# Patient Record
Sex: MALE | Race: WHITE | NOT HISPANIC OR LATINO | Employment: OTHER | URBAN - METROPOLITAN AREA
[De-identification: names, ages, dates, MRNs, and addresses within clinical notes are randomized per-mention and may not be internally consistent; named-entity substitution may affect disease eponyms.]

---

## 2017-01-04 ENCOUNTER — TELEPHONE (OUTPATIENT)
Dept: ENDOCRINOLOGY | Facility: CLINIC | Age: 67
End: 2017-01-04

## 2017-01-04 NOTE — TELEPHONE ENCOUNTER
Pt is requesting a refill of the below medication. Pt prefers [facility] in [place].      metFORMIN (GLUCOPHAGE) 1000 MG tablet 180 tablet 3         Si tab twice daily with meals

## 2017-01-06 NOTE — TELEPHONE ENCOUNTER
Pharmacy confirmed they have the order from 9-27-16 for 90 days with 3 refills and will prepare for the patient today. Family was notified that there are refills available.      Kathleen M Doege RN

## 2017-02-02 ENCOUNTER — OFFICE VISIT (OUTPATIENT)
Dept: OTOLARYNGOLOGY | Facility: CLINIC | Age: 67
End: 2017-02-02

## 2017-02-02 ENCOUNTER — OFFICE VISIT (OUTPATIENT)
Dept: AUDIOLOGY | Facility: CLINIC | Age: 67
End: 2017-02-02

## 2017-02-02 VITALS — BODY MASS INDEX: 30.46 KG/M2 | HEIGHT: 68 IN | WEIGHT: 201 LBS

## 2017-02-02 DIAGNOSIS — M26.609 TMJ (TEMPOROMANDIBULAR JOINT SYNDROME): Primary | ICD-10-CM

## 2017-02-02 DIAGNOSIS — H90.5 SNHL (SENSORINEURAL HEARING LOSS): ICD-10-CM

## 2017-02-02 DIAGNOSIS — L29.9 EAR ITCH: ICD-10-CM

## 2017-02-02 DIAGNOSIS — H90.3 SENSORY HEARING LOSS, BILATERAL: Primary | ICD-10-CM

## 2017-02-02 DIAGNOSIS — H92.09 EAR PAIN, UNSPECIFIED LATERALITY: Primary | ICD-10-CM

## 2017-02-02 DIAGNOSIS — H92.03 EAR PAIN, BILATERAL: ICD-10-CM

## 2017-02-02 ASSESSMENT — PAIN SCALES - GENERAL: PAINLEVEL: NO PAIN (0)

## 2017-02-02 NOTE — NURSING NOTE
Chief Complaint   Patient presents with     RECHECK     bilateral ear pain. Left worse than right.     Simone Dudley LPN

## 2017-02-02 NOTE — MR AVS SNAPSHOT
After Visit Summary   2/2/2017    Earle Rene    MRN: [id number]           Patient Information     Date Of Birth          1950        Visit Information        Provider Department      2/2/2017 2:30 PM Gio Dempsey MD [facility] Ear Nose and Throat        Today's Diagnoses     TMJ (temporomandibular joint syndrome)    -  1     SNHL (sensorineural hearing loss)         Ear itch         Ear pain, bilateral           Care Instructions    The patient presents with a history of pain in the area of the temporomandibular joints and ears.  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  The patient will be referred to Dental Specialist for evaluation of his temporomandibular joint disorder and he will be referred for a CT scan of the temporal bones. He will be seen again after this testing is completed. He will use acetic acid/vinegar/alcohol ear rinses for management of his ear itching.        Follow-ups after your visit        Additional Services     DENTAL REFERRAL       Consult TMJ dental specialists for evaluation and management                  Future tests that were ordered for you today     Open Future Orders        Priority Expected Expires Ordered    CT Temporal orbital sella w/o contrast Routine  2/2/2018 2/2/2017            Who to contact     Please call your clinic at [phone] to:    Ask questions about your health    Make or cancel appointments    Discuss your medicines    Learn about your test results    Speak to your doctor   If you have compliments or concerns about an experience at your clinic, or if you wish to file a complaint, please contact [facility] Patient Relations at [phone] or email us at [email or web address]         Additional Information About Your Visit        MyChart Information     Pro[employer] TV is an electronic gateway that provides easy, online access to your medical records. With Pro[employer] TV, you can  "request a clinic appointment, read your test results, renew a prescription or communicate with your care team.     To sign up for Early[employer] visit the website at [email or web address]   You will be asked to enter the access code listed below, as well as some personal information. Please follow the directions to create your username and password.     Your access code is: [email or web address]  Expires: 5/3/2017  3:03 PM     Your access code will  in 90 days. If you need help or a new code, please contact your [facility] or call [phone] for assistance.        Care EveryWhere ID     This is your Care EveryWhere ID. This could be used by other organizations to access your [facility] medical records  [id number]        Your Vitals Were     Height BMI (Body Mass Index)                1.73 m (5' 8.11\") 30.46 kg/m2           Blood Pressure from Last 3 Encounters:   16 129/66   16 117/75   16 138/79    Weight from Last 3 Encounters:   17 91.173 kg (201 lb)   16 90.81 kg (200 lb 3.2 oz)   16 90.946 kg (200 lb 8 oz)              We Performed the Following     DENTAL REFERRAL        Primary Care Provider Office Phone # Fax #    Marcio Hare [phone]       [address] [address]        Thank you!     Thank you for choosing [facility] EAR NOSE AND THROAT  for your care. Our goal is always to provide you with excellent care. Hearing back from our patients is one way we can continue to improve our services. Please take a few minutes to complete the written survey that you may receive in the mail after your visit with us. Thank you!             Your Updated Medication List - Protect others around you: Learn how to safely use, store and throw away your medicines at www.disposemymeds.org.          This list is accurate as of: 17  3:03 PM.  Always use your most recent med list.                   " "Brand Name Dispense Instructions for use    alprostadil 20 MCG kit    EDEX    6 each    by Intracavitary route. Inject 3/4 ml (15 ug) as instructed.  Can increase to full dose of 1 ml (20 ug) if necessary.   Can dispense 6 kits.       amoxicillin-clavulanate 875-125 MG per tablet    AUGMENTIN    20 tablet    Take 1 tablet by mouth 2 times daily       aspirin 81 MG tablet      Take 1 tablet by mouth daily.       atorvastatin 20 MG tablet    LIPITOR    90 tablet    TAKE 1 TABLET BY MOUTH DAILY       Blood Pressure Monitor Kit     1 kit    Check blood pressure as directed.       clobetasol 0.05 % ointment    TEMOVATE    30 g    Apply topically to legs twice daily for 2 weeks.  Then discontinue       fenofibrate 54 MG tablet     90 tablet    Take 1 tablet (54 mg) by mouth daily       gabapentin 100 MG capsule    NEURONTIN    180 capsule    Take 1 capsule (100 mg) by mouth 3 times daily       insulin glargine 100 UNIT/ML injection    LANTUS SOLOSTAR    45 mL    Inject 60 Units Subcutaneous At Bedtime       insulin pen needle 31G X 5 MM    B-D U/F    400 each    Use 4 times per day.  Please dispense as BD Pen Needle Mini U/F 31G x 5 MM       insulin syringe 31G X 5/16\" 0.5 ML Misc     270 each    Use three syringes daily       loratadine 10 MG tablet    CLARITIN    90 tablet    Take 1 tablet (10 mg) by mouth daily       losartan 25 MG tablet    COZAAR    90 tablet    Take 1 tablet (25 mg) by mouth daily       metFORMIN 1000 MG tablet    GLUCOPHAGE    180 tablet    1 tab twice daily with meals       mupirocin 2 % cream    BACTROBAN    15 g    Apply  topically. In very small amounts only as needed       * NovoLOG FLEXPEN 100 UNIT/ML injection   Generic drug:  insulin aspart     60 mL    Inject 8 -14 units SQ with meals with use of correction insulin. Pt uses approx 60 units in 24 hrs.       * insulin aspart 100 UNITS/ML injection    NovoLOG    60 mL    Inject 8 -14 units SQ with meals with use of correction insulin. Pt uses " approx 60 units in 24 hrs.       Omega-3 Fish Oil 1000 MG Caps     60 capsule    Take 1 capsule (1 g) by mouth 2 times daily       ONE TOUCH ULTRA test strip   Generic drug:  blood glucose monitoring     200 strip    Test twice daily or as directed       pantoprazole 40 MG EC tablet    PROTONIX    90 tablet    Take 1 tablet by mouth daily.       tacrolimus 0.03 % ointment    PROTOPIC    60 g    Apply to affected area(s) twice daily       tamsulosin 0.4 MG capsule    FLOMAX    90 capsule    Take 1 capsule (0.4 mg) by mouth daily       * Notice:  This list has 2 medication(s) that are the same as other medications prescribed for you. Read the directions carefully, and ask your doctor or other care provider to review them with you.

## 2017-02-02 NOTE — PATIENT INSTRUCTIONS
The patient presents with a history of pain in the area of the temporomandibular joints and ears.  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  The patient will be referred to Dental Specialist for evaluation of his temporomandibular joint disorder and he will be referred for a CT scan of the temporal bones. He will be seen again after this testing is completed. He will use acetic acid/vinegar/alcohol ear rinses for management of his ear itching.

## 2017-02-02 NOTE — PROGRESS NOTES
"The patient presents with a history of pain in the area anterior to and around his ears. He also has a history of sensorineural hearing loss. The patient has discomfort with wide extension of the mouth.  He denies dysphagia or odynophagia.  The patient denies sinusitis, rhinitis, facial pain, nasal obstruction or purulent nasal discharge. The patient denies chronic or recurrent tonsillitis, chronic or recurrent pharyngitis. The patient denies otorrhea, eustachian tube dysfunction, ear infections, dizziness or tinnitus. He reports itching of the ears. His Audiogram and Tympanogram are reviewed and they demonstrates sensorineural hearing loss bilateral that is moderate and sloping. The patient's word recognition scores are 100% bilaterally. His tympanograms are normal.     This patient is seen in consultation at the request of Dr. Marcio Hare.    All other systems were reviewed and they are either negative or they are not directly pertinent to this Otolaryngology examination.      Past Medical History:    Past Medical History   Diagnosis Date     Diabetes (H)      Sarcoidosis of lung (H)      Peripheral neuropathy (H)      Nonsenile cataract      Dry eye syndrome      Blepharitis of both eyes        Past Surgical History:    Past Surgical History   Procedure Laterality Date     Surgical pathology exam       Colonoscopy  7/29/2013     Procedure: COLONOSCOPY;;  Surgeon: Montana Pascal MD;  Location:  GI       Medications:      Current outpatient prescriptions:      insulin pen needle (B-D U/F) 31G X 5 MM, Use 4 times per day.  Please dispense as BD Pen Needle Mini U/F 31G x 5 MM, Disp: 400 each, Rfl: 3     losartan (COZAAR) 25 MG tablet, Take 1 tablet (25 mg) by mouth daily, Disp: 90 tablet, Rfl: 3     insulin syringe 31G X 5/16\" 0.5 ML MISC, Use three syringes daily, Disp: 270 each, Rfl: 1     insulin glargine (LANTUS SOLOSTAR) 100 UNIT/ML PEN, Inject 60 Units Subcutaneous At Bedtime, Disp: 45 mL, Rfl: 1     " insulin aspart (NOVOLOG) 100 UNITS/ML VIAL, Inject 8 -14 units SQ with meals with use of correction insulin. Pt uses approx 60 units in 24 hrs., Disp: 60 mL, Rfl: 1     metFORMIN (GLUCOPHAGE) 1000 MG tablet, 1 tab twice daily with meals, Disp: 180 tablet, Rfl: 3     amoxicillin-clavulanate (AUGMENTIN) 875-125 MG per tablet, Take 1 tablet by mouth 2 times daily, Disp: 20 tablet, Rfl: 0     atorvastatin (LIPITOR) 20 MG tablet, TAKE 1 TABLET BY MOUTH DAILY, Disp: 90 tablet, Rfl: 3     tamsulosin (FLOMAX) 0.4 MG 24 hr capsule, Take 1 capsule (0.4 mg) by mouth daily, Disp: 90 capsule, Rfl: 3     gabapentin (NEURONTIN) 100 MG capsule, Take 1 capsule (100 mg) by mouth 3 times daily, Disp: 180 capsule, Rfl: 5     NOVOLOG FLEXPEN 100 UNIT/ML soln, Inject 8 -14 units SQ with meals with use of correction insulin. Pt uses approx 60 units in 24 hrs., Disp: 60 mL, Rfl: 3     Omega-3 Fatty Acids (OMEGA-3 FISH OIL) 1000 MG CAPS, Take 1 capsule (1 g) by mouth 2 times daily, Disp: 60 capsule, Rfl: 11     ONE TOUCH ULTRA test strip, Test twice daily or as directed, Disp: 200 strip, Rfl: 3     fenofibrate 54 MG tablet, Take 1 tablet (54 mg) by mouth daily, Disp: 90 tablet, Rfl: 0     loratadine (CLARITIN) 10 MG tablet, Take 1 tablet (10 mg) by mouth daily, Disp: 90 tablet, Rfl: 0     pantoprazole (PROTONIX) 40 MG enteric coated tablet, Take 1 tablet by mouth daily., Disp: 90 tablet, Rfl: 3     clobetasol (TEMOVATE) 0.05 % ointment, Apply topically to legs twice daily for 2 weeks.  Then discontinue, Disp: 30 g, Rfl: 0     tacrolimus (PROTOPIC) 0.03 % ointment, Apply to affected area(s) twice daily, Disp: 60 g, Rfl: 0     Blood Pressure Monitor KIT, Check blood pressure as directed., Disp: 1 kit, Rfl: 0     mupirocin (BACTROBAN) 2 % cream, Apply  topically. In very small amounts only as needed, Disp: 15 g, Rfl: 1     alprostadil (EDEX) 20 MCG injection, by Intracavitary route. Inject 3/4 ml (15 ug) as instructed.  Can increase to full  dose of 1 ml (20 ug) if necessary.   Can dispense 6 kits., Disp: 6 each, Rfl: 12     aspirin 81 MG tablet, Take 1 tablet by mouth daily., Disp: , Rfl:     Allergies:    Review of patient's allergies indicates no known allergies.    Physical Examination:    The patient is a well developed, well nourished male in no apparent distress.  He is normocephalic, atraumatic with pupils equally round and reactive to light.    Oral Cavity Examination:  Normal mucosa with no masses or lesions  Nasal Examination: Normal mucosa with no masses or lesions  Ear Examination: Ear canals clear, tympanic membranes and middle ear spaces normal  Neurological Examination: Facial nerve function intact and symmetric  Integumentary Examination: No lesions on the skin of the head and neck  Neck Examination: No masses or lesions, no lymphadenopathy  Endocrine Examination: Normal thyroid examination  TMJ Examination:  Malrotation in the area of the temporomandibular joints bilaterally.  There is reproducible pain at the site of the TM joints bilaterally.    Assessment and Plan:    The patient presents with a history of pain in the area of the temporomandibular joints and ears.  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  The patient will be referred to Dental Specialist for evaluation of his temporomandibular joint disorder and he will be referred for a CT scan of the temporal bones. He will be seen again after this testing is completed. He will use acetic acid/vinegar/alcohol ear rinses for management of his ear itching.    CC: Dr. Marcio Hare

## 2017-02-02 NOTE — Clinical Note
2/2/2017       RE: Earle Rene  [address]  [facility] [facility] [address]     Dear Colleague,    Thank you for referring your patient, Earle Rene, to the [facility] TH[facility] at [facility]. Please see a copy of my visit note below.    The patient presents with a history of pain in the area anterior to and around his ears. He also has a history of sensorineural hearing loss. The patient has discomfort with wide extension of the mouth.  He denies dysphagia or odynophagia.  The patient denies sinusitis, rhinitis, facial pain, nasal obstruction or purulent nasal discharge. The patient denies chronic or recurrent tonsillitis, chronic or recurrent pharyngitis. The patient denies otorrhea, eustachian tube dysfunction, ear infections, dizziness or tinnitus. He reports itching of the ears. His Audiogram and Tympanogram are reviewed and they demonstrates sensorineural hearing loss bilateral that is moderate and sloping. The patient's word recognition scores are 100% bilaterally. His tympanograms are normal.     This patient is seen in consultation at the request of Dr. Marcio Hare.    All other systems were reviewed and they are either negative or they are not directly pertinent to this Otolaryngology examination.      Past Medical History:    Past Medical History   Diagnosis Date     Diabetes (H)      Sarcoidosis of lung (H)      Peripheral neuropathy (H)      Nonsenile cataract      Dry eye syndrome      Blepharitis of both eyes        Past Surgical History:    Past Surgical History   Procedure Laterality Date     Surgical pathology exam       Colonoscopy  7/29/2013     Procedure: COLONOSCOPY;;  Surgeon: Montana Pascal MD;  Location:  GI       Medications:      Current outpatient prescriptions:      insulin pen needle (B-D U/F) 31G X 5 MM, Use 4 times per day.  Please dispense as BD Pen Needle Mini U/F 31G x 5 MM, Disp: 400 each, Rfl: 3     losartan (COZAAR) 25 MG tablet,  "Take 1 tablet (25 mg) by mouth daily, Disp: 90 tablet, Rfl: 3     insulin syringe 31G X 5/16\" 0.5 ML MISC, Use three syringes daily, Disp: 270 each, Rfl: 1     insulin glargine (LANTUS SOLOSTAR) 100 UNIT/ML PEN, Inject 60 Units Subcutaneous At Bedtime, Disp: 45 mL, Rfl: 1     insulin aspart (NOVOLOG) 100 UNITS/ML VIAL, Inject 8 -14 units SQ with meals with use of correction insulin. Pt uses approx 60 units in 24 hrs., Disp: 60 mL, Rfl: 1     metFORMIN (GLUCOPHAGE) 1000 MG tablet, 1 tab twice daily with meals, Disp: 180 tablet, Rfl: 3     amoxicillin-clavulanate (AUGMENTIN) 875-125 MG per tablet, Take 1 tablet by mouth 2 times daily, Disp: 20 tablet, Rfl: 0     atorvastatin (LIPITOR) 20 MG tablet, TAKE 1 TABLET BY MOUTH DAILY, Disp: 90 tablet, Rfl: 3     tamsulosin (FLOMAX) 0.4 MG 24 hr capsule, Take 1 capsule (0.4 mg) by mouth daily, Disp: 90 capsule, Rfl: 3     gabapentin (NEURONTIN) 100 MG capsule, Take 1 capsule (100 mg) by mouth 3 times daily, Disp: 180 capsule, Rfl: 5     NOVOLOG FLEXPEN 100 UNIT/ML soln, Inject 8 -14 units SQ with meals with use of correction insulin. Pt uses approx 60 units in 24 hrs., Disp: 60 mL, Rfl: 3     Omega-3 Fatty Acids (OMEGA-3 FISH OIL) 1000 MG CAPS, Take 1 capsule (1 g) by mouth 2 times daily, Disp: 60 capsule, Rfl: 11     ONE TOUCH ULTRA test strip, Test twice daily or as directed, Disp: 200 strip, Rfl: 3     fenofibrate 54 MG tablet, Take 1 tablet (54 mg) by mouth daily, Disp: 90 tablet, Rfl: 0     loratadine (CLARITIN) 10 MG tablet, Take 1 tablet (10 mg) by mouth daily, Disp: 90 tablet, Rfl: 0     pantoprazole (PROTONIX) 40 MG enteric coated tablet, Take 1 tablet by mouth daily., Disp: 90 tablet, Rfl: 3     clobetasol (TEMOVATE) 0.05 % ointment, Apply topically to legs twice daily for 2 weeks.  Then discontinue, Disp: 30 g, Rfl: 0     tacrolimus (PROTOPIC) 0.03 % ointment, Apply to affected area(s) twice daily, Disp: 60 g, Rfl: 0     Blood Pressure Monitor KIT, Check blood " pressure as directed., Disp: 1 kit, Rfl: 0     mupirocin (BACTROBAN) 2 % cream, Apply  topically. In very small amounts only as needed, Disp: 15 g, Rfl: 1     alprostadil (EDEX) 20 MCG injection, by Intracavitary route. Inject 3/4 ml (15 ug) as instructed.  Can increase to full dose of 1 ml (20 ug) if necessary.   Can dispense 6 kits., Disp: 6 each, Rfl: 12     aspirin 81 MG tablet, Take 1 tablet by mouth daily., Disp: , Rfl:     Allergies:    Review of patient's allergies indicates no known allergies.    Physical Examination:    The patient is a well developed, well nourished male in no apparent distress.  He is normocephalic, atraumatic with pupils equally round and reactive to light.    Oral Cavity Examination:  Normal mucosa with no masses or lesions  Nasal Examination: Normal mucosa with no masses or lesions  Ear Examination: Ear canals clear, tympanic membranes and middle ear spaces normal  Neurological Examination: Facial nerve function intact and symmetric  Integumentary Examination: No lesions on the skin of the head and neck  Neck Examination: No masses or lesions, no lymphadenopathy  Endocrine Examination: Normal thyroid examination  TMJ Examination:  Malrotation in the area of the temporomandibular joints bilaterally.  There is reproducible pain at the site of the TM joints bilaterally.    Assessment and Plan:    The patient presents with a history of pain in the area of the temporomandibular joints and ears.  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  The patient will be referred to Dental Specialist for evaluation of his temporomandibular joint disorder and he will be referred for a CT scan of the temporal bones. He will be seen again after this testing is completed. He will use acetic acid/vinegar/alcohol ear rinses for management of his ear itching.    CC: Dr. Marcio Hare    Again, thank you for allowing me to participate in the care of your patient.      Sincerely,    Gio  KASIE Dempsey MD

## 2017-02-02 NOTE — PROGRESS NOTES
AUDIOLOGY REPORT    SUMMARY: Audiology visit completed. See audiogram for results.      RECOMMENDATIONS: Follow-up with ENT.    Dante Booth.  Licensed Audiologist  [place] #[id number]

## 2017-02-19 DIAGNOSIS — E11.40 TYPE 2 DIABETES MELLITUS WITH DIABETIC NEUROPATHY (H): ICD-10-CM

## 2017-02-20 RX ORDER — INSULIN GLARGINE 100 [IU]/ML
INJECTION, SOLUTION SUBCUTANEOUS
Qty: 30 ML | Refills: 2 | Status: SHIPPED | OUTPATIENT
Start: 2017-02-20 | End: 2017-07-18

## 2017-04-27 DIAGNOSIS — E78.5 HYPERLIPIDEMIA LDL GOAL <100: ICD-10-CM

## 2017-04-27 RX ORDER — ATORVASTATIN CALCIUM 20 MG/1
20 TABLET, FILM COATED ORAL DAILY
Qty: 30 TABLET | Refills: 0 | Status: SHIPPED | OUTPATIENT
Start: 2017-04-27 | End: 2017-08-14

## 2017-04-27 NOTE — TELEPHONE ENCOUNTER
atorvastatin (LIPITOR) 20 MG tablet  Last Written Prescription Date:  3/17/16  Last Fill Quantity: 90,   # refills: 3  Last Office Visit with [facility], P or [facility] prescribing provider: 3/16/16  Future Office visit:   None    appt letter sent.    30 tabs to pharmacy.

## 2017-04-27 NOTE — LETTER
[facility]  [address]  [address] [place] [address]      April 27, 2017      Earle Rene  [address] [address]        Dear Earle,    This letter is a reminder that you are overdue to see your Primary Care Provider for an Annual Visit and needed labs. You must be seen by your Primary Care Provider on a yearly basis and have appropriate labs drawn for continued care and prescription refills. Please call [phone] to schedule an appointment for an Annual Visit with Dr Payam ZELAYA.       You have been given a 30 day supply/refill of your atorvastatin (LIPITOR) 20 MG tablet   while you get your clinic visit/labs completed.      Regards,    Primary Care Center

## 2017-05-02 DIAGNOSIS — E11.21 TYPE 2 DIABETES MELLITUS WITH DIABETIC NEPHROPATHY (H): ICD-10-CM

## 2017-05-02 RX ORDER — INSULIN ASPART 100 [IU]/ML
INJECTION, SOLUTION INTRAVENOUS; SUBCUTANEOUS
Qty: 60 ML | Refills: 3 | Status: SHIPPED | OUTPATIENT
Start: 2017-05-02 | End: 2018-02-05

## 2017-05-04 ENCOUNTER — TELEPHONE (OUTPATIENT)
Dept: UROLOGY | Facility: CLINIC | Age: 67
End: 2017-05-04

## 2017-05-04 DIAGNOSIS — N40.1 BENIGN NON-NODULAR PROSTATIC HYPERPLASIA WITH LOWER URINARY TRACT SYMPTOMS: Primary | ICD-10-CM

## 2017-05-04 RX ORDER — TAMSULOSIN HYDROCHLORIDE 0.4 MG/1
0.4 CAPSULE ORAL DAILY
Qty: 30 CAPSULE | Refills: 0 | Status: SHIPPED | OUTPATIENT
Start: 2017-05-04 | End: 2017-06-04

## 2017-05-04 NOTE — TELEPHONE ENCOUNTER
----- Message from Luisa Infante sent at 5/4/2017  9:19 AM CDT -----  Regarding: Weight/ Rx refill  Contact: [phone]  Type of call: Medication Refill       Medication: FLOMAX  Pharmacy:[id number] - [place], [place] - [address] [facility] [address]  Notes: pt has NO supplies left, has nahomy scheduled with Weight 6/15/17  Patients call back number: [phone]    Thank you,  [facility]

## 2017-05-23 ENCOUNTER — OFFICE VISIT (OUTPATIENT)
Dept: OPHTHALMOLOGY | Facility: CLINIC | Age: 67
End: 2017-05-23
Attending: OPHTHALMOLOGY
Payer: MEDICARE

## 2017-05-23 DIAGNOSIS — H52.203 MYOPIA WITH ASTIGMATISM AND PRESBYOPIA, BILATERAL: ICD-10-CM

## 2017-05-23 DIAGNOSIS — E11.3299 NON-PROLIFERATIVE DIABETIC RETINOPATHY (H): ICD-10-CM

## 2017-05-23 DIAGNOSIS — G51.0 FACIAL NERVE PALSY: Primary | ICD-10-CM

## 2017-05-23 DIAGNOSIS — H26.9 CATARACT OF BOTH EYES: ICD-10-CM

## 2017-05-23 DIAGNOSIS — H52.4 MYOPIA WITH ASTIGMATISM AND PRESBYOPIA, BILATERAL: ICD-10-CM

## 2017-05-23 DIAGNOSIS — H52.13 MYOPIA WITH ASTIGMATISM AND PRESBYOPIA, BILATERAL: ICD-10-CM

## 2017-05-23 PROCEDURE — 99212 OFFICE O/P EST SF 10 MIN: CPT | Mod: 25

## 2017-05-23 PROCEDURE — 68761 CLOSE TEAR DUCT OPENING: CPT | Mod: ZF | Performed by: OPHTHALMOLOGY

## 2017-05-23 ASSESSMENT — REFRACTION_WEARINGRX
SPECS_TYPE: PAL
OS_AXIS: 033
OD_AXIS: 157
OS_CYLINDER: +0.75
OD_ADD: +2.50
OS_ADD: +2.50
OD_CYLINDER: +1.50
OD_SPHERE: -2.50
OS_SPHERE: -2.50

## 2017-05-23 ASSESSMENT — TONOMETRY
IOP_METHOD: ICARE
OS_IOP_MMHG: 18
OD_IOP_MMHG: 19

## 2017-05-23 ASSESSMENT — EXTERNAL EXAM - RIGHT EYE: OD_EXAM: NORMAL

## 2017-05-23 ASSESSMENT — VISUAL ACUITY
OS_CC+: -2
OD_CC: 20/25
OS_CC: 20/40
METHOD: SNELLEN - LINEAR
OD_CC+: -3
CORRECTION_TYPE: GLASSES

## 2017-05-23 ASSESSMENT — CONF VISUAL FIELD
OS_NORMAL: 1
OD_NORMAL: 1

## 2017-05-23 ASSESSMENT — CUP TO DISC RATIO
OS_RATIO: 0.2
OD_RATIO: 0.2

## 2017-05-23 ASSESSMENT — SLIT LAMP EXAM - LIDS: COMMENTS: NORMAL

## 2017-05-23 NOTE — PROGRESS NOTES
CC: Comprehensive Exam    HPI: Earle Rene is a 67 year old male who presents for acute visit. He has had a foreign body sensation, itching, and tearing in his left eye for the past 3 days. In reviewing his chart, he is diabetic and has not had a dilated fundus exam here since 5/2015. He says he has not gone elsewhere. His last A1C was 9.7%  9/22/2016. He is on insulin.    POHx:  None    Current Eye Medications:   None    Assessment & Plan   Earle Rene is a 67 year old male with the following diagnoses:     1. Facial Nerve Palsy, Left Side   - exam is significant for incomplete blink, lower lid laxity, and inferior corneal epithelial irregularity   - patient reports acute onset of severe pain a couple months ago on left side, was seen by ENT who suspected TMJ but also noted sensorineural hearing loss   - this possibly could have been a microvascular nerve palsy given his risk factors, has sarcoid ?neurosarcoid, herpes zoster, or another etiology, will notify primary team   - place punctal plug today   - begin artificial tears QID OS   - may benefit from lateral tarsal strip with mini tarsorhaphy to tighten lax left lower eyelid    2. Non Proliferative Diabetic Retinopathy   - exam significant for MAs and exudates in both macula   - will obtain OCT at next visit to evaluate for diabetic macular edema    3. 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  Myopia with Astigmatism, Presbyopia   - will hold on glasses  prescription for now as he considers cataract surgery and until surface improves     Return 4 weeks with OCT OU, possible IOL calcs OU  Seen and discussed with Dr. Maki,    Rafael Alba MD PGY-3  Ophthalmology        Attending Physician Attestation: Complete documentation of historical and exam elements from today's encounter can be found in the full encounter summary report (not reduplicated in this progress note). I personally obtained the chief complaint(s) and history of present illness.  I confirmed and edited as necessary the review of systems, past medical/surgical history, family history, social history, and examination findings as documented by others; and I examined the patient myself. I personally reviewed the relevant tests, images, and reports as documented above. I formulated and edited as necessary the assessment and plan and discussed the findings and management plan with the patient and family. - Milton Maki MD  5/25/2017   Attending Physician Image/Tesing Attestation: I have personally view and interpreted all testing/images as documented in imaging/procedures   Attending Physician Procedure Attestation: I was present for the entire procedure

## 2017-05-23 NOTE — MR AVS SNAPSHOT
After Visit Summary   5/23/2017    Earle Rene    MRN: [id number]           Patient Information     Date Of Birth          1950        Visit Information        Provider Department      5/23/2017 10:00 AM Rafael Alba MD [facility]         Follow-ups after your visit        Follow-up notes from your care team     Return in about 4 weeks (around 6/20/2017) for recheck surface OS, DM, Cats.      Your next 10 appointments already scheduled     Jun 07, 2017  9:05 AM CDT   (Arrive by 8:50 AM)   Return Visit with Marcio Hare MD   [facility] ([facility])    [address] [place] [id number] [phone]            Reji 15, 2017  9:40 AM CDT   (Arrive by 9:25 AM)   Return Visit with Emmanuel Cantu MD   [facility] ([facility])    [address] [place] [id number] [phone]            Jun 20, 2017  9:00 AM CDT   RETURN GENERAL with Rafael Alba MD   Eye Clinic ([facility])    [address]  [address] [facility]  [address]  [facility] [id number] [phone]              Who to contact     Please call your clinic at [phone] to:    Ask questions about your health    Make or cancel appointments    Discuss your medicines    Learn about your test results    Speak to your doctor   If you have compliments or concerns about an experience at your clinic, or if you wish to file a complaint, please contact [facility] Patient Relations at [phone] or email us at [email or web address]         Additional Information About Your Visit        MyChart Information     [employer] is an electronic gateway that provides easy, online access to your medical records. With EXO5, you can request a clinic appointment, read your test results, renew a  prescription or communicate with your care team.     To sign up for [employer] visit the website at [email or web address]   You will be asked to enter the access code listed below, as well as some personal information. Please follow the directions to create your username and password.     Your access code is: [email or web address]  Expires: 2017 11:59 AM     Your access code will  in 90 days. If you need help or a new code, please contact your [facility] or call [phone] for assistance.        Care EveryWhere ID     This is your Care EveryWhere ID. This could be used by other organizations to access your [facility] medical records  [id number]         Blood Pressure from Last 3 Encounters:   16 129/66   16 117/75   16 138/79    Weight from Last 3 Encounters:   17 91.2 kg (201 lb)   16 90.8 kg (200 lb 3.2 oz)   16 90.9 kg (200 lb 8 oz)              Today, you had the following     No orders found for display       Primary Care Provider Office Phone # Fax #    Marcio Hare [phone]       [address] [address]        Thank you!     Thank you for choosing [facility]  for your care. Our goal is always to provide you with excellent care. Hearing back from our patients is one way we can continue to improve our services. Please take a few minutes to complete the written survey that you may receive in the mail after your visit with us. Thank you!             Your Updated Medication List - Protect others around you: Learn how to safely use, store and throw away your medicines at www.disposemymeds.org.          This list is accurate as of: 17 11:59 AM.  Always use your most recent med list.                   Brand Name Dispense Instructions for use    alprostadil 20 MCG kit    EDEX    6 each    by Intracavitary route. Inject 3/4 ml (15 ug) as instructed.  Can increase to full dose of  "1 ml (20 ug) if necessary.   Can dispense 6 kits.       amoxicillin-clavulanate 875-125 MG per tablet    AUGMENTIN    20 tablet    Take 1 tablet by mouth 2 times daily       aspirin 81 MG tablet      Take 1 tablet by mouth daily.       atorvastatin 20 MG tablet    LIPITOR    30 tablet    Take 1 tablet (20 mg) by mouth daily       Blood Pressure Monitor Kit     1 kit    Check blood pressure as directed.       clobetasol 0.05 % ointment    TEMOVATE    30 g    Apply topically to legs twice daily for 2 weeks.  Then discontinue       fenofibrate 54 MG tablet     90 tablet    Take 1 tablet (54 mg) by mouth daily       fish oil-omega-3 fatty acids 1000 MG capsule     60 capsule    Take 1 capsule (1 g) by mouth 2 times daily       gabapentin 100 MG capsule    NEURONTIN    180 capsule    Take 1 capsule (100 mg) by mouth 3 times daily       * insulin glargine 100 UNIT/ML injection    LANTUS SOLOSTAR    45 mL    Inject 60 Units Subcutaneous At Bedtime       * LANTUS SOLOSTAR 100 UNIT/ML injection   Generic drug:  insulin glargine     30 mL    INJECT 60 UNITS SUBCUTANEOUS EVERY NIGHT AT BEDTIME       insulin pen needle 31G X 5 MM    B-D U/F    400 each    Use 4 times per day.  Please dispense as BD Pen Needle Mini U/F 31G x 5 MM       insulin syringe 31G X 5/16\" 0.5 ML Misc     270 each    Use three syringes daily       loratadine 10 MG tablet    CLARITIN    90 tablet    Take 1 tablet (10 mg) by mouth daily       losartan 25 MG tablet    COZAAR    90 tablet    Take 1 tablet (25 mg) by mouth daily       metFORMIN 1000 MG tablet    GLUCOPHAGE    180 tablet    1 tab twice daily with meals       mupirocin 2 % cream    BACTROBAN    15 g    Apply  topically. In very small amounts only as needed       NovoLOG FLEXPEN 100 UNIT/ML injection   Generic drug:  insulin aspart     60 mL    Inject 8 -14 units SQ with meals with use of correction insulin. Pt uses approx 60 units in 24 hrs.       ONE TOUCH ULTRA test strip   Generic drug:  " blood glucose monitoring     200 strip    Test twice daily or as directed       pantoprazole 40 MG EC tablet    PROTONIX    90 tablet    Take 1 tablet by mouth daily.       tacrolimus 0.03 % ointment    PROTOPIC    60 g    Apply to affected area(s) twice daily       tamsulosin 0.4 MG capsule    FLOMAX    30 capsule    Take 1 capsule (0.4 mg) by mouth daily       * Notice:  This list has 2 medication(s) that are the same as other medications prescribed for you. Read the directions carefully, and ask your doctor or other care provider to review them with you.

## 2017-05-23 NOTE — NURSING NOTE
Chief Complaints and History of Present Illnesses   Patient presents with     Eye Problem Both Eyes     HPI    Affected eye(s):  Left   Symptoms:     Decreased vision   No floaters   No flashes   Foreign body sensation   Tearing   Itching      Frequency:  Constant       Do you have eye pain now?:  Yes   Location:  OS   Pain Level:  Severe Pain (6)   Pain Duration:  3 days   Pain Frequency:  Constant   Pain Characteristics:  Stabbing, Aching      Comments:  Patient is a type 2 insulin dependent diabetic.  He does not check B/S regularly.  Last A1C, about 6 mos ago, was 9.  He c/o pain, FB sensation, tearing, itching and decreased VA OS for the last 3 days.  He has tried using artificial tears with no results.    DAJUAN Solorzano, Student  [employer]  10:33 AM05/23/2017

## 2017-05-23 NOTE — Clinical Note
Deven Hare and Pamela,  I saw Mr. Rene in the eye clinic today for vision changes in his left eye. On my exam, I found an CNVII palsy and incomplete blink causing ocular surface issues. He describes left ear pain in February for which he saw ENT who said it was TMJ but also noted sensory hearing loss. He could have had a painful microvascular insult or zoster but none-the-less wanted to bring this to your attention. He is scheduled with you 6/7.  On his fundus exam, he does now have signs of new diabetic retinopathy.  We will bring him back in 4 weeks to evaluate these issues. He is also considering possible cataract surgery.  Thank you,  Gilbert

## 2017-05-25 ENCOUNTER — TELEPHONE (OUTPATIENT)
Dept: OPHTHALMOLOGY | Facility: CLINIC | Age: 67
End: 2017-05-25

## 2017-05-25 NOTE — TELEPHONE ENCOUNTER
Left eye incomplete blink  Pt last seen Tuesday  punctal plug placed and started artificial tears  Pt using tears 5-6 times/day  Pt still having complaints of eye pain, burning, and tearing   Pt not able to come in for evaluation today    Recommended changing to preservative free version of artificial tears every one-two hours, start lubricating eye ointment at night.  May use ointment during day.  Will likely blur vision     Pt would like to review with dr. Alba    Note to dr. Alba for review of plan of care--  I will contact pt after reviewing with dr. Parth Anne RN 9:35 AM 05/25/17

## 2017-05-25 NOTE — TELEPHONE ENCOUNTER
Reviewed with pt dr. Alba comment about silicone plug irritation improving over next 1 day or 2  Offered appt for evaluation and pt unable this week.  Encouraged frequent lubrication and to use lubricating eye ointment at night-- during day pt may use, but will blur vision  Eye tearing which is blurring vision more today  Asked pt to call if symptoms are worsening  Miles Anne RN 11:33 AM 05/25/17

## 2017-05-25 NOTE — TELEPHONE ENCOUNTER
----- Message from Alia King sent at 5/25/2017  8:54 AM CDT -----  Regarding: Continued pain and tearing   Contact: [phone]  Pt is requesting a call back. He saw Dr. Alba on Tuesday for eye pain and tearing and those symptoms are still going on. Please call pt back to advise.    Thank you!    KI    Please DO NOT send this message and/or reply back to sender.  Call Center Representatives DO NOT respond to messages.

## 2017-05-27 DIAGNOSIS — G51.0 BELL'S PALSY: Primary | ICD-10-CM

## 2017-05-27 RX ORDER — VALACYCLOVIR HYDROCHLORIDE 1 G/1
1000 TABLET, FILM COATED ORAL 3 TIMES DAILY
Qty: 21 TABLET | Refills: 0 | Status: SHIPPED | OUTPATIENT
Start: 2017-05-27 | End: 2017-06-13

## 2017-05-27 RX ORDER — PREDNISONE 20 MG/1
60 TABLET ORAL DAILY
Qty: 21 TABLET | Refills: 0 | Status: SHIPPED | OUTPATIENT
Start: 2017-05-27 | End: 2017-06-03

## 2017-05-28 NOTE — PROGRESS NOTES
Brief Telephone Note    Mr. Rene called me this evening. He described a slowly progressive L facial weakness over the past few weeks associated with poor eyelid closure. 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 He was prescribed artifical tears but no steroids or anti-virals. The symptoms have not significantly progressed since that visit. He denies issues swallowing, speaking, breathing, or with weakness in his extremities.    Based on this information, I am prescribing Mr. Rene Prednisone 60mg PO QDay x7 days and Valacyclovir 1g PO TID x7 days for a presumed Bell's Palsy (based on his description may be House-Brackman Grade  IV or V). I would also like to have him re-imaged as his last scan was in 2014 for hearing loss which was unremarkable and he's had significant changes since then. We will get thin slices through the 7th nerve for improved sensitivity. He should also continue artificial tears and protecting his eye at night. I will send a message to Dr. Mayorga and Mr. Rene will call his office on Monday. I informed mr. Rene that he should present to the ED or immediately call EMS should he develop symptoms of a stroke (risk factors: DMT2).    Gio Nathan MD  Neurology PGY2

## 2017-06-02 ENCOUNTER — CARE COORDINATION (OUTPATIENT)
Dept: NEUROLOGY | Facility: CLINIC | Age: 67
End: 2017-06-02

## 2017-06-02 NOTE — PROGRESS NOTES
Patient is hoping to get an appointment with Dr. Mayorga soon. He was recently diagnosed with bells palsy. Was prescribed prednisone and Valtrex which seem to be helping somewhat. Is also having issues with his neuropathy. I explained to him that I will have someone from the [facility] location contact him to schedule. Patient verbalized understanding and agreement with plan. Message sent to [facility] office to help schedule patient.

## 2017-06-04 DIAGNOSIS — N40.1 BENIGN NON-NODULAR PROSTATIC HYPERPLASIA WITH LOWER URINARY TRACT SYMPTOMS: ICD-10-CM

## 2017-06-05 ENCOUNTER — TELEPHONE (OUTPATIENT)
Dept: UROLOGY | Facility: CLINIC | Age: 67
End: 2017-06-05

## 2017-06-05 RX ORDER — TAMSULOSIN HYDROCHLORIDE 0.4 MG/1
CAPSULE ORAL
Qty: 90 CAPSULE | Refills: 0 | Status: SHIPPED | OUTPATIENT
Start: 2017-06-05 | End: 2017-09-15

## 2017-06-06 ENCOUNTER — OFFICE VISIT (OUTPATIENT)
Dept: INTERNAL MEDICINE | Facility: CLINIC | Age: 67
End: 2017-06-06

## 2017-06-06 VITALS
WEIGHT: 195.3 LBS | BODY MASS INDEX: 29.6 KG/M2 | HEART RATE: 72 BPM | DIASTOLIC BLOOD PRESSURE: 85 MMHG | SYSTOLIC BLOOD PRESSURE: 137 MMHG

## 2017-06-06 DIAGNOSIS — R73.09 INCREASED GLUCOSE LEVEL: Primary | ICD-10-CM

## 2017-06-06 DIAGNOSIS — R10.32 GROIN PAIN, LEFT: ICD-10-CM

## 2017-06-06 DIAGNOSIS — R73.09 INCREASED GLUCOSE LEVEL: ICD-10-CM

## 2017-06-06 DIAGNOSIS — Z12.5 ENCOUNTER FOR SCREENING FOR MALIGNANT NEOPLASM OF PROSTATE: ICD-10-CM

## 2017-06-06 LAB
ALBUMIN SERPL-MCNC: 3.5 G/DL (ref 3.4–5)
ALBUMIN UR-MCNC: >499 MG/DL
ALP SERPL-CCNC: 70 U/L (ref 40–150)
ALT SERPL W P-5'-P-CCNC: 49 U/L (ref 0–70)
ANION GAP SERPL CALCULATED.3IONS-SCNC: 10 MMOL/L (ref 3–14)
APPEARANCE UR: ABNORMAL
AST SERPL W P-5'-P-CCNC: 20 U/L (ref 0–45)
BASOPHILS # BLD AUTO: 0 10E9/L (ref 0–0.2)
BASOPHILS NFR BLD AUTO: 0.3 %
BILIRUB SERPL-MCNC: 0.7 MG/DL (ref 0.2–1.3)
BILIRUB UR QL STRIP: NEGATIVE
BUN SERPL-MCNC: 21 MG/DL (ref 7–30)
CALCIUM SERPL-MCNC: 9.2 MG/DL (ref 8.5–10.1)
CHLORIDE SERPL-SCNC: 108 MMOL/L (ref 94–109)
CO2 SERPL-SCNC: 24 MMOL/L (ref 20–32)
COLOR UR AUTO: YELLOW
CREAT SERPL-MCNC: 1.26 MG/DL (ref 0.66–1.25)
DIFFERENTIAL METHOD BLD: ABNORMAL
EOSINOPHIL # BLD AUTO: 0.2 10E9/L (ref 0–0.7)
EOSINOPHIL NFR BLD AUTO: 2 %
ERYTHROCYTE [DISTWIDTH] IN BLOOD BY AUTOMATED COUNT: 13.4 % (ref 10–15)
GFR SERPL CREATININE-BSD FRML MDRD: 57 ML/MIN/1.7M2
GLUCOSE SERPL-MCNC: 96 MG/DL (ref 70–99)
GLUCOSE UR STRIP-MCNC: >499 MG/DL
HBA1C MFR BLD: 10.2 % (ref 4.3–6)
HCT VFR BLD AUTO: 46.2 % (ref 40–53)
HGB BLD-MCNC: 15.8 G/DL (ref 13.3–17.7)
HGB UR QL STRIP: NEGATIVE
IMM GRANULOCYTES # BLD: 0.1 10E9/L (ref 0–0.4)
IMM GRANULOCYTES NFR BLD: 1.3 %
KETONES UR STRIP-MCNC: NEGATIVE MG/DL
LEUKOCYTE ESTERASE UR QL STRIP: NEGATIVE
LYMPHOCYTES # BLD AUTO: 6.7 10E9/L (ref 0.8–5.3)
LYMPHOCYTES NFR BLD AUTO: 62.9 %
MCH RBC QN AUTO: 32.5 PG (ref 26.5–33)
MCHC RBC AUTO-ENTMCNC: 34.2 G/DL (ref 31.5–36.5)
MCV RBC AUTO: 95 FL (ref 78–100)
MONOCYTES # BLD AUTO: 0.8 10E9/L (ref 0–1.3)
MONOCYTES NFR BLD AUTO: 7.5 %
MUCOUS THREADS #/AREA URNS LPF: PRESENT /LPF
NEUTROPHILS # BLD AUTO: 2.8 10E9/L (ref 1.6–8.3)
NEUTROPHILS NFR BLD AUTO: 26 %
NITRATE UR QL: NEGATIVE
NRBC # BLD AUTO: 0 10*3/UL
NRBC BLD AUTO-RTO: 0 /100
PH UR STRIP: 5 PH (ref 5–7)
PLATELET # BLD AUTO: 170 10E9/L (ref 150–450)
POTASSIUM SERPL-SCNC: 3.9 MMOL/L (ref 3.4–5.3)
PROT SERPL-MCNC: 6.6 G/DL (ref 6.8–8.8)
PSA SERPL-ACNC: 8.64 UG/L (ref 0–4)
RBC # BLD AUTO: 4.86 10E12/L (ref 4.4–5.9)
RBC #/AREA URNS AUTO: 2 /HPF (ref 0–2)
SODIUM SERPL-SCNC: 141 MMOL/L (ref 133–144)
SP GR UR STRIP: 1.02 (ref 1–1.03)
URN SPEC COLLECT METH UR: ABNORMAL
UROBILINOGEN UR STRIP-MCNC: 0 MG/DL (ref 0–2)
WBC # BLD AUTO: 10.7 10E9/L (ref 4–11)
WBC #/AREA URNS AUTO: 2 /HPF (ref 0–2)

## 2017-06-06 ASSESSMENT — PAIN SCALES - GENERAL: PAINLEVEL: NO PAIN (0)

## 2017-06-06 NOTE — MR AVS SNAPSHOT
After Visit Summary   6/6/2017    Earle Rene    MRN: [id number]           Patient Information     Date Of Birth          1950        Visit Information        Provider Department      6/6/2017 9:35 AM Marcio Hare MD [facility]        Today's Diagnoses     Increased glucose level    -  1    Encounter for screening for malignant neoplasm of prostate         Groin pain, left           Follow-ups after your visit        Additional Services     ORTHOPEDICS ADULT REFERRAL       Hip and groin pain                  Your next 10 appointments already scheduled     Jun 06, 2017 10:45 AM CDT   LAB with  LAB   [facility]    [address] [place] [address] [phone]           Patient must bring picture ID.  Patient should be prepared to give a urine specimen  Please do not eat 10-12 hours before your appointment if you are coming in fasting for labs on lipids, cholesterol, or glucose (sugar).  Pregnant women should follow their Care Team instructions. Water with medications is okay. Do not drink coffee or other fluids.   If you have concerns about taking  your medications, please ask at office or if scheduling via [employer], send a message by clicking on Secure Messaging, Message Your Care Team.            Jun 06, 2017  4:00 PM CDT   CT ABDOMEN PELVIS W/O CONTRAST with UCCT1   [facility] [place] [facility] ([facility])    [address] [place] [address] [phone]           Please bring any scans or X-rays taken at other hospitals, if similar tests were done. Also bring a list of your medicines, including vitamins, minerals and over-the-counter drugs. It is safest to leave personal items at home.  Be sure to tell your doctor:   If you have any allergies.   If there s any chance you are pregnant.   If you are breastfeeding.   If you have any special needs.  You  do not need to do anything special to prepare.  Please wear loose clothing, such as a sweat suit or jogging clothes. Avoid snaps, zippers and other metal. We may ask you to undress and put on a hospital gown.            Jun 08, 2017  1:00 PM CDT   (Arrive by 12:45 PM)   MR BRAIN W/O & W CONTRAST with [address] MRI ([facility])    [address]  1st Floor  [facility] [address] [phone]           Take your medicines as usual, unless your doctor tells you not to. Bring a list of your current medicines to your exam (including vitamins, minerals and over-the-counter drugs).  You will be given intravenous contrast for this exam. To prepare:   The day before your exam, drink extra fluids at least six 8-ounce glasses (unless your doctor tells you to restrict your fluids).   Have a blood test (creatinine test) within 30 days of your exam. Go to your clinic or Diagnostic Imaging Department for this test.  The MRI machine uses a strong magnet. Please wear clothes without metal (snaps, zippers). A sweatsuit works well, or we may give you a hospital gown.  Please remove any body piercings and hair extensions before you arrive. You will also remove watches, jewelry, hairpins, wallets, dentures, partial dental plates and hearing aids. You may wear contact lenses, and you may be able to wear your rings. We have a safe place to keep your personal items, but it is safer to leave them at home.   **IMPORTANT** THE INSTRUCTIONS BELOW ARE ONLY FOR THOSE PATIENTS WHO HAVE BEEN TOLD THEY WILL RECEIVE SEDATION OR GENERAL ANESTHESIA DURING THEIR MRI PROCEDURE:  IF YOU WILL RECEIVE SEDATION (take medicine to help you relax during your exam):   You must get the medicine from your doctor before you arrive. Bring the medicine to the exam. Do not take it at home.   Arrive one hour early. Bring someone who can take you home after the test. Your medicine will make you sleepy. After  the exam, you may not drive, take a bus or take a taxi by yourself.   No eating 8 hours before your exam. You may have clear liquids up until 4 hours before your exam. (Clear liquids include water, clear tea, black coffee and fruit juice without pulp.)  IF YOU WILL RECEIVE ANESTHESIA (be asleep for your exam):   Arrive 1 1/2 hours early. Bring someone who can take you home after the test. You may not drive, take a bus or take a taxi by yourself.   No eating 8 hours before your exam. You may have clear liquids up until 4 hours before your exam. (Clear liquids include water, clear tea, black coffee and fruit juice without pulp.)  Please call the Imaging Department at your exam site with any questions.            Jun 12, 2017  9:45 AM CDT   (Arrive by 9:30 AM)   New Patient Visit with Silvano Rowe MD   [facility] ([facility])    [address]  [address]th [facility] [address]   [phone]            Jun 13, 2017 10:35 AM CDT   (Arrive by 10:20 AM)   Return Visit with Marcio Hare MD   [facility] ([facility])    [address]  [address]th [facility] [address]   [phone]            Reji 15, 2017  9:40 AM CDT   (Arrive by 9:25 AM)   Return Visit with Emmanuel Cantu MD   [facility] ([facility])    [address]  [address]th [facility] [address]   [phone]            Jun 20, 2017  9:00 AM CDT   RETURN GENERAL with Rafael Alba MD   [facility] Clinic ([facility])    [address]  [address]  9th [address] Clin [address]  [facility] [address]   [phone]            Jun 28, 2017 12:30 PM CDT   New Patient Visit with Fabricio Mayorga MD   [facility] ([facility])    [address], [address]  [facility] [address]    [phone]              Future tests that were ordered for you today     Open Future Orders        Priority Expected Expires Ordered    UA with Micro reflex to Culture Routine 2017    PSA screen Routine 2017    Hemoglobin A1c Routine 2017    Comprehensive metabolic panel Routine 2017    CBC with platelets differential Routine 2017            Who to contact     Please call your clinic at [phone] to:    Ask questions about your health    Make or cancel appointments    Discuss your medicines    Learn about your test results    Speak to your doctor   If you have compliments or concerns about an experience at your clinic, or if you wish to file a complaint, please contact [facility] Patient Relations at [phone] or email us at [email or web address]         Additional Information About Your Visit        [employer] Information     [employer] is an electronic gateway that provides easy, online access to your medical records. With [employer], you can request a clinic appointment, read your test results, renew a prescription or communicate with your care team.     To sign up for [employer] visit the website at [email or web address]   You will be asked to enter the access code listed below, as well as some personal information. Please follow the directions to create your username and password.     Your access code is: [email or web address]  Expires: 2017 11:59 AM     Your access code will  in 90 days. If you need help or a new code, please contact your [facility] or call [phone] for assistance.        Care EveryWhere ID     This is your Care EveryWhere ID. This could be used by other organizations to access your [facility] medical records  [id number]        Your Vitals Were     Pulse BMI (Body Mass Index)                72 29.6 kg/m2            Blood Pressure from Last 3 Encounters:   06/06/17 137/85   11/14/16 129/66   09/22/16 117/75    Weight from Last 3 Encounters:   06/06/17 88.6 kg (195 lb 4.8 oz)   02/02/17 91.2 kg (201 lb)   11/14/16 90.8 kg (200 lb 3.2 oz)              We Performed the Following     CT Abdomen/Pelvis w/o contrast     ORTHOPEDICS ADULT REFERRAL        Primary Care Provider Office Phone # Fax #    Marcio Hare [phone]       [facility] [address] [address]        Thank you!     Thank you for choosing [facility]  for your care. Our goal is always to provide you with excellent care. Hearing back from our patients is one way we can continue to improve our services. Please take a few minutes to complete the written survey that you may receive in the mail after your visit with us. Thank you!             Your Updated Medication List - Protect others around you: Learn how to safely use, store and throw away your medicines at www.disposemymeds.org.          This list is accurate as of: 6/6/17 10:22 AM.  Always use your most recent med list.                   Brand Name Dispense Instructions for use    alprostadil 20 MCG kit    EDEX    6 each    by Intracavitary route. Inject 3/4 ml (15 ug) as instructed.  Can increase to full dose of 1 ml (20 ug) if necessary.   Can dispense 6 kits.       amoxicillin-clavulanate 875-125 MG per tablet    AUGMENTIN    20 tablet    Take 1 tablet by mouth 2 times daily       aspirin 81 MG tablet      Take 1 tablet by mouth daily.       atorvastatin 20 MG tablet    LIPITOR    30 tablet    Take 1 tablet (20 mg) by mouth daily       Blood Pressure Monitor Kit     1 kit    Check blood pressure as directed.       clobetasol 0.05 % ointment    TEMOVATE    30 g    Apply topically to legs twice daily for 2 weeks.  Then discontinue       fenofibrate 54 MG tablet     90 tablet    Take 1 tablet (54 mg) by mouth daily       fish oil-omega-3  "fatty acids 1000 MG capsule     60 capsule    Take 1 capsule (1 g) by mouth 2 times daily       gabapentin 100 MG capsule    NEURONTIN    180 capsule    Take 1 capsule (100 mg) by mouth 3 times daily       * insulin glargine 100 UNIT/ML injection    LANTUS SOLOSTAR    45 mL    Inject 60 Units Subcutaneous At Bedtime       * LANTUS SOLOSTAR 100 UNIT/ML injection   Generic drug:  insulin glargine     30 mL    INJECT 60 UNITS SUBCUTANEOUS EVERY NIGHT AT BEDTIME       insulin pen needle 31G X 5 MM    B-D U/F    400 each    Use 4 times per day.  Please dispense as BD Pen Needle Mini U/F 31G x 5 MM       insulin syringe 31G X 5/16\" 0.5 ML Misc     270 each    Use three syringes daily       loratadine 10 MG tablet    CLARITIN    90 tablet    Take 1 tablet (10 mg) by mouth daily       losartan 25 MG tablet    COZAAR    90 tablet    Take 1 tablet (25 mg) by mouth daily       metFORMIN 1000 MG tablet    GLUCOPHAGE    180 tablet    1 tab twice daily with meals       mupirocin 2 % cream    BACTROBAN    15 g    Apply  topically. In very small amounts only as needed       NovoLOG FLEXPEN 100 UNIT/ML injection   Generic drug:  insulin aspart     60 mL    Inject 8 -14 units SQ with meals with use of correction insulin. Pt uses approx 60 units in 24 hrs.       ONE TOUCH ULTRA test strip   Generic drug:  blood glucose monitoring     200 strip    Test twice daily or as directed       pantoprazole 40 MG EC tablet    PROTONIX    90 tablet    Take 1 tablet by mouth daily.       tacrolimus 0.03 % ointment    PROTOPIC    60 g    Apply to affected area(s) twice daily       tamsulosin 0.4 MG capsule    FLOMAX    90 capsule    TAKE 1 CAPSULE(0.4 MG) BY MOUTH EVERY DAY       valACYclovir 1000 mg tablet    VALTREX    21 tablet    Take 1 tablet (1,000 mg) by mouth 3 times daily for 7 days       * Notice:  This list has 2 medication(s) that are the same as other medications prescribed for you. Read the directions carefully, and ask your doctor or " other care provider to review them with you.

## 2017-06-06 NOTE — LETTER
Patient:  Earle Rene  :   1950  MRN:     [id number]        Mr. Earle Rene  [address] [place] [address]        2017    Dear Mr. Rene,    Thank you for choosing the [facility] for your healthcare needs.  We appreciate the opportunity to serve you.    The following are your recent test results.     Dear Mr. Rene;     Your CT scan does not show any acute process; however, you have some lung nodules and I recommend we do a full chest CT scan to evaluate these further. I placed an order for this today and you can call [phone] to schedule this test     Results for orders placed or performed in visit on 17   CT Abdomen/Pelvis w/o contrast    Narrative    Exam: CT abdomen pelvis without contrast 2017 11:32 AM.    History: Left lower quadrant pain.    Comparison: Abdominal ultrasound dated 2016    Technique: CT images from the lung bases through the symphysis pubis  were obtained without IV contrast    Findings:   Abdomen/pelvis: Noncontrast evaluation of the liver, gallbladder,  spleen, pancreas and adrenal glands is unremarkable. Noncontrast  evaluation of the kidneys is within normal limits. No hydronephrosis  or hydroureter. The urinary bladder is partially distended without  focal mucosal thickening. The prostate is enlarged measuring up to 5.5  x 6.2 cm transaxial dimensions.    No focally dilated loops of bowel. The appendix is normal. No  pneumatosis, portal venous gas or pneumoperitoneum. Mild patient  motion noted. Moderate colonic stool burden. No intra-abdominal,  retroperitoneal or inguinal lymphadenopathy by size criteria.  Atherosclerotic calcifications of the abdominal aorta without  aneurysmal dilatation.    Bones and soft tissues: Multilevel degenerative changes of the spine  with compression endplate deformity involving the superior, and to  lesser extent inferior, endplates of L4. This results in  approximately  40% vertebral body height loss. Hyperdensity along the iliac side of  the right sacroiliac joint is favored to represent a benign bone  island with additional bone islands throughout the left iliac crest.  No acute osseous abnormality or suspicious osseous lesions. Incomplete  posterior element fusion of the lumbosacral spine incidentally noted.    Lung bases: Partially calcified 4 mm nodule inferior left upper lobe  (series 2 image 1). 2 mm nodule small inferior right upper lobe  (series 2 image 1) with central calcification. The lung bases are  without focal consolidation, pleural effusion or pneumothorax. The  heart is enlarged. There is no pericardial effusion.      Impression    Impression:   1. Negative noncontrast CT without findings to explain the patient's  left lower quadrant pain.  2. Compression deformity of the L4 vertebral body resulting in  approximately 40-50% vertebral body height loss.  3. Prostatomegaly    I have personally reviewed the examination and initial interpretation  and I agree with the findings.    CAMILA MICHELLE MD       Please contact your provider if you have any questions or concerns.  We look forward to serving your needs in the future.      Sincerely,    MOISES Hare MD

## 2017-06-06 NOTE — PROGRESS NOTES
HPI   Pt. with h/o DM2, increased , HTN comes in for follow up today.  He saw  kathy Hickman 9/10/15 for DM2.   No SOB, No rest/exertional CP. He does not smoke nor abuse EtOH. He has B foot numbness complaints. He was seen by Dr. Mayorga Neurology for neuropathy 1/8/16.   He has decreased urinary stream w/o hematuria or dysuria.  No other HEENT, cardiopulmonary, abdominal, , neurological, systemic (no F/C/NS). Colonoscopy 7/13 repeat in 10 years.   PSA 9/9/14.  He was seen by Dr. Hand Cardiology 1/14. No lymphatic vascular complaints. He was seen by Dr. Cantu Urology 2/16 for BPH and elevated PSA. He had normal resting echo and Lexiscan 1/14/16. He had normal abdominal U/S for liver and spleen 2/2/16.       Physical Exam   Vitals noted gen nad cooperative alert, HEENT, ears normal oropahrynx clear no exudate neck supple nl rom no adenopathy, he has some problem with L eye closing, he can raise B eyebrows and B smiling,  LCTA, B, normal resp. system exam, good air movement,  RRR, S1, S2, no MRG, abdomen, nt nd no masses, normal neurological exam. He has diffuse mild erythema (no skin breakdown) on B Legs.  B. Neurological exam B UE/LE/proximal/distal is normal and symmetric for sensation, reflexes, and strength. Gait is normal. He has good B dorsalis pedis pulses.         A and P   1. RTHC; colonoscopy 2013 repeat in 10 years. Prevnar 13  (1/12/16).   2. Neuropathy; he follows with Neurology Dr. Mayorga seen 1/8/16.  3.DM2; seen by Pamela Laura 9/15; he was seen in optho. 5/15. Checked A1C and urine microalbumin today and referred back to Kathy  4. HTN; stable.  5.  Fasting lipids for increased cholesterol   6.  He was seen by Dr. Cantu  Urology  for possible BPH elevated PSA; recheck UA and PSA today  7. Lexiscan and resting echo are normal.  8. Normal abdominal U/S for low platelets  9. Will get CT abdomen/pelvis for L groin/hip pain; ortho referral  10. L sided Bell's palsy, he has less sxs. After  taking Prednisone or Valtrex. Neurology ordered 5/27/17 MRI brain and I recommend this. L ear exam is clear  He saw Dr. Dempsey, ENT 2/17 as well for possible L-sided TMJ    I will see him back in about 2 weeks.       Total time spent 25 minutes.  More than 50% of the time spent with Mr. Rene on counseling / coordinating his care

## 2017-06-08 ENCOUNTER — TELEPHONE (OUTPATIENT)
Dept: INTERNAL MEDICINE | Facility: CLINIC | Age: 67
End: 2017-06-08

## 2017-06-08 ENCOUNTER — HOSPITAL ENCOUNTER (OUTPATIENT)
Dept: MRI IMAGING | Facility: CLINIC | Age: 67
Discharge: HOME | End: 2017-06-08
Attending: PSYCHIATRY & NEUROLOGY | Admitting: PSYCHIATRY & NEUROLOGY
Payer: MEDICARE

## 2017-06-08 DIAGNOSIS — G51.0 BELL'S PALSY: ICD-10-CM

## 2017-06-08 DIAGNOSIS — R73.09 INCREASED GLUCOSE LEVEL: Primary | ICD-10-CM

## 2017-06-08 DIAGNOSIS — R91.8 PULMONARY NODULES: Primary | ICD-10-CM

## 2017-06-08 PROCEDURE — A9585 GADOBUTROL INJECTION: HCPCS | Performed by: PSYCHIATRY & NEUROLOGY

## 2017-06-08 PROCEDURE — 70553 MRI BRAIN STEM W/O & W/DYE: CPT

## 2017-06-08 PROCEDURE — 25000128 H RX IP 250 OP 636: Performed by: PSYCHIATRY & NEUROLOGY

## 2017-06-08 RX ORDER — GADOBUTROL 604.72 MG/ML
10 INJECTION INTRAVENOUS ONCE
Status: COMPLETED | OUTPATIENT
Start: 2017-06-08 | End: 2017-06-08

## 2017-06-08 RX ADMIN — GADOBUTROL 10 ML: 604.72 INJECTION INTRAVENOUS at 12:28

## 2017-06-08 NOTE — TELEPHONE ENCOUNTER
Sent pt. Results letter and placed future CT chest for lung nodules this encounter. Quoc      Dear Mr. Rene;    Your CT scan does not show any acute process; however, you have some lung nodules and I recommend we do a full chest CT scan to evaluate these further. I placed an order for this today and you can call [phone] to schedule this test    MOISES Hare

## 2017-06-12 ENCOUNTER — PRE VISIT (OUTPATIENT)
Dept: UROLOGY | Facility: CLINIC | Age: 67
End: 2017-06-12

## 2017-06-13 ENCOUNTER — OFFICE VISIT (OUTPATIENT)
Dept: INTERNAL MEDICINE | Facility: CLINIC | Age: 67
End: 2017-06-13

## 2017-06-13 VITALS
SYSTOLIC BLOOD PRESSURE: 141 MMHG | HEART RATE: 77 BPM | RESPIRATION RATE: 18 BRPM | BODY MASS INDEX: 30.16 KG/M2 | WEIGHT: 199 LBS | DIASTOLIC BLOOD PRESSURE: 83 MMHG

## 2017-06-13 DIAGNOSIS — H53.8 BLURRED VISION, LEFT EYE: Primary | ICD-10-CM

## 2017-06-13 DIAGNOSIS — R10.32 GROIN PAIN, LEFT: ICD-10-CM

## 2017-06-13 ASSESSMENT — PAIN SCALES - GENERAL: PAINLEVEL: SEVERE PAIN (6)

## 2017-06-13 NOTE — PROGRESS NOTES
HPI   Pt. with h/o DM2, increased , HTN comes in for follow up today.  He saw  kathy Hickman 9/10/15 for DM2.   No SOB, No rest/exertional CP. He does not smoke nor abuse EtOH. He has B foot numbness complaints. He was seen by Dr. Mayorga Neurology for neuropathy 1/8/16.   He has decreased urinary stream w/o hematuria or dysuria.  No other HEENT, cardiopulmonary, abdominal, , neurological, systemic (no F/C/NS). Colonoscopy 7/13 repeat in 10 years.   PSA 9/9/14.  He was seen by Dr. Hand Cardiology 1/14. No lymphatic vascular complaints. He was seen by Dr. Cantu Urology 2/16 for BPH and elevated PSA. He had normal resting echo and Lexiscan 1/14/16. He had normal abdominal U/S for liver and spleen 2/2/16.       Physical Exam   Vitals noted gen nad cooperative alert, HEENT, ears normal oropahrynx clear no exudate neck supple nl rom no adenopathy, he has some problem with L eye closing, he can raise B eyebrows and B smiling,  LCTA, B, normal resp. system exam, good air movement,  RRR, S1, S2, no MRG, abdomen, nt nd no masses, normal neurological exam. He has diffuse mild erythema (no skin breakdown) on B Legs.  B. Neurological exam B UE/LE/proximal/distal is normal and symmetric for sensation, reflexes, and strength. Gait is normal. He has good B dorsalis pedis pulses.         A and P   1. RTHC; colonoscopy 2013 repeat in 10 years. Prevnar 13  (1/12/16).   2. Neuropathy; he follows with Neurology Dr. Mayorga seen 1/8/16.  3.DM2; seen by Pamela Laura 9/15; he was seen in optho. 5/15. Checked A1C and urine microalbumin today and referred back to Kathy on 6/8/17 may be due to steroid use.   4. HTN; stable.  5.  Fasting lipids for increased cholesterol   6.  He was seen by Dr. Cantu  Urology  for possible BPH elevated PSA; recheck UA and PSA last visit and elevated and referred back to Dr. Cantu and he has appt. 6/15/17  7. Lexiscan and resting echo are normal.  8. Normal abdominal U/S for low platelets  9.   CT abdomen/pelvis for L groin/hip pain; ortho referral again today 6/13/17.  10. L sided Guthrie's palsy, he has less sxs. After taking Prednisone or Valtrex. Neurology ordered MRI brain done 6/8/17 stable. Referred back to Optho today to be possibly sooner than 6/20/17 because of blurring vision L eye.  He saw Dr. Dempsey, ENT 2/17 as well for possible L-sided TMJ  11. Full CT chest scan ordered 6/8/17 for lung nodules      I will see him back in 8/17      Total time spent 25 minutes.  More than 50% of the time spent with Mr. Rene on counseling / coordinating his care

## 2017-06-13 NOTE — MR AVS SNAPSHOT
After Visit Summary   6/13/2017    Earle Rene    MRN: [id number]           Patient Information     Date Of Birth          1950        Visit Information        Provider Department      6/13/2017 10:35 AM Marcio Hare MD [facility]        Today's Diagnoses     Blurred vision, left eye    -  1    Groin pain, left          Care Instructions    Ophthalmology [phone] ([facility], 4th Floor S)   U Ortho [phone] ([facility], 4th Floor N)             Follow-ups after your visit        Additional Services     OPHTHALMOLOGY ADULT REFERRAL       L eye blurring from Bell's palsy needs to be seen before 6/20/17            ORTHOPEDICS ADULT REFERRAL       L hip and groin pain                  Your next 10 appointments already scheduled     Reji 15, 2017  9:40 AM CDT   (Arrive by 9:25 AM)   Return Visit with Emmanuel Cantu MD   [facility] and [facility] for Prostate and Urologic Cancers ([facility])    [address]  4th Floor  [facility] [id number] [phone]            Jun 20, 2017  9:00 AM CDT   RETURN GENERAL with Rafael Alba MD   Eye Clinic ([facility])    [address]  [address]  9th [address] Clin 9a  [facility] [address] [phone]            Jun 28, 2017 12:30 PM CDT   New Patient Visit with Fabricio Mayorga MD   [facility] ([facility])    [address], [address]  [facility] [address] [phone]              Who to contact     Please call your clinic at [phone] to:    Ask questions about your health    Make or cancel appointments    Discuss your medicines    Learn about your test results    Speak to your doctor   If you have compliments or concerns about an experience at your clinic, or if you wish to file a complaint, please contact [facility] Patient Relations at [phone] or email us at  [email or web address]         Additional Information About Your Visit        [email or web address] Information     AdLemonst is an electronic gateway that provides easy, online access to your medical records. With Apps & Zerts, you can request a clinic appointment, read your test results, renew a prescription or communicate with your care team.     To sign up for Apps & Zerts visit the website at [email or web address]   You will be asked to enter the access code listed below, as well as some personal information. Please follow the directions to create your username and password.     Your access code is: [email or web address]  Expires: 2017 11:59 AM     Your access code will  in 90 days. If you need help or a new code, please contact your [facility] or call [phone] for assistance.        Care EveryWhere ID     This is your Care EveryWhere ID. This could be used by other organizations to access your [facility] medical records  [id number]        Your Vitals Were     Pulse Respirations BMI (Body Mass Index)             77 18 30.16 kg/m2          Blood Pressure from Last 3 Encounters:   17 141/83   17 137/85   16 129/66    Weight from Last 3 Encounters:   17 90.3 kg (199 lb)   17 88.6 kg (195 lb 4.8 oz)   17 91.2 kg (201 lb)              We Performed the Following     OPHTHALMOLOGY ADULT REFERRAL     ORTHOPEDICS ADULT REFERRAL        Primary Care Provider Office Phone # Fax #    Marcio Hare [phone]       [address] [address]        Thank you!     Thank you for choosing [facility]  for your care. Our goal is always to provide you with excellent care. Hearing back from our patients is one way we can continue to improve our services. Please take a few minutes to complete the written survey that you may receive in the mail after your visit with us. Thank you!            "  Your Updated Medication List - Protect others around you: Learn how to safely use, store and throw away your medicines at www.disposemymeds.org.          This list is accurate as of: 6/13/17 11:04 AM.  Always use your most recent med list.                   Brand Name Dispense Instructions for use    alprostadil 20 MCG kit    EDEX    6 each    by Intracavitary route. Inject 3/4 ml (15 ug) as instructed.  Can increase to full dose of 1 ml (20 ug) if necessary.   Can dispense 6 kits.       aspirin 81 MG tablet      Take 1 tablet by mouth daily.       atorvastatin 20 MG tablet    LIPITOR    30 tablet    Take 1 tablet (20 mg) by mouth daily       Blood Pressure Monitor Kit     1 kit    Check blood pressure as directed.       clobetasol 0.05 % ointment    TEMOVATE    30 g    Apply topically to legs twice daily for 2 weeks.  Then discontinue       fenofibrate 54 MG tablet     90 tablet    Take 1 tablet (54 mg) by mouth daily       fish oil-omega-3 fatty acids 1000 MG capsule     60 capsule    Take 1 capsule (1 g) by mouth 2 times daily       gabapentin 100 MG capsule    NEURONTIN    180 capsule    Take 1 capsule (100 mg) by mouth 3 times daily       * insulin glargine 100 UNIT/ML injection    LANTUS SOLOSTAR    45 mL    Inject 60 Units Subcutaneous At Bedtime       * LANTUS SOLOSTAR 100 UNIT/ML injection   Generic drug:  insulin glargine     30 mL    INJECT 60 UNITS SUBCUTANEOUS EVERY NIGHT AT BEDTIME       insulin pen needle 31G X 5 MM    B-D U/F    400 each    Use 4 times per day.  Please dispense as BD Pen Needle Mini U/F 31G x 5 MM       insulin syringe 31G X 5/16\" 0.5 ML Misc     270 each    Use three syringes daily       loratadine 10 MG tablet    CLARITIN    90 tablet    Take 1 tablet (10 mg) by mouth daily       losartan 25 MG tablet    COZAAR    90 tablet    Take 1 tablet (25 mg) by mouth daily       metFORMIN 1000 MG tablet    GLUCOPHAGE    180 tablet    1 tab twice daily with meals       mupirocin 2 % cream "    BACTROBAN    15 g    Apply  topically. In very small amounts only as needed       NovoLOG FLEXPEN 100 UNIT/ML injection   Generic drug:  insulin aspart     60 mL    Inject 8 -14 units SQ with meals with use of correction insulin. Pt uses approx 60 units in 24 hrs.       ONE TOUCH ULTRA test strip   Generic drug:  blood glucose monitoring     200 strip    Test twice daily or as directed       pantoprazole 40 MG EC tablet    PROTONIX    90 tablet    Take 1 tablet by mouth daily.       tacrolimus 0.03 % ointment    PROTOPIC    60 g    Apply to affected area(s) twice daily       tamsulosin 0.4 MG capsule    FLOMAX    90 capsule    TAKE 1 CAPSULE(0.4 MG) BY MOUTH EVERY DAY       * Notice:  This list has 2 medication(s) that are the same as other medications prescribed for you. Read the directions carefully, and ask your doctor or other care provider to review them with you.

## 2017-06-13 NOTE — NURSING NOTE
Chief Complaint   Patient presents with     Results     Patient is here to discuss results from week ago     Margie Mcmanus CMA 10:39 AM on 6/13/2017.

## 2017-06-13 NOTE — PATIENT INSTRUCTIONS
Ophthalmology [phone] ([facility], 4th Floor S)   U Ortho [phone] ([facility], 4th Floor N)

## 2017-06-15 ENCOUNTER — OFFICE VISIT (OUTPATIENT)
Dept: UROLOGY | Facility: CLINIC | Age: 67
End: 2017-06-15

## 2017-06-15 VITALS
DIASTOLIC BLOOD PRESSURE: 67 MMHG | BODY MASS INDEX: 30.31 KG/M2 | WEIGHT: 200 LBS | HEART RATE: 99 BPM | HEIGHT: 68 IN | SYSTOLIC BLOOD PRESSURE: 125 MMHG

## 2017-06-15 DIAGNOSIS — C61 MALIGNANT NEOPLASM OF PROSTATE (H): Primary | ICD-10-CM

## 2017-06-15 ASSESSMENT — PAIN SCALES - GENERAL: PAINLEVEL: NO PAIN (0)

## 2017-06-15 NOTE — MR AVS SNAPSHOT
After Visit Summary   6/15/2017    Earle Rene    MRN: [id number]           Patient Information     Date Of Birth          1950        Visit Information        Provider Department      6/15/2017 9:40 AM Pepe, Emmanuel Boyle MD [facility] Urology and [facility] for Prostate and Urologic Cancers        Today's Diagnoses     Malignant neoplasm of prostate (H)    -  1      Care Instructions    Please schedule MRI of prostate followed by a prostate biopsy in clinic.    It was a pleasure meeting with you today.  Thank you for allowing me and my team the privilege of caring for you today.  YOU are the reason we are here, and I truly hope we provided you with the excellent service you deserve.  Please let us know if there is anything else we can do for you so that we can be sure you are leaving completely satisfied with your care experience.                  Follow-ups after your visit        Your next 10 appointments already scheduled     Jun 18, 2017  4:00 PM CDT   (Arrive by 3:45 PM)   MR PROSTATE with [id number]   [facility] MRI ([facility])    [address] [address] [phone]           Take your medicines as usual, unless your doctor tells you not to. Bring a list of your current medicines to your exam (including vitamins, minerals and over-the-counter drugs).  You will be given intravenous contrast for this exam. To prepare:   The day before your exam, drink extra fluids at least six 8-ounce glasses (unless your doctor tells you to restrict your fluids).   Have a blood test (creatinine test) within 30 days of your exam. Go to your clinic or Diagnostic Imaging Department for this test.  The MRI machine uses a strong magnet. Please wear clothes without metal (snaps, zippers). A sweatsuit works well, or we may give you a hospital gown.  Please remove any body piercings and hair extensions before you arrive. You will also remove  watches, jewelry, hairpins, wallets, dentures, partial dental plates and hearing aids. You may wear contact lenses, and you may be able to wear your rings. We have a safe place to keep your personal items, but it is safer to leave them at home.   **IMPORTANT** THE INSTRUCTIONS BELOW ARE ONLY FOR THOSE PATIENTS WHO HAVE BEEN TOLD THEY WILL RECEIVE SEDATION OR GENERAL ANESTHESIA DURING THEIR MRI PROCEDURE:  IF YOU WILL RECEIVE SEDATION (take medicine to help you relax during your exam):   You must get the medicine from your doctor before you arrive. Bring the medicine to the exam. Do not take it at home.   Arrive one hour early. Bring someone who can take you home after the test. Your medicine will make you sleepy. After the exam, you may not drive, take a bus or take a taxi by yourself.   No eating 8 hours before your exam. You may have clear liquids up until 4 hours before your exam. (Clear liquids include water, clear tea, black coffee and fruit juice without pulp.)  IF YOU WILL RECEIVE ANESTHESIA (be asleep for your exam):   Arrive 1 1/2 hours early. Bring someone who can take you home after the test. You may not drive, take a bus or take a taxi by yourself.   No eating 8 hours before your exam. You may have clear liquids up until 4 hours before your exam. (Clear liquids include water, clear tea, black coffee and fruit juice without pulp.)  Please call the Imaging Department at your exam site with any questions.            Jun 27, 2017  8:45 AM CDT   RETURN GENERAL with Rafael Alba MD   Eye Clinic ([facility])    [address]  [address] [address]  [facility] [address]   [phone]            Jun 28, 2017 12:30 PM CDT   New Patient Visit with Fabricio Mayorga MD   [facility] ([facility])    [address], [address]  [facility] [address] [phone]            Jul 27, 2017  8:40 AM CDT   (Arrive  "by 8:25 AM)   Sonography/Biopsy with Emmanuel Cantu MD   [facility] and [facility] for Prostate and Urologic Cancers ([facility])    [address]  4th [facility] [address] [phone]              Future tests that were ordered for you today     Open Future Orders        Priority Expected Expires Ordered    MR Prostate Routine  6/15/2018 6/15/2017            Who to contact     Please call your clinic at [phone] to:    Ask questions about your health    Make or cancel appointments    Discuss your medicines    Learn about your test results    Speak to your doctor   If you have compliments or concerns about an experience at your clinic, or if you wish to file a complaint, please contact [facility] Patient Relations at [phone] or email us at [email or web address]         Additional Information About Your Visit        UNYQ Information     UNYQ is an electronic gateway that provides easy, online access to your medical records. With UNYQ, you can request a clinic appointment, read your test results, renew a prescription or communicate with your care team.     To sign up for UNYQ visit the website at [email or web address]   You will be asked to enter the access code listed below, as well as some personal information. Please follow the directions to create your username and password.     Your access code is: [email or web address]  Expires: 2017 11:59 AM     Your access code will  in 90 days. If you need help or a new code, please contact your [facility] or call [phone] for assistance.        Care EveryWhere ID     This is your Care EveryWhere ID. This could be used by other organizations to access your [facility] medical records  [id number]        Your Vitals Were     Pulse Height BMI (Body Mass Index)             99 1.727 m (5' 8\") 30.41 kg/m2          Blood " Pressure from Last 3 Encounters:   06/15/17 125/67   06/13/17 141/83   06/06/17 137/85    Weight from Last 3 Encounters:   06/15/17 90.7 kg (200 lb)   06/13/17 90.3 kg (199 lb)   06/06/17 88.6 kg (195 lb 4.8 oz)               Primary Care Provider Office Phone # Fax #    Marcio Hare [phone]       [address] [address]        Thank you!     Thank you for choosing [facility] UROLOGY AND [facility] FOR PROSTATE AND UROLOGIC CANCERS  for your care. Our goal is always to provide you with excellent care. Hearing back from our patients is one way we can continue to improve our services. Please take a few minutes to complete the written survey that you may receive in the mail after your visit with us. Thank you!             Your Updated Medication List - Protect others around you: Learn how to safely use, store and throw away your medicines at www.disposemymeds.org.          This list is accurate as of: 6/15/17 10:49 AM.  Always use your most recent med list.                   Brand Name Dispense Instructions for use    alprostadil 20 MCG kit    EDEX    6 each    by Intracavitary route. Inject 3/4 ml (15 ug) as instructed.  Can increase to full dose of 1 ml (20 ug) if necessary.   Can dispense 6 kits.       aspirin 81 MG tablet      Take 1 tablet by mouth daily.       atorvastatin 20 MG tablet    LIPITOR    30 tablet    Take 1 tablet (20 mg) by mouth daily       Blood Pressure Monitor Kit     1 kit    Check blood pressure as directed.       clobetasol 0.05 % ointment    TEMOVATE    30 g    Apply topically to legs twice daily for 2 weeks.  Then discontinue       fenofibrate 54 MG tablet     90 tablet    Take 1 tablet (54 mg) by mouth daily       fish oil-omega-3 fatty acids 1000 MG capsule     60 capsule    Take 1 capsule (1 g) by mouth 2 times daily       gabapentin 100 MG capsule    NEURONTIN    180 capsule    Take 1 capsule (100 mg) by mouth 3 times daily       *  "insulin glargine 100 UNIT/ML injection    LANTUS SOLOSTAR    45 mL    Inject 60 Units Subcutaneous At Bedtime       * LANTUS SOLOSTAR 100 UNIT/ML injection   Generic drug:  insulin glargine     30 mL    INJECT 60 UNITS SUBCUTANEOUS EVERY NIGHT AT BEDTIME       insulin pen needle 31G X 5 MM    B-D U/F    400 each    Use 4 times per day.  Please dispense as BD Pen Needle Mini U/F 31G x 5 MM       insulin syringe 31G X 5/16\" 0.5 ML Misc     270 each    Use three syringes daily       loratadine 10 MG tablet    CLARITIN    90 tablet    Take 1 tablet (10 mg) by mouth daily       losartan 25 MG tablet    COZAAR    90 tablet    Take 1 tablet (25 mg) by mouth daily       metFORMIN 1000 MG tablet    GLUCOPHAGE    180 tablet    1 tab twice daily with meals       mupirocin 2 % cream    BACTROBAN    15 g    Apply  topically. In very small amounts only as needed       NovoLOG FLEXPEN 100 UNIT/ML injection   Generic drug:  insulin aspart     60 mL    Inject 8 -14 units SQ with meals with use of correction insulin. Pt uses approx 60 units in 24 hrs.       ONE TOUCH ULTRA test strip   Generic drug:  blood glucose monitoring     200 strip    Test twice daily or as directed       pantoprazole 40 MG EC tablet    PROTONIX    90 tablet    Take 1 tablet by mouth daily.       tacrolimus 0.03 % ointment    PROTOPIC    60 g    Apply to affected area(s) twice daily       tamsulosin 0.4 MG capsule    FLOMAX    90 capsule    TAKE 1 CAPSULE(0.4 MG) BY MOUTH EVERY DAY       * Notice:  This list has 2 medication(s) that are the same as other medications prescribed for you. Read the directions carefully, and ask your doctor or other care provider to review them with you.      "

## 2017-06-15 NOTE — PROGRESS NOTES
"67 year old male with history of LUTS (Lower Urinary Tract Symptoms) on Flomax by Dr. Guy for past several years    Symptoms have worsened over the past few years.    PSA   Date Value Ref Range Status   06/06/2017 8.64 (H) 0 - 4 ug/L Final     Comment:     Assay Method:  Chemiluminescence using Siemens Vista analyzer   01/14/2016 3.75 0 - 4 ug/L Final   09/09/2014 2.92 0 - 4 ug/L Final   06/12/2013 2.18 0 - 4 ug/L Final     Comment:     PSA results are about 7% lower than our prior method due to a methodology   change   on August 30, 2011.   02/02/2012 1.77 0 - 4 ug/L Final     Comment:     PSA results are about 7% lower than our prior method due to a methodology   change   on August 30, 2011.   03/08/2011 1.37 0 - 4 ug/L Final   08/10/2010 1.76 0 - 4 ug/L Final   04/20/2007 1.47 0 - 4 ug/L Final   08/17/2006 1.32 0 - 4 ug/L Final   05/27/2005 0.87 0 - 4 ug/L Final       PVR today was 150 or so in the past but no UTIs    Happy with urinary function on flomax    AYAH 1/25  Tried viagra 15 years ago, and did help, but hasn't tried     PE  /67  Pulse 99  Ht 1.727 m (5' 8\")  Wt 90.7 kg (200 lb)  BMI 30.41 kg/m2   Digital Rectal Exam: Prostate is smooth, anodular and symmetric, but somewhat firm      A/P LUTS (Lower Urinary Tract Symptoms) due to BPH and prostate cancer risk assessment    We had an exceedingly long discussion about the utility of PSA screening.  We talked about the Pros and the Cons.  We discussed the findings of the PLCO and EORTC studies.  We discussed the results of the Bill-[place] Scandinavian trial of treatment vs. Observation in clinically detected prostate cancer as well as the results of the PIVOT trial in the context of screen-detected cancer.  We discussed the recommendations by the AUA, ASC and USPTSF.  We also discussed some of the findings of the [facility] prostate observation study.    We also discussed the significant (2-6%) and rising risk of biopsy associated " sepsis.    We plugged his numbers into the PCPT individualized risk calculator and found   the risk of a negative biopsy is 65%  The risk of low risk cancer is 24%  The risk of intermediate to high risk is 20%    Patient is very high risk for PC and I strongly advised him to get a biopsy    He is very hesitant    I tried to talk through concerns that he had about infection, pain etc and put them in perspecitve that the chance of a complication form a biopsy is 7-10 fold less likely than the chance he has intermediate or high risk prostate cancer    Addendum:  MRI of prostate shows PIRADS 4 lesion 4-5 [place]ock left side      Emmanuel Cantu MD  Department of Urology Staff  [facility]

## 2017-06-15 NOTE — NURSING NOTE
Chief Complaint   Patient presents with     RECHECK     follow up elevated PSA     Chana Vasquez RN

## 2017-06-15 NOTE — LETTER
"6/15/2017       RE: Earle Rene  [address]  [place] [address]     Dear Colleague,    Thank you for referring your patient, Earle Rene, to the [facility] UROLOGY AND INST FOR PROSTATE AND UROLOGIC CANCERS at [facility]. Please see a copy of my visit note below.    67 year old male with history of LUTS (Lower Urinary Tract Symptoms) on Flomax by Dr. Guy for past several years    Symptoms have worsened over the past few years.    PSA   Date Value Ref Range Status   06/06/2017 8.64 (H) 0 - 4 ug/L Final     Comment:     Assay Method:  Chemiluminescence using Siemens Vista analyzer   01/14/2016 3.75 0 - 4 ug/L Final   09/09/2014 2.92 0 - 4 ug/L Final   06/12/2013 2.18 0 - 4 ug/L Final     Comment:     PSA results are about 7% lower than our prior method due to a methodology   change   on August 30, 2011.   02/02/2012 1.77 0 - 4 ug/L Final     Comment:     PSA results are about 7% lower than our prior method due to a methodology   change   on August 30, 2011.   03/08/2011 1.37 0 - 4 ug/L Final   08/10/2010 1.76 0 - 4 ug/L Final   04/20/2007 1.47 0 - 4 ug/L Final   08/17/2006 1.32 0 - 4 ug/L Final   05/27/2005 0.87 0 - 4 ug/L Final       PVR today was 150 or so in the past but no UTIs    Happy with urinary function on flomax    AYAH 1/25  Tried viagra 15 years ago, and did help, but hasn't tried     PE  /67  Pulse 99  Ht 1.727 m (5' 8\")  Wt 90.7 kg (200 lb)  BMI 30.41 kg/m2   Digital Rectal Exam: Prostate is smooth, anodular and symmetric, but somewhat firm      A/P LUTS (Lower Urinary Tract Symptoms) due to BPH and prostate cancer risk assessment    We had an exceedingly long discussion about the utility of PSA screening.  We talked about the Pros and the Cons.  We discussed the findings of the PLCO and EORTC studies.  We discussed the results of the Bill-Ax[place] Scandinavian trial of treatment vs. Observation in clinically detected prostate cancer as well as " the results of the PIVOT trial in the context of screen-detected cancer.  We discussed the recommendations by the AUA, ASC and USPTSF.  We also discussed some of the findings of the [facility] prostate observation study.    We also discussed the significant (2-6%) and rising risk of biopsy associated sepsis.    We plugged his numbers into the PCPT individualized risk calculator and found   the risk of a negative biopsy is 65%  The risk of low risk cancer is 24%  The risk of intermediate to high risk is 20%    Patient is very high risk for PC and I strongly advised him to get a biopsy    He is very hesitant    I tried to talk through concerns that he had about infection, pain etc and put them in perspecitve that the chance of a complication form a biopsy is 7-10 fold less likely than the chance he has intermediate or high risk prostate cancer    Addendum:  MRI of prostate shows PIRADS 4 lesion 4-5 o[place] left side      Emmanuel Cantu MD  Department of Urology [facility]

## 2017-06-15 NOTE — PATIENT INSTRUCTIONS
Please schedule MRI of prostate followed by a prostate biopsy in clinic.    It was a pleasure meeting with you today.  Thank you for allowing me and my team the privilege of caring for you today.  YOU are the reason we are here, and I truly hope we provided you with the excellent service you deserve.  Please let us know if there is anything else we can do for you so that we can be sure you are leaving completely satisfied with your care experience.

## 2017-06-24 ENCOUNTER — NURSE TRIAGE (OUTPATIENT)
Dept: NURSING | Facility: CLINIC | Age: 67
End: 2017-06-24

## 2017-06-24 ENCOUNTER — HOSPITAL ENCOUNTER (EMERGENCY)
Facility: CLINIC | Age: 67
Discharge: HOME | End: 2017-06-25
Attending: EMERGENCY MEDICINE | Admitting: EMERGENCY MEDICINE
Payer: MEDICARE

## 2017-06-24 DIAGNOSIS — K11.20 SIALOADENITIS OF SUBMANDIBULAR GLAND: ICD-10-CM

## 2017-06-24 DIAGNOSIS — K11.5 SIALOLITHIASIS OF SUBMANDIBULAR GLAND: ICD-10-CM

## 2017-06-24 LAB
ANION GAP SERPL CALCULATED.3IONS-SCNC: 10 MMOL/L (ref 3–14)
BASOPHILS # BLD AUTO: 0 10E9/L (ref 0–0.2)
BASOPHILS NFR BLD AUTO: 0.4 %
BUN SERPL-MCNC: 17 MG/DL (ref 7–30)
CALCIUM SERPL-MCNC: 9.3 MG/DL (ref 8.5–10.1)
CHLORIDE SERPL-SCNC: 105 MMOL/L (ref 94–109)
CO2 SERPL-SCNC: 22 MMOL/L (ref 20–32)
CREAT SERPL-MCNC: 1.12 MG/DL (ref 0.66–1.25)
DIFFERENTIAL METHOD BLD: NORMAL
EOSINOPHIL # BLD AUTO: 0.3 10E9/L (ref 0–0.7)
EOSINOPHIL NFR BLD AUTO: 4 %
ERYTHROCYTE [DISTWIDTH] IN BLOOD BY AUTOMATED COUNT: 13.6 % (ref 10–15)
GFR SERPL CREATININE-BSD FRML MDRD: 65 ML/MIN/1.7M2
GLUCOSE SERPL-MCNC: 215 MG/DL (ref 70–99)
HCT VFR BLD AUTO: 41.7 % (ref 40–53)
HGB BLD-MCNC: 14.8 G/DL (ref 13.3–17.7)
IMM GRANULOCYTES # BLD: 0 10E9/L (ref 0–0.4)
IMM GRANULOCYTES NFR BLD: 0.2 %
LYMPHOCYTES # BLD AUTO: 3 10E9/L (ref 0.8–5.3)
LYMPHOCYTES NFR BLD AUTO: 35.7 %
MCH RBC QN AUTO: 32.7 PG (ref 26.5–33)
MCHC RBC AUTO-ENTMCNC: 35.5 G/DL (ref 31.5–36.5)
MCV RBC AUTO: 92 FL (ref 78–100)
MONOCYTES # BLD AUTO: 0.7 10E9/L (ref 0–1.3)
MONOCYTES NFR BLD AUTO: 8.8 %
NEUTROPHILS # BLD AUTO: 4.3 10E9/L (ref 1.6–8.3)
NEUTROPHILS NFR BLD AUTO: 50.9 %
NRBC # BLD AUTO: 0 10*3/UL
NRBC BLD AUTO-RTO: 0 /100
PLATELET # BLD AUTO: 151 10E9/L (ref 150–450)
POTASSIUM SERPL-SCNC: 3.7 MMOL/L (ref 3.4–5.3)
RBC # BLD AUTO: 4.52 10E12/L (ref 4.4–5.9)
SODIUM SERPL-SCNC: 137 MMOL/L (ref 133–144)
WBC # BLD AUTO: 8.3 10E9/L (ref 4–11)

## 2017-06-24 PROCEDURE — 85025 COMPLETE CBC W/AUTO DIFF WBC: CPT | Performed by: EMERGENCY MEDICINE

## 2017-06-24 PROCEDURE — 99285 EMERGENCY DEPT VISIT HI MDM: CPT | Mod: 25 | Performed by: EMERGENCY MEDICINE

## 2017-06-24 PROCEDURE — 80048 BASIC METABOLIC PNL TOTAL CA: CPT | Performed by: EMERGENCY MEDICINE

## 2017-06-24 PROCEDURE — 25000128 H RX IP 250 OP 636: Performed by: EMERGENCY MEDICINE

## 2017-06-24 PROCEDURE — 99285 EMERGENCY DEPT VISIT HI MDM: CPT | Mod: Z6 | Performed by: EMERGENCY MEDICINE

## 2017-06-24 PROCEDURE — 96374 THER/PROPH/DIAG INJ IV PUSH: CPT | Performed by: EMERGENCY MEDICINE

## 2017-06-24 RX ORDER — MORPHINE SULFATE 4 MG/ML
4 INJECTION, SOLUTION INTRAMUSCULAR; INTRAVENOUS ONCE
Status: COMPLETED | OUTPATIENT
Start: 2017-06-24 | End: 2017-06-24

## 2017-06-24 RX ADMIN — MORPHINE SULFATE 4 MG: 4 INJECTION, SOLUTION INTRAMUSCULAR; INTRAVENOUS at 23:21

## 2017-06-24 ASSESSMENT — ENCOUNTER SYMPTOMS
FEVER: 0
TROUBLE SWALLOWING: 1
NECK PAIN: 1
SORE THROAT: 1
FACIAL SWELLING: 1

## 2017-06-24 NOTE — ED AVS SNAPSHOT
[facility], [place], Emergency Department    [address] [facility] [address]    Phone:  [phone]                                       Earle Rene   MRN: [id number]    Department:  [facility], Emergency Department   Date of Visit:  6/24/2017           After Visit Summary Signature Page     I have received my discharge instructions, and my questions have been answered. I have discussed any challenges I see with this plan with the nurse or doctor.    ..........................................................................................................................................  Patient/Patient Representative Signature      ..........................................................................................................................................  Patient Representative Print Name and Relationship to Patient    ..................................................               ................................................  Date                                            Time    ..........................................................................................................................................  Reviewed by Signature/Title    ...................................................              ..............................................  Date                                                            Time

## 2017-06-24 NOTE — ED AVS SNAPSHOT
" [facility], Emergency Department    [address] [facility] [address] [facility] [address]    Phone:  [phone]                                       Earle Rene   MRN: [id number]    Department:  [facility], Emergency Department   Date of Visit:  6/24/2017           Patient Information     Date Of Birth          1950        Your diagnoses for this visit were:     Sialolithiasis of submandibular gland     Sialoadenitis of submandibular gland        You were seen by Danika Lucas MD.        Discharge Instructions           Salivary Gland Infection  Salivary glands make saliva. Saliva is mostly water. It also has minerals and proteins that help break down food and keep the mouth and teeth healthy. There are three pairs of salivary glands:    Parotid glands (in front of the ear)    Submandibular glands (below the jaw)    Sublingual glands (below the tongue)  A salivary gland can become infected by bacteria (germs). Things that make this more likely include dehydration and taking medicines that affect saliva flow. Infection is also more likely when the duct (channel) that carries saliva from the gland to the mouth is blocked. It may be blocked by a salivary gland \"stone.\" This is a collection of minerals that forms in the salivary gland.  Signs of infection include fever, severe pain in the gland, and redness and swelling over the gland. It may hurt to open the mouth. Symptoms may be worse when the flow of saliva is stimulated, such as by the smell of food.   Antibiotics are used to treat the infection. Drainage of the infection with a simple surgical procedure is sometimes needed. It a salivary gland stone is present, a procedure may be done to remove it.  Home Care:    Take antibiotics as directed until they are finished. Do this even if you start to feel better after only a few days.    Unless another medicine was prescribed, take over-the-counter medicines, such as acetaminophen or ibuprofen, to help " relieve pain.    Moist heat can also help relieve swelling and pain. Wet a cloth with warm water and put it over the affected gland for 10-15 minutes several times a day.    Gently massage the gland a few times a day.     Suck on lemon or other tart hard candies to encourage flow of saliva.  To help prevent blockages and infections:    Drink 6-8 glasses of fluid per day (such as water, tea, and clear soup) to keep well hydrated.    If you smoke, ask your healthcare provider for help to quit. Smoking makes salivary gland stones more likely.    Maintain good dental hygiene. Brush and floss your teeth daily. See your dentist for regular cleanings.  Follow-up care  Follow up with your healthcare provider or as advised.   When to seek medical advice  Call your healthcare provider if any of the following occur:    Fever over 100.4 F (38 C) after two days of taking antibiotics    Symptoms get worse or don't improve within a few days    Trouble breathing or swallowing  Date Last Reviewed: 5/4/2015 2000-2017 [employer]. [address] [place] [address]. All rights reserved. This information is not intended as a substitute for professional medical care. Always follow your healthcare professional's instructions.        Salivary Gland Stones  Salivary glands produce saliva in response to food in your mouth. Saliva is mostly water, but also has minerals and proteins that help digest food and keep the mouth and teeth healthy. There are three pairs of salivary glands:    Parotid glands (in front of the ear)    Submandibular glands (below the jaw)    Sublingual glands (below the tongue)  Each gland has a duct (channel) that allows saliva to flow from the gland to the mouth. A salivary gland stone can form as a result of poor salivary flow which allows minerals to deposit, creating a stone. When a stone forms, it blocks the flow of saliva. The gland swells and becomes painful. Symptoms may be worse during  eating since food stimulates the flow of saliva.  A blocked salivary gland may become infected. This can cause worsened pain, redness over the gland, and fever.  Tests that help diagnose a salivary gland obstruction include CT-scan, X-ray, ultrasound, or injection of dye into the salivary duct. A stone is discovered may be removed or allowed to pass naturally.  Home care    Unless another medicine was prescribed, take over-the-counter medicines, such as acetaminophen or ibuprofen, to help relieve pain.    Moist heat can also help relieve pain. Wet a cloth with warm water and put it over the affected gland for 10-15 minutes several times a day.    Gently massage the gland a few times a day.     Suck on lemon or other tart hard candies to encourage flow of saliva.    To help prevent future blockages:    Drink 6-8 glasses of fluid per day (such as water, tea, and clear soup) to keep well hydrated.    If you smoke, ask your healthcare provider for help to quit. Smoking makes salivary gland stones more likely.    Maintain good dental hygiene. Brush and floss your teeth daily. See your dentist for regular cleanings.  Follow-up care  Follow up with your healthcare provider or as advised.  When to seek medical advice  Call your healthcare provider if any of the following occur:    Increasing pain or swelling in the gland    Inability to open mouth or pain when opening mouth    Fever of 100.4 F (38 C) or higher, or as directed by your healthcare provider    Redness over the tender gland    Pus draining into the mouth    Trouble swallowing or breathing  Date Last Reviewed: 5/4/2015 2000-2017 [employer]. [address] [place] [address], [place] [address]. All rights reserved. This information is not intended as a substitute for professional medical care. Always follow your healthcare professional's instructions.    Please make an appointment to follow up with your nephew who is an ENT specialist or the [facility] (phone: [phone]) in 1-2 days if not improving.        Future Appointments        Provider Department Dept Phone Center    6/28/2017 12:30 PM Fabricio Mayorga MD [facility] [phone] [facility] Own[facility]    6/28/2017 2:15 PM Lacey Eugene MD [facility] [phone] [facility]    7/27/2017 8:40 AM Emmanuel Cantu MD [facility] [phone] [facility]      24 Hour Appointment Hotline       To make an appointment at any [facility], call [phone] ([phone]). If you don't have a family doctor or clinic, we will help you find one. [facility] clinics are conveniently located to serve the needs of you and your family.             Review of your medicines      START taking        Dose / Directions Last dose taken    cephALEXin 500 MG capsule   Commonly known as:  KEFLEX   Dose:  500 mg   Quantity:  40 capsule        Take 1 capsule (500 mg) by mouth 4 times daily for 10 days   Refills:  0        oxyCODONE 5 MG IR tablet   Commonly known as:  ROXICODONE   Dose:  5 mg   Quantity:  20 tablet        Take 1 tablet (5 mg) by mouth every 6 hours as needed for pain (may take 1-2 tablets up to every 6 hours as needed)   Refills:  0          Our records show that you are taking the medicines listed below. If these are incorrect, please call your family doctor or clinic.        Dose / Directions Last dose taken    alprostadil 20 MCG kit   Commonly known as:  EDEX   Quantity:  6 each        by Intracavitary route. Inject 3/4 ml (15 ug) as instructed.  Can increase to full dose of 1 ml (20 ug) if necessary.   Can dispense 6 kits.   Refills:  12        aspirin 81 MG tablet   Dose:  1 tablet        Take 1 tablet by mouth daily.   Refills:  0        atorvastatin 20 MG tablet   Commonly known as:  LIPITOR   Dose:  20 mg   Quantity:  30 tablet        Take 1 tablet (20 mg) by mouth daily  "  Refills:  0        Blood Pressure Monitor Kit   Quantity:  1 kit        Check blood pressure as directed.   Refills:  0        clobetasol 0.05 % ointment   Commonly known as:  TEMOVATE   Quantity:  30 g        Apply topically to legs twice daily for 2 weeks.  Then discontinue   Refills:  0        fenofibrate 54 MG tablet   Dose:  54 mg   Quantity:  90 tablet        Take 1 tablet (54 mg) by mouth daily   Refills:  0        fish oil-omega-3 fatty acids 1000 MG capsule   Dose:  1 g   Quantity:  60 capsule        Take 1 capsule (1 g) by mouth 2 times daily   Refills:  11        gabapentin 100 MG capsule   Commonly known as:  NEURONTIN   Dose:  100 mg   Quantity:  180 capsule        Take 1 capsule (100 mg) by mouth 3 times daily   Refills:  5        * insulin glargine 100 UNIT/ML injection   Commonly known as:  LANTUS SOLOSTAR   Dose:  60 Units   Quantity:  45 mL        Inject 60 Units Subcutaneous At Bedtime   Refills:  1        * LANTUS SOLOSTAR 100 UNIT/ML injection   Quantity:  30 mL   Generic drug:  insulin glargine        INJECT 60 UNITS SUBCUTANEOUS EVERY NIGHT AT BEDTIME   Refills:  2        insulin pen needle 31G X 5 MM   Commonly known as:  B-D U/F   Quantity:  400 each        Use 4 times per day.  Please dispense as BD Pen Needle Mini U/F 31G x 5 MM   Refills:  3        insulin syringe 31G X 5/16\" 0.5 ML Misc   Quantity:  270 each        Use three syringes daily   Refills:  1        loratadine 10 MG tablet   Commonly known as:  CLARITIN   Dose:  10 mg   Quantity:  90 tablet        Take 1 tablet (10 mg) by mouth daily   Refills:  0        losartan 25 MG tablet   Commonly known as:  COZAAR   Dose:  25 mg   Quantity:  90 tablet        Take 1 tablet (25 mg) by mouth daily   Refills:  3        metFORMIN 1000 MG tablet   Commonly known as:  GLUCOPHAGE   Quantity:  180 tablet        1 tab twice daily with meals   Refills:  3        mupirocin 2 % cream   Commonly known as:  BACTROBAN   Quantity:  15 g        Apply  " topically. In very small amounts only as needed   Refills:  1        NovoLOG FLEXPEN 100 UNIT/ML injection   Quantity:  60 mL   Generic drug:  insulin aspart        Inject 8 -14 units SQ with meals with use of correction insulin. Pt uses approx 60 units in 24 hrs.   Refills:  3        ONE TOUCH ULTRA test strip   Quantity:  200 strip   Generic drug:  blood glucose monitoring        Test twice daily or as directed   Refills:  3        pantoprazole 40 MG EC tablet   Commonly known as:  PROTONIX   Dose:  40 mg   Quantity:  90 tablet        Take 1 tablet by mouth daily.   Refills:  3        tacrolimus 0.03 % ointment   Commonly known as:  PROTOPIC   Quantity:  60 g        Apply to affected area(s) twice daily   Refills:  0        tamsulosin 0.4 MG capsule   Commonly known as:  FLOMAX   Quantity:  90 capsule        TAKE 1 CAPSULE(0.4 MG) BY MOUTH EVERY DAY   Refills:  0        * Notice:  This list has 2 medication(s) that are the same as other medications prescribed for you. Read the directions carefully, and ask your doctor or other care provider to review them with you.            Prescriptions were sent or printed at these locations (2 Prescriptions)                   Other Prescriptions                Printed at Department/Unit printer (2 of 2)         oxyCODONE (ROXICODONE) 5 MG IR tablet               cephALEXin (KEFLEX) 500 MG capsule                Procedures and tests performed during your visit     Basic metabolic panel    CBC with platelets differential    CT Soft Tissue Neck w Contrast      Orders Needing Specimen Collection     None      Pending Results     Date and Time Order Name Status Description    6/24/2017 2139 CT Soft Tissue Neck w Contrast Preliminary             Pending Culture Results     No orders found for last 3 day(s).            Pending Results Instructions     If you had any lab results that were not finalized at the time of your Discharge, you can call the ED Lab Result RN at [phone].  "You will be contacted by this team for any positive Lab results or changes in treatment. The nurses are available 7 days a week from 10A to 6:30P.  You can leave a message 24 hours per day and they will return your call.        Thank you for choosing [facility]       Thank you for choosing [facility] for your care. Our goal is always to provide you with excellent care. Hearing back from our patients is one way we can continue to improve our services. Please take a few minutes to complete the written survey that you may receive in the mail after you visit with us. Thank you!        [employer] Information     [employer] lets you send messages to your doctor, view your test results, renew your prescriptions, schedule appointments and more. To sign up, go to [email or web address] . Click on \"Log in\" on the left side of the screen, which will take you to the Welcome page. Then click on \"Sign up Now\" on the right side of the page.     You will be asked to enter the access code listed below, as well as some personal information. Please follow the directions to create your username and password.     Your access code is: [email or web address]  Expires: 2017 11:59 AM     Your access code will  in 90 days. If you need help or a new code, please call your [facility] clinic or [phone].        Care EveryWhere ID     This is your Care EveryWhere ID. This could be used by other organizations to access your [facility] medical records  [id number]        Equal Access to Services     SHARON MAYS AH: Hadii priscilla Aggarwal, waaxda georgie, qaybta kaalmashady pimentel, ethan bobo. So [facility] [phone].    ATENCIÓN: Si habla español, tiene a márquez disposición servicios gratuitos de asistencia lingüística. Llame al [phone].    We comply with applicable federal civil rights laws and [place] laws. We do not discriminate on the basis of race, color, national origin, age, disability sex, sexual orientation " or gender identity.            After Visit Summary       This is your record. Keep this with you and show to your community pharmacist(s) and doctor(s) at your next visit.

## 2017-06-25 ENCOUNTER — APPOINTMENT (OUTPATIENT)
Dept: CT IMAGING | Facility: CLINIC | Age: 67
End: 2017-06-25
Attending: EMERGENCY MEDICINE
Payer: MEDICARE

## 2017-06-25 VITALS
WEIGHT: 198.19 LBS | OXYGEN SATURATION: 95 % | HEART RATE: 86 BPM | BODY MASS INDEX: 30.14 KG/M2 | TEMPERATURE: 99.1 F | DIASTOLIC BLOOD PRESSURE: 86 MMHG | SYSTOLIC BLOOD PRESSURE: 168 MMHG

## 2017-06-25 PROCEDURE — 96376 TX/PRO/DX INJ SAME DRUG ADON: CPT | Performed by: EMERGENCY MEDICINE

## 2017-06-25 PROCEDURE — A9270 NON-COVERED ITEM OR SERVICE: HCPCS | Mod: GY | Performed by: EMERGENCY MEDICINE

## 2017-06-25 PROCEDURE — 25000128 H RX IP 250 OP 636: Performed by: EMERGENCY MEDICINE

## 2017-06-25 PROCEDURE — 70491 CT SOFT TISSUE NECK W/DYE: CPT

## 2017-06-25 PROCEDURE — 25000132 ZZH RX MED GY IP 250 OP 250 PS 637: Mod: GY | Performed by: EMERGENCY MEDICINE

## 2017-06-25 PROCEDURE — 25000128 H RX IP 250 OP 636: Performed by: RADIOLOGY

## 2017-06-25 RX ORDER — OXYCODONE HYDROCHLORIDE 5 MG/1
5 TABLET ORAL EVERY 6 HOURS PRN
Qty: 20 TABLET | Refills: 0 | Status: SHIPPED | OUTPATIENT
Start: 2017-06-25 | End: 2017-08-09

## 2017-06-25 RX ORDER — MORPHINE SULFATE 4 MG/ML
4 INJECTION, SOLUTION INTRAMUSCULAR; INTRAVENOUS ONCE
Status: COMPLETED | OUTPATIENT
Start: 2017-06-25 | End: 2017-06-25

## 2017-06-25 RX ORDER — OXYCODONE HYDROCHLORIDE 5 MG/1
10 TABLET ORAL ONCE
Status: COMPLETED | OUTPATIENT
Start: 2017-06-25 | End: 2017-06-25

## 2017-06-25 RX ORDER — IOPAMIDOL 755 MG/ML
100 INJECTION, SOLUTION INTRAVASCULAR ONCE
Status: COMPLETED | OUTPATIENT
Start: 2017-06-25 | End: 2017-06-25

## 2017-06-25 RX ORDER — CEPHALEXIN 500 MG/1
500 CAPSULE ORAL 4 TIMES DAILY
Qty: 40 CAPSULE | Refills: 0 | Status: SHIPPED | OUTPATIENT
Start: 2017-06-25 | End: 2017-07-05

## 2017-06-25 RX ADMIN — OXYCODONE HYDROCHLORIDE 10 MG: 5 TABLET ORAL at 01:41

## 2017-06-25 RX ADMIN — MORPHINE SULFATE 4 MG: 4 INJECTION, SOLUTION INTRAMUSCULAR; INTRAVENOUS at 00:59

## 2017-06-25 RX ADMIN — IOPAMIDOL 100 ML: 755 INJECTION, SOLUTION INTRAVENOUS at 00:35

## 2017-06-25 ASSESSMENT — ENCOUNTER SYMPTOMS
VOMITING: 0
ABDOMINAL PAIN: 0
SHORTNESS OF BREATH: 0

## 2017-06-25 NOTE — ED NOTES
Patient had a bell's palsy on his left side 3 weeks ago, and today he began to experience right sided jaw pain and facial pain. He is alert, oriented, and ambulatory. He denies shortness of breath or chest pain.

## 2017-06-25 NOTE — ED PROVIDER NOTES
History     Chief Complaint   Patient presents with     Jaw Pain     Facial Pain     HPI  Earle Rene is a 67 year old male with a history of diabetes, sarcoidosis of lung, peripheral neuropathy, and Bell's Palsy (left) who presents to the Emergency Department for evaluation of right sided jaw, ear, facial, neck, and dental pain. Patient presents with his wife, who reports that the pain limits his ability to talk, eat and, drink. The pain has been on/off for about 2 weeks, but patient's wife reports that the pain worsened this morning. Patient reports that the pain radiates from the right side of his anterior neck up into his right ear, and rates this pain at a 9/10. He notes that he feels a lot of pressure in his ear. He also notes some feeling of facial swelling. Patient's dental pain is isolated to the right side and mostly in the bottom teeth. Patient had Bell's Palsy on the left side of his face. He had a CT of his face about 3 weeks ago. He reports that he may have bitten his tongue and reports pain underneath the tongue. His wife denies a documented fever, but patient notes that he has felt warm. He has taken 6 extra strength Tylenol since 2:30PM today. He also reports a sore throat and that it is painful to swallow; he does not have his tonsils. He denies any allergies.      Past Medical History:   Diagnosis Date     Blepharitis of both eyes      Diabetes (H)      Dry eye syndrome      Nonsenile cataract      Peripheral neuropathy (H)      Sarcoidosis of lung (H)        Past Surgical History:   Procedure Laterality Date     COLONOSCOPY  7/29/2013    Procedure: COLONOSCOPY;;  Surgeon: Montana Pascal MD;  Location:  GI     SURGICAL PATHOLOGY EXAM         Family History   Problem Relation Age of Onset     Glaucoma No family hx of      Macular Degeneration No family hx of        Social History   Substance Use Topics     Smoking status: Never Smoker     Smokeless tobacco: Never Used     Alcohol use No  "      No current facility-administered medications for this encounter.      Current Outpatient Prescriptions   Medication     oxyCODONE (ROXICODONE) 5 MG IR tablet     cephALEXin (KEFLEX) 500 MG capsule     tamsulosin (FLOMAX) 0.4 MG capsule     NOVOLOG FLEXPEN 100 UNIT/ML soln     atorvastatin (LIPITOR) 20 MG tablet     LANTUS SOLOSTAR 100 UNIT/ML soln     insulin pen needle (B-D U/F) 31G X 5 MM     losartan (COZAAR) 25 MG tablet     insulin syringe 31G X 5/16\" 0.5 ML MISC     insulin glargine (LANTUS SOLOSTAR) 100 UNIT/ML PEN     metFORMIN (GLUCOPHAGE) 1000 MG tablet     gabapentin (NEURONTIN) 100 MG capsule     Omega-3 Fatty Acids (OMEGA-3 FISH OIL) 1000 MG CAPS     ONE TOUCH ULTRA test strip     fenofibrate 54 MG tablet     loratadine (CLARITIN) 10 MG tablet     pantoprazole (PROTONIX) 40 MG enteric coated tablet     clobetasol (TEMOVATE) 0.05 % ointment     tacrolimus (PROTOPIC) 0.03 % ointment     Blood Pressure Monitor KIT     mupirocin (BACTROBAN) 2 % cream     alprostadil (EDEX) 20 MCG injection     aspirin 81 MG tablet      No Known Allergies        I have reviewed the Medications, Allergies, Past Medical and Surgical History, and Social History in the Epic system.    Review of Systems   Constitutional: Negative for fever.   HENT: Positive for dental problem (pain on the right side), ear pain, facial swelling, sore throat and trouble swallowing (painful).         Positive for pain underneath the tongue.  Positive for jaw pain (right).     Respiratory: Negative for shortness of breath.    Cardiovascular: Negative for chest pain.   Gastrointestinal: Negative for abdominal pain and vomiting.   Musculoskeletal: Positive for neck pain (right).   All other systems reviewed and are negative.      Physical Exam   BP: 183/89  Pulse: 86  Temp: 99.1  F (37.3  C)  Weight: 89.9 kg (198 lb 3.1 oz)  SpO2: 100 %  Physical Exam    GEN:  Alert, well developed, no acute distress  HEENT:  PERRL, EOMI, Mucous membranes are " moist. The right TM and ear canal are normal, No right sided facial swelling, however he has a beard which makes this difficult to evaluate. There is no dental tenderness, however, there is tenderness on the right floor of the mouth. No visible gingival abscess.  Neck:  There is right sided submandibular tenderness and palpable swelling, I cannot get a good visual exam of this area because of the beard.   Cardio:  RRR, no murmur, radial pulses equal bilaterally  PULM:  Lungs clear, good air movement, no wheezes, rales   Abd:  Soft, normal bowel sounds, no focal tenderness  Back exam:  No CVA tenderness  Musculoskeletal:  normal range of motion, no lower extremity swelling or calf tenderness  Neuro:  Alert and oriented X3, Follows commands, moving all extremities spontaneously   Skin:  Warm, dry   ED Course   10:49 PM  The patient was seen and examined by Vonda Lucas MD in Room 15.     ED Course     Procedures           Critical Care time:  none   Labs are normal except as shown.  CT of the neck with contrast was done to evaluate for possible abscess or infection. CT was discussed with radiology and the report is shown here.     CT Soft Tissue Neck w Contrast (Preliminary result) Result time: 06/25/17 01:16:45     Procedure changed from CT Maxillofacial w Contrast          Preliminary result by Miles Barber MD (06/25/17 01:16:45)     Impression:     Impression:  1. Right salivary duct calculi with dilation of the submandibular  gland duct.  Inflammatory changes of the right submandibular gland  compatible with sialadenitis.    2. 1.6 cm enhancing right level 2 lymph node, likely reactive.  Multiple other nonenlarged cervical lymph nodes.   3. Bulky atherosclerotic calcifications of the carotid bulbs because  less than 50% stenosis.   4. 1.6 cm right thyroid nodule. Recommend correlation with ultrasound     Discussed results with the patient and wife. Advised follow up ultrasound for the thyroid nodule.   He was  treated with IV Morphine for pain and later po oxycodone for pain.     Labs Ordered and Resulted from Time of ED Arrival Up to the Time of Departure from the ED   BASIC METABOLIC PANEL - Abnormal; Notable for the following:        Result Value    Glucose 215 (*)     All other components within normal limits   CBC WITH PLATELETS DIFFERENTIAL            Assessments & Plan (with Medical Decision Making)   Patient with right sided facial pain including pain in the right ear, right side of his mouth, and teeth on the right side and pain with swallowing.  His CT is showing right salivary duct calculi with dilation of the submandibular gland duct and inflammatory changes on the right submandibular gland compatible with sialadenitis.  I explained to the patient what the findings showed and advised he drink plenty of fluids and try lemon drops to encourage salivation and spontaneous passage of the stone.  Patient is also given prescriptions for Keflex to treat for sialadenitis and oxycodone for pain control.  He told me that his has a nephew who is an ENT physician, so he will follow up with his nephew.  I also gave him the number for the [facility] so that he can arrange follow-up there is needed.  He is encouraged to return if he has fever or worsening symptoms or other concerns. He was discharged to home with his wife.    This part of the document was transcribed by Miah Steel Scribgeorgina.      I have reviewed the nursing notes.    I have reviewed the findings, diagnosis, plan and need for follow up with the patient.    Discharge Medication List as of 6/25/2017  2:00 AM      START taking these medications    Details   oxyCODONE (ROXICODONE) 5 MG IR tablet Take 1 tablet (5 mg) by mouth every 6 hours as needed for pain (may take 1-2 tablets up to every 6 hours as needed), Disp-20 tablet, R-0, Local Print      cephALEXin (KEFLEX) 500 MG capsule Take 1 capsule (500 mg) by mouth 4 times daily for 10 days,  Disp-40 capsule, R-0, Local Print             Final diagnoses:   Sialolithiasis of submandibular gland   Sialoadenitis of submandibular gland   I, Amaya Staton, am serving as a trained medical scribe to document services personally performed by Vonda Lucas MD, based on the provider's statements to me.      IVonda MD, was physically present and have reviewed and verified the accuracy of this note documented by Amaya Staton.         6/24/2017   [facility], [place], EMERGENCY DEPARTMENT     Danika Lucas MD  06/25/17 1111

## 2017-06-25 NOTE — TELEPHONE ENCOUNTER
Wife states   has pain under his ear and jaw with swelling and tenderness; painful to swallow and open mouth;onset today; has DM.  Unknown if febrile.  Reason for Disposition    [1] Swollen area of face AND [2] is painful to touch    Protocols used: FACE PAIN-ADULT-ASAEL Rees RN  FNA

## 2017-06-25 NOTE — DISCHARGE INSTRUCTIONS
"    Salivary Gland Infection  Salivary glands make saliva. Saliva is mostly water. It also has minerals and proteins that help break down food and keep the mouth and teeth healthy. There are three pairs of salivary glands:    Parotid glands (in front of the ear)    Submandibular glands (below the jaw)    Sublingual glands (below the tongue)  A salivary gland can become infected by bacteria (germs). Things that make this more likely include dehydration and taking medicines that affect saliva flow. Infection is also more likely when the duct (channel) that carries saliva from the gland to the mouth is blocked. It may be blocked by a salivary gland \"stone.\" This is a collection of minerals that forms in the salivary gland.  Signs of infection include fever, severe pain in the gland, and redness and swelling over the gland. It may hurt to open the mouth. Symptoms may be worse when the flow of saliva is stimulated, such as by the smell of food.   Antibiotics are used to treat the infection. Drainage of the infection with a simple surgical procedure is sometimes needed. It a salivary gland stone is present, a procedure may be done to remove it.  Home Care:    Take antibiotics as directed until they are finished. Do this even if you start to feel better after only a few days.    Unless another medicine was prescribed, take over-the-counter medicines, such as acetaminophen or ibuprofen, to help relieve pain.    Moist heat can also help relieve swelling and pain. Wet a cloth with warm water and put it over the affected gland for 10-15 minutes several times a day.    Gently massage the gland a few times a day.     Suck on lemon or other tart hard candies to encourage flow of saliva.  To help prevent blockages and infections:    Drink 6-8 glasses of fluid per day (such as water, tea, and clear soup) to keep well hydrated.    If you smoke, ask your healthcare provider for help to quit. Smoking makes salivary gland stones more " likely.    Maintain good dental hygiene. Brush and floss your teeth daily. See your dentist for regular cleanings.  Follow-up care  Follow up with your healthcare provider or as advised.   When to seek medical advice  Call your healthcare provider if any of the following occur:    Fever over 100.4 F (38 C) after two days of taking antibiotics    Symptoms get worse or don't improve within a few days    Trouble breathing or swallowing  Date Last Reviewed: 5/4/2015 2000-2017 The [employer]. [address] [place] [address]. All rights reserved. This information is not intended as a substitute for professional medical care. Always follow your healthcare professional's instructions.        Salivary Gland Stones  Salivary glands produce saliva in response to food in your mouth. Saliva is mostly water, but also has minerals and proteins that help digest food and keep the mouth and teeth healthy. There are three pairs of salivary glands:    Parotid glands (in front of the ear)    Submandibular glands (below the jaw)    Sublingual glands (below the tongue)  Each gland has a duct (channel) that allows saliva to flow from the gland to the mouth. A salivary gland stone can form as a result of poor salivary flow which allows minerals to deposit, creating a stone. When a stone forms, it blocks the flow of saliva. The gland swells and becomes painful. Symptoms may be worse during eating since food stimulates the flow of saliva.  A blocked salivary gland may become infected. This can cause worsened pain, redness over the gland, and fever.  Tests that help diagnose a salivary gland obstruction include CT-scan, X-ray, ultrasound, or injection of dye into the salivary duct. A stone is discovered may be removed or allowed to pass naturally.  Home care    Unless another medicine was prescribed, take over-the-counter medicines, such as acetaminophen or ibuprofen, to help relieve pain.    Moist heat can also help  relieve pain. Wet a cloth with warm water and put it over the affected gland for 10-15 minutes several times a day.    Gently massage the gland a few times a day.     Suck on lemon or other tart hard candies to encourage flow of saliva.    To help prevent future blockages:    Drink 6-8 glasses of fluid per day (such as water, tea, and clear soup) to keep well hydrated.    If you smoke, ask your healthcare provider for help to quit. Smoking makes salivary gland stones more likely.    Maintain good dental hygiene. Brush and floss your teeth daily. See your dentist for regular cleanings.  Follow-up care  Follow up with your healthcare provider or as advised.  When to seek medical advice  Call your healthcare provider if any of the following occur:    Increasing pain or swelling in the gland    Inability to open mouth or pain when opening mouth    Fever of 100.4 F (38 C) or higher, or as directed by your healthcare provider    Redness over the tender gland    Pus draining into the mouth    Trouble swallowing or breathing  Date Last Reviewed: 5/4/2015 2000-2017 [employer]. [address] [place] [address]. All rights reserved. This information is not intended as a substitute for professional medical care. Always follow your healthcare professional's instructions.    Please make an appointment to follow up with your nephew who is an ENT specialist or the [facility] (phone: [phone]) in 1-2 days if not improving.

## 2017-06-26 ENCOUNTER — OFFICE VISIT (OUTPATIENT)
Dept: OTOLARYNGOLOGY | Facility: CLINIC | Age: 67
End: 2017-06-26

## 2017-06-26 VITALS — WEIGHT: 198 LBS | BODY MASS INDEX: 30.01 KG/M2 | HEIGHT: 68 IN

## 2017-06-26 DIAGNOSIS — K11.5 SIALOLITHIASIS OF SUBMANDIBULAR GLAND: Primary | ICD-10-CM

## 2017-06-26 ASSESSMENT — PAIN SCALES - GENERAL: PAINLEVEL: SEVERE PAIN (7)

## 2017-06-26 NOTE — MR AVS SNAPSHOT
After Visit Summary   6/26/2017    Earle Rene    MRN: [id number]           Patient Information     Date Of Birth          1950        Visit Information        Provider Department      6/26/2017 9:00 AM Mark Julian MD [facility]        Today's Diagnoses     Sialolithiasis of submandibular gland    -  1      Care Instructions    The plan will be to refer you to [facility] as we do not have a surgeon available to do the surgery in the next several days.   Brian JamesRN  [phone]              Follow-ups after your visit        Your next 10 appointments already scheduled     Jul 27, 2017  8:40 AM CDT   (Arrive by 8:25 AM)   Sonography/Biopsy with Emmanuel Cantu MD   [facility] ([facility] [place])    [address] [place] [id number] [phone]              Who to contact     Please call your clinic at [phone] to:    Ask questions about your health    Make or cancel appointments    Discuss your medicines    Learn about your test results    Speak to your doctor   If you have compliments or concerns about an experience at your clinic, or if you wish to file a complaint, please contact [facility] Patient Relations at [phone] or email us at [email or web address]         Additional Information About Your Visit        MyChart Information     [employer] is an electronic gateway that provides easy, online access to your medical records. With babberly, you can request a clinic appointment, read your test results, renew a prescription or communicate with your care team.     To sign up for [employer] visit the website at [email or web address]   You will be asked to enter the access code listed below, as well as some personal information. Please follow the directions to create your username and password.     Your access  "code is: [email or web address]  Expires: 2017 11:59 AM     Your access code will  in 90 days. If you need help or a new code, please contact your [facility] or call [phone] for assistance.        Care EveryWhere ID     This is your Care EveryWhere ID. This could be used by other organizations to access your [place] medical records  [id number]        Your Vitals Were     Height BMI (Body Mass Index)                1.727 m (5' 8\") 30.11 kg/m2           Blood Pressure from Last 3 Encounters:   17 168/86   06/15/17 125/67   17 141/83    Weight from Last 3 Encounters:   17 89.8 kg (198 lb)   17 89.9 kg (198 lb 3.1 oz)   06/15/17 90.7 kg (200 lb)              Today, you had the following     No orders found for display       Primary Care Provider Office Phone # Fax #    Marcio Hare [phone]       [address] [address]        Equal Access to Services     [facility] AH: Hadii aad ku hadasho Soomaali, waaxda luqadaha, qaybta kaalmada adeegyada, waxay idiin hayaan adejose moody lacamille . So [facility] [phone].    ATENCIÓN: Si habla español, tiene a márquez disposición servicios gratuitos de asistencia lingüística. Llame al [phone].    We comply with applicable federal civil rights laws and [place] laws. We do not discriminate on the basis of race, color, national origin, age, disability sex, sexual orientation or gender identity.            Thank you!     Thank you for choosing [facility]  for your care. Our goal is always to provide you with excellent care. Hearing back from our patients is one way we can continue to improve our services. Please take a few minutes to complete the written survey that you may receive in the mail after your visit with us. Thank you!             Your Updated Medication List - Protect others around you: Learn how to safely use, store and throw away " your medicines at www.disposemymeds.org.          This list is accurate as of: 6/26/17 11:59 PM.  Always use your most recent med list.                   Brand Name Dispense Instructions for use Diagnosis    alprostadil 20 MCG kit    EDEX    6 each    by Intracavitary route. Inject 3/4 ml (15 ug) as instructed.  Can increase to full dose of 1 ml (20 ug) if necessary.   Can dispense 6 kits.    Erectile dysfunction       aspirin 81 MG tablet      Take 1 tablet by mouth daily.        atorvastatin 20 MG tablet    LIPITOR    30 tablet    Take 1 tablet (20 mg) by mouth daily    Hyperlipidemia LDL goal <100       Blood Pressure Monitor Kit     1 kit    Check blood pressure as directed.    Unspecified essential hypertension       cephALEXin 500 MG capsule    KEFLEX    40 capsule    Take 1 capsule (500 mg) by mouth 4 times daily for 10 days        clobetasol 0.05 % ointment    TEMOVATE    30 g    Apply topically to legs twice daily for 2 weeks.  Then discontinue    Rash and other nonspecific skin eruption       fenofibrate 54 MG tablet     90 tablet    Take 1 tablet (54 mg) by mouth daily    Hyperlipidemia LDL goal < 100       fish oil-omega-3 fatty acids 1000 MG capsule     60 capsule    Take 1 capsule (1 g) by mouth 2 times daily    Lymphocytopenia, High cholesterol, Screening for prostate cancer       gabapentin 100 MG capsule    NEURONTIN    180 capsule    Take 1 capsule (100 mg) by mouth 3 times daily    Neuropathy in diabetes (H)       * insulin glargine 100 UNIT/ML injection    LANTUS SOLOSTAR    45 mL    Inject 60 Units Subcutaneous At Bedtime    Type 2 diabetes mellitus with diabetic neuropathy (H)       * LANTUS SOLOSTAR 100 UNIT/ML injection   Generic drug:  insulin glargine     30 mL    INJECT 60 UNITS SUBCUTANEOUS EVERY NIGHT AT BEDTIME    Type 2 diabetes mellitus with diabetic neuropathy (H)       insulin pen needle 31G X 5 MM    B-D U/F    400 each    Use 4 times per day.  Please dispense as BD Pen Needle Mini  "U/F 31G x 5 MM    Diabetes mellitus type 2, insulin dependent (H)       insulin syringe 31G X 5/16\" 0.5 ML Misc     270 each    Use three syringes daily    Type 2 diabetes mellitus (H)       loratadine 10 MG tablet    CLARITIN    90 tablet    Take 1 tablet (10 mg) by mouth daily    Seasonal allergies       losartan 25 MG tablet    COZAAR    90 tablet    Take 1 tablet (25 mg) by mouth daily    HTN (hypertension)       metFORMIN 1000 MG tablet    GLUCOPHAGE    180 tablet    1 tab twice daily with meals    Type 2 diabetes mellitus with diabetic neuropathy (H)       mupirocin 2 % cream    BACTROBAN    15 g    Apply  topically. In very small amounts only as needed    Rash and other nonspecific skin eruption       NovoLOG FLEXPEN 100 UNIT/ML injection   Generic drug:  insulin aspart     60 mL    Inject 8 -14 units SQ with meals with use of correction insulin. Pt uses approx 60 units in 24 hrs.    Type 2 diabetes mellitus with diabetic nephropathy (H)       ONE TOUCH ULTRA test strip   Generic drug:  blood glucose monitoring     200 strip    Test twice daily or as directed    DM (diabetes mellitus) (H)       oxyCODONE 5 MG IR tablet    ROXICODONE    20 tablet    Take 1 tablet (5 mg) by mouth every 6 hours as needed for pain (may take 1-2 tablets up to every 6 hours as needed)        pantoprazole 40 MG EC tablet    PROTONIX    90 tablet    Take 1 tablet by mouth daily.    Esophageal reflux       tacrolimus 0.03 % ointment    PROTOPIC    60 g    Apply to affected area(s) twice daily    Rash and other nonspecific skin eruption       tamsulosin 0.4 MG capsule    FLOMAX    90 capsule    TAKE 1 CAPSULE(0.4 MG) BY MOUTH EVERY DAY    Benign non-nodular prostatic hyperplasia with lower urinary tract symptoms       * Notice:  This list has 2 medication(s) that are the same as other medications prescribed for you. Read the directions carefully, and ask your doctor or other care provider to review them with you.      "

## 2017-06-26 NOTE — PROGRESS NOTES
HISTORY OF PRESENT ILLNESS:  Mr. Rene is here to see us today regarding his right submandibular sialolithiasis.  He notes that approximately four or five days ago he started getting swelling in his right submandibular area.  This was associated pain.  He was seen in the Emergency Department over the weekend where a CT scan demonstrated numerous stones in the right submandibular duct with dilation of the duct and gland.  He comes in today with his wife reporting that he has really had a difficult time eating and drinking.  He is also a diabetic and he has had difficulty maintaining good control of his sugars.  He reports that the pain is extreme.  He has not had problems with this before.      PHYSICAL EXAMINATION:  This is a 67-year-old gentleman in mild to moderate distress, breathing without difficulty or stridor.  Voice is dysarthric.  Examination of the mouth shows edematous swelling with some mild bruising of the floor of the mouth.  No firmness associated with this to suggest anything consistent with angina, but there is clearly some swelling which affects his function of his tongue at this point.  Palpation of the right submandibular duct is very tender.  Unable to really palpate the floor of mouth very well to appreciate any stones secondary to the patient's discomfort.      Palpation of the left submandibular duct shows no significant tenderness.  Palpation of the rest of the neck shows no obvious abnormalities otherwise noted above.      ASSESSMENT AND PLAN:  A 67-year-old with right submandibular sialolithiasis and severe discomfort and odynophagia resulting in inability to maintain hydration and nutrition.  He has diabetes and this certainly makes that situation more difficult.  At this point we discussed options and we will have him admitted to the hospital.  We will do this at [facility] where Dr. Lio Jeffers can consider whether sialendoscopy is appropriately our next step.  If not, we can do an open  approach either for removal of the gland or removal of the stones through the floor of mouth.  We discussed this with the patient and his wife.      A total of 25 minutes were spent with the patient, the majority in counseling and coordination of care.      EDB/bandar

## 2017-06-26 NOTE — NURSING NOTE
Chief Complaint   Patient presents with     Consult     salivary stone     Starla Aguilar Medical Assistant

## 2017-06-26 NOTE — PATIENT INSTRUCTIONS
The plan will be to refer you to [facility] as we do not have a surgeon available to do the surgery in the next several days.   Brian James ,RN  [phone]

## 2017-06-26 NOTE — LETTER
6/26/2017       RE: Earle Rene  [address]  [place] [address]     Dear Colleague,    Thank you for referring your patient, Earle Rene, to the [facility] EAR NOSE AND TH[facility]AT at [facility]. Please see a copy of my visit note below.    HISTORY OF PRESENT ILLNESS:  Mr. Rene is here to see us today regarding his right submandibular sialolithiasis.  He notes that approximately four or five days ago he started getting swelling in his right submandibular area.  This was associated pain.  He was seen in the Emergency Department over the weekend where a CT scan demonstrated numerous stones in the right submandibular duct with dilation of the duct and gland.  He comes in today with his wife reporting that he has really had a difficult time eating and drinking.  He is also a diabetic and he has had difficulty maintaining good control of his sugars.  He reports that the pain is extreme.  He has not had problems with this before.      PHYSICAL EXAMINATION:  This is a 67-year-old gentleman in mild to moderate distress, breathing without difficulty or stridor.  Voice is dysarthric.  Examination of the mouth shows edematous swelling with some mild bruising of the floor of the mouth.  No firmness associated with this to suggest anything consistent with angina, but there is clearly some swelling which affects his function of his tongue at this point.  Palpation of the right submandibular duct is very tender.  Unable to really palpate the floor of mouth very well to appreciate any stones secondary to the patient's discomfort.      Palpation of the left submandibular duct shows no significant tenderness.  Palpation of the rest of the neck shows no obvious abnormalities otherwise noted above.      ASSESSMENT AND PLAN:  A 67-year-old with right submandibular sialolithiasis and severe discomfort and odynophagia resulting in inability to maintain hydration and nutrition.  He has diabetes  and this certainly makes that situation more difficult.  At this point we discussed options and we will have him admitted to the hospital.  We will do this at [facility] where Dr. Lio Jeffers can consider whether sialendoscopy is appropriately our next step.  If not, we can do an open approach either for removal of the gland or removal of the stones through the floor of mouth.  We discussed this with the patient and his wife.      A total of 25 minutes were spent with the patient, the majority in counseling and coordination of care.      SJ/bandar       Again, thank you for allowing me to participate in the care of your patient.      Sincerely,    Mark Julian MD

## 2017-07-12 ENCOUNTER — OFFICE VISIT (OUTPATIENT)
Dept: OPHTHALMOLOGY | Facility: CLINIC | Age: 67
End: 2017-07-12
Attending: OPHTHALMOLOGY
Payer: MEDICARE

## 2017-07-12 VITALS — BODY MASS INDEX: 28.79 KG/M2 | WEIGHT: 190 LBS | HEIGHT: 68 IN

## 2017-07-12 DIAGNOSIS — H25.813 COMBINED FORMS OF AGE-RELATED CATARACT OF BOTH EYES: Primary | ICD-10-CM

## 2017-07-12 DIAGNOSIS — G51.0 FACIAL NERVE PALSY: ICD-10-CM

## 2017-07-12 DIAGNOSIS — H52.13 MYOPIC ASTIGMATISM OF BOTH EYES: ICD-10-CM

## 2017-07-12 DIAGNOSIS — E11.3293 TYPE 2 DIABETES MELLITUS WITH BOTH EYES AFFECTED BY MILD NONPROLIFERATIVE RETINOPATHY WITHOUT MACULAR EDEMA, WITH LONG-TERM CURRENT USE OF INSULIN (H): ICD-10-CM

## 2017-07-12 DIAGNOSIS — Z79.4 TYPE 2 DIABETES MELLITUS WITH BOTH EYES AFFECTED BY MILD NONPROLIFERATIVE RETINOPATHY WITHOUT MACULAR EDEMA, WITH LONG-TERM CURRENT USE OF INSULIN (H): ICD-10-CM

## 2017-07-12 DIAGNOSIS — H52.4 PRESBYOPIA: ICD-10-CM

## 2017-07-12 DIAGNOSIS — H52.203 MYOPIC ASTIGMATISM OF BOTH EYES: ICD-10-CM

## 2017-07-12 PROCEDURE — 99215 OFFICE O/P EST HI 40 MIN: CPT | Mod: ZF

## 2017-07-12 PROCEDURE — 92015 DETERMINE REFRACTIVE STATE: CPT | Mod: ZF

## 2017-07-12 PROCEDURE — 76516 ECHO EXAM OF EYE: CPT | Mod: ZF | Performed by: OPHTHALMOLOGY

## 2017-07-12 ASSESSMENT — REFRACTION_MANIFEST
OD_SPHERE: -1.75
OS_AXIS: 035
OS_CYLINDER: +0.75
OS_SPHERE: -2.50
OD_CYLINDER: +2.00
OD_AXIS: 165
OD_ADD: +2.50
OS_ADD: +2.50

## 2017-07-12 ASSESSMENT — VISUAL ACUITY
OD_CC+: -3
CORRECTION_TYPE: GLASSES
OS_CC: 20/30
METHOD: SNELLEN - LINEAR
OS_CC+: -3
OD_CC: 20/50

## 2017-07-12 ASSESSMENT — EXTERNAL EXAM - RIGHT EYE: OD_EXAM: NORMAL

## 2017-07-12 ASSESSMENT — SLIT LAMP EXAM - LIDS
COMMENTS: NORMAL
COMMENTS: NORMAL

## 2017-07-12 ASSESSMENT — CUP TO DISC RATIO
OD_RATIO: 0.3
OS_RATIO: 0.3

## 2017-07-12 ASSESSMENT — REFRACTION_WEARINGRX
OS_CYLINDER: +0.75
OD_ADD: +2.50
OD_CYLINDER: +1.50
OS_SPHERE: -2.50
OS_ADD: +2.50
OD_AXIS: 157
OD_SPHERE: -2.50
OS_AXIS: 033

## 2017-07-12 ASSESSMENT — CONF VISUAL FIELD
OD_NORMAL: 1
OS_NORMAL: 1
METHOD: COUNTING FINGERS

## 2017-07-12 ASSESSMENT — TONOMETRY
OD_IOP_MMHG: 14
IOP_METHOD: TONOPEN
OS_IOP_MMHG: 12

## 2017-07-12 ASSESSMENT — EXTERNAL EXAM - LEFT EYE: OS_EXAM: NORMAL

## 2017-07-12 NOTE — MR AVS SNAPSHOT
After Visit Summary   7/12/2017    Earle Rene    MRN: [id number]           Patient Information     Date Of Birth          1950        Visit Information        Provider Department      7/12/2017 8:45 AM Lacey Eugene MD [facility]        Today's Diagnoses     Combined forms of age-related cataract of both eyes    -  1    Facial nerve palsy - Left Eye        Type 2 diabetes mellitus with both eyes affected by mild nonproliferative retinopathy without macular edema, with long-term current use of insulin (H)        Myopic astigmatism of both eyes        Presbyopia           Follow-ups after your visit        Follow-up notes from your care team     Return for scheduled procedure.      Your next 10 appointments already scheduled     Jul 27, 2017  8:40 AM CDT   (Arrive by 8:25 AM)   Sonography/Biopsy with Emmanuel Cantu MD   [facility] ([facility])    [address] [place] [id number] [phone]            Aug 03, 2017  9:20 AM CDT   (Arrive by 9:05 AM)   Return Visit with Emmanuel Cantu MD   [facility] ([facility])    [address] [place] [id number] [phone]            Aug 14, 2017  9:05 AM CDT   (Arrive by 8:50 AM)   Return Visit with Marcio Hare MD   [facility] ([facility])    [address] [place] [address] [id number] [phone]            Aug 18, 2017 10:00 AM CDT   (Arrive by 9:45 AM)   Post-Op with Lacey Eugene MD   [facility] ([facility])    [address] [place] [id number] [phone]              Who to contact     Please call your clinic at [phone] to:    Ask questions about your health    Make or cancel  "appointments    Discuss your medicines    Learn about your test results    Speak to your doctor   If you have compliments or concerns about an experience at your clinic, or if you wish to file a complaint, please contact [facility] Patient Relations at [phone] or email us at [email or web address]         Additional Information About Your Visit        [employer] Information     [employer] is an electronic gateway that provides easy, online access to your medical records. With [employer], you can request a clinic appointment, read your test results, renew a prescription or communicate with your care team.     To sign up for [employer] visit the website at [email or web address]   You will be asked to enter the access code listed below, as well as some personal information. Please follow the directions to create your username and password.     Your access code is: [email or web address]  Expires: 2017 11:59 AM     Your access code will  in 90 days. If you need help or a new code, please contact your [facility] Clinic or call [phone] for assistance.        Care EveryWhere ID     This is your Care EveryWhere ID. This could be used by other organizations to access your [place] medical records  [id number]        Your Vitals Were     Height BMI (Body Mass Index)                1.727 m (5' 8\") 28.89 kg/m2           Blood Pressure from Last 3 Encounters:   17 168/86   06/15/17 125/67   17 141/83    Weight from Last 3 Encounters:   17 86.2 kg (190 lb)   17 89.8 kg (198 lb)   17 89.9 kg (198 lb 3.1 oz)              We Performed the Following     Biometry w/o IOL calc OU (both eyes)     Deya-Operative Worksheet        Primary Care Provider Office Phone # Fax #    Marcio Hare [phone]       [address] [address]        Equal Access to Services     SHARON MAYS AH: Hadii " priscilla Aggarwal, wapiloda luqadaha, qaybta kaalreza pimentel, ethan hipolitoin hayaaóscar hainesjose antonrichieramses dangeloJamesshay jeramie. So [facility] [phone].    ATENCIÓN: Si habla viridiana, tiene a márquez disposición servicios gratuitos de asistencia lingüística. Halley al [phone].    We comply with applicable federal civil rights laws and [place] laws. We do not discriminate on the basis of race, color, national origin, age, disability sex, sexual orientation or gender identity.            Thank you!     Thank you for choosing EYE CLINIC  for your care. Our goal is always to provide you with excellent care. Hearing back from our patients is one way we can continue to improve our services. Please take a few minutes to complete the written survey that you may receive in the mail after your visit with us. Thank you!             Your Updated Medication List - Protect others around you: Learn how to safely use, store and throw away your medicines at www.disposemymeds.org.          This list is accurate as of: 7/12/17 11:24 AM.  Always use your most recent med list.                   Brand Name Dispense Instructions for use Diagnosis    alprostadil 20 MCG kit    EDEX    6 each    by Intracavitary route. Inject 3/4 ml (15 ug) as instructed.  Can increase to full dose of 1 ml (20 ug) if necessary.   Can dispense 6 kits.    Erectile dysfunction       aspirin 81 MG tablet      Take 1 tablet by mouth daily.        atorvastatin 20 MG tablet    LIPITOR    30 tablet    Take 1 tablet (20 mg) by mouth daily    Hyperlipidemia LDL goal <100       Blood Pressure Monitor Kit     1 kit    Check blood pressure as directed.    Unspecified essential hypertension       clobetasol 0.05 % ointment    TEMOVATE    30 g    Apply topically to legs twice daily for 2 weeks.  Then discontinue    Rash and other nonspecific skin eruption       fenofibrate 54 MG tablet     90 tablet    Take 1 tablet (54 mg) by mouth daily    Hyperlipidemia LDL goal < 100       fish  "oil-omega-3 fatty acids 1000 MG capsule     60 capsule    Take 1 capsule (1 g) by mouth 2 times daily    Lymphocytopenia, High cholesterol, Screening for prostate cancer       gabapentin 100 MG capsule    NEURONTIN    180 capsule    Take 1 capsule (100 mg) by mouth 3 times daily    Neuropathy in diabetes (H)       * insulin glargine 100 UNIT/ML injection    LANTUS SOLOSTAR    45 mL    Inject 60 Units Subcutaneous At Bedtime    Type 2 diabetes mellitus with diabetic neuropathy (H)       * LANTUS SOLOSTAR 100 UNIT/ML injection   Generic drug:  insulin glargine     30 mL    INJECT 60 UNITS SUBCUTANEOUS EVERY NIGHT AT BEDTIME    Type 2 diabetes mellitus with diabetic neuropathy (H)       insulin pen needle 31G X 5 MM    B-D U/F    400 each    Use 4 times per day.  Please dispense as BD Pen Needle Mini U/F 31G x 5 MM    Diabetes mellitus type 2, insulin dependent (H)       insulin syringe 31G X 5/16\" 0.5 ML Misc     270 each    Use three syringes daily    Type 2 diabetes mellitus (H)       loratadine 10 MG tablet    CLARITIN    90 tablet    Take 1 tablet (10 mg) by mouth daily    Seasonal allergies       losartan 25 MG tablet    COZAAR    90 tablet    Take 1 tablet (25 mg) by mouth daily    HTN (hypertension)       metFORMIN 1000 MG tablet    GLUCOPHAGE    180 tablet    1 tab twice daily with meals    Type 2 diabetes mellitus with diabetic neuropathy (H)       mupirocin 2 % cream    BACTROBAN    15 g    Apply  topically. In very small amounts only as needed    Rash and other nonspecific skin eruption       NovoLOG FLEXPEN 100 UNIT/ML injection   Generic drug:  insulin aspart     60 mL    Inject 8 -14 units SQ with meals with use of correction insulin. Pt uses approx 60 units in 24 hrs.    Type 2 diabetes mellitus with diabetic nephropathy (H)       ONE TOUCH ULTRA test strip   Generic drug:  blood glucose monitoring     200 strip    Test twice daily or as directed    DM (diabetes mellitus) (H)       oxyCODONE 5 MG IR " tablet    ROXICODONE    20 tablet    Take 1 tablet (5 mg) by mouth every 6 hours as needed for pain (may take 1-2 tablets up to every 6 hours as needed)        pantoprazole 40 MG EC tablet    PROTONIX    90 tablet    Take 1 tablet by mouth daily.    Esophageal reflux       tacrolimus 0.03 % ointment    PROTOPIC    60 g    Apply to affected area(s) twice daily    Rash and other nonspecific skin eruption       tamsulosin 0.4 MG capsule    FLOMAX    90 capsule    TAKE 1 CAPSULE(0.4 MG) BY MOUTH EVERY DAY    Benign non-nodular prostatic hyperplasia with lower urinary tract symptoms       * Notice:  This list has 2 medication(s) that are the same as other medications prescribed for you. Read the directions carefully, and ask your doctor or other care provider to review them with you.

## 2017-07-12 NOTE — PROGRESS NOTES
PATT Rene is a 67 year old male with history of diabetes, last A1c 10.1, and recent onset of Bell's palsy (L) two months ago. He reports that he has full return of facial muscle function now. He reports lingering irritation of his eyes, OS > OD, though has not been using ATs. He has noticed that his vision has been getting worse and worse slowly in both eyes over the last year. He can no longer read very well, and colors look dull. He has also stopped driving at night due to glare, and he has difficulty seeing road signs during daytime driving.     Assessment & Plan    (H25.813) Combined forms of age-related cataract of both eyes  (primary encounter diagnosis)  Comment: Visually significant with BCVA of 20/50- OD and 20/30- OS with dense cortical and nuclear changes OU.    Dilates to: 7.5 mm  Alpha blockers/Flomax: YES (FLOMAX)  Trauma/Pseudoxfoliation: None  Fuchs dystrophy/guttae: None    Diabetes: YES, mild NPDR  Anticoagulation: ASA  81mg     Cyl:0.46 @ 082 OD, 0.91 @ 072 OS    We discussed the risks and benefits of cataract surgery, and informed consent was obtained.  Proceed with CE/IOL OD followed by OS.    Surgical plan:  Topical  Malyugin ring  Post-op acular  Aim emmetropia    (G51.0) Facial nerve palsy - Left Eye  Comment: Improving, no lagophthalmos  Plan: Monitor, recommend he continue artificial tears 3-4 x daily    (E11.3293,  Z79.4) Type 2 diabetes mellitus with both eyes affected by mild nonproliferative retinopathy without macular edema, with long-term current use of insulin (H)  Comment: On insulin. Last A1c 10.1. Mild NPDR.  Plan: Discussed the importance of tight blood glucose control in the prevention of diabetic retinopathy. Recommend yearly dilated eye exam. Also discussed risks of macular edema post-operatively after cataract surgery. He expressed understanding.    -----------------------------------------------------------------------------------    Patient disposition:   Return  for scheduled procedure. or sooner as needed.    Teaching statement:  Complete documentation of historical and exam elements from today's encounter can be found in the full encounter summary report (not reduplicated in this progress note). I personally obtained the chief complaint(s) and history of present illness.  I confirmed and edited as necessary the review of systems, past medical/surgical history, family history, social history, and examination findings as documented by others; and I examined the patient myself. I personally reviewed the relevant tests, images, and reports as documented above.     I formulated and edited as necessary the assessment and plan and discussed the findings and management plan with the patient and family.    Lacey Eugene MD  Comprehensive Ophthalmology & Ocular Pathology  Department of Ophthalmology and Visual Neurosciences  [email or web address]  Pager [phone]

## 2017-07-16 DIAGNOSIS — E11.40 TYPE 2 DIABETES MELLITUS WITH DIABETIC NEUROPATHY (H): ICD-10-CM

## 2017-07-18 ENCOUNTER — TELEPHONE (OUTPATIENT)
Dept: ENDOCRINOLOGY | Facility: CLINIC | Age: 67
End: 2017-07-18

## 2017-07-18 DIAGNOSIS — E11.9 DIABETES MELLITUS, TYPE 2 (H): Primary | ICD-10-CM

## 2017-07-18 NOTE — TELEPHONE ENCOUNTER
Pt asking for financial assistance for his insulins. Forgets who it was who helped him in 2016, remembered it r/t manufacturers of Novolog, Lantus. Please advise at [phone]. DVM fine. Sent to [employer].

## 2017-07-20 ENCOUNTER — PRE VISIT (OUTPATIENT)
Dept: UROLOGY | Facility: CLINIC | Age: 67
End: 2017-07-20

## 2017-07-20 DIAGNOSIS — N39.0 URINARY TRACT INFECTION: Primary | ICD-10-CM

## 2017-07-20 RX ORDER — CIPROFLOXACIN 500 MG/1
500 TABLET, FILM COATED ORAL 2 TIMES DAILY
Qty: 6 TABLET | Refills: 0 | Status: SHIPPED | OUTPATIENT
Start: 2017-07-20 | End: 2017-08-09

## 2017-07-20 NOTE — TELEPHONE ENCOUNTER
Message left for patient to call back for prep information for MRI fusion biopsy next week with Dr. Cantu.

## 2017-07-25 ENCOUNTER — PRE VISIT (OUTPATIENT)
Dept: UROLOGY | Facility: CLINIC | Age: 67
End: 2017-07-25

## 2017-07-25 DIAGNOSIS — E78.5 HYPERLIPIDEMIA LDL GOAL <100: ICD-10-CM

## 2017-07-26 ENCOUNTER — TELEPHONE (OUTPATIENT)
Dept: GASTROENTEROLOGY | Facility: CLINIC | Age: 67
End: 2017-07-26

## 2017-07-26 DIAGNOSIS — E11.40 TYPE 2 DIABETES MELLITUS WITH DIABETIC NEUROPATHY (H): ICD-10-CM

## 2017-07-26 RX ORDER — ATORVASTATIN CALCIUM 20 MG/1
TABLET, FILM COATED ORAL
Qty: 90 TABLET | Refills: 0 | OUTPATIENT
Start: 2017-07-26

## 2017-07-27 ENCOUNTER — OFFICE VISIT (OUTPATIENT)
Dept: UROLOGY | Facility: CLINIC | Age: 67
End: 2017-07-27

## 2017-07-27 VITALS
HEIGHT: 68 IN | SYSTOLIC BLOOD PRESSURE: 145 MMHG | BODY MASS INDEX: 29.86 KG/M2 | HEART RATE: 91 BPM | WEIGHT: 197 LBS | DIASTOLIC BLOOD PRESSURE: 81 MMHG

## 2017-07-27 DIAGNOSIS — Z12.5 SCREENING FOR PROSTATE CANCER: Primary | ICD-10-CM

## 2017-07-27 DIAGNOSIS — R97.20 ELEVATED PROSTATE SPECIFIC ANTIGEN (PSA): ICD-10-CM

## 2017-07-27 DIAGNOSIS — E11.40 POLYNEUROPATHY IN DIABETES(357.2): ICD-10-CM

## 2017-07-27 RX ORDER — GABAPENTIN 100 MG/1
100 CAPSULE ORAL 3 TIMES DAILY
Qty: 180 CAPSULE | Refills: 5 | Status: SHIPPED | OUTPATIENT
Start: 2017-07-27 | End: 2018-11-21

## 2017-07-27 ASSESSMENT — PAIN SCALES - GENERAL: PAINLEVEL: NO PAIN (0)

## 2017-07-27 NOTE — LETTER
7/27/2017       RE: Earle Rene  [address]  [place] [address]     Dear Colleague,    Thank you for referring your patient, Earle Rene, to the [facility] UROLOGY AND INST FOR PROSTATE AND UROLOGIC CANCERS at [facility]. Please see a copy of my visit note below.    Here for an MRI-ultrasound-fusion guided biopsy of the prostate    We previously obtained an MRI of the prostate and identified all PIRADS 3-5 lesions for targeting    Target Lesion #1 4 oclock mid  Target Lesion #2 630 apex      Prostate volume 110    PSA   Date Value Ref Range Status   06/06/2017 8.64 (H) 0 - 4 ug/L Final     Comment:     Assay Method:  Chemiluminescence using Siemens Vista analyzer   01/14/2016 3.75 0 - 4 ug/L Final   09/09/2014 2.92 0 - 4 ug/L Final   06/12/2013 2.18 0 - 4 ug/L Final     Comment:     PSA results are about 7% lower than our prior method due to a methodology   change   on August 30, 2011.       An enema was completed and 500 mg of Cipro was given prior to the biopsy.  After obtaining informed consent patient was placed in lateral decubitus position.  The ultrasound probe was placed in the rectum.  The prostate was numbed using ultrasound guidance with 1% lidocaine 5 mls along each nerve bundle.  US images were obtained and then fused with the MRI images.  The fused images were then used to guide the biopsy of the targeted lesions with at least 2 cores taken of each lesion.  We then performed random biopsies.  12 cores were taken with 6 on each side, base, mid and apex.  The patient tolerated the procedure well.      We will follow up with the results in 7-10 days and contact patient with these results.        Emmanuel Cantu MD  Department of Urology Staff  [facility]

## 2017-07-27 NOTE — MR AVS SNAPSHOT
After Visit Summary   7/27/2017    Earle Rene    MRN: [id number]           Patient Information     Date Of Birth          1950        Visit Information        Provider Department      7/27/2017 8:40 AM Emmanuel Cantu MD [facility] for Prostate and Urologic Cancers        Care [facility] for Prostate and Urologic Cancers  Precautions Following a Prostate Biopsy    There are four conditions that you should watch for after a prostate biopsy:    1. Excessive pain  2. Bleeding irregularities (passing numerous  dime sized  clots or if your urine looks like cranberry juice)  3. Fever of 100 degrees or more  4. If you are unable to urinate        If any of these occur, call the Urology Clinic during normal business hours (M-F, 8:00-4:30) at [phone].  If you experience a problem after normal business hours, call our 24-hour phone number at [phone] and ask for the Urology Resident on call to be paged.      If you experience any discomfort following the biopsy, you may take Tylenol.  DO NOT TAKE ASPIRIN unless specified by your physician.   If the discomfort becomes severe or uncontrolled by medication, contact the Urology Clinic or Urology Resident (after normal business hours).      Do not be alarmed if you have some blood in your stool, in your urine, or ejaculate (semen).  This occurrence is normal and may last up to three (3) or four (4) days, usually intermittently.  Blood in the ejaculate (semen) may last several weeks, up to about a dozen ejaculations.  The blood in your ejaculate may appear as brown streaks, blood tinged, and immediately following a biopsy, it may appear bright red.      If you run a fever above 100 degrees, call the Urology Clinic or Urology Resident (after normal business hours) immediately.  If you are unable to reach your physician or the Resident on call, go to the nearest emergency room.  Explain that you have had a  transrectal biopsy of your prostate and what problems you are experiencing.        You should attempt to urinate following your biopsy before you leave the clinic.  If you are unable to urinate four (4) to six (6) hours after you leave the clinic, you will need to contact the Urology Clinic or the Resident on call.  If you are unable to reach your physician or the Resident on call, go to the nearest emergency room.            If you have any questions or concerns after your biopsy, feel free to contact the Urology Clinic at [phone] during M-F, 8:00-5pm business hours.  If you need to speak with someone after normal business hours, call [phone] and ask for the Resident on call to be paged.            Follow-ups after your visit        Your next 10 appointments already scheduled     Aug 03, 2017  9:20 AM CDT   (Arrive by 9:05 AM)   Return Visit with Emmanuel Cantu MD   [facility] and [facility] for Prostate and Urologic Cancers ([facility])    [address] [place] [address]  4th [facility] [id number] [phone]            Aug 09, 2017 10:00 AM CDT   (Arrive by 9:45 AM)   RETURN DIABETES with Pamela Laura PA-C   [facility] Endocrinology ([facility])    [address] [place] [address]  3rd [facility] [id number] [phone]            Aug 14, 2017  9:05 AM CDT   (Arrive by 8:50 AM)   Return Visit with Marcio Hare MD   [facility] Primary Care Clinic ([facility])    [address] [place] [address]  4th [facility] [id number] [phone]            Aug 18, 2017   Procedure with Lacey Eugene MD   [facility] Surgery and Procedure Center ([facility])    [address] [place] [address]  5th [facility] [id number] [phone]           Located in the Clinics and Surgery Center at [address] [place] [address].   parking is very convenient and highly recommended.  is  a $6 flat rate fee.  Both  and self parkers should enter the main arrival plaza from [facility]; parking attendants will direct you based on your parking preference.            Aug 18, 2017 10:00 AM CDT   (Arrive by 9:45 AM)   Post-Op with Lacey Eugene MD   [facility] Ophthalmology ([facility] and [facility] [place])    [address]  4th Floor  [facility] [address] [phone]            Sep 08, 2017   Procedure with Lacey Eugene MD   [facility] PeriOP Services (--)    [address]  [facility] [address] [phone]              Who to contact     Please call your clinic at [phone] to:    Ask questions about your health    Make or cancel appointments    Discuss your medicines    Learn about your test results    Speak to your doctor   If you have compliments or concerns about an experience at your clinic, or if you wish to file a complaint, please contact [facility] Patient Relations at [phone] or email us at [email or web address]         Additional Information About Your Visit        [employer] Information     [employer] is an electronic gateway that provides easy, online access to your medical records. With [employer], you can request a clinic appointment, read your test results, renew a prescription or communicate with your care team.     To sign up for [employer] visit the website at [email or web address]   You will be asked to enter the access code listed below, as well as some personal information. Please follow the directions to create your username and password.     Your access code is: [email or web address]  Expires: 2017 11:59 AM     Your access code will  in 90 days. If you need help or a new code, please contact your [facility] Clinic or call [phone] for assistance.        Care EveryWhere ID     This is your Care EveryWhere ID. This could be used by other organizations to access  "your [facility] medical records  [id number]        Your Vitals Were     Pulse Height BMI (Body Mass Index)             91 1.727 m (5' 8\") 29.95 kg/m2          Blood Pressure from Last 3 Encounters:   07/27/17 145/81   06/25/17 168/86   06/15/17 125/67    Weight from Last 3 Encounters:   07/27/17 89.4 kg (197 lb)   07/12/17 86.2 kg (190 lb)   06/26/17 89.8 kg (198 lb)              Today, you had the following     No orders found for display       Primary Care Provider Office Phone # Fax #    Marcio Hare [phone]       [address] [address]        Equal Access to Services     SHARON MAYS : Hadii aad ku hadasho Soomaali, waaxda luqadaha, qaybta kaalmada adeegyada, ethan nascimento . So [facility] [phone].    ATENCIÓN: Si habla español, tiene a márquez disposición servicios gratuitos de asistencia lingüística. Llame al [phone].    We comply with applicable federal civil rights laws and [place] laws. We do not discriminate on the basis of race, color, national origin, age, disability sex, sexual orientation or gender identity.            Thank you!     Thank you for choosing [facility] UROLOGY AND [facility] FOR PROSTATE AND UROLOGIC CANCERS  for your care. Our goal is always to provide you with excellent care. Hearing back from our patients is one way we can continue to improve our services. Please take a few minutes to complete the written survey that you may receive in the mail after your visit with us. Thank you!             Your Updated Medication List - Protect others around you: Learn how to safely use, store and throw away your medicines at www.disposemymeds.org.          This list is accurate as of: 7/27/17 10:09 AM.  Always use your most recent med list.                   Brand Name Dispense Instructions for use Diagnosis    alprostadil 20 MCG kit    EDEX    6 each    by Intracavitary route. Inject 3/4 ml (15 ug) as instructed. " " Can increase to full dose of 1 ml (20 ug) if necessary.   Can dispense 6 kits.    Erectile dysfunction       aspirin 81 MG tablet      Take 1 tablet by mouth daily.        atorvastatin 20 MG tablet    LIPITOR    30 tablet    Take 1 tablet (20 mg) by mouth daily    Hyperlipidemia LDL goal <100       Blood Pressure Monitor Kit     1 kit    Check blood pressure as directed.    Unspecified essential hypertension       ciprofloxacin 500 MG tablet    CIPRO    6 tablet    Take 1 tablet (500 mg) by mouth 2 times daily    Urinary tract infection       clobetasol 0.05 % ointment    TEMOVATE    30 g    Apply topically to legs twice daily for 2 weeks.  Then discontinue    Rash and other nonspecific skin eruption       fenofibrate 54 MG tablet     90 tablet    Take 1 tablet (54 mg) by mouth daily    Hyperlipidemia LDL goal < 100       fish oil-omega-3 fatty acids 1000 MG capsule     60 capsule    Take 1 capsule (1 g) by mouth 2 times daily    Lymphocytopenia, High cholesterol, Screening for prostate cancer       gabapentin 100 MG capsule    NEURONTIN    180 capsule    Take 1 capsule (100 mg) by mouth 3 times daily    Neuropathy in diabetes (H)       * insulin glargine 100 UNIT/ML injection    LANTUS SOLOSTAR    45 mL    Inject 60 Units Subcutaneous At Bedtime    Type 2 diabetes mellitus with diabetic neuropathy (H)       * insulin glargine 100 UNIT/ML injection    LANTUS SOLOSTAR    30 mL    INJECT 60 UNITS SUBCUTANEOUS EVERY NIGHT AT BEDTIME    Diabetes mellitus, type 2 (H)       insulin pen needle 31G X 5 MM    B-D U/F    400 each    Use 4 times per day.  Please dispense as BD Pen Needle Mini U/F 31G x 5 MM    Diabetes mellitus type 2, insulin dependent (H)       insulin syringe 31G X 5/16\" 0.5 ML Misc     270 each    Use three syringes daily    Type 2 diabetes mellitus (H)       loratadine 10 MG tablet    CLARITIN    90 tablet    Take 1 tablet (10 mg) by mouth daily    Seasonal allergies       losartan 25 MG tablet    COZAAR "    90 tablet    Take 1 tablet (25 mg) by mouth daily    HTN (hypertension)       metFORMIN 1000 MG tablet    GLUCOPHAGE    180 tablet    1 tab twice daily with meals    Type 2 diabetes mellitus with diabetic neuropathy (H)       mupirocin 2 % cream    BACTROBAN    15 g    Apply  topically. In very small amounts only as needed    Rash and other nonspecific skin eruption       NovoLOG FLEXPEN 100 UNIT/ML injection   Generic drug:  insulin aspart     60 mL    Inject 8 -14 units SQ with meals with use of correction insulin. Pt uses approx 60 units in 24 hrs.    Type 2 diabetes mellitus with diabetic nephropathy (H)       ONE TOUCH ULTRA test strip   Generic drug:  blood glucose monitoring     200 strip    Test twice daily or as directed    DM (diabetes mellitus) (H)       oxyCODONE 5 MG IR tablet    ROXICODONE    20 tablet    Take 1 tablet (5 mg) by mouth every 6 hours as needed for pain (may take 1-2 tablets up to every 6 hours as needed)        pantoprazole 40 MG EC tablet    PROTONIX    90 tablet    Take 1 tablet by mouth daily.    Esophageal reflux       tacrolimus 0.03 % ointment    PROTOPIC    60 g    Apply to affected area(s) twice daily    Rash and other nonspecific skin eruption       tamsulosin 0.4 MG capsule    FLOMAX    90 capsule    TAKE 1 CAPSULE(0.4 MG) BY MOUTH EVERY DAY    Benign non-nodular prostatic hyperplasia with lower urinary tract symptoms       * Notice:  This list has 2 medication(s) that are the same as other medications prescribed for you. Read the directions carefully, and ask your doctor or other care provider to review them with you.

## 2017-07-27 NOTE — PATIENT INSTRUCTIONS
[place] for Prostate and Urologic Cancers  Precautions Following a Prostate Biopsy    There are four conditions that you should watch for after a prostate biopsy:    1. Excessive pain  2. Bleeding irregularities (passing numerous  dime sized  clots or if your urine looks like cranberry juice)  3. Fever of 100 degrees or more  4. If you are unable to urinate        If any of these occur, call the Urology Clinic during normal business hours (M-F, 8:00-4:30) at [phone].  If you experience a problem after normal business hours, call our 24-hour phone number at [phone] and ask for the Urology Resident on call to be paged.      If you experience any discomfort following the biopsy, you may take Tylenol.  DO NOT TAKE ASPIRIN unless specified by your physician.   If the discomfort becomes severe or uncontrolled by medication, contact the Urology Clinic or Urology Resident (after normal business hours).      Do not be alarmed if you have some blood in your stool, in your urine, or ejaculate (semen).  This occurrence is normal and may last up to three (3) or four (4) days, usually intermittently.  Blood in the ejaculate (semen) may last several weeks, up to about a dozen ejaculations.  The blood in your ejaculate may appear as brown streaks, blood tinged, and immediately following a biopsy, it may appear bright red.      If you run a fever above 100 degrees, call the Urology Clinic or Urology Resident (after normal business hours) immediately.  If you are unable to reach your physician or the Resident on call, go to the nearest emergency room.  Explain that you have had a transrectal biopsy of your prostate and what problems you are experiencing.        You should attempt to urinate following your biopsy before you leave the clinic.  If you are unable to urinate four (4) to six (6) hours after you leave the clinic, you will need to contact the Urology Clinic or the Resident on call.  If you are unable to reach  your physician or the Resident on call, go to the nearest emergency room.            If you have any questions or concerns after your biopsy, feel free to contact the Urology Clinic at [phone] during M-F, 8:00-5pm business hours.  If you need to speak with someone after normal business hours, call [phone] and ask for the Resident on call to be paged.

## 2017-07-27 NOTE — TELEPHONE ENCOUNTER
----- Message from Fred Pearson sent at 7/27/2017 11:33 AM CDT -----  Regarding: Rx request  Contact: [phone]  Re: gabapentin (NEURONTIN) 100 MG capsule    Was prescribed by a different doctor but is wondering if Dr. Hare will prescribed this for him since he has not been able to contact the provider that initially was refilling this for him.     [id number] [place], [place] - [address] AT [facility] [address]

## 2017-07-27 NOTE — NURSING NOTE
The following medication was given:     MEDICATION: Gentamicin  ROUTE: IM  SITE: LUQ - Gluteus  DOSE: 80 mg/ 2 mL  LOT #: [id number]  :  ZAP  EXPIRATION DATE:  08/18  NDC#: 17747-946-41    Patient tolerated injection well.    Chana Vasquez RN

## 2017-07-27 NOTE — NURSING NOTE
"Chief Complaint   Patient presents with     RECHECK     Follow up- elevated PSA with MRI fusion biopsy       Initial /81  Pulse 91  Ht 1.727 m (5' 8\")  Wt 89.4 kg (197 lb)  BMI 29.95 kg/m2 Estimated body mass index is 29.95 kg/(m^2) as calculated from the following:    Height as of this encounter: 1.727 m (5' 8\").    Weight as of this encounter: 89.4 kg (197 lb).  Medication Reconciliation: complete     JAMES Dodd    "

## 2017-07-27 NOTE — NURSING NOTE
Invasive Procedure Safety Checklist:    Procedure: MRI Fusion biopsy- Prostate    Action: Complete sections and checkboxes as appropriate.    Pre-procedure:  1. Patient ID Verified with 2 identifiers (Nimisha and  or MRN) : YES    2. Procedure and site verified with patient/designee (when able) : YES    3. Accurate consent documentation in medical record : YES    4. H&P (or appropriate assessment) documented in medical record : NO  H&P must be up to 30 days prior to procedure an updated within 24 hours of                 Procedure as applicable.     5. Relevant diagnostic and radiology test results appropriately labeled and displayed as applicable : NO    6. Blood products, implants, devices, and/or special equipment available for the procedure as applicable : NO    7. Procedure site(s) marked with provider initials [Exclusions: ] : NO    8. Marking not required. Reason : Yes  Procedure does not require site marking    Time Out:     Time-Out performed immediately prior to starting procedure, including verbal and active participation of all team members addressing: YES    1. Correct patient identity.  2. Confirmed that the correct side and site are marked.  3. An accurate procedure to be done.  4. Agreement on the procedure to be done.  5. Correct patient position.  6. Relevant images and results are properly labeled and appropriately displayed.  7. The need to administer antibiotics or fluids for irrigation purposes during the procedure as applicable.  8. Safety precautions based on patient history or medication use.    During Procedure: Verification of correct person, site, and procedure occurs any time the responsibility for care of the patient is transferred to another member of the care team.    JAMES Dodd

## 2017-07-27 NOTE — PROGRESS NOTES
Here for an MRI-ultrasound-fusion guided biopsy of the prostate    We previously obtained an MRI of the prostate and identified all PIRADS 3-5 lesions for targeting    Target Lesion #1 4 oclock mid  Target Lesion #2 630 apex      Prostate volume 110    PSA   Date Value Ref Range Status   06/06/2017 8.64 (H) 0 - 4 ug/L Final     Comment:     Assay Method:  Chemiluminescence using Siemens Vista analyzer   01/14/2016 3.75 0 - 4 ug/L Final   09/09/2014 2.92 0 - 4 ug/L Final   06/12/2013 2.18 0 - 4 ug/L Final     Comment:     PSA results are about 7% lower than our prior method due to a methodology   change   on August 30, 2011.       An enema was completed and 500 mg of Cipro was given prior to the biopsy.  After obtaining informed consent patient was placed in lateral decubitus position.  The ultrasound probe was placed in the rectum.  The prostate was numbed using ultrasound guidance with 1% lidocaine 5 mls along each nerve bundle.  US images were obtained and then fused with the MRI images.  The fused images were then used to guide the biopsy of the targeted lesions with at least 2 cores taken of each lesion.  We then performed random biopsies.  12 cores were taken with 6 on each side, base, mid and apex.  The patient tolerated the procedure well.      We will follow up with the results in 7-10 days and contact patient with these results.        Emmanuel Cantu MD  Department of Urology [facility]

## 2017-07-28 LAB — COPATH REPORT: NORMAL

## 2017-07-31 ENCOUNTER — TELEPHONE (OUTPATIENT)
Dept: ENDOCRINOLOGY | Facility: CLINIC | Age: 67
End: 2017-07-31

## 2017-07-31 DIAGNOSIS — E11.9 DM (DIABETES MELLITUS) (H): ICD-10-CM

## 2017-07-31 DIAGNOSIS — E11.40 TYPE 2 DIABETES MELLITUS WITH DIABETIC NEUROPATHY (H): ICD-10-CM

## 2017-07-31 NOTE — TELEPHONE ENCOUNTER
Patient's wife called to request a refill for metformin and patient's test strips. Says she has called about this several times over the last few weeks. Message sent to endocrine nurses at high priority. Please send Rxs to [facility].    Richard Gonzalez RN

## 2017-07-31 NOTE — TELEPHONE ENCOUNTER
Patient called to discuss his lantus and novolog Rxs. Says he went through a financial application to help get these medications covered. Says this was approved with someone last week. He tried to  the Rxs at the [facility] pharmacy last Friday and they were not available, so he went up to the clinic and talked to a nurse who said they would take care of it. Want to make sure this is done; informed patient I did not see any documentation in the chart regarding this, but that I would send it to the endocrine nurses to follow up.     Please follow up with patient directly at [phone].     Richard Gonzalez, JOEL, BAN

## 2017-08-01 NOTE — TELEPHONE ENCOUNTER
Social Work:  D/I: Request from Svetlana Marrero Rn diabetes clinic to assist Pt further w/applications for insulin assistance. She assisted him w/applications for Lantus and Novolog and he was denied, with a request for additional info/documents. Contacted the programs and the Pt. He needs to apply for the extra help program and get a denial letter. Also needs to submit info re how much OOP he has paid for his meds so far this year. They require a minimum of $1000.   Pt to work on those documents and contact me when they are avail.   P: Will continue to facilitate Pt assistance applications.

## 2017-08-03 ENCOUNTER — OFFICE VISIT (OUTPATIENT)
Dept: UROLOGY | Facility: CLINIC | Age: 67
End: 2017-08-03

## 2017-08-03 VITALS
HEIGHT: 68 IN | WEIGHT: 197 LBS | SYSTOLIC BLOOD PRESSURE: 148 MMHG | HEART RATE: 81 BPM | DIASTOLIC BLOOD PRESSURE: 78 MMHG | BODY MASS INDEX: 29.86 KG/M2

## 2017-08-03 DIAGNOSIS — C61 MALIGNANT NEOPLASM OF PROSTATE (H): ICD-10-CM

## 2017-08-03 DIAGNOSIS — N13.8 BENIGN PROSTATIC HYPERPLASIA WITH URINARY OBSTRUCTION: Primary | ICD-10-CM

## 2017-08-03 DIAGNOSIS — N40.1 BENIGN PROSTATIC HYPERPLASIA WITH URINARY OBSTRUCTION: Primary | ICD-10-CM

## 2017-08-03 RX ORDER — FINASTERIDE 5 MG/1
5 TABLET, FILM COATED ORAL DAILY
Qty: 90 TABLET | Refills: 3 | Status: SHIPPED | OUTPATIENT
Start: 2017-08-03 | End: 2018-10-07

## 2017-08-03 ASSESSMENT — PAIN SCALES - GENERAL: PAINLEVEL: NO PAIN (0)

## 2017-08-03 NOTE — PROGRESS NOTES
"      Chief Complaint:   Presumed Localized Prostate Cancer         History of Present Illness:    Earle Rene is a very pleasant 67 year old male who presents with a history of Prostate Cancer.   The clinical stage is cT1c, initial PSA was 8.6 and biopsy jayson score is 3+3 in 1 of 16 cores,.  Volume of core(s) affected by prostate cancer ranged from 5%.  NALLELY is normal.  The prostate size was measured and estimated to be to be 110 grams.  PSA density is 0.08.           Past Medical History:     Past Medical History:   Diagnosis Date     Blepharitis of both eyes      Diabetes (H)      Dry eye syndrome      Nonsenile cataract      Peripheral neuropathy (H)      Sarcoidosis of lung (H)             Past Surgical History:     Past Surgical History:   Procedure Laterality Date     COLONOSCOPY  7/29/2013    Procedure: COLONOSCOPY;;  Surgeon: Montana Pascal MD;  Location:  GI     SURGICAL PATHOLOGY EXAM              Medications     Current Outpatient Prescriptions   Medication     finasteride (PROSCAR) 5 MG tablet     metFORMIN (GLUCOPHAGE) 1000 MG tablet     ONE TOUCH ULTRA test strip     gabapentin (NEURONTIN) 100 MG capsule     ciprofloxacin (CIPRO) 500 MG tablet     insulin glargine (LANTUS SOLOSTAR) 100 UNIT/ML injection     oxyCODONE (ROXICODONE) 5 MG IR tablet     tamsulosin (FLOMAX) 0.4 MG capsule     NOVOLOG FLEXPEN 100 UNIT/ML soln     insulin pen needle (B-D U/F) 31G X 5 MM     losartan (COZAAR) 25 MG tablet     insulin syringe 31G X 5/16\" 0.5 ML MISC     insulin glargine (LANTUS SOLOSTAR) 100 UNIT/ML PEN     Omega-3 Fatty Acids (OMEGA-3 FISH OIL) 1000 MG CAPS     fenofibrate 54 MG tablet     loratadine (CLARITIN) 10 MG tablet     pantoprazole (PROTONIX) 40 MG enteric coated tablet     clobetasol (TEMOVATE) 0.05 % ointment     tacrolimus (PROTOPIC) 0.03 % ointment     Blood Pressure Monitor KIT     mupirocin (BACTROBAN) 2 % cream     alprostadil (EDEX) 20 MCG injection     aspirin 81 MG tablet     " "atorvastatin (LIPITOR) 20 MG tablet     No current facility-administered medications for this visit.             Family History:     Family History   Problem Relation Age of Onset     Glaucoma No family hx of      Macular Degeneration No family hx of             Social History:     Social History     Social History     Marital status:      Spouse name: N/A     Number of children: N/A     Years of education: N/A     Occupational History     Not on file.     Social History Main Topics     Smoking status: Never Smoker     Smokeless tobacco: Never Used     Alcohol use No     Drug use: No     Sexual activity: Not on file     Other Topics Concern     Not on file     Social History Narrative            Allergies:   Review of patient's allergies indicates no known allergies.         Review of Systems:  From intake questionnaire     Negative 14 system review except as noted on HPI, nurse's note.         Physical Exam:     Patient is a 67 year old  male   Vitals: Blood pressure 148/78, pulse 81, height 1.727 m (5' 8\"), weight 89.4 kg (197 lb).  General Appearance Adult: Alert, no acute distress, oriented  Lungs: no respiratory distress, or pursed lip breathing  Heart: No obvious jugular venous distension present  Abdomen: soft, nontender, no organomegaly or masses, Body mass index is 29.95 kg/(m^2).      Labs and Pathology:    I reviewed all applicable laboratory and pathology data and went over findings with patient  Significant for   PSA   Date Value Ref Range Status   06/06/2017 8.64 (H) 0 - 4 ug/L Final     Comment:     Assay Method:  Chemiluminescence using Siemens Vista analyzer   01/14/2016 3.75 0 - 4 ug/L Final   09/09/2014 2.92 0 - 4 ug/L Final   06/12/2013 2.18 0 - 4 ug/L Final     Comment:     PSA results are about 7% lower than our prior method due to a methodology   change   on August 30, 2011.   02/02/2012 1.77 0 - 4 ug/L Final     Comment:     PSA results are about 7% lower than our prior method due to a " methodology   change   on August 30, 2011.   03/08/2011 1.37 0 - 4 ug/L Final   08/10/2010 1.76 0 - 4 ug/L Final   04/20/2007 1.47 0 - 4 ug/L Final   08/17/2006 1.32 0 - 4 ug/L Final   05/27/2005 0.87 0 - 4 ug/L Final         Imaging:    I reviewed all applicable imaging and went over findings with patient.  Significant for MRI of prostate           Assessment and Plan:     Assessment:   Presumed Localized Low Risk Prostate Cancer    Plan:  We had an extensive discussion about the significance of localized, prostate cancer.  His estimated risk of extraprostatic disease is less than 2% therefore no additional diagnostic imaging is indicated at this time.     We discussed the options for treatment of a localized prostate cancer including active surveillance (reviewing the [place] experience), brachytherapy, external beam, and  proton beam radiation therapy, as well as surgical extirpation.  We briefly mentioned cryotherapy, but the results are not great in the primary setting and they almost uniformly lead to loss of erections.  We discussed the fact that all prostate cancer treatments leads to the loss or decrease in sexual function.  This loss usually occurs immediately with surgery and then improves as the patient heals and with radiation there is usually no effect, then it drops off at a faster than expected pace such that 2-3 years down the road, the number of men suffering from ED after treatment for their prostate cancer is approximately equal.    We also discussed the advantages and disadvantages and roles of open surgery vs. laparoscopic (and [place] assisted) surgery.  I noted that though robotic surgery has been associated with shorter hospitalization, shorter time to complete recovery, fewer blood transfusions and lower risk of bladder neck contractures, in the most important domains, cancer control, preservation of urinary function and preservation of sexual function, the outcomes have been quite  comparable.    I would heavily recommend active surveillance given the low risk and low volume cancer.  He concurs.    Discussed Telugu rugs.    Would recommend PSA in 6 months and repeat MRI in one year with biopsy.        Orders  Orders Placed This Encounter   Procedures     PSA tumor marker           Emmanuel Cantu MD  Department of Urology Staff  [facility]  Patient Care Team:  Marcio Hare MD as PCP - General (Internal Medicine)  Marcio Hare MD as Referring Physician (Internal Medicine)  Ilana Desir RN as Nurse Coordinator (Cardiology)  Rafael Hand MD as MD (Cardiology)  Pamela Laura PA-C as Physician Assistant (Physician Assistant)  Marcio Hare MD as Referring Physician (Internal Medicine)  Rafael Alba MD as Resident (Student in organized health care education/training program)  ESTABLISHED PATIENT    Copy to patient  CHRIS MONTOYA  [address] [place] [address]

## 2017-08-03 NOTE — NURSING NOTE
"Chief Complaint   Patient presents with     RECHECK     Follow up- discuss results of prostate biopsy       Initial Ht 1.727 m (5' 8\")  Wt 89.4 kg (197 lb)  BMI 29.95 kg/m2 Estimated body mass index is 29.95 kg/(m^2) as calculated from the following:    Height as of this encounter: 1.727 m (5' 8\").    Weight as of this encounter: 89.4 kg (197 lb).  Medication Reconciliation: complete     JAMES Dodd    "

## 2017-08-03 NOTE — MR AVS SNAPSHOT
After Visit Summary   8/3/2017    Earle Rene    MRN: [id number]           Patient Information     Date Of Birth          1950        Visit Information        Provider Department      8/3/2017 9:20 AM Emmanuel Cantu MD [facility] Urology and [facility] Prostate [facility] Urologic [facility]        Today's Diagnoses     Benign prostatic hyperplasia with urinary obstruction    -  1    Malignant neoplasm of prostate (H)          Care Instructions    Follow up with Dr. Cantu in 6 months with PSA.    It was a pleasure meeting with you today.  Thank you for allowing me and my team the privilege of caring for you today.  YOU are the reason we are here, and I truly hope we provided you with the excellent service you deserve.  Please let us know if there is anything else we can do for you so that we can be sure you are leaving completely satisfied with your care experience.      JAMES Dodd          Follow-ups after your visit        Your next 10 appointments already scheduled     Aug 09, 2017 10:00 AM CDT   (Arrive by 9:45 AM)   RETURN DIABETES with Pamela Laura PA-C   [facility] Endocrinology ([facility])    [address] [place] [address]  3rd [address] [phone]            Aug 14, 2017  9:05 AM CDT   (Arrive by 8:50 AM)   Return Visit with Marcio Hare MD   [facility] Primary Care Clinic ([facility])    [address] [place] [address]  4th [place] [address] [phone]            Aug 18, 2017   Procedure with Lacey Eugene MD   [facility] Surgery and Procedure Center ([facility])    [address] [place] [address]  5th [facility] [address] [phone]           Located in the Clinics and Surgery Center at [address] [place] [address].   parking is very convenient and highly recommended.  is a $6 flat rate fee.  Both  and self parkers should enter the  main arrival rajwinder from [facility]; parking attendants will direct you based on your parking preference.            Aug 18, 2017 10:00 AM CDT   (Arrive by 9:45 AM)   Post-Op with Lacey Eugene MD   [facility] Ophthalmology ([facility])    [address]  4th Floor  [facility] [id number] [phone]            Sep 08, 2017   Procedure with Lacey Eugene MD   [facility] Per[facility] (--)    [address]  [facility] [address] [phone]              Future tests that were ordered for you today     Open Future Orders        Priority Expected Expires Ordered    PSA tumor marker Routine 8/3/2017 8/3/2018 8/3/2017            Who to contact     Please call your clinic at [phone] to:    Ask questions about your health    Make or cancel appointments    Discuss your medicines    Learn about your test results    Speak to your doctor   If you have compliments or concerns about an experience at your clinic, or if you wish to file a complaint, please contact [facility] Patient Relations at [phone] or email us at [email or web address]         Additional Information About Your Visit        [employer] [facility] Information     [email or web address] is an electronic gateway that provides easy, online access to your medical records. With [email or web address], you can request a clinic appointment, read your test results, renew a prescription or communicate with your care team.     To sign up for [employer] visit the website at [email or web address]   You will be asked to enter the access code listed below, as well as some personal information. Please follow the directions to create your username and password.     Your access code is: [email or web address]  Expires: 2017 11:59 AM     Your access code will  in 90 days. If you need help or a new code, please contact your [facility] or call [phone] for assistance.       "  Care EveryWhere ID     This is your Care EveryWhere ID. This could be used by other organizations to access your [facility] medical records  [id number]        Your Vitals Were     Pulse Height BMI (Body Mass Index)             81 1.727 m (5' 8\") 29.95 kg/m2          Blood Pressure from Last 3 Encounters:   08/03/17 148/78   07/27/17 145/81   06/25/17 168/86    Weight from Last 3 Encounters:   08/03/17 89.4 kg (197 lb)   07/27/17 89.4 kg (197 lb)   07/12/17 86.2 kg (190 lb)                 Today's Medication Changes          These changes are accurate as of: 8/3/17  1:36 PM.  If you have any questions, ask your nurse or doctor.               Start taking these medicines.        Dose/Directions    finasteride 5 MG tablet   Commonly known as:  PROSCAR   Used for:  Benign prostatic hyperplasia with urinary obstruction   Started by:  Emmanuel Cantu MD        Dose:  5 mg   Take 1 tablet (5 mg) by mouth daily   Quantity:  90 tablet   Refills:  3            Where to get your medicines      These medications were sent to [id number] - [place], [place] - [address] AT [facility] [address]  [facility] [address], [place] [address]    Hours:  24-hours Phone:  [phone]     finasteride 5 MG tablet                Primary Care Provider Office Phone # Fax #    Marcio GAVIN MD [place] [phone]       [address] [address]        Equal Access to Services     TRUDI MAYS AH: Hadii priscilla rock Sofranklin, waaxda luqadaha, qaybta kaalmada ethan pimentel. So [facility] [phone].    ATENCIÓN: Si habla español, tiene a márquez disposición servicios gratuitos de asistencia lingüística. Llame al [phone].    We comply with applicable federal civil rights laws and [place] laws. We do not discriminate on the basis of race, color, national origin, age, disability sex, sexual orientation or gender " identity.            Thank you!     Thank you for choosing [facility] UROLOGY AND [facility] FOR PROSTATE AND UROLOGIC CANCERS  for your care. Our goal is always to provide you with excellent care. Hearing back from our patients is one way we can continue to improve our services. Please take a few minutes to complete the written survey that you may receive in the mail after your visit with us. Thank you!             Your Updated Medication List - Protect others around you: Learn how to safely use, store and throw away your medicines at www.disposemymeds.org.          This list is accurate as of: 8/3/17  1:36 PM.  Always use your most recent med list.                   Brand Name Dispense Instructions for use Diagnosis    alprostadil 20 MCG kit    EDEX    6 each    by Intracavitary route. Inject 3/4 ml (15 ug) as instructed.  Can increase to full dose of 1 ml (20 ug) if necessary.   Can dispense 6 kits.    Erectile dysfunction       aspirin 81 MG tablet      Take 1 tablet by mouth daily.        atorvastatin 20 MG tablet    LIPITOR    30 tablet    Take 1 tablet (20 mg) by mouth daily    Hyperlipidemia LDL goal <100       Blood Pressure Monitor Kit     1 kit    Check blood pressure as directed.    Unspecified essential hypertension       ciprofloxacin 500 MG tablet    CIPRO    6 tablet    Take 1 tablet (500 mg) by mouth 2 times daily    Urinary tract infection       clobetasol 0.05 % ointment    TEMOVATE    30 g    Apply topically to legs twice daily for 2 weeks.  Then discontinue    Rash and other nonspecific skin eruption       fenofibrate 54 MG tablet     90 tablet    Take 1 tablet (54 mg) by mouth daily    Hyperlipidemia LDL goal < 100       finasteride 5 MG tablet    PROSCAR    90 tablet    Take 1 tablet (5 mg) by mouth daily    Benign prostatic hyperplasia with urinary obstruction       fish oil-omega-3 fatty acids 1000 MG capsule     60 capsule    Take 1 capsule (1 g) by mouth 2 times daily    Lymphocytopenia, High  "cholesterol, Screening for prostate cancer       gabapentin 100 MG capsule    NEURONTIN    180 capsule    Take 1 capsule (100 mg) by mouth 3 times daily    Neuropathy in diabetes (H)       * insulin glargine 100 UNIT/ML injection    LANTUS SOLOSTAR    45 mL    Inject 60 Units Subcutaneous At Bedtime    Type 2 diabetes mellitus with diabetic neuropathy (H)       * insulin glargine 100 UNIT/ML injection    LANTUS SOLOSTAR    30 mL    INJECT 60 UNITS SUBCUTANEOUS EVERY NIGHT AT BEDTIME    Diabetes mellitus, type 2 (H)       insulin pen needle 31G X 5 MM    B-D U/F    400 each    Use 4 times per day.  Please dispense as BD Pen Needle Mini U/F 31G x 5 MM    Diabetes mellitus type 2, insulin dependent (H)       insulin syringe 31G X 5/16\" 0.5 ML Misc     270 each    Use three syringes daily    Type 2 diabetes mellitus (H)       loratadine 10 MG tablet    CLARITIN    90 tablet    Take 1 tablet (10 mg) by mouth daily    Seasonal allergies       losartan 25 MG tablet    COZAAR    90 tablet    Take 1 tablet (25 mg) by mouth daily    HTN (hypertension)       metFORMIN 1000 MG tablet    GLUCOPHAGE    180 tablet    1 tab twice daily with meals    Type 2 diabetes mellitus with diabetic neuropathy (H)       mupirocin 2 % cream    BACTROBAN    15 g    Apply  topically. In very small amounts only as needed    Rash and other nonspecific skin eruption       NovoLOG FLEXPEN 100 UNIT/ML injection   Generic drug:  insulin aspart     60 mL    Inject 8 -14 units SQ with meals with use of correction insulin. Pt uses approx 60 units in 24 hrs.    Type 2 diabetes mellitus with diabetic nephropathy (H)       ONE TOUCH ULTRA test strip   Generic drug:  blood glucose monitoring     200 strip    Test twice daily or as directed    DM (diabetes mellitus) (H)       oxyCODONE 5 MG IR tablet    ROXICODONE    20 tablet    Take 1 tablet (5 mg) by mouth every 6 hours as needed for pain (may take 1-2 tablets up to every 6 hours as needed)        " pantoprazole 40 MG EC tablet    PROTONIX    90 tablet    Take 1 tablet by mouth daily.    Esophageal reflux       tacrolimus 0.03 % ointment    PROTOPIC    60 g    Apply to affected area(s) twice daily    Rash and other nonspecific skin eruption       tamsulosin 0.4 MG capsule    FLOMAX    90 capsule    TAKE 1 CAPSULE(0.4 MG) BY MOUTH EVERY DAY    Benign non-nodular prostatic hyperplasia with lower urinary tract symptoms       * Notice:  This list has 2 medication(s) that are the same as other medications prescribed for you. Read the directions carefully, and ask your doctor or other care provider to review them with you.

## 2017-08-03 NOTE — LETTER
"8/3/2017        RE: Earle Rene  [address]  [place] [address]     Dear Colleague,    Thank you for referring your patient, Earle Rene, to the [facility] UROLOGY AND INST FOR PROSTATE AND UROLOGIC CANCERS at [facility]. Please see a copy of my visit note below.          Chief Complaint:   Presumed Localized Prostate Cancer         History of Present Illness:    Earle Rene is a very pleasant 67 year old male who presents with a history of Prostate Cancer.   The clinical stage is cT1c, initial PSA was 8.6 and biopsy jayson score is 3+3 in 1 of 16 cores,.  Volume of core(s) affected by prostate cancer ranged from 5%.  NALLELY is normal.  The prostate size was measured and estimated to be to be 110 grams.  PSA density is 0.08.           Past Medical History:     Past Medical History:   Diagnosis Date     Blepharitis of both eyes      Diabetes (H)      Dry eye syndrome      Nonsenile cataract      Peripheral neuropathy (H)      Sarcoidosis of lung (H)             Past Surgical History:     Past Surgical History:   Procedure Laterality Date     COLONOSCOPY  7/29/2013    Procedure: COLONOSCOPY;;  Surgeon: Montana Pascal MD;  Location:  GI     SURGICAL PATHOLOGY EXAM              Medications     Current Outpatient Prescriptions   Medication     finasteride (PROSCAR) 5 MG tablet     metFORMIN (GLUCOPHAGE) 1000 MG tablet     ONE TOUCH ULTRA test strip     gabapentin (NEURONTIN) 100 MG capsule     ciprofloxacin (CIPRO) 500 MG tablet     insulin glargine (LANTUS SOLOSTAR) 100 UNIT/ML injection     oxyCODONE (ROXICODONE) 5 MG IR tablet     tamsulosin (FLOMAX) 0.4 MG capsule     NOVOLOG FLEXPEN 100 UNIT/ML soln     insulin pen needle (B-D U/F) 31G X 5 MM     losartan (COZAAR) 25 MG tablet     insulin syringe 31G X 5/16\" 0.5 ML MISC     insulin glargine (LANTUS SOLOSTAR) 100 UNIT/ML PEN     Omega-3 Fatty Acids (OMEGA-3 FISH OIL) 1000 MG CAPS     fenofibrate 54 MG tablet     " "loratadine (CLARITIN) 10 MG tablet     pantoprazole (PROTONIX) 40 MG enteric coated tablet     clobetasol (TEMOVATE) 0.05 % ointment     tacrolimus (PROTOPIC) 0.03 % ointment     Blood Pressure Monitor KIT     mupirocin (BACTROBAN) 2 % cream     alprostadil (EDEX) 20 MCG injection     aspirin 81 MG tablet     atorvastatin (LIPITOR) 20 MG tablet     No current facility-administered medications for this visit.             Family History:     Family History   Problem Relation Age of Onset     Glaucoma No family hx of      Macular Degeneration No family hx of             Social History:     Social History     Social History     Marital status:      Spouse name: N/A     Number of children: N/A     Years of education: N/A     Occupational History     Not on file.     Social History Main Topics     Smoking status: Never Smoker     Smokeless tobacco: Never Used     Alcohol use No     Drug use: No     Sexual activity: Not on file     Other Topics Concern     Not on file     Social History Narrative            Allergies:   Review of patient's allergies indicates no known allergies.         Review of Systems:  From intake questionnaire     Negative 14 system review except as noted on HPI, nurse's note.         Physical Exam:     Patient is a 67 year old  male   Vitals: Blood pressure 148/78, pulse 81, height 1.727 m (5' 8\"), weight 89.4 kg (197 lb).  General Appearance Adult: Alert, no acute distress, oriented  Lungs: no respiratory distress, or pursed lip breathing  Heart: No obvious jugular venous distension present  Abdomen: soft, nontender, no organomegaly or masses, Body mass index is 29.95 kg/(m^2).      Labs and Pathology:    I reviewed all applicable laboratory and pathology data and went over findings with patient  Significant for   PSA   Date Value Ref Range Status   06/06/2017 8.64 (H) 0 - 4 ug/L Final     Comment:     Assay Method:  Chemiluminescence using Siemens Vista analyzer   01/14/2016 3.75 0 - 4 ug/L " Final   09/09/2014 2.92 0 - 4 ug/L Final   06/12/2013 2.18 0 - 4 ug/L Final     Comment:     PSA results are about 7% lower than our prior method due to a methodology   change   on August 30, 2011.   02/02/2012 1.77 0 - 4 ug/L Final     Comment:     PSA results are about 7% lower than our prior method due to a methodology   change   on August 30, 2011.   03/08/2011 1.37 0 - 4 ug/L Final   08/10/2010 1.76 0 - 4 ug/L Final   04/20/2007 1.47 0 - 4 ug/L Final   08/17/2006 1.32 0 - 4 ug/L Final   05/27/2005 0.87 0 - 4 ug/L Final         Imaging:    I reviewed all applicable imaging and went over findings with patient.  Significant for MRI of prostate           Assessment and Plan:     Assessment:   Presumed Localized Low Risk Prostate Cancer    Plan:  We had an extensive discussion about the significance of localized, prostate cancer.  His estimated risk of extraprostatic disease is less than 2% therefore no additional diagnostic imaging is indicated at this time.     We discussed the options for treatment of a localized prostate cancer including active surveillance (reviewing the [place] experience), brachytherapy, external beam, and  proton beam radiation therapy, as well as surgical extirpation.  We briefly mentioned cryotherapy, but the results are not great in the primary setting and they almost uniformly lead to loss of erections.  We discussed the fact that all prostate cancer treatments leads to the loss or decrease in sexual function.  This loss usually occurs immediately with surgery and then improves as the patient heals and with radiation there is usually no effect, then it drops off at a faster than expected pace such that 2-3 years down the road, the number of men suffering from ED after treatment for their prostate cancer is approximately equal.    We also discussed the advantages and disadvantages and roles of open surgery vs. laparoscopic (and [place] assisted) surgery.  I noted that though robotic  surgery has been associated with shorter hospitalization, shorter time to complete recovery, fewer blood transfusions and lower risk of bladder neck contractures, in the most important domains, cancer control, preservation of urinary function and preservation of sexual function, the outcomes have been quite comparable.    I would heavily recommend active surveillance given the low risk and low volume cancer.  He concurs.    Discussed Syriac rugs.    Would recommend PSA in 6 months and repeat MRI in one year with biopsy.        Orders  Orders Placed This Encounter   Procedures     PSA tumor marker           Emmanuel Cantu MD  Department of Urology Staff  [facility]    CC  Patient Care Team:  Marcio Hare MD as PCP - General (Internal Medicine)  Marcio Hare MD as Referring Physician (Internal Medicine)  Ilana Desir, RN as Nurse Coordinator (Cardiology)  Rafael Hand MD as MD (Cardiology)  Pamela Laura PA-C as Physician Assistant (Physician Assistant)  Marcio Hare MD as Referring Physician (Internal Medicine)  Rafael Alba MD as Resident (Student in organized health care education/training program)  ESTABLISHED PATIENT    Copy to patient  CHRIS MONTOYA  [address] [place] [address]

## 2017-08-03 NOTE — PATIENT INSTRUCTIONS
Follow up with Dr. Cantu in 6 months with PSA.    It was a pleasure meeting with you today.  Thank you for allowing me and my team the privilege of caring for you today.  YOU are the reason we are here, and I truly hope we provided you with the excellent service you deserve.  Please let us know if there is anything else we can do for you so that we can be sure you are leaving completely satisfied with your care experience.      JAMES Dodd

## 2017-08-09 ENCOUNTER — OFFICE VISIT (OUTPATIENT)
Dept: ENDOCRINOLOGY | Facility: CLINIC | Age: 67
End: 2017-08-09

## 2017-08-09 ENCOUNTER — TELEPHONE (OUTPATIENT)
Dept: AUDIOLOGY | Facility: CLINIC | Age: 67
End: 2017-08-09

## 2017-08-09 VITALS
HEIGHT: 68 IN | WEIGHT: 199 LBS | DIASTOLIC BLOOD PRESSURE: 79 MMHG | SYSTOLIC BLOOD PRESSURE: 143 MMHG | BODY MASS INDEX: 30.16 KG/M2 | HEART RATE: 90 BPM

## 2017-08-09 DIAGNOSIS — E11.65 TYPE 2 DIABETES MELLITUS WITH HYPERGLYCEMIA, WITH LONG-TERM CURRENT USE OF INSULIN (H): Primary | ICD-10-CM

## 2017-08-09 DIAGNOSIS — Z79.4 TYPE 2 DIABETES MELLITUS WITH HYPERGLYCEMIA, WITH LONG-TERM CURRENT USE OF INSULIN (H): Primary | ICD-10-CM

## 2017-08-09 LAB — HBA1C MFR BLD: 8.9 % (ref 4.3–6)

## 2017-08-09 NOTE — TELEPHONE ENCOUNTER
Earle Rene was seen at the [facility] on 8/9/17 for hearing aid services.  He came to the clinic with three hearing aids: bilateral Phonak devices from ~2001 and a right MicroTech device, which is part of a bilateral set fit at this facility in 2010.  The patient reports the left device from that set was lost last year.  The MicroTech hearing aid was cleaned but found to be non-functional.  The Phonak devices were cleaned and found to be working.  Mr. Rene was advised of the potential repair charge for the MicroTech device but he would like to defer repairs at this time.  He may return to the clinic for a hearing aid evaluation or to have his Phonak devices adjusted.

## 2017-08-09 NOTE — LETTER
8/9/2017     RE: Earle Rene  [address]  [place] [address]     Dear Colleague,    Thank you for referring your patient, Earle Rene, to the [facility] ENDOCRINOLOGY at [facility]. Please see a copy of my visit note below.    HPI   Earle Rene is a 67 year old male with type 2 diabetes mellitus here today for a follow up visit.      He was last seen in our clinic in Sept 2016.  Pt's diabetes is complicated by retinopathy, nephropathy and neuropathy.  Mr. Rene tells me he has been better at taking his insulin as prescribed.  He states he is taking Lantus 60 units SQ at hs, Novolog 8-10 units with meals and Metformin 1000 mg BID.  Pt's A1C is 8.9 % today.  His previous A1C was 10.2 % in 6/2017.  We downloaded his glucose meter today and his blood sugars have been in the high 100- mid 200 range.  He has been checking his blood sugar more frequent and he has his Novolog flexpen with him today.  On ROS today, he was recently hospitalized at [facility] and had salivary stones removed and he tells me his blood sugars were very good in the hospital and he felt much better.  He is interested in meeting with our dietitian.  Pt has numbness in both feet from his neuropathy.  Some intermittent blurred vision.  He reports chronic cough.  Pt denies n/v, SOB at rest, chest pain, abd pain, diarrhea,dysuria, hematuria or foot ulcers.    ROS   Please see under HPI.     ALLERGIES:  Review of patient's allergies indicates no known allergies.    Current Outpatient Prescriptions   Medication Sig Dispense Refill     insulin glargine (LANTUS SOLOSTAR) 100 UNIT/ML injection Inject 65 units SQ at bedtime. 30 mL 2     finasteride (PROSCAR) 5 MG tablet Take 1 tablet (5 mg) by mouth daily 90 tablet 3     metFORMIN (GLUCOPHAGE) 1000 MG tablet 1 tab twice daily with meals 180 tablet 3     ONE TOUCH ULTRA test strip Test twice daily or as directed 200 strip 3     gabapentin (NEURONTIN) 100 MG  "capsule Take 1 capsule (100 mg) by mouth 3 times daily 180 capsule 5     tamsulosin (FLOMAX) 0.4 MG capsule TAKE 1 CAPSULE(0.4 MG) BY MOUTH EVERY DAY 90 capsule 0     NOVOLOG FLEXPEN 100 UNIT/ML soln Inject 8 -14 units SQ with meals with use of correction insulin. Pt uses approx 60 units in 24 hrs. 60 mL 3     insulin pen needle (B-D U/F) 31G X 5 MM Use 4 times per day.  Please dispense as BD Pen Needle Mini U/F 31G x 5  each 3     losartan (COZAAR) 25 MG tablet Take 1 tablet (25 mg) by mouth daily 90 tablet 3     insulin syringe 31G X 5/16\" 0.5 ML MISC Use three syringes daily 270 each 1     Omega-3 Fatty Acids (OMEGA-3 FISH OIL) 1000 MG CAPS Take 1 capsule (1 g) by mouth 2 times daily 60 capsule 11     loratadine (CLARITIN) 10 MG tablet Take 1 tablet (10 mg) by mouth daily 90 tablet 0     clobetasol (TEMOVATE) 0.05 % ointment Apply topically to legs twice daily for 2 weeks.  Then discontinue 30 g 0     tacrolimus (PROTOPIC) 0.03 % ointment Apply to affected area(s) twice daily 60 g 0     Blood Pressure Monitor KIT Check blood pressure as directed. 1 kit 0     mupirocin (BACTROBAN) 2 % cream Apply  topically. In very small amounts only as needed 15 g 1     alprostadil (EDEX) 20 MCG injection by Intracavitary route. Inject 3/4 ml (15 ug) as instructed.  Can increase to full dose of 1 ml (20 ug) if necessary.   Can dispense 6 kits. 6 each 12     aspirin 81 MG tablet Take 1 tablet by mouth daily.       [DISCONTINUED] insulin glargine (LANTUS SOLOSTAR) 100 UNIT/ML injection INJECT 60 UNITS SUBCUTANEOUS EVERY NIGHT AT BEDTIME 30 mL 2     atorvastatin (LIPITOR) 20 MG tablet Take 1 tablet (20 mg) by mouth daily 30 tablet 0     [DISCONTINUED] insulin glargine (LANTUS SOLOSTAR) 100 UNIT/ML PEN Inject 60 Units Subcutaneous At Bedtime 45 mL 1     fenofibrate 54 MG tablet Take 1 tablet (54 mg) by mouth daily (Patient not taking: Reported on 8/9/2017) 90 tablet 0     Family Hx   No change.     Personal Hx   Smoke: none. " "  ETOH: none.    with grown children.   He owns his own business.    PMH   1. Type 2 Diabetes Mellitus dx at age 43.   2. Neuropathy.  3. Nephropathy.   4. ED.   5. Dyslipidemia.   6. Nephrolithiasis.   7. Decrease auditory acuity.   8. Sarcoidosis-lung.   9. Goiter.   10. S/P T & A.   11. S/P FX right heel.   12. Vit D def.   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   14. CT chest- ? granulomas.   Past Medical History:   Diagnosis Date     Blepharitis of both eyes      Diabetes (H)      Dry eye syndrome      Nonsenile cataract      Peripheral neuropathy (H)      Sarcoidosis of lung (H)      Past Surgical History:   Procedure Laterality Date     COLONOSCOPY  7/29/2013    Procedure: COLONOSCOPY;;  Surgeon: Montana Pascal MD;  Location:  GI     SURGICAL PATHOLOGY EXAM       Physical Exam   General appearance: Vital signs:   /79 (BP Location: Left arm, Patient Position: Sitting, Cuff Size: Adult Regular)  Pulse 90  Ht 1.727 m (5' 8\")  Wt 90.3 kg (199 lb)  BMI 30.26 kg/m2  Estimated body mass index is 30.26 kg/(m^2) as calculated from the following:    Height as of this encounter: 1.727 m (5' 8\").    Weight as of this encounter: 90.3 kg (199 lb).  Head:  Normal.   Eyes: PERRLA; fundi not visualized.   Lungs: clear.   Cardiovascular system:  RRR.   Abdomen:  Large and nontender.  Musculoskeletal system: no edema.   Neurological:  normal.   Skin:  necrobiosis lipoidica on both legs.   FEET: no ulcers.    Results   Creatinine   Date Value Ref Range Status   06/24/2017 1.12 0.66 - 1.25 mg/dL Final     GFR Estimate   Date Value Ref Range Status   06/24/2017 65 >60 mL/min/1.7m2 Final     Comment:     Non  GFR Calc     Hemoglobin A1C   Date Value Ref Range Status   06/06/2017 10.2 (H) 4.3 - 6.0 % Final     Potassium   Date Value Ref Range Status   06/24/2017 3.7 3.4 - 5.3 mmol/L Final     ALT   Date Value Ref Range Status   06/06/2017 49 0 - 70 U/L Final     AST   Date Value Ref Range Status "   06/06/2017 20 0 - 45 U/L Final     TSH   Date Value Ref Range Status   10/21/2014 1.13 0.40 - 4.00 mU/L Final     Comment:     Effective 7/30/2014, the reference range for this assay has changed to reflect   new instrumentation/methodology.       T4 Free   Date Value Ref Range Status   10/21/2014 1.00 0.76 - 1.46 ng/dL Final     Comment:     Effective 7/30/2014, the reference range for this assay has changed to reflect   new instrumentation/methodology.           Cholesterol   Date Value Ref Range Status   01/14/2016 173 <200 mg/dL Final   09/09/2014 123 <200 mg/dL Final     Comment:     LDL Cholesterol is the primary guide to therapy.   The NCEP recommends further evaluation of: patients with cholesterol greater   than 200 mg/dL if additional risk factors are present, cholesterol greater   than   240 mg/dL, triglycerides greater than 150 mg/dL, or HDL less than 40 mg/dL.       HDL Cholesterol   Date Value Ref Range Status   01/14/2016 30 (L) >39 mg/dL Final   09/09/2014 32 (L) >40 mg/dL Final     LDL Cholesterol Calculated   Date Value Ref Range Status   01/14/2016  <100 mg/dL Final    Cannot estimate LDL when triglyceride exceeds 400 mg/dL   09/09/2014 30 0 - 129 mg/dL Final     Comment:     LDL Cholesterol is the primary guide to therapy: LDL-cholesterol goal in high   risk patients is <100 mg/dL and in very high risk patients is <70 mg/dL.       LDL Cholesterol Direct   Date Value Ref Range Status   01/14/2016 84 <100 mg/dL Final     Comment:     Desirable:       <100 mg/dl     Triglycerides   Date Value Ref Range Status   01/14/2016 436 (H) <150 mg/dL Final     Comment:     Borderline high:  150-199 mg/dl   High:             200-499 mg/dl   Very high:       >499 mg/dl     09/09/2014 307 (H) 0 - 150 mg/dL Final     Comment:     Fasting specimen     Cholesterol/HDL Ratio   Date Value Ref Range Status   09/09/2014 3.8 0.0 - 5.0 Final   11/20/2013 5.8 (H) 0.0 - 5.0 Final     A1C      8.9   8/9/2017  A1C      9.7    9/22/2016  A1C      9.7   7/21/2015  A1C     10.2  12/2/2014  A1C     10.2  9/9/2014  A1C      9.8  11/20/2013  A1C      9.3   6/12/2013  A1C      8.0   8/14/2012  A1C      8.2   5/22/2012  A1C      8.0   5/22/2012    ASSESSMENT/PLAN:     1. TYPE 2 DIABETES MELLITUS: Uncontrolled type 2 diabetes mellitus complicated by retinopathy, nephropathy and neuropathy.  Mr. Rene has been doing a better job of checking his blood sugar more frequent and taking his insulin as prescribed.  I asked him to increase his Lantus 65 units SQ at hs and remains on current Novolog and Metformin doses.  I placed an order for patient to meet with our dietitian.  Encouraged patient to make healthy food choices, reduce his food portions with meals, avoid snacking and walk daily and to take his insulin and medications as prescribed.  He has had no interest in carb counting in the past.  Pt remains on daily ASA.     2. GOITER: Nontender goiter.  Will check TSH next visit.    3. NEPHROPATHY: Discussed the need for better glycemic control and good BP control.   Pt's creat 1.12 with GFR 65 mL/min in June 2017.  Pt's urine microalbuminuria was + in March 2016.  He is taking Cozaar.    4.  RETINOPATHY: Pt seen by Oph here in July 2017.    5.  NEUROPATHY: Continue Gabapentin.  No foot ulcers.    6. DYSLIPIDEMIA: LDL 84 in Jan 2016.   Pt is now taking Lipitor, fenofibrate and Fish Oil.    7. OBESITY: Encouraged him to meet with our dietitian.   He does not want to take Byetta, Victoza or Bydureon.  See #1 above.    8.   Return to Endocrine Clinic to see me in 2 months.  Pt is to call me if he has more than 2 low blood sugars ( 70 or less ) per week or persistent high blood sugar values > 250.      Again, thank you for allowing me to participate in the care of your patient.      Sincerely,    Pamela Laura PA-C

## 2017-08-09 NOTE — NURSING NOTE
"Chief Complaint   Patient presents with     RECHECK     DIABETES TYPE 2 F/U        Initial /79 (BP Location: Left arm, Patient Position: Sitting, Cuff Size: Adult Regular)  Pulse 90  Ht 1.727 m (5' 8\")  Wt 90.3 kg (199 lb)  BMI 30.26 kg/m2 Estimated body mass index is 30.26 kg/(m^2) as calculated from the following:    Height as of this encounter: 1.727 m (5' 8\").    Weight as of this encounter: 90.3 kg (199 lb).  Medication Reconciliation: complete       Performed A1C test - patient tolerated well.    Francoise Trujillo, CMA       "

## 2017-08-09 NOTE — MR AVS SNAPSHOT
After Visit Summary   8/9/2017    Earle Rene    MRN: [id number]           Patient Information     Date Of Birth          1950        Visit Information        Provider Department      8/9/2017 10:00 AM Pamela Laura PA-C [facility]        Today's Diagnoses     Type 2 diabetes mellitus with hyperglycemia, with long-term current use of insulin (H)    -  1       Follow-ups after your visit        Additional Services     DIABETES EDUCATOR REFERRAL       DIABETES SELF MANAGEMENT TRAINING (DSMT)    Please schedule pt to see dieititian.    Your provider has referred you to Diabetes Education: PREFERRED PROVIDERS:    Type of training and number of hours: Previous Diagnosis: Follow-up DSMT - 2 hours.    Medicare covers: 10 hours of initial DSMT in 12 month period from the time of first visit, plus 2 hours of follow-up DSMT annually, and additional hours as requested for insulin training.    Diabetes Type: Type 2 - On Insulin             Diabetes Co-Morbidities: kidney disease, neuropathy and retinopathy               A1C Goal:  <8.0       A1C  8.9 % on 8/9/2017    If an urgent visit is needed or A1C is above 12, Care Team to call the Diabetes Education Team at [phone] or send an In Basket message to the Diabetes Education Pool (P DIAB ED-PATIENT CARE).    Diabetes Education Topics: Knowledge:Review diet with patient.    Special Educational Needs Requiring Individual DSMT: None       MEDICAL NUTRITION THERAPY (MNT) for Diabetes    Medical Nutrition Therapy with a Registered Dietitian can be provided in coordination with Diabetes Self-Management Training to assist in achieving optimal diabetes management.    MNT Type and Hours: Previous diagnosis: Annual follow-up MNT - 2 hours                       Medicare will cover: 3 hours initial MNT in 12 month period after first visit, plus 2 hours of follow-up MNT annually    Please be aware that coverage of these services is subject to  the terms and limitations of your health insurance plan.  Call member services at your health plan to determine Diabetes Self-Management Training benefits and ask which blood glucose monitor brands are covered by your plan.      Please bring the following with you to your appointment:    (1)  List of current medications   (2)  List of Blood Glucose Monitor brands that are covered by your insurance plan  (3)  Blood Glucose Monitor and log book  (4)   Food records for the 3 days prior to your visit    The Certified Diabetes Educator may make diabetes medication adjustments per the CDE Protocol and Collaborative Practice Agreement.                  Your next 10 appointments already scheduled     Aug 14, 2017  9:05 AM CDT   (Arrive by 8:50 AM)   Return Visit with Marcio Hare MD   [facility] ([facility])    [address] [place] [id number] [phone]            Aug 18, 2017   Procedure with Lacey Eugene MD   [facility] ([facility])    [address] [place] [id number] [phone]           Located in the Clinics and Surgery Center at [address] [place] [address].   parking is very convenient and highly recommended.  is a $6 flat rate fee.  Both  and self parkers should enter the main arrival plaza from [facility]; parking attendants will direct you based on your parking preference.            Aug 18, 2017 10:00 AM CDT   (Arrive by 9:45 AM)   Post-Op with Lacey Eugene MD   [facility] ([facility])    [address] [place] [id number] [phone]            Aug 23, 2017  9:00 AM CDT   (Arrive by 8:45 AM)   NEW FOOT/ANKLE with Narendra Grajeda DPM   [facility] ([facility])    [address] [place] [address]  [place] [address]   [phone]            Aug 28, 2017  1:30 PM CDT   (Arrive by 1:15 PM)   Office Visit with URIEL Chavez [facility] ([facility])    [address] [place] [id number] [phone]           Bring a current list of meds and any records pertaining to this visit. For Physicals, please bring immunization records and any forms needing to be filled out. Please arrive 10 minutes early to complete paperwork.            Sep 08, 2017   Procedure with Lacey Eugene MD   [facility] (--)    [address]  [facility] [address]   [phone]            Nov 01, 2017  9:30 AM CDT   (Arrive by 9:15 AM)   RETURN DIABETES with RAJANI Li [facility] ([facility])    [address] [place] [id number] [phone]              Who to contact     Please call your clinic at [phone] to:    Ask questions about your health    Make or cancel appointments    Discuss your medicines    Learn about your test results    Speak to your doctor   If you have compliments or concerns about an experience at your clinic, or if you wish to file a complaint, please contact [facility] Patient Relations at [phone] or email us at [email or web address]         Additional Information About Your Visit        [employer] Information     MyFeelBack is an electronic gateway that provides easy, online access to your medical records. With MyFeelBack, you can request a clinic appointment, read your test results, renew a prescription or communicate with your care team.     To sign up for MyFeelBack visit the website at [email or web address]   You will be asked to enter the access code listed below, as well as some personal information. Please follow the directions to create your username and password.     Your access code is:  [email or web address]  Expires: 2017 11:59 AM     Your access code will  in 90 days. If you need help or a new code, please contact your [facility] or call [phone] for assistance.        Care EveryWhere ID     This is your Care EveryWhere ID. This could be used by other organizations to access your [place] medical records  [id number]        Your Vitals Were     Pulse Height BMI (Body Mass Index)             90 1.727 m (5' 8\") 30.26 kg/m2          Blood Pressure from Last 3 Encounters:   17 143/79   17 148/78   17 145/81    Weight from Last 3 Encounters:   17 90.3 kg (199 lb)   17 89.4 kg (197 lb)   17 89.4 kg (197 lb)              We Performed the Following     DIABETES EDUCATOR REFERRAL          Today's Medication Changes          These changes are accurate as of: 17 10:49 AM.  If you have any questions, ask your nurse or doctor.               These medicines have changed or have updated prescriptions.        Dose/Directions    LANTUS SOLOSTAR 100 UNIT/ML injection   This may have changed:    - additional instructions  - Another medication with the same name was removed. Continue taking this medication, and follow the directions you see here.   Generic drug:  insulin glargine        Inject 65 units SQ at bedtime.   Quantity:  30 mL   Refills:  2         Stop taking these medicines if you haven't already. Please contact your care team if you have questions.     ciprofloxacin 500 MG tablet   Commonly known as:  CIPRO           oxyCODONE 5 MG IR tablet   Commonly known as:  ROXICODONE           pantoprazole 40 MG EC tablet   Commonly known as:  PROTONIX                    Primary Care Provider Office Phone # Fax #    Marcio Hare [phone]       [address] [address]        Equal Access to Services     SHARON MAYS AH: Coby Aggarwal, jonny jacques, sandeep pimentel, " ethan mckeon sean dangelo'aan ah. So [facility] [phone].    ATENCIÓN: Si jennie kemp, tiene a márquez disposición servicios gratuitos de asistencia lingüística. Halley izaguirre [phone].    We comply with applicable federal civil rights laws and [place] laws. We do not discriminate on the basis of race, color, national origin, age, disability sex, sexual orientation or gender identity.            Thank you!     Thank you for choosing [facility] ENDO[facility]  for your care. Our goal is always to provide you with excellent care. Hearing back from our patients is one way we can continue to improve our services. Please take a few minutes to complete the written survey that you may receive in the mail after your visit with us. Thank you!             Your Updated Medication List - Protect others around you: Learn how to safely use, store and throw away your medicines at www.disposemymeds.org.          This list is accurate as of: 8/9/17 10:49 AM.  Always use your most recent med list.                   Brand Name Dispense Instructions for use Diagnosis    alprostadil 20 MCG kit    EDEX    6 each    by Intracavitary route. Inject 3/4 ml (15 ug) as instructed.  Can increase to full dose of 1 ml (20 ug) if necessary.   Can dispense 6 kits.    Erectile dysfunction       aspirin 81 MG tablet      Take 1 tablet by mouth daily.        atorvastatin 20 MG tablet    LIPITOR    30 tablet    Take 1 tablet (20 mg) by mouth daily    Hyperlipidemia LDL goal <100       Blood Pressure Monitor Kit     1 kit    Check blood pressure as directed.    Unspecified essential hypertension       clobetasol 0.05 % ointment    TEMOVATE    30 g    Apply topically to legs twice daily for 2 weeks.  Then discontinue    Rash and other nonspecific skin eruption       fenofibrate 54 MG tablet     90 tablet    Take 1 tablet (54 mg) by mouth daily    Hyperlipidemia LDL goal < 100       finasteride 5 MG tablet    PROSCAR    90 tablet    Take 1 tablet (5 mg)  "by mouth daily    Benign prostatic hyperplasia with urinary obstruction       fish oil-omega-3 fatty acids 1000 MG capsule     60 capsule    Take 1 capsule (1 g) by mouth 2 times daily    Lymphocytopenia, High cholesterol, Screening for prostate cancer       gabapentin 100 MG capsule    NEURONTIN    180 capsule    Take 1 capsule (100 mg) by mouth 3 times daily    Neuropathy in diabetes (H)       insulin pen needle 31G X 5 MM    B-D U/F    400 each    Use 4 times per day.  Please dispense as BD Pen Needle Mini U/F 31G x 5 MM    Diabetes mellitus type 2, insulin dependent (H)       insulin syringe 31G X 5/16\" 0.5 ML Misc     270 each    Use three syringes daily    Type 2 diabetes mellitus (H)       LANTUS SOLOSTAR 100 UNIT/ML injection   Generic drug:  insulin glargine     30 mL    Inject 65 units SQ at bedtime.        loratadine 10 MG tablet    CLARITIN    90 tablet    Take 1 tablet (10 mg) by mouth daily    Seasonal allergies       losartan 25 MG tablet    COZAAR    90 tablet    Take 1 tablet (25 mg) by mouth daily    HTN (hypertension)       metFORMIN 1000 MG tablet    GLUCOPHAGE    180 tablet    1 tab twice daily with meals    Type 2 diabetes mellitus with diabetic neuropathy (H)       mupirocin 2 % cream    BACTROBAN    15 g    Apply  topically. In very small amounts only as needed    Rash and other nonspecific skin eruption       NovoLOG FLEXPEN 100 UNIT/ML injection   Generic drug:  insulin aspart     60 mL    Inject 8 -14 units SQ with meals with use of correction insulin. Pt uses approx 60 units in 24 hrs.    Type 2 diabetes mellitus with diabetic nephropathy (H)       ONE TOUCH ULTRA test strip   Generic drug:  blood glucose monitoring     200 strip    Test twice daily or as directed    DM (diabetes mellitus) (H)       tacrolimus 0.03 % ointment    PROTOPIC    60 g    Apply to affected area(s) twice daily    Rash and other nonspecific skin eruption       tamsulosin 0.4 MG capsule    FLOMAX    90 capsule    " TAKE 1 CAPSULE(0.4 MG) BY MOUTH EVERY DAY    Benign non-nodular prostatic hyperplasia with lower urinary tract symptoms

## 2017-08-09 NOTE — PROGRESS NOTES
HPI   Earle Rene is a 67 year old male with type 2 diabetes mellitus here today for a follow up visit.      He was last seen in our clinic in Sept 2016.  Pt's diabetes is complicated by retinopathy, nephropathy and neuropathy.  Mr. Rene tells me he has been better at taking his insulin as prescribed.  He states he is taking Lantus 60 units SQ at hs, Novolog 8-10 units with meals and Metformin 1000 mg BID.  Pt's A1C is 8.9 % today.  His previous A1C was 10.2 % in 6/2017.  We downloaded his glucose meter today and his blood sugars have been in the high 100- mid 200 range.  He has been checking his blood sugar more frequent and he has his Novolog flexpen with him today.  On ROS today, he was recently hospitalized at [facility] and had salivary stones removed and he tells me his blood sugars were very good in the hospital and he felt much better.  He is interested in meeting with our dietitian.  Pt has numbness in both feet from his neuropathy.  Some intermittent blurred vision.  He reports chronic cough.  Pt denies n/v, SOB at rest, chest pain, abd pain, diarrhea,dysuria, hematuria or foot ulcers.    ROS   Please see under HPI.     ALLERGIES:  Review of patient's allergies indicates no known allergies.    Current Outpatient Prescriptions   Medication Sig Dispense Refill     insulin glargine (LANTUS SOLOSTAR) 100 UNIT/ML injection Inject 65 units SQ at bedtime. 30 mL 2     finasteride (PROSCAR) 5 MG tablet Take 1 tablet (5 mg) by mouth daily 90 tablet 3     metFORMIN (GLUCOPHAGE) 1000 MG tablet 1 tab twice daily with meals 180 tablet 3     ONE TOUCH ULTRA test strip Test twice daily or as directed 200 strip 3     gabapentin (NEURONTIN) 100 MG capsule Take 1 capsule (100 mg) by mouth 3 times daily 180 capsule 5     tamsulosin (FLOMAX) 0.4 MG capsule TAKE 1 CAPSULE(0.4 MG) BY MOUTH EVERY DAY 90 capsule 0     NOVOLOG FLEXPEN 100 UNIT/ML soln Inject 8 -14 units SQ with meals with use of correction insulin. Pt uses approx  "60 units in 24 hrs. 60 mL 3     insulin pen needle (B-D U/F) 31G X 5 MM Use 4 times per day.  Please dispense as BD Pen Needle Mini U/F 31G x 5  each 3     losartan (COZAAR) 25 MG tablet Take 1 tablet (25 mg) by mouth daily 90 tablet 3     insulin syringe 31G X 5/16\" 0.5 ML MISC Use three syringes daily 270 each 1     Omega-3 Fatty Acids (OMEGA-3 FISH OIL) 1000 MG CAPS Take 1 capsule (1 g) by mouth 2 times daily 60 capsule 11     loratadine (CLARITIN) 10 MG tablet Take 1 tablet (10 mg) by mouth daily 90 tablet 0     clobetasol (TEMOVATE) 0.05 % ointment Apply topically to legs twice daily for 2 weeks.  Then discontinue 30 g 0     tacrolimus (PROTOPIC) 0.03 % ointment Apply to affected area(s) twice daily 60 g 0     Blood Pressure Monitor KIT Check blood pressure as directed. 1 kit 0     mupirocin (BACTROBAN) 2 % cream Apply  topically. In very small amounts only as needed 15 g 1     alprostadil (EDEX) 20 MCG injection by Intracavitary route. Inject 3/4 ml (15 ug) as instructed.  Can increase to full dose of 1 ml (20 ug) if necessary.   Can dispense 6 kits. 6 each 12     aspirin 81 MG tablet Take 1 tablet by mouth daily.       [DISCONTINUED] insulin glargine (LANTUS SOLOSTAR) 100 UNIT/ML injection INJECT 60 UNITS SUBCUTANEOUS EVERY NIGHT AT BEDTIME 30 mL 2     atorvastatin (LIPITOR) 20 MG tablet Take 1 tablet (20 mg) by mouth daily 30 tablet 0     [DISCONTINUED] insulin glargine (LANTUS SOLOSTAR) 100 UNIT/ML PEN Inject 60 Units Subcutaneous At Bedtime 45 mL 1     fenofibrate 54 MG tablet Take 1 tablet (54 mg) by mouth daily (Patient not taking: Reported on 8/9/2017) 90 tablet 0     Family Hx   No change.     Personal Hx   Smoke: none.   ETOH: none.    with grown children.   He owns his own business.    PMH   1. Type 2 Diabetes Mellitus dx at age 43.   2. Neuropathy.  3. Nephropathy.   4. ED.   5. Dyslipidemia.   6. Nephrolithiasis.   7. Decrease auditory acuity.   8. Sarcoidosis-lung.   9. Goiter.   10. " "S/P T & A.   11. S/P FX right heel.   12. Vit D def.   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   14. CT chest- ? granulomas.   Past Medical History:   Diagnosis Date     Blepharitis of both eyes      Diabetes (H)      Dry eye syndrome      Nonsenile cataract      Peripheral neuropathy (H)      Sarcoidosis of lung (H)      Past Surgical History:   Procedure Laterality Date     COLONOSCOPY  7/29/2013    Procedure: COLONOSCOPY;;  Surgeon: Montana Pascal MD;  Location:  GI     SURGICAL PATHOLOGY EXAM       Physical Exam   General appearance: Vital signs:   /79 (BP Location: Left arm, Patient Position: Sitting, Cuff Size: Adult Regular)  Pulse 90  Ht 1.727 m (5' 8\")  Wt 90.3 kg (199 lb)  BMI 30.26 kg/m2  Estimated body mass index is 30.26 kg/(m^2) as calculated from the following:    Height as of this encounter: 1.727 m (5' 8\").    Weight as of this encounter: 90.3 kg (199 lb).  Head:  Normal.   Eyes: PERRLA; fundi not visualized.   Lungs: clear.   Cardiovascular system:  RRR.   Abdomen:  Large and nontender.  Musculoskeletal system: no edema.   Neurological:  normal.   Skin:  necrobiosis lipoidica on both legs.   FEET: no ulcers.    Results   Creatinine   Date Value Ref Range Status   06/24/2017 1.12 0.66 - 1.25 mg/dL Final     GFR Estimate   Date Value Ref Range Status   06/24/2017 65 >60 mL/min/1.7m2 Final     Comment:     Non  GFR Calc     Hemoglobin A1C   Date Value Ref Range Status   06/06/2017 10.2 (H) 4.3 - 6.0 % Final     Potassium   Date Value Ref Range Status   06/24/2017 3.7 3.4 - 5.3 mmol/L Final     ALT   Date Value Ref Range Status   06/06/2017 49 0 - 70 U/L Final     AST   Date Value Ref Range Status   06/06/2017 20 0 - 45 U/L Final     TSH   Date Value Ref Range Status   10/21/2014 1.13 0.40 - 4.00 mU/L Final     Comment:     Effective 7/30/2014, the reference range for this assay has changed to reflect   new instrumentation/methodology.       T4 Free   Date Value Ref Range " Status   10/21/2014 1.00 0.76 - 1.46 ng/dL Final     Comment:     Effective 7/30/2014, the reference range for this assay has changed to reflect   new instrumentation/methodology.           Cholesterol   Date Value Ref Range Status   01/14/2016 173 <200 mg/dL Final   09/09/2014 123 <200 mg/dL Final     Comment:     LDL Cholesterol is the primary guide to therapy.   The NCEP recommends further evaluation of: patients with cholesterol greater   than 200 mg/dL if additional risk factors are present, cholesterol greater   than   240 mg/dL, triglycerides greater than 150 mg/dL, or HDL less than 40 mg/dL.       HDL Cholesterol   Date Value Ref Range Status   01/14/2016 30 (L) >39 mg/dL Final   09/09/2014 32 (L) >40 mg/dL Final     LDL Cholesterol Calculated   Date Value Ref Range Status   01/14/2016  <100 mg/dL Final    Cannot estimate LDL when triglyceride exceeds 400 mg/dL   09/09/2014 30 0 - 129 mg/dL Final     Comment:     LDL Cholesterol is the primary guide to therapy: LDL-cholesterol goal in high   risk patients is <100 mg/dL and in very high risk patients is <70 mg/dL.       LDL Cholesterol Direct   Date Value Ref Range Status   01/14/2016 84 <100 mg/dL Final     Comment:     Desirable:       <100 mg/dl     Triglycerides   Date Value Ref Range Status   01/14/2016 436 (H) <150 mg/dL Final     Comment:     Borderline high:  150-199 mg/dl   High:             200-499 mg/dl   Very high:       >499 mg/dl     09/09/2014 307 (H) 0 - 150 mg/dL Final     Comment:     Fasting specimen     Cholesterol/HDL Ratio   Date Value Ref Range Status   09/09/2014 3.8 0.0 - 5.0 Final   11/20/2013 5.8 (H) 0.0 - 5.0 Final     A1C      8.9   8/9/2017  A1C      9.7   9/22/2016  A1C      9.7   7/21/2015  A1C     10.2  12/2/2014  A1C     10.2  9/9/2014  A1C      9.8  11/20/2013  A1C      9.3   6/12/2013  A1C      8.0   8/14/2012  A1C      8.2   5/22/2012  A1C      8.0   5/22/2012    ASSESSMENT/PLAN:     1. TYPE 2 DIABETES MELLITUS:  Uncontrolled type 2 diabetes mellitus complicated by retinopathy, nephropathy and neuropathy.  Mr. Rene has been doing a better job of checking his blood sugar more frequent and taking his insulin as prescribed.  I asked him to increase his Lantus 65 units SQ at hs and remains on current Novolog and Metformin doses.  I placed an order for patient to meet with our dietitian.  Encouraged patient to make healthy food choices, reduce his food portions with meals, avoid snacking and walk daily and to take his insulin and medications as prescribed.  He has had no interest in carb counting in the past.  Pt remains on daily ASA.     2. GOITER: Nontender goiter.  Will check TSH next visit.    3. NEPHROPATHY: Discussed the need for better glycemic control and good BP control.   Pt's creat 1.12 with GFR 65 mL/min in June 2017.  Pt's urine microalbuminuria was + in March 2016.  He is taking Cozaar.    4.  RETINOPATHY: Pt seen by Oph here in July 2017.    5.  NEUROPATHY: Continue Gabapentin.  No foot ulcers.    6. DYSLIPIDEMIA: LDL 84 in Jan 2016.   Pt is now taking Lipitor, fenofibrate and Fish Oil.    7. OBESITY: Encouraged him to meet with our dietitian.   He does not want to take Byetta, Victoza or Bydureon.  See #1 above.    8.   Return to Endocrine Clinic to see me in 2 months.  Pt is to call me if he has more than 2 low blood sugars ( 70 or less ) per week or persistent high blood sugar values > 250.

## 2017-08-14 ENCOUNTER — PRE VISIT (OUTPATIENT)
Dept: ORTHOPEDICS | Facility: CLINIC | Age: 67
End: 2017-08-14

## 2017-08-14 ENCOUNTER — OFFICE VISIT (OUTPATIENT)
Dept: INTERNAL MEDICINE | Facility: CLINIC | Age: 67
End: 2017-08-14

## 2017-08-14 VITALS
BODY MASS INDEX: 29.76 KG/M2 | SYSTOLIC BLOOD PRESSURE: 154 MMHG | WEIGHT: 195.7 LBS | HEART RATE: 91 BPM | DIASTOLIC BLOOD PRESSURE: 84 MMHG

## 2017-08-14 DIAGNOSIS — E78.5 HYPERLIPIDEMIA LDL GOAL <100: ICD-10-CM

## 2017-08-14 RX ORDER — ATORVASTATIN CALCIUM 20 MG/1
20 TABLET, FILM COATED ORAL DAILY
Qty: 90 TABLET | Refills: 3 | Status: SHIPPED | OUTPATIENT
Start: 2017-08-14 | End: 2018-10-14

## 2017-08-14 ASSESSMENT — PAIN SCALES - GENERAL: PAINLEVEL: NO PAIN (0)

## 2017-08-14 NOTE — MR AVS SNAPSHOT
After Visit Summary   8/14/2017    Earle Rene    MRN: [id number]           Patient Information     Date Of Birth          1950        Visit Information        Provider Department      8/14/2017 9:05 AM Marcio Hare MD [facility]        Today's Diagnoses     Hyperlipidemia LDL goal <100           Follow-ups after your visit        Your next 10 appointments already scheduled     Aug 18, 2017   Procedure with Lacey Eugene MD   [facility] ([facility])    [address] [place] [address]  5th [facility] [id number] [phone]           Located in the Clinics and Surgery Center at [address] [place] [address].   parking is very convenient and highly recommended.  is a $6 flat rate fee.  Both  and self parkers should enter the main arrival plaza from [facility]; parking attendants will direct you based on your parking preference.            Aug 18, 2017 10:00 AM CDT   (Arrive by 9:45 AM)   Post-Op with Lacey Eugene MD   [facility] ([facility])    [address] [place] [address]  4th [facility] [id number] [phone]            Aug 23, 2017  9:00 AM CDT   (Arrive by 8:45 AM)   NEW FOOT/ANKLE with Narendra Grajeda DPM   [facility] ([facility])    [address] [place] [address]  4th [facility] [id number] [phone]            Aug 23, 2017 10:20 AM CDT   (Arrive by 10:05 AM)   CT CHEST W/O CONTRAST with UCCT1   [facility] ([facility])    [address] [place] [address]  1st [facility] [id number] [phone]           Please bring any scans or X-rays taken at other hospitals, if similar tests were done. Also bring a list of your medicines, including vitamins, minerals and over-the-counter drugs. It is safest to leave personal items at home.  Be sure to tell  your doctor:   If you have any allergies.   If there s any chance you are pregnant.   If you are breastfeeding.   If you have any special needs.  You do not need to do anything special to prepare.  Please wear loose clothing, such as a sweat suit or jogging clothes. Avoid snaps, zippers and other metal. We may ask you to undress and put on a hospital gown.            Aug 28, 2017  1:30 PM CDT   (Arrive by 1:15 PM)   Office Visit with Anjali Wade RD   [facility] ([facility])    [address] [place] [id number] [phone]           Bring a current list of meds and any records pertaining to this visit. For Physicals, please bring immunization records and any forms needing to be filled out. Please arrive 10 minutes early to complete paperwork.            Sep 08, 2017   Procedure with Lacey Eugene MD   [facility] (--)    [address]  [facility] [address]   [phone]            Nov 01, 2017  9:30 AM CDT   (Arrive by 9:15 AM)   RETURN DIABETES with Pamela Laura PA-C   [facility] ([facility])    [address] [place] [id number] [phone]              Who to contact     Please call your clinic at [phone] to:    Ask questions about your health    Make or cancel appointments    Discuss your medicines    Learn about your test results    Speak to your doctor   If you have compliments or concerns about an experience at your clinic, or if you wish to file a complaint, please contact [facility] Patient Relations at [phone] or email us at [email or web address]         Additional Information About Your Visit        Lum[employer] Information     [employer] is an electronic gateway that provides easy, online access to your medical records. With [employer], you can request a clinic appointment, read your test  results, renew a prescription or communicate with your care team.     To sign up for [employer] visit the website at [email or web address]   You will be asked to enter the access code listed below, as well as some personal information. Please follow the directions to create your username and password.     Your access code is: [email or web address]  Expires: 2017 11:59 AM     Your access code will  in 90 days. If you need help or a new code, please contact your [facility] or call [phone] for assistance.        Care EveryWhere ID     This is your Care EveryWhere ID. This could be used by other organizations to access your [facility] medical records  [id number]        Your Vitals Were     Pulse BMI (Body Mass Index)                91 29.76 kg/m2           Blood Pressure from Last 3 Encounters:   17 154/84   17 143/79   17 148/78    Weight from Last 3 Encounters:   17 88.8 kg (195 lb 11.2 oz)   17 90.3 kg (199 lb)   17 89.4 kg (197 lb)              Today, you had the following     No orders found for display         Where to get your medicines      These medications were sent to [id number] - [place], [place] - [address] AT [facility] [address]  [facility] [address], [place] [address]    Hours:  24-hours Phone:  [phone]     atorvastatin 20 MG tablet          Primary Care Provider Office Phone # Fax #    Marcio Hare [phone] [address] [address]        Equal Access to Services     SHARON MAYS AH: Hadii priscilla Aggarwal, wapiloda luqadaha, qaybta kaalmada margarito, ethan bobo. So [facility] [phone].    ATENCIÓN: Si habla español, tiene a márquez disposición servicios gratuitos de asistencia lingüística. Llame al [phone].    We comply with applicable federal civil rights laws and [place] laws. We do not discriminate on  the basis of race, color, national origin, age, disability sex, sexual orientation or gender identity.            Thank you!     Thank you for choosing [facility]  for your care. Our goal is always to provide you with excellent care. Hearing back from our patients is one way we can continue to improve our services. Please take a few minutes to complete the written survey that you may receive in the mail after your visit with us. Thank you!             Your Updated Medication List - Protect others around you: Learn how to safely use, store and throw away your medicines at www.disposemymeds.org.          This list is accurate as of: 8/14/17 10:11 AM.  Always use your most recent med list.                   Brand Name Dispense Instructions for use Diagnosis    alprostadil 20 MCG kit    EDEX    6 each    by Intracavitary route. Inject 3/4 ml (15 ug) as instructed.  Can increase to full dose of 1 ml (20 ug) if necessary.   Can dispense 6 kits.    Erectile dysfunction       aspirin 81 MG tablet      Take 1 tablet by mouth daily.        atorvastatin 20 MG tablet    LIPITOR    90 tablet    Take 1 tablet (20 mg) by mouth daily    Hyperlipidemia LDL goal <100       Blood Pressure Monitor Kit     1 kit    Check blood pressure as directed.    Unspecified essential hypertension       clobetasol 0.05 % ointment    TEMOVATE    30 g    Apply topically to legs twice daily for 2 weeks.  Then discontinue    Rash and other nonspecific skin eruption       fenofibrate 54 MG tablet     90 tablet    Take 1 tablet (54 mg) by mouth daily    Hyperlipidemia LDL goal < 100       finasteride 5 MG tablet    PROSCAR    90 tablet    Take 1 tablet (5 mg) by mouth daily    Benign prostatic hyperplasia with urinary obstruction       fish oil-omega-3 fatty acids 1000 MG capsule     60 capsule    Take 1 capsule (1 g) by mouth 2 times daily    Lymphocytopenia, High cholesterol, Screening for prostate cancer       gabapentin 100 MG  "capsule    NEURONTIN    180 capsule    Take 1 capsule (100 mg) by mouth 3 times daily    Neuropathy in diabetes (H)       insulin pen needle 31G X 5 MM    B-D U/F    400 each    Use 4 times per day.  Please dispense as BD Pen Needle Mini U/F 31G x 5 MM    Diabetes mellitus type 2, insulin dependent (H)       insulin syringe 31G X 5/16\" 0.5 ML Misc     270 each    Use three syringes daily    Type 2 diabetes mellitus (H)       LANTUS SOLOSTAR 100 UNIT/ML injection   Generic drug:  insulin glargine     30 mL    Inject 65 units SQ at bedtime.        loratadine 10 MG tablet    CLARITIN    90 tablet    Take 1 tablet (10 mg) by mouth daily    Seasonal allergies       losartan 25 MG tablet    COZAAR    90 tablet    Take 1 tablet (25 mg) by mouth daily    HTN (hypertension)       metFORMIN 1000 MG tablet    GLUCOPHAGE    180 tablet    1 tab twice daily with meals    Type 2 diabetes mellitus with diabetic neuropathy (H)       mupirocin 2 % cream    BACTROBAN    15 g    Apply  topically. In very small amounts only as needed    Rash and other nonspecific skin eruption       NovoLOG FLEXPEN 100 UNIT/ML injection   Generic drug:  insulin aspart     60 mL    Inject 8 -14 units SQ with meals with use of correction insulin. Pt uses approx 60 units in 24 hrs.    Type 2 diabetes mellitus with diabetic nephropathy (H)       ONE TOUCH ULTRA test strip   Generic drug:  blood glucose monitoring     200 strip    Test twice daily or as directed    DM (diabetes mellitus) (H)       tacrolimus 0.03 % ointment    PROTOPIC    60 g    Apply to affected area(s) twice daily    Rash and other nonspecific skin eruption       tamsulosin 0.4 MG capsule    FLOMAX    90 capsule    TAKE 1 CAPSULE(0.4 MG) BY MOUTH EVERY DAY    Benign non-nodular prostatic hyperplasia with lower urinary tract symptoms         "

## 2017-08-14 NOTE — TELEPHONE ENCOUNTER
1.  Date/reason for appt: 8/23/17- DM foot check    2.  Referring provider: Dr. Hare     3.  Call to patient (Yes / No - short description): No- pt is referred. All records are in Psych[facility].     4.  Previous care at / records requested from:     1. [facility], Dr. Hare- 6/13/17   2. [facility]   3. [facility]

## 2017-08-14 NOTE — NURSING NOTE
Chief Complaint   Patient presents with     Surgical Followup     Patient here after surgery.     Refill Request     Patient here for medication refill.     Lakeisha Jensen LPN at 9:27 AM on 8/14/2017.

## 2017-08-14 NOTE — PROGRESS NOTES
HPI   Pt. with h/o DM2, increased , HTN comes in for follow up today.  He saw  loretta Hickman 8/9/17 for DM2.   No SOB, No rest/exertional CP. He does not smoke nor abuse EtOH. He has B foot numbness complaints. He was seen by Dr. Mayorga Neurology for neuropathy 1/8/16.   He has decreased urinary stream w/o hematuria or dysuria.  No other HEENT, cardiopulmonary, abdominal, , neurological, systemic (no F/C/NS). Colonoscopy 7/13 repeat in 10 years.   He was seen by Dr. Hand Cardiology 1/14. No lymphatic vascular complaints. He was seen by Dr. Cantu Urology 8/3/17 for positive prostate bx. 7/27/17.  He had normal resting echo and Lexiscan 1/14/16. He had normal abdominal U/S for liver and spleen 2/2/16.       Physical Exam   Vitals noted gen nad cooperative alert, HEENT, ears normal oropahrynx clear no exudate neck supple nl rom no adenopathy, he has some problem with L eye closing, he can raise B eyebrows and B smiling,  LCTA, B, normal resp. system exam, good air movement,  RRR, S1, S2, no MRG, abdomen, nt nd no masses, normal neurological exam. He has diffuse mild erythema (no skin breakdown) on B Legs.  B. Neurological exam B UE/LE/proximal/distal is normal and symmetric for sensation, reflexes, and strength. Gait is normal. He has good B dorsalis pedis pulses.         A and P   1. RTHC; colonoscopy 2013 repeat in 10 years. Prevnar 13  (1/12/16).   2. Neuropathy; he follows with Neurology Dr. Mayorga seen 1/8/16.  3.DM2; seen by Pamela Laura 8/9/17  4. HTN; stable.  5.  Fasting lipids for increased cholesterol; renewed today 8/14/17  6.  He was seen by Dr. Cantu  Urology  8/3/17 for presumed localized prostate cancer  7. Lexiscan and resting echo are normal.  8. Normal abdominal U/S for low platelets  9.  CT abdomen/pelvis for L groin/hip pain; ortho referral again  6/13/17. For hip and groin pain  10. L sided Guthrie's palsy, he has less sxs. After taking Prednisone or Valtrex. Neurology ordered MRI  brain done 6/8/17 stable. Referred back to Optho today to be possibly sooner than 6/20/17 because of blurring vision L eye.  He saw Dr. Dempsey, ENT 2/17 as well for possible L-sided TMJ  11. Full CT chest scan ordered 6/8/17 for lung nodules and I recommend he do this in the next few months.    I will see him back after CT scan             Total time spent 25 minutes.  More than 50% of the time spent with Mr. Rene on counseling / coordinating his care

## 2017-08-21 ENCOUNTER — TRANSFERRED RECORDS (OUTPATIENT)
Dept: HEALTH INFORMATION MANAGEMENT | Facility: CLINIC | Age: 67
End: 2017-08-21

## 2017-08-23 ENCOUNTER — OFFICE VISIT (OUTPATIENT)
Dept: ORTHOPEDICS | Facility: CLINIC | Age: 67
End: 2017-08-23

## 2017-08-23 DIAGNOSIS — B35.3 TINEA PEDIS OF BOTH FEET: ICD-10-CM

## 2017-08-23 DIAGNOSIS — M79.661 BILATERAL CALF PAIN: Primary | ICD-10-CM

## 2017-08-23 DIAGNOSIS — B35.1 DERMATOPHYTOSIS OF NAIL: ICD-10-CM

## 2017-08-23 DIAGNOSIS — E11.42 TYPE 2 DIABETES MELLITUS WITH DIABETIC POLYNEUROPATHY, WITH LONG-TERM CURRENT USE OF INSULIN (H): ICD-10-CM

## 2017-08-23 DIAGNOSIS — M20.42 HAMMERTOES OF BOTH FEET: ICD-10-CM

## 2017-08-23 DIAGNOSIS — M79.662 BILATERAL CALF PAIN: Primary | ICD-10-CM

## 2017-08-23 DIAGNOSIS — Z79.4 TYPE 2 DIABETES MELLITUS WITH DIABETIC POLYNEUROPATHY, WITH LONG-TERM CURRENT USE OF INSULIN (H): ICD-10-CM

## 2017-08-23 DIAGNOSIS — M20.41 HAMMERTOES OF BOTH FEET: ICD-10-CM

## 2017-08-23 RX ORDER — CLOTRIMAZOLE 1 %
CREAM (GRAM) TOPICAL 2 TIMES DAILY
Qty: 30 G | Refills: 3 | Status: SHIPPED | OUTPATIENT
Start: 2017-08-23 | End: 2023-09-29

## 2017-08-23 ASSESSMENT — ENCOUNTER SYMPTOMS
COUGH: 1
COUGH DISTURBING SLEEP: 0
HEMOPTYSIS: 0
POSTURAL DYSPNEA: 0
SNORES LOUDLY: 0
DYSPNEA ON EXERTION: 0
WHEEZING: 0
RESPIRATORY PAIN: 0
SHORTNESS OF BREATH: 0
SPUTUM PRODUCTION: 1

## 2017-08-23 NOTE — PROGRESS NOTES
Date of Service: 8/23/2017    Chief Complaint: Diabetic foot care     HPI: Earle is a 67 year old male who presents today for diabetic foot care. Earle has DM type 2 which is not well controlled with a Lantus, Novolog and Metformin regimen. Last A1C was 8.9% when he saw his endocrinologist 8/9/17. He has known diabetes complications including peripheral neuropathy, retinopathy and nephropathy. His Lantus dose was increased at last visit and he is planning to meet with a dietician. He takes Gabapentin 300 mg QD for control of his neuropathy symptoms which doesn't seem to make a difference. He still admits constant numbness in his toes and dorsal feet, which worsens overnight. It is especially difficult for him to take the first steps of the morning. This is being managed by his PCP Dr. Hare and neurologist Dr. Mayorga (last seen 1/8/16).    He is interested in diabetic shoes, he has never had these before. He trims his own toenails with some difficulty. Relates that he has one time weekly episodes of cramping leg pain that wakes him up from sleeping. It begins in his groin on either side, then radiates down his leg into his calf. Getting up and moving around helps, but it will still last for a few hours per episode. Does not occur during the day while walking. This has not been evaluated before. Denies open sores, burning or tingling.    Review of Systems: Answers for HPI/ROS submitted by the patient on 8/23/2017 and reviewed by me  General Symptoms: No  Skin Symptoms: No  HENT Symptoms: No  EYE SYMPTOMS: No  HEART SYMPTOMS: No  LUNG SYMPTOMS: Yes  INTESTINAL SYMPTOMS: No  URINARY SYMPTOMS: Yes  REPRODUCTIVE SYMPTOMS: Yes  SKELETAL SYMPTOMS: Yes  BLOOD SYMPTOMS: No  NERVOUS SYSTEM SYMPTOMS: No  MENTAL HEALTH SYMPTOMS: No  Cough: Yes  Sputum or phlegm: Yes  Coughing up blood: No  Difficulty breating or shortness of breath: No  Snoring: No  Wheezing: No  Difficulty breathing on exertion: No  Respiratory pain:  No  Nighttime Cough: No  Difficulty breathing when lying flat: No    PMH:   Past Medical History:   Diagnosis Date     Blepharitis of both eyes      Diabetes (H)      Dry eye syndrome      Nonsenile cataract      Peripheral neuropathy (H)      Sarcoidosis of lung (H)        PSxH:   Past Surgical History:   Procedure Laterality Date     COLONOSCOPY  7/29/2013    Procedure: COLONOSCOPY;;  Surgeon: Montana Pascal MD;  Location:  GI     SURGICAL PATHOLOGY EXAM         Allergies: Review of patient's allergies indicates no known allergies.    SH:   Social History     Social History     Marital status:      Spouse name: N/A     Number of children: N/A     Years of education: N/A     Occupational History     Not on file.     Social History Main Topics     Smoking status: Never Smoker     Smokeless tobacco: Never Used     Alcohol use No     Drug use: No     Sexual activity: Not on file     Other Topics Concern     Not on file     Social History Narrative       FH:   Family History   Problem Relation Age of Onset     Glaucoma No family hx of      Macular Degeneration No family hx of        Objective:  PT and DP pulses are 1/4 bilaterally. CRT is <3 seconds. Absent pedal hair.   Pinpoint sensation is intact with 5.07 Boykin-Jeff monofilament bilaterally.  Equinus present bilaterally. No pain with active or passive ROM of the ankle, MTJ, 1st ray, or halluces bilaterally. Dorsally contracted digits 2-5 bilaterally. Sharp pain with left calf squeeze, increased numbness with right calf squeeze.  Nails thickened and discolored to varying degrees bilaterally. No open lesions are noted. Scabs noted at distal left hallux and left third digit. Nails have been cut very short. Skin is dry and flaking on the plantar surfaces of his feet BL - nonpuritic, no erythema.    Assessment:   - Onychomycosis  - Leg cramps  - Tinea pedis  - DM type 2, uncontrolled  - Diabetic peripheral neuropathy  [facility]    Plan:  - Pt seen and  evaluated. Diagnosis and treatment options discussed.   - Ordered diabetic shoes and inserts x 3. Patient to call and make an appointment  - Ordered US Venous Duplex test to r/o DVT. Consider Venous Competency and GARCIA at next visit  - Continue management of neuropathy symptoms with Dr. Hare and Dr. Mayorga. May need to increase dose or change medication if symptoms are not being controlled to adequate degree - patient to discuss at his next appointment with Dr. Hare.  - Rx'd clotrimazole cream for tinea pedis  - Educated patient on proper foot care - strongly suggested that patient allow us to trim his nails.   - RTC 3 months or sooner with concerns.

## 2017-08-23 NOTE — MR AVS SNAPSHOT
After Visit Summary   8/23/2017    Earle Rene    MRN: [id number]           Patient Information     Date Of Birth          1950        Visit Information        Provider Department      8/23/2017 9:00 AM Narendra Grajeda DPM [facility]        Today's Diagnoses     Bilateral calf pain    -  1       Follow-ups after your visit        Your next 10 appointments already scheduled     Aug 23, 2017 10:20 AM CDT   (Arrive by 10:05 AM)   CT CHEST W/O CONTRAST with CT[address] [place] [address] CT ([facility])    [address] [place] [id number] [phone]           Please bring any scans or X-rays taken at other hospitals, if similar tests were done. Also bring a list of your medicines, including vitamins, minerals and over-the-counter drugs. It is safest to leave personal items at home.  Be sure to tell your doctor:   If you have any allergies.   If there s any chance you are pregnant.   If you are breastfeeding.   If you have any special needs.  You do not need to do anything special to prepare.  Please wear loose clothing, such as a sweat suit or jogging clothes. Avoid snaps, zippers and other metal. We may ask you to undress and put on a hospital gown.            Aug 23, 2017  1:00 PM CDT   US LOWER EXTREMITY VENOUS DUPLEX BILATERAL with [facility] [place] [address] [place] ([facility])    [address] [place] [address] [id number] [phone]           Please bring a list of your medicines (including vitamins, minerals and over-the-counter drugs). Also, tell your doctor about any allergies you may have. Wear comfortable clothes and leave your valuables at home.  You do not need to do anything special to prepare for your exam.  Please call the Imaging Department at your exam site with any questions.            Aug 28, 2017  1:30 PM CDT   (Arrive by 1:15 PM)   Office Visit with  Anjali Wade RD   [facility] Diabetes ([facility])    [address] [place] [address]  3rd [facility] [address]   [phone]           Bring a current list of meds and any records pertaining to this visit. For Physicals, please bring immunization records and any forms needing to be filled out. Please arrive 10 minutes early to complete paperwork.            Oct 20, 2017   Procedure with Lacey Eugene MD   [facility] ([facility])    [address] [place] [address]  5th [facility] [address]   [phone]           Located in the Clinics and Surgery Center at [address] [place] [address].   parking is very convenient and highly recommended.  is a $6 flat rate fee.  Both  and self parkers should enter the main arrival plaza from [facility]; parking attendants will direct you based on your parking preference.            Oct 20, 2017 10:00 AM CDT   (Arrive by 9:45 AM)   Post-Op with Lacey Eugene MD   [facility] Ophthalmology ([facility])    [address] [place] [address]  4th [facility] [address]   [phone]            Nov 01, 2017  9:30 AM CDT   (Arrive by 9:15 AM)   RETURN DIABETES with Pamela Laura PA-C   [facility] Endocrinology ([facility])    [address] [place] [id number]   [phone]            Nov 27, 2017  1:00 PM CST   (Arrive by 12:45 PM)   RETURN FOOT/ANKLE with Narendra Grajeda DPM   [facility] Sports Medicine ([facility])    [address] [place] [address]   [phone]              Future tests that were ordered for you today     Open Future Orders        Priority Expected Expires Ordered    US Lower Extremity Venous Duplex Bilateral Routine  8/23/2018 8/23/2017    US Lower Extremity Venous Bilateral Port Routine  8/23/2018 8/23/2017             Who to contact     Please call your clinic at [phone] to:    Ask questions about your health    Make or cancel appointments    Discuss your medicines    Learn about your test results    Speak to your doctor   If you have compliments or concerns about an experience at your clinic, or if you wish to file a complaint, please contact [facility] Patient Relations at [phone] or email us at [email or web address]         Additional Information About Your Visit        CiteeCarharBuzzvil Information     Artimi is an electronic gateway that provides easy, online access to your medical records. With Artimi, you can request a clinic appointment, read your test results, renew a prescription or communicate with your care team.     To sign up for Artimi visit the website at [email or web address]   You will be asked to enter the access code listed below, as well as some personal information. Please follow the directions to create your username and password.     Your access code is: [email or web address]  Expires: 2017  9:35 AM     Your access code will  in 90 days. If you need help or a new code, please contact your [facility] or call [phone] for assistance.        Care EveryWhere ID     This is your Care EveryWhere ID. This could be used by other organizations to access your [facility] medical records  [id number]         Blood Pressure from Last 3 Encounters:   17 154/84   17 143/79   17 148/78    Weight from Last 3 Encounters:   17 195 lb 11.2 oz (88.8 kg)   17 199 lb (90.3 kg)   17 197 lb (89.4 kg)               Primary Care Provider Office Phone # Fax #    Marcio Hare [phone]       6 [address] [address]        Equal Access to Services     SHARON MAYS AH: Coby Aggarwal, jonny jacques, ethan kilgore  lacamille bobo. So [facility] [phone].    ATENCIÓN: Si habla viridiana, tiene a márquez disposición servicios gratuitos de asistencia lingüística. Halley izaguirre [phone].    We comply with applicable federal civil rights laws and [place] laws. We do not discriminate on the basis of race, color, national origin, age, disability sex, sexual orientation or gender identity.            Thank you!     Thank you for choosing [facility]  for your care. Our goal is always to provide you with excellent care. Hearing back from our patients is one way we can continue to improve our services. Please take a few minutes to complete the written survey that you may receive in the mail after your visit with us. Thank you!             Your Updated Medication List - Protect others around you: Learn how to safely use, store and throw away your medicines at www.disposemymeds.org.          This list is accurate as of: 8/23/17  9:35 AM.  Always use your most recent med list.                   Brand Name Dispense Instructions for use Diagnosis    alprostadil 20 MCG kit    EDEX    6 each    by Intracavitary route. Inject 3/4 ml (15 ug) as instructed.  Can increase to full dose of 1 ml (20 ug) if necessary.   Can dispense 6 kits.    Erectile dysfunction       aspirin 81 MG tablet      Take 1 tablet by mouth daily.        atorvastatin 20 MG tablet    LIPITOR    90 tablet    Take 1 tablet (20 mg) by mouth daily    Hyperlipidemia LDL goal <100       Blood Pressure Monitor Kit     1 kit    Check blood pressure as directed.    Unspecified essential hypertension       clobetasol 0.05 % ointment    TEMOVATE    30 g    Apply topically to legs twice daily for 2 weeks.  Then discontinue    Rash and other nonspecific skin eruption       fenofibrate 54 MG tablet     90 tablet    Take 1 tablet (54 mg) by mouth daily    Hyperlipidemia LDL goal < 100       finasteride 5 MG tablet    PROSCAR    90 tablet    Take 1 tablet (5 mg) by mouth daily    Benign  "prostatic hyperplasia with urinary obstruction       fish oil-omega-3 fatty acids 1000 MG capsule     60 capsule    Take 1 capsule (1 g) by mouth 2 times daily    Lymphocytopenia, High cholesterol, Screening for prostate cancer       gabapentin 100 MG capsule    NEURONTIN    180 capsule    Take 1 capsule (100 mg) by mouth 3 times daily    Neuropathy in diabetes (H)       insulin pen needle 31G X 5 MM    B-D U/F    400 each    Use 4 times per day.  Please dispense as BD Pen Needle Mini U/F 31G x 5 MM    Diabetes mellitus type 2, insulin dependent (H)       insulin syringe 31G X 5/16\" 0.5 ML Misc     270 each    Use three syringes daily    Type 2 diabetes mellitus (H)       LANTUS SOLOSTAR 100 UNIT/ML injection   Generic drug:  insulin glargine     30 mL    Inject 65 units SQ at bedtime.        loratadine 10 MG tablet    CLARITIN    90 tablet    Take 1 tablet (10 mg) by mouth daily    Seasonal allergies       losartan 25 MG tablet    COZAAR    90 tablet    Take 1 tablet (25 mg) by mouth daily    HTN (hypertension)       metFORMIN 1000 MG tablet    GLUCOPHAGE    180 tablet    1 tab twice daily with meals    Type 2 diabetes mellitus with diabetic neuropathy (H)       mupirocin 2 % cream    BACTROBAN    15 g    Apply  topically. In very small amounts only as needed    Rash and other nonspecific skin eruption       NovoLOG FLEXPEN 100 UNIT/ML injection   Generic drug:  insulin aspart     60 mL    Inject 8 -14 units SQ with meals with use of correction insulin. Pt uses approx 60 units in 24 hrs.    Type 2 diabetes mellitus with diabetic nephropathy (H)       ONE TOUCH ULTRA test strip   Generic drug:  blood glucose monitoring     200 strip    Test twice daily or as directed    DM (diabetes mellitus) (H)       tacrolimus 0.03 % ointment    PROTOPIC    60 g    Apply to affected area(s) twice daily    Rash and other nonspecific skin eruption       tamsulosin 0.4 MG capsule    FLOMAX    90 capsule    TAKE 1 CAPSULE(0.4 MG) BY " MOUTH EVERY DAY    Benign non-nodular prostatic hyperplasia with lower urinary tract symptoms

## 2017-08-23 NOTE — LETTER
8/23/2017       RE: Earle Rene  [address]  [place] [address]     Dear Colleague,    Thank you for referring your patient, Earle Rene, to the [facility] at [facility]. Please see a copy of my visit note below.    Date of Service: 8/23/2017    Chief Complaint: Diabetic foot care     HPI: Earle is a 67 year old male who presents today for diabetic foot care. Earle has DM type 2 which is not well controlled with a Lantus, Novolog and Metformin regimen. Last A1C was 8.9% when he saw his endocrinologist 8/9/17. He has known diabetes complications including peripheral neuropathy, retinopathy and nephropathy. His Lantus dose was increased at last visit and he is planning to meet with a dietician. He takes Gabapentin 300 mg QD for control of his neuropathy symptoms which doesn't seem to make a difference. He still admits constant numbness in his toes and dorsal feet, which worsens overnight. It is especially difficult for him to take the first steps of the morning. This is being managed by his PCP Dr. Hare and neurologist Dr. Mayorga (last seen 1/8/16).    He is interested in diabetic shoes, he has never had these before. He trims his own toenails with some difficulty. Relates that he has one time weekly episodes of cramping leg pain that wakes him up from sleeping. It begins in his groin on either side, then radiates down his leg into his calf. Getting up and moving around helps, but it will still last for a few hours per episode. Does not occur during the day while walking. This has not been evaluated before. Denies open sores, burning or tingling.    Review of Systems: Answers for HPI/ROS submitted by the patient on 8/23/2017 and reviewed by me    PMH:   Past Medical History:   Diagnosis Date     Blepharitis of both eyes      Diabetes (H)      Dry eye syndrome      Nonsenile cataract      Peripheral neuropathy (H)      Sarcoidosis of lung  (H)      PSxH:   Past Surgical History:   Procedure Laterality Date     COLONOSCOPY  7/29/2013    Procedure: COLONOSCOPY;;  Surgeon: Montana Pascal MD;  Location:  GI     SURGICAL PATHOLOGY EXAM       Allergies: Review of patient's allergies indicates no known allergies.    SH:   Social History     Social History     Marital status:      Spouse name: N/A     Number of children: N/A     Years of education: N/A     Occupational History     Not on file.     Social History Main Topics     Smoking status: Never Smoker     Smokeless tobacco: Never Used     Alcohol use No     Drug use: No     Sexual activity: Not on file     Other Topics Concern     Not on file     Social History Narrative     FH:   Family History   Problem Relation Age of Onset     Glaucoma No family hx of      Macular Degeneration No family hx of      Objective:  PT and DP pulses are 1/4 bilaterally. CRT is <3 seconds. Absent pedal hair.   Pinpoint sensation is intact with 5.07 [place] monofilament bilaterally.  Equinus present bilaterally. No pain with active or passive ROM of the ankle, MTJ, 1st ray, or halluces bilaterally. Dorsally contracted digits 2-5 bilaterally. Sharp pain with left calf squeeze, increased numbness with right calf squeeze.  Nails thickened and discolored to varying degrees bilaterally. No open lesions are noted. Scabs noted at distal left hallux and left third digit. Nails have been cut very short. Skin is dry and flaking on the plantar surfaces of his feet BL - nonpuritic, no erythema.    Assessment:   - Onychomycosis  - Leg cramps  - Tinea pedis  - DM type 2, uncontrolled  - Diabetic peripheral neuropathy  - Hammertoes    Plan:  - Pt seen and evaluated. Diagnosis and treatment options discussed.   - Ordered diabetic shoes and inserts x 3. Patient to call and make an appointment  - Ordered US Venous Duplex test to r/o DVT. Consider Venous Competency and GARCIA at next visit  - Continue management of neuropathy  symptoms with Dr. Hare and Dr. Mayorga. May need to increase dose or change medication if symptoms are not being controlled to adequate degree - patient to discuss at his next appointment with Dr. Hare.  - Rx'd clotrimazole cream for tinea pedis  - Educated patient on proper foot care - strongly suggested that patient allow us to trim his nails.   - RTC 3 months or sooner with concerns.    Again, thank you for allowing me to participate in the care of your patient.      Sincerely,    Narendra Grajeda DPM

## 2017-09-08 ENCOUNTER — MEDICAL CORRESPONDENCE (OUTPATIENT)
Dept: HEALTH INFORMATION MANAGEMENT | Facility: CLINIC | Age: 67
End: 2017-09-08

## 2017-09-15 DIAGNOSIS — N40.1 BENIGN NON-NODULAR PROSTATIC HYPERPLASIA WITH LOWER URINARY TRACT SYMPTOMS: ICD-10-CM

## 2017-09-15 RX ORDER — TAMSULOSIN HYDROCHLORIDE 0.4 MG/1
CAPSULE ORAL
Qty: 90 CAPSULE | Refills: 0 | Status: SHIPPED | OUTPATIENT
Start: 2017-09-15 | End: 2018-01-15

## 2017-10-04 ENCOUNTER — TELEPHONE (OUTPATIENT)
Dept: OPHTHALMOLOGY | Facility: CLINIC | Age: 67
End: 2017-10-04

## 2017-11-01 ENCOUNTER — OFFICE VISIT (OUTPATIENT)
Dept: ENDOCRINOLOGY | Facility: CLINIC | Age: 67
End: 2017-11-01

## 2017-11-01 VITALS
HEART RATE: 102 BPM | WEIGHT: 197.7 LBS | DIASTOLIC BLOOD PRESSURE: 80 MMHG | HEIGHT: 68 IN | SYSTOLIC BLOOD PRESSURE: 135 MMHG | BODY MASS INDEX: 29.96 KG/M2

## 2017-11-01 DIAGNOSIS — C61 MALIGNANT NEOPLASM OF PROSTATE (H): ICD-10-CM

## 2017-11-01 DIAGNOSIS — E11.65 TYPE 2 DIABETES MELLITUS WITH HYPERGLYCEMIA, WITH LONG-TERM CURRENT USE OF INSULIN (H): Primary | ICD-10-CM

## 2017-11-01 DIAGNOSIS — Z79.4 TYPE 2 DIABETES MELLITUS WITH HYPERGLYCEMIA, WITH LONG-TERM CURRENT USE OF INSULIN (H): ICD-10-CM

## 2017-11-01 DIAGNOSIS — Z79.4 TYPE 2 DIABETES MELLITUS WITH HYPERGLYCEMIA, WITH LONG-TERM CURRENT USE OF INSULIN (H): Primary | ICD-10-CM

## 2017-11-01 DIAGNOSIS — E11.65 TYPE 2 DIABETES MELLITUS WITH HYPERGLYCEMIA, WITH LONG-TERM CURRENT USE OF INSULIN (H): ICD-10-CM

## 2017-11-01 LAB
ALT SERPL W P-5'-P-CCNC: 44 U/L (ref 0–70)
AST SERPL W P-5'-P-CCNC: 23 U/L (ref 0–45)
BASOPHILS # BLD AUTO: 0.1 10E9/L (ref 0–0.2)
BASOPHILS NFR BLD AUTO: 1.4 %
CREAT SERPL-MCNC: 1.2 MG/DL (ref 0.66–1.25)
DIFFERENTIAL METHOD BLD: ABNORMAL
EOSINOPHIL # BLD AUTO: 0.3 10E9/L (ref 0–0.7)
EOSINOPHIL NFR BLD AUTO: 5.1 %
ERYTHROCYTE [DISTWIDTH] IN BLOOD BY AUTOMATED COUNT: 12.4 % (ref 10–15)
GFR SERPL CREATININE-BSD FRML MDRD: 60 ML/MIN/1.7M2
HBA1C MFR BLD: 9.6 % (ref 4.3–6)
HCT VFR BLD AUTO: 43.6 % (ref 40–53)
HGB BLD-MCNC: 15.1 G/DL (ref 13.3–17.7)
IMM GRANULOCYTES # BLD: 0 10E9/L (ref 0–0.4)
IMM GRANULOCYTES NFR BLD: 0.2 %
LYMPHOCYTES # BLD AUTO: 3 10E9/L (ref 0.8–5.3)
LYMPHOCYTES NFR BLD AUTO: 62 %
MCH RBC QN AUTO: 32 PG (ref 26.5–33)
MCHC RBC AUTO-ENTMCNC: 34.6 G/DL (ref 31.5–36.5)
MCV RBC AUTO: 92 FL (ref 78–100)
MONOCYTES # BLD AUTO: 0.4 10E9/L (ref 0–1.3)
MONOCYTES NFR BLD AUTO: 8.2 %
NEUTROPHILS # BLD AUTO: 1.1 10E9/L (ref 1.6–8.3)
NEUTROPHILS NFR BLD AUTO: 23.1 %
NRBC # BLD AUTO: 0 10*3/UL
NRBC BLD AUTO-RTO: 0 /100
PLATELET # BLD AUTO: 170 10E9/L (ref 150–450)
POTASSIUM SERPL-SCNC: 4.4 MMOL/L (ref 3.4–5.3)
PSA SERPL-MCNC: 2.52 UG/L (ref 0–4)
RBC # BLD AUTO: 4.72 10E12/L (ref 4.4–5.9)
TSH SERPL DL<=0.005 MIU/L-ACNC: 1.12 MU/L (ref 0.4–4)
WBC # BLD AUTO: 4.9 10E9/L (ref 4–11)

## 2017-11-01 ASSESSMENT — PAIN SCALES - GENERAL: PAINLEVEL: NO PAIN (0)

## 2017-11-01 NOTE — LETTER
11/1/2017       RE: Earle Rene  [address]  [place] [address]     Dear Colleague,    Thank you for referring your patient, Earle Rene, to the [facility] ENDO[facility]Y at [facility]. Please see a copy of my visit note below.    HPI   Earle Rene is a 67 year old male with type 2 diabetes mellitus here today for a follow up visit.      He was last seen in our clinic in August 2017.  Pt's diabetes is complicated by retinopathy, nephropathy and neuropathy.  He is currently being treated for a submandibular abscess.    Mr. Rene tells me he has been better at taking his insulin as prescribed.  He states he is taking Lantus 60 units SQ at hs, Novolog 8-12 units with meals and Metformin 1000 mg BID.  Pt's A1C is 9.6 % today and his previous A1C was 8.9 %.   He did not bring his blood sugar meter today and states he has not been checking his blood sugar.  On ROS today, pt is being treated for submandibular abscess as above. He is taking Augmentin and he states he is feeling better.  Pt reports dark brown stools.  No blood in the stool.  No fevers at this time.  Pt has numbness in both feet from his neuropathy.  Some intermittent blurred vision.  He reports chronic cough.  Pt denies n/v, SOB at rest, chest pain, abd pain, diarrhea,dysuria, hematuria or foot ulcers.    ROS   Please see under HPI.     ALLERGIES:  Review of patient's allergies indicates no known allergies.    Current Outpatient Prescriptions   Medication Sig Dispense Refill     tamsulosin (FLOMAX) 0.4 MG capsule TAKE 1 CAPSULE BY MOUTH DAILY 90 capsule 0     clotrimazole (LOTRIMIN) 1 % cream Apply topically 2 times daily 30 g 3     atorvastatin (LIPITOR) 20 MG tablet Take 1 tablet (20 mg) by mouth daily 90 tablet 3     insulin glargine (LANTUS SOLOSTAR) 100 UNIT/ML injection Inject 65 units SQ at bedtime. 30 mL 2     finasteride (PROSCAR) 5 MG tablet Take 1 tablet (5 mg) by mouth daily 90 tablet 3      "metFORMIN (GLUCOPHAGE) 1000 MG tablet 1 tab twice daily with meals 180 tablet 3     ONE TOUCH ULTRA test strip Test twice daily or as directed 200 strip 3     gabapentin (NEURONTIN) 100 MG capsule Take 1 capsule (100 mg) by mouth 3 times daily 180 capsule 5     NOVOLOG FLEXPEN 100 UNIT/ML soln Inject 8 -14 units SQ with meals with use of correction insulin. Pt uses approx 60 units in 24 hrs. 60 mL 3     insulin pen needle (B-D U/F) 31G X 5 MM Use 4 times per day.  Please dispense as BD Pen Needle Mini U/F 31G x 5  each 3     losartan (COZAAR) 25 MG tablet Take 1 tablet (25 mg) by mouth daily 90 tablet 3     insulin syringe 31G X 5/16\" 0.5 ML MISC Use three syringes daily 270 each 1     Omega-3 Fatty Acids (OMEGA-3 FISH OIL) 1000 MG CAPS Take 1 capsule (1 g) by mouth 2 times daily 60 capsule 11     fenofibrate 54 MG tablet Take 1 tablet (54 mg) by mouth daily 90 tablet 0     loratadine (CLARITIN) 10 MG tablet Take 1 tablet (10 mg) by mouth daily 90 tablet 0     clobetasol (TEMOVATE) 0.05 % ointment Apply topically to legs twice daily for 2 weeks.  Then discontinue 30 g 0     tacrolimus (PROTOPIC) 0.03 % ointment Apply to affected area(s) twice daily 60 g 0     Blood Pressure Monitor KIT Check blood pressure as directed. 1 kit 0     mupirocin (BACTROBAN) 2 % cream Apply  topically. In very small amounts only as needed 15 g 1     alprostadil (EDEX) 20 MCG injection by Intracavitary route. Inject 3/4 ml (15 ug) as instructed.  Can increase to full dose of 1 ml (20 ug) if necessary.   Can dispense 6 kits. 6 each 12     aspirin 81 MG tablet Take 1 tablet by mouth daily.       Family Hx   No change.     Personal Hx   Smoke: none.   ETOH: none.    with grown children.   He owns his own business.    PMH   1. Type 2 Diabetes Mellitus dx at age 43.   2. Neuropathy.  3. Nephropathy.   4. ED.   5. Dyslipidemia.   6. Nephrolithiasis.   7. Decrease auditory acuity.   8. Sarcoidosis-lung.   9. Goiter.   10. S/P T & A. " "  11. S/P FX right heel.   12. Vit D def.   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   14. CT chest- ? granulomas.   Past Medical History:   Diagnosis Date     Blepharitis of both eyes      Diabetes (H)      Dry eye syndrome      Nonsenile cataract      Peripheral neuropathy      Sarcoidosis of lung (H)      Past Surgical History:   Procedure Laterality Date     COLONOSCOPY  7/29/2013    Procedure: COLONOSCOPY;;  Surgeon: Montana Pascal MD;  Location:  GI     SURGICAL PATHOLOGY EXAM       Physical Exam   General appearance: Vital signs:   /80  Pulse 102  Ht 1.727 m (5' 8\")  Wt 89.7 kg (197 lb 11.2 oz)  BMI 30.06 kg/m2  Estimated body mass index is 30.06 kg/(m^2) as calculated from the following:    Height as of this encounter: 1.727 m (5' 8\").    Weight as of this encounter: 89.7 kg (197 lb 11.2 oz).  Head:  Normal.   Eyes: PERRLA; fundi not visualized.   Lungs: clear.   Cardiovascular system:  RRR.   Abdomen:  Large and nontender.  Musculoskeletal system: no edema.   Neurological:  normal.   Skin:  necrobiosis lipoidica on both legs.   FEET: no ulcers.    Results   Creatinine   Date Value Ref Range Status   11/01/2017 1.20 0.66 - 1.25 mg/dL Final     GFR Estimate   Date Value Ref Range Status   11/01/2017 60 (L) >60 mL/min/1.7m2 Final     Comment:     Non  GFR Calc     Hemoglobin A1C   Date Value Ref Range Status   06/06/2017 10.2 (H) 4.3 - 6.0 % Final     Potassium   Date Value Ref Range Status   11/01/2017 4.4 3.4 - 5.3 mmol/L Final     ALT   Date Value Ref Range Status   11/01/2017 44 0 - 70 U/L Final     AST   Date Value Ref Range Status   11/01/2017 23 0 - 45 U/L Final     TSH   Date Value Ref Range Status   11/01/2017 1.12 0.40 - 4.00 mU/L Final     T4 Free   Date Value Ref Range Status   10/21/2014 1.00 0.76 - 1.46 ng/dL Final     Comment:     Effective 7/30/2014, the reference range for this assay has changed to reflect   new instrumentation/methodology.           Cholesterol   Date " Value Ref Range Status   01/14/2016 173 <200 mg/dL Final   09/09/2014 123 <200 mg/dL Final     Comment:     LDL Cholesterol is the primary guide to therapy.   The NCEP recommends further evaluation of: patients with cholesterol greater   than 200 mg/dL if additional risk factors are present, cholesterol greater   than   240 mg/dL, triglycerides greater than 150 mg/dL, or HDL less than 40 mg/dL.       HDL Cholesterol   Date Value Ref Range Status   01/14/2016 30 (L) >39 mg/dL Final   09/09/2014 32 (L) >40 mg/dL Final     LDL Cholesterol Calculated   Date Value Ref Range Status   01/14/2016  <100 mg/dL Final    Cannot estimate LDL when triglyceride exceeds 400 mg/dL   09/09/2014 30 0 - 129 mg/dL Final     Comment:     LDL Cholesterol is the primary guide to therapy: LDL-cholesterol goal in high   risk patients is <100 mg/dL and in very high risk patients is <70 mg/dL.       LDL Cholesterol Direct   Date Value Ref Range Status   01/14/2016 84 <100 mg/dL Final     Comment:     Desirable:       <100 mg/dl     Triglycerides   Date Value Ref Range Status   01/14/2016 436 (H) <150 mg/dL Final     Comment:     Borderline high:  150-199 mg/dl   High:             200-499 mg/dl   Very high:       >499 mg/dl     09/09/2014 307 (H) 0 - 150 mg/dL Final     Comment:     Fasting specimen     Cholesterol/HDL Ratio   Date Value Ref Range Status   09/09/2014 3.8 0.0 - 5.0 Final   11/20/2013 5.8 (H) 0.0 - 5.0 Final     A1C      9.6   11/1/2017  A1C      8.9   8/9/2017  A1C      9.7   9/22/2016  A1C      9.7   7/21/2015  A1C     10.2  12/2/2014  A1C     10.2  9/9/2014  A1C      9.8  11/20/2013  A1C      9.3   6/12/2013  A1C      8.0   8/14/2012  A1C      8.2   5/22/2012  A1C      8.0   5/22/2012    ASSESSMENT/PLAN:     1. TYPE 2 DIABETES MELLITUS: Uncontrolled type 2 diabetes mellitus complicated by retinopathy, nephropathy and neuropathy.  Mr. Rene has NOT been checking his blood sugar.  I asked him to check his blood sugar fasting  each am, prelunch and predinner DAILY.  He is to increase his Lantus 65 units SQ at bedtime and take Novolog 10-12 units with meals.  I asked him to meet with the CDE and dietitian to learn how to use an I/C ratio with meals.  Encouraged patient to make healthy food choices, reduce his food portions with meals, avoid snacking and walk daily and to take his insulin and medications as prescribed.  Pt remains on daily ASA.   Flu vaccine today.    2. GOITER: Nontender goiter.  TSH normal today.    3. NEPHROPATHY: Discussed the need for better glycemic control and good BP control.   Pt's creat is 1.2 with GFR 60 mL/min today.  Pt's urine microalbuminuria was + in March 2016.  He is taking Cozaar.    4.  RETINOPATHY: Pt seen by Oph here in July 2017.    5.  NEUROPATHY: Continue Gabapentin.  No foot ulcers.    6. DYSLIPIDEMIA: LDL 84 in Jan 2016.   Pt is now taking Lipitor, fenofibrate and Fish Oil.    7. OBESITY: Encouraged him to meet with our dietitian.   He does not want to take Byetta, Victoza or Bydureon.  See #1 above.    8.   Return to Endocrine Clinic to see me in 5 weeks.  Pt is to call me if he has more than 2 low blood sugars ( 70 or less ) per week or persistent high blood sugar values > 250.    Again, thank you for allowing me to participate in the care of your patient.      Sincerely,    Pamela Laura PA-C

## 2017-11-01 NOTE — PROGRESS NOTES
HPI   Earle Rene is a 67 year old male with type 2 diabetes mellitus here today for a follow up visit.      He was last seen in our clinic in August 2017.  Pt's diabetes is complicated by retinopathy, nephropathy and neuropathy.  He is currently being treated for a submandibular abscess.    Mr. Rene tells me he has been better at taking his insulin as prescribed.  He states he is taking Lantus 60 units SQ at hs, Novolog 8-12 units with meals and Metformin 1000 mg BID.  Pt's A1C is 9.6 % today and his previous A1C was 8.9 %.   He did not bring his blood sugar meter today and states he has not been checking his blood sugar.  On ROS today, pt is being treated for submandibular abscess as above. He is taking Augmentin and he states he is feeling better.  Pt reports dark brown stools.  No blood in the stool.  No fevers at this time.  Pt has numbness in both feet from his neuropathy.  Some intermittent blurred vision.  He reports chronic cough.  Pt denies n/v, SOB at rest, chest pain, abd pain, diarrhea,dysuria, hematuria or foot ulcers.    ROS   Please see under HPI.     ALLERGIES:  Review of patient's allergies indicates no known allergies.    Current Outpatient Prescriptions   Medication Sig Dispense Refill     tamsulosin (FLOMAX) 0.4 MG capsule TAKE 1 CAPSULE BY MOUTH DAILY 90 capsule 0     clotrimazole (LOTRIMIN) 1 % cream Apply topically 2 times daily 30 g 3     atorvastatin (LIPITOR) 20 MG tablet Take 1 tablet (20 mg) by mouth daily 90 tablet 3     insulin glargine (LANTUS SOLOSTAR) 100 UNIT/ML injection Inject 65 units SQ at bedtime. 30 mL 2     finasteride (PROSCAR) 5 MG tablet Take 1 tablet (5 mg) by mouth daily 90 tablet 3     metFORMIN (GLUCOPHAGE) 1000 MG tablet 1 tab twice daily with meals 180 tablet 3     ONE TOUCH ULTRA test strip Test twice daily or as directed 200 strip 3     gabapentin (NEURONTIN) 100 MG capsule Take 1 capsule (100 mg) by mouth 3 times daily 180 capsule 5     NOVOLOG FLEXPEN 100  "UNIT/ML soln Inject 8 -14 units SQ with meals with use of correction insulin. Pt uses approx 60 units in 24 hrs. 60 mL 3     insulin pen needle (B-D U/F) 31G X 5 MM Use 4 times per day.  Please dispense as BD Pen Needle Mini U/F 31G x 5  each 3     losartan (COZAAR) 25 MG tablet Take 1 tablet (25 mg) by mouth daily 90 tablet 3     insulin syringe 31G X 5/16\" 0.5 ML MISC Use three syringes daily 270 each 1     Omega-3 Fatty Acids (OMEGA-3 FISH OIL) 1000 MG CAPS Take 1 capsule (1 g) by mouth 2 times daily 60 capsule 11     fenofibrate 54 MG tablet Take 1 tablet (54 mg) by mouth daily 90 tablet 0     loratadine (CLARITIN) 10 MG tablet Take 1 tablet (10 mg) by mouth daily 90 tablet 0     clobetasol (TEMOVATE) 0.05 % ointment Apply topically to legs twice daily for 2 weeks.  Then discontinue 30 g 0     tacrolimus (PROTOPIC) 0.03 % ointment Apply to affected area(s) twice daily 60 g 0     Blood Pressure Monitor KIT Check blood pressure as directed. 1 kit 0     mupirocin (BACTROBAN) 2 % cream Apply  topically. In very small amounts only as needed 15 g 1     alprostadil (EDEX) 20 MCG injection by Intracavitary route. Inject 3/4 ml (15 ug) as instructed.  Can increase to full dose of 1 ml (20 ug) if necessary.   Can dispense 6 kits. 6 each 12     aspirin 81 MG tablet Take 1 tablet by mouth daily.       Family Hx   No change.     Personal Hx   Smoke: none.   ETOH: none.    with grown children.   He owns his own business.    PMH   1. Type 2 Diabetes Mellitus dx at age 43.   2. Neuropathy.  3. Nephropathy.   4. ED.   5. Dyslipidemia.   6. Nephrolithiasis.   7. Decrease auditory acuity.   8. Sarcoidosis-lung.   9. Goiter.   10. S/P T & A.   11. S/P FX right heel.   12. Vit D def.   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   14. CT chest- ? granulomas.   Past Medical History:   Diagnosis Date     Blepharitis of both eyes      Diabetes (H)      Dry eye syndrome      Nonsenile cataract      Peripheral neuropathy      " "Sarcoidosis of lung (H)      Past Surgical History:   Procedure Laterality Date     COLONOSCOPY  7/29/2013    Procedure: COLONOSCOPY;;  Surgeon: Montana Pascal MD;  Location:  GI     SURGICAL PATHOLOGY EXAM       Physical Exam   General appearance: Vital signs:   /80  Pulse 102  Ht 1.727 m (5' 8\")  Wt 89.7 kg (197 lb 11.2 oz)  BMI 30.06 kg/m2  Estimated body mass index is 30.06 kg/(m^2) as calculated from the following:    Height as of this encounter: 1.727 m (5' 8\").    Weight as of this encounter: 89.7 kg (197 lb 11.2 oz).  Head:  Normal.   Eyes: PERRLA; fundi not visualized.   Lungs: clear.   Cardiovascular system:  RRR.   Abdomen:  Large and nontender.  Musculoskeletal system: no edema.   Neurological:  normal.   Skin:  necrobiosis lipoidica on both legs.   FEET: no ulcers.    Results   Creatinine   Date Value Ref Range Status   11/01/2017 1.20 0.66 - 1.25 mg/dL Final     GFR Estimate   Date Value Ref Range Status   11/01/2017 60 (L) >60 mL/min/1.7m2 Final     Comment:     Non  GFR Calc     Hemoglobin A1C   Date Value Ref Range Status   06/06/2017 10.2 (H) 4.3 - 6.0 % Final     Potassium   Date Value Ref Range Status   11/01/2017 4.4 3.4 - 5.3 mmol/L Final     ALT   Date Value Ref Range Status   11/01/2017 44 0 - 70 U/L Final     AST   Date Value Ref Range Status   11/01/2017 23 0 - 45 U/L Final     TSH   Date Value Ref Range Status   11/01/2017 1.12 0.40 - 4.00 mU/L Final     T4 Free   Date Value Ref Range Status   10/21/2014 1.00 0.76 - 1.46 ng/dL Final     Comment:     Effective 7/30/2014, the reference range for this assay has changed to reflect   new instrumentation/methodology.           Cholesterol   Date Value Ref Range Status   01/14/2016 173 <200 mg/dL Final   09/09/2014 123 <200 mg/dL Final     Comment:     LDL Cholesterol is the primary guide to therapy.   The NCEP recommends further evaluation of: patients with cholesterol greater   than 200 mg/dL if additional risk " factors are present, cholesterol greater   than   240 mg/dL, triglycerides greater than 150 mg/dL, or HDL less than 40 mg/dL.       HDL Cholesterol   Date Value Ref Range Status   01/14/2016 30 (L) >39 mg/dL Final   09/09/2014 32 (L) >40 mg/dL Final     LDL Cholesterol Calculated   Date Value Ref Range Status   01/14/2016  <100 mg/dL Final    Cannot estimate LDL when triglyceride exceeds 400 mg/dL   09/09/2014 30 0 - 129 mg/dL Final     Comment:     LDL Cholesterol is the primary guide to therapy: LDL-cholesterol goal in high   risk patients is <100 mg/dL and in very high risk patients is <70 mg/dL.       LDL Cholesterol Direct   Date Value Ref Range Status   01/14/2016 84 <100 mg/dL Final     Comment:     Desirable:       <100 mg/dl     Triglycerides   Date Value Ref Range Status   01/14/2016 436 (H) <150 mg/dL Final     Comment:     Borderline high:  150-199 mg/dl   High:             200-499 mg/dl   Very high:       >499 mg/dl     09/09/2014 307 (H) 0 - 150 mg/dL Final     Comment:     Fasting specimen     Cholesterol/HDL Ratio   Date Value Ref Range Status   09/09/2014 3.8 0.0 - 5.0 Final   11/20/2013 5.8 (H) 0.0 - 5.0 Final     A1C      9.6   11/1/2017  A1C      8.9   8/9/2017  A1C      9.7   9/22/2016  A1C      9.7   7/21/2015  A1C     10.2  12/2/2014  A1C     10.2  9/9/2014  A1C      9.8  11/20/2013  A1C      9.3   6/12/2013  A1C      8.0   8/14/2012  A1C      8.2   5/22/2012  A1C      8.0   5/22/2012    ASSESSMENT/PLAN:     1. TYPE 2 DIABETES MELLITUS: Uncontrolled type 2 diabetes mellitus complicated by retinopathy, nephropathy and neuropathy.  Mr. Rene has NOT been checking his blood sugar.  I asked him to check his blood sugar fasting each am, prelunch and predinner DAILY.  He is to increase his Lantus 65 units SQ at bedtime and take Novolog 10-12 units with meals.  I asked him to meet with the CDE and dietitian to learn how to use an I/C ratio with meals.  Encouraged patient to make healthy food  choices, reduce his food portions with meals, avoid snacking and walk daily and to take his insulin and medications as prescribed.  Pt remains on daily ASA.   Flu vaccine today.    2. GOITER: Nontender goiter.  TSH normal today.    3. NEPHROPATHY: Discussed the need for better glycemic control and good BP control.   Pt's creat is 1.2 with GFR 60 mL/min today.  Pt's urine microalbuminuria was + in March 2016.  He is taking Cozaar.    4.  RETINOPATHY: Pt seen by Oph here in July 2017.    5.  NEUROPATHY: Continue Gabapentin.  No foot ulcers.    6. DYSLIPIDEMIA: LDL 84 in Jan 2016.   Pt is now taking Lipitor, fenofibrate and Fish Oil.    7. OBESITY: Encouraged him to meet with our dietitian.   He does not want to take Byetta, Victoza or Bydureon.  See #1 above.    8.   Return to Endocrine Clinic to see me in 5 weeks.  Pt is to call me if he has more than 2 low blood sugars ( 70 or less ) per week or persistent high blood sugar values > 250.

## 2017-11-01 NOTE — MR AVS SNAPSHOT
After Visit Summary   11/1/2017    Earle Rene    MRN: [id number]           Patient Information     Date Of Birth          1950        Visit Information        Provider Department      11/1/2017 9:30 AM Pamela Laura PA-C M [facility] Endocrinology        Today's Diagnoses     Type 2 diabetes mellitus with hyperglycemia, with long-term current use of insulin (H)    -  1       Follow-ups after your visit        Your next 10 appointments already scheduled     Nov 07, 2017  8:30 AM CST   (Arrive by 8:15 AM)   Office Visit with Aracelis Ramirez RN   [facility] ([facility])    [address] [place] [id number] [phone]           Bring a current list of meds and any records pertaining to this visit. For Physicals, please bring immunization records and any forms needing to be filled out. Please arrive 10 minutes early to complete paperwork.            Nov 27, 2017  1:00 PM CST   (Arrive by 12:45 PM)   RETURN FOOT/ANKLE with Narendra Grajeda DPM   [facility] Medicine ([facility])    [address] [place] [id number]   [phone]            Dec 08, 2017  9:00 AM CST   (Arrive by 8:45 AM)   RETURN DIABETES with RAJANI Li [facility] Endocrinology ([facility])    [address] [place] [id number] [phone]              Future tests that were ordered for you today     Open Future Orders        Priority Expected Expires Ordered    Creatinine Routine  11/1/2018 11/1/2017    TSH Routine  11/1/2018 11/1/2017    Potassium Routine  11/1/2018 11/1/2017    AST Routine  11/1/2018 11/1/2017    ALT Routine  11/1/2018 11/1/2017    CBC with platelets differential Routine  11/1/2018 11/1/2017            Who to contact     Please call your clinic at [phone] to:    Ask questions about your health    Make or cancel  "appointments    Discuss your medicines    Learn about your test results    Speak to your doctor   If you have compliments or concerns about an experience at your clinic, or if you wish to file a complaint, please contact [facility] Patient Relations at [phone] or email us at [email or web address]         Additional Information About Your Visit        [employer] Information     [employer] is an electronic gateway that provides easy, online access to your medical records. With [employer], you can request a clinic appointment, read your test results, renew a prescription or communicate with your care team.     To sign up for [employer] visit the website at [email or web address]   You will be asked to enter the access code listed below, as well as some personal information. Please follow the directions to create your username and password.     Your access code is: [email or web address]  Expires: 2017  9:35 AM     Your access code will  in 90 days. If you need help or a new code, please contact your [facility] or call [phone] for assistance.        Care EveryWhere ID     This is your Care EveryWhere ID. This could be used by other organizations to access your [place] medical records  [id number]        Your Vitals Were     Pulse Height BMI (Body Mass Index)             102 1.727 m (5' 8\") 30.06 kg/m2          Blood Pressure from Last 3 Encounters:   17 135/80   17 154/84   17 143/79    Weight from Last 3 Encounters:   17 89.7 kg (197 lb 11.2 oz)   17 88.8 kg (195 lb 11.2 oz)   17 90.3 kg (199 lb)               Primary Care Provider Office Phone # Fax #    Marcio Hare [phone]       [address] [address]        Equal Access to Services     SHARON MAYS AH: Coby vegao Sofranklin, waaxda luqadaha, qaybta kaalreza pimentel, ethan moody " lacamille bobo. So [facility] [phone].    ATENCIÓN: Si habla viridiana, tiene a márquez disposición servicios gratuitos de asistencia lingüística. Halley izaguirre [phone].    We comply with applicable federal civil rights laws and [place] laws. We do not discriminate on the basis of race, color, national origin, age, disability, sex, sexual orientation, or gender identity.            Thank you!     Thank you for choosing [facility]  for your care. Our goal is always to provide you with excellent care. Hearing back from our patients is one way we can continue to improve our services. Please take a few minutes to complete the written survey that you may receive in the mail after your visit with us. Thank you!             Your Updated Medication List - Protect others around you: Learn how to safely use, store and throw away your medicines at www.disposemymeds.org.          This list is accurate as of: 11/1/17 10:15 AM.  Always use your most recent med list.                   Brand Name Dispense Instructions for use Diagnosis    alprostadil 20 MCG kit    EDEX    6 each    by Intracavitary route. Inject 3/4 ml (15 ug) as instructed.  Can increase to full dose of 1 ml (20 ug) if necessary.   Can dispense 6 kits.    Erectile dysfunction       aspirin 81 MG tablet      Take 1 tablet by mouth daily.        atorvastatin 20 MG tablet    LIPITOR    90 tablet    Take 1 tablet (20 mg) by mouth daily    Hyperlipidemia LDL goal <100       Blood Pressure Monitor Kit     1 kit    Check blood pressure as directed.    Unspecified essential hypertension       clobetasol 0.05 % ointment    TEMOVATE    30 g    Apply topically to legs twice daily for 2 weeks.  Then discontinue    Rash and other nonspecific skin eruption       clotrimazole 1 % cream    LOTRIMIN    30 g    Apply topically 2 times daily    Tinea pedis of both feet, Dermatophytosis of nail       fenofibrate 54 MG tablet     90 tablet    Take 1 tablet (54 mg) by mouth daily     "Hyperlipidemia LDL goal < 100       finasteride 5 MG tablet    PROSCAR    90 tablet    Take 1 tablet (5 mg) by mouth daily    Benign prostatic hyperplasia with urinary obstruction       fish oil-omega-3 fatty acids 1000 MG capsule     60 capsule    Take 1 capsule (1 g) by mouth 2 times daily    Lymphocytopenia, High cholesterol, Screening for prostate cancer       gabapentin 100 MG capsule    NEURONTIN    180 capsule    Take 1 capsule (100 mg) by mouth 3 times daily    Polyneuropathy in diabetes(357.2)       insulin pen needle 31G X 5 MM    B-D U/F    400 each    Use 4 times per day.  Please dispense as BD Pen Needle Mini U/F 31G x 5 MM    Diabetes mellitus type 2, insulin dependent (H)       insulin syringe 31G X 5/16\" 0.5 ML Misc     270 each    Use three syringes daily    Type 2 diabetes mellitus (H)       LANTUS SOLOSTAR 100 UNIT/ML injection   Generic drug:  insulin glargine     30 mL    Inject 65 units SQ at bedtime.        loratadine 10 MG tablet    CLARITIN    90 tablet    Take 1 tablet (10 mg) by mouth daily    Seasonal allergies       losartan 25 MG tablet    COZAAR    90 tablet    Take 1 tablet (25 mg) by mouth daily    HTN (hypertension)       metFORMIN 1000 MG tablet    GLUCOPHAGE    180 tablet    1 tab twice daily with meals    Type 2 diabetes mellitus with diabetic neuropathy (H)       mupirocin 2 % cream    BACTROBAN    15 g    Apply  topically. In very small amounts only as needed    Rash and other nonspecific skin eruption       NovoLOG FLEXPEN 100 UNIT/ML injection   Generic drug:  insulin aspart     60 mL    Inject 8 -14 units SQ with meals with use of correction insulin. Pt uses approx 60 units in 24 hrs.    Type 2 diabetes mellitus with diabetic nephropathy (H)       ONE TOUCH ULTRA test strip   Generic drug:  blood glucose monitoring     200 strip    Test twice daily or as directed    DM (diabetes mellitus) (H)       tacrolimus 0.03 % ointment    PROTOPIC    60 g    Apply to affected area(s) " twice daily    Rash and other nonspecific skin eruption       tamsulosin 0.4 MG capsule    FLOMAX    90 capsule    TAKE 1 CAPSULE BY MOUTH DAILY    Benign non-nodular prostatic hyperplasia with lower urinary tract symptoms

## 2017-11-16 ENCOUNTER — OFFICE VISIT (OUTPATIENT)
Dept: EDUCATION SERVICES | Facility: CLINIC | Age: 67
End: 2017-11-16

## 2017-11-16 DIAGNOSIS — E11.9 DIABETES MELLITUS TYPE 2, INSULIN DEPENDENT (H): Primary | ICD-10-CM

## 2017-11-16 DIAGNOSIS — E11.9 DIABETES MELLITUS TYPE 2, INSULIN DEPENDENT (H): ICD-10-CM

## 2017-11-16 DIAGNOSIS — Z79.4 DIABETES MELLITUS TYPE 2, INSULIN DEPENDENT (H): ICD-10-CM

## 2017-11-16 DIAGNOSIS — Z79.4 DIABETES MELLITUS TYPE 2, INSULIN DEPENDENT (H): Primary | ICD-10-CM

## 2017-11-16 RX ORDER — LANCETS
EACH MISCELLANEOUS
Qty: 102 EACH | Refills: 11 | Status: SHIPPED | OUTPATIENT
Start: 2017-11-16 | End: 2017-11-16

## 2017-11-16 RX ORDER — LANCETS
EACH MISCELLANEOUS
Qty: 300 EACH | Refills: 3 | Status: SHIPPED | OUTPATIENT
Start: 2017-11-16 | End: 2018-02-05 | Stop reason: ALTCHOICE

## 2017-11-27 ENCOUNTER — OFFICE VISIT (OUTPATIENT)
Dept: ORTHOPEDICS | Facility: CLINIC | Age: 67
End: 2017-11-27

## 2017-11-27 VITALS — BODY MASS INDEX: 29.7 KG/M2 | WEIGHT: 196 LBS | RESPIRATION RATE: 16 BRPM | HEIGHT: 68 IN

## 2017-11-27 DIAGNOSIS — I73.9 PVD (PERIPHERAL VASCULAR DISEASE) (H): ICD-10-CM

## 2017-11-27 DIAGNOSIS — L84 TYLOMA: ICD-10-CM

## 2017-11-27 DIAGNOSIS — I73.9 CLAUDICATION IN PERIPHERAL VASCULAR DISEASE (H): Primary | ICD-10-CM

## 2017-11-27 DIAGNOSIS — R60.9 PITTING EDEMA: ICD-10-CM

## 2017-11-27 DIAGNOSIS — B35.1 ONYCHOMYCOSIS: ICD-10-CM

## 2017-11-27 NOTE — PROGRESS NOTES
" Subjective:   Earle Rene is a 67 year old male who is here following up on diabetic foot care. He has been wearing diabetic shoes,which he claims helps symptoms.     Date of injury: NA  Date last seen: Visit date not found  Following Therapeutic Plan: Yes   Pain: Unchanged  Function: Unchanged  Interval History:  Still is getting pain with walking in both of his calves.     PAST MEDICAL, SOCIAL, SURGICAL AND FAMILY HISTORY: He  has a past medical history of Blepharitis of both eyes; Diabetes (H); Dry eye syndrome; Nonsenile cataract; Peripheral neuropathy; and Sarcoidosis of lung (H).  He  has a past surgical history that includes Surgical pathology exam and Colonoscopy (7/29/2013).  His family history is negative for Glaucoma and Macular Degeneration.  He reports that he has never smoked. He has never used smokeless tobacco. He reports that he does not drink alcohol or use illicit drugs.      ALLERGIES: He has No Known Allergies.    CURRENT MEDICATIONS: He has a current medication list which includes the following prescription(s): blood glucose monitoring, tamsulosin, clotrimazole, atorvastatin, insulin glargine, finasteride, metformin, onetouch ultra, gabapentin, novolog flexpen, insulin pen needle, losartan, insulin syringe, omega-3 fish oil, fenofibrate, loratadine, clobetasol, tacrolimus, blood pressure monitor, mupirocin, alprostadil, and aspirin.     REVIEW OF SYSTEMS: 9 point review of systems is negative except as noted above.     Exam:   Resp 16  Ht 5' 8\" (1.727 m)  Wt 196 lb (88.9 kg)  BMI 29.8 kg/m2           PT and DP pulses are 1/4 bilaterally. CRT is <3 seconds. Absent pedal hair.   Pinpoint sensation is intact with 5.07 [place] monofilament bilaterally.  Equinus present bilaterally. No pain with active or passive ROM of the ankle, MTJ, 1st ray, or halluces bilaterally. Dorsally contracted digits 2-5 bilaterally. No pain with left calf squeeze, increased numbness with right calf " squeeze.  Nails thickened and discolored to varying degrees bilaterally. No open lesions are noted.  Skin is dry and flaking on the plantar surfaces of his feet BL - nonpuritic, no erythema. Tyloma noted on the lateral 5th met head on the right.             Assessment/Plan:   Assessment:   - Onychomycosis  - Leg cramps  - Tinea pedis  - DM type 2, uncontrolled  - Diabetic peripheral neuropathy  - Hammertoes     Plan:  - Pt seen and evaluated. Diagnosis and treatment options discussed.   - Ordered Venous Competency and GARCIA at next visit  - Nails debrided x 10.   - Tyloma debrided x 1.   - RTC 3 months or sooner with concerns.

## 2017-11-27 NOTE — MR AVS SNAPSHOT
After Visit Summary   11/27/2017    Earle Rene    MRN: [id number]           Patient Information     Date Of Birth          1950        Visit Information        Provider Department      11/27/2017 1:00 PM Narendra Grajeda DPM [facility]        Today's Diagnoses     Claudication in peripheral vascular disease (H)    -  1    Pitting edema           Follow-ups after your visit        Your next 10 appointments already scheduled     Dec 01, 2017  8:30 AM CST   US LOWER EXTREMITY VENOUS COMPETENCY BILATERAL with [id number]   [facility] ([facility])    [address] [place] [id number] [phone]           Please bring a list of your medicines (including vitamins, minerals and over-the-counter drugs). Also, tell your doctor about any allergies you may have. Wear comfortable clothes and leave your valuables at home.  You do not need to do anything special to prepare for your exam.  Please call the Imaging Department at your exam site with any questions.            Dec 01, 2017 10:30 AM CST   US GARCIA DOPPLER NO EXERCISE 1-2 LVLS BILAT with [id number] [facility] ([facility])    [address] [id number] [phone]           Please bring a list of your medicines (including vitamins, minerals and over-the-counter drugs). Also, tell your doctor about any allergies you may have. Wear comfortable clothes and leave your valuables at home.  No caffeine or tobacco for 1 hour prior to exam.  Please call the Imaging Department at your exam site with any questions.            Dec 08, 2017  9:00 AM CST   (Arrive by 8:45 AM)   RETURN DIABETES with Pamela Laura PA-C   [facility] ([facility])    [address] [place] [id number] [phone]            Dec 13, 2017  9:30 AM CST   (Arrive  by 9:15 AM)   Office Visit with Anjali aWde RD   [facility] ([facility])    [address]  [address] [place] [address] [phone]           Bring a current list of meds and any records pertaining to this visit. For Physicals, please bring immunization records and any forms needing to be filled out. Please arrive 10 minutes early to complete paperwork.            Jan 03, 2018  8:40 AM CST   (Arrive by 8:25 AM)   RETURN FOOT/ANKLE with Narendra Grajeda DPM   [facility] ([facility])    [address]  [address] [employer] [id number] [phone]              Future tests that were ordered for you today     Open Future Orders        Priority Expected Expires Ordered    US GARCIA Doppler No Exercise 1-2 Levels Bilateral Routine  11/27/2018 11/27/2017    US Venous Competency Bilateral Routine  11/27/2018 11/27/2017            Who to contact     Please call your clinic at [phone] to:    Ask questions about your health    Make or cancel appointments    Discuss your medicines    Learn about your test results    Speak to your doctor   If you have compliments or concerns about an experience at your clinic, or if you wish to file a complaint, please contact [facility] Patient Relations at [phone] or email us at [email or web address]         Additional Information About Your Visit        [email or web address] Information     [employer] is an electronic gateway that provides easy, online access to your medical records. With [employer], you can request a clinic appointment, read your test results, renew a prescription or communicate with your care team.     To sign up for [employer] visit the website at [email or web address]   You will be asked to enter the access code listed below, as well as some personal information. Please follow the directions to create your username and password.    "  Your access code is: [email or web address]  Expires: 2018  6:30 AM     Your access code will  in 90 days. If you need help or a new code, please contact your [facility] or call [phone] for assistance.        Care EveryWhere ID     This is your Care EveryWhere ID. This could be used by other organizations to access your [facility] medical records  [id number]        Your Vitals Were     Respirations Height BMI (Body Mass Index)             16 1.727 m (5' 8\") 29.8 kg/m2          Blood Pressure from Last 3 Encounters:   17 135/80   17 154/84   17 143/79    Weight from Last 3 Encounters:   17 88.9 kg (196 lb)   17 89.7 kg (197 lb 11.2 oz)   17 88.8 kg (195 lb 11.2 oz)               Primary Care Provider Office Phone # Fax #    Marcio Hare [phone]       [address] [address]        Equal Access to Services     [facility]: Hadii priscilla ku hadasho Soomaali, waaxda luqadaha, qaybta kaalmada adejoseyada, ethan nascimento . So [facility] [phone].    ATENCIÓN: Si habla español, tiene a márquez disposición servicios gratuitos de asistencia lingüística. Llame al [phone].    We comply with applicable federal civil rights laws and [place] laws. We do not discriminate on the basis of race, color, national origin, age, disability, sex, sexual orientation, or gender identity.            Thank you!     Thank you for choosing [facility]  for your care. Our goal is always to provide you with excellent care. Hearing back from our patients is one way we can continue to improve our services. Please take a few minutes to complete the written survey that you may receive in the mail after your visit with us. Thank you!             Your Updated Medication List - Protect others around you: Learn how to safely use, store and throw away your medicines at [email or web address].        " "  This list is accurate as of: 11/27/17  1:41 PM.  Always use your most recent med list.                   Brand Name Dispense Instructions for use Diagnosis    alprostadil 20 MCG kit    EDEX    6 each    by Intracavitary route. Inject 3/4 ml (15 ug) as instructed.  Can increase to full dose of 1 ml (20 ug) if necessary.   Can dispense 6 kits.    Erectile dysfunction       aspirin 81 MG tablet      Take 1 tablet by mouth daily.        atorvastatin 20 MG tablet    LIPITOR    90 tablet    Take 1 tablet (20 mg) by mouth daily    Hyperlipidemia LDL goal <100       blood glucose monitoring lancets     300 each    USE TO TEST BLOOD SUGAR THREE TIMES DAILY    Diabetes mellitus type 2, insulin dependent (H)       Blood Pressure Monitor Kit     1 kit    Check blood pressure as directed.    Unspecified essential hypertension       clobetasol 0.05 % ointment    TEMOVATE    30 g    Apply topically to legs twice daily for 2 weeks.  Then discontinue    Rash and other nonspecific skin eruption       clotrimazole 1 % cream    LOTRIMIN    30 g    Apply topically 2 times daily    Tinea pedis of both feet, Dermatophytosis of nail       fenofibrate 54 MG tablet     90 tablet    Take 1 tablet (54 mg) by mouth daily    Hyperlipidemia LDL goal < 100       finasteride 5 MG tablet    PROSCAR    90 tablet    Take 1 tablet (5 mg) by mouth daily    Benign prostatic hyperplasia with urinary obstruction       fish oil-omega-3 fatty acids 1000 MG capsule     60 capsule    Take 1 capsule (1 g) by mouth 2 times daily    Lymphocytopenia, High cholesterol, Screening for prostate cancer       gabapentin 100 MG capsule    NEURONTIN    180 capsule    Take 1 capsule (100 mg) by mouth 3 times daily    Polyneuropathy in diabetes(357.2)       insulin pen needle 31G X 5 MM    B-D U/F    400 each    Use 4 times per day.  Please dispense as BD Pen Needle Mini U/F 31G x 5 MM    Diabetes mellitus type 2, insulin dependent (H)       insulin syringe 31G X 5/16\" " 0.5 ML Misc     270 each    Use three syringes daily    Type 2 diabetes mellitus (H)       LANTUS SOLOSTAR 100 UNIT/ML injection   Generic drug:  insulin glargine     30 mL    Inject 65 units SQ at bedtime.        loratadine 10 MG tablet    CLARITIN    90 tablet    Take 1 tablet (10 mg) by mouth daily    Seasonal allergies       losartan 25 MG tablet    COZAAR    90 tablet    Take 1 tablet (25 mg) by mouth daily    HTN (hypertension)       metFORMIN 1000 MG tablet    GLUCOPHAGE    180 tablet    1 tab twice daily with meals    Type 2 diabetes mellitus with diabetic neuropathy (H)       mupirocin 2 % cream    BACTROBAN    15 g    Apply  topically. In very small amounts only as needed    Rash and other nonspecific skin eruption       NovoLOG FLEXPEN 100 UNIT/ML injection   Generic drug:  insulin aspart     60 mL    Inject 8 -14 units SQ with meals with use of correction insulin. Pt uses approx 60 units in 24 hrs.    Type 2 diabetes mellitus with diabetic nephropathy (H)       ONETOUCH ULTRA test strip   Generic drug:  blood glucose monitoring     200 strip    Test twice daily or as directed    DM (diabetes mellitus) (H)       tacrolimus 0.03 % ointment    PROTOPIC    60 g    Apply to affected area(s) twice daily    Rash and other nonspecific skin eruption       tamsulosin 0.4 MG capsule    FLOMAX    90 capsule    TAKE 1 CAPSULE BY MOUTH DAILY    Benign non-nodular prostatic hyperplasia with lower urinary tract symptoms

## 2017-11-27 NOTE — LETTER
"  11/27/2017      RE: Earle Rene  [address]  [place] [address]        Subjective:   Earle Rene is a 67 year old male who is here following up on diabetic foot care. He has been wearing diabetic shoes,which he claims helps symptoms.     Date of injury: NA  Date last seen: Visit date not found  Following Therapeutic Plan: Yes   Pain: Unchanged  Function: Unchanged  Interval History:  Still is getting pain with walking in both of his calves.     PAST MEDICAL, SOCIAL, SURGICAL AND FAMILY HISTORY: He  has a past medical history of Blepharitis of both eyes; Diabetes (H); Dry eye syndrome; Nonsenile cataract; Peripheral neuropathy; and Sarcoidosis of lung (H).  He  has a past surgical history that includes Surgical pathology exam and Colonoscopy (7/29/2013).  His family history is negative for Glaucoma and Macular Degeneration.  He reports that he has never smoked. He has never used smokeless tobacco. He reports that he does not drink alcohol or use illicit drugs.      ALLERGIES: He has No Known Allergies.    CURRENT MEDICATIONS: He has a current medication list which includes the following prescription(s): blood glucose monitoring, tamsulosin, clotrimazole, atorvastatin, insulin glargine, finasteride, metformin, onetouch ultra, gabapentin, novolog flexpen, insulin pen needle, losartan, insulin syringe, omega-3 fish oil, fenofibrate, loratadine, clobetasol, tacrolimus, blood pressure monitor, mupirocin, alprostadil, and aspirin.     REVIEW OF SYSTEMS: 9 point review of systems is negative except as noted above.     Exam:   Resp 16  Ht 5' 8\" (1.727 m)  Wt 196 lb (88.9 kg)  BMI 29.8 kg/m2           PT and DP pulses are 1/4 bilaterally. CRT is <3 seconds. Absent pedal hair.   Pinpoint sensation is intact with 5.07 Grannis-Jeff monofilament bilaterally.  Equinus present bilaterally. No pain with active or passive ROM of the ankle, MTJ, 1st ray, or halluces bilaterally. Dorsally contracted digits " 2-5 bilaterally. No pain with left calf squeeze, increased numbness with right calf squeeze.  Nails thickened and discolored to varying degrees bilaterally. No open lesions are noted.  Skin is dry and flaking on the plantar surfaces of his feet BL - nonpuritic, no erythema. Tyloma noted on the lateral 5th met head on the right.             Assessment/Plan:   Assessment:   - Onychomycosis  - Leg cramps  - Tinea pedis  - DM type 2, uncontrolled  - Diabetic peripheral neuropathy  - Hammertoes     Plan:  - Pt seen and evaluated. Diagnosis and treatment options discussed.   - Ordered Venous Competency and GARCIA at next visit  - Nails debrided x 10.   - Tyloma debrided x 1.   - RTC 3 months or sooner with concerns.    Narendra Grajeda DPM

## 2017-12-13 ENCOUNTER — OFFICE VISIT (OUTPATIENT)
Dept: EDUCATION SERVICES | Facility: CLINIC | Age: 67
End: 2017-12-13
Payer: MEDICARE

## 2017-12-13 DIAGNOSIS — E11.9 DIABETES MELLITUS WITHOUT COMPLICATION (H): Primary | ICD-10-CM

## 2017-12-13 NOTE — PROGRESS NOTES
"  Diabetes Self Management Training: Individual Review Visit    Earle Rene presents today for education related to Type 2 diabetes.    He is accompanied by self    Patient Problem List and Family Medical History reviewed for relevant medical history, current medical status, and diabetes risk factors.    Current Diabetes Management per Patient:  Taking diabetes medications?   yes:     Diabetes Medication(s)     Biguanides Sig    metFORMIN (GLUCOPHAGE) 1000 MG tablet 1 tab twice daily with meals    Insulin Sig    insulin glargine (LANTUS SOLOSTAR) 100 UNIT/ML injection Inject 65 units SQ at bedtime.    NOVOLOG FLEXPEN 100 UNIT/ML soln Inject 8 -14 units SQ with meals with use of correction insulin. Pt uses approx 60 units in 24 hrs.          Past Diabetes Education: Yes    Patient glucose self monitoring as follows: not reviewed today.        Vitals:  There were no vitals taken for this visit.  Estimated body mass index is 29.8 kg/(m^2) as calculated from the following:    Height as of 11/27/17: 1.727 m (5' 8\").    Weight as of 11/27/17: 88.9 kg (196 lb).   Last 3 BP:   BP Readings from Last 3 Encounters:   11/01/17 135/80   08/14/17 154/84   08/09/17 143/79     History   Smoking Status     Never Smoker   Smokeless Tobacco     Never Used       Labs:  Lab Results   Component Value Date    A1C 10.2 06/06/2017     Lab Results   Component Value Date     06/24/2017     Lab Results   Component Value Date    LDL  01/14/2016     Cannot estimate LDL when triglyceride exceeds 400 mg/dL    LDL 84 01/14/2016     HDL Cholesterol   Date Value Ref Range Status   01/14/2016 30 (L) >39 mg/dL Final   ]  GFR Estimate   Date Value Ref Range Status   11/01/2017 60 (L) >60 mL/min/1.7m2 Final     Comment:     Non  GFR Calc     GFR Estimate If Black   Date Value Ref Range Status   11/01/2017 73 >60 mL/min/1.7m2 Final     Comment:      GFR Calc     Lab Results   Component Value Date    CR 1.20 " 11/01/2017     No results found for: MICROALBUMIN    Nutrition Review:  Earle is here today per Pamela STAFFORD to review carb counting for transition to use of an insulin/carb ratio. I have read his PMH. He was dx with his type 2 diabetes at age 29. He is , owns 2 businesses, lighting and rugs. He lives with his wife, mother in law and daughter and grand daughter. He uses set doses of Novolog, 10-12 with meals..     Diet Recall:   Breakfast:oatmeal/2% milk with cocoa powder, sometimes banana or 2 eggs/ 2 large slices bread or fried potatoes, tea or coffee  AM snack:nothing  Lunch:7-8 crackers with cheese or turkey or salami, sometimes salad or leftovers from dinner  PM snack:Sunday only: peanuts/popcorn, fruit, chips  Dinner:noodles or spaghetti, chicken/fish, rarely beef or eggs, salad, water or tea  Evening snack:sometimes cookie or cake on special occassions only    Eats out in restaurants: about rarely  Beverages: water, coffee or tea with artificial sweetener  ETOH: 1-2 glasses wine on Friday and Saturday night with dinner     Physical Activity:    none      Gave Earle written and verbal information on general healthy eating, low sat/trans fat & carbohydrate counting. Reviewed how to access nutrition information/carbohydrates when eating out in restaurants using phone apps and other web sites. Worked with Earle to practice calculating Novolog doses based on 1 unit per 10 grams carbohydrate based on typical meals and snacks consumed in addition to correction Novolog before meals only of 1 unit per 50 over 150mg/dl. Instructed Earle to discontinue taking set doses of Novolog if he wants to use an insulin/carb ratio instead. Gave Earle daily carbohydrate, insulin, blood sugar, exercise  records to fill out for 3 days and return for team review.                    Education provided today on:  AADE Self-Care Behaviors:  Healthy Eating: carbohydrate counting, heart healthy diet and label reading    Pt  verbalized understanding of concepts discussed and recommendations provided today.         ASSESSMENT: Earle was able to verbalize carbohydrate counting basics correctly today and calculate appropriate doses of Novolog based on 1 unit per 10 grams carbohydrate in addition to correction Novolog based on 1 unit per 50 over 150mg/dl/bg. Food records will help determine if these insulin doses are adequate. Will f/u past food record receipt and in January 2018.        PLAN:  See Patient Instructions for co-developed, patient-stated behavior change goals.  AVS printed and provided to patient today.    FOLLOW-UP:  Follow-up appointment scheduled one month.  Chart routed to referring provider.    Time Spent: 60 minutes  Encounter Type: Individual    Any diabetes medication dose changes were made via the CDE Protocol and Collaborative Practice Agreement with the patient's referring provider. A copy of this encounter was shared with the provider.

## 2017-12-13 NOTE — PATIENT INSTRUCTIONS
1. We reviewed carbohydrate counting today  2. Take 1 unit Novolog per 10 grams carbohydrate with all meals and snacks  3. Before meals only, take extra Novolog for high blood sugars over 150. Take 1 unit Novolog per 50 over 150.  4. Fill out food records for 3 days and email back to me for review  5. Follow up January 17th at 9:30 with me  Anjali Wade RD, LD, CDE  Diabetes Care  [address]  [address] [place] [address]  [address]  Phone: [phone]  Appointment line: [phone]  Email: [email or web address]

## 2017-12-13 NOTE — MR AVS SNAPSHOT
After Visit Summary   12/13/2017    Earle Rene    MRN: [id number]           Patient Information     Date Of Birth          1950        Visit Information        Provider Department      12/13/2017 9:30 AM Anjali Wade RD [facility] Diabetes        Care Instructions    1. We reviewed carbohydrate counting today  2. Take 1 unit Novolog per 10 grams carbohydrate with all meals and snacks  3. Before meals only, take extra Novolog for high blood sugars over 150. Take 1 unit Novolog per 50 over 150.  4. Fill out food records for 3 days and email back to me for review  5. Follow up January 17th at 9:30 with me  Anjali Wade RD, LD, CDE  Diabetes Care  [address]  [address]  [place]  [address]  Phone: [phone]  Appointment line: [phone]  Email: [email or web address]              Follow-ups after your visit        Your next 10 appointments already scheduled     Dec 27, 2017  8:35 AM CST   (Arrive by 8:20 AM)   PRE-OP with Marcio Hare MD   [facility] ([facility] [place])    [address] [place] [address] [id number] [phone]            Jan 03, 2018  8:40 AM CST   (Arrive by 8:25 AM)   RETURN FOOT/ANKLE with Narendra Grajeda DPM   [facility] ([facility] [place])    [address] [place] [address] [id number] [phone]            Jan 09, 2018  9:30 AM CST   (Arrive by 9:15 AM)   RETURN DIABETES with Pamela Laura PA-C   [facility] Endocrinology ([facility] [place])    [address] [place] [address]  3rd [facility] [id number] [phone]              Who to contact     Please call your clinic at [phone] to:    Ask questions about your health    Make or cancel appointments    Discuss your medicines    Learn about your test results    Speak to your doctor   If you have compliments or concerns  about an experience at your clinic, or if you wish to file a complaint, please contact [facility] Patient Relations at [phone] or email us at [email or web address]         Additional Information About Your Visit        B2B-Center Information     B[employer]B-Center is an electronic gateway that provides easy, online access to your medical records. With B2B-Center, you can request a clinic appointment, read your test results, renew a prescription or communicate with your care team.     To sign up for B2B-Center visit the website at [email or web address]   You will be asked to enter the access code listed below, as well as some personal information. Please follow the directions to create your username and password.     Your access code is: [email or web address]  Expires: 2018  6:30 AM     Your access code will  in 90 days. If you need help or a new code, please contact your [facility] or call [phone] for assistance.        Care EveryWhere ID     This is your Care EveryWhere ID. This could be used by other organizations to access your [place] medical records  [id number]         Blood Pressure from Last 3 Encounters:   17 135/80   17 154/84   17 143/79    Weight from Last 3 Encounters:   17 88.9 kg (196 lb)   17 89.7 kg (197 lb 11.2 oz)   17 88.8 kg (195 lb 11.2 oz)              Today, you had the following     No orders found for display       Primary Care Provider Office Phone # Fax #    Marcio Hare [phone] 909 [address] [address]        Equal Access to Services     SHARON MAYS : Hadii priscilla Aggarwal, wapiloda luqadaha, qaybta kaalmada margarito, ethan bobo. So [facility] [phone].    ATENCIÓN: Si habla español, tiene a márquez disposición servicios gratuitos de asistencia lingüística. Llame al [phone].    We comply with  applicable federal civil rights laws and [place] laws. We do not discriminate on the basis of race, color, national origin, age, disability, sex, sexual orientation, or gender identity.            Thank you!     Thank you for choosing [facility]  for your care. Our goal is always to provide you with excellent care. Hearing back from our patients is one way we can continue to improve our services. Please take a few minutes to complete the written survey that you may receive in the mail after your visit with us. Thank you!             Your Updated Medication List - Protect others around you: Learn how to safely use, store and throw away your medicines at www.disposemymeds.org.          This list is accurate as of: 12/13/17 10:43 AM.  Always use your most recent med list.                   Brand Name Dispense Instructions for use Diagnosis    alprostadil 20 MCG kit    EDEX    6 each    by Intracavitary route. Inject 3/4 ml (15 ug) as instructed.  Can increase to full dose of 1 ml (20 ug) if necessary.   Can dispense 6 kits.    Erectile dysfunction       aspirin 81 MG tablet      Take 1 tablet by mouth daily.        atorvastatin 20 MG tablet    LIPITOR    90 tablet    Take 1 tablet (20 mg) by mouth daily    Hyperlipidemia LDL goal <100       blood glucose monitoring lancets     300 each    USE TO TEST BLOOD SUGAR THREE TIMES DAILY    Diabetes mellitus type 2, insulin dependent (H)       Blood Pressure Monitor Kit     1 kit    Check blood pressure as directed.    Unspecified essential hypertension       clobetasol 0.05 % ointment    TEMOVATE    30 g    Apply topically to legs twice daily for 2 weeks.  Then discontinue    Rash and other nonspecific skin eruption       clotrimazole 1 % cream    LOTRIMIN    30 g    Apply topically 2 times daily    Tinea pedis of both feet, Dermatophytosis of nail       fenofibrate 54 MG tablet     90 tablet    Take 1 tablet (54 mg) by mouth daily    Hyperlipidemia LDL goal < 100     "   finasteride 5 MG tablet    PROSCAR    90 tablet    Take 1 tablet (5 mg) by mouth daily    Benign prostatic hyperplasia with urinary obstruction       fish oil-omega-3 fatty acids 1000 MG capsule     60 capsule    Take 1 capsule (1 g) by mouth 2 times daily    Lymphocytopenia, High cholesterol, Screening for prostate cancer       gabapentin 100 MG capsule    NEURONTIN    180 capsule    Take 1 capsule (100 mg) by mouth 3 times daily    Polyneuropathy in diabetes(357.2)       insulin pen needle 31G X 5 MM    B-D U/F    400 each    Use 4 times per day.  Please dispense as BD Pen Needle Mini U/F 31G x 5 MM    Diabetes mellitus type 2, insulin dependent (H)       insulin syringe 31G X 5/16\" 0.5 ML Misc     270 each    Use three syringes daily    Type 2 diabetes mellitus (H)       LANTUS SOLOSTAR 100 UNIT/ML injection   Generic drug:  insulin glargine     30 mL    Inject 65 units SQ at bedtime.        loratadine 10 MG tablet    CLARITIN    90 tablet    Take 1 tablet (10 mg) by mouth daily    Seasonal allergies       losartan 25 MG tablet    COZAAR    90 tablet    Take 1 tablet (25 mg) by mouth daily    HTN (hypertension)       metFORMIN 1000 MG tablet    GLUCOPHAGE    180 tablet    1 tab twice daily with meals    Type 2 diabetes mellitus with diabetic neuropathy (H)       mupirocin 2 % cream    BACTROBAN    15 g    Apply  topically. In very small amounts only as needed    Rash and other nonspecific skin eruption       NovoLOG FLEXPEN 100 UNIT/ML injection   Generic drug:  insulin aspart     60 mL    Inject 8 -14 units SQ with meals with use of correction insulin. Pt uses approx 60 units in 24 hrs.    Type 2 diabetes mellitus with diabetic nephropathy (H)       ONETOUCH ULTRA test strip   Generic drug:  blood glucose monitoring     200 strip    Test twice daily or as directed    DM (diabetes mellitus) (H)       tacrolimus 0.03 % ointment    PROTOPIC    60 g    Apply to affected area(s) twice daily    Rash and other " nonspecific skin eruption       tamsulosin 0.4 MG capsule    FLOMAX    90 capsule    TAKE 1 CAPSULE BY MOUTH DAILY    Benign non-nodular prostatic hyperplasia with lower urinary tract symptoms

## 2018-01-15 DIAGNOSIS — N40.1 BENIGN NON-NODULAR PROSTATIC HYPERPLASIA WITH LOWER URINARY TRACT SYMPTOMS: ICD-10-CM

## 2018-01-15 RX ORDER — TAMSULOSIN HYDROCHLORIDE 0.4 MG/1
CAPSULE ORAL
Qty: 90 CAPSULE | Refills: 0 | Status: SHIPPED | OUTPATIENT
Start: 2018-01-15 | End: 2018-05-04

## 2018-01-16 DIAGNOSIS — I10 HTN (HYPERTENSION): ICD-10-CM

## 2018-01-17 RX ORDER — LOSARTAN POTASSIUM 25 MG/1
25 TABLET ORAL DAILY
Qty: 90 TABLET | Refills: 3 | Status: SHIPPED | OUTPATIENT
Start: 2018-01-17 | End: 2019-03-07

## 2018-02-02 ENCOUNTER — TELEPHONE (OUTPATIENT)
Dept: ENDOCRINOLOGY | Facility: CLINIC | Age: 68
End: 2018-02-02

## 2018-02-02 NOTE — TELEPHONE ENCOUNTER
"[employer]  \"Jacey\"   Since his eligibility end date was 12/31/2017 - 90 day supply sent 10/26/2017 - there is a 5% spend out   Need a renewed application which they will fax us today.     Spoke w/ Pt he has Lantus through the end of the month - but don't have novolog. He stated he will come in on Monday to complete application.   Also need a spouse signature on application as he includes Spouse on W2       "

## 2018-02-05 ENCOUNTER — TELEPHONE (OUTPATIENT)
Dept: ENDOCRINOLOGY | Facility: CLINIC | Age: 68
End: 2018-02-05

## 2018-02-05 ENCOUNTER — ALLIED HEALTH/NURSE VISIT (OUTPATIENT)
Dept: ENDOCRINOLOGY | Facility: CLINIC | Age: 68
End: 2018-02-05

## 2018-02-05 DIAGNOSIS — Z79.4 DIABETES MELLITUS TYPE 2, INSULIN DEPENDENT (H): ICD-10-CM

## 2018-02-05 DIAGNOSIS — E11.9 DIABETES MELLITUS TYPE 2, INSULIN DEPENDENT (H): ICD-10-CM

## 2018-02-05 DIAGNOSIS — E11.9 DIABETES MELLITUS TYPE 2, INSULIN DEPENDENT (H): Primary | ICD-10-CM

## 2018-02-05 DIAGNOSIS — E11.21 TYPE 2 DIABETES MELLITUS WITH DIABETIC NEPHROPATHY (H): ICD-10-CM

## 2018-02-05 DIAGNOSIS — Z71.9 VISIT FOR COUNSELING: Primary | ICD-10-CM

## 2018-02-05 DIAGNOSIS — Z79.4 DIABETES MELLITUS TYPE 2, INSULIN DEPENDENT (H): Primary | ICD-10-CM

## 2018-02-05 RX ORDER — INSULIN ASPART 100 [IU]/ML
INJECTION, SOLUTION INTRAVENOUS; SUBCUTANEOUS
Qty: 60 ML | Refills: 3 | Status: SHIPPED | OUTPATIENT
Start: 2018-02-05 | End: 2018-02-05

## 2018-02-05 NOTE — MR AVS SNAPSHOT
"              After Visit Summary   2/5/2018    Earle Rene    MRN: [id number]           Patient Information     Date Of Birth          1950        Visit Information        Provider Department      2/5/2018 3:43 PM Mayda Oconnor, [facility]        Today's Diagnoses     Visit for counseling    -  1       Follow-ups after your visit        Future tests that were ordered for you today     Open Future Orders        Priority Expected Expires Ordered    Albumin Random Urine Quantitative with Creat Ratio Routine  2/5/2019 2/5/2018    Lipid Profile Routine  2/5/2019 2/5/2018    Hemoglobin A1c Routine  2/5/2019 2/5/2018            Who to contact     If you have questions or need follow up information about today's clinic visit or your schedule please contact [facility] directly at [phone].  Normal or non-critical lab and imaging results will be communicated to you by Wis.dmhart, letter or phone within 4 business days after the clinic has received the results. If you do not hear from us within 7 days, please contact the clinic through Wis.dmhart or phone. If you have a critical or abnormal lab result, we will notify you by phone as soon as possible.  Submit refill requests through Yashi or call your pharmacy and they will forward the refill request to us. Please allow 3 business days for your refill to be completed.          Additional Information About Your Visit        MyChart Information     Yashi lets you send messages to your doctor, view your test results, renew your prescriptions, schedule appointments and more. To sign up, go to [email or web address] . Click on \"Log in\" on the left side of the screen, which will take you to the Welcome page. Then click on \"Sign up Now\" on the right side of the page.     You will be asked to enter the access code listed below, as well as some personal information. Please follow the directions to " create your username and password.     Your access code is: [email or web address]  Expires: 2018  6:30 AM     Your access code will  in 90 days. If you need help or a new code, please call your [facility] or [phone].        Care EveryWhere ID     This is your Care EveryWhere ID. This could be used by other organizations to access your [place] medical records  [id number]         Blood Pressure from Last 3 Encounters:   17 135/80   17 154/84   17 143/79    Weight from Last 3 Encounters:   17 88.9 kg (196 lb)   17 89.7 kg (197 lb 11.2 oz)   17 88.8 kg (195 lb 11.2 oz)              Today, you had the following     No orders found for display         Today's Medication Changes          These changes are accurate as of 18  3:52 PM.  If you have any questions, ask your nurse or doctor.               Start taking these medicines.        Dose/Directions    blood glucose lancets standard   Commonly known as:  no brand specified   Used for:  Type 2 diabetes mellitus with diabetic nephropathy (H)   Started by:  Pamela Laura PA-C        Lancets that go with device, Test 3 times daily   Quantity:  300 each   Refills:  3       blood glucose monitoring meter device kit   Commonly known as:  no brand specified   Used for:  Type 2 diabetes mellitus with diabetic nephropathy (H)   Started by:  Pamela Laura PA-C        Any meter covered by insurance, not store brand, use as directed.   Quantity:  1 kit   Refills:  0         These medicines have changed or have updated prescriptions.        Dose/Directions    blood glucose monitoring test strip   Commonly known as:  no brand specified   This may have changed:  additional instructions   Used for:  Type 2 diabetes mellitus with diabetic nephropathy (H)   Changed by:  Pamela Laura PA-C        Strips that go with meter, covered by insurance. Test 3 times daily   Quantity:  300 strip   Refills:  3          Stop taking these medicines if you haven't already. Please contact your care team if you have questions.     blood glucose monitoring lancets   Stopped by:  Pamela Laura PA-C           LANTUS SOLOSTAR 100 UNIT/ML injection   Generic drug:  insulin glargine   Stopped by:  Pamela Laura PA-C                Where to get your medicines      These medications were sent to [id number] - [place], [place] - [address] AT [facility] [address]  [address], [place] [address]    Hours:  24-hours Phone:  [phone]     blood glucose lancets standard    blood glucose monitoring meter device kit    blood glucose monitoring test strip    NovoLOG FLEXPEN 100 UNIT/ML injection                Primary Care Provider Office Phone # Fax #    Marcio Hare [phone] [address] [address]        Equal Access to Services     SHARON [employer] GroupESTELA AH: Hadii aad ku hadasho Soomaali, waaxda luqadaha, qaybta kaalmada adeegyada, waxay idiin hayaan adeeg khruben nascimento . So [facility] [phone].    ATENCIÓN: Si habla español, tiene a márquez disposición servicios gratuitos de asistencia lingüística. Ll[facility] [phone].    We comply with applicable federal civil rights laws and [place] laws. We do not discriminate on the basis of race, color, national origin, age, disability, sex, sexual orientation, or gender identity.            Thank you!     Thank you for choosing [facility]  for your care. Our goal is always to provide you with excellent care. Hearing back from our patients is one way we can continue to improve our services. Please take a few minutes to complete the written survey that you may receive in the mail after your visit with us. Thank you!             Your Updated Medication List - Protect others around you: Learn how to safely use, store and throw away your medicines at www.disposemymeds.org.           This list is accurate as of 2/5/18  3:52 PM.  Always use your most recent med list.                   Brand Name Dispense Instructions for use Diagnosis    alprostadil 20 MCG kit    EDEX    6 each    by Intracavitary route. Inject 3/4 ml (15 ug) as instructed.  Can increase to full dose of 1 ml (20 ug) if necessary.   Can dispense 6 kits.    Erectile dysfunction       aspirin 81 MG tablet      Take 1 tablet by mouth daily.        atorvastatin 20 MG tablet    LIPITOR    90 tablet    Take 1 tablet (20 mg) by mouth daily    Hyperlipidemia LDL goal <100       blood glucose lancets standard    no brand specified    300 each    Lancets that go with device, Test 3 times daily    Type 2 diabetes mellitus with diabetic nephropathy (H)       blood glucose monitoring meter device kit    no brand specified    1 kit    Any meter covered by insurance, not store brand, use as directed.    Type 2 diabetes mellitus with diabetic nephropathy (H)       blood glucose monitoring test strip    no brand specified    300 strip    Strips that go with meter, covered by insurance. Test 3 times daily    Type 2 diabetes mellitus with diabetic nephropathy (H)       Blood Pressure Monitor Kit     1 kit    Check blood pressure as directed.    Unspecified essential hypertension       clobetasol 0.05 % ointment    TEMOVATE    30 g    Apply topically to legs twice daily for 2 weeks.  Then discontinue    Rash and other nonspecific skin eruption       clotrimazole 1 % cream    LOTRIMIN    30 g    Apply topically 2 times daily    Tinea pedis of both feet, Dermatophytosis of nail       fenofibrate 54 MG tablet     90 tablet    Take 1 tablet (54 mg) by mouth daily    Hyperlipidemia LDL goal < 100       finasteride 5 MG tablet    PROSCAR    90 tablet    Take 1 tablet (5 mg) by mouth daily    Benign prostatic hyperplasia with urinary obstruction       fish oil-omega-3 fatty acids 1000 MG capsule     60 capsule    Take 1 capsule (1 g) by mouth 2 times  "daily    Lymphocytopenia, High cholesterol, Screening for prostate cancer       gabapentin 100 MG capsule    NEURONTIN    180 capsule    Take 1 capsule (100 mg) by mouth 3 times daily    Polyneuropathy in diabetes(357.2)       insulin pen needle 31G X 5 MM    B-D U/F    400 each    Use 4 times per day.  Please dispense as BD Pen Needle Mini U/F 31G x 5 MM    Diabetes mellitus type 2, insulin dependent (H)       insulin syringe 31G X 5/16\" 0.5 ML Misc     270 each    Use three syringes daily    Type 2 diabetes mellitus (H)       loratadine 10 MG tablet    CLARITIN    90 tablet    Take 1 tablet (10 mg) by mouth daily    Seasonal allergies       losartan 25 MG tablet    COZAAR    90 tablet    Take 1 tablet (25 mg) by mouth daily    HTN (hypertension)       metFORMIN 1000 MG tablet    GLUCOPHAGE    180 tablet    1 tab twice daily with meals    Type 2 diabetes mellitus with diabetic neuropathy (H)       mupirocin 2 % cream    BACTROBAN    15 g    Apply  topically. In very small amounts only as needed    Rash and other nonspecific skin eruption       NovoLOG FLEXPEN 100 UNIT/ML injection   Generic drug:  insulin aspart     60 mL    Inject 8 -14 units SQ with meals with use of correction insulin. Pt uses approx 60 units in 24 hrs.    Type 2 diabetes mellitus with diabetic nephropathy (H)       tacrolimus 0.03 % ointment    PROTOPIC    60 g    Apply to affected area(s) twice daily    Rash and other nonspecific skin eruption       tamsulosin 0.4 MG capsule    FLOMAX    90 capsule    TAKE 1 CAPSULE BY MOUTH DAILY    Benign non-nodular prostatic hyperplasia with lower urinary tract symptoms         "

## 2018-02-05 NOTE — PROGRESS NOTES
Social Work Brief Intervention  [facility] [place]    Data/Intervention:    Patient Name:  Earle Rene  /Age:  1950 (67 year old)    Visit Type: in person  Referral Source: Maxim Marrero RN   Reason for Referral:  Can't afford insulin    Collaborated With:    -Earle Marrero RN  -Pt's Part D RX plan [employer]    Patient Concerns/Issues:   Pt came to clinic today because he's not been able to afford his insulin. He is out of Novolog. He was approved for the Pt assistance programs for each medication last year, he reported and that ended 17.   He has a new RX plan with part D. He indicated that he tried to fill Lantus and it's not covered at all. He tried to get Novolog and it was about $200. He doesn't receive SS yet. 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 Discussed applying for the Extra help program today    Intervention/Education/Resources Provided:  Contacted his RX plan and discovered that he is on the Extra Help program with Part D- in fact he was just approved so that is why his Novolog was high as he checked before the approval came thru. He can now get Novolog for an $8. Copay.  Lantus is not covered at all but Basaglar is covered with a $8 copay. Svetlana will send rx to his pharmacy to pick that up along with test strips, needles and lancets.     Assessment/Plan:  Pt was thrilled to find out that his co-pays are now very low and he can afford them.   Encouraged him to arrange a f/u appt with Pamela Laura.    Provided patient/family with contact information and availability to follow up as needed.    Mayda Oconnor, JENNIFER, [facility] Hospital[facility]    ealth  [facility]  [phone]

## 2018-02-06 RX ORDER — INSULIN GLARGINE 100 [IU]/ML
INJECTION, SOLUTION SUBCUTANEOUS
Qty: 60 ML | Refills: 3 | Status: SHIPPED | OUTPATIENT
Start: 2018-02-06 | End: 2018-03-21

## 2018-02-06 RX ORDER — INSULIN ASPART 100 [IU]/ML
INJECTION, SOLUTION INTRAVENOUS; SUBCUTANEOUS
Qty: 60 ML | Refills: 3 | Status: SHIPPED | OUTPATIENT
Start: 2018-02-06 | End: 2018-03-21

## 2018-03-06 ENCOUNTER — OFFICE VISIT (OUTPATIENT)
Dept: INTERNAL MEDICINE | Facility: CLINIC | Age: 68
End: 2018-03-06
Payer: MEDICARE

## 2018-03-06 VITALS
DIASTOLIC BLOOD PRESSURE: 72 MMHG | OXYGEN SATURATION: 99 % | BODY MASS INDEX: 29.63 KG/M2 | WEIGHT: 194.9 LBS | HEART RATE: 95 BPM | SYSTOLIC BLOOD PRESSURE: 116 MMHG

## 2018-03-06 DIAGNOSIS — E11.9 DIABETES MELLITUS TYPE 2, INSULIN DEPENDENT (H): ICD-10-CM

## 2018-03-06 DIAGNOSIS — I10 BENIGN ESSENTIAL HYPERTENSION: Primary | ICD-10-CM

## 2018-03-06 DIAGNOSIS — I10 BENIGN ESSENTIAL HYPERTENSION: ICD-10-CM

## 2018-03-06 DIAGNOSIS — Z79.4 DIABETES MELLITUS TYPE 2, INSULIN DEPENDENT (H): ICD-10-CM

## 2018-03-06 LAB
ALBUMIN SERPL-MCNC: 3.8 G/DL (ref 3.4–5)
ALP SERPL-CCNC: 77 U/L (ref 40–150)
ALT SERPL W P-5'-P-CCNC: 35 U/L (ref 0–70)
ANION GAP SERPL CALCULATED.3IONS-SCNC: 9 MMOL/L (ref 3–14)
AST SERPL W P-5'-P-CCNC: 25 U/L (ref 0–45)
BASOPHILS # BLD AUTO: 0 10E9/L (ref 0–0.2)
BASOPHILS NFR BLD AUTO: 0.8 %
BILIRUB SERPL-MCNC: 0.8 MG/DL (ref 0.2–1.3)
BUN SERPL-MCNC: 21 MG/DL (ref 7–30)
CALCIUM SERPL-MCNC: 8.8 MG/DL (ref 8.5–10.1)
CHLORIDE SERPL-SCNC: 105 MMOL/L (ref 94–109)
CHOLEST SERPL-MCNC: 101 MG/DL
CO2 SERPL-SCNC: 23 MMOL/L (ref 20–32)
CREAT SERPL-MCNC: 1.33 MG/DL (ref 0.66–1.25)
CREAT UR-MCNC: 162 MG/DL
DIFFERENTIAL METHOD BLD: ABNORMAL
EOSINOPHIL # BLD AUTO: 0.2 10E9/L (ref 0–0.7)
EOSINOPHIL NFR BLD AUTO: 3.3 %
ERYTHROCYTE [DISTWIDTH] IN BLOOD BY AUTOMATED COUNT: 12.8 % (ref 10–15)
GFR SERPL CREATININE-BSD FRML MDRD: 53 ML/MIN/1.7M2
GLUCOSE SERPL-MCNC: 197 MG/DL (ref 70–99)
HBA1C MFR BLD: 9.1 % (ref 4.3–6)
HCT VFR BLD AUTO: 44.6 % (ref 40–53)
HDLC SERPL-MCNC: 32 MG/DL
HGB BLD-MCNC: 15.5 G/DL (ref 13.3–17.7)
IMM GRANULOCYTES # BLD: 0 10E9/L (ref 0–0.4)
IMM GRANULOCYTES NFR BLD: 0.2 %
INTERPRETATION ECG - MUSE: NORMAL
LDLC SERPL CALC-MCNC: 36 MG/DL
LYMPHOCYTES # BLD AUTO: 2.4 10E9/L (ref 0.8–5.3)
LYMPHOCYTES NFR BLD AUTO: 50.6 %
MCH RBC QN AUTO: 32.6 PG (ref 26.5–33)
MCHC RBC AUTO-ENTMCNC: 34.8 G/DL (ref 31.5–36.5)
MCV RBC AUTO: 94 FL (ref 78–100)
MICROALBUMIN UR-MCNC: 1860 MG/L
MICROALBUMIN/CREAT UR: 1148.15 MG/G CR (ref 0–17)
MONOCYTES # BLD AUTO: 0.6 10E9/L (ref 0–1.3)
MONOCYTES NFR BLD AUTO: 12.2 %
NEUTROPHILS # BLD AUTO: 1.6 10E9/L (ref 1.6–8.3)
NEUTROPHILS NFR BLD AUTO: 32.9 %
NONHDLC SERPL-MCNC: 69 MG/DL
NRBC # BLD AUTO: 0 10*3/UL
NRBC BLD AUTO-RTO: 0 /100
PLATELET # BLD AUTO: 148 10E9/L (ref 150–450)
POTASSIUM SERPL-SCNC: 4 MMOL/L (ref 3.4–5.3)
PROT SERPL-MCNC: 7.3 G/DL (ref 6.8–8.8)
RBC # BLD AUTO: 4.76 10E12/L (ref 4.4–5.9)
SODIUM SERPL-SCNC: 136 MMOL/L (ref 133–144)
TRIGL SERPL-MCNC: 166 MG/DL
WBC # BLD AUTO: 4.8 10E9/L (ref 4–11)

## 2018-03-06 ASSESSMENT — PAIN SCALES - GENERAL: PAINLEVEL: NO PAIN (0)

## 2018-03-06 NOTE — NURSING NOTE
Chief Complaint   Patient presents with     Pre Op Exam     Patient here for a pre op exam.     Lakeisha Jensen LPN at 10:55 AM on 3/6/2018.

## 2018-03-06 NOTE — MR AVS SNAPSHOT
After Visit Summary   3/6/2018    Earle Rene    MRN: [id number]           Patient Information     Date Of Birth          1950        Visit Information        Provider Department      3/6/2018 11:05 AM Marcio Hare MD [facility]        Today's Diagnoses     Benign essential hypertension    -  1      Care Instructions    [facility]: [phone]     Primary Care Center Medication Refill Request Information:  * Please contact your pharmacy regarding ANY request for medication refills.  ** [facility] Prescription Fax = [phone]  * Please allow 3 business days for routine medication refills.  * Please allow 5 business days for controlled substance medication refills.     Primary Care Center Test Result notification information:  *You will be notified with in 7-10 days of your appointment day regarding the results of your test.  If you are on MyChart you will be notified as soon as the provider has reviewed the results and signed off on them.            Follow-ups after your visit        Your next 10 appointments already scheduled     Mar 06, 2018 12:00 PM CST   LAB with  LAB   [facility] ([facility])    [address] [place] [address] [phone]           Please do not eat 10-12 hours before your appointment if you are coming in fasting for labs on lipids, cholesterol, or glucose (sugar). This does not apply to pregnant women. Water, hot tea and black coffee (with nothing added) are okay. Do not drink other fluids, diet soda or chew gum.            May 24, 2018  2:40 PM CDT   (Arrive by 2:25 PM)   Return Visit with Emmanuel Cantu MD   [facility] ([facility])    [address] [place] [address]  4[address] [facility] [address] [phone]              Future tests that were ordered for you today     Open Future Orders        Priority  Expected Expires Ordered    Comprehensive metabolic panel Routine 3/6/2018 3/20/2018 3/6/2018    CBC with platelets differential Routine 3/6/2018 3/20/2018 3/6/2018            Who to contact     Please call your clinic at [phone] to:    Ask questions about your health    Make or cancel appointments    Discuss your medicines    Learn about your test results    Speak to your doctor            Additional Information About Your Visit        MyChart Information     [employer] is an electronic gateway that provides easy, online access to your medical records. With SongAfter, you can request a clinic appointment, read your test results, renew a prescription or communicate with your care team.     To sign up for SongAfter visit the website at [email or web address]   You will be asked to enter the access code listed below, as well as some personal information. Please follow the directions to create your username and password.     Your access code is: [email or web address]  Expires: 2018  6:30 AM     Your access code will  in 90 days. If you need help or a new code, please contact your [facility] or call [phone] for assistance.        Care EveryWhere ID     This is your Care EveryWhere ID. This could be used by other organizations to access your [facility] medical records  [id number]        Your Vitals Were     Pulse Pulse Oximetry BMI (Body Mass Index)             95 99% 29.63 kg/m2          Blood Pressure from Last 3 Encounters:   18 116/72   17 135/80   17 154/84    Weight from Last 3 Encounters:   18 88.4 kg (194 lb 14.4 oz)   17 88.9 kg (196 lb)   17 89.7 kg (197 lb 11.2 oz)              We Performed the Following     EKG Performed in Clinic w/ Provider Reading Fee        Primary Care Provider Office Phone # Fax #    Marcio Hare [phone] [address] [address]        Equal Access to  Services     [facility]: Hadii aad ku hadkurtmaris Lizzettefranklin, waaxda luqadaha, qaybta kaalmashady pimentel, ethan nascimento . So [facility] [phone].    ATENCIÓN: Si habla viridiana, tiene a márquez disposición servicios gratuitos de asistencia lingüística. Llame al [phone].    We comply with applicable federal civil rights laws and [place] laws. We do not discriminate on the basis of race, color, national origin, age, disability, sex, sexual orientation, or gender identity.            Thank you!     Thank you for choosing [facility]  for your care. Our goal is always to provide you with excellent care. Hearing back from our patients is one way we can continue to improve our services. Please take a few minutes to complete the written survey that you may receive in the mail after your visit with us. Thank you!             Your Updated Medication List - Protect others around you: Learn how to safely use, store and throw away your medicines at www.disposemymeds.org.          This list is accurate as of 3/6/18 11:48 AM.  Always use your most recent med list.                   Brand Name Dispense Instructions for use Diagnosis    alprostadil 20 MCG kit    EDEX    6 each    by Intracavitary route. Inject 3/4 ml (15 ug) as instructed.  Can increase to full dose of 1 ml (20 ug) if necessary.   Can dispense 6 kits.    Erectile dysfunction       aspirin 81 MG tablet      Take 1 tablet by mouth daily.        atorvastatin 20 MG tablet    LIPITOR    90 tablet    Take 1 tablet (20 mg) by mouth daily    Hyperlipidemia LDL goal <100       BASAGLAR 100 UNIT/ML injection     60 mL    Inject 65 units SQ at bedtime    Type 2 diabetes mellitus with diabetic nephropathy (H)       blood glucose lancets standard    no brand specified    300 each    Lancets that go with device, Test 3 times daily    Type 2 diabetes mellitus with diabetic nephropathy (H)       blood glucose monitoring meter device kit     "no brand specified    1 kit    Any meter covered by insurance, not store brand, use as directed.    Type 2 diabetes mellitus with diabetic nephropathy (H)       blood glucose monitoring test strip    no brand specified    300 strip    Strips that go with meter, covered by insurance. Test 3 times daily    Type 2 diabetes mellitus with diabetic nephropathy (H)       Blood Pressure Monitor Kit     1 kit    Check blood pressure as directed.    Unspecified essential hypertension       clobetasol 0.05 % ointment    TEMOVATE    30 g    Apply topically to legs twice daily for 2 weeks.  Then discontinue    Rash and other nonspecific skin eruption       clotrimazole 1 % cream    LOTRIMIN    30 g    Apply topically 2 times daily    Tinea pedis of both feet, Dermatophytosis of nail       fenofibrate 54 MG tablet     90 tablet    Take 1 tablet (54 mg) by mouth daily    Hyperlipidemia LDL goal < 100       finasteride 5 MG tablet    PROSCAR    90 tablet    Take 1 tablet (5 mg) by mouth daily    Benign prostatic hyperplasia with urinary obstruction       fish oil-omega-3 fatty acids 1000 MG capsule     60 capsule    Take 1 capsule (1 g) by mouth 2 times daily    Lymphocytopenia, High cholesterol, Screening for prostate cancer       gabapentin 100 MG capsule    NEURONTIN    180 capsule    Take 1 capsule (100 mg) by mouth 3 times daily    Polyneuropathy in diabetes(357.2)       insulin pen needle 31G X 5 MM    B-D U/F    400 each    Use 4 times per day.  Please dispense as BD Pen Needle Mini U/F 31G x 5 MM    Diabetes mellitus type 2, insulin dependent (H)       insulin syringe 31G X 5/16\" 0.5 ML Misc     270 each    Use three syringes daily    Type 2 diabetes mellitus (H)       loratadine 10 MG tablet    CLARITIN    90 tablet    Take 1 tablet (10 mg) by mouth daily    Seasonal allergies       losartan 25 MG tablet    COZAAR    90 tablet    Take 1 tablet (25 mg) by mouth daily    HTN (hypertension)       metFORMIN 1000 MG tablet    " GLUCOPHAGE    180 tablet    1 tab twice daily with meals    Type 2 diabetes mellitus with diabetic neuropathy (H)       mupirocin 2 % cream    BACTROBAN    15 g    Apply  topically. In very small amounts only as needed    Rash and other nonspecific skin eruption       NovoLOG FLEXPEN 100 UNIT/ML injection   Generic drug:  insulin aspart     60 mL    Inject 10 -12 units SQ with meals with use of correction insulin. Pt uses approx 60 units in 24 hrs.    Type 2 diabetes mellitus with diabetic nephropathy (H)       tacrolimus 0.03 % ointment    PROTOPIC    60 g    Apply to affected area(s) twice daily    Rash and other nonspecific skin eruption       tamsulosin 0.4 MG capsule    FLOMAX    90 capsule    TAKE 1 CAPSULE BY MOUTH DAILY    Benign non-nodular prostatic hyperplasia with lower urinary tract symptoms

## 2018-03-06 NOTE — PATIENT INSTRUCTIONS
[facility]: [phone]     [facility] Medication Refill Request Information:  * Please contact your pharmacy regarding ANY request for medication refills.  ** [facility] Prescription Fax = [phone]  * Please allow 3 business days for routine medication refills.  * Please allow 5 business days for controlled substance medication refills.     [facility] Test Result notification information:  *You will be notified with in 7-10 days of your appointment day regarding the results of your test.  If you are on MyChart you will be notified as soon as the provider has reviewed the results and signed off on them.

## 2018-03-06 NOTE — PROGRESS NOTES
Surgeon (please enter first and last name):  Dr. Kiran  Fax number for Preop Evaluation:    Location of Surgery: [facility]  Date of Surgery:  3/8/18  Procedure:  Gland Stone Removal  History of reaction to anesthesia?  No   Lakeisha Jensen LPN at 10:58 AM on 3/6/2018.

## 2018-03-06 NOTE — PROGRESS NOTES
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 He reports recent gastroinestinal illness that has resolved, with sx that included: Non-bloody diarrhea, nausea. Otherwise occassional cough with white sputum, occasional light-headedness, nocturia, urgency, burning with high blood sugar. He denies: F/Ch, vomiting, diaphoresis, HA, dysarthria, dysphagia, CP, SOB, palpitations.   Chief Complaint   Patient presents with     Pre Op Exam     Patient here for a pre op exam.       Active diagnoses this visit:  Benign essential hypertension    Review Of Systems  Skin: negative  Eyes: negative, positive for negative, cataracts  Ears/Nose/Throat: Previous pain, CA  Respiratory: No shortness of breath, dyspnea on exertion, cough, or hemoptysis and Cough- rare with white sputum  Cardiovascular: negative  Gastrointestinal: Nausea with no vomit,   Genitourinary: Nocturia  Musculoskeletal: negative  Neurologic: negative  Psychiatric: negative  Hematologic/Lymphatic/Immunologic: negative  Endocrine: negative, positive for negative and diabetes and negative for    Medical, surgical, social, and family histories, medications and allergies reviewed and updated.    Objective:  Exam:  Constitutional: healthy, alert and no distress  Head: Injection in right eye with blood around fundus, poor dentition and halitosis  ENT: ENT exam normal, no neck nodes or sinus tenderness. Minor tenderness.   Cardiovascular: negative, PMI normal. No lifts, heaves, or thrills. RRR. No murmurs, clicks gallops or rub  Respiratory: negative, Percussion normal. Good diaphragmatic excursion. Lungs clear  Gastrointestinal: Abdomen soft, non-tender. BS normal. No masses, organomegaly  : Deferred  Musculoskeletal: extremities normal- no gross deformities noted, gait normal and normal muscle tone  Skin: no suspicious  lesions or rashes  Neurologic: Gait normal.  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     Preop Eval:  Patient is not reporting any concerning symptoms for CV disease e.g. Syncope, palpitations. He also does not seem to be acutely ill. Will need ECG for preop. Additionally, he is taking many medications, some of which for his diabetes. He will need to hold his Metformin before his surgery which is scheduled for the day after tomorrow. Lastly, to assess his glycemic control and kidney function we will examine a BMP.   - ECG; SR at 91 No change from 3/8/2011  - Hold Metformin day before the surgery  - BMP  GI Illness:  Reports of recent nausea, diarrhea, decreased appetite, and indigestion that has seemed to resolved recently. Patient is starting to regain appetite. Could be a mild form of gastroenteritis that has self-resolved since his symptoms have resolved, and there isn't any other objective data to worry for dehydration. BMP will help for metabolic status. Anticipating a Non-anion gap acidosis that is resolving. Will f/u and call patient if concerning.     Staff note; I personally interviewed pt. And conducted physical examination. I agree with above assessment and plan    Pt. Denies any anesthesia issues or bleeding problems.    His perioperative blood glucose values should be followed carefully.     MOISES Hare MD    Total time spent 25 minutes.  More than 50% of the time spent with Mr. Rene on counseling / coordinating his care

## 2018-03-21 ENCOUNTER — OFFICE VISIT (OUTPATIENT)
Dept: ENDOCRINOLOGY | Facility: CLINIC | Age: 68
End: 2018-03-21
Payer: MEDICARE

## 2018-03-21 VITALS
BODY MASS INDEX: 29.66 KG/M2 | WEIGHT: 195.7 LBS | SYSTOLIC BLOOD PRESSURE: 118 MMHG | HEIGHT: 68 IN | HEART RATE: 94 BPM | DIASTOLIC BLOOD PRESSURE: 71 MMHG

## 2018-03-21 DIAGNOSIS — E11.29 TYPE 2 DIABETES MELLITUS WITH PROTEINURIA (H): Primary | ICD-10-CM

## 2018-03-21 DIAGNOSIS — R80.9 TYPE 2 DIABETES MELLITUS WITH PROTEINURIA (H): Primary | ICD-10-CM

## 2018-03-21 RX ORDER — INSULIN ASPART 100 [IU]/ML
INJECTION, SOLUTION INTRAVENOUS; SUBCUTANEOUS
Qty: 60 ML | Refills: 3 | COMMUNITY
Start: 2018-03-21 | End: 2018-08-07

## 2018-03-21 RX ORDER — INSULIN GLARGINE 100 [IU]/ML
INJECTION, SOLUTION SUBCUTANEOUS
Qty: 60 ML | Refills: 3 | COMMUNITY
Start: 2018-03-21 | End: 2018-09-07

## 2018-03-21 ASSESSMENT — PAIN SCALES - GENERAL: PAINLEVEL: NO PAIN (0)

## 2018-03-21 NOTE — NURSING NOTE
"Chief Complaint   Patient presents with     RECHECK     DIABETES F/U        Initial /71 (BP Location: Left arm)  Pulse 94  Ht 1.727 m (5' 8\")  Wt 88.8 kg (195 lb 11.2 oz)  BMI 29.76 kg/m2 Estimated body mass index is 29.76 kg/(m^2) as calculated from the following:    Height as of this encounter: 1.727 m (5' 8\").    Weight as of this encounter: 88.8 kg (195 lb 11.2 oz).  Medication Reconciliation: complete    "

## 2018-03-21 NOTE — PATIENT INSTRUCTIONS
1.  Increase Basaglar 68 units at bedtime.  2.  Increase Novolog 12-14 units with meals plus correction insulin.  3.  See me in 2 months.  Pamela Laura

## 2018-03-21 NOTE — PROGRESS NOTES
HPI   Earle Rene is a 68 year old male with type 2 diabetes mellitus here today for a follow up visit.      He was last seen in our clinic in Nov 2017.  Pt's diabetes is complicated by retinopathy, nephropathy and neuropathy.  For his diabetes, he is currently taking Lantus 64 units SQ at hs, Novolog 10-12 units with meals and Metformin 1000 mg BID.  Pt's A1C was 9.1 %on 3/6/2018 and his previous A1C was 9.6 %.   We downloaded his glucose meter today and his blood sugar values have been in the high 100s to high 200s range.  No hypoglycemia.  His fasting blood sugar values have been 248, 278, 282, 229, 265, 166 and 209.  No hypoglycemia.  On ROS today, pt reports fatigue.  Some intermittent blurred vision.  Pt has numbness in both feet from his neuropathy.  He reports chronic cough.  Pt denies n/v, SOB at rest, chest pain, abd pain, diarrhea,dysuria, hematuria or foot ulcers.    ROS   Please see under HPI.     ALLERGIES:  Review of patient's allergies indicates no known allergies.    Current Outpatient Prescriptions   Medication Sig Dispense Refill     amoxicillin-clavulanate (AUGMENTIN) 875-125 MG per tablet TK 1 T PO BID FOR 10 DAYS  0     BASAGLAR 100 UNIT/ML injection Inject 68 units SQ at bedtime 60 mL 3     NOVOLOG FLEXPEN 100 UNIT/ML soln Inject 12-14 units SQ with meals with use of correction insulin. Pt uses approx 60 units in 24 hrs. 60 mL 3     blood glucose (NO BRAND SPECIFIED) lancets standard Lancets that go with device, Test 3 times daily 300 each 3     blood glucose monitoring (NO BRAND SPECIFIED) test strip Strips that go with meter, covered by insurance. Test 3 times daily 300 strip 3     blood glucose monitoring (NO BRAND SPECIFIED) meter device kit Any meter covered by insurance, not store brand, use as directed. 1 kit 0     losartan (COZAAR) 25 MG tablet Take 1 tablet (25 mg) by mouth daily 90 tablet 3     tamsulosin (FLOMAX) 0.4 MG capsule TAKE 1 CAPSULE BY MOUTH DAILY 90 capsule 0      "clotrimazole (LOTRIMIN) 1 % cream Apply topically 2 times daily 30 g 3     atorvastatin (LIPITOR) 20 MG tablet Take 1 tablet (20 mg) by mouth daily 90 tablet 3     finasteride (PROSCAR) 5 MG tablet Take 1 tablet (5 mg) by mouth daily 90 tablet 3     metFORMIN (GLUCOPHAGE) 1000 MG tablet 1 tab twice daily with meals 180 tablet 3     gabapentin (NEURONTIN) 100 MG capsule Take 1 capsule (100 mg) by mouth 3 times daily 180 capsule 5     insulin pen needle (B-D U/F) 31G X 5 MM Use 4 times per day.  Please dispense as BD Pen Needle Mini U/F 31G x 5  each 3     insulin syringe 31G X 5/16\" 0.5 ML MISC Use three syringes daily 270 each 1     Omega-3 Fatty Acids (OMEGA-3 FISH OIL) 1000 MG CAPS Take 1 capsule (1 g) by mouth 2 times daily 60 capsule 11     fenofibrate 54 MG tablet Take 1 tablet (54 mg) by mouth daily 90 tablet 0     loratadine (CLARITIN) 10 MG tablet Take 1 tablet (10 mg) by mouth daily 90 tablet 0     clobetasol (TEMOVATE) 0.05 % ointment Apply topically to legs twice daily for 2 weeks.  Then discontinue 30 g 0     tacrolimus (PROTOPIC) 0.03 % ointment Apply to affected area(s) twice daily 60 g 0     Blood Pressure Monitor KIT Check blood pressure as directed. 1 kit 0     mupirocin (BACTROBAN) 2 % cream Apply  topically. In very small amounts only as needed 15 g 1     alprostadil (EDEX) 20 MCG injection by Intracavitary route. Inject 3/4 ml (15 ug) as instructed.  Can increase to full dose of 1 ml (20 ug) if necessary.   Can dispense 6 kits. 6 each 12     aspirin 81 MG tablet Take 1 tablet by mouth daily.       [DISCONTINUED] BASAGLAR 100 UNIT/ML injection Inject 65 units SQ at bedtime 60 mL 3     [DISCONTINUED] NOVOLOG FLEXPEN 100 UNIT/ML soln Inject 10 -12 units SQ with meals with use of correction insulin. Pt uses approx 60 units in 24 hrs. 60 mL 3     Family Hx   No change.     Personal Hx   Smoke: none.   ETOH: none.    with grown children.   He owns his own business.    PMH   1. Type 2 " "Diabetes Mellitus dx at age 43.   2. Neuropathy.  3. Nephropathy.   4. ED.   5. Dyslipidemia.   6. Nephrolithiasis.   7. Decrease auditory acuity.   8. Sarcoidosis-lung.   9. Goiter.   10. S/P T & A.   11. S/P FX right heel.   12. Vit D def.   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   14. CT chest- ? granulomas.   Past Medical History:   Diagnosis Date     Blepharitis of both eyes      Diabetes (H)      Dry eye syndrome      Nonsenile cataract      Peripheral neuropathy      Sarcoidosis of lung (H)      Past Surgical History:   Procedure Laterality Date     COLONOSCOPY  7/29/2013    Procedure: COLONOSCOPY;;  Surgeon: Montana Pascal MD;  Location:  GI     SURGICAL PATHOLOGY EXAM       Physical Exam   General appearance: Vital signs:   /71 (BP Location: Left arm)  Pulse 94  Ht 1.727 m (5' 8\")  Wt 88.8 kg (195 lb 11.2 oz)  BMI 29.76 kg/m2  Estimated body mass index is 29.76 kg/(m^2) as calculated from the following:    Height as of this encounter: 1.727 m (5' 8\").    Weight as of this encounter: 88.8 kg (195 lb 11.2 oz).  Head:  Normal.   Eyes: PERRLA; fundi not visualized.   Lungs: clear.   Cardiovascular system:  RRR.   Abdomen:  Large and nontender.  Musculoskeletal system: no edema.   Neurological:  normal.   Skin:  necrobiosis lipoidica on both legs.   FEET: no ulcers.    Results   Creatinine   Date Value Ref Range Status   03/06/2018 1.33 (H) 0.66 - 1.25 mg/dL Final     GFR Estimate   Date Value Ref Range Status   03/06/2018 53 (L) >60 mL/min/1.7m2 Final     Comment:     Non  GFR Calc     Hemoglobin A1C   Date Value Ref Range Status   03/06/2018 9.1 (H) 4.3 - 6.0 % Final     Potassium   Date Value Ref Range Status   03/06/2018 4.0 3.4 - 5.3 mmol/L Final     ALT   Date Value Ref Range Status   03/06/2018 35 0 - 70 U/L Final     AST   Date Value Ref Range Status   03/06/2018 25 0 - 45 U/L Final     TSH   Date Value Ref Range Status   11/01/2017 1.12 0.40 - 4.00 mU/L Final     T4 Free "   Date Value Ref Range Status   10/21/2014 1.00 0.76 - 1.46 ng/dL Final     Comment:     Effective 7/30/2014, the reference range for this assay has changed to reflect   new instrumentation/methodology.           Cholesterol   Date Value Ref Range Status   03/06/2018 101 <200 mg/dL Final   01/14/2016 173 <200 mg/dL Final     HDL Cholesterol   Date Value Ref Range Status   03/06/2018 32 (L) >39 mg/dL Final   01/14/2016 30 (L) >39 mg/dL Final     LDL Cholesterol Calculated   Date Value Ref Range Status   03/06/2018 36 <100 mg/dL Final     Comment:     Desirable:       <100 mg/dl   01/14/2016  <100 mg/dL Final    Cannot estimate LDL when triglyceride exceeds 400 mg/dL     LDL Cholesterol Direct   Date Value Ref Range Status   01/14/2016 84 <100 mg/dL Final     Comment:     Desirable:       <100 mg/dl     Triglycerides   Date Value Ref Range Status   03/06/2018 166 (H) <150 mg/dL Final     Comment:     Borderline high:  150-199 mg/dl  High:             200-499 mg/dl  Very high:       >499 mg/dl     01/14/2016 436 (H) <150 mg/dL Final     Comment:     Borderline high:  150-199 mg/dl   High:             200-499 mg/dl   Very high:       >499 mg/dl       Cholesterol/HDL Ratio   Date Value Ref Range Status   09/09/2014 3.8 0.0 - 5.0 Final   11/20/2013 5.8 (H) 0.0 - 5.0 Final     A1C      9.1   3/6/2018  A1C      9.6   11/1/2017  A1C      8.9   8/9/2017  A1C      9.7   9/22/2016  A1C      9.7   7/21/2015  A1C     10.2  12/2/2014  A1C     10.2  9/9/2014  A1C      9.8  11/20/2013  A1C      9.3   6/12/2013  A1C      8.0   8/14/2012  A1C      8.2   5/22/2012  A1C      8.0   5/22/2012    ASSESSMENT/PLAN:     1. TYPE 2 DIABETES MELLITUS: Uncontrolled type 2 diabetes mellitus complicated by retinopathy, nephropathy and neuropathy.  I asked him to check his blood sugar fasting each am, prelunch and predinner DAILY.  He is to increase his Basaglar 68 units SQ at bedtime and take Novolog 12-14 units with meals.  He tells me that he is  not interested in using an insulin carb ratio.  Encouraged patient to make healthy food choices, reduce his food portions with meals, avoid snacking and walk daily and to take his insulin and medications as prescribed.  Pt remains on daily ASA.   Pt had the flu vaccine this season.    2. GOITER: Nontender goiter.  TSH normal in Nov 2017.    3. NEPHROPATHY: Discussed the need for better glycemic control and good BP control.   Pt's creat is 1.33 with GFR 53 mL/min on 3/6/2018.  Pt's urine microalbuminuria was + on 3/6/2018.  He is taking Cozaar.  I asked pt to see Nephrology in follow up.    4.  RETINOPATHY: Pt seen by Oph here.    5.  NEUROPATHY: Continue Gabapentin.  No foot ulcers.    6. DYSLIPIDEMIA: LDL 36 on 3/6/2018.   Pt is taking Lipitor, fenofibrate and Fish Oil.    7. OBESITY: Encouraged him to meet with our dietitian.   He does not want to take Byetta, Victoza or Bydureon.  See #1 above.    8.   Return to Endocrine Clinic to see me in 8 weeks.  Pt is to call me if he has more than 2 low blood sugars ( 70 or less ) per week or persistent high blood sugar values > 250.

## 2018-03-21 NOTE — MR AVS SNAPSHOT
After Visit Summary   3/21/2018    Earle Rene    MRN: [id number]           Patient Information     Date Of Birth          1950        Visit Information        Provider Department      3/21/2018 1:30 PM Pamela Laura PA-C [facility]        Today's Diagnoses     Type 2 diabetes mellitus with proteinuria (H)    -  1      Care Instructions    1.  Increase Basaglar 68 units at bedtime.  2.  Increase Novolog 12-14 units with meals plus correction insulin.  3.  See me in 2 months.  Pamela Laura          Follow-ups after your visit        Additional Services     NEPHROLOGY ADULT REFERRAL       Your provider has referred you to: PREFERRED PROVIDERS:  Type 1 diabetic male with increase proteinuria.    Please be aware that coverage of these services is subject to the terms and limitations of your health insurance plan.  Call member services at your health plan with any benefit or coverage questions.                  Your next 10 appointments already scheduled     May 24, 2018  2:40 PM CDT   (Arrive by 2:25 PM)   Return Visit with Emmanuel Cantu MD   [facility] and [facility] ([facility])    [address] [place] [id number] [phone]            May 31, 2018 10:30 AM CDT   (Arrive by 10:15 AM)   RETURN DIABETES with RAJANI Li [facility] ([facility])    [address] [place] [id number] [phone]              Who to contact     Please call your clinic at [phone] to:    Ask questions about your health    Make or cancel appointments    Discuss your medicines    Learn about your test results    Speak to your doctor            Additional Information About Your Visit        shoplyhart Information     Sayah is an electronic gateway that provides easy, online access to your medical records. With Sayah, you  "can request a clinic appointment, read your test results, renew a prescription or communicate with your care team.     To sign up for Webvant[employer] visit the website at [email or web address]   You will be asked to enter the access code listed below, as well as some personal information. Please follow the directions to create your username and password.     Your access code is: [email or web address]  Expires: 2018  7:30 AM     Your access code will  in 90 days. If you need help or a new code, please contact your [facility] or call [phone] for assistance.        Care EveryWhere ID     This is your Care EveryWhere ID. This could be used by other organizations to access your [place] medical records  [id number]        Your Vitals Were     Pulse Height BMI (Body Mass Index)             94 1.727 m (5' 8\") 29.76 kg/m2          Blood Pressure from Last 3 Encounters:   18 118/71   18 116/72   17 135/80    Weight from Last 3 Encounters:   18 88.8 kg (195 lb 11.2 oz)   18 88.4 kg (194 lb 14.4 oz)   17 88.9 kg (196 lb)              We Performed the Following     NEPHROLOGY ADULT REFERRAL          Today's Medication Changes          These changes are accurate as of 3/21/18  2:43 PM.  If you have any questions, ask your nurse or doctor.               These medicines have changed or have updated prescriptions.        Dose/Directions    BASAGLAR 100 UNIT/ML injection   This may have changed:  additional instructions        Inject 68 units SQ at bedtime   Quantity:  60 mL   Refills:  3       NovoLOG FLEXPEN 100 UNIT/ML injection   This may have changed:  additional instructions   Generic drug:  insulin aspart        Inject 12-14 units SQ with meals with use of correction insulin. Pt uses approx 60 units in 24 hrs.   Quantity:  60 mL   Refills:  3                Primary Care Provider Office Phone # Fax #    Marcio Hare [phone]       " [address] [address]        Equal Access to Services     SHARON MAYS : Hadii priscilla rubin pranav Sofranklin, waaxda luqadaha, qaybta kaalmada betoniashady, waxay idiin haycarolineóscar waltonrichieramses bobo. So [facility] [phone].    ATENCIÓN: Si habla español, tiene a márquez disposición servicios gratuitos de asistencia lingüística. Llame al [phone].    We comply with applicable federal civil rights laws and [place] laws. We do not discriminate on the basis of race, color, national origin, age, disability, sex, sexual orientation, or gender identity.            Thank you!     Thank you for choosing [facility]  for your care. Our goal is always to provide you with excellent care. Hearing back from our patients is one way we can continue to improve our services. Please take a few minutes to complete the written survey that you may receive in the mail after your visit with us. Thank you!             Your Updated Medication List - Protect others around you: Learn how to safely use, store and throw away your medicines at www.disposemymeds.org.          This list is accurate as of 3/21/18  2:43 PM.  Always use your most recent med list.                   Brand Name Dispense Instructions for use Diagnosis    alprostadil 20 MCG kit    EDEX    6 each    by Intracavitary route. Inject 3/4 ml (15 ug) as instructed.  Can increase to full dose of 1 ml (20 ug) if necessary.   Can dispense 6 kits.    Erectile dysfunction       amoxicillin-clavulanate 875-125 MG per tablet    AUGMENTIN     TK 1 T PO BID FOR 10 DAYS        aspirin 81 MG tablet      Take 1 tablet by mouth daily.        atorvastatin 20 MG tablet    LIPITOR    90 tablet    Take 1 tablet (20 mg) by mouth daily    Hyperlipidemia LDL goal <100       BASAGLAR 100 UNIT/ML injection     60 mL    Inject 68 units SQ at bedtime        blood glucose lancets standard    no brand specified    300 each    Lancets that go with device, Test 3 times daily    Type  "2 diabetes mellitus with diabetic nephropathy (H)       blood glucose monitoring meter device kit    no brand specified    1 kit    Any meter covered by insurance, not store brand, use as directed.    Type 2 diabetes mellitus with diabetic nephropathy (H)       blood glucose monitoring test strip    no brand specified    300 strip    Strips that go with meter, covered by insurance. Test 3 times daily    Type 2 diabetes mellitus with diabetic nephropathy (H)       Blood Pressure Monitor Kit     1 kit    Check blood pressure as directed.    Unspecified essential hypertension       clobetasol 0.05 % ointment    TEMOVATE    30 g    Apply topically to legs twice daily for 2 weeks.  Then discontinue    Rash and other nonspecific skin eruption       clotrimazole 1 % cream    LOTRIMIN    30 g    Apply topically 2 times daily    Tinea pedis of both feet, Dermatophytosis of nail       fenofibrate 54 MG tablet     90 tablet    Take 1 tablet (54 mg) by mouth daily    Hyperlipidemia LDL goal < 100       finasteride 5 MG tablet    PROSCAR    90 tablet    Take 1 tablet (5 mg) by mouth daily    Benign prostatic hyperplasia with urinary obstruction       fish oil-omega-3 fatty acids 1000 MG capsule     60 capsule    Take 1 capsule (1 g) by mouth 2 times daily    Lymphocytopenia, High cholesterol, Screening for prostate cancer       gabapentin 100 MG capsule    NEURONTIN    180 capsule    Take 1 capsule (100 mg) by mouth 3 times daily    Polyneuropathy in diabetes(357.2)       insulin pen needle 31G X 5 MM    B-D U/F    400 each    Use 4 times per day.  Please dispense as BD Pen Needle Mini U/F 31G x 5 MM    Diabetes mellitus type 2, insulin dependent (H)       insulin syringe 31G X 5/16\" 0.5 ML Misc     270 each    Use three syringes daily    Type 2 diabetes mellitus (H)       loratadine 10 MG tablet    CLARITIN    90 tablet    Take 1 tablet (10 mg) by mouth daily    Seasonal allergies       losartan 25 MG tablet    COZAAR    90 " tablet    Take 1 tablet (25 mg) by mouth daily    HTN (hypertension)       metFORMIN 1000 MG tablet    GLUCOPHAGE    180 tablet    1 tab twice daily with meals    Type 2 diabetes mellitus with diabetic neuropathy (H)       mupirocin 2 % cream    BACTROBAN    15 g    Apply  topically. In very small amounts only as needed    Rash and other nonspecific skin eruption       NovoLOG FLEXPEN 100 UNIT/ML injection   Generic drug:  insulin aspart     60 mL    Inject 12-14 units SQ with meals with use of correction insulin. Pt uses approx 60 units in 24 hrs.        tacrolimus 0.03 % ointment    PROTOPIC    60 g    Apply to affected area(s) twice daily    Rash and other nonspecific skin eruption       tamsulosin 0.4 MG capsule    FLOMAX    90 capsule    TAKE 1 CAPSULE BY MOUTH DAILY    Benign non-nodular prostatic hyperplasia with lower urinary tract symptoms

## 2018-03-21 NOTE — LETTER
3/21/2018       RE: Earle Rene  [address]  [place] [address]     Dear Colleague,    Thank you for referring your patient, Earle Rene, to the [facility] at [facility]. Please see a copy of my visit note below.    HPI   Earle Rene is a 68 year old male with type 2 diabetes mellitus here today for a follow up visit.      He was last seen in our clinic in Nov 2017.  Pt's diabetes is complicated by retinopathy, nephropathy and neuropathy.  For his diabetes, he is currently taking Lantus 64 units SQ at hs, Novolog 10-12 units with meals and Metformin 1000 mg BID.  Pt's A1C was 9.1 %on 3/6/2018 and his previous A1C was 9.6 %.   We downloaded his glucose meter today and his blood sugar values have been in the high 100s to high 200s range.  No hypoglycemia.  His fasting blood sugar values have been 248, 278, 282, 229, 265, 166 and 209.  No hypoglycemia.  On ROS today, pt reports fatigue.  Some intermittent blurred vision.  Pt has numbness in both feet from his neuropathy.  He reports chronic cough.  Pt denies n/v, SOB at rest, chest pain, abd pain, diarrhea,dysuria, hematuria or foot ulcers.    ROS   Please see under HPI.     ALLERGIES:  Review of patient's allergies indicates no known allergies.    Current Outpatient Prescriptions   Medication Sig Dispense Refill     amoxicillin-clavulanate (AUGMENTIN) 875-125 MG per tablet TK 1 T PO BID FOR 10 DAYS  0     BASAGLAR 100 UNIT/ML injection Inject 68 units SQ at bedtime 60 mL 3     NOVOLOG FLEXPEN 100 UNIT/ML soln Inject 12-14 units SQ with meals with use of correction insulin. Pt uses approx 60 units in 24 hrs. 60 mL 3     blood glucose (NO BRAND SPECIFIED) lancets standard Lancets that go with device, Test 3 times daily 300 each 3     blood glucose monitoring (NO BRAND SPECIFIED) test strip Strips that go with meter, covered by insurance. Test 3 times daily 300 strip 3     blood glucose monitoring (NO  "BRAND SPECIFIED) meter device kit Any meter covered by insurance, not store brand, use as directed. 1 kit 0     losartan (COZAAR) 25 MG tablet Take 1 tablet (25 mg) by mouth daily 90 tablet 3     tamsulosin (FLOMAX) 0.4 MG capsule TAKE 1 CAPSULE BY MOUTH DAILY 90 capsule 0     clotrimazole (LOTRIMIN) 1 % cream Apply topically 2 times daily 30 g 3     atorvastatin (LIPITOR) 20 MG tablet Take 1 tablet (20 mg) by mouth daily 90 tablet 3     finasteride (PROSCAR) 5 MG tablet Take 1 tablet (5 mg) by mouth daily 90 tablet 3     metFORMIN (GLUCOPHAGE) 1000 MG tablet 1 tab twice daily with meals 180 tablet 3     gabapentin (NEURONTIN) 100 MG capsule Take 1 capsule (100 mg) by mouth 3 times daily 180 capsule 5     insulin pen needle (B-D U/F) 31G X 5 MM Use 4 times per day.  Please dispense as BD Pen Needle Mini U/F 31G x 5  each 3     insulin syringe 31G X 5/16\" 0.5 ML MISC Use three syringes daily 270 each 1     Omega-3 Fatty Acids (OMEGA-3 FISH OIL) 1000 MG CAPS Take 1 capsule (1 g) by mouth 2 times daily 60 capsule 11     fenofibrate 54 MG tablet Take 1 tablet (54 mg) by mouth daily 90 tablet 0     loratadine (CLARITIN) 10 MG tablet Take 1 tablet (10 mg) by mouth daily 90 tablet 0     clobetasol (TEMOVATE) 0.05 % ointment Apply topically to legs twice daily for 2 weeks.  Then discontinue 30 g 0     tacrolimus (PROTOPIC) 0.03 % ointment Apply to affected area(s) twice daily 60 g 0     Blood Pressure Monitor KIT Check blood pressure as directed. 1 kit 0     mupirocin (BACTROBAN) 2 % cream Apply  topically. In very small amounts only as needed 15 g 1     alprostadil (EDEX) 20 MCG injection by Intracavitary route. Inject 3/4 ml (15 ug) as instructed.  Can increase to full dose of 1 ml (20 ug) if necessary.   Can dispense 6 kits. 6 each 12     aspirin 81 MG tablet Take 1 tablet by mouth daily.       [DISCONTINUED] BASAGLAR 100 UNIT/ML injection Inject 65 units SQ at bedtime 60 mL 3     [DISCONTINUED] NOVOLOG FLEXPEN 100 " "UNIT/ML soln Inject 10 -12 units SQ with meals with use of correction insulin. Pt uses approx 60 units in 24 hrs. 60 mL 3     Family Hx   No change.     Personal Hx   Smoke: none.   ETOH: none.    with grown children.   He owns his own business.    PMH   1. Type 2 Diabetes Mellitus dx at age 43.   2. Neuropathy.  3. Nephropathy.   4. ED.   5. Dyslipidemia.   6. Nephrolithiasis.   7. Decrease auditory acuity.   8. Sarcoidosis-lung.   9. Goiter.   10. S/P T & A.   11. S/P FX right heel.   12. Vit D def.   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   14. CT chest- ? granulomas.   Past Medical History:   Diagnosis Date     Blepharitis of both eyes      Diabetes (H)      Dry eye syndrome      Nonsenile cataract      Peripheral neuropathy      Sarcoidosis of lung (H)      Past Surgical History:   Procedure Laterality Date     COLONOSCOPY  7/29/2013    Procedure: COLONOSCOPY;;  Surgeon: Montana Pascal MD;  Location:  GI     SURGICAL PATHOLOGY EXAM       Physical Exam   General appearance: Vital signs:   /71 (BP Location: Left arm)  Pulse 94  Ht 1.727 m (5' 8\")  Wt 88.8 kg (195 lb 11.2 oz)  BMI 29.76 kg/m2  Estimated body mass index is 29.76 kg/(m^2) as calculated from the following:    Height as of this encounter: 1.727 m (5' 8\").    Weight as of this encounter: 88.8 kg (195 lb 11.2 oz).  Head:  Normal.   Eyes: PERRLA; fundi not visualized.   Lungs: clear.   Cardiovascular system:  RRR.   Abdomen:  Large and nontender.  Musculoskeletal system: no edema.   Neurological:  normal.   Skin:  necrobiosis lipoidica on both legs.   FEET: no ulcers.    Results   Creatinine   Date Value Ref Range Status   03/06/2018 1.33 (H) 0.66 - 1.25 mg/dL Final     GFR Estimate   Date Value Ref Range Status   03/06/2018 53 (L) >60 mL/min/1.7m2 Final     Comment:     Non  GFR Calc     Hemoglobin A1C   Date Value Ref Range Status   03/06/2018 9.1 (H) 4.3 - 6.0 % Final     Potassium   Date Value Ref Range Status "   03/06/2018 4.0 3.4 - 5.3 mmol/L Final     ALT   Date Value Ref Range Status   03/06/2018 35 0 - 70 U/L Final     AST   Date Value Ref Range Status   03/06/2018 25 0 - 45 U/L Final     TSH   Date Value Ref Range Status   11/01/2017 1.12 0.40 - 4.00 mU/L Final     T4 Free   Date Value Ref Range Status   10/21/2014 1.00 0.76 - 1.46 ng/dL Final     Comment:     Effective 7/30/2014, the reference range for this assay has changed to reflect   new instrumentation/methodology.           Cholesterol   Date Value Ref Range Status   03/06/2018 101 <200 mg/dL Final   01/14/2016 173 <200 mg/dL Final     HDL Cholesterol   Date Value Ref Range Status   03/06/2018 32 (L) >39 mg/dL Final   01/14/2016 30 (L) >39 mg/dL Final     LDL Cholesterol Calculated   Date Value Ref Range Status   03/06/2018 36 <100 mg/dL Final     Comment:     Desirable:       <100 mg/dl   01/14/2016  <100 mg/dL Final    Cannot estimate LDL when triglyceride exceeds 400 mg/dL     LDL Cholesterol Direct   Date Value Ref Range Status   01/14/2016 84 <100 mg/dL Final     Comment:     Desirable:       <100 mg/dl     Triglycerides   Date Value Ref Range Status   03/06/2018 166 (H) <150 mg/dL Final     Comment:     Borderline high:  150-199 mg/dl  High:             200-499 mg/dl  Very high:       >499 mg/dl     01/14/2016 436 (H) <150 mg/dL Final     Comment:     Borderline high:  150-199 mg/dl   High:             200-499 mg/dl   Very high:       >499 mg/dl       Cholesterol/HDL Ratio   Date Value Ref Range Status   09/09/2014 3.8 0.0 - 5.0 Final   11/20/2013 5.8 (H) 0.0 - 5.0 Final     A1C      9.1   3/6/2018  A1C      9.6   11/1/2017  A1C      8.9   8/9/2017  A1C      9.7   9/22/2016  A1C      9.7   7/21/2015  A1C     10.2  12/2/2014  A1C     10.2  9/9/2014  A1C      9.8  11/20/2013  A1C      9.3   6/12/2013  A1C      8.0   8/14/2012  A1C      8.2   5/22/2012  A1C      8.0   5/22/2012    ASSESSMENT/PLAN:     1. TYPE 2 DIABETES MELLITUS: Uncontrolled type 2  diabetes mellitus complicated by retinopathy, nephropathy and neuropathy.  I asked him to check his blood sugar fasting each am, prelunch and predinner DAILY.  He is to increase his Basaglar 68 units SQ at bedtime and take Novolog 12-14 units with meals.  He tells me that he is not interested in using an insulin carb ratio.  Encouraged patient to make healthy food choices, reduce his food portions with meals, avoid snacking and walk daily and to take his insulin and medications as prescribed.  Pt remains on daily ASA.   Pt had the flu vaccine this season.    2. GOITER: Nontender goiter.  TSH normal in Nov 2017.    3. NEPHROPATHY: Discussed the need for better glycemic control and good BP control.   Pt's creat is 1.33 with GFR 53 mL/min on 3/6/2018.  Pt's urine microalbuminuria was + on 3/6/2018.  He is taking Cozaar.  I asked pt to see Nephrology in follow up.    4.  RETINOPATHY: Pt seen by Oph here.    5.  NEUROPATHY: Continue Gabapentin.  No foot ulcers.    6. DYSLIPIDEMIA: LDL 36 on 3/6/2018.   Pt is taking Lipitor, fenofibrate and Fish Oil.    7. OBESITY: Encouraged him to meet with our dietitian.   He does not want to take Byetta, Victoza or Bydureon.  See #1 above.    8.   Return to Endocrine Clinic to see me in 8 weeks.  Pt is to call me if he has more than 2 low blood sugars ( 70 or less ) per week or persistent high blood sugar values > 250.    Again, thank you for allowing me to participate in the care of your patient.      Sincerely,    Pamela Laura PA-C

## 2018-04-20 DIAGNOSIS — E55.9 VITAMIN D DEFICIENCY: ICD-10-CM

## 2018-04-20 DIAGNOSIS — R80.9 PROTEINURIA: Primary | ICD-10-CM

## 2018-04-20 DIAGNOSIS — D63.1 ANEMIA IN CHRONIC KIDNEY DISEASE (CODE): ICD-10-CM

## 2018-05-01 ENCOUNTER — OFFICE VISIT (OUTPATIENT)
Dept: NEPHROLOGY | Facility: CLINIC | Age: 68
End: 2018-05-01
Attending: INTERNAL MEDICINE
Payer: MEDICARE

## 2018-05-01 VITALS
DIASTOLIC BLOOD PRESSURE: 76 MMHG | WEIGHT: 195.3 LBS | OXYGEN SATURATION: 95 % | SYSTOLIC BLOOD PRESSURE: 115 MMHG | BODY MASS INDEX: 29.6 KG/M2 | HEART RATE: 86 BPM | TEMPERATURE: 97.5 F | HEIGHT: 68 IN

## 2018-05-01 DIAGNOSIS — N18.30 CHRONIC KIDNEY DISEASE, STAGE III (MODERATE) (H): Primary | ICD-10-CM

## 2018-05-01 DIAGNOSIS — R25.2 CRAMPS OF LOWER EXTREMITY: ICD-10-CM

## 2018-05-01 DIAGNOSIS — E55.9 HYPOVITAMINOSIS D: ICD-10-CM

## 2018-05-01 DIAGNOSIS — R80.9 PROTEINURIA: ICD-10-CM

## 2018-05-01 DIAGNOSIS — E11.29 TYPE 2 DIABETES MELLITUS WITH PROTEINURIA (H): ICD-10-CM

## 2018-05-01 DIAGNOSIS — E55.9 VITAMIN D DEFICIENCY: ICD-10-CM

## 2018-05-01 DIAGNOSIS — R80.9 PROTEINURIA, UNSPECIFIED TYPE: ICD-10-CM

## 2018-05-01 DIAGNOSIS — D63.1 ANEMIA IN CHRONIC KIDNEY DISEASE (CODE): ICD-10-CM

## 2018-05-01 DIAGNOSIS — R80.9 TYPE 2 DIABETES MELLITUS WITH PROTEINURIA (H): ICD-10-CM

## 2018-05-01 LAB
ALBUMIN SERPL-MCNC: 4 G/DL (ref 3.4–5)
ALBUMIN UR-MCNC: >499 MG/DL
ANION GAP SERPL CALCULATED.3IONS-SCNC: 10 MMOL/L (ref 3–14)
APPEARANCE UR: CLEAR
BILIRUB UR QL STRIP: NEGATIVE
BUN SERPL-MCNC: 20 MG/DL (ref 7–30)
CALCIUM SERPL-MCNC: 9.8 MG/DL (ref 8.5–10.1)
CHLORIDE SERPL-SCNC: 110 MMOL/L (ref 94–109)
CO2 SERPL-SCNC: 21 MMOL/L (ref 20–32)
COLOR UR AUTO: YELLOW
CREAT SERPL-MCNC: 1.28 MG/DL (ref 0.66–1.25)
CREAT UR-MCNC: 117 MG/DL
DEPRECATED CALCIDIOL+CALCIFEROL SERPL-MC: 20 UG/L (ref 20–75)
ERYTHROCYTE [DISTWIDTH] IN BLOOD BY AUTOMATED COUNT: 12.7 % (ref 10–15)
FERRITIN SERPL-MCNC: 160 NG/ML (ref 26–388)
GFR SERPL CREATININE-BSD FRML MDRD: 56 ML/MIN/1.7M2
GLUCOSE SERPL-MCNC: 149 MG/DL (ref 70–99)
GLUCOSE UR STRIP-MCNC: 50 MG/DL
HCT VFR BLD AUTO: 42.5 % (ref 40–53)
HGB BLD-MCNC: 14.7 G/DL (ref 13.3–17.7)
HGB UR QL STRIP: NEGATIVE
IRON SATN MFR SERPL: 24 % (ref 15–46)
IRON SERPL-MCNC: 83 UG/DL (ref 35–180)
KETONES UR STRIP-MCNC: NEGATIVE MG/DL
LEUKOCYTE ESTERASE UR QL STRIP: NEGATIVE
MAGNESIUM SERPL-MCNC: 2.1 MG/DL (ref 1.6–2.3)
MCH RBC QN AUTO: 32.7 PG (ref 26.5–33)
MCHC RBC AUTO-ENTMCNC: 34.6 G/DL (ref 31.5–36.5)
MCV RBC AUTO: 94 FL (ref 78–100)
MUCOUS THREADS #/AREA URNS LPF: PRESENT /LPF
NITRATE UR QL: NEGATIVE
PH UR STRIP: 5 PH (ref 5–7)
PHOSPHATE SERPL-MCNC: 3.3 MG/DL (ref 2.5–4.5)
PLATELET # BLD AUTO: 148 10E9/L (ref 150–450)
POTASSIUM SERPL-SCNC: 4.7 MMOL/L (ref 3.4–5.3)
PROT UR-MCNC: 2.06 G/L
PROT/CREAT 24H UR: 1.76 G/G CR (ref 0–0.2)
PTH-INTACT SERPL-MCNC: 49 PG/ML (ref 18–80)
RBC # BLD AUTO: 4.5 10E12/L (ref 4.4–5.9)
RBC #/AREA URNS AUTO: 1 /HPF (ref 0–2)
SODIUM SERPL-SCNC: 141 MMOL/L (ref 133–144)
SOURCE: ABNORMAL
SP GR UR STRIP: 1.02 (ref 1–1.03)
SQUAMOUS #/AREA URNS AUTO: <1 /HPF (ref 0–1)
TIBC SERPL-MCNC: 344 UG/DL (ref 240–430)
UROBILINOGEN UR STRIP-MCNC: 0 MG/DL (ref 0–2)
WBC # BLD AUTO: 4.6 10E9/L (ref 4–11)
WBC #/AREA URNS AUTO: <1 /HPF (ref 0–5)

## 2018-05-01 PROCEDURE — 81001 URINALYSIS AUTO W/SCOPE: CPT | Performed by: INTERNAL MEDICINE

## 2018-05-01 PROCEDURE — G0463 HOSPITAL OUTPT CLINIC VISIT: HCPCS | Mod: ZF

## 2018-05-01 PROCEDURE — 84156 ASSAY OF PROTEIN URINE: CPT | Performed by: INTERNAL MEDICINE

## 2018-05-01 PROCEDURE — 36415 COLL VENOUS BLD VENIPUNCTURE: CPT | Performed by: INTERNAL MEDICINE

## 2018-05-01 PROCEDURE — 83550 IRON BINDING TEST: CPT | Performed by: INTERNAL MEDICINE

## 2018-05-01 PROCEDURE — 80069 RENAL FUNCTION PANEL: CPT | Performed by: INTERNAL MEDICINE

## 2018-05-01 PROCEDURE — 82728 ASSAY OF FERRITIN: CPT | Performed by: INTERNAL MEDICINE

## 2018-05-01 PROCEDURE — 83540 ASSAY OF IRON: CPT | Performed by: INTERNAL MEDICINE

## 2018-05-01 PROCEDURE — 85027 COMPLETE CBC AUTOMATED: CPT | Performed by: INTERNAL MEDICINE

## 2018-05-01 PROCEDURE — 83735 ASSAY OF MAGNESIUM: CPT | Performed by: INTERNAL MEDICINE

## 2018-05-01 PROCEDURE — 83970 ASSAY OF PARATHORMONE: CPT | Performed by: INTERNAL MEDICINE

## 2018-05-01 PROCEDURE — 82306 VITAMIN D 25 HYDROXY: CPT | Performed by: INTERNAL MEDICINE

## 2018-05-01 RX ORDER — SODIUM BICARBONATE 650 MG/1
650 TABLET ORAL 3 TIMES DAILY
Qty: 90 TABLET | Refills: 11 | Status: SHIPPED | OUTPATIENT
Start: 2018-05-01 | End: 2019-03-13

## 2018-05-01 ASSESSMENT — PAIN SCALES - GENERAL: PAINLEVEL: NO PAIN (0)

## 2018-05-01 NOTE — MR AVS SNAPSHOT
After Visit Summary   5/1/2018    Earle Rene    MRN: [id number]           Patient Information     Date Of Birth          1950        Visit Information        Provider Department      5/1/2018 1:00 PM Jayda Bolden MD [facility]        Today's Diagnoses     Cramps of lower extremity    -  1    Type 2 diabetes mellitus with proteinuria (H)        Chronic kidney disease, stage III (moderate)          Care Instructions    1. Your kidney function is stable  2. You have moderate amount of protein in the urine. It is important for you to have better control of your diabetes  3. You were started on a new medication sodium bcarbonate 650 mg three times daily  4. Please check your blood pressure daily and keep records for us to review  5. You should follow up with your urologist  to check on post void residuals (urinary retention)  6. Follow up with renal clinic in 6 months          Follow-ups after your visit        Follow-up notes from your care team     Return in about 6 months (around 11/1/2018).      Your next 10 appointments already scheduled     May 24, 2018  2:40 PM CDT   (Arrive by 2:25 PM)   Return Visit with Emmanuel Cantu MD   [facility] ([facility])    [address] [place] [address]  4th Floor  [facility] [address]   [phone]            May 31, 2018 10:30 AM CDT   (Arrive by 10:15 AM)   RETURN DIABETES with Pamela Laura PA-C   [facility] ([facility])    [address] [place] [address]  3rd Floor  [facility] [address]   [phone]            Aug 08, 2018 12:00 PM CDT   Lab with  LAB   [facility] ([facility])    [address] [place] [address]  [address] [facility] [address]   [phone]            Aug 08, 2018  1:00 PM CDT   (Arrive by 12:30 PM)   Return Visit with Jayda Bolden MD   [facility] (  "[facility])    [address]  [address]  [facility] [id number] [phone]              Future tests that were ordered for you today     Open Future Orders        Priority Expected Expires Ordered    Magnesium Add-On  2018            Who to contact     If you have questions or need follow up information about today's clinic visit or your schedule please contact [facility] NEPHROLOG[facility] directly at [phone].  Normal or non-critical lab and imaging results will be communicated to you by mobicanvashart, letter or phone within 4 business days after the clinic has received the results. If you do not hear from us within 7 days, please contact the clinic through mobicanvashart or phone. If you have a critical or abnormal lab result, we will notify you by phone as soon as possible.  Submit refill requests through [employer] or call your pharmacy and they will forward the refill request to us. Please allow 3 business days for your refill to be completed.          Additional Information About Your Visit        [employer] Information     [employer] lets you send messages to your doctor, view your test results, renew your prescriptions, schedule appointments and more. To sign up, go to [email or web address] . Click on \"Log in\" on the left side of the screen, which will take you to the Welcome page. Then click on \"Sign up Now\" on the right side of the page.     You will be asked to enter the access code listed below, as well as some personal information. Please follow the directions to create your username and password.     Your access code is: [email or web address]  Expires: 2018  7:30 AM     Your access code will  in 90 days. If you need help or a new code, please call your [facility] clinic or [phone].        Care EveryWhere ID     This is your Care EveryWhere ID. This could be used by other organizations to access your [facility] medical records  [id number]        Your Vitals Were     Pulse " "Temperature Height Pulse Oximetry BMI (Body Mass Index)       86 97.5  F (36.4  C) (Oral) 1.727 m (5' 8\") 95% 29.7 kg/m2        Blood Pressure from Last 3 Encounters:   05/01/18 115/76   03/21/18 118/71   03/06/18 116/72    Weight from Last 3 Encounters:   05/01/18 88.6 kg (195 lb 4.8 oz)   03/21/18 88.8 kg (195 lb 11.2 oz)   03/06/18 88.4 kg (194 lb 14.4 oz)                 Today's Medication Changes          These changes are accurate as of 5/1/18  2:55 PM.  If you have any questions, ask your nurse or doctor.               Start taking these medicines.        Dose/Directions    sodium bicarbonate 650 MG tablet   Used for:  Chronic kidney disease, stage III (moderate)   Started by:  Jayda Bolden MD        Dose:  650 mg   Take 1 tablet (650 mg) by mouth 3 times daily   Quantity:  90 tablet   Refills:  11            Where to get your medicines      These medications were sent to [id number] - [place], [place] - [address] AT [facility] [address]  [email or web address], [place] [address]    Hours:  24-hours Phone:  [phone]     sodium bicarbonate 650 MG tablet                Primary Care Provider Office Phone # Fax #    Marcio GAVIN MD [facility] [phone] [address] [address]        Equal Access to Services     SHARON MAYS AH: Hadii priscilla vegao Soomaali, waaxda luqadaha, qaybta kaalmada adeegyada, ethan bobo. So [facility] [phone].    ATENCIÓN: Si habla español, tiene a márquez disposición servicios gratuitos de asistencia lingüística. Llame al [phone].    We comply with applicable federal civil rights laws and [place] laws. We do not discriminate on the basis of race, color, national origin, age, disability, sex, sexual orientation, or gender identity.            Thank you!     Thank you for choosing [facility]  for your care. Our goal is always to provide you with excellent care. Hearing " back from our patients is one way we can continue to improve our services. Please take a few minutes to complete the written survey that you may receive in the mail after your visit with us. Thank you!             Your Updated Medication List - Protect others around you: Learn how to safely use, store and throw away your medicines at www.disposemymeds.org.          This list is accurate as of 5/1/18  2:55 PM.  Always use your most recent med list.                   Brand Name Dispense Instructions for use Diagnosis    alprostadil 20 MCG kit    EDEX    6 each    by Intracavitary route. Inject 3/4 ml (15 ug) as instructed.  Can increase to full dose of 1 ml (20 ug) if necessary.   Can dispense 6 kits.    Erectile dysfunction       amoxicillin-clavulanate 875-125 MG per tablet    AUGMENTIN     TK 1 T PO BID FOR 10 DAYS        aspirin 81 MG tablet      Take 1 tablet by mouth daily.        atorvastatin 20 MG tablet    LIPITOR    90 tablet    Take 1 tablet (20 mg) by mouth daily    Hyperlipidemia LDL goal <100       BASAGLAR 100 UNIT/ML injection     60 mL    Inject 68 units SQ at bedtime        blood glucose lancets standard    no brand specified    300 each    Lancets that go with device, Test 3 times daily    Type 2 diabetes mellitus with diabetic nephropathy (H)       blood glucose monitoring meter device kit    no brand specified    1 kit    Any meter covered by insurance, not store brand, use as directed.    Type 2 diabetes mellitus with diabetic nephropathy (H)       blood glucose monitoring test strip    no brand specified    300 strip    Strips that go with meter, covered by insurance. Test 3 times daily    Type 2 diabetes mellitus with diabetic nephropathy (H)       Blood Pressure Monitor Kit     1 kit    Check blood pressure as directed.    Unspecified essential hypertension       clobetasol 0.05 % ointment    TEMOVATE    30 g    Apply topically to legs twice daily for 2 weeks.  Then discontinue    Rash and  "other nonspecific skin eruption       clotrimazole 1 % cream    LOTRIMIN    30 g    Apply topically 2 times daily    Tinea pedis of both feet, Dermatophytosis of nail       fenofibrate 54 MG tablet     90 tablet    Take 1 tablet (54 mg) by mouth daily    Hyperlipidemia LDL goal < 100       finasteride 5 MG tablet    PROSCAR    90 tablet    Take 1 tablet (5 mg) by mouth daily    Benign prostatic hyperplasia with urinary obstruction       fish oil-omega-3 fatty acids 1000 MG capsule     60 capsule    Take 1 capsule (1 g) by mouth 2 times daily    Lymphocytopenia, High cholesterol, Screening for prostate cancer       gabapentin 100 MG capsule    NEURONTIN    180 capsule    Take 1 capsule (100 mg) by mouth 3 times daily    Polyneuropathy in diabetes(357.2)       insulin pen needle 31G X 5 MM    B-D U/F    400 each    Use 4 times per day.  Please dispense as BD Pen Needle Mini U/F 31G x 5 MM    Diabetes mellitus type 2, insulin dependent (H)       insulin syringe 31G X 5/16\" 0.5 ML Misc     270 each    Use three syringes daily    Type 2 diabetes mellitus (H)       loratadine 10 MG tablet    CLARITIN    90 tablet    Take 1 tablet (10 mg) by mouth daily    Seasonal allergies       losartan 25 MG tablet    COZAAR    90 tablet    Take 1 tablet (25 mg) by mouth daily    HTN (hypertension)       metFORMIN 1000 MG tablet    GLUCOPHAGE    180 tablet    1 tab twice daily with meals    Type 2 diabetes mellitus with diabetic neuropathy (H)       mupirocin 2 % cream    BACTROBAN    15 g    Apply  topically. In very small amounts only as needed    Rash and other nonspecific skin eruption       NovoLOG FLEXPEN 100 UNIT/ML injection   Generic drug:  insulin aspart     60 mL    Inject 12-14 units SQ with meals with use of correction insulin. Pt uses approx 60 units in 24 hrs.        sodium bicarbonate 650 MG tablet     90 tablet    Take 1 tablet (650 mg) by mouth 3 times daily    Chronic kidney disease, stage III (moderate)       " tacrolimus 0.03 % ointment    PROTOPIC    60 g    Apply to affected area(s) twice daily    Rash and other nonspecific skin eruption       tamsulosin 0.4 MG capsule    FLOMAX    90 capsule    TAKE 1 CAPSULE BY MOUTH DAILY    Benign non-nodular prostatic hyperplasia with lower urinary tract symptoms

## 2018-05-01 NOTE — PROGRESS NOTES
Assessment and Plan:   1. Stage III CKD-baseline serum creatinine of 1.2-1.3 mg/dL and GFR of  53-56 ml/min/1.73m2 BSA likely from diabetic nephropathy given 20 year history of diabetes and diabetic reinopathy. Recent serum creatinine was 1.33 mg/dL 3/06/ 2018. Today serum creatinine is 1.28 mg/dL.  -encouraged the patient to improve his glycemic control.    2. Nephrotic range proteinuria-likely secondary to diabetic nephropathy. Patient has been on Cozaar 25 mg daily for few years. Patient's BP is 115/76 mmHG today. Urine prot today is 1.76 g/g  -patient was advised to check his BP daily and keep records for us to review to see if Cozaar dose can be increased    3.Electrolytes:within acceptable limits    4. Volume status:patient looks euvolemic on exam. 2D ECHO in 2016 showed LVEF of 55-60%    5. Acid/Base status: HCO3 is 21. Goal>22. We will start on sodium bicarbonate 650 mg three times daily    6. Hypertension: patient's BP is controlled, 115/76 mmHg. Goal <130/90.On Cozaar 25 mg daily  -patient was advised to check his BP daily and keep records for us to review to see if Cozaar dose can be increased    7. BMD  -normal serum calcium , phosphorus and PTH  -vitamin D is 20 ug/L which in low end of normal-recommend to start cholecalciferol 2000U Daily     8. Type II DM-uncontrolled. Most recent Hgba1c is 9.1%. Follows with endocrinology clinic    9. Diabetic neuropathy-on Gabapentin 100 mg TID    10. Diabetic retinopathy-follows with ophthalmologist DR. Eugene    11. Obesity-BMI 29.8. Encouraged the patient to loose weight.      Recommend to follow up with nephrology clinic in 6 months.    Assessment and plan was discussed with patient and he voiced his understanding and agreement.    I have seen and discussed the patient with     Thank you for allowing us to participate in the care of your patient. We will follow with you. Please do not hesitate to contact us with questions.    Jayda Bolden  MD  Nephrology Fellow      Consult:  Earle Rene was seen in consultation at the request of RAJANI Laura for stage III CKD management and proteinuria.    Reason for Visit:  Earle Rene is a 68 year old male with stage III CKD, who presents for evaluation of proteinuria.    HPI:  Mr. Rene is pleasant 69 y/o male with PMHx significant for type II diabetes mellitus for 20 years, complicated by diabetic neuropathy, mild non proliferative retinopathy (follows with ophthalmologist ) and nephropathy. He is currently takes 64 units of Lantus and Novolog 8-10 units with melas and Metformin 1000 mg daily. His most recent Hgb A1c was 9.1% in 3/2018 and his previous was 9.6% in 11/2017. Patient  States that he still have awareness of hypoglycemia and reports no episodes of hypoglycemia recently. He states that he has been taking Cozaar 25 mg daily for few years as a preventive medication. Patient states that he never been diagnosed with hypertension. His blood pressure usually runs in 110s/70s. He follows with RAJANI Laura in endocrinology clinic. Patient reports history of hyperlipidimia and takes Lipitor 20 mg aaily and Fenofibrate 54 mg daily. He has history of BPH and elevated PSA. He takes Proscar 5 mg daily. He follows with urologist . He has history of goiter . Patient had normal TSH in 11/2017. Furthermore he dose have history of granulomatous disease of the lungs. CT chest 08/23/2017 showed stable calcified and noncalcified nodules in the lungs. Patient states that he was not aware of this and never been seen by pulmonologist or rheumatologist.     In regards of patient's renal function. The patient had creatinine of 0.96 mg/d in 2005, then 1 mg/dL in 2007, then 1.1-1.2 mg/dl since 2010. Patient had creatinine of 1.3 mg/dL on 03/06/2018. Patient dose have history of microalbuminuria at least since 05/2005. Proteinuria at least since 6/2010. UPCR was 0.23 g/gCr in 6/2010. His most  recent urine albumin level was 1148 mg/gCr in 03/2018. Today laboratory work up showed creatinine of 1.28, proteinuria, UPCR of 1.76 g/gCr. No hematuria. He had Renal Ultrasound in 02/2016 which showed both kidneys were without mass or hydronephrosis (Right 12.3 cm, Left 12.6 cm), mild left greater than right renal cortical thinning/scarring without hydronephrosis. Patient states that he feels like he is not emptying his bladder completely. He is planning to see urologist soon but has not set up a follow up appointment yet. He reports history of nephrolithiasis. Patient states that he passes two stones many years ago.  Patient complaining of intermittent cramps in the upper thighs bilaterally.     Patient denies: fever, chills, weight loss, dizziness, adenopathy, sore throat, rhinorrhea, cough, shortness of breath , chest pain, palpitations, orthopnea, nausea, vomiting, abdominal pain, changes in bowel habits, dysuria, urinary frequency, urgency, hematuria, rash.    Patient is originally from [place]. He immig[place] to [place] ~ [place] ago. He is . He lives with his wife. He has  Five children and 12 grandchildren. 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 Patient states that he reduced his food portions with meals, avoiding starch and high carbohydrate food. Furthermore, he is trying to be more active and walking daily.             Kidney Disease and Medical Hx:       h/o HTN: No  Usual BP: SBP 110s/70s       h/o DM: Yes        h/o Protein in Urine: Yes        h/o Blood in Urine: No       h/o Kidney Stones:Yes , many years ago passed kidney stonex2 in 1980       h/o UTI  No       h/o Chronic NSAID Use: No         Previous Transplant Hx:      Yes; No         Uremic Symptoms:       Fatigue: No; Cold: No; Nausea: No; Poor Appetite: No; Metallic Taste: No; Edema: No; Pruritis: No; Tremor: No;    ROS:   A comprehensive review of systems was obtained and negative, except as noted in the HPI or PMH.    Active  Medical Problems:  Patient Active Problem List   Diagnosis     Psychological factors associated with another disorder     Mood disorder in conditions classified elsewhere     Occupational problem     Diabetes mellitus type 2, insulin dependent (H)     Erectile dysfunction     Hyperlipidemia with target LDL less than 100     CTS (carpal tunnel syndrome)     Diabetic polyneuropathy (H)     Presbyopia     Myopia     Cataracts, bilateral     Pain of right thumb     PMH:   Medical record was reviewed and PMH was discussed with patient and noted below.  Past Medical History:   Diagnosis Date     Blepharitis of both eyes      BPH (benign prostatic hyperplasia)      Diabetes (H)      Diabetic neuropathy (H)      Diabetic retinopathy associated with diabetes mellitus due to underlying condition (H)      Dry eye syndrome      Granulomatous disease (H)      Nonsenile cataract      Peripheral neuropathy      PSH:   Past Surgical History:   Procedure Laterality Date     AS RAD RESEC TONSIL/PILLARS Bilateral 1961     COLONOSCOPY  7/29/2013    Procedure: COLONOSCOPY;;  Surgeon: Montana Pascal MD;  Location: [facility]  11/2017     SURGICAL PATHOLOGY EXAM         Family Hx:   Family History   Problem Relation Age of Onset     DIABETES Father      Myocardial Infarction Father      DIABETES Brother      Leukemia Brother 44     Glaucoma No family hx of      Macular Degeneration No family hx of      KIDNEY DISEASE No family hx of      Personal Hx:   Social History     Social History     Marital status:      Spouse name: N/A     Number of children: 5     Years of education: N/A     Occupational History     private bussiness owner      Social History Main Topics     Smoking status: Never Smoker     Smokeless tobacco: Never Used     Alcohol use Yes      Comment: occasionaly     Drug use: No     Sexual activity: Not on file     Other Topics Concern     Not on file     Social History Narrative       Allergies:  No Known  Allergies    Medications:  Prior to Admission medications    Medication Sig Start Date End Date Taking? Authorizing Provider   alprostadil (EDEX) 20 MCG injection by Intracavitary route. Inject 3/4 ml (15 ug) as instructed.  Can increase to full dose of 1 ml (20 ug) if necessary.   Can dispense 6 kits. 2/9/12  Yes Yoshi Vinson MD   amoxicillin-clavulanate (AUGMENTIN) 875-125 MG per tablet TK 1 T PO BID FOR 10 DAYS 3/9/18  Yes Reported, Patient   aspirin 81 MG tablet Take 1 tablet by mouth daily.   Yes Reported, Patient   atorvastatin (LIPITOR) 20 MG tablet Take 1 tablet (20 mg) by mouth daily 8/14/17  Yes Marcio Hare MD   BASAGLAR 100 UNIT/ML injection Inject 68 units SQ at bedtime 3/21/18  Yes Pamela Laura PA-C   blood glucose (NO BRAND SPECIFIED) lancets standard Lancets that go with device, Test 3 times daily 2/5/18  Yes Pamela Laura PA-C   blood glucose monitoring (NO BRAND SPECIFIED) meter device kit Any meter covered by insurance, not store brand, use as directed. 2/5/18  Yes Pamela Laura PA-C   blood glucose monitoring (NO BRAND SPECIFIED) test strip Strips that go with meter, covered by insurance. Test 3 times daily 2/5/18  Yes Pamela Laura PA-C   Blood Pressure Monitor KIT Check blood pressure as directed. 2/17/12  Yes Marcio Hare MD   clobetasol (TEMOVATE) 0.05 % ointment Apply topically to legs twice daily for 2 weeks.  Then discontinue 1/31/13  Yes Montana Rush MD   clotrimazole (LOTRIMIN) 1 % cream Apply topically 2 times daily 8/23/17  Yes Narendra Grajeda DPM   fenofibrate 54 MG tablet Take 1 tablet (54 mg) by mouth daily 2/5/15  Yes Rafael Hand MD   finasteride (PROSCAR) 5 MG tablet Take 1 tablet (5 mg) by mouth daily 8/3/17  Yes Emmanuel Cantu MD   gabapentin (NEURONTIN) 100 MG capsule Take 1 capsule (100 mg) by mouth 3 times daily 7/27/17  Yes Marcio Hare MD   insulin pen needle (B-D U/F) 31G X 5 MM  "Use 4 times per day.  Please dispense as BD Pen Needle Mini U/F 31G x 5 MM 12/12/16  Yes Pamela Laura PA-C   insulin syringe 31G X 5/16\" 0.5 ML MISC Use three syringes daily 10/26/16  Yes Pamela Laura PA-C   loratadine (CLARITIN) 10 MG tablet Take 1 tablet (10 mg) by mouth daily 8/18/14  Yes Marcio Hare MD   losartan (COZAAR) 25 MG tablet Take 1 tablet (25 mg) by mouth daily 1/17/18  Yes Pamela Laura PA-C   metFORMIN (GLUCOPHAGE) 1000 MG tablet 1 tab twice daily with meals 7/31/17  Yes Pamela Laura PA-C   mupirocin (BACTROBAN) 2 % cream Apply  topically. In very small amounts only as needed 2/17/12  Yes Marcio Hare MD   NOVOLOG FLEXPEN 100 UNIT/ML soln Inject 12-14 units SQ with meals with use of correction insulin. Pt uses approx 60 units in 24 hrs. 3/21/18  Yes Pamela Laura PA-C   Omega-3 Fatty Acids (OMEGA-3 FISH OIL) 1000 MG CAPS Take 1 capsule (1 g) by mouth 2 times daily 4/10/15  Yes Marcio Hare MD   tacrolimus (PROTOPIC) 0.03 % ointment Apply to affected area(s) twice daily 1/31/13  Yes Montana Rush MD   tamsulosin (FLOMAX) 0.4 MG capsule TAKE 1 CAPSULE BY MOUTH DAILY 1/15/18  Yes Emmanuel Cantu MD     Vitals:  /76  Pulse 86  Temp 97.5  F (36.4  C) (Oral)  Ht 1.727 m (5' 8\")  Wt 88.6 kg (195 lb 4.8 oz)  SpO2 95%  BMI 29.7 kg/m2    Exam:  GENERAL APPEARANCE:overweight,  alert and no distress  HENT: mouth without ulcers or lesions  LYMPHATICS: no cervical or supraclavicular nodes  RESP: lungs clear to auscultation - no rales, rhonchi or wheezes  CV: regular rhythm, normal rate, no rub, no murmur  EDEMA: no LE edema bilaterally  ABDOMEN: obese, soft, nondistended, nontender, bowel sounds normal  MS: extremities normal - no gross deformities noted, no evidence of inflammation in joints, no muscle tenderness  SKIN: necrobiosis lipoidica on both lower legs      Results:  Recent Results (from the past 336 hour(s)) "   Renal panel    Collection Time: 05/01/18 12:33 PM   Result Value Ref Range    Sodium 141 133 - 144 mmol/L    Potassium 4.7 3.4 - 5.3 mmol/L    Chloride 110 (H) 94 - 109 mmol/L    Carbon Dioxide 21 20 - 32 mmol/L    Anion Gap 10 3 - 14 mmol/L    Glucose 149 (H) 70 - 99 mg/dL    Urea Nitrogen 20 7 - 30 mg/dL    Creatinine 1.28 (H) 0.66 - 1.25 mg/dL    GFR Estimate 56 (L) >60 mL/min/1.7m2    GFR Estimate If Black 67 >60 mL/min/1.7m2    Calcium 9.8 8.5 - 10.1 mg/dL    Phosphorus 3.3 2.5 - 4.5 mg/dL    Albumin 4.0 3.4 - 5.0 g/dL   CBC with platelets    Collection Time: 05/01/18 12:33 PM   Result Value Ref Range    WBC 4.6 4.0 - 11.0 10e9/L    RBC Count 4.50 4.4 - 5.9 10e12/L    Hemoglobin 14.7 13.3 - 17.7 g/dL    Hematocrit 42.5 40.0 - 53.0 %    MCV 94 78 - 100 fl    MCH 32.7 26.5 - 33.0 pg    MCHC 34.6 31.5 - 36.5 g/dL    RDW 12.7 10.0 - 15.0 %    Platelet Count 148 (L) 150 - 450 10e9/L   Ferritin    Collection Time: 05/01/18 12:33 PM   Result Value Ref Range    Ferritin 160 26 - 388 ng/mL   Iron and iron binding capacity    Collection Time: 05/01/18 12:33 PM   Result Value Ref Range    Iron 83 35 - 180 ug/dL    Iron Binding Cap 344 240 - 430 ug/dL    Iron Saturation Index 24 15 - 46 %   Parathyroid Hormone Intact    Collection Time: 05/01/18 12:33 PM   Result Value Ref Range    Parathyroid Hormone Intact 49 18 - 80 pg/mL   Magnesium    Collection Time: 05/01/18 12:33 PM   Result Value Ref Range    Magnesium 2.1 1.6 - 2.3 mg/dL   Protein  random urine with Creat Ratio    Collection Time: 05/01/18 12:38 PM   Result Value Ref Range    Protein Random Urine 2.06 g/L    Protein Total Urine g/gr Creatinine 1.76 (H) 0 - 0.2 g/g Cr   UA with Microscopic reflex to Culture    Collection Time: 05/01/18 12:38 PM   Result Value Ref Range    Color Urine Yellow     Appearance Urine Clear     Glucose Urine 50 (A) NEG^Negative mg/dL    Bilirubin Urine Negative NEG^Negative    Ketones Urine Negative NEG^Negative mg/dL    Specific  Gravity Urine 1.016 1.003 - 1.035    Blood Urine Negative NEG^Negative    pH Urine 5.0 5.0 - 7.0 pH    Protein Albumin Urine >499 (A) NEG^Negative mg/dL    Urobilinogen mg/dL 0.0 0.0 - 2.0 mg/dL    Nitrite Urine Negative NEG^Negative    Leukocyte Esterase Urine Negative NEG^Negative    Source Midstream Urine     WBC Urine <1 0 - 5 /HPF    RBC Urine 1 0 - 2 /HPF    Squamous Epithelial /HPF Urine <1 0 - 1 /HPF    Mucous Urine Present (A) NEG^Negative /LPF   Creatinine urine calculation only    Collection Time: 05/01/18 12:38 PM   Result Value Ref Range    Creatinine Urine 117 mg/dL     I have seen and examined this patient with the resident.  This note reflects our joint assessment and plan.     Delphine Cespedes MD

## 2018-05-01 NOTE — PATIENT INSTRUCTIONS
1. Your kidney function is stable  2. You have moderate amount of protein in the urine. It is important for you to have better control of your diabetes  3. You were started on a new medication sodium bicarbonate 650 mg three times daily  4. Please check your blood pressure daily and keep records for us to review  5. You should follow up with your urologist  to check on post void residuals (urinary retention)  6. Follow up with renal clinic in 6 months

## 2018-05-01 NOTE — NURSING NOTE
"Chief Complaint   Patient presents with     Consult     consult proteinuria       Initial Pulse 86  Temp 97.5  F (36.4  C) (Oral)  Ht 1.727 m (5' 8\")  Wt 88.6 kg (195 lb 4.8 oz)  SpO2 95%  BMI 29.7 kg/m2 Estimated body mass index is 29.7 kg/(m^2) as calculated from the following:    Height as of this encounter: 1.727 m (5' 8\").    Weight as of this encounter: 88.6 kg (195 lb 4.8 oz).  Medication Reconciliation: complete     Edith Grimes MA    "

## 2018-05-01 NOTE — LETTER
5/1/2018      RE: Earle Rene  [address] [place] [address]       Assessment and Plan:   1. Stage III CKD-baseline serum creatinine of 1.2-1.3 mg/dL and GFR of  53-56 ml/min/1.73m2 BSA likely from diabetic nephropathy given 20 year history of diabetes and diabetic reinopathy. Recent serum creatinine was 1.33 mg/dL 3/06/ 2018. Today serum creatinine is 1.28 mg/dL.  -encouraged the patient to improve his glycemic control.    2. Nephrotic range proteinuria-likely secondary to diabetic nephropathy. Patient has been on Cozaar 25 mg daily for few years. Patient's BP is 115/76 mmHG today. Urine prot today is 1.76 g/g  -patient was advised to check his BP daily and keep records for us to review to see if Cozaar dose can be increased    3.Electrolytes:within acceptable limits    4. Volume status:patient looks euvolemic on exam. 2D ECHO in 2016 showed LVEF of 55-60%    5. Acid/Base status: HCO3 is 21. Goal>22. We will start on sodium bicarbonate 650 mg three times daily    6. Hypertension: patient's BP is controlled, 115/76 mmHg. Goal <130/90.On Cozaar 25 mg daily  -patient was advised to check his BP daily and keep records for us to review to see if Cozaar dose can be increased    7. BMD  -normal serum calcium , phosphorus and PTH  -vitamin D is 20 ug/L which in low end of normal-recommend to start cholecalciferol 2000U Daily     8. Type II DM-uncontrolled. Most recent Hgba1c is 9.1%. Follows with endocrinology clinic    9. Diabetic neuropathy-on Gabapentin 100 mg TID    10. Diabetic retinopathy-follows with ophthalmologist DR. Eugene    11. Obesity-BMI 29.8. Encouraged the patient to loose weight.      Recommend to follow up with nephrology clinic in 6 months.    Assessment and plan was discussed with patient and he voiced his understanding and agreement.    I have seen and discussed the patient with     Thank you for allowing us to participate in the care of your patient. We will follow with  you. Please do not hesitate to contact us with questions.    Jayda Bolden MD  Nephrology Fellow      Consult:  Earle Rene was seen in consultation at the request of RAJANI Laura for stage III CKD management and proteinuria.    Reason for Visit:  Earle Rene is a 68 year old male with stage III CKD, who presents for evaluation of proteinuria.    HPI:  Mr. Rene is pleasant 67 y/o male with PMHx significant for type II diabetes mellitus for 20 years, complicated by diabetic neuropathy, mild non proliferative retinopathy (follows with ophthalmologist ) and nephropathy. He is currently takes 64 units of Lantus and Novolog 8-10 units with melas and Metformin 1000 mg daily. His most recent Hgb A1c was 9.1% in 3/2018 and his previous was 9.6% in 11/2017. Patient  States that he still have awareness of hypoglycemia and reports no episodes of hypoglycemia recently. He states that he has been taking Cozaar 25 mg daily for few years as a preventive medication. Patient states that he never been diagnosed with hypertension. His blood pressure usually runs in 110s/70s. He follows with RAJANI Laura in endocrinology clinic. Patient reports history of hyperlipidimia and takes Lipitor 20 mg aaily and Fenofibrate 54 mg daily. He has history of BPH and elevated PSA. He takes Proscar 5 mg daily. He follows with urologist . He has history of goiter . Patient had normal TSH in 11/2017. Furthermore he dose have history of granulomatous disease of the lungs. CT chest 08/23/2017 showed stable calcified and noncalcified nodules in the lungs. Patient states that he was not aware of this and never been seen by pulmonologist or rheumatologist.     In regards of patient's renal function. The patient had creatinine of 0.96 mg/d in 2005, then 1 mg/dL in 2007, then 1.1-1.2 mg/dl since 2010. Patient had creatinine of 1.3 mg/dL on 03/06/2018. Patient dose have history of microalbuminuria at least since  05/2005. Proteinuria at least since 6/2010. UPCR was 0.23 g/gCr in 6/2010. His most recent urine albumin level was 1148 mg/gCr in 03/2018. Today laboratory work up showed creatinine of 1.28, proteinuria, UPCR of 1.76 g/gCr. No hematuria. He had Renal Ultrasound in 02/2016 which showed both kidneys were without mass or hydronephrosis (Right 12.3 cm, Left 12.6 cm), mild left greater than right renal cortical thinning/scarring without hydronephrosis. Patient states that he feels like he is not emptying his bladder completely. He is planning to see urologist soon but has not set up a follow up appointment yet. He reports history of nephrolithiasis. Patient states that he passes two stones many years ago.  Patient complaining of intermittent cramps in the upper thighs bilaterally.     Patient denies: fever, chills, weight loss, dizziness, adenopathy, sore throat, rhinorrhea, cough, shortness of breath , chest pain, palpitations, orthopnea, nausea, vomiting, abdominal pain, changes in bowel habits, dysuria, urinary frequency, urgency, hematuria, rash.    Patient is originally from [place]. He immig[place] to [place] ~ [place] ago. He is . He lives with his wife. He has  Five children and 12 grandchildren. 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 Patient states that he reduced his food portions with meals, avoiding starch and high carbohydrate food. Furthermore, he is trying to be more active and walking daily.             Kidney Disease and Medical Hx:       h/o HTN: No  Usual BP: SBP 110s/70s       h/o DM: Yes        h/o Protein in Urine: Yes        h/o Blood in Urine: No       h/o Kidney Stones:Yes , many years ago passed kidney stonex2 in 1980       h/o UTI  No       h/o Chronic NSAID Use: No         Previous Transplant Hx:      Yes; No         Uremic Symptoms:       Fatigue: No; Cold: No; Nausea: No; Poor Appetite: No; Metallic Taste: No; Edema: No; Pruritis: No; Tremor: No;    ROS:   A comprehensive review  of systems was obtained and negative, except as noted in the HPI or PMH.    Active Medical Problems:  Patient Active Problem List   Diagnosis     Psychological factors associated with another disorder     Mood disorder in conditions classified elsewhere     Occupational problem     Diabetes mellitus type 2, insulin dependent (H)     Erectile dysfunction     Hyperlipidemia with target LDL less than 100     CTS (carpal tunnel syndrome)     Diabetic polyneuropathy (H)     Presbyopia     Myopia     Cataracts, bilateral     Pain of right thumb     PMH:   Medical record was reviewed and PMH was discussed with patient and noted below.  Past Medical History:   Diagnosis Date     Blepharitis of both eyes      BPH (benign prostatic hyperplasia)      Diabetes (H)      Diabetic neuropathy (H)      Diabetic retinopathy associated with diabetes mellitus due to underlying condition (H)      Dry eye syndrome      Granulomatous disease (H)      Nonsenile cataract      Peripheral neuropathy      PSH:   Past Surgical History:   Procedure Laterality Date     AS RAD RESEC TONSIL/PILLARS Bilateral 1961     COLONOSCOPY  7/29/2013    Procedure: COLONOSCOPY;;  Surgeon: Montana Pascal MD;  Location: [facility]  11/2017     SURGICAL PATHOLOGY EXAM         Family Hx:   Family History   Problem Relation Age of Onset     DIABETES Father      Myocardial Infarction Father      DIABETES Brother      Leukemia Brother 44     Glaucoma No family hx of      Macular Degeneration No family hx of      KIDNEY DISEASE No family hx of      Personal Hx:   Social History     Social History     Marital status:      Spouse name: N/A     Number of children: 5     Years of education: N/A     Occupational History     private bussiness owner      Social History Main Topics     Smoking status: Never Smoker     Smokeless tobacco: Never Used     Alcohol use Yes      Comment: occasionaly     Drug use: No     Sexual activity: Not on file     Other Topics  Concern     Not on file     Social History Narrative       Allergies:  No Known Allergies    Medications:  Prior to Admission medications    Medication Sig Start Date End Date Taking? Authorizing Provider   alprostadil (EDEX) 20 MCG injection by Intracavitary route. Inject 3/4 ml (15 ug) as instructed.  Can increase to full dose of 1 ml (20 ug) if necessary.   Can dispense 6 kits. 2/9/12  Yes Yoshi Vinson MD   amoxicillin-clavulanate (AUGMENTIN) 875-125 MG per tablet TK 1 T PO BID FOR 10 DAYS 3/9/18  Yes Reported, Patient   aspirin 81 MG tablet Take 1 tablet by mouth daily.   Yes Reported, Patient   atorvastatin (LIPITOR) 20 MG tablet Take 1 tablet (20 mg) by mouth daily 8/14/17  Yes Marcio Hare MD   BASAGLAR 100 UNIT/ML injection Inject 68 units SQ at bedtime 3/21/18  Yes Pamela Laura PA-C   blood glucose (NO BRAND SPECIFIED) lancets standard Lancets that go with device, Test 3 times daily 2/5/18  Yes Pamela Laura PA-C   blood glucose monitoring (NO BRAND SPECIFIED) meter device kit Any meter covered by insurance, not store brand, use as directed. 2/5/18  Yes Pamela Laura PA-C   blood glucose monitoring (NO BRAND SPECIFIED) test strip Strips that go with meter, covered by insurance. Test 3 times daily 2/5/18  Yes Pamela Laura PA-C   Blood Pressure Monitor KIT Check blood pressure as directed. 2/17/12  Yes Marcio Hare MD   clobetasol (TEMOVATE) 0.05 % ointment Apply topically to legs twice daily for 2 weeks.  Then discontinue 1/31/13  Yes Montana Rush MD   clotrimazole (LOTRIMIN) 1 % cream Apply topically 2 times daily 8/23/17  Yes Narendra Grajeda DPM   fenofibrate 54 MG tablet Take 1 tablet (54 mg) by mouth daily 2/5/15  Yes Rafael Hand MD   finasteride (PROSCAR) 5 MG tablet Take 1 tablet (5 mg) by mouth daily 8/3/17  Yes Emmanuel Cantu MD   gabapentin (NEURONTIN) 100 MG capsule Take 1 capsule (100 mg) by mouth 3 times  "daily 7/27/17  Yes Marcio Hare MD   insulin pen needle (B-D U/F) 31G X 5 MM Use 4 times per day.  Please dispense as BD Pen Needle Mini U/F 31G x 5 MM 12/12/16  Yes Pamela Laura PA-C   insulin syringe 31G X 5/16\" 0.5 ML MISC Use three syringes daily 10/26/16  Yes Pamela Laura PA-C   loratadine (CLARITIN) 10 MG tablet Take 1 tablet (10 mg) by mouth daily 8/18/14  Yes Marcio Hare MD   losartan (COZAAR) 25 MG tablet Take 1 tablet (25 mg) by mouth daily 1/17/18  Yes Pamela Laura PA-C   metFORMIN (GLUCOPHAGE) 1000 MG tablet 1 tab twice daily with meals 7/31/17  Yes Pamela Laura PA-C   mupirocin (BACTROBAN) 2 % cream Apply  topically. In very small amounts only as needed 2/17/12  Yes Marcio Hare MD   NOVOLOG FLEXPEN 100 UNIT/ML soln Inject 12-14 units SQ with meals with use of correction insulin. Pt uses approx 60 units in 24 hrs. 3/21/18  Yes Pamela Laura PA-C   Omega-3 Fatty Acids (OMEGA-3 FISH OIL) 1000 MG CAPS Take 1 capsule (1 g) by mouth 2 times daily 4/10/15  Yes Marcio Hare MD   tacrolimus (PROTOPIC) 0.03 % ointment Apply to affected area(s) twice daily 1/31/13  Yes Montana Rush MD   tamsulosin (FLOMAX) 0.4 MG capsule TAKE 1 CAPSULE BY MOUTH DAILY 1/15/18  Yes Emmanuel Cantu MD     Vitals:  /76  Pulse 86  Temp 97.5  F (36.4  C) (Oral)  Ht 1.727 m (5' 8\")  Wt 88.6 kg (195 lb 4.8 oz)  SpO2 95%  BMI 29.7 kg/m2    Exam:  GENERAL APPEARANCE:overweight,  alert and no distress  HENT: mouth without ulcers or lesions  LYMPHATICS: no cervical or supraclavicular nodes  RESP: lungs clear to auscultation - no rales, rhonchi or wheezes  CV: regular rhythm, normal rate, no rub, no murmur  EDEMA: no LE edema bilaterally  ABDOMEN: obese, soft, nondistended, nontender, bowel sounds normal  MS: extremities normal - no gross deformities noted, no evidence of inflammation in joints, no muscle tenderness  SKIN: necrobiosis " lipoidica on both lower legs      Results:  Recent Results (from the past 336 hour(s))   Renal panel    Collection Time: 05/01/18 12:33 PM   Result Value Ref Range    Sodium 141 133 - 144 mmol/L    Potassium 4.7 3.4 - 5.3 mmol/L    Chloride 110 (H) 94 - 109 mmol/L    Carbon Dioxide 21 20 - 32 mmol/L    Anion Gap 10 3 - 14 mmol/L    Glucose 149 (H) 70 - 99 mg/dL    Urea Nitrogen 20 7 - 30 mg/dL    Creatinine 1.28 (H) 0.66 - 1.25 mg/dL    GFR Estimate 56 (L) >60 mL/min/1.7m2    GFR Estimate If Black 67 >60 mL/min/1.7m2    Calcium 9.8 8.5 - 10.1 mg/dL    Phosphorus 3.3 2.5 - 4.5 mg/dL    Albumin 4.0 3.4 - 5.0 g/dL   CBC with platelets    Collection Time: 05/01/18 12:33 PM   Result Value Ref Range    WBC 4.6 4.0 - 11.0 10e9/L    RBC Count 4.50 4.4 - 5.9 10e12/L    Hemoglobin 14.7 13.3 - 17.7 g/dL    Hematocrit 42.5 40.0 - 53.0 %    MCV 94 78 - 100 fl    MCH 32.7 26.5 - 33.0 pg    MCHC 34.6 31.5 - 36.5 g/dL    RDW 12.7 10.0 - 15.0 %    Platelet Count 148 (L) 150 - 450 10e9/L   Ferritin    Collection Time: 05/01/18 12:33 PM   Result Value Ref Range    Ferritin 160 26 - 388 ng/mL   Iron and iron binding capacity    Collection Time: 05/01/18 12:33 PM   Result Value Ref Range    Iron 83 35 - 180 ug/dL    Iron Binding Cap 344 240 - 430 ug/dL    Iron Saturation Index 24 15 - 46 %   Parathyroid Hormone Intact    Collection Time: 05/01/18 12:33 PM   Result Value Ref Range    Parathyroid Hormone Intact 49 18 - 80 pg/mL   Magnesium    Collection Time: 05/01/18 12:33 PM   Result Value Ref Range    Magnesium 2.1 1.6 - 2.3 mg/dL   Protein  random urine with Creat Ratio    Collection Time: 05/01/18 12:38 PM   Result Value Ref Range    Protein Random Urine 2.06 g/L    Protein Total Urine g/gr Creatinine 1.76 (H) 0 - 0.2 g/g Cr   UA with Microscopic reflex to Culture    Collection Time: 05/01/18 12:38 PM   Result Value Ref Range    Color Urine Yellow     Appearance Urine Clear     Glucose Urine 50 (A) NEG^Negative mg/dL    Bilirubin  Urine Negative NEG^Negative    Ketones Urine Negative NEG^Negative mg/dL    Specific Gravity Urine 1.016 1.003 - 1.035    Blood Urine Negative NEG^Negative    pH Urine 5.0 5.0 - 7.0 pH    Protein Albumin Urine >499 (A) NEG^Negative mg/dL    Urobilinogen mg/dL 0.0 0.0 - 2.0 mg/dL    Nitrite Urine Negative NEG^Negative    Leukocyte Esterase Urine Negative NEG^Negative    Source Midstream Urine     WBC Urine <1 0 - 5 /HPF    RBC Urine 1 0 - 2 /HPF    Squamous Epithelial /HPF Urine <1 0 - 1 /HPF    Mucous Urine Present (A) NEG^Negative /LPF   Creatinine urine calculation only    Collection Time: 05/01/18 12:38 PM   Result Value Ref Range    Creatinine Urine 117 mg/dL     I have seen and examined this patient with the resident.  This note reflects our joint assessment and plan.     MD Jayda Dueñas MD

## 2018-05-04 DIAGNOSIS — N40.1 BENIGN NON-NODULAR PROSTATIC HYPERPLASIA WITH LOWER URINARY TRACT SYMPTOMS: ICD-10-CM

## 2018-05-08 RX ORDER — TAMSULOSIN HYDROCHLORIDE 0.4 MG/1
0.4 CAPSULE ORAL DAILY
Qty: 90 CAPSULE | Refills: 1 | Status: SHIPPED | OUTPATIENT
Start: 2018-05-08 | End: 2018-10-07

## 2018-05-11 ENCOUNTER — TELEPHONE (OUTPATIENT)
Dept: NEPHROLOGY | Facility: CLINIC | Age: 68
End: 2018-05-11

## 2018-05-11 NOTE — TELEPHONE ENCOUNTER
----- Message from Bre Hasnon LPN sent at 5/3/2018 11:03 AM CDT -----  Regarding: FW: BP follow up, hypovitaminosis D      ----- Message -----     From: Britni Austin, RN     Sent: 5/2/2018   3:52 PM       To: Bre Hanson LPN  Subject: FW: BP follow up, hypovitaminosis D              CKD 3, can you address this?  ----- Message -----     From: Jayda Bolden MD     Sent: 5/2/2018   3:39 PM       To: Delphine Cespedes MD, Britni Austin, RN  Subject: BP follow up, hypovitaminosis D                  This patient dose have CKD stage 3 and proteinuria secondary to diabetic nephropathy. He takes Cozaar 25 mg daily. His BP was 115/76 mmHg. Patient was advised to check his BP daily and keep records for us to review to see if Cozaar dose can be increased. Please call him next week. Also his vitamin D level is in low end of normal 20 ug/L. Please tell him to take cholecalciferol 2000U daily.     Thank you    Haritha

## 2018-05-11 NOTE — TELEPHONE ENCOUNTER
Call to patient regarding message below, BP and vitamin D. Will try again. LVM. Provided number for call back.  Bre Hanson LPN  Nephrology  [phone]

## 2018-05-15 ENCOUNTER — PRE VISIT (OUTPATIENT)
Dept: UROLOGY | Facility: CLINIC | Age: 68
End: 2018-05-15

## 2018-05-15 DIAGNOSIS — C61 MALIGNANT NEOPLASM OF PROSTATE (H): Primary | ICD-10-CM

## 2018-05-16 DIAGNOSIS — Z79.4 DIABETES MELLITUS TYPE 2, INSULIN DEPENDENT (H): ICD-10-CM

## 2018-05-16 DIAGNOSIS — E11.9 DIABETES MELLITUS TYPE 2, INSULIN DEPENDENT (H): ICD-10-CM

## 2018-05-24 ENCOUNTER — OFFICE VISIT (OUTPATIENT)
Dept: UROLOGY | Facility: CLINIC | Age: 68
End: 2018-05-24
Payer: MEDICARE

## 2018-05-24 VITALS
BODY MASS INDEX: 29.3 KG/M2 | HEIGHT: 68 IN | SYSTOLIC BLOOD PRESSURE: 125 MMHG | DIASTOLIC BLOOD PRESSURE: 68 MMHG | HEART RATE: 84 BPM | WEIGHT: 193.3 LBS

## 2018-05-24 DIAGNOSIS — C61 MALIGNANT NEOPLASM OF PROSTATE (H): ICD-10-CM

## 2018-05-24 DIAGNOSIS — R25.2 CRAMPS OF LOWER EXTREMITY: ICD-10-CM

## 2018-05-24 DIAGNOSIS — C61 MALIGNANT NEOPLASM OF PROSTATE (H): Primary | ICD-10-CM

## 2018-05-24 LAB
MAGNESIUM SERPL-MCNC: 2.1 MG/DL (ref 1.6–2.3)
PSA SERPL-MCNC: 2.14 UG/L (ref 0–4)

## 2018-05-24 ASSESSMENT — PAIN SCALES - GENERAL: PAINLEVEL: NO PAIN (0)

## 2018-05-24 NOTE — MR AVS SNAPSHOT
After Visit Summary   5/24/2018    Earle Rene    MRN: [id number]           Patient Information     Date Of Birth          1950        Visit Information        Provider Department      5/24/2018 2:40 PM Weight, Emmanuel Boyle MD [facility]        Today's Diagnoses     Malignant neoplasm of prostate (H)    -  1      Care Instructions    Schedule appointment with Dr. Glass.    It was a pleasure meeting with you today.  Thank you for allowing me and my team the privilege of caring for you today.  YOU are the reason we are here, and I truly hope we provided you with the excellent service you deserve.  Please let us know if there is anything else we can do for you so that we can be sure you are leaving completely satisfied with your care experience.        JAMES Dodd          Follow-ups after your visit        Your next 10 appointments already scheduled     Aug 08, 2018 12:00 PM CDT   Lab with  LAB   [facility] ([facility])    [address] [place] [address]  1st Floor  [facility] [id number] [phone]            Aug 08, 2018  1:00 PM CDT   (Arrive by 12:30 PM)   Return Visit with Jayda Bolden MD   [facility] ([facility])    [address] [facility]  [address]  [facility] [id number] [phone]            Sep 07, 2018  8:30 AM CDT   (Arrive by 8:15 AM)   RETURN DIABETES with Pamela Laura PA-C   [facility] ([facility])    [address] [place] [address]  3rd Floor  [facility] [id number] [phone]              Who to contact     Please call your clinic at [phone] to:    Ask questions about your health    Make or cancel appointments    Discuss your medicines    Learn about your test results    Speak to your doctor            Additional Information About Your Visit        Care EveryWhere ID     This is your Care  "EveryWhere ID. This could be used by other organizations to access your [facility] medical records  [id number]        Your Vitals Were     Pulse Height BMI (Body Mass Index)             84 1.727 m (5' 8\") 29.39 kg/m2          Blood Pressure from Last 3 Encounters:   05/31/18 115/78   05/24/18 125/68   05/01/18 115/76    Weight from Last 3 Encounters:   05/31/18 87.4 kg (192 lb 9.6 oz)   05/24/18 87.7 kg (193 lb 4.8 oz)   05/01/18 88.6 kg (195 lb 4.8 oz)              Today, you had the following     No orders found for display       Primary Care Provider Office Phone # Fax #    Marcio Hare [phone] [address] [address]        Equal Access to Services     [facility]: Hadii priscilla rubin hadasho Soomaali, waaxda luqadaha, qaybta kaalmada adeegyada, ethan nascimento . So [facility] [phone].    ATENCIÓN: Si habla español, tiene a márquez disposición servicios gratuitos de asistencia lingüística. Llame al [phone].    We comply with applicable federal civil rights laws and [place] laws. We do not discriminate on the basis of race, color, national origin, age, disability, sex, sexual orientation, or gender identity.            Thank you!     Thank you for choosing [facility] AND [facility]  for your care. Our goal is always to provide you with excellent care. Hearing back from our patients is one way we can continue to improve our services. Please take a few minutes to complete the written survey that you may receive in the mail after your visit with us. Thank you!             Your Updated Medication List - Protect others around you: Learn how to safely use, store and throw away your medicines at www.disposemymeds.org.          This list is accurate as of 5/24/18 11:59 PM.  Always use your most recent med list.                   Brand Name Dispense Instructions for use Diagnosis    alprostadil 20 MCG kit    EDEX "    6 each    by Intracavitary route. Inject 3/4 ml (15 ug) as instructed.  Can increase to full dose of 1 ml (20 ug) if necessary.   Can dispense 6 kits.    Erectile dysfunction       amoxicillin-clavulanate 875-125 MG per tablet    AUGMENTIN     TK 1 T PO BID FOR 10 DAYS        aspirin 81 MG tablet      Take 1 tablet by mouth daily.        atorvastatin 20 MG tablet    LIPITOR    90 tablet    Take 1 tablet (20 mg) by mouth daily    Hyperlipidemia LDL goal <100       BASAGLAR 100 UNIT/ML injection     60 mL    Inject 68 units SQ at bedtime        blood glucose lancets standard    no brand specified    300 each    Lancets that go with device, Test 3 times daily    Type 2 diabetes mellitus with diabetic nephropathy (H)       blood glucose monitoring meter device kit    no brand specified    1 kit    Any meter covered by insurance, not store brand, use as directed.    Type 2 diabetes mellitus with diabetic nephropathy (H)       blood glucose monitoring test strip    no brand specified    300 strip    Strips that go with meter, covered by insurance. Test 3 times daily    Type 2 diabetes mellitus with diabetic nephropathy (H)       Blood Pressure Monitor Kit     1 kit    Check blood pressure as directed.    Unspecified essential hypertension       clobetasol 0.05 % ointment    TEMOVATE    30 g    Apply topically to legs twice daily for 2 weeks.  Then discontinue    Rash and other nonspecific skin eruption       clotrimazole 1 % cream    LOTRIMIN    30 g    Apply topically 2 times daily    Tinea pedis of both feet, Dermatophytosis of nail       fenofibrate 54 MG tablet     90 tablet    Take 1 tablet (54 mg) by mouth daily    Hyperlipidemia LDL goal < 100       finasteride 5 MG tablet    PROSCAR    90 tablet    Take 1 tablet (5 mg) by mouth daily    Benign prostatic hyperplasia with urinary obstruction       fish oil-omega-3 fatty acids 1000 MG capsule     60 capsule    Take 1 capsule (1 g) by mouth 2 times daily     "Lymphocytopenia, High cholesterol, Screening for prostate cancer       gabapentin 100 MG capsule    NEURONTIN    180 capsule    Take 1 capsule (100 mg) by mouth 3 times daily    Polyneuropathy in diabetes(357.2)       insulin pen needle 31G X 5 MM    B-D U/F    400 each    Use 4 times per day.  Please dispense as BD Pen Needle Mini U/F 31G x 5 MM    Diabetes mellitus type 2, insulin dependent (H)       insulin syringe 31G X 5/16\" 0.5 ML Misc     270 each    Use three syringes daily    Type 2 diabetes mellitus (H)       loratadine 10 MG tablet    CLARITIN    90 tablet    Take 1 tablet (10 mg) by mouth daily    Seasonal allergies       losartan 25 MG tablet    COZAAR    90 tablet    Take 1 tablet (25 mg) by mouth daily    HTN (hypertension)       metFORMIN 1000 MG tablet    GLUCOPHAGE    180 tablet    1 tab twice daily with meals    Type 2 diabetes mellitus with diabetic neuropathy (H)       mupirocin 2 % cream    BACTROBAN    15 g    Apply  topically. In very small amounts only as needed    Rash and other nonspecific skin eruption       NovoLOG FLEXPEN 100 UNIT/ML injection   Generic drug:  insulin aspart     60 mL    Inject 12-14 units SQ with meals with use of correction insulin. Pt uses approx 60 units in 24 hrs.        sodium bicarbonate 650 MG tablet     90 tablet    Take 1 tablet (650 mg) by mouth 3 times daily    Chronic kidney disease, stage III (moderate)       tacrolimus 0.03 % ointment    PROTOPIC    60 g    Apply to affected area(s) twice daily    Rash and other nonspecific skin eruption       tamsulosin 0.4 MG capsule    FLOMAX    90 capsule    Take 1 capsule (0.4 mg) by mouth daily    Benign non-nodular prostatic hyperplasia with lower urinary tract symptoms         "

## 2018-05-24 NOTE — PATIENT INSTRUCTIONS
Schedule appointment with Dr. Glass.    It was a pleasure meeting with you today.  Thank you for allowing me and my team the privilege of caring for you today.  YOU are the reason we are here, and I truly hope we provided you with the excellent service you deserve.  Please let us know if there is anything else we can do for you so that we can be sure you are leaving completely satisfied with your care experience.        JAMES Dodd

## 2018-05-24 NOTE — LETTER
"5/24/2018     RE: Earle Rene  [address]  [place] [address]     Dear Colleague,    Thank you for referring your patient, Earle Rene, to the [facility] UROLOGY AND INST FOR PROSTATE AND UROLOGIC CANCERS at [facility]. Please see a copy of my visit note below.    Prostate Cancer Follow up on Active Surveillance     Earle Rene is a very pleasant 68 year old male who presents with a history of prostate cancer and being followed by AS.      Initial PSA at diagnosis: 8.6  1st Biopsy (Date 7/2017) [place] Score was 3 + 3  In 1 of 6 cores.  Volume affected was 5%.  Grade Group: 1    2nd Biopsy: NA    MRI (Date 6/2017)  Lesion #1 location: 4:30 to 5:00 position of posterior lateral left peripheral zone near base  PIRADS Lesion #1 score 4  Lesion #2 location 7 oclock  PIRADS Lesion #2 score 2  Prostate volume 110g  Also PIRADS 3 at 6 oclock    PSA Density 0.08    digital rectal exam: deferred       There is no evidence of disease progression to date.    Today reports significant voiding symptoms including urgency, frequency, and dysuria. He is currently taking Flomax daily and Finasteride not as often.     Physical Exam  Vitals: /68 (BP Location: Left arm, Patient Position: Sitting, Cuff Size: Adult Regular)  Pulse 84  Ht 1.727 m (5' 8\")  Wt 87.7 kg (193 lb 4.8 oz)  BMI 29.39 kg/m2  General Appearance Adult: A&O in NAD       PSA   Date Value Ref Range Status   05/24/2018 2.14 0 - 4 ug/L Final     Comment:     Assay Method:  Chemiluminescence using Siemens Vista analyzer   11/01/2017 2.52 0 - 4 ug/L Final     Comment:     Assay Method:  Chemiluminescence using Siemens Vista analyzer   06/06/2017 8.64 (H) 0 - 4 ug/L Final     Comment:     Assay Method:  Chemiluminescence using Siemens Vista analyzer   01/14/2016 3.75 0 - 4 ug/L Final   09/09/2014 2.92 0 - 4 ug/L Final   06/12/2013 2.18 0 - 4 ug/L Final     Comment:     PSA results are about 7% lower than our " prior method due to a methodology   change   on August 30, 2011.   02/02/2012 1.77 0 - 4 ug/L Final     Comment:     PSA results are about 7% lower than our prior method due to a methodology   change   on August 30, 2011.   03/08/2011 1.37 0 - 4 ug/L Final   08/10/2010 1.76 0 - 4 ug/L Final   04/20/2007 1.47 0 - 4 ug/L Final         Assessment presumed localized prostate cancer GS=6 and Grade Group 1 on active surveillance since 8/2017 with no evidence of disease progression and dropping PSA    Plan      Since there is no sign of disease progression, will plan to continue with active surveillance. We did discuss repeating MRI of prostate as his last MRI was in 6/2017. Will plan to do this sometime over the next 6 months and repeat the biopsy     Given that his current voiding symptoms are bothersome to him and his large prostate volume, we discussed that surgical intervention may be helpful in improving his voiding symptoms. Particularly we discussed HoLEP - pt was interested in learning more about this.    Obtain Prostate MRI and refer to Dr. Glass for consideration of HoLEP.  No concern about prostate cancer now if he elects to proceed with HoLEP      Follow up in 3 months    PSA (tumor marker)    Clinic Exam      Emmanuel Cantu MD  Department of Urology  [facility]

## 2018-05-24 NOTE — PROGRESS NOTES
"Prostate Cancer Follow up on Active Surveillance     Earle Rene is a very pleasant 68 year old male who presents with a history of prostate cancer and being followed by AS.      Initial PSA at diagnosis: 8.6  1st Biopsy (Date 7/2017) [place] Score was 3 + 3  In 1 of 6 cores.  Volume affected was 5%.  Grade Group: 1    2nd Biopsy: NA    MRI (Date 6/2017)  Lesion #1 location: 4:30 to 5:00 position of posterior lateral left peripheral zone near base  PIRADS Lesion #1 score 4  Lesion #2 location 7 oclock  PIRADS Lesion #2 score 2  Prostate volume 110g  Also PIRADS 3 at 6 oclock    PSA Density 0.08    digital rectal exam: deferred       There is no evidence of disease progression to date.    Today reports significant voiding symptoms including urgency, frequency, and dysuria. He is currently taking Flomax daily and Finasteride not as often.     Physical Exam  Vitals: /68 (BP Location: Left arm, Patient Position: Sitting, Cuff Size: Adult Regular)  Pulse 84  Ht 1.727 m (5' 8\")  Wt 87.7 kg (193 lb 4.8 oz)  BMI 29.39 kg/m2  General Appearance Adult: A&O in NAD       PSA   Date Value Ref Range Status   05/24/2018 2.14 0 - 4 ug/L Final     Comment:     Assay Method:  Chemiluminescence using Siemens Vista analyzer   11/01/2017 2.52 0 - 4 ug/L Final     Comment:     Assay Method:  Chemiluminescence using Siemens Vista analyzer   06/06/2017 8.64 (H) 0 - 4 ug/L Final     Comment:     Assay Method:  Chemiluminescence using Siemens Vista analyzer   01/14/2016 3.75 0 - 4 ug/L Final   09/09/2014 2.92 0 - 4 ug/L Final   06/12/2013 2.18 0 - 4 ug/L Final     Comment:     PSA results are about 7% lower than our prior method due to a methodology   change   on August 30, 2011.   02/02/2012 1.77 0 - 4 ug/L Final     Comment:     PSA results are about 7% lower than our prior method due to a methodology   change   on August 30, 2011.   03/08/2011 1.37 0 - 4 ug/L Final   08/10/2010 1.76 0 - 4 ug/L Final   04/20/2007 1.47 0 - 4 " ug/L Final         Assessment presumed localized prostate cancer GS=6 and Grade Group 1 on active surveillance since 8/2017 with no evidence of disease progression and dropping PSA    Plan      Since there is no sign of disease progression, will plan to continue with active surveillance. We did discuss repeating MRI of prostate as his last MRI was in 6/2017. Will plan to do this sometime over the next 6 months and repeat the biopsy     Given that his current voiding symptoms are bothersome to him and his large prostate volume, we discussed that surgical intervention may be helpful in improving his voiding symptoms. Particularly we discussed HoLEP - pt was interested in learning more about this.    Obtain Prostate MRI and refer to Dr. Glass for consideration of HoLEP.  No concern about prostate cancer now if he elects to proceed with HoLEP      Follow up in 3 months    PSA (tumor marker)    Clinic Exam      Emmanuel Cantu MD  Department of Urology  [facility]

## 2018-05-31 ENCOUNTER — OFFICE VISIT (OUTPATIENT)
Dept: ENDOCRINOLOGY | Facility: CLINIC | Age: 68
End: 2018-05-31
Payer: MEDICARE

## 2018-05-31 VITALS
HEART RATE: 95 BPM | DIASTOLIC BLOOD PRESSURE: 78 MMHG | WEIGHT: 192.6 LBS | BODY MASS INDEX: 29.19 KG/M2 | SYSTOLIC BLOOD PRESSURE: 115 MMHG | HEIGHT: 68 IN

## 2018-05-31 DIAGNOSIS — Z79.4 TYPE 2 DIABETES MELLITUS WITH HYPERGLYCEMIA, WITH LONG-TERM CURRENT USE OF INSULIN (H): Primary | ICD-10-CM

## 2018-05-31 DIAGNOSIS — E11.65 TYPE 2 DIABETES MELLITUS WITH HYPERGLYCEMIA, WITH LONG-TERM CURRENT USE OF INSULIN (H): Primary | ICD-10-CM

## 2018-05-31 ASSESSMENT — PAIN SCALES - GENERAL: PAINLEVEL: NO PAIN (0)

## 2018-05-31 NOTE — LETTER
5/31/2018       RE: Earle Rene  [address]  [place] [address]     Dear Colleague,    Thank you for referring your patient, Earle Rene, to the [facility] ENDOCRINOLOGY at [facility]. Please see a copy of my visit note below.    HPI   Earle Rene is a 68 year old male with type 2 diabetes mellitus here today for a follow up visit.      He was last seen in our clinic in March 2018.  Pt's diabetes is complicated by retinopathy, nephropathy and neuropathy.  He has made lifestyle and dietary changes since his last visit and has lost weight and his glycemic control has improved.  For his diabetes, he is currently taking Basaglar 60  units SQ at hs, Novolog 8 units with meals and Metformin 1000 mg BID.  Pt's A1C is 7.1 % today and his previous A1C was 9.1 % on 3/6/2018.   His A1C was 9.6 %.   We downloaded his glucose meter today and his blood sugar values have improved.  His fasting blood sugar values have been 161, 124, 117, 111, 141, and 102.  He has no prelunch or predinner blood sugar values.  Patient has no documented hypoglycemia and he denies symptoms of frequent hypoglycemia.  On ROS today, pt reports feeling better overall.  He has lost some weight.  He states his vision is stable at this time and he has been seen by ophthalmology here on a regular basis for injections.  Pt has numbness in both feet from his neuropathy.  He reports chronic cough.  Pt denies frequent headaches, n/v, SOB at rest, chest pain, abd pain, diarrhea, dysuria, hematuria or foot ulcers.    ROS   Please see under HPI.     ALLERGIES:  Review of patient's allergies indicates no known allergies.    Current Outpatient Prescriptions   Medication Sig Dispense Refill     alprostadil (EDEX) 20 MCG injection by Intracavitary route. Inject 3/4 ml (15 ug) as instructed.  Can increase to full dose of 1 ml (20 ug) if necessary.   Can dispense 6 kits. 6 each 12     amoxicillin-clavulanate  "(AUGMENTIN) 875-125 MG per tablet TK 1 T PO BID FOR 10 DAYS  0     aspirin 81 MG tablet Take 1 tablet by mouth daily.       atorvastatin (LIPITOR) 20 MG tablet Take 1 tablet (20 mg) by mouth daily 90 tablet 3     BASAGLAR 100 UNIT/ML injection Inject 68 units SQ at bedtime 60 mL 3     blood glucose (NO BRAND SPECIFIED) lancets standard Lancets that go with device, Test 3 times daily 300 each 3     blood glucose monitoring (NO BRAND SPECIFIED) meter device kit Any meter covered by insurance, not store brand, use as directed. 1 kit 0     blood glucose monitoring (NO BRAND SPECIFIED) test strip Strips that go with meter, covered by insurance. Test 3 times daily 300 strip 3     Blood Pressure Monitor KIT Check blood pressure as directed. 1 kit 0     clobetasol (TEMOVATE) 0.05 % ointment Apply topically to legs twice daily for 2 weeks.  Then discontinue 30 g 0     clotrimazole (LOTRIMIN) 1 % cream Apply topically 2 times daily 30 g 3     fenofibrate 54 MG tablet Take 1 tablet (54 mg) by mouth daily 90 tablet 0     finasteride (PROSCAR) 5 MG tablet Take 1 tablet (5 mg) by mouth daily 90 tablet 3     gabapentin (NEURONTIN) 100 MG capsule Take 1 capsule (100 mg) by mouth 3 times daily 180 capsule 5     insulin pen needle (B-D U/F) 31G X 5 MM Use 4 times per day.  Please dispense as BD Pen Needle Mini U/F 31G x 5  each 3     insulin syringe 31G X 5/16\" 0.5 ML MISC Use three syringes daily 270 each 1     loratadine (CLARITIN) 10 MG tablet Take 1 tablet (10 mg) by mouth daily 90 tablet 0     losartan (COZAAR) 25 MG tablet Take 1 tablet (25 mg) by mouth daily 90 tablet 3     metFORMIN (GLUCOPHAGE) 1000 MG tablet 1 tab twice daily with meals 180 tablet 3     mupirocin (BACTROBAN) 2 % cream Apply  topically. In very small amounts only as needed 15 g 1     NOVOLOG FLEXPEN 100 UNIT/ML soln Inject 12-14 units SQ with meals with use of correction insulin. Pt uses approx 60 units in 24 hrs. 60 mL 3     Omega-3 Fatty Acids " "(OMEGA-3 FISH OIL) 1000 MG CAPS Take 1 capsule (1 g) by mouth 2 times daily 60 capsule 11     sodium bicarbonate 650 MG tablet Take 1 tablet (650 mg) by mouth 3 times daily 90 tablet 11     tacrolimus (PROTOPIC) 0.03 % ointment Apply to affected area(s) twice daily 60 g 0     tamsulosin (FLOMAX) 0.4 MG capsule Take 1 capsule (0.4 mg) by mouth daily 90 capsule 1     Family Hx   No change.     Personal Hx   Smoke: none.   ETOH: none.    with grown children.   He owns his own business.    PMH   1. Type 2 Diabetes Mellitus dx at age 43.   2. Neuropathy.  3. Nephropathy.   4. ED.   5. Dyslipidemia.   6. Nephrolithiasis.   7. Decrease auditory acuity.   8. Sarcoidosis-lung.   9. Goiter.   10. S/P T & A.   11. S/P FX right heel.   12. Vit D def.   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   14. CT chest- ? granulomas.   Past Medical History:   Diagnosis Date     Blepharitis of both eyes      BPH (benign prostatic hyperplasia)      Diabetes (H)      Diabetic neuropathy (H)      Diabetic retinopathy associated with diabetes mellitus due to underlying condition (H)      Dry eye syndrome      Goiter      Granulomatous disease (H)      Nonsenile cataract      Peripheral neuropathy      Past Surgical History:   Procedure Laterality Date     AS RAD RESEC TONSIL/PILLARS Bilateral 1961     COLONOSCOPY  7/29/2013    Procedure: COLONOSCOPY;;  Surgeon: Montana Pascal MD;  Location: [facility]  11/2017     SURGICAL PATHOLOGY EXAM       Physical Exam   General appearance: Vital signs:   /78  Pulse 95  Ht 1.727 m (5' 7.99\")  Wt 87.4 kg (192 lb 9.6 oz)  BMI 29.29 kg/m2  Estimated body mass index is 29.29 kg/(m^2) as calculated from the following:    Height as of this encounter: 1.727 m (5' 7.99\").    Weight as of this encounter: 87.4 kg (192 lb 9.6 oz).  Head:  Normal.   Eyes: PERRLA; fundi not visualized.   Lungs: clear.   Cardiovascular system:  RRR.   Abdomen:  Large and nontender.  Musculoskeletal system: no " edema.   Neurological:  normal.   Skin:  necrobiosis lipoidica on both legs.   FEET: no ulcers.    Results   Creatinine   Date Value Ref Range Status   05/01/2018 1.28 (H) 0.66 - 1.25 mg/dL Final     GFR Estimate   Date Value Ref Range Status   05/01/2018 56 (L) >60 mL/min/1.7m2 Final     Comment:     Non  GFR Calc     Hemoglobin A1C   Date Value Ref Range Status   03/06/2018 9.1 (H) 4.3 - 6.0 % Final     Potassium   Date Value Ref Range Status   05/01/2018 4.7 3.4 - 5.3 mmol/L Final     ALT   Date Value Ref Range Status   03/06/2018 35 0 - 70 U/L Final     AST   Date Value Ref Range Status   03/06/2018 25 0 - 45 U/L Final     TSH   Date Value Ref Range Status   11/01/2017 1.12 0.40 - 4.00 mU/L Final     T4 Free   Date Value Ref Range Status   10/21/2014 1.00 0.76 - 1.46 ng/dL Final     Comment:     Effective 7/30/2014, the reference range for this assay has changed to reflect   new instrumentation/methodology.           Cholesterol   Date Value Ref Range Status   03/06/2018 101 <200 mg/dL Final   01/14/2016 173 <200 mg/dL Final     HDL Cholesterol   Date Value Ref Range Status   03/06/2018 32 (L) >39 mg/dL Final   01/14/2016 30 (L) >39 mg/dL Final     LDL Cholesterol Calculated   Date Value Ref Range Status   03/06/2018 36 <100 mg/dL Final     Comment:     Desirable:       <100 mg/dl   01/14/2016  <100 mg/dL Final    Cannot estimate LDL when triglyceride exceeds 400 mg/dL     LDL Cholesterol Direct   Date Value Ref Range Status   01/14/2016 84 <100 mg/dL Final     Comment:     Desirable:       <100 mg/dl     Triglycerides   Date Value Ref Range Status   03/06/2018 166 (H) <150 mg/dL Final     Comment:     Borderline high:  150-199 mg/dl  High:             200-499 mg/dl  Very high:       >499 mg/dl     01/14/2016 436 (H) <150 mg/dL Final     Comment:     Borderline high:  150-199 mg/dl   High:             200-499 mg/dl   Very high:       >499 mg/dl       Cholesterol/HDL Ratio   Date Value Ref Range  Status   09/09/2014 3.8 0.0 - 5.0 Final   11/20/2013 5.8 (H) 0.0 - 5.0 Final     A1C      7.1   5/31/2018  A1C      9.1   3/6/2018  A1C      9.6   11/1/2017  A1C      8.9   8/9/2017  A1C      9.7   9/22/2016  A1C      9.7   7/21/2015  A1C     10.2  12/2/2014  A1C     10.2  9/9/2014  A1C      9.8  11/20/2013  A1C      9.3   6/12/2013  A1C      8.0   8/14/2012  A1C      8.2   5/22/2012  A1C      8.0   5/22/2012    ASSESSMENT/PLAN:     1. TYPE 2 DIABETES MELLITUS: Type 2 diabetes mellitus complicated by retinopathy, nephropathy and neuropathy.  Pt's blood sugar control has improved with dietary and lifestyle changes.  No change in insulin doses today.  I asked him to check his blood sugar fasting each am, prelunch and predinner DAILY.  He tells me that he is not interested in using an insulin carb ratio.  Encouraged patient to continue to make healthy food choices, reduce his food portions with meals, avoid snacking and walk daily and to take his insulin and medications as prescribed.  Pt remains on daily ASA.     2. GOITER: Nontender goiter.  TSH normal in Nov 2017.    3. NEPHROPATHY: Discussed the need for good glycemic control and good BP control.   Pt's creat is 1.28 with GFR 56 mL/min on 5/1/2018.  Pt's urine protein +.  He is taking Cozaar.  He has been seen by Nephrology here.    4.  RETINOPATHY: Pt seen by Oph here on a regular basis for injections.    5.  NEUROPATHY: Continue Gabapentin.  No foot ulcers.    6. DYSLIPIDEMIA: LDL 36 on 3/6/2018.   Pt is taking Lipitor, fenofibrate and Fish Oil.    7. OBESITY: See under # 1 above.  He does not want to take Byetta, Victoza or Bydureon.    8.   Return to Endocrine Clinic to see me in 3 months.  Pt is to call me if he has more than 2 low blood sugars ( 70 or less ) per week or persistent high blood sugar values > 250.      Again, thank you for allowing me to participate in the care of your patient.      Sincerely,    Pamela Laura PA-C

## 2018-05-31 NOTE — MR AVS SNAPSHOT
"              After Visit Summary   5/31/2018    Earle Rene    MRN: [id number]           Patient Information     Date Of Birth          1950        Visit Information        Provider Department      5/31/2018 10:30 AM Pamela Laura PA-C [facility] Endocr[facility]         Follow-ups after your visit        Your next 10 appointments already scheduled     Aug 08, 2018 12:00 PM CDT   Lab with [facility]   [facility] ([facility])    [address]  1st Floor  [place] [id number] [phone]            Aug 08, 2018  1:00 PM CDT   (Arrive by 12:30 PM)   Return Visit with MD LISA Reed [facility] ([facility])    [address]  [address]  [place] [id number] [phone]            Sep 07, 2018  8:30 AM CDT   (Arrive by 8:15 AM)   RETURN DIABETES with RAJANI Li [facility] ([facility])    [address]  3rd Floor  [place] [id number] [phone]              Who to contact     Please call your clinic at [phone] to:    Ask questions about your health    Make or cancel appointments    Discuss your medicines    Learn about your test results    Speak to your doctor            Additional Information About Your Visit        Care EveryWhere ID     This is your Care EveryWhere ID. This could be used by other organizations to access your [facility] medical records  [id number]        Your Vitals Were     Pulse Height BMI (Body Mass Index)             95 1.727 m (5' 7.99\") 29.29 kg/m2          Blood Pressure from Last 3 Encounters:   05/31/18 115/78   05/24/18 125/68   05/01/18 115/76    Weight from Last 3 Encounters:   05/31/18 87.4 kg (192 lb 9.6 oz)   05/24/18 87.7 kg (193 lb 4.8 oz)   05/01/18 88.6 kg (195 lb 4.8 oz)              Today, you had the following     No orders found for display       Primary Care Provider Office Phone # Fax #    Marcio W " MD Payam [phone]       [address] [address]        Equal Access to Services     SHARON MAYS : Coby priscilla rubin pranav Aggarwal, jonny dentonwillha, sandeep kaomarda margarito, ethan moody laJamesshay bobo. So [facility] [phone].    ATENCIÓN: Si habla español, tiene a márquez disposición servicios gratuitos de asistencia lingüística. Llame al [phone].    We comply with applicable federal civil rights laws and [place] laws. We do not discriminate on the basis of race, color, national origin, age, disability, sex, sexual orientation, or gender identity.            Thank you!     Thank you for choosing [facility] ENDOCRINOLOGY  for your care. Our goal is always to provide you with excellent care. Hearing back from our patients is one way we can continue to improve our services. Please take a few minutes to complete the written survey that you may receive in the mail after your visit with us. Thank you!             Your Updated Medication List - Protect others around you: Learn how to safely use, store and throw away your medicines at www.disposemymeds.org.          This list is accurate as of 5/31/18 11:06 AM.  Always use your most recent med list.                   Brand Name Dispense Instructions for use Diagnosis    alprostadil 20 MCG kit    EDEX    6 each    by Intracavitary route. Inject 3/4 ml (15 ug) as instructed.  Can increase to full dose of 1 ml (20 ug) if necessary.   Can dispense 6 kits.    Erectile dysfunction       amoxicillin-clavulanate 875-125 MG per tablet    AUGMENTIN     TK 1 T PO BID FOR 10 DAYS        aspirin 81 MG tablet      Take 1 tablet by mouth daily.        atorvastatin 20 MG tablet    LIPITOR    90 tablet    Take 1 tablet (20 mg) by mouth daily    Hyperlipidemia LDL goal <100       BASAGLAR 100 UNIT/ML injection     60 mL    Inject 68 units SQ at bedtime        blood glucose lancets standard    no brand specified    300 each    Lancets  "that go with device, Test 3 times daily    Type 2 diabetes mellitus with diabetic nephropathy (H)       blood glucose monitoring meter device kit    no brand specified    1 kit    Any meter covered by insurance, not store brand, use as directed.    Type 2 diabetes mellitus with diabetic nephropathy (H)       blood glucose monitoring test strip    no brand specified    300 strip    Strips that go with meter, covered by insurance. Test 3 times daily    Type 2 diabetes mellitus with diabetic nephropathy (H)       Blood Pressure Monitor Kit     1 kit    Check blood pressure as directed.    Unspecified essential hypertension       clobetasol 0.05 % ointment    TEMOVATE    30 g    Apply topically to legs twice daily for 2 weeks.  Then discontinue    Rash and other nonspecific skin eruption       clotrimazole 1 % cream    LOTRIMIN    30 g    Apply topically 2 times daily    Tinea pedis of both feet, Dermatophytosis of nail       fenofibrate 54 MG tablet     90 tablet    Take 1 tablet (54 mg) by mouth daily    Hyperlipidemia LDL goal < 100       finasteride 5 MG tablet    PROSCAR    90 tablet    Take 1 tablet (5 mg) by mouth daily    Benign prostatic hyperplasia with urinary obstruction       fish oil-omega-3 fatty acids 1000 MG capsule     60 capsule    Take 1 capsule (1 g) by mouth 2 times daily    Lymphocytopenia, High cholesterol, Screening for prostate cancer       gabapentin 100 MG capsule    NEURONTIN    180 capsule    Take 1 capsule (100 mg) by mouth 3 times daily    Polyneuropathy in diabetes(357.2)       insulin pen needle 31G X 5 MM    B-D U/F    400 each    Use 4 times per day.  Please dispense as BD Pen Needle Mini U/F 31G x 5 MM    Diabetes mellitus type 2, insulin dependent (H)       insulin syringe 31G X 5/16\" 0.5 ML Misc     270 each    Use three syringes daily    Type 2 diabetes mellitus (H)       loratadine 10 MG tablet    CLARITIN    90 tablet    Take 1 tablet (10 mg) by mouth daily    Seasonal allergies "       losartan 25 MG tablet    COZAAR    90 tablet    Take 1 tablet (25 mg) by mouth daily    HTN (hypertension)       metFORMIN 1000 MG tablet    GLUCOPHAGE    180 tablet    1 tab twice daily with meals    Type 2 diabetes mellitus with diabetic neuropathy (H)       mupirocin 2 % cream    BACTROBAN    15 g    Apply  topically. In very small amounts only as needed    Rash and other nonspecific skin eruption       NovoLOG FLEXPEN 100 UNIT/ML injection   Generic drug:  insulin aspart     60 mL    Inject 12-14 units SQ with meals with use of correction insulin. Pt uses approx 60 units in 24 hrs.        sodium bicarbonate 650 MG tablet     90 tablet    Take 1 tablet (650 mg) by mouth 3 times daily    Chronic kidney disease, stage III (moderate)       tacrolimus 0.03 % ointment    PROTOPIC    60 g    Apply to affected area(s) twice daily    Rash and other nonspecific skin eruption       tamsulosin 0.4 MG capsule    FLOMAX    90 capsule    Take 1 capsule (0.4 mg) by mouth daily    Benign non-nodular prostatic hyperplasia with lower urinary tract symptoms

## 2018-05-31 NOTE — PROGRESS NOTES
HPI   Earle Rene is a 68 year old male with type 2 diabetes mellitus here today for a follow up visit.      He was last seen in our clinic in March 2018.  Pt's diabetes is complicated by retinopathy, nephropathy and neuropathy.  He has made lifestyle and dietary changes since his last visit and has lost weight and his glycemic control has improved.  For his diabetes, he is currently taking Basaglar 60  units SQ at hs, Novolog 8 units with meals and Metformin 1000 mg BID.  Pt's A1C is 7.1 % today and his previous A1C was 9.1 % on 3/6/2018.   His A1C was 9.6 %.   We downloaded his glucose meter today and his blood sugar values have improved.  His fasting blood sugar values have been 161, 124, 117, 111, 141, and 102.  He has no prelunch or predinner blood sugar values.  Patient has no documented hypoglycemia and he denies symptoms of frequent hypoglycemia.  On ROS today, pt reports feeling better overall.  He has lost some weight.  He states his vision is stable at this time and he has been seen by ophthalmology here on a regular basis for injections.  Pt has numbness in both feet from his neuropathy.  He reports chronic cough.  Pt denies frequent headaches, n/v, SOB at rest, chest pain, abd pain, diarrhea, dysuria, hematuria or foot ulcers.    ROS   Please see under HPI.     ALLERGIES:  Review of patient's allergies indicates no known allergies.    Current Outpatient Prescriptions   Medication Sig Dispense Refill     alprostadil (EDEX) 20 MCG injection by Intracavitary route. Inject 3/4 ml (15 ug) as instructed.  Can increase to full dose of 1 ml (20 ug) if necessary.   Can dispense 6 kits. 6 each 12     amoxicillin-clavulanate (AUGMENTIN) 875-125 MG per tablet TK 1 T PO BID FOR 10 DAYS  0     aspirin 81 MG tablet Take 1 tablet by mouth daily.       atorvastatin (LIPITOR) 20 MG tablet Take 1 tablet (20 mg) by mouth daily 90 tablet 3     BASAGLAR 100 UNIT/ML injection Inject 68 units SQ at bedtime 60 mL 3     blood  "glucose (NO BRAND SPECIFIED) lancets standard Lancets that go with device, Test 3 times daily 300 each 3     blood glucose monitoring (NO BRAND SPECIFIED) meter device kit Any meter covered by insurance, not store brand, use as directed. 1 kit 0     blood glucose monitoring (NO BRAND SPECIFIED) test strip Strips that go with meter, covered by insurance. Test 3 times daily 300 strip 3     Blood Pressure Monitor KIT Check blood pressure as directed. 1 kit 0     clobetasol (TEMOVATE) 0.05 % ointment Apply topically to legs twice daily for 2 weeks.  Then discontinue 30 g 0     clotrimazole (LOTRIMIN) 1 % cream Apply topically 2 times daily 30 g 3     fenofibrate 54 MG tablet Take 1 tablet (54 mg) by mouth daily 90 tablet 0     finasteride (PROSCAR) 5 MG tablet Take 1 tablet (5 mg) by mouth daily 90 tablet 3     gabapentin (NEURONTIN) 100 MG capsule Take 1 capsule (100 mg) by mouth 3 times daily 180 capsule 5     insulin pen needle (B-D U/F) 31G X 5 MM Use 4 times per day.  Please dispense as BD Pen Needle Mini U/F 31G x 5  each 3     insulin syringe 31G X 5/16\" 0.5 ML MISC Use three syringes daily 270 each 1     loratadine (CLARITIN) 10 MG tablet Take 1 tablet (10 mg) by mouth daily 90 tablet 0     losartan (COZAAR) 25 MG tablet Take 1 tablet (25 mg) by mouth daily 90 tablet 3     metFORMIN (GLUCOPHAGE) 1000 MG tablet 1 tab twice daily with meals 180 tablet 3     mupirocin (BACTROBAN) 2 % cream Apply  topically. In very small amounts only as needed 15 g 1     NOVOLOG FLEXPEN 100 UNIT/ML soln Inject 12-14 units SQ with meals with use of correction insulin. Pt uses approx 60 units in 24 hrs. 60 mL 3     Omega-3 Fatty Acids (OMEGA-3 FISH OIL) 1000 MG CAPS Take 1 capsule (1 g) by mouth 2 times daily 60 capsule 11     sodium bicarbonate 650 MG tablet Take 1 tablet (650 mg) by mouth 3 times daily 90 tablet 11     tacrolimus (PROTOPIC) 0.03 % ointment Apply to affected area(s) twice daily 60 g 0     tamsulosin (FLOMAX) " "0.4 MG capsule Take 1 capsule (0.4 mg) by mouth daily 90 capsule 1     Family Hx   No change.     Personal Hx   Smoke: none.   ETOH: none.    with grown children.   He owns his own business.    PMH   1. Type 2 Diabetes Mellitus dx at age 43.   2. Neuropathy.  3. Nephropathy.   4. ED.   5. Dyslipidemia.   6. Nephrolithiasis.   7. Decrease auditory acuity.   8. Sarcoidosis-lung.   9. Goiter.   10. S/P T & A.   11. S/P FX right heel.   12. Vit D def.   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   14. CT chest- ? granulomas.   Past Medical History:   Diagnosis Date     Blepharitis of both eyes      BPH (benign prostatic hyperplasia)      Diabetes (H)      Diabetic neuropathy (H)      Diabetic retinopathy associated with diabetes mellitus due to underlying condition (H)      Dry eye syndrome      Goiter      Granulomatous disease (H)      Nonsenile cataract      Peripheral neuropathy      Past Surgical History:   Procedure Laterality Date     AS RAD RESEC TONSIL/PILLARS Bilateral 1961     COLONOSCOPY  7/29/2013    Procedure: COLONOSCOPY;;  Surgeon: Montana Pascal MD;  Location: [facility]  11/2017     SURGICAL PATHOLOGY EXAM       Physical Exam   General appearance: Vital signs:   /78  Pulse 95  Ht 1.727 m (5' 7.99\")  Wt 87.4 kg (192 lb 9.6 oz)  BMI 29.29 kg/m2  Estimated body mass index is 29.29 kg/(m^2) as calculated from the following:    Height as of this encounter: 1.727 m (5' 7.99\").    Weight as of this encounter: 87.4 kg (192 lb 9.6 oz).  Head:  Normal.   Eyes: PERRLA; fundi not visualized.   Lungs: clear.   Cardiovascular system:  RRR.   Abdomen:  Large and nontender.  Musculoskeletal system: no edema.   Neurological:  normal.   Skin:  necrobiosis lipoidica on both legs.   FEET: no ulcers.    Results   Creatinine   Date Value Ref Range Status   05/01/2018 1.28 (H) 0.66 - 1.25 mg/dL Final     GFR Estimate   Date Value Ref Range Status   05/01/2018 56 (L) >60 mL/min/1.7m2 Final     Comment: "     Non  GFR Calc     Hemoglobin A1C   Date Value Ref Range Status   03/06/2018 9.1 (H) 4.3 - 6.0 % Final     Potassium   Date Value Ref Range Status   05/01/2018 4.7 3.4 - 5.3 mmol/L Final     ALT   Date Value Ref Range Status   03/06/2018 35 0 - 70 U/L Final     AST   Date Value Ref Range Status   03/06/2018 25 0 - 45 U/L Final     TSH   Date Value Ref Range Status   11/01/2017 1.12 0.40 - 4.00 mU/L Final     T4 Free   Date Value Ref Range Status   10/21/2014 1.00 0.76 - 1.46 ng/dL Final     Comment:     Effective 7/30/2014, the reference range for this assay has changed to reflect   new instrumentation/methodology.           Cholesterol   Date Value Ref Range Status   03/06/2018 101 <200 mg/dL Final   01/14/2016 173 <200 mg/dL Final     HDL Cholesterol   Date Value Ref Range Status   03/06/2018 32 (L) >39 mg/dL Final   01/14/2016 30 (L) >39 mg/dL Final     LDL Cholesterol Calculated   Date Value Ref Range Status   03/06/2018 36 <100 mg/dL Final     Comment:     Desirable:       <100 mg/dl   01/14/2016  <100 mg/dL Final    Cannot estimate LDL when triglyceride exceeds 400 mg/dL     LDL Cholesterol Direct   Date Value Ref Range Status   01/14/2016 84 <100 mg/dL Final     Comment:     Desirable:       <100 mg/dl     Triglycerides   Date Value Ref Range Status   03/06/2018 166 (H) <150 mg/dL Final     Comment:     Borderline high:  150-199 mg/dl  High:             200-499 mg/dl  Very high:       >499 mg/dl     01/14/2016 436 (H) <150 mg/dL Final     Comment:     Borderline high:  150-199 mg/dl   High:             200-499 mg/dl   Very high:       >499 mg/dl       Cholesterol/HDL Ratio   Date Value Ref Range Status   09/09/2014 3.8 0.0 - 5.0 Final   11/20/2013 5.8 (H) 0.0 - 5.0 Final     A1C      7.1   5/31/2018  A1C      9.1   3/6/2018  A1C      9.6   11/1/2017  A1C      8.9   8/9/2017  A1C      9.7   9/22/2016  A1C      9.7   7/21/2015  A1C     10.2  12/2/2014  A1C     10.2  9/9/2014  A1C      9.8   11/20/2013  A1C      9.3   6/12/2013  A1C      8.0   8/14/2012  A1C      8.2   5/22/2012  A1C      8.0   5/22/2012    ASSESSMENT/PLAN:     1. TYPE 2 DIABETES MELLITUS: Type 2 diabetes mellitus complicated by retinopathy, nephropathy and neuropathy.  Pt's blood sugar control has improved with dietary and lifestyle changes.  No change in insulin doses today.  I asked him to check his blood sugar fasting each am, prelunch and predinner DAILY.  He tells me that he is not interested in using an insulin carb ratio.  Encouraged patient to continue to make healthy food choices, reduce his food portions with meals, avoid snacking and walk daily and to take his insulin and medications as prescribed.  Pt remains on daily ASA.     2. GOITER: Nontender goiter.  TSH normal in Nov 2017.    3. NEPHROPATHY: Discussed the need for good glycemic control and good BP control.   Pt's creat is 1.28 with GFR 56 mL/min on 5/1/2018.  Pt's urine protein +.  He is taking Cozaar.  He has been seen by Nephrology here.    4.  RETINOPATHY: Pt seen by [facility] here on a regular basis for injections.    5.  NEUROPATHY: Continue Gabapentin.  No foot ulcers.    6. DYSLIPIDEMIA: LDL 36 on 3/6/2018.   Pt is taking Lipitor, fenofibrate and Fish Oil.    7. OBESITY: See under # 1 above.  He does not want to take Byetta, Victoza or Bydureon.    8.   Return to Endocrine Clinic to see me in 3 months.  Pt is to call me if he has more than 2 low blood sugars ( 70 or less ) per week or persistent high blood sugar values > 250.

## 2018-07-19 ENCOUNTER — OFFICE VISIT (OUTPATIENT)
Dept: OPHTHALMOLOGY | Facility: CLINIC | Age: 68
End: 2018-07-19
Attending: OPHTHALMOLOGY
Payer: MEDICARE

## 2018-07-19 DIAGNOSIS — E13.311 MACULAR EDEMA DUE TO SECONDARY DIABETES (H): ICD-10-CM

## 2018-07-19 DIAGNOSIS — E11.3299 NPDR (NONPROLIFERATIVE DIABETIC RETINOPATHY) (H): Primary | ICD-10-CM

## 2018-07-19 PROCEDURE — G0463 HOSPITAL OUTPT CLINIC VISIT: HCPCS | Mod: ZF | Performed by: TECHNICIAN/TECHNOLOGIST

## 2018-07-19 PROCEDURE — C9257 BEVACIZUMAB INJECTION: HCPCS | Mod: ZF | Performed by: OPHTHALMOLOGY

## 2018-07-19 PROCEDURE — 92235 FLUORESCEIN ANGRPH MLTIFRAME: CPT | Mod: ZF | Performed by: OPHTHALMOLOGY

## 2018-07-19 PROCEDURE — 25000128 H RX IP 250 OP 636: Mod: ZF | Performed by: OPHTHALMOLOGY

## 2018-07-19 PROCEDURE — 92134 CPTRZ OPH DX IMG PST SGM RTA: CPT | Mod: ZF | Performed by: OPHTHALMOLOGY

## 2018-07-19 PROCEDURE — 67028 INJECTION EYE DRUG: CPT | Mod: ZF | Performed by: OPHTHALMOLOGY

## 2018-07-19 RX ADMIN — Medication 1.25 MG: at 11:20

## 2018-07-19 ASSESSMENT — VISUAL ACUITY
METHOD: SNELLEN - LINEAR
CORRECTION_TYPE: GLASSES
OS_CC: 20/50
OD_CC: 20/40
OD_CC+: -2

## 2018-07-19 ASSESSMENT — TONOMETRY
OD_IOP_MMHG: 19
OS_IOP_MMHG: 19
IOP_METHOD: ICARE

## 2018-07-19 ASSESSMENT — CUP TO DISC RATIO
OS_RATIO: 0.3
OD_RATIO: 0.3

## 2018-07-19 ASSESSMENT — EXTERNAL EXAM - LEFT EYE: OS_EXAM: NORMAL

## 2018-07-19 ASSESSMENT — CONF VISUAL FIELD
OD_NORMAL: 1
METHOD: COUNTING FINGERS
OS_NORMAL: 1

## 2018-07-19 ASSESSMENT — SLIT LAMP EXAM - LIDS
COMMENTS: NORMAL
COMMENTS: NORMAL

## 2018-07-19 ASSESSMENT — EXTERNAL EXAM - RIGHT EYE: OD_EXAM: NORMAL

## 2018-07-19 NOTE — MR AVS SNAPSHOT
After Visit Summary   7/19/2018    Earle Rene    MRN: [id number]           Patient Information     Date Of Birth          1950        Visit Information        Provider Department      7/19/2018 8:30 AM Jackie Rodriguez MD [facility]        Today's Diagnoses     NPDR (nonproliferative diabetic retinopathy) (H)    -  1    Macular edema due to secondary diabetes (H)           Follow-ups after your visit        Your next 10 appointments already scheduled     Aug 01, 2018 11:30 AM CDT   Lab with  LAB   [facility] ([facility])    [address]  1st Floor  [facility] [address] [phone]            Aug 01, 2018 12:30 PM CDT   (Arrive by 12:00 PM)   Return Visit with Jayda Bolden MD   [facility] Nephrology ([facility])    [address]  [address]  [facility] [id number] [phone]            Aug 16, 2018  7:30 AM CDT   RETURN RETINA with Jackie Rodriguez [facility]   [facility]    [address]  [facility] [address]   [phone]            Sep 07, 2018  8:30 AM CDT   (Arrive by 8:15 AM)   RETURN DIABETES with Pamela Laura PA-C   [facility] Endocrinology ([facility])    [address]  [address] [facility] [id number] [phone]              Future tests that were ordered for you today     Open Future Orders        Priority Expected Expires Ordered    OCT Retina Spectralis OU (both eyes) Routine  1/20/2020 7/19/2018            Who to contact     Please call your clinic at [phone] to:    Ask questions about your health    Make or cancel appointments    Discuss your medicines    Learn about your test results    Speak to your doctor            Additional Information About Your Visit        Care EveryWhere ID     This is your Care EveryWhere ID. This could be used by other  organizations to access your [facility] medical records  [id number]         Blood Pressure from Last 3 Encounters:   05/31/18 115/78   05/24/18 125/68   05/01/18 115/76    Weight from Last 3 Encounters:   05/31/18 87.4 kg (192 lb 9.6 oz)   05/24/18 87.7 kg (193 lb 4.8 oz)   05/01/18 88.6 kg (195 lb 4.8 oz)              We Performed the Following     Avastin (Bevacizumab) 1.25MG Intravitreal Injection OD (right eye)     Fluorescein Angiography OU (both eyes)     OCT Retina Spectralis OU (both eyes)        Primary Care Provider Office Phone # Fax #    Marcio GAVIN MD [place] [phone]       [address] [address]        Equal Access to Services     [facility]I [facility] : Hadii aad ku hadasho Sofranklin, waaxda luqadaha, qaybta kaalmada adeboris, ethan bobo. So [facility] [phone].    ATENCIÓN: Si habla español, tiene a márquez disposición servicios gratuitos de asistencia lingüística. Halley al [phone].    We comply with applicable federal civil rights laws and [place] laws. We do not discriminate on the basis of race, color, national origin, age, disability, sex, sexual orientation, or gender identity.            Thank you!     Thank you for choosing [facility]  for your care. Our goal is always to provide you with excellent care. Hearing back from our patients is one way we can continue to improve our services. Please take a few minutes to complete the written survey that you may receive in the mail after your visit with us. Thank you!             Your Updated Medication List - Protect others around you: Learn how to safely use, store and throw away your medicines at www.disposemymeds.org.          This list is accurate as of 7/19/18 11:01 AM.  Always use your most recent med list.                   Brand Name Dispense Instructions for use Diagnosis    alprostadil 20 MCG kit    EDEX    6 each    by Intracavitary route. Inject 3/4 ml (15 ug) as instructed.  Can  increase to full dose of 1 ml (20 ug) if necessary.   Can dispense 6 kits.    Erectile dysfunction       amoxicillin-clavulanate 875-125 MG per tablet    AUGMENTIN     TK 1 T PO BID FOR 10 DAYS        aspirin 81 MG tablet      Take 1 tablet by mouth daily.        atorvastatin 20 MG tablet    LIPITOR    90 tablet    Take 1 tablet (20 mg) by mouth daily    Hyperlipidemia LDL goal <100       BASAGLAR 100 UNIT/ML injection     60 mL    Inject 68 units SQ at bedtime        blood glucose lancets standard    no brand specified    300 each    Lancets that go with device, Test 3 times daily    Type 2 diabetes mellitus with diabetic nephropathy (H)       blood glucose monitoring meter device kit    no brand specified    1 kit    Any meter covered by insurance, not store brand, use as directed.    Type 2 diabetes mellitus with diabetic nephropathy (H)       blood glucose monitoring test strip    no brand specified    300 strip    Strips that go with meter, covered by insurance. Test 3 times daily    Type 2 diabetes mellitus with diabetic nephropathy (H)       Blood Pressure Monitor Kit     1 kit    Check blood pressure as directed.    Unspecified essential hypertension       clobetasol 0.05 % ointment    TEMOVATE    30 g    Apply topically to legs twice daily for 2 weeks.  Then discontinue    Rash and other nonspecific skin eruption       clotrimazole 1 % cream    LOTRIMIN    30 g    Apply topically 2 times daily    Tinea pedis of both feet, Dermatophytosis of nail       fenofibrate 54 MG tablet     90 tablet    Take 1 tablet (54 mg) by mouth daily    Hyperlipidemia LDL goal < 100       finasteride 5 MG tablet    PROSCAR    90 tablet    Take 1 tablet (5 mg) by mouth daily    Benign prostatic hyperplasia with urinary obstruction       fish oil-omega-3 fatty acids 1000 MG capsule     60 capsule    Take 1 capsule (1 g) by mouth 2 times daily    Lymphocytopenia, High cholesterol, Screening for prostate cancer       gabapentin 100  "MG capsule    NEURONTIN    180 capsule    Take 1 capsule (100 mg) by mouth 3 times daily    Polyneuropathy in diabetes(357.2)       insulin pen needle 31G X 5 MM    B-D U/F    400 each    Use 4 times per day.  Please dispense as BD Pen Needle Mini U/F 31G x 5 MM    Diabetes mellitus type 2, insulin dependent (H)       insulin syringe 31G X 5/16\" 0.5 ML Misc     270 each    Use three syringes daily    Type 2 diabetes mellitus (H)       loratadine 10 MG tablet    CLARITIN    90 tablet    Take 1 tablet (10 mg) by mouth daily    Seasonal allergies       losartan 25 MG tablet    COZAAR    90 tablet    Take 1 tablet (25 mg) by mouth daily    HTN (hypertension)       metFORMIN 1000 MG tablet    GLUCOPHAGE    180 tablet    1 tab twice daily with meals    Type 2 diabetes mellitus with diabetic neuropathy (H)       mupirocin 2 % cream    BACTROBAN    15 g    Apply  topically. In very small amounts only as needed    Rash and other nonspecific skin eruption       NovoLOG FLEXPEN 100 UNIT/ML injection   Generic drug:  insulin aspart     60 mL    Inject 12-14 units SQ with meals with use of correction insulin. Pt uses approx 60 units in 24 hrs.        sodium bicarbonate 650 MG tablet     90 tablet    Take 1 tablet (650 mg) by mouth 3 times daily    Chronic kidney disease, stage III (moderate)       tacrolimus 0.03 % ointment    PROTOPIC    60 g    Apply to affected area(s) twice daily    Rash and other nonspecific skin eruption       tamsulosin 0.4 MG capsule    FLOMAX    90 capsule    Take 1 capsule (0.4 mg) by mouth daily    Benign non-nodular prostatic hyperplasia with lower urinary tract symptoms         "

## 2018-07-19 NOTE — PROGRESS NOTES
I have confirmed the patient's and reviewed Past Medical History, Past Surgical History, Social History, Family History, Problem List, Medication List and agree with Tech note.    CC: diabetic retinopathy both eyes     HPI: previous pt of Dr. Alberto was scheduled to have cataract surgery but transferred care to [facility] with Dr. Marcio Flores who diagnosed him with macular edema and sent him to Dr. Hilton  He has been receiving injections in both eyes  Right Eye: 2/26/18, 4/10/18, 5/11/20218, 6/11/18  Left Eye: 2/26/18, 4/10/18, 5/11/2018       Assessment/plan:   1. 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 Patient advised that failure to adequately control each may lead to vision loss. The patient expressed understanding.      2. Diabetic macular edema right eye   - temporal and subfoveal macular edema   - has been receiving injections , last injection 6/11/18   - recommend avastin today   - follow up 4 weeks    3. Diabetic macular edema left eye   - temporal edema approaching fovea   - last injection 5/11/18 (likely trend was improving)   - plan to observe today   - restart injections if no improvement or if plan for cataract surgery    4. Cataracts both eyes    - needs cataract surgery   - was seen previously , has IOL calcs   - will refer to Dr. Siu once the macular edema is resolved      Return to Clinic 4 weeks for OCT Mac     Zeeshan Barnard MD, PhD  Vitreoretinal Surgery Fellow    Attestation:  I have seen and examined the patient with Dr. Barnard and agree with the findings in this note, as well as the interpretations of the diagnostic tests. I was present and did the procedure myself.       Jackie Nolasco MD PhD.  Professor & Chair

## 2018-07-19 NOTE — NURSING NOTE
Chief Complaints and History of Present Illnesses   Patient presents with     Follow Up For     initial visit with Dr. Rodriguez for Type 2 diabetes mellitus with moderate nonproliferative diabetic retinopathy and macular edema      HPI    Symptoms:              Comments:  Patient states vision is at least 85% good.     History of Lucentis Injection:  Right Eye: 2/26/18, 4/10/18, 5/11/20218  Left Eye: 2/26/18, 4/10/18, 5/11/2018    Lab Results       Component                Value               Date                       A1C                      9.1                 03/06/2018                 A1C                      10.2                06/06/2017                 A1C                      9.0                 03/16/2016                 Lisa Desai CO 7/19/2018 9:15 AM

## 2018-07-30 DIAGNOSIS — E11.9 DIABETES MELLITUS TYPE 2, INSULIN DEPENDENT (H): Primary | ICD-10-CM

## 2018-07-30 DIAGNOSIS — N18.30 CKD (CHRONIC KIDNEY DISEASE) STAGE 3, GFR 30-59 ML/MIN (H): ICD-10-CM

## 2018-07-30 DIAGNOSIS — Z79.4 DIABETES MELLITUS TYPE 2, INSULIN DEPENDENT (H): Primary | ICD-10-CM

## 2018-08-01 ENCOUNTER — TELEPHONE (OUTPATIENT)
Dept: NEPHROLOGY | Facility: CLINIC | Age: 68
End: 2018-08-01

## 2018-08-01 ENCOUNTER — OFFICE VISIT (OUTPATIENT)
Dept: NEPHROLOGY | Facility: CLINIC | Age: 68
End: 2018-08-01
Attending: INTERNAL MEDICINE
Payer: MEDICARE

## 2018-08-01 VITALS
WEIGHT: 190.8 LBS | SYSTOLIC BLOOD PRESSURE: 143 MMHG | BODY MASS INDEX: 29.02 KG/M2 | HEART RATE: 75 BPM | OXYGEN SATURATION: 98 % | TEMPERATURE: 98 F | DIASTOLIC BLOOD PRESSURE: 78 MMHG

## 2018-08-01 DIAGNOSIS — N18.30 CKD (CHRONIC KIDNEY DISEASE) STAGE 3, GFR 30-59 ML/MIN (H): ICD-10-CM

## 2018-08-01 DIAGNOSIS — R80.8 OTHER PROTEINURIA: Primary | ICD-10-CM

## 2018-08-01 DIAGNOSIS — Z79.4 DIABETES MELLITUS TYPE 2, INSULIN DEPENDENT (H): ICD-10-CM

## 2018-08-01 DIAGNOSIS — I10 BENIGN ESSENTIAL HYPERTENSION: ICD-10-CM

## 2018-08-01 DIAGNOSIS — N18.30 CHRONIC KIDNEY DISEASE, STAGE 3 (MODERATE): ICD-10-CM

## 2018-08-01 DIAGNOSIS — E11.9 DIABETES MELLITUS TYPE 2, INSULIN DEPENDENT (H): ICD-10-CM

## 2018-08-01 LAB
ALBUMIN SERPL-MCNC: 3.8 G/DL (ref 3.4–5)
ANION GAP SERPL CALCULATED.3IONS-SCNC: 7 MMOL/L (ref 3–14)
BUN SERPL-MCNC: 23 MG/DL (ref 7–30)
CALCIUM SERPL-MCNC: 9.5 MG/DL (ref 8.5–10.1)
CHLORIDE SERPL-SCNC: 108 MMOL/L (ref 94–109)
CO2 SERPL-SCNC: 24 MMOL/L (ref 20–32)
CREAT SERPL-MCNC: 1.31 MG/DL (ref 0.66–1.25)
CREAT UR-MCNC: 97 MG/DL
GFR SERPL CREATININE-BSD FRML MDRD: 54 ML/MIN/1.7M2
GLUCOSE SERPL-MCNC: 116 MG/DL (ref 70–99)
HGB BLD-MCNC: 14.1 G/DL (ref 13.3–17.7)
MICROALBUMIN UR-MCNC: 1050 MG/L
MICROALBUMIN/CREAT UR: 1085.83 MG/G CR (ref 0–17)
PHOSPHATE SERPL-MCNC: 3.1 MG/DL (ref 2.5–4.5)
POTASSIUM SERPL-SCNC: 4.6 MMOL/L (ref 3.4–5.3)
PROT UR-MCNC: 1.32 G/L
PROT/CREAT 24H UR: 1.37 G/G CR (ref 0–0.2)
SODIUM SERPL-SCNC: 140 MMOL/L (ref 133–144)

## 2018-08-01 PROCEDURE — G0463 HOSPITAL OUTPT CLINIC VISIT: HCPCS | Mod: ZF

## 2018-08-01 PROCEDURE — 80069 RENAL FUNCTION PANEL: CPT | Performed by: INTERNAL MEDICINE

## 2018-08-01 PROCEDURE — 36415 COLL VENOUS BLD VENIPUNCTURE: CPT | Performed by: INTERNAL MEDICINE

## 2018-08-01 PROCEDURE — 84156 ASSAY OF PROTEIN URINE: CPT | Performed by: INTERNAL MEDICINE

## 2018-08-01 PROCEDURE — 85018 HEMOGLOBIN: CPT | Performed by: INTERNAL MEDICINE

## 2018-08-01 PROCEDURE — 82043 UR ALBUMIN QUANTITATIVE: CPT | Performed by: INTERNAL MEDICINE

## 2018-08-01 ASSESSMENT — PAIN SCALES - GENERAL: PAINLEVEL: NO PAIN (0)

## 2018-08-01 NOTE — PATIENT INSTRUCTIONS
1. Your kidney function is stable  2. We will make no changes in your medications  3. Please check your blood pressure at least once daily for 2 weeks and keep records for us to review  4. Follow up with nephrology clinic in 6 months

## 2018-08-01 NOTE — TELEPHONE ENCOUNTER
[facility] Call Center    Phone Message    May a detailed message be left on voicemail: yes    Reason for Call: Medication Refill Request    Has the patient contacted the pharmacy for the refill? Yes   Name of medication being requested: Blood Pressure Monitor  Provider who prescribed the medication: Dr. Bolden  Pharmacy: [facility]  Date medication is needed: 8/1/2018    Per pt, Dr. Bolden was supposed to send RX to pharmacy but when patient went to [facility], they told her they don't carry these and it would have to go to a [facility]. Pt's wife is wondering      Action Taken: Message routed to:  [facility] ([facility] Nephrology

## 2018-08-01 NOTE — PROGRESS NOTES
Assessment and Plan:   1. Stage III CKD-baseline serum creatinine of 1.2-1.3 mg/dL and GFR of  53-56 ml/min/1.73m2 BSA likely from diabetic nephropathy given 20 year history of diabetes and diabetic reinopathy. Renal function is stable.  Recent serum creatinine was 1.28 mg/dL 05/01/ 2018. Today serum creatinine is 1.31 mg/dL.  -encouraged the patient to improve his glycemic control.    2. Subnephrotic range proteinuria-likely secondary to diabetic nephropathy. Patient has been on Cozaar 25 mg daily for few years. Improved. Patient had UPCR of 1.76 g/gCr on 05/01/18. Today UPCR is 1.37 g/gCr.  -patient was advised to check his BP daily and keep records for us to review to see if Cozaar dose can be increased    3. Electrolytes:within acceptable limits    4. Volume status:patient looks euvolemic on exam. 2D ECHO in 2016 showed LVEF of 55-60%    5. Acid/Base status: HCO3 is 24. Goal 22-27. We will continue sodium bicarbonate 650 mg three times daily    6. Hypertension: patient's BP is elevated 143/78 mmHg. Goal <130/90 mmHG.On Cozaar 25 mg daily  -patient was advised to check his BP daily and keep records for us to review to see if Cozaar dose can be increased    7. BMD  -normal serum calcium  and phosphorus   -patient had normal PTH in 05/18  -patient had vitamin D of 20 ug/L which in low end of normal-recommend to continue cholecalciferol 2000U Daily     8. Type II DM-uncontrolled. Most recent Hgba1c is 9.1%. Follows with endocrinology clinic    9. Diabetic neuropathy-on Gabapentin 100 mg TID    10. Diabetic retinopathy-follows with ophthalmologist. Last seen by Dr.Van Reyna on 07/19/18. Exam showed moderate NPDR    11. Obesity-BMI 29.8. Patient lost ~ 2 lbs since last visit. Encouraged the patient to loose weight.      Recommend to follow up with nephrology clinic in 6 months.    Assessment and plan was discussed with patient and he voiced his understanding and agreement.    I have seen and discussed the patient with  Dr.Manske Jayda Bolden MD  Nephrology Fellow        Reason for Visit:  Earle Rene is a 68 year old male with stage III CKD, who presents with his spouse for 3 months follow up appointment.    HPI:  Mr. Rene is pleasant 69 y/o male with PMHx significant for type II diabetes mellitus for 20 years, complicated by diabetic neuropathy, mild non proliferative retinopathy (follows with ophthalmologist ) and nephropathy. He is currently takes 64 units of Lantus and Novolog 8-10 units with melas and Metformin 1000 mg daily. His most recent Hgb A1c was 9.1% in 3/2018 and his previous was 9.6% in 11/2017. Patient states that he had Hgba1c of 7% in June of 2018. However I can not find any records of that. Patient states that his blood glucose has been running below 150. He follows with RAJANI Laura in endocrinology clinic.Patient  states that he still have awareness of hypoglycemia and reports no episodes of hypoglycemia recently.     He states that he has been taking Cozaar 25 mg daily for few years as a preventive medication. Patient states that he never been diagnosed with hypertension. His blood pressure usually runs in 110s/70s. Patient states that he has not been checking his blood pressure at home for last 3 months.     Furthermore he dose have history of granulomatous disease of the lungs. CT chest 08/23/2017 showed stable calcified and noncalcified nodules in the lungs. Patient states that he was not aware of this and never been seen by pulmonologist or rheumatologist.     In regards of patient's renal function. The patient had creatinine of 0.96 mg/d in 2005, then 1 mg/dL in 2007, then 1.1-1.2 mg/dl since 2010. Patient had creatinine of 1.3 mg/dL on 03/06/2018. Patient dose have history of microalbuminuria at least since 05/2005. Proteinuria at least since 6/2010. UPCR was 0.23 g/gCr in 6/2010. His most recent urine albumin level was 1148 mg/gCr in 03/2018. Laboratory work up 05/01/18  showed creatinine of 1.28 g/gCr, proteinuria, UPCR of 1.76 g/gCr. No hematuria.   He had Renal Ultrasound in 02/2016 which showed both kidneys were without mass or hydronephrosis (Right 12.3 cm, Left 12.6 cm), mild left greater than right renal cortical thinning/scarring without hydronephrosis. Patient states that he feels like he is not emptying his bladder completely. He is planning to see urologist soon but has not set up a follow up appointment yet. He reports history of nephrolithiasis. Patient states that he passes two stones many years ago.     He has history of BPH and elevated PSA. Since his last visit he was seen by urologist  on 5/24/18. His PSA was within normal limits. Patient was started on Flomax 0.4 mg daily. Also he takes Proscar 5 mg three times per week. Patient reports improved urinary urinary urgency, frequency and nocturia. Patient supposed to schedule an appointment with urologist  to discuss HoLEP.     He has history of goiter . Patient had normal TSH in 11/2017  Patient states that he is feeling fair. No new complaints.    Patient denies: fever, chills, weight loss, dizziness, adenopathy, sore throat, rhinorrhea, cough, shortness of breath , chest pain, palpitations, orthopnea, nausea, vomiting, abdominal pain, changes in bowel habits, dysuria, hematuria, rash.               ROS:   A comprehensive review of systems was obtained and negative, except as noted in the HPI or PMH.    Active Medical Problems:  Patient Active Problem List   Diagnosis     Psychological factors associated with another disorder     Mood disorder in conditions classified elsewhere     Occupational problem     Diabetes mellitus type 2, insulin dependent (H)     Erectile dysfunction     Hyperlipidemia with target LDL less than 100     CTS (carpal tunnel syndrome)     Diabetic polyneuropathy (H)     Presbyopia     Myopia     Cataracts, bilateral     Pain of right thumb     PMH:   Medical record was  reviewed and PMH was discussed with patient and noted below.  Past Medical History:   Diagnosis Date     Blepharitis of both eyes      BPH (benign prostatic hyperplasia)      Diabetes (H)      Diabetic neuropathy (H)      Diabetic retinopathy associated with diabetes mellitus due to underlying condition (H)      Dry eye syndrome      Goiter      Granulomatous disease (H)      Nonsenile cataract      Peripheral neuropathy      PSH:   Past Surgical History:   Procedure Laterality Date     AS RAD RESEC TONSIL/PILLARS Bilateral 1961     COLONOSCOPY  7/29/2013    Procedure: COLONOSCOPY;;  Surgeon: Montana Pascal MD;  Location: [facility]  11/2017     SURGICAL PATHOLOGY EXAM         Family Hx:   Family History   Problem Relation Age of Onset     Diabetes Father      Myocardial Infarction Father      Diabetes Brother      Leukemia Brother 44     Glaucoma No family hx of      Macular Degeneration No family hx of      KIDNEY DISEASE No family hx of      Personal Hx:   Social History     Social History     Marital status:      Spouse name: N/A     Number of children: 5     Years of education: N/A     Occupational History     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 Authorizing Provider   alprostadil (EDEX) 20 MCG injection by Intracavitary route. Inject 3/4 ml (15 ug) as instructed.  Can increase to full dose of 1 ml (20 ug) if necessary.   Can dispense 6 kits. 2/9/12  Yes Yoshi Vinson MD   amoxicillin-clavulanate (AUGMENTIN) 875-125 MG per tablet TK 1 T PO BID FOR 10 DAYS 3/9/18  Yes Reported, Patient   aspirin 81 MG tablet Take  "1 tablet by mouth daily.   Yes Reported, Patient   atorvastatin (LIPITOR) 20 MG tablet Take 1 tablet (20 mg) by mouth daily 8/14/17  Yes Marcio Hare MD   BASAGLAR 100 UNIT/ML injection Inject 68 units SQ at bedtime 3/21/18  Yes Pamela Laura PA-C   blood glucose (NO BRAND SPECIFIED) lancets standard Lancets that go with device, Test 3 times daily 2/5/18  Yes Pamela Laura PA-C   blood glucose monitoring (NO BRAND SPECIFIED) meter device kit Any meter covered by insurance, not store brand, use as directed. 2/5/18  Yes Pamela Laura PA-C   blood glucose monitoring (NO BRAND SPECIFIED) test strip Strips that go with meter, covered by insurance. Test 3 times daily 2/5/18  Yes Pamela Laura PA-C   Blood Pressure Monitor KIT Check blood pressure as directed. 2/17/12  Yes Marcio Hare MD   clobetasol (TEMOVATE) 0.05 % ointment Apply topically to legs twice daily for 2 weeks.  Then discontinue 1/31/13  Yes Montana Rush MD   clotrimazole (LOTRIMIN) 1 % cream Apply topically 2 times daily 8/23/17  Yes Narendra Grajeda DPM   fenofibrate 54 MG tablet Take 1 tablet (54 mg) by mouth daily 2/5/15  Yes Rafael Hand MD   finasteride (PROSCAR) 5 MG tablet Take 1 tablet (5 mg) by mouth daily 8/3/17  Yes Emmanuel Cantu MD   gabapentin (NEURONTIN) 100 MG capsule Take 1 capsule (100 mg) by mouth 3 times daily 7/27/17  Yes Marcio Hare MD   insulin pen needle (B-D U/F) 31G X 5 MM Use 4 times per day.  Please dispense as BD Pen Needle Mini U/F 31G x 5 MM 12/12/16  Yes Pamela Laura PA-C   insulin syringe 31G X 5/16\" 0.5 ML MISC Use three syringes daily 10/26/16  Yes Pamela Laura PA-C   loratadine (CLARITIN) 10 MG tablet Take 1 tablet (10 mg) by mouth daily 8/18/14  Yes Marcio Hare MD   losartan (COZAAR) 25 MG tablet Take 1 tablet (25 mg) by mouth daily 1/17/18  Yes Pamela Laura PA-C   metFORMIN (GLUCOPHAGE) 1000 MG " tablet 1 tab twice daily with meals 7/31/17  Yes Pamela Laura PA-C   mupirocin (BACTROBAN) 2 % cream Apply  topically. In very small amounts only as needed 2/17/12  Yes Marcio Hare MD   NOVOLOG FLEXPEN 100 UNIT/ML soln Inject 12-14 units SQ with meals with use of correction insulin. Pt uses approx 60 units in 24 hrs. 3/21/18  Yes Pamela Laura PA-C   Omega-3 Fatty Acids (OMEGA-3 FISH OIL) 1000 MG CAPS Take 1 capsule (1 g) by mouth 2 times daily 4/10/15  Yes Marcio Hare MD   tacrolimus (PROTOPIC) 0.03 % ointment Apply to affected area(s) twice daily 1/31/13  Yes Montana Rush MD   tamsulosin (FLOMAX) 0.4 MG capsule TAKE 1 CAPSULE BY MOUTH DAILY 1/15/18  Yes Emmanuel Cantu MD     Vitals:  There were no vitals taken for this visit.    Exam:  GENERAL APPEARANCE:overweight,  alert and no distress  HENT: mouth without ulcers or lesions  LYMPHATICS: no cervical or supraclavicular nodes  RESP: lungs clear to auscultation - no rales, rhonchi or wheezes  CV: regular rhythm, normal rate, no rub, no murmur  EDEMA: no LE edema bilaterally  ABDOMEN: obese, soft, nondistended, nontender, bowel sounds normal  MS: extremities normal - no gross deformities noted, no evidence of inflammation in joints, no muscle tenderness  SKIN: necrobiosis lipoidica on both lower legs    Electrolytes/Renal -   Recent Labs   Lab Test  05/24/18   1414  05/01/18   1233  03/06/18   1216  11/01/17   1044  06/24/17   2317   06/15/10   1546   NA   --   141  136   --   137   < >  139   POTASSIUM   --   4.7  4.0  4.4  3.7   < >  4.6   CHLORIDE   --   110*  105   --   105   < >  105   CO2   --   21  23   --   22   < >  23   BUN   --   20  21   --   17   < >  18   CR   --   1.28*  1.33*  1.20  1.12   < >  1.07   GLC   --   149*  197*   --   215*   < >  258*   INDERJIT   --   9.8  8.8   --   9.3   < >  9.6   MAG  2.1  2.1   --    --    --    --    --    PHOS   --   3.3   --    --    --    --   3.8    < > = values in  this interval not displayed.       CBC -   Recent Labs   Lab Test  08/01/18   1222  05/01/18   1233  03/06/18   1216  11/01/17   1044   WBC   --   4.6  4.8  4.9   HGB  14.1  14.7  15.5  15.1   PLT   --   148*  148*  170       LFTs -   Recent Labs   Lab Test  05/01/18   1233  03/06/18   1216  11/01/17   1044  06/06/17   1116  01/14/16   0849   ALKPHOS   --   77   --   70  71   BILITOTAL   --   0.8   --   0.7  0.5   ALT   --   35  44  49  43   AST   --   25  23  20  17   PROTTOTAL   --   7.3   --   6.6*  6.8   ALBUMIN  4.0  3.8   --   3.5  3.8       Coags -   Recent Labs   Lab Test  06/12/13   0949   INR  0.93   PTT  29       Iron Panel -   Recent Labs   Lab Test  05/01/18   1233   IRON  83   IRONSAT  24   DEANA  160       Endocrine -   Recent Labs   Lab Test  03/06/18   1216  11/01/17   1044  06/06/17   1116  03/16/16   1012  10/21/14   1042   05/22/12   1058   A1C  9.1*   --   10.2*  9.0*   --    < >  8.2*   TSH   --   1.12   --    --   1.13   --   0.78    < > = values in this interval not displayed.           I have seen and examined this patient with the resident.  This note reflects our joint assessment and plan.     Delphine Cespedes MD

## 2018-08-01 NOTE — MR AVS SNAPSHOT
After Visit Summary   8/1/2018    Earle Rene    MRN: [id number]           Patient Information     Date Of Birth          1950        Visit Information        Provider Department      8/1/2018 12:30 PM Jayda Bolden MD [facility] Nephrology        Today's Diagnoses     Other proteinuria    -  1    Benign essential hypertension          Care Instructions     1. Your kidney function is stable  2. We will make no changes in your medications  3. Please check your blood pressure at least once daily for 2 weeks and keep records for us to review  4. Follow up with nephrology clinic in 6 months            Follow-ups after your visit        Follow-up notes from your care team     Return in about 6 months (around 2/1/2019) for Physical Exam, BP Recheck.      Your next 10 appointments already scheduled     Aug 16, 2018  7:30 AM CDT   RETURN RETINA with Jackie Rodriguez MD   Eye Clinic ([facility])    [address]  [facility] [address]   [phone]            Sep 07, 2018  8:30 AM CDT   (Arrive by 8:15 AM)   RETURN DIABETES with Pamela Laura PA-C   [facility] Endocrinology ([facility])    [address]  3rd Floor  [facility] [id number] [phone]            Jan 30, 2019 11:30 AM CST   Lab with  LAB   [facility] Lab ([facility])    [address] [place] [address]  1st Floor  [facility] [id number] [phone]            Jan 30, 2019 12:30 PM CST   (Arrive by 12:00 PM)   Return Visit with Jayda Bolden MD   [facility] Nephrology ([facility])    [address] [place] [address]  [address]  [facility] [id number] [phone]              Who to contact     If you have questions or need follow up information about today's clinic visit or your schedule please contact [facility] NEPHROLOGY directly at [phone].  Normal or non-critical lab  and imaging results will be communicated to you by MyChart, letter or phone within 4 business days after the clinic has received the results. If you do not hear from us within 7 days, please contact the clinic through MyChart or phone. If you have a critical or abnormal lab result, we will notify you by phone as soon as possible.  Submit refill requests through [employer] or call your pharmacy and they will forward the refill request to us. Please allow 3 business days for your refill to be completed.          Additional Information About Your Visit        Care EveryWhere ID     This is your Care EveryWhere ID. This could be used by other organizations to access your [facility] medical records  [id number]        Your Vitals Were     Pulse Temperature Pulse Oximetry BMI (Body Mass Index)          75 98  F (36.7  C) (Oral) 98% 29.02 kg/m2         Blood Pressure from Last 3 Encounters:   08/01/18 143/78   05/31/18 115/78   05/24/18 125/68    Weight from Last 3 Encounters:   08/01/18 86.5 kg (190 lb 12.8 oz)   05/31/18 87.4 kg (192 lb 9.6 oz)   05/24/18 87.7 kg (193 lb 4.8 oz)              We Performed the Following     Blood pressure monitor        Primary Care Provider Office Phone # Fax #    Marcio Hare [phone] [address] [address]        Equal Access to Services     [facility]UDI [facility] HospitalESTEAL : Hadii priscilla ku hadasho Sofranklin, waaxda luqadaha, qaybta kaalmada adejoseyada, ethan nascimento . So [facility] [phone].    ATENCIÓN: Si habla español, tiene a márquez disposición servicios gratuitos de asistencia lingüística. Llame al [phone].    We comply with applicable federal civil rights laws and [place] laws. We do not discriminate on the basis of race, color, national origin, age, disability, sex, sexual orientation, or gender identity.            Thank you!     Thank you for choosing [facility] NEPHROLOGY  for your care. Our goal is always to provide you  with excellent care. Hearing back from our patients is one way we can continue to improve our services. Please take a few minutes to complete the written survey that you may receive in the mail after your visit with us. Thank you!             Your Updated Medication List - Protect others around you: Learn how to safely use, store and throw away your medicines at www.disposemymeds.org.          This list is accurate as of 8/1/18  1:32 PM.  Always use your most recent med list.                   Brand Name Dispense Instructions for use Diagnosis    alprostadil 20 MCG kit    EDEX    6 each    by Intracavitary route. Inject 3/4 ml (15 ug) as instructed.  Can increase to full dose of 1 ml (20 ug) if necessary.   Can dispense 6 kits.    Erectile dysfunction       amoxicillin-clavulanate 875-125 MG per tablet    AUGMENTIN     TK 1 T PO BID FOR 10 DAYS        aspirin 81 MG tablet      Take 1 tablet by mouth daily.        atorvastatin 20 MG tablet    LIPITOR    90 tablet    Take 1 tablet (20 mg) by mouth daily    Hyperlipidemia LDL goal <100       BASAGLAR 100 UNIT/ML injection     60 mL    Inject 68 units SQ at bedtime        blood glucose lancets standard    no brand specified    300 each    Lancets that go with device, Test 3 times daily    Type 2 diabetes mellitus with diabetic nephropathy (H)       blood glucose monitoring meter device kit    no brand specified    1 kit    Any meter covered by insurance, not store brand, use as directed.    Type 2 diabetes mellitus with diabetic nephropathy (H)       blood glucose monitoring test strip    no brand specified    300 strip    Strips that go with meter, covered by insurance. Test 3 times daily    Type 2 diabetes mellitus with diabetic nephropathy (H)       Blood Pressure Monitor Kit     1 kit    Check blood pressure as directed.    Unspecified essential hypertension       clobetasol 0.05 % ointment    TEMOVATE    30 g    Apply topically to legs twice daily for 2 weeks.   "Then discontinue    Rash and other nonspecific skin eruption       clotrimazole 1 % cream    LOTRIMIN    30 g    Apply topically 2 times daily    Tinea pedis of both feet, Dermatophytosis of nail       fenofibrate 54 MG tablet     90 tablet    Take 1 tablet (54 mg) by mouth daily    Hyperlipidemia LDL goal < 100       finasteride 5 MG tablet    PROSCAR    90 tablet    Take 1 tablet (5 mg) by mouth daily    Benign prostatic hyperplasia with urinary obstruction       fish oil-omega-3 fatty acids 1000 MG capsule     60 capsule    Take 1 capsule (1 g) by mouth 2 times daily    Lymphocytopenia, High cholesterol, Screening for prostate cancer       gabapentin 100 MG capsule    NEURONTIN    180 capsule    Take 1 capsule (100 mg) by mouth 3 times daily    Polyneuropathy in diabetes(357.2)       insulin pen needle 31G X 5 MM    B-D U/F    400 each    Use 4 times per day.  Please dispense as BD Pen Needle Mini U/F 31G x 5 MM    Diabetes mellitus type 2, insulin dependent (H)       insulin syringe 31G X 5/16\" 0.5 ML Misc     270 each    Use three syringes daily    Type 2 diabetes mellitus (H)       loratadine 10 MG tablet    CLARITIN    90 tablet    Take 1 tablet (10 mg) by mouth daily    Seasonal allergies       losartan 25 MG tablet    COZAAR    90 tablet    Take 1 tablet (25 mg) by mouth daily    HTN (hypertension)       metFORMIN 1000 MG tablet    GLUCOPHAGE    180 tablet    1 tab twice daily with meals    Type 2 diabetes mellitus with diabetic neuropathy (H)       mupirocin 2 % cream    BACTROBAN    15 g    Apply  topically. In very small amounts only as needed    Rash and other nonspecific skin eruption       NovoLOG FLEXPEN 100 UNIT/ML injection   Generic drug:  insulin aspart     60 mL    Inject 12-14 units SQ with meals with use of correction insulin. Pt uses approx 60 units in 24 hrs.        sodium bicarbonate 650 MG tablet     90 tablet    Take 1 tablet (650 mg) by mouth 3 times daily    Chronic kidney disease, " stage III (moderate)       tacrolimus 0.03 % ointment    PROTOPIC    60 g    Apply to affected area(s) twice daily    Rash and other nonspecific skin eruption       tamsulosin 0.4 MG capsule    FLOMAX    90 capsule    Take 1 capsule (0.4 mg) by mouth daily    Benign non-nodular prostatic hyperplasia with lower urinary tract symptoms

## 2018-08-01 NOTE — LETTER
8/1/2018    RE: Earle Rene  [address] [place] [address]       Assessment and Plan:   1. Stage III CKD-baseline serum creatinine of 1.2-1.3 mg/dL and GFR of  53-56 ml/min/1.73m2 BSA likely from diabetic nephropathy given 20 year history of diabetes and diabetic reinopathy. Renal function is stable.  Recent serum creatinine was 1.28 mg/dL 05/01/ 2018. Today serum creatinine is 1.31 mg/dL.  -encouraged the patient to improve his glycemic control.    2. Subnephrotic range proteinuria-likely secondary to diabetic nephropathy. Patient has been on Cozaar 25 mg daily for few years. Improved. Patient had UPCR of 1.76 g/gCr on 05/01/18. Today UPCR is 1.37 g/gCr.  -patient was advised to check his BP daily and keep records for us to review to see if Cozaar dose can be increased    3. Electrolytes:within acceptable limits    4. Volume status:patient looks euvolemic on exam. 2D ECHO in 2016 showed LVEF of 55-60%    5. Acid/Base status: HCO3 is 24. Goal 22-27. We will continue sodium bicarbonate 650 mg three times daily    6. Hypertension: patient's BP is elevated 143/78 mmHg. Goal <130/90 mmHG.On Cozaar 25 mg daily  -patient was advised to check his BP daily and keep records for us to review to see if Cozaar dose can be increased    7. BMD  -normal serum calcium  and phosphorus   -patient had normal PTH in 05/18  -patient had vitamin D of 20 ug/L which in low end of normal-recommend to continue cholecalciferol 2000U Daily     8. Type II DM-uncontrolled. Most recent Hgba1c is 9.1%. Follows with endocrinology clinic    9. Diabetic neuropathy-on Gabapentin 100 mg TID    10. Diabetic retinopathy-follows with ophthalmologist. Last seen by Dr.Van Reyna on 07/19/18. Exam showed moderate NPDR    11. Obesity-BMI 29.8. Patient lost ~ 2 lbs since last visit. Encouraged the patient to loose weight.      Recommend to follow up with nephrology clinic in 6 months.    Assessment and plan was discussed with patient and he  voiced his understanding and agreement.    I have seen and discussed the patient with Dr.Manske Jayda Bolden MD  Nephrology Fellow        Reason for Visit:  Earle Rene is a 68 year old male with stage III CKD, who presents with his spouse for 3 months follow up appointment.    HPI:  Mr. Rene is pleasant 67 y/o male with PMHx significant for type II diabetes mellitus for 20 years, complicated by diabetic neuropathy, mild non proliferative retinopathy (follows with ophthalmologist ) and nephropathy. He is currently takes 64 units of Lantus and Novolog 8-10 units with melas and Metformin 1000 mg daily. His most recent Hgb A1c was 9.1% in 3/2018 and his previous was 9.6% in 11/2017. Patient states that he had Hgba1c of 7% in June of 2018. However I can not find any records of that. Patient states that his blood glucose has been running below 150. He follows with RAJANI Laura in endocrinology clinic.Patient  states that he still have awareness of hypoglycemia and reports no episodes of hypoglycemia recently.     He states that he has been taking Cozaar 25 mg daily for few years as a preventive medication. Patient states that he never been diagnosed with hypertension. His blood pressure usually runs in 110s/70s. Patient states that he has not been checking his blood pressure at home for last 3 months.     Furthermore he dose have history of granulomatous disease of the lungs. CT chest 08/23/2017 showed stable calcified and noncalcified nodules in the lungs. Patient states that he was not aware of this and never been seen by pulmonologist or rheumatologist.     In regards of patient's renal function. The patient had creatinine of 0.96 mg/d in 2005, then 1 mg/dL in 2007, then 1.1-1.2 mg/dl since 2010. Patient had creatinine of 1.3 mg/dL on 03/06/2018. Patient dose have history of microalbuminuria at least since 05/2005. Proteinuria at least since 6/2010. UPCR was 0.23 g/gCr in 6/2010. His  most recent urine albumin level was 1148 mg/gCr in 03/2018. Laboratory work up 05/01/18 showed creatinine of 1.28 g/gCr, proteinuria, UPCR of 1.76 g/gCr. No hematuria.   He had Renal Ultrasound in 02/2016 which showed both kidneys were without mass or hydronephrosis (Right 12.3 cm, Left 12.6 cm), mild left greater than right renal cortical thinning/scarring without hydronephrosis. Patient states that he feels like he is not emptying his bladder completely. He is planning to see urologist soon but has not set up a follow up appointment yet. He reports history of nephrolithiasis. Patient states that he passes two stones many years ago.     He has history of BPH and elevated PSA. Since his last visit he was seen by urologist  on 5/24/18. His PSA was within normal limits. Patient was started on Flomax 0.4 mg daily. Also he takes Proscar 5 mg three times per week. Patient reports improved urinary urinary urgency, frequency and nocturia. Patient supposed to schedule an appointment with urologist  to discuss HoLEP.     He has history of goiter . Patient had normal TSH in 11/2017  Patient states that he is feeling fair. No new complaints.    Patient denies: fever, chills, weight loss, dizziness, adenopathy, sore throat, rhinorrhea, cough, shortness of breath , chest pain, palpitations, orthopnea, nausea, vomiting, abdominal pain, changes in bowel habits, dysuria, hematuria, rash.               ROS:   A comprehensive review of systems was obtained and negative, except as noted in the HPI or PMH.    Active Medical Problems:  Patient Active Problem List   Diagnosis     Psychological factors associated with another disorder     Mood disorder in conditions classified elsewhere     Occupational problem     Diabetes mellitus type 2, insulin dependent (H)     Erectile dysfunction     Hyperlipidemia with target LDL less than 100     CTS (carpal tunnel syndrome)     Diabetic polyneuropathy (H)     Presbyopia      Myopia     Cataracts, bilateral     Pain of right thumb     PMH:   Medical record was reviewed and PMH was discussed with patient and noted below.  Past Medical History:   Diagnosis Date     Blepharitis of both eyes      BPH (benign prostatic hyperplasia)      Diabetes (H)      Diabetic neuropathy (H)      Diabetic retinopathy associated with diabetes mellitus due to underlying condition (H)      Dry eye syndrome      Goiter      Granulomatous disease (H)      Nonsenile cataract      Peripheral neuropathy      PSH:   Past Surgical History:   Procedure Laterality Date     AS RAD RESEC TONSIL/PILLARS Bilateral 1961     COLONOSCOPY  7/29/2013    Procedure: COLONOSCOPY;;  Surgeon: Montana Pascal MD;  Location: [facility]  11/2017     SURGICAL PATHOLOGY EXAM         Family Hx:   Family History   Problem Relation Age of Onset     Diabetes Father      Myocardial Infarction Father      Diabetes Brother      Leukemia Brother 44     Glaucoma No family hx of      Macular Degeneration No family hx of      KIDNEY DISEASE No family hx of      Personal Hx:   Social History     Social History     Marital status:      Spouse name: N/A     Number of children: 5     Years of education: N/A     Occupational History     private bussiness owner      Social History Main Topics     Smoking status: Never Smoker     Smokeless tobacco: Never Used     Alcohol use Yes      Comment: occasionaly     Drug use: No     Sexual activity: Not on file     Other Topics Concern     Not on file     Social History Narrative       Allergies:  No Known Allergies    Medications:  Prior to Admission medications    Medication Sig Start Date End Date Taking? Authorizing Provider   alprostadil (EDEX) 20 MCG injection by Intracavitary route. Inject 3/4 ml (15 ug) as instructed.  Can increase to full dose of 1 ml (20 ug) if necessary.   Can dispense 6 kits. 2/9/12  Yes Yoshi Vinson MD   amoxicillin-clavulanate (AUGMENTIN) 875-125 MG per  "tablet TK 1 T PO BID FOR 10 DAYS 3/9/18  Yes Reported, Patient   aspirin 81 MG tablet Take 1 tablet by mouth daily.   Yes Reported, Patient   atorvastatin (LIPITOR) 20 MG tablet Take 1 tablet (20 mg) by mouth daily 8/14/17  Yes Marcio Hare MD   BASAGLAR 100 UNIT/ML injection Inject 68 units SQ at bedtime 3/21/18  Yes Pamela Laura PA-C   blood glucose (NO BRAND SPECIFIED) lancets standard Lancets that go with device, Test 3 times daily 2/5/18  Yes Pamela Laura PA-C   blood glucose monitoring (NO BRAND SPECIFIED) meter device kit Any meter covered by insurance, not store brand, use as directed. 2/5/18  Yes Pamela Laura PA-C   blood glucose monitoring (NO BRAND SPECIFIED) test strip Strips that go with meter, covered by insurance. Test 3 times daily 2/5/18  Yes Pamela Laura PA-C   Blood Pressure Monitor KIT Check blood pressure as directed. 2/17/12  Yes Marcio Hare MD   clobetasol (TEMOVATE) 0.05 % ointment Apply topically to legs twice daily for 2 weeks.  Then discontinue 1/31/13  Yes Montana Rush MD   clotrimazole (LOTRIMIN) 1 % cream Apply topically 2 times daily 8/23/17  Yes Narendra Grajeda DPM   fenofibrate 54 MG tablet Take 1 tablet (54 mg) by mouth daily 2/5/15  Yes Rafael Hand MD   finasteride (PROSCAR) 5 MG tablet Take 1 tablet (5 mg) by mouth daily 8/3/17  Yes Emmanuel Cantu MD   gabapentin (NEURONTIN) 100 MG capsule Take 1 capsule (100 mg) by mouth 3 times daily 7/27/17  Yes Marcio Hare MD   insulin pen needle (B-D U/F) 31G X 5 MM Use 4 times per day.  Please dispense as BD Pen Needle Mini U/F 31G x 5 MM 12/12/16  Yes Pamela Laura PA-C   insulin syringe 31G X 5/16\" 0.5 ML MISC Use three syringes daily 10/26/16  Yes Pamela Laura PA-C   loratadine (CLARITIN) 10 MG tablet Take 1 tablet (10 mg) by mouth daily 8/18/14  Yes Marcio Hare MD   losartan (COZAAR) 25 MG tablet Take 1 tablet (25 mg) by " mouth daily 1/17/18  Yes Pamela Laura PA-C   metFORMIN (GLUCOPHAGE) 1000 MG tablet 1 tab twice daily with meals 7/31/17  Yes Pamela Laura PA-C   mupirocin (BACTROBAN) 2 % cream Apply  topically. In very small amounts only as needed 2/17/12  Yes Marcio Hare MD   NOVOLOG FLEXPEN 100 UNIT/ML soln Inject 12-14 units SQ with meals with use of correction insulin. Pt uses approx 60 units in 24 hrs. 3/21/18  Yes Pamela Laura PA-C   Omega-3 Fatty Acids (OMEGA-3 FISH OIL) 1000 MG CAPS Take 1 capsule (1 g) by mouth 2 times daily 4/10/15  Yes Marcio Hare MD   tacrolimus (PROTOPIC) 0.03 % ointment Apply to affected area(s) twice daily 1/31/13  Yes Montana Rush MD   tamsulosin (FLOMAX) 0.4 MG capsule TAKE 1 CAPSULE BY MOUTH DAILY 1/15/18  Yes Emmanuel Cantu MD     Vitals:  There were no vitals taken for this visit.    Exam:  GENERAL APPEARANCE:overweight,  alert and no distress  HENT: mouth without ulcers or lesions  LYMPHATICS: no cervical or supraclavicular nodes  RESP: lungs clear to auscultation - no rales, rhonchi or wheezes  CV: regular rhythm, normal rate, no rub, no murmur  EDEMA: no LE edema bilaterally  ABDOMEN: obese, soft, nondistended, nontender, bowel sounds normal  MS: extremities normal - no gross deformities noted, no evidence of inflammation in joints, no muscle tenderness  SKIN: necrobiosis lipoidica on both lower legs    Electrolytes/Renal -   Recent Labs   Lab Test  05/24/18   1414  05/01/18   1233  03/06/18   1216  11/01/17   1044  06/24/17   2317   06/15/10   1546   NA   --   141  136   --   137   < >  139   POTASSIUM   --   4.7  4.0  4.4  3.7   < >  4.6   CHLORIDE   --   110*  105   --   105   < >  105   CO2   --   21  23   --   22   < >  23   BUN   --   20  21   --   17   < >  18   CR   --   1.28*  1.33*  1.20  1.12   < >  1.07   GLC   --   149*  197*   --   215*   < >  258*   INDERJIT   --   9.8  8.8   --   9.3   < >  9.6   MAG  2.1  2.1   --     --    --    --    --    PHOS   --   3.3   --    --    --    --   3.8    < > = values in this interval not displayed.       CBC -   Recent Labs   Lab Test  08/01/18   1222  05/01/18   1233  03/06/18   1216  11/01/17   1044   WBC   --   4.6  4.8  4.9   HGB  14.1  14.7  15.5  15.1   PLT   --   148*  148*  170       LFTs -   Recent Labs   Lab Test  05/01/18   1233  03/06/18   1216  11/01/17   1044  06/06/17   1116  01/14/16   0849   ALKPHOS   --   77   --   70  71   BILITOTAL   --   0.8   --   0.7  0.5   ALT   --   35  44  49  43   AST   --   25  23  20  17   PROTTOTAL   --   7.3   --   6.6*  6.8   ALBUMIN  4.0  3.8   --   3.5  3.8       Coags -   Recent Labs   Lab Test  06/12/13   0949   INR  0.93   PTT  29       Iron Panel -   Recent Labs   Lab Test  05/01/18   1233   IRON  83   IRONSAT  24   DEANA  160       Endocrine -   Recent Labs   Lab Test  03/06/18   1216  11/01/17   1044  06/06/17   1116  03/16/16   1012  10/21/14   1042   05/22/12   1058   A1C  9.1*   --   10.2*  9.0*   --    < >  8.2*   TSH   --   1.12   --    --   1.13   --   0.78    < > = values in this interval not displayed.       I have seen and examined this patient with the resident.  This note reflects our joint assessment and plan.     MD Jayda Dueñas MD

## 2018-08-01 NOTE — NURSING NOTE
Chief Complaint   Patient presents with     RECHECK     follow up proteinuria     /78  Pulse 75  Temp 98  F (36.7  C) (Oral)  Wt 86.5 kg (190 lb 12.8 oz)  SpO2 98%  BMI 29.02 kg/m2   Susanne Perez

## 2018-08-03 NOTE — TELEPHONE ENCOUNTER
Confirmed with [employer], they received BP kit order and will call patient regarding coverage.  Bre Hanson LPN  Nephrology  [phone]

## 2018-08-06 DIAGNOSIS — E11.9 DIABETES MELLITUS, TYPE 2 (H): Primary | ICD-10-CM

## 2018-08-06 NOTE — TELEPHONE ENCOUNTER
metformin    Last Written Prescription Date:  7/13/17  Last Fill Quantity: 180,   # refills: 3  Last Office Visit : 5/31/18  Future Office visit:  9/7/18    Routing refill request to provider for review/approval because:  Drug failed the [facility], [facility] or [facility] refill protocol criteria

## 2018-08-07 DIAGNOSIS — E11.9 TYPE 2 DIABETES MELLITUS (H): Primary | ICD-10-CM

## 2018-08-07 RX ORDER — INSULIN ASPART 100 [IU]/ML
INJECTION, SOLUTION INTRAVENOUS; SUBCUTANEOUS
Qty: 60 ML | Refills: 3 | Status: SHIPPED | OUTPATIENT
Start: 2018-08-07 | End: 2018-09-07

## 2018-08-09 DIAGNOSIS — E11.21 TYPE 2 DIABETES MELLITUS WITH DIABETIC NEPHROPATHY (H): ICD-10-CM

## 2018-08-15 ENCOUNTER — TELEPHONE (OUTPATIENT)
Dept: NEPHROLOGY | Facility: CLINIC | Age: 68
End: 2018-08-15

## 2018-08-15 NOTE — TELEPHONE ENCOUNTER
Call to patient regarding BP readings and to let him know that his proteinuria slightly improved from 1.7 g/gCr to 1.36 g/gCr per Dr. Bolden. Left VM. Provided number for call back.  Bre Hanson LPN  Nephrology  [phone]

## 2018-08-16 ENCOUNTER — TELEPHONE (OUTPATIENT)
Dept: ENDOCRINOLOGY | Facility: CLINIC | Age: 68
End: 2018-08-16

## 2018-08-16 ENCOUNTER — OFFICE VISIT (OUTPATIENT)
Dept: OPHTHALMOLOGY | Facility: CLINIC | Age: 68
End: 2018-08-16
Attending: OPHTHALMOLOGY
Payer: MEDICARE

## 2018-08-16 DIAGNOSIS — E11.3299 NON-PROLIFERATIVE DIABETIC RETINOPATHY (H): ICD-10-CM

## 2018-08-16 DIAGNOSIS — H25.13 AGE-RELATED NUCLEAR CATARACT OF BOTH EYES: Primary | ICD-10-CM

## 2018-08-16 DIAGNOSIS — E11.21 TYPE 2 DIABETES MELLITUS WITH DIABETIC NEPHROPATHY (H): ICD-10-CM

## 2018-08-16 PROCEDURE — 92134 CPTRZ OPH DX IMG PST SGM RTA: CPT | Mod: ZF | Performed by: OPHTHALMOLOGY

## 2018-08-16 PROCEDURE — G0463 HOSPITAL OUTPT CLINIC VISIT: HCPCS | Mod: ZF

## 2018-08-16 ASSESSMENT — REFRACTION_WEARINGRX
OS_AXIS: 033
OS_ADD: +2.50
OS_CYLINDER: +0.75
OD_SPHERE: -2.50
OD_AXIS: 157
OD_CYLINDER: +1.50
OD_ADD: +2.50
OS_SPHERE: -2.50

## 2018-08-16 ASSESSMENT — TONOMETRY
IOP_METHOD: APPLANATION
OS_IOP_MMHG: 18
OD_IOP_MMHG: 18

## 2018-08-16 ASSESSMENT — SLIT LAMP EXAM - LIDS
COMMENTS: NORMAL
COMMENTS: NORMAL

## 2018-08-16 ASSESSMENT — EXTERNAL EXAM - RIGHT EYE: OD_EXAM: NORMAL

## 2018-08-16 ASSESSMENT — VISUAL ACUITY
OS_CC+: -2
OD_CC: 20/40
METHOD: SNELLEN - LINEAR
CORRECTION_TYPE: GLASSES
OS_CC: 20/30
OD_CC+: -1

## 2018-08-16 ASSESSMENT — CONF VISUAL FIELD
OD_NORMAL: 1
OS_NORMAL: 1

## 2018-08-16 ASSESSMENT — CUP TO DISC RATIO
OS_RATIO: 0.3
OD_RATIO: 0.3

## 2018-08-16 ASSESSMENT — EXTERNAL EXAM - LEFT EYE: OS_EXAM: NORMAL

## 2018-08-16 NOTE — NURSING NOTE
"Chief Complaints and History of Present Illnesses   Patient presents with     Follow Up For     nonproliferative diabetic retinopathy + possible injction     HPI    Affected eye(s):  Both   Symptoms:        Duration:  4 weeks   Frequency:  Constant       Do you have eye pain now?:  No      Comments:  Patient here for 4 week follow up visit due to nonproliferative diabetic retinopathy of both eyes. He states his vision fluctuates daily OU. Has itching daily in both eyes. He also notes that he feels \"pressure\" inside of his eyes.  Doris Desir COA 7:53 AM August 16, 2018                "

## 2018-08-16 NOTE — PROGRESS NOTES
I have confirmed the patient's and reviewed Past Medical History, Past Surgical History, Social History, Family History, Problem List, Medication List and agree with Tech note.    CC: diabetic retinopathy both eyes     HPI: previous pt of Dr. Alberto was scheduled to have cataract surgery but transferred care to [facility] with Dr. Marcio Ortiz who diagnosed him with macular edema and sent him to Dr. Hilton  He has been receiving injections in both eyes  Right Eye: 2/26/18, 4/10/18, 5/11/20218, 6/11/18 and last one her on 7/19/2018  Left Eye: 2/26/18, 4/10/18, 5/11/2018       Assessment/plan:   1. 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 Patient advised that failure to adequately control each may lead to vision loss. The patient expressed understanding.      2. Diabetic macular edema right eye   - temporal macular edema,    - has been receiving injections , last injection 6/11/18 at outside clinic and 7/19/2018 here at [facility]    - recommend defer avastin today since there was not much of an effect    - follow up 4 weeks    3. Diabetic macular edema left eye   - temporal edema approaching fovea   - last injection 5/11/18 (likely trend was improving)   - plan to observe today   - restart injections if no improvement or if plan for cataract surgery    4. Cataracts both eyes    - needs cataract surgery   - was seen previously , has IOL calcs   - will refer back t Dr. Eugene and suggest CE-IOL with concurrent intravitreal Triescence for temporal macular edema       Return to Clinic 4 weeks for OCT Mac on a Wednesday       Jackie Nolasco MD PhD.  Professor & Chair

## 2018-08-16 NOTE — MR AVS SNAPSHOT
After Visit Summary   8/16/2018    Earle Rene    MRN: [id number]           Patient Information     Date Of Birth          1950        Visit Information        Provider Department      8/16/2018 7:30 AM Jackie Rodriguez MD [facility]        Today's Diagnoses     Age-related nuclear cataract of both eyes    -  1    Non-proliferative diabetic retinopathy (H)           Follow-ups after your visit        Follow-up notes from your care team     Return in about 5 weeks (around 9/20/2018) for Diabetic exam, Exam & OCT OU.      Your next 10 appointments already scheduled     Sep 07, 2018  8:30 AM CDT   (Arrive by 8:15 AM)   RETURN DIABETES with Pamela Laura PA-C   [facility] Endocrinology ([facility])    [address]  3rd Floor  [facility] [id number] [phone]            Sep 19, 2018  8:00 AM CDT   RETURN RETINA with Jackie Rodriguez MD   [facility] ([facility])    [address]  [facility] [id number] [phone]            Jan 30, 2019 11:30 AM CST   Lab with  LAB   [facility] Lab ([facility])    [address]  [address]st [facility] [id number] [phone]            Jan 30, 2019 12:30 PM CST   (Arrive by 12:00 PM)   Return Visit with Jayda Bolden MD   [facility] Nephrology ([facility])    [address]  [address]  [facility] [id number] [phone]              Who to contact     Please call your clinic at [phone] to:    Ask questions about your health    Make or cancel appointments    Discuss your medicines    Learn about your test results    Speak to your doctor            Additional Information About Your Visit        Care EveryWhere ID     This is your Care EveryWhere ID. This could be used by other organizations to access your [place] medical records  [id number]          Blood Pressure from Last 3 Encounters:   08/01/18 143/78   05/31/18 115/78   05/24/18 125/68    Weight from Last 3 Encounters:   08/01/18 86.5 kg (190 lb 12.8 oz)   05/31/18 87.4 kg (192 lb 9.6 oz)   05/24/18 87.7 kg (193 lb 4.8 oz)              We Performed the Following     OCT Retina Spectralis OU (both eyes)        Primary Care Provider Office Phone # Fax #    Marcio GAVIN MD [facility] [phone] [address] [address]        Equal Access to Services     [facility] CenterESTELA : Hadii aad ku hadasho Sofranklin, waaxda luqadaha, qaybta kaalmada adeegyada, ethan nascimento . So [facility] [phone].    ATENCIÓN: Si habla español, tiene a márquez disposición servicios gratuitos de asistencia lingüística. LlGood [facility]aritan [facility] [phone].    We comply with applicable federal civil rights laws and [place] laws. We do not discriminate on the basis of race, color, national origin, age, disability, sex, sexual orientation, or gender identity.            Thank you!     Thank you for choosing EYE CLINIC  for your care. Our goal is always to provide you with excellent care. Hearing back from our patients is one way we can continue to improve our services. Please take a few minutes to complete the written survey that you may receive in the mail after your visit with us. Thank you!             Your Updated Medication List - Protect others around you: Learn how to safely use, store and throw away your medicines at www.disposemymeds.org.          This list is accurate as of 8/16/18  8:46 AM.  Always use your most recent med list.                   Brand Name Dispense Instructions for use Diagnosis    alprostadil 20 MCG kit    EDEX    6 each    by Intracavitary route. Inject 3/4 ml (15 ug) as instructed.  Can increase to full dose of 1 ml (20 ug) if necessary.   Can dispense 6 kits.    Erectile dysfunction       amoxicillin-clavulanate 875-125 MG per tablet    AUGMENTIN     TK 1 T PO BID FOR  10 DAYS        aspirin 81 MG tablet      Take 1 tablet by mouth daily.        atorvastatin 20 MG tablet    LIPITOR    90 tablet    Take 1 tablet (20 mg) by mouth daily    Hyperlipidemia LDL goal <100       BASAGLAR 100 UNIT/ML injection     60 mL    Inject 68 units SQ at bedtime        blood glucose lancets standard    no brand specified    300 each    Lancets that go with device, Test 3 times daily    Type 2 diabetes mellitus with diabetic nephropathy (H)       blood glucose monitoring meter device kit    no brand specified    1 kit    Any meter covered by insurance, not store brand, use as directed.    Type 2 diabetes mellitus with diabetic nephropathy (H)       * blood glucose monitoring test strip    no brand specified    300 strip    Strips that go with meter, covered by insurance. Test 3 times daily    Type 2 diabetes mellitus with diabetic nephropathy (H)       * ONETOUCH ULTRA test strip   Generic drug:  blood glucose monitoring     300 strip    Use to test blood sugar 3 times daily    Type 2 diabetes mellitus with diabetic nephropathy (H)       * Blood Pressure Monitor Kit     1 kit    Check blood pressure as directed.    Unspecified essential hypertension       * Blood Pressure Monitor Kit     1 kit    Automatic Blood Pressure Monitor    Benign essential hypertension, Chronic kidney disease, stage 3 (moderate)       clobetasol 0.05 % ointment    TEMOVATE    30 g    Apply topically to legs twice daily for 2 weeks.  Then discontinue    Rash and other nonspecific skin eruption       clotrimazole 1 % cream    LOTRIMIN    30 g    Apply topically 2 times daily    Tinea pedis of both feet, Dermatophytosis of nail       fenofibrate 54 MG tablet     90 tablet    Take 1 tablet (54 mg) by mouth daily    Hyperlipidemia LDL goal < 100       finasteride 5 MG tablet    PROSCAR    90 tablet    Take 1 tablet (5 mg) by mouth daily    Benign prostatic hyperplasia with urinary obstruction       fish oil-omega-3 fatty acids  "1000 MG capsule     60 capsule    Take 1 capsule (1 g) by mouth 2 times daily    Lymphocytopenia, High cholesterol, Screening for prostate cancer       gabapentin 100 MG capsule    NEURONTIN    180 capsule    Take 1 capsule (100 mg) by mouth 3 times daily    Polyneuropathy in diabetes(357.2)       insulin pen needle 31G X 5 MM    B-D U/F    400 each    Use 4 times per day.  Please dispense as BD Pen Needle Mini U/F 31G x 5 MM    Diabetes mellitus type 2, insulin dependent (H)       insulin syringe 31G X 5/16\" 0.5 ML Misc     270 each    Use three syringes daily    Type 2 diabetes mellitus (H)       loratadine 10 MG tablet    CLARITIN    90 tablet    Take 1 tablet (10 mg) by mouth daily    Seasonal allergies       losartan 25 MG tablet    COZAAR    90 tablet    Take 1 tablet (25 mg) by mouth daily    HTN (hypertension)       metFORMIN 1000 MG tablet    GLUCOPHAGE    180 tablet    1 tab twice daily with meals    Diabetes mellitus, type 2 (H)       mupirocin 2 % cream    BACTROBAN    15 g    Apply  topically. In very small amounts only as needed    Rash and other nonspecific skin eruption       NovoLOG FLEXPEN 100 UNIT/ML injection   Generic drug:  insulin aspart     60 mL    Inject 12-14 units SQ with meals with use of correction insulin. Pt uses approx 60 units in 24 hrs.    Type 2 diabetes mellitus (H)       sodium bicarbonate 650 MG tablet     90 tablet    Take 1 tablet (650 mg) by mouth 3 times daily    Chronic kidney disease, stage III (moderate)       tacrolimus 0.03 % ointment    PROTOPIC    60 g    Apply to affected area(s) twice daily    Rash and other nonspecific skin eruption       tamsulosin 0.4 MG capsule    FLOMAX    90 capsule    Take 1 capsule (0.4 mg) by mouth daily    Benign non-nodular prostatic hyperplasia with lower urinary tract symptoms       * Notice:  This list has 4 medication(s) that are the same as other medications prescribed for you. Read the directions carefully, and ask your doctor or " other care provider to review them with you.

## 2018-08-20 ENCOUNTER — OFFICE VISIT (OUTPATIENT)
Dept: INTERNAL MEDICINE | Facility: CLINIC | Age: 68
End: 2018-08-20
Payer: MEDICARE

## 2018-08-20 VITALS
DIASTOLIC BLOOD PRESSURE: 70 MMHG | BODY MASS INDEX: 28.74 KG/M2 | SYSTOLIC BLOOD PRESSURE: 122 MMHG | WEIGHT: 189 LBS | HEART RATE: 79 BPM | RESPIRATION RATE: 16 BRPM

## 2018-08-20 DIAGNOSIS — S91.001A OPEN WOUND OF RIGHT KNEE, LEG, AND ANKLE, INITIAL ENCOUNTER: Primary | ICD-10-CM

## 2018-08-20 DIAGNOSIS — S81.801A OPEN WOUND OF RIGHT KNEE, LEG, AND ANKLE, INITIAL ENCOUNTER: Primary | ICD-10-CM

## 2018-08-20 DIAGNOSIS — S81.001A OPEN WOUND OF RIGHT KNEE, LEG, AND ANKLE, INITIAL ENCOUNTER: Primary | ICD-10-CM

## 2018-08-20 DIAGNOSIS — E11.22 TYPE 2 DIABETES MELLITUS WITH CHRONIC KIDNEY DISEASE, WITHOUT LONG-TERM CURRENT USE OF INSULIN, UNSPECIFIED CKD STAGE (H): ICD-10-CM

## 2018-08-20 ASSESSMENT — PAIN SCALES - GENERAL: PAINLEVEL: EXTREME PAIN (9)

## 2018-08-20 NOTE — PROGRESS NOTES
S) 69 yo male with DM II seen with RLE wounds after fall in garden. Pain is preventing sleep. Took PCN as was concerned about infection.    O) /70  Pulse 79  Resp 16  Wt 85.7 kg (189 lb)  BMI 28.74 kg/m2            A/P) 1) RLE wounds. No evidence of infection but has lost epidermis and partial dermis. Very painful. Risk of infection and delayed healing given DM. Taking acetaminophen and ibuprofen for pain- cautioned against ongoing ibuprofen use given DM renal disease. Discussed with Dr. Aguero of surgical wound mgmt and he supplied 2 packs of UNNA-Z. I dressed the wound and wrapped with light gauze then instructed him to change this nightly. He'll keep the extra medicated gauze (has zinc and calomine lotion) in the refrigerator when not using it. He'll f/u with Dr. Hare in a week and will keep an eye out for any signs of infection.    Over 15 min of 25 min visit spent in care coordination and counseling related to the above issues.    Juan Rehman MD, FACP, FAAP

## 2018-08-20 NOTE — NURSING NOTE
Chief Complaint   Patient presents with     Laceration     pt is here to discuss right leg wound. fell while gardening last wednesday       Pooja Ren CMA at 12:59 PM on 8/20/2018

## 2018-08-21 ENCOUNTER — CARE COORDINATION (OUTPATIENT)
Dept: NEPHROLOGY | Facility: CLINIC | Age: 68
End: 2018-08-21

## 2018-08-21 NOTE — TELEPHONE ENCOUNTER
Patient reports BP:  8/5: 133/79  8/6: 118/79, 143/81  8/7: 130/76, 152/90  8/8: 148/82, 148/86  8/9: 139/76, 131/70  8/10: 137/81, 131/81  8/12: 141/79  8/13: 134/76, 151/91  8/14: 131/81  8/15: 143/87, 142/82  8/16: 120/80  8/17: 126/76, 131/80  8/20: 157/90, 126/71    Patient reports no changes, and all BP readings are before he takes his BP medications.  Bre Hanson LPN  Nephrology  [phone]

## 2018-08-27 ENCOUNTER — MEDICAL CORRESPONDENCE (OUTPATIENT)
Dept: HEALTH INFORMATION MANAGEMENT | Facility: CLINIC | Age: 68
End: 2018-08-27

## 2018-09-07 ENCOUNTER — OFFICE VISIT (OUTPATIENT)
Dept: ENDOCRINOLOGY | Facility: CLINIC | Age: 68
End: 2018-09-07
Payer: MEDICARE

## 2018-09-07 ENCOUNTER — OFFICE VISIT (OUTPATIENT)
Dept: FAMILY MEDICINE | Facility: CLINIC | Age: 68
End: 2018-09-07
Payer: MEDICARE

## 2018-09-07 VITALS — DIASTOLIC BLOOD PRESSURE: 74 MMHG | SYSTOLIC BLOOD PRESSURE: 134 MMHG

## 2018-09-07 VITALS
BODY MASS INDEX: 29.98 KG/M2 | HEIGHT: 67 IN | DIASTOLIC BLOOD PRESSURE: 82 MMHG | HEART RATE: 72 BPM | WEIGHT: 191 LBS | SYSTOLIC BLOOD PRESSURE: 136 MMHG

## 2018-09-07 DIAGNOSIS — L08.9 LOCAL INFECTION OF SKIN AND SUBCUTANEOUS TISSUE: ICD-10-CM

## 2018-09-07 DIAGNOSIS — Z79.4 TYPE 2 DIABETES MELLITUS WITH DIABETIC NEUROPATHY, WITH LONG-TERM CURRENT USE OF INSULIN (H): Primary | ICD-10-CM

## 2018-09-07 DIAGNOSIS — R21 RASH AND NONSPECIFIC SKIN ERUPTION: Primary | ICD-10-CM

## 2018-09-07 DIAGNOSIS — E11.40 TYPE 2 DIABETES MELLITUS WITH DIABETIC NEUROPATHY, WITH LONG-TERM CURRENT USE OF INSULIN (H): Primary | ICD-10-CM

## 2018-09-07 RX ORDER — INSULIN ASPART 100 [IU]/ML
INJECTION, SOLUTION INTRAVENOUS; SUBCUTANEOUS
Qty: 60 ML | Refills: 3 | COMMUNITY
Start: 2018-09-07 | End: 2019-03-07

## 2018-09-07 RX ORDER — INSULIN GLARGINE 100 [IU]/ML
INJECTION, SOLUTION SUBCUTANEOUS
Qty: 60 ML | Refills: 3 | COMMUNITY
Start: 2018-09-07 | End: 2018-09-20

## 2018-09-07 RX ORDER — CEPHALEXIN 500 MG/1
500 CAPSULE ORAL 3 TIMES DAILY
Qty: 30 CAPSULE | Refills: 0 | Status: SHIPPED | OUTPATIENT
Start: 2018-09-07 | End: 2019-02-06

## 2018-09-07 RX ORDER — TRIAMCINOLONE ACETONIDE 1 MG/G
CREAM TOPICAL 3 TIMES DAILY
Qty: 80 G | Refills: 0 | Status: SHIPPED | OUTPATIENT
Start: 2018-09-07 | End: 2023-09-29

## 2018-09-07 ASSESSMENT — PAIN SCALES - GENERAL
PAINLEVEL: EXTREME PAIN (9)
PAINLEVEL: NO PAIN (0)

## 2018-09-07 NOTE — MR AVS SNAPSHOT
After Visit Summary   9/7/2018    Earle Rene    MRN: [id number]           Patient Information     Date Of Birth          1950        Visit Information        Provider Department      9/7/2018 8:30 AM Pamela Laura PA-C [facility] Endocrinology        Care Instructions    1.  Take Basaglar 62 units at bedtime.  2.  Take Novolog 10 units breakfast, 8 units with lunch and 10 units with dinner.  3.  Please be seen today in primary care clinic for follow up right lower leg wound and rash on arms.  4.  See Podiatrist for foot and nail care.  5.  See me in 2 months.  Pamela Laura           Follow-ups after your visit        Your next 10 appointments already scheduled     Sep 12, 2018  7:30 AM CDT   RETURN RETINA with Jackie Rodriguez MD   [facility] ([facility])    [address]  [facility] [address]   [phone]            Nov 13, 2018  8:30 AM CST   (Arrive by 8:15 AM)   RETURN DIABETES with RAJANI Li [facility] Endocrinology ([facility])    [address] [place] [address]  3rd Floor  [facility] [id number] [phone]            Jan 30, 2019 11:30 AM CST   Lab with  LAB   [facility] Lab ([facility])    [address] [place] [address]  [address]st [facility] [id number] [phone]            Jan 30, 2019 12:30 PM CST   (Arrive by 12:00 PM)   Return Visit with Jayda Bolden MD   [facility] Nephrology ([facility])    [address] [place] [address]  [address]  [facility] [id number] [phone]              Who to contact     Please call your clinic at [phone] to:    Ask questions about your health    Make or cancel appointments    Discuss your medicines    Learn about your test results    Speak to your doctor            Additional Information About Your Visit        MyChart Information     [employer] is an electronic gateway that  "provides easy, online access to your medical records. With [email or web address], you can request a clinic appointment, read your test results, renew a prescription or communicate with your care team.     To sign up for [email or web address] visit the website at [email or web address]   You will be asked to enter the access code listed below, as well as some personal information. Please follow the directions to create your username and password.     Your access code is: [email or web address]  Expires: 2018  6:30 AM     Your access code will  in 90 days. If you need help or a new code, please contact your [facility] or call [phone] for assistance.        Care EveryWhere ID     This is your Care EveryWhere ID. This could be used by other organizations to access your [place] medical records  [id number]        Your Vitals Were     Pulse Height BMI (Body Mass Index)             72 1.702 m (5' 7\") 29.91 kg/m2          Blood Pressure from Last 3 Encounters:   18 136/82   18 122/70   18 143/78    Weight from Last 3 Encounters:   18 86.6 kg (191 lb)   18 85.7 kg (189 lb)   18 86.5 kg (190 lb 12.8 oz)              Today, you had the following     No orders found for display         Today's Medication Changes          These changes are accurate as of 18  9:25 AM.  If you have any questions, ask your nurse or doctor.               These medicines have changed or have updated prescriptions.        Dose/Directions    BASAGLAR 100 UNIT/ML injection   This may have changed:  additional instructions        Inject 62 units SQ at bedtime   Quantity:  60 mL   Refills:  3       NovoLOG FLEXPEN 100 UNIT/ML injection   This may have changed:  additional instructions   Generic drug:  insulin aspart        Inject 10 units with breakfast, 8 units with lunch and 10 units with dinner, plus correction. Pt uses approx 60 units in 24 hrs.   Quantity:  60 mL   Refills:  3                " Primary Care Provider Office Phone # Fax #    Marcio Hare [phone] [address] [address]        Equal Access to Services     NIGHATTRUDI TABATHA : Hadii aad ku silviao Soomaali, waaxda luqadaha, qaybta kaalmada adeegyada, ethan mcmullenshay bobo. So [facility] [phone].    ATENCIÓN: Si habla español, tiene a márquez disposición servicios gratuitos de asistencia lingüística. Llame al [phone].    We comply with applicable federal civil rights laws and [place] laws. We do not discriminate on the basis of race, color, national origin, age, disability, sex, sexual orientation, or gender identity.            Thank you!     Thank you for choosing [facility]  for your care. Our goal is always to provide you with excellent care. Hearing back from our patients is one way we can continue to improve our services. Please take a few minutes to complete the written survey that you may receive in the mail after your visit with us. Thank you!             Your Updated Medication List - Protect others around you: Learn how to safely use, store and throw away your medicines at www.disposemymeds.org.          This list is accurate as of 9/7/18  9:25 AM.  Always use your most recent med list.                   Brand Name Dispense Instructions for use Diagnosis    alprostadil 20 MCG kit    EDEX    6 each    by Intracavitary route. Inject 3/4 ml (15 ug) as instructed.  Can increase to full dose of 1 ml (20 ug) if necessary.   Can dispense 6 kits.    Erectile dysfunction       amoxicillin-clavulanate 875-125 MG per tablet    AUGMENTIN     TK 1 T PO BID FOR 10 DAYS        aspirin 81 MG tablet      Take 1 tablet by mouth daily.        atorvastatin 20 MG tablet    LIPITOR    90 tablet    Take 1 tablet (20 mg) by mouth daily    Hyperlipidemia LDL goal <100       BASAGLAR 100 UNIT/ML injection     60 mL    Inject 62 units SQ at bedtime        blood glucose lancets  "standard    no brand specified    300 each    Lancets that go with device, Test 3 times daily    Type 2 diabetes mellitus with diabetic nephropathy (H)       blood glucose monitoring meter device kit    no brand specified    1 kit    Any meter covered by insurance, not store brand, use as directed.    Type 2 diabetes mellitus with diabetic nephropathy (H)       * Blood Pressure Monitor Kit     1 kit    Check blood pressure as directed.    Unspecified essential hypertension       * Blood Pressure Monitor Kit     1 kit    Automatic Blood Pressure Monitor    Benign essential hypertension, Chronic kidney disease, stage 3 (moderate)       clobetasol 0.05 % ointment    TEMOVATE    30 g    Apply topically to legs twice daily for 2 weeks.  Then discontinue    Rash and other nonspecific skin eruption       clotrimazole 1 % cream    LOTRIMIN    30 g    Apply topically 2 times daily    Tinea pedis of both feet, Dermatophytosis of nail       fenofibrate 54 MG tablet     90 tablet    Take 1 tablet (54 mg) by mouth daily    Hyperlipidemia LDL goal < 100       finasteride 5 MG tablet    PROSCAR    90 tablet    Take 1 tablet (5 mg) by mouth daily    Benign prostatic hyperplasia with urinary obstruction       fish oil-omega-3 fatty acids 1000 MG capsule     60 capsule    Take 1 capsule (1 g) by mouth 2 times daily    Lymphocytopenia, High cholesterol, Screening for prostate cancer       gabapentin 100 MG capsule    NEURONTIN    180 capsule    Take 1 capsule (100 mg) by mouth 3 times daily    Polyneuropathy in diabetes(357.2)       insulin pen needle 31G X 5 MM    B-D U/F    400 each    Use 4 times per day.  Please dispense as BD Pen Needle Mini U/F 31G x 5 MM    Diabetes mellitus type 2, insulin dependent (H)       insulin syringe 31G X 5/16\" 0.5 ML Misc     270 each    Use three syringes daily    Type 2 diabetes mellitus (H)       loratadine 10 MG tablet    CLARITIN    90 tablet    Take 1 tablet (10 mg) by mouth daily    Seasonal " allergies       losartan 25 MG tablet    COZAAR    90 tablet    Take 1 tablet (25 mg) by mouth daily    HTN (hypertension)       metFORMIN 1000 MG tablet    GLUCOPHAGE    180 tablet    1 tab twice daily with meals    Diabetes mellitus, type 2 (H)       mupirocin 2 % cream    BACTROBAN    15 g    Apply  topically. In very small amounts only as needed    Rash and other nonspecific skin eruption       NovoLOG FLEXPEN 100 UNIT/ML injection   Generic drug:  insulin aspart     60 mL    Inject 10 units with breakfast, 8 units with lunch and 10 units with dinner, plus correction. Pt uses approx 60 units in 24 hrs.        * ONETOUCH ULTRA test strip   Generic drug:  blood glucose monitoring     300 strip    Use to test blood sugar 3 times daily    Type 2 diabetes mellitus with diabetic nephropathy (H)       * blood glucose monitoring test strip    no brand specified    300 strip    Strips that go with meter, covered by insurance. Test 3 times daily    Type 2 diabetes mellitus with diabetic nephropathy (H)       sodium bicarbonate 650 MG tablet     90 tablet    Take 1 tablet (650 mg) by mouth 3 times daily    Chronic kidney disease, stage III (moderate)       tacrolimus 0.03 % ointment    PROTOPIC    60 g    Apply to affected area(s) twice daily    Rash and other nonspecific skin eruption       tamsulosin 0.4 MG capsule    FLOMAX    90 capsule    Take 1 capsule (0.4 mg) by mouth daily    Benign non-nodular prostatic hyperplasia with lower urinary tract symptoms       * Notice:  This list has 4 medication(s) that are the same as other medications prescribed for you. Read the directions carefully, and ask your doctor or other care provider to review them with you.

## 2018-09-07 NOTE — MR AVS SNAPSHOT
After Visit Summary   9/7/2018    Earle Rene    MRN: [id number]           Patient Information     Date Of Birth          1950        Visit Information        Provider Department      9/7/2018 12:05 PM Hemanth Rivera MD [facility]        Today's Diagnoses     Rash and nonspecific skin eruption    -  1    Local infection of skin and subcutaneous tissue          Care Instructions    .Primary Care Center Medication Refill Request Information:  * Please contact your pharmacy regarding ANY request for medication refills.  ** [facility] Prescription Fax = [phone]  * Please allow 3 business days for routine medication refills.  * Please allow 5 business days for controlled substance medication refills.     Primary Care Center Test Result notification information:  *You will be notified with in 7-10 days of your appointment day regarding the results of your test.  If you are on MyChart you will be notified as soon as the provider has reviewed the results and signed off on them.            Follow-ups after your visit        Your next 10 appointments already scheduled     Sep 12, 2018  7:30 AM CDT   RETURN RETINA with Jackie Rodriguez MD   Eye Clinic ([facility])    [address]  [facility] [address]   [phone]            Nov 13, 2018  8:30 AM CST   (Arrive by 8:15 AM)   RETURN DIABETES with Pamela Laura PA-C   [facility] Endocrinology ([facility])    [address] [place] [id number]   [phone]            Jan 30, 2019 11:30 AM CST   Lab with  LAB   [facility] Lab ([facility])    [address] [place] [address]   [phone]            Jan 30, 2019 12:30 PM CST   (Arrive by 12:00 PM)   Return Visit with Jayda Bolden MD   [facility] Nephrology ([facility])    [facility]  [facility]  [address] [place] [id number] [phone]              Who to contact     Please call your clinic at [phone] to:    Ask questions about your health    Make or cancel appointments    Discuss your medicines    Learn about your test results    Speak to your doctor            Additional Information About Your Visit        MyChart Information     [employer] is an electronic gateway that provides easy, online access to your medical records. With [employer], you can request a clinic appointment, read your test results, renew a prescription or communicate with your care team.     To sign up for [employer] visit the website at [email or web address]   You will be asked to enter the access code listed below, as well as some personal information. Please follow the directions to create your username and password.     Your access code is: [email or web address]  Expires: 2018  6:30 AM     Your access code will  in 90 days. If you need help or a new code, please contact your [facility] or call [phone] for assistance.        Care EveryWhere ID     This is your Care EveryWhere ID. This could be used by other organizations to access your [place] medical records  [id number]         Blood Pressure from Last 3 Encounters:   18 134/74   18 136/82   18 122/70    Weight from Last 3 Encounters:   18 86.6 kg (191 lb)   18 85.7 kg (189 lb)   18 86.5 kg (190 lb 12.8 oz)              Today, you had the following     No orders found for display         Today's Medication Changes          These changes are accurate as of 18  1:12 PM.  If you have any questions, ask your nurse or doctor.               Start taking these medicines.        Dose/Directions    cephALEXin 500 MG capsule   Commonly known as:  KEFLEX   Used for:  Local infection of skin and subcutaneous tissue   Started by:  Hemanth Rivera MD        Dose:  500 mg   Take 1  capsule (500 mg) by mouth 3 times daily   Quantity:  30 capsule   Refills:  0       triamcinolone 0.1 % cream   Commonly known as:  KENALOG   Used for:  Rash and nonspecific skin eruption   Started by:  Hemanth Rivera MD        Apply topically 3 times daily   Quantity:  80 g   Refills:  0         These medicines have changed or have updated prescriptions.        Dose/Directions    BASAGLAR 100 UNIT/ML injection   This may have changed:  additional instructions   Changed by:  Pamela Laura PA-C        Inject 62 units SQ at bedtime   Quantity:  60 mL   Refills:  3       NovoLOG FLEXPEN 100 UNIT/ML injection   This may have changed:  additional instructions   Generic drug:  insulin aspart   Changed by:  Pamela Laura PA-C        Inject 10 units with breakfast, 8 units with lunch and 10 units with dinner, plus correction. Pt uses approx 60 units in 24 hrs.   Quantity:  60 mL   Refills:  3            Where to get your medicines      These medications were sent to "[facility] [id number] - [place], [place] - [address] AT [facility] [address]  [address], [place] [address]    Hours:  24-hours Phone:  [phone]     cephALEXin 500 MG capsule    triamcinolone 0.1 % cream                Primary Care Provider Office Phone # Fax #    Marcio W MD [place] [phone] [address] [address]        Equal Access to Services     [facility] AH: Hadii aad ku hadasho Soomaali, waaxda luqadaha, qaybta kaalmada adeegyada, waxay hipolitoin hayaan beto nascimento ah. So [facility] [phone].    ATENCIÓN: Si habla español, tiene a márquez disposición servicios gratuitos de asistencia lingüística. Halley al [phone].    We comply with applicable federal civil rights laws and [place] laws. We do not discriminate on the basis of race, color, national origin, age, disability, sex, sexual orientation, or gender identity.            Thank you!     Thank  you for choosing [facility]  for your care. Our goal is always to provide you with excellent care. Hearing back from our patients is one way we can continue to improve our services. Please take a few minutes to complete the written survey that you may receive in the mail after your visit with us. Thank you!             Your Updated Medication List - Protect others around you: Learn how to safely use, store and throw away your medicines at www.disposemymeds.org.          This list is accurate as of 9/7/18  1:12 PM.  Always use your most recent med list.                   Brand Name Dispense Instructions for use Diagnosis    alprostadil 20 MCG kit    EDEX    6 each    by Intracavitary route. Inject 3/4 ml (15 ug) as instructed.  Can increase to full dose of 1 ml (20 ug) if necessary.   Can dispense 6 kits.    Erectile dysfunction       amoxicillin-clavulanate 875-125 MG per tablet    AUGMENTIN     TK 1 T PO BID FOR 10 DAYS        aspirin 81 MG tablet      Take 1 tablet by mouth daily.        atorvastatin 20 MG tablet    LIPITOR    90 tablet    Take 1 tablet (20 mg) by mouth daily    Hyperlipidemia LDL goal <100       BASAGLAR 100 UNIT/ML injection     60 mL    Inject 62 units SQ at bedtime        blood glucose lancets standard    no brand specified    300 each    Lancets that go with device, Test 3 times daily    Type 2 diabetes mellitus with diabetic nephropathy (H)       blood glucose monitoring meter device kit    no brand specified    1 kit    Any meter covered by insurance, not store brand, use as directed.    Type 2 diabetes mellitus with diabetic nephropathy (H)       * Blood Pressure Monitor Kit     1 kit    Check blood pressure as directed.    Unspecified essential hypertension       * Blood Pressure Monitor Kit     1 kit    Automatic Blood Pressure Monitor    Benign essential hypertension, Chronic kidney disease, stage 3 (moderate)       cephALEXin 500 MG capsule    KEFLEX    30 capsule     "Take 1 capsule (500 mg) by mouth 3 times daily    Local infection of skin and subcutaneous tissue       clobetasol 0.05 % ointment    TEMOVATE    30 g    Apply topically to legs twice daily for 2 weeks.  Then discontinue    Rash and other nonspecific skin eruption       clotrimazole 1 % cream    LOTRIMIN    30 g    Apply topically 2 times daily    Tinea pedis of both feet, Dermatophytosis of nail       fenofibrate 54 MG tablet     90 tablet    Take 1 tablet (54 mg) by mouth daily    Hyperlipidemia LDL goal < 100       finasteride 5 MG tablet    PROSCAR    90 tablet    Take 1 tablet (5 mg) by mouth daily    Benign prostatic hyperplasia with urinary obstruction       fish oil-omega-3 fatty acids 1000 MG capsule     60 capsule    Take 1 capsule (1 g) by mouth 2 times daily    Lymphocytopenia, High cholesterol, Screening for prostate cancer       gabapentin 100 MG capsule    NEURONTIN    180 capsule    Take 1 capsule (100 mg) by mouth 3 times daily    Polyneuropathy in diabetes(357.2)       insulin pen needle 31G X 5 MM    B-D U/F    400 each    Use 4 times per day.  Please dispense as BD Pen Needle Mini U/F 31G x 5 MM    Diabetes mellitus type 2, insulin dependent (H)       insulin syringe 31G X 5/16\" 0.5 ML Misc     270 each    Use three syringes daily    Type 2 diabetes mellitus (H)       loratadine 10 MG tablet    CLARITIN    90 tablet    Take 1 tablet (10 mg) by mouth daily    Seasonal allergies       losartan 25 MG tablet    COZAAR    90 tablet    Take 1 tablet (25 mg) by mouth daily    HTN (hypertension)       metFORMIN 1000 MG tablet    GLUCOPHAGE    180 tablet    1 tab twice daily with meals    Diabetes mellitus, type 2 (H)       mupirocin 2 % cream    BACTROBAN    15 g    Apply  topically. In very small amounts only as needed    Rash and other nonspecific skin eruption       NovoLOG FLEXPEN 100 UNIT/ML injection   Generic drug:  insulin aspart     60 mL    Inject 10 units with breakfast, 8 units with lunch and " 10 units with dinner, plus correction. Pt uses approx 60 units in 24 hrs.        * ONETOUCH ULTRA test strip   Generic drug:  blood glucose monitoring     300 strip    Use to test blood sugar 3 times daily    Type 2 diabetes mellitus with diabetic nephropathy (H)       * blood glucose monitoring test strip    no brand specified    300 strip    Strips that go with meter, covered by insurance. Test 3 times daily    Type 2 diabetes mellitus with diabetic nephropathy (H)       sodium bicarbonate 650 MG tablet     90 tablet    Take 1 tablet (650 mg) by mouth 3 times daily    Chronic kidney disease, stage III (moderate)       tacrolimus 0.03 % ointment    PROTOPIC    60 g    Apply to affected area(s) twice daily    Rash and other nonspecific skin eruption       tamsulosin 0.4 MG capsule    FLOMAX    90 capsule    Take 1 capsule (0.4 mg) by mouth daily    Benign non-nodular prostatic hyperplasia with lower urinary tract symptoms       triamcinolone 0.1 % cream    KENALOG    80 g    Apply topically 3 times daily    Rash and nonspecific skin eruption       * Notice:  This list has 4 medication(s) that are the same as other medications prescribed for you. Read the directions carefully, and ask your doctor or other care provider to review them with you.

## 2018-09-07 NOTE — LETTER
9/7/2018       RE: Earle Rene  [address]  [place] [address]     Dear Colleague,    Thank you for referring your patient, Earle Rene, to the [facility] ENDOCRINOLOGY at [facility]. Please see a copy of my visit note below.    HPI   Earle Rene is a 68 year old male with type 2 diabetes mellitus here today for a follow up visit.      His wife is present today.  Pt's diabetes is complicated by retinopathy, nephropathy, neuropathy and ED.  Pt also has hx of hyperlipidemia and HTN.  For his diabetes, he is currently taking Basaglar 60  units SQ at hs, Novolog 8-10 units with meals and Metformin 1000 mg BID. He has no interest in carb counting or using an I/C ratio.  Pt's A1C is 7.4 % today.  We downloaded his glucose meter today and his blood sugar have improved overall. He has been better at taking his insulin and making healthier food choices.  He has only been checking his FBS values which have been 155, 158, 139, 152, 203, 131, 217, 136 and 137.  I have no predinner or evening blood sugar values.  He states he does not taking any insulin at lunchtime.  He denies symptoms of frequent hypoglycemia.  On ROS today, pt fell while gardening a few weeks ago and has a wound on his right lower leg. The wound has not healed and he reports increase pain. No fever or chills.  He may need an antibiotic, so I will have him seen in follow up in primary care clinic today following this visit.  He also has a rash on his arms which may be bug bites. He is to have this evaluated in [facility] today.  On ROS today, blurred vision and he was seen by Oph in Aug 2018.  Right lower leg wound and rash on arms as above.  He has lost some weight which has been intentional per patient.  Pt has numbness in both feet from his neuropathy.  He reports chronic cough.  Pt denies frequent headaches, n/v, SOB at rest, chest pain, abd pain, diarrhea, dysuria or hematuria.  No foot ulcers on exam  "today.    ROS   Please see under HPI.     ALLERGIES:  Review of patient's allergies indicates no known allergies.    Current Outpatient Prescriptions   Medication Sig Dispense Refill     alprostadil (EDEX) 20 MCG injection by Intracavitary route. Inject 3/4 ml (15 ug) as instructed.  Can increase to full dose of 1 ml (20 ug) if necessary.   Can dispense 6 kits. 6 each 12     amoxicillin-clavulanate (AUGMENTIN) 875-125 MG per tablet TK 1 T PO BID FOR 10 DAYS  0     aspirin 81 MG tablet Take 1 tablet by mouth daily.       atorvastatin (LIPITOR) 20 MG tablet Take 1 tablet (20 mg) by mouth daily 90 tablet 3     BASAGLAR 100 UNIT/ML injection Inject 62 units SQ at bedtime 60 mL 3     blood glucose (NO BRAND SPECIFIED) lancets standard Lancets that go with device, Test 3 times daily 300 each 3     blood glucose monitoring (NO BRAND SPECIFIED) meter device kit Any meter covered by insurance, not store brand, use as directed. 1 kit 0     blood glucose monitoring (NO BRAND SPECIFIED) test strip Strips that go with meter, covered by insurance. Test 3 times daily 300 strip 3     Blood Pressure Monitor KIT Automatic Blood Pressure Monitor 1 kit 0     Blood Pressure Monitor KIT Check blood pressure as directed. 1 kit 0     clobetasol (TEMOVATE) 0.05 % ointment Apply topically to legs twice daily for 2 weeks.  Then discontinue 30 g 0     clotrimazole (LOTRIMIN) 1 % cream Apply topically 2 times daily 30 g 3     fenofibrate 54 MG tablet Take 1 tablet (54 mg) by mouth daily 90 tablet 0     finasteride (PROSCAR) 5 MG tablet Take 1 tablet (5 mg) by mouth daily 90 tablet 3     gabapentin (NEURONTIN) 100 MG capsule Take 1 capsule (100 mg) by mouth 3 times daily 180 capsule 5     insulin pen needle (B-D U/F) 31G X 5 MM Use 4 times per day.  Please dispense as BD Pen Needle Mini U/F 31G x 5  each 3     insulin syringe 31G X 5/16\" 0.5 ML MISC Use three syringes daily 270 each 1     loratadine (CLARITIN) 10 MG tablet Take 1 tablet " (10 mg) by mouth daily 90 tablet 0     losartan (COZAAR) 25 MG tablet Take 1 tablet (25 mg) by mouth daily 90 tablet 3     metFORMIN (GLUCOPHAGE) 1000 MG tablet 1 tab twice daily with meals 180 tablet 3     mupirocin (BACTROBAN) 2 % cream Apply  topically. In very small amounts only as needed 15 g 1     NOVOLOG FLEXPEN 100 UNIT/ML soln Inject 10 units with breakfast, 8 units with lunch and 10 units with dinner, plus correction. Pt uses approx 60 units in 24 hrs. 60 mL 3     Omega-3 Fatty Acids (OMEGA-3 FISH OIL) 1000 MG CAPS Take 1 capsule (1 g) by mouth 2 times daily 60 capsule 11     ONETOUCH ULTRA test strip Use to test blood sugar 3 times daily 300 strip 3     sodium bicarbonate 650 MG tablet Take 1 tablet (650 mg) by mouth 3 times daily 90 tablet 11     tacrolimus (PROTOPIC) 0.03 % ointment Apply to affected area(s) twice daily 60 g 0     tamsulosin (FLOMAX) 0.4 MG capsule Take 1 capsule (0.4 mg) by mouth daily 90 capsule 1     cephALEXin (KEFLEX) 500 MG capsule Take 1 capsule (500 mg) by mouth 3 times daily 30 capsule 0     triamcinolone (KENALOG) 0.1 % cream Apply topically 3 times daily 80 g 0     Family Hx   No change.     Personal Hx   Smoke: none.   ETOH: none.    with grown children.   He owns his own business.    PMH   1. Type 2 Diabetes Mellitus dx at age 43.   2. Neuropathy.  3. Nephropathy.   4. ED.   5. Dyslipidemia.   6. Nephrolithiasis.   7. Decrease auditory acuity.   8. Sarcoidosis-lung.   9. Goiter.   10. S/P T & A.   11. S/P FX right heel.   12. Vit D def.   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   14. CT chest- ? granulomas.   15. Retinopathy.  16. Goiter.  Past Medical History:   Diagnosis Date     Blepharitis of both eyes      BPH (benign prostatic hyperplasia)      Diabetes (H)      Diabetic neuropathy (H)      Diabetic retinopathy associated with diabetes mellitus due to underlying condition (H)      Dry eye syndrome      Goiter      Granulomatous disease (H)      Nonsenile  "cataract      Peripheral neuropathy      Past Surgical History:   Procedure Laterality Date     AS RAD RESEC TONSIL/PILLARS Bilateral 1961     COLONOSCOPY  7/29/2013    Procedure: COLONOSCOPY;;  Surgeon: Montana Pascal MD;  Location: [facility]  11/2017     SURGICAL PATHOLOGY EXAM       Physical Exam   General appearance: Vital signs:   /82  Pulse 72  Ht 1.702 m (5' 7\")  Wt 86.6 kg (191 lb)  BMI 29.91 kg/m2  Estimated body mass index is 29.91 kg/(m^2) as calculated from the following:    Height as of this encounter: 1.702 m (5' 7\").    Weight as of this encounter: 86.6 kg (191 lb).  Head:  Normal.   Eyes: Decrease visual acuity; fundi not visualized.   Lungs: clear bilateral.  Cardiovascular system:  RRR.   Abdomen:  Large and nontender.  Musculoskeletal system: no edema.   Neurological:  normal.   Skin:  necrobiosis lipoidica on both legs. Rash forearms.  Wound right lower leg.  FEET: no ulcers. Nails are thick.  Abnormal monofilamentous exam.    Results   Creatinine   Date Value Ref Range Status   08/01/2018 1.31 (H) 0.66 - 1.25 mg/dL Final     GFR Estimate   Date Value Ref Range Status   08/01/2018 54 (L) >60 mL/min/1.7m2 Final     Comment:     Non  GFR Calc     Hemoglobin A1C   Date Value Ref Range Status   03/06/2018 9.1 (H) 4.3 - 6.0 % Final     Potassium   Date Value Ref Range Status   08/01/2018 4.6 3.4 - 5.3 mmol/L Final     ALT   Date Value Ref Range Status   03/06/2018 35 0 - 70 U/L Final     AST   Date Value Ref Range Status   03/06/2018 25 0 - 45 U/L Final     TSH   Date Value Ref Range Status   11/01/2017 1.12 0.40 - 4.00 mU/L Final     T4 Free   Date Value Ref Range Status   10/21/2014 1.00 0.76 - 1.46 ng/dL Final     Comment:     Effective 7/30/2014, the reference range for this assay has changed to reflect   new instrumentation/methodology.           Cholesterol   Date Value Ref Range Status   03/06/2018 101 <200 mg/dL Final   01/14/2016 173 <200 mg/dL Final "     HDL Cholesterol   Date Value Ref Range Status   03/06/2018 32 (L) >39 mg/dL Final   01/14/2016 30 (L) >39 mg/dL Final     LDL Cholesterol Calculated   Date Value Ref Range Status   03/06/2018 36 <100 mg/dL Final     Comment:     Desirable:       <100 mg/dl   01/14/2016  <100 mg/dL Final    Cannot estimate LDL when triglyceride exceeds 400 mg/dL     LDL Cholesterol Direct   Date Value Ref Range Status   01/14/2016 84 <100 mg/dL Final     Comment:     Desirable:       <100 mg/dl     Triglycerides   Date Value Ref Range Status   03/06/2018 166 (H) <150 mg/dL Final     Comment:     Borderline high:  150-199 mg/dl  High:             200-499 mg/dl  Very high:       >499 mg/dl     01/14/2016 436 (H) <150 mg/dL Final     Comment:     Borderline high:  150-199 mg/dl   High:             200-499 mg/dl   Very high:       >499 mg/dl       Cholesterol/HDL Ratio   Date Value Ref Range Status   09/09/2014 3.8 0.0 - 5.0 Final   11/20/2013 5.8 (H) 0.0 - 5.0 Final     A1C      7.4   9/7/2018  A1C      7.1   5/31/2018  A1C      9.1   3/6/2018  A1C      9.6   11/1/2017  A1C      8.9   8/9/2017  A1C      9.7   9/22/2016  A1C      9.7   7/21/2015  A1C     10.2  12/2/2014  A1C     10.2  9/9/2014  A1C      9.8  11/20/2013  A1C      9.3   6/12/2013  A1C      8.0   8/14/2012  A1C      8.2   5/22/2012  A1C      8.0   5/22/2012    ASSESSMENT/PLAN:     1. TYPE 2 DIABETES MELLITUS: Type 2 diabetes mellitus complicated by retinopathy, nephropathy,neuropathy and ED.  Pt's blood sugar control has improved with dietary and lifestyle changes.  I reminded him to take Novolog when he eats lunch.  Pt instructed to increase Basaglar 62 units subcutaneous at hs and take Novolog 10 units with breakfast, 8 units with lunch and 10 units with dinner.  He was instructed to take his Novolog 5-10 minutes prior to his meals.  He is to remain on Metformin 1000 mg BID for now.  Pt's most recent creat was 1.31 with GFR 54 mL/min on 8/1/2018.  I asked him to check  his blood sugar fasting each am, prelunch and predinner DAILY.  Encouraged patient to continue to make healthy food choices, reduce his food portions with meals, avoid snacking and walk daily and to take his insulin and medications as prescribed.  Pt remains on daily ASA.     2. GOITER: Nontender goiter.  TSH normal in Nov 2017.    3. NEPHROPATHY: Discussed the need for good glycemic control and good BP control.   Pt's creat 1.31 with GFR 54 mL/min on 8/1/2018.   K+ 4.6 .  Pt's urine protein +.  He is taking Cozaar.  He has been seen by Nephrology here.    4.  RETINOPATHY: Pt seen by Oph here on a regular basis and was seen in Aug 2018.    5.  NEUROPATHY: Continue Gabapentin.  No foot ulcers.  Referred to Podiatry for foot and nail care.    6. DYSLIPIDEMIA: LDL 36 on 3/6/2018.   Pt is taking Lipitor.    7. OBESITY: See under # 1 above.  He does not want to use a GLP-1.    8.  LEG WOUND: Pt to be seen in [facility] today for follow up right lower leg wound. He may need to take Keflex.  Also eval pruritic rash on arms.    9.   Return to Endocrine Clinic to see me in 2 months.        Pamela Laura PA-C

## 2018-09-07 NOTE — PROGRESS NOTES
[facility]  Primary Care Center   Hemanth Rivera MD  09/07/2018      Chief Complaint:   Wound Check     History of Present Illness:   Earle Rene is a 68 year old male with a history of type II diabetes mellitus and hyperlipidemia who presents for evaluation of a wound.    Lower Leg wound: The patient fell down while gardening ~3-4 weeks ago and then saw Dr. Juan Rehman in  on 8/20/18 for his right leg wound. At that time, there was no evidence of infection, but there was a risk of infection and delayed healing due to his diabetes. The patient was supplied 2 packs of UNNA-Z. Today, he notes that the top of his right shin is better, but the lower part is still painful. He notes some blood and green drainage coming from it.     Arm/Hand Lesions: Last week, the patient reports developing lesions across his distal arms and hands with associated itching. These lesions are mostly on the left, but some on the right. He has previously experienced this type of breakout and still has scars from previous ones. He denies any drainage from them.    He denies fever or any other symptoms besides what are mentioned in the HPI.     Review of Systems:   Pertinent items are noted in HPI, remainder of complete ROS is negative.      Active Medications:      alprostadil (EDEX) 20 MCG injection, by Intracavitary route. Inject 3/4 ml (15 ug) as instructed.  Can increase to full dose of 1 ml (20 ug) if necessary.   Can dispense 6 kits., Disp: 6 each, Rfl: 12     amoxicillin-clavulanate (AUGMENTIN) 875-125 MG per tablet, TK 1 T PO BID FOR 10 DAYS, Disp: , Rfl: 0     aspirin 81 MG tablet, Take 1 tablet by mouth daily., Disp: , Rfl:      atorvastatin (LIPITOR) 20 MG tablet, Take 1 tablet (20 mg) by mouth daily, Disp: 90 tablet, Rfl: 3     BASAGLAR 100 UNIT/ML injection, Inject 62 units SQ at bedtime, Disp: 60 mL, Rfl: 3     clobetasol (TEMOVATE) 0.05 % ointment, Apply topically to legs twice daily for 2 weeks.  Then discontinue,  "Disp: 30 g, Rfl: 0     clotrimazole (LOTRIMIN) 1 % cream, Apply topically 2 times daily, Disp: 30 g, Rfl: 3     fenofibrate 54 MG tablet, Take 1 tablet (54 mg) by mouth daily, Disp: 90 tablet, Rfl: 0     finasteride (PROSCAR) 5 MG tablet, Take 1 tablet (5 mg) by mouth daily, Disp: 90 tablet, Rfl: 3     gabapentin (NEURONTIN) 100 MG capsule, Take 1 capsule (100 mg) by mouth 3 times daily, Disp: 180 capsule, Rfl: 5     insulin pen needle (B-D U/F) 31G X 5 MM, Use 4 times per day.  Please dispense as BD Pen Needle Mini U/F 31G x 5 MM, Disp: 400 each, Rfl: 3     insulin syringe 31G X 5/16\" 0.5 ML MISC, Use three syringes daily, Disp: 270 each, Rfl: 1     loratadine (CLARITIN) 10 MG tablet, Take 1 tablet (10 mg) by mouth daily, Disp: 90 tablet, Rfl: 0     losartan (COZAAR) 25 MG tablet, Take 1 tablet (25 mg) by mouth daily, Disp: 90 tablet, Rfl: 3     metFORMIN (GLUCOPHAGE) 1000 MG tablet, 1 tab twice daily with meals, Disp: 180 tablet, Rfl: 3     mupirocin (BACTROBAN) 2 % cream, Apply  topically. In very small amounts only as needed, Disp: 15 g, Rfl: 1     NOVOLOG FLEXPEN 100 UNIT/ML soln, Inject 10 units with breakfast, 8 units with lunch and 10 units with dinner, plus correction. Pt uses approx 60 units in 24 hrs., Disp: 60 mL, Rfl: 3     Omega-3 Fatty Acids (OMEGA-3 FISH OIL) 1000 MG CAPS, Take 1 capsule (1 g) by mouth 2 times daily, Disp: 60 capsule, Rfl: 11     sodium bicarbonate 650 MG tablet, Take 1 tablet (650 mg) by mouth 3 times daily, Disp: 90 tablet, Rfl: 11     tacrolimus (PROTOPIC) 0.03 % ointment, Apply to affected area(s) twice daily, Disp: 60 g, Rfl: 0     tamsulosin (FLOMAX) 0.4 MG capsule, Take 1 capsule (0.4 mg) by mouth daily, Disp: 90 capsule, Rfl: 1     bevacizumab (AVASTIN) intravitreal inj 1.25 mg, 1.25 mg, Intravitreal, Q28 Days, Zeeshan Barnard MD, 1.25 mg at 07/19/18 1120      Allergies:   Review of patient's allergies indicates no known allergies.      Past Medical History:  Blepharitis of " both eyes   BPH (benign prostatic hyperplasia)  Type II diabetes mellitus   Diabetic neuropathy   Nonsenile cataract   Goiter   ED (erectile dysfunction)  Hyperlipidemia  CTS (carpal tunnel syndrome)  Presbyopia  Myopia     Past Surgical History:  Tonsil/pillars resection, bilateral  Surgical pathology exam     Family History:   Father: diabetes, MI  Brother: diabetes, leukemia       Social History:   The patient is , a nonsmoker, and consumes alcohol.     Physical Exam:   /74   Constitutional: Alert. In no distress.  Musculoskeletal: Extremities normal. No gross deformities noted. Normal muscle tone.  Skin:   Right shin: there are 2, 1cm red patches on the mid shin with scab in the middle. Not raised, not draining, mildly warm, nontender.   Left forearm: Approximately 10 mildly red, slightly raised, firm nodules. Some have excoriations on the top, nontender, not warm, not fluctuant, no drainage.   Psychiatric: Mentation appears normal. Normal affect.      Assessment and Plan:  Arm Nodules  This could be anything from bug bites to, unlikely, sporotrichosis. He did have one similar lesion on his right forearm, but the majority on the left. For now, he will try Kenalog and observe. He is not having any systemic symptoms.   - triamcinolone (KENALOG) 0.1 % cream  Dispense: 80 g; Refill: 0    Leg wound with secondary infection  No sign of abscess, Keflex. I asked him to follow-up next week, he prefers 2 weeks.   - cephALEXin (KEFLEX) 500 MG capsule  Dispense: 30 capsule; Refill: 0     Follow-up: follow-up in 2 weeks.      Scribe Disclosure:   I, Danika Appiah, am serving as a scribe to document services personally performed by Hemanth Rivera MD at this visit, based upon the provider's statements to me. All documentation has been reviewed by the aforementioned provider prior to being entered into the official medical record.     Portions of this medical record were completed by a scribe. UPON MY  REVIEW AND AUTHENTICATION BY ELECTRONIC SIGNATURE, this confirms (a) I performed the applicable clinical services, and (b) the record is accurate.   Hemanth Rivera MD

## 2018-09-07 NOTE — PATIENT INSTRUCTIONS
1.  Take Basaglar 62 units at bedtime.  2.  Take Novolog 10 units breakfast, 8 units with lunch and 10 units with dinner.  3.  Please be seen today in primary care clinic for follow up right lower leg wound and rash on arms.  4.  See Podiatrist for foot and nail care.  5.  See me in 2 months.  Pamela Laura

## 2018-09-07 NOTE — PATIENT INSTRUCTIONS
.Primary Care Center Medication Refill Request Information:  * Please contact your pharmacy regarding ANY request for medication refills.  ** [facility] Prescription Fax = [phone]  * Please allow 3 business days for routine medication refills.  * Please allow 5 business days for controlled substance medication refills.     Primary Care Center Test Result notification information:  *You will be notified with in 7-10 days of your appointment day regarding the results of your test.  If you are on MyChart you will be notified as soon as the provider has reviewed the results and signed off on them.

## 2018-09-10 NOTE — PROGRESS NOTES
HPI   Earle Rene is a 68 year old male with type 2 diabetes mellitus here today for a follow up visit.      His wife is present today.  Pt's diabetes is complicated by retinopathy, nephropathy, neuropathy and ED.  Pt also has hx of hyperlipidemia and HTN.  For his diabetes, he is currently taking Basaglar 60  units SQ at hs, Novolog 8-10 units with meals and Metformin 1000 mg BID. He has no interest in carb counting or using an I/C ratio.  Pt's A1C is 7.4 % today.  We downloaded his glucose meter today and his blood sugar have improved overall. He has been better at taking his insulin and making healthier food choices.  He has only been checking his FBS values which have been 155, 158, 139, 152, 203, 131, 217, 136 and 137.  I have no predinner or evening blood sugar values.  He states he does not taking any insulin at lunchtime.  He denies symptoms of frequent hypoglycemia.  On ROS today, pt fell while gardening a few weeks ago and has a wound on his right lower leg. The wound has not healed and he reports increase pain. No fever or chills.  He may need an antibiotic, so I will have him seen in follow up in primary care clinic today following this visit.  He also has a rash on his arms which may be bug bites. He is to have this evaluated in PCC today.  On ROS today, blurred vision and he was seen by Oph in Aug 2018.  Right lower leg wound and rash on arms as above.  He has lost some weight which has been intentional per patient.  Pt has numbness in both feet from his neuropathy.  He reports chronic cough.  Pt denies frequent headaches, n/v, SOB at rest, chest pain, abd pain, diarrhea, dysuria or hematuria.  No foot ulcers on exam today.    ROS   Please see under HPI.     ALLERGIES:  Review of patient's allergies indicates no known allergies.    Current Outpatient Prescriptions   Medication Sig Dispense Refill     alprostadil (EDEX) 20 MCG injection by Intracavitary route. Inject 3/4 ml (15 ug) as instructed.  Can  "increase to full dose of 1 ml (20 ug) if necessary.   Can dispense 6 kits. 6 each 12     amoxicillin-clavulanate (AUGMENTIN) 875-125 MG per tablet TK 1 T PO BID FOR 10 DAYS  0     aspirin 81 MG tablet Take 1 tablet by mouth daily.       atorvastatin (LIPITOR) 20 MG tablet Take 1 tablet (20 mg) by mouth daily 90 tablet 3     BASAGLAR 100 UNIT/ML injection Inject 62 units SQ at bedtime 60 mL 3     blood glucose (NO BRAND SPECIFIED) lancets standard Lancets that go with device, Test 3 times daily 300 each 3     blood glucose monitoring (NO BRAND SPECIFIED) meter device kit Any meter covered by insurance, not store brand, use as directed. 1 kit 0     blood glucose monitoring (NO BRAND SPECIFIED) test strip Strips that go with meter, covered by insurance. Test 3 times daily 300 strip 3     Blood Pressure Monitor KIT Automatic Blood Pressure Monitor 1 kit 0     Blood Pressure Monitor KIT Check blood pressure as directed. 1 kit 0     clobetasol (TEMOVATE) 0.05 % ointment Apply topically to legs twice daily for 2 weeks.  Then discontinue 30 g 0     clotrimazole (LOTRIMIN) 1 % cream Apply topically 2 times daily 30 g 3     fenofibrate 54 MG tablet Take 1 tablet (54 mg) by mouth daily 90 tablet 0     finasteride (PROSCAR) 5 MG tablet Take 1 tablet (5 mg) by mouth daily 90 tablet 3     gabapentin (NEURONTIN) 100 MG capsule Take 1 capsule (100 mg) by mouth 3 times daily 180 capsule 5     insulin pen needle (B-D U/F) 31G X 5 MM Use 4 times per day.  Please dispense as BD Pen Needle Mini U/F 31G x 5  each 3     insulin syringe 31G X 5/16\" 0.5 ML MISC Use three syringes daily 270 each 1     loratadine (CLARITIN) 10 MG tablet Take 1 tablet (10 mg) by mouth daily 90 tablet 0     losartan (COZAAR) 25 MG tablet Take 1 tablet (25 mg) by mouth daily 90 tablet 3     metFORMIN (GLUCOPHAGE) 1000 MG tablet 1 tab twice daily with meals 180 tablet 3     mupirocin (BACTROBAN) 2 % cream Apply  topically. In very small amounts only as " needed 15 g 1     NOVOLOG FLEXPEN 100 UNIT/ML soln Inject 10 units with breakfast, 8 units with lunch and 10 units with dinner, plus correction. Pt uses approx 60 units in 24 hrs. 60 mL 3     Omega-3 Fatty Acids (OMEGA-3 FISH OIL) 1000 MG CAPS Take 1 capsule (1 g) by mouth 2 times daily 60 capsule 11     ONETOUCH ULTRA test strip Use to test blood sugar 3 times daily 300 strip 3     sodium bicarbonate 650 MG tablet Take 1 tablet (650 mg) by mouth 3 times daily 90 tablet 11     tacrolimus (PROTOPIC) 0.03 % ointment Apply to affected area(s) twice daily 60 g 0     tamsulosin (FLOMAX) 0.4 MG capsule Take 1 capsule (0.4 mg) by mouth daily 90 capsule 1     cephALEXin (KEFLEX) 500 MG capsule Take 1 capsule (500 mg) by mouth 3 times daily 30 capsule 0     triamcinolone (KENALOG) 0.1 % cream Apply topically 3 times daily 80 g 0     Family Hx   No change.     Personal Hx   Smoke: none.   ETOH: none.    with grown children.   He owns his own business.    PMH   1. Type 2 Diabetes Mellitus dx at age 43.   2. Neuropathy.  3. Nephropathy.   4. ED.   5. Dyslipidemia.   6. Nephrolithiasis.   7. Decrease auditory acuity.   8. Sarcoidosis-lung.   9. Goiter.   10. S/P T & A.   11. S/P FX right heel.   12. Vit D def.   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   14. CT chest- ? granulomas.   15. Retinopathy.  16. Goiter.  Past Medical History:   Diagnosis Date     Blepharitis of both eyes      BPH (benign prostatic hyperplasia)      Diabetes (H)      Diabetic neuropathy (H)      Diabetic retinopathy associated with diabetes mellitus due to underlying condition (H)      Dry eye syndrome      Goiter      Granulomatous disease (H)      Nonsenile cataract      Peripheral neuropathy      Past Surgical History:   Procedure Laterality Date     AS RAD RESEC TONSIL/PILLARS Bilateral 1961     COLONOSCOPY  7/29/2013    Procedure: COLONOSCOPY;;  Surgeon: Montana Pascal MD;  Location: [facility]  11/2017     SURGICAL PATHOLOGY EXAM    "    Physical Exam   General appearance: Vital signs:   /82  Pulse 72  Ht 1.702 m (5' 7\")  Wt 86.6 kg (191 lb)  BMI 29.91 kg/m2  Estimated body mass index is 29.91 kg/(m^2) as calculated from the following:    Height as of this encounter: 1.702 m (5' 7\").    Weight as of this encounter: 86.6 kg (191 lb).  Head:  Normal.   Eyes: Decrease visual acuity; fundi not visualized.   Lungs: clear bilateral.  Cardiovascular system:  RRR.   Abdomen:  Large and nontender.  Musculoskeletal system: no edema.   Neurological:  normal.   Skin:  necrobiosis lipoidica on both legs. Rash forearms.  Wound right lower leg.  FEET: no ulcers. Nails are thick.  Abnormal monofilamentous exam.    Results   Creatinine   Date Value Ref Range Status   08/01/2018 1.31 (H) 0.66 - 1.25 mg/dL Final     GFR Estimate   Date Value Ref Range Status   08/01/2018 54 (L) >60 mL/min/1.7m2 Final     Comment:     Non  GFR Calc     Hemoglobin A1C   Date Value Ref Range Status   03/06/2018 9.1 (H) 4.3 - 6.0 % Final     Potassium   Date Value Ref Range Status   08/01/2018 4.6 3.4 - 5.3 mmol/L Final     ALT   Date Value Ref Range Status   03/06/2018 35 0 - 70 U/L Final     AST   Date Value Ref Range Status   03/06/2018 25 0 - 45 U/L Final     TSH   Date Value Ref Range Status   11/01/2017 1.12 0.40 - 4.00 mU/L Final     T4 Free   Date Value Ref Range Status   10/21/2014 1.00 0.76 - 1.46 ng/dL Final     Comment:     Effective 7/30/2014, the reference range for this assay has changed to reflect   new instrumentation/methodology.           Cholesterol   Date Value Ref Range Status   03/06/2018 101 <200 mg/dL Final   01/14/2016 173 <200 mg/dL Final     HDL Cholesterol   Date Value Ref Range Status   03/06/2018 32 (L) >39 mg/dL Final   01/14/2016 30 (L) >39 mg/dL Final     LDL Cholesterol Calculated   Date Value Ref Range Status   03/06/2018 36 <100 mg/dL Final     Comment:     Desirable:       <100 mg/dl   01/14/2016  <100 mg/dL Final    " Cannot estimate LDL when triglyceride exceeds 400 mg/dL     LDL Cholesterol Direct   Date Value Ref Range Status   01/14/2016 84 <100 mg/dL Final     Comment:     Desirable:       <100 mg/dl     Triglycerides   Date Value Ref Range Status   03/06/2018 166 (H) <150 mg/dL Final     Comment:     Borderline high:  150-199 mg/dl  High:             200-499 mg/dl  Very high:       >499 mg/dl     01/14/2016 436 (H) <150 mg/dL Final     Comment:     Borderline high:  150-199 mg/dl   High:             200-499 mg/dl   Very high:       >499 mg/dl       Cholesterol/HDL Ratio   Date Value Ref Range Status   09/09/2014 3.8 0.0 - 5.0 Final   11/20/2013 5.8 (H) 0.0 - 5.0 Final     A1C      7.4   9/7/2018  A1C      7.1   5/31/2018  A1C      9.1   3/6/2018  A1C      9.6   11/1/2017  A1C      8.9   8/9/2017  A1C      9.7   9/22/2016  A1C      9.7   7/21/2015  A1C     10.2  12/2/2014  A1C     10.2  9/9/2014  A1C      9.8  11/20/2013  A1C      9.3   6/12/2013  A1C      8.0   8/14/2012  A1C      8.2   5/22/2012  A1C      8.0   5/22/2012    ASSESSMENT/PLAN:     1. TYPE 2 DIABETES MELLITUS: Type 2 diabetes mellitus complicated by retinopathy, nephropathy,neuropathy and ED.  Pt's blood sugar control has improved with dietary and lifestyle changes.  I reminded him to take Novolog when he eats lunch.  Pt instructed to increase Basaglar 62 units subcutaneous at hs and take Novolog 10 units with breakfast, 8 units with lunch and 10 units with dinner.  He was instructed to take his Novolog 5-10 minutes prior to his meals.  He is to remain on Metformin 1000 mg BID for now.  Pt's most recent creat was 1.31 with GFR 54 mL/min on 8/1/2018.  I asked him to check his blood sugar fasting each am, prelunch and predinner DAILY.  Encouraged patient to continue to make healthy food choices, reduce his food portions with meals, avoid snacking and walk daily and to take his insulin and medications as prescribed.  Pt remains on daily ASA.     2. GOITER:  Nontender goiter.  TSH normal in Nov 2017.    3. NEPHROPATHY: Discussed the need for good glycemic control and good BP control.   Pt's creat 1.31 with GFR 54 mL/min on 8/1/2018.   K+ 4.6 .  Pt's urine protein +.  He is taking Cozaar.  He has been seen by Nephrology here.    4.  RETINOPATHY: Pt seen by Oph here on a regular basis and was seen in Aug 2018.    5.  NEUROPATHY: Continue Gabapentin.  No foot ulcers.  Referred to Podiatry for foot and nail care.    6. DYSLIPIDEMIA: LDL 36 on 3/6/2018.   Pt is taking Lipitor.    7. OBESITY: See under # 1 above.  He does not want to use a GLP-1.    8.  LEG WOUND: Pt to be seen in PCC today for follow up right lower leg wound. He may need to take Keflex.  Also eval pruritic rash on arms.    9.   Return to Endocrine Clinic to see me in 2 months.

## 2018-09-12 ENCOUNTER — OFFICE VISIT (OUTPATIENT)
Dept: OPHTHALMOLOGY | Facility: CLINIC | Age: 68
End: 2018-09-12
Attending: OPHTHALMOLOGY
Payer: MEDICARE

## 2018-09-12 DIAGNOSIS — E11.3213 TYPE 2 DIABETES MELLITUS WITH BOTH EYES AFFECTED BY MILD NONPROLIFERATIVE RETINOPATHY AND MACULAR EDEMA, WITH LONG-TERM CURRENT USE OF INSULIN (H): Primary | ICD-10-CM

## 2018-09-12 DIAGNOSIS — Z79.4 TYPE 2 DIABETES MELLITUS WITH BOTH EYES AFFECTED BY MILD NONPROLIFERATIVE RETINOPATHY AND MACULAR EDEMA, WITH LONG-TERM CURRENT USE OF INSULIN (H): Primary | ICD-10-CM

## 2018-09-12 DIAGNOSIS — H25.13 SENILE NUCLEAR SCLEROSIS, BILATERAL: ICD-10-CM

## 2018-09-12 PROCEDURE — G0463 HOSPITAL OUTPT CLINIC VISIT: HCPCS | Mod: ZF

## 2018-09-12 PROCEDURE — 92134 CPTRZ OPH DX IMG PST SGM RTA: CPT | Mod: ZF | Performed by: OPHTHALMOLOGY

## 2018-09-12 ASSESSMENT — CUP TO DISC RATIO
OS_RATIO: 0.3
OD_RATIO: 0.3

## 2018-09-12 ASSESSMENT — VISUAL ACUITY
OD_CC+: -1
OS_CC+: -2
OD_CC: 20/40
METHOD_MR: DECLINES MR TODAY
OS_CC: 20/40
METHOD: SNELLEN - LINEAR
OD_PH_CC: 20/30

## 2018-09-12 ASSESSMENT — SLIT LAMP EXAM - LIDS
COMMENTS: NORMAL
COMMENTS: NORMAL

## 2018-09-12 ASSESSMENT — EXTERNAL EXAM - RIGHT EYE: OD_EXAM: NORMAL

## 2018-09-12 ASSESSMENT — CONF VISUAL FIELD
METHOD: COUNTING FINGERS
OS_NORMAL: 1
OD_NORMAL: 1

## 2018-09-12 ASSESSMENT — TONOMETRY
IOP_METHOD: TONOPEN
OD_IOP_MMHG: 18
OS_IOP_MMHG: 17

## 2018-09-12 ASSESSMENT — EXTERNAL EXAM - LEFT EYE: OS_EXAM: NORMAL

## 2018-09-12 NOTE — NURSING NOTE
Chief Complaints and History of Present Illnesses   Patient presents with     Eye Exam For Diabetes      Moderate NPDR     HPI    Symptoms:     No floaters   No flashes      Frequency:  Constant       Do you have eye pain now?:  No      Comments:  Pt reports VA might be slightly improved since last visit. Pt sometimes experiences achey feeling around the orbit. No flashes or floaters. Pt uses Refresh Ats' for dry eyes. Administered drops at 5am.    Hubert CALIX September 12, 2018 8:00 AM  I have reviewed all information that was taken. Brianna JUDD 8:25 AM September 12, 2018

## 2018-09-12 NOTE — MR AVS SNAPSHOT
After Visit Summary   9/12/2018    Earle Rene    MRN: [id number]           Patient Information     Date Of Birth          1950        Visit Information        Provider Department      9/12/2018 7:30 AM Jackie Rodriguez MD [facility]        Today's Diagnoses     Type 2 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, with long-term current use of insulin (H)    -  1    Senile nuclear sclerosis, bilateral           Follow-ups after your visit        Follow-up notes from your care team     Return in about 6 weeks (around 10/24/2018) for Diabetic exam, Exam & OCT OU.      Your next 10 appointments already scheduled     Sep 17, 2018  7:20 AM CDT   (Arrive by 7:05 AM)   ACUTE/CHRONIC SINGLE CONDITION with Hemanth Rivera MD   [facility] ([facility])    [address] [place] [address]  4th [facility] [id number] [phone]            Oct 24, 2018  8:00 AM CDT   RETURN RETINA with Jackie Rodriguez MD   Eye Clinic ([facility])    [address] Clin [address]  [facility] [id number] [phone]            Nov 13, 2018  8:30 AM CST   (Arrive by 8:15 AM)   RETURN DIABETES with Pamela Laura PA-C   [facility] Endocrinology ([facility])    [address] [place] [address]  3rd Floor  [facility] [id number] [phone]            Jan 30, 2019 11:30 AM CST   Lab with  LAB   [facility] Lab ([facility])    [address] [place] [address]  1st Floor  [facility] [id number] [phone]            Jan 30, 2019 12:30 PM CST   (Arrive by 12:00 PM)   Return Visit with Jayda Bolden MD   [facility] Nephrology ([facility])    [address] [place] [address]  [address]  [facility] [id number] [phone]              Who to contact     Please call your clinic at [phone]  to:    Ask questions about your health    Make or cancel appointments    Discuss your medicines    Learn about your test results    Speak to your doctor            Additional Information About Your Visit        MyChart Information     [employer] is an electronic gateway that provides easy, online access to your medical records. With Eyefreight, you can request a clinic appointment, read your test results, renew a prescription or communicate with your care team.     To sign up for Eyefreight visit the website at [email or web address]   You will be asked to enter the access code listed below, as well as some personal information. Please follow the directions to create your username and password.     Your access code is: [email or web address]  Expires: 2018  6:30 AM     Your access code will  in 90 days. If you need help or a new code, please contact your [facility] or call [phone] for assistance.        Care EveryWhere ID     This is your Care EveryWhere ID. This could be used by other organizations to access your [place] medical records  [id number]         Blood Pressure from Last 3 Encounters:   18 134/74   18 136/82   18 122/70    Weight from Last 3 Encounters:   18 86.6 kg (191 lb)   18 85.7 kg (189 lb)   18 86.5 kg (190 lb 12.8 oz)              We Performed the Following     OCT Retina Spectralis OU (both eyes)        Primary Care Provider Office Phone # Fax #    Marcio Hare [phone]       [address] [address]        Equal Access to Services     [facility] CenterESTEL[facility] : Hadii aad ku hadasho Soomaali, waaxda luqadaha, qaybta kaalmada adeegyada, ethan nascimento . So [facility] [phone].    ATENCIÓN: Si habla español, tiene a márquez disposición servicios gratuitos de asistencia lingüística. Llame al [phone].    We comply with applicable federal civil rights laws and [place]  laws. We do not discriminate on the basis of race, color, national origin, age, disability, sex, sexual orientation, or gender identity.            Thank you!     Thank you for choosing EYE CLINIC  for your care. Our goal is always to provide you with excellent care. Hearing back from our patients is one way we can continue to improve our services. Please take a few minutes to complete the written survey that you may receive in the mail after your visit with us. Thank you!             Your Updated Medication List - Protect others around you: Learn how to safely use, store and throw away your medicines at www.disposemymeds.org.          This list is accurate as of 9/12/18  9:08 AM.  Always use your most recent med list.                   Brand Name Dispense Instructions for use Diagnosis    alprostadil 20 MCG kit    EDEX    6 each    by Intracavitary route. Inject 3/4 ml (15 ug) as instructed.  Can increase to full dose of 1 ml (20 ug) if necessary.   Can dispense 6 kits.    Erectile dysfunction       amoxicillin-clavulanate 875-125 MG per tablet    AUGMENTIN     TK 1 T PO BID FOR 10 DAYS        aspirin 81 MG tablet      Take 1 tablet by mouth daily.        atorvastatin 20 MG tablet    LIPITOR    90 tablet    Take 1 tablet (20 mg) by mouth daily    Hyperlipidemia LDL goal <100       BASAGLAR 100 UNIT/ML injection     60 mL    Inject 62 units SQ at bedtime        blood glucose lancets standard    no brand specified    300 each    Lancets that go with device, Test 3 times daily    Type 2 diabetes mellitus with diabetic nephropathy (H)       blood glucose monitoring meter device kit    no brand specified    1 kit    Any meter covered by insurance, not store brand, use as directed.    Type 2 diabetes mellitus with diabetic nephropathy (H)       * Blood Pressure Monitor Kit     1 kit    Check blood pressure as directed.    Unspecified essential hypertension       * Blood Pressure Monitor Kit     1 kit    Automatic Blood  "Pressure Monitor    Benign essential hypertension, Chronic kidney disease, stage 3 (moderate)       cephALEXin 500 MG capsule    KEFLEX    30 capsule    Take 1 capsule (500 mg) by mouth 3 times daily    Local infection of skin and subcutaneous tissue       clobetasol 0.05 % ointment    TEMOVATE    30 g    Apply topically to legs twice daily for 2 weeks.  Then discontinue    Rash and other nonspecific skin eruption       clotrimazole 1 % cream    LOTRIMIN    30 g    Apply topically 2 times daily    Tinea pedis of both feet, Dermatophytosis of nail       fenofibrate 54 MG tablet     90 tablet    Take 1 tablet (54 mg) by mouth daily    Hyperlipidemia LDL goal < 100       finasteride 5 MG tablet    PROSCAR    90 tablet    Take 1 tablet (5 mg) by mouth daily    Benign prostatic hyperplasia with urinary obstruction       fish oil-omega-3 fatty acids 1000 MG capsule     60 capsule    Take 1 capsule (1 g) by mouth 2 times daily    Lymphocytopenia, High cholesterol, Screening for prostate cancer       gabapentin 100 MG capsule    NEURONTIN    180 capsule    Take 1 capsule (100 mg) by mouth 3 times daily    Polyneuropathy in diabetes(357.2)       insulin pen needle 31G X 5 MM    B-D U/F    400 each    Use 4 times per day.  Please dispense as BD Pen Needle Mini U/F 31G x 5 MM    Diabetes mellitus type 2, insulin dependent (H)       insulin syringe 31G X 5/16\" 0.5 ML Misc     270 each    Use three syringes daily    Type 2 diabetes mellitus (H)       loratadine 10 MG tablet    CLARITIN    90 tablet    Take 1 tablet (10 mg) by mouth daily    Seasonal allergies       losartan 25 MG tablet    COZAAR    90 tablet    Take 1 tablet (25 mg) by mouth daily    HTN (hypertension)       metFORMIN 1000 MG tablet    GLUCOPHAGE    180 tablet    1 tab twice daily with meals    Diabetes mellitus, type 2 (H)       mupirocin 2 % cream    BACTROBAN    15 g    Apply  topically. In very small amounts only as needed    Rash and other nonspecific skin " eruption       NovoLOG FLEXPEN 100 UNIT/ML injection   Generic drug:  insulin aspart     60 mL    Inject 10 units with breakfast, 8 units with lunch and 10 units with dinner, plus correction. Pt uses approx 60 units in 24 hrs.        * ONETOUCH ULTRA test strip   Generic drug:  blood glucose monitoring     300 strip    Use to test blood sugar 3 times daily    Type 2 diabetes mellitus with diabetic nephropathy (H)       * blood glucose monitoring test strip    no brand specified    300 strip    Strips that go with meter, covered by insurance. Test 3 times daily    Type 2 diabetes mellitus with diabetic nephropathy (H)       sodium bicarbonate 650 MG tablet     90 tablet    Take 1 tablet (650 mg) by mouth 3 times daily    Chronic kidney disease, stage III (moderate)       tacrolimus 0.03 % ointment    PROTOPIC    60 g    Apply to affected area(s) twice daily    Rash and other nonspecific skin eruption       tamsulosin 0.4 MG capsule    FLOMAX    90 capsule    Take 1 capsule (0.4 mg) by mouth daily    Benign non-nodular prostatic hyperplasia with lower urinary tract symptoms       triamcinolone 0.1 % cream    KENALOG    80 g    Apply topically 3 times daily    Rash and nonspecific skin eruption       * Notice:  This list has 4 medication(s) that are the same as other medications prescribed for you. Read the directions carefully, and ask your doctor or other care provider to review them with you.

## 2018-09-12 NOTE — PROGRESS NOTES
I have confirmed the patient's and reviewed Past Medical History, Past Surgical History, Social History, Family History, Problem List, Medication List and agree with Tech note.    CC: diabetic retinopathy both eyes     HPI: previous pt of Dr. Alberto was scheduled to have cataract surgery but transferred care to [facility] with Dr. Marcio Ortiz who diagnosed him with macular edema and sent him to Dr. Hilton  He has been receiving injections in both eyes  Right Eye: 2/26/18, 4/10/18, 5/11/20218, 6/11/18 and last one her on 7/19/2018  Left Eye: 2/26/18, 4/10/18, 5/11/2018       Assessment/plan:   1. 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 Patient advised that failure to adequately control each may lead to vision loss. The patient expressed understanding.      2. Diabetic macular edema right eye   - temporal macular edema,    - has been receiving injections , last injection 6/11/18 at outside clinic and 7/19/2018 here at [facility] adult eye    - recommend defer avastin today since there was not much of an effect    - follow up 4 weeks    3. Diabetic macular edema left eye   - temporal edema approaching fovea   - last injection 5/11/18 (likely trend was improving)   - plan to observe today   - restart injections if no improvement or if plan for cataract surgery    4. Cataracts both eyes    - needs cataract surgery   - was seen previously , has IOL calcs   - will refer back t Dr. Eugene and suggest CE-IOL with concurrent intravitreal Triescence for temporal macular edema       Return to Clinic 6 weeks for OCT Mac on a Wednesday       Jackie Nolasco MD PhD.  Professor & Chair

## 2018-09-19 DIAGNOSIS — E11.42 DIABETIC POLYNEUROPATHY (H): ICD-10-CM

## 2018-09-20 ENCOUNTER — TELEPHONE (OUTPATIENT)
Dept: ENDOCRINOLOGY | Facility: CLINIC | Age: 68
End: 2018-09-20

## 2018-09-20 DIAGNOSIS — E11.65 TYPE 2 DIABETES MELLITUS WITH HYPERGLYCEMIA, WITH LONG-TERM CURRENT USE OF INSULIN (H): Primary | ICD-10-CM

## 2018-09-20 DIAGNOSIS — Z79.4 TYPE 2 DIABETES MELLITUS WITH HYPERGLYCEMIA, WITH LONG-TERM CURRENT USE OF INSULIN (H): Primary | ICD-10-CM

## 2018-09-20 NOTE — TELEPHONE ENCOUNTER
----- Message from Pamela Laura PA-C sent at 9/20/2018  1:12 PM CDT -----  Done.  Rakan    ----- Message -----     From: Delphine Cooper RN     Sent: 9/20/2018  12:33 PM       To: Pamela Laura PA-C    Yes please   ----- Message -----     From: Pamela Laura PA-C     Sent: 9/20/2018  10:33 AM       To: Delphine Cooper RN    It' ok if he uses Tresiba.  I would like to use a lower dose Tresiba 55 units subcutaneous once a day.  Should I send a RX and to which pharmacy?  Thanks rakan   ----- Message -----     From: Delphine Cooper RN     Sent: 9/20/2018   9:04 AM       To: Pamela Laura PA-C    Alessandra needs a PA but is he able to use Lantus, levemir or Tresiba?  New orders needed.

## 2018-09-21 RX ORDER — GABAPENTIN 100 MG/1
CAPSULE ORAL
Qty: 180 CAPSULE | Refills: 0 | OUTPATIENT
Start: 2018-09-21

## 2018-09-29 ENCOUNTER — HEALTH MAINTENANCE LETTER (OUTPATIENT)
Age: 68
End: 2018-09-29

## 2018-10-07 DIAGNOSIS — N13.8 BENIGN PROSTATIC HYPERPLASIA WITH URINARY OBSTRUCTION: ICD-10-CM

## 2018-10-07 DIAGNOSIS — N40.1 BENIGN NON-NODULAR PROSTATIC HYPERPLASIA WITH LOWER URINARY TRACT SYMPTOMS: ICD-10-CM

## 2018-10-07 DIAGNOSIS — N40.1 BENIGN PROSTATIC HYPERPLASIA WITH URINARY OBSTRUCTION: ICD-10-CM

## 2018-10-09 RX ORDER — FINASTERIDE 5 MG/1
5 TABLET, FILM COATED ORAL DAILY
Qty: 90 TABLET | Refills: 1 | Status: SHIPPED | OUTPATIENT
Start: 2018-10-09 | End: 2019-03-07

## 2018-10-09 RX ORDER — TAMSULOSIN HYDROCHLORIDE 0.4 MG/1
0.4 CAPSULE ORAL DAILY
Qty: 90 CAPSULE | Refills: 1 | Status: SHIPPED | OUTPATIENT
Start: 2018-10-09 | End: 2019-03-07

## 2018-10-14 DIAGNOSIS — E78.5 HYPERLIPIDEMIA LDL GOAL <100: ICD-10-CM

## 2018-10-16 RX ORDER — ATORVASTATIN CALCIUM 20 MG/1
20 TABLET, FILM COATED ORAL DAILY
Qty: 90 TABLET | Refills: 1 | Status: SHIPPED | OUTPATIENT
Start: 2018-10-16 | End: 2019-05-01

## 2018-10-24 ENCOUNTER — OFFICE VISIT (OUTPATIENT)
Dept: OPHTHALMOLOGY | Facility: CLINIC | Age: 68
End: 2018-10-24
Attending: OPHTHALMOLOGY
Payer: MEDICARE

## 2018-10-24 DIAGNOSIS — H25.13 SENILE NUCLEAR SCLEROSIS, BILATERAL: ICD-10-CM

## 2018-10-24 DIAGNOSIS — E11.3213 TYPE 2 DIABETES MELLITUS WITH BOTH EYES AFFECTED BY MILD NONPROLIFERATIVE RETINOPATHY AND MACULAR EDEMA, WITH LONG-TERM CURRENT USE OF INSULIN (H): Primary | ICD-10-CM

## 2018-10-24 DIAGNOSIS — Z79.4 TYPE 2 DIABETES MELLITUS WITH BOTH EYES AFFECTED BY MILD NONPROLIFERATIVE RETINOPATHY AND MACULAR EDEMA, WITH LONG-TERM CURRENT USE OF INSULIN (H): Primary | ICD-10-CM

## 2018-10-24 PROCEDURE — G0463 HOSPITAL OUTPT CLINIC VISIT: HCPCS | Mod: ZF

## 2018-10-24 PROCEDURE — 92134 CPTRZ OPH DX IMG PST SGM RTA: CPT | Mod: ZF | Performed by: OPHTHALMOLOGY

## 2018-10-24 ASSESSMENT — SLIT LAMP EXAM - LIDS
COMMENTS: NORMAL
COMMENTS: NORMAL

## 2018-10-24 ASSESSMENT — CUP TO DISC RATIO
OS_RATIO: 0.3
OD_RATIO: 0.3

## 2018-10-24 ASSESSMENT — VISUAL ACUITY
OS_PH_CC: 20/40
OD_PH_CC: 20/30
OD_CC: 20/40
OD_CC+: +2
OS_CC: 20/50
CORRECTION_TYPE: GLASSES
METHOD: SNELLEN - LINEAR

## 2018-10-24 ASSESSMENT — REFRACTION_WEARINGRX
OS_SPHERE: -2.50
OD_SPHERE: -2.50
OS_CYLINDER: +0.75
OD_CYLINDER: +1.50
OD_ADD: +2.50
OS_ADD: +2.50
OS_AXIS: 033
OD_AXIS: 157

## 2018-10-24 ASSESSMENT — CONF VISUAL FIELD
METHOD: COUNTING FINGERS
OD_NORMAL: 1
OS_NORMAL: 1

## 2018-10-24 ASSESSMENT — TONOMETRY
IOP_METHOD: TONOPEN
OS_IOP_MMHG: 18
OD_IOP_MMHG: 18

## 2018-10-24 ASSESSMENT — EXTERNAL EXAM - RIGHT EYE: OD_EXAM: NORMAL

## 2018-10-24 ASSESSMENT — EXTERNAL EXAM - LEFT EYE: OS_EXAM: NORMAL

## 2018-10-24 NOTE — NURSING NOTE
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  DM2 BS: 260 yesterday morning.  Lab Results       Component                Value               Date                  A1C: 7.4 taken about 2 months ago per pt.       A1C                      9.1                 03/06/2018                 A1C                      10.2                06/06/2017                 A1C                      9.0                 03/16/2016                 A1C                      10.2                09/09/2014                 A1C                      8.8                 08/21/2013                [facility] October 24, 2018 8:33 AM

## 2018-10-24 NOTE — PROGRESS NOTES
I have confirmed the patient's and reviewed Past Medical History, Past Surgical History, Social History, Family History, Problem List, Medication List and agree with Tech note.    CC: diabetic retinopathy both eyes     HPI: previous pt of Dr. Alberto was scheduled to have cataract surgery but transferred care to [facility] with Dr. Marcio Ortiz who diagnosed him with macular edema and sent him to Dr. Hilton  He has been receiving injections in both eyes  Right Eye: 2/26/18, 4/10/18, 5/11/20218, 6/11/18 and last one her on 7/19/2018  Left Eye: 2/26/18, 4/10/18, 5/11/2018       Assessment/plan:   1. 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 Patient advised that failure to adequately control each may lead to vision loss. The patient expressed understanding.      2. Diabetic macular edema right eye   - temporal macular edema,    - has been receiving injections , last injection 6/11/18 at outside clinic and 7/19/2018 here at [facility] adult eye    - recommend defer avastin today since there was not much of an effect    - follow up 12 weeks    3. Diabetic macular edema left eye   - temporal edema approaching fovea   - last injection 5/11/18 (likely trend was improving)   - plan to observe today   - restart injections if no improvement or if plan for cataract surgery    4. Cataracts both eyes    - needs cataract surgery   - was seen previously , has IOL calcs   - will refer back t Dr. Eugene and suggest CE-IOL with concurrent intravitreal Triescence for temporal macular edema       Return to Clinic 12 weeks for OCT Mac on a Wednesday       Jackie Nolasco MD PhD.  Professor & Chair

## 2018-10-24 NOTE — MR AVS SNAPSHOT
After Visit Summary   10/24/2018    Earle Rene    MRN: [id number]           Patient Information     Date Of Birth          1950        Visit Information        Provider Department      10/24/2018 8:00 AM Jackie Rodriguez MD [facility]        Today's Diagnoses     Type 2 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, with long-term current use of insulin (H)    -  1    Senile nuclear sclerosis, bilateral           Follow-ups after your visit        Follow-up notes from your care team     Return in about 3 months (around 1/24/2019) for Diabetic exam, Exam & OCT OU.      Your next 10 appointments already scheduled     Nov 13, 2018  8:30 AM CST   (Arrive by 8:15 AM)   RETURN DIABETES with Pamela Laura PA-C   [facility] Endocrinology ([facility])    [address] [place] [address]  3rd Floor  [facility] [id number] [phone]            Jan 30, 2019  8:30 AM CST   RETURN RETINA with Jackie Rodriguez MD   Eye Clinic ([facility])    [address]  [facility] Clin 9a  [facility] [id number] [phone]            Jan 30, 2019 11:30 AM CST   Lab with  LAB   [facility] Lab ([facility])    [address] [place] [address]  1st [facility] [id number] [phone]            Jan 30, 2019 12:30 PM CST   (Arrive by 12:00 PM)   Return Visit with Jayda Bolden MD   [facility] Nephrology ([facility])    [address] [place] [address]  [address]  [facility] [id number] [phone]              Who to contact     Please call your clinic at [phone] to:    Ask questions about your health    Make or cancel appointments    Discuss your medicines    Learn about your test results    Speak to your doctor            Additional Information About Your Visit        Care EveryWhere ID     This is your Care EveryWhere ID.  This could be used by other organizations to access your [place] medical records  [id number]         Blood Pressure from Last 3 Encounters:   09/07/18 134/74   09/07/18 136/82   08/20/18 122/70    Weight from Last 3 Encounters:   09/07/18 86.6 kg (191 lb)   08/20/18 85.7 kg (189 lb)   08/01/18 86.5 kg (190 lb 12.8 oz)              We Performed the Following     OCT Retina Spectralis OU (both eyes)        Primary Care Provider Office Phone # Fax #    Marcio Hare [phone]       [address] [address]        Equal Access to Services     SHARON MAYS : Hadii priscilla vegao Sofranklin, waaxda luqadaha, qaybta kaalmada adeegyada, ethan bobo. So [facility] [phone].    ATENCIÓN: Si habla español, tiene a márquez disposición servicios gratuitos de asistencia lingüística. Llame al [phone].    We comply with applicable federal civil rights laws and [place] laws. We do not discriminate on the basis of race, color, national origin, age, disability, sex, sexual orientation, or gender identity.            Thank you!     Thank you for choosing EYE CLINIC  for your care. Our goal is always to provide you with excellent care. Hearing back from our patients is one way we can continue to improve our services. Please take a few minutes to complete the written survey that you may receive in the mail after your visit with us. Thank you!             Your Updated Medication List - Protect others around you: Learn how to safely use, store and throw away your medicines at www.disposemymeds.org.          This list is accurate as of 10/24/18  9:02 AM.  Always use your most recent med list.                   Brand Name Dispense Instructions for use Diagnosis    alprostadil 20 MCG kit    EDEX    6 each    by Intracavitary route. Inject 3/4 ml (15 ug) as instructed.  Can increase to full dose of 1 ml (20 ug) if necessary.   Can dispense 6 kits.    Erectile  dysfunction       amoxicillin-clavulanate 875-125 MG per tablet    AUGMENTIN     TK 1 T PO BID FOR 10 DAYS        aspirin 81 MG tablet      Take 1 tablet by mouth daily.        atorvastatin 20 MG tablet    LIPITOR    90 tablet    Take 1 tablet (20 mg) by mouth daily    Hyperlipidemia LDL goal <100       blood glucose lancets standard    no brand specified    300 each    Lancets that go with device, Test 3 times daily    Type 2 diabetes mellitus with diabetic nephropathy (H)       blood glucose monitoring meter device kit    no brand specified    1 kit    Any meter covered by insurance, not store brand, use as directed.    Type 2 diabetes mellitus with diabetic nephropathy (H)       * Blood Pressure Monitor Kit     1 kit    Check blood pressure as directed.    Unspecified essential hypertension       * Blood Pressure Monitor Kit     1 kit    Automatic Blood Pressure Monitor    Benign essential hypertension, Chronic kidney disease, stage 3 (moderate) (H)       cephALEXin 500 MG capsule    KEFLEX    30 capsule    Take 1 capsule (500 mg) by mouth 3 times daily    Local infection of skin and subcutaneous tissue       clobetasol 0.05 % ointment    TEMOVATE    30 g    Apply topically to legs twice daily for 2 weeks.  Then discontinue    Rash and other nonspecific skin eruption       clotrimazole 1 % cream    LOTRIMIN    30 g    Apply topically 2 times daily    Tinea pedis of both feet, Dermatophytosis of nail       fenofibrate 54 MG tablet     90 tablet    Take 1 tablet (54 mg) by mouth daily    Hyperlipidemia LDL goal < 100       finasteride 5 MG tablet    PROSCAR    90 tablet    Take 1 tablet (5 mg) by mouth daily    Benign prostatic hyperplasia with urinary obstruction       fish oil-omega-3 fatty acids 1000 MG capsule     60 capsule    Take 1 capsule (1 g) by mouth 2 times daily    Lymphocytopenia, High cholesterol, Screening for prostate cancer       gabapentin 100 MG capsule    NEURONTIN    180 capsule    Take 1  "capsule (100 mg) by mouth 3 times daily    Polyneuropathy in diabetes(357.2)       insulin degludec 100 UNIT/ML pen    TRESIBA    15 mL    Inject 55 units daily. This replaced Basaglar insulin.    Type 2 diabetes mellitus with hyperglycemia, with long-term current use of insulin (H)       insulin pen needle 31G X 5 MM    B-D U/F    400 each    Use 4 times per day.  Please dispense as BD Pen Needle Mini U/F 31G x 5 MM    Diabetes mellitus type 2, insulin dependent (H)       insulin syringe 31G X 5/16\" 0.5 ML Misc     270 each    Use three syringes daily    Type 2 diabetes mellitus (H)       loratadine 10 MG tablet    CLARITIN    90 tablet    Take 1 tablet (10 mg) by mouth daily    Seasonal allergies       losartan 25 MG tablet    COZAAR    90 tablet    Take 1 tablet (25 mg) by mouth daily    HTN (hypertension)       metFORMIN 1000 MG tablet    GLUCOPHAGE    180 tablet    1 tab twice daily with meals    Diabetes mellitus, type 2 (H)       mupirocin 2 % cream    BACTROBAN    15 g    Apply  topically. In very small amounts only as needed    Rash and other nonspecific skin eruption       NovoLOG FLEXPEN 100 UNIT/ML injection   Generic drug:  insulin aspart     60 mL    Inject 10 units with breakfast, 8 units with lunch and 10 units with dinner, plus correction. Pt uses approx 60 units in 24 hrs.        * ONETOUCH ULTRA test strip   Generic drug:  blood glucose monitoring     300 strip    Use to test blood sugar 3 times daily    Type 2 diabetes mellitus with diabetic nephropathy (H)       * blood glucose monitoring test strip    no brand specified    300 strip    Strips that go with meter, covered by insurance. Test 3 times daily    Type 2 diabetes mellitus with diabetic nephropathy (H)       sodium bicarbonate 650 MG tablet     90 tablet    Take 1 tablet (650 mg) by mouth 3 times daily    Chronic kidney disease, stage III (moderate) (H)       tacrolimus 0.03 % ointment    PROTOPIC    60 g    Apply to affected area(s) " twice daily    Rash and other nonspecific skin eruption       tamsulosin 0.4 MG capsule    FLOMAX    90 capsule    Take 1 capsule (0.4 mg) by mouth daily    Benign non-nodular prostatic hyperplasia with lower urinary tract symptoms       triamcinolone 0.1 % cream    KENALOG    80 g    Apply topically 3 times daily    Rash and nonspecific skin eruption       * Notice:  This list has 4 medication(s) that are the same as other medications prescribed for you. Read the directions carefully, and ask your doctor or other care provider to review them with you.

## 2018-12-18 ENCOUNTER — PATIENT OUTREACH (OUTPATIENT)
Dept: CARE COORDINATION | Facility: CLINIC | Age: 68
End: 2018-12-18

## 2018-12-21 ENCOUNTER — OFFICE VISIT (OUTPATIENT)
Dept: ENDOCRINOLOGY | Facility: CLINIC | Age: 68
End: 2018-12-21
Payer: MEDICARE

## 2018-12-21 VITALS
HEART RATE: 82 BPM | SYSTOLIC BLOOD PRESSURE: 147 MMHG | WEIGHT: 197.9 LBS | DIASTOLIC BLOOD PRESSURE: 80 MMHG | BODY MASS INDEX: 31.06 KG/M2 | HEIGHT: 67 IN

## 2018-12-21 DIAGNOSIS — E11.3213 TYPE 2 DIABETES MELLITUS WITH BOTH EYES AFFECTED BY MILD NONPROLIFERATIVE RETINOPATHY AND MACULAR EDEMA, WITH LONG-TERM CURRENT USE OF INSULIN (H): Primary | ICD-10-CM

## 2018-12-21 DIAGNOSIS — E11.65 TYPE 2 DIABETES MELLITUS WITH HYPERGLYCEMIA, WITH LONG-TERM CURRENT USE OF INSULIN (H): ICD-10-CM

## 2018-12-21 DIAGNOSIS — Z79.4 TYPE 2 DIABETES MELLITUS WITH BOTH EYES AFFECTED BY MILD NONPROLIFERATIVE RETINOPATHY AND MACULAR EDEMA, WITH LONG-TERM CURRENT USE OF INSULIN (H): Primary | ICD-10-CM

## 2018-12-21 DIAGNOSIS — Z79.4 TYPE 2 DIABETES MELLITUS WITH HYPERGLYCEMIA, WITH LONG-TERM CURRENT USE OF INSULIN (H): ICD-10-CM

## 2018-12-21 LAB — HBA1C MFR BLD: 8.4 % (ref 4.3–6)

## 2018-12-21 ASSESSMENT — MIFFLIN-ST. JEOR: SCORE: 1621.3

## 2018-12-21 NOTE — NURSING NOTE
Chief Complaint   Patient presents with     RECHECK     Type II Diabetes     Performed capillary puncture for A1C testing. Patient tolerated well.    Omar Mcguire, [facility]  Endocrinology & Diabetes

## 2018-12-21 NOTE — LETTER
12/21/2018       RE: Earle Rene  [address]  [place] [address]     Dear Colleague,    Thank you for referring your patient, Earle Rene, to the [facility] ENDOCRINOLOGY at [facility]. Please see a copy of my visit note below.    HPI   Earle Rene is a 68 year old male with type 2 diabetes mellitus here today for a follow up visit.      Pt's diabetes is complicated by retinopathy, nephropathy, neuropathy and ED.  Pt also has hx of hyperlipidemia and HTN.  For his diabetes, he is currently taking Tresiba 54 units SQ at hs, Novolog 8-12 units with meals and Metformin 1000 mg BID. He has no interest in carb counting or using an I/C ratio.  Pt's A1C is 8.4 % today.  His previous A1C was 7.4 %.  We downloaded his glucose meter today and his average glucose was 184 with SD 51 over the past month.  He only had 5 blood sugar readings for the past 14 days.  He has missed some Novolog doses at lunch time.  He denies symptoms of hypoglycemia.  On ROS today, pruritic rash.  His right lower leg wound has healed.  Dev blurred vision.  Pt has numbness in both feet from his neuropathy.  He reports chronic cough.  Pt denies frequent headaches, n/v, SOB at rest, chest pain, abd pain, diarrhea, dysuria or hematuria.  No foot ulcers .    ROS   Please see under HPI.     ALLERGIES:  Review of patient's allergies indicates no known allergies.    Current Outpatient Medications   Medication Sig Dispense Refill     alprostadil (EDEX) 20 MCG injection by Intracavitary route. Inject 3/4 ml (15 ug) as instructed.  Can increase to full dose of 1 ml (20 ug) if necessary.   Can dispense 6 kits. 6 each 12     aspirin 81 MG tablet Take 1 tablet by mouth daily.       atorvastatin (LIPITOR) 20 MG tablet Take 1 tablet (20 mg) by mouth daily 90 tablet 1     insulin degludec (TRESIBA) 100 UNIT/ML pen Inject 58 units daily. 15 mL 3     blood glucose (NO BRAND SPECIFIED) lancets standard Lancets that  "go with device, Test 3 times daily 300 each 3     blood glucose monitoring (NO BRAND SPECIFIED) meter device kit Any meter covered by insurance, not store brand, use as directed. 1 kit 0     blood glucose monitoring (NO BRAND SPECIFIED) test strip Strips that go with meter, covered by insurance. Test 3 times daily 300 strip 3     Blood Pressure Monitor KIT Automatic Blood Pressure Monitor 1 kit 0     Blood Pressure Monitor KIT Check blood pressure as directed. 1 kit 0     cephALEXin (KEFLEX) 500 MG capsule Take 1 capsule (500 mg) by mouth 3 times daily (Patient not taking: Reported on 12/21/2018) 30 capsule 0     clobetasol (TEMOVATE) 0.05 % ointment Apply topically to legs twice daily for 2 weeks.  Then discontinue 30 g 0     clotrimazole (LOTRIMIN) 1 % cream Apply topically 2 times daily 30 g 3     fenofibrate 54 MG tablet Take 1 tablet (54 mg) by mouth daily 90 tablet 0     finasteride (PROSCAR) 5 MG tablet Take 1 tablet (5 mg) by mouth daily 90 tablet 1     gabapentin (NEURONTIN) 100 MG capsule Take 1 capsule (100 mg) by mouth 3 times daily 90 capsule 3     insulin pen needle (B-D U/F) 31G X 5 MM Use 4 times per day.  Please dispense as BD Pen Needle Mini U/F 31G x 5  each 3     insulin syringe 31G X 5/16\" 0.5 ML MISC Use three syringes daily 270 each 1     loratadine (CLARITIN) 10 MG tablet Take 1 tablet (10 mg) by mouth daily 90 tablet 0     losartan (COZAAR) 25 MG tablet Take 1 tablet (25 mg) by mouth daily 90 tablet 3     metFORMIN (GLUCOPHAGE) 1000 MG tablet 1 tab twice daily with meals 180 tablet 3     mupirocin (BACTROBAN) 2 % cream Apply  topically. In very small amounts only as needed 15 g 1     NOVOLOG FLEXPEN 100 UNIT/ML soln Inject 10 units with breakfast, 8 units with lunch and 10 units with dinner, plus correction. Pt uses approx 60 units in 24 hrs. 60 mL 3     Omega-3 Fatty Acids (OMEGA-3 FISH OIL) 1000 MG CAPS Take 1 capsule (1 g) by mouth 2 times daily 60 capsule 11     ONETOUCH ULTRA test " "strip Use to test blood sugar 3 times daily 300 strip 3     sodium bicarbonate 650 MG tablet Take 1 tablet (650 mg) by mouth 3 times daily 90 tablet 11     tacrolimus (PROTOPIC) 0.03 % ointment Apply to affected area(s) twice daily 60 g 0     tamsulosin (FLOMAX) 0.4 MG capsule Take 1 capsule (0.4 mg) by mouth daily 90 capsule 1     triamcinolone (KENALOG) 0.1 % cream Apply topically 3 times daily 80 g 0     Family Hx   No change.     Personal Hx   Smoke: none.   ETOH: none.    with grown children.   He owns his own business.    PMH   1. Type 2 Diabetes Mellitus dx at age 43.   2. Neuropathy.  3. Nephropathy.   4. ED.   5. Dyslipidemia.   6. Nephrolithiasis.   7. Decrease auditory acuity.   8. Sarcoidosis-lung.   9. Goiter.   10. S/P T & A.   11. S/P FX right heel.   12. Vit D def.   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   14. CT chest- ? granulomas.   15. Retinopathy.  16. Goiter.  Past Medical History:   Diagnosis Date     Blepharitis of both eyes      BPH (benign prostatic hyperplasia)      Diabetes (H)      Diabetic neuropathy (H)      Diabetic retinopathy associated with diabetes mellitus due to underlying condition (H)      Dry eye syndrome      Goiter      Granulomatous disease (H)      Nonsenile cataract      Peripheral neuropathy      Past Surgical History:   Procedure Laterality Date     AS RAD RESEC TONSIL/PILLARS Bilateral 1961     COLONOSCOPY  7/29/2013    Procedure: COLONOSCOPY;;  Surgeon: Montana Pascal MD;  Location: [facility]  11/2017     SURGICAL PATHOLOGY EXAM       Physical Exam   General appearance: Vital signs:   /80   Pulse 82   Ht 1.702 m (5' 7\")   Wt 89.8 kg (197 lb 14.4 oz)   BMI 31.00 kg/m     Estimated body mass index is 31 kg/m  as calculated from the following:    Height as of this encounter: 1.702 m (5' 7\").    Weight as of this encounter: 89.8 kg (197 lb 14.4 oz).  Head:  Normal.   Eyes: Decrease visual acuity; fundi not visualized.   Lungs: clear " bilateral.  Cardiovascular system:  RRR.   Abdomen:  Large and nontender.  Musculoskeletal system: no edema.   Neurological:  normal.   Skin:  necrobiosis lipoidica on both legs. Pruritic rash.  FEET: no ulcers. Nails are thick.  Abnormal monofilamentous exam.    Results   Creatinine   Date Value Ref Range Status   08/01/2018 1.31 (H) 0.66 - 1.25 mg/dL Final     GFR Estimate   Date Value Ref Range Status   08/01/2018 54 (L) >60 mL/min/1.7m2 Final     Comment:     Non  GFR Calc     Hemoglobin A1C   Date Value Ref Range Status   03/06/2018 9.1 (H) 4.3 - 6.0 % Final     Potassium   Date Value Ref Range Status   08/01/2018 4.6 3.4 - 5.3 mmol/L Final     ALT   Date Value Ref Range Status   03/06/2018 35 0 - 70 U/L Final     AST   Date Value Ref Range Status   03/06/2018 25 0 - 45 U/L Final     TSH   Date Value Ref Range Status   11/01/2017 1.12 0.40 - 4.00 mU/L Final     T4 Free   Date Value Ref Range Status   10/21/2014 1.00 0.76 - 1.46 ng/dL Final     Comment:     Effective 7/30/2014, the reference range for this assay has changed to reflect   new instrumentation/methodology.           Cholesterol   Date Value Ref Range Status   03/06/2018 101 <200 mg/dL Final   01/14/2016 173 <200 mg/dL Final     HDL Cholesterol   Date Value Ref Range Status   03/06/2018 32 (L) >39 mg/dL Final   01/14/2016 30 (L) >39 mg/dL Final     LDL Cholesterol Calculated   Date Value Ref Range Status   03/06/2018 36 <100 mg/dL Final     Comment:     Desirable:       <100 mg/dl   01/14/2016  <100 mg/dL Final    Cannot estimate LDL when triglyceride exceeds 400 mg/dL     LDL Cholesterol Direct   Date Value Ref Range Status   01/14/2016 84 <100 mg/dL Final     Comment:     Desirable:       <100 mg/dl     Triglycerides   Date Value Ref Range Status   03/06/2018 166 (H) <150 mg/dL Final     Comment:     Borderline high:  150-199 mg/dl  High:             200-499 mg/dl  Very high:       >499 mg/dl     01/14/2016 436 (H) <150 mg/dL Final      Comment:     Borderline high:  150-199 mg/dl   High:             200-499 mg/dl   Very high:       >499 mg/dl       Cholesterol/HDL Ratio   Date Value Ref Range Status   09/09/2014 3.8 0.0 - 5.0 Final   11/20/2013 5.8 (H) 0.0 - 5.0 Final     A1C      8.4   12/21/2018  A1C      7.4   9/7/2018  A1C      7.1   5/31/2018  A1C      9.1   3/6/2018  A1C      9.6   11/1/2017  A1C      8.9   8/9/2017  A1C      9.7   9/22/2016  A1C      9.7   7/21/2015  A1C     10.2  12/2/2014  A1C     10.2  9/9/2014  A1C      9.8  11/20/2013  A1C      9.3   6/12/2013  A1C      8.0   8/14/2012  A1C      8.2   5/22/2012  A1C      8.0   5/22/2012    ASSESSMENT/PLAN:     1. TYPE 2 DIABETES MELLITUS: Uncontrolled type 2 diabetes mellitus complicated by retinopathy, nephropathy,neuropathy and ED.  I had pt increase his Tresiba 58 units at hs and he was instructed to take Novolog with all meals.  He is to remain on Metformin 1000 mg BID for now.  Pt's most recent creat was 1.31 with GFR 54 mL/min on 8/1/2018.  I asked him to check his blood sugar fasting each am, prelunch and predinner DAILY.  Encouraged patient to continue to make healthy food choices, reduce his food portions with meals, avoid snacking and walk daily and to take his insulin and medications as prescribed.  Pt remains on daily ASA.     2. GOITER: Nontender goiter.  TSH normal in Nov 2017.    3. NEPHROPATHY: Discussed the need for good glycemic control and good BP control.   Pt's creat 1.31 with GFR 54 mL/min on 8/1/2018.   K+ 4.6 .  Pt's urine protein +.  He is taking Cozaar.  He is seen here by Nephrology.    4.  RETINOPATHY: Pt seen by Oph here on a regular basis and was seen in Oct 2018.    5.  NEUROPATHY: Continue Gabapentin.  No foot ulcers.  Referred to Podiatry for foot and nail care.    6. DYSLIPIDEMIA: LDL 36 on 3/6/2018.   Pt is taking Lipitor and Fenofibrate.    7. OBESITY: See under # 1 above.  He does not want to use a GLP-1.    8.  RASH: Pruritic rash. Referred to  Derm.    9.   Return to Endocrine Clinic to see me in 3 months.      Pamela Laura PA-C

## 2018-12-27 NOTE — PROGRESS NOTES
HPI   Earle Rene is a 68 year old male with type 2 diabetes mellitus here today for a follow up visit.      Pt's diabetes is complicated by retinopathy, nephropathy, neuropathy and ED.  Pt also has hx of hyperlipidemia and HTN.  For his diabetes, he is currently taking Tresiba 54 units SQ at hs, Novolog 8-12 units with meals and Metformin 1000 mg BID. He has no interest in carb counting or using an I/C ratio.  Pt's A1C is 8.4 % today.  His previous A1C was 7.4 %.  We downloaded his glucose meter today and his average glucose was 184 with SD 51 over the past month.  He only had 5 blood sugar readings for the past 14 days.  He has missed some Novolog doses at lunch time.  He denies symptoms of hypoglycemia.  On ROS today, pruritic rash.  His right lower leg wound has healed.  Dev blurred vision.  Pt has numbness in both feet from his neuropathy.  He reports chronic cough.  Pt denies frequent headaches, n/v, SOB at rest, chest pain, abd pain, diarrhea, dysuria or hematuria.  No foot ulcers .    ROS   Please see under HPI.     ALLERGIES:  Review of patient's allergies indicates no known allergies.    Current Outpatient Medications   Medication Sig Dispense Refill     alprostadil (EDEX) 20 MCG injection by Intracavitary route. Inject 3/4 ml (15 ug) as instructed.  Can increase to full dose of 1 ml (20 ug) if necessary.   Can dispense 6 kits. 6 each 12     aspirin 81 MG tablet Take 1 tablet by mouth daily.       atorvastatin (LIPITOR) 20 MG tablet Take 1 tablet (20 mg) by mouth daily 90 tablet 1     insulin degludec (TRESIBA) 100 UNIT/ML pen Inject 58 units daily. 15 mL 3     blood glucose (NO BRAND SPECIFIED) lancets standard Lancets that go with device, Test 3 times daily 300 each 3     blood glucose monitoring (NO BRAND SPECIFIED) meter device kit Any meter covered by insurance, not store brand, use as directed. 1 kit 0     blood glucose monitoring (NO BRAND SPECIFIED) test strip Strips that go with meter, covered  "by insurance. Test 3 times daily 300 strip 3     Blood Pressure Monitor KIT Automatic Blood Pressure Monitor 1 kit 0     Blood Pressure Monitor KIT Check blood pressure as directed. 1 kit 0     cephALEXin (KEFLEX) 500 MG capsule Take 1 capsule (500 mg) by mouth 3 times daily (Patient not taking: Reported on 12/21/2018) 30 capsule 0     clobetasol (TEMOVATE) 0.05 % ointment Apply topically to legs twice daily for 2 weeks.  Then discontinue 30 g 0     clotrimazole (LOTRIMIN) 1 % cream Apply topically 2 times daily 30 g 3     fenofibrate 54 MG tablet Take 1 tablet (54 mg) by mouth daily 90 tablet 0     finasteride (PROSCAR) 5 MG tablet Take 1 tablet (5 mg) by mouth daily 90 tablet 1     gabapentin (NEURONTIN) 100 MG capsule Take 1 capsule (100 mg) by mouth 3 times daily 90 capsule 3     insulin pen needle (B-D U/F) 31G X 5 MM Use 4 times per day.  Please dispense as BD Pen Needle Mini U/F 31G x 5  each 3     insulin syringe 31G X 5/16\" 0.5 ML MISC Use three syringes daily 270 each 1     loratadine (CLARITIN) 10 MG tablet Take 1 tablet (10 mg) by mouth daily 90 tablet 0     losartan (COZAAR) 25 MG tablet Take 1 tablet (25 mg) by mouth daily 90 tablet 3     metFORMIN (GLUCOPHAGE) 1000 MG tablet 1 tab twice daily with meals 180 tablet 3     mupirocin (BACTROBAN) 2 % cream Apply  topically. In very small amounts only as needed 15 g 1     NOVOLOG FLEXPEN 100 UNIT/ML soln Inject 10 units with breakfast, 8 units with lunch and 10 units with dinner, plus correction. Pt uses approx 60 units in 24 hrs. 60 mL 3     Omega-3 Fatty Acids (OMEGA-3 FISH OIL) 1000 MG CAPS Take 1 capsule (1 g) by mouth 2 times daily 60 capsule 11     ONETOUCH ULTRA test strip Use to test blood sugar 3 times daily 300 strip 3     sodium bicarbonate 650 MG tablet Take 1 tablet (650 mg) by mouth 3 times daily 90 tablet 11     tacrolimus (PROTOPIC) 0.03 % ointment Apply to affected area(s) twice daily 60 g 0     tamsulosin (FLOMAX) 0.4 MG capsule Take " "1 capsule (0.4 mg) by mouth daily 90 capsule 1     triamcinolone (KENALOG) 0.1 % cream Apply topically 3 times daily 80 g 0     Family Hx   No change.     Personal Hx   Smoke: none.   ETOH: none.    with grown children.   He owns his own business.    PMH   1. Type 2 Diabetes Mellitus dx at age 43.   2. Neuropathy.  3. Nephropathy.   4. ED.   5. Dyslipidemia.   6. Nephrolithiasis.   7. Decrease auditory acuity.   8. Sarcoidosis-lung.   9. Goiter.   10. S/P T & A.   11. S/P FX right heel.   12. Vit D def.   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   14. CT chest- ? granulomas.   15. Retinopathy.  16. Goiter.  Past Medical History:   Diagnosis Date     Blepharitis of both eyes      BPH (benign prostatic hyperplasia)      Diabetes (H)      Diabetic neuropathy (H)      Diabetic retinopathy associated with diabetes mellitus due to underlying condition (H)      Dry eye syndrome      Goiter      Granulomatous disease (H)      Nonsenile cataract      Peripheral neuropathy      Past Surgical History:   Procedure Laterality Date     AS RAD RESEC TONSIL/PILLARS Bilateral 1961     COLONOSCOPY  7/29/2013    Procedure: COLONOSCOPY;;  Surgeon: Montana Pascal MD;  Location: [facility]  11/2017     SURGICAL PATHOLOGY EXAM       Physical Exam   General appearance: Vital signs:   /80   Pulse 82   Ht 1.702 m (5' 7\")   Wt 89.8 kg (197 lb 14.4 oz)   BMI 31.00 kg/m    Estimated body mass index is 31 kg/m  as calculated from the following:    Height as of this encounter: 1.702 m (5' 7\").    Weight as of this encounter: 89.8 kg (197 lb 14.4 oz).  Head:  Normal.   Eyes: Decrease visual acuity; fundi not visualized.   Lungs: clear bilateral.  Cardiovascular system:  RRR.   Abdomen:  Large and nontender.  Musculoskeletal system: no edema.   Neurological:  normal.   Skin:  necrobiosis lipoidica on both legs. Pruritic rash.  FEET: no ulcers. Nails are thick.  Abnormal monofilamentous exam.    Results   Creatinine   Date " Value Ref Range Status   08/01/2018 1.31 (H) 0.66 - 1.25 mg/dL Final     GFR Estimate   Date Value Ref Range Status   08/01/2018 54 (L) >60 mL/min/1.7m2 Final     Comment:     Non  GFR Calc     Hemoglobin A1C   Date Value Ref Range Status   03/06/2018 9.1 (H) 4.3 - 6.0 % Final     Potassium   Date Value Ref Range Status   08/01/2018 4.6 3.4 - 5.3 mmol/L Final     ALT   Date Value Ref Range Status   03/06/2018 35 0 - 70 U/L Final     AST   Date Value Ref Range Status   03/06/2018 25 0 - 45 U/L Final     TSH   Date Value Ref Range Status   11/01/2017 1.12 0.40 - 4.00 mU/L Final     T4 Free   Date Value Ref Range Status   10/21/2014 1.00 0.76 - 1.46 ng/dL Final     Comment:     Effective 7/30/2014, the reference range for this assay has changed to reflect   new instrumentation/methodology.           Cholesterol   Date Value Ref Range Status   03/06/2018 101 <200 mg/dL Final   01/14/2016 173 <200 mg/dL Final     HDL Cholesterol   Date Value Ref Range Status   03/06/2018 32 (L) >39 mg/dL Final   01/14/2016 30 (L) >39 mg/dL Final     LDL Cholesterol Calculated   Date Value Ref Range Status   03/06/2018 36 <100 mg/dL Final     Comment:     Desirable:       <100 mg/dl   01/14/2016  <100 mg/dL Final    Cannot estimate LDL when triglyceride exceeds 400 mg/dL     LDL Cholesterol Direct   Date Value Ref Range Status   01/14/2016 84 <100 mg/dL Final     Comment:     Desirable:       <100 mg/dl     Triglycerides   Date Value Ref Range Status   03/06/2018 166 (H) <150 mg/dL Final     Comment:     Borderline high:  150-199 mg/dl  High:             200-499 mg/dl  Very high:       >499 mg/dl     01/14/2016 436 (H) <150 mg/dL Final     Comment:     Borderline high:  150-199 mg/dl   High:             200-499 mg/dl   Very high:       >499 mg/dl       Cholesterol/HDL Ratio   Date Value Ref Range Status   09/09/2014 3.8 0.0 - 5.0 Final   11/20/2013 5.8 (H) 0.0 - 5.0 Final     A1C      8.4   12/21/2018  A1C      7.4    9/7/2018  A1C      7.1   5/31/2018  A1C      9.1   3/6/2018  A1C      9.6   11/1/2017  A1C      8.9   8/9/2017  A1C      9.7   9/22/2016  A1C      9.7   7/21/2015  A1C     10.2  12/2/2014  A1C     10.2  9/9/2014  A1C      9.8  11/20/2013  A1C      9.3   6/12/2013  A1C      8.0   8/14/2012  A1C      8.2   5/22/2012  A1C      8.0   5/22/2012    ASSESSMENT/PLAN:     1. TYPE 2 DIABETES MELLITUS: Uncontrolled type 2 diabetes mellitus complicated by retinopathy, nephropathy,neuropathy and ED.  I had pt increase his Tresiba 58 units at hs and he was instructed to take Novolog with all meals.  He is to remain on Metformin 1000 mg BID for now.  Pt's most recent creat was 1.31 with GFR 54 mL/min on 8/1/2018.  I asked him to check his blood sugar fasting each am, prelunch and predinner DAILY.  Encouraged patient to continue to make healthy food choices, reduce his food portions with meals, avoid snacking and walk daily and to take his insulin and medications as prescribed.  Pt remains on daily ASA.     2. GOITER: Nontender goiter.  TSH normal in Nov 2017.    3. NEPHROPATHY: Discussed the need for good glycemic control and good BP control.   Pt's creat 1.31 with GFR 54 mL/min on 8/1/2018.   K+ 4.6 .  Pt's urine protein +.  He is taking Cozaar.  He is seen here by Nephrology.    4.  RETINOPATHY: Pt seen by Oph here on a regular basis and was seen in Oct 2018.    5.  NEUROPATHY: Continue Gabapentin.  No foot ulcers.  Referred to Podiatry for foot and nail care.    6. DYSLIPIDEMIA: LDL 36 on 3/6/2018.   Pt is taking Lipitor and Fenofibrate.    7. OBESITY: See under # 1 above.  He does not want to use a GLP-1.    8.  RASH: Pruritic rash. Referred to Derm.    9.   Return to Endocrine Clinic to see me in 3 months.

## 2019-01-08 ENCOUNTER — TELEPHONE (OUTPATIENT)
Dept: ENDOCRINOLOGY | Facility: CLINIC | Age: 69
End: 2019-01-08

## 2019-01-08 DIAGNOSIS — Z79.4 TYPE 2 DIABETES MELLITUS WITH HYPERGLYCEMIA, WITH LONG-TERM CURRENT USE OF INSULIN (H): ICD-10-CM

## 2019-01-08 DIAGNOSIS — E11.65 TYPE 2 DIABETES MELLITUS WITH HYPERGLYCEMIA, WITH LONG-TERM CURRENT USE OF INSULIN (H): ICD-10-CM

## 2019-01-08 NOTE — TELEPHONE ENCOUNTER
[facility] Call Center    Phone Message    May a detailed message be left on voicemail: yes    Reason for Call: Other: Pt needs refill of insulin degludec (TRESIBA) 100 UNIT/ML pen, pt goes to [facility] on [address] in [facility], pt states he needs medication 1/8     Action Taken: Message routed to:  [facility]): Med Refill Team

## 2019-01-08 NOTE — TELEPHONE ENCOUNTER
insulin degludec (TRESIBA) 100 UNIT/ML pen      Last Written Prescription Date:  12-21-18  Last Fill Quantity: 15 ml,   # refills: 3    Order did not transmit to the pharmacy - sent today.      Kathleen M Doege RN

## 2019-01-09 ENCOUNTER — TELEPHONE (OUTPATIENT)
Dept: ENDOCRINOLOGY | Facility: CLINIC | Age: 69
End: 2019-01-09

## 2019-01-09 NOTE — TELEPHONE ENCOUNTER
[facility] Call Center    Phone Message    May a detailed message be left on voicemail: yes    Reason for Call: Medication Refill Request    Has the patient contacted the pharmacy for the refill? Yes   Name of medication being requested: Insulin/Tresiba  Provider who prescribed the medication: Pamela Laura  Pharmacy: [facility] on [facility] [facility], phone # [phone]  Date medication is needed: ASAP, pt says Rx was sent [email or web address], but they actually need it to be sent to [facility] for insurance reasons. Pt's wife asking it to be sent before end of day so they can pick it up today.      Action Taken: Message routed to:  [facility] ([facility]): Endocrinology

## 2019-01-09 NOTE — TELEPHONE ENCOUNTER
Prior Authorization Retail Medication Request    Medication/Dose: Tresiba Flextouch pen  ICD code (if different than what is on RX):E11.65  Rationale:Patient needs medication due to diabetes    Insurance Name:Medicare  Insurance ID:[id number]       Pharmacy Information (if different than what is on RX)  Name:  [facility]  Phone:[phone]

## 2019-01-10 NOTE — TELEPHONE ENCOUNTER
Central Prior Authorization Team   Phone: [phone]      PA Initiation    Medication: insulin degludec (TRESIBA) 100 UNIT/ML pen-PA initiated  Insurance Company: [employer] - Phone [phone] Fax [phone]  Pharmacy Filling the Rx: [id number] - [place], [place] - [address]  Filling Pharmacy Phone: [phone]  Filling Pharmacy Fax:    Start Date: 1/10/2019

## 2019-01-10 NOTE — TELEPHONE ENCOUNTER
".Prior Authorization Not Needed per Insurance-This request came from Karen. Their rejection was \"non-participating pharmacy\". The Rx was filled and claim paid at [facility] on 1/9.  Pharmacy was notified they cannot fill for the patient.    Medication: insulin degludec (TRESIBA) 100 UNIT/ML pen-PA Not Needed  Insurance Company: [employer] - Phone [phone] Fax [phone]  Expected CoPay:      Pharmacy Filling the Rx: [id number] - [place], [place] - [address] [place] [address]  Pharmacy Notified: Yes  Patient Notified: No        "

## 2019-01-22 ENCOUNTER — TELEPHONE (OUTPATIENT)
Dept: DERMATOLOGY | Facility: CLINIC | Age: 69
End: 2019-01-22

## 2019-01-22 NOTE — TELEPHONE ENCOUNTER
Called pt due a referral received. Voicemail was left and call center number was provided as a call back number.

## 2019-01-29 ENCOUNTER — TELEPHONE (OUTPATIENT)
Dept: OPHTHALMOLOGY | Facility: CLINIC | Age: 69
End: 2019-01-29

## 2019-01-31 ENCOUNTER — OFFICE VISIT (OUTPATIENT)
Dept: URGENT CARE | Facility: URGENT CARE | Age: 69
End: 2019-01-31
Payer: COMMERCIAL

## 2019-01-31 ENCOUNTER — OFFICE VISIT (OUTPATIENT)
Dept: OPHTHALMOLOGY | Facility: CLINIC | Age: 69
End: 2019-01-31
Attending: OPHTHALMOLOGY
Payer: COMMERCIAL

## 2019-01-31 VITALS
SYSTOLIC BLOOD PRESSURE: 130 MMHG | WEIGHT: 192 LBS | RESPIRATION RATE: 20 BRPM | BODY MASS INDEX: 30.13 KG/M2 | HEIGHT: 67 IN | DIASTOLIC BLOOD PRESSURE: 84 MMHG | TEMPERATURE: 98.5 F | HEART RATE: 124 BPM | OXYGEN SATURATION: 96 %

## 2019-01-31 DIAGNOSIS — H25.13 SENILE NUCLEAR SCLEROSIS, BILATERAL: ICD-10-CM

## 2019-01-31 DIAGNOSIS — E13.311 MACULAR EDEMA DUE TO SECONDARY DIABETES (H): ICD-10-CM

## 2019-01-31 DIAGNOSIS — E11.3213 TYPE 2 DIABETES MELLITUS WITH BOTH EYES AFFECTED BY MILD NONPROLIFERATIVE RETINOPATHY AND MACULAR EDEMA, WITH LONG-TERM CURRENT USE OF INSULIN (H): Primary | ICD-10-CM

## 2019-01-31 DIAGNOSIS — R68.83 CHILLS: Primary | ICD-10-CM

## 2019-01-31 DIAGNOSIS — Z79.4 TYPE 2 DIABETES MELLITUS WITH BOTH EYES AFFECTED BY MILD NONPROLIFERATIVE RETINOPATHY AND MACULAR EDEMA, WITH LONG-TERM CURRENT USE OF INSULIN (H): Primary | ICD-10-CM

## 2019-01-31 LAB
FLUAV+FLUBV AG SPEC QL: NEGATIVE
FLUAV+FLUBV AG SPEC QL: NEGATIVE
SPECIMEN SOURCE: NORMAL

## 2019-01-31 PROCEDURE — 87804 INFLUENZA ASSAY W/OPTIC: CPT | Performed by: PEDIATRICS

## 2019-01-31 PROCEDURE — 99213 OFFICE O/P EST LOW 20 MIN: CPT | Performed by: PEDIATRICS

## 2019-01-31 PROCEDURE — G0463 HOSPITAL OUTPT CLINIC VISIT: HCPCS | Mod: ZF

## 2019-01-31 PROCEDURE — 92134 CPTRZ OPH DX IMG PST SGM RTA: CPT | Mod: ZF | Performed by: OPHTHALMOLOGY

## 2019-01-31 ASSESSMENT — CUP TO DISC RATIO
OD_RATIO: 0.3
OS_RATIO: 0.3

## 2019-01-31 ASSESSMENT — TONOMETRY
OS_IOP_MMHG: 14
OD_IOP_MMHG: 16
IOP_METHOD: TONOPEN

## 2019-01-31 ASSESSMENT — VISUAL ACUITY
METHOD: SNELLEN - LINEAR
CORRECTION_TYPE: GLASSES
OS_CC+: -2
OD_CC: 20/30
OS_CC: 20/40
OD_CC+: +1

## 2019-01-31 ASSESSMENT — REFRACTION_WEARINGRX
OS_ADD: +2.50
OD_SPHERE: -2.50
OD_CYLINDER: +1.50
OD_AXIS: 157
OD_ADD: +2.50
OS_CYLINDER: +0.75
OS_SPHERE: -2.50
OS_AXIS: 033

## 2019-01-31 ASSESSMENT — MIFFLIN-ST. JEOR: SCORE: 1594.54

## 2019-01-31 ASSESSMENT — SLIT LAMP EXAM - LIDS
COMMENTS: NORMAL
COMMENTS: NORMAL

## 2019-01-31 ASSESSMENT — EXTERNAL EXAM - LEFT EYE: OS_EXAM: NORMAL

## 2019-01-31 ASSESSMENT — CONF VISUAL FIELD
METHOD: COUNTING FINGERS
OS_NORMAL: 1
OD_NORMAL: 1

## 2019-01-31 ASSESSMENT — EXTERNAL EXAM - RIGHT EYE: OD_EXAM: NORMAL

## 2019-01-31 NOTE — PROGRESS NOTES
I have confirmed the patient's and reviewed Past Medical History, Past Surgical History, Social History, Family History, Problem List, Medication List and agree with Tech note.    CC: diabetic retinopathy both eyes     HPI: previous pt of Dr. Alberto was scheduled to have cataract surgery but transferred care to [facility] with Dr. Marcio Ortiz who diagnosed him with macular edema and sent him to Dr. Hilton  He has been receiving injections in both eyes  Right Eye: 2/26/18, 4/10/18, 5/11/20218, 6/11/18 and last one her on 7/19/2018  Left Eye: 2/26/18, 4/10/18, 5/11/2018       Assessment/plan:   1. 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 Patient advised that failure to adequately control each may lead to vision loss. The patient expressed understanding.      2. Diabetic macular edema right eye   - temporal macular edema,    - has been receiving injections , last injection 6/11/18 at outside clinic and 7/19/2018 here at [facility] adult eye    - recommend defer avastin today since there was not much of an effect    - follow up 12 weeks    3. Diabetic macular edema left eye   - temporal edema approaching fovea   - last injection 5/11/18 (likely trend was improving)   - plan to observe today   - restart injections if no improvement or if plan for cataract surgery    4. Cataracts both eyes    - needs cataract surgery   - was seen previously , has IOL calcs   - will refer back t Dr. Eugene and suggest CE-IOL with concurrent intravitreal Triescence for temporal macular edema       Return to Clinic 12 weeks for OCT Mac on a Wednesday       Jackie Nolasco MD PhD.  Professor & Chair

## 2019-01-31 NOTE — NURSING NOTE
Chief Complaints and History of Present Illnesses   Patient presents with     Diabetic Retinopathy Follow Up     3 month follow up both eyes.     Chief Complaint(s) and History of Present Illness(es)     Diabetic Retinopathy Follow Up     Associated symptoms: Negative for flashes, floaters, dryness and redness    Comments: 3 month follow up both eyes.              Comments     Pt states vision fluctuates daily.Vision today has been good.  DM2 BS: Pt doesn't check sugars daily.  Lab Results       Component                Value               Date                  A1C: 8 something per pt, taken 1 month ago.       A1C                      9.1                 03/06/2018                 A1C                      10.2                06/06/2017                 A1C                      9.0                 03/16/2016                 A1C                      10.2                09/09/2014                 A1C                      8.8                 08/21/2013              [facility] [place] COMT January 31, 2019 9:13 AM

## 2019-02-01 NOTE — PROGRESS NOTES
"SUBJECTIVE:  Earle Rene is a 68 year old male here with concerns about sinus infection.  He states onset of symptoms were 10 day(s) ago.  He has had maxillary pressure. Course of illness is worsening. Severity moderate  Current and Associated symptoms: nasal congestion, rhinorrhea, cough , sore throat and facial pain/pressure  Predisposing factors include recent sick contacts. Recent treatment has included: None    Past Medical History:   Diagnosis Date     Blepharitis of both eyes      BPH (benign prostatic hyperplasia)      Diabetes (H)      Diabetic neuropathy (H)      Diabetic retinopathy associated with diabetes mellitus due to underlying condition (H)      Dry eye syndrome      Goiter      Granulomatous disease (H)      Nonsenile cataract      Peripheral neuropathy      Social History     Tobacco Use     Smoking status: Never Smoker     Smokeless tobacco: Never Used   Substance Use Topics     Alcohol use: Yes     Comment: occasionaly       ROS:  INTEGUMENTARY/SKIN: NEGATIVE for worrisome rashes, moles or lesions  EYES: NEGATIVE for vision changes or irritation  CV: NEGATIVE for chest pain, palpitations or peripheral edema  MUSCULOSKELETAL: NEGATIVE for significant arthralgias or myalgia  NEURO: NEGATIVE for weakness, dizziness or paresthesias    OBJECTIVE:  /84   Pulse 124   Temp 98.5  F (36.9  C) (Oral)   Resp 20   Ht 1.702 m (5' 7\")   Wt 87.1 kg (192 lb)   SpO2 96%   BMI 30.07 kg/m    Exam:GENERAL APPEARANCE: tired appearing, pleasant, cooperative, well-developed  EYES: EOMI,  PERRL, conjunctiva clear  HENT: ear canals and TM's normal.  Nose and mouth without ulcers, erythema or lesions. Tenderness of the maxilla and frontal sinuses  NECK: supple, nontender, no lymphadenopathy  NEURO: Normal strength and tone, sensory exam grossly normal,  normal speech and mentation  SKIN: no suspicious lesions or rashes    ASSESSMENT:  Sinusitis  See diagnosis    PLAN:  See orders  Follow up with primary " clinic if not improving

## 2019-02-06 ENCOUNTER — ANCILLARY PROCEDURE (OUTPATIENT)
Dept: GENERAL RADIOLOGY | Facility: CLINIC | Age: 69
End: 2019-02-06
Attending: NURSE PRACTITIONER
Payer: COMMERCIAL

## 2019-02-06 ENCOUNTER — OFFICE VISIT (OUTPATIENT)
Dept: INTERNAL MEDICINE | Facility: CLINIC | Age: 69
End: 2019-02-06
Payer: COMMERCIAL

## 2019-02-06 VITALS
WEIGHT: 195.1 LBS | TEMPERATURE: 97.7 F | BODY MASS INDEX: 30.56 KG/M2 | HEART RATE: 97 BPM | DIASTOLIC BLOOD PRESSURE: 68 MMHG | OXYGEN SATURATION: 96 % | SYSTOLIC BLOOD PRESSURE: 112 MMHG

## 2019-02-06 DIAGNOSIS — K21.9 GASTROESOPHAGEAL REFLUX DISEASE, ESOPHAGITIS PRESENCE NOT SPECIFIED: Primary | ICD-10-CM

## 2019-02-06 DIAGNOSIS — R05.9 COUGH: ICD-10-CM

## 2019-02-06 DIAGNOSIS — J20.9 ACUTE BRONCHITIS, UNSPECIFIED ORGANISM: ICD-10-CM

## 2019-02-06 DIAGNOSIS — J18.9 COMMUNITY ACQUIRED PNEUMONIA OF LEFT LOWER LOBE OF LUNG: ICD-10-CM

## 2019-02-06 LAB
ALBUMIN SERPL-MCNC: 3.2 G/DL (ref 3.4–5)
ANION GAP SERPL CALCULATED.3IONS-SCNC: 7 MMOL/L (ref 3–14)
BASOPHILS # BLD AUTO: 0.1 10E9/L (ref 0–0.2)
BASOPHILS NFR BLD AUTO: 1.3 %
BUN SERPL-MCNC: 22 MG/DL (ref 7–30)
CALCIUM SERPL-MCNC: 9.3 MG/DL (ref 8.5–10.1)
CHLORIDE SERPL-SCNC: 107 MMOL/L (ref 94–109)
CO2 SERPL-SCNC: 24 MMOL/L (ref 20–32)
CREAT SERPL-MCNC: 1.36 MG/DL (ref 0.66–1.25)
CREAT UR-MCNC: 146 MG/DL
DIFFERENTIAL METHOD BLD: NORMAL
EOSINOPHIL # BLD AUTO: 0.4 10E9/L (ref 0–0.7)
EOSINOPHIL NFR BLD AUTO: 5.9 %
ERYTHROCYTE [DISTWIDTH] IN BLOOD BY AUTOMATED COUNT: 11.9 % (ref 10–15)
GFR SERPL CREATININE-BSD FRML MDRD: 52 ML/MIN/{1.73_M2}
GLUCOSE SERPL-MCNC: 197 MG/DL (ref 70–99)
HCT VFR BLD AUTO: 41.4 % (ref 40–53)
HGB BLD-MCNC: 13.8 G/DL (ref 13.3–17.7)
IMM GRANULOCYTES # BLD: 0.1 10E9/L (ref 0–0.4)
IMM GRANULOCYTES NFR BLD: 1 %
LYMPHOCYTES # BLD AUTO: 2.9 10E9/L (ref 0.8–5.3)
LYMPHOCYTES NFR BLD AUTO: 47.4 %
MCH RBC QN AUTO: 31.3 PG (ref 26.5–33)
MCHC RBC AUTO-ENTMCNC: 33.3 G/DL (ref 31.5–36.5)
MCV RBC AUTO: 94 FL (ref 78–100)
MONOCYTES # BLD AUTO: 0.5 10E9/L (ref 0–1.3)
MONOCYTES NFR BLD AUTO: 8.6 %
NEUTROPHILS # BLD AUTO: 2.2 10E9/L (ref 1.6–8.3)
NEUTROPHILS NFR BLD AUTO: 35.8 %
NRBC # BLD AUTO: 0 10*3/UL
NRBC BLD AUTO-RTO: 0 /100
PHOSPHATE SERPL-MCNC: 3.9 MG/DL (ref 2.5–4.5)
PLATELET # BLD AUTO: 225 10E9/L (ref 150–450)
POTASSIUM SERPL-SCNC: 4.3 MMOL/L (ref 3.4–5.3)
PROT UR-MCNC: 2.17 G/L
PROT/CREAT 24H UR: 1.49 G/G CR (ref 0–0.2)
RBC # BLD AUTO: 4.41 10E12/L (ref 4.4–5.9)
SODIUM SERPL-SCNC: 138 MMOL/L (ref 133–144)
WBC # BLD AUTO: 6.1 10E9/L (ref 4–11)

## 2019-02-06 RX ORDER — ALBUTEROL SULFATE 90 UG/1
2 AEROSOL, METERED RESPIRATORY (INHALATION) EVERY 6 HOURS
Qty: 1 INHALER | Refills: 0 | Status: SHIPPED | OUTPATIENT
Start: 2019-02-06 | End: 2019-02-28

## 2019-02-06 RX ORDER — AMOXICILLIN 250 MG
1 CAPSULE ORAL
COMMUNITY
Start: 2018-03-08 | End: 2019-12-24

## 2019-02-06 RX ORDER — LEVOFLOXACIN 750 MG/1
750 TABLET, FILM COATED ORAL DAILY
Qty: 7 TABLET | Refills: 0 | Status: SHIPPED | OUTPATIENT
Start: 2019-02-06 | End: 2019-12-24

## 2019-02-06 RX ORDER — INSULIN GLARGINE 100 [IU]/ML
60 INJECTION, SOLUTION SUBCUTANEOUS
COMMUNITY
End: 2019-06-21

## 2019-02-06 RX ORDER — CODEINE PHOSPHATE/GUAIFENESIN 10-100MG/5
5 LIQUID (ML) ORAL EVERY 4 HOURS PRN
Qty: 236 ML | Refills: 0 | Status: SHIPPED | OUTPATIENT
Start: 2019-02-06 | End: 2019-05-08

## 2019-02-06 ASSESSMENT — PAIN SCALES - GENERAL: PAINLEVEL: EXTREME PAIN (8)

## 2019-02-06 NOTE — PATIENT INSTRUCTIONS
[facility] Medication Refill Request Information:  * Please contact your pharmacy regarding ANY request for medication refills.  ** [facility] Prescription Fax = [phone]  * Please allow 3 business days for routine medication refills.  * Please allow 5 business days for controlled substance medication refills.     [facility] Test Result notification information:  *You will be notified with in 7-10 days of your appointment day regarding the results of your test.  If you are on MyChart you will be notified as soon as the provider has reviewed the results and signed off on them.    [facility]: [phone]

## 2019-02-06 NOTE — PROGRESS NOTES
"[facility]   Yanni Sage, RAJNI CNP  02/06/2019      Chief Complaint:   Cough and Urgent Care F/U       History of Present Illness:   Earle Rene is a 68 year old male with a history of type 2 diabetes, hyperlipidemia, and polyneuropathy who presents with his wife for evaluation of cough and urgent care follow-up. He was seen in [facility] a week ago on 1/31/19 for a sinus infection. At that point, he had been experiencing symptoms of nasal congestion, rhinorrhea, cough, sore throat, and facial pain/pressure for 10 days. They did a swab for influenza which came back negative. He was prescribed an antibiotic at that time which he has been taking, but has not noticed any improvement in his condition, and in fact feels his cough is almost worse. He has additionally been taking Tylenol and has used his wife's inhaler twice at night. She believes he slept better after using it but he did not notice a difference. Today he notes pressure in his chest which worsens when he begins coughing. Once he starts to cough, his wife notes he will continue to cough for at least a few minutes. He notes coughing up \"saliva\" which is thick yellow and slimy. He denies wheezing and fevers. He endorses shortness of breath when he is coughing. He also notes having acid reflux when he coughs and notes that these two things seem to be linked reporting that when he feels he has heartburn, he also feels the need to cough. He is not currently taking anything to manage acid reflux. Today he notes having drainage from his nose. No other medication changes noted.    Other concerns discussed:  1. Itching all over his skin - ongoing for past 2-3 months, uses cream prescribed by dermatology, which helps     Review of Systems:   Pertinent items are noted in HPI, remainder of complete ROS is negative.      Active Medications:      alprostadil (EDEX) 20 MCG injection, by Intracavitary route. Inject 3/4 ml " (15 ug) as instructed.  Can increase to full dose of 1 ml (20 ug) if necessary.   Can dispense 6 kits., Disp: 6 each, Rfl: 12     amoxicillin-clavulanate (AUGMENTIN) 875-125 MG tablet, Take 1 tablet by mouth 2 times daily for 10 days, Disp: 20 tablet, Rfl: 0     ARTIFICIAL TEAR OP, Apply to eye as needed, Disp: , Rfl:      aspirin 81 MG tablet, Take 1 tablet by mouth daily., Disp: , Rfl:      atorvastatin (LIPITOR) 20 MG tablet, Take 1 tablet (20 mg) by mouth daily, Disp: 90 tablet, Rfl: 1     cephALEXin (KEFLEX) 500 MG capsule, Take 1 capsule (500 mg) by mouth 3 times daily, Disp: 30 capsule, Rfl: 0     clobetasol (TEMOVATE) 0.05 % ointment, Apply topically to legs twice daily for 2 weeks.  Then discontinue, Disp: 30 g, Rfl: 0     clotrimazole (LOTRIMIN) 1 % cream, Apply topically 2 times daily, Disp: 30 g, Rfl: 3     fenofibrate 54 MG tablet, Take 1 tablet (54 mg) by mouth daily, Disp: 90 tablet, Rfl: 0     finasteride (PROSCAR) 5 MG tablet, Take 1 tablet (5 mg) by mouth daily, Disp: 90 tablet, Rfl: 1     gabapentin (NEURONTIN) 100 MG capsule, Take 1 capsule (100 mg) by mouth 3 times daily, Disp: 90 capsule, Rfl: 3     insulin degludec (TRESIBA) 100 UNIT/ML pen, Inject 58 units daily., Disp: 15 mL, Rfl: 3     insulin glargine (LANTUS VIAL) 100 UNIT/ML vial, Inject 60 Units Subcutaneous, Disp: , Rfl:      loratadine (CLARITIN) 10 MG tablet, Take 1 tablet (10 mg) by mouth daily, Disp: 90 tablet, Rfl: 0     losartan (COZAAR) 25 MG tablet, Take 1 tablet (25 mg) by mouth daily, Disp: 90 tablet, Rfl: 3     metFORMIN (GLUCOPHAGE) 1000 MG tablet, 1 tab twice daily with meals, Disp: 180 tablet, Rfl: 3     mupirocin (BACTROBAN) 2 % cream, Apply  topically. In very small amounts only as needed, Disp: 15 g, Rfl: 1     NOVOLOG FLEXPEN 100 UNIT/ML soln, Inject 10 units with breakfast, 8 units with lunch and 10 units with dinner, plus correction. Pt uses approx 60 units in 24 hrs., Disp: 60 mL, Rfl: 3     Omega-3 Fatty Acids  (OMEGA-3 FISH OIL) 1000 MG CAPS, Take 1 capsule (1 g) by mouth 2 times daily, Disp: 60 capsule, Rfl: 11     ONETOUCH ULTRA test strip, Use to test blood sugar 3 times daily, Disp: 300 strip, Rfl: 3     senna-docusate (SENOKOT-S/PERICOLACE) 8.6-50 MG tablet, Take 1 tablet by mouth, Disp: , Rfl:      sodium bicarbonate 650 MG tablet, Take 1 tablet (650 mg) by mouth 3 times daily, Disp: 90 tablet, Rfl: 11     tacrolimus (PROTOPIC) 0.03 % ointment, Apply to affected area(s) twice daily, Disp: 60 g, Rfl: 0     tamsulosin (FLOMAX) 0.4 MG capsule, Take 1 capsule (0.4 mg) by mouth daily, Disp: 90 capsule, Rfl: 1     triamcinolone (KENALOG) 0.1 % cream, Apply topically 3 times daily, Disp: 80 g, Rfl: 0     Allergies:   Patient has no known allergies.      Past Medical History:  Blepharitis of both eyes  Benign prostatic hyperplasia  Type 2 diabetes   Diabetic neuropathy  Diabetic retinopathy  Dry eye syndrome  Goiter  Granulomatous disease  Nonsenile cataract  Peripheral neuropathy  Psychological factors associated with another disorder  Mood disorder in conditions classified elsewhere  Erectile dysfunction  Hyperlipidemia  Carpal tunnel syndrome  Presbyopia  Myopia     Past Surgical History:  Rad resec tonsil/pillars, bilateral - 1961  Sialadenitis - 11/2017  Surgical pathology exam     Family History:   Diabetes - father, brother  Myocardial infarction - father  Leukemia - brother      Social History:   The patient is  with 5 children, a nonsmoker, and does occassionally consume alcohol.      Physical Exam:   /68   Pulse 97   Temp 97.7  F (36.5  C) (Oral)   Wt 88.5 kg (195 lb 1.6 oz)   SpO2 96%   BMI 30.56 kg/m     Constitutional: Alert, oriented, pleasant, no acute distress  Head: Normocephalic, atraumatic  Eyes: Extra-ocular movements intact, pupils equally round and reactive bilaterally, no scleral icterus  Ears: tympanic membranes pearly gray with positive light reflex, moderate cerumen in EACs  bilaterally   ENT: Oropharynx clear, moist mucus membranes, good dentition  Neck: Supple, no lymphadenopathy, no thyromegaly  Cardiovascular: Regular rate and rhythm, no murmurs, rubs or gallops  Respiratory: Good air movement bilaterally, coarse crackles in left lower lobe clear with cough, faint expiratory wheezes  GI: Abdomen soft, bowel sounds present, nondistended, nontender, no organomegaly or masses, no rebound/guarding  Psychiatric: normal mentation, affect and mood      Assessment and Plan:  1. Cough, Acute bronchitis, unspecified organism  He has had an ongoing cough with associated chest pain for which he went to [facility]. He has been taking the antibiotic they prescribed him since his visit but has not noticed any improvement in his symptoms, and cough is now associated with SOB. He will have a chest x-ray completed today for further evaluation. We will call him with the results. If this comes back with evidence of pneumonia, will consider different course of antibiotics since he has been taking Augmentin x7 days. If the x-ray is clear, advised him to finish his current antibiotics, take cough syrup at night to suppress the cough, and use inhaler to help open up his lungs.   - XR Chest 2 Views  - CBC with platelets differential  - Basic metabolic panel  - guaiFENesin-codeine (GUAIFENESIN AC) 100-10 MG/5ML syrup  Dispense: 236 mL; Refill: 0  - albuterol (VENTOLIN HFA) 108 (90 Base) MCG/ACT inhaler  Dispense: 1 Inhaler; Refill: 0     2. Gastroesophageal reflux disease, esophagitis presence not specified  He endorses having acid reflux, exacerbated by coughing. We discussed foods that he should avoid, such as tomatoes and spicy foods, as they could agitate this condition. He has not been treated for reflux in the past. Prescribed Zantac to reduce the acid and help alleviate his cough as well.   - ranitidine (ZANTAC) 300 MG tablet  Dispense: 90 tablet; Refill: 3    3. Community acquired  pneumonia  Chest Xray does show opacity in LLL, consistent with exam findings. Will switch to Levaquin. He should RTC if symptoms do not improve with above measures.    Follow-up: Patient should return if symptoms worsen or fail to improve.      Scribe Disclosure:   I, Samreen Robles, am serving as a scribe to document services personally performed by RAJNI Robles CNP at this visit, based upon the provider's statements to me. All documentation has been reviewed by the aforementioned provider prior to being entered into the official medical record.     Portions of this medical record were completed by a scribe. UPON MY REVIEW AND AUTHENTICATION BY ELECTRONIC SIGNATURE, this confirms (a) I performed the applicable clinical services, and (b) the record is accurate.     RAJNI Robles CNP

## 2019-02-06 NOTE — NURSING NOTE
Chief Complaint   Patient presents with     Cough     pt here for cough for last 2 weeks scratchy throat, lots of mucus     Hospital F/U     pt states he was in the ER last week       Cora Noonan CMA at 12:32 PM on 2/6/2019.

## 2019-02-15 ENCOUNTER — TELEPHONE (OUTPATIENT)
Dept: ENDOCRINOLOGY | Facility: CLINIC | Age: 69
End: 2019-02-15

## 2019-02-15 NOTE — TELEPHONE ENCOUNTER
Completed signed DMV form faxed to DMV x2, copy labeled to scan, copy on file, original returned to PT via [employer].

## 2019-02-26 ENCOUNTER — TELEPHONE (OUTPATIENT)
Dept: ENDOCRINOLOGY | Facility: CLINIC | Age: 69
End: 2019-02-26

## 2019-02-26 DIAGNOSIS — Z79.4 TYPE 2 DIABETES MELLITUS WITH BOTH EYES AFFECTED BY MILD NONPROLIFERATIVE RETINOPATHY AND MACULAR EDEMA, WITH LONG-TERM CURRENT USE OF INSULIN (H): ICD-10-CM

## 2019-02-26 DIAGNOSIS — E11.42 DIABETIC POLYNEUROPATHY ASSOCIATED WITH TYPE 2 DIABETES MELLITUS (H): Primary | ICD-10-CM

## 2019-02-26 DIAGNOSIS — E11.3213 TYPE 2 DIABETES MELLITUS WITH BOTH EYES AFFECTED BY MILD NONPROLIFERATIVE RETINOPATHY AND MACULAR EDEMA, WITH LONG-TERM CURRENT USE OF INSULIN (H): ICD-10-CM

## 2019-02-26 NOTE — TELEPHONE ENCOUNTER
[facility] Call Center    Phone Message    May a detailed message be left on voicemail: yes    Reason for Call: Order(s): Other:   Reason for requested: Diabetic shoes  Date needed: ASAP  Provider name: Pamela Laura    Send order to  [facility] on [facility].       Action Taken: Message routed to:  [facility] Diabetes/Endo

## 2019-02-28 DIAGNOSIS — J20.9 ACUTE BRONCHITIS, UNSPECIFIED ORGANISM: ICD-10-CM

## 2019-02-28 RX ORDER — ALBUTEROL SULFATE 90 UG/1
2 AEROSOL, METERED RESPIRATORY (INHALATION) EVERY 6 HOURS
Qty: 18 G | Refills: 3 | Status: SHIPPED | OUTPATIENT
Start: 2019-02-28 | End: 2023-09-29

## 2019-03-07 ENCOUNTER — TELEPHONE (OUTPATIENT)
Dept: ENDOCRINOLOGY | Facility: CLINIC | Age: 69
End: 2019-03-07

## 2019-03-07 DIAGNOSIS — E11.9 TYPE 2 DIABETES MELLITUS (H): Primary | ICD-10-CM

## 2019-03-07 DIAGNOSIS — I10 HTN (HYPERTENSION): ICD-10-CM

## 2019-03-07 DIAGNOSIS — N40.1 BENIGN NON-NODULAR PROSTATIC HYPERPLASIA WITH LOWER URINARY TRACT SYMPTOMS: ICD-10-CM

## 2019-03-07 DIAGNOSIS — E11.9 DIABETES MELLITUS, TYPE 2 (H): ICD-10-CM

## 2019-03-07 DIAGNOSIS — N13.8 BENIGN PROSTATIC HYPERPLASIA WITH URINARY OBSTRUCTION: ICD-10-CM

## 2019-03-07 DIAGNOSIS — N40.1 BENIGN PROSTATIC HYPERPLASIA WITH URINARY OBSTRUCTION: ICD-10-CM

## 2019-03-07 RX ORDER — INSULIN ASPART 100 [IU]/ML
INJECTION, SOLUTION INTRAVENOUS; SUBCUTANEOUS
Qty: 60 ML | Refills: 3 | Status: SHIPPED | OUTPATIENT
Start: 2019-03-07 | End: 2019-09-18

## 2019-03-07 RX ORDER — LOSARTAN POTASSIUM 25 MG/1
25 TABLET ORAL DAILY
Qty: 90 TABLET | Refills: 3 | Status: SHIPPED | OUTPATIENT
Start: 2019-03-07 | End: 2019-03-13

## 2019-03-07 RX ORDER — TAMSULOSIN HYDROCHLORIDE 0.4 MG/1
0.4 CAPSULE ORAL DAILY
Qty: 90 CAPSULE | Refills: 1 | Status: SHIPPED | OUTPATIENT
Start: 2019-03-07 | End: 2019-08-27

## 2019-03-07 RX ORDER — FINASTERIDE 5 MG/1
5 TABLET, FILM COATED ORAL DAILY
Qty: 90 TABLET | Refills: 1 | Status: SHIPPED | OUTPATIENT
Start: 2019-03-07 | End: 2019-08-28

## 2019-03-07 NOTE — TELEPHONE ENCOUNTER
Sent to on-call for signature  [facility] Call Center    Phone Message    May a detailed message be left on voicemail: yes    Reason for Call: Other: PT is requesting refills on metformin, novolog and losartan.  they would like it sent to the [facility]  on [address]  Phone number to the pharmacy is [phone].  PT is out of medication Please follow up with the PT.     Action Taken: Message routed to:  [facility] ([facility]): Endo

## 2019-03-07 NOTE — TELEPHONE ENCOUNTER
I am uncomfortable with the format in which the refill request was sent for signing, which was attached to a hidden RN nurse note and not coming through the refill Rx tab, not permitting me to view the patients usual med list in the usual way.    I signed the requested refills but I am unable to see any of them in the current med list after the fact.  This appears to be a systems issue?     Karina Goldberg MD

## 2019-03-07 NOTE — TELEPHONE ENCOUNTER
[facility] Call Center    Phone Message    May a detailed message be left on voicemail: yes    Reason for Call: Medication Refill Request    Has the patient contacted the pharmacy for the refill? Yes   Name of medication being requested: tamsulosin (FLOMAX) 0.4 MG capsule && finasteride (PROSCAR) 5 MG tablet  Provider who prescribed the medication: C. Weight   Pharmacy: [address] [address] Phone: [phone]  Date medication is needed: ASAP          Action Taken: Message routed to:  [facility] ([facility]): MED refills team

## 2019-03-07 NOTE — TELEPHONE ENCOUNTER
Last Clinic Visit: 5/24/2018  [facility]

## 2019-03-10 DIAGNOSIS — R80.9 PROTEINURIA: Primary | ICD-10-CM

## 2019-03-10 DIAGNOSIS — E55.9 VITAMIN D DEFICIENCY: ICD-10-CM

## 2019-03-13 ENCOUNTER — MEDICAL CORRESPONDENCE (OUTPATIENT)
Dept: HEALTH INFORMATION MANAGEMENT | Facility: CLINIC | Age: 69
End: 2019-03-13

## 2019-03-13 ENCOUNTER — OFFICE VISIT (OUTPATIENT)
Dept: NEPHROLOGY | Facility: CLINIC | Age: 69
End: 2019-03-13
Attending: INTERNAL MEDICINE
Payer: COMMERCIAL

## 2019-03-13 ENCOUNTER — TELEPHONE (OUTPATIENT)
Dept: ENDOCRINOLOGY | Facility: CLINIC | Age: 69
End: 2019-03-13

## 2019-03-13 VITALS
BODY MASS INDEX: 30.73 KG/M2 | SYSTOLIC BLOOD PRESSURE: 128 MMHG | TEMPERATURE: 98.3 F | DIASTOLIC BLOOD PRESSURE: 86 MMHG | HEART RATE: 86 BPM | WEIGHT: 196.2 LBS | OXYGEN SATURATION: 97 %

## 2019-03-13 DIAGNOSIS — E55.9 HYPOVITAMINOSIS D: Primary | ICD-10-CM

## 2019-03-13 DIAGNOSIS — R80.9 PROTEINURIA: ICD-10-CM

## 2019-03-13 DIAGNOSIS — E55.9 VITAMIN D DEFICIENCY: ICD-10-CM

## 2019-03-13 DIAGNOSIS — N18.30 CHRONIC KIDNEY DISEASE, STAGE III (MODERATE) (H): ICD-10-CM

## 2019-03-13 LAB
ALBUMIN SERPL-MCNC: 3.6 G/DL (ref 3.4–5)
ANION GAP SERPL CALCULATED.3IONS-SCNC: 7 MMOL/L (ref 3–14)
BUN SERPL-MCNC: 25 MG/DL (ref 7–30)
CALCIUM SERPL-MCNC: 9 MG/DL (ref 8.5–10.1)
CHLORIDE SERPL-SCNC: 106 MMOL/L (ref 94–109)
CO2 SERPL-SCNC: 24 MMOL/L (ref 20–32)
CREAT SERPL-MCNC: 1.2 MG/DL (ref 0.66–1.25)
CREAT UR-MCNC: 71 MG/DL
ERYTHROCYTE [DISTWIDTH] IN BLOOD BY AUTOMATED COUNT: 12.5 % (ref 10–15)
GFR SERPL CREATININE-BSD FRML MDRD: 61 ML/MIN/{1.73_M2}
GLUCOSE SERPL-MCNC: 217 MG/DL (ref 70–99)
HCT VFR BLD AUTO: 40.5 % (ref 40–53)
HGB BLD-MCNC: 13.7 G/DL (ref 13.3–17.7)
MCH RBC QN AUTO: 31.3 PG (ref 26.5–33)
MCHC RBC AUTO-ENTMCNC: 33.8 G/DL (ref 31.5–36.5)
MCV RBC AUTO: 93 FL (ref 78–100)
PHOSPHATE SERPL-MCNC: 3.4 MG/DL (ref 2.5–4.5)
PLATELET # BLD AUTO: 131 10E9/L (ref 150–450)
POTASSIUM SERPL-SCNC: 4.5 MMOL/L (ref 3.4–5.3)
PROT UR-MCNC: 2.16 G/L
PROT/CREAT 24H UR: 3.04 G/G CR (ref 0–0.2)
PTH-INTACT SERPL-MCNC: 42 PG/ML (ref 18–80)
RBC # BLD AUTO: 4.38 10E12/L (ref 4.4–5.9)
SODIUM SERPL-SCNC: 138 MMOL/L (ref 133–144)
WBC # BLD AUTO: 5.2 10E9/L (ref 4–11)

## 2019-03-13 PROCEDURE — 84156 ASSAY OF PROTEIN URINE: CPT | Performed by: INTERNAL MEDICINE

## 2019-03-13 PROCEDURE — 85027 COMPLETE CBC AUTOMATED: CPT | Performed by: INTERNAL MEDICINE

## 2019-03-13 PROCEDURE — 36415 COLL VENOUS BLD VENIPUNCTURE: CPT | Performed by: INTERNAL MEDICINE

## 2019-03-13 PROCEDURE — G0463 HOSPITAL OUTPT CLINIC VISIT: HCPCS | Mod: ZF

## 2019-03-13 PROCEDURE — 80069 RENAL FUNCTION PANEL: CPT | Performed by: INTERNAL MEDICINE

## 2019-03-13 PROCEDURE — 83970 ASSAY OF PARATHORMONE: CPT | Performed by: INTERNAL MEDICINE

## 2019-03-13 PROCEDURE — 82306 VITAMIN D 25 HYDROXY: CPT | Performed by: INTERNAL MEDICINE

## 2019-03-13 RX ORDER — SODIUM BICARBONATE 650 MG/1
650 TABLET ORAL 3 TIMES DAILY
Qty: 90 TABLET | Refills: 11 | Status: SHIPPED | OUTPATIENT
Start: 2019-03-13 | End: 2020-08-25

## 2019-03-13 RX ORDER — LOSARTAN POTASSIUM 25 MG/1
50 TABLET ORAL DAILY
Qty: 60 TABLET | Refills: 0 | Status: SHIPPED | DISCHARGE
Start: 2019-03-13 | End: 2019-04-12

## 2019-03-13 ASSESSMENT — PAIN SCALES - GENERAL: PAINLEVEL: SEVERE PAIN (7)

## 2019-03-13 NOTE — PATIENT INSTRUCTIONS
1. Your kidney function is stable  2. The amount of protein in the urine increased.   3. We will increase Losartan dose from 25 mg to 50 mg daily  4. Please check your blood pressure twice daily and keep records for us to review  5. We will repeat blood work in 2 weeks  6. Follow up in 3 months

## 2019-03-13 NOTE — TELEPHONE ENCOUNTER
Forms received from: [place] [facility]     Forms were for [employer] therapeutic shoes for medicare     Faxed Date:3/13/19

## 2019-03-13 NOTE — PROGRESS NOTES
Assessment and Plan:   1. Stage III CKD-baseline serum creatinine of 1.2-1.3 mg/dL and GFR of  53-56 ml/min/1.73m2 BSA likely from diabetic nephropathy given 20 year history of diabetes and diabetic reinopathy. Renal function is stable.  Recent serum creatinine was 1.36 mg/dL 02/06/2019. Today serum creatinine is 1.31  Mg/dL and correlating eGFR of 61 ml/min.  -encouraged the patient to improve his glycemic control.      2. Subnephrotic range proteinuria-likely secondary to diabetic nephropathy. Patient has been on Cozaar 25 mg daily for few years. Patient had UPCR of 1.76 g/gCr on 05/01/18, then UPCR of 1.37 g/gCr in 08/2018, then 1.49 g/gCR in 02/2019. Today UPCR is 3 g/gCr.  -we will increase Cozaar dose to 50 mg daily      3. Electrolytes:within acceptable limits    4. Volume status:patient looks euvolemic on exam. 2D ECHO in 2016 showed LVEF of 55-60%    5. Acid/Base status: HCO3 is 24. Goal 22-27. We will continue current dose of  sodium bicarbonate 650 mg three times daily    6. Hypertension: patient's BP is elevated 141/93 mmHg. Goal <130/90 mmHG. On Cozaar 25 mg daily  -we will increase Cozaar dose as above  -patient was advised to check his BP daily and keep records for us to review to see if Cozaar dose can be increased    7. BMD  -normal serum calcium  and phosphorus   -normal PTH of 42 pg/ml  -vitamin D deficiency-Vitamin D level is 14  Ug/L. We will start on cholecalciferol 2000U Daily     8. Type II DM-suboptimally controlled. Most recent Hgba1c is 8.4%. Follows with endocrinology clinic.    9. Diabetic neuropathy-on Gabapentin 100 mg TID.    10. Diabetic retinopathy-follows with ophthalmologist. Last seen by Dr.Van Reyna on 01/31/2019. Exam showed moderate NPDR both eyes with macular edema. Received injections. Needs cataract surgery    11. Obesity-BMI 30. Encouraged the patient to loose weight.    12. Persistent cough-patient was advise to see his PCP for further evaluation given history of  granulomatous disease of the lungs. CT chest 08/23/2017 showed stable calcified and noncalcified nodules in the lungs. Patient states that he was not aware of this and never been seen by pulmonologist or rheumatologist.         Recommend to follow up with nephrology clinic in 6 months.    Assessment and plan was discussed with patient and he voiced his understanding and agreement.    I have seen and discussed the patient with Dr.Manske Jayda Bolden MD  Nephrology Fellow        Reason for Visit:  Earle Rene is a 69 year old male with stage III CKD, who presents  for follow up appointment. Patient was last seen in 08/2018.    HPI:  Mr. Rene is pleasant 70 y/o male with PMHx significant for type II diabetes mellitus for 20 years, complicated by diabetic neuropathy, mild non proliferative retinopathy (follows with ophthalmologist ) and nephropathy. He is currently takes 64 units of Lantus and Novolog 8-10 units with melas and Metformin 1000 mg daily. His most recent Hgb A1c was 8.4% in 12/2018 and his previous was 9.6% in 11/2017. Patient states that his blood glucose has been running below 150. He follows with RAJANI Laura in endocrinology clinic.Patient  states that he still have awareness of hypoglycemia and reports no episodes of hypoglycemia recently.     He states that he has been taking Cozaar 25 mg daily for few years as a preventive medication. Patient states that he never been diagnosed with hypertension. His blood pressure usually runs in 110s/70s. Patient states that he has not been checking his blood pressure at home for last 3 months.     Furthermore he dose have history of granulomatous disease of the lungs. CT chest 08/23/2017 showed stable calcified and noncalcified nodules in the lungs. Patient states that he was not aware of this and never been seen by pulmonologist or rheumatologist.     In regards of patient's renal function. The patient had creatinine of 0.96 mg/d  in 2005, then 1 mg/dL in 2007, then 1.1-1.2 mg/dl since 2010. Patient had creatinine of 1.3 mg/dL on 03/06/2018. Patient dose have history of microalbuminuria at least since 05/2005. Proteinuria at least since 6/2010. UPCR was 0.23 g/gCr in 6/2010. His urine albumin level was 1148 mg/gCr in 03/2018. Laboratory work up in 02/2019 showed creatinine of 1.36 g/gCr, proteinuria, UPCR of 1.46 g/gCr. No hematuria.   He had Renal Ultrasound in 02/2016 which showed both kidneys were without mass or hydronephrosis (Right 12.3 cm, Left 12.6 cm), mild left greater than right renal cortical thinning/scarring without hydronephrosis. Patient states that he feels like he is not emptying his bladder completely. He is planning to see urologist soon but has not set up a follow up appointment yet. He reports history of nephrolithiasis. Patient states that he passes two stones many years ago.     He has history of BPH and elevated PSA. Patient followed by urologist  , last seen on 5/24/18. His last PSA was within normal limits. Patient takes Flomax 0.4 mg daily and Proscar 5 mg three times per week. Patient reports improved urinary urinary urgency, frequency and nocturia.    He has history of goiter . Patient had normal TSH in 11/2017.    Interval history:  Patient states that about 4-5 months agio he developed intermittent generalized skin pruritis and was seen by dermatologist. Subsequently, he underwent skin biopsy and was told that his skin pruritis is related to chronic kidney disease. He uses triamcinolone cream as needed with good relief of his symptoms.  Patient complaining of persistent productive cough since November of 2018. Patient states that he was seen by his PCP and was treated with coarse of oral antibiotic. Patient states that he dose not check his blood pressure at home.    Patient denies:NSAIDs use, fever, chills, weight loss, dizziness, adenopathy, sore throat, rhinorrhea, shortness of breath , chest pain,  palpitations, orthopnea, nausea, vomiting, abdominal pain, changes in bowel habits, dysuria, hematuria.           ROS:   A comprehensive review of systems was obtained and negative, except as noted in the HPI or PMH.    Active Medical Problems:  Patient Active Problem List   Diagnosis     Psychological factors associated with another disorder     Mood disorder in conditions classified elsewhere     Occupational problem     Type 2 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, with long-term current use of insulin (H)     Erectile dysfunction     Hyperlipidemia with target LDL less than 100     CTS (carpal tunnel syndrome)     Diabetic polyneuropathy (H)     Presbyopia     Myopia     Cataracts, bilateral     Pain of right thumb     PMH:   Medical record was reviewed and PMH was discussed with patient and noted below.  Past Medical History:   Diagnosis Date     Blepharitis of both eyes      BPH (benign prostatic hyperplasia)      Diabetes (H)      Diabetic neuropathy (H)      Diabetic retinopathy associated with diabetes mellitus due to underlying condition (H)      Dry eye syndrome      Goiter      Granulomatous disease (H)      Nonsenile cataract      Peripheral neuropathy      PSH:   Past Surgical History:   Procedure Laterality Date     AS RAD RESEC TONSIL/PILLARS Bilateral 1961     COLONOSCOPY  7/29/2013    Procedure: COLONOSCOPY;;  Surgeon: Montana Pascal MD;  Location: [facility]  11/2017     SURGICAL PATHOLOGY EXAM         Family Hx:   Family History   Problem Relation Age of Onset     Diabetes Father      Myocardial Infarction Father      Diabetes Brother      Leukemia Brother 44     Glaucoma No family hx of      Macular Degeneration No family hx of      Kidney Disease No family hx of      Personal Hx:   Social History     Socioeconomic History     Marital status:      Spouse name: Not on file     Number of children: 5     Years of education:  Not on file     Highest education level: Not on file   Occupational History     Occupation: private bussiness owner   Social Needs     Financial resource strain: Not on file     Food insecurity:     Worry: Not on file     Inability: Not on file     Transportation needs:     Medical: Not on file     Non-medical: Not on file   Tobacco Use     Smoking status: Never Smoker     Smokeless tobacco: Never Used   Substance and Sexual Activity     Alcohol use: Yes     Comment: occasionaly     Drug use: No     Sexual activity: Not on file   Lifestyle     Physical activity:     Days per week: Not on file     Minutes per session: Not on file     Stress: Not on file   Relationships     Social connections:     Talks on phone: Not on file     Gets together: Not on file     Attends Worship service: Not on file     Active member of club or organization: Not on file     Attends meetings of clubs or organizations: Not on file     Relationship status: Not on file     Intimate partner violence:     Fear of current or ex partner: Not on file     Emotionally abused: Not on file     Physically abused: Not on file     Forced sexual activity: Not on file   Other Topics Concern     Parent/sibling w/ CABG, MI or angioplasty before 65F 55M? Not Asked   Social History Narrative     Not on file       Allergies:  No Known Allergies    Medications:  Prior to Admission medications    Medication Sig Start Date End Date Taking? Authorizing Provider   alprostadil (EDEX) 20 MCG injection by Intracavitary route. Inject 3/4 ml (15 ug) as instructed.  Can increase to full dose of 1 ml (20 ug) if necessary.   Can dispense 6 kits. 2/9/12  Yes Yoshi Vinson MD   amoxicillin-clavulanate (AUGMENTIN) 875-125 MG per tablet TK 1 T PO BID FOR 10 DAYS 3/9/18  Yes Reported, Patient   aspirin 81 MG tablet Take 1 tablet by mouth daily.   Yes Reported, Patient   atorvastatin (LIPITOR) 20 MG tablet Take 1 tablet (20 mg) by mouth daily 8/14/17  Yes Marcio Hare  "MD ROMAINE   BASAGLAR 100 UNIT/ML injection Inject 68 units SQ at bedtime 3/21/18  Yes Pamela Laura PA-C   blood glucose (NO BRAND SPECIFIED) lancets standard Lancets that go with device, Test 3 times daily 2/5/18  Yes Pamela Laura PA-C   blood glucose monitoring (NO BRAND SPECIFIED) meter device kit Any meter covered by insurance, not store brand, use as directed. 2/5/18  Yes Pamela Laura PA-C   blood glucose monitoring (NO BRAND SPECIFIED) test strip Strips that go with meter, covered by insurance. Test 3 times daily 2/5/18  Yes Pamela Laura PA-C   Blood Pressure Monitor KIT Check blood pressure as directed. 2/17/12  Yes Marcio Hare MD   clobetasol (TEMOVATE) 0.05 % ointment Apply topically to legs twice daily for 2 weeks.  Then discontinue 1/31/13  Yes Montana Rush MD   clotrimazole (LOTRIMIN) 1 % cream Apply topically 2 times daily 8/23/17  Yes Narendra Grajeda DPM   fenofibrate 54 MG tablet Take 1 tablet (54 mg) by mouth daily 2/5/15  Yes Rafael Hand MD   finasteride (PROSCAR) 5 MG tablet Take 1 tablet (5 mg) by mouth daily 8/3/17  Yes Emmanuel Cantu MD   gabapentin (NEURONTIN) 100 MG capsule Take 1 capsule (100 mg) by mouth 3 times daily 7/27/17  Yes Marcio Hare MD   insulin pen needle (B-D U/F) 31G X 5 MM Use 4 times per day.  Please dispense as BD Pen Needle Mini U/F 31G x 5 MM 12/12/16  Yes Pamela Laura PA-C   insulin syringe 31G X 5/16\" 0.5 ML MISC Use three syringes daily 10/26/16  Yes Pamela Laura PA-C   loratadine (CLARITIN) 10 MG tablet Take 1 tablet (10 mg) by mouth daily 8/18/14  Yes Marcio Hare MD   losartan (COZAAR) 25 MG tablet Take 1 tablet (25 mg) by mouth daily 1/17/18  Yes Pamela Laura PA-C   metFORMIN (GLUCOPHAGE) 1000 MG tablet 1 tab twice daily with meals 7/31/17  Yes Pamela Laura PA-C   mupirocin (BACTROBAN) 2 % cream Apply  topically. In very small amounts only " as needed 2/17/12  Yes Marcio Hare MD   NOVOLOG FLEXPEN 100 UNIT/ML soln Inject 12-14 units SQ with meals with use of correction insulin. Pt uses approx 60 units in 24 hrs. 3/21/18  Yes Pamela Laura PA-C   Omega-3 Fatty Acids (OMEGA-3 FISH OIL) 1000 MG CAPS Take 1 capsule (1 g) by mouth 2 times daily 4/10/15  Yes Marcio Hare MD   tacrolimus (PROTOPIC) 0.03 % ointment Apply to affected area(s) twice daily 1/31/13  Yes Montana Rush MD   tamsulosin (FLOMAX) 0.4 MG capsule TAKE 1 CAPSULE BY MOUTH DAILY 1/15/18  Yes Emmanuel Cantu MD     Vitals:  /86 (BP Location: Right arm)   Pulse 86   Temp 98.3  F (36.8  C) (Oral)   Wt 89 kg (196 lb 3.2 oz)   SpO2 97%   BMI 30.73 kg/m      Exam:  GENERAL APPEARANCE:overweight,  alert and no distress  HENT: mouth without ulcers or lesions  LYMPHATICS: no cervical or supraclavicular nodes  RESP: lungs clear to auscultation - no rales, rhonchi or wheezes  CV: regular rhythm, normal rate, no rub, no murmur  EDEMA: no LE edema bilaterally  ABDOMEN: obese, soft, nondistended, nontender, bowel sounds normal  MS: extremities normal - no gross deformities noted, no evidence of inflammation in joints, no muscle tenderness  SKIN: necrobiosis lipoidica on both lower legs    Electrolytes/Renal -   Recent Labs   Lab Test 02/06/19  1322 08/01/18  1222 05/24/18  1414 05/01/18  1233    140  --  141   POTASSIUM 4.3 4.6  --  4.7   CHLORIDE 107 108  --  110*   CO2 24 24  --  21   BUN 22 23  --  20   CR 1.36* 1.31*  --  1.28*   * 116*  --  149*   INDERJIT 9.3 9.5  --  9.8   MAG  --   --  2.1 2.1   PHOS 3.9 3.1  --  3.3       CBC -   Recent Labs   Lab Test 02/06/19  1322 08/01/18  1222 05/01/18  1233 03/06/18  1216   WBC 6.1  --  4.6 4.8   HGB 13.8 14.1 14.7 15.5     --  148* 148*       LFTs -   Recent Labs   Lab Test 02/06/19  1322 08/01/18  1222 05/01/18  1233 03/06/18  1216 11/01/17  1044 06/06/17  1116 01/14/16  0849   ALKPHOS  --    --   --  77  --  70 71   BILITOTAL  --   --   --  0.8  --  0.7 0.5   ALT  --   --   --  35 44 49 43   AST  --   --   --  25 23 20 17   PROTTOTAL  --   --   --  7.3  --  6.6* 6.8   ALBUMIN 3.2* 3.8 4.0 3.8  --  3.5 3.8       Coags -   Recent Labs   Lab Test 06/12/13  0949   INR 0.93   PTT 29       Iron Panel -   Recent Labs   Lab Test 05/01/18  1233   IRON 83   IRONSAT 24   DEANA 160       Endocrine -   Recent Labs   Lab Test 03/06/18  1216 11/01/17  1044 06/06/17  1116 03/16/16  1012 10/21/14  1042  05/22/12  1058   A1C 9.1*  --  10.2* 9.0*  --    < > 8.2*   TSH  --  1.12  --   --  1.13  --  0.78    < > = values in this interval not displayed.     I have seen and examined this patient with the resident.  This note reflects our joint assessment and plan.     Delphine Cespedes MD

## 2019-03-13 NOTE — LETTER
RE: Earle Rene  [address] [place] [address]       Assessment and Plan:   1. Stage III CKD-baseline serum creatinine of 1.2-1.3 mg/dL and GFR of  53-56 ml/min/1.73m2 BSA likely from diabetic nephropathy given 20 year history of diabetes and diabetic reinopathy. Renal function is stable.  Recent serum creatinine was 1.36 mg/dL 02/06/2019. Today serum creatinine is 1.31  Mg/dL and correlating eGFR of 61 ml/min.  -encouraged the patient to improve his glycemic control.      2. Subnephrotic range proteinuria-likely secondary to diabetic nephropathy. Patient has been on Cozaar 25 mg daily for few years. Patient had UPCR of 1.76 g/gCr on 05/01/18, then UPCR of 1.37 g/gCr in 08/2018, then 1.49 g/gCR in 02/2019. Today UPCR is 3 g/gCr.  -we will increase Cozaar dose to 50 mg daily      3. Electrolytes:within acceptable limits    4. Volume status:patient looks euvolemic on exam. 2D ECHO in 2016 showed LVEF of 55-60%    5. Acid/Base status: HCO3 is 24. Goal 22-27. We will continue current dose of  sodium bicarbonate 650 mg three times daily    6. Hypertension: patient's BP is elevated 141/93 mmHg. Goal <130/90 mmHG. On Cozaar 25 mg daily  -we will increase Cozaar dose as above  -patient was advised to check his BP daily and keep records for us to review to see if Cozaar dose can be increased    7. BMD  -normal serum calcium  and phosphorus   -normal PTH of 42 pg/ml  -vitamin D deficiency-Vitamin D level is 14  Ug/L. We will start on cholecalciferol 2000U Daily     8. Type II DM-suboptimally controlled. Most recent Hgba1c is 8.4%. Follows with endocrinology clinic.    9. Diabetic neuropathy-on Gabapentin 100 mg TID.    10. Diabetic retinopathy-follows with ophthalmologist. Last seen by Dr.Van Reyna on 01/31/2019. Exam showed moderate NPDR both eyes with macular edema. Received injections. Needs cataract surgery    11. Obesity-BMI 30. Encouraged the patient to loose weight.    12. Persistent cough-patient  was advise to see his PCP for further evaluation given history of granulomatous disease of the lungs. CT chest 08/23/2017 showed stable calcified and noncalcified nodules in the lungs. Patient states that he was not aware of this and never been seen by pulmonologist or rheumatologist.     Recommend to follow up with nephrology clinic in 6 months.    Assessment and plan was discussed with patient and he voiced his understanding and agreement.    I have seen and discussed the patient with Dr.Manske Jayda Bolden MD  Nephrology Fellow    Reason for Visit:  Earle Rene is a 69 year old male with stage III CKD, who presents  for follow up appointment. Patient was last seen in 08/2018.    HPI:  Mr. Rene is pleasant 68 y/o male with PMHx significant for type II diabetes mellitus for 20 years, complicated by diabetic neuropathy, mild non proliferative retinopathy (follows with ophthalmologist ) and nephropathy. He is currently takes 64 units of Lantus and Novolog 8-10 units with melas and Metformin 1000 mg daily. His most recent Hgb A1c was 8.4% in 12/2018 and his previous was 9.6% in 11/2017. Patient states that his blood glucose has been running below 150. He follows with RAJANI Laura in endocrinology clinic.Patient  states that he still have awareness of hypoglycemia and reports no episodes of hypoglycemia recently.     He states that he has been taking Cozaar 25 mg daily for few years as a preventive medication. Patient states that he never been diagnosed with hypertension. His blood pressure usually runs in 110s/70s. Patient states that he has not been checking his blood pressure at home for last 3 months.     Furthermore he dose have history of granulomatous disease of the lungs. CT chest 08/23/2017 showed stable calcified and noncalcified nodules in the lungs. Patient states that he was not aware of this and never been seen by pulmonologist or rheumatologist.     In regards of patient's  renal function. The patient had creatinine of 0.96 mg/d in 2005, then 1 mg/dL in 2007, then 1.1-1.2 mg/dl since 2010. Patient had creatinine of 1.3 mg/dL on 03/06/2018. Patient dose have history of microalbuminuria at least since 05/2005. Proteinuria at least since 6/2010. UPCR was 0.23 g/gCr in 6/2010. His urine albumin level was 1148 mg/gCr in 03/2018. Laboratory work up in 02/2019 showed creatinine of 1.36 g/gCr, proteinuria, UPCR of 1.46 g/gCr. No hematuria.   He had Renal Ultrasound in 02/2016 which showed both kidneys were without mass or hydronephrosis (Right 12.3 cm, Left 12.6 cm), mild left greater than right renal cortical thinning/scarring without hydronephrosis. Patient states that he feels like he is not emptying his bladder completely. He is planning to see urologist soon but has not set up a follow up appointment yet. He reports history of nephrolithiasis. Patient states that he passes two stones many years ago.     He has history of BPH and elevated PSA. Patient followed by urologist  , last seen on 5/24/18. His last PSA was within normal limits. Patient takes Flomax 0.4 mg daily and Proscar 5 mg three times per week. Patient reports improved urinary urinary urgency, frequency and nocturia.    He has history of goiter . Patient had normal TSH in 11/2017.    Interval history:  Patient states that about 4-5 months agio he developed intermittent generalized skin pruritis and was seen by dermatologist. Subsequently, he underwent skin biopsy and was told that his skin pruritis is related to chronic kidney disease. He uses triamcinolone cream as needed with good relief of his symptoms.  Patient complaining of persistent productive cough since November of 2018. Patient states that he was seen by his PCP and was treated with coarse of oral antibiotic. Patient states that he dose not check his blood pressure at home.    Patient denies:NSAIDs use, fever, chills, weight loss, dizziness, adenopathy,  sore throat, rhinorrhea, shortness of breath , chest pain, palpitations, orthopnea, nausea, vomiting, abdominal pain, changes in bowel habits, dysuria, hematuria.    Active Medical Problems:  Patient Active Problem List   Diagnosis     Psychological factors associated with another disorder     Mood disorder in conditions classified elsewhere     Occupational problem     Type 2 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, with long-term current use of insulin (H)     Erectile dysfunction     Hyperlipidemia with target LDL less than 100     CTS (carpal tunnel syndrome)     Diabetic polyneuropathy (H)     Presbyopia     Myopia     Cataracts, bilateral     Pain of right thumb     PMH:   Medical record was reviewed and PMH was discussed with patient and noted below.  Past Medical History:   Diagnosis Date     Blepharitis of both eyes      BPH (benign prostatic hyperplasia)      Diabetes (H)      Diabetic neuropathy (H)      Diabetic retinopathy associated with diabetes mellitus due to underlying condition (H)      Dry eye syndrome      Goiter      Granulomatous disease (H)      Nonsenile cataract      Peripheral neuropathy      PSH:   Past Surgical History:   Procedure Laterality Date     AS RAD RESEC TONSIL/PILLARS Bilateral 1961     COLONOSCOPY  7/29/2013    Procedure: COLONOSCOPY;;  Surgeon: Montana Pascal MD;  Location: [facility]  11/2017     SURGICAL PATHOLOGY EXAM       Family Hx:   Family History   Problem Relation Age of Onset     Diabetes Father      Myocardial Infarction Father      Diabetes Brother      Leukemia Brother 44     Glaucoma No family hx of      Macular Degeneration No family hx of      Kidney Disease No family hx of      Personal Hx:   Social History     Socioeconomic History     Marital status:      Spouse name: Not on file     Number of children: 5     Years of education: Not on file     Highest education level: Not on file    Occupational History     Occupation: private bussiness owner   Social Needs     Financial resource strain: Not on file     Food insecurity:     Worry: Not on file     Inability: Not on file     Transportation needs:     Medical: Not on file     Non-medical: Not on file   Tobacco Use     Smoking status: Never Smoker     Smokeless tobacco: Never Used   Substance and Sexual Activity     Alcohol use: Yes     Comment: occasionaly     Drug use: No     Sexual activity: Not on file   Lifestyle     Physical activity:     Days per week: Not on file     Minutes per session: Not on file     Stress: Not on file   Relationships     Social connections:     Talks on phone: Not on file     Gets together: Not on file     Attends Faith service: Not on file     Active member of club or organization: Not on file     Attends meetings of clubs or organizations: Not on file     Relationship status: Not on file     Intimate partner violence:     Fear of current or ex partner: Not on file     Emotionally abused: Not on file     Physically abused: Not on file     Forced sexual activity: Not on file   Other Topics Concern     Parent/sibling w/ CABG, MI or angioplasty before 65F 55M? Not Asked   Social History Narrative     Not on file     Allergies:  No Known Allergies    Medications:  Prior to Admission medications    Medication Sig Start Date End Date Taking? Authorizing Provider   alprostadil (EDEX) 20 MCG injection by Intracavitary route. Inject 3/4 ml (15 ug) as instructed.  Can increase to full dose of 1 ml (20 ug) if necessary.   Can dispense 6 kits. 2/9/12  Yes Yoshi Vinson MD   amoxicillin-clavulanate (AUGMENTIN) 875-125 MG per tablet TK 1 T PO BID FOR 10 DAYS 3/9/18  Yes Reported, Patient   aspirin 81 MG tablet Take 1 tablet by mouth daily.   Yes Reported, Patient   atorvastatin (LIPITOR) 20 MG tablet Take 1 tablet (20 mg) by mouth daily 8/14/17  Yes Marcio Hare MD   BASAGLAR 100 UNIT/ML injection Inject 68 units  "SQ at bedtime 3/21/18  Yes Pamela Laura PA-C   blood glucose (NO BRAND SPECIFIED) lancets standard Lancets that go with device, Test 3 times daily 2/5/18  Yes Pamela Laura PA-C   blood glucose monitoring (NO BRAND SPECIFIED) meter device kit Any meter covered by insurance, not store brand, use as directed. 2/5/18  Yes Pamela Laura PA-C   blood glucose monitoring (NO BRAND SPECIFIED) test strip Strips that go with meter, covered by insurance. Test 3 times daily 2/5/18  Yes Pamela Laura PA-C   Blood Pressure Monitor KIT Check blood pressure as directed. 2/17/12  Yes Marcio Hare MD   clobetasol (TEMOVATE) 0.05 % ointment Apply topically to legs twice daily for 2 weeks.  Then discontinue 1/31/13  Yes Montana Rush MD   clotrimazole (LOTRIMIN) 1 % cream Apply topically 2 times daily 8/23/17  Yes Narendra Grajeda DPM   fenofibrate 54 MG tablet Take 1 tablet (54 mg) by mouth daily 2/5/15  Yes Rafael Hand MD   finasteride (PROSCAR) 5 MG tablet Take 1 tablet (5 mg) by mouth daily 8/3/17  Yes Emmanuel Cantu MD   gabapentin (NEURONTIN) 100 MG capsule Take 1 capsule (100 mg) by mouth 3 times daily 7/27/17  Yes Marcio Hare MD   insulin pen needle (B-D U/F) 31G X 5 MM Use 4 times per day.  Please dispense as BD Pen Needle Mini U/F 31G x 5 MM 12/12/16  Yes Pamela Laura PA-C   insulin syringe 31G X 5/16\" 0.5 ML MISC Use three syringes daily 10/26/16  Yes Pamela Laura PA-C   loratadine (CLARITIN) 10 MG tablet Take 1 tablet (10 mg) by mouth daily 8/18/14  Yes Marcio Hare MD   losartan (COZAAR) 25 MG tablet Take 1 tablet (25 mg) by mouth daily 1/17/18  Yes Pamela Laura PA-C   metFORMIN (GLUCOPHAGE) 1000 MG tablet 1 tab twice daily with meals 7/31/17  Yes Pamela Laura PA-C   mupirocin (BACTROBAN) 2 % cream Apply  topically. In very small amounts only as needed 2/17/12  Yes Marcio Hare MD   NOVOLOG " FLEXPEN 100 UNIT/ML soln Inject 12-14 units SQ with meals with use of correction insulin. Pt uses approx 60 units in 24 hrs. 3/21/18  Yes Pamela Laura PA-C   Omega-3 Fatty Acids (OMEGA-3 FISH OIL) 1000 MG CAPS Take 1 capsule (1 g) by mouth 2 times daily 4/10/15  Yes Marcio Hare MD   tacrolimus (PROTOPIC) 0.03 % ointment Apply to affected area(s) twice daily 1/31/13  Yes Montana Rush MD   tamsulosin (FLOMAX) 0.4 MG capsule TAKE 1 CAPSULE BY MOUTH DAILY 1/15/18  Yes WeightEmmanuel MD     Vitals:  /86 (BP Location: Right arm)   Pulse 86   Temp 98.3  F (36.8  C) (Oral)   Wt 89 kg (196 lb 3.2 oz)   SpO2 97%   BMI 30.73 kg/m       Exam:  GENERAL APPEARANCE:overweight,  alert and no distress  HENT: mouth without ulcers or lesions  LYMPHATICS: no cervical or supraclavicular nodes  RESP: lungs clear to auscultation - no rales, rhonchi or wheezes  CV: regular rhythm, normal rate, no rub, no murmur  EDEMA: no LE edema bilaterally  ABDOMEN: obese, soft, nondistended, nontender, bowel sounds normal  MS: extremities normal - no gross deformities noted, no evidence of inflammation in joints, no muscle tenderness  SKIN: necrobiosis lipoidica on both lower legs    Electrolytes/Renal -   Recent Labs   Lab Test 02/06/19  1322 08/01/18  1222 05/24/18  1414 05/01/18  1233    140  --  141   POTASSIUM 4.3 4.6  --  4.7   CHLORIDE 107 108  --  110*   CO2 24 24  --  21   BUN 22 23  --  20   CR 1.36* 1.31*  --  1.28*   * 116*  --  149*   INDERJIT 9.3 9.5  --  9.8   MAG  --   --  2.1 2.1   PHOS 3.9 3.1  --  3.3     CBC -   Recent Labs   Lab Test 02/06/19  1322 08/01/18  1222 05/01/18  1233 03/06/18  1216   WBC 6.1  --  4.6 4.8   HGB 13.8 14.1 14.7 15.5     --  148* 148*     LFTs -   Recent Labs   Lab Test 02/06/19  1322 08/01/18  1222 05/01/18  1233 03/06/18  1216 11/01/17  1044 06/06/17  1116 01/14/16  0849   ALKPHOS  --   --   --  77  --  70 71   BILITOTAL  --   --   --  0.8  --   0.7 0.5   ALT  --   --   --  35 44 49 43   AST  --   --   --  25 23 20 17   PROTTOTAL  --   --   --  7.3  --  6.6* 6.8   ALBUMIN 3.2* 3.8 4.0 3.8  --  3.5 3.8     Coags -   Recent Labs   Lab Test 06/12/13  0949   INR 0.93   PTT 29       Iron Panel -   Recent Labs   Lab Test 05/01/18  1233   IRON 83   IRONSAT 24   DEANA 160       Endocrine -   Recent Labs   Lab Test 03/06/18  1216 11/01/17  1044 06/06/17  1116 03/16/16  1012 10/21/14  1042  05/22/12  1058   A1C 9.1*  --  10.2* 9.0*  --    < > 8.2*   TSH  --  1.12  --   --  1.13  --  0.78    < > = values in this interval not displayed.     I have seen and examined this patient with the resident.  This note reflects our joint assessment and plan.     Delphine Cespedes MD

## 2019-03-13 NOTE — NURSING NOTE
"Chief Complaint   Patient presents with     RECHECK     Proteinuria and b/p check     Vital signs:  Temp: 98.3  F (36.8  C) Temp src: Oral BP: 128/86 Pulse: 86     SpO2: 97 %       Weight: 89 kg (196 lb 3.2 oz)  Estimated body mass index is 30.73 kg/m  as calculated from the following:    Height as of 1/31/19: 1.702 m (5' 7\").    Weight as of this encounter: 89 kg (196 lb 3.2 oz).        Suasnne Perez    "

## 2019-03-14 ENCOUNTER — MEDICAL CORRESPONDENCE (OUTPATIENT)
Dept: HEALTH INFORMATION MANAGEMENT | Facility: CLINIC | Age: 69
End: 2019-03-14

## 2019-03-14 LAB — DEPRECATED CALCIDIOL+CALCIFEROL SERPL-MC: 14 UG/L (ref 20–75)

## 2019-03-20 ENCOUNTER — TELEPHONE (OUTPATIENT)
Dept: ENDOCRINOLOGY | Facility: CLINIC | Age: 69
End: 2019-03-20

## 2019-03-20 ENCOUNTER — CARE COORDINATION (OUTPATIENT)
Dept: NEPHROLOGY | Facility: CLINIC | Age: 69
End: 2019-03-20

## 2019-03-20 NOTE — TELEPHONE ENCOUNTER
Prior Authorization Retail Medication Request    Medication/Dose: NOVOLOG FLEXPEN 100 UNIT/ML soln. Inject 10 units with breakfast, 8 units with lunch and 10 units with dinner, plus correction. Pt uses approx 60 units in 24 hrs.    ICD code:E11.9  Previously Tried and Failed:  Tuyet  Rationale:  Patient stopped Tuyet due to the side effects of the medication and has been on the Novolog and tolerating well.    Insurance Name:  [employer] MEDICARE ADVANTAGE   Insurance ID:  [id number]       Pharmacy Information   Name:  [facility]   Phone:  [phone]

## 2019-03-20 NOTE — PROGRESS NOTES
Nephrology Note: Nursing Outreach Encounter    REASON FOR CALL:                                                      REASON FOR CALL: Blood Pressure Follow Up                                          SITUATION/BACKROUND:                                                    Patient is being treated for HTN.    ASSESSMENT:                                                      Called patient to check in on BP. His meter isn't working right now, so he will try to start checking again today and follow up next week. Reviewed that his vitamin d level is low, he will talk to his pharmacist about finding a kosher supplement. Denied symptoms or concerns today.    Uremic Symptoms: No       PLAN:                                                      Follow Up:   Follow up call in 1-2 weeks     Patient verbalized understanding and will contact the clinic with any further questions or concerns.     Britni Austin RN

## 2019-03-21 DIAGNOSIS — E11.9 TYPE 2 DIABETES MELLITUS (H): Primary | ICD-10-CM

## 2019-03-21 RX ORDER — INSULIN LISPRO 100 [IU]/ML
INJECTION, SOLUTION INTRAVENOUS; SUBCUTANEOUS
Qty: 60 ML | Refills: 3 | Status: SHIPPED | OUTPATIENT
Start: 2019-03-21 | End: 2019-09-18

## 2019-03-25 ENCOUNTER — TELEPHONE (OUTPATIENT)
Dept: ENDOCRINOLOGY | Facility: CLINIC | Age: 69
End: 2019-03-25

## 2019-03-27 ENCOUNTER — CARE COORDINATION (OUTPATIENT)
Dept: NEPHROLOGY | Facility: CLINIC | Age: 69
End: 2019-03-27

## 2019-03-27 DIAGNOSIS — I10 BENIGN ESSENTIAL HYPERTENSION: ICD-10-CM

## 2019-03-27 DIAGNOSIS — N18.30 CHRONIC KIDNEY DISEASE, STAGE III (MODERATE) (H): Primary | ICD-10-CM

## 2019-03-27 NOTE — PROGRESS NOTES
Nephrology Note: Nursing Outreach Encounter    REASON FOR CALL:                                                      REASON FOR CALL: Blood Pressure Follow Up                                          SITUATION/BACKROUND:                                                    Patient is being treated for HTN.      ASSESSMENT:                                                      Patient has been able to check his BP a few times, and it's been averaging 140/80's. Doesn't write down his heart rate. Denied any symptoms today, said he feels great.    Currently taking losartan 50mg daily.    Uremic Symptoms: No       PLAN:                                                      Follow Up:   Route to provider to further advise     Per Dr. Bolden: Please ask him to come for repeat blood work ( BMP) sometime this week or next week. We will increase it to 75 mg daily if his renal function and electrolytes are stable . Goal blood pressure is 130/80 or less.     Patient verbalized understanding and will contact the clinic with any further questions or concerns.     Britni Austin RN

## 2019-03-27 NOTE — TELEPHONE ENCOUNTER
PRIOR AUTHORIZATION DENIED    Medication: NOVOLOG FLEXPEN 100 UNIT/ML soln-PA denied    Denial Date: 3/27/2019    Denial Rational:         Appeal Information:

## 2019-03-27 NOTE — TELEPHONE ENCOUNTER
Central Prior Authorization Team   Phone: [phone]      PA Initiation    Medication: NOVOLOG FLEXPEN 100 UNIT/ML soln-PA initiated  Insurance Company: [employer] [place] - Phone [phone] Fax [phone]  Pharmacy Filling the Rx: [id number] - [place], [place] - [address]  Filling Pharmacy Phone: [phone]  Filling Pharmacy Fax:    Start Date: 3/27/2019

## 2019-03-28 ENCOUNTER — AMBULATORY - HEALTHEAST (OUTPATIENT)
Dept: OTHER | Facility: CLINIC | Age: 69
End: 2019-03-28

## 2019-04-10 DIAGNOSIS — N18.30 CHRONIC KIDNEY DISEASE, STAGE III (MODERATE) (H): ICD-10-CM

## 2019-04-10 PROCEDURE — 36415 COLL VENOUS BLD VENIPUNCTURE: CPT | Performed by: INTERNAL MEDICINE

## 2019-04-10 PROCEDURE — 80048 BASIC METABOLIC PNL TOTAL CA: CPT | Performed by: INTERNAL MEDICINE

## 2019-04-11 LAB
ANION GAP SERPL CALCULATED.3IONS-SCNC: 9 MMOL/L (ref 3–14)
BUN SERPL-MCNC: 23 MG/DL (ref 7–30)
CALCIUM SERPL-MCNC: 9.8 MG/DL (ref 8.5–10.1)
CHLORIDE SERPL-SCNC: 106 MMOL/L (ref 94–109)
CO2 SERPL-SCNC: 22 MMOL/L (ref 20–32)
CREAT SERPL-MCNC: 1.39 MG/DL (ref 0.66–1.25)
GFR SERPL CREATININE-BSD FRML MDRD: 51 ML/MIN/{1.73_M2}
GLUCOSE SERPL-MCNC: 332 MG/DL (ref 70–99)
POTASSIUM SERPL-SCNC: 4.7 MMOL/L (ref 3.4–5.3)
SODIUM SERPL-SCNC: 137 MMOL/L (ref 133–144)

## 2019-04-12 RX ORDER — LOSARTAN POTASSIUM 25 MG/1
75 TABLET ORAL DAILY
Qty: 90 TABLET | Refills: 11 | Status: SHIPPED | OUTPATIENT
Start: 2019-04-12 | End: 2019-05-08

## 2019-04-12 NOTE — PROGRESS NOTES
Reviewed labs with Dr. Bolden:    Please call Earle and let him know that his kidney function is stable. I recommend to increase his Losartan dose to 75 mg daily.   He should continue to check his blood pressure daily for next 2 weeks and call us if it is persistently above 140/90 mmHG.     Called patient, who agreed to plan. Updated rx sent.    Britni Austin RN

## 2019-04-26 ENCOUNTER — CARE COORDINATION (OUTPATIENT)
Dept: NEPHROLOGY | Facility: CLINIC | Age: 69
End: 2019-04-26

## 2019-04-29 NOTE — PROGRESS NOTES
Nephrology Note: Nursing Outreach Encounter    REASON FOR CALL:                                                      REASON FOR CALL: Blood Pressure Follow Up                                          SITUATION/BACKROUND:                                                    Patient is being treated for HTN.      ASSESSMENT:                                                      Called patient for a BP update. States readings haven't changed, around 130-140 / 80's. Denied any symptoms.    Currently prescribed losartan 75mg daily.    Uremic Symptoms: No       PLAN:                                                      Follow Up:   Follow up call in 3-4 weeks     Patient verbalized understanding and will contact the clinic with any further questions or concerns.     Britni Austin RN

## 2019-04-30 ENCOUNTER — TELEPHONE (OUTPATIENT)
Dept: INTERNAL MEDICINE | Facility: CLINIC | Age: 69
End: 2019-04-30

## 2019-04-30 DIAGNOSIS — E78.5 HYPERLIPIDEMIA LDL GOAL <100: ICD-10-CM

## 2019-05-01 RX ORDER — ATORVASTATIN CALCIUM 20 MG/1
20 TABLET, FILM COATED ORAL DAILY
Qty: 90 TABLET | Refills: 0 | Status: SHIPPED | OUTPATIENT
Start: 2019-05-01 | End: 2019-08-16

## 2019-05-01 NOTE — TELEPHONE ENCOUNTER
atorvastatin (LIPITOR) 20 MG    90 DAY RF SENT    OVER DUE LAB/ LDL  Scheduling/CMA has been notified to contact the pt for appointment/LAB

## 2019-05-02 DIAGNOSIS — Z79.4 TYPE 2 DIABETES MELLITUS WITH HYPERGLYCEMIA, WITH LONG-TERM CURRENT USE OF INSULIN (H): ICD-10-CM

## 2019-05-02 DIAGNOSIS — E11.65 TYPE 2 DIABETES MELLITUS WITH HYPERGLYCEMIA, WITH LONG-TERM CURRENT USE OF INSULIN (H): ICD-10-CM

## 2019-05-02 RX ORDER — INSULIN DEGLUDEC 100 U/ML
INJECTION, SOLUTION SUBCUTANEOUS
Qty: 15 ML | Refills: 0 | Status: SHIPPED | OUTPATIENT
Start: 2019-05-02 | End: 2019-05-31

## 2019-05-02 NOTE — TELEPHONE ENCOUNTER
"I was requested to refill insulin for this patient. His provider out of office. Last note reviewed from 12/2018 cc below.   \" TYPE 2 DIABETES MELLITUS: Uncontrolled type 2 diabetes mellitus complicated by retinopathy, nephropathy,neuropathy and ED.  I had pt increase his Tresiba 58 units at hs and he was instructed to take Novolog with all meals.\"    ENDO DIABETES Latest Ref Rng & Units 12/21/2018   HEMOGLOBIN A1C, POC 4.3 - 6.0 % 8.4 (A)     Patient doesn't have an appointment and he was a no show on 3/26/19.      Please advise pt to schedule an appointment. Refill provided until his follow up appointment.   "

## 2019-05-02 NOTE — TELEPHONE ENCOUNTER
insulin degludec (TRESIBA) 100 UNIT/ML pen        Last Written Prescription Date:  01/09/19  Last Fill Quantity: 15mL,   # refills: 3  Last Office Visit : 12/21/18  Future Office visit:  None Scheduled    Routing refill request to provider for review/approval because:  Insulin - refilled per clinic

## 2019-05-08 ENCOUNTER — PRE VISIT (OUTPATIENT)
Dept: UROLOGY | Facility: CLINIC | Age: 69
End: 2019-05-08

## 2019-05-08 ENCOUNTER — OFFICE VISIT (OUTPATIENT)
Dept: OPHTHALMOLOGY | Facility: CLINIC | Age: 69
End: 2019-05-08
Attending: OPHTHALMOLOGY
Payer: COMMERCIAL

## 2019-05-08 ENCOUNTER — TELEPHONE (OUTPATIENT)
Dept: NEPHROLOGY | Facility: CLINIC | Age: 69
End: 2019-05-08

## 2019-05-08 DIAGNOSIS — I10 BENIGN ESSENTIAL HYPERTENSION: Primary | ICD-10-CM

## 2019-05-08 DIAGNOSIS — E11.3213 TYPE 2 DIABETES MELLITUS WITH BOTH EYES AFFECTED BY MILD NONPROLIFERATIVE RETINOPATHY AND MACULAR EDEMA, WITH LONG-TERM CURRENT USE OF INSULIN (H): ICD-10-CM

## 2019-05-08 DIAGNOSIS — Z79.4 TYPE 2 DIABETES MELLITUS WITH BOTH EYES AFFECTED BY MILD NONPROLIFERATIVE RETINOPATHY AND MACULAR EDEMA, WITH LONG-TERM CURRENT USE OF INSULIN (H): ICD-10-CM

## 2019-05-08 DIAGNOSIS — R97.20 ELEVATED PROSTATE SPECIFIC ANTIGEN (PSA): Primary | ICD-10-CM

## 2019-05-08 DIAGNOSIS — H25.13 NUCLEAR SCLEROTIC CATARACT OF BOTH EYES: Primary | ICD-10-CM

## 2019-05-08 PROCEDURE — G0463 HOSPITAL OUTPT CLINIC VISIT: HCPCS | Mod: ZF

## 2019-05-08 PROCEDURE — 76516 ECHO EXAM OF EYE: CPT | Mod: ZF | Performed by: OPHTHALMOLOGY

## 2019-05-08 PROCEDURE — 40000269 ZZH STATISTIC NO CHARGE FACILITY FEE: Mod: ZF

## 2019-05-08 PROCEDURE — 92134 CPTRZ OPH DX IMG PST SGM RTA: CPT | Mod: ZF | Performed by: OPHTHALMOLOGY

## 2019-05-08 RX ORDER — LOSARTAN POTASSIUM 50 MG/1
50 TABLET ORAL DAILY
Qty: 30 TABLET | Refills: 11 | Status: SHIPPED | OUTPATIENT
Start: 2019-05-08 | End: 2019-12-24

## 2019-05-08 RX ORDER — LOSARTAN POTASSIUM 25 MG/1
25 TABLET ORAL DAILY
Qty: 30 TABLET | Refills: 11 | Status: SHIPPED | OUTPATIENT
Start: 2019-05-08 | End: 2019-12-24

## 2019-05-08 ASSESSMENT — VISUAL ACUITY
OD_CC+: -1
OD_CC: 20/30
OS_CC+: -2
OD_CC+: -1
OS_CC: 20/60
METHOD: SNELLEN - LINEAR
METHOD: SNELLEN - LINEAR
OS_CC+: +2
OS_CC: 20/60-2
OD_CC: 20/30
CORRECTION_TYPE: GLASSES

## 2019-05-08 ASSESSMENT — REFRACTION_WEARINGRX
OD_CYLINDER: +1.50
OS_AXIS: 033
OS_ADD: +2.50
OS_CYLINDER: +0.75
OD_AXIS: 157
OD_SPHERE: -2.50
OS_SPHERE: -2.50
OD_ADD: +2.50
SPECS_TYPE: PAL

## 2019-05-08 ASSESSMENT — TONOMETRY
OD_IOP_MMHG: 20
OS_IOP_MMHG: 17
IOP_METHOD: TONOPEN
OS_IOP_MMHG: 17
IOP_METHOD: TONOPEN
OD_IOP_MMHG: 20

## 2019-05-08 ASSESSMENT — CONF VISUAL FIELD
METHOD: COUNTING FINGERS
OD_NORMAL: 1
OD_NORMAL: 1
OS_NORMAL: 1
OS_NORMAL: 1

## 2019-05-08 ASSESSMENT — SLIT LAMP EXAM - LIDS
COMMENTS: NORMAL
COMMENTS: NORMAL

## 2019-05-08 ASSESSMENT — EXTERNAL EXAM - RIGHT EYE: OD_EXAM: NORMAL

## 2019-05-08 ASSESSMENT — EXTERNAL EXAM - LEFT EYE: OS_EXAM: NORMAL

## 2019-05-08 ASSESSMENT — CUP TO DISC RATIO
OD_RATIO: 0.3
OS_RATIO: 0.3

## 2019-05-08 NOTE — TELEPHONE ENCOUNTER
Called pharmacy, spoke with Keely. States they faxed over a request 4 times but never heard back. His insurance will not cover more than 1 tablet of a specific dose (they'd only over 1 25mg tablet, not 3). Sent over updated rx for a 50mg tablet and 25mg tablet for total dose of 75mg.    Updated patient that rx sent. He will call if they have any further issues.    Britni Austin RN

## 2019-05-08 NOTE — PROGRESS NOTES
I have confirmed the patient's and reviewed Past Medical History, Past Surgical History, Social History, Family History, Problem List, Medication List and agree with Tech note.    CC: diabetic retinopathy both eyes     HPI: previous pt of Dr. Alberto was scheduled to have cataract surgery but transferred care to [facility] with Dr. Marcio Ortiz who diagnosed him with macular edema and sent him to Dr. Hilton  He has been receiving injections in both eyes  Right Eye: 2/26/18, 4/10/18, 5/11/20218, 6/11/18 and last one her on 7/19/2018  Left Eye: 2/26/18, 4/10/18, 5/11/2018       Assessment/plan:   1. 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 Patient advised that failure to adequately control each may lead to vision loss. The patient expressed understanding.      2. Diabetic macular edema right eye   - temporal macular edema,    - has been receiving injections , last injection 6/11/18 at outside clinic and 7/19/2018 here at [facility]    - recommend defer avastin today since there was not much of an effect    - follow up 12 weeks    3. Diabetic macular edema left eye   - temporal edema approaching fovea   - last injection 5/11/18 (likely trend was improving)   - plan to observe today   - plan for cataract surgery and intra-op treatment for Diabetic macular edema     4. Cataracts both eyes    - needs cataract surgery   - was seen previously , has IOL calcs   - will refer back t Dr. Eugene and suggest CE-IOL with concurrent intravitreal Triescence/ or Avastin for temporal macular edema       Return to [facility] for Cataract extraction both eyes left eye first       Jackie Nolasco MD PhD.  Professor & Chair

## 2019-05-08 NOTE — TELEPHONE ENCOUNTER
[facility] Call Center    Phone Message    May a detailed message be left on voicemail: yes    Reason for Call: Other: The pt states that his dose of losartan (COZAAR) 25 MG tablet was raised to 3 a day, but the pharmacy has the rx that was 1 a day. He states that the pharmacy has been calling to get it straightened out, and he has been out for 2 weeks. Please call the [id number] - [place], [place] - [address] pharmacy to discuss. Thanks.    Action Taken: Message routed to:  [facility] ([facility]

## 2019-05-08 NOTE — PROGRESS NOTES
PATT Rene is a 69 year old male referred by Dr. Rodriguez for cataract evaluation. He has noted a slow decrease in his vision in both eyes over the last couple of years. The vision has been blurred in quality for near and distance, left eye worse than right eye. There is associated worsening glare with driving at night. 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 No pain, redness, discharge. No flashes/floaters.     POH: Bell's palsy, diabetic retinopathy with DME      Assessment & Plan    (H25.813) Combined forms of age-related cataract of both eyes  (primary encounter diagnosis)  Comment: Visually significant with BCVA of 20/30- OD and 20/60- OS with dense cortical and nuclear changes OU.    Dilates to: 7.5 mm  Alpha blockers/Flomax: YES (FLOMAX)  Trauma/Pseudoxfoliation: None  Fuchs dystrophy/guttae: None    Diabetes: YES, mild NPDR  Anticoagulation: ASA  81mg     Cyl: 0.71 @ 050 left eye, 0.63 @ 003 OS    We discussed the risks and benefits of cataract surgery, and informed consent was obtained.  Proceed with CE/IOL left eye, then will make sure DME is stable prior to proceeding with right eye.     Surgical plan:  Topical  Malyugin ring possible  **Intraoperative Avastin recommended by Dr. Rodriguez  Post-op acular  Aim emmetropia    (E11.3213,  Z79.4) Type 2 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, with long-term current use of insulin (H)  Comment: On insulin. Last A1c 9.1.  Temporal macular edema left eye. Has been receiving injections, last injection 6/11/18 at outside clinic and 7/19/2018 here at [facility]. Dr. Rodriguez recommend deferring avastin today since there was not much of an effect   Plan: Continue to follow with retina clinic, intra-op avastin as above.    -----------------------------------------------------------------------------------    Patient disposition:   Return for scheduled procedure. or  sooner as needed.    Teaching statement:  Complete documentation of historical and exam elements from today's encounter can be found in the full encounter summary report (not reduplicated in this progress note). I personally obtained the chief complaint(s) and history of present illness.  I confirmed and edited as necessary the review of systems, past medical/surgical history, family history, social history, and examination findings as documented by others; and I examined the patient myself. I personally reviewed the relevant tests, images, and reports as documented above.     I formulated and edited as necessary the assessment and plan and discussed the findings and management plan with the patient and family.    Lacey Eugene MD  Comprehensive Ophthalmology & Ocular Pathology  Department of Ophthalmology and Visual Neurosciences  [email or web address]  Pager [phone]

## 2019-05-08 NOTE — TELEPHONE ENCOUNTER
Chief Complaint : PSA check     New Hx/Sx: erctile dysfunction     Records/Orders/Proced:     Pt Contacted: called patient to please get PSA done 1 week to 3 days before appointment.    At Rooming: normal

## 2019-05-08 NOTE — NURSING NOTE
"Chief Complaints and History of Present Illnesses   Patient presents with     Follow Up     Chief Complaint(s) and History of Present Illness(es)     Follow Up     Laterality: both eyes    Quality: blurred vision    Severity: moderate    Frequency: constantly    Context: distance vision and near vision    Course: gradually worsening    Associated symptoms: dryness    Treatments tried: artificial tears and glasses    Response to treatment: mild improvement    Pain scale: 0/10              Comments     4mo bilateral NPDR recheck    Vision is \"getting worse,\" would like to schedule CE/IOL sx.     DM2 (insulin)  Last BS \"was taken a long time ago but typically around 150\"                     A1C                      9.1                 03/06/2018                 A1C                      10.2                06/06/2017                 A1C                      9.0                 03/16/2016                 A1C                      10.2                09/09/2014                 A1C                      8.8                 08/21/2013                  Jennifer Rowe COT 9:16 AM May 8, 2019                   "

## 2019-05-20 ENCOUNTER — CARE COORDINATION (OUTPATIENT)
Dept: NEPHROLOGY | Facility: CLINIC | Age: 69
End: 2019-05-20

## 2019-05-20 NOTE — PROGRESS NOTES
Nephrology Note: Nursing Outreach Encounter    REASON FOR CALL:                                                      REASON FOR CALL: Blood Pressure Follow Up                                          SITUATION/BACKROUND:                                                    Patient is being treated for HTN.      ASSESSMENT:                                                      Called patient to check in. He still had issues with losartan, was only able to get the 50mg tablets, not the 25mg. So he has been taking 50mg and trying to cut another tablet in half to get to 75mg. BP this morning was 139/81. Called pharmacy, was told it was an insurance issue (too soon for them to fill the 25 mg tablets, but she can today). Patient verbalized understanding.    Uremic Symptoms: No       PLAN:                                                      Follow Up:   Follow up call in 3-4 weeks     Patient verbalized understanding and will contact the clinic with any further questions or concerns.     Britni Austin RN

## 2019-05-28 DIAGNOSIS — N18.30 CHRONIC KIDNEY DISEASE, STAGE III (MODERATE) (H): ICD-10-CM

## 2019-05-28 DIAGNOSIS — R97.20 ELEVATED PROSTATE SPECIFIC ANTIGEN (PSA): ICD-10-CM

## 2019-05-28 LAB
ANION GAP SERPL CALCULATED.3IONS-SCNC: 9 MMOL/L (ref 3–14)
BUN SERPL-MCNC: 23 MG/DL (ref 7–30)
CALCIUM SERPL-MCNC: 9.4 MG/DL (ref 8.5–10.1)
CHLORIDE SERPL-SCNC: 108 MMOL/L (ref 94–109)
CO2 SERPL-SCNC: 21 MMOL/L (ref 20–32)
CREAT SERPL-MCNC: 1.31 MG/DL (ref 0.66–1.25)
GFR SERPL CREATININE-BSD FRML MDRD: 55 ML/MIN/{1.73_M2}
GLUCOSE SERPL-MCNC: 320 MG/DL (ref 70–99)
POTASSIUM SERPL-SCNC: 4.3 MMOL/L (ref 3.4–5.3)
PSA SERPL-MCNC: 3.61 UG/L (ref 0–4)
SODIUM SERPL-SCNC: 138 MMOL/L (ref 133–144)

## 2019-05-28 PROCEDURE — 84153 ASSAY OF PSA TOTAL: CPT | Performed by: UROLOGY

## 2019-05-28 PROCEDURE — 36415 COLL VENOUS BLD VENIPUNCTURE: CPT | Performed by: UROLOGY

## 2019-05-28 PROCEDURE — 80048 BASIC METABOLIC PNL TOTAL CA: CPT | Performed by: UROLOGY

## 2019-05-30 ENCOUNTER — OFFICE VISIT (OUTPATIENT)
Dept: UROLOGY | Facility: CLINIC | Age: 69
End: 2019-05-30
Payer: COMMERCIAL

## 2019-05-30 VITALS
BODY MASS INDEX: 30.76 KG/M2 | HEART RATE: 81 BPM | WEIGHT: 196 LBS | HEIGHT: 67 IN | SYSTOLIC BLOOD PRESSURE: 143 MMHG | DIASTOLIC BLOOD PRESSURE: 77 MMHG

## 2019-05-30 DIAGNOSIS — C61 MALIGNANT NEOPLASM OF PROSTATE (H): Primary | ICD-10-CM

## 2019-05-30 ASSESSMENT — PAIN SCALES - GENERAL: PAINLEVEL: SEVERE PAIN (6)

## 2019-05-30 ASSESSMENT — MIFFLIN-ST. JEOR: SCORE: 1607.68

## 2019-05-30 NOTE — LETTER
2019       RE: Earle Rene  [address] [place] [address]     Dear Colleague,    Thank you for referring your patient, Earle Rene, to the [facility] UROLOGY AND INST FOR PROSTATE AND UROLOGIC CANCERS at [facility]. Please see a copy of my visit note below.      Urology Clinic    Emmanuel Cantu MD  Date of Service: 2019     Name: Earle Rene  MRN: [id number]  Age: 69 year old  : 1950  Referring provider: Established Patient     Assessment and Plan:  1. Assessment presumed localized prostate cancer GS=6 and Grade Group 1 on active surveillance since 2017 with no evidence of disease progression and dropping PSA.    PSA Trend  2019- 3.61  2018- 2.14  2017- 2.52  2017- 8.64  2016- 3.75  2014-2.92     Since there is no sign of disease progression, will plan to continue with active surveillance. We did discuss repeating MRI of prostate as his last MRI was in 2017. Will plan to do this sometime over the next 6 months and  only then proceed with biopsy.     2. Urinary symptoms:     The patient describes frequent urination. He is currently taking Flomax daily and Finasteride. The patient would like a new prescription. The patient notes some pain with urination. I noted that we would not recommend surgery until quality of life becomes an issue. We could refer to Dr. Glass for consideration of HoLEP as the patient need.     --MRI of prostate and follow up in 1 year  --Continued prescription of Flomax and Finasteride.   --PSA (tumor marker)  --Clinic Exam    Attestation:  This patient was seen and evaluated by me, with a scribe taking notes.  I have reviewed the note above and agree.  The physical exam and or any procedures were performed by me and the pertinant details are outlined below.       Emmanuel Cantu MD  Department of Urology  [facility]  "[place]    ______________________________________________________________________    [facility]  Earle Rene is a 69 year old male who presents with a history of prostate cancer and being followed by AS.      Initial PSA at diagnosis: 8.6  1st Biopsy (Date 7/2017) Jameel Score was 3 + 3  In 1 of 6 cores.  Volume affected was 5%.  Grade Group: 1     2nd Biopsy: NA     MRI (Date 6/2017)  Lesion #1 location: 4:30 to 5:00 position of posterior lateral left peripheral zone near base  PIRADS Lesion #1 score 4  Lesion #2 location 7 oclock  PIRADS Lesion #2 score 2  Prostate volume 110g  Also PIRADS 3 at 6 oclock     PSA Density 0.08     digital rectal exam: deferred      There is no evidence of disease progression to date.     Today reports significant voiding symptoms including urgency, frequency, and dysuria. He is currently taking Flomax daily and Finasteride.    Review of Systems:   Pertinent items are noted in HPI or as below, remainder of complete ROS is negative.      Physical Exam:   /77   Pulse 81   Ht 1.702 m (5' 7\")   Wt 88.9 kg (196 lb)   BMI 30.70 kg/m      Body mass index is 30.7 kg/m .  General: Alert, no acute distress, oriented  HENT: Good dentition  Lungs: No respiratory distress, or pursed lip breathing  Heart: No obvious jugular venous distension present  Abdomen: Soft, nontender, no organomegaly or masses  Musculoskeletal: Extremities normal, no peripheral edema  Skin: No suspicious lesions or rashes  Neuro: Alert, oriented, speech and mentation normal  Psych: Affect and mood normal  Gait: Normal  : deferred    Laboratory:   I reviewed all applicable laboratory and pathology data and went over findings with patient  Significant for     Lab Results   Component Value Date    CR 1.31 05/28/2019    CR 1.39 04/10/2019    CR 1.20 03/13/2019    CR 1.36 02/06/2019    CR 1.31 08/01/2018    CR 1.28 05/01/2018    CR 1.33 03/06/2018    CR 1.20 11/01/2017    CR 1.12 06/24/2017    CR 1.26 06/06/2017 "     PSA   Date Value Ref Range Status   05/28/2019 3.61 0 - 4 ug/L Final     Comment:     Assay Method:  Chemiluminescence using Siemens Vista analyzer   05/24/2018 2.14 0 - 4 ug/L Final     Comment:     Assay Method:  Chemiluminescence using Siemens Vista analyzer   11/01/2017 2.52 0 - 4 ug/L Final     Comment:     Assay Method:  Chemiluminescence using Siemens Vista analyzer   06/06/2017 8.64 (H) 0 - 4 ug/L Final     Comment:     Assay Method:  Chemiluminescence using Siemens Vista analyzer   01/14/2016 3.75 0 - 4 ug/L Final   09/09/2014 2.92 0 - 4 ug/L Final   06/12/2013 2.18 0 - 4 ug/L Final     Comment:     PSA results are about 7% lower than our prior method due to a methodology   change   on August 30, 2011.   02/02/2012 1.77 0 - 4 ug/L Final     Comment:     PSA results are about 7% lower than our prior method due to a methodology   change   on August 30, 2011.   03/08/2011 1.37 0 - 4 ug/L Final   08/10/2010 1.76 0 - 4 ug/L Final       Results for orders placed or performed in visit on 08/10/10   Routine UA with microscopic   Result Value Ref Range    Source Midstream Urine     Color Urine Yellow     Appearance Urine Clear     Glucose Urine 70 (A) NEG mg/dL    Bilirubin Urine Negative NEG    Ketones Urine Negative NEG mg/dL    Specific Gravity Urine 1.019 1.003 - 1.035    Blood Urine Negative NEG    pH Urine 5.5 5.0 - 7.0 pH    Protein Albumin Urine 10 (A) NEG mg/dL    Urobilinogen mg/dL Normal 0.0 - 2.0 mg/dL    Nitrite Urine Negative NEG    Leukocyte Esterase Urine Negative NEG    WBC Urine 1 0 - 2 /HPF    RBC Urine <1 0 - 2 /HPF    Hyaline Casts 3 (H) 0 - 2 /LPF    Mucous Urine Present (A) NEG /LPF     Imaging:   I personally reviewed all applicable imaging and went over the below findings with patient.    Results for orders placed or performed in visit on 02/06/19   XR Chest 2 Views    Narrative    Examination: XR CHEST 2 VW, 2/6/2019 1:22 PM    Comparison: 8/23/2017, 12/19/2015    History:  Cough    Findings: The cardiomediastinal silhouette is within normal limits.  Hazy opacity in the left lateral lung base, probably in the lingula  based on the lateral radiograph. Lungs are otherwise clear. No pleural  effusion or pneumothorax.      Impression    Impression: Opacity in the left lung base, likely lingula, which may  be infectious.    JOSELIN JOSÉ MD     Scribe Disclosure:  IGio, am serving as a scribe to document services personally performed by Emmanuel Cantu MD at this visit, based upon the provider's statements to me. All documentation has been reviewed by the aforementioned provider prior to being entered into the official medical record.     CC  Patient Care Team:  Marcio Hare MD as PCP - General (Internal Medicine)  Rafael Hand MD as MD (Cardiology)  Pamela Laura PA-C as Physician Assistant (Physician Assistant)  Jackie Rodriguez MD as MD (Ophthalmology)  Juan Rehman MD as MD (Internal Medicine)    Copy to patient  CHRIS MONTOYA  [address] [place] [address]      Again, thank you for allowing me to participate in the care of your patient.      Sincerely,    Emmanuel Cantu MD

## 2019-05-30 NOTE — PATIENT INSTRUCTIONS
MRI of prostate.    Follow up with Dr. Cantu in one year.    It was a pleasure meeting with you today.  Thank you for allowing me and my team the privilege of caring for you today.  YOU are the reason we are here, and I truly hope we provided you with the excellent service you deserve.  Please let us know if there is anything else we can do for you so that we can be sure you are leaving completely satisfied with your care experience.

## 2019-05-30 NOTE — PROGRESS NOTES
Urology Clinic    Emmanuel Cantu MD  Date of Service: 2019     Name: Earle Rene  MRN: [id number]  Age: 69 year old  : 1950  Referring provider: Established Patient     Assessment and Plan:  1. Assessment presumed localized prostate cancer GS=6 and Grade Group 1 on active surveillance since 2017 with no evidence of disease progression and dropping PSA.    PSA Trend  2019- 3.61  2018- 2.14  2017- 2.52  2017- 8.64  2016- 3.75  2014-2.92     Since there is no sign of disease progression, will plan to continue with active surveillance. We did discuss repeating MRI of prostate as his last MRI was in 2017. Will plan to do this sometime over the next 6 months and only then proceed with biopsy.     2. Urinary symptoms:     The patient describes frequent urination. He is currently taking Flomax daily and Finasteride. The patient would like a new prescription. The patient notes some pain with urination. I noted that we would not recommend surgery until quality of life becomes an issue. We could refer to Dr. Glass for consideration of HoLEP as the patient need.     --MRI of prostate and follow up in 1 year  --Continued prescription of Flomax and Finasteride.   --PSA (tumor marker)  --Clinic Exam    Attestation:  This patient was seen and evaluated by me, with a scribe taking notes.  I have reviewed the note above and agree.  The physical exam and or any procedures were performed by me and the pertinant details are outlined below.       Emmanuel Cantu MD  Department of Urology  [facility]    ______________________________________________________________________    HPI  Earle Rene is a 69 year old male who presents with a history of prostate cancer and being followed by AS.      Initial PSA at diagnosis: 8.6  1st Biopsy (Date 2017) Jameel Score was 3 + 3  In 1 of 6 cores.  Volume affected was 5%.  Grade Group: 1     2nd Biopsy:  "NA     MRI (Date 6/2017)  Lesion #1 location: 4:30 to 5:00 position of posterior lateral left peripheral zone near base  PIRADS Lesion #1 score 4  Lesion #2 location 7 oclock  PIRADS Lesion #2 score 2  Prostate volume 110g  Also PIRADS 3 at 6 oclock     PSA Density 0.08     digital rectal exam: deferred      There is no evidence of disease progression to date.     Today reports significant voiding symptoms including urgency, frequency, and dysuria. He is currently taking Flomax daily and Finasteride.    Review of Systems:   Pertinent items are noted in HPI or as below, remainder of complete ROS is negative.      Physical Exam:   /77   Pulse 81   Ht 1.702 m (5' 7\")   Wt 88.9 kg (196 lb)   BMI 30.70 kg/m     Body mass index is 30.7 kg/m .  General: Alert, no acute distress, oriented  HENT: Good dentition  Lungs: No respiratory distress, or pursed lip breathing  Heart: No obvious jugular venous distension present  Abdomen: Soft, nontender, no organomegaly or masses  Musculoskeletal: Extremities normal, no peripheral edema  Skin: No suspicious lesions or rashes  Neuro: Alert, oriented, speech and mentation normal  Psych: Affect and mood normal  Gait: Normal  : deferred    Laboratory:   I reviewed all applicable laboratory and pathology data and went over findings with patient  Significant for     Lab Results   Component Value Date    CR 1.31 05/28/2019    CR 1.39 04/10/2019    CR 1.20 03/13/2019    CR 1.36 02/06/2019    CR 1.31 08/01/2018    CR 1.28 05/01/2018    CR 1.33 03/06/2018    CR 1.20 11/01/2017    CR 1.12 06/24/2017    CR 1.26 06/06/2017     PSA   Date Value Ref Range Status   05/28/2019 3.61 0 - 4 ug/L Final     Comment:     Assay Method:  Chemiluminescence using Siemens Vista analyzer   05/24/2018 2.14 0 - 4 ug/L Final     Comment:     Assay Method:  Chemiluminescence using Siemens Vista analyzer   11/01/2017 2.52 0 - 4 ug/L Final     Comment:     Assay Method:  Chemiluminescence using Siemens " [place] analyzer   06/06/2017 8.64 (H) 0 - 4 ug/L Final     Comment:     Assay Method:  Chemiluminescence using Siemens Vista analyzer   01/14/2016 3.75 0 - 4 ug/L Final   09/09/2014 2.92 0 - 4 ug/L Final   06/12/2013 2.18 0 - 4 ug/L Final     Comment:     PSA results are about 7% lower than our prior method due to a methodology   change   on August 30, 2011.   02/02/2012 1.77 0 - 4 ug/L Final     Comment:     PSA results are about 7% lower than our prior method due to a methodology   change   on August 30, 2011.   03/08/2011 1.37 0 - 4 ug/L Final   08/10/2010 1.76 0 - 4 ug/L Final       Results for orders placed or performed in visit on 08/10/10   Routine UA with microscopic   Result Value Ref Range    Source Midstream Urine     Color Urine Yellow     Appearance Urine Clear     Glucose Urine 70 (A) NEG mg/dL    Bilirubin Urine Negative NEG    Ketones Urine Negative NEG mg/dL    Specific Gravity Urine 1.019 1.003 - 1.035    Blood Urine Negative NEG    pH Urine 5.5 5.0 - 7.0 pH    Protein Albumin Urine 10 (A) NEG mg/dL    Urobilinogen mg/dL Normal 0.0 - 2.0 mg/dL    Nitrite Urine Negative NEG    Leukocyte Esterase Urine Negative NEG    WBC Urine 1 0 - 2 /HPF    RBC Urine <1 0 - 2 /HPF    Hyaline Casts 3 (H) 0 - 2 /LPF    Mucous Urine Present (A) NEG /LPF     Imaging:   I personally reviewed all applicable imaging and went over the below findings with patient.    Results for orders placed or performed in visit on 02/06/19   XR Chest 2 Views    Narrative    Examination: XR CHEST 2 VW, 2/6/2019 1:22 PM    Comparison: 8/23/2017, 12/19/2015    History: Cough    Findings: The cardiomediastinal silhouette is within normal limits.  Hazy opacity in the left lateral lung base, probably in the lingula  based on the lateral radiograph. Lungs are otherwise clear. No pleural  effusion or pneumothorax.      Impression    Impression: Opacity in the left lung base, likely lingula, which may  be infectious.    JOSELIN JOSÉ MD      Scribe Disclosure:  I, Gio Chris, am serving as a scribe to document services personally performed by Emmanuel Cantu MD at this visit, based upon the provider's statements to me. All documentation has been reviewed by the aforementioned provider prior to being entered into the official medical record.     CC  Patient Care Team:  Marcio Hare MD as PCP - General (Internal Medicine)  Marcio Hare MD as Referring Physician (Internal Medicine)  Rafael Hand MD as MD (Cardiology)  Pamela Laura PA-C as Physician Assistant (Physician Assistant)  Marcio Hare MD as Referring Physician (Internal Medicine)  Emmanuel Cantu MD as MD (Urology)  Jackie Rodriguez MD as MD (Ophthalmology)  Juan Rehman MD as MD (Internal Medicine)  ESTABLISHED PATIENT    Copy to patient  CHRIS MONTOYA  [address] [place] [address]

## 2019-05-30 NOTE — NURSING NOTE
"Chief Complaint   Patient presents with     Follow Up     annual follow up CaP       Blood pressure 143/77, pulse 81, height 1.702 m (5' 7\"), weight 88.9 kg (196 lb). Body mass index is 30.7 kg/m .    Patient Active Problem List   Diagnosis     Psychological factors associated with another disorder     Mood disorder in conditions classified elsewhere     Occupational problem     Type 2 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, with long-term current use of insulin (H)     Erectile dysfunction     Hyperlipidemia with target LDL less than 100     CTS (carpal tunnel syndrome)     Diabetic polyneuropathy (H)     Presbyopia     Myopia     Cataracts, bilateral     Pain of right thumb       No Known Allergies    Current Outpatient Medications   Medication Sig Dispense Refill     albuterol (VENTOLIN HFA) 108 (90 Base) MCG/ACT inhaler Inhale 2 puffs into the lungs every 6 hours 18 g 3     alprostadil (EDEX) 20 MCG injection by Intracavitary route. Inject 3/4 ml (15 ug) as instructed.  Can increase to full dose of 1 ml (20 ug) if necessary.   Can dispense 6 kits. 6 each 12     ARTIFICIAL TEAR OP Apply to eye as needed       aspirin 81 MG tablet Take 1 tablet by mouth daily.       atorvastatin (LIPITOR) 20 MG tablet Take 1 tablet (20 mg) by mouth daily * LAB/LDL DUE 90 tablet 0     blood glucose (NO BRAND SPECIFIED) lancets standard Lancets that go with device, Test 3 times daily 300 each 3     blood glucose monitoring (NO BRAND SPECIFIED) meter device kit Any meter covered by insurance, not store brand, use as directed. 1 kit 0     blood glucose monitoring (NO BRAND SPECIFIED) test strip Strips that go with meter, covered by insurance. Test 3 times daily 300 strip 3     Blood Pressure Monitor KIT Automatic Blood Pressure Monitor 1 kit 0     Blood Pressure Monitor KIT Check blood pressure as directed. 1 kit 0     clobetasol (TEMOVATE) 0.05 % ointment Apply topically to " "legs twice daily for 2 weeks.  Then discontinue 30 g 0     clotrimazole (LOTRIMIN) 1 % cream Apply topically 2 times daily 30 g 3     fenofibrate 54 MG tablet Take 1 tablet (54 mg) by mouth daily 90 tablet 0     finasteride (PROSCAR) 5 MG tablet Take 1 tablet (5 mg) by mouth daily 90 tablet 1     gabapentin (NEURONTIN) 100 MG capsule Take 1 capsule (100 mg) by mouth 3 times daily 90 capsule 3     HUMALOG KWIKPEN 100 UNIT/ML soln Inject 10 units with breakfast, 8 units with lunch and 10 units with dinner, plus correction. Pt uses approx 60 units in 24 hrs. 60 mL 3     insulin glargine (LANTUS VIAL) 100 UNIT/ML vial Inject 60 Units Subcutaneous       insulin pen needle (B-D U/F) 31G X 5 MM Use 4 times per day.  Please dispense as BD Pen Needle Mini U/F 31G x 5  each 3     insulin syringe 31G X 5/16\" 0.5 ML MISC Use three syringes daily 270 each 1     loratadine (CLARITIN) 10 MG tablet Take 1 tablet (10 mg) by mouth daily 90 tablet 0     losartan (COZAAR) 25 MG tablet Take 1 tablet (25 mg) by mouth daily With a 50mg tablet for total dose of 75mg daily 30 tablet 11     losartan (COZAAR) 50 MG tablet Take 1 tablet (50 mg) by mouth daily With 25mg tablet for a total dose of 75mg daily 30 tablet 11     metFORMIN (GLUCOPHAGE) 1000 MG tablet 1 tab twice daily with meals 180 tablet 3     mupirocin (BACTROBAN) 2 % cream Apply  topically. In very small amounts only as needed 15 g 1     NOVOLOG FLEXPEN 100 UNIT/ML soln Inject 10 units with breakfast, 8 units with lunch and 10 units with dinner, plus correction. Pt uses approx 60 units in 24 hrs. 60 mL 3     Omega-3 Fatty Acids (OMEGA-3 FISH OIL) 1000 MG CAPS Take 1 capsule (1 g) by mouth 2 times daily 60 capsule 11     ONETOUCH ULTRA test strip Use to test blood sugar 3 times daily 300 strip 3     ranitidine (ZANTAC) 300 MG tablet Take 1 tablet (300 mg) by mouth At Bedtime 90 tablet 3     senna-docusate (SENOKOT-S/PERICOLACE) 8.6-50 MG tablet Take 1 tablet by mouth       " sodium bicarbonate 650 MG tablet Take 1 tablet (650 mg) by mouth 3 times daily 90 tablet 11     tamsulosin (FLOMAX) 0.4 MG capsule Take 1 capsule (0.4 mg) by mouth daily 90 capsule 1     TRESIBA FLEXTOUCH 100 UNIT/ML pen INJECT 58 UNITS DAILY. 15 mL 0     triamcinolone (KENALOG) 0.1 % cream Apply topically 3 times daily 80 g 0     vitamin D3 (CHOLECALCIFEROL) 1000 units (25 mcg) tablet Take 2 tablets (2,000 Units) by mouth daily 30 tablet 11       Social History     Tobacco Use     Smoking status: Never Smoker     Smokeless tobacco: Never Used   Substance Use Topics     Alcohol use: Yes     Comment: occasionaly     Drug use: No       Pooja Blount LPN  5/30/2019  4:31 PM

## 2019-05-31 DIAGNOSIS — Z79.4 TYPE 2 DIABETES MELLITUS WITH HYPERGLYCEMIA, WITH LONG-TERM CURRENT USE OF INSULIN (H): ICD-10-CM

## 2019-05-31 DIAGNOSIS — E11.65 TYPE 2 DIABETES MELLITUS WITH HYPERGLYCEMIA, WITH LONG-TERM CURRENT USE OF INSULIN (H): ICD-10-CM

## 2019-05-31 RX ORDER — INSULIN DEGLUDEC 100 U/ML
INJECTION, SOLUTION SUBCUTANEOUS
Qty: 17.4 ML | Refills: 0 | Status: SHIPPED | OUTPATIENT
Start: 2019-05-31 | End: 2019-06-27

## 2019-05-31 NOTE — TELEPHONE ENCOUNTER
TRESIBA FLEXTOUCH 100 UNIT/ML pen        Last Written Prescription Date:  05/02/19  Last Fill Quantity: 15mL,   # refills: 0  Last Office Visit : 12/21/18  Future Office visit:  None scheduled    Routing refill request to provider for review/approval because:  Insulin - refilled per clinic

## 2019-06-10 ENCOUNTER — CARE COORDINATION (OUTPATIENT)
Dept: NEPHROLOGY | Facility: CLINIC | Age: 69
End: 2019-06-10

## 2019-06-18 ENCOUNTER — ANCILLARY PROCEDURE (OUTPATIENT)
Dept: GENERAL RADIOLOGY | Facility: CLINIC | Age: 69
End: 2019-06-18
Attending: INTERNAL MEDICINE
Payer: COMMERCIAL

## 2019-06-18 ENCOUNTER — OFFICE VISIT (OUTPATIENT)
Dept: INTERNAL MEDICINE | Facility: CLINIC | Age: 69
End: 2019-06-18
Payer: COMMERCIAL

## 2019-06-18 VITALS
TEMPERATURE: 98.3 F | WEIGHT: 197.8 LBS | BODY MASS INDEX: 29.98 KG/M2 | HEART RATE: 76 BPM | HEIGHT: 68 IN | OXYGEN SATURATION: 97 % | SYSTOLIC BLOOD PRESSURE: 120 MMHG | DIASTOLIC BLOOD PRESSURE: 76 MMHG

## 2019-06-18 DIAGNOSIS — R09.89 OTHER SPECIFIED SYMPTOMS AND SIGNS INVOLVING THE CIRCULATORY AND RESPIRATORY SYSTEMS: ICD-10-CM

## 2019-06-18 DIAGNOSIS — Z12.83 SKIN CANCER SCREENING: Primary | ICD-10-CM

## 2019-06-18 DIAGNOSIS — I10 BENIGN ESSENTIAL HYPERTENSION: ICD-10-CM

## 2019-06-18 DIAGNOSIS — R73.09 INCREASED GLUCOSE LEVEL: ICD-10-CM

## 2019-06-18 DIAGNOSIS — M79.671 RIGHT FOOT PAIN: ICD-10-CM

## 2019-06-18 DIAGNOSIS — M25.551 HIP PAIN, RIGHT: ICD-10-CM

## 2019-06-18 DIAGNOSIS — D17.30 LIPOMA OF SKIN AND SUBCUTANEOUS TISSUE: ICD-10-CM

## 2019-06-18 DIAGNOSIS — Z12.83 SKIN CANCER SCREENING: ICD-10-CM

## 2019-06-18 LAB
ALBUMIN SERPL-MCNC: 3.8 G/DL (ref 3.4–5)
ALP SERPL-CCNC: 63 U/L (ref 40–150)
ALT SERPL W P-5'-P-CCNC: 38 U/L (ref 0–70)
ANION GAP SERPL CALCULATED.3IONS-SCNC: 8 MMOL/L (ref 3–14)
AST SERPL W P-5'-P-CCNC: 24 U/L (ref 0–45)
BASOPHILS # BLD AUTO: 0.1 10E9/L (ref 0–0.2)
BASOPHILS NFR BLD AUTO: 1.1 %
BILIRUB SERPL-MCNC: 0.6 MG/DL (ref 0.2–1.3)
BUN SERPL-MCNC: 27 MG/DL (ref 7–30)
CALCIUM SERPL-MCNC: 9.3 MG/DL (ref 8.5–10.1)
CHLORIDE SERPL-SCNC: 106 MMOL/L (ref 94–109)
CHOLEST SERPL-MCNC: 145 MG/DL
CO2 SERPL-SCNC: 22 MMOL/L (ref 20–32)
CREAT SERPL-MCNC: 1.41 MG/DL (ref 0.66–1.25)
CREAT UR-MCNC: 124 MG/DL
DIFFERENTIAL METHOD BLD: ABNORMAL
EOSINOPHIL # BLD AUTO: 0.3 10E9/L (ref 0–0.7)
EOSINOPHIL NFR BLD AUTO: 6.1 %
ERYTHROCYTE [DISTWIDTH] IN BLOOD BY AUTOMATED COUNT: 12.6 % (ref 10–15)
GFR SERPL CREATININE-BSD FRML MDRD: 50 ML/MIN/{1.73_M2}
GLUCOSE SERPL-MCNC: 195 MG/DL (ref 70–99)
HBA1C MFR BLD: 9.2 % (ref 0–5.6)
HCT VFR BLD AUTO: 38.7 % (ref 40–53)
HDLC SERPL-MCNC: 38 MG/DL
HGB BLD-MCNC: 13.5 G/DL (ref 13.3–17.7)
IMM GRANULOCYTES # BLD: 0 10E9/L (ref 0–0.4)
IMM GRANULOCYTES NFR BLD: 0.2 %
LDLC SERPL CALC-MCNC: 49 MG/DL
LYMPHOCYTES # BLD AUTO: 2.6 10E9/L (ref 0.8–5.3)
LYMPHOCYTES NFR BLD AUTO: 56.6 %
MCH RBC QN AUTO: 32.3 PG (ref 26.5–33)
MCHC RBC AUTO-ENTMCNC: 34.9 G/DL (ref 31.5–36.5)
MCV RBC AUTO: 93 FL (ref 78–100)
MICROALBUMIN UR-MCNC: 1710 MG/L
MICROALBUMIN/CREAT UR: 1379.03 MG/G CR (ref 0–17)
MONOCYTES # BLD AUTO: 0.4 10E9/L (ref 0–1.3)
MONOCYTES NFR BLD AUTO: 8.7 %
NEUTROPHILS # BLD AUTO: 1.3 10E9/L (ref 1.6–8.3)
NEUTROPHILS NFR BLD AUTO: 27.3 %
NONHDLC SERPL-MCNC: 107 MG/DL
NRBC # BLD AUTO: 0 10*3/UL
NRBC BLD AUTO-RTO: 0 /100
PLATELET # BLD AUTO: 130 10E9/L (ref 150–450)
POTASSIUM SERPL-SCNC: 4.3 MMOL/L (ref 3.4–5.3)
PROT SERPL-MCNC: 7 G/DL (ref 6.8–8.8)
RBC # BLD AUTO: 4.18 10E12/L (ref 4.4–5.9)
SODIUM SERPL-SCNC: 136 MMOL/L (ref 133–144)
TRIGL SERPL-MCNC: 289 MG/DL
WBC # BLD AUTO: 4.6 10E9/L (ref 4–11)

## 2019-06-18 ASSESSMENT — PAIN SCALES - GENERAL: PAINLEVEL: SEVERE PAIN (6)

## 2019-06-18 ASSESSMENT — MIFFLIN-ST. JEOR: SCORE: 1629.71

## 2019-06-18 NOTE — PROGRESS NOTES
Surgeon (please enter first and last name): Dr Lacey Eugene  Fax number for Preop Evaluation:   Location of Surgery: [facility] [place]  Date of Surgery: 0921*19  Procedure: cataract  History of reaction to anesthesia? NO      HPI:    Pt. Comes in for preoperative evaluation for cataract surgery. He states R eye with vision changes for several months. He denies bleeding or anesthesia issues. He does not smoke nor abuse EtOH. He denies rest/exertional CP/SOB.      Past Medical History:   Diagnosis Date     Blepharitis of both eyes      BPH (benign prostatic hyperplasia)      Diabetes (H)      Diabetic neuropathy (H)      Diabetic retinopathy associated with diabetes mellitus due to underlying condition (H)      Dry eye syndrome      Goiter      Granulomatous disease (H)      Nonsenile cataract      Peripheral neuropathy      Past Surgical History:   Procedure Laterality Date     AS RAD RESEC TONSIL/PILLARS Bilateral 1961     COLONOSCOPY  7/29/2013    Procedure: COLONOSCOPY;;  Surgeon: Montana Pascal MD;  [facility]     SURGICAL PATHOLOGY EXAM       PE:    Vitals noted gen nad cooperative alert, HEENT oropharynx clear no exudate, neck supple nl rom, no adenopathy, no B carotid bruits, horizontal surgical scar on posterior neck, LCTA, good air movement, RRR, S1, S2, No MRG, abdomen no acute findings. Grossly normal neurological exam. Approx. 4 x 5 cm non tender, no erythema soft tissue mass R upper back. B decreased distal LE pulses. B toe nail changes. Several skin changes on B LE extremities.     EKG; SR at 78; no acute findings and no significant change from 3/6/2018    Normal Lexiscan stress test 1/14/2016  Resting echo 1/14/2016 with Normal LVF 55-60%    A/P:    1. Low risk for planned surgery. He will hold his short acting insulin. He will take only 48 units of Lantus the night before surgery. He will hold his Metformin day of surgery. Blood glucose values should be followed carefully  perioperatively. He can take his other medications. He denies any cardiopulmonary complaints.  2. B LE complaints. Probable neuropathy and he may need change in Gabapentin dose and/or EMG. Will also get B LE arterial U/S for vascular evaluation  3. Refer to Dermatology for B LE skin changes  4. Refer to General surgery for probable R upper back lipoma  5. Checked A1c for DM2; he saw loretta Hickman last 12/21/2018  6. He saw Dr. Cantu Urology 5/30/2019 for elevated PSA  7. Colonoscopy 7/29/2013 and repeat in 10 years  8. He saw Dr. Cespedes, renal for CKD 3/13/2019  9. B hip X-rays  And refer to ortho for hip pain    Total time spent 40 minutes.  More than 50% of the time spent with Mr. Rene on counseling / coordinating his care

## 2019-06-18 NOTE — NURSING NOTE
Chief Complaint   Patient presents with     Pre-Op Exam     pt having cataract surgery on 06/21/19 by Dr Lacey Eugene at [facility] [place]       Cora Noonan CMA at 8:55 AM on 6/18/2019.

## 2019-06-18 NOTE — LETTER
6/18/2019      RE: Earle Rene  [address] [place] [address]       Surgeon (please enter first and last name): Dr Lacey Eugene  Fax number for Preop Evaluation:   Location of Surgery: [facility] [place]  Date of Surgery: 0921*19  Procedure: cataract  History of reaction to anesthesia? NO      HPI:    Pt. Comes in for preoperative evaluation for cataract surgery. He states R eye with vision changes for several months. He denies bleeding or anesthesia issues. He does not smoke nor abuse EtOH. He denies rest/exertional CP/SOB.      Past Medical History:   Diagnosis Date     Blepharitis of both eyes      BPH (benign prostatic hyperplasia)      Diabetes (H)      Diabetic neuropathy (H)      Diabetic retinopathy associated with diabetes mellitus due to underlying condition (H)      Dry eye syndrome      Goiter      Granulomatous disease (H)      Nonsenile cataract      Peripheral neuropathy      Past Surgical History:   Procedure Laterality Date     AS RAD RESEC TONSIL/PILLARS Bilateral 1961     COLONOSCOPY  7/29/2013    Procedure: COLONOSCOPY;;  Surgeon: Montana Pascal MD;  Location: [facility]  11/2017     SURGICAL PATHOLOGY EXAM       PE:    Vitals noted gen nad cooperative alert, HEENT oropharynx clear no exudate, neck supple nl rom, no adenopathy, no B carotid bruits, horizontal surgical scar on posterior neck, LCTA, good air movement, RRR, S1, S2, No MRG, abdomen no acute findings. Grossly normal neurological exam. Approx. 4 x 5 cm non tender, no erythema soft tissue mass R upper back. B decreased distal LE pulses. B toe nail changes. Several skin changes on B LE extremities.     EKG; SR at 78; no acute findings and no significant change from 3/6/2018    Normal Lexiscan stress test 1/14/2016  Resting echo 1/14/2016 with Normal LVF 55-60%    A/P:    1. Low risk for planned surgery. He will hold his short acting insulin. He will take only 48 units of Lantus the night before surgery. He will hold his  Metformin day of surgery. Blood glucose values should be followed carefully perioperatively. He can take his other medications. He denies any cardiopulmonary complaints.  2. B LE complaints. Probable neuropathy and he may need change in Gabapentin dose and/or EMG. Will also get B LE arterial U/S for vascular evaluation  3. Refer to Dermatology for B LE skin changes  4. Refer to General surgery for probable R upper back lipoma  5. Checked A1c for DM2; he saw loretta Hickman last 12/21/2018  6. He saw Dr. Cantu Urology 5/30/2019 for elevated PSA  7. Colonoscopy 7/29/2013 and repeat in 10 years  8. He saw Dr. Cespedes renal for CKD 3/13/2019  9. B hip X-rays  And refer to ortho for hip pain    Total time spent 40 minutes.  More than 50% of the time spent with Mr. Rene on counseling / coordinating his care          Marcio Hare MD

## 2019-06-18 NOTE — LETTER
6/18/2019      RE: Earle Rene  [address] [place] [address]       Surgeon (please enter first and last name): Dr Lacey Eugene  Fax number for Preop Evaluation:   [place] [facility] [place]  Date of Surgery: 0921*19  Procedure: cataract  History of reaction to anesthesia? NO      HPI:    Pt. Comes in for preoperative evaluation for cataract surgery. He states R eye with vision changes for several months. He denies bleeding or anesthesia issues. He does not smoke nor abuse EtOH. He denies rest/exertional CP/SOB.      Past Medical History:   Diagnosis Date     Blepharitis of both eyes      BPH (benign prostatic hyperplasia)      Diabetes (H)      Diabetic neuropathy (H)      Diabetic retinopathy associated with diabetes mellitus due to underlying condition (H)      Dry eye syndrome      Goiter      Granulomatous disease (H)      Nonsenile cataract      Peripheral neuropathy      Past Surgical History:   Procedure Laterality Date     AS RAD RESEC TONSIL/PILLARS Bilateral 1961     COLONOSCOPY  7/29/2013    Procedure: COLONOSCOPY;;  Surgeon: Montana Pascal MD;  Location: [facility]  11/2017     SURGICAL PATHOLOGY EXAM       PE:    Vitals noted gen nad cooperative alert, HEENT oropharynx clear no exudate, neck supple nl rom, no adenopathy, no B carotid bruits, horizontal surgical scar on posterior neck, LCTA, good air movement, RRR, S1, S2, No MRG, abdomen no acute findings. Grossly normal neurological exam. Approx. 4 x 5 cm non tender, no erythema soft tissue mass R upper back. B decreased distal LE pulses. B toe nail changes. Several skin changes on B LE extremities.     EKG; SR at 78; no acute findings and no significant change from 3/6/2018    Normal Lexiscan stress test 1/14/2016  Resting echo 1/14/2016 with Normal LVF 55-60%    A/P:    1. Low risk for planned surgery. He will hold his short acting insulin. He will take only 48 units of Lantus the night before surgery. He will hold his  Metformin day of surgery. Blood glucose values should be followed carefully perioperatively. He can take his other medications. He denies any cardiopulmonary complaints.  2. B LE complaints. Probable neuropathy and he may need change in Gabapentin dose and/or EMG. Will also get B LE arterial U/S for vascular evaluation  3. Refer to Dermatology for B LE skin changes  4. Refer to General surgery for probable R upper back lipoma  5. Checked A1c for DM2; he saw loretta Hickman last 12/21/2018  6. He saw Dr. Cantu Urology 5/30/2019 for elevated PSA  7. Colonoscopy 7/29/2013 and repeat in 10 years  8. He saw Dr. Cespedes renal for CKD 3/13/2019  9. B hip X-rays  And refer to ortho for hip pain    Total time spent 40 minutes.  More than 50% of the time spent with Mr. Rene on counseling / coordinating his care          Marcio Hare MD

## 2019-06-18 NOTE — PATIENT INSTRUCTIONS
[facility] Medication Refill Request Information:  * Please contact your pharmacy regarding ANY request for medication refills.  ** [facility] Prescription Fax = [phone]  * Please allow 3 business days for routine medication refills.  * Please allow 5 business days for controlled substance medication refills.     [facility] Test Result notification information:  *You will be notified with in 7-10 days of your appointment day regarding the results of your test.  If you are on MyChart you will be notified as soon as the provider has reviewed the results and signed off on them.    [facility]: [phone]

## 2019-06-19 ENCOUNTER — ANCILLARY PROCEDURE (OUTPATIENT)
Dept: ULTRASOUND IMAGING | Facility: CLINIC | Age: 69
End: 2019-06-19
Attending: INTERNAL MEDICINE
Payer: COMMERCIAL

## 2019-06-19 ENCOUNTER — OFFICE VISIT (OUTPATIENT)
Dept: ENDOCRINOLOGY | Facility: CLINIC | Age: 69
End: 2019-06-19
Payer: COMMERCIAL

## 2019-06-19 VITALS
DIASTOLIC BLOOD PRESSURE: 72 MMHG | SYSTOLIC BLOOD PRESSURE: 126 MMHG | HEART RATE: 80 BPM | HEIGHT: 67 IN | WEIGHT: 199.8 LBS | BODY MASS INDEX: 31.36 KG/M2

## 2019-06-19 DIAGNOSIS — R09.89 OTHER SPECIFIED SYMPTOMS AND SIGNS INVOLVING THE CIRCULATORY AND RESPIRATORY SYSTEMS: ICD-10-CM

## 2019-06-19 DIAGNOSIS — E11.65 TYPE 2 DIABETES MELLITUS WITH HYPERGLYCEMIA, WITH LONG-TERM CURRENT USE OF INSULIN (H): Primary | ICD-10-CM

## 2019-06-19 DIAGNOSIS — I10 BENIGN ESSENTIAL HYPERTENSION: ICD-10-CM

## 2019-06-19 DIAGNOSIS — M25.551 HIP PAIN, RIGHT: ICD-10-CM

## 2019-06-19 DIAGNOSIS — D17.30 LIPOMA OF SKIN AND SUBCUTANEOUS TISSUE: ICD-10-CM

## 2019-06-19 DIAGNOSIS — R73.09 INCREASED GLUCOSE LEVEL: ICD-10-CM

## 2019-06-19 DIAGNOSIS — Z12.83 SKIN CANCER SCREENING: ICD-10-CM

## 2019-06-19 DIAGNOSIS — Z79.4 TYPE 2 DIABETES MELLITUS WITH HYPERGLYCEMIA, WITH LONG-TERM CURRENT USE OF INSULIN (H): Primary | ICD-10-CM

## 2019-06-19 DIAGNOSIS — M79.671 RIGHT FOOT PAIN: ICD-10-CM

## 2019-06-19 DIAGNOSIS — H25.12 AGE-RELATED NUCLEAR CATARACT, LEFT: Primary | ICD-10-CM

## 2019-06-19 LAB — INTERPRETATION ECG - MUSE: NORMAL

## 2019-06-19 RX ORDER — OFLOXACIN 3 MG/ML
SOLUTION/ DROPS OPHTHALMIC
Qty: 1 BOTTLE | Refills: 0 | Status: SHIPPED | OUTPATIENT
Start: 2019-06-19 | End: 2019-12-24

## 2019-06-19 RX ORDER — FLASH GLUCOSE SENSOR
1 KIT MISCELLANEOUS DAILY
Qty: 1 DEVICE | Refills: 0 | Status: SHIPPED | OUTPATIENT
Start: 2019-06-19 | End: 2020-03-24

## 2019-06-19 RX ORDER — PREDNISOLONE ACETATE 10 MG/ML
SUSPENSION/ DROPS OPHTHALMIC
Qty: 1 BOTTLE | Refills: 1 | Status: SHIPPED | OUTPATIENT
Start: 2019-06-19 | End: 2019-12-24

## 2019-06-19 RX ORDER — FLASH GLUCOSE SENSOR
1 KIT MISCELLANEOUS
Qty: 8 EACH | Refills: 3 | Status: SHIPPED | OUTPATIENT
Start: 2019-06-19 | End: 2020-03-24

## 2019-06-19 RX ORDER — KETOROLAC TROMETHAMINE 5 MG/ML
SOLUTION OPHTHALMIC
Qty: 1 BOTTLE | Refills: 1 | Status: SHIPPED | OUTPATIENT
Start: 2019-06-19 | End: 2019-12-24

## 2019-06-19 ASSESSMENT — MIFFLIN-ST. JEOR: SCORE: 1624.92

## 2019-06-19 ASSESSMENT — PAIN SCALES - GENERAL: PAINLEVEL: NO PAIN (0)

## 2019-06-19 NOTE — PATIENT INSTRUCTIONS
Start using the Freestyle Lora device and sensor to check your blood sugar before breakfast, before lunch, before dinner and at bedtime.  Take your Novolog 10 units BEFORE meals.  See me in 3 months.  Pamela Laura PA-C

## 2019-06-19 NOTE — PROGRESS NOTES
HPI   Earle Rene is a 69 year old male with type 2 diabetes mellitus here today for a follow up visit.      He was last seen in our clinic in Dec 2018.  Pt's diabetes is complicated by retinopathy, nephropathy, neuropathy and ED.  Pt also has hx of hyperlipidemia and HTN.  For his diabetes, he is prescribed to take Tresiba 58 units SQ at hs, Novolog 10-12 units with meals and Metformin 1000 mg BID. He has no interest in carb counting or using an I/C ratio.  Pt states he has been missing his Novolog injection at noon and he has been taking Novolog after his meals.  Pt's A1C was 9.2 % on 6/18/2019.  His previous A1C values were 8.4 % and  7.4 %.  Earle has no glucose meter with him today and has no blood sugar values for me to review.  On ROS today, fatigue and blurred vision.  Pt has numbness in both feet from his neuropathy.  He reports chronic cough.  Pt denies frequent headaches, n/v, SOB at rest, chest pain, abd pain, diarrhea, dysuria or hematuria.  No foot ulcers .    ROS   Please see under HPI.     ALLERGIES:  Review of patient's allergies indicates no known allergies.    Current Outpatient Medications   Medication Sig Dispense Refill     Continuous Blood Gluc  (FREESTYLE PETER READER) JUANCARLOS 1 Device daily 1 Device 0     Continuous Blood Gluc Sensor (FREESTYLE PETRE 14 DAY SENSOR) MISC 1 applicator every 14 days 8 each 3     albuterol (VENTOLIN HFA) 108 (90 Base) MCG/ACT inhaler Inhale 2 puffs into the lungs every 6 hours 18 g 3     alprostadil (EDEX) 20 MCG injection by Intracavitary route. Inject 3/4 ml (15 ug) as instructed.  Can increase to full dose of 1 ml (20 ug) if necessary.   Can dispense 6 kits. 6 each 12     ARTIFICIAL TEAR OP Apply to eye as needed       aspirin 81 MG tablet Take 1 tablet by mouth daily.       atorvastatin (LIPITOR) 20 MG tablet Take 1 tablet (20 mg) by mouth daily * LAB/LDL DUE 90 tablet 0     blood glucose (NO BRAND SPECIFIED) lancets standard Lancets that go with  "device, Test 3 times daily 300 each 3     blood glucose monitoring (NO BRAND SPECIFIED) meter device kit Any meter covered by insurance, not store brand, use as directed. 1 kit 0     blood glucose monitoring (NO BRAND SPECIFIED) test strip Strips that go with meter, covered by insurance. Test 3 times daily 300 strip 3     Blood Pressure Monitor KIT Automatic Blood Pressure Monitor 1 kit 0     Blood Pressure Monitor KIT Check blood pressure as directed. 1 kit 0     clobetasol (TEMOVATE) 0.05 % ointment Apply topically to legs twice daily for 2 weeks.  Then discontinue 30 g 0     clotrimazole (LOTRIMIN) 1 % cream Apply topically 2 times daily 30 g 3     fenofibrate 54 MG tablet Take 1 tablet (54 mg) by mouth daily 90 tablet 0     finasteride (PROSCAR) 5 MG tablet Take 1 tablet (5 mg) by mouth daily 90 tablet 1     gabapentin (NEURONTIN) 100 MG capsule Take 1 capsule (100 mg) by mouth 3 times daily 90 capsule 3     HUMALOG KWIKPEN 100 UNIT/ML soln Inject 10 units with breakfast, 8 units with lunch and 10 units with dinner, plus correction. Pt uses approx 60 units in 24 hrs. 60 mL 3     insulin glargine (LANTUS VIAL) 100 UNIT/ML vial Inject 60 Units Subcutaneous       insulin pen needle (B-D U/F) 31G X 5 MM Use 4 times per day.  Please dispense as BD Pen Needle Mini U/F 31G x 5  each 3     insulin syringe 31G X 5/16\" 0.5 ML MISC Use three syringes daily 270 each 1     loratadine (CLARITIN) 10 MG tablet Take 1 tablet (10 mg) by mouth daily 90 tablet 0     losartan (COZAAR) 25 MG tablet Take 1 tablet (25 mg) by mouth daily With a 50mg tablet for total dose of 75mg daily 30 tablet 11     losartan (COZAAR) 50 MG tablet Take 1 tablet (50 mg) by mouth daily With 25mg tablet for a total dose of 75mg daily 30 tablet 11     metFORMIN (GLUCOPHAGE) 1000 MG tablet 1 tab twice daily with meals 180 tablet 3     mupirocin (BACTROBAN) 2 % cream Apply  topically. In very small amounts only as needed 15 g 1     NOVOLOG FLEXPEN 100 " UNIT/ML soln Inject 10 units with breakfast, 8 units with lunch and 10 units with dinner, plus correction. Pt uses approx 60 units in 24 hrs. 60 mL 3     Omega-3 Fatty Acids (OMEGA-3 FISH OIL) 1000 MG CAPS Take 1 capsule (1 g) by mouth 2 times daily 60 capsule 11     ONETOUCH ULTRA test strip Use to test blood sugar 3 times daily 300 strip 3     ranitidine (ZANTAC) 300 MG tablet Take 1 tablet (300 mg) by mouth At Bedtime 90 tablet 3     senna-docusate (SENOKOT-S/PERICOLACE) 8.6-50 MG tablet Take 1 tablet by mouth       sodium bicarbonate 650 MG tablet Take 1 tablet (650 mg) by mouth 3 times daily 90 tablet 11     tamsulosin (FLOMAX) 0.4 MG capsule Take 1 capsule (0.4 mg) by mouth daily 90 capsule 1     TRESIBA FLEXTOUCH 100 UNIT/ML pen INJECT 58 UNITS UNDER SKIN DAILY. 17.4 mL 0     triamcinolone (KENALOG) 0.1 % cream Apply topically 3 times daily 80 g 0     vitamin D3 (CHOLECALCIFEROL) 1000 units (25 mcg) tablet Take 2 tablets (2,000 Units) by mouth daily 30 tablet 11     Family Hx   No change.     Personal Hx   Smoke: none.   ETOH: none.    with grown children.   He owns his own business.    PMH   1. Type 2 Diabetes Mellitus dx at age 43.   2. Neuropathy.  3. Nephropathy.   4. ED.   5. Dyslipidemia.   6. Nephrolithiasis.   7. Decrease auditory acuity.   8. Sarcoidosis-lung.   9. Goiter.   10. S/P T & A.   11. S/P FX right heel.   12. Vit D def.   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   14. CT chest- ? granulomas.   15. Retinopathy.  16. Goiter.  Past Medical History:   Diagnosis Date     Blepharitis of both eyes      BPH (benign prostatic hyperplasia)      Diabetes (H)      Diabetic neuropathy (H)      Diabetic retinopathy associated with diabetes mellitus due to underlying condition (H)      Dry eye syndrome      Goiter      Granulomatous disease (H)      Nonsenile cataract      Peripheral neuropathy      Past Surgical History:   Procedure Laterality Date     AS RAD RESEC TONSIL/PILLARS Bilateral 1961      "COLONOSCOPY  7/29/2013    Procedure: COLONOSCOPY;;  Surgeon: Montana Pascal MD;  Location: [facility]  11/2017     SURGICAL PATHOLOGY EXAM       Physical Exam   General appearance: Vital signs:   /72   Pulse 80   Ht 1.702 m (5' 7\")   Wt 90.6 kg (199 lb 12.8 oz)   BMI 31.29 kg/m    Estimated body mass index is 31.29 kg/m  as calculated from the following:    Height as of this encounter: 1.702 m (5' 7\").    Weight as of this encounter: 90.6 kg (199 lb 12.8 oz).  Head:  Normal.   Eyes: Decrease visual acuity; fundi not visualized.   Lungs: clear bilateral.  Cardiovascular system:  RRR.   Abdomen:  Large and nontender.  Musculoskeletal system: no edema.   Neurological:  normal.   Skin:  necrobiosis lipoidica on both legs.   FEET: no ulcers. Nails are thick.  Abnormal monofilamentous exam.    Results   Creatinine   Date Value Ref Range Status   06/18/2019 1.41 (H) 0.66 - 1.25 mg/dL Final     GFR Estimate   Date Value Ref Range Status   06/18/2019 50 (L) >60 mL/min/[1.73_m2] Final     Comment:     Non  GFR Calc  Starting 12/18/2018, serum creatinine based estimated GFR (eGFR) will be   calculated using the Chronic Kidney Disease Epidemiology Collaboration   (CKD-EPI) equation.       Hemoglobin A1C   Date Value Ref Range Status   06/18/2019 9.2 (H) 0 - 5.6 % Final     Comment:     Normal <5.7% Prediabetes 5.7-6.4%  Diabetes 6.5% or higher - adopted from ADA   consensus guidelines.       Potassium   Date Value Ref Range Status   06/18/2019 4.3 3.4 - 5.3 mmol/L Final     ALT   Date Value Ref Range Status   06/18/2019 38 0 - 70 U/L Final     AST   Date Value Ref Range Status   06/18/2019 24 0 - 45 U/L Final     TSH   Date Value Ref Range Status   11/01/2017 1.12 0.40 - 4.00 mU/L Final     T4 Free   Date Value Ref Range Status   10/21/2014 1.00 0.76 - 1.46 ng/dL Final     Comment:     Effective 7/30/2014, the reference range for this assay has changed to reflect   new " instrumentation/methodology.           Cholesterol   Date Value Ref Range Status   06/18/2019 145 <200 mg/dL Final   03/06/2018 101 <200 mg/dL Final     HDL Cholesterol   Date Value Ref Range Status   06/18/2019 38 (L) >39 mg/dL Final   03/06/2018 32 (L) >39 mg/dL Final     LDL Cholesterol Calculated   Date Value Ref Range Status   06/18/2019 49 <100 mg/dL Final     Comment:     Desirable:       <100 mg/dl   03/06/2018 36 <100 mg/dL Final     Comment:     Desirable:       <100 mg/dl     Triglycerides   Date Value Ref Range Status   06/18/2019 289 (H) <150 mg/dL Final     Comment:     Borderline high:  150-199 mg/dl  High:             200-499 mg/dl  Very high:       >499 mg/dl     03/06/2018 166 (H) <150 mg/dL Final     Comment:     Borderline high:  150-199 mg/dl  High:             200-499 mg/dl  Very high:       >499 mg/dl       Cholesterol/HDL Ratio   Date Value Ref Range Status   09/09/2014 3.8 0.0 - 5.0 Final   11/20/2013 5.8 (H) 0.0 - 5.0 Final     A1C      9.2   6/18/2019  A1C      8.4   12/21/2018  A1C      7.4   9/7/2018  A1C      7.1   5/31/2018  A1C      9.1   3/6/2018  A1C      9.6   11/1/2017  A1C      8.9   8/9/2017  A1C      9.7   9/22/2016  A1C      9.7   7/21/2015  A1C     10.2  12/2/2014  A1C     10.2  9/9/2014  A1C      9.8  11/20/2013  A1C      9.3   6/12/2013  A1C      8.0   8/14/2012  A1C      8.2   5/22/2012  A1C      8.0   5/22/2012    ASSESSMENT/PLAN:     1. TYPE 2 DIABETES MELLITUS: Uncontrolled type 2 diabetes mellitus complicated by retinopathy, nephropathy,neuropathy and ED.  Pt's A1C is higher at 9.2 %.  He has been missing Novolog doses at noon and taking his Novolog AFTER eating.  I instructed pt to take his insulin as prescribed including at noon and to take his Novolog injection 10-15 minutes prior to eating.  No change in insulin doses today.  He is to remain on Metformin 1000 mg BID.  Pt's most recent creat was 1.41 with GFR 50 mL/min on 6/18/2019.  I asked him to check his blood  sugar fasting each am, prelunch and predinner DAILY.  I did place an order for a Freestyle Lora device/sensor.  Encouraged patient to make healthy food choices, reduce his food portions with meals, avoid snacking and walk daily and to take his insulin and medications as prescribed.  Pt remains on daily ASA.     2. GOITER: Nontender goiter.  TSH normal in Nov 2017.    3. NEPHROPATHY: Discussed the need for good glycemic control and good BP control.   Pt's creat 1.41 with GFR 50 mL/min on 6/18/2019..  Pt's urine protein +.  He is taking Cozaar.  He is seen here by Nephrology.    4.  RETINOPATHY: Pt seen by [facility] here on a regular basis and was seen in May 2019.    5.  NEUROPATHY: Continue Gabapentin.  No foot ulcers.    6. DYSLIPIDEMIA: LDL 36 on 3/6/2018.   Pt is taking Lipitor and Fenofibrate.    7. OBESITY: See under # 1 above.  He does not want to use a GLP-1.    8.   Return to Endocrine Clinic to see me in 3 months.

## 2019-06-19 NOTE — LETTER
6/19/2019       RE: Earle Rene  [address]  [place] [address]     Dear Colleague,    Thank you for referring your patient, Earle Rene, to the [facility] ENDOCRINOLOGY at [facility]. Please see a copy of my visit note below.    HPI   Earle Rene is a 69 year old male with type 2 diabetes mellitus here today for a follow up visit.      He was last seen in our clinic in Dec 2018.  Pt's diabetes is complicated by retinopathy, nephropathy, neuropathy and ED.  Pt also has hx of hyperlipidemia and HTN.  For his diabetes, he is prescribed to take Tresiba 58 units SQ at hs, Novolog 10-12 units with meals and Metformin 1000 mg BID. He has no interest in carb counting or using an I/C ratio.  Pt states he has been missing his Novolog injection at noon and he has been taking Novolog after his meals.  Pt's A1C was 9.2 % on 6/18/2019.  His previous A1C values were 8.4 % and  7.4 %.  Earle has no glucose meter with him today and has no blood sugar values for me to review.  On ROS today, fatigue and blurred vision.  Pt has numbness in both feet from his neuropathy.  He reports chronic cough.  Pt denies frequent headaches, n/v, SOB at rest, chest pain, abd pain, diarrhea, dysuria or hematuria.  No foot ulcers .    ROS   Please see under HPI.     ALLERGIES:  Review of patient's allergies indicates no known allergies.    Current Outpatient Medications   Medication Sig Dispense Refill     Continuous Blood Gluc  (FREESTYLE PETER READER) JUANCARLOS 1 Device daily 1 Device 0     Continuous Blood Gluc Sensor (FREESTYLE PETER 14 DAY SENSOR) MISC 1 applicator every 14 days 8 each 3     albuterol (VENTOLIN HFA) 108 (90 Base) MCG/ACT inhaler Inhale 2 puffs into the lungs every 6 hours 18 g 3     alprostadil (EDEX) 20 MCG injection by Intracavitary route. Inject 3/4 ml (15 ug) as instructed.  Can increase to full dose of 1 ml (20 ug) if necessary.   Can dispense 6 kits. 6 each 12      "ARTIFICIAL TEAR OP Apply to eye as needed       aspirin 81 MG tablet Take 1 tablet by mouth daily.       atorvastatin (LIPITOR) 20 MG tablet Take 1 tablet (20 mg) by mouth daily * LAB/LDL DUE 90 tablet 0     blood glucose (NO BRAND SPECIFIED) lancets standard Lancets that go with device, Test 3 times daily 300 each 3     blood glucose monitoring (NO BRAND SPECIFIED) meter device kit Any meter covered by insurance, not store brand, use as directed. 1 kit 0     blood glucose monitoring (NO BRAND SPECIFIED) test strip Strips that go with meter, covered by insurance. Test 3 times daily 300 strip 3     Blood Pressure Monitor KIT Automatic Blood Pressure Monitor 1 kit 0     Blood Pressure Monitor KIT Check blood pressure as directed. 1 kit 0     clobetasol (TEMOVATE) 0.05 % ointment Apply topically to legs twice daily for 2 weeks.  Then discontinue 30 g 0     clotrimazole (LOTRIMIN) 1 % cream Apply topically 2 times daily 30 g 3     fenofibrate 54 MG tablet Take 1 tablet (54 mg) by mouth daily 90 tablet 0     finasteride (PROSCAR) 5 MG tablet Take 1 tablet (5 mg) by mouth daily 90 tablet 1     gabapentin (NEURONTIN) 100 MG capsule Take 1 capsule (100 mg) by mouth 3 times daily 90 capsule 3     HUMALOG KWIKPEN 100 UNIT/ML soln Inject 10 units with breakfast, 8 units with lunch and 10 units with dinner, plus correction. Pt uses approx 60 units in 24 hrs. 60 mL 3     insulin glargine (LANTUS VIAL) 100 UNIT/ML vial Inject 60 Units Subcutaneous       insulin pen needle (B-D U/F) 31G X 5 MM Use 4 times per day.  Please dispense as BD Pen Needle Mini U/F 31G x 5  each 3     insulin syringe 31G X 5/16\" 0.5 ML MISC Use three syringes daily 270 each 1     loratadine (CLARITIN) 10 MG tablet Take 1 tablet (10 mg) by mouth daily 90 tablet 0     losartan (COZAAR) 25 MG tablet Take 1 tablet (25 mg) by mouth daily With a 50mg tablet for total dose of 75mg daily 30 tablet 11     losartan (COZAAR) 50 MG tablet Take 1 tablet (50 mg) " by mouth daily With 25mg tablet for a total dose of 75mg daily 30 tablet 11     metFORMIN (GLUCOPHAGE) 1000 MG tablet 1 tab twice daily with meals 180 tablet 3     mupirocin (BACTROBAN) 2 % cream Apply  topically. In very small amounts only as needed 15 g 1     NOVOLOG FLEXPEN 100 UNIT/ML soln Inject 10 units with breakfast, 8 units with lunch and 10 units with dinner, plus correction. Pt uses approx 60 units in 24 hrs. 60 mL 3     Omega-3 Fatty Acids (OMEGA-3 FISH OIL) 1000 MG CAPS Take 1 capsule (1 g) by mouth 2 times daily 60 capsule 11     ONETOUCH ULTRA test strip Use to test blood sugar 3 times daily 300 strip 3     ranitidine (ZANTAC) 300 MG tablet Take 1 tablet (300 mg) by mouth At Bedtime 90 tablet 3     senna-docusate (SENOKOT-S/PERICOLACE) 8.6-50 MG tablet Take 1 tablet by mouth       sodium bicarbonate 650 MG tablet Take 1 tablet (650 mg) by mouth 3 times daily 90 tablet 11     tamsulosin (FLOMAX) 0.4 MG capsule Take 1 capsule (0.4 mg) by mouth daily 90 capsule 1     TRESIBA FLEXTOUCH 100 UNIT/ML pen INJECT 58 UNITS UNDER SKIN DAILY. 17.4 mL 0     triamcinolone (KENALOG) 0.1 % cream Apply topically 3 times daily 80 g 0     vitamin D3 (CHOLECALCIFEROL) 1000 units (25 mcg) tablet Take 2 tablets (2,000 Units) by mouth daily 30 tablet 11     Family Hx   No change.     Personal Hx   Smoke: none.   ETOH: none.    with grown children.   He owns his own business.    PMH   1. Type 2 Diabetes Mellitus dx at age 43.   2. Neuropathy.  3. Nephropathy.   4. ED.   5. Dyslipidemia.   6. Nephrolithiasis.   7. Decrease auditory acuity.   8. Sarcoidosis-lung.   9. Goiter.   10. S/P T & A.   11. S/P FX right heel.   12. Vit D def.   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   14. CT chest- ? granulomas.   15. Retinopathy.  16. Goiter.  Past Medical History:   Diagnosis Date     Blepharitis of both eyes      BPH (benign prostatic hyperplasia)      Diabetes (H)      Diabetic neuropathy (H)      Diabetic retinopathy  "associated with diabetes mellitus due to underlying condition (H)      Dry eye syndrome      Goiter      Granulomatous disease (H)      Nonsenile cataract      Peripheral neuropathy      Past Surgical History:   Procedure Laterality Date     AS RAD RESEC TONSIL/PILLARS Bilateral 1961     COLONOSCOPY  7/29/2013    Procedure: COLONOSCOPY;;  Surgeon: Montana Pascal MD;  Location: [facility]  11/2017     SURGICAL PATHOLOGY EXAM       Physical Exam   General appearance: Vital signs:   /72   Pulse 80   Ht 1.702 m (5' 7\")   Wt 90.6 kg (199 lb 12.8 oz)   BMI 31.29 kg/m     Estimated body mass index is 31.29 kg/m  as calculated from the following:    Height as of this encounter: 1.702 m (5' 7\").    Weight as of this encounter: 90.6 kg (199 lb 12.8 oz).  Head:  Normal.   Eyes: Decrease visual acuity; fundi not visualized.   Lungs: clear bilateral.  Cardiovascular system:  RRR.   Abdomen:  Large and nontender.  Musculoskeletal system: no edema.   Neurological:  normal.   Skin:  necrobiosis lipoidica on both legs.   FEET: no ulcers. Nails are thick.  Abnormal monofilamentous exam.    Results   Creatinine   Date Value Ref Range Status   06/18/2019 1.41 (H) 0.66 - 1.25 mg/dL Final     GFR Estimate   Date Value Ref Range Status   06/18/2019 50 (L) >60 mL/min/[1.73_m2] Final     Comment:     Non  GFR Calc  Starting 12/18/2018, serum creatinine based estimated GFR (eGFR) will be   calculated using the Chronic Kidney Disease Epidemiology Collaboration   (CKD-EPI) equation.       Hemoglobin A1C   Date Value Ref Range Status   06/18/2019 9.2 (H) 0 - 5.6 % Final     Comment:     Normal <5.7% Prediabetes 5.7-6.4%  Diabetes 6.5% or higher - adopted from ADA   consensus guidelines.       Potassium   Date Value Ref Range Status   06/18/2019 4.3 3.4 - 5.3 mmol/L Final     ALT   Date Value Ref Range Status   06/18/2019 38 0 - 70 U/L Final     AST   Date Value Ref Range Status   06/18/2019 24 0 - 45 U/L " Final     TSH   Date Value Ref Range Status   11/01/2017 1.12 0.40 - 4.00 mU/L Final     T4 Free   Date Value Ref Range Status   10/21/2014 1.00 0.76 - 1.46 ng/dL Final     Comment:     Effective 7/30/2014, the reference range for this assay has changed to reflect   new instrumentation/methodology.           Cholesterol   Date Value Ref Range Status   06/18/2019 145 <200 mg/dL Final   03/06/2018 101 <200 mg/dL Final     HDL Cholesterol   Date Value Ref Range Status   06/18/2019 38 (L) >39 mg/dL Final   03/06/2018 32 (L) >39 mg/dL Final     LDL Cholesterol Calculated   Date Value Ref Range Status   06/18/2019 49 <100 mg/dL Final     Comment:     Desirable:       <100 mg/dl   03/06/2018 36 <100 mg/dL Final     Comment:     Desirable:       <100 mg/dl     Triglycerides   Date Value Ref Range Status   06/18/2019 289 (H) <150 mg/dL Final     Comment:     Borderline high:  150-199 mg/dl  High:             200-499 mg/dl  Very high:       >499 mg/dl     03/06/2018 166 (H) <150 mg/dL Final     Comment:     Borderline high:  150-199 mg/dl  High:             200-499 mg/dl  Very high:       >499 mg/dl       Cholesterol/HDL Ratio   Date Value Ref Range Status   09/09/2014 3.8 0.0 - 5.0 Final   11/20/2013 5.8 (H) 0.0 - 5.0 Final     A1C      9.2   6/18/2019  A1C      8.4   12/21/2018  A1C      7.4   9/7/2018  A1C      7.1   5/31/2018  A1C      9.1   3/6/2018  A1C      9.6   11/1/2017  A1C      8.9   8/9/2017  A1C      9.7   9/22/2016  A1C      9.7   7/21/2015  A1C     10.2  12/2/2014  A1C     10.2  9/9/2014  A1C      9.8  11/20/2013  A1C      9.3   6/12/2013  A1C      8.0   8/14/2012  A1C      8.2   5/22/2012  A1C      8.0   5/22/2012    ASSESSMENT/PLAN:     1. TYPE 2 DIABETES MELLITUS: Uncontrolled type 2 diabetes mellitus complicated by retinopathy, nephropathy,neuropathy and ED.  Pt's A1C is higher at 9.2 %.  He has been missing Novolog doses at noon and taking his Novolog AFTER eating.  I instructed pt to take his insulin as  prescribed including at noon and to take his Novolog injection 10-15 minutes prior to eating.  No change in insulin doses today.  He is to remain on Metformin 1000 mg BID.  Pt's most recent creat was 1.41 with GFR 50 mL/min on 6/18/2019.  I asked him to check his blood sugar fasting each am, prelunch and predinner DAILY.  I did place an order for a Freestyle Lora device/sensor.  Encouraged patient to make healthy food choices, reduce his food portions with meals, avoid snacking and walk daily and to take his insulin and medications as prescribed.  Pt remains on daily ASA.     2. GOITER: Nontender goiter.  TSH normal in Nov 2017.    3. NEPHROPATHY: Discussed the need for good glycemic control and good BP control.   Pt's creat 1.41 with GFR 50 mL/min on 6/18/2019..  Pt's urine protein +.  He is taking Cozaar.  He is seen here by Nephrology.    4.  RETINOPATHY: Pt seen by O[facility] here on a regular basis and was seen in May 2019.    5.  NEUROPATHY: Continue Gabapentin.  No foot ulcers.    6. DYSLIPIDEMIA: LDL 36 on 3/6/2018.   Pt is taking Lipitor and Fenofibrate.    7. OBESITY: See under # 1 above.  He does not want to use a GLP-1.    8.   Return to Endocrine Clinic to see me in 3 months.    Again, thank you for allowing me to participate in the care of your patient.      Sincerely,    Pamela Laura PA-C

## 2019-06-20 ENCOUNTER — ANESTHESIA EVENT (OUTPATIENT)
Dept: SURGERY | Facility: AMBULATORY SURGERY CENTER | Age: 69
End: 2019-06-20

## 2019-06-21 ENCOUNTER — OFFICE VISIT (OUTPATIENT)
Dept: OPHTHALMOLOGY | Facility: CLINIC | Age: 69
End: 2019-06-21
Payer: COMMERCIAL

## 2019-06-21 ENCOUNTER — HOSPITAL ENCOUNTER (OUTPATIENT)
Facility: AMBULATORY SURGERY CENTER | Age: 69
End: 2019-06-21
Attending: OPHTHALMOLOGY
Payer: COMMERCIAL

## 2019-06-21 ENCOUNTER — ANESTHESIA (OUTPATIENT)
Dept: SURGERY | Facility: AMBULATORY SURGERY CENTER | Age: 69
End: 2019-06-21

## 2019-06-21 VITALS
BODY MASS INDEX: 30.01 KG/M2 | TEMPERATURE: 97.1 F | HEART RATE: 80 BPM | WEIGHT: 198 LBS | OXYGEN SATURATION: 99 % | SYSTOLIC BLOOD PRESSURE: 144 MMHG | RESPIRATION RATE: 16 BRPM | DIASTOLIC BLOOD PRESSURE: 81 MMHG | HEIGHT: 68 IN

## 2019-06-21 DIAGNOSIS — H35.81 RETINAL EDEMA: ICD-10-CM

## 2019-06-21 DIAGNOSIS — H25.12 NUCLEAR SCLEROTIC CATARACT, LEFT: Primary | ICD-10-CM

## 2019-06-21 DIAGNOSIS — Z96.1 PSEUDOPHAKIA, LEFT EYE: Primary | ICD-10-CM

## 2019-06-21 LAB
GLUCOSE BLDC GLUCOMTR-MCNC: 183 MG/DL (ref 70–99)
GLUCOSE BLDC GLUCOMTR-MCNC: 191 MG/DL (ref 70–99)

## 2019-06-21 DEVICE — EYE IMP IOL AMO PCL TECNIS ZCB00 17.0: Type: IMPLANTABLE DEVICE | Site: EYE | Status: FUNCTIONAL

## 2019-06-21 RX ORDER — FLURBIPROFEN SODIUM 0.3 MG/ML
1 SOLUTION/ DROPS OPHTHALMIC
Status: COMPLETED | OUTPATIENT
Start: 2019-06-21 | End: 2019-06-21

## 2019-06-21 RX ORDER — SODIUM CHLORIDE, SODIUM LACTATE, POTASSIUM CHLORIDE, CALCIUM CHLORIDE 600; 310; 30; 20 MG/100ML; MG/100ML; MG/100ML; MG/100ML
500 INJECTION, SOLUTION INTRAVENOUS CONTINUOUS
Status: DISCONTINUED | OUTPATIENT
Start: 2019-06-21 | End: 2019-06-21 | Stop reason: HOSPADM

## 2019-06-21 RX ORDER — ERYTHROMYCIN 5 MG/G
0.5 OINTMENT OPHTHALMIC 3 TIMES DAILY
Qty: 1 G | Refills: 0 | Status: SHIPPED | OUTPATIENT
Start: 2019-06-21 | End: 2019-12-24

## 2019-06-21 RX ORDER — MOXIFLOXACIN IN NACL,ISO-OS/PF 0.3MG/0.3
SYRINGE (ML) INTRAOCULAR PRN
Status: DISCONTINUED | OUTPATIENT
Start: 2019-06-21 | End: 2019-06-21 | Stop reason: HOSPADM

## 2019-06-21 RX ORDER — BALANCED SALT SOLUTION 6.4; .75; .48; .3; 3.9; 1.7 MG/ML; MG/ML; MG/ML; MG/ML; MG/ML; MG/ML
SOLUTION OPHTHALMIC PRN
Status: DISCONTINUED | OUTPATIENT
Start: 2019-06-21 | End: 2019-06-21 | Stop reason: HOSPADM

## 2019-06-21 RX ORDER — TETRACAINE HYDROCHLORIDE 5 MG/ML
SOLUTION OPHTHALMIC PRN
Status: DISCONTINUED | OUTPATIENT
Start: 2019-06-21 | End: 2019-06-21 | Stop reason: HOSPADM

## 2019-06-21 RX ORDER — NALOXONE HYDROCHLORIDE 0.4 MG/ML
.1-.4 INJECTION, SOLUTION INTRAMUSCULAR; INTRAVENOUS; SUBCUTANEOUS
Status: DISCONTINUED | OUTPATIENT
Start: 2019-06-21 | End: 2019-06-22 | Stop reason: HOSPADM

## 2019-06-21 RX ORDER — OFLOXACIN 3 MG/ML
1 SOLUTION/ DROPS OPHTHALMIC
Status: COMPLETED | OUTPATIENT
Start: 2019-06-21 | End: 2019-06-21

## 2019-06-21 RX ORDER — PHENYLEPHRINE HYDROCHLORIDE 25 MG/ML
1 SOLUTION/ DROPS OPHTHALMIC
Status: COMPLETED | OUTPATIENT
Start: 2019-06-21 | End: 2019-06-21

## 2019-06-21 RX ORDER — OXYCODONE HYDROCHLORIDE 5 MG/1
5 TABLET ORAL EVERY 4 HOURS PRN
Status: DISCONTINUED | OUTPATIENT
Start: 2019-06-21 | End: 2019-06-22 | Stop reason: HOSPADM

## 2019-06-21 RX ORDER — FENTANYL CITRATE 50 UG/ML
25-50 INJECTION, SOLUTION INTRAMUSCULAR; INTRAVENOUS EVERY 5 MIN PRN
Status: DISCONTINUED | OUTPATIENT
Start: 2019-06-21 | End: 2019-06-21 | Stop reason: HOSPADM

## 2019-06-21 RX ORDER — SODIUM CHLORIDE, SODIUM LACTATE, POTASSIUM CHLORIDE, CALCIUM CHLORIDE 600; 310; 30; 20 MG/100ML; MG/100ML; MG/100ML; MG/100ML
INJECTION, SOLUTION INTRAVENOUS CONTINUOUS
Status: DISCONTINUED | OUTPATIENT
Start: 2019-06-21 | End: 2019-06-22 | Stop reason: HOSPADM

## 2019-06-21 RX ORDER — LIDOCAINE HYDROCHLORIDE 10 MG/ML
INJECTION, SOLUTION EPIDURAL; INFILTRATION; INTRACAUDAL; PERINEURAL PRN
Status: DISCONTINUED | OUTPATIENT
Start: 2019-06-21 | End: 2019-06-21 | Stop reason: HOSPADM

## 2019-06-21 RX ORDER — ONDANSETRON 4 MG/1
4 TABLET, ORALLY DISINTEGRATING ORAL EVERY 30 MIN PRN
Status: DISCONTINUED | OUTPATIENT
Start: 2019-06-21 | End: 2019-06-22 | Stop reason: HOSPADM

## 2019-06-21 RX ORDER — PROPARACAINE HYDROCHLORIDE 5 MG/ML
1 SOLUTION/ DROPS OPHTHALMIC ONCE
Status: COMPLETED | OUTPATIENT
Start: 2019-06-21 | End: 2019-06-21

## 2019-06-21 RX ORDER — CYCLOPENTOLATE HYDROCHLORIDE 10 MG/ML
1 SOLUTION/ DROPS OPHTHALMIC
Status: COMPLETED | OUTPATIENT
Start: 2019-06-21 | End: 2019-06-21

## 2019-06-21 RX ORDER — ONDANSETRON 2 MG/ML
4 INJECTION INTRAMUSCULAR; INTRAVENOUS EVERY 30 MIN PRN
Status: DISCONTINUED | OUTPATIENT
Start: 2019-06-21 | End: 2019-06-22 | Stop reason: HOSPADM

## 2019-06-21 RX ORDER — ACETAMINOPHEN 325 MG/1
975 TABLET ORAL ONCE
Status: COMPLETED | OUTPATIENT
Start: 2019-06-21 | End: 2019-06-21

## 2019-06-21 RX ADMIN — OFLOXACIN 1 DROP: 3 SOLUTION/ DROPS OPHTHALMIC at 06:58

## 2019-06-21 RX ADMIN — PROPARACAINE HYDROCHLORIDE 1 DROP: 5 SOLUTION/ DROPS OPHTHALMIC at 06:53

## 2019-06-21 RX ADMIN — FLURBIPROFEN SODIUM 1 DROP: 0.3 SOLUTION/ DROPS OPHTHALMIC at 07:04

## 2019-06-21 RX ADMIN — CYCLOPENTOLATE HYDROCHLORIDE 1 DROP: 10 SOLUTION/ DROPS OPHTHALMIC at 07:05

## 2019-06-21 RX ADMIN — PHENYLEPHRINE HYDROCHLORIDE 1 DROP: 25 SOLUTION/ DROPS OPHTHALMIC at 07:04

## 2019-06-21 RX ADMIN — CYCLOPENTOLATE HYDROCHLORIDE 1 DROP: 10 SOLUTION/ DROPS OPHTHALMIC at 07:11

## 2019-06-21 RX ADMIN — CYCLOPENTOLATE HYDROCHLORIDE 1 DROP: 10 SOLUTION/ DROPS OPHTHALMIC at 06:58

## 2019-06-21 RX ADMIN — SODIUM CHLORIDE, SODIUM LACTATE, POTASSIUM CHLORIDE, CALCIUM CHLORIDE 500 ML: 600; 310; 30; 20 INJECTION, SOLUTION INTRAVENOUS at 06:49

## 2019-06-21 RX ADMIN — PHENYLEPHRINE HYDROCHLORIDE 1 DROP: 25 SOLUTION/ DROPS OPHTHALMIC at 06:58

## 2019-06-21 RX ADMIN — FLURBIPROFEN SODIUM 1 DROP: 0.3 SOLUTION/ DROPS OPHTHALMIC at 07:16

## 2019-06-21 RX ADMIN — OFLOXACIN 1 DROP: 3 SOLUTION/ DROPS OPHTHALMIC at 07:11

## 2019-06-21 RX ADMIN — PHENYLEPHRINE HYDROCHLORIDE 1 DROP: 25 SOLUTION/ DROPS OPHTHALMIC at 07:11

## 2019-06-21 RX ADMIN — OFLOXACIN 1 DROP: 3 SOLUTION/ DROPS OPHTHALMIC at 07:04

## 2019-06-21 RX ADMIN — FLURBIPROFEN SODIUM 1 DROP: 0.3 SOLUTION/ DROPS OPHTHALMIC at 07:11

## 2019-06-21 RX ADMIN — ACETAMINOPHEN 975 MG: 325 TABLET ORAL at 06:48

## 2019-06-21 RX ADMIN — FLURBIPROFEN SODIUM 1 DROP: 0.3 SOLUTION/ DROPS OPHTHALMIC at 06:58

## 2019-06-21 ASSESSMENT — MIFFLIN-ST. JEOR: SCORE: 1632.62

## 2019-06-21 ASSESSMENT — SLIT LAMP EXAM - LIDS: COMMENTS: NORMAL

## 2019-06-21 ASSESSMENT — VISUAL ACUITY
OS_SC: 20/125
METHOD: SNELLEN - LINEAR

## 2019-06-21 ASSESSMENT — TONOMETRY
IOP_METHOD: TONOPEN
OS_IOP_MMHG: 29

## 2019-06-21 NOTE — ANESTHESIA PREPROCEDURE EVALUATION
Anesthesia Pre-Procedure Evaluation    Patient: Earle Rene   MRN:     [id number] Gender:   male   Age:    69 year old :      1950        Preoperative Diagnosis: Visually Significant Cataract   Procedure(s):  Left Eye Cataract Removal with Intraocular Lens Implant with Intraoperative Avastin Injection     Past Medical History:   Diagnosis Date     Blepharitis of both eyes      BPH (benign prostatic hyperplasia)      Diabetes (H)      Diabetic neuropathy (H)      Diabetic retinopathy associated with diabetes mellitus due to underlying condition (H)      Dry eye syndrome      Goiter      Granulomatous disease (H)      Nonsenile cataract      Peripheral neuropathy       Past Surgical History:   Procedure Laterality Date     AS RAD RESEC TONSIL/PILLARS Bilateral      COLONOSCOPY  2013    Procedure: COLONOSCOPY;;  Surgeon: Montana Pascal MD;  Location: [facility]  2017     SURGICAL PATHOLOGY EXAM            Anesthesia Evaluation     .             ROS/MED HX    ENT/Pulmonary:       Neurologic:     (+)neuropathy     Cardiovascular:     (+) Dyslipidemia, ----. : . . . :. .       METS/Exercise Tolerance:     Hematologic:         Musculoskeletal:         GI/Hepatic:         Renal/Genitourinary:         Endo:     (+) type II DM .      Psychiatric:         Infectious Disease:         Malignancy:         Other:                         PHYSICAL EXAM:   Mental Status/Neuro: A/A/O   Airway: Facies: Beard  Mallampati: II  Mouth/Opening: Full  TM distance: > 6 cm  Neck ROM: Full   Respiratory: Auscultation: CTAB     Resp. Rate: Normal     Resp. Effort: Normal      CV: Rhythm: Regular  Rate: Age appropriate  Heart: Normal Sounds   Comments:      Dental: Normal                  Lab Results   Component Value Date    WBC 4.6 2019    HGB 13.5 2019    HCT 38.7 (L) 2019     (L) 2019    CRP <5.0 11/15/2010    SED 11 11/15/2010     2019    POTASSIUM 4.3 2019     "CHLORIDE 106 06/18/2019    CO2 22 06/18/2019    BUN 27 06/18/2019    CR 1.41 (H) 06/18/2019     (H) 06/18/2019    INDERJIT 9.3 06/18/2019    PHOS 3.4 03/13/2019    MAG 2.1 05/24/2018    ALBUMIN 3.8 06/18/2019    PROTTOTAL 7.0 06/18/2019    ALT 38 06/18/2019    AST 24 06/18/2019    ALKPHOS 63 06/18/2019    BILITOTAL 0.6 06/18/2019    LIPASE 179 05/22/2012    AMYLASE 85 05/22/2012    PTT 29 06/12/2013    INR 0.93 06/12/2013    TSH 1.12 11/01/2017    T4 1.00 10/21/2014       Preop Vitals  BP Readings from Last 3 Encounters:   06/21/19 128/72   06/19/19 126/72   06/18/19 120/76    Pulse Readings from Last 3 Encounters:   06/21/19 80   06/19/19 80   06/18/19 76      Resp Readings from Last 3 Encounters:   06/21/19 18   01/31/19 20   08/20/18 16    SpO2 Readings from Last 3 Encounters:   06/21/19 96%   06/18/19 97%   03/13/19 97%      Temp Readings from Last 1 Encounters:   06/21/19 36.8  C (98.2  F) (Oral)    Ht Readings from Last 1 Encounters:   06/21/19 1.727 m (5' 8\")      Wt Readings from Last 1 Encounters:   06/21/19 89.8 kg (198 lb)    Estimated body mass index is 30.11 kg/m  as calculated from the following:    Height as of this encounter: 1.727 m (5' 8\").    Weight as of this encounter: 89.8 kg (198 lb).     LDA:            Assessment:   ASA SCORE: 2    NPO Status: > 6 hours since completed Solid Foods   Documentation: H&P complete; Preop Testing complete; Consents complete   Proceeding: Proceed without further delay  Tobacco Use:  NO Active use of Tobacco/UNKNOWN Tobacco use status     Plan:   Anes. Type:  MAC   Pre-Induction: Midazolam IV   Induction:  Not applicable   Airway: Native Airway (capnography)   Access/Monitoring: PIV   Maintenance: N/a   Emergence: N/a   Logistics: Same Day Surgery     Postop Pain/Sedation Strategy:  Standard-Options: Opioids PRN     PONV Management:  Adult Risk Factors:, Non-Smoker, Postop Opioids  Prevention: NO Volatile     CONSENT: Direct conversation   Plan and risks " discussed with: Patient   Blood Products: Consent Deferred (Minimal Blood Loss)                     Florentin Dougherty MD  Staff Anesthesiologist  [phone]

## 2019-06-21 NOTE — ANESTHESIA CARE TRANSFER NOTE
Patient: Earle Rene    Procedure(s):  Left Eye Cataract Removal with Intraocular Lens Implant with Intraoperative Avastin Injection    Diagnosis: Visually Significant Cataract  Diagnosis Additional Information: No value filed.    Anesthesia Type:   No value filed.     Note:  Airway :Room Air  Patient transferred to:Phase II  Comments: Ronald Report: Identifed the Patient, Identified the Reponsible Provider, Reviewed the pertinent medical history, Discussed the surgical course, Reviewed Intra-OP anesthesia mangement and issues during anesthesia, Set expectations for post-procedure period and Allowed opportunity for questions and acknowledgement of understanding      Vitals: (Last set prior to Anesthesia Care Transfer)    CRNA VITALS  6/21/2019 0804 - 6/21/2019 0840      6/21/2019             Pulse:  76    Ht Rate:  75    SpO2:  100 %    Resp Rate (set):  10                Electronically Signed By: RAJNI Kemp CRNA  June 21, 2019  8:40 AM

## 2019-06-21 NOTE — PROGRESS NOTES
POD#0, status post cataract surgery, left eye    No complaints.  Denies eye pain.    Impression/Plan:  Pseudophakia, OS: POD0, good post-operative appearance. IOP slightly elevated. Timolol adminstered in clinic    Eye protection at all times and eye shield at night for 1 week.    Limited activities with no exercise or heavy lifting for 1 week.    Instructed patient to contact us for decreasing vision, eye pain, new floaters or flashes of light or other concerning symptoms.    Start erythromycin four times a day    Written instructions given    Return to clinic 3-4 weeks with refraction and dilation    Rock Ayala MD  PGY4, Dept of Ophthalmology  Pager [phone]      Teaching statement:  Complete documentation of historical and exam elements from today's encounter can be found in the full encounter summary report (not reduplicated in this progress note). I personally obtained the chief complaint(s) and history of present illness.  I confirmed and edited as necessary the review of systems, past medical/surgical history, family history, social history, and examination findings as documented by others; and I examined the patient myself. I personally reviewed the relevant tests, images, and reports as documented above.     I formulated and edited as necessary the assessment and plan and discussed the findings and management plan with the patient and family.    Lacey Eugene MD  Comprehensive Ophthalmology & Ocular Pathology  Department of Ophthalmology and Visual Neurosciences  [email or web address]  Pager [phone]

## 2019-06-21 NOTE — PROCEDURES
Ophthalmology Post-op Procedure Note    Surgical Service:    Ophthalmology & Visual Sciences  Date Performed:      June 21, 2019  Location: [facility]      Pre-operative Diagnosis: Visually significant cataract, left eye  Post-operative Diagnosis:  Pseudophakia, left eye  Operative Procedure:  Phacoemulsification with intraocular lens implantation, left eye    Surgeon(s):  Fellow/Staff Surgeon:       Lacey Eugene MD  Resident Surgeon:            HAWA Bravo MD    Anesthesia:   Topical/MAC  Findings:  Dense cortical changes with poor red reflex, floppy iris  Blood Loss:    Minimal  Implants:  ZCB00 17.0 intraocular lens  Specimens:  None     Complications:  The patient did not experience any complications.   Condition: Stable    Operative Report Completion:    Description of Operation/Procedure:    After appropriate informed consent was obtained, the patient was brought to the operating room. The appropriate cardiac and blood pressure monitors were placed. A final pause occurred just before the start of the procedure during which the entire procedure team actively confirmed the correct patient, procedure, site, special equipment and special requirements. A few drops of 0.5% tetracaine were instilled onto the operative eye. The patient was prepped and draped in the usual sterile fashion using 5% povidone/iodine.     An eyelid speculum was placed to open the eyelids. A paracentesis port was placed approximately sixty degrees to the left of the planned temporal incision location using the sideport blade. Approximately 0.8 cc of 1% nonpreserved lidocaine was placed into the anterior chamber. Air was injected in the anterior chamber followed by Trypan blue. Trypan blue was necessary to provide better visualization of the anterior capsule. The anterior chamber was filled with dispersive viscoelastic. A clear corneal temporal incision was made with a metal 2.6 mm keratome. A round continuous tear capsulorhexis was  initiated with a cystotome and completed with the Utrata forceps. Balanced salt solution on a cannula was used to perform hydrodissection. The nucleus was removed using phacoemulsification with a chop technique. The remaining cortical material was removed using the irrigation/aspiration handpiece. The capsular bag was filled with cohesive viscoelastic. A lens of the model and power listed above was placed into the capsular bag using the cartridge injection system. The remaining viscoelastic was removed using the irrigation aspiration handpiece. The paracentesis and temporal wounds were hydrated with balanced salt solution. Intracameral moxifloxacin was administered. Calipers were used to mimi 3.5 mm posterior to the limbus superotemporally. Intravitreal bevacizumab was injected at the marked site. At the conclusion of the case, the wounds were felt to be watertight, the pupil was round, the lens was centered, and the anterior chamber was deep. The eyelid speculum was removed. Maxitrol ointment was administered to the operative the eye. A patch and shield were placed over the operative eye.    Attending Attestation:  I was present for and performed the entire procedure.  Lacey Eugene MD

## 2019-06-21 NOTE — NURSING NOTE
Chief Complaints and History of Present Illnesses   Patient presents with     Post Op (Ophthalmology) Left Eye     Left Eye Cataract Removal with Intraocular Lens Implant with Intraoperative Avastin Injection     Chief Complaint(s) and History of Present Illness(es)     Post Op (Ophthalmology) Left Eye     Laterality: left eye    Onset: sudden    Onset: hours ago    Quality: blurred vision    Severity: moderate    Frequency: constantly    Timing: throughout the day    Course: stable    Associated symptoms: Negative for eye pain    Treatments tried: no treatments    Pain scale: 0/10    Comments: Left Eye Cataract Removal with Intraocular Lens Implant with Intraoperative Avastin Injection              Comments     Left Eye Cataract Removal with Intraocular Lens Implant with Intraoperative Avastin Injection same day s/p. No eye pain today. Vision is blurry. Patient states they have their post op drops from the pharmacy.     Chel Garcias COT 12:31 PM June 21, 2019

## 2019-06-21 NOTE — ANESTHESIA POSTPROCEDURE EVALUATION
Anesthesia POST Procedure Evaluation    Patient: Earle Rene   MRN:     [id number] Gender:   male   Age:    69 year old :      1950        Preoperative Diagnosis: Visually Significant Cataract   Procedure(s):  Left Eye Cataract Removal with Intraocular Lens Implant with Intraoperative Avastin Injection   Postop Comments: No value filed.       Anesthesia Type:  MAC  No value filed.    Reportable Event: NO     PAIN: Uncomplicated   Sign Out status: Comfortable, Well controlled pain     PONV: No PONV   Sign Out status:  No Nausea or Vomiting     Neuro/Psych: Uneventful perioperative course   Sign Out Status: Preoperative baseline; Age appropriate mentation     Airway/Resp.: Uneventful perioperative course   Sign Out Status: Non labored breathing, age appropriate RR; Resp. Status within EXPECTED Parameters     CV: Uneventful perioperative course   Sign Out status: Appropriate BP and perfusion indices; Appropriate HR/Rhythm     Disposition:   Sign Out in:  PACU  Disposition:  Phase II; Home  Recovery Course: Uneventful  Follow-Up: Not required           Last Anesthesia Record Vitals:  CRNA VITALS  2019 0804 - 2019 0904      2019             Pulse:  76    Ht Rate:  75    SpO2:  100 %    Resp Rate (set):  10          Last PACU Vitals:  Vitals Value Taken Time   BP     Temp     Pulse     Resp     SpO2     Temp src Available 2019  8:30 AM   NIBP 147/86 2019  8:31 AM   Pulse 76 2019  8:34 AM   SpO2 100 % 2019  8:34 AM   Resp     Temp     Ht Rate 75 2019  8:34 AM   Temp 2           Electronically Signed By: Florentin Dougherty MD, 2019, 10:31 AM

## 2019-06-21 NOTE — DISCHARGE INSTRUCTIONS
[facility]  Home Care Following Anesthesia  For 24 hours after surgery:  1. Get plenty of rest.  A responsible adult must stay with you for at least 24 hours after you leave the surgery center.  2. Do not drive or use heavy equipment.  If you have weakness or tingling, don't drive or use heavy equipment until this feeling goes away.   3. Do not drink alcohol.   4. Avoid strenuous or risky activities.  Ask for help when climbing stairs.  5. You may feel lightheaded.  IF so, sit for a few minutes before standing.  Have someone help you get up.   6. If you have nausea (feel sick to your stomach): Drink only clear liquids such as apple juice, ginger ale, broth or 7-Up.  Rest may also help.  Be sure to drink enough fluids.  Move to a regular diet as you feel able.   7. You may have a slight fever.  Call the doctor if your fever is over 100 F (37.7 C) (taken under the tongue) or lasts longer than 24 hours.  8. You may have a dry mouth, a sore throat, muscle aches or trouble sleeping. These should go away after 24 hours.  9. Do not make important or legal decisions.               Tips for taking pain medications  To get the best pain relief possible, remember these points:    Take pain medications as directed, before pain becomes severe.    Pain medication can upset your stomach: taking it with food may help.    Constipation is a common side effect of pain medication. Drink plenty of  fluids.    Eat foods high in fiber. Take a stool softener if recommended by your doctor or pharmacist.    Do not drink alcohol, drive or operate machinery while taking pain medications.    Ask about other ways to control pain, such as with heat, ice or relaxation.    Tylenol/Acetaminophen Consumption  To help encourage the safe use of acetaminophen, the makers of TYLENOL  have lowered the maximum daily dose for single-ingredient Extra Strength TYLENOL  (acetaminophen) products sold in the [place] from 8  pills per day (4,000 mg) to 6 pills per day (3,000 mg). The dosing interval has also changed from 2 pills every 4-6 hours to 2 pills every 6 hours.    If you feel your pain relief is insufficient, you may take Tylenol/Acetaminophen in addition to your narcotic pain medication.     Be careful not to exceed 3,000 mg of Tylenol/Acetaminophen in a 24 hour period from all sources.    If you are taking extra strength Tylenol/acetaminophen (500 mg), the maximum dose is 6 tablets in 24 hours.    If you are taking regular strength acetaminophen (325 mg), the maximum dose is 9 tablets in 24 hours.    Call a doctor for any of the followin. Signs of infection (fever, growing tenderness at the surgery site, a large amount of drainage or bleeding, severe pain, foul-smelling drainage, redness, swelling).  2. It has been over 8 to 10 hours since surgery and you are still not able to urinate (pass water).  3. Headache for over 24 hours.  4. Numbness, tingling or weakness the day after surgery (if you had spinal anesthesia).  Your doctor is:       Dr. Lacey Eugene, Ophthalmology: [phone]               Or dial [phone] and ask for the resident on call for:  Ophthalmology  For emergency care, call the:  [facility]:  [phone] (TTY for hearing impaired: [phone])[facility]     Home Care Following Cataract Surgery    If you have a gauze eye patch on, please do not remove it until it is removed by your doctor at your first appointment after your surgery.  You will start your eye drops the next day.    OR    If you only have a clear eye shield on, you may remove the eye shield when you get home and begin eye drops today as directed by your doctor.      Wear the clear eye shield for protection when sleeping for the next 5 days.      Do not rub the eye that had the operation.      Your eye might be sensitive to light.  Wearing sunglasses may be more comfortable  for you.      You may have some discomfort and irritation.  Acetaminophen (Tylenol) or Ibuprofen (Advil) may be taken for discomfort. If pain persists please call your doctor s office.      Keep the eye that had the surgery dry. You may wash your hair, bathe or shower, but keep your eye closed while doing so.       Avoid bending over, strenuous activity or heavy lifting until this activity has been approved by your doctor.      You have a follow-up appointment with your doctor today or tomorrow at the [facility] ([phone]).  Bring all your prescribed eye drops with you to this follow-up appointment.        If you take glaucoma medications, bring them with you to your follow-up appointment tomorrow.      Use medication exactly as prescribed by your doctor. You may restart your regular home medications.       Occasional blood-tinged tears are normal the day or two after surgery. However, if there is large or persistent bleeding from the eye, that is not normal, and you should contact the clinic.      Call your doctor s office at [phone] if any of the following should occur:    - Any sudden vision changes, including decreased vision  - Nausea or severe headache  - Increase in pain that is not controlled with Acetaminophen (Tylenol) or Ibuprofen (Advil)  - Signs of infection (pus, increasing redness or tenderness)  - Severe sensitivity to light  An increase in floaters (black spots in front of your vision)

## 2019-06-24 ENCOUNTER — OFFICE VISIT (OUTPATIENT)
Dept: OPHTHALMOLOGY | Facility: CLINIC | Age: 69
End: 2019-06-24
Attending: OPHTHALMOLOGY
Payer: COMMERCIAL

## 2019-06-24 ENCOUNTER — NURSE TRIAGE (OUTPATIENT)
Dept: CALL CENTER | Age: 69
End: 2019-06-24

## 2019-06-24 DIAGNOSIS — Z96.1 PSEUDOPHAKIA, LEFT EYE: Primary | ICD-10-CM

## 2019-06-24 DIAGNOSIS — H11.32 SUBCONJUNCTIVAL HEMORRHAGE, LEFT: ICD-10-CM

## 2019-06-24 PROCEDURE — G0463 HOSPITAL OUTPT CLINIC VISIT: HCPCS | Mod: ZF

## 2019-06-24 ASSESSMENT — REFRACTION_WEARINGRX
OD_CYLINDER: +1.50
OD_ADD: +2.50
OD_SPHERE: -2.50
SPECS_TYPE: PAL
OD_AXIS: 157
OS_ADD: +2.50
OS_CYLINDER: +0.75
OS_AXIS: 033
OS_SPHERE: -2.50

## 2019-06-24 ASSESSMENT — TONOMETRY
OD_IOP_MMHG: 24
IOP_METHOD: ICARE
OS_IOP_MMHG: 16

## 2019-06-24 ASSESSMENT — VISUAL ACUITY
METHOD: SNELLEN - LINEAR
CORRECTION_TYPE: GLASSES
OS_SC: 20/40
OS_SC+: -2
OD_CC: 20/40

## 2019-06-24 ASSESSMENT — CUP TO DISC RATIO: OS_RATIO: 0.3

## 2019-06-24 NOTE — PROGRESS NOTES
POD#3, status post cataract surgery, left eye    Left eye redness and pressure-like pain around the eye over the weekend. The ofloxacin drops seem to worsen the pain. He has been using pred as instructed and erythromycin ointment at bedtime.    Impression/Plan:  Pseudophakia, OS: POD3, 360 bullous SHRUTHI, now with some extension/ecchymosis of lower eyelid as well, although elevation of the conjunctiva is improved from POD0 check.     IOP good and vision improving both subjectively and objectively.    Continue prednisolone left eye with taper as instructed  Hold ofloxacin, and instead increase erythromycin to BID left eye  Ok to take OTC Tylenol for pain and recommend gentle cool compresses to left eye. If pain is not controlled by these measures, recommend he call again for urgent appointment. Pain improved in clinic with topical proparacaine, so more likely related to irregularity of ocular surface.    Continue eye protection at all times and eye shield at night for 1 week.  Limited activities with no exercise or heavy lifting for 1 week.    Instructed patient to contact us for decreasing vision, eye pain, new floaters or flashes of light or other concerning symptoms.    Recheck in 1 week or sooner as needed.    Teaching statement:  Complete documentation of historical and exam elements from today's encounter can be found in the full encounter summary report (not reduplicated in this progress note). I personally obtained the chief complaint(s) and history of present illness.  I confirmed and edited as necessary the review of systems, past medical/surgical history, family history, social history, and examination findings as documented by others; and I examined the patient myself. I personally reviewed the relevant tests, images, and reports as documented above.     I formulated and edited as necessary the assessment and plan and discussed the findings and management plan with the patient and family.    Lacey FERRO  MD [facility]  Comprehensive Ophthalmology & Ocular Pathology  Department of Ophthalmology and Visual Neurosciences  [email or web address]  Pager [phone]

## 2019-06-24 NOTE — NURSING NOTE
Chief Complaints and History of Present Illnesses   Patient presents with     Post Op (Ophthalmology) Left Eye     Chief Complaint(s) and History of Present Illness(es)     Post Op (Ophthalmology) Left Eye     Laterality: left eye    Onset: 3 days ago    Pain scale: 9/10              Comments     S/p CE/IOL LE  Pt. States that he has had severe pain LE since surgery.  No nausea.  Was not able to sleep last night due to pain.  VA seems ok.  Naida Ornelas COT 11:01 AM June 24, 2019

## 2019-06-24 NOTE — TELEPHONE ENCOUNTER
"[facility]: Nurse Triage Note  SITUATION/BACKGROUND                                                      Earle Rene is a 69 year old male who calls with post surgical concerns  left eye.    Description:  Blood in sclera and below eye as well as pulpil looking \"smashed\"  Onset/duration:  Post op  Precip. factors:  none  Associated symptoms:  Irritated and watery  Improves/worsens symptoms:  nothing  Pain scale (0-10)   5-6/10    MEDICATIONS:   Taking medication(s) as prescribed? No  Taking over the counter medication(s?) N/A  Any barriers to taking medication(s) as prescribed?  Yes pain  Medication(s) improving/managing symptoms?  No    Allergies: No Known Allergies     ASSESSMENT          RECOMMENDATION/PLAN                                                      RECOMMENDED DISPOSITION:  Clinic to call the patient  Will comply with recommendation: Yes pt comfortable with this plan    If further questions/concerns or if symptoms do not improve, worsen or new symptoms develop, call your PCP or [phone] to talk with the Resident on call, as soon as possible.    Guideline used: eye injury  Telephone Triage Protocols for Nurses, Fifth Edition, Julie Briggs Kathleen M. Doege, RN  "

## 2019-06-25 ENCOUNTER — TELEPHONE (OUTPATIENT)
Dept: NEPHROLOGY | Facility: CLINIC | Age: 69
End: 2019-06-25

## 2019-06-25 DIAGNOSIS — N18.30 CHRONIC KIDNEY DISEASE, STAGE III (MODERATE) (H): Primary | ICD-10-CM

## 2019-06-26 ENCOUNTER — OFFICE VISIT (OUTPATIENT)
Dept: NEPHROLOGY | Facility: CLINIC | Age: 69
End: 2019-06-26
Attending: INTERNAL MEDICINE
Payer: COMMERCIAL

## 2019-06-26 ENCOUNTER — DOCUMENTATION ONLY (OUTPATIENT)
Dept: CARE COORDINATION | Facility: CLINIC | Age: 69
End: 2019-06-26

## 2019-06-26 VITALS
SYSTOLIC BLOOD PRESSURE: 133 MMHG | RESPIRATION RATE: 18 BRPM | DIASTOLIC BLOOD PRESSURE: 85 MMHG | TEMPERATURE: 97.8 F | BODY MASS INDEX: 30.53 KG/M2 | WEIGHT: 200.8 LBS | HEART RATE: 81 BPM

## 2019-06-26 DIAGNOSIS — E11.21 TYPE 2 DIABETES MELLITUS WITH DIABETIC NEPHROPATHY, WITH LONG-TERM CURRENT USE OF INSULIN (H): Primary | ICD-10-CM

## 2019-06-26 DIAGNOSIS — Z79.4 TYPE 2 DIABETES MELLITUS WITH DIABETIC NEPHROPATHY, WITH LONG-TERM CURRENT USE OF INSULIN (H): Primary | ICD-10-CM

## 2019-06-26 DIAGNOSIS — N18.30 CHRONIC KIDNEY DISEASE, STAGE III (MODERATE) (H): ICD-10-CM

## 2019-06-26 LAB
ANION GAP SERPL CALCULATED.3IONS-SCNC: 7 MMOL/L (ref 3–14)
BUN SERPL-MCNC: 19 MG/DL (ref 7–30)
CALCIUM SERPL-MCNC: 9 MG/DL (ref 8.5–10.1)
CHLORIDE SERPL-SCNC: 110 MMOL/L (ref 94–109)
CO2 SERPL-SCNC: 23 MMOL/L (ref 20–32)
CREAT SERPL-MCNC: 1.31 MG/DL (ref 0.66–1.25)
GFR SERPL CREATININE-BSD FRML MDRD: 55 ML/MIN/{1.73_M2}
GLUCOSE SERPL-MCNC: 142 MG/DL (ref 70–99)
POTASSIUM SERPL-SCNC: 4.3 MMOL/L (ref 3.4–5.3)
SODIUM SERPL-SCNC: 140 MMOL/L (ref 133–144)

## 2019-06-26 PROCEDURE — G0463 HOSPITAL OUTPT CLINIC VISIT: HCPCS | Mod: ZF

## 2019-06-26 RX ORDER — LOSARTAN POTASSIUM 100 MG/1
100 TABLET ORAL AT BEDTIME
Qty: 30 TABLET | Refills: 11 | Status: SHIPPED | OUTPATIENT
Start: 2019-06-26 | End: 2020-05-11

## 2019-06-26 ASSESSMENT — PAIN SCALES - GENERAL: PAINLEVEL: MILD PAIN (3)

## 2019-06-26 NOTE — LETTER
2019       RE: Earle Rene  [address]  [place] [address]     Dear Colleague,    Thank you for referring your patient, Earle Rene, to the [facility] NEPHROLOGY at [facility]. Please see a copy of my visit note below.      Nephrology Clinic    Kiah Ramsey MD  2019     Name: Earle Rene  MRN: [id number]  Age: 69 year old  : 1950  Referring provider: Marcio Hrae     Assessment and Plan:  1. Stage IIIA CKD-baseline serum creatinine of 1.2-1.3 mg/dL and GFR of  53-56 ml/min/1.73m2 BSA likely from diabetic nephropathy given 20 year history of diabetes and diabetic retinopathy. Renal function is stable. Today , serum creatinine is 1.31  Mg/dL and correlating eGFR of 55 ml/min.   - Losartan increased to 100 mg PO daily at night  - Discussed the importance of a low salt diet to decrease the amount of albumin in the urine to protect kidney function. The patient expresses understanding. He reports that he rarely adds salt to his food. He and his wife cook most meals with fresh foods at home. He admits he eats olives occasionally.     2. Subnephrotic range proteinuria-likely secondary to diabetic nephropathy. Patient has been on Cozaar 25 mg daily for few years. Patient had UPCR of 1.76 g/gCr on 18, then UPCR of 1.37 g/gCr in 2018, then 1.49 g/gCR in 2019. Today UPCR is 3 g/gCr from 3/13/2019.  -we will increase Cozaar (losartan) dose to 100 mg daily     3. Electrolytes:within acceptable limits     4. Volume status:patient looks euvolemic on exam. 2D ECHO in 2016 showed LVEF of 55-60%     5. Acid/Base status: HCO3 is 24. Goal 22-27. We will continue current dose of sodium bicarbonate 650 mg three times daily.     6. Hypertension: patient's BP at home is typically 140/80. Goal <130/90 mmHG.  -Will increase losartan to 100mg/day.      7. BMD  -normal serum calcium  and phosphorus   -normal PTH of 42 pg/ml  -vitamin D  deficiency-Vitamin D level is 14  Ug/L. We will start on cholecalciferol 2000U Daily      8. Type II DM-suboptimally controlled.  He reports his most recent HgbA1C was taken this week and has not come back yet. Follows with endocrinology clinic.     9. Diabetic neuropathy- He reports numbness extending up to the knees bilaterally. He reports occasional leg cramps at rest. He occasionally has numbness and tingling in his hands. He takes Gabapentin 100 mg TID.     10. Diabetic retinopathy-follows with an ophthalmologist. He had cataract surgery on his left eye on  06/21/2019. Last seen as an outpatient by Dr.Van Reyna on 05/08/2019. He has his cataract surgery for his right eye scheduled for next week.     11. Obesity-BMI 30. Encouraged the patient to lose weight.     12. Persistent cough-patient was advise to see his PCP for further evaluation given history of granulomatous disease of the lungs. CT chest 08/23/2017 showed stable calcified and noncalcified nodules in the lungs. Patient states that he was not aware of this and never been seen by pulmonologist or rheumatologist.       Follow-up: Return to clinic in one month to evaluate increase in losartan.     This patient has been seen and evaluated by me, Kiah Ramsey MD on 6/26/19 .  I have reviewed  Medications, Vital Signs and Labs as well as provider notes.       Kiah Ramsey MD  [facility]  Department of Medicine  Division of Renal Disease and Hypertension  [phone]     Reason For Visit:   Follow-up    HPI:   Mr. Rene is a 68 y/o male with PMHx significant for type II diabetes mellitus for 20 years, complicated by diabetic neuropathy, mild non proliferative retinopathy (follows with ophthalmologist ) and nephropathy. He is currently takes 64 units of Lantus and Novolog 8-10 units with melas and Metformin 1000 mg daily. Patient was last seen on 3/13/19 by Dr. Cespedes; please see that note for further details.      Patient states that his home blood pressure is typically 140/80. In regards to his diet, he doesn't typically consume sweets and avoids adding too much salt to his meals. He doesn't typically check his blood sugars.  Endorses occasional lower extremity edema. Denies lightheadedness or dizziness. He is continued on losartan and notes that he recently increased his dose without side effects. Continued on metformin. Is continued on vitamin D supplements twice daily.        Review of Systems:   Pertinent items are noted in HPI or as below, remainder of complete ROS is negative.      Active Medications:     Current Outpatient Medications:      albuterol (VENTOLIN HFA) 108 (90 Base) MCG/ACT inhaler, Inhale 2 puffs into the lungs every 6 hours, Disp: 18 g, Rfl: 3     ARTIFICIAL TEAR OP, Apply to eye as needed, Disp: , Rfl:      aspirin 81 MG tablet, Take 1 tablet by mouth daily., Disp: , Rfl:      atorvastatin (LIPITOR) 20 MG tablet, Take 1 tablet (20 mg) by mouth daily * LAB/LDL DUE, Disp: 90 tablet, Rfl: 0     blood glucose (NO BRAND SPECIFIED) lancets standard, Lancets that go with device, Test 3 times daily, Disp: 300 each, Rfl: 3     blood glucose monitoring (NO BRAND SPECIFIED) meter device kit, Any meter covered by insurance, not store brand, use as directed., Disp: 1 kit, Rfl: 0     blood glucose monitoring (NO BRAND SPECIFIED) test strip, Strips that go with meter, covered by insurance. Test 3 times daily, Disp: 300 strip, Rfl: 3     Blood Pressure Monitor KIT, Automatic Blood Pressure Monitor, Disp: 1 kit, Rfl: 0     Blood Pressure Monitor KIT, Check blood pressure as directed., Disp: 1 kit, Rfl: 0     clobetasol (TEMOVATE) 0.05 % ointment, Apply topically to legs twice daily for 2 weeks.  Then discontinue, Disp: 30 g, Rfl: 0     clotrimazole (LOTRIMIN) 1 % cream, Apply topically 2 times daily, Disp: 30 g, Rfl: 3     Continuous Blood Gluc  (FREESTYLE PETER READER) JUANCARLOS, 1 Device daily, Disp: 1 Device,  "Rfl: 0     Continuous Blood Gluc Sensor (FREESTYLE PETER 14 DAY SENSOR) MISC, 1 applicator every 14 days, Disp: 8 each, Rfl: 3     erythromycin (ROMYCIN) 5 MG/GM ophthalmic ointment, Place 0.5 inches Into the left eye 3 times daily, Disp: 1 g, Rfl: 0     fenofibrate 54 MG tablet, Take 1 tablet (54 mg) by mouth daily, Disp: 90 tablet, Rfl: 0     finasteride (PROSCAR) 5 MG tablet, Take 1 tablet (5 mg) by mouth daily, Disp: 90 tablet, Rfl: 1     gabapentin (NEURONTIN) 100 MG capsule, Take 1 capsule (100 mg) by mouth 3 times daily, Disp: 90 capsule, Rfl: 3     HUMALOG KWIKPEN 100 UNIT/ML soln, Inject 10 units with breakfast, 8 units with lunch and 10 units with dinner, plus correction. Pt uses approx 60 units in 24 hrs., Disp: 60 mL, Rfl: 3     insulin pen needle (B-D U/F) 31G X 5 MM, Use 4 times per day.  Please dispense as BD Pen Needle Mini U/F 31G x 5 MM, Disp: 400 each, Rfl: 3     insulin syringe 31G X 5/16\" 0.5 ML MISC, Use three syringes daily, Disp: 270 each, Rfl: 1     ketorolac (ACULAR) 0.5 % ophthalmic solution, 1 drop in surgical eye as directed - start 1 week after surgery, 4x daily until follow-up visit, Disp: 1 Bottle, Rfl: 1     loratadine (CLARITIN) 10 MG tablet, Take 1 tablet (10 mg) by mouth daily, Disp: 90 tablet, Rfl: 0     losartan (COZAAR) 100 MG tablet, Take 1 tablet (100 mg) by mouth At Bedtime, Disp: 30 tablet, Rfl: 11     losartan (COZAAR) 25 MG tablet, Take 1 tablet (25 mg) by mouth daily With a 50mg tablet for total dose of 75mg daily, Disp: 30 tablet, Rfl: 11     losartan (COZAAR) 50 MG tablet, Take 1 tablet (50 mg) by mouth daily With 25mg tablet for a total dose of 75mg daily, Disp: 30 tablet, Rfl: 11     metFORMIN (GLUCOPHAGE) 1000 MG tablet, 1 tab twice daily with meals, Disp: 180 tablet, Rfl: 3     mupirocin (BACTROBAN) 2 % cream, Apply  topically. In very small amounts only as needed, Disp: 15 g, Rfl: 1     NOVOLOG FLEXPEN 100 UNIT/ML soln, Inject 10 units with breakfast, 8 units with " lunch and 10 units with dinner, plus correction. Pt uses approx 60 units in 24 hrs., Disp: 60 mL, Rfl: 3     ofloxacin (OCUFLOX) 0.3 % ophthalmic solution, 1 drop 4x daily in the surgical eye for 1 week after surgery, then stop, Disp: 1 Bottle, Rfl: 0     Omega-3 Fatty Acids (OMEGA-3 FISH OIL) 1000 MG CAPS, Take 1 capsule (1 g) by mouth 2 times daily, Disp: 60 capsule, Rfl: 11     ONETOUCH ULTRA test strip, Use to test blood sugar 3 times daily, Disp: 300 strip, Rfl: 3     prednisoLONE acetate (PRED FORTE) 1 % ophthalmic suspension, 1 drop in surgical eye as directed, 4x daily after surgery until follow-up, Disp: 1 Bottle, Rfl: 1     ranitidine (ZANTAC) 300 MG tablet, Take 1 tablet (300 mg) by mouth At Bedtime, Disp: 90 tablet, Rfl: 3     senna-docusate (SENOKOT-S/PERICOLACE) 8.6-50 MG tablet, Take 1 tablet by mouth, Disp: , Rfl:      sodium bicarbonate 650 MG tablet, Take 1 tablet (650 mg) by mouth 3 times daily, Disp: 90 tablet, Rfl: 11     tamsulosin (FLOMAX) 0.4 MG capsule, Take 1 capsule (0.4 mg) by mouth daily, Disp: 90 capsule, Rfl: 1     TRESIBA FLEXTOUCH 100 UNIT/ML pen, INJECT 58 UNITS UNDER SKIN DAILY., Disp: 17.4 mL, Rfl: 0     triamcinolone (KENALOG) 0.1 % cream, Apply topically 3 times daily, Disp: 80 g, Rfl: 0     vitamin D3 (CHOLECALCIFEROL) 1000 units (25 mcg) tablet, Take 2 tablets (2,000 Units) by mouth daily, Disp: 30 tablet, Rfl: 11     Allergies:   Patient has no known allergies.      Past Medical History:  Past Medical History:   Diagnosis Date     Blepharitis of both eyes      BPH (benign prostatic hyperplasia)      Diabetes (H)      Diabetic neuropathy (H)      Diabetic retinopathy associated with diabetes mellitus due to underlying condition (H)      Dry eye syndrome      Goiter      Granulomatous disease (H)      Nonsenile cataract      Peripheral neuropathy      Patient Active Problem List   Diagnosis     Psychological factors associated with another disorder     Mood disorder in  conditions classified elsewhere     Occupational problem     Type 2 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, with long-term current use of insulin (H)     Erectile dysfunction     Hyperlipidemia with target LDL less than 100     CTS (carpal tunnel syndrome)     Diabetic polyneuropathy (H)     Presbyopia     Myopia     Cataracts, bilateral     Pain of right thumb        Past Surgical History:  Past Surgical History:   Procedure Laterality Date     AS RAD RESEC TONSIL/PILLARS Bilateral 1961     COLONOSCOPY  7/29/2013    Procedure: COLONOSCOPY;;  Surgeon: Montana Pascal MD;  Location:  GI     PHACOEMULSIFICATION WITH STANDARD INTRAOCULAR LENS IMPLANT Left 6/21/2019    Procedure: Left Eye Cataract Removal with Intraocular Lens Implant with Intraoperative Avastin Injection;  Surgeon: Lacey Eugene MD;  Location:  OR     [facility]  11/2017     SURGICAL PATHOLOGY EXAM         Family History:   Family History   Problem Relation Age of Onset     Diabetes Father      Myocardial Infarction Father      Diabetes Brother      Leukemia Brother 44     Glaucoma No family hx of      Macular Degeneration No family hx of      Kidney Disease No family hx of          Social History:   Patient was born in [place] and graduated with a degree in engineering. Currently owns 2 businesses.     Physical Exam:  /85   Pulse 81   Temp 97.8  F (36.6  C) (Oral)   Resp 18   Wt 91.1 kg (200 lb 12.8 oz)   BMI 30.53 kg/m      GENERAL APPEARANCE: alert and no distress  EYES: no scleral icterus, pupils equal  HENT: NC/AT  Endocrine: no goiter, no moon facies  Pulmonary: normal work of breathing, no clubbing  CV: WWP   - Edema none  MS: no evidence of inflammation in joints, no muscle tenderness  : no Chamberlain  SKIN: no rash, warm, dry, no cyanosis  NEURO: mentation intact and speech normal     Laboratory:  CMP  Recent Labs   Lab Test 06/26/19  1217 06/18/19  1054 05/28/19  0948  04/10/19  1703 03/13/19  1516 02/06/19  1322 08/01/18  1222 05/24/18  1414 05/01/18  1233 03/06/18  1216 11/01/17  1044  06/06/17  1116 01/14/16  0849    136 138 137 138 138 140  --  141 136  --    < > 141 140   POTASSIUM 4.3 4.3 4.3 4.7 4.5 4.3 4.6  --  4.7 4.0 4.4   < > 3.9 3.9   CHLORIDE 110* 106 108 106 106 107 108  --  110* 105  --    < > 108 106   CO2 23 22 21 22 24 24 24  --  21 23  --    < > 24 28   ANIONGAP 7 8 9 9 7 7 7  --  10 9  --    < > 10 6   * 195* 320* 332* 217* 197* 116*  --  149* 197*  --    < > 96 191*   BUN 19 27 23 23 25 22 23  --  20 21  --    < > 21 15   CR 1.31* 1.41* 1.31* 1.39* 1.20 1.36* 1.31*  --  1.28* 1.33* 1.20   < > 1.26* 1.07   GFRESTIMATED 55* 50* 55* 51* 61 52* 54*  --  56* 53* 60*   < > 57* 69   GFRESTBLACK 63 58* 63 59* 71 61 66  --  67 65 73   < > 69 83   INDERJIT 9.0 9.3 9.4 9.8 9.0 9.3 9.5  --  9.8 8.8  --    < > 9.2 8.8   MAG  --   --   --   --   --   --   --  2.1 2.1  --   --   --   --   --    PHOS  --   --   --   --  3.4 3.9 3.1  --  3.3  --   --   --   --   --    PROTTOTAL  --  7.0  --   --   --   --   --   --   --  7.3  --   --  6.6* 6.8   ALBUMIN  --  3.8  --   --  3.6 3.2* 3.8  --  4.0 3.8  --   --  3.5 3.8   BILITOTAL  --  0.6  --   --   --   --   --   --   --  0.8  --   --  0.7 0.5   ALKPHOS  --  63  --   --   --   --   --   --   --  77  --   --  70 71   AST  --  24  --   --   --   --   --   --   --  25 23  --  20 17   ALT  --  38  --   --   --   --   --   --   --  35 44  --  49 43    < > = values in this interval not displayed.     CBC  Recent Labs   Lab Test 06/18/19  1054 03/13/19  1516 02/06/19  1322 08/01/18  1222 05/01/18  1233   HGB 13.5 13.7 13.8 14.1 14.7   WBC 4.6 5.2 6.1  --  4.6   RBC 4.18* 4.38* 4.41  --  4.50   HCT 38.7* 40.5 41.4  --  42.5   MCV 93 93 94  --  94   MCH 32.3 31.3 31.3  --  32.7   MCHC 34.9 33.8 33.3  --  34.6   RDW 12.6 12.5 11.9  --  12.7   * 131* 225  --  148*     INR  Recent Labs   Lab Test 06/12/13  0949   INR 0.93    PTT 29     ABG  No lab results found.   URINE STUDIES  Recent Labs   Lab Test 05/01/18  1238 06/06/17  1120 03/16/16  1000 01/14/16  0858   COLOR Yellow Yellow Yellow Yellow   APPEARANCE Clear Slightly Cloudy Clear Clear   URINEGLC 50* >499* >1000* 100*   URINEBILI Negative Negative Negative Negative   URINEKETONE Negative Negative Negative Negative   SG 1.016 1.017 1.028 >1.030   UBLD Negative Negative Trace* Negative   URINEPH 5.0 5.0 6.0 5.5   PROTEIN >499* >499* 100* 100*   UROBILINOGEN  --   --   --  0.2   NITRITE Negative Negative Negative Negative   LEUKEST Negative Negative Negative Negative   RBCU 1 2 1* O - 2   WBCU <1 2 <1* O - 2     Recent Labs   Lab Test 03/13/19  1522 02/06/19  1326 08/01/18  1225 05/01/18  1238   UTPG 3.04* 1.49* 1.37* 1.76*     PTH  Recent Labs   Lab Test 03/13/19  1516 05/01/18  1233   PTHI 42 49     IRON STUDIES   Recent Labs   Lab Test 05/01/18  1233   IRON 83      IRONSAT 24   DEANA 160           I, Kevin Orlando, served as a scribe preparing the chart for the clinic encounter through chart review for the provider team.       Again, thank you for allowing me to participate in the care of your patient.      Sincerely,    Kiah Ramsey MD

## 2019-06-26 NOTE — NURSING NOTE
Chief Complaint   Patient presents with     RECHECK     3 MO KIDNEY FOLLOW UP     Blood pressure 133/85, pulse 81, temperature 97.8  F (36.6  C), temperature source Oral, resp. rate 18, weight 91.1 kg (200 lb 12.8 oz).    Patient states all medications are the same  Dago Cao CMA on 6/26/2019 at 12:51 PM

## 2019-06-26 NOTE — PATIENT INSTRUCTIONS
Dear Earle Garg were seen in the [facility] for follow up of diabetic kidney disease.  Your kidney function is about 50-55% and is stable over the last year.    We are suggesting the following medications:  Increase losartan to 100 mg per day    Please set up appointment with:  Kiah Ramsey MD in 6 months    It was a pleasure meeting with you today. Thank you for allowing me and my team the privilege of caring for you today.  Please let us know if there is anything else we can do for you.    Bre Hanson LPN care coordinator - [phone]  Richard Gonzalez RN care coordinator [phone]    Take care!  Kiah Ramsey MD  Department of Medicine  Division of Renal Diseases and Hypertension  [facility]    Email: [email or web address]

## 2019-06-26 NOTE — TELEPHONE ENCOUNTER
Foot pain and nail changes appt per Rosa in [facility]    RECORDS RECEIVED FROM: Internal   DATE RECEIVED: 07/10/19   NOTES STATUS DETAILS   OFFICE NOTE from referring provider Internal 06/18/19 Dr. Hare   OFFICE NOTE from other specialist Internal 03/14/19 Orthotics diabetes shoe insert  08/23/17 Dr. Grajeda calf pain   DISCHARGE SUMMARY from hospital n/a    DISCHARGE REPORT from the ER n/a    OPERATIVE REPORT n/a    MEDICATION LIST Internal    IMPLANT RECORD/STICKER n/a    LABS     CBC/DIFF Internal 06/18/19   CULTURES n/a    INJECTIONS DONE IN RADIOLOGY n/a    MRI n/a    CT SCAN n/a    XRAYS (IMAGES & REPORTS) Internal 06/19/19   TUMOR     PATHOLOGY  Slides & report n/a

## 2019-06-26 NOTE — PROGRESS NOTES
Nephrology Clinic    Kiah Ramsey MD  2019     Name: Earle Rene  MRN: [id number]  Age: 69 year old  : 1950  Referring provider: Marcio Hare     Assessment and Plan:  1. Stage IIIA CKD-baseline serum creatinine of 1.2-1.3 mg/dL and GFR of  53-56 ml/min/1.73m2 BSA likely from diabetic nephropathy given 20 year history of diabetes and diabetic retinopathy. Renal function is stable. Today, serum creatinine is 1.31  Mg/dL and correlating eGFR of 55 ml/min.   - Losartan increased to 100 mg PO daily at night  - Discussed the importance of a low salt diet to decrease the amount of albumin in the urine to protect kidney function. The patient expresses understanding. He reports that he rarely adds salt to his food. He and his wife cook most meals with fresh foods at home. He admits he eats olives occasionally.     2. Subnephrotic range proteinuria-likely secondary to diabetic nephropathy. Patient has been on Cozaar 25 mg daily for few years. Patient had UPCR of 1.76 g/gCr on 18, then UPCR of 1.37 g/gCr in 2018, then 1.49 g/gCR in 2019. Today UPCR is 3 g/gCr from 3/13/2019.  -we will increase Cozaar (losartan) dose to 100 mg daily     3. Electrolytes:within acceptable limits     4. Volume status:patient looks euvolemic on exam. 2D ECHO in  showed LVEF of 55-60%     5. Acid/Base status: HCO3 is 24. Goal 22-27. We will continue current dose of sodium bicarbonate 650 mg three times daily.     6. Hypertension: patient's BP at home is typically 140/80. Goal <130/90 mmHG.  -Will increase losartan to 100mg/day.      7. BMD  -normal serum calcium  and phosphorus   -normal PTH of 42 pg/ml  -vitamin D deficiency-Vitamin D level is 14  Ug/L. We will start on cholecalciferol 2000U Daily      8. Type II DM-suboptimally controlled. He reports his most recent HgbA1C was taken this week and has not come back yet. Follows with endocrinology clinic.     9. Diabetic neuropathy- He reports numbness  extending up to the knees bilaterally. He reports occasional leg cramps at rest. He occasionally has numbness and tingling in his hands. He takes Gabapentin 100 mg TID.     10. Diabetic retinopathy-follows with an ophthalmologist. He had cataract surgery on his left eye on  06/21/2019. Last seen as an outpatient by Dr.Van Reyna on 05/08/2019. He has his cataract surgery for his right eye scheduled for next week.     11. Obesity-BMI 30. Encouraged the patient to lose weight.     12. Persistent cough-patient was advise to see his PCP for further evaluation given history of granulomatous disease of the lungs. CT chest 08/23/2017 showed stable calcified and noncalcified nodules in the lungs. Patient states that he was not aware of this and never been seen by pulmonologist or rheumatologist.       Follow-up: Return to clinic in one month to evaluate increase in losartan.     This patient has been seen and evaluated by me, Kiah Ramsey MD on 6/26/19 .  I have reviewed  Medications, Vital Signs and Labs as well as provider notes.       Kiah Ramsey MD  [facility]  Department of Medicine  Division of Renal Disease and Hypertension  [phone]     Reason For Visit:   Follow-up    HPI:   Mr. Rene is a 70 y/o male with PMHx significant for type II diabetes mellitus for 20 years, complicated by diabetic neuropathy, mild non proliferative retinopathy (follows with ophthalmologist ) and nephropathy. He is currently takes 64 units of Lantus and Novolog 8-10 units with melas and Metformin 1000 mg daily. Patient was last seen on 3/13/19 by Dr. Cespedes; please see that note for further details.     Patient states that his home blood pressure is typically 140/80. In regards to his diet, he doesn't typically consume sweets and avoids adding too much salt to his meals. He doesn't typically check his blood sugars.  Endorses occasional lower extremity edema. Denies lightheadedness or  dizziness. He is continued on losartan and notes that he recently increased his dose without side effects. Continued on metformin. Is continued on vitamin D supplements twice daily.        Review of Systems:   Pertinent items are noted in HPI or as below, remainder of complete ROS is negative.      Active Medications:     Current Outpatient Medications:      albuterol (VENTOLIN HFA) 108 (90 Base) MCG/ACT inhaler, Inhale 2 puffs into the lungs every 6 hours, Disp: 18 g, Rfl: 3     ARTIFICIAL TEAR OP, Apply to eye as needed, Disp: , Rfl:      aspirin 81 MG tablet, Take 1 tablet by mouth daily., Disp: , Rfl:      atorvastatin (LIPITOR) 20 MG tablet, Take 1 tablet (20 mg) by mouth daily * LAB/LDL DUE, Disp: 90 tablet, Rfl: 0     blood glucose (NO BRAND SPECIFIED) lancets standard, Lancets that go with device, Test 3 times daily, Disp: 300 each, Rfl: 3     blood glucose monitoring (NO BRAND SPECIFIED) meter device kit, Any meter covered by insurance, not store brand, use as directed., Disp: 1 kit, Rfl: 0     blood glucose monitoring (NO BRAND SPECIFIED) test strip, Strips that go with meter, covered by insurance. Test 3 times daily, Disp: 300 strip, Rfl: 3     Blood Pressure Monitor KIT, Automatic Blood Pressure Monitor, Disp: 1 kit, Rfl: 0     Blood Pressure Monitor KIT, Check blood pressure as directed., Disp: 1 kit, Rfl: 0     clobetasol (TEMOVATE) 0.05 % ointment, Apply topically to legs twice daily for 2 weeks.  Then discontinue, Disp: 30 g, Rfl: 0     clotrimazole (LOTRIMIN) 1 % cream, Apply topically 2 times daily, Disp: 30 g, Rfl: 3     Continuous Blood Gluc  (FREESTYLE PETER READER) JUANCARLOS, 1 Device daily, Disp: 1 Device, Rfl: 0     Continuous Blood Gluc Sensor (FREESTYLE PETER 14 DAY SENSOR) MISC, 1 applicator every 14 days, Disp: 8 each, Rfl: 3     erythromycin (ROMYCIN) 5 MG/GM ophthalmic ointment, Place 0.5 inches Into the left eye 3 times daily, Disp: 1 g, Rfl: 0     fenofibrate 54 MG tablet, Take 1  "tablet (54 mg) by mouth daily, Disp: 90 tablet, Rfl: 0     finasteride (PROSCAR) 5 MG tablet, Take 1 tablet (5 mg) by mouth daily, Disp: 90 tablet, Rfl: 1     gabapentin (NEURONTIN) 100 MG capsule, Take 1 capsule (100 mg) by mouth 3 times daily, Disp: 90 capsule, Rfl: 3     HUMALOG KWIKPEN 100 UNIT/ML soln, Inject 10 units with breakfast, 8 units with lunch and 10 units with dinner, plus correction. Pt uses approx 60 units in 24 hrs., Disp: 60 mL, Rfl: 3     insulin pen needle (B-D U/F) 31G X 5 MM, Use 4 times per day.  Please dispense as BD Pen Needle Mini U/F 31G x 5 MM, Disp: 400 each, Rfl: 3     insulin syringe 31G X 5/16\" 0.5 ML MISC, Use three syringes daily, Disp: 270 each, Rfl: 1     ketorolac (ACULAR) 0.5 % ophthalmic solution, 1 drop in surgical eye as directed - start 1 week after surgery, 4x daily until follow-up visit, Disp: 1 Bottle, Rfl: 1     loratadine (CLARITIN) 10 MG tablet, Take 1 tablet (10 mg) by mouth daily, Disp: 90 tablet, Rfl: 0     losartan (COZAAR) 100 MG tablet, Take 1 tablet (100 mg) by mouth At Bedtime, Disp: 30 tablet, Rfl: 11     losartan (COZAAR) 25 MG tablet, Take 1 tablet (25 mg) by mouth daily With a 50mg tablet for total dose of 75mg daily, Disp: 30 tablet, Rfl: 11     losartan (COZAAR) 50 MG tablet, Take 1 tablet (50 mg) by mouth daily With 25mg tablet for a total dose of 75mg daily, Disp: 30 tablet, Rfl: 11     metFORMIN (GLUCOPHAGE) 1000 MG tablet, 1 tab twice daily with meals, Disp: 180 tablet, Rfl: 3     mupirocin (BACTROBAN) 2 % cream, Apply  topically. In very small amounts only as needed, Disp: 15 g, Rfl: 1     NOVOLOG FLEXPEN 100 UNIT/ML soln, Inject 10 units with breakfast, 8 units with lunch and 10 units with dinner, plus correction. Pt uses approx 60 units in 24 hrs., Disp: 60 mL, Rfl: 3     ofloxacin (OCUFLOX) 0.3 % ophthalmic solution, 1 drop 4x daily in the surgical eye for 1 week after surgery, then stop, Disp: 1 Bottle, Rfl: 0     Omega-3 Fatty Acids (OMEGA-3 " FISH OIL) 1000 MG CAPS, Take 1 capsule (1 g) by mouth 2 times daily, Disp: 60 capsule, Rfl: 11     ONETOUCH ULTRA test strip, Use to test blood sugar 3 times daily, Disp: 300 strip, Rfl: 3     prednisoLONE acetate (PRED FORTE) 1 % ophthalmic suspension, 1 drop in surgical eye as directed, 4x daily after surgery until follow-up, Disp: 1 Bottle, Rfl: 1     ranitidine (ZANTAC) 300 MG tablet, Take 1 tablet (300 mg) by mouth At Bedtime, Disp: 90 tablet, Rfl: 3     senna-docusate (SENOKOT-S/PERICOLACE) 8.6-50 MG tablet, Take 1 tablet by mouth, Disp: , Rfl:      sodium bicarbonate 650 MG tablet, Take 1 tablet (650 mg) by mouth 3 times daily, Disp: 90 tablet, Rfl: 11     tamsulosin (FLOMAX) 0.4 MG capsule, Take 1 capsule (0.4 mg) by mouth daily, Disp: 90 capsule, Rfl: 1     TRESIBA FLEXTOUCH 100 UNIT/ML pen, INJECT 58 UNITS UNDER SKIN DAILY., Disp: 17.4 mL, Rfl: 0     triamcinolone (KENALOG) 0.1 % cream, Apply topically 3 times daily, Disp: 80 g, Rfl: 0     vitamin D3 (CHOLECALCIFEROL) 1000 units (25 mcg) tablet, Take 2 tablets (2,000 Units) by mouth daily, Disp: 30 tablet, Rfl: 11     Allergies:   Patient has no known allergies.      Past Medical History:  Past Medical History:   Diagnosis Date     Blepharitis of both eyes      BPH (benign prostatic hyperplasia)      Diabetes (H)      Diabetic neuropathy (H)      Diabetic retinopathy associated with diabetes mellitus due to underlying condition (H)      Dry eye syndrome      Goiter      Granulomatous disease (H)      Nonsenile cataract      Peripheral neuropathy      Patient Active Problem List   Diagnosis     Psychological factors associated with another disorder     Mood disorder in conditions classified elsewhere     Occupational problem     Type 2 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, with long-term current use of insulin (H)     Erectile dysfunction     Hyperlipidemia with target LDL less than 100     CTS  (carpal tunnel syndrome)     Diabetic polyneuropathy (H)     Presbyopia     Myopia     Cataracts, bilateral     Pain of right thumb        Past Surgical History:  Past Surgical History:   Procedure Laterality Date     AS RAD RESEC TONSIL/PILLARS Bilateral 1961     COLONOSCOPY  7/29/2013    Procedure: COLONOSCOPY;;  Surgeon: Montana Pascal MD;  Location:  GI     PHACOEMULSIFICATION WITH STANDARD INTRAOCULAR LENS IMPLANT Left 6/21/2019    Procedure: Left Eye Cataract Removal with Intraocular Lens Implant with Intraoperative Avastin Injection;  Surgeon: Lacey Eugene MD;  Location:  OR     [facility]  11/2017     SURGICAL PATHOLOGY EXAM         Family History:   Family History   Problem Relation Age of Onset     Diabetes Father      Myocardial Infarction Father      Diabetes Brother      Leukemia Brother 44     Glaucoma No family hx of      Macular Degeneration No family hx of      Kidney Disease No family hx of          Social History:   Patient was born in [place] and graduated with a degree in engineering. Currently owns 2 businesses.     Physical Exam:  /85   Pulse 81   Temp 97.8  F (36.6  C) (Oral)   Resp 18   Wt 91.1 kg (200 lb 12.8 oz)   BMI 30.53 kg/m     GENERAL APPEARANCE: alert and no distress  EYES: no scleral icterus, pupils equal  HENT: NC/AT  Endocrine: no goiter, no moon facies  Pulmonary: normal work of breathing, no clubbing  CV: WWP   - Edema none  MS: no evidence of inflammation in joints, no muscle tenderness  : no Chamberlain  SKIN: no rash, warm, dry, no cyanosis  NEURO: mentation intact and speech normal     Laboratory:  CMP  Recent Labs   Lab Test 06/26/19  1217 06/18/19  1054 05/28/19  0948 04/10/19  1703 03/13/19  1516 02/06/19  1322 08/01/18  1222 05/24/18  1414 05/01/18  1233 03/06/18  1216 11/01/17  1044  06/06/17  1116 01/14/16  0849    136 138 137 138 138 140  --  141 136  --    < > 141 140   POTASSIUM 4.3 4.3 4.3 4.7 4.5 4.3 4.6  --  4.7 4.0 4.4   < > 3.9 3.9    CHLORIDE 110* 106 108 106 106 107 108  --  110* 105  --    < > 108 106   CO2 23 22 21 22 24 24 24  --  21 23  --    < > 24 28   ANIONGAP 7 8 9 9 7 7 7  --  10 9  --    < > 10 6   * 195* 320* 332* 217* 197* 116*  --  149* 197*  --    < > 96 191*   BUN 19 27 23 23 25 22 23  --  20 21  --    < > 21 15   CR 1.31* 1.41* 1.31* 1.39* 1.20 1.36* 1.31*  --  1.28* 1.33* 1.20   < > 1.26* 1.07   GFRESTIMATED 55* 50* 55* 51* 61 52* 54*  --  56* 53* 60*   < > 57* 69   GFRESTBLACK 63 58* 63 59* 71 61 66  --  67 65 73   < > 69 83   INDERJIT 9.0 9.3 9.4 9.8 9.0 9.3 9.5  --  9.8 8.8  --    < > 9.2 8.8   MAG  --   --   --   --   --   --   --  2.1 2.1  --   --   --   --   --    PHOS  --   --   --   --  3.4 3.9 3.1  --  3.3  --   --   --   --   --    PROTTOTAL  --  7.0  --   --   --   --   --   --   --  7.3  --   --  6.6* 6.8   ALBUMIN  --  3.8  --   --  3.6 3.2* 3.8  --  4.0 3.8  --   --  3.5 3.8   BILITOTAL  --  0.6  --   --   --   --   --   --   --  0.8  --   --  0.7 0.5   ALKPHOS  --  63  --   --   --   --   --   --   --  77  --   --  70 71   AST  --  24  --   --   --   --   --   --   --  25 23  --  20 17   ALT  --  38  --   --   --   --   --   --   --  35 44  --  49 43    < > = values in this interval not displayed.     CBC  Recent Labs   Lab Test 06/18/19  1054 03/13/19  1516 02/06/19  1322 08/01/18  1222 05/01/18  1233   HGB 13.5 13.7 13.8 14.1 14.7   WBC 4.6 5.2 6.1  --  4.6   RBC 4.18* 4.38* 4.41  --  4.50   HCT 38.7* 40.5 41.4  --  42.5   MCV 93 93 94  --  94   MCH 32.3 31.3 31.3  --  32.7   MCHC 34.9 33.8 33.3  --  34.6   RDW 12.6 12.5 11.9  --  12.7   * 131* 225  --  148*     INR  Recent Labs   Lab Test 06/12/13  0949   INR 0.93   PTT 29     ABG  No lab results found.   URINE STUDIES  Recent Labs   Lab Test 05/01/18  1238 06/06/17  1120 03/16/16  1000 01/14/16  0858   COLOR Yellow Yellow Yellow Yellow   APPEARANCE Clear Slightly Cloudy Clear Clear   URINEGLC 50* >499* >1000* 100*   URINEBILI Negative Negative  Negative Negative   URINEKETONE Negative Negative Negative Negative   SG 1.016 1.017 1.028 >1.030   UBLD Negative Negative Trace* Negative   URINEPH 5.0 5.0 6.0 5.5   PROTEIN >499* >499* 100* 100*   UROBILINOGEN  --   --   --  0.2   NITRITE Negative Negative Negative Negative   LEUKEST Negative Negative Negative Negative   RBCU 1 2 1* O - 2   WBCU <1 2 <1* O - 2     Recent Labs   Lab Test 03/13/19  1522 02/06/19  1326 08/01/18  1225 05/01/18  1238   UTPG 3.04* 1.49* 1.37* 1.76*     PTH  Recent Labs   Lab Test 03/13/19  1516 05/01/18  1233   PTHI 42 49     IRON STUDIES   Recent Labs   Lab Test 05/01/18  1233   IRON 83      IRONSAT 24   DEANA 160           I, Kevin Orlando, served as a scribe preparing the chart for the clinic encounter through chart review for the provider team.

## 2019-06-27 DIAGNOSIS — E11.65 TYPE 2 DIABETES MELLITUS WITH HYPERGLYCEMIA, WITH LONG-TERM CURRENT USE OF INSULIN (H): ICD-10-CM

## 2019-06-27 DIAGNOSIS — Z79.4 TYPE 2 DIABETES MELLITUS WITH HYPERGLYCEMIA, WITH LONG-TERM CURRENT USE OF INSULIN (H): ICD-10-CM

## 2019-06-27 RX ORDER — INSULIN DEGLUDEC 100 U/ML
INJECTION, SOLUTION SUBCUTANEOUS
Qty: 60 ML | Refills: 3 | Status: SHIPPED | OUTPATIENT
Start: 2019-06-27 | End: 2019-09-18

## 2019-06-27 NOTE — TELEPHONE ENCOUNTER
RECORDS RECEIVED FROM: bilat hip pain   DATE RECEIVED: 6/27   NOTES STATUS DETAILS   OFFICE NOTE from referring provider Internal Marcio Hare MD   OFFICE NOTE from other specialist N/A    DISCHARGE SUMMARY from hospital N/A    DISCHARGE REPORT from the ER N/A    OPERATIVE REPORT N/A    MEDICATION LIST Internal    IMPLANT RECORD/STICKER N/A    LABS     CBC/DIFF Internal    CULTURES N/A    INJECTIONS DONE IN RADIOLOGY N/A    MRI N/A    CT SCAN N/A    XRAYS (IMAGES & REPORTS) Internal 6/18/19   TUMOR     PATHOLOGY  Slides & report N/A

## 2019-06-27 NOTE — TELEPHONE ENCOUNTER
TRESIBA FLEXTOUCH 100 UNIT/ML pen        Last Written Prescription Date:  05/31/19  Last Fill Quantity: 17.4mL,   # refills: 0  Last Office Visit : 06/19/19  Future Office visit:  09/18/19    Routing refill request to provider for review/approval because:  Insulin - refilled per clinic

## 2019-07-01 NOTE — PROGRESS NOTES
Pt is a 69 year old male here today for: bilateral hip pain    HPI:   Bilateral Hip pain : pain starts when he stands, needs to hold onto something due to pain  Location? Bilateral buttock  Duration? At least 1 year   Injury/ Inciting activity? No   Pop? no   Swelling/Bruising? no   Limited motion? yes   Snapping/ Clicking? possibly   Giving way/ instability? Yes, feel like they might give out   Numbness/Tingling? Yes in bilateral feet (patient does have diabetic neuropathy), patient does complain of cramping in his lower legs  Imaging? Xray 2019, GARCIA lower extremities - 2019  Treatment? No PT, using Tylenol for post-cataract surgery pain    Findings:   AP view of pelvis, frog-leg lateral  views of the each hip were  obtained.   Right hip: No acute osseous abnormality.     Mild degenerative change with preservation of joint space.   Left hip: No acute osseous abnormality.     Mild degenerative change with preservation of joint space.  Others:  Enthesopathic changes of pelvis and trochanters. Degenerative changes  of the visualized lumbar spine.   Pelvic phlebolith. Vascular calcifications. Mineralization overlying  the left greater trochanter, likely hydroxyapatite depositions.                                                                Impression:  1. No acute osseous abnormality.  2. Mild degenerative change of bilateral hips with preservation of  joint spaces.    Impression:      Right le. Resting GARCIA cannot be calculated, due to noncompressible vessels.   2. TBI of 0.85. Normal.  3. Focal segment of elevated measured peak systolic velocity in the  proximal popliteal artery of 660 cm/s, however waveforms are preserved  distal to this. Evaluation of the image of the measured velocity at  this segment suggests that this is artifactual (along the periphery of  the vessel) given the normal distal waveforms, and is likely not truly  representative of stenosis.     Left le. Resting GARCIA cannot  be calculated, due to noncompressible vessels.  2. TBI is 0.88. Normal.  3. Patent arterial Doppler evaluation of the left lower extremity,  without focal hemodynamically significant stenosis.    Past Medical History:   Diagnosis Date     Blepharitis of both eyes      BPH (benign prostatic hyperplasia)      Diabetes (H)      Diabetic neuropathy (H)      Diabetic retinopathy associated with diabetes mellitus due to underlying condition (H)      Dry eye syndrome      Goiter      Granulomatous disease (H)      Nonsenile cataract      Peripheral neuropathy       Past Surgical History:   Procedure Laterality Date     AS RAD RESEC TONSIL/PILLARS Bilateral 1961     COLONOSCOPY  7/29/2013    Procedure: COLONOSCOPY;;  Surgeon: Montana Pascal MD;  Location: [facility]     PHACOEMULSIFICATION WITH STANDARD INTRAOCULAR LENS IMPLANT Left 6/21/2019    Procedure: Left Eye Cataract Removal with Intraocular Lens Implant with Intraoperative Avastin Injection;  Surgeon: Lacey Eugene MD;  Location:  OR     [facility][note 3]  11/2017     SURGICAL PATHOLOGY EXAM        Current Outpatient Medications   Medication Sig Dispense Refill     albuterol (VENTOLIN HFA) 108 (90 Base) MCG/ACT inhaler Inhale 2 puffs into the lungs every 6 hours 18 g 3     ARTIFICIAL TEAR OP Apply to eye as needed       aspirin 81 MG tablet Take 1 tablet by mouth daily.       atorvastatin (LIPITOR) 20 MG tablet Take 1 tablet (20 mg) by mouth daily * LAB/LDL DUE 90 tablet 0     blood glucose (NO BRAND SPECIFIED) lancets standard Lancets that go with device, Test 3 times daily 300 each 3     blood glucose monitoring (NO BRAND SPECIFIED) meter device kit Any meter covered by insurance, not store brand, use as directed. 1 kit 0     blood glucose monitoring (NO BRAND SPECIFIED) test strip Strips that go with meter, covered by insurance. Test 3 times daily 300 strip 3     Blood Pressure Monitor KIT Automatic Blood Pressure Monitor 1 kit 0     Blood Pressure Monitor KIT Check  "blood pressure as directed. 1 kit 0     clobetasol (TEMOVATE) 0.05 % ointment Apply topically to legs twice daily for 2 weeks.  Then discontinue 30 g 0     clotrimazole (LOTRIMIN) 1 % cream Apply topically 2 times daily 30 g 3     Continuous Blood Gluc  (FREESTYLE PETER READER) JUANCARLOS 1 Device daily 1 Device 0     Continuous Blood Gluc Sensor (FREESTYLE PETER 14 DAY SENSOR) MISC 1 applicator every 14 days 8 each 3     erythromycin (ROMYCIN) 5 MG/GM ophthalmic ointment Place 0.5 inches Into the left eye 3 times daily 1 g 0     fenofibrate 54 MG tablet Take 1 tablet (54 mg) by mouth daily 90 tablet 0     finasteride (PROSCAR) 5 MG tablet Take 1 tablet (5 mg) by mouth daily 90 tablet 1     gabapentin (NEURONTIN) 100 MG capsule Take 1 capsule (100 mg) by mouth 3 times daily 90 capsule 3     HUMALOG KWIKPEN 100 UNIT/ML soln Inject 10 units with breakfast, 8 units with lunch and 10 units with dinner, plus correction. Pt uses approx 60 units in 24 hrs. 60 mL 3     insulin pen needle (B-D U/F) 31G X 5 MM Use 4 times per day.  Please dispense as BD Pen Needle Mini U/F 31G x 5  each 3     insulin syringe 31G X 5/16\" 0.5 ML MISC Use three syringes daily 270 each 1     ketorolac (ACULAR) 0.5 % ophthalmic solution 1 drop in surgical eye as directed - start 1 week after surgery, 4x daily until follow-up visit 1 Bottle 1     loratadine (CLARITIN) 10 MG tablet Take 1 tablet (10 mg) by mouth daily 90 tablet 0     losartan (COZAAR) 100 MG tablet Take 1 tablet (100 mg) by mouth At Bedtime 30 tablet 11     losartan (COZAAR) 25 MG tablet Take 1 tablet (25 mg) by mouth daily With a 50mg tablet for total dose of 75mg daily 30 tablet 11     losartan (COZAAR) 50 MG tablet Take 1 tablet (50 mg) by mouth daily With 25mg tablet for a total dose of 75mg daily 30 tablet 11     metFORMIN (GLUCOPHAGE) 1000 MG tablet 1 tab twice daily with meals 180 tablet 3     mupirocin (BACTROBAN) 2 % cream Apply  topically. In very small amounts " only as needed 15 g 1     NOVOLOG FLEXPEN 100 UNIT/ML soln Inject 10 units with breakfast, 8 units with lunch and 10 units with dinner, plus correction. Pt uses approx 60 units in 24 hrs. 60 mL 3     ofloxacin (OCUFLOX) 0.3 % ophthalmic solution 1 drop 4x daily in the surgical eye for 1 week after surgery, then stop 1 Bottle 0     Omega-3 Fatty Acids (OMEGA-3 FISH OIL) 1000 MG CAPS Take 1 capsule (1 g) by mouth 2 times daily 60 capsule 11     ONETOUCH ULTRA test strip Use to test blood sugar 3 times daily 300 strip 3     prednisoLONE acetate (PRED FORTE) 1 % ophthalmic suspension 1 drop in surgical eye as directed, 4x daily after surgery until follow-up 1 Bottle 1     ranitidine (ZANTAC) 300 MG tablet Take 1 tablet (300 mg) by mouth At Bedtime 90 tablet 3     senna-docusate (SENOKOT-S/PERICOLACE) 8.6-50 MG tablet Take 1 tablet by mouth       sodium bicarbonate 650 MG tablet Take 1 tablet (650 mg) by mouth 3 times daily 90 tablet 11     tamsulosin (FLOMAX) 0.4 MG capsule Take 1 capsule (0.4 mg) by mouth daily 90 capsule 1     TRESIBA FLEXTOUCH 100 UNIT/ML pen INJECT 58 UNITS UNDER SKIN DAILY. 60 mL 3     triamcinolone (KENALOG) 0.1 % cream Apply topically 3 times daily 80 g 0     vitamin D3 (CHOLECALCIFEROL) 1000 units (25 mcg) tablet Take 2 tablets (2,000 Units) by mouth daily 30 tablet 11      No Known Allergies   Social History     Tobacco Use     Smoking status: Never Smoker     Smokeless tobacco: Never Used   Substance Use Topics     Alcohol use: Yes     Comment: occasionaly     Drug use: No      Family History   Problem Relation Age of Onset     Diabetes Father      Myocardial Infarction Father      Diabetes Brother      Leukemia Brother 44     Glaucoma No family hx of      Macular Degeneration No family hx of      Kidney Disease No family hx of       ROS:   Gen- no fevers/chills   Rheum - no morning stiffness   Derm - no rash/ redness   Neuro - no numbness, no tingling   Remainder of ROS negative.     Exam:  "  Resp 18   Ht 1.727 m (5' 8\")   Wt 91.1 kg (200 lb 13.4 oz)   BMI 30.54 kg/m         Bilateral Hips:  Full ROM  Neg FADIR  +TTP at trochanteric bursa and glut tendon insertion    Back:  No pain w/ extension  Limited flexion due to hamstring tightness  ?Slump b/l with some reproduction of pain down legs      (M79.604,  M79.605,  M79.671,  M79.672) Bilateral leg and foot pain  (primary encounter diagnosis)  Comment: unclear etiology; neg eval for vascular claudication; will check EMG to help tease apart underlying diabetic neuropathy from possible lumbar source; could have stenosis/neurogenic claudication; will rehab back and hip; f/u after EMG  Plan: EMG ([facility]), PHYSICAL THERAPY REFERRAL (Internal)            (M70.61,  M70.62) Trochanteric bursitis of both hips  Comment: rehab for hip bursitis; would not do cortisone injection at this time due to poorly-controlled DM  Plan: PHYSICAL THERAPY REFERRAL (Internal)            (G47.62) Nocturnal leg cramps  Comment: will check labs  Plan: Magnesium, Ferritin, Calcium ionized            Charles Rivera MD  July 2, 2019  9:10 AM    "

## 2019-07-02 ENCOUNTER — TELEPHONE (OUTPATIENT)
Dept: SURGERY | Facility: CLINIC | Age: 69
End: 2019-07-02

## 2019-07-02 ENCOUNTER — PRE VISIT (OUTPATIENT)
Dept: ORTHOPEDICS | Facility: CLINIC | Age: 69
End: 2019-07-02

## 2019-07-02 ENCOUNTER — OFFICE VISIT (OUTPATIENT)
Dept: ORTHOPEDICS | Facility: CLINIC | Age: 69
End: 2019-07-02
Attending: INTERNAL MEDICINE
Payer: COMMERCIAL

## 2019-07-02 VITALS — HEIGHT: 68 IN | WEIGHT: 200.84 LBS | RESPIRATION RATE: 18 BRPM | BODY MASS INDEX: 30.44 KG/M2

## 2019-07-02 DIAGNOSIS — G47.62 NOCTURNAL LEG CRAMPS: ICD-10-CM

## 2019-07-02 DIAGNOSIS — M70.61 TROCHANTERIC BURSITIS OF BOTH HIPS: ICD-10-CM

## 2019-07-02 DIAGNOSIS — M79.604 BILATERAL LEG AND FOOT PAIN: Primary | ICD-10-CM

## 2019-07-02 DIAGNOSIS — M70.62 TROCHANTERIC BURSITIS OF BOTH HIPS: ICD-10-CM

## 2019-07-02 DIAGNOSIS — M79.605 BILATERAL LEG AND FOOT PAIN: Primary | ICD-10-CM

## 2019-07-02 DIAGNOSIS — M79.671 BILATERAL LEG AND FOOT PAIN: Primary | ICD-10-CM

## 2019-07-02 DIAGNOSIS — M79.672 BILATERAL LEG AND FOOT PAIN: Primary | ICD-10-CM

## 2019-07-02 LAB
CA-I SERPL ISE-MCNC: 4.9 MG/DL (ref 4.4–5.2)
FERRITIN SERPL-MCNC: 178 NG/ML (ref 26–388)
MAGNESIUM SERPL-MCNC: 1.9 MG/DL (ref 1.6–2.3)

## 2019-07-02 ASSESSMENT — MIFFLIN-ST. JEOR: SCORE: 1645.5

## 2019-07-02 NOTE — TELEPHONE ENCOUNTER
Pre Visit Call and Assessment    Date of call:  07/02/2019    Phone numbers:  Cell number on file:    Telephone Information:   Mobile [phone]       Reached patient/confirmed appointment:  No - left message:   on voicemail  Patient care team/Primary provider:  Marcio Hare    Referred to:  Dr. Johana Choudhury    Reason for visit:  Lipoma consult

## 2019-07-02 NOTE — LETTER
2019      RE: [facility]  [address] [place] [address]       Pt is a 69 year old male here today for: bilateral hip pain    HPI:   Bilateral Hip pain : pain starts when he stands, needs to hold onto something due to pain  Location? Bilateral buttock  Duration? At least 1 year   Injury/ Inciting activity? No   Pop? no   Swelling/Bruising? no   Limited motion? yes   Snapping/ Clicking? possibly   Giving way/ instability? Yes, feel like they might give out   Numbness/Tingling? Yes in bilateral feet (patient does have diabetic neuropathy), patient does complain of cramping in his lower legs  Imaging? Xray 2019, GARCIA lower extremities - 2019  Treatment? No PT, using Tylenol for post-cataract surgery pain    Findings:   AP view of pelvis, frog-leg lateral  views of the each hip were  obtained.   Right hip: No acute osseous abnormality.     Mild degenerative change with preservation of joint space.   Left hip: No acute osseous abnormality.     Mild degenerative change with preservation of joint space.  Others:  Enthesopathic changes of pelvis and trochanters. Degenerative changes  of the visualized lumbar spine.   Pelvic phlebolith. Vascular calcifications. Mineralization overlying  the left greater trochanter, likely hydroxyapatite depositions.                                                                Impression:  1. No acute osseous abnormality.  2. Mild degenerative change of bilateral hips with preservation of  joint spaces.    Impression:      Right le. Resting GARCIA cannot be calculated, due to noncompressible vessels.   2. TBI of 0.85. Normal.  3. Focal segment of elevated measured peak systolic velocity in the  proximal popliteal artery of 660 cm/s, however waveforms are preserved  distal to this. Evaluation of the image of the measured velocity at  this segment suggests that this is artifactual (along the periphery of  the vessel) given the normal distal waveforms, and is  likely not truly  representative of stenosis.     Left le. Resting GARCIA cannot be calculated, due to noncompressible vessels.  2. TBI is 0.88. Normal.  3. Patent arterial Doppler evaluation of the left lower extremity,  without focal hemodynamically significant stenosis.    Past Medical History:   Diagnosis Date     Blepharitis of both eyes      BPH (benign prostatic hyperplasia)      Diabetes (H)      Diabetic neuropathy (H)      Diabetic retinopathy associated with diabetes mellitus due to underlying condition (H)      Dry eye syndrome      Goiter      Granulomatous disease (H)      Nonsenile cataract      Peripheral neuropathy       Past Surgical History:   Procedure Laterality Date     AS RAD RESEC TONSIL/PILLARS Bilateral      COLONOSCOPY  2013    Procedure: COLONOSCOPY;;  Surgeon: Montana Pascal MD;  Location:  GI     PHACOEMULSIFICATION WITH STANDARD INTRAOCULAR LENS IMPLANT Left 2019    Procedure: Left Eye Cataract Removal with Intraocular Lens Implant with Intraoperative Avastin Injection;  Surgeon: Lacey Eugene MD;  [facility]  [facility]     SURGICAL PATHOLOGY EXAM        Current Outpatient Medications   Medication Sig Dispense Refill     albuterol (VENTOLIN HFA) 108 (90 Base) MCG/ACT inhaler Inhale 2 puffs into the lungs every 6 hours 18 g 3     ARTIFICIAL TEAR OP Apply to eye as needed       aspirin 81 MG tablet Take 1 tablet by mouth daily.       atorvastatin (LIPITOR) 20 MG tablet Take 1 tablet (20 mg) by mouth daily * LAB/LDL DUE 90 tablet 0     blood glucose (NO BRAND SPECIFIED) lancets standard Lancets that go with device, Test 3 times daily 300 each 3     blood glucose monitoring (NO BRAND SPECIFIED) meter device kit Any meter covered by insurance, not store brand, use as directed. 1 kit 0     blood glucose monitoring (NO BRAND SPECIFIED) test strip Strips that go with meter, covered by insurance. Test 3 times daily 300 strip 3     Blood Pressure  "Monitor KIT Automatic Blood Pressure Monitor 1 kit 0     Blood Pressure Monitor KIT Check blood pressure as directed. 1 kit 0     clobetasol (TEMOVATE) 0.05 % ointment Apply topically to legs twice daily for 2 weeks.  Then discontinue 30 g 0     clotrimazole (LOTRIMIN) 1 % cream Apply topically 2 times daily 30 g 3     Continuous Blood Gluc  (FREESTYLE PETER READER) JUANCARLOS 1 Device daily 1 Device 0     Continuous Blood Gluc Sensor (FREESTYLE PETER 14 DAY SENSOR) MISC 1 applicator every 14 days 8 each 3     erythromycin (ROMYCIN) 5 MG/GM ophthalmic ointment Place 0.5 inches Into the left eye 3 times daily 1 g 0     fenofibrate 54 MG tablet Take 1 tablet (54 mg) by mouth daily 90 tablet 0     finasteride (PROSCAR) 5 MG tablet Take 1 tablet (5 mg) by mouth daily 90 tablet 1     gabapentin (NEURONTIN) 100 MG capsule Take 1 capsule (100 mg) by mouth 3 times daily 90 capsule 3     HUMALOG KWIKPEN 100 UNIT/ML soln Inject 10 units with breakfast, 8 units with lunch and 10 units with dinner, plus correction. Pt uses approx 60 units in 24 hrs. 60 mL 3     insulin pen needle (B-D U/F) 31G X 5 MM Use 4 times per day.  Please dispense as BD Pen Needle Mini U/F 31G x 5  each 3     insulin syringe 31G X 5/16\" 0.5 ML MISC Use three syringes daily 270 each 1     ketorolac (ACULAR) 0.5 % ophthalmic solution 1 drop in surgical eye as directed - start 1 week after surgery, 4x daily until follow-up visit 1 Bottle 1     loratadine (CLARITIN) 10 MG tablet Take 1 tablet (10 mg) by mouth daily 90 tablet 0     losartan (COZAAR) 100 MG tablet Take 1 tablet (100 mg) by mouth At Bedtime 30 tablet 11     losartan (COZAAR) 25 MG tablet Take 1 tablet (25 mg) by mouth daily With a 50mg tablet for total dose of 75mg daily 30 tablet 11     losartan (COZAAR) 50 MG tablet Take 1 tablet (50 mg) by mouth daily With 25mg tablet for a total dose of 75mg daily 30 tablet 11     metFORMIN (GLUCOPHAGE) 1000 MG tablet 1 tab twice daily with meals " 180 tablet 3     mupirocin (BACTROBAN) 2 % cream Apply  topically. In very small amounts only as needed 15 g 1     NOVOLOG FLEXPEN 100 UNIT/ML soln Inject 10 units with breakfast, 8 units with lunch and 10 units with dinner, plus correction. Pt uses approx 60 units in 24 hrs. 60 mL 3     ofloxacin (OCUFLOX) 0.3 % ophthalmic solution 1 drop 4x daily in the surgical eye for 1 week after surgery, then stop 1 Bottle 0     Omega-3 Fatty Acids (OMEGA-3 FISH OIL) 1000 MG CAPS Take 1 capsule (1 g) by mouth 2 times daily 60 capsule 11     ONETOUCH ULTRA test strip Use to test blood sugar 3 times daily 300 strip 3     prednisoLONE acetate (PRED FORTE) 1 % ophthalmic suspension 1 drop in surgical eye as directed, 4x daily after surgery until follow-up 1 Bottle 1     ranitidine (ZANTAC) 300 MG tablet Take 1 tablet (300 mg) by mouth At Bedtime 90 tablet 3     senna-docusate (SENOKOT-S/PERICOLACE) 8.6-50 MG tablet Take 1 tablet by mouth       sodium bicarbonate 650 MG tablet Take 1 tablet (650 mg) by mouth 3 times daily 90 tablet 11     tamsulosin (FLOMAX) 0.4 MG capsule Take 1 capsule (0.4 mg) by mouth daily 90 capsule 1     TRESIBA FLEXTOUCH 100 UNIT/ML pen INJECT 58 UNITS UNDER SKIN DAILY. 60 mL 3     triamcinolone (KENALOG) 0.1 % cream Apply topically 3 times daily 80 g 0     vitamin D3 (CHOLECALCIFEROL) 1000 units (25 mcg) tablet Take 2 tablets (2,000 Units) by mouth daily 30 tablet 11      No Known Allergies   Social History     Tobacco Use     Smoking status: Never Smoker     Smokeless tobacco: Never Used   Substance Use Topics     Alcohol use: Yes     Comment: occasionaly     Drug use: No      Family History   Problem Relation Age of Onset     Diabetes Father      Myocardial Infarction Father      Diabetes Brother      Leukemia Brother 44     Glaucoma No family hx of      Macular Degeneration No family hx of      Kidney Disease No family hx of       ROS:   Gen- no fevers/chills   Rheum - no morning stiffness   Derm - no  "rash/ redness   Neuro - no numbness, no tingling   Remainder of ROS negative.     Exam:   Resp 18   Ht 1.727 m (5' 8\")   Wt 91.1 kg (200 lb 13.4 oz)   BMI 30.54 kg/m          Bilateral Hips:  Full ROM  Neg FADIR  +TTP at trochanteric bursa and glut tendon insertion    Back:  No pain w/ extension  Limited flexion due to hamstring tightness  ?Slump b/l with some reproduction of pain down legs      (M79.604,  M79.605,  M79.671,  M79.672) Bilateral leg and foot pain  (primary encounter diagnosis)  Comment: unclear etiology; neg eval for vascular claudication; will check EMG to help tease apart underlying diabetic neuropathy from possible lumbar source; could have stenosis/neurogenic claudication; will rehab back and hip; f/u after EMG  Plan: EMG ([facility]), PHYSICAL THERAPY REFERRAL (Internal)            (M70.61,  M70.62) Trochanteric bursitis of both hips  Comment: rehab for hip bursitis; would not do cortisone injection at this time due to poorly-controlled DM  Plan: PHYSICAL THERAPY REFERRAL (Internal)            (G47.62) Nocturnal leg cramps  Comment: will check labs  Plan: Magnesium, Ferritin, Calcium ionized            Charles Rivera MD  July 2, 2019  9:10 AM        "

## 2019-07-03 ENCOUNTER — TELEPHONE (OUTPATIENT)
Dept: SURGERY | Facility: CLINIC | Age: 69
End: 2019-07-03

## 2019-07-03 ENCOUNTER — PATIENT OUTREACH (OUTPATIENT)
Dept: SURGERY | Facility: CLINIC | Age: 69
End: 2019-07-03

## 2019-07-03 ENCOUNTER — OFFICE VISIT (OUTPATIENT)
Dept: OPHTHALMOLOGY | Facility: CLINIC | Age: 69
End: 2019-07-03
Attending: OPHTHALMOLOGY
Payer: COMMERCIAL

## 2019-07-03 ENCOUNTER — ANCILLARY PROCEDURE (OUTPATIENT)
Dept: ULTRASOUND IMAGING | Facility: CLINIC | Age: 69
End: 2019-07-03
Attending: SURGERY
Payer: COMMERCIAL

## 2019-07-03 ENCOUNTER — ANCILLARY PROCEDURE (OUTPATIENT)
Dept: MRI IMAGING | Facility: CLINIC | Age: 69
End: 2019-07-03
Attending: UROLOGY
Payer: COMMERCIAL

## 2019-07-03 ENCOUNTER — OFFICE VISIT (OUTPATIENT)
Dept: SURGERY | Facility: CLINIC | Age: 69
End: 2019-07-03
Payer: COMMERCIAL

## 2019-07-03 VITALS
WEIGHT: 200 LBS | HEART RATE: 81 BPM | OXYGEN SATURATION: 98 % | BODY MASS INDEX: 30.31 KG/M2 | TEMPERATURE: 97.5 F | HEIGHT: 68 IN | DIASTOLIC BLOOD PRESSURE: 68 MMHG | SYSTOLIC BLOOD PRESSURE: 134 MMHG

## 2019-07-03 DIAGNOSIS — H35.352 CYSTOID MACULAR EDEMA, LEFT: ICD-10-CM

## 2019-07-03 DIAGNOSIS — R09.89 OTHER SPECIFIED SYMPTOMS AND SIGNS INVOLVING THE CIRCULATORY AND RESPIRATORY SYSTEMS: ICD-10-CM

## 2019-07-03 DIAGNOSIS — Z98.890 POSTSURGICAL STATE, EYE: Primary | ICD-10-CM

## 2019-07-03 DIAGNOSIS — R22.9 LOCALIZED SUPERFICIAL SWELLING, MASS, OR LUMP: Primary | ICD-10-CM

## 2019-07-03 DIAGNOSIS — M79.89 SOFT TISSUE MASS: ICD-10-CM

## 2019-07-03 DIAGNOSIS — M79.89 SOFT TISSUE MASS: Primary | ICD-10-CM

## 2019-07-03 PROCEDURE — G0463 HOSPITAL OUTPT CLINIC VISIT: HCPCS | Mod: ZF

## 2019-07-03 PROCEDURE — 92134 CPTRZ OPH DX IMG PST SGM RTA: CPT | Mod: ZF | Performed by: OPHTHALMOLOGY

## 2019-07-03 RX ORDER — PREDNISOLONE ACETATE 10 MG/ML
1 SUSPENSION/ DROPS OPHTHALMIC
Qty: 1 BOTTLE | Refills: 3 | Status: SHIPPED | OUTPATIENT
Start: 2019-07-03 | End: 2019-07-03

## 2019-07-03 RX ORDER — MOXIFLOXACIN 5 MG/ML
1 SOLUTION/ DROPS OPHTHALMIC
Qty: 1 BOTTLE | Refills: 3 | Status: SHIPPED | OUTPATIENT
Start: 2019-07-03 | End: 2019-12-24

## 2019-07-03 RX ORDER — PREDNISOLONE ACETATE 10 MG/ML
1 SUSPENSION/ DROPS OPHTHALMIC
Qty: 1 BOTTLE | Refills: 3 | Status: SHIPPED | OUTPATIENT
Start: 2019-07-03 | End: 2019-12-24

## 2019-07-03 RX ORDER — NEOMYCIN SULFATE, POLYMYXIN B SULFATE, AND DEXAMETHASONE 3.5; 10000; 1 MG/G; [USP'U]/G; MG/G
0.5 OINTMENT OPHTHALMIC AT BEDTIME
Qty: 1 TUBE | Refills: 0 | Status: SHIPPED | OUTPATIENT
Start: 2019-07-03 | End: 2019-12-24

## 2019-07-03 RX ORDER — GADOBUTROL 604.72 MG/ML
10 INJECTION INTRAVENOUS ONCE
Status: COMPLETED | OUTPATIENT
Start: 2019-07-03 | End: 2019-07-03

## 2019-07-03 RX ADMIN — GADOBUTROL 9 ML: 604.72 INJECTION INTRAVENOUS at 17:40

## 2019-07-03 ASSESSMENT — VISUAL ACUITY
OD_SC: 20/50
OS_SC: 20/250
METHOD: SNELLEN - LINEAR

## 2019-07-03 ASSESSMENT — MIFFLIN-ST. JEOR: SCORE: 1641.69

## 2019-07-03 ASSESSMENT — SLIT LAMP EXAM - LIDS: COMMENTS: NORMAL

## 2019-07-03 ASSESSMENT — TONOMETRY
OD_IOP_MMHG: 17
IOP_METHOD: TONOPEN
OS_IOP_MMHG: 17

## 2019-07-03 ASSESSMENT — CUP TO DISC RATIO: OS_RATIO: 0.3

## 2019-07-03 NOTE — NURSING NOTE
"  Chief Complaint   Patient presents with     Consult     New consult dh Back lipoma     Vitals:    07/03/19 0719   BP: 134/68   Pulse: 81   Temp: 97.5  F (36.4  C)   TempSrc: Oral   SpO2: 98%   Weight: 90.7 kg (200 lb)   Height: 1.727 m (5' 8\")     Body mass index is 30.41 kg/m .  Ruslan Ruth CMA    "

## 2019-07-03 NOTE — NURSING NOTE
Chief Complaints and History of Present Illnesses   Patient presents with     Post Op (Ophthalmology) Left Eye     Chief Complaint(s) and History of Present Illness(es)     Post Op (Ophthalmology) Left Eye     Laterality: left eye    Onset: gradual    Onset: weeks ago    Quality: fluctuating (Blurry va)    Frequency: constantly    Associated symptoms: eye pain, redness and photophobia.  Negative for headache (+pressure)    Pain scale: 9/10              Comments     BS avg   Lab Results       Component                Value               Date                       A1C                      9.2                 06/18/2019                 A1C                      9.1                 03/06/2018                 A1C                      10.2                06/06/2017                 A1C                      9.0                 03/16/2016                 A1C                      10.2                09/09/2014            Brianna Leung COT 8:46 AM July 3, 2019

## 2019-07-03 NOTE — PATIENT INSTRUCTIONS
You met with Dr. Johana Choudhury.      Today's visit instructions:    Dr. Choudhury would like you to undergo an ultrasound.  We will call you with results and the recommendations.    If you have questions please contact Clarence RN or Sarai RN during regular clinic hours, Monday through Friday 7:30 AM - 4:00 PM, or you can contact us via [facility] at anytime.       If you have urgent needs after-hours, weekends, or holidays please call the hospital at [phone] and ask to speak with our on-call General Surgery Team.    Appointment [phone], option #1   Nurse Advice (Clarence or Sarai): [phone]   Surgery Scheduler (Anastasia): [phone]  Fax: [phone]

## 2019-07-03 NOTE — PROGRESS NOTES
POW#1, status post cataract surgery, left eye    He notes the vision is more blurred and the eye continues to be painful. He stopped using the ofloxacin as directed, but also did not use the erythromycin ointment and lost his bottle of prednisolone, so has been only using ATs. He is taking Tylenol 3 times a day.    Impression/Plan:  Pseudophakia, OS: POW1, 360 bullous SHRUTHI now resolved. But with increased post-operative inflammation and corneal edema with decreased VA. Perilimbal subepi corneal haze/infiltrate temporal - looks inflammatory/staph marginal, but will watch closely. Vitreous without cell. Mac OCT with DME, but looks stable from prior to surgery. Less likely endophthalmitis, but will also watch closely.    Re-start prednisolone Q1hour while awake  Change to Vigamox Q1hour while awake  Maxitrol ointment at bedtime    Recheck in 2 days.     Instructed patient to contact us for decreasing vision, eye pain, new floaters or flashes of light or other concerning symptoms.    Teaching statement:  Complete documentation of historical and exam elements from today's encounter can be found in the full encounter summary report (not reduplicated in this progress note). I personally obtained the chief complaint(s) and history of present illness.  I confirmed and edited as necessary the review of systems, past medical/surgical history, family history, social history, and examination findings as documented by others; and I examined the patient myself. I personally reviewed the relevant tests, images, and reports as documented above.     I formulated and edited as necessary the assessment and plan and discussed the findings and management plan with the patient and family.    Lacey Eugene MD  Comprehensive Ophthalmology & Ocular Pathology  Department of Ophthalmology and Visual Neurosciences  [email or web address]  Pager [phone]

## 2019-07-03 NOTE — PROGRESS NOTES
Called and spoke with patient and his wife regarding the US results. His questions were answered. He states he is in no hurry to have this done. He would prefer to schedule in the fall. Patient would like to be contacted mid-August to discuss date.        Findings:   There is a 4.4 x 1.9 cm encapsulated mass in the subcutaneous fat just  superficial to the posterior right shoulder musculature demonstrating  echogenicity similar to that of adjacent subcutaneous fat. This lesion  is avascular. No fluid collection within the lesion.                                                                      Impression: 4.4 x 1.9 cm lipoma in the subcutaneous fat of the  posterior right shoulder.     I have personally reviewed the examination and initial interpretation  and I agree with the findings.     DO Coy PEÑA Ruth Finnigsmier, Andrea, RN             He would like to know his ultrasound results before scheduling. Could you call him?     Thanks,   Anastasia

## 2019-07-03 NOTE — TELEPHONE ENCOUNTER
This writer called the patient to discuss scheduling and he would like to have his ultrasound results before scheduling. This writer will ask Dr. Johana Cohudhury's nurse about the imaging results.

## 2019-07-03 NOTE — LETTER
"7/3/2019       RE: Earle Rene  [address] [facility] [address]     Dear Colleague,    Thank you for referring your patient, Earle Rene, to the [facility] GENERAL SURGERY at [facility]. Please see a copy of my visit note below.    Surgery Clinic Note    CC:  Lipoma- back    HPI:  Earle Rene is a 69M who presents for evaluation of a right posterior shoulder mass.  He states that he first noticed the mass in the last few weeks ago after undergoing massage for shoulder pain. Does not know if this has gotten bigger or stayed the same size.  Would like to consider excision due to ongoing shoulder pain, but has reservation about undergoing surgery with general anesthesia.  (due to DM, comorbidities)    PE:  /68   Pulse 81   Temp 97.5  F (36.4  C) (Oral)   Ht 1.727 m (5' 8\")   Wt 90.7 kg (200 lb)   SpO2 98%   BMI 30.41 kg/m     RRR  CTAB  Right shoulder- firm, nodular mass 5x3 cm or right posterior shoulder (suprascapular region), mobile, non tender, no overlying skin changes.    A/P:  Right posterior shoulder lipoma  Will schedule for US to assess dimension today  Depending on imaging results, will schedule for elective lipoma excision at patient's convenience- local anesthetic, anticipated procedure time ~ 1 hour.    Johana Choudhury  9:22 AM    "

## 2019-07-03 NOTE — PROGRESS NOTES
"Surgery Clinic Note    CC:  Lipoma- back    HPI:  Earle Rene is a 69M who presents for evaluation of a right posterior shoulder mass.  He states that he first noticed the mass in the last few weeks ago after undergoing massage for shoulder pain. Does not know if this has gotten bigger or stayed the same size.  Would like to consider excision due to ongoing shoulder pain, but has reservation about undergoing surgery with general anesthesia.  (due to DM, comorbidities)      PE:  /68   Pulse 81   Temp 97.5  F (36.4  C) (Oral)   Ht 1.727 m (5' 8\")   Wt 90.7 kg (200 lb)   SpO2 98%   BMI 30.41 kg/m    RRR  CTAB  Right shoulder- firm, nodular mass 5x3 cm or right posterior shoulder (suprascapular region), mobile, non tender, no overlying skin changes.      A/P:  Right posterior shoulder lipoma  Will schedule for US to assess dimension today  Depending on imaging results, will schedule for elective lipoma excision at patient's convenience- local anesthetic, anticipated procedure time ~ 1 hour.    Johana Choudhury  9:22 AM        "

## 2019-07-03 NOTE — DISCHARGE INSTRUCTIONS
MRI Contrast Discharge Instructions    The IV contrast you received today will pass out of your body in your  urine. This will happen in the next 24 hours. You will not feel this process.  Your urine will not change color.    Drink at least 4 extra glasses of water or juice today (unless your doctor  has restricted your fluids). This reduces the stress on your kidneys.  You may take your regular medicines.    If you are on dialysis: It is best to have dialysis today.    If you have a reaction: Most reactions happen right away. If you have  any new symptoms after leaving the hospital (such as hives or swelling),  call your hospital at the correct number below. Or call your family doctor.  If you have breathing distress or wheezing, call 911.    Special instructions: ***    I have read and understand the above information.    Signature:______________________________________ Date:___________    Staff:__________________________________________ Date:___________     Time:__________    [place] Radiology Departments:    ___[place]: [phone]  ___[facility]: [phone]  ___[place]: [phone] ___[facility]: [phone]  ___[facility]: [phone]  ___[facility]: [phone]  ___[phone]  ___[facility]: [phone]  ___[phone]

## 2019-07-03 NOTE — PROGRESS NOTES
After Your Mass/Lump Removal       Incision care     You may shower 24 hours after surgery. Carefully wash your incision with soap and water. Do not submerge yourself in water (bath, whirlpool, hot tub, pool, lake) for 14 days after surgery.     Remove the gauze bandage 24 hours after surgery, but leave the medical tape (Steri-Strips) or glue in place. These will loosen and fall off on their own 5 to 7 days after surgery.       Always wash your hands before touching your incisions or removing bandages.     It is not unusual to form a collection of fluid or blood under your incision that may feel firm or squishy- it can take several weeks to months for your body to reabsorb it.  At times, it may even drain.  If that should happen keep the area clean with soap, water,  and cover with a clean gauze dressing. You can change this daily or as needed.     Other medicines     Wait to start aspirin or blood thinners until the day after surgery. You can continue your regular medicines at your normal time the day after surgery.        For pain or discomfort     Please use over-the-counter medication, use acetaminophen (Tylenol) or ibuprofen (Advil, Motrin) as instructed on the box.      Use an ice pack on your surgical cut (incision) for 20 minutes at a time as needed for the first 24 hours. Be sure to protect your skin by putting a cloth between the ice pack and your skin.      Activities   No restrictions.     Diet   You can eat your regular meals after surgery.      Results   You should know any test results about 5 to 7 business days after surgery       When to call the doctor   Call your doctor if you have:     A fever above 101 F (38.3 C) (taken under the tongue), or a fever or chills lasting more than a day.     Redness at the incision site.     Any fluid or blood draining from the incision, especially if it smells bad.      Severe pain that doesn t improve with pain medicine.      We will call you 2 to 4 days after  surgery to review this handout, answer questions and help arrange after-surgery care. If you have questions or concerns, please call [phone] during regular office hours. If you need to call after business hours, call [phone] and ask to page the surgeon on-call.

## 2019-07-03 NOTE — TELEPHONE ENCOUNTER
----- Message from Clarence Akhtar RN sent at 7/3/2019 10:37 AM CDT -----  Please watch for worksheet. Patient was left a message by VALERIANO that you would be calling to help schedule.    Can you please print out my instructions note as well? And I think he will need soap.    Thanks,  Clarence

## 2019-07-05 ENCOUNTER — OFFICE VISIT (OUTPATIENT)
Dept: OPHTHALMOLOGY | Facility: CLINIC | Age: 69
End: 2019-07-05
Attending: OPHTHALMOLOGY
Payer: COMMERCIAL

## 2019-07-05 DIAGNOSIS — Z98.890 POSTSURGICAL STATE, EYE: Primary | ICD-10-CM

## 2019-07-05 DIAGNOSIS — H35.352 CYSTOID MACULAR EDEMA, LEFT: ICD-10-CM

## 2019-07-05 DIAGNOSIS — Z96.1 PSEUDOPHAKIA, LEFT EYE: ICD-10-CM

## 2019-07-05 PROCEDURE — G0463 HOSPITAL OUTPT CLINIC VISIT: HCPCS | Mod: ZF

## 2019-07-05 PROCEDURE — 92134 CPTRZ OPH DX IMG PST SGM RTA: CPT | Mod: ZF | Performed by: OPHTHALMOLOGY

## 2019-07-05 ASSESSMENT — VISUAL ACUITY
OD_SC: 20/60
OS_SC: 20/250
METHOD: SNELLEN - LINEAR
OD_SC+: -2

## 2019-07-05 ASSESSMENT — TONOMETRY
OD_IOP_MMHG: 15
IOP_METHOD: TONOPEN
OS_IOP_MMHG: 14

## 2019-07-05 ASSESSMENT — CUP TO DISC RATIO: OS_RATIO: 0.3

## 2019-07-05 ASSESSMENT — SLIT LAMP EXAM - LIDS: COMMENTS: NORMAL

## 2019-07-05 NOTE — PROGRESS NOTES
POW#2, status post cataract surgery, left eye    He notes the vision is about the same as Wednesday, still blurred, and the eye continues to be painful but controlled with Tylenol. He has been using the Vigamox and Pred as often as he can (Q1hr WA instructed) with Maxitrol at bedtime.    Impression/Plan:  Pseudophakia, OS: POW1, 360 bullous SHRUTHI now resolved. But with increased post-operative inflammation and corneal edema with decreased VA after self-DCing all drops about 5 days post-op. Today, perilimbal subepi corneal haze/infiltrate temporal - looks inflammatory/staph marginal, no epi defect, stable to slightly smaller. Corneal edema and AC cell also looks stable to a bit better. Vitreous continues to be without cell. Mac OCT with DME, quality of image better today consistent with improving corneal edema, increase in DME from pre-op but not large increase, and stable from 2 days ago. Less likely endophthalmitis as an issue at this point, but will continue to follow closely    Prednisolone Q1hour while awake  Vigamox Q1hour while awake  Maxitrol ointment at bedtime    Recheck on Monday.     Instructed patient to contact us for decreasing vision, eye pain, new floaters or flashes of light or other concerning symptoms.    Teaching statement:  Complete documentation of historical and exam elements from today's encounter can be found in the full encounter summary report (not reduplicated in this progress note). I personally obtained the chief complaint(s) and history of present illness.  I confirmed and edited as necessary the review of systems, past medical/surgical history, family history, social history, and examination findings as documented by others; and I examined the patient myself. I personally reviewed the relevant tests, images, and reports as documented above.     I formulated and edited as necessary the assessment and plan and discussed the findings and management plan with the patient and  family.    Lacey Eugene MD  Comprehensive Ophthalmology & Ocular Pathology  Department of Ophthalmology and Visual Neurosciences  [email or web address]  Pager [phone]

## 2019-07-05 NOTE — NURSING NOTE
Chief Complaint(s) and History of Present Illness(es)     Post Op (Ophthalmology) Left Eye     In left eye.  Associated symptoms include eye pain and redness.  Negative for dryness.  Pain was noted as 9/10.              Comments     Pt is here for a 2 day f/u s/p CE/IOL LE 06/21/19. Pt notes LE vision cloudy and cannot see anything, pt can only see white x a few days after cataract surgery and progressively getting worse in LE.  Pt noted some eye pain and without any pain med it is a 9/10 but pt took a tylenol this AM x after surgery. Ocular meds: Pred Q1hour LE, Vigamox Q1Hour LE, and Maxitrol bushra LE. Pt also uses occasional AT's PRN each eye.     Lisa Morley, COMT 9:27 AM July 5, 2019

## 2019-07-08 ENCOUNTER — THERAPY VISIT (OUTPATIENT)
Dept: PHYSICAL THERAPY | Facility: CLINIC | Age: 69
End: 2019-07-08
Payer: COMMERCIAL

## 2019-07-08 ENCOUNTER — OFFICE VISIT (OUTPATIENT)
Dept: OPHTHALMOLOGY | Facility: CLINIC | Age: 69
End: 2019-07-08
Attending: OPHTHALMOLOGY
Payer: COMMERCIAL

## 2019-07-08 DIAGNOSIS — M79.671 BILATERAL LEG AND FOOT PAIN: ICD-10-CM

## 2019-07-08 DIAGNOSIS — M79.672 BILATERAL LEG AND FOOT PAIN: ICD-10-CM

## 2019-07-08 DIAGNOSIS — Z96.1 PSEUDOPHAKIA, LEFT EYE: Primary | ICD-10-CM

## 2019-07-08 DIAGNOSIS — M79.604 BILATERAL LEG AND FOOT PAIN: ICD-10-CM

## 2019-07-08 DIAGNOSIS — M79.605 BILATERAL LEG AND FOOT PAIN: ICD-10-CM

## 2019-07-08 DIAGNOSIS — M70.61 TROCHANTERIC BURSITIS OF BOTH HIPS: ICD-10-CM

## 2019-07-08 DIAGNOSIS — M70.62 TROCHANTERIC BURSITIS OF BOTH HIPS: ICD-10-CM

## 2019-07-08 PROCEDURE — 97161 PT EVAL LOW COMPLEX 20 MIN: CPT | Mod: GP | Performed by: PHYSICAL THERAPIST

## 2019-07-08 PROCEDURE — 97110 THERAPEUTIC EXERCISES: CPT | Mod: GP | Performed by: PHYSICAL THERAPIST

## 2019-07-08 PROCEDURE — G0463 HOSPITAL OUTPT CLINIC VISIT: HCPCS | Mod: ZF

## 2019-07-08 ASSESSMENT — CUP TO DISC RATIO: OS_RATIO: 0.3

## 2019-07-08 ASSESSMENT — ACTIVITIES OF DAILY LIVING (ADL)
PUTTING_ON_SOCKS_AND_SHOES: SLIGHT DIFFICULTY
WALKING_DOWN_STEEP_HILLS: MODERATE DIFFICULTY
HEAVY_WORK: MODERATE DIFFICULTY
ROLLING_OVER_IN_BED: SLIGHT DIFFICULTY
GOING_UP_1_FLIGHT_OF_STAIRS: MODERATE DIFFICULTY
SITTING_FOR_15_MINUTES: SLIGHT DIFFICULTY
GETTING_INTO_AND_OUT_OF_AN_AVERAGE_CAR: SLIGHT DIFFICULTY
HOS_ADL_HIGHEST_POTENTIAL_SCORE: 48
GETTING_INTO_AND_OUT_OF_A_BATHTUB: SLIGHT DIFFICULTY
HOS_ADL_ITEM_SCORE_TOTAL: 27
STANDING_FOR_15_MINUTES: EXTREME DIFFICULTY
RECREATIONAL_ACTIVITIES: MODERATE DIFFICULTY
TWISTING/PIVOTING_ON_INVOLVED_LEG: SLIGHT DIFFICULTY
HOS_ADL_COUNT: 12
GOING_DOWN_1_FLIGHT_OF_STAIRS: MODERATE DIFFICULTY
WALKING_UP_STEEP_HILLS: MODERATE DIFFICULTY
STEPPING_UP_AND_DOWN_CURBS: MODERATE DIFFICULTY

## 2019-07-08 ASSESSMENT — VISUAL ACUITY
OS_SC: 20/250
METHOD: SNELLEN - LINEAR
OD_SC: 20/60-/+2

## 2019-07-08 ASSESSMENT — SLIT LAMP EXAM - LIDS: COMMENTS: NORMAL

## 2019-07-08 ASSESSMENT — TONOMETRY
IOP_METHOD: TONOPEN
OS_IOP_MMHG: 18
OD_IOP_MMHG: 18

## 2019-07-08 NOTE — PROGRESS NOTES
[facility] Initial Evaluation  Subjective:  [facility]                  [facility]  Physical Therapy Initial Examination/Evaluation  July 8, 2019    Earle Rene is a 69 year old  male referred to physical therapy by Dr. Rivera for treatment of bilateral hip and leg pain with Precautions/Restrictions/MD instructions none    KEY PT FINDINGS:  1.  Poor core stability  2.  Weak gluteals  3.  Significant hamstring tightness  4.  Poor SL balance    Subjective:  Referring MD visit date: 7/2/19  DOI/onset: several months to a year ago  Mechanism of injury: Insidious onset of hip pain.  Does note that he had a calcaneus fracture 20 years ago, that he believes he still compensates from.  DOS none  Previous treatment: none  Imaging: xray - mild degenerative change in bilateral hips  Chief Complaint:   Pain with standing for a few minutes as well as walking.  Has to take steps one at a time.  L>R  Pain: best 3-4 /10, worst 10/10 low back to lateral hips to knee Described as: sharp Alleviated by: lying down, rest Frequency: intermittent Progression of symptoms since initial onset: staying the same Time of day when pain is worse: activity related  Sleeping: cramps wake him up at night     Occupation: two businesses  Job duties:  Sitting, standing    Current HEP/exercise regimen: limited due to recent cataract surgery  Patient's goals are reduce pain    Pertinent PMH: see epic   General Health Reported by Patient: fair  Return to MD:  PRN       Objective:        Flexibility/Screens:       Lower Extremity:  Decreased left lower extremity flexibility:Hamstrings    Decreased right lower extremity flexibility:  Hamstrings                                                 Hip Evaluation  Hip PROM:    Flexion: Left: 110   Right: 110        Internal Rotation: Left: 0    Right: 0  External Rotation: Left: 45    Right: 45              Hip Strength:    Flexion:   Left:  5-/5   -  Pain:  Right: 5-/5   -  Pain:                      Abduction:  Left: 4+/5    -   Pain:Right: 4+/5   -   Pain:        Knee Flexion:  Left: 5/5   -  Pain:Right: 5/5   -  Pain:  Knee Extension:  Left: 5/5   -  Pain:Right: 5/5    -  Pain:          Hip Palpation:    Left hip tenderness present at:   Gluteus Medius and Bursa  Right hip tenderness present at:  Gluteus Medius and Bursa  Functional Testing:            Proprioception:    Stork balance test:   Left:    <2 seconds  Right:  <2 seconds  % of Uninvolved:                General     ROS    Assessment/Plan:    Patient is a 69 year old male with both sides hip complaints.    Patient has the following significant findings with corresponding treatment plan.                Diagnosis 1:  Bilateral hip pain  Pain -  hot/cold therapy, US, electric stimulation, manual therapy, splint/taping/bracing/orthotics, self management, education and home program  Decreased ROM/flexibility - manual therapy and therapeutic exercise  Decreased joint mobility - manual therapy and therapeutic exercise  Decreased strength - therapeutic exercise and therapeutic activities  Impaired balance - neuro re-education and therapeutic activities  Decreased proprioception - neuro re-education and therapeutic activities  Impaired muscle performance - neuro re-education  Decreased function - therapeutic activities    Therapy Evaluation Codes:   1) History comprised of:   Personal factors that impact the plan of care:      Age.    Comorbidity factors that impact the plan of care are:      None.     Medications impacting care: None.  2) Examination of Body Systems comprised of:   Body structures and functions that impact the plan of care:      Hip.   Activity limitations that impact the plan of care are:      Standing and Walking.  3) Clinical presentation characteristics are:   Stable/Uncomplicated.  4) Decision-Making    Low complexity using standardized patient assessment instrument and/or  measureable assessment of functional outcome.  Cumulative Therapy Evaluation is: Low complexity.    Previous and current functional limitations:  (See Goal Flow Sheet for this information)    Short term and Long term goals: (See Goal Flow Sheet for this information)     Communication ability:  Patient appears to be able to clearly communicate and understand verbal and written communication and follow directions correctly.  Treatment Explanation - The following has been discussed with the patient:   RX ordered/plan of care  Anticipated outcomes  Possible risks and side effects  This patient would benefit from PT intervention to resume normal activities.   Rehab potential is good.    Frequency:  1 X week, once daily  Duration:  for 6 weeks  Discharge Plan:  Achieve all LTG.  Independent in home treatment program.  Reach maximal therapeutic benefit.    Please refer to the daily flowsheet for treatment today, total treatment time and time spent performing 1:1 timed codes.

## 2019-07-08 NOTE — NURSING NOTE
Chief Complaints and History of Present Illnesses   Patient presents with     Post Op (Ophthalmology) Left Eye     Chief Complaint(s) and History of Present Illness(es)     Post Op (Ophthalmology) Left Eye     Laterality: left eye    Pain scale: 6/10              Comments     status post cataract surgery, left eye 6/21/2019  Prednisolone Q1hour while awake  Vigamox Q1hour while awake  Maxitrol ointment at bedtime    He states he is using the above drops as directed  He didn't have pain in his left eye yesterday but today he does have some.  Otherwise the pain has improved overall since Friday.  Vision seems to be the same.    DUTCH Almonte 9:21 AM 07/08/2019

## 2019-07-08 NOTE — PROGRESS NOTES
POW#2, status post cataract surgery, left eye    He notes the vision is about the same as on Friday, still blurred, but the pain and redness are now improving.  He has been using the Vigamox and Pred as often as he can (~Q2hr WA) with Maxitrol at bedtime.    Impression/Plan:  Pseudophakia, OS: POW2, 360 bullous SHRUTHI now resolved. But with increased post-operative inflammation and corneal edema with decreased VA after self-DCing all drops about 5 days post-op.     Today, perilimbal subepi corneal haze/infiltrate temporal - looks inflammatory/staph marginal, no epi defect, stable to slightly smaller. Corneal edema and AC cell also looks stable to a bit better. Vitreous continues to be without cell.     Mac OCT e days ago with DME, quality of image better today consistent with improving corneal edema, increase in DME from pre-op but not large increase, and stable from 2 days ago.     Prednisolone 6x daily  Vigamox 6x daily  Maxitrol ointment at bedtime    Recheck in 1 week.     Instructed patient to contact us for decreasing vision, eye pain, new floaters or flashes of light or other concerning symptoms.    Teaching statement:  Complete documentation of historical and exam elements from today's encounter can be found in the full encounter summary report (not reduplicated in this progress note). I personally obtained the chief complaint(s) and history of present illness.  I confirmed and edited as necessary the review of systems, past medical/surgical history, family history, social history, and examination findings as documented by others; and I examined the patient myself. I personally reviewed the relevant tests, images, and reports as documented above.     I formulated and edited as necessary the assessment and plan and discussed the findings and management plan with the patient and family.    Lacey Eugene MD  Comprehensive Ophthalmology & Ocular Pathology  Department of Ophthalmology and Visual  [email or web address]  Pager [phone]

## 2019-07-10 ENCOUNTER — OFFICE VISIT (OUTPATIENT)
Dept: ORTHOPEDICS | Facility: CLINIC | Age: 69
End: 2019-07-10
Attending: INTERNAL MEDICINE
Payer: COMMERCIAL

## 2019-07-10 ENCOUNTER — PRE VISIT (OUTPATIENT)
Dept: ORTHOPEDICS | Facility: CLINIC | Age: 69
End: 2019-07-10

## 2019-07-10 DIAGNOSIS — L85.3 XEROSIS CUTIS: ICD-10-CM

## 2019-07-10 DIAGNOSIS — R25.2 BILATERAL LEG CRAMPS: Primary | ICD-10-CM

## 2019-07-10 DIAGNOSIS — Z79.4 TYPE 2 DIABETES MELLITUS WITH DIABETIC POLYNEUROPATHY, WITH LONG-TERM CURRENT USE OF INSULIN (H): ICD-10-CM

## 2019-07-10 DIAGNOSIS — M20.41 HAMMERTOES OF BOTH FEET: ICD-10-CM

## 2019-07-10 DIAGNOSIS — M20.42 HAMMERTOES OF BOTH FEET: ICD-10-CM

## 2019-07-10 DIAGNOSIS — E11.42 TYPE 2 DIABETES MELLITUS WITH DIABETIC POLYNEUROPATHY, WITH LONG-TERM CURRENT USE OF INSULIN (H): ICD-10-CM

## 2019-07-10 DIAGNOSIS — B35.1 DERMATOPHYTOSIS OF NAIL: ICD-10-CM

## 2019-07-10 NOTE — PATIENT INSTRUCTIONS
Thank you for seeing us today!    Referral to Vascular Medicine to further investigate leg cramps.    Talk to your primary provider about refilling Gabapentin at current dose or possibly increasing the dose to help with leg cramping, depending on recommendations from Vascular Medicine.     Wear your diabetic shoes and inserts most of the time during the day to avoid foot injuries and to help with balance.     Look at your feet daily for wounds, blisters, dry skin. Moisturize your feet and ankles daily. Good moisturizers over the counter include Gold Bond for Diabetics, Eucerin, Aquaphor.     Do not trim your own toenails - you risk creating wounds that may be difficult to heal which leads to infection. It is okay to file them with regular nail file.    Return to clinic to see the podiatry team in 10 weeks.

## 2019-07-10 NOTE — NURSING NOTE
Reason For Visit:   Chief Complaint   Patient presents with     Consult     Pain, bilateral foot. Patient stated that he went to physical therapy and was given exercises. Patient stated that he has cramping sensations in his feet. Nail changes.        Pain Assessment  Patient Currently in Pain: Yes  Primary Pain Location: Foot(Bilateral)  Pain Descriptors: Constant, Cramping, Intermittent        No Known Allergies        Adelaida Villalobos LPN

## 2019-07-10 NOTE — LETTER
7/10/2019       RE: Earle Rene  [address]  [place] [address]     Dear Colleague,    Thank you for referring your patient, Earle Rene, to the [facility]. Please see a copy of my visit note below.    Chief Complaint:   Chief Complaint   Patient presents with     Consult     Pain, bilateral foot. Patient stated that he went to physical therapy and was given exercises. Patient stated that he has cramping sensations in his feet. Nail changes.      Subjective: Earle is a 69 year old male who presents to the clinic today for diabetic foot care and management. DM type 2 c/b peripheral neuropathy and CKD Stage IIIA from diabetic nephropathy. Last A1C 9.2% on 6/18/19. Last saw Pamela Laura PA-C on 6/19 for endocrine follow up, no change in insulin or metformin doses, encouraged to take insulin as prescribed (has been missing insulin doses). On Gabapentin for peripheral neuropathy, isn't sure if it is effective.   Admits leg cramps which occur mostly at night, up to 3 times each night. Present for years, staying constant. Occurs in calves or posterior thighs. Recently started PT and has been continuing exercises at home.   Diabetic shoes and inserts current, working well.   Admits numbness sensations to both legs from knee down.  Trims his nails himself, often bleeds and creates small wounds to tips of toes. Would appreciate nails trimmed today.    ROS: Denies open lesions, LE swelling.    Past Medical History:   Diagnosis Date     Blepharitis of both eyes      BPH (benign prostatic hyperplasia)      Diabetes (H)      Diabetic neuropathy (H)      Diabetic retinopathy associated with diabetes mellitus due to underlying condition (H)      Dry eye syndrome      Goiter      Granulomatous disease (H)      Nonsenile cataract      Peripheral neuropathy      Past Surgical History:   Procedure Laterality Date     AS RAD RESEC TONSIL/PILLARS  Bilateral 1961     COLONOSCOPY  7/29/2013    Procedure: COLONOSCOPY;;  Surgeon: Montana Pascal MD;  Location:  GI     PHACOEMULSIFICATION WITH STANDARD INTRAOCULAR LENS IMPLANT Left 6/21/2019    Procedure: Left Eye Cataract Removal with Intraocular Lens Implant with Intraoperative Avastin Injection;  Surgeon: Lacey Eugene MD;  Location:  OR     [facility]  11/2017     SURGICAL PATHOLOGY EXAM       Family History   Problem Relation Age of Onset     Diabetes Father      Myocardial Infarction Father      Diabetes Brother      Leukemia Brother 44     Glaucoma No family hx of      Macular Degeneration No family hx of      Kidney Disease No family hx of      Social History     Tobacco Use     Smoking status: Never Smoker     Smokeless tobacco: Never Used   Substance Use Topics     Alcohol use: Yes     Comment: occasionaly     No Known Allergies  Current Outpatient Rx   Medication Sig Dispense Refill     albuterol (VENTOLIN HFA) 108 (90 Base) MCG/ACT inhaler Inhale 2 puffs into the lungs every 6 hours 18 g 3     ARTIFICIAL TEAR OP Apply to eye as needed       aspirin 81 MG tablet Take 1 tablet by mouth daily.       atorvastatin (LIPITOR) 20 MG tablet Take 1 tablet (20 mg) by mouth daily * LAB/LDL DUE 90 tablet 0     blood glucose (NO BRAND SPECIFIED) lancets standard Lancets that go with device, Test 3 times daily 300 each 3     blood glucose monitoring (NO BRAND SPECIFIED) meter device kit Any meter covered by insurance, not store brand, use as directed. 1 kit 0     blood glucose monitoring (NO BRAND SPECIFIED) test strip Strips that go with meter, covered by insurance. Test 3 times daily 300 strip 3     Blood Pressure Monitor KIT Automatic Blood Pressure Monitor 1 kit 0     clobetasol (TEMOVATE) 0.05 % ointment Apply topically to legs twice daily for 2 weeks.  Then discontinue 30 g 0     clotrimazole (LOTRIMIN) 1 % cream Apply topically 2 times daily 30 g 3     Continuous Blood Gluc  (FREESTYLE PETER  "READER) JUANCARLOS 1 Device daily 1 Device 0     Continuous Blood Gluc Sensor (FREESTYLE PETER 14 DAY SENSOR) MISC 1 applicator every 14 days 8 each 3     erythromycin (ROMYCIN) 5 MG/GM ophthalmic ointment Place 0.5 inches Into the left eye 3 times daily 1 g 0     fenofibrate 54 MG tablet Take 1 tablet (54 mg) by mouth daily 90 tablet 0     finasteride (PROSCAR) 5 MG tablet Take 1 tablet (5 mg) by mouth daily 90 tablet 1     gabapentin (NEURONTIN) 100 MG capsule Take 1 capsule (100 mg) by mouth 3 times daily 90 capsule 3     HUMALOG KWIKPEN 100 UNIT/ML soln Inject 10 units with breakfast, 8 units with lunch and 10 units with dinner, plus correction. Pt uses approx 60 units in 24 hrs. 60 mL 3     insulin pen needle (B-D U/F) 31G X 5 MM Use 4 times per day.  Please dispense as BD Pen Needle Mini U/F 31G x 5  each 3     insulin syringe 31G X 5/16\" 0.5 ML MISC Use three syringes daily 270 each 1     ketorolac (ACULAR) 0.5 % ophthalmic solution 1 drop in surgical eye as directed - start 1 week after surgery, 4x daily until follow-up visit 1 Bottle 1     loratadine (CLARITIN) 10 MG tablet Take 1 tablet (10 mg) by mouth daily 90 tablet 0     losartan (COZAAR) 100 MG tablet Take 1 tablet (100 mg) by mouth At Bedtime 30 tablet 11     losartan (COZAAR) 25 MG tablet Take 1 tablet (25 mg) by mouth daily With a 50mg tablet for total dose of 75mg daily 30 tablet 11     losartan (COZAAR) 50 MG tablet Take 1 tablet (50 mg) by mouth daily With 25mg tablet for a total dose of 75mg daily 30 tablet 11     metFORMIN (GLUCOPHAGE) 1000 MG tablet 1 tab twice daily with meals 180 tablet 3     moxifloxacin (VIGAMOX) 0.5 % ophthalmic solution Place 1 drop Into the left eye every hour (while awake) 1 Bottle 3     mupirocin (BACTROBAN) 2 % cream Apply  topically. In very small amounts only as needed 15 g 1     neomycin-polymyxin-dexamethasone (MAXITROL) 3.5-88748-6.1 ophthalmic ointment Place 0.5 inches Into the left eye At Bedtime 1 Tube 0     " NOVOLOG FLEXPEN 100 UNIT/ML soln Inject 10 units with breakfast, 8 units with lunch and 10 units with dinner, plus correction. Pt uses approx 60 units in 24 hrs. 60 mL 3     ofloxacin (OCUFLOX) 0.3 % ophthalmic solution 1 drop 4x daily in the surgical eye for 1 week after surgery, then stop 1 Bottle 0     Omega-3 Fatty Acids (OMEGA-3 FISH OIL) 1000 MG CAPS Take 1 capsule (1 g) by mouth 2 times daily 60 capsule 11     ONETOUCH ULTRA test strip Use to test blood sugar 3 times daily 300 strip 3     prednisoLONE acetate (PRED FORTE) 1 % ophthalmic suspension Place 1 drop Into the left eye every hour (while awake) 1 Bottle 3     prednisoLONE acetate (PRED FORTE) 1 % ophthalmic suspension 1 drop in surgical eye as directed, 4x daily after surgery until follow-up 1 Bottle 1     ranitidine (ZANTAC) 300 MG tablet Take 1 tablet (300 mg) by mouth At Bedtime 90 tablet 3     senna-docusate (SENOKOT-S/PERICOLACE) 8.6-50 MG tablet Take 1 tablet by mouth       sodium bicarbonate 650 MG tablet Take 1 tablet (650 mg) by mouth 3 times daily 90 tablet 11     tamsulosin (FLOMAX) 0.4 MG capsule Take 1 capsule (0.4 mg) by mouth daily 90 capsule 1     TRESIBA FLEXTOUCH 100 UNIT/ML pen INJECT 58 UNITS UNDER SKIN DAILY. 60 mL 3     triamcinolone (KENALOG) 0.1 % cream Apply topically 3 times daily 80 g 0     vitamin D3 (CHOLECALCIFEROL) 1000 units (25 mcg) tablet Take 2 tablets (2,000 Units) by mouth daily 30 tablet 11       Objective:   There were no vitals taken for this visit.    General: Patient is well groomed, appears stated age, is alert, cooperative and in no acute distress.  Vascular: DP and PT pulses are nonpalpable bilaterally. LE capillary refill time is <3 seconds. - pedal hair. No LE edema.   MSK: No pain with active or passive ROM of the ankle, MTJ, 1st ray or halluces bilaterally. Equinus present bilaterally. Dorsally contracted digits 2-5 BL.   Neurologic: Pinpoint sensation minimally reduced bilaterally with 10 gram  monofilament.  Skin: LE skin color, texture and turgor are normal besides dryness especially to plantar surfaces. Toenails are thickened, discolored with subungual debris bilaterally. No open lesions present, though there are scabs to distal left digits 1 and 2 secondary to self nail trimming.     Previous Laboratory Studies:   Lab Results   Component Value Date    A1C 9.2 2019    A1C 9.1 2018     GARCIA 2019  Impression:   Right le. Resting GARCIA cannot be calculated, due to noncompressible vessels.   2. TBI of 0.85. Normal.  3. Focal segment of elevated measured peak systolic velocity in the  proximal popliteal artery of 660 cm/s, however waveforms are preserved  distal to this. Evaluation of the image of the measured velocity at  this segment suggests that this is artifactual (along the periphery of  the vessel) given the normal distal waveforms, and is likely not truly  representative of stenosis.     Left le. Resting GARCIA cannot be calculated, due to noncompressible vessels.  2. TBI is 0.88. Normal.  3. Patent arterial Doppler evaluation of the left lower extremity,  without focal hemodynamically significant stenosis.    GARCIA 17  Impression:   Right leg: Resting GARCIA is 1.25Normal (No observed evidence of  increased cardiovascular risk or peripheral arterial disease.)     Left leg: Resting GARCIA is 1.05Normal (No observed evidence of increased  cardiovascular risk or peripheral arterial disease.)     Right TBI of 0.85 which is normal, left TBI is 0.65 which is mildly  abnormal. Abnormal right VPR's in the ankle and first digit suggestive  of mild infrapopliteal and small vessel arterial disease. Abnormal  VPR's on the left suggestive of mild to moderate infra popliteal and  small vessel disease.    Assessment:   - Onychomycosis  - Xerosis cutis  - Hammertoes  - Leg cramps, BL  - DM type 2, uncontrolled  - Diabetic polyneuropathy    Plan:   - Pt seen and evaluated. Diagnosis and  treatment options were discussed. Discussed recent GARCIA versus study performed in 2017. Recommend referral to Vascular Medicine for further evaluation and suggestions. Could also be contributing to symptom of leg cramps, probably a mixed etiology.   - Continue PT and home exercises for leg cramping. EMG not yet scheduled.   - Toenails debrided x 10  - Educated patient on daily foot care, see AVS. Continue wearing diabetic shoes and inserts - he is not due for new pair yet.  - Pt to return to clinic in 10 weeks    Again, thank you for allowing me to participate in the care of your patient.      Sincerely,    Tatyana Price PA-C

## 2019-07-10 NOTE — PROGRESS NOTES
Chief Complaint:   Chief Complaint   Patient presents with     Consult     Pain, bilateral foot. Patient stated that he went to physical therapy and was given exercises. Patient stated that he has cramping sensations in his feet. Nail changes.      Subjective: Earle is a 69 year old male who presents to the clinic today for diabetic foot care and management. DM type 2 c/b peripheral neuropathy and CKD Stage IIIA from diabetic nephropathy. Last A1C 9.2% on 6/18/19. Last saw Pamela Laura PA-C on 6/19 for endocrine follow up, no change in insulin or metformin doses, encouraged to take insulin as prescribed (has been missing insulin doses). On Gabapentin for peripheral neuropathy, isn't sure if it is effective.   Admits leg cramps which occur mostly at night, up to 3 times each night. Present for years, staying constant. Occurs in calves or posterior thighs. Recently started PT and has been continuing exercises at home.   Diabetic shoes and inserts current, working well.   Admits numbness sensations to both legs from knee down.  Trims his nails himself, often bleeds and creates small wounds to tips of toes. Would appreciate nails trimmed today.    ROS: Denies open lesions, LE swelling.    Past Medical History:   Diagnosis Date     Blepharitis of both eyes      BPH (benign prostatic hyperplasia)      Diabetes (H)      Diabetic neuropathy (H)      Diabetic retinopathy associated with diabetes mellitus due to underlying condition (H)      Dry eye syndrome      Goiter      Granulomatous disease (H)      Nonsenile cataract      Peripheral neuropathy      Past Surgical History:   Procedure Laterality Date     AS RAD RESEC TONSIL/PILLARS Bilateral 1961     COLONOSCOPY  7/29/2013    Procedure: COLONOSCOPY;;  Surgeon: Montana Pascal MD;  Location: [facility]     PHACOEMULSIFICATION WITH STANDARD INTRAOCULAR LENS IMPLANT Left 6/21/2019    Procedure: Left Eye Cataract Removal with Intraocular Lens Implant with Intraoperative Avastin  Injection;  Surgeon: Lacey Eugene MD;  Location: [facility]  11/2017     SURGICAL PATHOLOGY EXAM       Family History   Problem Relation Age of Onset     Diabetes Father      Myocardial Infarction Father      Diabetes Brother      Leukemia Brother 44     Glaucoma No family hx of      Macular Degeneration No family hx of      Kidney Disease No family hx of      Social History     Tobacco Use     Smoking status: Never Smoker     Smokeless tobacco: Never Used   Substance Use Topics     Alcohol use: Yes     Comment: occasionaly     No Known Allergies  Current Outpatient Rx   Medication Sig Dispense Refill     albuterol (VENTOLIN HFA) 108 (90 Base) MCG/ACT inhaler Inhale 2 puffs into the lungs every 6 hours 18 g 3     ARTIFICIAL TEAR OP Apply to eye as needed       aspirin 81 MG tablet Take 1 tablet by mouth daily.       atorvastatin (LIPITOR) 20 MG tablet Take 1 tablet (20 mg) by mouth daily * LAB/LDL DUE 90 tablet 0     blood glucose (NO BRAND SPECIFIED) lancets standard Lancets that go with device, Test 3 times daily 300 each 3     blood glucose monitoring (NO BRAND SPECIFIED) meter device kit Any meter covered by insurance, not store brand, use as directed. 1 kit 0     blood glucose monitoring (NO BRAND SPECIFIED) test strip Strips that go with meter, covered by insurance. Test 3 times daily 300 strip 3     Blood Pressure Monitor KIT Automatic Blood Pressure Monitor 1 kit 0     clobetasol (TEMOVATE) 0.05 % ointment Apply topically to legs twice daily for 2 weeks.  Then discontinue 30 g 0     clotrimazole (LOTRIMIN) 1 % cream Apply topically 2 times daily 30 g 3     Continuous Blood Gluc  (FREESTYLE PETER READER) JUANCARLOS 1 Device daily 1 Device 0     Continuous Blood Gluc Sensor (FREESTYLE PETER 14 DAY SENSOR) MISC 1 applicator every 14 days 8 each 3     erythromycin (ROMYCIN) 5 MG/GM ophthalmic ointment Place 0.5 inches Into the left eye 3 times daily 1 g 0     fenofibrate 54 MG tablet Take 1  "tablet (54 mg) by mouth daily 90 tablet 0     finasteride (PROSCAR) 5 MG tablet Take 1 tablet (5 mg) by mouth daily 90 tablet 1     gabapentin (NEURONTIN) 100 MG capsule Take 1 capsule (100 mg) by mouth 3 times daily 90 capsule 3     HUMALOG KWIKPEN 100 UNIT/ML soln Inject 10 units with breakfast, 8 units with lunch and 10 units with dinner, plus correction. Pt uses approx 60 units in 24 hrs. 60 mL 3     insulin pen needle (B-D U/F) 31G X 5 MM Use 4 times per day.  Please dispense as BD Pen Needle Mini U/F 31G x 5  each 3     insulin syringe 31G X 5/16\" 0.5 ML MISC Use three syringes daily 270 each 1     ketorolac (ACULAR) 0.5 % ophthalmic solution 1 drop in surgical eye as directed - start 1 week after surgery, 4x daily until follow-up visit 1 Bottle 1     loratadine (CLARITIN) 10 MG tablet Take 1 tablet (10 mg) by mouth daily 90 tablet 0     losartan (COZAAR) 100 MG tablet Take 1 tablet (100 mg) by mouth At Bedtime 30 tablet 11     losartan (COZAAR) 25 MG tablet Take 1 tablet (25 mg) by mouth daily With a 50mg tablet for total dose of 75mg daily 30 tablet 11     losartan (COZAAR) 50 MG tablet Take 1 tablet (50 mg) by mouth daily With 25mg tablet for a total dose of 75mg daily 30 tablet 11     metFORMIN (GLUCOPHAGE) 1000 MG tablet 1 tab twice daily with meals 180 tablet 3     moxifloxacin (VIGAMOX) 0.5 % ophthalmic solution Place 1 drop Into the left eye every hour (while awake) 1 Bottle 3     mupirocin (BACTROBAN) 2 % cream Apply  topically. In very small amounts only as needed 15 g 1     neomycin-polymyxin-dexamethasone (MAXITROL) 3.5-86730-9.1 ophthalmic ointment Place 0.5 inches Into the left eye At Bedtime 1 Tube 0     NOVOLOG FLEXPEN 100 UNIT/ML soln Inject 10 units with breakfast, 8 units with lunch and 10 units with dinner, plus correction. Pt uses approx 60 units in 24 hrs. 60 mL 3     ofloxacin (OCUFLOX) 0.3 % ophthalmic solution 1 drop 4x daily in the surgical eye for 1 week after surgery, then " stop 1 Bottle 0     Omega-3 Fatty Acids (OMEGA-3 FISH OIL) 1000 MG CAPS Take 1 capsule (1 g) by mouth 2 times daily 60 capsule 11     ONETOUCH ULTRA test strip Use to test blood sugar 3 times daily 300 strip 3     prednisoLONE acetate (PRED FORTE) 1 % ophthalmic suspension Place 1 drop Into the left eye every hour (while awake) 1 Bottle 3     prednisoLONE acetate (PRED FORTE) 1 % ophthalmic suspension 1 drop in surgical eye as directed, 4x daily after surgery until follow-up 1 Bottle 1     ranitidine (ZANTAC) 300 MG tablet Take 1 tablet (300 mg) by mouth At Bedtime 90 tablet 3     senna-docusate (SENOKOT-S/PERICOLACE) 8.6-50 MG tablet Take 1 tablet by mouth       sodium bicarbonate 650 MG tablet Take 1 tablet (650 mg) by mouth 3 times daily 90 tablet 11     tamsulosin (FLOMAX) 0.4 MG capsule Take 1 capsule (0.4 mg) by mouth daily 90 capsule 1     TRESIBA FLEXTOUCH 100 UNIT/ML pen INJECT 58 UNITS UNDER SKIN DAILY. 60 mL 3     triamcinolone (KENALOG) 0.1 % cream Apply topically 3 times daily 80 g 0     vitamin D3 (CHOLECALCIFEROL) 1000 units (25 mcg) tablet Take 2 tablets (2,000 Units) by mouth daily 30 tablet 11       Objective:   There were no vitals taken for this visit.    General: Patient is well groomed, appears stated age, is alert, cooperative and in no acute distress.  Vascular: DP and PT pulses are nonpalpable bilaterally. LE capillary refill time is <3 seconds. - pedal hair. No LE edema.   MSK: No pain with active or passive ROM of the ankle, MTJ, 1st ray or halluces bilaterally. Equinus present bilaterally. Dorsally contracted digits 2-5 BL.   Neurologic: Pinpoint sensation minimally reduced bilaterally with 10 gram monofilament.  Skin: LE skin color, texture and turgor are normal besides dryness especially to plantar surfaces. Toenails are thickened, discolored with subungual debris bilaterally. No open lesions present, though there are scabs to distal left digits 1 and 2 secondary to self nail trimming.      Previous Laboratory Studies:   Lab Results   Component Value Date    A1C 9.2 2019    A1C 9.1 2018     GARCIA 2019  Impression:   Right le. Resting GARCIA cannot be calculated, due to noncompressible vessels.   2. TBI of 0.85. Normal.  3. Focal segment of elevated measured peak systolic velocity in the  proximal popliteal artery of 660 cm/s, however waveforms are preserved  distal to this. Evaluation of the image of the measured velocity at  this segment suggests that this is artifactual (along the periphery of  the vessel) given the normal distal waveforms, and is likely not truly  representative of stenosis.     Left le. Resting GARCIA cannot be calculated, due to noncompressible vessels.  2. TBI is 0.88. Normal.  3. Patent arterial Doppler evaluation of the left lower extremity,  without focal hemodynamically significant stenosis.    GARCIA 17  Impression:   Right leg: Resting GARCIA is 1.25Normal (No observed evidence of  increased cardiovascular risk or peripheral arterial disease.)     Left leg: Resting GARCIA is 1.05Normal (No observed evidence of increased  cardiovascular risk or peripheral arterial disease.)     Right TBI of 0.85 which is normal, left TBI is 0.65 which is mildly  abnormal. Abnormal right VPR's in the ankle and first digit suggestive  of mild infrapopliteal and small vessel arterial disease. Abnormal  VPR's on the left suggestive of mild to moderate infra popliteal and  small vessel disease.    Assessment:   - Onychomycosis  - Xerosis cutis  - Hammertoes  - Leg cramps, BL  - DM type 2, uncontrolled  - Diabetic polyneuropathy    Plan:   - Pt seen and evaluated. Diagnosis and treatment options were discussed. Discussed recent GARCIA versus study performed in 2017. Recommend referral to Vascular Medicine for further evaluation and suggestions. Could also be contributing to symptom of leg cramps, probably a mixed etiology.   - Continue PT and home exercises for leg  cramping. EMG not yet scheduled.   - Toenails debrided x 10  - Educated patient on daily foot care, see AVS. Continue wearing diabetic shoes and inserts - he is not due for new pair yet.  - Pt to return to clinic in 10 weeks

## 2019-07-15 NOTE — TELEPHONE ENCOUNTER
RECORDS RECEIVED FROM: 10 week follow up. Foot care. Tatyana patient.   Foot pain and nail changes appt per Rosa in [facility]   DATE RECEIVED: 09/20/19   NOTES STATUS DETAILS   OFFICE NOTE from referring provider Internal 07/10/19 Dr. Price   OFFICE NOTE from other specialist Internal 07/10/19 pre-visit   DISCHARGE SUMMARY from hospital n/a    DISCHARGE REPORT from the ER n/a    OPERATIVE REPORT n/a    MEDICATION LIST Internal    IMPLANT RECORD/STICKER n/a    LABS     CBC/DIFF Internal 06/18/19   CULTURES n/a    INJECTIONS DONE IN RADIOLOGY n/a    MRI n/a    CT SCAN n/a    XRAYS (IMAGES & REPORTS) n/a    TUMOR     PATHOLOGY  Slides & report n/a

## 2019-07-17 ENCOUNTER — OFFICE VISIT (OUTPATIENT)
Dept: OPHTHALMOLOGY | Facility: CLINIC | Age: 69
End: 2019-07-17
Attending: OPHTHALMOLOGY
Payer: COMMERCIAL

## 2019-07-17 DIAGNOSIS — Z96.1 PSEUDOPHAKIA, LEFT EYE: ICD-10-CM

## 2019-07-17 DIAGNOSIS — H35.352 CYSTOID MACULAR EDEMA, LEFT: Primary | ICD-10-CM

## 2019-07-17 PROCEDURE — 92134 CPTRZ OPH DX IMG PST SGM RTA: CPT | Mod: ZF | Performed by: OPHTHALMOLOGY

## 2019-07-17 PROCEDURE — G0463 HOSPITAL OUTPT CLINIC VISIT: HCPCS | Mod: ZF

## 2019-07-17 RX ORDER — MOXIFLOXACIN 5 MG/ML
1 SOLUTION/ DROPS OPHTHALMIC
Qty: 1 BOTTLE | Refills: 3 | Status: CANCELLED | OUTPATIENT
Start: 2019-07-17

## 2019-07-17 RX ORDER — PREDNISOLONE ACETATE 10 MG/ML
1 SUSPENSION/ DROPS OPHTHALMIC
Qty: 1 BOTTLE | Refills: 3 | Status: SHIPPED | OUTPATIENT
Start: 2019-07-17 | End: 2019-12-24

## 2019-07-17 RX ORDER — KETOROLAC TROMETHAMINE 5 MG/ML
1 SOLUTION OPHTHALMIC 4 TIMES DAILY
Qty: 1 BOTTLE | Refills: 1 | Status: SHIPPED | OUTPATIENT
Start: 2019-07-17 | End: 2019-12-24

## 2019-07-17 ASSESSMENT — TONOMETRY
OS_IOP_MMHG: 16
IOP_METHOD: TONOPEN
OD_IOP_MMHG: 19

## 2019-07-17 ASSESSMENT — VISUAL ACUITY
METHOD: SNELLEN - LINEAR
OD_CC: 20/30
CORRECTION_TYPE: GLASSES
OS_SC: 20/250

## 2019-07-17 ASSESSMENT — CUP TO DISC RATIO: OS_RATIO: 0.3

## 2019-07-17 ASSESSMENT — SLIT LAMP EXAM - LIDS: COMMENTS: NORMAL

## 2019-07-17 NOTE — PROGRESS NOTES
POW#4, status post cataract surgery, left eye    He feels the pain is essentially resolved. There is some residual mild redness. He thinks the vision is improving. He was instructed to use moxifloxacin and prednisolone 6x daily, but was not using them that often (more like 2-4 x/day, and he ran out of drops last week, so stopped them again a few days ago)  Impression/Plan:  Pseudophakia, OS: POW4, 360 bullous SHRUTHI now resolved. Increased post-operative inflammation and corneal edema with decreased VA after self-DCing all drops about 5 days post-op. After re-starting drops, corneal edema/inflammation improving nicely. Mild residual edema present today. No significant AC cell. Never had vitreous cell. Macula OCT with DME, improving quality of image consistent with improved ocular surface/corneal edema. DME mildly increased from pre-op, but stable since last visit.    His visual acuity remains decreased - ? Secondary to corneal edema/retinal edema. I would expect it to improve since his corneal edema has improved.     Prednisolone 4x daily  Start ketorolac 4x daily    Recheck in 2 weeks. Myself and Dr. Rodriguez to see if he thinks his macular edema explains that much decrease in acuity?    Instructed patient to contact us for decreasing vision, eye pain, new floaters or flashes of light or other concerning symptoms.    Teaching statement:  Complete documentation of historical and exam elements from today's encounter can be found in the full encounter summary report (not reduplicated in this progress note). I personally obtained the chief complaint(s) and history of present illness.  I confirmed and edited as necessary the review of systems, past medical/surgical history, family history, social history, and examination findings as documented by others; and I examined the patient myself. I personally reviewed the relevant tests, images, and reports as documented above.     I formulated and edited as necessary the  assessment and plan and discussed the findings and management plan with the patient and family.    Lacey Eugene MD  Comprehensive Ophthalmology & Ocular Pathology  Department of Ophthalmology and Visual Neurosciences  [email or web address]  Pager [phone]

## 2019-07-17 NOTE — NURSING NOTE
Chief Complaints and History of Present Illnesses   Patient presents with     Post Op (Ophthalmology) Left Eye     Chief Complaint(s) and History of Present Illness(es)     Post Op (Ophthalmology) Left Eye     Laterality: left eye    Associated symptoms: Postive for eye redness    Pain scale: 0/10              Comments     status post cataract surgery, left eye 6/21/2019   Prednisolone 6x daily  Vigamox 6x daily  Maxitrol ointment at bedtime  Ran out of one of his drops about a week ago / states pharmacy has not yet gotten an okay for refill.    Sanjana JUDD 9:35 AM 07/17/2019

## 2019-07-23 ENCOUNTER — PRE VISIT (OUTPATIENT)
Dept: INTERNAL MEDICINE | Facility: CLINIC | Age: 69
End: 2019-07-23

## 2019-07-23 NOTE — TELEPHONE ENCOUNTER
[facility] PRIMARY [facility]INIC  Dept: [phone]     Patient: Earle Rene   : 1950  MRN: [id number]  Encounter: 19       Pre Visit Assessment    Health Maintenance Due   Topic Date Due     ADVANCE CARE PLANNING  1950     ZOSTER IMMUNIZATION (1 of 2) 1999     DIABETIC FOOT EXAM  2013     MEDICARE ANNUAL WELLNESS VISIT  2015     COLONOSCOPY  2018     Chart Reviewed for Labs needed/Health Maintenance: Yes   If labs needed, orders placed:  No, labs completed 19  Lab appointment scheduled:  No    Notes:  Patient confirmed they will attend appointment:  N/A  Appointment rescheduled?  N/A      Cora Noonan at 4:00 PM on 2019

## 2019-07-25 NOTE — TELEPHONE ENCOUNTER
RECORDS RECEIVED FROM: Internal   DATE RECEIVED: 9-24   NOTES STATUS DETAILS   OFFICE NOTE from referring provider    Internal    OFFICE NOTE from other vascular specilists N/A    DISCHARGE SUMMARY from hospital    N/A    DISCHARGE REPORT from the ER   N/A    OPERATIVE REPORT    N/A    MEDICATION LIST   Internal    LABS     Most recent labs within 3 months            (most recent result of each lab test) Internal    IMAGING (DISC & REPORT)      Duplex Ultrasounds   Internal    MRI/MRA   N/A    Cath   N/A    CT/CTA Scan   N/A

## 2019-07-30 ENCOUNTER — OFFICE VISIT (OUTPATIENT)
Dept: INTERNAL MEDICINE | Facility: CLINIC | Age: 69
End: 2019-07-30
Payer: COMMERCIAL

## 2019-07-30 VITALS
DIASTOLIC BLOOD PRESSURE: 82 MMHG | SYSTOLIC BLOOD PRESSURE: 132 MMHG | WEIGHT: 201.7 LBS | OXYGEN SATURATION: 97 % | BODY MASS INDEX: 30.67 KG/M2 | HEART RATE: 85 BPM | TEMPERATURE: 99.1 F

## 2019-07-30 DIAGNOSIS — M25.552 HIP PAIN, LEFT: Primary | ICD-10-CM

## 2019-07-30 RX ORDER — FLUOCINONIDE 0.5 MG/G
CREAM TOPICAL
Refills: 3 | COMMUNITY
Start: 2019-07-15 | End: 2023-09-29

## 2019-07-30 ASSESSMENT — PAIN SCALES - GENERAL: PAINLEVEL: SEVERE PAIN (7)

## 2019-07-30 NOTE — PROGRESS NOTES
HPI:    Pt. Comes in for follow up today. He does not smoke nor abuse EtOH. He denies rest/exertional CP/SOB.      Past Medical History:   Diagnosis Date     Blepharitis of both eyes      BPH (benign prostatic hyperplasia)      Diabetes (H)      Diabetic neuropathy (H)      Diabetic retinopathy associated with diabetes mellitus due to underlying condition (H)      Dry eye syndrome      Goiter      Granulomatous disease (H)      Nonsenile cataract      Peripheral neuropathy      Past Surgical History:   Procedure Laterality Date     AS RAD RESEC TONSIL/PILLARS Bilateral 1961     COLONOSCOPY  7/29/2013    Procedure: COLONOSCOPY;;  Surgeon: Montana Pascal MD;  Location:  GI     PHACOEMULSIFICATION WITH STANDARD INTRAOCULAR LENS IMPLANT Left 6/21/2019    Procedure: Left Eye Cataract Removal with Intraocular Lens Implant with Intraoperative Avastin Injection;  Surgeon: Lacey Eugene MD;  Location:  OR     siladenatis  11/2017     SURGICAL PATHOLOGY EXAM       PE:    Vitals noted gen nad cooperative alert, HEENT oropharynx clear no exudate, neck supple nl rom, no adenopathy, no B carotid bruits, horizontal surgical scar on posterior neck, LCTA, good air movement, RRR, S1, S2, No MRG, abdomen no acute findings. Grossly normal neurological exam. Approx. 4 x 5 cm non tender, no erythema soft tissue mass R upper back. B decreased distal LE pulses. B toe nail changes. Several skin changes on B LE extremities.     EKG (from 6/18/2019); SR at 78; no acute findings and no significant change from 3/6/2018    Normal Lexiscan stress test 1/14/2016  Resting echo 1/14/2016 with Normal LVF 55-60%    A/P:    1. Seen in Podiatry 7/10/2019 and has follow up 9/20/2019.  2. B LE complaints. Probable neuropathy and he may need change in Gabapentin dose and/or EMG. Will also get B LE arterial U/S for vascular evaluation (done 6/19/2019). He has EMG scheduled for 8/5/2019. He has vascular follow up with Dr. Colvin 9/24/2019.   3. Refer  to Dermatology for B LE skin changes  4. Refer to General surgery for probable R upper back lipoma and he was seen 7/3/2019  5. Checked A1c for DM2; he saw loretta Hickman last 6/19/2019 and he has follow up 9/18/2019; A1c was 9.2% on 6/18/2019  6. He saw Dr. Cantu Urology 5/30/2019 for elevated PSA. He had prostate MRI study 7/3/2019.   7. Colonoscopy 7/29/2013 and repeat in 10 years  8. He saw Dr. Cespedes, renal for CKD 3/13/2019. He saw Dr. Ramsey, renal 6/26/2019  9. B hip X-rays  And refer to ortho for hip pain and he was seen 7/2/2019 in ortho. Still in pain. He only had one PT appt. And this did not help. Ordered MRI L hip and he has follow up with Dr. Rivera 8/13/2019  10. 6/18/2019; LDL 49 and HDL 38. He is on Atorvastatin    Total time spent 25 minutes.  More than 50% of the time spent with Mr. Rene on counseling / coordinating his care

## 2019-07-30 NOTE — PATIENT INSTRUCTIONS
[facility] Medication Refill Request Information:  * Please contact your pharmacy regarding ANY request for medication refills.  ** [facility] Pres[facility] Fax = [phone]  * Please allow 3 business days for routine medication refills.  * Please allow 5 business days for controlled substance medication refills.     [facility] Test Result notification information:  *You will be notified with in 7-10 days of your appointment day regarding the results of your test.  If you are on MyChart you will be notified as soon as the provider has reviewed the results and signed off on them.    [facility]: [phone]

## 2019-07-30 NOTE — NURSING NOTE
Chief Complaint   Patient presents with     Results     pt here to discuss results       Cora Noonan CMA at 9:24 AM on 7/30/2019.

## 2019-08-05 ENCOUNTER — OFFICE VISIT (OUTPATIENT)
Dept: NEUROLOGY | Facility: CLINIC | Age: 69
End: 2019-08-05
Attending: FAMILY MEDICINE
Payer: COMMERCIAL

## 2019-08-05 DIAGNOSIS — M79.605 BILATERAL LEG AND FOOT PAIN: ICD-10-CM

## 2019-08-05 DIAGNOSIS — M79.671 BILATERAL LEG AND FOOT PAIN: ICD-10-CM

## 2019-08-05 DIAGNOSIS — M79.672 BILATERAL LEG AND FOOT PAIN: ICD-10-CM

## 2019-08-05 DIAGNOSIS — G62.9 NEUROPATHY: ICD-10-CM

## 2019-08-05 DIAGNOSIS — M54.16 LUMBAR RADICULOPATHY: Primary | ICD-10-CM

## 2019-08-05 DIAGNOSIS — M79.604 BILATERAL LEG AND FOOT PAIN: ICD-10-CM

## 2019-08-05 NOTE — LETTER
2019       RE: Earle Rene  [address]  [place] [address]     Dear Colleague,    Thank you for referring your patient, Earle Rene, to the [facility] EMG at [facility]. Please see a copy of my visit note below.     [facility]  Electrodiagnostic Laboratory    Nerve Conduction & EMG Report          Patient: Earle Rene YOB: 1950  Patient ID: [id number] Age: 69 Years 4 Months  Gender: Male        History & Examination:  69 year old man with numbness and pain in feet and legs. Eval for polyneuropathy. Compare to prior study dated 2014.     Techniques:  Sensory and motor conduction studies were done with surface recording electrodes. EMG was done with a concentric needle electrode.     Results:  Nerve conduction studies:   1. Bilateral sural and left SP sensory responses are absent.   2. Left median-D2 sensory response shows normal amplitude and moderately slow CV.   3. Left peroneal-EDB motor response shows mildly prolonged DL, moderately reduced amplitude and moderately slowed CV.   4. Right roneal-EDB motor response shows mildly prolonged DL and moderately reduced amplitude.   5. Left tibial-AH motor response shows normal DL, moderately reduced amplitude and normal CV.   6. Right tibial-AH motor response shows normal DL and moderately reduced amplitude   7. Left median-APB motor response shows mildly prolonged DL, normal amplitude and normal CV in the forearm.     Needle EM. Fibrillation potentials and positive sharp waves were seen in the bilateral gastrocnemius, left LS paraspinal and right TA muscles.   2. Large amplitude and/or long duration motor unit potentials (MUP) were seen in the bilateral gastrocnemius and TA muscles.   3. Recruitment patterns were normal.     Interpretation:  This is an abnormal study. There is electrophysiologic evidence of a (1) length-dependant, axonal sensorimotor polyneuropathy and a (2)  superimposed lumbosacral radiculopathy affecting the S1>L5 nerve roots. Compared to the prior study dated 9/9/14 the right sural sensory response is now absent and lower limb motor responses are smaller. This indicates interval worsening of the polyneuropathy and perhaps the radiculopathy. Finally, incidental note is made of a (3) mild to moderate left-sided median neuropathy at the wrist (e.g., carpal tunnel syndrome). Clinical correlation is recommended.     Daniel Mckenzie MD  Department of Neurology        Sensory NCS      Nerve / Sites Rec. Site Onset Peak NP Amp Ref. PP Amp Dist Carlos Ref. Temp     ms ms  V  V  V cm m/s m/s  C   L MEDIAN - Dig II Anti      Wrist Dig II 3.59 4.64 11.6 10.0 19.1 14 39.0 48.0 32.6   L SURAL - Lat Mall 60      Calf Ankle NR NR NR 5.0 NR 14 NR 38.0 31.4   R SURAL - Lat Mall 60      Calf Ankle NR NR NR 5.0 NR 14 NR 38.0 31.1   L SUP PERONEAL      Lat Leg Calderón NR NR NR  NR 12.5 NR 38.0 31.4       Motor NCS      Nerve / Sites Rec. Site Lat Ref. Amp Ref. Rel Amp Dist Carlos Ref. Dur. Area Temp.     ms ms mV mV % cm m/s m/s ms %  C   L MEDIAN - APB      Wrist APB 4.95 4.40 6.2 5.0 100 8   7.29 100 32.4      Elbow APB 9.79  5.9  95.2 24 49.5 48.0 7.19 100 32.4   L DEEP PERONEAL - EDB 60      Ankle EDB 6.67 6.00 0.9 2.0 100 8   4.79 100 31.4      FibHead EDB 16.41  1.0  109 29.5 30.3 38.0 4.69 310 31.3      Pop Fos EDB 19.32  0.9  96.7 10 34.3 38.0 4.48 128 31.3   R DEEP PERONEAL - EDB 60      Ankle EDB 6.72 6.00 0.6 2.0 100 8   4.53 100 31.4   L TIBIAL - AH      Ankle AH 5.31 6.00 1.9 4.0 100 8   4.84 100 31.4      Pop Fos AH 15.63  1.8  96.3 40 38.8 38.0 9.58 230 31.4   R TIBIAL - AH      Ankle AH 5.05 6.00 1.5 4.0 100 8   5.47 100 31.4   L PERONEAL - Tib Ant      Fib Head Tib Ant 2.92  6.7  100    10.73 100 31.4      Knee Tib Ant 5.00  7.0  104 10 48.0  10.68 104 31.4       EMG Summary Table     Spontaneous MUAP Recruitment    IA Fib/PSW Fasc H.F. Amp Dur. PPP Pattern   L. VAST LATERALIS N  None None None N N None Normal   L. TIB ANTERIOR N None None None 2+ 1+ None Normal   L. GASTROCN (MED) Increased 2+ None None N 2+ 2+ Normal   L. GLUTEUS MED N None None None N N None Normal   L. LUMB PSP (L) Increased 1+ None None       R. TIB ANTERIOR Increased 1+ None None 1+ 1+ None Normal   R. GASTROCN (MED) Increased 2+ None None N 1+ 1+ Normal

## 2019-08-05 NOTE — PROGRESS NOTES
[facility]  Electrodiagnostic Laboratory    Nerve Conduction & EMG Report          Patient: Earle Rene YOB: 1950  Patient ID: [id number] Age: 69 Years 4 Months  Gender: Male        History & Examination:  69 year old man with numbness and pain in feet and legs. Eval for polyneuropathy. Compare to prior study dated 2014.     Techniques:  Sensory and motor conduction studies were done with surface recording electrodes. EMG was done with a concentric needle electrode.     Results:  Nerve conduction studies:   1. Bilateral sural and left SP sensory responses are absent.   2. Left median-D2 sensory response shows normal amplitude and moderately slow CV.   3. Left peroneal-EDB motor response shows mildly prolonged DL, moderately reduced amplitude and moderately slowed CV.   4. Right roneal-EDB motor response shows mildly prolonged DL and moderately reduced amplitude.   5. Left tibial-AH motor response shows normal DL, moderately reduced amplitude and normal CV.   6. Right tibial-AH motor response shows normal DL and moderately reduced amplitude   7. Left median-APB motor response shows mildly prolonged DL, normal amplitude and normal CV in the forearm.     Needle EM. Fibrillation potentials and positive sharp waves were seen in the bilateral gastrocnemius, left LS paraspinal and right TA muscles.   2. Large amplitude and/or long duration motor unit potentials (MUP) were seen in the bilateral gastrocnemius and TA muscles.   3. Recruitment patterns were normal.     Interpretation:  This is an abnormal study. There is electrophysiologic evidence of a (1) length-dependant, axonal sensorimotor polyneuropathy and a (2) superimposed lumbosacral radiculopathy affecting the S1>L5 nerve roots. Compared to the prior study dated 14 the right sural sensory response is now absent and lower limb motor responses are smaller. This indicates interval worsening of the polyneuropathy and  perhaps the radiculopathy. Finally, incidental note is made of a (3) mild to moderate left-sided median neuropathy at the wrist (e.g., carpal tunnel syndrome). Clinical correlation is recommended.     Daniel Mckenzie MD  Department of Neurology        Sensory NCS      Nerve / Sites Rec. Site Onset Peak NP Amp Ref. PP Amp Dist Carlos Ref. Temp     ms ms  V  V  V cm m/s m/s  C   L MEDIAN - Dig II Anti      Wrist Dig II 3.59 4.64 11.6 10.0 19.1 14 39.0 48.0 32.6   L SURAL - Lat Mall 60      Calf Ankle NR NR NR 5.0 NR 14 NR 38.0 31.4   R SURAL - Lat Mall 60      Calf Ankle NR NR NR 5.0 NR 14 NR 38.0 31.1   L SUP PERONEAL      Lat Leg Calderón NR NR NR  NR 12.5 NR 38.0 31.4       Motor NCS      Nerve / Sites Rec. Site Lat Ref. Amp Ref. Rel Amp Dist Carlos Ref. Dur. Area Temp.     ms ms mV mV % cm m/s m/s ms %  C   L MEDIAN - APB      Wrist APB 4.95 4.40 6.2 5.0 100 8   7.29 100 32.4      Elbow APB 9.79  5.9  95.2 24 49.5 48.0 7.19 100 32.4   L DEEP PERONEAL - EDB 60      Ankle EDB 6.67 6.00 0.9 2.0 100 8   4.79 100 31.4      FibHead EDB 16.41  1.0  109 29.5 30.3 38.0 4.69 310 31.3      Pop Fos EDB 19.32  0.9  96.7 10 34.3 38.0 4.48 128 31.3   R DEEP PERONEAL - EDB 60      Ankle EDB 6.72 6.00 0.6 2.0 100 8   4.53 100 31.4   L TIBIAL - AH      Ankle AH 5.31 6.00 1.9 4.0 100 8   4.84 100 31.4      Pop Fos AH 15.63  1.8  96.3 40 38.8 38.0 9.58 230 31.4   R TIBIAL - AH      Ankle AH 5.05 6.00 1.5 4.0 100 8   5.47 100 31.4   L PERONEAL - Tib Ant      Fib Head Tib Ant 2.92  6.7  100    10.73 100 31.4      Knee Tib Ant 5.00  7.0  104 10 48.0  10.68 104 31.4       EMG Summary Table     Spontaneous MUAP Recruitment    IA Fib/PSW Fasc H.F. Amp Dur. PPP Pattern   L. VAST LATERALIS N None None None N N None Normal   L. TIB ANTERIOR N None None None 2+ 1+ None Normal   L. GASTROCN (MED) Increased 2+ None None N 2+ 2+ Normal   L. GLUTEUS MED N None None None N N None Normal   L. LUMB PSP (L) Increased 1+ None None       R. TIB ANTERIOR Increased  1+ None None 1+ 1+ None Normal   R. GASTROCN (MED) Increased 2+ None None N 1+ 1+ Normal

## 2019-08-06 ENCOUNTER — ANCILLARY PROCEDURE (OUTPATIENT)
Dept: MRI IMAGING | Facility: CLINIC | Age: 69
End: 2019-08-06
Attending: INTERNAL MEDICINE
Payer: COMMERCIAL

## 2019-08-06 DIAGNOSIS — M25.552 HIP PAIN, LEFT: ICD-10-CM

## 2019-08-08 DIAGNOSIS — H35.352 CYSTOID MACULAR EDEMA, LEFT: Primary | ICD-10-CM

## 2019-08-08 NOTE — TELEPHONE ENCOUNTER
RECORDS RECEIVED FROM: L hip pain // referred by dr hewitt   DATE RECEIVED: 08/16/19   NOTES STATUS DETAILS   OFFICE NOTE from referring provider Internal 07/30/19 Dr. Hewitt   OFFICE NOTE from other specialist Internal 07/02/19 Dr. Rivera  07/08/19 PT Dr. Kan  07/10/19 Dr. Price   DISCHARGE SUMMARY from hospital n/a    DISCHARGE REPORT from the ER n/a    OPERATIVE REPORT n/a    MEDICATION LIST Internal    IMPLANT RECORD/STICKER n/a    LABS     CBC/DIFF Internal 06/18/19   CULTURES n/a    INJECTIONS DONE IN RADIOLOGY n/a    MRI Internal 08/06/19   CT SCAN n/a    XRAYS (IMAGES & REPORTS) Internal 06/18/19   TUMOR     PATHOLOGY  Slides & report n/a

## 2019-08-12 ENCOUNTER — OFFICE VISIT (OUTPATIENT)
Dept: OPHTHALMOLOGY | Facility: CLINIC | Age: 69
End: 2019-08-12
Attending: OPHTHALMOLOGY
Payer: COMMERCIAL

## 2019-08-12 DIAGNOSIS — Z96.1 PSEUDOPHAKIA, LEFT EYE: ICD-10-CM

## 2019-08-12 DIAGNOSIS — Z79.4 TYPE 2 DIABETES MELLITUS WITH BOTH EYES AFFECTED BY MILD NONPROLIFERATIVE RETINOPATHY AND MACULAR EDEMA, WITH LONG-TERM CURRENT USE OF INSULIN (H): Primary | ICD-10-CM

## 2019-08-12 DIAGNOSIS — H35.352 CYSTOID MACULAR EDEMA, LEFT: ICD-10-CM

## 2019-08-12 DIAGNOSIS — E11.3213 TYPE 2 DIABETES MELLITUS WITH BOTH EYES AFFECTED BY MILD NONPROLIFERATIVE RETINOPATHY AND MACULAR EDEMA, WITH LONG-TERM CURRENT USE OF INSULIN (H): Primary | ICD-10-CM

## 2019-08-12 PROCEDURE — G0463 HOSPITAL OUTPT CLINIC VISIT: HCPCS | Mod: ZF

## 2019-08-12 PROCEDURE — 40000269 ZZH STATISTIC NO CHARGE FACILITY FEE: Mod: ZF

## 2019-08-12 PROCEDURE — 92134 CPTRZ OPH DX IMG PST SGM RTA: CPT | Mod: ZF | Performed by: OPHTHALMOLOGY

## 2019-08-12 ASSESSMENT — VISUAL ACUITY
OD_CC+: -2
METHOD: SNELLEN - LINEAR
OD_CC: 20/30
OD_CC+: +1
OD_CC: 20/30-2
OS_CC: 20/100
OS_CC: 20/100
METHOD: SNELLEN - LINEAR

## 2019-08-12 ASSESSMENT — CONF VISUAL FIELD
OS_NORMAL: 1
OD_NORMAL: 1

## 2019-08-12 ASSESSMENT — TONOMETRY
OD_IOP_MMHG: 19
IOP_METHOD: TONOPEN
OS_IOP_MMHG: 12
OD_IOP_MMHG: 19
OS_IOP_MMHG: 12
IOP_METHOD: TONOPEN

## 2019-08-12 ASSESSMENT — REFRACTION_MANIFEST
METHOD_AUTOREFRACTION: 1
OS_SPHERE: -1.75
OS_CYLINDER: +0.50
OS_AXIS: 140

## 2019-08-12 ASSESSMENT — CUP TO DISC RATIO
OS_RATIO: 0.3
OS_RATIO: 0.3
OD_RATIO: 0.3
OD_RATIO: 0.3

## 2019-08-12 ASSESSMENT — REFRACTION_WEARINGRX
OD_ADD: +2.50
OS_CYLINDER: +0.75
OD_AXIS: 157
OS_AXIS: 033
OS_ADD: +2.50
OD_SPHERE: -2.50
SPECS_TYPE: PAL
OD_CYLINDER: +1.50
OS_SPHERE: -2.50

## 2019-08-12 ASSESSMENT — SLIT LAMP EXAM - LIDS
COMMENTS: NORMAL

## 2019-08-12 NOTE — NURSING NOTE
Chief Complaints and History of Present Illnesses   Patient presents with     Follow Up     Chief Complaint(s) and History of Present Illness(es)     Follow Up     Laterality: both eyes    Associated symptoms: eye pain.  Negative for flashes and floaters    Treatments tried: eye drops and artificial tears    Pain scale: 4/10              Comments     Pt notes his vision has slightly improved LE since last visit.   Complains of pressure & pain LE x 2 months   Denies any flashes, floaters, irritation, discharge, and tearing.   Currently using Prednisolone QID LE and Ketorolac QID LE.   Blood suga haven't checked for weeks. Last A1C 8 something taken 1 month ago  Sofie Crabtree COA 12:37 PM August 12, 2019

## 2019-08-12 NOTE — PROGRESS NOTES
I have confirmed the patient's and reviewed Past Medical History, Past Surgical History, Social History, Family History, Problem List, Medication List and agree with Tech note.    CC: decreased vision following CEIOL in OS    HPI:  pt of Dr. Alberto, s/p CEIOL left eye on 6/21/19. Has been having blurred vision since CEIOL. Reports that he had significant periorbital pain after the operation and has since had significant visual disturbance, blurred vision, distorted vision, diplopia, and eye pain. He is very concerned about his vision and would not like any more injections in his left eye at this time.     Self-DC'd all gtts about 5 days after surgery. Re-started on gtts about 3 weeks post-op. Has been taking PF and ketorolac QID for past 3 weeks until today.    Avastin injections  Right Eye: 2/26/18, 4/10/18, 5/11/20218, 6/11/18 and last one here on 7/19/2018  Left Eye: 2/26/18, 4/10/18, 5/11/2018, 6/21/19      Assessment/plan:   1. 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 Patient advised that failure to adequately control each may lead to vision loss. The patient expressed understanding.    2. Diabetic macular edema left eye   - Worsened since CEIOL on 6/21/19   - Has been on ketorolac and PF QID   - temporal edema involving fovea   - last injection 6/21/19 intra op Avastin   - inject Triescence today, patient deferred and wants to try drops longer     3. Pseudophakia left eye   - S/p CEIOL on 6/21/19    4. Diabetic macular edema right eye   - temporal macular edema, stable as of 7/3/19   - had been receiving injections , last injection 6/11/18 at outside clinic and 7/19/2018 here at [facility] adult eye    - recommend defer avastin today since there was not much of an effect    -  follow up 4 weeks    5. Cataract right eye   - s/p CEIOL left eye on 6/21/19, needs CEIOL in right eye    - was seen previously , has IOL calcs    RTC 4 weeks for re-check    Kenrick Gibson MD - PGY 3  Ophthalmology resident    Attestation:  I have seen and examined the patient with Dr. Gibson and agree with the findings in this note, as well as the interpretations of the diagnostic tests.      Jackie Nolasco MD PhD.  Professor & Chair

## 2019-08-12 NOTE — PROGRESS NOTES
PATT Rene is a 69 year old male here for 7 week follow-up after CE/IOL left eye. He notes his vision continues to blurred in the left eye. No pain, redness, discharge. No flashes/floaters.    No pain, redness, discharge. No flashes/floaters.     POH: Bell's palsy, diabetic retinopathy with DME    Impression/Plan:  (Z96.1) Pseudophakia, left eye  (E11.3213,  Z79.4) Type 2 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, with long-term current use of insulin (H)  (primary encounter diagnosis)  Comment: Increased post-operative inflammation and corneal edema with decreased VA after self-DCing all drops about 5 days post-op. After re-starting drops, corneal edema and AC inflammation resolved.     Macula OCT with increasing DME/post-op CME despite topical ketorolac and prednisolone    Plan: Continue drops for now, recheck with retina clinic today.    Return for Dr. Rodriguez as scheduled today. or sooner as needed.      Teaching statement:  Complete documentation of historical and exam elements from today's encounter can be found in the full encounter summary report (not reduplicated in this progress note). I personally obtained the chief complaint(s) and history of present illness.  I confirmed and edited as necessary the review of systems, past medical/surgical history, family history, social history, and examination findings as documented by others; and I examined the patient myself. I personally reviewed the relevant tests, images, and reports as documented above.     I formulated and edited as necessary the assessment and plan and discussed the findings and management plan with the patient and family.    Lacey Eugene MD  Comprehensive Ophthalmology & Ocular Pathology  Department of Ophthalmology and Visual Neurosciences  [email or web address]  Pager [phone]

## 2019-08-12 NOTE — NURSING NOTE
Chief Complaints and History of Present Illnesses   Patient presents with     Follow Up     4 week follow-up      Chief Complaint(s) and History of Present Illness(es)     Follow Up     Comments: 4 week follow-up               Comments     Pt notes his vision has slightly improved LE since last visit.  Complains of pressure & pain LE x 2 months  Denies any flashes, floaters, irritation, discharge, and tearing.  Currently using Prednisolone QID LE and Ketorolac QID LE.    Elin Duong OT 8:03 AM August 12, 2019

## 2019-08-13 ENCOUNTER — TELEPHONE (OUTPATIENT)
Dept: INTERNAL MEDICINE | Facility: CLINIC | Age: 69
End: 2019-08-13

## 2019-08-13 NOTE — TELEPHONE ENCOUNTER
Health Call Center    Phone Message    May a detailed message be left on voicemail: yes    Reason for Call: Order(s): Other:   Reason for requested: Pt's wife is requesting pt's orders for physical therapy be sent to the [facility] in [place]. Pt's wife states she goes to this location, and it will be easier to coordinate their appointments. Please fax orders to [phone].  Date needed: ASAP  Provider name: Dr. Marcio Hare    Action Taken: Message routed to:  [facility] AGNES

## 2019-08-13 NOTE — TELEPHONE ENCOUNTER
Routed to Charles Rivera who wrote the referral. Shanthi Jules Paramedic on 8/13/2019 at 3:28 PM

## 2019-08-16 ENCOUNTER — PRE VISIT (OUTPATIENT)
Dept: ORTHOPEDICS | Facility: CLINIC | Age: 69
End: 2019-08-16

## 2019-08-16 ENCOUNTER — OFFICE VISIT (OUTPATIENT)
Dept: ORTHOPEDICS | Facility: CLINIC | Age: 69
End: 2019-08-16
Attending: INTERNAL MEDICINE
Payer: COMMERCIAL

## 2019-08-16 ENCOUNTER — TELEPHONE (OUTPATIENT)
Dept: INTERNAL MEDICINE | Facility: CLINIC | Age: 69
End: 2019-08-16

## 2019-08-16 VITALS — WEIGHT: 201 LBS | BODY MASS INDEX: 30.46 KG/M2 | HEIGHT: 68 IN

## 2019-08-16 DIAGNOSIS — E78.5 HYPERLIPIDEMIA LDL GOAL <100: ICD-10-CM

## 2019-08-16 DIAGNOSIS — M70.62 GREATER TROCHANTERIC BURSITIS OF LEFT HIP: ICD-10-CM

## 2019-08-16 DIAGNOSIS — M54.40 CHRONIC LEFT-SIDED LOW BACK PAIN WITH SCIATICA, SCIATICA LATERALITY UNSPECIFIED: ICD-10-CM

## 2019-08-16 DIAGNOSIS — M79.2 POLYNEUROPATHIC PAIN: Primary | ICD-10-CM

## 2019-08-16 DIAGNOSIS — G89.29 CHRONIC LEFT-SIDED LOW BACK PAIN WITH SCIATICA, SCIATICA LATERALITY UNSPECIFIED: ICD-10-CM

## 2019-08-16 RX ORDER — GABAPENTIN 300 MG/1
300 CAPSULE ORAL 2 TIMES DAILY
Qty: 120 CAPSULE | Refills: 1 | Status: SHIPPED | OUTPATIENT
Start: 2019-08-16 | End: 2019-12-13

## 2019-08-16 RX ORDER — ATORVASTATIN CALCIUM 20 MG/1
20 TABLET, FILM COATED ORAL DAILY
Qty: 90 TABLET | Refills: 3 | Status: SHIPPED | OUTPATIENT
Start: 2019-08-16 | End: 2020-09-08

## 2019-08-16 ASSESSMENT — MIFFLIN-ST. JEOR: SCORE: 1646.23

## 2019-08-16 NOTE — TELEPHONE ENCOUNTER
Signed order    MOISES Hare    Fax received from [facility] on [address] for Lipitor.    Sharri Escobar RN on 8/16/2019 at 8:13 AM

## 2019-08-16 NOTE — LETTER
8/16/2019      RE: Earle Rene  [address]  [place] [address]       pherapy Subjective:   Earle Rene is a 69 year old male who complains of hip and foot pain, coming on slowly over 1 year.  Pain on hips with prolonged standing, cramps in feet.  Worse in the middle of the night, cramps so he can't sleep.  Can't bend low and has difficulties getting off the floor.  DM Type 2- 9.2, 25 years of DM, seen by nutrition, diet information in the past.  Pain in the side of the left buttock, feet pain on both sides.  Cramps from toes and very painful.  Only went to PT once, wife thinks he should go to chiropractic.  Elevated Cr.  Belongs to a health club.  Thinks to swim, about 3 miles.  Dr. Rivera sending pt to PT and testing.      Background:   Date of injury: None   Duration of symptoms: At least 1 year  Mechanism of Injury: Chronic; Unknown   Aggravating factors: Walking for long distances, standing for long periods  Relieving Factors: acetaminophen  Prior Evaluation: Prior Physician Evalutation: Dr. Rivera, MRI, EMG, physical therapy     PAST MEDICAL, SOCIAL, SURGICAL AND FAMILY HISTORY: He  has a past medical history of Blepharitis of both eyes, BPH (benign prostatic hyperplasia), Diabetes (H), Diabetic neuropathy (H), Diabetic retinopathy associated with diabetes mellitus due to underlying condition (H), Dry eye syndrome, Goiter, Granulomatous disease (H), Nonsenile cataract, and Peripheral neuropathy.  He  has a past surgical history that includes Surgical pathology exam; Colonoscopy (7/29/2013); RAD RESEC TONSIL/PILLARS (Bilateral, 1961); siladenatis (11/2017); and Phacoemulsification with standard intraocular lens implant (Left, 6/21/2019).  His family history includes Diabetes in his brother and father; Leukemia (age of onset: 44) in his brother; Myocardial Infarction in his father.  He reports that he has never smoked. He has never used smokeless tobacco. He reports that he drinks alcohol. He  "reports that he does not use drugs.    ALLERGIES: He has No Known Allergies.    CURRENT MEDICATIONS: He has a current medication list which includes the following prescription(s): albuterol, artificial tear, aspirin, atorvastatin, blood glucose, blood glucose monitoring, blood glucose, blood pressure monitor, clobetasol, clotrimazole, freestyle cyrus reader, freestyle cyrus 14 day sensor, erythromycin, fenofibrate, finasteride, fluocinonide, gabapentin, humalog kwikpen, insulin pen needle, insulin syringe, ketorolac, ketorolac, loratadine, losartan, losartan, losartan, metformin, moxifloxacin, mupirocin, neomycin-polymyxin-dexamethasone, novolog flexpen, ofloxacin, omega-3 fish oil, onetouch ultra, prednisolone acetate, prednisolone acetate, prednisolone acetate, ranitidine, senna-docusate, sodium bicarbonate, tamsulosin, tresiba flextouch, triamcinolone, and vitamin d3.     REVIEW OF SYSTEMS: 10 point review of systems is negative except as noted above.     Exam:   Ht 1.727 m (5' 8\")   Wt 91.2 kg (201 lb)   BMI 30.56 kg/m              CONSTITUTIONAL: healthy, alert, no distress and cooperative  HEAD: Normocephalic. No masses, lesions, tenderness or abnormalities  SKIN: no suspicious lesions or rashes  GAIT: broad based  NEUROLOGIC: positive findings: sensory deficit plantar surface of feet, numbness  PSYCHIATRIC: affect normal/bright and mentation appears normal.    MUSCULOSKELETAL: left lateral hip, burning in feet  Tender:  left para lumbar muscles, left greater trochanteric tenderness  Non-tender:  right para lumbar muscles, no groin pain  Range of Motion:  lumbar flexion  decreased, lumbar extension  decreased  Strength:  able to heel walk, able to toe walk  Special tests:  negative straight leg raises    Hip Exam: Hip ROM full       Assessment/Plan:   Pt is a 70 yo male with PMhx of DM polyneuropathy, left hip/buttock pain, hyperlipidemia presenting with left lateral hip pain, LBP, muscle cramps and numbness " in bilateral feet  1. Left greater trochanteric bursitis- discussed pool therapy and strengthening for weak hip abductors  Labral tear on MRI but no groin pain  2. LBP with left radiculopathy- EMG c/w radiculopathy L5-S1  Discussed possibility of MRI, if pt desires [facility] would need to obtain  Pt reports he doesn't want an injection and there are concerns of elevated BG with A1C over 9  Increased Neurontin 300mg BID, pt does report relief and has been on 300mg at bed time  3. DM neuropathy-  Trial of ketamine/neurontin/lidocaine cream, apply 1g to feet TID    RTC 6 weeks    X-RAY INTERPRETATION:   Reviewed MRI left hip, EMG, past images    Nicol Luu MD

## 2019-08-16 NOTE — PROGRESS NOTES
pherapy Subjective:   Earle Rene is a 69 year old male who complains of hip and foot pain, coming on slowly over 1 year.  Pain on hips with prolonged standing, cramps in feet.  Worse in the middle of the night, cramps so he can't sleep.  Can't bend low and has difficulties getting off the floor.  DM Type 2- 9.2, 25 years of DM, seen by nutrition, diet information in the past.  Pain in the side of the left buttock, feet pain on both sides.  Cramps from toes and very painful.  Only went to PT once, wife thinks he should go to chiropractic.  Elevated Cr.  Belongs to a health club.  Thinks to swim, about 3 miles.  Dr. Rivera sending pt to PT and testing.      Background:   Date of injury: None   Duration of symptoms: At least 1 year  Mechanism of Injury: Chronic; Unknown   Aggravating factors: Walking for long distances, standing for long periods  Relieving Factors: acetaminophen  Prior Evaluation: Prior Physician Evalutation: Dr. Rivera, MRI, EMG, physical therapy     PAST MEDICAL, SOCIAL, SURGICAL AND FAMILY HISTORY: He  has a past medical history of Blepharitis of both eyes, BPH (benign prostatic hyperplasia), Diabetes (H), Diabetic neuropathy (H), Diabetic retinopathy associated with diabetes mellitus due to underlying condition (H), Dry eye syndrome, Goiter, Granulomatous disease (H), Nonsenile cataract, and Peripheral neuropathy.  He  has a past surgical history that includes Surgical pathology exam; Colonoscopy (7/29/2013); RAD RESEC TONSIL/PILLARS (Bilateral, 1961); siladenatis (11/2017); and Phacoemulsification with standard intraocular lens implant (Left, 6/21/2019).  His family history includes Diabetes in his brother and father; Leukemia (age of onset: 44) in his brother; Myocardial Infarction in his father.  He reports that he has never smoked. He has never used smokeless tobacco. He reports that he drinks alcohol. He reports that he does not use drugs.    ALLERGIES: He has No Known Allergies.    CURRENT  "MEDICATIONS: He has a current medication list which includes the following prescription(s): albuterol, artificial tear, aspirin, atorvastatin, blood glucose, blood glucose monitoring, blood glucose, blood pressure monitor, clobetasol, clotrimazole, freestyle cyrus reader, freestyle cyrus 14 day sensor, erythromycin, fenofibrate, finasteride, fluocinonide, gabapentin, humalog kwikpen, insulin pen needle, insulin syringe, ketorolac, ketorolac, loratadine, losartan, losartan, losartan, metformin, moxifloxacin, mupirocin, neomycin-polymyxin-dexamethasone, novolog flexpen, ofloxacin, omega-3 fish oil, onetouch ultra, prednisolone acetate, prednisolone acetate, prednisolone acetate, ranitidine, senna-docusate, sodium bicarbonate, tamsulosin, tresiba flextouch, triamcinolone, and vitamin d3.     REVIEW OF SYSTEMS: 10 point review of systems is negative except as noted above.     Exam:   Ht 1.727 m (5' 8\")   Wt 91.2 kg (201 lb)   BMI 30.56 kg/m             CONSTITUTIONAL: healthy, alert, no distress and cooperative  HEAD: Normocephalic. No masses, lesions, tenderness or abnormalities  SKIN: no suspicious lesions or rashes  GAIT: broad based  NEUROLOGIC: positive findings: sensory deficit plantar surface of feet, numbness  PSYCHIATRIC: affect normal/bright and mentation appears normal.    MUSCULOSKELETAL: left lateral hip, burning in feet  Tender:  left para lumbar muscles, left greater trochanteric tenderness  Non-tender:  right para lumbar muscles, no groin pain  Range of Motion:  lumbar flexion  decreased, lumbar extension  decreased  Strength:  able to heel walk, able to toe walk  Special tests:  negative straight leg raises    Hip Exam: Hip ROM full       Assessment/Plan:   Pt is a 70 yo male with PMhx of DM polyneuropathy, left hip/buttock pain, hyperlipidemia presenting with left lateral hip pain, LBP, muscle cramps and numbness in bilateral feet  1. Left greater trochanteric bursitis- discussed pool therapy and " strengthening for weak hip abductors  Labral tear on MRI but no groin pain  2. LBP with left radiculopathy- EMG c/w radiculopathy L5-S1  Discussed possibility of MRI, if pt desires [facility] would need to obtain  Pt reports he doesn't want an injection and there are concerns of elevated BG with A1C over 9  Increased Neurontin 300mg BID, pt does report relief and has been on 300mg at bed time  3. DM neuropathy-  Trial of ketamine/neurontin/lidocaine cream, apply 1g to feet TID    RTC 6 weeks    X-RAY INTERPRETATION:   Reviewed MRI left hip, EMG, past images

## 2019-08-19 ENCOUNTER — TELEPHONE (OUTPATIENT)
Dept: ORTHOPEDICS | Facility: CLINIC | Age: 69
End: 2019-08-19

## 2019-08-19 NOTE — TELEPHONE ENCOUNTER
[facility] Call Center    Phone Message    May a detailed message be left on voicemail: yes    Reason for Call: Medication Question or concern regarding medication   Prescription Clarification  Name of Medication: ketamine 5% gabapentin 8% lidocaine 2.5% topical PLO cream  Prescribing Provider:    Pharmacy: [facility] [id number]   What on the order needs clarification? Per pt- stated medication is too expensive, pt is wondering if clinic can call insurance to lower the cost, or prescribe a covered alternative, pt would like a call back thanks          Action Taken: Message routed to:  [facility] ([facility] sports med

## 2019-08-20 ENCOUNTER — TELEPHONE (OUTPATIENT)
Dept: ORTHOPEDICS | Facility: CLINIC | Age: 69
End: 2019-08-20

## 2019-08-20 NOTE — TELEPHONE ENCOUNTER
Called back and per  there isn't really any alternative to medication that wouldn't be expensive. His wife has diclofenac gel that he could try. He has an upcoming appointment with .

## 2019-08-20 NOTE — TELEPHONE ENCOUNTER
A prior authorization is needed for the following medication prescribed.  Please complete a prior authorization with the information included below.    Medication: FV-Ketamine 5% Kaleb 8% Lido 2.5% Plo Crm  Ingredients: Ketamine Hcl Powd NDC: [address]-99  Lidocaine Hcl Powd NDC: [address]-25  Gabapentin Powd NDC: [address]-01  Salt Stable Ls Advanced Crea NDC: [address]-67    RX #: [id number]  Reason for Rejection: Not covered    Pharmacy Insurance plan: [employer] [place]  BIN #: [id number]  ID #: [id number]  PCN #: [id number]  Phone #: [phone]      Pharmacy NPI:[id number]      Please advise the pharmacy when the prior authorization is approved or denied.     Thank you for your time.     [facility]  [phone]

## 2019-08-23 NOTE — TELEPHONE ENCOUNTER
Central Prior Authorization Team   Phone: [phone]    PA Initiation    Medication: FV-Ketamine 5% Kaleb 8% Lido 2.5% Plo Crm-PA initiated  Insurance Company: [employer] - Phone [phone] Fax [phone]  Pharmacy Filling the Rx:    Filling Pharmacy Phone:    Filling Pharmacy Fax:    Start Date: 8/23/2019

## 2019-08-26 DIAGNOSIS — N40.1 BENIGN NON-NODULAR PROSTATIC HYPERPLASIA WITH LOWER URINARY TRACT SYMPTOMS: ICD-10-CM

## 2019-08-27 RX ORDER — TAMSULOSIN HYDROCHLORIDE 0.4 MG/1
0.4 CAPSULE ORAL DAILY
Qty: 90 CAPSULE | Refills: 2 | Status: SHIPPED | OUTPATIENT
Start: 2019-08-27 | End: 2020-02-27

## 2019-08-27 NOTE — TELEPHONE ENCOUNTER
Last Clinic Visit: 5/30/2019  [facility]

## 2019-08-28 ENCOUNTER — TRANSFERRED RECORDS (OUTPATIENT)
Dept: HEALTH INFORMATION MANAGEMENT | Facility: CLINIC | Age: 69
End: 2019-08-28

## 2019-08-28 DIAGNOSIS — N40.1 BENIGN PROSTATIC HYPERPLASIA WITH URINARY OBSTRUCTION: ICD-10-CM

## 2019-08-28 DIAGNOSIS — N13.8 BENIGN PROSTATIC HYPERPLASIA WITH URINARY OBSTRUCTION: ICD-10-CM

## 2019-08-28 RX ORDER — FINASTERIDE 5 MG/1
5 TABLET, FILM COATED ORAL DAILY
Qty: 90 TABLET | Refills: 2 | Status: SHIPPED | OUTPATIENT
Start: 2019-08-28 | End: 2020-02-27

## 2019-08-28 NOTE — TELEPHONE ENCOUNTER
PRIOR AUTHORIZATION DENIED    Medication: FV-Ketamine 5% Kaleb 8% Lido 2.5% Plo Crm-PA denied    Denial Date: 8/24/2019    Denial Rational: Excluded from Medicare coverage        Appeal Information:

## 2019-08-28 NOTE — TELEPHONE ENCOUNTER
I spoke to Anastasia at [facility]. This was denied on 8/24. She is faxing denial. Will update when received.

## 2019-08-28 NOTE — TELEPHONE ENCOUNTER
5/30/2019  [facility]

## 2019-09-09 ENCOUNTER — OFFICE VISIT (OUTPATIENT)
Dept: OPHTHALMOLOGY | Facility: CLINIC | Age: 69
End: 2019-09-09
Attending: OPHTHALMOLOGY
Payer: COMMERCIAL

## 2019-09-09 DIAGNOSIS — E11.3213 TYPE 2 DIABETES MELLITUS WITH MILD NONPROLIFERATIVE RETINOPATHY OF BOTH EYES AND MACULAR EDEMA, UNSPECIFIED WHETHER LONG TERM INSULIN USE (H): Primary | ICD-10-CM

## 2019-09-09 PROCEDURE — G0463 HOSPITAL OUTPT CLINIC VISIT: HCPCS | Mod: ZF

## 2019-09-09 PROCEDURE — 92134 CPTRZ OPH DX IMG PST SGM RTA: CPT | Mod: ZF | Performed by: OPHTHALMOLOGY

## 2019-09-09 ASSESSMENT — CONF VISUAL FIELD
OD_NORMAL: 1
OS_NORMAL: 1

## 2019-09-09 ASSESSMENT — TONOMETRY
OS_IOP_MMHG: 12
OD_IOP_MMHG: 19
IOP_METHOD: TONOPEN

## 2019-09-09 ASSESSMENT — REFRACTION_WEARINGRX
OS_ADD: +2.50
OS_CYLINDER: +0.75
OS_SPHERE: -2.50
OD_SPHERE: -2.50
OD_ADD: +2.50
SPECS_TYPE: PAL
OD_AXIS: 157
OS_AXIS: 033
OD_CYLINDER: +1.50

## 2019-09-09 ASSESSMENT — SLIT LAMP EXAM - LIDS
COMMENTS: NORMAL
COMMENTS: NORMAL

## 2019-09-09 ASSESSMENT — VISUAL ACUITY
OD_CC: 20/30
CORRECTION_TYPE: GLASSES
OS_SC+: -2
METHOD: SNELLEN - LINEAR
OS_SC: 20/40

## 2019-09-09 ASSESSMENT — CUP TO DISC RATIO
OD_RATIO: 0.3
OS_RATIO: 0.3

## 2019-09-09 NOTE — PROGRESS NOTES
I have confirmed the patient's and reviewed Past Medical History, Past Surgical History, Social History, Family History, Problem List, Medication List and agree with Tech note.    CC: decreased vision following CEIOL in OS    HPI:  pt of Dr. Eugene, s/p CEIOL left eye on 6/21/19. Has been having blurred vision since CEIOL. Reports that he had significant periorbital pain after the operation and has since had significant visual disturbance, blurred vision, distorted vision, diplopia, and eye pain. He is very concerned about his vision and would not like any more injections in his left eye at this time.     Self-DC'd all gtts about 5 days after surgery. Re-started on gtts about 3 weeks post-op. Since last visit has been taking prednisolone and ofloxacin 2-3 times per day    Avastin injections  Right Eye: 2/26/18, 4/10/18, 5/11/20218, 6/11/18 and last one here on 7/19/2018  Left Eye: 2/26/18, 4/10/18, 5/11/2018, 6/21/19      Assessment/plan:   1. 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 Patient advised that failure to adequately control each may lead to vision loss. The patient expressed understanding.    2. Diabetic macular edema left eye   - Worsened since CEIOL on 6/21/19   - Has been on ketorolac and PF QID   - OCT (9/9/19): temporal edema involving fovea is improved from 8/2019   - last injection 6/21/19 intra op Avastin   - questioned inject Triescence, patient deferred and wants to try drops longer so will stay on PF 1% four times a day      3. Pseudophakia left eye   - S/p CEIOL on 6/21/19    4. Diabetic macular edema right eye   - temporal macular edema, stable as of 7/3/19   - had been receiving injections , last injection 6/11/18 at outside clinic and 7/19/2018 here  at [facility]    - recommend defer avastin today since there was not much of an effect    - follow up 4 weeks    5. Cataract right eye   - s/p CEIOL left eye on 6/21/19, needs CEIOL in right eye    - was seen previously , has IOL calcs    RTC 5 weeks for re-check Exam/OCT OS    Gabriele Hutson MD  Ophthalmology Resident  PGY-3     Attestation:  I have seen and examined the patient with Dr. Hutson and agree with the findings in this note, as well as the interpretations of the diagnostic tests.      Jackie Nolasco MD PhD.  Professor & Chair

## 2019-09-09 NOTE — NURSING NOTE
Chief Complaints and History of Present Illnesses   Patient presents with     Diabetic Retinopathy Follow Up     Chief Complaint(s) and History of Present Illness(es)     Diabetic Retinopathy Follow Up     Laterality: both eyes    Onset: 4 weeks ago              Comments     Pt. States that he is doing well.  VA is improving BE.  No pain BE.  Naida Ornelas COT 2:47 PM September 9, 2019

## 2019-09-18 ENCOUNTER — OFFICE VISIT (OUTPATIENT)
Dept: ENDOCRINOLOGY | Facility: CLINIC | Age: 69
End: 2019-09-18
Payer: COMMERCIAL

## 2019-09-18 VITALS
SYSTOLIC BLOOD PRESSURE: 130 MMHG | BODY MASS INDEX: 30.14 KG/M2 | HEIGHT: 68 IN | HEART RATE: 94 BPM | DIASTOLIC BLOOD PRESSURE: 79 MMHG | WEIGHT: 198.9 LBS

## 2019-09-18 DIAGNOSIS — Z79.4 TYPE 2 DIABETES MELLITUS WITH HYPERGLYCEMIA, WITH LONG-TERM CURRENT USE OF INSULIN (H): Primary | ICD-10-CM

## 2019-09-18 DIAGNOSIS — E11.65 TYPE 2 DIABETES MELLITUS WITH HYPERGLYCEMIA, WITH LONG-TERM CURRENT USE OF INSULIN (H): Primary | ICD-10-CM

## 2019-09-18 LAB — HBA1C MFR BLD: 10 % (ref 4.3–6)

## 2019-09-18 RX ORDER — INSULIN ASPART 100 [IU]/ML
INJECTION, SOLUTION INTRAVENOUS; SUBCUTANEOUS
Qty: 60 ML | Refills: 3 | Status: SHIPPED | OUTPATIENT
Start: 2019-09-18 | End: 2020-05-15

## 2019-09-18 ASSESSMENT — ENCOUNTER SYMPTOMS
BACK PAIN: 1
STIFFNESS: 0
STIFFNESS: 0
ARTHRALGIAS: 1
JOINT SWELLING: 0
NECK PAIN: 1
MYALGIAS: 1
MUSCLE CRAMPS: 1
NECK PAIN: 1
ARTHRALGIAS: 1
BACK PAIN: 1
MUSCLE WEAKNESS: 1
MUSCLE WEAKNESS: 1
JOINT SWELLING: 0
MUSCLE CRAMPS: 1
MYALGIAS: 1

## 2019-09-18 ASSESSMENT — MIFFLIN-ST. JEOR: SCORE: 1636.7

## 2019-09-18 ASSESSMENT — PAIN SCALES - GENERAL: PAINLEVEL: NO PAIN (0)

## 2019-09-18 NOTE — PATIENT INSTRUCTIONS
Increase Tresiba 60 units subcutaneous at bedtime.  Increase Novolog 12-14 units with meals, plus correction.  Start using your Freestyle Lora device/sensor.  Check your fasting blood sugar, before lunch, before dinner and at bedtime DAILY.  See me in 6 weeks. Please bring your Freestyle device to your appointments.  Pamela Laura PA-C

## 2019-09-18 NOTE — PROGRESS NOTES
HPI   Earle Rene is a 69 year old male with type 2 diabetes mellitus here today for a follow up visit.      Pt's diabetes is complicated by retinopathy, nephropathy, neuropathy and ED.  Pt also has hx of hyperlipidemia and HTN.  For his diabetes, he is prescribed to take Tresiba 58 units SQ at hs, Novolog 10-12 units with meals and Metformin 1000 mg BID. He has no interest in carb counting or using an I/C ratio.  Pt denies missing any insulin doses.  His A1C is too high at 10.0 % today. His A1C was 9.2 % in 6/2019.  His previous A1C values were 8.4 % and  7.4 %.  Earle has no glucose meter with him today and has no blood sugar values for me to review.  He states he has not checked his blood sugar for approx 1 year.  On ROS today, fatigue and blurred vision.  Pt has numbness in both feet from his neuropathy.  He reports chronic cough.  Pt denies frequent headaches, n/v, SOB at rest, chest pain, abd pain, diarrhea, dysuria or hematuria.  No foot ulcers .    ROS   Please see under HPI.     ALLERGIES:  Review of patient's allergies indicates no known allergies.    Current Outpatient Medications   Medication Sig Dispense Refill     albuterol (VENTOLIN HFA) 108 (90 Base) MCG/ACT inhaler Inhale 2 puffs into the lungs every 6 hours 18 g 3     ARTIFICIAL TEAR OP Apply to eye as needed       aspirin 81 MG tablet Take 1 tablet by mouth daily.       atorvastatin (LIPITOR) 20 MG tablet Take 1 tablet (20 mg) by mouth daily 90 tablet 3     blood glucose (NO BRAND SPECIFIED) lancets standard Lancets that go with device, Test 3 times daily 300 each 3     blood glucose monitoring (NO BRAND SPECIFIED) meter device kit Any meter covered by insurance, not store brand, use as directed. 1 kit 0     blood glucose monitoring (NO BRAND SPECIFIED) test strip Strips that go with meter, covered by insurance. Test 3 times daily 300 strip 3     Blood Pressure Monitor KIT Automatic Blood Pressure Monitor 1 kit 0     clobetasol (TEMOVATE) 0.05  "% ointment Apply topically to legs twice daily for 2 weeks.  Then discontinue 30 g 0     clotrimazole (LOTRIMIN) 1 % cream Apply topically 2 times daily 30 g 3     Continuous Blood Gluc  (FREESTYLE PETER READER) JUANCARLOS 1 Device daily 1 Device 0     Continuous Blood Gluc Sensor (FREESTYLE PETER 14 DAY SENSOR) MISC 1 applicator every 14 days 8 each 3     erythromycin (ROMYCIN) 5 MG/GM ophthalmic ointment Place 0.5 inches Into the left eye 3 times daily 1 g 0     fenofibrate 54 MG tablet Take 1 tablet (54 mg) by mouth daily 90 tablet 0     finasteride (PROSCAR) 5 MG tablet Take 1 tablet (5 mg) by mouth daily 90 tablet 2     fluocinonide (LIDEX) 0.05 % external cream APPLY TO AFFECTED AREA TWICE A DAY  3     gabapentin (NEURONTIN) 100 MG capsule Take 1 capsule (100 mg) by mouth 3 times daily 90 capsule 3     gabapentin (NEURONTIN) 300 MG capsule Take 1 capsule (300 mg) by mouth 2 times daily 120 capsule 1     insulin degludec (TRESIBA FLEXTOUCH) 100 UNIT/ML pen Inject 60 units subcutaneous at bedtime. 60 mL 3     insulin pen needle (B-D U/F) 31G X 5 MM Use 4 times per day.  Please dispense as BD Pen Needle Mini U/F 31G x 5  each 3     insulin syringe 31G X 5/16\" 0.5 ML MISC Use three syringes daily 270 each 1     ketamine 5% gabapentin 8% lidocaine 2.5% topical PLO cream Apply 1 g topically 3 times daily 30 g 3     ketorolac (ACULAR) 0.5 % ophthalmic solution Place 1 drop Into the left eye 4 times daily 1 Bottle 1     ketorolac (ACULAR) 0.5 % ophthalmic solution 1 drop in surgical eye as directed - start 1 week after surgery, 4x daily until follow-up visit 1 Bottle 1     loratadine (CLARITIN) 10 MG tablet Take 1 tablet (10 mg) by mouth daily 90 tablet 0     losartan (COZAAR) 100 MG tablet Take 1 tablet (100 mg) by mouth At Bedtime 30 tablet 11     losartan (COZAAR) 25 MG tablet Take 1 tablet (25 mg) by mouth daily With a 50mg tablet for total dose of 75mg daily 30 tablet 11     losartan (COZAAR) 50 MG tablet " Take 1 tablet (50 mg) by mouth daily With 25mg tablet for a total dose of 75mg daily 30 tablet 11     metFORMIN (GLUCOPHAGE) 1000 MG tablet 1 tab twice daily with meals 180 tablet 3     moxifloxacin (VIGAMOX) 0.5 % ophthalmic solution Place 1 drop Into the left eye every hour (while awake) 1 Bottle 3     mupirocin (BACTROBAN) 2 % cream Apply  topically. In very small amounts only as needed 15 g 1     neomycin-polymyxin-dexamethasone (MAXITROL) 3.5-66506-5.1 ophthalmic ointment Place 0.5 inches Into the left eye At Bedtime 1 Tube 0     NOVOLOG FLEXPEN 100 UNIT/ML soln Inject 12-14 units with meals, plus correction. Pt uses approx 65 units in 24 hrs. 60 mL 3     ofloxacin (OCUFLOX) 0.3 % ophthalmic solution 1 drop 4x daily in the surgical eye for 1 week after surgery, then stop 1 Bottle 0     Omega-3 Fatty Acids (OMEGA-3 FISH OIL) 1000 MG CAPS Take 1 capsule (1 g) by mouth 2 times daily 60 capsule 11     ONETOUCH ULTRA test strip Use to test blood sugar 3 times daily 300 strip 3     prednisoLONE acetate (PRED FORTE) 1 % ophthalmic suspension Place 1 drop Into the left eye 6 times daily 1 Bottle 3     prednisoLONE acetate (PRED FORTE) 1 % ophthalmic suspension Place 1 drop Into the left eye every hour (while awake) 1 Bottle 3     prednisoLONE acetate (PRED FORTE) 1 % ophthalmic suspension 1 drop in surgical eye as directed, 4x daily after surgery until follow-up 1 Bottle 1     ranitidine (ZANTAC) 300 MG tablet Take 1 tablet (300 mg) by mouth At Bedtime 90 tablet 3     senna-docusate (SENOKOT-S/PERICOLACE) 8.6-50 MG tablet Take 1 tablet by mouth       sodium bicarbonate 650 MG tablet Take 1 tablet (650 mg) by mouth 3 times daily 90 tablet 11     tamsulosin (FLOMAX) 0.4 MG capsule Take 1 capsule (0.4 mg) by mouth daily 90 capsule 2     triamcinolone (KENALOG) 0.1 % cream Apply topically 3 times daily 80 g 0     vitamin D3 (CHOLECALCIFEROL) 1000 units (25 mcg) tablet Take 2 tablets (2,000 Units) by mouth daily 30 tablet  "11     Family Hx   No change.     Personal Hx   Smoke: none.   ETOH: none.    with grown children.   He owns his own business.    PMH   1. Type 2 Diabetes Mellitus dx at age 43.   2. Neuropathy.  3. Nephropathy.   4. ED.   5. Dyslipidemia.   6. Nephrolithiasis.   7. Decrease auditory acuity.   8. Sarcoidosis-lung.   9. Goiter.   10. S/P T & A.   11. S/P FX right heel.   12. Vit D def.   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   14. CT chest- ? granulomas.   15. Retinopathy.  16. Goiter.  Past Medical History:   Diagnosis Date     Blepharitis of both eyes      BPH (benign prostatic hyperplasia)      Diabetes (H)      Diabetic neuropathy (H)      Diabetic retinopathy associated with diabetes mellitus due to underlying condition (H)      Dry eye syndrome      Goiter      Granulomatous disease (H)      Nonsenile cataract      Peripheral neuropathy      Past Surgical History:   Procedure Laterality Date     AS RAD RESEC TONSIL/PILLARS Bilateral 1961     COLONOSCOPY  7/29/2013    Procedure: COLONOSCOPY;;  Surgeon: Montana Pascal MD;  Location:  GI     PHACOEMULSIFICATION WITH STANDARD INTRAOCULAR LENS IMPLANT Left 6/21/2019    Procedure: Left Eye Cataract Removal with Intraocular Lens Implant with Intraoperative Avastin Injection;  Surgeon: Lacey Eugene MD;  Location:  OR     [facility]  11/2017     SURGICAL PATHOLOGY EXAM       Physical Exam   General appearance: Vital signs:   /79   Pulse 94   Ht 1.727 m (5' 8\")   Wt 90.2 kg (198 lb 14.4 oz)   BMI 30.24 kg/m    Estimated body mass index is 30.24 kg/m  as calculated from the following:    Height as of this encounter: 1.727 m (5' 8\").    Weight as of this encounter: 90.2 kg (198 lb 14.4 oz).  Head:  Normal.   Eyes: Decrease visual acuity; fundi not visualized.   Lungs: clear bilateral.  Cardiovascular system:  RRR.   Abdomen:  Large and nontender.  Musculoskeletal system: no edema.   Neurological:  normal.   Skin:  necrobiosis lipoidica on both " legs.   FEET: no ulcers. Nails are thick.  Abnormal monofilamentous exam.    Results   Creatinine   Date Value Ref Range Status   06/26/2019 1.31 (H) 0.66 - 1.25 mg/dL Final     GFR Estimate   Date Value Ref Range Status   06/26/2019 55 (L) >60 mL/min/[1.73_m2] Final     Comment:     Non  GFR Calc  Starting 12/18/2018, serum creatinine based estimated GFR (eGFR) will be   calculated using the Chronic Kidney Disease Epidemiology Collaboration   (CKD-EPI) equation.       Hemoglobin A1C   Date Value Ref Range Status   06/18/2019 9.2 (H) 0 - 5.6 % Final     Comment:     Normal <5.7% Prediabetes 5.7-6.4%  Diabetes 6.5% or higher - adopted from ADA   consensus guidelines.       Potassium   Date Value Ref Range Status   06/26/2019 4.3 3.4 - 5.3 mmol/L Final     ALT   Date Value Ref Range Status   06/18/2019 38 0 - 70 U/L Final     AST   Date Value Ref Range Status   06/18/2019 24 0 - 45 U/L Final     TSH   Date Value Ref Range Status   11/01/2017 1.12 0.40 - 4.00 mU/L Final     T4 Free   Date Value Ref Range Status   10/21/2014 1.00 0.76 - 1.46 ng/dL Final     Comment:     Effective 7/30/2014, the reference range for this assay has changed to reflect   new instrumentation/methodology.           Cholesterol   Date Value Ref Range Status   06/18/2019 145 <200 mg/dL Final   03/06/2018 101 <200 mg/dL Final     HDL Cholesterol   Date Value Ref Range Status   06/18/2019 38 (L) >39 mg/dL Final   03/06/2018 32 (L) >39 mg/dL Final     LDL Cholesterol Calculated   Date Value Ref Range Status   06/18/2019 49 <100 mg/dL Final     Comment:     Desirable:       <100 mg/dl   03/06/2018 36 <100 mg/dL Final     Comment:     Desirable:       <100 mg/dl     Triglycerides   Date Value Ref Range Status   06/18/2019 289 (H) <150 mg/dL Final     Comment:     Borderline high:  150-199 mg/dl  High:             200-499 mg/dl  Very high:       >499 mg/dl     03/06/2018 166 (H) <150 mg/dL Final     Comment:     Borderline high:   150-199 mg/dl  High:             200-499 mg/dl  Very high:       >499 mg/dl       Cholesterol/HDL Ratio   Date Value Ref Range Status   09/09/2014 3.8 0.0 - 5.0 Final   11/20/2013 5.8 (H) 0.0 - 5.0 Final     A1C     10.0  9/18/2019  A1C      9.2   6/18/2019  A1C      8.4   12/21/2018  A1C      7.4   9/7/2018  A1C      7.1   5/31/2018  A1C      9.1   3/6/2018  A1C      9.6   11/1/2017  A1C      8.9   8/9/2017  A1C      9.7   9/22/2016  A1C      9.7   7/21/2015  A1C     10.2  12/2/2014  A1C     10.2  9/9/2014  A1C      9.8  11/20/2013  A1C      9.3   6/12/2013  A1C      8.0   8/14/2012  A1C      8.2   5/22/2012  A1C      8.0   5/22/2012    ASSESSMENT/PLAN:     1. TYPE 2 DIABETES MELLITUS: Uncontrolled type 2 diabetes mellitus complicated by retinopathy, nephropathy,neuropathy and ED.  Increase Tresiba 60 units subcutaneous at bedtime and Novolog 12-14 units with all meals DAILY.  We placed his Freestyle Lora sensor today. I asked him to check his FBS each am and also check his blood sugar prelunch, predinner and at bedtime DAILY.  He is to return to clinic with his Freestyle device in 6 weeks to see me.  He is to remain on Metformin 1000 mg BID.  Pt's most recent creat was 1.31 with GFR 55 mL/min on 6/26/2019.  Encouraged patient to make healthy food choices, reduce his food portions with meals, avoid snacking and walk daily and to take his insulin and medications as prescribed.  Pt remains on daily ASA.   Reminded pt to have the flu vaccine this season.    2. GOITER: Nontender goiter.  TSH normal in Nov 2017.    3. NEPHROPATHY: Discussed the need for good glycemic control and good BP control.   Pt's creat/GFR as above.   Pt's urine protein +.  He is taking Cozaar.  He is seen here by Nephrology.    4.  RETINOPATHY: Pt seen by O[facility] here on 9/9/2019.     5.  NEUROPATHY: Continue Gabapentin.  No foot ulcers.    6. DYSLIPIDEMIA: LDL 36 on 3/6/2018.   Pt is taking Lipitor and Fenofibrate.    7. OBESITY: See under # 1  above.  He does not want to use a GLP-1.    8.   Return to Endocrine Clinic to see me in 6 weeks.

## 2019-09-18 NOTE — LETTER
9/18/2019     RE: Earle Rene  [address]  [place] [address]     Dear Colleague,    Thank you for referring your patient, Earle Rene, to the [facility] ENDOCRINOLOGY at [facility]. Please see a copy of my visit note below.    HPI   Earle Rene is a 69 year old male with type 2 diabetes mellitus here today for a follow up visit.      Pt's diabetes is complicated by retinopathy, nephropathy, neuropathy and ED.  Pt also has hx of hyperlipidemia and HTN.  For his diabetes, he is prescribed to take Tresiba 58 units SQ at hs, Novolog 10-12 units with meals and Metformin 1000 mg BID. He has no interest in carb counting or using an I/C ratio.  Pt denies missing any insulin doses.  His A1C is too high at 10.0 % today. His A1C was 9.2 % in 6/2019.  His previous A1C values were 8.4 % and  7.4 %.  Earle has no glucose meter with him today and has no blood sugar values for me to review.  He states he has not checked his blood sugar for approx 1 year.  On ROS today, fatigue and blurred vision.  Pt has numbness in both feet from his neuropathy.  He reports chronic cough.  Pt denies frequent headaches, n/v, SOB at rest, chest pain, abd pain, diarrhea, dysuria or hematuria.  No foot ulcers .    ROS   Please see under HPI.     ALLERGIES:  Review of patient's allergies indicates no known allergies.    Current Outpatient Medications   Medication Sig Dispense Refill     albuterol (VENTOLIN HFA) 108 (90 Base) MCG/ACT inhaler Inhale 2 puffs into the lungs every 6 hours 18 g 3     ARTIFICIAL TEAR OP Apply to eye as needed       aspirin 81 MG tablet Take 1 tablet by mouth daily.       atorvastatin (LIPITOR) 20 MG tablet Take 1 tablet (20 mg) by mouth daily 90 tablet 3     blood glucose (NO BRAND SPECIFIED) lancets standard Lancets that go with device, Test 3 times daily 300 each 3     blood glucose monitoring (NO BRAND SPECIFIED) meter device kit Any meter covered by insurance, not  "store brand, use as directed. 1 kit 0     blood glucose monitoring (NO BRAND SPECIFIED) test strip Strips that go with meter, covered by insurance. Test 3 times daily 300 strip 3     Blood Pressure Monitor KIT Automatic Blood Pressure Monitor 1 kit 0     clobetasol (TEMOVATE) 0.05 % ointment Apply topically to legs twice daily for 2 weeks.  Then discontinue 30 g 0     clotrimazole (LOTRIMIN) 1 % cream Apply topically 2 times daily 30 g 3     Continuous Blood Gluc  (FREESTYLE PETER READER) JUANCARLOS 1 Device daily 1 Device 0     Continuous Blood Gluc Sensor (FREESTYLE PETER 14 DAY SENSOR) MIS 1 applicator every 14 days 8 each 3     erythromycin (ROMYCIN) 5 MG/GM ophthalmic ointment Place 0.5 inches Into the left eye 3 times daily 1 g 0     fenofibrate 54 MG tablet Take 1 tablet (54 mg) by mouth daily 90 tablet 0     finasteride (PROSCAR) 5 MG tablet Take 1 tablet (5 mg) by mouth daily 90 tablet 2     fluocinonide (LIDEX) 0.05 % external cream APPLY TO AFFECTED AREA TWICE A DAY  3     gabapentin (NEURONTIN) 100 MG capsule Take 1 capsule (100 mg) by mouth 3 times daily 90 capsule 3     gabapentin (NEURONTIN) 300 MG capsule Take 1 capsule (300 mg) by mouth 2 times daily 120 capsule 1     insulin degludec (TRESIBA FLEXTOUCH) 100 UNIT/ML pen Inject 60 units subcutaneous at bedtime. 60 mL 3     insulin pen needle (B-D U/F) 31G X 5 MM Use 4 times per day.  Please dispense as BD Pen Needle Mini U/F 31G x 5  each 3     insulin syringe 31G X 5/16\" 0.5 ML MISC Use three syringes daily 270 each 1     ketamine 5% gabapentin 8% lidocaine 2.5% topical PLO cream Apply 1 g topically 3 times daily 30 g 3     ketorolac (ACULAR) 0.5 % ophthalmic solution Place 1 drop Into the left eye 4 times daily 1 Bottle 1     ketorolac (ACULAR) 0.5 % ophthalmic solution 1 drop in surgical eye as directed - start 1 week after surgery, 4x daily until follow-up visit 1 Bottle 1     loratadine (CLARITIN) 10 MG tablet Take 1 tablet (10 mg) by " mouth daily 90 tablet 0     losartan (COZAAR) 100 MG tablet Take 1 tablet (100 mg) by mouth At Bedtime 30 tablet 11     losartan (COZAAR) 25 MG tablet Take 1 tablet (25 mg) by mouth daily With a 50mg tablet for total dose of 75mg daily 30 tablet 11     losartan (COZAAR) 50 MG tablet Take 1 tablet (50 mg) by mouth daily With 25mg tablet for a total dose of 75mg daily 30 tablet 11     metFORMIN (GLUCOPHAGE) 1000 MG tablet 1 tab twice daily with meals 180 tablet 3     moxifloxacin (VIGAMOX) 0.5 % ophthalmic solution Place 1 drop Into the left eye every hour (while awake) 1 Bottle 3     mupirocin (BACTROBAN) 2 % cream Apply  topically. In very small amounts only as needed 15 g 1     neomycin-polymyxin-dexamethasone (MAXITROL) 3.5-38768-4.1 ophthalmic ointment Place 0.5 inches Into the left eye At Bedtime 1 Tube 0     NOVOLOG FLEXPEN 100 UNIT/ML soln Inject 12-14 units with meals, plus correction. Pt uses approx 65 units in 24 hrs. 60 mL 3     ofloxacin (OCUFLOX) 0.3 % ophthalmic solution 1 drop 4x daily in the surgical eye for 1 week after surgery, then stop 1 Bottle 0     Omega-3 Fatty Acids (OMEGA-3 FISH OIL) 1000 MG CAPS Take 1 capsule (1 g) by mouth 2 times daily 60 capsule 11     ONETOUCH ULTRA test strip Use to test blood sugar 3 times daily 300 strip 3     prednisoLONE acetate (PRED FORTE) 1 % ophthalmic suspension Place 1 drop Into the left eye 6 times daily 1 Bottle 3     prednisoLONE acetate (PRED FORTE) 1 % ophthalmic suspension Place 1 drop Into the left eye every hour (while awake) 1 Bottle 3     prednisoLONE acetate (PRED FORTE) 1 % ophthalmic suspension 1 drop in surgical eye as directed, 4x daily after surgery until follow-up 1 Bottle 1     ranitidine (ZANTAC) 300 MG tablet Take 1 tablet (300 mg) by mouth At Bedtime 90 tablet 3     senna-docusate (SENOKOT-S/PERICOLACE) 8.6-50 MG tablet Take 1 tablet by mouth       sodium bicarbonate 650 MG tablet Take 1 tablet (650 mg) by mouth 3 times daily 90 tablet 11  "    tamsulosin (FLOMAX) 0.4 MG capsule Take 1 capsule (0.4 mg) by mouth daily 90 capsule 2     triamcinolone (KENALOG) 0.1 % cream Apply topically 3 times daily 80 g 0     vitamin D3 (CHOLECALCIFEROL) 1000 units (25 mcg) tablet Take 2 tablets (2,000 Units) by mouth daily 30 tablet 11     Family Hx   No change.     Personal Hx   Smoke: none.   ETOH: none.    with grown children.   He owns his own business.    PMH   1. Type 2 Diabetes Mellitus dx at age 43.   2. Neuropathy.  3. Nephropathy.   4. ED.   5. Dyslipidemia.   6. Nephrolithiasis.   7. Decrease auditory acuity.   8. Sarcoidosis-lung.   9. Goiter.   10. S/P T & A.   11. S/P FX right heel.   12. Vit D def.   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   14. CT chest- ? granulomas.   15. Retinopathy.  16. Goiter.  Past Medical History:   Diagnosis Date     Blepharitis of both eyes      BPH (benign prostatic hyperplasia)      Diabetes (H)      Diabetic neuropathy (H)      Diabetic retinopathy associated with diabetes mellitus due to underlying condition (H)      Dry eye syndrome      Goiter      Granulomatous disease (H)      Nonsenile cataract      Peripheral neuropathy      Past Surgical History:   Procedure Laterality Date     AS RAD RESEC TONSIL/PILLARS Bilateral 1961     COLONOSCOPY  7/29/2013    Procedure: COLONOSCOPY;;  Surgeon: Montana Pascal MD;  Location: [facility]     PHACOEMULSIFICATION WITH STANDARD INTRAOCULAR LENS IMPLANT Left 6/21/2019    Procedure: Left Eye Cataract Removal with Intraocular Lens Implant with Intraoperative Avastin Injection;  Surgeon: Lacey Eugene MD;  [facility]  [facility]     SURGICAL PATHOLOGY EXAM       Physical Exam   General appearance: Vital signs:   /79   Pulse 94   Ht 1.727 m (5' 8\")   Wt 90.2 kg (198 lb 14.4 oz)   BMI 30.24 kg/m     Estimated body mass index is 30.24 kg/m  as calculated from the following:    Height as of this encounter: 1.727 m (5' 8\").    Weight as of this encounter: " 90.2 kg (198 lb 14.4 oz).  Head:  Normal.   Eyes: Decrease visual acuity; fundi not visualized.   Lungs: clear bilateral.  Cardiovascular system:  RRR.   Abdomen:  Large and nontender.  Musculoskeletal system: no edema.   Neurological:  normal.   Skin:  necrobiosis lipoidica on both legs.   FEET: no ulcers. Nails are thick.  Abnormal monofilamentous exam.    Results   Creatinine   Date Value Ref Range Status   06/26/2019 1.31 (H) 0.66 - 1.25 mg/dL Final     GFR Estimate   Date Value Ref Range Status   06/26/2019 55 (L) >60 mL/min/[1.73_m2] Final     Comment:     Non  GFR Calc  Starting 12/18/2018, serum creatinine based estimated GFR (eGFR) will be   calculated using the Chronic Kidney Disease Epidemiology Collaboration   (CKD-EPI) equation.       Hemoglobin A1C   Date Value Ref Range Status   06/18/2019 9.2 (H) 0 - 5.6 % Final     Comment:     Normal <5.7% Prediabetes 5.7-6.4%  Diabetes 6.5% or higher - adopted from ADA   consensus guidelines.       Potassium   Date Value Ref Range Status   06/26/2019 4.3 3.4 - 5.3 mmol/L Final     ALT   Date Value Ref Range Status   06/18/2019 38 0 - 70 U/L Final     AST   Date Value Ref Range Status   06/18/2019 24 0 - 45 U/L Final     TSH   Date Value Ref Range Status   11/01/2017 1.12 0.40 - 4.00 mU/L Final     T4 Free   Date Value Ref Range Status   10/21/2014 1.00 0.76 - 1.46 ng/dL Final     Comment:     Effective 7/30/2014, the reference range for this assay has changed to reflect   new instrumentation/methodology.           Cholesterol   Date Value Ref Range Status   06/18/2019 145 <200 mg/dL Final   03/06/2018 101 <200 mg/dL Final     HDL Cholesterol   Date Value Ref Range Status   06/18/2019 38 (L) >39 mg/dL Final   03/06/2018 32 (L) >39 mg/dL Final     LDL Cholesterol Calculated   Date Value Ref Range Status   06/18/2019 49 <100 mg/dL Final     Comment:     Desirable:       <100 mg/dl   03/06/2018 36 <100 mg/dL Final     Comment:     Desirable:       <100  mg/dl     Triglycerides   Date Value Ref Range Status   06/18/2019 289 (H) <150 mg/dL Final     Comment:     Borderline high:  150-199 mg/dl  High:             200-499 mg/dl  Very high:       >499 mg/dl     03/06/2018 166 (H) <150 mg/dL Final     Comment:     Borderline high:  150-199 mg/dl  High:             200-499 mg/dl  Very high:       >499 mg/dl       Cholesterol/HDL Ratio   Date Value Ref Range Status   09/09/2014 3.8 0.0 - 5.0 Final   11/20/2013 5.8 (H) 0.0 - 5.0 Final     A1C     10.0  9/18/2019  A1C      9.2   6/18/2019  A1C      8.4   12/21/2018  A1C      7.4   9/7/2018  A1C      7.1   5/31/2018  A1C      9.1   3/6/2018  A1C      9.6   11/1/2017  A1C      8.9   8/9/2017  A1C      9.7   9/22/2016  A1C      9.7   7/21/2015  A1C     10.2  12/2/2014  A1C     10.2  9/9/2014  A1C      9.8  11/20/2013  A1C      9.3   6/12/2013  A1C      8.0   8/14/2012  A1C      8.2   5/22/2012  A1C      8.0   5/22/2012    ASSESSMENT/PLAN:     1. TYPE 2 DIABETES MELLITUS: Uncontrolled type 2 diabetes mellitus complicated by retinopathy, nephropathy,neuropathy and ED.  Increase Tresiba 60 units subcutaneous at bedtime and Novolog 12-14 units with all meals DAILY.  We placed his Freestyle Lora sensor today. I asked him to check his FBS each am and also check his blood sugar prelunch, predinner and at bedtime DAILY.  He is to return to clinic with his Freestyle device in 6 weeks to see me.  He is to remain on Metformin 1000 mg BID.  Pt's most recent creat was 1.31 with GFR 55 mL/min on 6/26/2019.  Encouraged patient to make healthy food choices, reduce his food portions with meals, avoid snacking and walk daily and to take his insulin and medications as prescribed.  Pt remains on daily ASA.   Reminded pt to have the flu vaccine this season.    2. GOITER: Nontender goiter.  TSH normal in Nov 2017.    3. NEPHROPATHY: Discussed the need for good glycemic control and good BP control.   Pt's creat/GFR as above.   Pt's urine protein  +.  He is taking Cozaar.  He is seen here by Nephrology.    4.  RETINOPATHY: Pt seen by Oph here on 9/9/2019.     5.  NEUROPATHY: Continue Gabapentin.  No foot ulcers.    6. DYSLIPIDEMIA: LDL 36 on 3/6/2018.   Pt is taking Lipitor and Fenofibrate.    7. OBESITY: See under # 1 above.  He does not want to use a GLP-1.  8.   Return to Endocrine Clinic to see me in 6 weeks.    Again, thank you for allowing me to participate in the care of your patient.      Sincerely,    Pamela Laura PA-C

## 2019-09-20 ENCOUNTER — PRE VISIT (OUTPATIENT)
Dept: ORTHOPEDICS | Facility: CLINIC | Age: 69
End: 2019-09-20

## 2019-09-24 ENCOUNTER — PRE VISIT (OUTPATIENT)
Dept: CARDIOLOGY | Facility: CLINIC | Age: 69
End: 2019-09-24

## 2019-09-24 ENCOUNTER — OFFICE VISIT (OUTPATIENT)
Dept: CARDIOLOGY | Facility: CLINIC | Age: 69
End: 2019-09-24
Attending: INTERNAL MEDICINE
Payer: COMMERCIAL

## 2019-09-24 ENCOUNTER — OFFICE VISIT (OUTPATIENT)
Dept: INTERNAL MEDICINE | Facility: CLINIC | Age: 69
End: 2019-09-24
Payer: COMMERCIAL

## 2019-09-24 VITALS
DIASTOLIC BLOOD PRESSURE: 76 MMHG | RESPIRATION RATE: 16 BRPM | WEIGHT: 190.6 LBS | HEART RATE: 89 BPM | BODY MASS INDEX: 28.98 KG/M2 | SYSTOLIC BLOOD PRESSURE: 139 MMHG

## 2019-09-24 VITALS
RESPIRATION RATE: 16 BRPM | HEIGHT: 68 IN | BODY MASS INDEX: 28.79 KG/M2 | WEIGHT: 190 LBS | HEART RATE: 89 BPM | SYSTOLIC BLOOD PRESSURE: 139 MMHG | DIASTOLIC BLOOD PRESSURE: 76 MMHG

## 2019-09-24 DIAGNOSIS — R25.2 BILATERAL LEG CRAMPS: ICD-10-CM

## 2019-09-24 DIAGNOSIS — R73.09 INCREASED GLUCOSE LEVEL: Primary | ICD-10-CM

## 2019-09-24 DIAGNOSIS — Z23 NEED FOR PROPHYLACTIC VACCINATION AND INOCULATION AGAINST INFLUENZA: ICD-10-CM

## 2019-09-24 PROCEDURE — G0463 HOSPITAL OUTPT CLINIC VISIT: HCPCS | Mod: ZF

## 2019-09-24 PROCEDURE — 99204 OFFICE O/P NEW MOD 45 MIN: CPT | Mod: GC | Performed by: INTERNAL MEDICINE

## 2019-09-24 ASSESSMENT — PAIN SCALES - GENERAL
PAINLEVEL: NO PAIN (0)
PAINLEVEL: SEVERE PAIN (6)

## 2019-09-24 ASSESSMENT — MIFFLIN-ST. JEOR: SCORE: 1596.33

## 2019-09-24 NOTE — PATIENT INSTRUCTIONS
You were seen today in the Cardiovascular Clinic at the [facility].     Cardiology Providers you saw during your visit: Dr Josie Colvin    Diagnosis:       Recommendations:     1.      Follow-up:     1.       For emergencies call 911.    For any scheduling needs or nursing related questions, please call [phone].    Thank you for your visit today! If you have questions or concerns about today's visit, please call me.      Kathya Bush  RN Care Coordinator  [facility]  [phone]  Option #1 for Scheduling and directions  Option #2 or Cardio-Thoracic Surgery scheduling  Option #3 for Billing or Copies of Medical Records  Option #4 to send a Message to your Care Team

## 2019-09-24 NOTE — PROGRESS NOTES
Vascular Cardiology Consultation      HPI:     69M with history of DM, BPH, peripheral neuropathy who presented for bilateral lower extremity pain.   Patient reported chronic bilateral LE pain worse on the left compared to the right. He describes the pain as a fuzzy sensation which he says is numbness, tingling, and sharp pain. It can occur at rest and with ambulation. 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 His PCP also ordered GARCIA's which showed non-compressible vessels in bilateral LE but normal TBI. He is also being seen by Sports medicine due to concern that his symptoms are mostly due to radiculopathy. He has been working with physical therapy but he reported minimal improvement in symptoms so he decided to stop going. He mentioned that the pain is worse from his L hip to his L knee. Patient is a non-smoker.     PAST MEDICAL HISTORY:  Past Medical History:   Diagnosis Date     Blepharitis of both eyes      BPH (benign prostatic hyperplasia)      Diabetes (H)      Diabetic neuropathy (H)      Diabetic retinopathy associated with diabetes mellitus due to underlying condition (H)      Dry eye syndrome      Goiter      Granulomatous disease (H)      Nonsenile cataract      Peripheral neuropathy        CURRENT MEDICATIONS:  Current Outpatient Medications   Medication Sig Dispense Refill     albuterol (VENTOLIN HFA) 108 (90 Base) MCG/ACT inhaler Inhale 2 puffs into the lungs every 6 hours 18 g 3     ARTIFICIAL TEAR OP Apply to eye as needed       aspirin 81 MG tablet Take 1 tablet by mouth daily.       atorvastatin (LIPITOR) 20 MG tablet Take 1 tablet (20 mg) by mouth daily 90 tablet 3     blood glucose (NO BRAND SPECIFIED) lancets standard Lancets that go with device, Test 3 times daily 300  "each 3     blood glucose monitoring (NO BRAND SPECIFIED) meter device kit Any meter covered by insurance, not store brand, use as directed. 1 kit 0     blood glucose monitoring (NO BRAND SPECIFIED) test strip Strips that go with meter, covered by insurance. Test 3 times daily 300 strip 3     Blood Pressure Monitor KIT Automatic Blood Pressure Monitor 1 kit 0     clobetasol (TEMOVATE) 0.05 % ointment Apply topically to legs twice daily for 2 weeks.  Then discontinue 30 g 0     clotrimazole (LOTRIMIN) 1 % cream Apply topically 2 times daily 30 g 3     Continuous Blood Gluc  (FREESTYLE PETER READER) JUANCARLOS 1 Device daily 1 Device 0     Continuous Blood Gluc Sensor (FREESTYLE PETER 14 DAY SENSOR) MISC 1 applicator every 14 days 8 each 3     erythromycin (ROMYCIN) 5 MG/GM ophthalmic ointment Place 0.5 inches Into the left eye 3 times daily 1 g 0     fenofibrate 54 MG tablet Take 1 tablet (54 mg) by mouth daily 90 tablet 0     finasteride (PROSCAR) 5 MG tablet Take 1 tablet (5 mg) by mouth daily 90 tablet 2     fluocinonide (LIDEX) 0.05 % external cream APPLY TO AFFECTED AREA TWICE A DAY  3     gabapentin (NEURONTIN) 100 MG capsule Take 1 capsule (100 mg) by mouth 3 times daily 90 capsule 3     gabapentin (NEURONTIN) 300 MG capsule Take 1 capsule (300 mg) by mouth 2 times daily 120 capsule 1     insulin degludec (TRESIBA FLEXTOUCH) 100 UNIT/ML pen Inject 60 units subcutaneous at bedtime. 60 mL 3     insulin pen needle (B-D U/F) 31G X 5 MM Use 4 times per day.  Please dispense as BD Pen Needle Mini U/F 31G x 5  each 3     insulin syringe 31G X 5/16\" 0.5 ML MISC Use three syringes daily 270 each 1     ketamine 5% gabapentin 8% lidocaine 2.5% topical PLO cream Apply 1 g topically 3 times daily 30 g 3     ketorolac (ACULAR) 0.5 % ophthalmic solution Place 1 drop Into the left eye 4 times daily 1 Bottle 1     ketorolac (ACULAR) 0.5 % ophthalmic solution 1 drop in surgical eye as directed - start 1 week after " surgery, 4x daily until follow-up visit 1 Bottle 1     loratadine (CLARITIN) 10 MG tablet Take 1 tablet (10 mg) by mouth daily 90 tablet 0     losartan (COZAAR) 100 MG tablet Take 1 tablet (100 mg) by mouth At Bedtime 30 tablet 11     losartan (COZAAR) 25 MG tablet Take 1 tablet (25 mg) by mouth daily With a 50mg tablet for total dose of 75mg daily 30 tablet 11     losartan (COZAAR) 50 MG tablet Take 1 tablet (50 mg) by mouth daily With 25mg tablet for a total dose of 75mg daily 30 tablet 11     metFORMIN (GLUCOPHAGE) 1000 MG tablet 1 tab twice daily with meals 180 tablet 3     moxifloxacin (VIGAMOX) 0.5 % ophthalmic solution Place 1 drop Into the left eye every hour (while awake) 1 Bottle 3     mupirocin (BACTROBAN) 2 % cream Apply  topically. In very small amounts only as needed 15 g 1     neomycin-polymyxin-dexamethasone (MAXITROL) 3.5-04971-4.1 ophthalmic ointment Place 0.5 inches Into the left eye At Bedtime 1 Tube 0     NOVOLOG FLEXPEN 100 UNIT/ML soln Inject 12-14 units with meals, plus correction. Pt uses approx 65 units in 24 hrs. 60 mL 3     ofloxacin (OCUFLOX) 0.3 % ophthalmic solution 1 drop 4x daily in the surgical eye for 1 week after surgery, then stop 1 Bottle 0     Omega-3 Fatty Acids (OMEGA-3 FISH OIL) 1000 MG CAPS Take 1 capsule (1 g) by mouth 2 times daily 60 capsule 11     ONETOUCH ULTRA test strip Use to test blood sugar 3 times daily 300 strip 3     prednisoLONE acetate (PRED FORTE) 1 % ophthalmic suspension Place 1 drop Into the left eye 6 times daily 1 Bottle 3     prednisoLONE acetate (PRED FORTE) 1 % ophthalmic suspension Place 1 drop Into the left eye every hour (while awake) 1 Bottle 3     prednisoLONE acetate (PRED FORTE) 1 % ophthalmic suspension 1 drop in surgical eye as directed, 4x daily after surgery until follow-up 1 Bottle 1     ranitidine (ZANTAC) 300 MG tablet Take 1 tablet (300 mg) by mouth At Bedtime 90 tablet 3     senna-docusate (SENOKOT-S/PERICOLACE) 8.6-50 MG tablet Take  1 tablet by mouth       sodium bicarbonate 650 MG tablet Take 1 tablet (650 mg) by mouth 3 times daily 90 tablet 11     tamsulosin (FLOMAX) 0.4 MG capsule Take 1 capsule (0.4 mg) by mouth daily 90 capsule 2     triamcinolone (KENALOG) 0.1 % cream Apply topically 3 times daily 80 g 0     vitamin D3 (CHOLECALCIFEROL) 1000 units (25 mcg) tablet Take 2 tablets (2,000 Units) by mouth daily 30 tablet 11       PAST SURGICAL HISTORY:  Past Surgical History:   Procedure Laterality Date     AS RAD RESEC TONSIL/PILLARS Bilateral 1961     COLONOSCOPY  7/29/2013    Procedure: COLONOSCOPY;;  Surgeon: Montana Pascal MD;  Location:  GI     PHACOEMULSIFICATION WITH STANDARD INTRAOCULAR LENS IMPLANT Left 6/21/2019    Procedure: Left Eye Cataract Removal with Intraocular Lens Implant with Intraoperative Avastin Injection;  Surgeon: Lacey Eugene MD;  Location: [facility] OR     siladenatis  11/2017     SURGICAL PATHOLOGY EXAM         ALLERGIES   No Known Allergies    FAMILY HISTORY:  Family History   Problem Relation Age of Onset     Diabetes Father      Myocardial Infarction Father      Diabetes Brother      Leukemia Brother 44     Glaucoma No family hx of      Macular Degeneration No family hx of      Kidney Disease No family hx of        SOCIAL HISTORY:  Social History     Socioeconomic History     Marital status:      Spouse name: Not on file     Number of children: 5     Years of education: Not on file     Highest education level: Not on file   Occupational History     Occupation: private bussiness owner   Social Needs     Financial resource strain: Not on file     Food insecurity:     Worry: Not on file     Inability: Not on file     Transportation needs:     Medical: Not on file     Non-medical: Not on file   Tobacco Use     Smoking status: Never Smoker     Smokeless tobacco: Never Used   Substance and Sexual Activity     Alcohol use: Yes     Comment: occasionaly     Drug use: No     Sexual activity: Not on file   Lifestyle  "    Physical activity:     Days per week: Not on file     Minutes per session: Not on file     Stress: Not on file   Relationships     Social connections:     Talks on phone: Not on file     Gets together: Not on file     Attends [employer] service: Not on file     Active member of club or organization: Not on file     Attends meetings of clubs or organizations: Not on file     Relationship status: Not on file     Intimate partner violence:     Fear of current or ex partner: Not on file     Emotionally abused: Not on file     Physically abused: Not on file     Forced sexual activity: Not on file   Other Topics Concern     Parent/sibling w/ CABG, MI or angioplasty before 65F 55M? Not Asked   Social History Narrative     Not on file       ROS:   Constitutional: No fever, chills, or sweats. No weight gain/loss   ENT: No visual disturbance, ear ache, epistaxis, sore throat  Allergies/Immunologic: Negative.   Respiratory: No cough, hemoptysia  Cardiovascular: As per HPI  GI: No nausea, vomiting, hematemesis, melena, or hematochezia  : No urinary frequency, dysuria, or hematuria  Integument: Negative  Psychiatric: Negative  Neuro: Negative  Endocrinology: Negative   Musculoskeletal: +pain in bilateral LE     EXAM:  /76   Pulse 89   Resp 16   Ht 1.727 m (5' 8\")   Wt 86.2 kg (190 lb)   BMI 28.89 kg/m    In general, the patient is a pleasant male in no apparent distress.    HEENT: NC/AT.  PERRLA.  EOMI.  Sclerae white, not injected.  Nares clear.  Pharynx without erythema or exudate.  Dentition intact.    Neck: No adenopathy.  No thyromegaly. Carotids +4/4 bilaterally without bruits.  No jugular venous distension.   Heart: RRR. Normal S1, S2 splits physiologically. No murmur, rub, click, or gallop. The PMI is in the 5th ICS in the midclavicular line. There is no heave.    Lungs: CTA.  No ronchi, wheezes, rales.  No dullness to percussion.   Abdomen: Soft, nontender, nondistended. No organomegaly.  No bruits. "   Extremities: +Hyperpigmentation and scaling in bilateral shins. Warm, well perfused LE, difficulty palpating bilateral LE DP and posterior tibial pulses. Intact bilateral radial pulses, +nail thickening   Neurologic: Alert and oriented to person/place/time, normal speech, gait and affect  Skin: No petechiae, purpura or rash.    Labs:  LIPID RESULTS:  Lab Results   Component Value Date    CHOL 145 2019    HDL 38 (L) 2019    LDL 49 2019    TRIG 289 (H) 2019    CHOLHDLRATIO 3.8 2014    NHDL 107 2019       LIVER ENZYME RESULTS:  Lab Results   Component Value Date    AST 24 2019    ALT 38 2019       CBC RESULTS:  Lab Results   Component Value Date    WBC 4.6 2019    RBC 4.18 (L) 2019    HGB 13.5 2019    HCT 38.7 (L) 2019    MCV 93 2019    MCH 32.3 2019    MCHC 34.9 2019    RDW 12.6 2019     (L) 2019       BMP RESULTS:  Lab Results   Component Value Date     2019    POTASSIUM 4.3 2019    CHLORIDE 110 (H) 2019    CO2 23 2019    ANIONGAP 7 2019     (H) 2019    BUN 19 2019    CR 1.31 (H) 2019    GFRESTIMATED 55 (L) 2019    GFRESTBLACK 63 2019    INDERJIT 9.0 2019        A1C RESULTS:  Lab Results   Component Value Date    A1C 9.2 (H) 2019       INR RESULTS:  Lab Results   Component Value Date    INR 0.93 2013       Procedures:  GARCIA 2019   Right le. Resting GARCIA cannot be calculated, due to noncompressible vessels.   2. TBI of 0.85. Normal.  3. Focal segment of elevated measured peak systolic velocity in the  proximal popliteal artery of 660 cm/s, however waveforms are preserved  distal to this. Evaluation of the image of the measured velocity at  this segment suggests that this is artifactual (along the periphery of  the vessel) given the normal distal waveforms, and is likely not truly  representative of stenosis.     Left  le. Resting GARCIA cannot be calculated, due to noncompressible vessels.  2. TBI is 0.88. Normal.  3. Patent arterial Doppler evaluation of the left lower extremity,  without focal hemodynamically significant stenosis.    Assessment and Plan:   69M with history of DM, BPH, peripheral neuropathy who was referred for bilateral LE pain and found to have non-compressible vessels on GARCIA.   Discussed and reviewed GARCIA with doppler studies with patient. Reassured patient that he does not have significant plaque or stenosis based on imaging studies. His noncompressible vessels are likely due to his history of diabetes but his normal TBI's are reassuring. AGiven his history of diabetes, he is at risk for PAD. Advised blood sugar control, encouraged regular physical activity, and continue aspirin and statin.  His chronic LE pain which occurs both at rest and with ambulation, most prominent from his L hip to his L knee appears more consistent with a neuropathy given his history of poorly controlled DM and EMG NCV which showed polyneuropathy and lumbosacral radiculopathy and is on gabapentin.     Discussed with Dr.Fanola Angela Duarte   Cardiology fellow     ATTENDING ATTESTATION    Patient was seen and evaluated in clinic today with the cardiology fellow. The note has been edited to reflect the history taking performed by me, my personal review of imaging, EKGs, labs and procedures, and our joint assessment and plan.       Josie Colvin MD MSc    Division of Cardiology  Vascular Section  [facility]  Patient Care Team:  Marcio Hare MD as PCP - General (Internal Medicine)  Marcio Hare MD as Referring Physician (Internal Medicine)  Rafael Hand MD as MD (Cardiology)  Pamela Laura PA-C as Physician Assistant (Physician Assistant)  Marcio Hare MD as Referring Physician (Internal Medicine)  Emmanuel Cantu MD as MD (Urology)  [place]  Jackie Reyna MD as MD (Ophthalmology)  Juan Rehman MD as MD (Internal Medicine)  DOROTHY DEL CASTILLO  Answers for HPI/ROS submitted by the patient on 9/18/2019   General Symptoms: No  Skin Symptoms: No  HENT Symptoms: No  EYE SYMPTOMS: No  HEART SYMPTOMS: No  LUNG SYMPTOMS: No  INTESTINAL SYMPTOMS: No  URINARY SYMPTOMS: No  REPRODUCTIVE SYMPTOMS: No  SKELETAL SYMPTOMS: Yes  BLOOD SYMPTOMS: No  NERVOUS SYSTEM SYMPTOMS: No  MENTAL HEALTH SYMPTOMS: No  Back pain: Yes  Muscle aches: Yes  Neck pain: Yes  Swollen joints: No  Joint pain: Yes  Bone pain: No  Muscle cramps: Yes  Muscle weakness: Yes  Joint stiffness: No  Bone fracture: No

## 2019-09-24 NOTE — LETTER
9/24/2019      RE: Earle Rene  [address]  [place] [address]       Dear Colleague,    Thank you for the opportunity to participate in the care of your patient, Earle Rene, at the [facility] at [facility]. Please see a copy of my visit note below.         Vascular Cardiology Consultation      HPI:     69M with history of DM, BPH, peripheral neuropathy who presented for bilateral lower extremity pain.   Patient reported chronic bilateral LE pain worse on the left compared to the right. He describes the pain as a fuzzy sensation which he says is numbness, tingling, and sharp pain. It can occur at rest and with ambulation. 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 His PCP also ordered GARCIA's which showed non-compressible vessels in bilateral LE but normal TBI. He is also being seen by Sports medicine due to concern that his symptoms are mostly due to radiculopathy. He has been working with physical therapy but he reported minimal improvement in symptoms so he decided to stop going. He mentioned that the pain is worse from his L hip to his L knee. Patient is a non-smoker.     PAST MEDICAL HISTORY:  Past Medical History:   Diagnosis Date     Blepharitis of both eyes      BPH (benign prostatic hyperplasia)      Diabetes (H)      Diabetic neuropathy (H)      Diabetic retinopathy associated with diabetes mellitus due to underlying condition (H)      Dry eye syndrome      Goiter      Granulomatous disease (H)      Nonsenile cataract      Peripheral neuropathy        CURRENT MEDICATIONS:  Current Outpatient Medications   Medication Sig Dispense Refill     albuterol (VENTOLIN HFA) 108 (90 Base) MCG/ACT inhaler Inhale 2 puffs into the lungs every 6  "hours 18 g 3     ARTIFICIAL TEAR OP Apply to eye as needed       aspirin 81 MG tablet Take 1 tablet by mouth daily.       atorvastatin (LIPITOR) 20 MG tablet Take 1 tablet (20 mg) by mouth daily 90 tablet 3     blood glucose (NO BRAND SPECIFIED) lancets standard Lancets that go with device, Test 3 times daily 300 each 3     blood glucose monitoring (NO BRAND SPECIFIED) meter device kit Any meter covered by insurance, not store brand, use as directed. 1 kit 0     blood glucose monitoring (NO BRAND SPECIFIED) test strip Strips that go with meter, covered by insurance. Test 3 times daily 300 strip 3     Blood Pressure Monitor KIT Automatic Blood Pressure Monitor 1 kit 0     clobetasol (TEMOVATE) 0.05 % ointment Apply topically to legs twice daily for 2 weeks.  Then discontinue 30 g 0     clotrimazole (LOTRIMIN) 1 % cream Apply topically 2 times daily 30 g 3     Continuous Blood Gluc  (FREESTYLE PETER READER) JUANCARLOS 1 Device daily 1 Device 0     Continuous Blood Gluc Sensor (FREESTYLE PETER 14 DAY SENSOR) MISC 1 applicator every 14 days 8 each 3     erythromycin (ROMYCIN) 5 MG/GM ophthalmic ointment Place 0.5 inches Into the left eye 3 times daily 1 g 0     fenofibrate 54 MG tablet Take 1 tablet (54 mg) by mouth daily 90 tablet 0     finasteride (PROSCAR) 5 MG tablet Take 1 tablet (5 mg) by mouth daily 90 tablet 2     fluocinonide (LIDEX) 0.05 % external cream APPLY TO AFFECTED AREA TWICE A DAY  3     gabapentin (NEURONTIN) 100 MG capsule Take 1 capsule (100 mg) by mouth 3 times daily 90 capsule 3     gabapentin (NEURONTIN) 300 MG capsule Take 1 capsule (300 mg) by mouth 2 times daily 120 capsule 1     insulin degludec (TRESIBA FLEXTOUCH) 100 UNIT/ML pen Inject 60 units subcutaneous at bedtime. 60 mL 3     insulin pen needle (B-D U/F) 31G X 5 MM Use 4 times per day.  Please dispense as BD Pen Needle Mini U/F 31G x 5  each 3     insulin syringe 31G X 5/16\" 0.5 ML MISC Use three syringes daily 270 each 1     " ketamine 5% gabapentin 8% lidocaine 2.5% topical PLO cream Apply 1 g topically 3 times daily 30 g 3     ketorolac (ACULAR) 0.5 % ophthalmic solution Place 1 drop Into the left eye 4 times daily 1 Bottle 1     ketorolac (ACULAR) 0.5 % ophthalmic solution 1 drop in surgical eye as directed - start 1 week after surgery, 4x daily until follow-up visit 1 Bottle 1     loratadine (CLARITIN) 10 MG tablet Take 1 tablet (10 mg) by mouth daily 90 tablet 0     losartan (COZAAR) 100 MG tablet Take 1 tablet (100 mg) by mouth At Bedtime 30 tablet 11     losartan (COZAAR) 25 MG tablet Take 1 tablet (25 mg) by mouth daily With a 50mg tablet for total dose of 75mg daily 30 tablet 11     losartan (COZAAR) 50 MG tablet Take 1 tablet (50 mg) by mouth daily With 25mg tablet for a total dose of 75mg daily 30 tablet 11     metFORMIN (GLUCOPHAGE) 1000 MG tablet 1 tab twice daily with meals 180 tablet 3     moxifloxacin (VIGAMOX) 0.5 % ophthalmic solution Place 1 drop Into the left eye every hour (while awake) 1 Bottle 3     mupirocin (BACTROBAN) 2 % cream Apply  topically. In very small amounts only as needed 15 g 1     neomycin-polymyxin-dexamethasone (MAXITROL) 3.5-33142-6.1 ophthalmic ointment Place 0.5 inches Into the left eye At Bedtime 1 Tube 0     NOVOLOG FLEXPEN 100 UNIT/ML soln Inject 12-14 units with meals, plus correction. Pt uses approx 65 units in 24 hrs. 60 mL 3     ofloxacin (OCUFLOX) 0.3 % ophthalmic solution 1 drop 4x daily in the surgical eye for 1 week after surgery, then stop 1 Bottle 0     Omega-3 Fatty Acids (OMEGA-3 FISH OIL) 1000 MG CAPS Take 1 capsule (1 g) by mouth 2 times daily 60 capsule 11     ONETOUCH ULTRA test strip Use to test blood sugar 3 times daily 300 strip 3     prednisoLONE acetate (PRED FORTE) 1 % ophthalmic suspension Place 1 drop Into the left eye 6 times daily 1 Bottle 3     prednisoLONE acetate (PRED FORTE) 1 % ophthalmic suspension Place 1 drop Into the left eye every hour (while awake) 1 Bottle  3     prednisoLONE acetate (PRED FORTE) 1 % ophthalmic suspension 1 drop in surgical eye as directed, 4x daily after surgery until follow-up 1 Bottle 1     ranitidine (ZANTAC) 300 MG tablet Take 1 tablet (300 mg) by mouth At Bedtime 90 tablet 3     senna-docusate (SENOKOT-S/PERICOLACE) 8.6-50 MG tablet Take 1 tablet by mouth       sodium bicarbonate 650 MG tablet Take 1 tablet (650 mg) by mouth 3 times daily 90 tablet 11     tamsulosin (FLOMAX) 0.4 MG capsule Take 1 capsule (0.4 mg) by mouth daily 90 capsule 2     triamcinolone (KENALOG) 0.1 % cream Apply topically 3 times daily 80 g 0     vitamin D3 (CHOLECALCIFEROL) 1000 units (25 mcg) tablet Take 2 tablets (2,000 Units) by mouth daily 30 tablet 11       PAST SURGICAL HISTORY:  Past Surgical History:   Procedure Laterality Date     AS RAD RESEC TONSIL/PILLARS Bilateral 1961     COLONOSCOPY  7/29/2013    Procedure: COLONOSCOPY;;  Surgeon: Montana Pascal MD;  Location:  GI     PHACOEMULSIFICATION WITH STANDARD INTRAOCULAR LENS IMPLANT Left 6/21/2019    Procedure: Left Eye Cataract Removal with Intraocular Lens Implant with Intraoperative Avastin Injection;  Surgeon: Lacey Eugene MD;  Location:  OR     siladenatis  11/2017     SURGICAL PATHOLOGY EXAM         ALLERGIES   No Known Allergies    FAMILY HISTORY:  Family History   Problem Relation Age of Onset     Diabetes Father      Myocardial Infarction Father      Diabetes Brother      Leukemia Brother 44     Glaucoma No family hx of      Macular Degeneration No family hx of      Kidney Disease No family hx of        SOCIAL HISTORY:  Social History     Socioeconomic History     Marital status:      Spouse name: Not on file     Number of children: 5     Years of education: Not on file     Highest education level: Not on file   Occupational History     Occupation: private bussiness owner   Social Needs     Financial resource strain: Not on file     Food insecurity:     Worry: Not on file     Inability: Not  "on file     Transportation needs:     Medical: Not on file     Non-medical: Not on file   Tobacco Use     Smoking status: Never Smoker     Smokeless tobacco: Never Used   Substance and Sexual Activity     Alcohol use: Yes     Comment: occasionaly     Drug use: No     Sexual activity: Not on file   Lifestyle     Physical activity:     Days per week: Not on file     Minutes per session: Not on file     Stress: Not on file   Relationships     Social connections:     Talks on phone: Not on file     Gets together: Not on file     Attends Gnosticism service: Not on file     Active member of club or organization: Not on file     Attends meetings of clubs or organizations: Not on file     Relationship status: Not on file     Intimate partner violence:     Fear of current or ex partner: Not on file     Emotionally abused: Not on file     Physically abused: Not on file     Forced sexual activity: Not on file   Other Topics Concern     Parent/sibling w/ CABG, MI or angioplasty before 65F 55M? Not Asked   Social History Narrative     Not on file       ROS:   Constitutional: No fever, chills, or sweats. No weight gain/loss   ENT: No visual disturbance, ear ache, epistaxis, sore throat  Allergies/Immunologic: Negative.   Respiratory: No cough, hemoptysia  Cardiovascular: As per HPI  GI: No nausea, vomiting, hematemesis, melena, or hematochezia  : No urinary frequency, dysuria, or hematuria  Integument: Negative  Psychiatric: Negative  Neuro: Negative  Endocrinology: Negative   Musculoskeletal: +pain in bilateral LE     EXAM:  /76   Pulse 89   Resp 16   Ht 1.727 m (5' 8\")   Wt 86.2 kg (190 lb)   BMI 28.89 kg/m     In general, the patient is a pleasant male in no apparent distress.    HEENT: NC/AT.  PERRLA.  EOMI.  Sclerae white, not injected.  Nares clear.  Pharynx without erythema or exudate.  Dentition intact.    Neck: No adenopathy.  No thyromegaly. Carotids +4/4 bilaterally without bruits.  No jugular venous " distension.   Heart: RRR. Normal S1, S2 splits physiologically. No murmur, rub, click, or gallop. The PMI is in the 5th ICS in the midclavicular line. There is no heave.    Lungs: CTA.  No ronchi, wheezes, rales.  No dullness to percussion.   Abdomen: Soft, nontender, nondistended. No organomegaly.  No bruits.   Extremities: +Hyperpigmentation and scaling in bilateral shins. Warm, well perfused LE, difficulty palpating bilateral LE DP and posterior tibial pulses. Intact bilateral radial pulses, +nail thickening   Neurologic: Alert and oriented to person/place/time, normal speech, gait and affect  Skin: No petechiae, purpura or rash.    Labs:  LIPID RESULTS:  Lab Results   Component Value Date    CHOL 145 2019    HDL 38 (L) 2019    LDL 49 2019    TRIG 289 (H) 2019    CHOLHDLRATIO 3.8 2014    NHDL 107 2019       LIVER ENZYME RESULTS:  Lab Results   Component Value Date    AST 24 2019    ALT 38 2019       CBC RESULTS:  Lab Results   Component Value Date    WBC 4.6 2019    RBC 4.18 (L) 2019    HGB 13.5 2019    HCT 38.7 (L) 2019    MCV 93 2019    MCH 32.3 2019    MCHC 34.9 2019    RDW 12.6 2019     (L) 2019       BMP RESULTS:  Lab Results   Component Value Date     2019    POTASSIUM 4.3 2019    CHLORIDE 110 (H) 2019    CO2 23 2019    ANIONGAP 7 2019     (H) 2019    BUN 19 2019    CR 1.31 (H) 2019    GFRESTIMATED 55 (L) 2019    GFRESTBLACK 63 2019    INDERJIT 9.0 2019        A1C RESULTS:  Lab Results   Component Value Date    A1C 9.2 (H) 2019       INR RESULTS:  Lab Results   Component Value Date    INR 0.93 2013       Procedures:  GARCIA 2019   Right le. Resting GARCIA cannot be calculated, due to noncompressible vessels.   2. TBI of 0.85. Normal.  3. Focal segment of elevated measured peak systolic velocity in the  proximal  popliteal artery of 660 cm/s, however waveforms are preserved  distal to this. Evaluation of the image of the measured velocity at  this segment suggests that this is artifactual (along the periphery of  the vessel) given the normal distal waveforms, and is likely not truly  representative of stenosis.     Left le. Resting GARCIA cannot be calculated, due to noncompressible vessels.  2. TBI is 0.88. Normal.  3. Patent arterial Doppler evaluation of the left lower extremity,  without focal hemodynamically significant stenosis.    Assessment and Plan:   69M with history of DM, BPH, peripheral neuropathy who was referred for bilateral LE pain and found to have non-compressible vessels on GARCIA.   Discussed and reviewed GARCIA with doppler studies with patient. Reassured patient that he does not have significant plaque or stenosis based on imaging studies. His noncompressible vessels are likely due to his history of diabetes but his normal TBI's are reassuring. AGiven his history of diabetes, he is at risk for PAD. Advised blood sugar control, encouraged regular physical activity, and continue aspirin and statin.  His chronic LE pain which occurs both at rest and with ambulation, most prominent from his L hip to his L knee appears more consistent with a neuropathy given his history of poorly controlled DM and EMG NCV which showed polyneuropathy and lumbosacral radiculopathy and is on gabapentin.     Discussed with Dr.Fanola Angela Duarte   Cardiology fellow     ATTENDING ATTESTATION    Patient was seen and evaluated in clinic today with the cardiology fellow. The note has been edited to reflect the history taking performed by me, my personal review of imaging, EKGs, labs and procedures, and our joint assessment and plan.       Josie Colvin MD MSc    Division of Cardiology  Vascular Section  [facility]        CC  Patient Care Team:  Marcio Hare MD as PCP - General (Internal  Medicine)  Marcio Hare MD as Referring Physician (Internal Medicine)  Rafael Hand MD as MD (Cardiology)  Pamela Laura PA-C as Physician Assistant (Physician Assistant)  Marcio Hare MD as Referring Physician (Internal Medicine)  Emmanuel Cantu MD as MD (Urology)  Jackie Rodriguez MD as MD (Ophthalmology)  Juan Rehman MD as MD (Internal Medicine)  DOROTHY DEL CASTILLO

## 2019-09-24 NOTE — PATIENT INSTRUCTIONS
Make a surgery appt. With Dr. Choudhury (General Surgeon)  at [phone]         Your health care Provider has recommended that you receive the new Shingle vaccine called Shingrix to prevent shingles for ages 50 and above. Many private insurance and Medicare Part D plans cover Shingrix. However, not all insurance carriers cover the entire cost of the Shingrix vaccine if the vaccine is administered at your primary care clinic. The clinic cannot determine your insurance benefits.  Please call your insurance carrier prior. The vaccine comes in two doses. Your second dose should be 2-6 months from your first dose.     There is a nationwide shortage of the Shingle vaccine. Because the second dose must be given 2-6 months from the first dose, the CDC DOES NOT recommend your 1st Shingle vaccine until the vaccine is back in stock next year 2020.  Currently, our clinic shortage is reserved for 2nd dose only (those who already had their first Shingrix vaccine).  You may check with your Pharmacy if you want the vaccine for this year.       Prior to receiving the vaccine, we recommend that you call your insurance carrier and ask them the following questions:            1. Is there a cost difference if I receive the vaccine at my doctor's office or a pharmacy?          2. Does my insurance cover the Shingrix Vaccine and administration of the vaccine?          3. What is my co-pay or deductible for the vaccine?        Nurse Visit hours are available Monday, Wednesday, and Friday from 9:00 AM-11:00 AM and 1:00 PM-3:00 PM.

## 2019-09-24 NOTE — PROGRESS NOTES
HPI:    Pt. Comes in for follow up today. He does not smoke nor abuse EtOH. He denies rest/exertional CP/SOB. Heh has h/o DM2 (poorly controlled), CKD, HTN, and increased cholesterol.       Past Medical History:   Diagnosis Date     Blepharitis of both eyes      BPH (benign prostatic hyperplasia)      Diabetes (H)      Diabetic neuropathy (H)      Diabetic retinopathy associated with diabetes mellitus due to underlying condition (H)      Dry eye syndrome      Goiter      Granulomatous disease (H)      Nonsenile cataract      Peripheral neuropathy      Past Surgical History:   Procedure Laterality Date     AS RAD RESEC TONSIL/PILLARS Bilateral 1961     COLONOSCOPY  7/29/2013    Procedure: COLONOSCOPY;;  Surgeon: Montana Pascal MD;  Location:  GI     PHACOEMULSIFICATION WITH STANDARD INTRAOCULAR LENS IMPLANT Left 6/21/2019    Procedure: Left Eye Cataract Removal with Intraocular Lens Implant with Intraoperative Avastin Injection;  Surgeon: Lacey Eugene MD;  Location:  OR     siladenatis  11/2017     SURGICAL PATHOLOGY EXAM       PE:    Vitals noted gen nad cooperative alert, HEENT oropharynx clear no exudate, neck supple nl rom, no adenopathy, no B carotid bruits, horizontal surgical scar on posterior neck, LCTA, good air movement, RRR, S1, S2, No MRG, abdomen no acute findings. Grossly normal neurological exam.  Decreased distal LE pulses. B toe nail changes. Several skin changes on B LE extremities. He has approx. 4 x 2 cm non-tender mass on his R shoulder     EKG (from 6/18/2019); SR at 78; no acute findings and no significant change from 3/6/2018    Normal Lexiscan stress test 1/14/2016  Resting echo 1/14/2016 with Normal LVF 55-60%    A/P:    1. Seen in Podiatry 7/10/2019 and has follow up 9/20/2019.  2. B LE complaints. Probable neuropathy and he may need change in Gabapentin dose and/or EMG. Will also get B LE arterial U/S for vascular evaluation (done 6/19/2019). He had EMG  8/5/2019 with some  radiculopathy. He has vascular follow up with Dr. Colvin 9/24/2019 today  3. Referred to Dermatology for B LE skin changes on 6/18/2019  4. Refer to General surgery for probable R upper back lipoma and he was seen 7/3/2019 by Dr. White, general surgery and he had an Ultrasound 7/3/2019 showing a 4.4 x 1.9 cm mass/lipoma.   5. He saw Pamela Laura endo last 9/18/2019  A1c was 10.0% on 9/18/2019  6. He saw Dr. Cantu Urology 5/30/2019 for elevated PSA. He had prostate MRI study 7/3/2019.   7. Colonoscopy 7/29/2013 and repeat in 10 years  8. He saw Dr. Cespedes renal for CKD 3/13/2019. He saw Dr. Ramsey, renal 6/26/2019; Cr on that date was 1.31  9. B hip X-rays  And refer to ortho for hip pain and he was seen 7/2/2019 in ortho. Still in pain. He only had one PT appt. And this did not help. Ordered MRI L hip and he has follow up with Dr. Rivera 8/13/2019. He was seen 8/16/2019 by Dr. Bell patel and has follow up tomorrow 9/25/2019.  10. 6/18/2019; LDL 49 and HDL 38. He is on Atorvastatin  11. Influenza vaccination today. Check with insurance regarding Shingrex vaccination.     Total time spent 25 minutes.  More than 50% of the time spent with Mr. Rene on counseling / coordinating his care

## 2019-09-24 NOTE — NURSING NOTE
Chief Complaint   Patient presents with     New Patient     reason for visit: initial evaluation of leg pain and possible PAD on duplex     Vitals were taken and medications were reconciled.   Alivia Boyd  5:19 PM

## 2019-09-24 NOTE — NURSING NOTE
Chief Complaint   Patient presents with     Recheck Medication     Patient is here for follow up       Adrian Hu CMA ([facility]) at 4:36 PM on 9/24/2019

## 2019-10-02 ENCOUNTER — TELEPHONE (OUTPATIENT)
Dept: INTERNAL MEDICINE | Facility: CLINIC | Age: 69
End: 2019-10-02

## 2019-10-02 DIAGNOSIS — R22.9 SUBCUTANEOUS MASS: Primary | ICD-10-CM

## 2019-10-02 NOTE — TELEPHONE ENCOUNTER
Health Call Center    Phone Message    May a detailed message be left on voicemail: yes    Reason for Call: Other: Pt's wife called and wants to know the name of the doctor and what clinic Dr. Hare is referring pt to see for his shoulder. Please call pt's wife to discuss.  Pt's wife said pt's suppose to have a referral to see someone for fatty tissue in shoulder.     Action Taken: Message routed to:  [facility] ([facility] Orthopedics

## 2019-10-02 NOTE — TELEPHONE ENCOUNTER
Spoke with pt and he is wondering if Dr Choudhury is still with the clinic. He wants to know who Dr Hare recommended for his surgery due to his diabetes and healing.   Will route to Gen Surg and PCP. Shanthi Jules Paramedic on 10/2/2019 at 2:23 PM

## 2019-10-03 ENCOUNTER — TELEPHONE (OUTPATIENT)
Dept: SURGERY | Facility: CLINIC | Age: 69
End: 2019-10-03

## 2019-10-03 PROBLEM — R22.9 SUBCUTANEOUS MASS: Status: ACTIVE | Noted: 2019-10-03

## 2019-10-03 NOTE — TELEPHONE ENCOUNTER
Edgard should be fine for surgery with Dr. Choudhury. I can certainly do a preoperative evaluation if necessary    MOISES Hare

## 2019-10-03 NOTE — TELEPHONE ENCOUNTER
Patient is scheduled for surgery with Dr. Johana Choudhury      Spoke with: Earle Rene about surgery dates.    Date of Surgery: 11/11/2019 at 10:00 AM with Dr. Johana Choudhury    Location: [facility]  [address] [place] [id number]  [phone]     H&P: To be updated by Dr. Choudhury day of surgery.    Informed patient they will need an adult : No-local anesthesia only procedure.    Surgery packet: Mailed per patient request.    Additional comments: He also knows to call general surgery if he experiences any cold or flu symptoms. Surgery teaching and soap were given to in clinic on 07/03/2019. He was asked to call [phone] if he needs to reschedule and [phone] if he experiences any symptoms.

## 2019-10-21 ENCOUNTER — TELEPHONE (OUTPATIENT)
Dept: UROLOGY | Facility: CLINIC | Age: 69
End: 2019-10-21

## 2019-10-21 NOTE — TELEPHONE ENCOUNTER
[facility] Call Center    Phone Message    May a detailed message be left on voicemail: no    Reason for Call: Medication Refill Request    Has the patient contacted the pharmacy for the refill? Yes   Name of medication being requested: tamsulosin (FLOMAX) 0.4 MG capsule  Provider who prescribed the medication: Dr. Cantu  Pharmacy: [facility] in [address]  Date medication is needed: 10/21/19   [facility] pharmacy called and stated that this medication needs to be refilled, it was not sent to another pharmacy, the other medication that was is the finasteride        Action Taken: Message routed to:  [facility] uro

## 2019-10-23 DIAGNOSIS — E55.9 VITAMIN D DEFICIENCY: Primary | ICD-10-CM

## 2019-10-23 RX ORDER — CHOLECALCIFEROL (VITAMIN D3) 50 MCG
2000 TABLET ORAL DAILY
Qty: 90 TABLET | Refills: 3 | Status: SHIPPED | OUTPATIENT
Start: 2019-10-23 | End: 2019-12-24

## 2019-10-23 NOTE — TELEPHONE ENCOUNTER
Last Office Visit with Nephrologist:  6/26/2019.  Medication refilled per Nephrology Clinic protocol.    Richard Gonzalez RN

## 2019-10-28 ENCOUNTER — OFFICE VISIT (OUTPATIENT)
Dept: OPHTHALMOLOGY | Facility: CLINIC | Age: 69
End: 2019-10-28
Attending: OPHTHALMOLOGY
Payer: COMMERCIAL

## 2019-10-28 DIAGNOSIS — E11.3213 TYPE 2 DIABETES MELLITUS WITH MILD NONPROLIFERATIVE RETINOPATHY OF BOTH EYES AND MACULAR EDEMA, UNSPECIFIED WHETHER LONG TERM INSULIN USE (H): ICD-10-CM

## 2019-10-28 DIAGNOSIS — E11.3213 TYPE 2 DIABETES MELLITUS WITH MILD NONPROLIFERATIVE RETINOPATHY OF BOTH EYES AND MACULAR EDEMA, UNSPECIFIED WHETHER LONG TERM INSULIN USE (H): Primary | ICD-10-CM

## 2019-10-28 PROCEDURE — G0463 HOSPITAL OUTPT CLINIC VISIT: HCPCS | Mod: ZF

## 2019-10-28 PROCEDURE — 92134 CPTRZ OPH DX IMG PST SGM RTA: CPT | Mod: ZF | Performed by: OPHTHALMOLOGY

## 2019-10-28 ASSESSMENT — TONOMETRY
OD_IOP_MMHG: 19
IOP_METHOD: ICARE
OS_IOP_MMHG: 15

## 2019-10-28 ASSESSMENT — REFRACTION_WEARINGRX
OD_SPHERE: -2.50
OS_SPHERE: -2.50
OS_ADD: +2.50
OD_ADD: +2.50
OS_CYLINDER: +0.75
OD_CYLINDER: +1.50
OS_AXIS: 033
SPECS_TYPE: PAL
OD_AXIS: 157

## 2019-10-28 ASSESSMENT — CUP TO DISC RATIO
OD_RATIO: 0.3
OS_RATIO: 0.3

## 2019-10-28 ASSESSMENT — VISUAL ACUITY
METHOD: SNELLEN - LINEAR
OS_PH_SC+: +2
OS_PH_SC: 20/50
OD_CC: 20/25
OD_CC+: -1+2
OS_SC: 20/70

## 2019-10-28 ASSESSMENT — SLIT LAMP EXAM - LIDS
COMMENTS: NORMAL
COMMENTS: NORMAL

## 2019-10-28 ASSESSMENT — CONF VISUAL FIELD
OS_NORMAL: 1
METHOD: COUNTING FINGERS
OD_NORMAL: 1

## 2019-10-28 NOTE — PROGRESS NOTES
I have confirmed the patient's and reviewed Past Medical History, Past Surgical History, Social History, Family History, Problem List, Medication List and agree with Tech note.    CC: decreased vision following CEIOL in OS    HPI:  pt of Dr. Eugene, s/p CEIOL left eye on 6/21/19. Has been having blurred vision since CEIOL. Reports that he had significant periorbital pain after the operation and has since had significant visual disturbance, blurred vision, distorted vision, diplopia, and eye pain. He is very concerned about his vision and would not like any more injections in his left eye at this time.     Self-DC'd all gtts about 5 days after surgery. Re-started on gtts about 3 weeks post-op. Since last visit has been taking prednisolone and ofloxacin 2-3 times per day    Avastin injections  Right Eye: 2/26/18, 4/10/18, 5/11/20218, 6/11/18 and last one here on 7/19/2018  Left Eye: 2/26/18, 4/10/18, 5/11/2018, 6/21/19      Assessment/plan:   1. 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 Patient advised that failure to adequately control each may lead to vision loss. The patient expressed understanding.    2. Diabetic macular edema left eye   - Worsened since CEIOL on 6/21/19   - Has been on ketorolac and PF twice a day/tid since surgery and will stop when running out    - OCT (9/9/19): temporal edema involving fovea is improved from 8/2019   - last injection 6/21/19 intra op Avastin   - see a few more microaneurysms on Optical Coherence Tomography Scan and will stop drops and repeat fluorescein angiography next visit     3. Pseudophakia left eye   - S/p CEIOL on 6/21/19    4. Diabetic macular edema right eye   - temporal macular edema, stable/reduced  as of 7/3/19   - had been  receiving injections , last injection 6/11/18 at outside clinic and 7/19/2018 here at [facility]    - recommend defer avastin today since there was not much of an effect    - follow up 4 weeks    5. Cataract right eye   - s/p CEIOL left eye on 6/21/19, defer CEIOL in right eye    - was seen previously , has IOL calcs    6.  Binocular diplopia    -several months now per patient   - no changes recently, most likely diabetic neuropathy   - patient requests evaluation with Caesar Llanos MD        RTC 5 weeks for OPTOS fluorescein angiography and Exam/OCT OS      Jackie Nolasco MD PhD.  Professor & Chair

## 2019-10-28 NOTE — NURSING NOTE
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  Pt states VA in LE is getting better.  Pt has no other concerns or pain at this time.    DM 2.  BS were 189 at about 1:40 today.  Lab Results       Component                Value               Date                       A1C                      9.2                 06/18/2019                 A1C                      9.1                 03/06/2018                 A1C                      10.2                06/06/2017                 A1C                      9.0                 03/16/2016                 A1C                      10.2                09/09/2014              DUTCH Rosado October 28, 2019 3:40 PM

## 2019-11-06 ENCOUNTER — OFFICE VISIT (OUTPATIENT)
Dept: ENDOCRINOLOGY | Facility: CLINIC | Age: 69
End: 2019-11-06
Payer: COMMERCIAL

## 2019-11-06 VITALS
WEIGHT: 205.7 LBS | DIASTOLIC BLOOD PRESSURE: 81 MMHG | HEIGHT: 68 IN | BODY MASS INDEX: 31.17 KG/M2 | SYSTOLIC BLOOD PRESSURE: 128 MMHG | HEART RATE: 77 BPM

## 2019-11-06 DIAGNOSIS — Z79.4 TYPE 2 DIABETES MELLITUS WITH HYPERGLYCEMIA, WITH LONG-TERM CURRENT USE OF INSULIN (H): Primary | ICD-10-CM

## 2019-11-06 DIAGNOSIS — E11.65 TYPE 2 DIABETES MELLITUS WITH HYPERGLYCEMIA, WITH LONG-TERM CURRENT USE OF INSULIN (H): Primary | ICD-10-CM

## 2019-11-06 ASSESSMENT — MIFFLIN-ST. JEOR: SCORE: 1667.55

## 2019-11-06 ASSESSMENT — PAIN SCALES - GENERAL: PAINLEVEL: NO PAIN (0)

## 2019-11-06 NOTE — PROGRESS NOTES
HPI   Earle Rene is a 69 year old male with type 2 diabetes mellitus here today for a follow up visit.      His wife is present.  Pt's diabetes is complicated by retinopathy, nephropathy, neuropathy and ED.  Pt also has hx of hyperlipidemia and HTN.  For his diabetes, he is prescribed to take Tresiba 60 units SQ at hs, Novolog 14 units with meals and Metformin 1000 mg BID. He has no interest in carb counting or using an I/C ratio.  I gave him a RX for the Freestyle Lora device/sensor last visit. He enjoys using the Freestyle sensor and has been checking his blood sugar more frequent.  His A1C was 10 % on 9/18/2019. His previous A1C values were 8.4 % and 7.4 %.  We downloaded his Freestyle device today.  He ate a salad ad had beans for dinner last night and his FBS is 133 this am.  He did not eat after his dinner last pm.  He did taking his Tresiba 60 units subcutaneous.  Pt ate oatmeal with almond mild this am and took 14 units Novolog and his blood sugar is now 169 1 hr postbreakfast.  He continues to miss taking insulin at noon/lunch.  His downloaded showed an average glucose of 236 with SD 71.  No hypoglycemia.  On ROS today, chronic blurred vision and he will be seeing Oph on 11/20 and 12/4 here.   Pt has numbness in both feet from his neuropathy.  Pt denies frequent headaches, n/v, SOB at rest, chest pain, abd pain, diarrhea, dysuria or hematuria.  No foot ulcers .    ROS   Please see under HPI.     ALLERGIES:  Review of patient's allergies indicates no known allergies.    Current Outpatient Medications   Medication Sig Dispense Refill     albuterol (VENTOLIN HFA) 108 (90 Base) MCG/ACT inhaler Inhale 2 puffs into the lungs every 6 hours 18 g 3     ARTIFICIAL TEAR OP Apply to eye as needed       aspirin 81 MG tablet Take 1 tablet by mouth daily.       atorvastatin (LIPITOR) 20 MG tablet Take 1 tablet (20 mg) by mouth daily 90 tablet 3     blood glucose (NO BRAND SPECIFIED) lancets standard Lancets that go  "with device, Test 3 times daily 300 each 3     blood glucose monitoring (NO BRAND SPECIFIED) meter device kit Any meter covered by insurance, not store brand, use as directed. 1 kit 0     blood glucose monitoring (NO BRAND SPECIFIED) test strip Strips that go with meter, covered by insurance. Test 3 times daily 300 strip 3     Blood Pressure Monitor KIT Automatic Blood Pressure Monitor 1 kit 0     clobetasol (TEMOVATE) 0.05 % ointment Apply topically to legs twice daily for 2 weeks.  Then discontinue 30 g 0     clotrimazole (LOTRIMIN) 1 % cream Apply topically 2 times daily 30 g 3     Continuous Blood Gluc  (FREESTYLE PETER READER) JUANCARLOS 1 Device daily 1 Device 0     Continuous Blood Gluc Sensor (FREESTYLE PETER 14 DAY SENSOR) MISC 1 applicator every 14 days 8 each 3     erythromycin (ROMYCIN) 5 MG/GM ophthalmic ointment Place 0.5 inches Into the left eye 3 times daily 1 g 0     fenofibrate 54 MG tablet Take 1 tablet (54 mg) by mouth daily 90 tablet 0     finasteride (PROSCAR) 5 MG tablet Take 1 tablet (5 mg) by mouth daily 90 tablet 2     fluocinonide (LIDEX) 0.05 % external cream APPLY TO AFFECTED AREA TWICE A DAY  3     gabapentin (NEURONTIN) 100 MG capsule Take 1 capsule (100 mg) by mouth 3 times daily 90 capsule 3     gabapentin (NEURONTIN) 300 MG capsule Take 1 capsule (300 mg) by mouth 2 times daily 120 capsule 1     insulin degludec (TRESIBA FLEXTOUCH) 100 UNIT/ML pen Inject 60 units subcutaneous at bedtime. 60 mL 3     insulin pen needle (B-D U/F) 31G X 5 MM Use 4 times per day.  Please dispense as BD Pen Needle Mini U/F 31G x 5  each 3     insulin syringe 31G X 5/16\" 0.5 ML MISC Use three syringes daily 270 each 1     ketamine 5% gabapentin 8% lidocaine 2.5% topical PLO cream Apply 1 g topically 3 times daily 30 g 3     ketorolac (ACULAR) 0.5 % ophthalmic solution Place 1 drop Into the left eye 4 times daily 1 Bottle 1     ketorolac (ACULAR) 0.5 % ophthalmic solution 1 drop in surgical eye as " directed - start 1 week after surgery, 4x daily until follow-up visit 1 Bottle 1     loratadine (CLARITIN) 10 MG tablet Take 1 tablet (10 mg) by mouth daily 90 tablet 0     losartan (COZAAR) 100 MG tablet Take 1 tablet (100 mg) by mouth At Bedtime 30 tablet 11     losartan (COZAAR) 25 MG tablet Take 1 tablet (25 mg) by mouth daily With a 50mg tablet for total dose of 75mg daily 30 tablet 11     losartan (COZAAR) 50 MG tablet Take 1 tablet (50 mg) by mouth daily With 25mg tablet for a total dose of 75mg daily 30 tablet 11     metFORMIN (GLUCOPHAGE) 1000 MG tablet 1 tab twice daily with meals 180 tablet 3     moxifloxacin (VIGAMOX) 0.5 % ophthalmic solution Place 1 drop Into the left eye every hour (while awake) 1 Bottle 3     mupirocin (BACTROBAN) 2 % cream Apply  topically. In very small amounts only as needed 15 g 1     neomycin-polymyxin-dexamethasone (MAXITROL) 3.5-09723-7.1 ophthalmic ointment Place 0.5 inches Into the left eye At Bedtime 1 Tube 0     NOVOLOG FLEXPEN 100 UNIT/ML soln Inject 12-14 units with meals, plus correction. Pt uses approx 65 units in 24 hrs. 60 mL 3     ofloxacin (OCUFLOX) 0.3 % ophthalmic solution 1 drop 4x daily in the surgical eye for 1 week after surgery, then stop 1 Bottle 0     Omega-3 Fatty Acids (OMEGA-3 FISH OIL) 1000 MG CAPS Take 1 capsule (1 g) by mouth 2 times daily 60 capsule 11     ONETOUCH ULTRA test strip Use to test blood sugar 3 times daily 300 strip 3     prednisoLONE acetate (PRED FORTE) 1 % ophthalmic suspension Place 1 drop Into the left eye 6 times daily 1 Bottle 3     prednisoLONE acetate (PRED FORTE) 1 % ophthalmic suspension Place 1 drop Into the left eye every hour (while awake) 1 Bottle 3     prednisoLONE acetate (PRED FORTE) 1 % ophthalmic suspension 1 drop in surgical eye as directed, 4x daily after surgery until follow-up 1 Bottle 1     ranitidine (ZANTAC) 300 MG tablet Take 1 tablet (300 mg) by mouth At Bedtime 90 tablet 3     senna-docusate  (SENOKOT-S/PERICOLACE) 8.6-50 MG tablet Take 1 tablet by mouth       sodium bicarbonate 650 MG tablet Take 1 tablet (650 mg) by mouth 3 times daily 90 tablet 11     tamsulosin (FLOMAX) 0.4 MG capsule Take 1 capsule (0.4 mg) by mouth daily 90 capsule 2     triamcinolone (KENALOG) 0.1 % cream Apply topically 3 times daily 80 g 0     vitamin D3 (CHOLECALCIFEROL) 1000 units (25 mcg) tablet Take 2 tablets (2,000 Units) by mouth daily 30 tablet 11     vitamin D3 (CHOLECALCIFEROL) 2000 units (50 mcg) tablet Take 1 tablet (2,000 Units) by mouth daily 90 tablet 3     Family Hx   No change.     Personal Hx   Smoke: none.   ETOH: none.    with grown children.   He owns his own business.    PMH   1. Type 2 Diabetes Mellitus dx at age 43.   2. Neuropathy.  3. Nephropathy.   4. ED.   5. Dyslipidemia.   6. Nephrolithiasis.   7. Decrease auditory acuity.   8. Sarcoidosis-lung.   9. Goiter.   10. S/P T & A.   11. S/P FX right heel.   12. Vit D def.   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   14. CT chest- ? granulomas.   15. Retinopathy.  16. Goiter.  Past Medical History:   Diagnosis Date     Blepharitis of both eyes      BPH (benign prostatic hyperplasia)      Diabetes (H)      Diabetic neuropathy (H)      Diabetic retinopathy associated with diabetes mellitus due to underlying condition (H)      Dry eye syndrome      Goiter      Granulomatous disease (H)      Nonsenile cataract      Peripheral neuropathy      Past Surgical History:   Procedure Laterality Date     AS RAD RESEC TONSIL/PILLARS Bilateral 1961     CATARACT IOL, RT/LT Left      COLONOSCOPY  7/29/2013    Procedure: COLONOSCOPY;;  Surgeon: Montana Pascal MD;  Location:  GI     PHACOEMULSIFICATION WITH STANDARD INTRAOCULAR LENS IMPLANT Left 6/21/2019    Procedure: Left Eye Cataract Removal with Intraocular Lens Implant with Intraoperative Avastin Injection;  Surgeon: Lacey Eugene MD;  Location:  OR     [facility]  11/2017     SURGICAL PATHOLOGY EXAM    "    Physical Exam   General appearance: Vital signs:   /81   Pulse 77   Ht 1.727 m (5' 8\")   Wt 93.3 kg (205 lb 11.2 oz)   BMI 31.28 kg/m    Estimated body mass index is 31.28 kg/m  as calculated from the following:    Height as of this encounter: 1.727 m (5' 8\").    Weight as of this encounter: 93.3 kg (205 lb 11.2 oz).  Head:  Normal.   Eyes: Decrease visual acuity; fundi not visualized.   Lungs: clear bilateral.  Cardiovascular system:  RRR.   Abdomen:  nontender.  Musculoskeletal system: no edema.   Neurological:  normal.   Skin:  necrobiosis lipoidica on both legs.   FEET: no ulcers. Nails are thick.  Abnormal monofilamentous exam.    Results   Creatinine   Date Value Ref Range Status   06/26/2019 1.31 (H) 0.66 - 1.25 mg/dL Final     GFR Estimate   Date Value Ref Range Status   06/26/2019 55 (L) >60 mL/min/[1.73_m2] Final     Comment:     Non  GFR Calc  Starting 12/18/2018, serum creatinine based estimated GFR (eGFR) will be   calculated using the Chronic Kidney Disease Epidemiology Collaboration   (CKD-EPI) equation.       Hemoglobin A1C   Date Value Ref Range Status   06/18/2019 9.2 (H) 0 - 5.6 % Final     Comment:     Normal <5.7% Prediabetes 5.7-6.4%  Diabetes 6.5% or higher - adopted from ADA   consensus guidelines.       Potassium   Date Value Ref Range Status   06/26/2019 4.3 3.4 - 5.3 mmol/L Final     ALT   Date Value Ref Range Status   06/18/2019 38 0 - 70 U/L Final     AST   Date Value Ref Range Status   06/18/2019 24 0 - 45 U/L Final     TSH   Date Value Ref Range Status   11/01/2017 1.12 0.40 - 4.00 mU/L Final     T4 Free   Date Value Ref Range Status   10/21/2014 1.00 0.76 - 1.46 ng/dL Final     Comment:     Effective 7/30/2014, the reference range for this assay has changed to reflect   new instrumentation/methodology.           Cholesterol   Date Value Ref Range Status   06/18/2019 145 <200 mg/dL Final   03/06/2018 101 <200 mg/dL Final     HDL Cholesterol   Date Value " Ref Range Status   06/18/2019 38 (L) >39 mg/dL Final   03/06/2018 32 (L) >39 mg/dL Final     LDL Cholesterol Calculated   Date Value Ref Range Status   06/18/2019 49 <100 mg/dL Final     Comment:     Desirable:       <100 mg/dl   03/06/2018 36 <100 mg/dL Final     Comment:     Desirable:       <100 mg/dl     Triglycerides   Date Value Ref Range Status   06/18/2019 289 (H) <150 mg/dL Final     Comment:     Borderline high:  150-199 mg/dl  High:             200-499 mg/dl  Very high:       >499 mg/dl     03/06/2018 166 (H) <150 mg/dL Final     Comment:     Borderline high:  150-199 mg/dl  High:             200-499 mg/dl  Very high:       >499 mg/dl       Cholesterol/HDL Ratio   Date Value Ref Range Status   09/09/2014 3.8 0.0 - 5.0 Final   11/20/2013 5.8 (H) 0.0 - 5.0 Final       A1C     10.0  9/18/2019  A1C      9.2   6/18/2019  A1C      8.4   12/21/2018  A1C      7.4   9/7/2018  A1C      7.1   5/31/2018  A1C      9.1   3/6/2018  A1C      9.6   11/1/2017  A1C      8.9   8/9/2017  A1C      9.7   9/22/2016  A1C      9.7   7/21/2015  A1C     10.2  12/2/2014  A1C     10.2  9/9/2014  A1C      9.8  11/20/2013  A1C      9.3   6/12/2013  A1C      8.0   8/14/2012  A1C      8.2   5/22/2012  A1C      8.0   5/22/2012    ASSESSMENT/PLAN:     1. TYPE 2 DIABETES MELLITUS: Uncontrolled type 2 diabetes mellitus complicated by retinopathy, nephropathy, neuropathy and ED.  I asked him to be sure and take Novolog with ALL meals and if he does not eat a meal and his blood sugar is > 150 to take 1-4 units Novolog for correction.  No change in insulin doses.  Instructed him to check his blood sugar fasting each am, prelunch, predinner and at bedtime DAILY.  He is to return to clinic with his Freestyle device in 4 weeks to see me.  He is to remain on Metformin 1000 mg BID.  May consider adding Jardiance next visit.  Pt's most recent creat was 1.31 with GFR 55 mL/min on 6/26/2019.  Encouraged patient to make healthy food choices, reduce his  food portions with meals, avoid snacking and walk daily and to take his insulin and medications as prescribed.  Pt remains on daily ASA.   Pt had the flu vaccine this season.    2. GOITER: Nontender goiter.  TSH normal in Nov 2017.    3. NEPHROPATHY: Discussed the need for good glycemic control and good BP control.   Pt's creat/GFR as above.   Pt's urine protein +.  He is taking Cozaar.  He is seen here by Nephrology.    4.  RETINOPATHY: Pt seen by Oph here on 10/28/2019. 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    5.  NEUROPATHY: Continue Gabapentin.  No foot ulcers.    6. DYSLIPIDEMIA: LDL 36 on 3/6/2018.   Pt is taking Lipitor and Fenofibrate.    7. OBESITY: See under # 1 above.  He does not want to use a GLP-1.    8.   Return to Endocrine Clinic to see me in 4 weeks.

## 2019-11-11 ENCOUNTER — HOSPITAL ENCOUNTER (OUTPATIENT)
Facility: AMBULATORY SURGERY CENTER | Age: 69
End: 2019-11-11
Attending: SURGERY
Payer: COMMERCIAL

## 2019-11-11 VITALS
SYSTOLIC BLOOD PRESSURE: 133 MMHG | OXYGEN SATURATION: 99 % | BODY MASS INDEX: 31.07 KG/M2 | TEMPERATURE: 97.6 F | HEIGHT: 68 IN | WEIGHT: 205.03 LBS | HEART RATE: 83 BPM | RESPIRATION RATE: 16 BRPM | DIASTOLIC BLOOD PRESSURE: 65 MMHG

## 2019-11-11 DIAGNOSIS — R22.9 SUBCUTANEOUS MASS: ICD-10-CM

## 2019-11-11 LAB — GLUCOSE BLDC GLUCOMTR-MCNC: 175 MG/DL (ref 70–99)

## 2019-11-11 RX ORDER — OXYCODONE HYDROCHLORIDE 5 MG/1
5 TABLET ORAL EVERY 6 HOURS PRN
Qty: 12 TABLET | Refills: 0 | Status: SHIPPED | OUTPATIENT
Start: 2019-11-11 | End: 2019-12-24

## 2019-11-11 ASSESSMENT — MIFFLIN-ST. JEOR: SCORE: 1664.5

## 2019-11-11 NOTE — BRIEF OP NOTE
[facility] Brief Operative Note    Pre-operative diagnosis: Subcutaneous mass [R22.9]   Post-operative diagnosis 4x 3 cm subcutaneous mass right posterior shoulder     Procedure: Procedure(s):  EXCISION, MASS, UPPER EXTREMITY, RIGHT SHOULDER   Surgeon(s): Surgeon(s) and Role:     * Johana Choudhury MD - Primary     * Gio Mcneil MD   Estimated blood loss: * No values recorded between 11/11/2019  1:10 PM and 11/11/2019  2:44 PM *    Specimens: ID Type Source Tests Collected by Time Destination   A : Right Shoulder Fatty Mass Tissue Shoulder SURGICAL PATHOLOGY EXAM Johana Choudhury MD 11/11/2019  2:00 PM       Findings: Fatty mass adherent to muscle

## 2019-11-11 NOTE — DISCHARGE INSTRUCTIONS
After Your Mass/Lump Removal       Incision care     You may take a shower the day after surgery. Carefully wash your incision with soap and water. Do not submerge yourself in water (bath, whirlpool, hot tub, pool, lake) for 14 days after surgery.     Remove the gauze bandage 24 hours after surgery, but leave the  glue in place. This will loosen and flake off in 7-10 days after surgery.       Always wash your hands before touching your incisions or removing bandages.     It is not unusual to form a collection of fluid or blood under your incision that may feel firm or squishy- it can take several weeks to months for your body to reabsorb it.  At times, it may even drain.  If that should happen keep the area clean with soap, water,  and cover with a clean gauze dressing. You can change this daily or as needed.     Other medicines     Wait to start aspirin or blood thinners until the day after surgery. You can continue your regular medicines at your normal time the day after surgery.     Your pain medicine may cause constipation (hard, dry stools). To help with this, take the stool softener your doctor gave you or an over-the-counter stool softener or laxative. You can stop taking this when you are no longer taking pain medicine and your bowel movements are back to normal.      For pain or discomfort     Take the narcotic pain medicine your doctor gave you as needed and as instructed on the bottle. If you prefer to use over-the-counter medication, use acetaminophen (Tylenol) or ibuprofen (Advil, Motrin) as instructed on the box. Do not take Tylenol if it is in your narcotic pain medication.      Use an ice pack on your surgical cut (incision) for 20 minutes at a time as needed for the first 24 hours. Be sure to protect your skin by putting a cloth between the ice pack and your skin.      Activities   No driving until you feel it s safe to do so. Don t drive while taking narcotic pain medicine.      Diet   You can eat  your regular meals after surgery.      Results   You should know any test results about 5 to 7 business days after surgery       When to call the doctor   Call your doctor if you have:     A fever above 101 F (38.3 C) (taken under the tongue), or a fever or chills lasting more than a day.     Redness at the incision site.     Any fluid or blood draining from the incision, especially if it smells bad.      Severe pain that doesn t improve with pain medicine.      We will call you 2 to 4 days after surgery to review this handout, answer questions and help arrange after-surgery care. If you have questions or concerns, please call [phone] during regular office hours. If you need to call after business hours, call [phone] and ask to page the surgeon on-call.

## 2019-11-13 ENCOUNTER — PATIENT OUTREACH (OUTPATIENT)
Dept: SURGERY | Facility: CLINIC | Age: 69
End: 2019-11-13

## 2019-11-13 LAB — COPATH REPORT: NORMAL

## 2019-11-13 NOTE — PROGRESS NOTES
RN Post-Op/Post-Discharge Care Coordination Note    Mr. Earle Rene is a 69 year old male who underwent excision of a soft tissue mass on 11/11 with  Dr. Johana Choudhury.  Spoke with Patient.    Support  Patient able to care for self independently     Health Status  Fevers/chills: Patient denies any fever or chills.  Nausea/Vomiting: Patient denies nausea/vomiting.  Eating/drinking: Patient is able to eat and drink without any complaints.  Bowel habits: Patient reports having a normal bowel movement.  Drains (MATTHEW): N/A  Incisions: Patient denies any signs and symptoms of infection..  Wound closure:  Skin Sealant  Pain: Improved.  He is taking Tylenol PRN per package instructions  New Medications:  Oxycodone    Activity/Restrictions  No restrictions    Equipment  None    Pathology reviewed with patient:  No: Not yet available    Forms/Letters  No- patient is back at work    All of his questions were answered including reviewing restrictions and wound care.  He will call this office if he has any further questions and/or concerns.      Patient will return to the General Surgery Clinic on a PRN basis.    A copy of this note was routed to the primary surgeon.      Whom and When to Call  Patient acknowledges understanding of how to manage any medication changes and   when to seek medical care.     Patient advised that if after hour medical concerns arise to please call [phone] and choose option 4 to speak to the physician on call.

## 2019-11-15 ENCOUNTER — PATIENT OUTREACH (OUTPATIENT)
Dept: SURGERY | Facility: CLINIC | Age: 69
End: 2019-11-15

## 2019-11-15 NOTE — PROGRESS NOTES
Spoke with patient and reviewed pathology results.  All questions answered.    Pathology:  Copath Report 11/11/2019  2:00 PM 88   Patient Name: CHRIS MONTOYA   MR#: [id number]   Specimen #: [id number]   Collected: 11/11/2019   Received: 11/11/2019   Reported: 11/13/2019 16:44   Ordering Phy(s): CYNDI HOROWITZ     For improved result formatting, select 'View Enhanced Report Format' under    Linked Documents section.     SPECIMEN(S):   Right shoulder fatty mass     FINAL DIAGNOSIS:   Soft tissue, right shoulder:   - Benign lipoma

## 2019-11-20 ENCOUNTER — OFFICE VISIT (OUTPATIENT)
Dept: OPHTHALMOLOGY | Facility: CLINIC | Age: 69
End: 2019-11-20
Attending: OPHTHALMOLOGY
Payer: COMMERCIAL

## 2019-11-20 DIAGNOSIS — H53.2 DOUBLE VISION: ICD-10-CM

## 2019-11-20 DIAGNOSIS — H49.22 SIXTH NERVE PALSY OF LEFT EYE: Primary | ICD-10-CM

## 2019-11-20 DIAGNOSIS — H53.10 SUBJECTIVE VISUAL DISTURBANCE: ICD-10-CM

## 2019-11-20 DIAGNOSIS — H50.21 HYPERTROPIA OF RIGHT EYE: ICD-10-CM

## 2019-11-20 PROCEDURE — 92060 SENSORIMOTOR EXAMINATION: CPT | Mod: ZF | Performed by: OPHTHALMOLOGY

## 2019-11-20 PROCEDURE — G0463 HOSPITAL OUTPT CLINIC VISIT: HCPCS | Mod: 25,ZF

## 2019-11-20 PROCEDURE — V2718 FRESNELL PRISM PRESS-ON LENS: HCPCS | Mod: ZF | Performed by: OPHTHALMOLOGY

## 2019-11-20 ASSESSMENT — REFRACTION_WEARINGRX
OS_AXIS: 033
OD_CYLINDER: +1.50
OS_SPHERE: -2.50
OD_SPHERE: -2.50
SPECS_TYPE: PAL
OD_AXIS: 157
OD_ADD: +2.50
OS_ADD: +2.50
OS_CYLINDER: +0.75

## 2019-11-20 ASSESSMENT — TONOMETRY
IOP_METHOD: ICARE
OS_IOP_MMHG: 14
OD_IOP_MMHG: 16

## 2019-11-20 ASSESSMENT — CONF VISUAL FIELD
OD_NORMAL: 1
OS_NORMAL: 1

## 2019-11-20 ASSESSMENT — EXTERNAL EXAM - RIGHT EYE: OD_EXAM: NORMAL

## 2019-11-20 ASSESSMENT — VISUAL ACUITY
CORRECTION_TYPE: GLASSES
OS_CC: 20/25
OD_CC: 20/60
METHOD: SNELLEN - LINEAR
OD_CC+: +2

## 2019-11-20 ASSESSMENT — SLIT LAMP EXAM - LIDS
COMMENTS: NORMAL
COMMENTS: NORMAL

## 2019-11-20 ASSESSMENT — EXTERNAL EXAM - LEFT EYE: OS_EXAM: NORMAL

## 2019-11-20 NOTE — NURSING NOTE
Chief Complaints and History of Present Illnesses   Patient presents with     Diplopia Evaluation     Chief Complaint(s) and History of Present Illness(es)     Diplopia Evaluation               Comments     Earle Rene is a 69 year old male who presents today for  1. 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 Diabetic macular edema left eye  3. Pseudophakia left eye  - S/p CEIOL on 6/21/19  4. Diabetic macular edema right eye   5. Cataract right eye  6.  Binocular diplopia     Started noticing diplopia after cataract surgery on the left eye.   Oblique images. Onset in June 2019. Worse at night. Rarely diplopic during the day. Diplopia worse at distance.     Joseph BOUDREAUX 9:20 AM November 20, 2019

## 2019-11-20 NOTE — PROGRESS NOTES
1. Small angle esotropia worse in left gaze with a relatively comitant small angle right hypertropia.  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  Fit patient with prism today to allow him single binocular vision in primary gaze.  Return to clinic in 2 months. If strabismus stable then will give him ground in prism glasses. If diplopia resolved then it proves microvascular etiology.  If esotropia worse then would proceed with neuro-imaging.    Earle Rene is a pleasant 69 year old male who presents to my neuro-ophthalmology clinic today for diplopia.    2.  Moderate NPDR both eyes with macular edema extra foveally in right eye, involving fovea in left eye- followed by retina  3. Diabetic macular edema left eye- followed by retina  4. Pseudophakia left eye              - S/p CEIOL on 6/21/19 with TOPICAL anesthesia  5.  Diabetic macular edema right eye- followed by retina  6. Cataract right eye              - s/p CEIOL left eye on 6/21/19, defer CEIOL in right eye per Dr. Rodriguez.        Complete documentation of historical and exam elements from today's encounter can be found in the full encounter summary report (not reduplicated in this progress note).  I personally obtained the chief complaint(s) and history of present illness.  I confirmed and edited as necessary the review of systems, past medical/surgical history, family history, social history, and examination findings as documented by others; and I examined the patient myself.  I personally reviewed the relevant tests, images, and reports as documented above.  I formulated and edited as necessary the assessment and plan and discussed the findings and management plan with the patient and family.  I personally  reviewed the ophthalmic test(s) associated with this encounter, agree with the interpretation(s) as documented by the resident/fellow, and have edited the corresponding report(s) as necessary.     Caesar Llanos MD

## 2019-11-20 NOTE — LETTER
2019    RE: Earle Rene  : 1950  MRN: [id number]    Dear Dr. Rodrgiuez:    Thank you for referring your patient, Earle Rene, to my neuro-ophthalmology clinic recently.  After a thorough neuro-ophthalmic history and examination, I came to the following conclusions:     1. Small angle esotropia worse in left gaze with a relatively comitant small angle right hypertropia.  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  Fit patient with prism today to allow him single binocular vision in primary gaze.  Return to clinic in 2 months. If strabismus stable then will give him ground in prism glasses. If diplopia resolved then it proves microvascular etiology.  If esotropia worse then would proceed with neuro-imaging.    Earle Rene is a pleasant 69 year old male who presents to my neuro-ophthalmology clinic today for diplopia.    2.  Moderate NPDR both eyes with macular edema extra foveally in right eye, involving fovea in left eye- followed by retina  3. Diabetic macular edema left eye- followed by retina  4. Pseudophakia left eye              - S/p CEIOL on 19 with TOPICAL anesthesia  5.  Diabetic macular edema right eye- followed by retina  6. Cataract right eye              - s/p CEIOL left eye on 19, defer CEIOL in right eye per Dr. Rodriguez.    Again, thank you for trusting me with the care of your patient.  For further exam details, please feel free to contact our office for additional records.  If you wish to contact me regarding this patient please email me at [email or web address] or give my clinic a call to arrange a phone conversation.    Sincerely,    Caesar Llanos MD  , Neuro-Ophthalmology and Adult Strabismus Surgery  The  [facility] and Anastasia [facility] in Neuro-Ophthalmology  Department of Ophthalmology and Visual Neurosciences  [facility]    DX: microvascular cranial nerve 6 palsy

## 2019-12-02 DIAGNOSIS — E11.3213 TYPE 2 DIABETES MELLITUS WITH MILD NONPROLIFERATIVE RETINOPATHY OF BOTH EYES AND MACULAR EDEMA, UNSPECIFIED WHETHER LONG TERM INSULIN USE (H): Primary | ICD-10-CM

## 2019-12-04 ENCOUNTER — OFFICE VISIT (OUTPATIENT)
Dept: OPHTHALMOLOGY | Facility: CLINIC | Age: 69
End: 2019-12-04
Attending: OPHTHALMOLOGY
Payer: COMMERCIAL

## 2019-12-04 ENCOUNTER — OFFICE VISIT (OUTPATIENT)
Dept: ENDOCRINOLOGY | Facility: CLINIC | Age: 69
End: 2019-12-04
Payer: COMMERCIAL

## 2019-12-04 VITALS
WEIGHT: 204.7 LBS | SYSTOLIC BLOOD PRESSURE: 132 MMHG | BODY MASS INDEX: 31.02 KG/M2 | DIASTOLIC BLOOD PRESSURE: 81 MMHG | HEIGHT: 68 IN | HEART RATE: 77 BPM

## 2019-12-04 DIAGNOSIS — E11.3213 TYPE 2 DIABETES MELLITUS WITH BOTH EYES AFFECTED BY MILD NONPROLIFERATIVE RETINOPATHY AND MACULAR EDEMA, WITH LONG-TERM CURRENT USE OF INSULIN (H): Primary | ICD-10-CM

## 2019-12-04 DIAGNOSIS — H26.8: Primary | ICD-10-CM

## 2019-12-04 DIAGNOSIS — E11.3411 SEVERE NONPROLIFERATIVE DIABETIC RETINOPATHY OF RIGHT EYE WITH MACULAR EDEMA ASSOCIATED WITH TYPE 2 DIABETES MELLITUS (H): ICD-10-CM

## 2019-12-04 DIAGNOSIS — E13.311 MACULAR EDEMA DUE TO SECONDARY DIABETES (H): ICD-10-CM

## 2019-12-04 DIAGNOSIS — H01.01A ULCERATIVE BLEPHARITIS OF UPPER AND LOWER EYELIDS OF BOTH EYES: ICD-10-CM

## 2019-12-04 DIAGNOSIS — H01.01B ULCERATIVE BLEPHARITIS OF UPPER AND LOWER EYELIDS OF BOTH EYES: ICD-10-CM

## 2019-12-04 DIAGNOSIS — Z79.4 TYPE 2 DIABETES MELLITUS WITH BOTH EYES AFFECTED BY MILD NONPROLIFERATIVE RETINOPATHY AND MACULAR EDEMA, WITH LONG-TERM CURRENT USE OF INSULIN (H): Primary | ICD-10-CM

## 2019-12-04 DIAGNOSIS — H04.123 BILATERAL DRY EYES: ICD-10-CM

## 2019-12-04 DIAGNOSIS — E11.3213 TYPE 2 DIABETES MELLITUS WITH MILD NONPROLIFERATIVE RETINOPATHY OF BOTH EYES AND MACULAR EDEMA, UNSPECIFIED WHETHER LONG TERM INSULIN USE (H): ICD-10-CM

## 2019-12-04 LAB — HBA1C MFR BLD: 8.5 % (ref 4.3–6)

## 2019-12-04 PROCEDURE — 40000269 ZZH STATISTIC NO CHARGE FACILITY FEE: Mod: ZF

## 2019-12-04 PROCEDURE — 92134 CPTRZ OPH DX IMG PST SGM RTA: CPT | Mod: ZF | Performed by: OPHTHALMOLOGY

## 2019-12-04 PROCEDURE — 76519 ECHO EXAM OF EYE: CPT | Mod: RT,ZF | Performed by: OPHTHALMOLOGY

## 2019-12-04 PROCEDURE — G0463 HOSPITAL OUTPT CLINIC VISIT: HCPCS | Mod: 25

## 2019-12-04 PROCEDURE — 92235 FLUORESCEIN ANGRPH MLTIFRAME: CPT | Mod: ZF | Performed by: OPHTHALMOLOGY

## 2019-12-04 RX ORDER — ERYTHROMYCIN 5 MG/G
0.5 OINTMENT OPHTHALMIC AT BEDTIME
Qty: 2 TUBE | Refills: 3 | Status: SHIPPED | OUTPATIENT
Start: 2019-12-04 | End: 2019-12-24

## 2019-12-04 ASSESSMENT — REFRACTION_WEARINGRX
SPECS_TYPE: PAL
OD_ADD: +2.50
OD_CYLINDER: +1.50
OS_AXIS: 033
OD_SPHERE: -2.50
OD_AXIS: 157
OS_ADD: +2.50
OS_CYLINDER: +0.75
OS_SPHERE: -2.50

## 2019-12-04 ASSESSMENT — MIFFLIN-ST. JEOR: SCORE: 1663.01

## 2019-12-04 ASSESSMENT — VISUAL ACUITY
OS_SC: 20/60
METHOD: SNELLEN - LINEAR
METHOD: SNELLEN - LINEAR
OS_PH_SC: 20/50
OD_CC+: -2
OD_CC+: -2
OS_SC+: -1
CORRECTION_TYPE: GLASSES
OS_SC: 20/60
OS_PH_SC+: -1
OD_CC: 20/50
OD_CC: 20/50

## 2019-12-04 ASSESSMENT — CONF VISUAL FIELD
OS_NORMAL: 1
OD_NORMAL: 1
OS_NORMAL: 1
METHOD: COUNTING FINGERS
OD_NORMAL: 1

## 2019-12-04 ASSESSMENT — TONOMETRY
OD_IOP_MMHG: 18
OS_IOP_MMHG: 14
OS_IOP_MMHG: 14
IOP_METHOD: TONOPEN
IOP_METHOD: TONOPEN
OD_IOP_MMHG: 18

## 2019-12-04 ASSESSMENT — SLIT LAMP EXAM - LIDS
COMMENTS: NORMAL
COMMENTS: NORMAL
COMMENTS: BLEPHARITIS, SCURF, DERMATOCHALASIS
COMMENTS: BLEPHARITIS, SCURF, DERMATOCHALASIS

## 2019-12-04 ASSESSMENT — EXTERNAL EXAM - RIGHT EYE
OD_EXAM: NORMAL
OD_EXAM: NORMAL

## 2019-12-04 ASSESSMENT — EXTERNAL EXAM - LEFT EYE
OS_EXAM: NORMAL
OS_EXAM: NORMAL

## 2019-12-04 ASSESSMENT — CUP TO DISC RATIO
OD_RATIO: 0.3
OS_RATIO: 0.3

## 2019-12-04 ASSESSMENT — PAIN SCALES - GENERAL: PAINLEVEL: NO PAIN (0)

## 2019-12-04 NOTE — PROGRESS NOTES
"Chief Complaint(s) and History of Present Illness(es)     Cataract Follow-Up     Laterality: both eyes              Comments     5 week f/u for Moderate NPDR both eyes with macular edema extra foveally   in right eye, involving fovea in left eye. Pt c/o an \"extremly\" red in LE   since cataract surgery 6 months ago. Pt states his \"pupil\" is not clear in   the LE and has not been clear since cataract surgery 6 months ago. Pt   feels the cataract surgery is the reason why he is having all the problems   with his LE. RE vision is unchanged, LE vision is \"not good\" per pt x 6   months. Pt c/o eye pain (ache 2-7/10 on pain scale) in LE worse in the AM,   pain fluctuates through out the day x 6 months.      Lab Results       Component                Value               Date                       A1C                      9.2                 06/18/2019                 A1C                      9.1                 03/06/2018                 A1C                      10.2                06/06/2017                 A1C                      9.0                 03/16/2016                 A1C                      10.2                09/09/2014           Ocular meds: AT's BID BE     LisaLANE Leavitt 9:15 AM December 4, 2019                   Review of systems for the eyes was negative other than the pertinent positives/negatives listed in the HPI.      Assessment & Plan      Earle Rene is a 69 year old male with the following diagnoses:   1. Combined form of nonsenile cataract of right eye    2. Macular edema due to secondary diabetes (H)    3. Severe nonproliferative diabetic retinopathy of right eye with macular edema associated with type 2 diabetes mellitus (H)    4. Ulcerative blepharitis of upper and lower eyelids of both eyes    5. Bilateral dry eyes       Cataract, right eye  Visually significant  Risks, benefits and alternatives to cataract extraction/IOL implantation discussed; consent obtained.  Will schedule surgery " today.  Difficulty with PostOp pain and poor drop compliance after left eye cataract extraction.  Discussed need for proper instillation of eye drops to help the eye heal efficiently.    Special equipment/needs:    Anesthesia:MAC with block  Dilation:Good  Iris expansion:Not needed  Pseudoexfoliation: No pseudoexfoliation  Trypan Blue: Yes  Beta/Avastin  Goal -0.50        Patient disposition:   Call to schedule         Attending Physician Attestation:  Complete documentation of historical and exam elements from today's encounter can be found in the full encounter summary report (not reduplicated in this progress note).  I personally obtained the chief complaint(s) and history of present illness.  I confirmed and edited as necessary the review of systems, past medical/surgical history, family history, social history, and examination findings as documented by others; and I examined the patient myself.  I personally reviewed the relevant tests, images, and reports as documented above.  I formulated and edited as necessary the assessment and plan and discussed the findings and management plan with the patient and family. . - Milton Maki MD

## 2019-12-04 NOTE — OP NOTE
OPERATIVE REPORT    Pre operative diagnosis: Right posterior shoulder lipoma    Post operative diagnosis: Same, measuring 4 x 6 cm    Procedure:  Excision of right shoulder lipoma    Surgeon:  Johana Choudhury MD    Resident:  Gio Pascal MD    Anesthesia:  Local  1% lidocaine, 0.5% marcaine mixed 1:1, 30 mL total    Operative course:    The patient was brought into the operating room and positioned supine.    He was prepped and draped in the usual sterile fashion.    Time out was performed and the site marking was identified.  The posterior right should was then anesthetized by injecting 1% lidocaine mixed with 0.5 % marcaine.     A transverse incision ~ 5 cm in length was made over the posterior right shoulder.  The incision was carried down sharply through the dermis and subcutaneous tissue until a fatty mass was encountered.  The mass was circumferentially dissected away from the surround fatty tissue and underlying muscle using a combination of blunt and sharp dissection.  It was somewhat adherent to the underlying rotator cuff muscles, and had to be removed in piecemeal fashion.  The wound cavity was inspected to ensure that there was no residual mass.  It was then irrigated and hemostasis was obtained using electrocautery.      The incision was then closed in layers using 3-0 vicryl to approximated the dermis and 4-0 monocryl and skin sealant to close the skin edges.      A pressure dressing was then applied.    At the end of the procedure all counts were correct.  The patient was transferred to PACU in stable condition.    Johana Choudhury MD  11/11/2019

## 2019-12-04 NOTE — PATIENT INSTRUCTIONS
Continue to check your blood sugar fasting each am, before lunch or around noon, before dinner and before bedtime daily.  Start Jardiance 10 mg 1 tablet each am.  Drink plenty of water.  See me in 4 weeks.  Have you lab done before seeing me on your appointment day.  Pamela Laura PA-C

## 2019-12-04 NOTE — NURSING NOTE
"Chief Complaint(s) and History of Present Illness(es)     Diabetic Eye Exam Follow Up     Associated symptoms include redness, double vision and blurred vision.  Negative for tearing and foreign body sensation.  Pain was noted as 3/10.              Comments     5 week f/u for Moderate NPDR both eyes with macular edema extra foveally in right eye, involving fovea in left eye. Pt c/o an \"extremly\" red in LE since cataract surgery 6 months ago. Pt states his \"pupil\" is not clear in the LE and has not been clear since cataract surgery 6 months ago. Pt feels the cataract surgery is the reason why he is having all the problems with his LE. RE vision is unchanged, LE vision is \"not good\" per pt x 6 months. Pt c/o eye pain (ache 2-7/10 on pain scale) in LE worse in the AM, pain fluctuates through out the day x 6 months.     Lab Results       Component                Value               Date                       A1C                      9.2                 06/18/2019                 A1C                      9.1                 03/06/2018                 A1C                      10.2                06/06/2017                 A1C                      9.0                 03/16/2016                 A1C                      10.2                09/09/2014          Ocular meds: AT's BID BE    LANE Ha 9:15 AM December 4, 2019                         "

## 2019-12-04 NOTE — LETTER
12/4/2019     RE: Earle Rene  [address]  [place] [address]     Dear Colleague,    Thank you for referring your patient, Earle Rene, to the [facility] ENDOCRINOLOGY at [facility]. Please see a copy of my visit note below.    HPI   Earle Rene is a 69 year old male with type 2 diabetes mellitus here today for a follow up visit.      Pt's diabetes is complicated by retinopathy, nephropathy, neuropathy and ED.  Pt also has hx of hyperlipidemia and HTN.  For his diabetes, he is prescribed to take Tresiba 60 units SQ at hs, Novolog 14 units with meals and Metformin 1000 mg BID. He has no interest in carb counting or using an I/C ratio.   He enjoys using the Freestyle sensor and has been checking his blood sugar more frequent.  His A1C is 8.5 % today and his previous A1C was 10 % on 9/18/2019.   We downloaded his Freestyle device today.  His average glucose was 188 with SD 55 over the past 28 days.  No hypoglycemia.  On ROS today, chronic blurred vision and he will be seeing Oph again today.  Pt has numbness in both feet.  Pt denies frequent headaches, n/v, SOB at rest, chest pain, abd pain, diarrhea, dysuria or hematuria.  No foot ulcers.    ROS   Please see under HPI.     ALLERGIES:  Review of patient's allergies indicates no known allergies.    Current Outpatient Medications   Medication Sig Dispense Refill     albuterol (VENTOLIN HFA) 108 (90 Base) MCG/ACT inhaler Inhale 2 puffs into the lungs every 6 hours 18 g 3     ARTIFICIAL TEAR OP Apply to eye as needed       aspirin 81 MG tablet Take 1 tablet by mouth daily.       atorvastatin (LIPITOR) 20 MG tablet Take 1 tablet (20 mg) by mouth daily 90 tablet 3     blood glucose (NO BRAND SPECIFIED) lancets standard Lancets that go with device, Test 3 times daily 300 each 3     blood glucose monitoring (NO BRAND SPECIFIED) meter device kit Any meter covered by insurance, not store brand, use as directed. 1 kit 0      "blood glucose monitoring (NO BRAND SPECIFIED) test strip Strips that go with meter, covered by insurance. Test 3 times daily 300 strip 3     Blood Pressure Monitor KIT Automatic Blood Pressure Monitor 1 kit 0     clobetasol (TEMOVATE) 0.05 % ointment Apply topically to legs twice daily for 2 weeks.  Then discontinue 30 g 0     clotrimazole (LOTRIMIN) 1 % cream Apply topically 2 times daily 30 g 3     Continuous Blood Gluc  (FREESTYLE PETER READER) JUANCARLOS 1 Device daily 1 Device 0     Continuous Blood Gluc Sensor (FREESTYLE PETER 14 DAY SENSOR) MISC 1 applicator every 14 days 8 each 3     empagliflozin (JARDIANCE) 10 MG TABS tablet Take 1 tablet (10 mg) by mouth daily 90 tablet 1     erythromycin (ROMYCIN) 5 MG/GM ophthalmic ointment Place 0.5 inches Into the left eye 3 times daily 1 g 0     fenofibrate 54 MG tablet Take 1 tablet (54 mg) by mouth daily 90 tablet 0     finasteride (PROSCAR) 5 MG tablet Take 1 tablet (5 mg) by mouth daily 90 tablet 2     fluocinonide (LIDEX) 0.05 % external cream APPLY TO AFFECTED AREA TWICE A DAY  3     gabapentin (NEURONTIN) 100 MG capsule Take 1 capsule (100 mg) by mouth 3 times daily 90 capsule 3     gabapentin (NEURONTIN) 300 MG capsule Take 1 capsule (300 mg) by mouth 2 times daily 120 capsule 1     insulin degludec (TRESIBA FLEXTOUCH) 100 UNIT/ML pen Inject 60 units subcutaneous at bedtime. 60 mL 3     insulin pen needle (B-D U/F) 31G X 5 MM Use 4 times per day.  Please dispense as BD Pen Needle Mini U/F 31G x 5  each 3     insulin syringe 31G X 5/16\" 0.5 ML MISC Use three syringes daily 270 each 1     ketamine 5% gabapentin 8% lidocaine 2.5% topical PLO cream Apply 1 g topically 3 times daily 30 g 3     ketorolac (ACULAR) 0.5 % ophthalmic solution Place 1 drop Into the left eye 4 times daily 1 Bottle 1     ketorolac (ACULAR) 0.5 % ophthalmic solution 1 drop in surgical eye as directed - start 1 week after surgery, 4x daily until follow-up visit 1 Bottle 1     " levofloxacin (LEVAQUIN) 750 MG tablet Take 1 tablet (750 mg) by mouth daily for 7 days 7 tablet 0     loratadine (CLARITIN) 10 MG tablet Take 1 tablet (10 mg) by mouth daily 90 tablet 0     losartan (COZAAR) 100 MG tablet Take 1 tablet (100 mg) by mouth At Bedtime 30 tablet 11     losartan (COZAAR) 25 MG tablet Take 1 tablet (25 mg) by mouth daily With a 50mg tablet for total dose of 75mg daily 30 tablet 11     losartan (COZAAR) 50 MG tablet Take 1 tablet (50 mg) by mouth daily With 25mg tablet for a total dose of 75mg daily 30 tablet 11     metFORMIN (GLUCOPHAGE) 1000 MG tablet 1 tab twice daily with meals 180 tablet 3     moxifloxacin (VIGAMOX) 0.5 % ophthalmic solution Place 1 drop Into the left eye every hour (while awake) 1 Bottle 3     mupirocin (BACTROBAN) 2 % cream Apply  topically. In very small amounts only as needed 15 g 1     neomycin-polymyxin-dexamethasone (MAXITROL) 3.5-09004-0.1 ophthalmic ointment Place 0.5 inches Into the left eye At Bedtime 1 Tube 0     NOVOLOG FLEXPEN 100 UNIT/ML soln Inject 12-14 units with meals, plus correction. Pt uses approx 65 units in 24 hrs. 60 mL 3     ofloxacin (OCUFLOX) 0.3 % ophthalmic solution 1 drop 4x daily in the surgical eye for 1 week after surgery, then stop 1 Bottle 0     Omega-3 Fatty Acids (OMEGA-3 FISH OIL) 1000 MG CAPS Take 1 capsule (1 g) by mouth 2 times daily 60 capsule 11     ONETOUCH ULTRA test strip Use to test blood sugar 3 times daily 300 strip 3     oxyCODONE (ROXICODONE) 5 MG tablet Take 1 tablet (5 mg) by mouth every 6 hours as needed for severe pain 12 tablet 0     prednisoLONE acetate (PRED FORTE) 1 % ophthalmic suspension Place 1 drop Into the left eye 6 times daily 1 Bottle 3     prednisoLONE acetate (PRED FORTE) 1 % ophthalmic suspension Place 1 drop Into the left eye every hour (while awake) 1 Bottle 3     prednisoLONE acetate (PRED FORTE) 1 % ophthalmic suspension 1 drop in surgical eye as directed, 4x daily after surgery until follow-up  1 Bottle 1     ranitidine (ZANTAC) 300 MG tablet Take 1 tablet (300 mg) by mouth At Bedtime 90 tablet 3     senna-docusate (SENOKOT-S/PERICOLACE) 8.6-50 MG tablet Take 1 tablet by mouth       sodium bicarbonate 650 MG tablet Take 1 tablet (650 mg) by mouth 3 times daily 90 tablet 11     tamsulosin (FLOMAX) 0.4 MG capsule Take 1 capsule (0.4 mg) by mouth daily 90 capsule 2     triamcinolone (KENALOG) 0.1 % cream Apply topically 3 times daily 80 g 0     vitamin D3 (CHOLECALCIFEROL) 1000 units (25 mcg) tablet Take 2 tablets (2,000 Units) by mouth daily 30 tablet 11     vitamin D3 (CHOLECALCIFEROL) 2000 units (50 mcg) tablet Take 1 tablet (2,000 Units) by mouth daily 90 tablet 3     erythromycin (ROMYCIN) 5 MG/GM ophthalmic ointment Place 0.5 inches into both eyes At Bedtime 2 Tube 3     Family Hx   No change.     Personal Hx   Smoke: none.   ETOH: none.    with grown children.   He owns his own business.    PMH   1. Type 2 Diabetes Mellitus dx at age 43.   2. Neuropathy.  3. Nephropathy.   4. ED.   5. Dyslipidemia.   6. Nephrolithiasis.   7. Decrease auditory acuity.   8. Sarcoidosis-lung.   9. Goiter.   10. S/P T & A.   11. S/P FX right heel.   12. Vit D def.   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   14. CT chest- ? granulomas.   15. Retinopathy.  16. Goiter.  Past Medical History:   Diagnosis Date     Blepharitis of both eyes      BPH (benign prostatic hyperplasia)      Diabetes (H)      Diabetic neuropathy (H)      Diabetic retinopathy associated with diabetes mellitus due to underlying condition (H)      Dry eye syndrome      Goiter      Granulomatous disease (H)      Nonsenile cataract      Peripheral neuropathy      Past Surgical History:   Procedure Laterality Date     AS RAD RESEC TONSIL/PILLARS Bilateral 1961     CATARACT IOL, RT/LT Left      COLONOSCOPY  7/29/2013    Procedure: COLONOSCOPY;;  Surgeon: Montana Pascal MD;  Location:  GI     EXCISE MASS UPPER EXTREMITY Right 11/11/2019    Procedure:  "EXCISION, MASS, UPPER EXTREMITY, RIGHT SHOULDER;  Surgeon: Johana Choudhury MD;  Location: [facility] OR     PHACOEMULSIFICATION WITH STANDARD INTRAOCULAR LENS IMPLANT Left 6/21/2019    Procedure: Left Eye Cataract Removal with Intraocular Lens Implant with Intraoperative Avastin Injection;  Surgeon: Lacey Eugene MD;  Location:  OR     siladenatis  11/2017     SURGICAL PATHOLOGY EXAM       Physical Exam   General appearance: Vital signs:   /81   Pulse 77   Ht 1.727 m (5' 8\")   Wt 92.9 kg (204 lb 11.2 oz)   BMI 31.12 kg/m     Estimated body mass index is 31.12 kg/m  as calculated from the following:    Height as of this encounter: 1.727 m (5' 8\").    Weight as of this encounter: 92.9 kg (204 lb 11.2 oz).  Head:  Normal.   Eyes: Decrease visual acuity; fundi not visualized.   Lungs: clear bilateral.  Cardiovascular system:  RRR.   Abdomen:  nontender.  Musculoskeletal system: no edema.   Neurological:  normal.   Skin:  necrobiosis lipoidica on both legs. Multiple skin excoriations.  FEET: no ulcers. Nails are thick.  Abnormal monofilamentous exam.    Results   Creatinine   Date Value Ref Range Status   06/26/2019 1.31 (H) 0.66 - 1.25 mg/dL Final     GFR Estimate   Date Value Ref Range Status   06/26/2019 55 (L) >60 mL/min/[1.73_m2] Final     Comment:     Non  GFR Calc  Starting 12/18/2018, serum creatinine based estimated GFR (eGFR) will be   calculated using the Chronic Kidney Disease Epidemiology Collaboration   (CKD-EPI) equation.       Hemoglobin A1C   Date Value Ref Range Status   06/18/2019 9.2 (H) 0 - 5.6 % Final     Comment:     Normal <5.7% Prediabetes 5.7-6.4%  Diabetes 6.5% or higher - adopted from ADA   consensus guidelines.       Potassium   Date Value Ref Range Status   06/26/2019 4.3 3.4 - 5.3 mmol/L Final     ALT   Date Value Ref Range Status   06/18/2019 38 0 - 70 U/L Final     AST   Date Value Ref Range Status   06/18/2019 24 0 - 45 U/L Final     TSH   Date Value Ref " Range Status   11/01/2017 1.12 0.40 - 4.00 mU/L Final     T4 Free   Date Value Ref Range Status   10/21/2014 1.00 0.76 - 1.46 ng/dL Final     Comment:     Effective 7/30/2014, the reference range for this assay has changed to reflect   new instrumentation/methodology.           Cholesterol   Date Value Ref Range Status   06/18/2019 145 <200 mg/dL Final   03/06/2018 101 <200 mg/dL Final     HDL Cholesterol   Date Value Ref Range Status   06/18/2019 38 (L) >39 mg/dL Final   03/06/2018 32 (L) >39 mg/dL Final     LDL Cholesterol Calculated   Date Value Ref Range Status   06/18/2019 49 <100 mg/dL Final     Comment:     Desirable:       <100 mg/dl   03/06/2018 36 <100 mg/dL Final     Comment:     Desirable:       <100 mg/dl     Triglycerides   Date Value Ref Range Status   06/18/2019 289 (H) <150 mg/dL Final     Comment:     Borderline high:  150-199 mg/dl  High:             200-499 mg/dl  Very high:       >499 mg/dl     03/06/2018 166 (H) <150 mg/dL Final     Comment:     Borderline high:  150-199 mg/dl  High:             200-499 mg/dl  Very high:       >499 mg/dl       Cholesterol/HDL Ratio   Date Value Ref Range Status   09/09/2014 3.8 0.0 - 5.0 Final   11/20/2013 5.8 (H) 0.0 - 5.0 Final     A1C     8.5    12/4/2019  A1C     10.0  9/18/2019  A1C      9.2   6/18/2019  A1C      8.4   12/21/2018  A1C      7.4   9/7/2018  A1C      7.1   5/31/2018  A1C      9.1   3/6/2018  A1C      9.6   11/1/2017  A1C      8.9   8/9/2017  A1C      9.7   9/22/2016  A1C      9.7   7/21/2015  A1C     10.2  12/2/2014  A1C     10.2  9/9/2014  A1C      9.8  11/20/2013  A1C      9.3   6/12/2013  A1C      8.0   8/14/2012  A1C      8.2   5/22/2012  A1C      8.0   5/22/2012    ASSESSMENT/PLAN:     1. TYPE 2 DIABETES MELLITUS: Uncontrolled type 2 diabetes mellitus complicated by retinopathy, nephropathy, neuropathy and ED.  Will add Jardiance 10 mg each am today.  I reviewed how Jardiance works and possible side effects of the medication including  dehydration, groin yeast infection, UTI, hypogylcemia, etc.  I asked him to be sure and take Novolog with ALL meals and if he does not eat a meal and his blood sugar is > 150 to take 1-4 units Novolog for correction.  No change in insulin doses.  Instructed him to check his blood sugar fasting each am, prelunch, predinner and at bedtime DAILY.  He is to remain on Metformin 1000 mg BID.  Pt's most recent creat was 1.31 with GFR 55 mL/min on 6/26/2019.  Encouraged patient to make healthy food choices, reduce his food portions with meals, avoid snacking and walk daily and to take his insulin and medications as prescribed.  Pt remains on daily ASA.   Pt had the flu vaccine this season.  I ordered a creat/GFR to be done in 4 weeks prior to seeing me in clinic.    2. GOITER: Nontender goiter.  TSH normal in Nov 2017.    3. NEPHROPATHY: Discussed the need for good glycemic control and good BP control.   Pt's creat/GFR as above.   Pt's urine protein +.  He is taking Cozaar.  He is seen here by Nephrology.    4.  RETINOPATHY: Pt seen by Oph here today.   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    5.  NEUROPATHY: Continue Gabapentin.  No foot ulcers.    6. DYSLIPIDEMIA: LDL 49 in [id number].  Pt is taking Lipitor and Fenofibrate.    7. OBESITY: See under # 1 above.  He does not want to use a GLP-1.    8.   Return to Endocrine Clinic to see me in 4 weeks.    Again, thank you for allowing me to participate in the care of your patient.      Sincerely,    Pamela Laura PA-C

## 2019-12-04 NOTE — NURSING NOTE
Chief Complaint   Patient presents with     RECHECK     Type 2 Diabetes     Capillary puncture performed for Hemoglobin A1C test. Patient tolerated well.    Leigh Ann Lee MA

## 2019-12-04 NOTE — PROGRESS NOTES
HPI   Earle Rene is a 69 year old male with type 2 diabetes mellitus here today for a follow up visit.      Pt's diabetes is complicated by retinopathy, nephropathy, neuropathy and ED.  Pt also has hx of hyperlipidemia and HTN.  For his diabetes, he is prescribed to take Tresiba 60 units SQ at hs, Novolog 14 units with meals and Metformin 1000 mg BID. He has no interest in carb counting or using an I/C ratio.   He enjoys using the Freestyle sensor and has been checking his blood sugar more frequent.  His A1C is 8.5 % today and his previous A1C was 10 % on 9/18/2019.   We downloaded his Freestyle device today.  His average glucose was 188 with SD 55 over the past 28 days.  No hypoglycemia.  On ROS today, chronic blurred vision and he will be seeing Oph again today.  Pt has numbness in both feet.  Pt denies frequent headaches, n/v, SOB at rest, chest pain, abd pain, diarrhea, dysuria or hematuria.  No foot ulcers.    ROS   Please see under HPI.     ALLERGIES:  Review of patient's allergies indicates no known allergies.    Current Outpatient Medications   Medication Sig Dispense Refill     albuterol (VENTOLIN HFA) 108 (90 Base) MCG/ACT inhaler Inhale 2 puffs into the lungs every 6 hours 18 g 3     ARTIFICIAL TEAR OP Apply to eye as needed       aspirin 81 MG tablet Take 1 tablet by mouth daily.       atorvastatin (LIPITOR) 20 MG tablet Take 1 tablet (20 mg) by mouth daily 90 tablet 3     blood glucose (NO BRAND SPECIFIED) lancets standard Lancets that go with device, Test 3 times daily 300 each 3     blood glucose monitoring (NO BRAND SPECIFIED) meter device kit Any meter covered by insurance, not store brand, use as directed. 1 kit 0     blood glucose monitoring (NO BRAND SPECIFIED) test strip Strips that go with meter, covered by insurance. Test 3 times daily 300 strip 3     Blood Pressure Monitor KIT Automatic Blood Pressure Monitor 1 kit 0     clobetasol (TEMOVATE) 0.05 % ointment Apply topically to legs twice  "daily for 2 weeks.  Then discontinue 30 g 0     clotrimazole (LOTRIMIN) 1 % cream Apply topically 2 times daily 30 g 3     Continuous Blood Gluc  (FREESTYLE PETER READER) JUANCARLOS 1 Device daily 1 Device 0     Continuous Blood Gluc Sensor (FREESTYLE PETER 14 DAY SENSOR) MISC 1 applicator every 14 days 8 each 3     empagliflozin (JARDIANCE) 10 MG TABS tablet Take 1 tablet (10 mg) by mouth daily 90 tablet 1     erythromycin (ROMYCIN) 5 MG/GM ophthalmic ointment Place 0.5 inches Into the left eye 3 times daily 1 g 0     fenofibrate 54 MG tablet Take 1 tablet (54 mg) by mouth daily 90 tablet 0     finasteride (PROSCAR) 5 MG tablet Take 1 tablet (5 mg) by mouth daily 90 tablet 2     fluocinonide (LIDEX) 0.05 % external cream APPLY TO AFFECTED AREA TWICE A DAY  3     gabapentin (NEURONTIN) 100 MG capsule Take 1 capsule (100 mg) by mouth 3 times daily 90 capsule 3     gabapentin (NEURONTIN) 300 MG capsule Take 1 capsule (300 mg) by mouth 2 times daily 120 capsule 1     insulin degludec (TRESIBA FLEXTOUCH) 100 UNIT/ML pen Inject 60 units subcutaneous at bedtime. 60 mL 3     insulin pen needle (B-D U/F) 31G X 5 MM Use 4 times per day.  Please dispense as BD Pen Needle Mini U/F 31G x 5  each 3     insulin syringe 31G X 5/16\" 0.5 ML MISC Use three syringes daily 270 each 1     ketamine 5% gabapentin 8% lidocaine 2.5% topical PLO cream Apply 1 g topically 3 times daily 30 g 3     ketorolac (ACULAR) 0.5 % ophthalmic solution Place 1 drop Into the left eye 4 times daily 1 Bottle 1     ketorolac (ACULAR) 0.5 % ophthalmic solution 1 drop in surgical eye as directed - start 1 week after surgery, 4x daily until follow-up visit 1 Bottle 1     levofloxacin (LEVAQUIN) 750 MG tablet Take 1 tablet (750 mg) by mouth daily for 7 days 7 tablet 0     loratadine (CLARITIN) 10 MG tablet Take 1 tablet (10 mg) by mouth daily 90 tablet 0     losartan (COZAAR) 100 MG tablet Take 1 tablet (100 mg) by mouth At Bedtime 30 tablet 11     " losartan (COZAAR) 25 MG tablet Take 1 tablet (25 mg) by mouth daily With a 50mg tablet for total dose of 75mg daily 30 tablet 11     losartan (COZAAR) 50 MG tablet Take 1 tablet (50 mg) by mouth daily With 25mg tablet for a total dose of 75mg daily 30 tablet 11     metFORMIN (GLUCOPHAGE) 1000 MG tablet 1 tab twice daily with meals 180 tablet 3     moxifloxacin (VIGAMOX) 0.5 % ophthalmic solution Place 1 drop Into the left eye every hour (while awake) 1 Bottle 3     mupirocin (BACTROBAN) 2 % cream Apply  topically. In very small amounts only as needed 15 g 1     neomycin-polymyxin-dexamethasone (MAXITROL) 3.5-38390-6.1 ophthalmic ointment Place 0.5 inches Into the left eye At Bedtime 1 Tube 0     NOVOLOG FLEXPEN 100 UNIT/ML soln Inject 12-14 units with meals, plus correction. Pt uses approx 65 units in 24 hrs. 60 mL 3     ofloxacin (OCUFLOX) 0.3 % ophthalmic solution 1 drop 4x daily in the surgical eye for 1 week after surgery, then stop 1 Bottle 0     Omega-3 Fatty Acids (OMEGA-3 FISH OIL) 1000 MG CAPS Take 1 capsule (1 g) by mouth 2 times daily 60 capsule 11     ONETOUCH ULTRA test strip Use to test blood sugar 3 times daily 300 strip 3     oxyCODONE (ROXICODONE) 5 MG tablet Take 1 tablet (5 mg) by mouth every 6 hours as needed for severe pain 12 tablet 0     prednisoLONE acetate (PRED FORTE) 1 % ophthalmic suspension Place 1 drop Into the left eye 6 times daily 1 Bottle 3     prednisoLONE acetate (PRED FORTE) 1 % ophthalmic suspension Place 1 drop Into the left eye every hour (while awake) 1 Bottle 3     prednisoLONE acetate (PRED FORTE) 1 % ophthalmic suspension 1 drop in surgical eye as directed, 4x daily after surgery until follow-up 1 Bottle 1     ranitidine (ZANTAC) 300 MG tablet Take 1 tablet (300 mg) by mouth At Bedtime 90 tablet 3     senna-docusate (SENOKOT-S/PERICOLACE) 8.6-50 MG tablet Take 1 tablet by mouth       sodium bicarbonate 650 MG tablet Take 1 tablet (650 mg) by mouth 3 times daily 90 tablet  11     tamsulosin (FLOMAX) 0.4 MG capsule Take 1 capsule (0.4 mg) by mouth daily 90 capsule 2     triamcinolone (KENALOG) 0.1 % cream Apply topically 3 times daily 80 g 0     vitamin D3 (CHOLECALCIFEROL) 1000 units (25 mcg) tablet Take 2 tablets (2,000 Units) by mouth daily 30 tablet 11     vitamin D3 (CHOLECALCIFEROL) 2000 units (50 mcg) tablet Take 1 tablet (2,000 Units) by mouth daily 90 tablet 3     erythromycin (ROMYCIN) 5 MG/GM ophthalmic ointment Place 0.5 inches into both eyes At Bedtime 2 Tube 3     Family Hx   No change.     Personal Hx   Smoke: none.   ETOH: none.    with grown children.   He owns his own business.    PMH   1. Type 2 Diabetes Mellitus dx at age 43.   2. Neuropathy.  3. Nephropathy.   4. ED.   5. Dyslipidemia.   6. Nephrolithiasis.   7. Decrease auditory acuity.   8. Sarcoidosis-lung.   9. Goiter.   10. S/P T & A.   11. S/P FX right heel.   12. Vit D def.   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   14. CT chest- ? granulomas.   15. Retinopathy.  16. Goiter.  Past Medical History:   Diagnosis Date     Blepharitis of both eyes      BPH (benign prostatic hyperplasia)      Diabetes (H)      Diabetic neuropathy (H)      Diabetic retinopathy associated with diabetes mellitus due to underlying condition (H)      Dry eye syndrome      Goiter      Granulomatous disease (H)      Nonsenile cataract      Peripheral neuropathy      Past Surgical History:   Procedure Laterality Date     AS RAD RESEC TONSIL/PILLARS Bilateral 1961     CATARACT IOL, RT/LT Left      COLONOSCOPY  7/29/2013    Procedure: COLONOSCOPY;;  Surgeon: Montana Pascal MD;  Location: [facility]     EXCISE MASS UPPER EXTREMITY Right 11/11/2019    Procedure: EXCISION, MASS, UPPER EXTREMITY, RIGHT SHOULDER;  Surgeon: Johana Choudhury MD;  Location: [facility]     PHACOEMULSIFICATION WITH STANDARD INTRAOCULAR LENS IMPLANT Left 6/21/2019    Procedure: Left Eye Cataract Removal with Intraocular Lens Implant with Intraoperative Avastin  "Injection;  Surgeon: Lacey Eugene MD;  Location:  OR     [facility]  11/2017     SURGICAL PATHOLOGY EXAM       Physical Exam   General appearance: Vital signs:   /81   Pulse 77   Ht 1.727 m (5' 8\")   Wt 92.9 kg (204 lb 11.2 oz)   BMI 31.12 kg/m    Estimated body mass index is 31.12 kg/m  as calculated from the following:    Height as of this encounter: 1.727 m (5' 8\").    Weight as of this encounter: 92.9 kg (204 lb 11.2 oz).  Head:  Normal.   Eyes: Decrease visual acuity; fundi not visualized.   Lungs: clear bilateral.  Cardiovascular system:  RRR.   Abdomen:  nontender.  Musculoskeletal system: no edema.   Neurological:  normal.   Skin:  necrobiosis lipoidica on both legs. Multiple skin excoriations.  FEET: no ulcers. Nails are thick.  Abnormal monofilamentous exam.    Results   Creatinine   Date Value Ref Range Status   06/26/2019 1.31 (H) 0.66 - 1.25 mg/dL Final     GFR Estimate   Date Value Ref Range Status   06/26/2019 55 (L) >60 mL/min/[1.73_m2] Final     Comment:     Non  GFR Calc  Starting 12/18/2018, serum creatinine based estimated GFR (eGFR) will be   calculated using the Chronic Kidney Disease Epidemiology Collaboration   (CKD-EPI) equation.       Hemoglobin A1C   Date Value Ref Range Status   06/18/2019 9.2 (H) 0 - 5.6 % Final     Comment:     Normal <5.7% Prediabetes 5.7-6.4%  Diabetes 6.5% or higher - adopted from ADA   consensus guidelines.       Potassium   Date Value Ref Range Status   06/26/2019 4.3 3.4 - 5.3 mmol/L Final     ALT   Date Value Ref Range Status   06/18/2019 38 0 - 70 U/L Final     AST   Date Value Ref Range Status   06/18/2019 24 0 - 45 U/L Final     TSH   Date Value Ref Range Status   11/01/2017 1.12 0.40 - 4.00 mU/L Final     T4 Free   Date Value Ref Range Status   10/21/2014 1.00 0.76 - 1.46 ng/dL Final     Comment:     Effective 7/30/2014, the reference range for this assay has changed to reflect   new instrumentation/methodology.   "         Cholesterol   Date Value Ref Range Status   06/18/2019 145 <200 mg/dL Final   03/06/2018 101 <200 mg/dL Final     HDL Cholesterol   Date Value Ref Range Status   06/18/2019 38 (L) >39 mg/dL Final   03/06/2018 32 (L) >39 mg/dL Final     LDL Cholesterol Calculated   Date Value Ref Range Status   06/18/2019 49 <100 mg/dL Final     Comment:     Desirable:       <100 mg/dl   03/06/2018 36 <100 mg/dL Final     Comment:     Desirable:       <100 mg/dl     Triglycerides   Date Value Ref Range Status   06/18/2019 289 (H) <150 mg/dL Final     Comment:     Borderline high:  150-199 mg/dl  High:             200-499 mg/dl  Very high:       >499 mg/dl     03/06/2018 166 (H) <150 mg/dL Final     Comment:     Borderline high:  150-199 mg/dl  High:             200-499 mg/dl  Very high:       >499 mg/dl       Cholesterol/HDL Ratio   Date Value Ref Range Status   09/09/2014 3.8 0.0 - 5.0 Final   11/20/2013 5.8 (H) 0.0 - 5.0 Final     A1C     8.5    12/4/2019  A1C     10.0  9/18/2019  A1C      9.2   6/18/2019  A1C      8.4   12/21/2018  A1C      7.4   9/7/2018  A1C      7.1   5/31/2018  A1C      9.1   3/6/2018  A1C      9.6   11/1/2017  A1C      8.9   8/9/2017  A1C      9.7   9/22/2016  A1C      9.7   7/21/2015  A1C     10.2  12/2/2014  A1C     10.2  9/9/2014  A1C      9.8  11/20/2013  A1C      9.3   6/12/2013  A1C      8.0   8/14/2012  A1C      8.2   5/22/2012  A1C      8.0   5/22/2012    ASSESSMENT/PLAN:     1. TYPE 2 DIABETES MELLITUS: Uncontrolled type 2 diabetes mellitus complicated by retinopathy, nephropathy, neuropathy and ED.  Will add Jardiance 10 mg each am today.  I reviewed how Jardiance works and possible side effects of the medication including dehydration, groin yeast infection, UTI, hypogylcemia, etc.  I asked him to be sure and take Novolog with ALL meals and if he does not eat a meal and his blood sugar is > 150 to take 1-4 units Novolog for correction.  No change in insulin doses.  Instructed him to check  his blood sugar fasting each am, prelunch, predinner and at bedtime DAILY.  He is to remain on Metformin 1000 mg BID.  Pt's most recent creat was 1.31 with GFR 55 mL/min on 6/26/2019.  Encouraged patient to make healthy food choices, reduce his food portions with meals, avoid snacking and walk daily and to take his insulin and medications as prescribed.  Pt remains on daily ASA.   Pt had the flu vaccine this season.  I ordered a creat/GFR to be done in 4 weeks prior to seeing me in clinic.    2. GOITER: Nontender goiter.  TSH normal in Nov 2017.    3. NEPHROPATHY: Discussed the need for good glycemic control and good BP control.   Pt's creat/GFR as above.   Pt's urine protein +.  He is taking Cozaar.  He is seen here by Nephrology.    4.  RETINOPATHY: Pt seen by Oph here today.   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    5.  NEUROPATHY: Continue Gabapentin.  No foot ulcers.    6. DYSLIPIDEMIA: LDL 49 in 75909.  Pt is taking Lipitor and Fenofibrate.    7. OBESITY: See under # 1 above.  He does not want to use a GLP-1.    8.   Return to Endocrine Clinic to see me in 4 weeks.

## 2019-12-04 NOTE — PROGRESS NOTES
I have confirmed the patient's and reviewed Past Medical History, Past Surgical History, Social History, Family History, Problem List, Medication List and agree with Tech note.    CC: decreased vision following CEIOL in OS    HPI:  pt of Dr. Eugene, s/p CEIOL left eye on 6/21/19. Has been having blurred vision since CEIOL. Reports that he had significant periorbital pain after the operation and has since had significant visual disturbance, blurred vision, distorted vision, diplopia, and eye pain. He is very concerned about his vision and would not like any more injections in his left eye at this time.     Self-DC'd all gtts about 5 days after surgery. Re-started on gtts about 3 weeks post-op. Since last visit has been taking prednisolone and ofloxacin 2-3 times per day    Avastin injections  Right Eye: 2/26/18, 4/10/18, 5/11/20218, 6/11/18 and last one here on 7/19/2018  Left Eye: 2/26/18, 4/10/18, 5/11/2018, 6/21/19        Assessment/plan:   1. Severe NPDR left eyes with macular edema extra foveally in right eye, involving fovea in OS   - FA today shows extensive ischemia and capillary non perfusion   - macular edema left eye worsened since CEIOL On 6/21/19   - Blood pressure (<120/80) and blood glucose (HbA1c <7.0) control discussed with patient. Patient advised that failure to adequately control each may lead to vision loss. The patient expressed understanding.   - FAF extensive ischemia left eye; RTC for Avastin followed by PRP OS    2. Diabetic macular edema left eye   - Worsened since CEIOL on 6/21/19   - Has been on ketorolac and PF twice a day/tid since surgery and will stop when running out    - OCT (12/4/19): fovea involving edema worsened compared to 10/2019   - last injection 6/21/19 intra op Avastin   - Avastin left eye next week followed by PRP left eye and then OD     3. Pseudophakia left eye   - S/p CEIOL on 6/21/19    4. Moderate NPDR and Diabetic macular edema right eye   - temporal macular edema,  stable/reduced  as of 12/4/19; OCT 12/4/19 no fovea involving edema   - had been receiving injections , last injection 6/11/18 at outside clinic and 7/19/2018 here at [facility]    - recommend defer avastin today since there was not much of an effect    - consider light PRP given capillary non perfusion and ischemia    5. Cataract right eye   - s/p CEIOL left eye on 6/21/19, defer CEIOL in right eye    - was seen previously , has IOL calcs    6.  Binocular diplopia    - left sixth nerve palsy   - seen by Caesar Llanos MD and press on prism prescribed   - diplopia resolved with press on prism for now     schedule for laser left eye     Jaden Mariano MD  Vitreoretinal surgery fellow  [facility]    Attestation:  I have seen and examined the patient with Dr. Alex Mariano and agree with the findings in this note, as well as the interpretations of the diagnostic tests.      Jackie Nolasco MD PhD.  Professor & Chair

## 2019-12-06 ENCOUNTER — TELEPHONE (OUTPATIENT)
Dept: OPHTHALMOLOGY | Facility: CLINIC | Age: 69
End: 2019-12-06

## 2019-12-06 PROBLEM — H26.8 COMBINED FORM OF NONSENILE CATARACT OF RIGHT EYE: Status: ACTIVE | Noted: 2019-12-06

## 2019-12-09 NOTE — TELEPHONE ENCOUNTER
Patient is scheduled for surgery with Dr. Maki      Spoke or left message with: Earle    Date of Surgery: 12/30/2019    Location: [facility]    Informed patient they will need an adult  Yes    H&P: Scheduled with Dr. Hare    Additional imaging/appointments: Post-op 1/17/2020    Surgery packet: Will mail      Additional comments:

## 2019-12-13 DIAGNOSIS — M79.2 POLYNEUROPATHIC PAIN: ICD-10-CM

## 2019-12-13 RX ORDER — GABAPENTIN 300 MG/1
CAPSULE ORAL
Qty: 120 CAPSULE | Refills: 1 | Status: SHIPPED | OUTPATIENT
Start: 2019-12-13 | End: 2020-04-14

## 2019-12-13 NOTE — TELEPHONE ENCOUNTER
gabapentin (NEURONTIN) 300 MG capsule      Last Written Prescription Date:  8-16-19  Last Fill Quantity: 120,   # refills: 1  Last Office Visit : 8-16-19  Future Office visit:  none    Routing refill request to provider for review/approval because:  Not on protocol.      Kathleen M Doege RN

## 2019-12-18 ENCOUNTER — OFFICE VISIT (OUTPATIENT)
Dept: OPHTHALMOLOGY | Facility: CLINIC | Age: 69
End: 2019-12-18
Attending: OPHTHALMOLOGY
Payer: COMMERCIAL

## 2019-12-18 ENCOUNTER — TELEPHONE (OUTPATIENT)
Dept: ENDOCRINOLOGY | Facility: CLINIC | Age: 69
End: 2019-12-18

## 2019-12-18 DIAGNOSIS — Z79.4 TYPE 2 DIABETES MELLITUS WITH HYPERGLYCEMIA, WITH LONG-TERM CURRENT USE OF INSULIN (H): ICD-10-CM

## 2019-12-18 DIAGNOSIS — Z79.4 TYPE 2 DIABETES MELLITUS WITH HYPERGLYCEMIA, WITH LONG-TERM CURRENT USE OF INSULIN (H): Primary | ICD-10-CM

## 2019-12-18 DIAGNOSIS — E11.3411 SEVERE NONPROLIFERATIVE DIABETIC RETINOPATHY OF RIGHT EYE WITH MACULAR EDEMA ASSOCIATED WITH TYPE 2 DIABETES MELLITUS (H): Primary | ICD-10-CM

## 2019-12-18 DIAGNOSIS — E11.65 TYPE 2 DIABETES MELLITUS WITH HYPERGLYCEMIA, WITH LONG-TERM CURRENT USE OF INSULIN (H): Primary | ICD-10-CM

## 2019-12-18 DIAGNOSIS — E11.65 TYPE 2 DIABETES MELLITUS WITH HYPERGLYCEMIA, WITH LONG-TERM CURRENT USE OF INSULIN (H): ICD-10-CM

## 2019-12-18 DIAGNOSIS — R30.0 BURNING WITH URINATION: Primary | ICD-10-CM

## 2019-12-18 LAB
ALBUMIN UR-MCNC: 100 MG/DL
APPEARANCE UR: ABNORMAL
BILIRUB UR QL STRIP: NEGATIVE
COLOR UR AUTO: YELLOW
CREAT SERPL-MCNC: 1.83 MG/DL (ref 0.66–1.25)
CREAT UR-MCNC: 93 MG/DL
GFR SERPL CREATININE-BSD FRML MDRD: 36 ML/MIN/{1.73_M2}
GLUCOSE UR STRIP-MCNC: >499 MG/DL
HGB UR QL STRIP: ABNORMAL
KETONES UR STRIP-MCNC: NEGATIVE MG/DL
LEUKOCYTE ESTERASE UR QL STRIP: ABNORMAL
MICROALBUMIN UR-MCNC: 1240 MG/L
MICROALBUMIN/CREAT UR: 1331.9 MG/G CR (ref 0–17)
NITRATE UR QL: NEGATIVE
PH UR STRIP: 5 PH (ref 5–7)
RBC #/AREA URNS AUTO: 129 /HPF (ref 0–2)
SOURCE: ABNORMAL
SP GR UR STRIP: 1.02 (ref 1–1.03)
UROBILINOGEN UR STRIP-MCNC: 0 MG/DL (ref 0–2)
WBC #/AREA URNS AUTO: 125 /HPF (ref 0–5)
WBC CLUMPS #/AREA URNS HPF: PRESENT /HPF

## 2019-12-18 PROCEDURE — 67228 TREATMENT X10SV RETINOPATHY: CPT | Mod: LT,ZF | Performed by: OPHTHALMOLOGY

## 2019-12-18 PROCEDURE — 25000125 ZZHC RX 250: Mod: ZF | Performed by: OPHTHALMOLOGY

## 2019-12-18 PROCEDURE — 40000269 ZZH STATISTIC NO CHARGE FACILITY FEE: Mod: ZF

## 2019-12-18 RX ORDER — LIDOCAINE HYDROCHLORIDE 20 MG/ML
1 INJECTION, SOLUTION EPIDURAL; INFILTRATION; INTRACAUDAL; PERINEURAL ONCE
Status: COMPLETED | OUTPATIENT
Start: 2019-12-18 | End: 2019-12-18

## 2019-12-18 RX ADMIN — LIDOCAINE HYDROCHLORIDE 1 ML: 20 INJECTION, SOLUTION EPIDURAL; INFILTRATION; INTRACAUDAL; PERINEURAL at 10:48

## 2019-12-18 ASSESSMENT — CONF VISUAL FIELD
OS_NORMAL: 1
OD_NORMAL: 1

## 2019-12-18 ASSESSMENT — REFRACTION_WEARINGRX
OD_ADD: +2.50
OS_AXIS: 033
OS_CYLINDER: +0.75
OS_ADD: +2.50
OD_SPHERE: -2.50
OD_CYLINDER: +1.50
SPECS_TYPE: PAL
OS_SPHERE: -2.50
OD_AXIS: 157

## 2019-12-18 ASSESSMENT — VISUAL ACUITY
CORRECTION_TYPE: GLASSES
OD_PH_CC+: -1
OS_PH_CC+: -1
METHOD: SNELLEN - LINEAR
OD_CC: 20/50
OS_CC: 20/60
OS_PH_CC: 20/50
OD_PH_CC: 20/40
OD_CC+: -2
OS_CC+: -1

## 2019-12-18 ASSESSMENT — TONOMETRY
IOP_METHOD: TONOPEN
OD_IOP_MMHG: 16
OS_IOP_MMHG: 12

## 2019-12-18 NOTE — PROGRESS NOTES
PRP left eye only visit    Very severe NPDR OS    Plan  PRP left eye today and plan for Avastin left eye next week        Jaden Mariano MD  Vitreoretinal surgery fellow  [facility]    Attestation:  I have seen and examined the patient with Dr. Alex Mariano and agree with the findings in this note, as well as the interpretations of the diagnostic tests. I was present for procedure.       Jackie Nolasco MD PhD.  Professor & Chair

## 2019-12-18 NOTE — TELEPHONE ENCOUNTER
----- Message from Pamela Laura PA-C sent at 12/18/2019  2:13 PM CST -----  Could you please have the lab run a UA/UCX on the urine specimen I ordered this am.  I accidentally ordered a urine microalbuminuria.  Thanks rakan

## 2019-12-18 NOTE — NURSING NOTE
Chief Complaint(s) and History of Present Illness(es)     Diabetic Retinopathy Follow Up     In both eyes.  Associated symptoms include Negative for dryness, eye pain, redness and tearing.  Pain was noted as 0/10.              Comments     2 week f/u for Severe NPDR left eyes with macular edema extra foveally in right eye, involving fovea in left eye. Plan today is to have an injection and PRP laser LE. Overall, vision seems about the same, maybe a little improved. Pt also notes the redness in the eyes seem to have gotten better since last visit as well.    Ocular meds: Jen at bedtime AURY Hawkinsong, COMT 9:23 AM December 18, 2019

## 2019-12-19 ENCOUNTER — TELEPHONE (OUTPATIENT)
Dept: ENDOCRINOLOGY | Facility: CLINIC | Age: 69
End: 2019-12-19

## 2019-12-19 NOTE — TELEPHONE ENCOUNTER
----- Message from Pamela Laura PA-C sent at 12/19/2019  9:03 AM CST -----  Regarding: RE: UA is back  They are running a culture.    He needs to increase his water intake and I'll let him know the UCX results once it's back.  Thanks rakan   ----- Message -----  From: Delphine Cooper RN  Sent: 12/18/2019   5:20 PM CST  To: Pamela Laura PA-C  Subject: UA is back                                       What should I tell him   ----- Message -----  From: Pamela Laura PA-C  Sent: 12/18/2019   1:13 PM CST  To: Delphine Cooper RN    Please call pt and let him know his creat is higher.  He needs to drink more water and I will recheck his creat in 2 weeks.  Urine results pending.  Thanks rakan

## 2019-12-19 NOTE — TELEPHONE ENCOUNTER
Called and talked with patient about rescheduling a post-op appointment with Dr. Milton Maki. Appointment was rescheduled to 1/14/20 at 9am    [phone]

## 2019-12-19 NOTE — TELEPHONE ENCOUNTER
Edgard was notified that  we are  waiting for the  Urine culture  to come back. I notified him that the creatinine is high  and he needs to  drink more water and repeat creatinine level in 2 weeks. He was reporting burning with urination after starting Jardiance. We will update him once the culture is back. Delphine Cooper RN on 12/19/2019 at 12:38 PM

## 2019-12-20 ENCOUNTER — TELEPHONE (OUTPATIENT)
Dept: ENDOCRINOLOGY | Facility: CLINIC | Age: 69
End: 2019-12-20

## 2019-12-20 DIAGNOSIS — Z79.4 TYPE 2 DIABETES MELLITUS WITH HYPERGLYCEMIA, WITH LONG-TERM CURRENT USE OF INSULIN (H): Primary | ICD-10-CM

## 2019-12-20 DIAGNOSIS — E11.65 TYPE 2 DIABETES MELLITUS WITH HYPERGLYCEMIA, WITH LONG-TERM CURRENT USE OF INSULIN (H): Primary | ICD-10-CM

## 2019-12-20 LAB
BACTERIA SPEC CULT: ABNORMAL
SPECIMEN SOURCE: ABNORMAL

## 2019-12-20 RX ORDER — CIPROFLOXACIN 500 MG/1
TABLET, FILM COATED ORAL
Qty: 7 TABLET | Refills: 0 | Status: SHIPPED | OUTPATIENT
Start: 2019-12-20 | End: 2019-12-24

## 2019-12-20 NOTE — TELEPHONE ENCOUNTER
----- Message from Pamela Laura PA-C sent at 12/20/2019  1:59 PM CST -----  Could you please call pt and let him know he has a UTI and I would like him to take Cipro 500 mg daily for 7 days.  If his symptoms do not resolve he should call.  Thanks rakan laura

## 2019-12-20 NOTE — TELEPHONE ENCOUNTER
Left detailed message on Pts vm with review and recommendations from Pamela Laura.   Lorena Bravo RN on 12/20/2019 at 3:51 PM

## 2019-12-23 PROBLEM — E87.1 HYPONATREMIA: Status: ACTIVE | Noted: 2017-06-27

## 2019-12-23 PROBLEM — K11.20 SIALOADENITIS: Status: ACTIVE | Noted: 2017-06-27

## 2019-12-23 PROBLEM — E11.3311: Status: ACTIVE | Noted: 2018-05-11

## 2019-12-23 NOTE — TELEPHONE ENCOUNTER
FUTURE VISIT INFORMATION      SURGERY INFORMATION:    Date: 19    Location: [facility] OR    Surgeon:  Milton Maki    Anesthesia Type:   Combined MAC with Retrobulbar    RECORDS REQUESTED FROM:       [facility]    Most recent EKG+ Tracin19    Most recent ECHO: 16    Most recent Cardiac Stress Test: 16    Most recent PFT's: 11/18/10

## 2019-12-24 ENCOUNTER — ANESTHESIA EVENT (OUTPATIENT)
Dept: SURGERY | Facility: AMBULATORY SURGERY CENTER | Age: 69
End: 2019-12-24

## 2019-12-24 ENCOUNTER — OFFICE VISIT (OUTPATIENT)
Dept: SURGERY | Facility: CLINIC | Age: 69
End: 2019-12-24
Payer: COMMERCIAL

## 2019-12-24 ENCOUNTER — OFFICE VISIT (OUTPATIENT)
Dept: OPHTHALMOLOGY | Facility: CLINIC | Age: 69
End: 2019-12-24
Attending: OPHTHALMOLOGY
Payer: COMMERCIAL

## 2019-12-24 ENCOUNTER — PRE VISIT (OUTPATIENT)
Dept: SURGERY | Facility: CLINIC | Age: 69
End: 2019-12-24

## 2019-12-24 VITALS
DIASTOLIC BLOOD PRESSURE: 74 MMHG | HEART RATE: 83 BPM | SYSTOLIC BLOOD PRESSURE: 115 MMHG | WEIGHT: 204 LBS | HEIGHT: 68 IN | BODY MASS INDEX: 30.92 KG/M2 | TEMPERATURE: 97.7 F | OXYGEN SATURATION: 99 % | RESPIRATION RATE: 14 BRPM

## 2019-12-24 DIAGNOSIS — E11.3411 SEVERE NONPROLIFERATIVE DIABETIC RETINOPATHY OF RIGHT EYE WITH MACULAR EDEMA ASSOCIATED WITH TYPE 2 DIABETES MELLITUS (H): Primary | ICD-10-CM

## 2019-12-24 DIAGNOSIS — Z01.818 PRE-OP EXAMINATION: Primary | ICD-10-CM

## 2019-12-24 PROCEDURE — 67028 INJECTION EYE DRUG: CPT | Mod: LT,ZF | Performed by: OPHTHALMOLOGY

## 2019-12-24 PROCEDURE — 40000269 ZZH STATISTIC NO CHARGE FACILITY FEE: Mod: ZF

## 2019-12-24 PROCEDURE — C9257 BEVACIZUMAB INJECTION: HCPCS | Mod: ZF | Performed by: OPHTHALMOLOGY

## 2019-12-24 PROCEDURE — 25000128 H RX IP 250 OP 636: Mod: ZF | Performed by: OPHTHALMOLOGY

## 2019-12-24 RX ADMIN — Medication 1.25 MG: at 10:00

## 2019-12-24 ASSESSMENT — VISUAL ACUITY
METHOD: SNELLEN - LINEAR
OD_CC+: -1
CORRECTION_TYPE: GLASSES
OD_CC: 20/40
OS_SC+: -2
OS_SC: 20/60

## 2019-12-24 ASSESSMENT — REFRACTION_WEARINGRX
OD_CYLINDER: +1.50
OS_ADD: +2.50
OS_CYLINDER: +0.75
OD_SPHERE: -2.50
OS_AXIS: 033
OD_ADD: +2.50
OS_SPHERE: -2.50
OD_AXIS: 157
SPECS_TYPE: PAL

## 2019-12-24 ASSESSMENT — PAIN SCALES - GENERAL: PAINLEVEL: NO PAIN (0)

## 2019-12-24 ASSESSMENT — CONF VISUAL FIELD
METHOD: COUNTING FINGERS
OD_NORMAL: 1
OS_NORMAL: 1

## 2019-12-24 ASSESSMENT — TONOMETRY
OD_IOP_MMHG: 19
IOP_METHOD: TONOPEN
OS_IOP_MMHG: 15

## 2019-12-24 ASSESSMENT — ENCOUNTER SYMPTOMS: SEIZURES: 0

## 2019-12-24 ASSESSMENT — MIFFLIN-ST. JEOR: SCORE: 1659.84

## 2019-12-24 ASSESSMENT — LIFESTYLE VARIABLES: TOBACCO_USE: 0

## 2019-12-24 NOTE — ANESTHESIA PREPROCEDURE EVALUATION
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 [H26.8]   Procedure(s):  PHACOEMULSIFICATION, CATARACT, WITH INTRAOCULAR LENS IMPLANT  INTRAVITREAL Bevacizumab injection     Past Medical History:   Diagnosis Date     Blepharitis of both eyes      BPH (benign prostatic hyperplasia)      Diabetes (H)      Diabetic neuropathy (H)      Diabetic retinopathy associated with diabetes mellitus due to underlying condition (H)      Dry eye syndrome      Goiter      Granulomatous disease (H)      Nonsenile cataract      Peripheral neuropathy       Past Surgical History:   Procedure Laterality Date     AS RAD RESEC TONSIL/PILLARS Bilateral      CATARACT IOL, RT/LT Left      COLONOSCOPY  2013    Procedure: COLONOSCOPY;;  Surgeon: Montana Pascal MD;  Location: [facility]     EXCISE MASS UPPER EXTREMITY Right 2019    Procedure: EXCISION, MASS, UPPER EXTREMITY, RIGHT SHOULDER;  Surgeon: oJhana Choudhury MD;  Location: [facility]     PHACOEMULSIFICATION WITH STANDARD INTRAOCULAR LENS IMPLANT Left 2019    Procedure: Left Eye Cataract Removal with Intraocular Lens Implant with Intraoperative Avastin Injection;  Surgeon: Lacey Eugene MD;  Location: [facility]     siladenatis  2017     SURGICAL PATHOLOGY EXAM            Anesthesia Evaluation     . Pt has had prior anesthetic. Type: General and MAC    No history of anesthetic complications          ROS/MED HX    ENT/Pulmonary: Comment: Patient reports in the past he had a constant cough and was diagnosed with sarcoidosis but had normal PFTs in  and denies any respiratory symptoms currently.      (-) tobacco use   Neurologic:     (+)neuropathy - feet. ,    (-) seizures, CVA and TIA   Cardiovascular:     (+) Dyslipidemia, hypertension----. : . . . :. . Previous cardiac testing  Echodate:1/14/16results:Interpretation Summary  Global and regional left ventricular function is normal with an EF of 55-60%  (calculated, 55%).  Global right ventricular function is normal.  Pulmonary artery systolic pressure is normal.  The inferior vena cava is normal. The estimated mean right atrial pressure is  <3 mmHg.  No pericardial effusion is present.  Previous study not available for comparison.  Stress Testdate:1/27/16 results:IMPRESSION:  1. Normal myocardial SPECT study with a summed stress score of 1  within the technical limitation noted above. A summed stress score of  <4 is associated with an annual event rate of 0.9% and 0.8% for  myocardial infarction and cardiac death, respectively in patients  undergoing adenosine exercise stress test (Estuardo. Circulation  1998;98:535-43).  2. No significant perfusion abnormalities.  3. Normal left ventricular systolic function without regional wall  motion abnormalities as described above.  4. No prior study available for comparison.  ECG reviewed date:6/18/19 results:Sinus rhythm, possible inferior infarct age undetermined date: results:          METS/Exercise Tolerance:  4 - Raking leaves, gardening   Hematologic:        (-) history of blood clots, anemia and History of Transfusion   Musculoskeletal:   (+)  other musculoskeletal- heel pain       GI/Hepatic:     (+) GERD Asymptomatic on medication,       Renal/Genitourinary: Comment: UTI     (+) chronic renal disease, type: CRI, Nephrolithiasis  (history of x2 ), Pt does not require dialysis, Pt has no history of transplant, BPH,       Endo:     (+) type II DM Last HgA1c: 8.5 date: 12/18/19 Using insulin - not using insulin pump Diabetic complications: nephropathy neuropathy retinopathy, thyroid problem (Goiter) Obesity, .      Psychiatric:  - neg psychiatric ROS       Infectious Disease:  - neg infectious disease ROS       Malignancy:      - no malignancy   Other:    (+) no H/O Chronic Pain,no other  significant disability                        PHYSICAL EXAM:   Mental Status/Neuro: A/A/O; Age Appropriate   Airway: Facies: Feasible  Mallampati: II  Mouth/Opening: Full  TM distance: > 6 cm  Neck ROM: Limited   Respiratory: Auscultation: CTAB     Resp. Rate: Normal     Resp. Effort: Normal      CV: Rhythm: Regular  Heart: Normal Sounds  Edema: None  Pulses: Normal   Comments: Thick and long beard                     LABS:  CBC:   Lab Results   Component Value Date    WBC 4.6 06/18/2019    WBC 5.2 03/13/2019    HGB 13.5 06/18/2019    HGB 13.7 03/13/2019    HCT 38.7 (L) 06/18/2019    HCT 40.5 03/13/2019     (L) 06/18/2019     (L) 03/13/2019     BMP:   Lab Results   Component Value Date     06/26/2019     06/18/2019    POTASSIUM 4.3 06/26/2019    POTASSIUM 4.3 06/18/2019    CHLORIDE 110 (H) 06/26/2019    CHLORIDE 106 06/18/2019    CO2 23 06/26/2019    CO2 22 06/18/2019    BUN 19 06/26/2019    BUN 27 06/18/2019    CR 1.83 (H) 12/18/2019    CR 1.31 (H) 06/26/2019     (H) 06/26/2019     (H) 06/18/2019     COAGS:   Lab Results   Component Value Date    PTT 29 06/12/2013    INR 0.93 06/12/2013     POC:   Lab Results   Component Value Date     (H) 11/11/2019     OTHER:   Lab Results   Component Value Date    A1C 9.2 (H) 06/18/2019    INDERJIT 9.0 06/26/2019    PHOS 3.4 03/13/2019    MAG 1.9 07/02/2019    ALBUMIN 3.8 06/18/2019    PROTTOTAL 7.0 06/18/2019    ALT 38 06/18/2019    AST 24 06/18/2019    ALKPHOS 63 06/18/2019    BILITOTAL 0.6 06/18/2019    LIPASE 179 05/22/2012    AMYLASE 85 05/22/2012    TSH 1.12 11/01/2017    T4 1.00 10/21/2014    CRP <5.0 11/15/2010    SED 11 11/15/2010        Preop Vitals    BP Readings from Last 3 Encounters:   12/04/19 132/81   11/11/19 133/65   11/06/19 128/81    Pulse Readings from Last 3 Encounters:   12/04/19 77   11/11/19 83   11/06/19 77      Resp Readings from Last 3 Encounters:   11/11/19 16   09/24/19 16   09/24/19 16    SpO2 Readings from  "Last 3 Encounters:   11/11/19 99%   07/30/19 97%   07/03/19 98%      Temp Readings from Last 1 Encounters:   11/11/19 97.6  F (36.4  C) (Temporal)    Ht Readings from Last 1 Encounters:   12/04/19 1.727 m (5' 8\")      Wt Readings from Last 1 Encounters:   12/04/19 92.9 kg (204 lb 11.2 oz)    Estimated body mass index is 31.12 kg/m  as calculated from the following:    Height as of 12/4/19: 1.727 m (5' 8\").    Weight as of 12/4/19: 92.9 kg (204 lb 11.2 oz).     LDA:        Assessment:   ASA SCORE: 3    H&P: History and physical reviewed and following examination; no interval change.   Smoking Status:  Non-Smoker/Unknown   NPO Status: NPO Appropriate     Plan:   Anes. Type:  MAC   Pre-Medication: None   Induction:  N/a   Airway: Native Airway   Access/Monitoring: PIV   Maintenance: N/a     Postop Plan:   Postop Pain: None  Postop Sedation/Airway: Not planned     PONV Management:   Adult Risk Factors:, Non-Smoker   Prevention: Ondansetron     CONSENT: Direct conversation   Plan and risks discussed with: Patient   Blood Products: Consent Deferred (Minimal Blood Loss)              Current Outpatient Medications   Medication     ARTIFICIAL TEAR OP     atorvastatin (LIPITOR) 20 MG tablet     blood glucose (NO BRAND SPECIFIED) lancets standard     blood glucose monitoring (NO BRAND SPECIFIED) meter device kit     blood glucose monitoring (NO BRAND SPECIFIED) test strip     clotrimazole (LOTRIMIN) 1 % cream     Continuous Blood Gluc  (FREESTYLE PETER READER) JUANCARLOS     Continuous Blood Gluc Sensor (FREESTYLE PETER 14 DAY SENSOR) MISC     empagliflozin (JARDIANCE) 10 MG TABS tablet     fenofibrate 54 MG tablet     finasteride (PROSCAR) 5 MG tablet     gabapentin (NEURONTIN) 300 MG capsule     insulin degludec (TRESIBA FLEXTOUCH) 100 UNIT/ML pen     insulin pen needle (B-D U/F) 31G X 5 MM     insulin syringe 31G X 5/16\" 0.5 ML MISC     ketamine 5% gabapentin 8% lidocaine 2.5% topical PLO cream     ketorolac tromethamine " (ACULAR-LS) 0.4 % SOLN ophthalmic solution     loratadine (CLARITIN) 10 MG tablet     losartan (COZAAR) 100 MG tablet     metFORMIN (GLUCOPHAGE) 1000 MG tablet     mupirocin (BACTROBAN) 2 % cream     NOVOLOG FLEXPEN 100 UNIT/ML soln     ofloxacin (OCUFLOX) 0.3 % ophthalmic solution     Omega-3 Fatty Acids (OMEGA-3 FISH OIL) 1000 MG CAPS     ONETOUCH ULTRA test strip     prednisoLONE acetate (PRED FORTE) 1 % ophthalmic suspension     sodium bicarbonate 650 MG tablet     tamsulosin (FLOMAX) 0.4 MG capsule     triamcinolone (KENALOG) 0.1 % cream     vitamin D3 (CHOLECALCIFEROL) 1000 units (25 mcg) tablet     albuterol (VENTOLIN HFA) 108 (90 Base) MCG/ACT inhaler     aspirin 81 MG tablet     Blood Pressure Monitor KIT     clobetasol (TEMOVATE) 0.05 % ointment     fluocinonide (LIDEX) 0.05 % external cream     Current Facility-Administered Medications   Medication     acetaminophen (TYLENOL) tablet 975 mg     bevacizumab (AVASTIN) intravitreal inj 1.25 mg     lactated ringers infusion     lidocaine (LMX4) kit     lidocaine 2%(PF)-9 mL, hyaluronidase (HYLENEX) 150 units     lidocaine BUFFERED 1 % injection 0.1-1 mL     povidone-iodine (BETADINE) 5 % ophthalmic solution 1 drop     sodium chloride (PF) 0.9% PF flush 3 mL     tropicamide 1 %-cyclopentolate 1 %-phenylephrine 2.5% (PEDIATRIC OPHTHALMIC DILATING SOLUTION) 1-1-2.5 % ophthalmic solution 1 drop          PAC Discussion and Assessment    ASA Classification: 3  Case is suitable for: ASC  Anesthetic techniques and relevant risks discussed: MAC with GA as backup  Invasive monitoring and risk discussed:   Types:   Possibility and Risk of blood transfusion discussed:   NPO instructions given:   Additional anesthetic preparation and risks discussed:   Needs early admission to pre-op area:   Other:     PAC Resident/NP Anesthesia Assessment:  Earle is a 69 year old man who is scheduled for PHACOEMULSIFICATION, CATARACT, WITH INTRAOCULAR LENS IMPLANT, INTRAVITREAL  Bevacizumab injection on 12/30/19 by Dr. Maki in treatment of non senile cataracts.  PAC referral for risk assessment and optimization for anesthesia with comorbid conditions of HTN, HLD, sarcoidosis, neuropathy, type 2 diabetes, goiter, obesity, GERD, UTI, CKD stage 2, BPH/ED, history of stones, pain:    Pre-operative considerations:  1.  Cardiac:  Functional status- METS.  Low risk surgery with 0.9% (RCRI #) risk of major adverse cardiac event. The patient had stress test and echo in 2016 which were normal. He had normal GARCIA and was seen by cardiologist, Dr. Colvin on 9/24/19 with as needed follow up. The patient had EKG on 6/18/19 with sinus rhythm, possible inferior infarct age undetermined. He has good METS with walking 1 mile without symptoms and low risk surgery. No further cardiac testing indicated.   ~ HTN - the patient will hold cozaar DOS.   ~ HLD - the patient can continue Lipitor, fenofibrate, fish oil and ASA 81 mg.     2.  Pulm:  Airway feasible.  YEN risk: Intermediate (age, male, HTN, BMI)   ~ Sarcoidosis - The patient reports he was diagnosed years ago after having a chronic cough but he had normal PFTs in 2010 and currently denies any symptoms. He hasn't needed his albuterol inhaler for many months. Continue Claritin.     3. ENT: Cataract - s/p cataract of left eye and now going to have surgery on right eye     4. Neuro: neuropathy in feet - He is working with chiropractor and physical therapy. Continue gabapentin     5. Endo: Obesity, BMI 31 - consideration for safe lifting techniques.   ~ Type 2 diabetes - uncontrolled but being closely followed by endocrinology. Last seen by Dr. Laura on 12/4/19 and A1c was 8.5 on 12/18/19. He will hold Jardiance and metformin DOS. He will hold short acting insulin and take 80% long acting insulin.   ~ goiter - followed by endocrinology and non tender.     6. GI:  Risk of PONV score = 1.  If > 2, anti-emetic intervention recommended.  ~ GERD - He controls  with diet and reports he only takes Zantac as needed.     7. : Current UTI - the patient's urine was checked by Dr. Laura, please see telephone encounter on 12/20/19 and was prescribed Cipro for 7 days. He did not know about message left and has yet to start prescription. He was advised to pick this up and start mediation.   ~ BPH/ED - the patient will continue Proscar and Flomax.   ~ CKD stage 2 - Creatinine was 0.31 on 6/26/19. Consideration for close monitoring of fluid status and avoid nephrotoxins.     8. Skin: The patient has itching of his skin. He is seen by dermatology and had several topical creams but is unsure which exact ones they are. No signs of cellulitis today.     VTE risk: 1.8%    Patient is optimized and is acceptable candidate for the proposed procedure.  No further diagnostic evaluation is needed.     The patient was discussed with Dr. Cervantes.     For further details of assessment, testing, and physical exam please see H and P completed on same date.    Bea Ventura PA-C        Mid-Level Provider/Resident: Bea Ventura PA-C  Date: 12/24/19  Time:     Attending Anesthesiologist Anesthesia Assessment:        Anesthesiologist:   Date:   Time:   Pass/Fail:   Disposition:     PAC Pharmacist Assessment:        Pharmacist:   Date:   Time:    Bea Ventura PA-C

## 2019-12-24 NOTE — PATIENT INSTRUCTIONS
Preparing for Your Surgery      Name:  Earle Rene   MRN:  [id number]   :  1950   Today's Date:  2019     Arriving for surgery:  Surgery date:  19  Arrival time:  8:45 am    Please come to:     [facility]  [address] [place] [id number]     Parking is available in front of the Clinics and Surgery Center building from 5:30AM to 8:00PM.  -  Proceed to the 5th floor to check into the Ambulatory Surgery Center.              >> There will be patient concierges on the 1st and 5th floor, for assistance or an escort, if you would like.              >> Please call [phone] with any questions.    -  Bring your ID and insurance card.    - If you are scheduled to go home the Same Day as surgery you must have a responsible adult as a  and to stay with you overnight the first 24 hours after surgery.     What can I eat or drink?  -  You may have solid food or milk products until 8 hours prior to your surgery. (Until 2 am )  -  You may have water, apple juice or 7up/Sprite until 2 hours prior to your surgery. (Until 8 am )    Which medicines can I take?       -  Do not bring your own medications to the hospital, except for inhalers and eye drops.        -  Follow Ophthalmology Clinic instructions regarding Ibuprofen. If no instructions given, NO Ibuprofen the day prior to surgery.             -  Hold Naproxen (Aleve) 2 days prior to surgery.        -  Hold Omega 3 Fatty Acids (Fish Oil) starting now.        -  May continue Aspirin prior to surgery.         -  The evening prior to surgery, decrease Degludec (Tresiba) insulin to 48 units.    -  Do NOT take these medications in the morning, the day of surgery:  Jardiance, Metformin, Novolog insulin, Sodium Bicarbonate, Vitamin D3,   No creams, ointments, gels, or lotions on the skin.    -  Please take these medications the morning of surgery:  Finasteride (Proscar), Gabapentin, Tamsulosin (Flomax),   Albuterol  inhaler if needed  Artificial tears if needed  Acetaminophen (Tylenol) if needed    How do I prepare myself?  -  Take two showers: one the night before surgery; and one the morning of surgery.         Use Scrubcare or Hibiclens to wash from neck down.  You may use your own shampoo and conditioner. No other hair products.   -  Do NOT use lotion, powder, colognes, deodorant, or antiperspirant the day of your surgery.  -  Do NOT wear any jewelry.    Questions or Concerns:  If your surgery is at the Ambulatory Surgery Center, please call [phone]. (Mon - Fri 8 am- 3:30 pm)  Questions about surgery, contact your Surgeons office.  If you have any health changes prior to surgery, please notify your Surgeon.    AFTER YOUR SURGERY  Breathing exercises   Breathing exercises help you recover faster. Take deep breaths and let the air out slowly. This will:     Help you wake up after surgery.    Help prevent complications like pneumonia.  Preventing complications will help you go home sooner.   Nausea and vomiting   You may feel sick to your stomach after surgery; if so, let your nurse know.    Pain control:  After surgery, you may have pain. Our goal is to help you manage your pain. Pain medicine will help you feel comfortable enough to do activities that will help you heal.  These activities may include breathing exercises, walking and physical therapy.   To help your health care team treat your pain we will ask: 1) If you have pain  2) where it is located 3) describe your pain in your words  Methods of pain control include medications given by mouth, vein or by nerve block for some surgeries.

## 2019-12-24 NOTE — NURSING NOTE
Chief Complaints and History of Present Illnesses   Patient presents with     Diabetic Retinopathy Follow Up     Injection only LE today.     Chief Complaint(s) and History of Present Illness(es)     Diabetic Retinopathy Follow Up     Comments: Injection only LE today.              Comments     Pt states vision is the same as last visit. Redness in both eyes today.  Achyness around both eyes today, tenderness to touch.  DM2 BS:225 currently.  Lab Results       Component                Value               Date                       A1C                      9.2                 06/18/2019                 A1C                      9.1                 03/06/2018                 A1C                      10.2                06/06/2017                 A1C                      9.0                 03/16/2016                 A1C                      10.2                09/09/2014              LANE Self  December 24, 2019 9:38 AM

## 2019-12-24 NOTE — H&P
Pre-Operative H & P     CC:  Preoperative exam to assess for increased cardiopulmonary risk while undergoing surgery and anesthesia.    Date of Encounter: 12/24/2019  Primary Care Physician:  Marcio Hare     Reason for visit: pre operative examination, non senile cataract, right eye    HPI  Earle Rene is a 69 year old male who presents for pre-operative H & P in preparation for PHACOEMULSIFICATION, CATARACT, WITH INTRAOCULAR LENS IMPLANT, INTRAVITREAL Bevacizumab injection with Dr. Maki on 12/30/19 at [facility].     The patient is a 70 year old man who has a history of diabetic retinopathy, macular edema, blepharitis, dry eyes and cataracts. He had cataract removal on 6/21/19 of his left eye. He continued to have redness, pain and complained of vision issues in his left eye when seen in the clinic on 12/4/19. He underwent PRP of the left eye on 12/18/19 and had avastin injection this morning. He is now scheduled for surgery on the right eye as above.      History is obtained from the patient and chart review    Past Medical History  Past Medical History:   Diagnosis Date     Blepharitis of both eyes      BPH (benign prostatic hyperplasia)      Diabetes (H)      Diabetic neuropathy (H)      Diabetic retinopathy associated with diabetes mellitus due to underlying condition (H)      Dry eye syndrome      Goiter      Granulomatous disease (H)      Nonsenile cataract      Peripheral neuropathy        Past Surgical History  Past Surgical History:   Procedure Laterality Date     AS RAD RESEC TONSIL/PILLARS Bilateral 1961     CATARACT IOL, RT/LT Left      COLONOSCOPY  7/29/2013    Procedure: COLONOSCOPY;;  Surgeon: Montana Pascal MD;  Location: [facility]     EXCISE MASS UPPER EXTREMITY Right 11/11/2019    Procedure: EXCISION, MASS, UPPER EXTREMITY, RIGHT SHOULDER;  Surgeon: Johana Choudhury MD;  Location: [facility]     PHACOEMULSIFICATION WITH STANDARD INTRAOCULAR LENS IMPLANT Left  6/21/2019    Procedure: Left Eye Cataract Removal with Intraocular Lens Implant with Intraoperative Avastin Injection;  Surgeon: Lacey Eugene MD;  Location:  OR     [facility]– [place]  11/2017     SURGICAL PATHOLOGY EXAM         Hx of Blood transfusions/reactions: none     Hx of abnormal bleeding or anti-platelet use: ASA 81 mg - continue    Menstrual history: No LMP for male patient.:     Steroid use in the last year: none    Personal or FH with difficulty with Anesthesia:  denies    Prior to Admission Medications  Current Outpatient Medications   Medication Sig Dispense Refill     albuterol (VENTOLIN HFA) 108 (90 Base) MCG/ACT inhaler Inhale 2 puffs into the lungs every 6 hours 18 g 3     ARTIFICIAL TEAR OP Apply to eye as needed       aspirin 81 MG tablet Take 1 tablet by mouth daily.       atorvastatin (LIPITOR) 20 MG tablet Take 1 tablet (20 mg) by mouth daily 90 tablet 3     blood glucose (NO BRAND SPECIFIED) lancets standard Lancets that go with device, Test 3 times daily 300 each 3     blood glucose monitoring (NO BRAND SPECIFIED) meter device kit Any meter covered by insurance, not store brand, use as directed. 1 kit 0     blood glucose monitoring (NO BRAND SPECIFIED) test strip Strips that go with meter, covered by insurance. Test 3 times daily 300 strip 3     Blood Pressure Monitor KIT Automatic Blood Pressure Monitor 1 kit 0     clobetasol (TEMOVATE) 0.05 % ointment Apply topically to legs twice daily for 2 weeks.  Then discontinue 30 g 0     clotrimazole (LOTRIMIN) 1 % cream Apply topically 2 times daily 30 g 3     Continuous Blood Gluc  (FREESTYLE PETER READER) JUANCARLOS 1 Device daily 1 Device 0     Continuous Blood Gluc Sensor (FREESTYLE PETER 14 DAY SENSOR) MISC 1 applicator every 14 days 8 each 3     empagliflozin (JARDIANCE) 10 MG TABS tablet Take 1 tablet (10 mg) by mouth daily 90 tablet 1     erythromycin (ROMYCIN) 5 MG/GM ophthalmic ointment Place 0.5 inches into both eyes At Bedtime 2 Tube  "3     erythromycin (ROMYCIN) 5 MG/GM ophthalmic ointment Place 0.5 inches Into the left eye 3 times daily 1 g 0     fenofibrate 54 MG tablet Take 1 tablet (54 mg) by mouth daily 90 tablet 0     finasteride (PROSCAR) 5 MG tablet Take 1 tablet (5 mg) by mouth daily 90 tablet 2     fluocinonide (LIDEX) 0.05 % external cream APPLY TO AFFECTED AREA TWICE A DAY  3     gabapentin (NEURONTIN) 300 MG capsule TAKE 1 CAPSULE BY MOUTH TWICE A  capsule 1     insulin degludec (TRESIBA FLEXTOUCH) 100 UNIT/ML pen Inject 60 units subcutaneous at bedtime. 60 mL 3     insulin pen needle (B-D U/F) 31G X 5 MM Use 4 times per day.  Please dispense as BD Pen Needle Mini U/F 31G x 5  each 3     insulin syringe 31G X 5/16\" 0.5 ML MISC Use three syringes daily 270 each 1     ketamine 5% gabapentin 8% lidocaine 2.5% topical PLO cream Apply 1 g topically 3 times daily 30 g 3     ketorolac (ACULAR) 0.5 % ophthalmic solution Place 1 drop Into the left eye 4 times daily 1 Bottle 1     ketorolac (ACULAR) 0.5 % ophthalmic solution 1 drop in surgical eye as directed - start 1 week after surgery, 4x daily until follow-up visit 1 Bottle 1     loratadine (CLARITIN) 10 MG tablet Take 1 tablet (10 mg) by mouth daily 90 tablet 0     losartan (COZAAR) 100 MG tablet Take 1 tablet (100 mg) by mouth At Bedtime 30 tablet 11     metFORMIN (GLUCOPHAGE) 1000 MG tablet 1 tab twice daily with meals 180 tablet 3     moxifloxacin (VIGAMOX) 0.5 % ophthalmic solution Place 1 drop Into the left eye every hour (while awake) 1 Bottle 3     mupirocin (BACTROBAN) 2 % cream Apply  topically. In very small amounts only as needed 15 g 1     neomycin-polymyxin-dexamethasone (MAXITROL) 3.5-06520-9.1 ophthalmic ointment Place 0.5 inches Into the left eye At Bedtime 1 Tube 0     NOVOLOG FLEXPEN 100 UNIT/ML soln Inject 12-14 units with meals, plus correction. Pt uses approx 65 units in 24 hrs. 60 mL 3     ofloxacin (OCUFLOX) 0.3 % ophthalmic solution 1 drop 4x daily in " the surgical eye for 1 week after surgery, then stop 1 Bottle 0     Omega-3 Fatty Acids (OMEGA-3 FISH OIL) 1000 MG CAPS Take 1 capsule (1 g) by mouth 2 times daily 60 capsule 11     ONETOUCH ULTRA test strip Use to test blood sugar 3 times daily 300 strip 3     prednisoLONE acetate (PRED FORTE) 1 % ophthalmic suspension Place 1 drop Into the left eye 6 times daily 1 Bottle 3     prednisoLONE acetate (PRED FORTE) 1 % ophthalmic suspension Place 1 drop Into the left eye every hour (while awake) 1 Bottle 3     prednisoLONE acetate (PRED FORTE) 1 % ophthalmic suspension 1 drop in surgical eye as directed, 4x daily after surgery until follow-up 1 Bottle 1     ranitidine (ZANTAC) 300 MG tablet Take 1 tablet (300 mg) by mouth At Bedtime 90 tablet 3     senna-docusate (SENOKOT-S/PERICOLACE) 8.6-50 MG tablet Take 1 tablet by mouth       sodium bicarbonate 650 MG tablet Take 1 tablet (650 mg) by mouth 3 times daily 90 tablet 11     tamsulosin (FLOMAX) 0.4 MG capsule Take 1 capsule (0.4 mg) by mouth daily 90 capsule 2     triamcinolone (KENALOG) 0.1 % cream Apply topically 3 times daily 80 g 0     vitamin D3 (CHOLECALCIFEROL) 1000 units (25 mcg) tablet Take 2 tablets (2,000 Units) by mouth daily 30 tablet 11     vitamin D3 (CHOLECALCIFEROL) 2000 units (50 mcg) tablet Take 1 tablet (2,000 Units) by mouth daily 90 tablet 3       Allergies  No Known Allergies    Social History  Social History     Socioeconomic History     Marital status:      Spouse name: Not on file     Number of children: 5     Years of education: Not on file     Highest education level: Not on file   Occupational History     Occupation: private bussiness owner   Social Needs     Financial resource strain: Not on file     Food insecurity:     Worry: Not on file     Inability: Not on file     Transportation needs:     Medical: Not on file     Non-medical: Not on file   Tobacco Use     Smoking status: Never Smoker     Smokeless tobacco: Never Used    Substance and Sexual Activity     Alcohol use: Yes     Comment: occasionaly     Drug use: No     Sexual activity: Not on file   Lifestyle     Physical activity:     Days per week: Not on file     Minutes per session: Not on file     Stress: Not on file   Relationships     Social connections:     Talks on phone: Not on file     Gets together: Not on file     Attends [employer] service: Not on file     Active member of club or organization: Not on file     Attends meetings of clubs or organizations: Not on file     Relationship status: Not on file     Intimate partner violence:     Fear of current or ex partner: Not on file     Emotionally abused: Not on file     Physically abused: Not on file     Forced sexual activity: Not on file   Other Topics Concern     Parent/sibling w/ CABG, MI or angioplasty before 65F 55M? Not Asked   Social History Narrative     Not on file       Family History  Family History   Problem Relation Age of Onset     Diabetes Father      Myocardial Infarction Father      Diabetes Brother      Leukemia Brother 44     Glaucoma No family hx of      Macular Degeneration No family hx of      Kidney Disease No family hx of        Review of Systems  ROS/MED HX    ENT/Pulmonary:       Neurologic:     (+)neuropathy     Cardiovascular:     (+) Dyslipidemia, hypertension----. : . . . :. . Previous cardiac testing Echodate:1/14/16results:Interpretation Summary  Global and regional left ventricular function is normal with an EF of 55-60%  (calculated, 55%).  Global right ventricular function is normal.  Pulmonary artery systolic pressure is normal.  The inferior vena cava is normal. The estimated mean right atrial pressure is  <3 mmHg.  No pericardial effusion is present.  Previous study not available for comparison.  Stress Testdate:1/27/16 results:IMPRESSION:  1. Normal myocardial SPECT study with a summed stress score of 1  within the technical limitation noted above. A summed stress score of  <4 is  "associated with an annual event rate of 0.9% and 0.8% for  myocardial infarction and cardiac death, respectively in patients  undergoing adenosine exercise stress test (Estuardo. Circulation  1998;98:535-43).  2. No significant perfusion abnormalities.  3. Normal left ventricular systolic function without regional wall  motion abnormalities as described above.  4. No prior study available for comparison.  ECG reviewed date:6/18/19 results:Sinus rhythm, possible inferior infarct age undetermined date: results:          METS/Exercise Tolerance:     Hematologic:         Musculoskeletal:         GI/Hepatic:     (+) GERD Asymptomatic on medication,       Renal/Genitourinary:     (+) chronic renal disease, type: CRI, Nephrolithiasis  (history of ), Pt does not require dialysis, Pt has no history of transplant, BPH,       Endo:     (+) type II DM Last HgA1c: 8.5 date: 12/18/19 Using insulin - not using insulin pump thyroid problem (Goiter) Obesity, .      Psychiatric:  - neg psychiatric ROS       Infectious Disease:  - neg infectious disease ROS       Malignancy:      - no malignancy   Other:    (+) no H/O Chronic Pain,no other significant disability        The complete review of systems is negative other than noted in the HPI or here.   /74 (BP Location: Left arm, Patient Position: Chair, Cuff Size: Adult Large)   Pulse 83   Temp 97.7  F (36.5  C) (Oral)   Resp 14   Ht 1.727 m (5' 8\")   Wt 92.5 kg (204 lb)   SpO2 99%   BMI 31.02 kg/m       Physical Exam  Constitutional: Awake, alert, cooperative, no apparent distress, and appears stated age.  Eyes: Pupils equal, round and reactive to light, extra ocular muscles intact, left injected sclera, right sclera clear, conjunctiva normal.  HENT: Normocephalic, oral pharynx with moist mucus membranes, good dentition. No goiter appreciated. Full beard   Respiratory: Clear to auscultation bilaterally, no crackles or wheezing.  Cardiovascular: Regular rate and rhythm, " normal S1 and S2, and no murmur noted.  Carotids +2, no bruits. No edema. Palpable pulses to radial  DP and PT arteries.   GI: Normal bowel sounds, soft, non-distended, non-tender, no masses palpated, no hepatosplenomegaly.    Lymph/Hematologic: No cervical lymphadenopathy and no supraclavicular lymphadenopathy.  Genitourinary:  defer  Skin: Warm and dry.  No rashes at anticipated surgical site.   Musculoskeletal: Slight limited ROM of neck. There is no redness, warmth, or swelling of the joints. Gross motor strength is normal.    Neurologic: Awake, alert, oriented to name, place and time. Cranial nerves II-XII are grossly intact. Gait is normal.   Neuropsychiatric: Calm, cooperative. Normal affect.      Labs: (personally reviewed)  LABS:  CBC:   Lab Results   Component Value Date    WBC 4.6 06/18/2019    WBC 5.2 03/13/2019    HGB 13.5 06/18/2019    HGB 13.7 03/13/2019    HCT 38.7 (L) 06/18/2019    HCT 40.5 03/13/2019     (L) 06/18/2019     (L) 03/13/2019     BMP:   Lab Results   Component Value Date     06/26/2019     06/18/2019    POTASSIUM 4.3 06/26/2019    POTASSIUM 4.3 06/18/2019    CHLORIDE 110 (H) 06/26/2019    CHLORIDE 106 06/18/2019    CO2 23 06/26/2019    CO2 22 06/18/2019    BUN 19 06/26/2019    BUN 27 06/18/2019    CR 1.83 (H) 12/18/2019    CR 1.31 (H) 06/26/2019     (H) 06/26/2019     (H) 06/18/2019     COAGS:   Lab Results   Component Value Date    PTT 29 06/12/2013    INR 0.93 06/12/2013     POC:   Lab Results   Component Value Date     (H) 11/11/2019     OTHER:   Lab Results   Component Value Date    A1C 9.2 (H) 06/18/2019    INDERJIT 9.0 06/26/2019    PHOS 3.4 03/13/2019    MAG 1.9 07/02/2019    ALBUMIN 3.8 06/18/2019    PROTTOTAL 7.0 06/18/2019    ALT 38 06/18/2019    AST 24 06/18/2019    ALKPHOS 63 06/18/2019    BILITOTAL 0.6 06/18/2019    LIPASE 179 05/22/2012    AMYLASE 85 05/22/2012    TSH 1.12 11/01/2017    T4 1.00 10/21/2014    CRP <5.0 11/15/2010     SED 11 11/15/2010        EK19  Sinus rhythm, possible inferior infarct age undetermined    Echo 16  Interpretation Summary  Global and regional left ventricular function is normal with an EF of 55-60%  (calculated, 55%).  Global right ventricular function is normal.  Pulmonary artery systolic pressure is normal.  The inferior vena cava is normal. The estimated mean right atrial pressure is  <3 mmHg.  No pericardial effusion is present.  Previous study not available for comparison.  NM Lexiscan 16  IMPRESSION:  1. Normal myocardial SPECT study with a summed stress score of 1  within the technical limitation noted above. A summed stress score of  <4 is associated with an annual event rate of 0.9% and 0.8% for  myocardial infarction and cardiac death, respectively in patients  undergoing adenosine exercise stress test (Estuardo. Circulation  1998;98:535-43).  2. No significant perfusion abnormalities.  3. Normal left ventricular systolic function without regional wall  motion abnormalities as described above.  4. No prior study available for comparison.  GARCIA 2019   Right le. Resting GARCIA cannot be calculated, due to noncompressible vessels.   2. TBI of 0.85. Normal.  3. Focal segment of elevated measured peak systolic velocity in the  proximal popliteal artery of 660 cm/s, however waveforms are preserved  distal to this. Evaluation of the image of the measured velocity at  this segment suggests that this is artifactual (along the periphery of  the vessel) given the normal distal waveforms, and is likely not truly  representative of stenosis.     Left le. Resting GARCIA cannot be calculated, due to noncompressible vessels.  2. TBI is 0.88. Normal.  3. Patent arterial Doppler evaluation of the left lower extremity,  without focal hemodynamically significant stenosis.    PFTs 11/18/10  The FEV1, FVC, FEV1/FVC ratio and BFW30-26% are within normal limits  but the FEF75% is reduced.  The  inspiratory flow rates are within normal  limits.  The diffusing capacity is normal.  However, the diffusing capacity  was not corrected for the patient's hemoglobin.  IMPRESSION:(The PFT standard for spirometry has been changed as of  (5/30/06) to NHANESIII, so the % predicted values might not correlate to  prior PFTs.)  Spirometry and DLCO are within normal limits.  This preliminary report should not be used clinically unless reviewed and  signed by a physician.  ____________________________________________LAVERN PEDROZA    The patient's records and results personally reviewed by this provider.     Outside records reviewed from: care everywhere    ASSESSMENT and PLAN  Earle is a 69 year old man who is scheduled for PHACOEMULSIFICATION, CATARACT, WITH INTRAOCULAR LENS IMPLANT, INTRAVITREAL Bevacizumab injection on 12/30/19 by Dr. Maki in treatment of non senile cataracts.  PAC referral for risk assessment and optimization for anesthesia with comorbid conditions of HTN, HLD, sarcoidosis, neuropathy, type 2 diabetes, goiter, obesity, GERD, UTI, CKD stage 2, BPH/ED, history of stones, pain:    Pre-operative considerations:  1.  Cardiac:  Functional status- METS.  Low risk surgery with 0.9% (RCRI #) risk of major adverse cardiac event. The patient had stress test and echo in 2016 which were normal. He had normal GARCIA and was seen by cardiologist, Dr. Colvin on 9/24/19 with as needed follow up. The patient had EKG on 6/18/19 with sinus rhythm, possible inferior infarct age undetermined. He has good METS with walking 1 mile without symptoms and low risk surgery. No further cardiac testing indicated.   ~ HTN - the patient will hold cozaar DOS.   ~ HLD - the patient can continue Lipitor, fenofibrate, fish oil and ASA 81 mg.     2.  Pulm:  Airway feasible.  YEN risk: Intermediate (age, male, HTN, BMI)   ~ Sarcoidosis - The patient reports he was diagnosed years ago after having a chronic cough but he had normal  PFTs in 2010 and currently denies any symptoms. He hasn't needed his albuterol inhaler for many months. Continue Claritin.     3. ENT: Cataract - s/p cataract of left eye and now going to have surgery on right eye     4. Neuro: neuropathy in feet - He is working with chiropractor and physical therapy. Continue gabapentin     5. Endo: Obesity, BMI 31 - consideration for safe lifting techniques.   ~ Type 2 diabetes - uncontrolled but being closely followed by endocrinology. Last seen by Dr. Laura on 12/4/19 and A1c was 8.5 on 12/18/19. He will hold Jardiance and metformin DOS. He will hold short acting insulin and take 80% long acting insulin.   ~ goiter - followed by endocrinology and non tender.     6. GI:  Risk of PONV score = 1.  If > 2, anti-emetic intervention recommended.  ~ GERD - He controls with diet and reports he only takes Zantac as needed.     7. : Current UTI - the patient's urine was checked by Dr. Laura, please see telephone encounter on 12/20/19 and was prescribed Cipro for 7 days. He did not know about message left and has yet to start prescription. He was advised to pick this up and start mediation.   ~ BPH/ED - the patient will continue Proscar and Flomax.   ~ CKD stage 2 - Creatinine was 0.31 on 6/26/19. Consideration for close monitoring of fluid status and avoid nephrotoxins.     8. Skin: The patient has itching of his skin. He is seen by dermatology and had several topical creams but is unsure which exact ones they are. No signs of cellulitis today.     VTE risk: 1.8%    Patient was discussed with Dr Cervantes.    The patient is optimized for their procedure. AVS with information on surgery time/arrival time, meds and NPO status given by nursing staff.        Bea Ventura PA-C  Preoperative Assessment Center  [facility]  Phone: [phone]  Fax: [phone]

## 2019-12-24 NOTE — PROGRESS NOTES
Here for Avastin Injection left eye only today  S/p PRP left eye 12/18/2019    Very severe NPDR OS    Plan  Keep appt with [facility] 1/22/2020        Complete documentation of historical and exam elements from today's encounter can be found in the full encounter summary report (not reduplicated in this progress note). I personally obtained the chief complaint(s) and history of present illness.  I confirmed and edited as necessary the review of systems, past medical/surgical history, family history, social history, and examination findings as documented by others; and I examined the patient myself. I personally reviewed the relevant tests, images, and reports as documented above. I formulated and edited as necessary the assessment and plan and discussed the findings and management plan with the patient and family.    Jaden Mariano MD  Vitreoretinal surgery fellow  [facility]

## 2019-12-30 ENCOUNTER — HOSPITAL ENCOUNTER (OUTPATIENT)
Facility: AMBULATORY SURGERY CENTER | Age: 69
End: 2019-12-30
Attending: OPHTHALMOLOGY
Payer: COMMERCIAL

## 2019-12-30 ENCOUNTER — ANESTHESIA (OUTPATIENT)
Dept: SURGERY | Facility: AMBULATORY SURGERY CENTER | Age: 69
End: 2019-12-30

## 2019-12-30 VITALS
RESPIRATION RATE: 16 BRPM | BODY MASS INDEX: 30.92 KG/M2 | TEMPERATURE: 97.4 F | HEART RATE: 67 BPM | SYSTOLIC BLOOD PRESSURE: 135 MMHG | DIASTOLIC BLOOD PRESSURE: 73 MMHG | HEIGHT: 68 IN | WEIGHT: 204 LBS | OXYGEN SATURATION: 99 %

## 2019-12-30 DIAGNOSIS — H26.8: ICD-10-CM

## 2019-12-30 LAB
GLUCOSE BLDC GLUCOMTR-MCNC: 190 MG/DL (ref 70–99)
GLUCOSE BLDC GLUCOMTR-MCNC: 205 MG/DL (ref 70–99)

## 2019-12-30 DEVICE — EYE IMP IOL AMO PCL TECNIS ZCB00 16.0: Type: IMPLANTABLE DEVICE | Site: EYE | Status: FUNCTIONAL

## 2019-12-30 RX ORDER — ONDANSETRON 2 MG/ML
4 INJECTION INTRAMUSCULAR; INTRAVENOUS EVERY 30 MIN PRN
Status: DISCONTINUED | OUTPATIENT
Start: 2019-12-30 | End: 2019-12-31 | Stop reason: HOSPADM

## 2019-12-30 RX ORDER — LIDOCAINE 40 MG/G
CREAM TOPICAL
Status: DISCONTINUED | OUTPATIENT
Start: 2019-12-30 | End: 2019-12-30 | Stop reason: HOSPADM

## 2019-12-30 RX ORDER — OXYCODONE HYDROCHLORIDE 5 MG/1
5 TABLET ORAL EVERY 4 HOURS PRN
Status: DISCONTINUED | OUTPATIENT
Start: 2019-12-30 | End: 2019-12-31 | Stop reason: HOSPADM

## 2019-12-30 RX ORDER — TIMOLOL 5 MG/ML
SOLUTION/ DROPS OPHTHALMIC PRN
Status: DISCONTINUED | OUTPATIENT
Start: 2019-12-30 | End: 2019-12-30 | Stop reason: HOSPADM

## 2019-12-30 RX ORDER — SODIUM CHLORIDE, SODIUM LACTATE, POTASSIUM CHLORIDE, CALCIUM CHLORIDE 600; 310; 30; 20 MG/100ML; MG/100ML; MG/100ML; MG/100ML
500 INJECTION, SOLUTION INTRAVENOUS CONTINUOUS
Status: DISCONTINUED | OUTPATIENT
Start: 2019-12-30 | End: 2019-12-30 | Stop reason: HOSPADM

## 2019-12-30 RX ORDER — OFLOXACIN 3 MG/ML
1 SOLUTION/ DROPS OPHTHALMIC 3 TIMES DAILY
Qty: 5 ML | Refills: 1 | Status: SHIPPED | OUTPATIENT
Start: 2019-12-30 | End: 2020-08-20

## 2019-12-30 RX ORDER — MEPERIDINE HYDROCHLORIDE 25 MG/ML
12.5 INJECTION INTRAMUSCULAR; INTRAVENOUS; SUBCUTANEOUS
Status: DISCONTINUED | OUTPATIENT
Start: 2019-12-30 | End: 2019-12-31 | Stop reason: HOSPADM

## 2019-12-30 RX ORDER — ACETAMINOPHEN 325 MG/1
975 TABLET ORAL ONCE
Status: COMPLETED | OUTPATIENT
Start: 2019-12-30 | End: 2019-12-30

## 2019-12-30 RX ORDER — CYCLOPENTOLAT/TROPIC/PHENYLEPH 1%-1%-2.5%
1 DROPS (EA) OPHTHALMIC (EYE)
Status: COMPLETED | OUTPATIENT
Start: 2019-12-30 | End: 2019-12-30

## 2019-12-30 RX ORDER — ONDANSETRON 4 MG/1
4 TABLET, ORALLY DISINTEGRATING ORAL EVERY 30 MIN PRN
Status: DISCONTINUED | OUTPATIENT
Start: 2019-12-30 | End: 2019-12-31 | Stop reason: HOSPADM

## 2019-12-30 RX ORDER — LIDOCAINE HYDROCHLORIDE 20 MG/ML
INJECTION, SOLUTION INFILTRATION; PERINEURAL PRN
Status: DISCONTINUED | OUTPATIENT
Start: 2019-12-30 | End: 2019-12-30

## 2019-12-30 RX ORDER — PREDNISOLONE ACETATE 10 MG/ML
1 SUSPENSION/ DROPS OPHTHALMIC 4 TIMES DAILY
Qty: 5 ML | Refills: 1 | Status: SHIPPED | OUTPATIENT
Start: 2019-12-30 | End: 2020-08-20

## 2019-12-30 RX ORDER — BALANCED SALT SOLUTION 6.4; .75; .48; .3; 3.9; 1.7 MG/ML; MG/ML; MG/ML; MG/ML; MG/ML; MG/ML
SOLUTION OPHTHALMIC PRN
Status: DISCONTINUED | OUTPATIENT
Start: 2019-12-30 | End: 2019-12-30 | Stop reason: HOSPADM

## 2019-12-30 RX ORDER — BETAMETHASONE SODIUM PHOSPHATE AND BETAMETHASONE ACETATE 3; 3 MG/ML; MG/ML
INJECTION, SUSPENSION INTRA-ARTICULAR; INTRALESIONAL; INTRAMUSCULAR; SOFT TISSUE PRN
Status: DISCONTINUED | OUTPATIENT
Start: 2019-12-30 | End: 2019-12-30 | Stop reason: HOSPADM

## 2019-12-30 RX ORDER — FENTANYL CITRATE 50 UG/ML
25-50 INJECTION, SOLUTION INTRAMUSCULAR; INTRAVENOUS
Status: DISCONTINUED | OUTPATIENT
Start: 2019-12-30 | End: 2019-12-30 | Stop reason: HOSPADM

## 2019-12-30 RX ORDER — NALOXONE HYDROCHLORIDE 0.4 MG/ML
.1-.4 INJECTION, SOLUTION INTRAMUSCULAR; INTRAVENOUS; SUBCUTANEOUS
Status: DISCONTINUED | OUTPATIENT
Start: 2019-12-30 | End: 2019-12-31 | Stop reason: HOSPADM

## 2019-12-30 RX ORDER — SODIUM CHLORIDE, SODIUM LACTATE, POTASSIUM CHLORIDE, CALCIUM CHLORIDE 600; 310; 30; 20 MG/100ML; MG/100ML; MG/100ML; MG/100ML
INJECTION, SOLUTION INTRAVENOUS CONTINUOUS
Status: DISCONTINUED | OUTPATIENT
Start: 2019-12-30 | End: 2019-12-31 | Stop reason: HOSPADM

## 2019-12-30 RX ORDER — KETOROLAC TROMETHAMINE 4 MG/ML
1 SOLUTION/ DROPS OPHTHALMIC 4 TIMES DAILY
Qty: 5 ML | Refills: 0 | Status: SHIPPED | OUTPATIENT
Start: 2019-12-30 | End: 2019-12-31

## 2019-12-30 RX ORDER — PROPOFOL 10 MG/ML
INJECTION, EMULSION INTRAVENOUS PRN
Status: DISCONTINUED | OUTPATIENT
Start: 2019-12-30 | End: 2019-12-30

## 2019-12-30 RX ADMIN — PROPOFOL 80 MG: 10 INJECTION, EMULSION INTRAVENOUS at 10:15

## 2019-12-30 RX ADMIN — Medication 1 DROP: at 09:12

## 2019-12-30 RX ADMIN — LIDOCAINE HYDROCHLORIDE 80 MG: 20 INJECTION, SOLUTION INFILTRATION; PERINEURAL at 10:15

## 2019-12-30 RX ADMIN — Medication 1 DROP: at 09:06

## 2019-12-30 RX ADMIN — ACETAMINOPHEN 975 MG: 325 TABLET ORAL at 09:28

## 2019-12-30 RX ADMIN — Medication 1 DROP: at 09:26

## 2019-12-30 RX ADMIN — SODIUM CHLORIDE, SODIUM LACTATE, POTASSIUM CHLORIDE, CALCIUM CHLORIDE: 600; 310; 30; 20 INJECTION, SOLUTION INTRAVENOUS at 10:10

## 2019-12-30 ASSESSMENT — MIFFLIN-ST. JEOR: SCORE: 1659.84

## 2019-12-30 NOTE — ANESTHESIA CARE TRANSFER NOTE
Patient: Earle Rene    Procedure(s):  PHACOEMULSIFICATION, CATARACT, WITH INTRAOCULAR LENS IMPLANT  INTRAVITREAL Bevacizumab injection    Diagnosis: Combined form of nonsenile cataract of right eye [H26.8]  Diagnosis Additional Information: No value filed.    Anesthesia Type:   MAC     Note:  Airway :Room Air  Patient transferred to:Phase II  Handoff Report: Identifed the Patient, Identified the Reponsible Provider, Reviewed the pertinent medical history, Discussed the surgical course, Reviewed Intra-OP anesthesia mangement and issues during anesthesia, Set expectations for post-procedure period and Allowed opportunity for questions and acknowledgement of understanding      Vitals: (Last set prior to Anesthesia Care Transfer)    CRNA VITALS  12/30/2019 1007 - 12/30/2019 1037      12/30/2019             Pulse:  67    Ht Rate:  66    SpO2:  100 %    Resp Rate (set):  10                Electronically Signed By: RAJNI Escobar CRNA  December 30, 2019  10:37 AM

## 2019-12-30 NOTE — OP NOTE
PREOPERATIVE DIAGNOSIS:   1) Visually significant cataract, Right eye   2) Diabetic macular edema, right eye   POSTOPERATIVE DIAGNOSIS: Same   PROCEDURES:   1. Cataract extraction with intraocular lens implant Right eye.  2. Intravitreal bevacizumab injection, right eye   SURGEON: Milton Maki M.D.  Assistant: Mundo Bean MD        INDICATIONS: The patient Earle Rene presented to the eye clinic with decreased vision secondary to cataract in the Right  Right eye. The risks, benefits and alternatives to cataract extraction were discussed. The patient elected to proceed. All questions were answered to the patient's satisfaction.   DESCRIPTION OF PROCEDURE:Prior to the procedure, appropriate cardiac and respiratory monitors were applied to the patient.  In the pre-operative holding area, under monitored anesthesia care, a retrobulbar injection of 2% lidocaine with hyaluronidase was given.  The patient was brought to the operating room where a surgical pause was carried out to identify with all members of the surgical team the correct surgical site.  With adequate anesthesia and akinesia, the Right  Right eye was prepped and draped in the usual sterile fashion. A lid speculum was placed, and the operating microscope was rotated into position. A paracentesis was created.  Through this limbal paracentesis, the anterior chamber was inflated with dispersive viscoelastic. A temporal wound was created at the limbus using a 2.5 mm blade. A capsulorrhexis was initiated using a bent 25-gauge needle and was completed in continuous and circular fashion using the capsulorrhexis forceps. The lens nucleus was hydrodissected using balanced salt solution.  The lens nucleus was rotated and removed using phacoemulsification in a stop and chop technique.  Residual cortical material was removed using irrigation-aspiration.  The capsular bag was reinflated to its maximal extent with cohesive viscoelastic.  A 16.0 diopter ZCBOO  inserted into the capsular bag.  The lens power selected was reviewed using the intraocular lens power measurements that were obtained preoperatively to confirm that the correct lens was selected for the desired post-operative refractive state. The residual viscoelastic was removed in its entirety, the wound were hydrated and found to be self-sealing.  Intracameral moxifloxacin was administered. Tactile pressure was confirmed to be in a normal range.  Subconjunctival Betamethasone (3 mg) was injected. Calipers were then used to mimi the conjunctivae 3.5 mm posterior to the limbus superotemporally and an extra drop of Betadine was placed on the mimi.  A TB syringe with a 30-gauge needle was used to inject 0.05 ml of Avastin into the vitreous.  The lid speculum was removed and a patch and shield were applied.  The patient tolerated the procedure well, and there were no complications.   PLAN: The patient will be discharged to home and will follow up tomorrow morning in the eye clinic.  EBL:  None  Complications:  None  Implant Name Type Inv. Item Serial No.  Lot No. LRB No. Used   EYE IMP IOL LARON PCL TECNIS ZCB00 16.0 Lens/Eye Implant EYE IMP IOL LARON PCL TECNIS ZCB00 16.0 [id number] [employer]  Right 1             Attending Physician Procedure Attestation: I was present for the entire procedure       Milton Maki MD  , Comprehensive Ophthalmology  Department of Ophthalmology and Visual Neurosciences  [facility]

## 2019-12-30 NOTE — DISCHARGE INSTRUCTIONS
[facility]     Home Care Following Cataract Surgery    If you have a gauze eye patch on, please do not remove it until it is removed by your doctor at your first appointment after your surgery.  You will start your eye drops the next day.    OR    If you only have a clear eye shield on, you may remove the eye shield when you get home and begin eye drops today as directed by your doctor.      Wear the clear eye shield for protection when sleeping for the next 5 days.      Do not rub the eye that had the operation.      Your eye might be sensitive to light.  Wearing sunglasses may be more comfortable for you.      You may have some discomfort and irritation.  Acetaminophen (Tylenol) or Ibuprofen (Advil) may be taken for discomfort. If pain persists please call your doctor's office.      Keep the eye that had the surgery dry. You may wash your hair, bathe or shower, but keep your eye closed while doing so.       Avoid bending over, strenuous activity or heavy lifting until this activity has been approved by your doctor.      You have a follow-up appointment with your doctor today or tomorrow.  Bring all your prescribed eye drops with you to this follow-up appointment.        If you take glaucoma medications, bring them with you to your follow-up appointment.      Use medication exactly as prescribed by your doctor. Wait 3-5 minutes between eye drops.  You may restart your regular home medications.       Artificial tears may be used immediately for foreign body sensation, dryness or itching.  These should also be spaced 3-5 minutes apart from any prescription eye drops.      Occasional blood-tinged tears are normal the day or two after surgery. However, if there is large or persistent bleeding from the eye, that is not normal, and you should contact the clinic.    Call your doctor s office if any of the following should occur:    - Any sudden vision changes, including decreased  "vision  - Nausea or severe headache  - Increase in pain that is not controlled with Acetaminophen (Tylenol) or Ibuprofen (Advil)  - Signs of infection (pus, increasing redness or tenderness)  - Severe sensitivity to light  - An increase in floaters (black spots in your vision)    Your doctor is:  Dr. Milton Maki, Ophthalmology: [phone]    [facility]  Home Care Following Anesthesia  For 24 hours after surgery:  1. Get plenty of rest.  A responsible adult must stay with you for at least 24 hours after you leave the surgery center.  2. Do not drive or use heavy equipment.  If you have weakness or tingling, don't drive or use heavy equipment until this feeling goes away.   3. Do not drink alcohol.   4. Avoid strenuous or risky activities.  Ask for help when climbing stairs.  5. You may feel lightheaded.  IF so, sit for a few minutes before standing.  Have someone help you get up.   6. If you have nausea (feel sick to your stomach): Drink only clear liquids such as apple juice, ginger ale, broth or 7-Up.  Rest may also help.  Be sure to drink enough fluids.  Move to a regular diet as you feel able.   7. You may have a slight fever.  Call the doctor if your fever is over 100 F (37.7 C) (taken under the tongue) or lasts longer than 24 hours.  8. You may have a dry mouth, a sore throat, muscle aches or trouble sleeping. These should go away after 24 hours.  9. Do not make important or legal decisions.        Today you received a Marcaine or bupivacaine block to numb the nerves near your surgery site.  This is a block using local anesthetic or \"numbing\" medication injected around the nerves to anesthetize or \"numb\" the area supplied by those nerves.  This block is injected into the muscle layer near your surgical site.  The medication may numb the location where you had surgery for 6-18 hours, but may last up to 24 hours.  If your surgical site is an arm or leg you should be " careful with your affected limb, since it is possible to injure your limb without being aware of it due to the numbing.  Until full feeling returns, you should guard against bumping or hitting your limb, and avoid extreme hot or cold temperatures on the skin.  As the block wears off, the feeling will return as a tingling or prickly sensation near your surgical site.  You will experience more discomfort from your incision as the feeling returns.  You may want to take a pain pill (a narcotic or Tylenol if this was prescribed by your surgeon) when you start to experience mild pain before the pain beccomes more severe.  If your pain medications do not control your pain you should notifiy your surgeon.    Tips for taking pain medications  To get the best pain relief possible, remember these points:    Take pain medications as directed, before pain becomes severe.    Pain medication can upset your stomach: taking it with food may help.    Constipation is a common side effect of pain medication. Drink plenty of  fluids.    Eat foods high in fiber. Take a stool softener if recommended by your doctor or pharmacist.    Do not drink alcohol, drive or operate machinery while taking pain medications.    Ask about other ways to control pain, such as with heat, ice or relaxation.    Tylenol/Acetaminophen Consumption  To help encourage the safe use of acetaminophen, the makers of TYLENOL  have lowered the maximum daily dose for single-ingredient Extra Strength TYLENOL  (acetaminophen) products sold in the [place] from 8 pills per day (4,000 mg) to 6 pills per day (3,000 mg). The dosing interval has also changed from 2 pills every 4-6 hours to 2 pills every 6 hours.    If you feel your pain relief is insufficient, you may take Tylenol/Acetaminophen in addition to your narcotic pain medication.     Be careful not to exceed 3,000 mg of Tylenol/Acetaminophen in a 24 hour period from all sources.    If you are taking extra strength  Tylenol/acetaminophen (500 mg), the maximum dose is 6 tablets in 24 hours.    If you are taking regular strength acetaminophen (325 mg), the maximum dose is 9 tablets in 24 hours.    **975 mg Tylenol given at 9:30, can take again at 3:30 PM    Call a doctor for any of the followin. Signs of infection (fever, growing tenderness at the surgery site, a large amount of drainage or bleeding, severe pain, foul-smelling drainage, redness, swelling).  2. It has been over 8 to 10 hours since surgery and you are still not able to urinate (pass water).  3. Headache for over 24 hours.  Your doctor is:       Dr. Milton Maki, Ophthalmology: [phone]               Or dial [phone] and ask for the resident on call for:  Ophthalmology  For emergency care, call the:  [facility]:  [phone] (TTY for hearing impaired: [phone])

## 2019-12-30 NOTE — ANESTHESIA POSTPROCEDURE EVALUATION
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       Anesthesia Type:  Not documented  MAC    Reportable Event: NO     PAIN: Uncomplicated   Sign Out status: Comfortable, Well controlled pain     PONV: No PONV   Sign Out status:  No Nausea or Vomiting     Neuro/Psych: Uneventful perioperative course   Sign Out Status: Preoperative baseline; Age appropriate mentation     Airway/Resp.: Uneventful perioperative course   Sign Out Status: Non labored breathing, age appropriate RR; Resp. Status within EXPECTED Parameters     CV: Uneventful perioperative course   Sign Out status: Appropriate BP and perfusion indices; Appropriate HR/Rhythm     Disposition:   Sign Out in:  PACU  Disposition:  Phase II; Home  Recovery Course: Uneventful  Follow-Up: Not required           Last Anesthesia Record Vitals:  CRNA VITALS  2019 1007 - 2019 1107      2019             Pulse:  67    Ht Rate:  66    SpO2:  100 %    Resp Rate (set):  10          Last PACU Vitals:  Vitals Value Taken Time   /79 2019 10:43 AM   Temp 36.3  C (97.4  F) 2019 10:43 AM   Pulse 70 2019 10:43 AM   Resp 16 2019 10:43 AM   SpO2 98 % 2019 10:43 AM   Temp src     NIBP 148/72 2019 10:34 AM   Pulse 67 2019 10:37 AM   SpO2 100 % 2019 10:37 AM   Resp     Temp     Ht Rate 66 2019 10:37 AM   Temp 2           Electronically Signed By: Mahamed Vázquez MD, 2019, 11:39 AM

## 2019-12-31 ENCOUNTER — OFFICE VISIT (OUTPATIENT)
Dept: OPHTHALMOLOGY | Facility: CLINIC | Age: 69
End: 2019-12-31
Attending: OPHTHALMOLOGY
Payer: COMMERCIAL

## 2019-12-31 DIAGNOSIS — Z96.1 PSEUDOPHAKIA OF BOTH EYES: Primary | ICD-10-CM

## 2019-12-31 PROCEDURE — G0463 HOSPITAL OUTPT CLINIC VISIT: HCPCS | Mod: ZF

## 2019-12-31 RX ORDER — KETOROLAC TROMETHAMINE 5 MG/ML
1 SOLUTION OPHTHALMIC 4 TIMES DAILY
Qty: 1 BOTTLE | Refills: 0 | Status: SHIPPED | OUTPATIENT
Start: 2019-12-31 | End: 2020-08-20

## 2019-12-31 ASSESSMENT — SLIT LAMP EXAM - LIDS
COMMENTS: BLEPHARITIS, SCURF, DERMATOCHALASIS
COMMENTS: BLEPHARITIS, SCURF, DERMATOCHALASIS

## 2019-12-31 ASSESSMENT — VISUAL ACUITY
METHOD: SNELLEN - LINEAR
OD_SC: 20/30-

## 2019-12-31 ASSESSMENT — EXTERNAL EXAM - LEFT EYE: OS_EXAM: NORMAL

## 2019-12-31 ASSESSMENT — TONOMETRY
OD_IOP_MMHG: 15
IOP_METHOD: ICARE

## 2019-12-31 ASSESSMENT — EXTERNAL EXAM - RIGHT EYE: OD_EXAM: NORMAL

## 2019-12-31 NOTE — PROGRESS NOTES
Chief Complaint(s) and History of Present Illness(es)     Post Op (Ophthalmology) Right Eye     Laterality: right eye    Onset: 1 day ago              Comments     Pt here for 1 day post op right eye, doing well so far, no eye pain     DUTCH Almonte 10:19 AM 12/31/2019              Review of systems for the eyes was negative other than the pertinent positives/negatives listed in the HPI.      Assessment & Plan      Earle Rene is a 69 year old male with the following diagnoses:   1. Pseudophakia of both eyes       PostOp, right eye, day 1    Doing well  Keep patch in place at night for 5 days  Start post-operative drops and taper according to instructions  Post-operative do's and don'ts reviewed, questions answered          Patient disposition:   Return for 2-3 weeks with refraction, Mac OCT and DFE right.           Attending Physician Attestation:  Complete documentation of historical and exam elements from today's encounter can be found in the full encounter summary report (not reduplicated in this progress note).  I personally obtained the chief complaint(s) and history of present illness.  I confirmed and edited as necessary the review of systems, past medical/surgical history, family history, social history, and examination findings as documented by others; and I examined the patient myself.  I personally reviewed the relevant tests, images, and reports as documented above.  I formulated and edited as necessary the assessment and plan and discussed the findings and management plan with the patient and family. . - Milton Maki MD

## 2020-01-06 ENCOUNTER — TELEPHONE (OUTPATIENT)
Dept: OPHTHALMOLOGY | Facility: CLINIC | Age: 70
End: 2020-01-06

## 2020-01-06 NOTE — TELEPHONE ENCOUNTER
H/o binocular diplopia.  Since cataract surgery, pt not wearing glasses or fresnel prism and complaint of binocular diplopia (closes either eye and goes away)  No s/s of stroke after review of s/s    Pt has f/u next Tuesday with Dr. Mkai and commented in appt detail orthoptic eval    Pt comfortable with appt next week to address  Miles Anne RN 12:41 PM 01/07/20          Earle Rene [phone]  Left message with direct number at [phone]  Miles Anne RN 8:02 AM 01/07/20          [facility]    Phone Message    May a detailed message be left on voicemail: yes    Reason for Call: Symptoms or Concerns     If patient has red-flag symptoms, warm transfer to triage line    Current symptom or concern: per pt- is experiencing double vision    Symptoms have been present for: a few days    Has patient previously been seen for this? No    By : n/a    Date: n/a    Are there any new or worsening symptoms? Yes: new      Action Taken: Message routed to:  [facility] eye

## 2020-01-09 ENCOUNTER — OFFICE VISIT (OUTPATIENT)
Dept: ENDOCRINOLOGY | Facility: CLINIC | Age: 70
End: 2020-01-09
Payer: COMMERCIAL

## 2020-01-09 ENCOUNTER — TELEPHONE (OUTPATIENT)
Dept: ENDOCRINOLOGY | Facility: CLINIC | Age: 70
End: 2020-01-09

## 2020-01-09 VITALS
HEIGHT: 68 IN | DIASTOLIC BLOOD PRESSURE: 81 MMHG | HEART RATE: 73 BPM | SYSTOLIC BLOOD PRESSURE: 123 MMHG | WEIGHT: 202.7 LBS | BODY MASS INDEX: 30.72 KG/M2

## 2020-01-09 DIAGNOSIS — Z79.4 TYPE 2 DIABETES MELLITUS WITH BOTH EYES AFFECTED BY MILD NONPROLIFERATIVE RETINOPATHY AND MACULAR EDEMA, WITH LONG-TERM CURRENT USE OF INSULIN (H): ICD-10-CM

## 2020-01-09 DIAGNOSIS — Z79.4 TYPE 2 DIABETES MELLITUS WITH BOTH EYES AFFECTED BY MILD NONPROLIFERATIVE RETINOPATHY AND MACULAR EDEMA, WITH LONG-TERM CURRENT USE OF INSULIN (H): Primary | ICD-10-CM

## 2020-01-09 DIAGNOSIS — Z79.4 TYPE 2 DIABETES MELLITUS WITH HYPERGLYCEMIA, WITH LONG-TERM CURRENT USE OF INSULIN (H): ICD-10-CM

## 2020-01-09 DIAGNOSIS — E11.3213 TYPE 2 DIABETES MELLITUS WITH BOTH EYES AFFECTED BY MILD NONPROLIFERATIVE RETINOPATHY AND MACULAR EDEMA, WITH LONG-TERM CURRENT USE OF INSULIN (H): Primary | ICD-10-CM

## 2020-01-09 DIAGNOSIS — E11.65 TYPE 2 DIABETES MELLITUS WITH HYPERGLYCEMIA, WITH LONG-TERM CURRENT USE OF INSULIN (H): Primary | ICD-10-CM

## 2020-01-09 DIAGNOSIS — E11.3213 TYPE 2 DIABETES MELLITUS WITH BOTH EYES AFFECTED BY MILD NONPROLIFERATIVE RETINOPATHY AND MACULAR EDEMA, WITH LONG-TERM CURRENT USE OF INSULIN (H): ICD-10-CM

## 2020-01-09 DIAGNOSIS — E11.65 TYPE 2 DIABETES MELLITUS WITH HYPERGLYCEMIA, WITH LONG-TERM CURRENT USE OF INSULIN (H): ICD-10-CM

## 2020-01-09 DIAGNOSIS — Z79.4 TYPE 2 DIABETES MELLITUS WITH HYPERGLYCEMIA, WITH LONG-TERM CURRENT USE OF INSULIN (H): Primary | ICD-10-CM

## 2020-01-09 LAB
ALBUMIN UR-MCNC: 100 MG/DL
APPEARANCE UR: CLEAR
BILIRUB UR QL STRIP: NEGATIVE
COLOR UR AUTO: ABNORMAL
CREAT SERPL-MCNC: 1.59 MG/DL (ref 0.66–1.25)
CREAT UR-MCNC: 68 MG/DL
GFR SERPL CREATININE-BSD FRML MDRD: 43 ML/MIN/{1.73_M2}
GLUCOSE UR STRIP-MCNC: >499 MG/DL
HGB UR QL STRIP: NEGATIVE
KETONES UR STRIP-MCNC: NEGATIVE MG/DL
LEUKOCYTE ESTERASE UR QL STRIP: NEGATIVE
MICROALBUMIN UR-MCNC: 857 MG/L
MICROALBUMIN/CREAT UR: 1264.01 MG/G CR (ref 0–17)
NITRATE UR QL: NEGATIVE
PH UR STRIP: 5 PH (ref 5–7)
POTASSIUM SERPL-SCNC: 4.4 MMOL/L (ref 3.4–5.3)
RBC #/AREA URNS AUTO: 1 /HPF (ref 0–2)
SOURCE: ABNORMAL
SP GR UR STRIP: 1.01 (ref 1–1.03)
TSH SERPL DL<=0.005 MIU/L-ACNC: 1.24 MU/L (ref 0.4–4)
UROBILINOGEN UR STRIP-MCNC: 0 MG/DL (ref 0–2)
WBC #/AREA URNS AUTO: <1 /HPF (ref 0–5)

## 2020-01-09 ASSESSMENT — MIFFLIN-ST. JEOR: SCORE: 1653.94

## 2020-01-09 ASSESSMENT — PAIN SCALES - GENERAL: PAINLEVEL: NO PAIN (0)

## 2020-01-09 NOTE — PROGRESS NOTES
HPI   Earle Rene is a 69 year old male with type 2 diabetes mellitus here today for a follow up visit.     I last saw him on 12/4/2019 and started Jardiance at that time and also treated him for a UTI.   Pt's diabetes is complicated by retinopathy, nephropathy, neuropathy and ED.  Pt also has hx of hyperlipidemia and HTN.  For his diabetes, he is prescribed to take Tresiba 60 units SQ at hs, Humalog 14 units with meals,Metformin 1000 mg BID and Jardiance 10 mg each am.  He has no interest in carb counting or using an I/C ratio.  Pt's blood sugar values have improved and he looks and feels better.  He enjoys using the Freestyle sensor and has been checking his blood sugar more frequent.  His A1C was 8.5 % on 12/4/2019 and his previous A1C was 10 % on 9/18/2019.   We downloaded his Freestyle device today.  His blood sugar values have improved as above with no frequent hypoglycemia.  On ROS today, he reports intermittent diplopia since his cataract surgery and has f/u with his Oph.  Less leg and feet edema.  Chronic numbness in feet.  His weight is down a few lbs today.  Skin is dry and itchy.   He denies frequent headaches, n/v, SOB at rest, cough, chest pain, abd pain, diarrhea, dysuria or hematuria.  No foot ulcers.  He also report urinary incontinence and is asking to see Dr. Cantu in Urology for follow up.    ROS   Please see under HPI.     ALLERGIES:  Review of patient's allergies indicates no known allergies.    Current Outpatient Medications   Medication Sig Dispense Refill     ARTIFICIAL TEAR OP Apply to eye as needed       aspirin 81 MG tablet Take 1 tablet by mouth At Bedtime        atorvastatin (LIPITOR) 20 MG tablet Take 1 tablet (20 mg) by mouth daily (Patient taking differently: Take 20 mg by mouth At Bedtime ) 90 tablet 3     blood glucose (NO BRAND SPECIFIED) lancets standard Lancets that go with device, Test 3 times daily 300 each 3     blood glucose monitoring (NO BRAND SPECIFIED) meter device  "kit Any meter covered by insurance, not store brand, use as directed. 1 kit 0     blood glucose monitoring (NO BRAND SPECIFIED) test strip Strips that go with meter, covered by insurance. Test 3 times daily 300 strip 3     Blood Pressure Monitor KIT Automatic Blood Pressure Monitor 1 kit 0     clobetasol (TEMOVATE) 0.05 % ointment Apply topically to legs twice daily for 2 weeks.  Then discontinue 30 g 0     clotrimazole (LOTRIMIN) 1 % cream Apply topically 2 times daily 30 g 3     Continuous Blood Gluc  (FREESTYLE PETER READER) JUANCARLOS 1 Device daily 1 Device 0     Continuous Blood Gluc Sensor (FREESTYLE PETER 14 DAY SENSOR) MISC 1 applicator every 14 days 8 each 3     empagliflozin (JARDIANCE) 10 MG TABS tablet Take 1 tablet (10 mg) by mouth daily (Patient taking differently: Take 10 mg by mouth every morning ) 90 tablet 1     fenofibrate 54 MG tablet Take 1 tablet (54 mg) by mouth daily (Patient taking differently: Take 54 mg by mouth At Bedtime ) 90 tablet 0     finasteride (PROSCAR) 5 MG tablet Take 1 tablet (5 mg) by mouth daily 90 tablet 2     fluocinonide (LIDEX) 0.05 % external cream APPLY TO AFFECTED AREA TWICE A DAY  3     gabapentin (NEURONTIN) 300 MG capsule TAKE 1 CAPSULE BY MOUTH TWICE A  capsule 1     insulin degludec (TRESIBA FLEXTOUCH) 100 UNIT/ML pen Inject 60 units subcutaneous at bedtime. 60 mL 3     insulin pen needle (B-D U/F) 31G X 5 MM Use 4 times per day.  Please dispense as BD Pen Needle Mini U/F 31G x 5  each 3     insulin syringe 31G X 5/16\" 0.5 ML MISC Use three syringes daily 270 each 1     ketamine 5% gabapentin 8% lidocaine 2.5% topical PLO cream Apply 1 g topically 3 times daily 30 g 3     ketorolac (ACULAR) 0.5 % ophthalmic solution Place 1 drop into the right eye 4 times daily 1 Bottle 0     loratadine (CLARITIN) 10 MG tablet Take 1 tablet (10 mg) by mouth daily (Patient taking differently: Take 10 mg by mouth At Bedtime ) 90 tablet 0     losartan (COZAAR) 100 MG " tablet Take 1 tablet (100 mg) by mouth At Bedtime 30 tablet 11     metFORMIN (GLUCOPHAGE) 1000 MG tablet 1 tab each am only. 180 tablet 3     mupirocin (BACTROBAN) 2 % cream Apply  topically. In very small amounts only as needed 15 g 1     NOVOLOG FLEXPEN 100 UNIT/ML soln Inject 12-14 units with meals, plus correction. Pt uses approx 65 units in 24 hrs. 60 mL 3     ofloxacin (OCUFLOX) 0.3 % ophthalmic solution Apply 1 drop to eye 3 times daily Instill into operative eye(s) per physician instructions. 5 mL 1     Omega-3 Fatty Acids (OMEGA-3 FISH OIL) 1000 MG CAPS Take 1 capsule (1 g) by mouth 2 times daily (Patient taking differently: Take 1 g by mouth as needed (PT last dose a week ago 12.24.19) ) 60 capsule 11     ONETOUCH ULTRA test strip Use to test blood sugar 3 times daily 300 strip 3     prednisoLONE acetate (PRED FORTE) 1 % ophthalmic suspension Apply 1 drop to eye 4 times daily Instill into operative eye(s) per physician instructions. 5 mL 1     sodium bicarbonate 650 MG tablet Take 1 tablet (650 mg) by mouth 3 times daily (Patient taking differently: Take 650 mg by mouth 2 times daily ) 90 tablet 11     tamsulosin (FLOMAX) 0.4 MG capsule Take 1 capsule (0.4 mg) by mouth daily 90 capsule 2     triamcinolone (KENALOG) 0.1 % cream Apply topically 3 times daily 80 g 0     vitamin D3 (CHOLECALCIFEROL) 1000 units (25 mcg) tablet Take 2 tablets (2,000 Units) by mouth daily (Patient taking differently: Take 1,000 Units by mouth 2 times daily ) 30 tablet 11     albuterol (VENTOLIN HFA) 108 (90 Base) MCG/ACT inhaler Inhale 2 puffs into the lungs every 6 hours (Patient not taking: Reported on 1/9/2020) 18 g 3     Family Hx   No change.     Personal Hx   Smoke: none.   ETOH: none.    with grown children.   He owns his own business.    PMH   1. Type 2 Diabetes Mellitus dx at age 43.   2. Neuropathy.  3. Nephropathy.   4. ED.   5. Dyslipidemia.   6. Nephrolithiasis.   7. Decrease auditory acuity.   8.  "Sarcoidosis-lung.   9. Goiter.   10. S/P T & A.   11. S/P FX right heel.   12. Vit D def.   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   14. CT chest- ? granulomas.   15. Retinopathy.  16. Goiter.  Past Medical History:   Diagnosis Date     Blepharitis of both eyes      BPH (benign prostatic hyperplasia)      Diabetes (H)      Diabetic neuropathy (H)      Diabetic retinopathy associated with diabetes mellitus due to underlying condition (H)      Dry eye syndrome      GERD (gastroesophageal reflux disease)      Goiter      Granulomatous disease (H)      HLD (hyperlipidemia)      HTN (hypertension)      Nonsenile cataract      Peripheral neuropathy      Past Surgical History:   Procedure Laterality Date     ------------OTHER-------------      back of neck abscess drainage in OR     AS RAD RESEC TONSIL/PILLARS Bilateral 1961     CATARACT IOL, RT/LT Left      COLONOSCOPY  7/29/2013    Procedure: COLONOSCOPY;;  Surgeon: Montana Pascal MD;  Location: [facility]     EXCISE MASS UPPER EXTREMITY Right 11/11/2019    Procedure: EXCISION, MASS, UPPER EXTREMITY, RIGHT SHOULDER;  Surgeon: Johana Choudhury MD;  Location: [facility]     INTRAVITREAL INJECTION CHEMOTHERAPY Right 12/30/2019    Procedure: INTRAVITREAL Bevacizumab injection;  Surgeon: Milton Maki MD;  Location: [facility]     PHACOEMULSIFICATION CLEAR CORNEA WITH STANDARD INTRAOCULAR LENS IMPLANT Right 12/30/2019    Procedure: PHACOEMULSIFICATION, CATARACT, WITH INTRAOCULAR LENS IMPLANT;  Surgeon: Milton Maki MD;  Location: [facility]     PHACOEMULSIFICATION WITH STANDARD INTRAOCULAR LENS IMPLANT Left 6/21/2019    Procedure: Left Eye Cataract Removal with Intraocular Lens Implant with Intraoperative Avastin Injection;  Surgeon: Lacey Eugene MD;  Location: [facility] OR     siladenatis  11/2017     Physical Exam   General appearance: Vital signs:   /81   Pulse 73   Ht 1.727 m (5' 8\")   Wt 91.9 kg (202 lb 11.2 oz)   BMI 30.82 kg/m    Estimated body mass index " "is 30.82 kg/m  as calculated from the following:    Height as of this encounter: 1.727 m (5' 8\").    Weight as of this encounter: 91.9 kg (202 lb 11.2 oz).  Head:  Normal.   Eyes: Fundi not examined.  Lungs: clear bilateral.  Cardiovascular system:  RRR.   Abdomen:  nontender.  Musculoskeletal system: no edema.   Neurological:  normal.   Skin:  necrobiosis lipoidica on both legs. Multiple skin excoriations.  FEET: no ulcers. Nails are thick.  Abnormal monofilamentous exam.    Results   Creatinine   Date Value Ref Range Status   01/09/2020 1.59 (H) 0.66 - 1.25 mg/dL Final     GFR Estimate   Date Value Ref Range Status   01/09/2020 43 (L) >60 mL/min/[1.73_m2] Final     Comment:     Non  GFR Calc  Starting 12/18/2018, serum creatinine based estimated GFR (eGFR) will be   calculated using the Chronic Kidney Disease Epidemiology Collaboration   (CKD-EPI) equation.       Hemoglobin A1C   Date Value Ref Range Status   06/18/2019 9.2 (H) 0 - 5.6 % Final     Comment:     Normal <5.7% Prediabetes 5.7-6.4%  Diabetes 6.5% or higher - adopted from ADA   consensus guidelines.       Potassium   Date Value Ref Range Status   01/09/2020 4.4 3.4 - 5.3 mmol/L Final     ALT   Date Value Ref Range Status   06/18/2019 38 0 - 70 U/L Final     AST   Date Value Ref Range Status   06/18/2019 24 0 - 45 U/L Final     TSH   Date Value Ref Range Status   01/09/2020 1.24 0.40 - 4.00 mU/L Final     T4 Free   Date Value Ref Range Status   10/21/2014 1.00 0.76 - 1.46 ng/dL Final     Comment:     Effective 7/30/2014, the reference range for this assay has changed to reflect   new instrumentation/methodology.           Cholesterol   Date Value Ref Range Status   06/18/2019 145 <200 mg/dL Final   03/06/2018 101 <200 mg/dL Final     HDL Cholesterol   Date Value Ref Range Status   06/18/2019 38 (L) >39 mg/dL Final   03/06/2018 32 (L) >39 mg/dL Final     LDL Cholesterol Calculated   Date Value Ref Range Status   06/18/2019 49 <100 mg/dL " Final     Comment:     Desirable:       <100 mg/dl   03/06/2018 36 <100 mg/dL Final     Comment:     Desirable:       <100 mg/dl     Triglycerides   Date Value Ref Range Status   06/18/2019 289 (H) <150 mg/dL Final     Comment:     Borderline high:  150-199 mg/dl  High:             200-499 mg/dl  Very high:       >499 mg/dl     03/06/2018 166 (H) <150 mg/dL Final     Comment:     Borderline high:  150-199 mg/dl  High:             200-499 mg/dl  Very high:       >499 mg/dl       Cholesterol/HDL Ratio   Date Value Ref Range Status   09/09/2014 3.8 0.0 - 5.0 Final   11/20/2013 5.8 (H) 0.0 - 5.0 Final     A1C     8.5    12/4/2019  A1C     10.0  9/18/2019  A1C      9.2   6/18/2019  A1C      8.4   12/21/2018  A1C      7.4   9/7/2018  A1C      7.1   5/31/2018  A1C      9.1   3/6/2018  A1C      9.6   11/1/2017  A1C      8.9   8/9/2017  A1C      9.7   9/22/2016  A1C      9.7   7/21/2015  A1C     10.2  12/2/2014  A1C     10.2  9/9/2014  A1C      9.8  11/20/2013  A1C      9.3   6/12/2013  A1C      8.0   8/14/2012  A1C      8.2   5/22/2012  A1C      8.0   5/22/2012    ASSESSMENT/PLAN:     1. TYPE 2 DIABETES MELLITUS: Uncontrolled type 2 diabetes mellitus complicated by retinopathy, nephropathy, neuropathy and ED.  Pt's blood sugar control has improved since starting Jardiance.  Again, I reviewed how Jardiance works and possible side effects of the medication including dehydration, groin yeast infection, UTI, hypogylcemia, etc.  He is to remain on Jardiance 10 mg each am, reduce Metformin 1000 mg each am only, Tresiba 60 units at bedtime, and Humalog 14 units with meals.  I asked him to be sure and take Humalog with ALL meals and if he does not eat a meal and his blood sugar is > 150 to take 1-4 units Novolog for correction.  Instructed him to check his blood sugar fasting each am, prelunch, predinner and at bedtime DAILY.  Pt's creat has improved today with creat 1.59  ( was 1.83 ) and GFR 43 mL/min.  Encouraged patient to make  healthy food choices, reduce his food portions with meals, avoid snacking and walk daily and to take his insulin and medications as prescribed.  He has been reducing his rice intake.  Pt remains on daily ASA.   Pt had the flu vaccine this season.    2. GOITER: Nontender goiter.  TSH normal today.    3. NEPHROPATHY: Discussed the need for good glycemic control and good BP control.   Pt's creat/GFR as above.   Pt's urine protein +.  He is taking Cozaar. K+ is 4.4 today.  He is seen here by Nephrology.    4.  RETINOPATHY: Pt has f/u with  Oph here next week.   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    5.  NEUROPATHY: Continue Gabapentin.  No foot ulcers.    6. DYSLIPIDEMIA: LDL 49 in 32500.  Pt is taking Lipitor and Fenofibrate.    7. OBESITY: See under # 1 above.  He does not want to use a GLP-1.    8.   Return to [facility] to see me in 2 months.

## 2020-01-09 NOTE — PATIENT INSTRUCTIONS
Decrease Meformin 1000 mg 1 pill each only.  Continue current dose of Jardiance, Tresiba and Humalog.  See me in 2 months.  I will call you today and give you your lab results.  Pamela Laura PA-C

## 2020-01-09 NOTE — TELEPHONE ENCOUNTER
----- Message from Pamela Laura PA-C sent at 1/9/2020  9:29 AM CST -----  Could you please call the lab and have them add a K+ onto pt's lab down this am.  Thanks rakan

## 2020-01-14 ENCOUNTER — TELEPHONE (OUTPATIENT)
Dept: ORTHOPEDICS | Facility: CLINIC | Age: 70
End: 2020-01-14

## 2020-01-14 ENCOUNTER — OFFICE VISIT (OUTPATIENT)
Dept: OPHTHALMOLOGY | Facility: CLINIC | Age: 70
End: 2020-01-14
Attending: OPHTHALMOLOGY
Payer: COMMERCIAL

## 2020-01-14 DIAGNOSIS — Z96.1 PSEUDOPHAKIA OF BOTH EYES: Primary | ICD-10-CM

## 2020-01-14 DIAGNOSIS — E11.3411 SEVERE NONPROLIFERATIVE DIABETIC RETINOPATHY OF RIGHT EYE WITH MACULAR EDEMA ASSOCIATED WITH TYPE 2 DIABETES MELLITUS (H): ICD-10-CM

## 2020-01-14 PROCEDURE — G0463 HOSPITAL OUTPT CLINIC VISIT: HCPCS | Mod: ZF

## 2020-01-14 ASSESSMENT — TONOMETRY
OD_IOP_MMHG: 15
IOP_METHOD: APPLANATION
OS_IOP_MMHG: 14

## 2020-01-14 ASSESSMENT — SLIT LAMP EXAM - LIDS
COMMENTS: BLEPHARITIS, SCURF, DERMATOCHALASIS
COMMENTS: BLEPHARITIS, SCURF, DERMATOCHALASIS

## 2020-01-14 ASSESSMENT — VISUAL ACUITY
OS_SC+: -1
METHOD: SNELLEN - LINEAR
OS_SC: 20/40
OD_CC: J2
OS_CC: J2
OS_PH_SC+: -1
OD_SC+: -2
OS_PH_SC: 20/30
OD_SC: 20/25

## 2020-01-14 ASSESSMENT — CONF VISUAL FIELD
METHOD: COUNTING FINGERS
OD_NORMAL: 1
OS_NORMAL: 1

## 2020-01-14 ASSESSMENT — CUP TO DISC RATIO
OS_RATIO: 0.3
OD_RATIO: 0.3

## 2020-01-14 ASSESSMENT — REFRACTION_MANIFEST
OD_ADD: +2.25
OD_SPHERE: PLANO
OS_SPHERE: -0.50
OS_CYLINDER: +0.25
OD_CYLINDER: SPHERE
OS_AXIS: 175
OS_ADD: +2.25

## 2020-01-14 ASSESSMENT — EXTERNAL EXAM - LEFT EYE: OS_EXAM: NORMAL

## 2020-01-14 ASSESSMENT — EXTERNAL EXAM - RIGHT EYE: OD_EXAM: NORMAL

## 2020-01-14 NOTE — NURSING NOTE
Chief Complaints and History of Present Illnesses   Patient presents with     Post Op (Ophthalmology) Right Eye     PHACOEMULSIFICATION, CATARACT, WITH INTRAOCULAR LENS IMPLANT.  INTRAVITREAL Bevacizumab injection     Chief Complaint(s) and History of Present Illness(es)     Post Op (Ophthalmology) Right Eye     Associated symptoms: eye pain, dryness, floaters (No changes.) and double vision.  Negative for tearing and flashes    Pain scale: 8/10    Comments: PHACOEMULSIFICATION, CATARACT, WITH INTRAOCULAR LENS IMPLANT.  INTRAVITREAL Bevacizumab injection              Comments     Pt unsure if they are using drops correctly and did not use drops today.  Pt does get alternating sharp eye pain for the last 2 weeks and occurs most days.  Pt has pain in RE currently and states it is 8/10.  Pt does report seeing double vision randomly during the day.  Pt has used prisms in the past and reports it has helped him.  This causes the Pt some concern because when driving they are unsure which car is the real car.  P has no other concerns at this time.    DM 2.  BS were 200 this am.  Lab Results       Component                Value               Date                       A1C                      9.2                 06/18/2019                 A1C                      9.1                 03/06/2018                 A1C                      10.2                06/06/2017                 A1C                      9.0                 03/16/2016                 A1C                      10.2                09/09/2014              DUTCH Rosado January 14, 2020 9:40 AM

## 2020-01-14 NOTE — PROGRESS NOTES
"Chief Complaint(s) and History of Present Illness(es)     Post Op (Ophthalmology) Right Eye     Associated symptoms: eye pain, dryness, floaters (No changes.) and double   vision.  Negative for tearing and flashes    Pain scale: 8/10    Comments: PHACOEMULSIFICATION, CATARACT, WITH INTRAOCULAR LENS   IMPLANT.  INTRAVITREAL Bevacizumab injection          Comments     Pt unsure if they are using drops correctly and did not use drops today.    Pt does get alternating sharp eye pain for the last 2 weeks and occurs   most days.  Pt has pain in RE currently and states it is 8/10.  Pt does   report seeing double vision randomly during the day.  Pt has used prisms   in the past and reports it has helped him.  This causes the Pt some   concern because when driving they are unsure which car is the real car.  P   has no other concerns at this time.    DM 2.  BS were 200 this am.  Lab Results       Component                Value               Date                       A1C                      9.2                 06/18/2019                 A1C                      9.1                 03/06/2018                 A1C                      10.2                06/06/2017                 A1C                      9.0                 03/16/2016                 A1C                      10.2                09/09/2014              DUTCH Rosado January 14, 2020 9:40 AM     Review of systems for the eyes was negative other than the pertinent positives/negatives listed in the HPI.      Not using drops as prescribed  Using ketorolac as needed for right eye pain  Using prednisolone \"several\" times per day  Used ofloxacin 4x daily initially and now using 1-2 times per day    Assessment & Plan      Earle Rene is a 69 year old male with the following diagnoses:   1. Pseudophakia of both eyes    2. Severe nonproliferative diabetic retinopathy of right eye with macular edema associated with type 2 diabetes mellitus (H)       CE/IOL RE " 12/30/2019 with intraop avastin  BCVA 20/25 ou  IOP 15/14  Having intermittent sharp pain in the right eye for around 1 week.  Not taking drops as prescribed, similar to previous surgery.  Discussed at length the importance of taking the drops as prescribed.  Will simplify regimen to increase chance of compliance.    PLAN:  - Ketorolac and Prednisolone 4x daily until next visit  - Stop ofloxacin  - Increase frequency of artificial tears  - Return in 2 weeks for OCT Macula and V/T recheck    Patient disposition:   Return in about 2 weeks (around 1/28/2020) for Vision, Pressure, OCT Macula.       Mundo Bean, PGY2  Ophthalmology Resident    Attending Physician Attestation:  Complete documentation of historical and exam elements from today's encounter can be found in the full encounter summary report (not reduplicated in this progress note).  I personally obtained the chief complaint(s) and history of present illness.  I confirmed and edited as necessary the review of systems, past medical/surgical history, family history, social history, and examination findings as documented by others; and I examined the patient myself.  I personally reviewed the relevant tests, images, and reports as documented above.  I formulated and edited as necessary the assessment and plan and discussed the findings and management plan with the patient and family. . - Milton Maki MD

## 2020-01-14 NOTE — TELEPHONE ENCOUNTER
[facility] Call Center    Phone Message    May a detailed message be left on voicemail: yes    Reason for Call: Other: .      pts wife states that her  would like his referral for physical therapy to be faxed over to [facility], due to this location being closer to them.    Fax: [phone]       Action Taken: Message routed to:  [facility] ([facility]

## 2020-01-24 DIAGNOSIS — N18.30 CHRONIC KIDNEY DISEASE, STAGE III (MODERATE) (H): Primary | ICD-10-CM

## 2020-01-28 ENCOUNTER — TELEPHONE (OUTPATIENT)
Dept: NEPHROLOGY | Facility: CLINIC | Age: 70
End: 2020-01-28

## 2020-01-29 ENCOUNTER — OFFICE VISIT (OUTPATIENT)
Dept: NEPHROLOGY | Facility: CLINIC | Age: 70
End: 2020-01-29
Attending: INTERNAL MEDICINE
Payer: COMMERCIAL

## 2020-01-29 VITALS
OXYGEN SATURATION: 98 % | HEART RATE: 76 BPM | WEIGHT: 203 LBS | TEMPERATURE: 98.4 F | SYSTOLIC BLOOD PRESSURE: 128 MMHG | DIASTOLIC BLOOD PRESSURE: 76 MMHG | BODY MASS INDEX: 30.77 KG/M2 | HEIGHT: 68 IN

## 2020-01-29 DIAGNOSIS — L29.89 UREMIC PRURITUS: Primary | ICD-10-CM

## 2020-01-29 DIAGNOSIS — N18.30 CHRONIC KIDNEY DISEASE, STAGE III (MODERATE) (H): ICD-10-CM

## 2020-01-29 LAB
ALBUMIN SERPL-MCNC: 3.7 G/DL (ref 3.4–5)
ANION GAP SERPL CALCULATED.3IONS-SCNC: 8 MMOL/L (ref 3–14)
BUN SERPL-MCNC: 34 MG/DL (ref 7–30)
CALCIUM SERPL-MCNC: 9.2 MG/DL (ref 8.5–10.1)
CHLORIDE SERPL-SCNC: 104 MMOL/L (ref 94–109)
CO2 SERPL-SCNC: 23 MMOL/L (ref 20–32)
CREAT SERPL-MCNC: 1.61 MG/DL (ref 0.66–1.25)
CREAT UR-MCNC: 44 MG/DL
GFR SERPL CREATININE-BSD FRML MDRD: 42 ML/MIN/{1.73_M2}
GLUCOSE SERPL-MCNC: 281 MG/DL (ref 70–99)
HGB BLD-MCNC: 14.6 G/DL (ref 13.3–17.7)
PHOSPHATE SERPL-MCNC: 4 MG/DL (ref 2.5–4.5)
POTASSIUM SERPL-SCNC: 4.5 MMOL/L (ref 3.4–5.3)
PROT UR-MCNC: 0.6 G/L
PROT/CREAT 24H UR: 1.36 G/G CR (ref 0–0.2)
SODIUM SERPL-SCNC: 135 MMOL/L (ref 133–144)

## 2020-01-29 PROCEDURE — 84156 ASSAY OF PROTEIN URINE: CPT | Performed by: INTERNAL MEDICINE

## 2020-01-29 PROCEDURE — 85018 HEMOGLOBIN: CPT | Performed by: INTERNAL MEDICINE

## 2020-01-29 PROCEDURE — 80069 RENAL FUNCTION PANEL: CPT | Performed by: INTERNAL MEDICINE

## 2020-01-29 PROCEDURE — G0463 HOSPITAL OUTPT CLINIC VISIT: HCPCS | Mod: ZF

## 2020-01-29 PROCEDURE — 36415 COLL VENOUS BLD VENIPUNCTURE: CPT | Performed by: INTERNAL MEDICINE

## 2020-01-29 ASSESSMENT — MIFFLIN-ST. JEOR: SCORE: 1655.17

## 2020-01-29 NOTE — LETTER
2020      RE: Earle Rene  [address] [place] [address]         Nephrology Clinic    Kiah Ramsey MD  2020     Name: Earle Rene  MRN: [id number]  Age: 69 year old  : 1950  Referring provider: Marcio Hare     Assessment and Plan:    1. Stage IIIA CKD-baseline serum creatinine of 1.2-1.3 mg/dL and GFR of  53-56 ml/min/1.73m2 BSA likely from diabetic nephropathy given 20 year history of diabetes and diabetic retinopathy. Renal function is stable. Today, serum creatinine is 1.61  Mg/dL and correlating eGFR of  42 ml/min.   - Losartan increased to 100 mg PO daily at night  - Discussed the importance of a low salt diet to decrease the amount of albumin in the urine to protect kidney function. The patient expresses understanding. He reports that he rarely adds salt to his food. He and his wife cook most meals with fresh foods at home. He admits he eats olives occasionally.     2. Subnephrotic range proteinuria-likely secondary to diabetic nephropathy. Patient has been on Cozaar 25 mg daily for few years. Patient had UPCR of 1.76 g/gCr on 18, then UPCR of 1.37 g/gCr in 2018, then 1.49 g/gCR in 2019. UPCR is 3 g/gCr from 3/13/2019.     3. Electrolytes: within acceptable limits     4. Volume status:patient looks euvolemic on exam. 2D ECHO in 2016 showed LVEF of 55-60%     5. Acid/Base status: HCO3 is 23. Goal 22-27. We will continue current dose of sodium bicarbonate 650 mg three times daily.     6. Hypertension: patient's BP at home at Goal <130/90 mmHG.   --No changes in meds today      7. BMD  -normal serum calcium  and phosphorus   -normal PTH of 42 pg/ml  -vitamin D deficiency-Vitamin D level is 14  Ug/L. We will start on cholecalciferol 2000U Daily      8. Type II DM- He just had Jardiance added to his regimen, which he says he is responding well to.      9. Diabetic neuropathy- He reports numbness extending up to the knees bilaterally. He reports  occasional leg cramps at rest. He occasionally has numbness and tingling in his hands. He takes Gabapentin 100 mg TID.     10. Diabetic retinopathy-follows with an ophthalmologist. He had cataract surgery on his left eye on  06/21/2019. Last seen as an outpatient by Dr.Van Reyna on 05/08/2019.      11. Obesity-BMI 30. not addressed by me 1/29/2020     12. Uremic pruritis-  I told him he can try over the counter lotion below   - camphor-menthol (DERMASARRA) 0.5-0.5 % external lotion       Kiah Ramsey MD  [facility]  Department of Medicine  Division of Renal Disease and Hypertension  [phone]     Follow-up: Return in about 4 months (around 5/29/2020).     Reason For Visit:   CKD follow up     HPI:   Earle Rene is a 69 year old male with a history of DM2 for 20+ years, complicated by diabetic neuropathy, mild non proliferative retinopathy (follows with ophthalmologist Dr. Eugene) and nephropathy. I last evaluated the patient 6/26/19. At that time we increased his losartan to 100 mg daily. Since last visit overall patient says he is doing well. He has been taking his BP's at home which are running <130/80. He denies any dizziness or lightheadedness. He has had a good appetite. No leg swelling. He does endorse some cramps in his legs, which is new for him. Also reports diffuse itchiness all over to his body and is scratching to the point of excoriation in some areas. He has been applying a cream, clobetasol, which is not helping. He rates his energy level 6/10. He had cataract surgery since I last saw him. He did start a new oral Jardiance, which he says he is responding well to.     Patient would like to see me every 3 months (instead of 4 months) if possible. He does see his endocrinologist every 2 months.     Review of Systems:   Pertinent items are noted in HPI or as below, remainder of complete ROS is negative.      Active Medications:      albuterol (VENTOLIN HFA) 108 (90 Base)  MCG/ACT inhaler, Inhale 2 puffs into the lungs every 6 hours, Disp: 18 g, Rfl: 3     ARTIFICIAL TEAR OP, Apply to eye as needed, Disp: , Rfl:      aspirin 81 MG tablet, Take 1 tablet by mouth At Bedtime , Disp: , Rfl:      atorvastatin (LIPITOR) 20 MG tablet, Take 1 tablet (20 mg) by mouth daily (Patient taking differently: Take 20 mg by mouth At Bedtime ), Disp: 90 tablet, Rfl: 3     blood glucose (NO BRAND SPECIFIED) lancets standard, Lancets that go with device, Test 3 times daily, Disp: 300 each, Rfl: 3     blood glucose monitoring (NO BRAND SPECIFIED) meter device kit, Any meter covered by insurance, not store brand, use as directed., Disp: 1 kit, Rfl: 0     blood glucose monitoring (NO BRAND SPECIFIED) test strip, Strips that go with meter, covered by insurance. Test 3 times daily, Disp: 300 strip, Rfl: 3     Blood Pressure Monitor KIT, Automatic Blood Pressure Monitor, Disp: 1 kit, Rfl: 0     clobetasol (TEMOVATE) 0.05 % ointment, Apply topically to legs twice daily for 2 weeks.  Then discontinue, Disp: 30 g, Rfl: 0     clotrimazole (LOTRIMIN) 1 % cream, Apply topically 2 times daily, Disp: 30 g, Rfl: 3     empagliflozin (JARDIANCE) 10 MG TABS tablet, Take 1 tablet (10 mg) by mouth daily (Patient taking differently: Take 10 mg by mouth every morning ), Disp: 90 tablet, Rfl: 1     fenofibrate 54 MG tablet, Take 1 tablet (54 mg) by mouth daily (Patient taking differently: Take 54 mg by mouth At Bedtime ), Disp: 90 tablet, Rfl: 0     finasteride (PROSCAR) 5 MG tablet, Take 1 tablet (5 mg) by mouth daily, Disp: 90 tablet, Rfl: 2     fluocinonide (LIDEX) 0.05 % external cream, APPLY TO AFFECTED AREA TWICE A DAY, Disp: , Rfl: 3     gabapentin (NEURONTIN) 300 MG capsule, TAKE 1 CAPSULE BY MOUTH TWICE A DAY, Disp: 120 capsule, Rfl: 1     insulin degludec (TRESIBA FLEXTOUCH) 100 UNIT/ML pen, Inject 60 units subcutaneous at bedtime., Disp: 60 mL, Rfl: 3     insulin pen needle (B-D U/F) 31G X 5 MM, Use 4 times per day.   "Please dispense as BD Pen Needle Mini U/F 31G x 5 MM, Disp: 400 each, Rfl: 3     insulin syringe 31G X 5/16\" 0.5 ML MISC, Use three syringes daily, Disp: 270 each, Rfl: 1     ketamine 5% gabapentin 8% lidocaine 2.5% topical PLO cream, Apply 1 g topically 3 times daily, Disp: 30 g, Rfl: 3     ketorolac (ACULAR) 0.5 % ophthalmic solution, Place 1 drop into the right eye 4 times daily, Disp: 1 Bottle, Rfl: 0     loratadine (CLARITIN) 10 MG tablet, Take 1 tablet (10 mg) by mouth daily (Patient taking differently: Take 10 mg by mouth At Bedtime ), Disp: 90 tablet, Rfl: 0     losartan (COZAAR) 100 MG tablet, Take 1 tablet (100 mg) by mouth At Bedtime, Disp: 30 tablet, Rfl: 11     metFORMIN (GLUCOPHAGE) 1000 MG tablet, 1 tab each am only., Disp: 180 tablet, Rfl: 3     mupirocin (BACTROBAN) 2 % cream, Apply  topically. In very small amounts only as needed, Disp: 15 g, Rfl: 1     NOVOLOG FLEXPEN 100 UNIT/ML soln, Inject 12-14 units with meals, plus correction. Pt uses approx 65 units in 24 hrs., Disp: 60 mL, Rfl: 3     ofloxacin (OCUFLOX) 0.3 % ophthalmic solution, Apply 1 drop to eye 3 times daily Instill into operative eye(s) per physician instructions., Disp: 5 mL, Rfl: 1     Omega-3 Fatty Acids (OMEGA-3 FISH OIL) 1000 MG CAPS, Take 1 capsule (1 g) by mouth 2 times daily (Patient taking differently: Take 1 g by mouth as needed (PT last dose a week ago 12.24.19) ), Disp: 60 capsule, Rfl: 11     ONETOUCH ULTRA test strip, Use to test blood sugar 3 times daily, Disp: 300 strip, Rfl: 3     prednisoLONE acetate (PRED FORTE) 1 % ophthalmic suspension, Apply 1 drop to eye 4 times daily Instill into operative eye(s) per physician instructions., Disp: 5 mL, Rfl: 1     sodium bicarbonate 650 MG tablet, Take 1 tablet (650 mg) by mouth 3 times daily (Patient taking differently: Take 650 mg by mouth 2 times daily ), Disp: 90 tablet, Rfl: 11     tamsulosin (FLOMAX) 0.4 MG capsule, Take 1 capsule (0.4 mg) by mouth daily, Disp: 90 " capsule, Rfl: 2     triamcinolone (KENALOG) 0.1 % cream, Apply topically 3 times daily, Disp: 80 g, Rfl: 0     vitamin D3 (CHOLECALCIFEROL) 1000 units (25 mcg) tablet, Take 2 tablets (2,000 Units) by mouth daily (Patient taking differently: Take 1,000 Units by mouth 2 times daily ), Disp: 30 tablet, Rfl: 11    Current Facility-Administered Medications:      bevacizumab (AVASTIN) intravitreal inj 1.25 mg, 1.25 mg, Intravitreal, Q28 Days, Jackie Rodriguez MD, 1.25 mg at 12/24/19 1000     Allergies:   Patient has no known allergies.      Past Medical History:  BPH  Diabetes   Diabetic neuropathy   Diabetic retinopathy   GERD   Granulomatous disease   Hyperlipidemia   Hypertension   Mood disorder   ED  Presbyopia   Myopia   Elevated PSA  Nephrolithiasis   Neutropenia   Sarcoidosis of lung   Sialoadenitis   Cataracts      Past Surgical History:  Past Surgical History:   Procedure Laterality Date     ------------OTHER-------------      back of neck abscess drainage in OR     AS RAD RESEC TONSIL/PILLARS Bilateral 1961     CATARACT IOL, RT/LT Left      COLONOSCOPY  7/29/2013    Procedure: COLONOSCOPY;;  Surgeon: Montana Pascal MD;  Location:  GI     EXCISE MASS UPPER EXTREMITY Right 11/11/2019    Procedure: EXCISION, MASS, UPPER EXTREMITY, RIGHT SHOULDER;  Surgeon: Johana Choudhury MD;  Location: [facility] OR     INTRAVITREAL INJECTION CHEMOTHERAPY Right 12/30/2019    Procedure: INTRAVITREAL Bevacizumab injection;  Surgeon: Milton Maki MD;  Location: [facility] OR     PHACOEMULSIFICATION CLEAR CORNEA WITH STANDARD INTRAOCULAR LENS IMPLANT Right 12/30/2019    Procedure: PHACOEMULSIFICATION, CATARACT, WITH INTRAOCULAR LENS IMPLANT;  Surgeon: Milton Maki MD;  Location: [facility] OR     PHACOEMULSIFICATION WITH STANDARD INTRAOCULAR LENS IMPLANT Left 6/21/2019    Procedure: Left Eye Cataract Removal with Intraocular Lens Implant with Intraoperative Avastin Injection;  Surgeon: Lacey Eugene MD;   "Location: [facility]  11/2017       Family History:   Diabetes- father and brother   MI- father   Leukemia- brother   Negative for kidney disease       Social History:   Never a smoker. Drinks alcohol occasionally.      Physical Exam:  /76 (BP Location: Right arm, Patient Position: Sitting, Cuff Size: Adult Regular)   Pulse 76   Temp 98.4  F (36.9  C) (Oral)   Ht 1.727 m (5' 7.99\")   Wt 92.1 kg (203 lb)   SpO2 98%   BMI 30.87 kg/m      GENERAL APPEARANCE: alert and no distress  EYES: No scleral icterus, pupils equal  HENT: NC/AT  Endocrine: no goiter, no moon facies  Pulmonary: normal work of breathing, no clubbing  CV: WWP   - Edema - none   MS: no evidence of inflammation in joints, no muscle tenderness  : no Chamberlain  SKIN: no rash, warm, dry, no cyanosis  NEURO: mentation intact and speech normal    Laboratory:  CMP  Recent Labs   Lab Test 01/29/20  1315 01/09/20  0839 12/18/19  1218 07/02/19  0947 06/26/19  1217 06/18/19  1054 05/28/19  0948  03/13/19  1516 02/06/19  1322 08/01/18  1222 05/24/18  1414 05/01/18  1233  03/06/18  1216 11/01/17  1044  06/06/17  1116 01/14/16  0849     --   --   --  140 136 138   < > 138 138 140  --  141  --  136  --    < > 141 140   POTASSIUM 4.5 4.4  --   --  4.3 4.3 4.3   < > 4.5 4.3 4.6  --  4.7  --  4.0 4.4   < > 3.9 3.9   CHLORIDE 104  --   --   --  110* 106 108   < > 106 107 108  --  110*  --  105  --    < > 108 106   CO2 23  --   --   --  23 22 21   < > 24 24 24  --  21  --  23  --    < > 24 28   ANIONGAP 8  --   --   --  7 8 9   < > 7 7 7  --  10  --  9  --    < > 10 6   *  --   --   --  142* 195* 320*   < > 217* 197* 116*  --  149*  --  197*  --    < > 96 191*   BUN 34*  --   --   --  19 27 23   < > 25 22 23  --  20  --  21  --    < > 21 15   CR 1.61* 1.59* 1.83*  --  1.31* 1.41* 1.31*   < > 1.20 1.36* 1.31*  --  1.28*  --  1.33* 1.20   < > 1.26* 1.07   GFRESTIMATED 42* 43* 36*  --  55* 50* 55*   < > 61 52* 54*  --  56*  --  53* 60*   < > " 57* 69   GFRESTBLACK 49* 50* 42*  --  63 58* 63   < > 71 61 66  --  67  --  65 73   < > 69 83   INDERJIT 9.2  --   --   --  9.0 9.3 9.4   < > 9.0 9.3 9.5  --  9.8  --  8.8  --    < > 9.2 8.8   MAG  --   --   --  1.9  --   --   --   --   --   --   --  2.1 2.1  --   --   --   --   --   --    PHOS 4.0  --   --   --   --   --   --   --  3.4 3.9 3.1  --  3.3   < >  --   --   --   --   --    PROTTOTAL  --   --   --   --   --  7.0  --   --   --   --   --   --   --   --  7.3  --   --  6.6* 6.8   ALBUMIN 3.7  --   --   --   --  3.8  --   --  3.6 3.2* 3.8  --  4.0  --  3.8  --   --  3.5 3.8   BILITOTAL  --   --   --   --   --  0.6  --   --   --   --   --   --   --   --  0.8  --   --  0.7 0.5   ALKPHOS  --   --   --   --   --  63  --   --   --   --   --   --   --   --  77  --   --  70 71   AST  --   --   --   --   --  24  --   --   --   --   --   --   --   --  25 23  --  20 17   ALT  --   --   --   --   --  38  --   --   --   --   --   --   --   --  35 44  --  49 43    < > = values in this interval not displayed.     CBC  Recent Labs   Lab Test 01/29/20  1315 06/18/19  1054 03/13/19  1516 02/06/19  1322  05/01/18  1233   HGB 14.6 13.5 13.7 13.8   < > 14.7   WBC  --  4.6 5.2 6.1  --  4.6   RBC  --  4.18* 4.38* 4.41  --  4.50   HCT  --  38.7* 40.5 41.4  --  42.5   MCV  --  93 93 94  --  94   MCH  --  32.3 31.3 31.3  --  32.7   MCHC  --  34.9 33.8 33.3  --  34.6   RDW  --  12.6 12.5 11.9  --  12.7   PLT  --  130* 131* 225  --  148*    < > = values in this interval not displayed.     INR  Recent Labs   Lab Test 06/12/13  0949   INR 0.93   PTT 29     URINE STUDIES  Recent Labs   Lab Test 01/09/20  0849 12/18/19  1220 05/01/18  1238 06/06/17  1120  01/14/16  0858   COLOR Straw Yellow Yellow Yellow   < > Yellow   APPEARANCE Clear Slightly Cloudy Clear Slightly Cloudy   < > Clear   URINEGLC >499* >499* 50* >499*   < > 100*   URINEBILI Negative Negative Negative Negative   < > Negative   URINEKETONE Negative Negative Negative Negative   < >  Negative   SG 1.015 1.021 1.016 1.017   < > >1.030   UBLD Negative Large* Negative Negative   < > Negative   URINEPH 5.0 5.0 5.0 5.0   < > 5.5   PROTEIN 100* 100* >499* >499*   < > 100*   UROBILINOGEN  --   --   --   --   --  0.2   NITRITE Negative Negative Negative Negative   < > Negative   LEUKEST Negative Small* Negative Negative   < > Negative   RBCU 1 129* 1 2   < > O - 2   WBCU <1 125* <1 2   < > O - 2    < > = values in this interval not displayed.     Recent Labs   Lab Test 01/29/20  1330 03/13/19  1522 02/06/19  1326 08/01/18  1225 05/01/18  1238   UTPG 1.36* 3.04* 1.49* 1.37* 1.76*     PTH  Recent Labs   Lab Test 03/13/19  1516 05/01/18  1233   PTHI 42 49     IRON STUDIES   Recent Labs   Lab Test 07/02/19  0947 05/01/18  1233   IRON  --  83   FEB  --  344   IRONSAT  --  24   DEANA 178 160     Scribe Disclosure:   I, Kiah Nunez, am serving as a scribe to document services personally performed by Kiah Ramsey MD at this visit, based upon the provider's statements to me. All documentation has been reviewed by the aforementioned provider prior to being entered into the official medical record.       Kiah Ramsey MD

## 2020-01-29 NOTE — PROGRESS NOTES
Nephrology Clinic    Kiah Ramsey MD  2020     Name: Earle Rene  MRN: [id number]  Age: 69 year old  : 1950  Referring provider: Marcio Hare     Assessment and Plan:    1. Stage IIIA CKD-baseline serum creatinine of 1.2-1.3 mg/dL and GFR of  53-56 ml/min/1.73m2 BSA likely from diabetic nephropathy given 20 year history of diabetes and diabetic retinopathy. Renal function is stable. Today, serum creatinine is 1.61  Mg/dL and correlating eGFR of 42 ml/min.   - Losartan increased to 100 mg PO daily at night  - Discussed the importance of a low salt diet to decrease the amount of albumin in the urine to protect kidney function. The patient expresses understanding. He reports that he rarely adds salt to his food. He and his wife cook most meals with fresh foods at home. He admits he eats olives occasionally.     2. Subnephrotic range proteinuria-likely secondary to diabetic nephropathy. Patient has been on Cozaar 25 mg daily for few years. Patient had UPCR of 1.76 g/gCr on 18, then UPCR of 1.37 g/gCr in 2018, then 1.49 g/gCR in 2019. UPCR is 3 g/gCr from 3/13/2019.     3. Electrolytes: within acceptable limits     4. Volume status:patient looks euvolemic on exam. 2D ECHO in  showed LVEF of 55-60%     5. Acid/Base status: HCO3 is 23. Goal 22-27. We will continue current dose of sodium bicarbonate 650 mg three times daily.     6. Hypertension: patient's BP at home at Goal <130/90 mmHG.   --No changes in meds today      7. BMD  -normal serum calcium  and phosphorus   -normal PTH of 42 pg/ml  -vitamin D deficiency-Vitamin D level is 14  Ug/L. We will start on cholecalciferol 2000U Daily      8. Type II DM- He just had Jardiance added to his regimen, which he says he is responding well to.      9. Diabetic neuropathy- He reports numbness extending up to the knees bilaterally. He reports occasional leg cramps at rest. He occasionally has numbness and tingling in his hands. He takes  Gabapentin 100 mg TID.     10. Diabetic retinopathy-follows with an ophthalmologist. He had cataract surgery on his left eye on  06/21/2019. Last seen as an outpatient by Dr.Van Reyna on 05/08/2019.      11. Obesity-BMI 30. not addressed by me 1/29/2020     12. Uremic pruritis-  I told him he can try over the counter lotion below   - camphor-menthol (DERMASARRA) 0.5-0.5 % external lotion       Kiah Ramsey MD  [facility]  Department of Medicine  Division of Renal Disease and Hypertension  [phone]     Follow-up: Return in about 4 months (around 5/29/2020).     Reason For Visit:   CKD follow up     HPI:   Earle Rene is a 69 year old male with a history of DM2 for 20+ years, complicated by diabetic neuropathy, mild non proliferative retinopathy (follows with ophthalmologist Dr. Eugene) and nephropathy. I last evaluated the patient 6/26/19. At that time we increased his losartan to 100 mg daily. Since last visit overall patient says he is doing well. He has been taking his BP's at home which are running <130/80. He denies any dizziness or lightheadedness. He has had a good appetite. No leg swelling. He does endorse some cramps in his legs, which is new for him. Also reports diffuse itchiness all over to his body and is scratching to the point of excoriation in some areas. He has been applying a cream, clobetasol, which is not helping. He rates his energy level 6/10. He had cataract surgery since I last saw him. He did start a new oral Jardiance, which he says he is responding well to.     Patient would like to see me every 3 months (instead of 4 months) if possible. He does see his endocrinologist every 2 months.     Review of Systems:   Pertinent items are noted in HPI or as below, remainder of complete ROS is negative.      Active Medications:      albuterol (VENTOLIN HFA) 108 (90 Base) MCG/ACT inhaler, Inhale 2 puffs into the lungs every 6 hours, Disp: 18 g, Rfl: 3     ARTIFICIAL  TEAR OP, Apply to eye as needed, Disp: , Rfl:      aspirin 81 MG tablet, Take 1 tablet by mouth At Bedtime , Disp: , Rfl:      atorvastatin (LIPITOR) 20 MG tablet, Take 1 tablet (20 mg) by mouth daily (Patient taking differently: Take 20 mg by mouth At Bedtime ), Disp: 90 tablet, Rfl: 3     blood glucose (NO BRAND SPECIFIED) lancets standard, Lancets that go with device, Test 3 times daily, Disp: 300 each, Rfl: 3     blood glucose monitoring (NO BRAND SPECIFIED) meter device kit, Any meter covered by insurance, not store brand, use as directed., Disp: 1 kit, Rfl: 0     blood glucose monitoring (NO BRAND SPECIFIED) test strip, Strips that go with meter, covered by insurance. Test 3 times daily, Disp: 300 strip, Rfl: 3     Blood Pressure Monitor KIT, Automatic Blood Pressure Monitor, Disp: 1 kit, Rfl: 0     clobetasol (TEMOVATE) 0.05 % ointment, Apply topically to legs twice daily for 2 weeks.  Then discontinue, Disp: 30 g, Rfl: 0     clotrimazole (LOTRIMIN) 1 % cream, Apply topically 2 times daily, Disp: 30 g, Rfl: 3     empagliflozin (JARDIANCE) 10 MG TABS tablet, Take 1 tablet (10 mg) by mouth daily (Patient taking differently: Take 10 mg by mouth every morning ), Disp: 90 tablet, Rfl: 1     fenofibrate 54 MG tablet, Take 1 tablet (54 mg) by mouth daily (Patient taking differently: Take 54 mg by mouth At Bedtime ), Disp: 90 tablet, Rfl: 0     finasteride (PROSCAR) 5 MG tablet, Take 1 tablet (5 mg) by mouth daily, Disp: 90 tablet, Rfl: 2     fluocinonide (LIDEX) 0.05 % external cream, APPLY TO AFFECTED AREA TWICE A DAY, Disp: , Rfl: 3     gabapentin (NEURONTIN) 300 MG capsule, TAKE 1 CAPSULE BY MOUTH TWICE A DAY, Disp: 120 capsule, Rfl: 1     insulin degludec (TRESIBA FLEXTOUCH) 100 UNIT/ML pen, Inject 60 units subcutaneous at bedtime., Disp: 60 mL, Rfl: 3     insulin pen needle (B-D U/F) 31G X 5 MM, Use 4 times per day.  Please dispense as BD Pen Needle Mini U/F 31G x 5 MM, Disp: 400 each, Rfl: 3     insulin  "syringe 31G X 5/16\" 0.5 ML MISC, Use three syringes daily, Disp: 270 each, Rfl: 1     ketamine 5% gabapentin 8% lidocaine 2.5% topical PLO cream, Apply 1 g topically 3 times daily, Disp: 30 g, Rfl: 3     ketorolac (ACULAR) 0.5 % ophthalmic solution, Place 1 drop into the right eye 4 times daily, Disp: 1 Bottle, Rfl: 0     loratadine (CLARITIN) 10 MG tablet, Take 1 tablet (10 mg) by mouth daily (Patient taking differently: Take 10 mg by mouth At Bedtime ), Disp: 90 tablet, Rfl: 0     losartan (COZAAR) 100 MG tablet, Take 1 tablet (100 mg) by mouth At Bedtime, Disp: 30 tablet, Rfl: 11     metFORMIN (GLUCOPHAGE) 1000 MG tablet, 1 tab each am only., Disp: 180 tablet, Rfl: 3     mupirocin (BACTROBAN) 2 % cream, Apply  topically. In very small amounts only as needed, Disp: 15 g, Rfl: 1     NOVOLOG FLEXPEN 100 UNIT/ML soln, Inject 12-14 units with meals, plus correction. Pt uses approx 65 units in 24 hrs., Disp: 60 mL, Rfl: 3     ofloxacin (OCUFLOX) 0.3 % ophthalmic solution, Apply 1 drop to eye 3 times daily Instill into operative eye(s) per physician instructions., Disp: 5 mL, Rfl: 1     Omega-3 Fatty Acids (OMEGA-3 FISH OIL) 1000 MG CAPS, Take 1 capsule (1 g) by mouth 2 times daily (Patient taking differently: Take 1 g by mouth as needed (PT last dose a week ago 12.24.19) ), Disp: 60 capsule, Rfl: 11     ONETOUCH ULTRA test strip, Use to test blood sugar 3 times daily, Disp: 300 strip, Rfl: 3     prednisoLONE acetate (PRED FORTE) 1 % ophthalmic suspension, Apply 1 drop to eye 4 times daily Instill into operative eye(s) per physician instructions., Disp: 5 mL, Rfl: 1     sodium bicarbonate 650 MG tablet, Take 1 tablet (650 mg) by mouth 3 times daily (Patient taking differently: Take 650 mg by mouth 2 times daily ), Disp: 90 tablet, Rfl: 11     tamsulosin (FLOMAX) 0.4 MG capsule, Take 1 capsule (0.4 mg) by mouth daily, Disp: 90 capsule, Rfl: 2     triamcinolone (KENALOG) 0.1 % cream, Apply topically 3 times daily, Disp: " 80 g, Rfl: 0     vitamin D3 (CHOLECALCIFEROL) 1000 units (25 mcg) tablet, Take 2 tablets (2,000 Units) by mouth daily (Patient taking differently: Take 1,000 Units by mouth 2 times daily ), Disp: 30 tablet, Rfl: 11    Current Facility-Administered Medications:      bevacizumab (AVASTIN) intravitreal inj 1.25 mg, 1.25 mg, Intravitreal, Q28 Days, Jackie Rodriguez MD, 1.25 mg at 12/24/19 1000     Allergies:   Patient has no known allergies.      Past Medical History:  BPH  Diabetes   Diabetic neuropathy   Diabetic retinopathy   GERD   Granulomatous disease   Hyperlipidemia   Hypertension   Mood disorder   ED  Presbyopia   Myopia   Elevated PSA  Nephrolithiasis   Neutropenia   Sarcoidosis of lung   Sialoadenitis   Cataracts      Past Surgical History:  Past Surgical History:   Procedure Laterality Date     ------------OTHER-------------      back of neck abscess drainage in OR     AS RAD RESEC TONSIL/PILLARS Bilateral 1961     CATARACT IOL, RT/LT Left      COLONOSCOPY  7/29/2013    Procedure: COLONOSCOPY;;  Surgeon: Montana Pascal MD;  [facility]: [facility]     EXCISE MASS UPPER EXTREMITY Right 11/11/2019    Procedure: EXCISION, MASS, UPPER EXTREMITY, RIGHT SHOULDER;  Surgeon: Johana Choudhury MD;  Location: [facility]     INTRAVITREAL INJECTION CHEMOTHERAPY Right 12/30/2019    Procedure: INTRAVITREAL Bevacizumab injection;  Surgeon: Milton Maki MD;  Location: [facility]     PHACOEMULSIFICATION CLEAR CORNEA WITH STANDARD INTRAOCULAR LENS IMPLANT Right 12/30/2019    Procedure: PHACOEMULSIFICATION, CATARACT, WITH INTRAOCULAR LENS IMPLANT;  Surgeon: Mitlon Maki MD;  Location: [facility]     PHACOEMULSIFICATION WITH STANDARD INTRAOCULAR LENS IMPLANT Left 6/21/2019    Procedure: Left Eye Cataract Removal with Intraocular Lens Implant with Intraoperative Avastin Injection;  Surgeon: Lacey Eugene MD;  Location: [facility]     siladenatis  11/2017       Family History:   Diabetes- father and brother   MI-  "father   Leukemia- brother   Negative for kidney disease       Social History:   Never a smoker. Drinks alcohol occasionally.      Physical Exam:  /76 (BP Location: Right arm, Patient Position: Sitting, Cuff Size: Adult Regular)   Pulse 76   Temp 98.4  F (36.9  C) (Oral)   Ht 1.727 m (5' 7.99\")   Wt 92.1 kg (203 lb)   SpO2 98%   BMI 30.87 kg/m     GENERAL APPEARANCE: alert and no distress  EYES: No scleral icterus, pupils equal  HENT: NC/AT  Endocrine: no goiter, no moon facies  Pulmonary: normal work of breathing, no clubbing  CV: WWP   - Edema - none   MS: no evidence of inflammation in joints, no muscle tenderness  : no Chamberlain  SKIN: no rash, warm, dry, no cyanosis  NEURO: mentation intact and speech normal    Laboratory:  CMP  Recent Labs   Lab Test 01/29/20  1315 01/09/20  0839 12/18/19  1218 07/02/19  0947 06/26/19  1217 06/18/19  1054 05/28/19  0948  03/13/19  1516 02/06/19  1322 08/01/18  1222 05/24/18  1414 05/01/18  1233  03/06/18  1216 11/01/17  1044  06/06/17  1116 01/14/16  0849     --   --   --  140 136 138   < > 138 138 140  --  141  --  136  --    < > 141 140   POTASSIUM 4.5 4.4  --   --  4.3 4.3 4.3   < > 4.5 4.3 4.6  --  4.7  --  4.0 4.4   < > 3.9 3.9   CHLORIDE 104  --   --   --  110* 106 108   < > 106 107 108  --  110*  --  105  --    < > 108 106   CO2 23  --   --   --  23 22 21   < > 24 24 24  --  21  --  23  --    < > 24 28   ANIONGAP 8  --   --   --  7 8 9   < > 7 7 7  --  10  --  9  --    < > 10 6   *  --   --   --  142* 195* 320*   < > 217* 197* 116*  --  149*  --  197*  --    < > 96 191*   BUN 34*  --   --   --  19 27 23   < > 25 22 23  --  20  --  21  --    < > 21 15   CR 1.61* 1.59* 1.83*  --  1.31* 1.41* 1.31*   < > 1.20 1.36* 1.31*  --  1.28*  --  1.33* 1.20   < > 1.26* 1.07   GFRESTIMATED 42* 43* 36*  --  55* 50* 55*   < > 61 52* 54*  --  56*  --  53* 60*   < > 57* 69   GFRESTBLACK 49* 50* 42*  --  63 58* 63   < > 71 61 66  --  67  --  65 73   < > 69 83   INDERJIT " 9.2  --   --   --  9.0 9.3 9.4   < > 9.0 9.3 9.5  --  9.8  --  8.8  --    < > 9.2 8.8   MAG  --   --   --  1.9  --   --   --   --   --   --   --  2.1 2.1  --   --   --   --   --   --    PHOS 4.0  --   --   --   --   --   --   --  3.4 3.9 3.1  --  3.3   < >  --   --   --   --   --    PROTTOTAL  --   --   --   --   --  7.0  --   --   --   --   --   --   --   --  7.3  --   --  6.6* 6.8   ALBUMIN 3.7  --   --   --   --  3.8  --   --  3.6 3.2* 3.8  --  4.0  --  3.8  --   --  3.5 3.8   BILITOTAL  --   --   --   --   --  0.6  --   --   --   --   --   --   --   --  0.8  --   --  0.7 0.5   ALKPHOS  --   --   --   --   --  63  --   --   --   --   --   --   --   --  77  --   --  70 71   AST  --   --   --   --   --  24  --   --   --   --   --   --   --   --  25 23  --  20 17   ALT  --   --   --   --   --  38  --   --   --   --   --   --   --   --  35 44  --  49 43    < > = values in this interval not displayed.     CBC  Recent Labs   Lab Test 01/29/20  1315 06/18/19  1054 03/13/19  1516 02/06/19  1322  05/01/18  1233   HGB 14.6 13.5 13.7 13.8   < > 14.7   WBC  --  4.6 5.2 6.1  --  4.6   RBC  --  4.18* 4.38* 4.41  --  4.50   HCT  --  38.7* 40.5 41.4  --  42.5   MCV  --  93 93 94  --  94   MCH  --  32.3 31.3 31.3  --  32.7   MCHC  --  34.9 33.8 33.3  --  34.6   RDW  --  12.6 12.5 11.9  --  12.7   PLT  --  130* 131* 225  --  148*    < > = values in this interval not displayed.     INR  Recent Labs   Lab Test 06/12/13  0949   INR 0.93   PTT 29     URINE STUDIES  Recent Labs   Lab Test 01/09/20  0849 12/18/19  1220 05/01/18  1238 06/06/17  1120  01/14/16  0858   COLOR Straw Yellow Yellow Yellow   < > Yellow   APPEARANCE Clear Slightly Cloudy Clear Slightly Cloudy   < > Clear   URINEGLC >499* >499* 50* >499*   < > 100*   URINEBILI Negative Negative Negative Negative   < > Negative   URINEKETONE Negative Negative Negative Negative   < > Negative   SG 1.015 1.021 1.016 1.017   < > >1.030   UBLD Negative Large* Negative Negative   < >  Negative   URINEPH 5.0 5.0 5.0 5.0   < > 5.5   PROTEIN 100* 100* >499* >499*   < > 100*   UROBILINOGEN  --   --   --   --   --  0.2   NITRITE Negative Negative Negative Negative   < > Negative   LEUKEST Negative Small* Negative Negative   < > Negative   RBCU 1 129* 1 2   < > O - 2   WBCU <1 125* <1 2   < > O - 2    < > = values in this interval not displayed.     Recent Labs   Lab Test 01/29/20  1330 03/13/19  1522 02/06/19  1326 08/01/18  1225 05/01/18  1238   UTPG 1.36* 3.04* 1.49* 1.37* 1.76*     PTH  Recent Labs   Lab Test 03/13/19  1516 05/01/18  1233   PTHI 42 49     IRON STUDIES   Recent Labs   Lab Test 07/02/19  0947 05/01/18  1233   IRON  --  83   FEB  --  344   IRONSAT  --  24   DEANA 178 160     Scribe Disclosure:   I, Kiah Nunez, am serving as a scribe to document services personally performed by Kiah Ramsey MD at this visit, based upon the provider's statements to me. All documentation has been reviewed by the aforementioned provider prior to being entered into the official medical record.

## 2020-01-29 NOTE — NURSING NOTE
"Chief Complaint   Patient presents with     RECHECK     F/U Proteinuria     Blood pressure 128/76, pulse 76, temperature 98.4  F (36.9  C), temperature source Oral, height 1.727 m (5' 7.99\"), weight 92.1 kg (203 lb), SpO2 98 %.    Naida Garrett on 1/29/2020 at 1:51 PM    "

## 2020-01-29 NOTE — PATIENT INSTRUCTIONS
Dear Earle Rene      Your were seen in the [facility] for follow up.      Use heat for leg cramps    Sarna brand lotion (or generic equivalent) for itching    Please let me know if blood pressure is more than 130/80    Keep sodium intake to less than 2000 mg per day    Please set up appointment with:  Kiah Ramsey MD 4 months      It was a pleasure meeting with you today. Thank you for allowing me and my team the privilege of caring for you today.  Please let us know if there is anything else we can do for you.    Bre Hanson LPSHERICE care coordinator - [phone]  Terri Austin, [phone]    Take care!  Kiah Ramsey MD  Department of Medicine  Division of Renal Diseases and Hypertension  [facility]    Email: [email or web address]

## 2020-01-30 ENCOUNTER — TRANSFERRED RECORDS (OUTPATIENT)
Dept: HEALTH INFORMATION MANAGEMENT | Facility: CLINIC | Age: 70
End: 2020-01-30

## 2020-02-03 ENCOUNTER — PRE VISIT (OUTPATIENT)
Dept: UROLOGY | Facility: CLINIC | Age: 70
End: 2020-02-03

## 2020-02-03 DIAGNOSIS — E11.3411 SEVERE NONPROLIFERATIVE DIABETIC RETINOPATHY OF RIGHT EYE WITH MACULAR EDEMA ASSOCIATED WITH TYPE 2 DIABETES MELLITUS (H): Primary | ICD-10-CM

## 2020-02-03 DIAGNOSIS — C61 MALIGNANT NEOPLASM OF PROSTATE (H): Primary | ICD-10-CM

## 2020-02-03 NOTE — TELEPHONE ENCOUNTER
Chief Complaint : year follow up    Records/Orders: sent message to get labs and imaging before appointment     Pt Contacted: no    At Rooming: normal

## 2020-02-04 ENCOUNTER — OFFICE VISIT (OUTPATIENT)
Dept: OPHTHALMOLOGY | Facility: CLINIC | Age: 70
End: 2020-02-04
Attending: OPHTHALMOLOGY
Payer: COMMERCIAL

## 2020-02-04 DIAGNOSIS — H35.81 RETINAL EDEMA: ICD-10-CM

## 2020-02-04 DIAGNOSIS — E11.3411 SEVERE NONPROLIFERATIVE DIABETIC RETINOPATHY OF RIGHT EYE WITH MACULAR EDEMA ASSOCIATED WITH TYPE 2 DIABETES MELLITUS (H): ICD-10-CM

## 2020-02-04 DIAGNOSIS — Z96.1 PSEUDOPHAKIA OF BOTH EYES: Primary | ICD-10-CM

## 2020-02-04 PROCEDURE — 25000128 H RX IP 250 OP 636: Mod: ZF | Performed by: OPHTHALMOLOGY

## 2020-02-04 PROCEDURE — C9257 BEVACIZUMAB INJECTION: HCPCS | Mod: ZF | Performed by: OPHTHALMOLOGY

## 2020-02-04 PROCEDURE — 92134 CPTRZ OPH DX IMG PST SGM RTA: CPT | Mod: ZF | Performed by: OPHTHALMOLOGY

## 2020-02-04 PROCEDURE — G0463 HOSPITAL OUTPT CLINIC VISIT: HCPCS | Mod: 25

## 2020-02-04 PROCEDURE — 67028 INJECTION EYE DRUG: CPT | Mod: LT,ZF | Performed by: OPHTHALMOLOGY

## 2020-02-04 RX ADMIN — Medication 1.25 MG: at 09:45

## 2020-02-04 ASSESSMENT — TONOMETRY
OS_IOP_MMHG: 09
IOP_METHOD: TONOPEN
OD_IOP_MMHG: 14

## 2020-02-04 ASSESSMENT — EXTERNAL EXAM - RIGHT EYE: OD_EXAM: NORMAL

## 2020-02-04 ASSESSMENT — VISUAL ACUITY
OD_SC: 20/30
OS_SC: 20/60
OS_PH_SC: 20/50
METHOD: SNELLEN - LINEAR
OS_PH_SC+: +1

## 2020-02-04 ASSESSMENT — SLIT LAMP EXAM - LIDS
COMMENTS: BLEPHARITIS, SCURF, DERMATOCHALASIS
COMMENTS: BLEPHARITIS, SCURF, DERMATOCHALASIS

## 2020-02-04 ASSESSMENT — EXTERNAL EXAM - LEFT EYE: OS_EXAM: NORMAL

## 2020-02-04 NOTE — NURSING NOTE
Chief Complaints and History of Present Illnesses   Patient presents with     Post Op (Ophthalmology) Right Eye        Cataract extraction with IOL in the right eye on  12/30/2019     Chief Complaint(s) and History of Present Illness(es)     Post Op (Ophthalmology) Right Eye     Laterality: right eye    Associated symptoms: double vision and dryness.  Negative for eye pain    Comments:    Cataract extraction with IOL in the right eye on  12/30/2019              Comments     He states that his distance vision has seemed stable in both eyes, since his last eye exam.   He gets occasional double vision.  Both eyes seem intermittently dry.  Using artificial tears does resolve the discomfort.     He is using his post operative drops as directed.    Pamela Smith, COT 7:42 AM  February 4, 2020

## 2020-02-04 NOTE — PROGRESS NOTES
Chief Complaint(s) and History of Present Illness(es)     Post Op (Ophthalmology) Right Eye     Laterality: right eye    Associated symptoms: double vision and dryness.  Negative for eye pain    Comments:    Cataract extraction with IOL in the right eye on  12/30/2019              Comments     He states that his distance vision has seemed stable in both eyes, since   his last eye exam.   He gets occasional double vision.  Both eyes seem   intermittently dry.  Using artificial tears does resolve the discomfort.     He is using his post operative drops as directed.    Pamela Smith, COT 7:42 AM  February 4, 2020               Review of systems for the eyes was negative other than the pertinent positives/negatives listed in the HPI.      Assessment & Plan      Earle Rene is a 69 year old male with the following diagnoses:   1. Pseudophakia of both eyes    2. Severe nonproliferative diabetic retinopathy of right eye with macular edema associated with type 2 diabetes mellitus (H)    3. Retinal edema         Doing well  Improved compliance with drops  Ok to stop ketorolac  Taper Predforte 321 weekly  Return precautions reviewed     Ok to resume normal activities  Hold on glasses until left eye stable  Artificial tears as needed     Missed last appt with [facility]  Recommend avastin left eye today  RBA reviewed, consent obtained  Recheck with [facility] in 1 mo    Patient disposition:   Return for to Dr. Nolasco for continued care, DFE, OCT Macula.           Attending Physician Attestation:  Complete documentation of historical and exam elements from today's encounter can be found in the full encounter summary report (not reduplicated in this progress note).  I personally obtained the chief complaint(s) and history of present illness.  I confirmed and edited as necessary the review of systems, past medical/surgical history, family history, social history, and examination findings as documented by others; and I examined the patient  myself.  I personally reviewed the relevant tests, images, and reports as documented above.  I formulated and edited as necessary the assessment and plan and discussed the findings and management plan with the patient and family. . - Milton Maki MD

## 2020-02-04 NOTE — Clinical Note
Right eye looks great.  He missed his last appointment with you and was found to have significant diabetic macular edema in the left eye again today.  I injected avastin left eye and recommended he follow back with you next month.  Thanks

## 2020-02-07 ENCOUNTER — MEDICAL CORRESPONDENCE (OUTPATIENT)
Dept: HEALTH INFORMATION MANAGEMENT | Facility: CLINIC | Age: 70
End: 2020-02-07

## 2020-02-09 ENCOUNTER — ANCILLARY PROCEDURE (OUTPATIENT)
Dept: MRI IMAGING | Facility: CLINIC | Age: 70
End: 2020-02-09
Attending: UROLOGY
Payer: COMMERCIAL

## 2020-02-09 DIAGNOSIS — C61 MALIGNANT NEOPLASM OF PROSTATE (H): ICD-10-CM

## 2020-02-09 RX ORDER — GADOBUTROL 604.72 MG/ML
10 INJECTION INTRAVENOUS ONCE
Status: COMPLETED | OUTPATIENT
Start: 2020-02-09 | End: 2020-02-09

## 2020-02-09 RX ADMIN — GADOBUTROL 9 ML: 604.72 INJECTION INTRAVENOUS at 15:50

## 2020-02-19 ENCOUNTER — TELEPHONE (OUTPATIENT)
Dept: UROLOGY | Facility: CLINIC | Age: 70
End: 2020-02-19

## 2020-02-19 NOTE — TELEPHONE ENCOUNTER
[facility] Call Center    Phone Message    May a detailed message be left on voicemail: yes     Reason for Call: Other: Patient called said he want to know his results from his prostate testing, please call to infom.     Action Taken: Other: p Urology    Travel Screening: Not Applicable

## 2020-02-27 ENCOUNTER — OFFICE VISIT (OUTPATIENT)
Dept: UROLOGY | Facility: CLINIC | Age: 70
End: 2020-02-27
Payer: COMMERCIAL

## 2020-02-27 ENCOUNTER — OFFICE VISIT (OUTPATIENT)
Dept: ENDOCRINOLOGY | Facility: CLINIC | Age: 70
End: 2020-02-27
Payer: COMMERCIAL

## 2020-02-27 VITALS
SYSTOLIC BLOOD PRESSURE: 114 MMHG | HEART RATE: 80 BPM | WEIGHT: 204 LBS | DIASTOLIC BLOOD PRESSURE: 65 MMHG | HEIGHT: 68 IN | BODY MASS INDEX: 30.92 KG/M2

## 2020-02-27 VITALS
DIASTOLIC BLOOD PRESSURE: 65 MMHG | WEIGHT: 199.3 LBS | HEART RATE: 80 BPM | HEIGHT: 68 IN | BODY MASS INDEX: 30.2 KG/M2 | SYSTOLIC BLOOD PRESSURE: 114 MMHG

## 2020-02-27 DIAGNOSIS — N40.1 BENIGN PROSTATIC HYPERPLASIA WITH URINARY OBSTRUCTION: ICD-10-CM

## 2020-02-27 DIAGNOSIS — N13.8 BENIGN PROSTATIC HYPERPLASIA WITH URINARY OBSTRUCTION: ICD-10-CM

## 2020-02-27 DIAGNOSIS — C61 MALIGNANT NEOPLASM OF PROSTATE (H): ICD-10-CM

## 2020-02-27 DIAGNOSIS — Z79.4 TYPE 2 DIABETES MELLITUS WITH HYPERGLYCEMIA, WITH LONG-TERM CURRENT USE OF INSULIN (H): Primary | ICD-10-CM

## 2020-02-27 DIAGNOSIS — N40.1 BENIGN NON-NODULAR PROSTATIC HYPERPLASIA WITH LOWER URINARY TRACT SYMPTOMS: ICD-10-CM

## 2020-02-27 DIAGNOSIS — E11.65 TYPE 2 DIABETES MELLITUS WITH HYPERGLYCEMIA, WITH LONG-TERM CURRENT USE OF INSULIN (H): Primary | ICD-10-CM

## 2020-02-27 LAB
HBA1C MFR BLD: 9 % (ref 4.3–6)
PSA SERPL-MCNC: 1.94 UG/L (ref 0–4)

## 2020-02-27 RX ORDER — FINASTERIDE 5 MG/1
5 TABLET, FILM COATED ORAL DAILY
Qty: 90 TABLET | Refills: 2 | Status: SHIPPED | OUTPATIENT
Start: 2020-02-27 | End: 2020-05-25

## 2020-02-27 RX ORDER — TAMSULOSIN HYDROCHLORIDE 0.4 MG/1
0.4 CAPSULE ORAL DAILY
Qty: 90 CAPSULE | Refills: 2 | Status: SHIPPED | OUTPATIENT
Start: 2020-02-27 | End: 2020-05-25

## 2020-02-27 ASSESSMENT — PAIN SCALES - GENERAL
PAINLEVEL: NO PAIN (0)
PAINLEVEL: NO PAIN (0)

## 2020-02-27 ASSESSMENT — MIFFLIN-ST. JEOR
SCORE: 1659.84
SCORE: 1638.52

## 2020-02-27 NOTE — LETTER
2020       RE: Earle Rene  [address]  [place] [address]     Dear Colleague,    Thank you for referring your patient, Earle Rene, to the [facility] UROLOGY AND INST FOR PROSTATE AND UROLOGIC CANCERS at [facility]. Please see a copy of my visit note below.      Urology Clinic    Emmanuel Cantu MD  Date of Service: 2020     Name: Earle Rene  MRN: [id number]  Age: 69 year old  : 1950  Referring provider: Established Patient     Assessment and Plan:  1.  Presumed localized prostate cancer GS=6 and Grade Group 1 on active surveillance since 2017 with no evidence of disease progression. Recent prostate MRI shows only PIRADS 2 lesion and 103 gram prostate.      PSA Trend  2020- 1.94  2019- 3.61  2018- 2.14  2017- 2.52  2017- 8.64  2016- 3.75  2014-2.92     Follow up in 1 year with PSA.     2. BPH with LUTS   He will start taking both Flomax and finasteride daily. If he continues to have bothersome symptoms, he will follow up with Dr. Glass for consideration of HoLEP in 2020.     Attestation:  This patient was seen and evaluated by me, with a scribe taking notes.  I have reviewed the note above and agree.  The physical exam and or any procedures were performed by me and the pertinant details are outlined below.     No evidence of prostate cancer progression on finasteride    Still with significant LUTS (Lower Urinary Tract Symptoms) and maybe some urgency incontinence vs. Overflow    Will set up appt with Dr. Glass prostate size 102 grams even on finasteride    Emmanuel Cantu MD  Department of Urology  [facility]    ______________________________________________________________________    HPI  Earle Rene is a 69 year old male with a history of prostate cancer and being followed by [facility].     Initial PSA at diagnosis: 8.6  1st Biopsy (Date 2017) Jameel Score  "was 3 + 3  In 1 of 6 cores.  Volume affected was 5%.  Grade Group: 1     2nd Biopsy: NA     MRI (Date 6/2017)  Lesion #1 location: 4:30 to 5:00 position of posterior lateral left peripheral zone near base  PIRADS Lesion #1 score 4  Lesion #2 location 7 oclock  PIRADS Lesion #2 score 2  Prostate volume 110g  Also PIRADS 3 at 6 oclock     MRI (Date 02/2020)  PIRADS 2 lesion   Prostate volume: 103 grams     PSA Density 0.03 based on PSA of 1.94 from today      digital rectal exam: deferred      There is no evidence of disease progression to date.    He is using Flomax and finasteride for symptoms of urgency, frequency, and dysuria. He notes that he has only been using one of these daily. He reports that he has urinary frequency, occasional incontinence with sudden strong urges, and post void dribble. He often double voids and often has to void within an hour of urination. His stream is not slow or weak.     Review of Systems:   Pertinent items are noted in HPI or as below, remainder of complete ROS is negative.      Physical Exam:   Patient is a 69 year old  male   Vitals: Blood pressure 114/65, pulse 80, height 1.727 m (5' 8\"), weight 92.5 kg (204 lb).  General Appearance Adult: Alert, no acute distress, oriented  HENT: throat/mouth:normal, good dentition  Lungs: no respiratory distress, or pursed lip breathing  Heart: No obvious jugular venous distension present  Abdomen: Body mass index is 31.02 kg/m .  Musculoskeltal: extremities normal  Skin: no visible suspicious lesions or rashes  Neuro: Alert, oriented, speech and mentation normal  Psych: affect and mood normal  Gait: Normal  : deferred    Laboratory:   I reviewed all applicable laboratory and pathology data and went over findings with patient  Significant for     Lab Results   Component Value Date    CR 1.61 01/29/2020    CR 1.59 01/09/2020    CR 1.83 12/18/2019    CR 1.31 06/26/2019    CR 1.41 06/18/2019    CR 1.31 05/28/2019    CR 1.39 04/10/2019    CR " 1.20 03/13/2019    CR 1.36 02/06/2019    CR 1.31 08/01/2018       PSA   Date Value Ref Range Status   02/27/2020 1.94 0 - 4 ug/L Final     Comment:     Assay Method:  Chemiluminescence using Siemens Vista analyzer   05/28/2019 3.61 0 - 4 ug/L Final     Comment:     Assay Method:  Chemiluminescence using Siemens Vista analyzer   05/24/2018 2.14 0 - 4 ug/L Final     Comment:     Assay Method:  Chemiluminescence using Siemens Vista analyzer   11/01/2017 2.52 0 - 4 ug/L Final     Comment:     Assay Method:  Chemiluminescence using Siemens Vista analyzer   06/06/2017 8.64 (H) 0 - 4 ug/L Final     Comment:     Assay Method:  Chemiluminescence using Siemens Vista analyzer   01/14/2016 3.75 0 - 4 ug/L Final   09/09/2014 2.92 0 - 4 ug/L Final   06/12/2013 2.18 0 - 4 ug/L Final     Comment:     PSA results are about 7% lower than our prior method due to a methodology   change   on August 30, 2011.   02/02/2012 1.77 0 - 4 ug/L Final     Comment:     PSA results are about 7% lower than our prior method due to a methodology   change   on August 30, 2011.   03/08/2011 1.37 0 - 4 ug/L Final       Results for orders placed or performed in visit on 08/10/10   Routine UA with microscopic   Result Value Ref Range    Source Midstream Urine     Color Urine Yellow     Appearance Urine Clear     Glucose Urine 70 (A) NEG mg/dL    Bilirubin Urine Negative NEG    Ketones Urine Negative NEG mg/dL    Specific Gravity Urine 1.019 1.003 - 1.035    Blood Urine Negative NEG    pH Urine 5.5 5.0 - 7.0 pH    Protein Albumin Urine 10 (A) NEG mg/dL    Urobilinogen mg/dL Normal 0.0 - 2.0 mg/dL    Nitrite Urine Negative NEG    Leukocyte Esterase Urine Negative NEG    WBC Urine 1 0 - 2 /HPF    RBC Urine <1 0 - 2 /HPF    Hyaline Casts 3 (H) 0 - 2 /LPF    Mucous Urine Present (A) NEG /LPF       Imaging:   I personally reviewed the following imaging studies, interpreted them, and discussed findings with the patient.     Results for orders placed or performed  in visit on 02/09/20   MR Prostate wo & w Contrast    Narrative    MRI PROSTATE: 2/9/2020 4:42 PM    CLINICAL HISTORY: prostate cancer; Malignant neoplasm of prostate (H)  . Camas 6, 3+3 at the right base on prior biopsy.    Most Recent PSA: 3.61 ug/L    Comparison: 7/3/2019.    TECHNIQUE:  The following sequences were obtained: High-resolution axial  T2-weighted, coronal T2-weighted, 3D volumetric T2-weighted, axial  pre-contrast T1, axial diffusion-weighted, axial apparent diffusion  coefficient and axial dynamic contrast-enhanced T1. Postcontrast  images were evaluated on a separate workstation to evaluate dynamic  contrast enhancement. The technique of this exam is PI-RADS v2.1  compliant. Contrast dose: 9 mL Gadavist    FINDINGS:  Size: 103 grams  Hemorrhage: Absent  Peripheral zone: Heterogeneous on T2-weighted images. Regions of  mildly decreased signal on ADC or DWI which are best characterized as  PI-RADS 2 without highly suspicious lesion.  Transition zone: Enlarged with BPH changes. Transition zone nodules  which are circumscribed or mostly encapsulated without diffusion  restriction.  PI-RADS 2.  No highly suspicious nodules. Prominent  median lobe impressing the urinary bladder floor. Extruded BPH nodule  at the right base, 7:00 position.    Neurovascular bundles: No neurovascular bundle involvement by  malignancy.    Seminal vesicles: No seminal vesicle involvement by malignancy.   Lymph nodes: No lymph node involvement   Bones: Heterogeneous bone marrow signal. Degenerative changes of the  spine. Heterogeneous bone enhancement with foci of enhancement in the  bones, for example in the left iliac bone, series 16 image 10 as well  as in the right innominate bone posteriorly, series 16 image 10,  indeterminate however stable. Enthesopathic changes off the pelvis and  hips. Stable wedge compression deformity of the vertebral body L4,  partially visualized.  Other pelvic organs: Urinary bladder wall  thickening and trabeculation  likely due to chronic outlet obstructive changes.        Impression    IMPRESSION:  1. Based on the most suspicious abnormality, this exam is  characterized as PIRADS 2 - Clinically significant cancer is unlikely  to be present.  2. No convincing suspicious adenopathy or evidence of pelvic  metastases.  3. Heterogeneous bone marrow signal and enhancement with scattered  foci of enhancement, indeterminate however stable from 7/3/2019.  Attention on follow-up studies.      PIRADS? v2.1 Assessment Categories   PIRADS 1: Very low (clinically significant cancer is highly unlikely  to be present)   PIRADS 2: Low (clinically significant cancer is unlikely to be  present)   PIRADS 3: Intermediate (the presence of clinically significant cancer  is equivocal)   PIRADS 4: High (clinically significant cancer is likely to be present)    PIRADS 5: Very high (clinically significant cancer is highly likely to  be present)               MARTA MORENO MD       Scribe Disclosure:  Iliana YUNG, am serving as a scribe to document services personally performed by Emmanuel Cantu MD at this visit, based upon the provider's statements to me. All documentation has been reviewed by the aforementioned provider prior to being entered into the official medical record.     Again, thank you for allowing me to participate in the care of your patient.      Sincerely,    Emmanuel Cantu MD

## 2020-02-27 NOTE — PATIENT INSTRUCTIONS
Please make a appointment with  in May  Please follow up with  in a year with PSA before       It was a pleasure meeting with you today.  Thank you for allowing me and my team the privilege of caring for you today.  YOU are the reason we are here, and I truly hope we provided you with the excellent service you deserve.  Please let us know if there is anything else we can do for you so that we can be sure you are leaving completely satisfied with your care experience.

## 2020-02-27 NOTE — LETTER
2/27/2020       RE: Earle Rene  [address]  [place] [address]     Dear Colleague,    Thank you for referring your patient, Earle Rene, to the [facility]CRINOLOGY [facility]. Please see a copy of my visit note below.    HPI   Earle Rene is a 69 year old male with type 2 diabetes mellitus here today for a follow up visit.     I last saw him on in early Jan 2020.    Pt's diabetes is complicated by retinopathy, nephropathy, neuropathy and ED.  Pt also has hx of hyperlipidemia, HTN, obesity, presumed localized prostate cancer, BPH and goiter.  For his diabetes, he is prescribed to take Tresiba 60 units SQ at hs, Humalog 14 units with meals, Metformin 1000 mg BID and Jardiance 10 mg each am.  He has no interest in carb counting or using an I/C ratio.  Earle is still not taking Humalog with lunch. I asked him again to take Humalog 10-12 units with lunch daily.  We downloaded his Freestyle Lora device today and his average glucose was 225 with SD 35 over the past month.  No hypoglycemia.  Pt's A1C is 9.0 % today. His previous A1C was 8.5 % in Dec 2019.  On ROS today, blurred vision and he was recently seen here by [facility].  Chronic numbness in feet.  Less LE edema.  He denies groin yeast infection, dysuria or hematuria at this time.  Pt denies frequent headaches, n/v, SOB at rest, cough, chest pain, abd pain or diarrhea.No foot ulcers.    ROS   Please see under HPI.     ALLERGIES:  Review of patient's allergies indicates no known allergies.      Current Outpatient Medications   Medication Sig Dispense Refill     albuterol (VENTOLIN HFA) 108 (90 Base) MCG/ACT inhaler Inhale 2 puffs into the lungs every 6 hours 18 g 3     ARTIFICIAL TEAR OP Apply to eye as needed       aspirin 81 MG tablet Take 1 tablet by mouth At Bedtime        atorvastatin (LIPITOR) 20 MG tablet Take 1 tablet (20 mg) by mouth daily (Patient taking differently: Take 20 mg by mouth At Bedtime  ") 90 tablet 3     blood glucose (NO BRAND SPECIFIED) lancets standard Lancets that go with device, Test 3 times daily 300 each 3     blood glucose monitoring (NO BRAND SPECIFIED) meter device kit Any meter covered by insurance, not store brand, use as directed. 1 kit 0     blood glucose monitoring (NO BRAND SPECIFIED) test strip Strips that go with meter, covered by insurance. Test 3 times daily 300 strip 3     Blood Pressure Monitor KIT Automatic Blood Pressure Monitor 1 kit 0     camphor-menthol (DERMASARRA) 0.5-0.5 % external lotion Apply to arms legs torso 1-2 times a day for itching 1 Bottle 11     clobetasol (TEMOVATE) 0.05 % ointment Apply topically to legs twice daily for 2 weeks.  Then discontinue 30 g 0     clotrimazole (LOTRIMIN) 1 % cream Apply topically 2 times daily 30 g 3     Continuous Blood Gluc  (FREESTYLE PETER READER) JUANCARLOS 1 Device daily 1 Device 0     Continuous Blood Gluc Sensor (FREESTYLE PETER 14 DAY SENSOR) MISC 1 applicator every 14 days 8 each 3     empagliflozin (JARDIANCE) 10 MG TABS tablet Take 1 tablet (10 mg) by mouth daily (Patient taking differently: Take 10 mg by mouth every morning ) 90 tablet 1     fenofibrate 54 MG tablet Take 1 tablet (54 mg) by mouth daily (Patient taking differently: Take 54 mg by mouth At Bedtime ) 90 tablet 0     finasteride (PROSCAR) 5 MG tablet Take 1 tablet (5 mg) by mouth daily 90 tablet 2     fluocinonide (LIDEX) 0.05 % external cream APPLY TO AFFECTED AREA TWICE A DAY  3     gabapentin (NEURONTIN) 300 MG capsule TAKE 1 CAPSULE BY MOUTH TWICE A  capsule 1     insulin degludec (TRESIBA FLEXTOUCH) 100 UNIT/ML pen Inject 64 units subcutaneous at bedtime. 60 mL 3     insulin pen needle (B-D U/F) 31G X 5 MM Use 4 times per day.  Please dispense as BD Pen Needle Mini U/F 31G x 5  each 3     insulin syringe 31G X 5/16\" 0.5 ML MISC Use three syringes daily 270 each 1     ketamine 5% gabapentin 8% lidocaine 2.5% topical PLO cream Apply 1 g " topically 3 times daily 30 g 3     ketorolac (ACULAR) 0.5 % ophthalmic solution Place 1 drop into the right eye 4 times daily 1 Bottle 0     loratadine (CLARITIN) 10 MG tablet Take 1 tablet (10 mg) by mouth daily (Patient taking differently: Take 10 mg by mouth At Bedtime ) 90 tablet 0     losartan (COZAAR) 100 MG tablet Take 1 tablet (100 mg) by mouth At Bedtime 30 tablet 11     metFORMIN (GLUCOPHAGE) 1000 MG tablet 1 tab each am only. 180 tablet 3     mupirocin (BACTROBAN) 2 % cream Apply  topically. In very small amounts only as needed 15 g 1     NOVOLOG FLEXPEN 100 UNIT/ML soln Inject 12-14 units with meals, plus correction. Pt uses approx 65 units in 24 hrs. 60 mL 3     ofloxacin (OCUFLOX) 0.3 % ophthalmic solution Apply 1 drop to eye 3 times daily Instill into operative eye(s) per physician instructions. 5 mL 1     Omega-3 Fatty Acids (OMEGA-3 FISH OIL) 1000 MG CAPS Take 1 capsule (1 g) by mouth 2 times daily (Patient taking differently: Take 1 g by mouth as needed (PT last dose a week ago 12.24.19) ) 60 capsule 11     ONETOUCH ULTRA test strip Use to test blood sugar 3 times daily 300 strip 3     prednisoLONE acetate (PRED FORTE) 1 % ophthalmic suspension Apply 1 drop to eye 4 times daily Instill into operative eye(s) per physician instructions. 5 mL 1     sodium bicarbonate 650 MG tablet Take 1 tablet (650 mg) by mouth 3 times daily (Patient taking differently: Take 650 mg by mouth 2 times daily ) 90 tablet 11     tamsulosin (FLOMAX) 0.4 MG capsule Take 1 capsule (0.4 mg) by mouth daily 90 capsule 2     triamcinolone (KENALOG) 0.1 % cream Apply topically 3 times daily 80 g 0     vitamin D3 (CHOLECALCIFEROL) 1000 units (25 mcg) tablet Take 2 tablets (2,000 Units) by mouth daily (Patient taking differently: Take 1,000 Units by mouth 2 times daily ) 30 tablet 11     Family Hx   No change.     Personal Hx   Smoke: none.   ETOH: none.    with grown children.   He owns his own business.    PMH   1. Type 2  Diabetes Mellitus dx at age 43.   2. Neuropathy.  3. Nephropathy.   4. ED.   5. Dyslipidemia.   6. Nephrolithiasis.   7. Decrease auditory acuity.   8. Sarcoidosis-lung.   9. Goiter.   10. S/P T & A.   11. S/P FX right heel.   12. Vit D def.   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   14. CT chest- ? granulomas.   15. Retinopathy.  16. Goiter.  Past Medical History:   Diagnosis Date     Blepharitis of both eyes      BPH (benign prostatic hyperplasia)      Diabetes (H)      Diabetic neuropathy (H)      Diabetic retinopathy associated with diabetes mellitus due to underlying condition (H)      Dry eye syndrome      GERD (gastroesophageal reflux disease)      Goiter      Granulomatous disease (H)      HLD (hyperlipidemia)      HTN (hypertension)      Nonsenile cataract      Peripheral neuropathy      Past Surgical History:   Procedure Laterality Date     ------------OTHER-------------      back of neck abscess drainage in OR     [facility] RAD RESEC TONSIL/PILLARS Bilateral 1961     CATARACT IOL, RT/LT Left      COLONOSCOPY  7/29/2013    Procedure: COLONOSCOPY;;  Surgeon: Montana Pascal MD;  Location:  GI     EXCISE MASS UPPER EXTREMITY Right 11/11/2019    Procedure: EXCISION, MASS, UPPER EXTREMITY, RIGHT SHOULDER;  Surgeon: Johana Choudhury MD;  Location: [facility] OR     INTRAVITREAL INJECTION CHEMOTHERAPY Right 12/30/2019    Procedure: INTRAVITREAL Bevacizumab injection;  Surgeon: Milton Maki MD;  Location: [facility] OR     PHACOEMULSIFICATION CLEAR CORNEA WITH STANDARD INTRAOCULAR LENS IMPLANT Right 12/30/2019    Procedure: PHACOEMULSIFICATION, CATARACT, WITH INTRAOCULAR LENS IMPLANT;  Surgeon: Milton Maki MD;  Location: [facility] OR     PHACOEMULSIFICATION WITH STANDARD INTRAOCULAR LENS IMPLANT Left 6/21/2019    Procedure: Left Eye Cataract Removal with Intraocular Lens Implant with Intraoperative Avastin Injection;  Surgeon: Lacey Eugene MD;  Location: [facility] OR     siladenatis  11/2017     Physical Exam  "  General appearance: Vital signs:   /65   Pulse 80   Ht 1.727 m (5' 8\")   Wt 90.4 kg (199 lb 4.8 oz)   BMI 30.30 kg/m     Estimated body mass index is 30.3 kg/m  as calculated from the following:    Height as of this encounter: 1.727 m (5' 8\").    Weight as of this encounter: 90.4 kg (199 lb 4.8 oz).  Head:  Normal.   Eyes: Fundi not examined.  Lungs: clear bilateral.  Cardiovascular system:  RRR.   Abdomen:  nontender.  Musculoskeletal system: no edema.   Neurological:  normal.   Skin:  necrobiosis lipoidica on both legs. Multiple skin excoriations.  FEET: no ulcers. Nails are thick.  Abnormal monofilamentous exam.    Results   Creatinine   Date Value Ref Range Status   01/29/2020 1.61 (H) 0.66 - 1.25 mg/dL Final     GFR Estimate   Date Value Ref Range Status   01/29/2020 42 (L) >60 mL/min/[1.73_m2] Final     Comment:     Non  GFR Calc  Starting 12/18/2018, serum creatinine based estimated GFR (eGFR) will be   calculated using the Chronic Kidney Disease Epidemiology Collaboration   (CKD-EPI) equation.       Hemoglobin A1C   Date Value Ref Range Status   06/18/2019 9.2 (H) 0 - 5.6 % Final     Comment:     Normal <5.7% Prediabetes 5.7-6.4%  Diabetes 6.5% or higher - adopted from ADA   consensus guidelines.       Potassium   Date Value Ref Range Status   01/29/2020 4.5 3.4 - 5.3 mmol/L Final     ALT   Date Value Ref Range Status   06/18/2019 38 0 - 70 U/L Final     AST   Date Value Ref Range Status   06/18/2019 24 0 - 45 U/L Final     TSH   Date Value Ref Range Status   01/09/2020 1.24 0.40 - 4.00 mU/L Final     T4 Free   Date Value Ref Range Status   10/21/2014 1.00 0.76 - 1.46 ng/dL Final     Comment:     Effective 7/30/2014, the reference range for this assay has changed to reflect   new instrumentation/methodology.           Cholesterol   Date Value Ref Range Status   06/18/2019 145 <200 mg/dL Final   03/06/2018 101 <200 mg/dL Final     HDL Cholesterol   Date Value Ref Range Status "   06/18/2019 38 (L) >39 mg/dL Final   03/06/2018 32 (L) >39 mg/dL Final     LDL Cholesterol Calculated   Date Value Ref Range Status   06/18/2019 49 <100 mg/dL Final     Comment:     Desirable:       <100 mg/dl   03/06/2018 36 <100 mg/dL Final     Comment:     Desirable:       <100 mg/dl     Triglycerides   Date Value Ref Range Status   06/18/2019 289 (H) <150 mg/dL Final     Comment:     Borderline high:  150-199 mg/dl  High:             200-499 mg/dl  Very high:       >499 mg/dl     03/06/2018 166 (H) <150 mg/dL Final     Comment:     Borderline high:  150-199 mg/dl  High:             200-499 mg/dl  Very high:       >499 mg/dl       Cholesterol/HDL Ratio   Date Value Ref Range Status   09/09/2014 3.8 0.0 - 5.0 Final   11/20/2013 5.8 (H) 0.0 - 5.0 Final     A1C     9.0    2/27/2020  A1C     8.5    12/4/2019  A1C     10.0  9/18/2019  A1C      9.2   6/18/2019  A1C      8.4   12/21/2018  A1C      7.4   9/7/2018  A1C      7.1   5/31/2018  A1C      9.1   3/6/2018  A1C      9.6   11/1/2017  A1C      8.9   8/9/2017  A1C      9.7   9/22/2016  A1C      9.7   7/21/2015  A1C     10.2  12/2/2014  A1C     10.2  9/9/2014  A1C      9.8  11/20/2013  A1C      9.3   6/12/2013  A1C      8.0   8/14/2012  A1C      8.2   5/22/2012  A1C      8.0   5/22/2012    ASSESSMENT/PLAN:     1. TYPE 2 DIABETES MELLITUS: Uncontrolled type 2 diabetes mellitus complicated by retinopathy, nephropathy, neuropathy and ED.  Reminded Earle to take Humalog with breakfast, lunch and dinner DAILY- Humalog 14 units with breakfast, 10-12 units with lunch and 14 units with dinner, plus correction insulin.  His FBS values are high.    I had him increase Tresiba 64 units subcutaneous at bedtime. He denies missing his Tresiba dose.  Continue lower dose of Metformin 1000 mg each am in few of CKD.  Continue Jardiance 10 mg each am. Reminded him to keep himself well hydrated.  Again, I reviewed how Jardiance works and possible side effects of the medication including  dehydration, groin yeast infection, UTI, hypogylcemia, etc.  Encouraged patient to make healthy food choices, reduce his food portions with meals, avoid snacking and walk daily.   Pt remains on daily ASA.   Pt had the flu vaccine this season.    2. GOITER: Nontender goiter. No change.  TSH normal on 1/9/2020.    3. NEPHROPATHY: Discussed the need for good glycemic control and good BP control.   He was seen by Nephrology here in Jan 2020.  Pt's most recent creat was 1.61 with GFR 42 mL/min in Jan 2020.  Pt's urine protein +.  His Cozaar dose was increased.      4.  RETINOPATHY:  Seen by Oph here on 2/4/2020 with severe NPDR right eye with macular edema. Retinal edema.    5.  NEUROPATHY: Continue Gabapentin.  No foot ulcers.    6. DYSLIPIDEMIA: LDL 49 in [id number].  Pt is taking Lipitor and Fenofibrate.    7. OBESITY: See under # 1 above.  He does not want to use a GLP-1.    8.   Return to Endocrine Clinic to see me in 2 months.                    Forms received from: [facility]    Forms were insulin treated diabetes mellitus report     Faxed Date:2/27/20 x 2  Original mailed to patient 2/27/20           Again, thank you for allowing me to participate in the care of your patient.      Sincerely,    Pamela Laura PA-C

## 2020-02-27 NOTE — PROGRESS NOTES
HPI   Earle Rene is a 69 year old male with type 2 diabetes mellitus here today for a follow up visit.     I last saw him on in early Jan 2020.    Pt's diabetes is complicated by retinopathy, nephropathy, neuropathy and ED.  Pt also has hx of hyperlipidemia, HTN, obesity, presumed localized prostate cancer, BPH and goiter.  For his diabetes, he is prescribed to take Tresiba 60 units SQ at hs, Humalog 14 units with meals, Metformin 1000 mg BID and Jardiance 10 mg each am.  He has no interest in carb counting or using an I/C ratio.  Earle is still not taking Humalog with lunch. I asked him again to take Humalog 10-12 units with lunch daily.  We downloaded his Freestyle Lora device today and his average glucose was 225 with SD 35 over the past month.  No hypoglycemia.  Pt's A1C is 9.0 % today. His previous A1C was 8.5 % in Dec 2019.  On ROS today, blurred vision and he was recently seen here by [facility].  Chronic numbness in feet.  Less LE edema.  He denies groin yeast infection, dysuria or hematuria at this time.  Pt denies frequent headaches, n/v, SOB at rest, cough, chest pain, abd pain or diarrhea.No foot ulcers.    ROS   Please see under HPI.     ALLERGIES:  Review of patient's allergies indicates no known allergies.      Current Outpatient Medications   Medication Sig Dispense Refill     albuterol (VENTOLIN HFA) 108 (90 Base) MCG/ACT inhaler Inhale 2 puffs into the lungs every 6 hours 18 g 3     ARTIFICIAL TEAR OP Apply to eye as needed       aspirin 81 MG tablet Take 1 tablet by mouth At Bedtime        atorvastatin (LIPITOR) 20 MG tablet Take 1 tablet (20 mg) by mouth daily (Patient taking differently: Take 20 mg by mouth At Bedtime ) 90 tablet 3     blood glucose (NO BRAND SPECIFIED) lancets standard Lancets that go with device, Test 3 times daily 300 each 3     blood glucose monitoring (NO BRAND SPECIFIED) meter device kit Any meter covered by insurance, not store brand, use as directed. 1 kit 0     blood  "glucose monitoring (NO BRAND SPECIFIED) test strip Strips that go with meter, covered by insurance. Test 3 times daily 300 strip 3     Blood Pressure Monitor KIT Automatic Blood Pressure Monitor 1 kit 0     camphor-menthol (DERMASARRA) 0.5-0.5 % external lotion Apply to arms legs torso 1-2 times a day for itching 1 Bottle 11     clobetasol (TEMOVATE) 0.05 % ointment Apply topically to legs twice daily for 2 weeks.  Then discontinue 30 g 0     clotrimazole (LOTRIMIN) 1 % cream Apply topically 2 times daily 30 g 3     Continuous Blood Gluc  (FREESTYLE PETER READER) JUANCARLOS 1 Device daily 1 Device 0     Continuous Blood Gluc Sensor (FREESTYLE PETER 14 DAY SENSOR) MISC 1 applicator every 14 days 8 each 3     empagliflozin (JARDIANCE) 10 MG TABS tablet Take 1 tablet (10 mg) by mouth daily (Patient taking differently: Take 10 mg by mouth every morning ) 90 tablet 1     fenofibrate 54 MG tablet Take 1 tablet (54 mg) by mouth daily (Patient taking differently: Take 54 mg by mouth At Bedtime ) 90 tablet 0     finasteride (PROSCAR) 5 MG tablet Take 1 tablet (5 mg) by mouth daily 90 tablet 2     fluocinonide (LIDEX) 0.05 % external cream APPLY TO AFFECTED AREA TWICE A DAY  3     gabapentin (NEURONTIN) 300 MG capsule TAKE 1 CAPSULE BY MOUTH TWICE A  capsule 1     insulin degludec (TRESIBA FLEXTOUCH) 100 UNIT/ML pen Inject 64 units subcutaneous at bedtime. 60 mL 3     insulin pen needle (B-D U/F) 31G X 5 MM Use 4 times per day.  Please dispense as BD Pen Needle Mini U/F 31G x 5  each 3     insulin syringe 31G X 5/16\" 0.5 ML MISC Use three syringes daily 270 each 1     ketamine 5% gabapentin 8% lidocaine 2.5% topical PLO cream Apply 1 g topically 3 times daily 30 g 3     ketorolac (ACULAR) 0.5 % ophthalmic solution Place 1 drop into the right eye 4 times daily 1 Bottle 0     loratadine (CLARITIN) 10 MG tablet Take 1 tablet (10 mg) by mouth daily (Patient taking differently: Take 10 mg by mouth At Bedtime ) 90 " tablet 0     losartan (COZAAR) 100 MG tablet Take 1 tablet (100 mg) by mouth At Bedtime 30 tablet 11     metFORMIN (GLUCOPHAGE) 1000 MG tablet 1 tab each am only. 180 tablet 3     mupirocin (BACTROBAN) 2 % cream Apply  topically. In very small amounts only as needed 15 g 1     NOVOLOG FLEXPEN 100 UNIT/ML soln Inject 12-14 units with meals, plus correction. Pt uses approx 65 units in 24 hrs. 60 mL 3     ofloxacin (OCUFLOX) 0.3 % ophthalmic solution Apply 1 drop to eye 3 times daily Instill into operative eye(s) per physician instructions. 5 mL 1     Omega-3 Fatty Acids (OMEGA-3 FISH OIL) 1000 MG CAPS Take 1 capsule (1 g) by mouth 2 times daily (Patient taking differently: Take 1 g by mouth as needed (PT last dose a week ago 12.24.19) ) 60 capsule 11     ONETOUCH ULTRA test strip Use to test blood sugar 3 times daily 300 strip 3     prednisoLONE acetate (PRED FORTE) 1 % ophthalmic suspension Apply 1 drop to eye 4 times daily Instill into operative eye(s) per physician instructions. 5 mL 1     sodium bicarbonate 650 MG tablet Take 1 tablet (650 mg) by mouth 3 times daily (Patient taking differently: Take 650 mg by mouth 2 times daily ) 90 tablet 11     tamsulosin (FLOMAX) 0.4 MG capsule Take 1 capsule (0.4 mg) by mouth daily 90 capsule 2     triamcinolone (KENALOG) 0.1 % cream Apply topically 3 times daily 80 g 0     vitamin D3 (CHOLECALCIFEROL) 1000 units (25 mcg) tablet Take 2 tablets (2,000 Units) by mouth daily (Patient taking differently: Take 1,000 Units by mouth 2 times daily ) 30 tablet 11     Family Hx   No change.     Personal Hx   Smoke: none.   ETOH: none.    with grown children.   He owns his own business.    PMH   1. Type 2 Diabetes Mellitus dx at age 43.   2. Neuropathy.  3. Nephropathy.   4. ED.   5. Dyslipidemia.   6. Nephrolithiasis.   7. Decrease auditory acuity.   8. Sarcoidosis-lung.   9. Goiter.   10. S/P T & A.   11. S/P FX right heel.   12. Vit D def.   13. Necrobiosis lipoidica on the  "LE's.   14. CT chest- ? granulomas.   15. Retinopathy.  16. Goiter.  Past Medical History:   Diagnosis Date     Blepharitis of both eyes      BPH (benign prostatic hyperplasia)      Diabetes (H)      Diabetic neuropathy (H)      Diabetic retinopathy associated with diabetes mellitus due to underlying condition (H)      Dry eye syndrome      GERD (gastroesophageal reflux disease)      Goiter      Granulomatous disease (H)      HLD (hyperlipidemia)      HTN (hypertension)      Nonsenile cataract      Peripheral neuropathy      Past Surgical History:   Procedure Laterality Date     ------------OTHER-------------      back of neck abscess drainage in OR     AS RAD RESEC TONSIL/PILLARS Bilateral 1961     CATARACT IOL, RT/LT Left      COLONOSCOPY  7/29/2013    Procedure: COLONOSCOPY;;  Surgeon: Montana Pascal MD;  Location: [facility]     EXCISE MASS UPPER EXTREMITY Right 11/11/2019    Procedure: EXCISION, MASS, UPPER EXTREMITY, RIGHT SHOULDER;  Surgeon: Johana Choudhury MD;  Location: [facility]     INTRAVITREAL INJECTION CHEMOTHERAPY Right 12/30/2019    Procedure: INTRAVITREAL Bevacizumab injection;  Surgeon: Milton Maki MD;  Location: [facility]     PHACOEMULSIFICATION CLEAR CORNEA WITH STANDARD INTRAOCULAR LENS IMPLANT Right 12/30/2019    Procedure: PHACOEMULSIFICATION, CATARACT, WITH INTRAOCULAR LENS IMPLANT;  Surgeon: Milton Maki MD;  Location: [facility]     PHACOEMULSIFICATION WITH STANDARD INTRAOCULAR LENS IMPLANT Left 6/21/2019    Procedure: Left Eye Cataract Removal with Intraocular Lens Implant with Intraoperative Avastin Injection;  Surgeon: Lacey Eugene MD;  Location: [facility] OR     siladenatis  11/2017     Physical Exam   General appearance: Vital signs:   /65   Pulse 80   Ht 1.727 m (5' 8\")   Wt 90.4 kg (199 lb 4.8 oz)   BMI 30.30 kg/m    Estimated body mass index is 30.3 kg/m  as calculated from the following:    Height as of this encounter: 1.727 m (5' 8\").    Weight as of this " encounter: 90.4 kg (199 lb 4.8 oz).  Head:  Normal.   Eyes: Fundi not examined.  Lungs: clear bilateral.  Cardiovascular system:  RRR.   Abdomen:  nontender.  Musculoskeletal system: no edema.   Neurological:  normal.   Skin:  necrobiosis lipoidica on both legs. Multiple skin excoriations.  FEET: no ulcers. Nails are thick.  Abnormal monofilamentous exam.    Results   Creatinine   Date Value Ref Range Status   01/29/2020 1.61 (H) 0.66 - 1.25 mg/dL Final     GFR Estimate   Date Value Ref Range Status   01/29/2020 42 (L) >60 mL/min/[1.73_m2] Final     Comment:     Non  GFR Calc  Starting 12/18/2018, serum creatinine based estimated GFR (eGFR) will be   calculated using the Chronic Kidney Disease Epidemiology Collaboration   (CKD-EPI) equation.       Hemoglobin A1C   Date Value Ref Range Status   06/18/2019 9.2 (H) 0 - 5.6 % Final     Comment:     Normal <5.7% Prediabetes 5.7-6.4%  Diabetes 6.5% or higher - adopted from ADA   consensus guidelines.       Potassium   Date Value Ref Range Status   01/29/2020 4.5 3.4 - 5.3 mmol/L Final     ALT   Date Value Ref Range Status   06/18/2019 38 0 - 70 U/L Final     AST   Date Value Ref Range Status   06/18/2019 24 0 - 45 U/L Final     TSH   Date Value Ref Range Status   01/09/2020 1.24 0.40 - 4.00 mU/L Final     T4 Free   Date Value Ref Range Status   10/21/2014 1.00 0.76 - 1.46 ng/dL Final     Comment:     Effective 7/30/2014, the reference range for this assay has changed to reflect   new instrumentation/methodology.           Cholesterol   Date Value Ref Range Status   06/18/2019 145 <200 mg/dL Final   03/06/2018 101 <200 mg/dL Final     HDL Cholesterol   Date Value Ref Range Status   06/18/2019 38 (L) >39 mg/dL Final   03/06/2018 32 (L) >39 mg/dL Final     LDL Cholesterol Calculated   Date Value Ref Range Status   06/18/2019 49 <100 mg/dL Final     Comment:     Desirable:       <100 mg/dl   03/06/2018 36 <100 mg/dL Final     Comment:     Desirable:       <100  mg/dl     Triglycerides   Date Value Ref Range Status   06/18/2019 289 (H) <150 mg/dL Final     Comment:     Borderline high:  150-199 mg/dl  High:             200-499 mg/dl  Very high:       >499 mg/dl     03/06/2018 166 (H) <150 mg/dL Final     Comment:     Borderline high:  150-199 mg/dl  High:             200-499 mg/dl  Very high:       >499 mg/dl       Cholesterol/HDL Ratio   Date Value Ref Range Status   09/09/2014 3.8 0.0 - 5.0 Final   11/20/2013 5.8 (H) 0.0 - 5.0 Final     A1C     9.0    2/27/2020  A1C     8.5    12/4/2019  A1C     10.0  9/18/2019  A1C      9.2   6/18/2019  A1C      8.4   12/21/2018  A1C      7.4   9/7/2018  A1C      7.1   5/31/2018  A1C      9.1   3/6/2018  A1C      9.6   11/1/2017  A1C      8.9   8/9/2017  A1C      9.7   9/22/2016  A1C      9.7   7/21/2015  A1C     10.2  12/2/2014  A1C     10.2  9/9/2014  A1C      9.8  11/20/2013  A1C      9.3   6/12/2013  A1C      8.0   8/14/2012  A1C      8.2   5/22/2012  A1C      8.0   5/22/2012    ASSESSMENT/PLAN:     1. TYPE 2 DIABETES MELLITUS: Uncontrolled type 2 diabetes mellitus complicated by retinopathy, nephropathy, neuropathy and ED.  Reminded Earle to take Humalog with breakfast, lunch and dinner DAILY- Humalog 14 units with breakfast, 10-12 units with lunch and 14 units with dinner, plus correction insulin.  His FBS values are high.    I had him increase Tresiba 64 units subcutaneous at bedtime. He denies missing his Tresiba dose.  Continue lower dose of Metformin 1000 mg each am in few of CKD.  Continue Jardiance 10 mg each am. Reminded him to keep himself well hydrated.  Again, I reviewed how Jardiance works and possible side effects of the medication including dehydration, groin yeast infection, UTI, hypogylcemia, etc.  Encouraged patient to make healthy food choices, reduce his food portions with meals, avoid snacking and walk daily.   Pt remains on daily ASA.   Pt had the flu vaccine this season.    2. GOITER: Nontender goiter. No  change.  TSH normal on 1/9/2020.    3. NEPHROPATHY: Discussed the need for good glycemic control and good BP control.   He was seen by Nephrology here in Jan 2020.  Pt's most recent creat was 1.61 with GFR 42 mL/min in Jan 2020.  Pt's urine protein +.  His Cozaar dose was increased.      4.  RETINOPATHY:  Seen by Oph here on 2/4/2020 with severe NPDR right eye with macular edema. Retinal edema.    5.  NEUROPATHY: Continue Gabapentin.  No foot ulcers.    6. DYSLIPIDEMIA: LDL 49 in [id number].  Pt is taking Lipitor and Fenofibrate.    7. OBESITY: See under # 1 above.  He does not want to use a GLP-1.    8.   Return to Endocrine Clinic to see me in 2 months.

## 2020-02-27 NOTE — PATIENT INSTRUCTIONS
Increase Tresiba 64 units at bedtime.  Take Humalog 14 units in am, 10-12 units at noon and 14 units in pm.  Continue Metformin 1000 mg each am.  Continue Jardiance 10 mg each am.  See me in 2 months.  Pamela Laura PA-C

## 2020-02-27 NOTE — PROGRESS NOTES
Urology Clinic    Emmanule Cantu MD  Date of Service: 2020     Name: Earle Rene  MRN: [id number]  Age: 69 year old  : 1950  Referring provider: Established Patient     Assessment and Plan:  1. Presumed localized prostate cancer GS=6 and Grade Group 1 on active surveillance since 2017 with no evidence of disease progression. Recent prostate MRI shows only PIRADS 2 lesion and 103 gram prostate.      PSA Trend  2020- 1.94  2019- 3.61  2018- 2.14  2017- 2.52  2017- 8.64  2016- 3.75  2014-2.92     Follow up in 1 year with PSA.     2. BPH with LUTS   He will start taking both Flomax and finasteride daily. If he continues to have bothersome symptoms, he will follow up with Dr. Glass for consideration of HoLEP in 2020.     Attestation:  This patient was seen and evaluated by me, with a scribe taking notes.  I have reviewed the note above and agree.  The physical exam and or any procedures were performed by me and the pertinant details are outlined below.     No evidence of prostate cancer progression on finasteride    Still with significant LUTS (Lower Urinary Tract Symptoms) and maybe some urgency incontinence vs. Overflow    Will set up appt with Dr. Glass prostate size 102 grams even on finasteride    Emmanuel Cantu MD  Department of Urology  [facility]    ______________________________________________________________________    HPI  Earle Rene is a 69 year old male with a history of prostate cancer and being followed by [facility].     Initial PSA at diagnosis: 8.6  1st Biopsy (Date 2017) Jameel Score was 3 + 3  In 1 of 6 cores.  Volume affected was 5%.  Grade Group: 1     2nd Biopsy: NA     MRI (Date 2017)  Lesion #1 location: 4:30 to 5:00 position of posterior lateral left peripheral zone near base  PIRADS Lesion #1 score 4  Lesion #2 location 7 oclock  PIRADS Lesion #2 score 2  Prostate volume 110g  Also PIRADS 3 at 6  "oclock     MRI (Date 02/2020)  PIRADS 2 lesion   Prostate volume: 103 grams     PSA Density 0.03 based on PSA of 1.94 from today      digital rectal exam: deferred      There is no evidence of disease progression to date.    He is using Flomax and finasteride for symptoms of urgency, frequency, and dysuria. He notes that he has only been using one of these daily. He reports that he has urinary frequency, occasional incontinence with sudden strong urges, and post void dribble. He often double voids and often has to void within an hour of urination. His stream is not slow or weak.     Review of Systems:   Pertinent items are noted in HPI or as below, remainder of complete ROS is negative.      Physical Exam:   Patient is a 69 year old  male   Vitals: Blood pressure 114/65, pulse 80, height 1.727 m (5' 8\"), weight 92.5 kg (204 lb).  General Appearance Adult: Alert, no acute distress, oriented  HENT: throat/mouth:normal, good dentition  Lungs: no respiratory distress, or pursed lip breathing  Heart: No obvious jugular venous distension present  Abdomen: Body mass index is 31.02 kg/m .  Musculoskeltal: extremities normal  Skin: no visible suspicious lesions or rashes  Neuro: Alert, oriented, speech and mentation normal  Psych: affect and mood normal  Gait: Normal  : deferred    Laboratory:   I reviewed all applicable laboratory and pathology data and went over findings with patient  Significant for     Lab Results   Component Value Date    CR 1.61 01/29/2020    CR 1.59 01/09/2020    CR 1.83 12/18/2019    CR 1.31 06/26/2019    CR 1.41 06/18/2019    CR 1.31 05/28/2019    CR 1.39 04/10/2019    CR 1.20 03/13/2019    CR 1.36 02/06/2019    CR 1.31 08/01/2018       PSA   Date Value Ref Range Status   02/27/2020 1.94 0 - 4 ug/L Final     Comment:     Assay Method:  Chemiluminescence using Siemens Vista analyzer   05/28/2019 3.61 0 - 4 ug/L Final     Comment:     Assay Method:  Chemiluminescence using Siemens Vista analyzer "   05/24/2018 2.14 0 - 4 ug/L Final     Comment:     Assay Method:  Chemiluminescence using Siemens Vista analyzer   11/01/2017 2.52 0 - 4 ug/L Final     Comment:     Assay Method:  Chemiluminescence using Siemens Vista analyzer   06/06/2017 8.64 (H) 0 - 4 ug/L Final     Comment:     Assay Method:  Chemiluminescence using Siemens Vista analyzer   01/14/2016 3.75 0 - 4 ug/L Final   09/09/2014 2.92 0 - 4 ug/L Final   06/12/2013 2.18 0 - 4 ug/L Final     Comment:     PSA results are about 7% lower than our prior method due to a methodology   change   on August 30, 2011.   02/02/2012 1.77 0 - 4 ug/L Final     Comment:     PSA results are about 7% lower than our prior method due to a methodology   change   on August 30, 2011.   03/08/2011 1.37 0 - 4 ug/L Final       Results for orders placed or performed in visit on 08/10/10   Routine UA with microscopic   Result Value Ref Range    Source Midstream Urine     Color Urine Yellow     Appearance Urine Clear     Glucose Urine 70 (A) NEG mg/dL    Bilirubin Urine Negative NEG    Ketones Urine Negative NEG mg/dL    Specific Gravity Urine 1.019 1.003 - 1.035    Blood Urine Negative NEG    pH Urine 5.5 5.0 - 7.0 pH    Protein Albumin Urine 10 (A) NEG mg/dL    Urobilinogen mg/dL Normal 0.0 - 2.0 mg/dL    Nitrite Urine Negative NEG    Leukocyte Esterase Urine Negative NEG    WBC Urine 1 0 - 2 /HPF    RBC Urine <1 0 - 2 /HPF    Hyaline Casts 3 (H) 0 - 2 /LPF    Mucous Urine Present (A) NEG /LPF       Imaging:   I personally reviewed the following imaging studies, interpreted them, and discussed findings with the patient.     Results for orders placed or performed in visit on 02/09/20   MR Prostate wo & w Contrast    Narrative    MRI PROSTATE: 2/9/2020 4:42 PM    CLINICAL HISTORY: prostate cancer; Malignant neoplasm of prostate (H)  . Jameel 6, 3+3 at the right base on prior biopsy.    Most Recent PSA: 3.61 ug/L    Comparison: 7/3/2019.    TECHNIQUE:  The following sequences were  obtained: High-resolution axial  T2-weighted, coronal T2-weighted, 3D volumetric T2-weighted, axial  pre-contrast T1, axial diffusion-weighted, axial apparent diffusion  coefficient and axial dynamic contrast-enhanced T1. Postcontrast  images were evaluated on a separate workstation to evaluate dynamic  contrast enhancement. The technique of this exam is PI-RADS v2.1  compliant. Contrast dose: 9 mL Gadavist    FINDINGS:  Size: 103 grams  Hemorrhage: Absent  Peripheral zone: Heterogeneous on T2-weighted images. Regions of  mildly decreased signal on ADC or DWI which are best characterized as  PI-RADS 2 without highly suspicious lesion.  Transition zone: Enlarged with BPH changes. Transition zone nodules  which are circumscribed or mostly encapsulated without diffusion  restriction.  PI-RADS 2.  No highly suspicious nodules. Prominent  median lobe impressing the urinary bladder floor. Extruded BPH nodule  at the right base, 7:00 position.    Neurovascular bundles: No neurovascular bundle involvement by  malignancy.    Seminal vesicles: No seminal vesicle involvement by malignancy.   Lymph nodes: No lymph node involvement   Bones: Heterogeneous bone marrow signal. Degenerative changes of the  spine. Heterogeneous bone enhancement with foci of enhancement in the  bones, for example in the left iliac bone, series 16 image 10 as well  as in the right innominate bone posteriorly, series 16 image 10,  indeterminate however stable. Enthesopathic changes off the pelvis and  hips. Stable wedge compression deformity of the vertebral body L4,  partially visualized.  Other pelvic organs: Urinary bladder wall thickening and trabeculation  likely due to chronic outlet obstructive changes.        Impression    IMPRESSION:  1. Based on the most suspicious abnormality, this exam is  characterized as PIRADS 2 - Clinically significant cancer is unlikely  to be present.  2. No convincing suspicious adenopathy or evidence of  pelvic  metastases.  3. Heterogeneous bone marrow signal and enhancement with scattered  foci of enhancement, indeterminate however stable from 7/3/2019.  Attention on follow-up studies.      PIRADS? v2.1 Assessment Categories   PIRADS 1: Very low (clinically significant cancer is highly unlikely  to be present)   PIRADS 2: Low (clinically significant cancer is unlikely to be  present)   PIRADS 3: Intermediate (the presence of clinically significant cancer  is equivocal)   PIRADS 4: High (clinically significant cancer is likely to be present)    PIRADS 5: Very high (clinically significant cancer is highly likely to  be present)               MARTA MORENO MD       Scribe Disclosure:  Iliana YUNG, am serving as a scribe to document services personally performed by Emmanuel Cantu MD at this visit, based upon the provider's statements to me. All documentation has been reviewed by the aforementioned provider prior to being entered into the official medical record.

## 2020-02-27 NOTE — PROGRESS NOTES
Forms received from: [employer]    Forms were insulin treated diabetes mellitus report     Faxed Date:2/27/20 x 2  Original mailed to patient 2/27/20

## 2020-03-07 DIAGNOSIS — K21.9 GASTROESOPHAGEAL REFLUX DISEASE, ESOPHAGITIS PRESENCE NOT SPECIFIED: ICD-10-CM

## 2020-03-07 NOTE — TELEPHONE ENCOUNTER
ranitidine (ZANTAC) 300 MG tablet    Last Written Prescription Date:  2/6/19  Last Fill Quantity: 90,   # refills: 3  Last Office Visit :9/24/19  [facility]  Future Office visit: none      Routing refill request to provider for review/approval because: med end date  12/24/19

## 2020-03-09 DIAGNOSIS — E11.3411 SEVERE NONPROLIFERATIVE DIABETIC RETINOPATHY OF RIGHT EYE WITH MACULAR EDEMA ASSOCIATED WITH TYPE 2 DIABETES MELLITUS (H): Primary | ICD-10-CM

## 2020-03-23 ENCOUNTER — TELEPHONE (OUTPATIENT)
Dept: ENDOCRINOLOGY | Facility: CLINIC | Age: 70
End: 2020-03-23

## 2020-03-23 NOTE — TELEPHONE ENCOUNTER
" [facility]    Phone Message    May a detailed message be left on voicemail: yes     Reason for Call: Other: Pt stated that he has lost his \"device\" to check his blood sugars.  Pt described it as being \" not the one you poke yourself with,\" and unable to provide more detail. Writer unsure of which device he is referring to. Pt stated that he has been unable to check his blood sugar the last couple of weeks. Please call back to discuss lost device, and replacement options.     Action Taken: Message routed to:  [facility] endo    Travel Screening: Not Applicable                                                                        "

## 2020-03-24 DIAGNOSIS — Z79.4 TYPE 2 DIABETES MELLITUS WITH HYPERGLYCEMIA, WITH LONG-TERM CURRENT USE OF INSULIN (H): ICD-10-CM

## 2020-03-24 DIAGNOSIS — E11.65 TYPE 2 DIABETES MELLITUS WITH HYPERGLYCEMIA, WITH LONG-TERM CURRENT USE OF INSULIN (H): ICD-10-CM

## 2020-03-24 RX ORDER — FLASH GLUCOSE SENSOR
1 KIT MISCELLANEOUS
Qty: 8 EACH | Refills: 3 | Status: SHIPPED | OUTPATIENT
Start: 2020-03-24 | End: 2021-05-19

## 2020-03-24 RX ORDER — FLASH GLUCOSE SENSOR
1 KIT MISCELLANEOUS DAILY
Qty: 1 DEVICE | Refills: 0 | Status: SHIPPED | OUTPATIENT
Start: 2020-03-24 | End: 2022-03-10

## 2020-04-03 DIAGNOSIS — K21.9 GASTROESOPHAGEAL REFLUX DISEASE WITHOUT ESOPHAGITIS: Primary | ICD-10-CM

## 2020-04-04 RX ORDER — FAMOTIDINE 20 MG/1
20 TABLET, FILM COATED ORAL 2 TIMES DAILY
Qty: 180 TABLET | Refills: 3 | Status: SHIPPED | OUTPATIENT
Start: 2020-04-04 | End: 2021-05-10

## 2020-04-09 DIAGNOSIS — M79.2 POLYNEUROPATHIC PAIN: ICD-10-CM

## 2020-04-10 NOTE — TELEPHONE ENCOUNTER
gabapentin (NEURONTIN) 300 MG capsule      Last Written Prescription Date:  12-13-19  Last Fill Quantity: 120,   # refills: 1  Last Office Visit : 8-16-19  Future Office visit:  none    Routing refill request to provider for review/approval because:  Not on protocol.        Kathleen M Doege RN

## 2020-04-14 RX ORDER — GABAPENTIN 300 MG/1
CAPSULE ORAL
Qty: 120 CAPSULE | Refills: 1 | Status: SHIPPED | OUTPATIENT
Start: 2020-04-14 | End: 2020-06-11

## 2020-04-24 ENCOUNTER — PRE VISIT (OUTPATIENT)
Dept: UROLOGY | Facility: CLINIC | Age: 70
End: 2020-04-24

## 2020-04-24 ENCOUNTER — TELEPHONE (OUTPATIENT)
Dept: ENDOCRINOLOGY | Facility: CLINIC | Age: 70
End: 2020-04-24

## 2020-04-24 DIAGNOSIS — Z79.4 TYPE 2 DIABETES MELLITUS WITH BOTH EYES AFFECTED BY MILD NONPROLIFERATIVE RETINOPATHY AND MACULAR EDEMA, WITH LONG-TERM CURRENT USE OF INSULIN (H): ICD-10-CM

## 2020-04-24 DIAGNOSIS — E11.3213 TYPE 2 DIABETES MELLITUS WITH BOTH EYES AFFECTED BY MILD NONPROLIFERATIVE RETINOPATHY AND MACULAR EDEMA, WITH LONG-TERM CURRENT USE OF INSULIN (H): ICD-10-CM

## 2020-04-24 DIAGNOSIS — S92.009A: ICD-10-CM

## 2020-04-24 DIAGNOSIS — E11.42 DIABETIC POLYNEUROPATHY ASSOCIATED WITH TYPE 2 DIABETES MELLITUS (H): Primary | ICD-10-CM

## 2020-04-24 NOTE — TELEPHONE ENCOUNTER
Spoke to Pt: Confirms it is the [facility].   Sent to provider for review. Pending order faxed.   Lorena Bravo RN on 4/24/2020 at 12:03 PM

## 2020-04-24 NOTE — TELEPHONE ENCOUNTER
Terri Arzola MA  [facility] Triage-    Caller: Unspecified (Today, 10:51 AM)               Can you send an order for patient?

## 2020-04-24 NOTE — TELEPHONE ENCOUNTER
Reason for Visit: Referral from Dr. Cantu    Diagnosis: BPH with urinary obstruction    Orders/Procedures/Records: Records available    Contact Patient: N/A    Rooming Requirements: Virtual check in      Bryanna Kauffman  04/24/20  3:15 PM

## 2020-04-24 NOTE — TELEPHONE ENCOUNTER
[facility] Call Center    Phone Message    May a detailed message be left on voicemail: yes     Reason for Call: Order(s): Other:   Reason for requested: Diabetic shoes; pt states he has an appt at 3pm today for a fitting, and he needs orders sent to the clinic on [address]. Pt did not know the exact name of the building or their fax number. Writer assumed pt is going to the [facility] at [address] in [place]. Their fax is [phone], please advise.     Date needed: ASAP  Provider name: Pamela STAFFORD    Action Taken: Message routed to:  [facility] Endocrinology    Travel Screening: Not Applicable

## 2020-04-27 ENCOUNTER — TELEPHONE (OUTPATIENT)
Dept: UROLOGY | Facility: CLINIC | Age: 70
End: 2020-04-27

## 2020-04-27 NOTE — TELEPHONE ENCOUNTER
Left voicemail informing the patient that the clinic is converting appointments to Video or Telephone Visits due to COVID-19. Patient was instructed to call the clinic back at [phone].      When the patient calls back please convert their upcoming appointment to either a Video or Telephone Visit. If the patient chooses a Video Visit they only need access to a smartphone. Dr. Glass will send them an automated text message with a link to their video visit.    Bryanna Kauffman, EMT

## 2020-04-27 NOTE — PROGRESS NOTES
"Earle Rene is a 70 year old male who is being evaluated via a billable telephone visit.      The patient has been notified of following:     \"This telephone visit will be conducted via a call between you and your physician/provider. We have found that certain health care needs can be provided without the need for a physical exam.  This service lets us provide the care you need with a short phone conversation.  If a prescription is necessary we can send it directly to your pharmacy.  If lab work is needed we can place an order for that and you can then stop by our lab to have the test done at a later time.    Telephone visits are billed at different rates depending on your insurance coverage. During this emergency period, for some insurers they may be billed the same as an in-person visit.  Please reach out to your insurance provider with any questions.    If during the course of the call the physician/provider feels a telephone visit is not appropriate, you will not be charged for this service.\"    Patient has given verbal consent for Telephone visit?  Yes    How would you like to obtain your AVS? Mail a copy      Leigh Ann Lee MA    Due to the COVID 19 pandemic this visit was converted to a telephone visit in order to help prevent spread of infection in this patient and the general population.    Time of start: 9:00 am  Time of end: 9:17 am  Total duration of telephone visit: 17 minutes.    HPI   Earle Rene is a 70 year old male with type 2 diabetes mellitus. Telephone visit today for diabetes follow up.   Pt's diabetes is complicated by retinopathy, nephropathy, neuropathy and ED.  Pt also has hx of hyperlipidemia, HTN, obesity, presumed localized prostate cancer, BPH and goiter.  For his diabetes, he is prescribed to take Tresiba 60 units SQ at hs, Humalog 14 units with breakfast, 10-12 units at noon and 14 units with evening meal.    He is also taking Metformin 1000 mg once a day and Jardiance 10 mg " each am.  He has no interest in carb counting or using an I/C ratio.  Earle is still not taking Humalog with lunch. I asked him again to take Humalog 10-12 units with lunch daily.  He tells me his FBS are in the  range.  He has not been checking his blood sugar later in the day.  He denies any symptoms of hypoglycemia.  Pt's A1C was 9.0 % on 2/27/2020.  His previous A1C was 8.5 % in Dec 2019.  On ROS today, blurred vision and he was seen here by [facility] in 2/2020 with severe NPDR with macular edema.  Chronic numbness in feet.   He denies groin yeast infection, dysuria or hematuria at this time.  Pt denies frequent headaches, n/v, SOB at rest, cough, chest pain, abd pain or diarrhea.  Earle denies foot ulcers at this time.    ROS   Please see under HPI.     ALLERGIES:  Review of patient's allergies indicates no known allergies.      Current Outpatient Medications   Medication Sig Dispense Refill     albuterol (VENTOLIN HFA) 108 (90 Base) MCG/ACT inhaler Inhale 2 puffs into the lungs every 6 hours (Patient not taking: Reported on 4/27/2020) 18 g 3     ARTIFICIAL TEAR OP Apply to eye as needed       aspirin 81 MG tablet Take 1 tablet by mouth At Bedtime        atorvastatin (LIPITOR) 20 MG tablet Take 1 tablet (20 mg) by mouth daily (Patient taking differently: Take 20 mg by mouth At Bedtime ) 90 tablet 3     blood glucose (NO BRAND SPECIFIED) lancets standard Lancets that go with device, Test 3 times daily 300 each 3     blood glucose monitoring (NO BRAND SPECIFIED) meter device kit Any meter covered by insurance, not store brand, use as directed. 1 kit 0     blood glucose monitoring (NO BRAND SPECIFIED) test strip Strips that go with meter, covered by insurance. Test 3 times daily 300 strip 3     Blood Pressure Monitor KIT Automatic Blood Pressure Monitor 1 kit 0     camphor-menthol (DERMASARRA) 0.5-0.5 % external lotion Apply to arms legs torso 1-2 times a day for itching 1 Bottle 11     clobetasol (TEMOVATE)  "0.05 % ointment Apply topically to legs twice daily for 2 weeks.  Then discontinue 30 g 0     clotrimazole (LOTRIMIN) 1 % cream Apply topically 2 times daily 30 g 3     Continuous Blood Gluc  (FREESTYLE PETER READER) JUANCARLOS 1 Device daily Use to read blood sugars   as  instruction 4 times daily 1 Device 0     Continuous Blood Gluc Sensor (FREESTYLE PETER 14 DAY SENSOR) MISC 1 applicator every 14 days Change every 14 days 8 each 3     empagliflozin (JARDIANCE) 10 MG TABS tablet Take 1 tablet (10 mg) by mouth daily (Patient taking differently: Take 10 mg by mouth every morning ) 90 tablet 1     famotidine (PEPCID) 20 MG tablet Take 1 tablet (20 mg) by mouth 2 times daily 180 tablet 3     fenofibrate 54 MG tablet Take 1 tablet (54 mg) by mouth daily (Patient taking differently: Take 54 mg by mouth At Bedtime ) 90 tablet 0     finasteride (PROSCAR) 5 MG tablet Take 1 tablet (5 mg) by mouth daily 90 tablet 2     fluocinonide (LIDEX) 0.05 % external cream APPLY TO AFFECTED AREA TWICE A DAY  3     gabapentin (NEURONTIN) 300 MG capsule TAKE 1 CAPSULE BY MOUTH TWICE A  capsule 1     insulin degludec (TRESIBA FLEXTOUCH) 100 UNIT/ML pen Inject 64 units subcutaneous at bedtime. 60 mL 3     insulin pen needle (B-D U/F) 31G X 5 MM Use 4 times per day.  Please dispense as BD Pen Needle Mini U/F 31G x 5  each 3     insulin syringe 31G X 5/16\" 0.5 ML MISC Use three syringes daily 270 each 1     ketamine 5% gabapentin 8% lidocaine 2.5% topical PLO cream Apply 1 g topically 3 times daily 30 g 3     ketorolac (ACULAR) 0.5 % ophthalmic solution Place 1 drop into the right eye 4 times daily 1 Bottle 0     loratadine (CLARITIN) 10 MG tablet Take 1 tablet (10 mg) by mouth daily (Patient taking differently: Take 10 mg by mouth At Bedtime ) 90 tablet 0     losartan (COZAAR) 100 MG tablet Take 1 tablet (100 mg) by mouth At Bedtime 30 tablet 11     metFORMIN (GLUCOPHAGE) 1000 MG tablet 1 tab each am only. 180 " tablet 3     mupirocin (BACTROBAN) 2 % cream Apply  topically. In very small amounts only as needed 15 g 1     NOVOLOG FLEXPEN 100 UNIT/ML soln Inject 12-14 units with meals, plus correction. Pt uses approx 65 units in 24 hrs. 60 mL 3     ofloxacin (OCUFLOX) 0.3 % ophthalmic solution Apply 1 drop to eye 3 times daily Instill into operative eye(s) per physician instructions. 5 mL 1     Omega-3 Fatty Acids (OMEGA-3 FISH OIL) 1000 MG CAPS Take 1 capsule (1 g) by mouth 2 times daily (Patient taking differently: Take 1 g by mouth as needed (PT last dose a week ago 12.24.19) ) 60 capsule 11     ONETOUCH ULTRA test strip Use to test blood sugar 3 times daily 300 strip 3     prednisoLONE acetate (PRED FORTE) 1 % ophthalmic suspension Apply 1 drop to eye 4 times daily Instill into operative eye(s) per physician instructions. 5 mL 1     ranitidine (ZANTAC) 300 MG tablet Take 1 tablet (300 mg) by mouth At Bedtime 90 tablet 3     sodium bicarbonate 650 MG tablet Take 1 tablet (650 mg) by mouth 3 times daily (Patient taking differently: Take 650 mg by mouth 2 times daily ) 90 tablet 11     tamsulosin (FLOMAX) 0.4 MG capsule Take 1 capsule (0.4 mg) by mouth daily 90 capsule 2     triamcinolone (KENALOG) 0.1 % cream Apply topically 3 times daily 80 g 0     vitamin D3 (CHOLECALCIFEROL) 1000 units (25 mcg) tablet Take 2 tablets (2,000 Units) by mouth daily (Patient taking differently: Take 1,000 Units by mouth 2 times daily ) 30 tablet 11     Family Hx   No change.     Personal Hx   Smoke: none.   ETOH: none.    with grown children.   He owns his own business.    PMH   1. Type 2 Diabetes Mellitus dx at age 43.   2. Neuropathy.  3. Nephropathy.   4. ED.   5. Dyslipidemia.   6. Nephrolithiasis.   7. Decrease auditory acuity.   8. Sarcoidosis-lung.   9. Goiter.   10. S/P T & A.   11. S/P FX right heel.   12. Vit D def.   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   14. CT chest- ? granulomas.   15. Retinopathy.  16. Goiter.  Past  Medical History:   Diagnosis Date     Blepharitis of both eyes      BPH (benign prostatic hyperplasia)      Diabetes (H)      Diabetic neuropathy (H)      Diabetic retinopathy associated with diabetes mellitus due to underlying condition (H)      Dry eye syndrome      GERD (gastroesophageal reflux disease)      Goiter      Granulomatous disease (H)      HLD (hyperlipidemia)      HTN (hypertension)      Nonsenile cataract      Peripheral neuropathy      Past Surgical History:   Procedure Laterality Date     ------------OTHER-------------      back of neck abscess drainage in OR     AS RAD RESEC TONSIL/PILLARS Bilateral 1961     CATARACT IOL, RT/LT Left      COLONOSCOPY  7/29/2013    Procedure: COLONOSCOPY;;  Surgeon: Montana Pascal MD;  Location: [facility]     EXCISE MASS UPPER EXTREMITY Right 11/11/2019    Procedure: EXCISION, MASS, UPPER EXTREMITY, RIGHT SHOULDER;  Surgeon: Johana Choudhury MD;  Location: [facility]     INTRAVITREAL INJECTION CHEMOTHERAPY Right 12/30/2019    Procedure: INTRAVITREAL Bevacizumab injection;  Surgeon: Milton Maki MD;  Location: [facility]     PHACOEMULSIFICATION CLEAR CORNEA WITH STANDARD INTRAOCULAR LENS IMPLANT Right 12/30/2019    Procedure: PHACOEMULSIFICATION, CATARACT, WITH INTRAOCULAR LENS IMPLANT;  Surgeon: Milton Maki MD;  Location: [facility]     PHACOEMULSIFICATION WITH STANDARD INTRAOCULAR LENS IMPLANT Left 6/21/2019    Procedure: Left Eye Cataract Removal with Intraocular Lens Implant with Intraoperative Avastin Injection;  Surgeon: Lacey Eugene MD;  Location: [facility]     siladenatis  11/2017     Physical Exam     No exam today.    Results   Creatinine   Date Value Ref Range Status   01/29/2020 1.61 (H) 0.66 - 1.25 mg/dL Final     GFR Estimate   Date Value Ref Range Status   01/29/2020 42 (L) >60 mL/min/[1.73_m2] Final     Comment:     Non  GFR Calc  Starting 12/18/2018, serum creatinine based estimated GFR (eGFR) will be   calculated using the  Chronic Kidney Disease Epidemiology Collaboration   (CKD-EPI) equation.       Hemoglobin A1C   Date Value Ref Range Status   06/18/2019 9.2 (H) 0 - 5.6 % Final     Comment:     Normal <5.7% Prediabetes 5.7-6.4%  Diabetes 6.5% or higher - adopted from ADA   consensus guidelines.       Potassium   Date Value Ref Range Status   01/29/2020 4.5 3.4 - 5.3 mmol/L Final     ALT   Date Value Ref Range Status   06/18/2019 38 0 - 70 U/L Final     AST   Date Value Ref Range Status   06/18/2019 24 0 - 45 U/L Final     TSH   Date Value Ref Range Status   01/09/2020 1.24 0.40 - 4.00 mU/L Final     T4 Free   Date Value Ref Range Status   10/21/2014 1.00 0.76 - 1.46 ng/dL Final     Comment:     Effective 7/30/2014, the reference range for this assay has changed to reflect   new instrumentation/methodology.           Cholesterol   Date Value Ref Range Status   06/18/2019 145 <200 mg/dL Final   03/06/2018 101 <200 mg/dL Final     HDL Cholesterol   Date Value Ref Range Status   06/18/2019 38 (L) >39 mg/dL Final   03/06/2018 32 (L) >39 mg/dL Final     LDL Cholesterol Calculated   Date Value Ref Range Status   06/18/2019 49 <100 mg/dL Final     Comment:     Desirable:       <100 mg/dl   03/06/2018 36 <100 mg/dL Final     Comment:     Desirable:       <100 mg/dl     Triglycerides   Date Value Ref Range Status   06/18/2019 289 (H) <150 mg/dL Final     Comment:     Borderline high:  150-199 mg/dl  High:             200-499 mg/dl  Very high:       >499 mg/dl     03/06/2018 166 (H) <150 mg/dL Final     Comment:     Borderline high:  150-199 mg/dl  High:             200-499 mg/dl  Very high:       >499 mg/dl       Cholesterol/HDL Ratio   Date Value Ref Range Status   09/09/2014 3.8 0.0 - 5.0 Final   11/20/2013 5.8 (H) 0.0 - 5.0 Final     A1C     9.0    2/27/2020  A1C     8.5    12/4/2019  A1C     10.0  9/18/2019  A1C      9.2   6/18/2019  A1C      8.4   12/21/2018  A1C      7.4   9/7/2018  A1C      7.1   5/31/2018  A1C      9.1   3/6/2018  A1C       9.6   11/1/2017  A1C      8.9   8/9/2017  A1C      9.7   9/22/2016  A1C      9.7   7/21/2015  A1C     10.2  12/2/2014  A1C     10.2  9/9/2014  A1C      9.8  11/20/2013  A1C      9.3   6/12/2013  A1C      8.0   8/14/2012  A1C      8.2   5/22/2012  A1C      8.0   5/22/2012    ASSESSMENT/PLAN:     1. TYPE 2 DIABETES MELLITUS: Type 2 diabetes mellitus complicated by retinopathy, nephropathy, neuropathy and ED.  Reminded Earle to take Humalog with breakfast, lunch and dinner DAILY- Humalog 14 units with breakfast, 10-12 units with lunch and 14 units with dinner, plus correction insulin.  He is to take Tresiba 64 units subcutaneous at bedtime.   Continue lower dose of Metformin 1000 mg each am in few of CKD.  Continue Jardiance 10 mg each am. Reminded him to keep himself well hydrated.  Again, I reviewed how Jardiance works and possible side effects of the medication including dehydration, groin yeast infection, UTI, hypogylcemia, etc.  Pt instructed to check his fasting blood sugar each am and also check his blood sugar prelunch, predinner and at bedtime daily and I plan to call him in 2 weeks and have him upload his Freestyle blood sugar data for review.  Encouraged patient to make healthy food choices, reduce his food portions with meals, avoid snacking and walk daily.   Pt remains on daily ASA.     2. GOITER: TSH normal on 1/9/2020.    3. NEPHROPATHY: Discussed the need for good glycemic control and good BP control.   He was seen by Nephrology here in Jan 2020.  Pt's most recent creat was 1.61 with GFR 42 mL/min in Jan 2020.  Pt's urine protein +.  His Cozaar dose was increased.      4.  RETINOPATHY:  Seen by [facility] here on 2/4/2020 with severe NPDR right eye with macular edema. Retinal edema.    5.  NEUROPATHY: Continue Gabapentin.  Pt denies any foot ulcers at this time.    6. DYSLIPIDEMIA: LDL 49 in [address]25[address].  Pt is taking Lipitor and Fenofibrate.    7. OBESITY: See under # 1 above.  He does not want to use a  GLP-1.    8.   Return to Endocrine Clinic to see me in 2 months.  A1C and labs ordered.

## 2020-04-28 ENCOUNTER — VIRTUAL VISIT (OUTPATIENT)
Dept: ENDOCRINOLOGY | Facility: CLINIC | Age: 70
End: 2020-04-28
Payer: COMMERCIAL

## 2020-04-28 DIAGNOSIS — E11.65 TYPE 2 DIABETES MELLITUS WITH HYPERGLYCEMIA, WITH LONG-TERM CURRENT USE OF INSULIN (H): Primary | ICD-10-CM

## 2020-04-28 DIAGNOSIS — Z79.4 TYPE 2 DIABETES MELLITUS WITH HYPERGLYCEMIA, WITH LONG-TERM CURRENT USE OF INSULIN (H): Primary | ICD-10-CM

## 2020-05-01 ENCOUNTER — TELEPHONE (OUTPATIENT)
Dept: UROLOGY | Facility: CLINIC | Age: 70
End: 2020-05-01

## 2020-05-01 ENCOUNTER — TELEPHONE (OUTPATIENT)
Dept: ENDOCRINOLOGY | Facility: CLINIC | Age: 70
End: 2020-05-01

## 2020-05-01 NOTE — TELEPHONE ENCOUNTER
Called to schedule patient for 2 week follow up. He declined to schedule at this time and will call back to schedule.

## 2020-05-01 NOTE — TELEPHONE ENCOUNTER
[facility] Call Center    Phone Message    May a detailed message be left on voicemail: yes     Reason for Call: Other: Orthodics is still waiting for 2 forms they require to move ahead from back on 4/24.  Please FU on these faxes and expedite their return.     Action Taken: Message routed to:  [facility] endocrine    Travel Screening: Not Applicable

## 2020-05-01 NOTE — TELEPHONE ENCOUNTER
Spoke to a family member to leave the call back number [phone]. Family member was instructed to have the patient call this number ASAP in regards to an upcoming appointment.     When the patient calls back please convert their upcoming appointment to either a Video or Telephone Visit. If the patient chooses a Video Visit they will need access to a smart-phone. Dr. Glass uses the [employer] and the patient should expect an automated text message containing a link to their Video Visit.        Bryanna Kauffman, EMT

## 2020-05-05 DIAGNOSIS — N18.30 CHRONIC KIDNEY DISEASE, STAGE III (MODERATE) (H): Primary | ICD-10-CM

## 2020-05-06 NOTE — TELEPHONE ENCOUNTER
Patient has not seen an MD and a PA is not able to sign off on form patient needs to make an appointment for a diabetic foot exam.

## 2020-05-11 ENCOUNTER — TELEPHONE (OUTPATIENT)
Dept: UROLOGY | Facility: CLINIC | Age: 70
End: 2020-05-11

## 2020-05-11 DIAGNOSIS — E11.21 TYPE 2 DIABETES MELLITUS WITH DIABETIC NEPHROPATHY, WITH LONG-TERM CURRENT USE OF INSULIN (H): ICD-10-CM

## 2020-05-11 DIAGNOSIS — Z79.4 TYPE 2 DIABETES MELLITUS WITH DIABETIC NEPHROPATHY, WITH LONG-TERM CURRENT USE OF INSULIN (H): ICD-10-CM

## 2020-05-11 RX ORDER — LOSARTAN POTASSIUM 100 MG/1
100 TABLET ORAL AT BEDTIME
Qty: 30 TABLET | Refills: 11 | Status: SHIPPED | OUTPATIENT
Start: 2020-05-11 | End: 2021-05-10

## 2020-05-11 NOTE — TELEPHONE ENCOUNTER
Spoke with patient to convert their upcoming visit with Dr. Glass to a Telephone Visit. Patient declined Video Visit option.      Bryanna Kauffman EMT

## 2020-05-15 ENCOUNTER — TELEPHONE (OUTPATIENT)
Dept: ENDOCRINOLOGY | Facility: CLINIC | Age: 70
End: 2020-05-15

## 2020-05-15 ENCOUNTER — VIRTUAL VISIT (OUTPATIENT)
Dept: ENDOCRINOLOGY | Facility: CLINIC | Age: 70
End: 2020-05-15
Payer: COMMERCIAL

## 2020-05-15 DIAGNOSIS — E11.65 TYPE 2 DIABETES MELLITUS WITH HYPERGLYCEMIA, WITH LONG-TERM CURRENT USE OF INSULIN (H): ICD-10-CM

## 2020-05-15 DIAGNOSIS — G62.9 NEUROPATHY: Primary | ICD-10-CM

## 2020-05-15 DIAGNOSIS — Z79.4 TYPE 2 DIABETES MELLITUS WITH HYPERGLYCEMIA, WITH LONG-TERM CURRENT USE OF INSULIN (H): ICD-10-CM

## 2020-05-15 RX ORDER — INSULIN ASPART 100 [IU]/ML
INJECTION, SOLUTION INTRAVENOUS; SUBCUTANEOUS
Qty: 60 ML | Refills: 3
Start: 2020-05-15 | End: 2021-03-28

## 2020-05-15 RX ORDER — PERPHENAZINE 16 MG
600 TABLET ORAL DAILY
Qty: 90 CAPSULE | Refills: 3 | Status: SHIPPED | OUTPATIENT
Start: 2020-05-15

## 2020-05-15 NOTE — TELEPHONE ENCOUNTER
----- Message from Lala Villanueva MD sent at 5/15/2020  8:07 AM CDT -----  Apparently there are some forms that I need to sign for him - please email me and I can sign - it was about his orthotics?

## 2020-05-15 NOTE — PATIENT INSTRUCTIONS
Alpha lipoic acid at 600 mg daily - for nerve pain    Novolog at 15 units with meals, ok to take 8 units if small meals    Talk with Dr. Hare about thyroid nodule on right side and also osteoporosis (noted fracture on back in June 2017 CT scan)

## 2020-05-15 NOTE — LETTER
Patient:  Earle Rene  :   1950  MRN:     [id number]        Mr.Kurosh Rene  [address] [place] [address]        May 15, 2020    Dear Edgard,    Here are our thoughts.    Dr. Villanueva    Alpha lipoic acid at 600 mg daily - for nerve pain    Novolog at 15 units with meals, ok to take 8 units if small meals    Talk with Dr. Hare about thyroid nodule on right side and also osteoporosis (noted fracture on back in 2017 CT scan)

## 2020-05-15 NOTE — PROGRESS NOTES
"Earle Rene is a 70 year old male who is being evaluated via a billable telephone visit.      The patient has been notified of following:     \"This telephone visit will be conducted via a call between you and your physician/provider. We have found that certain health care needs can be provided without the need for a physical exam.  This service lets us provide the care you need with a short phone conversation.  If a prescription is necessary we can send it directly to your pharmacy.  If lab work is needed we can place an order for that and you can then stop by our lab to have the test done at a later time.    Telephone visits are billed at different rates depending on your insurance coverage. During this emergency period, for some insurers they may be billed the same as an in-person visit.  Please reach out to your insurance provider with any questions.    If during the course of the call the physician/provider feels a telephone visit is not appropriate, you will not be charged for this service.\"    Patient has given verbal consent for Telephone visit?  Yes    What phone number would you like to be contacted at? [phone]    How would you like to obtain your AVS? Mail a copy      Average for 7 days 217   This morning BS was at 190          Earle is a 70 year old male presents today for  Type 2 diabetes    Due to the COVID 19 pandemic this visit was converted to a telephone/virtual  visit in order to help prevent spread of infection in this high risk patient and the general population .      (Telephone call: start time at 8:04, stop time at 836, total time 32 min)     HPI  #1 type 2 diabetes  Had diabetes since 1995. On Jardiance (started in December 2019), Tresiba at 64 units daily, metformin at 1000 mg daily, and Novolog 12-14 units with meals. Has noted urinary frequency with the Jardiance. Using freestyle cyrus sensor - started in 2019 and enjoys it.  Cost is acceptable. On lipitor, cozaar.    January 2020 " Cr of 1.61    ROS related to diabetes  CV: hx of negative stress test in January 2016  Neuro:positive - reports some tingling in feet  Neph: positive - on losartan  Eye: noted retinopathy - on avastin  Infections: UTI - on jardiance  Dental: pending  Immunizations: pending    Needs orthotics form filled out    #2 Goiter  Seen on 6/2017 CT - noted 2 cm nodule in right thyroid Unknown per pt report.     #3 osteoporosis  Noted compression fracture on 6/17 CT scan. Pt denies any problems with fractures. Reports heel fracture due to accident. Pt doesn't eat calcium.    Past Medical History  Past Medical History:   Diagnosis Date     Blepharitis of both eyes      BPH (benign prostatic hyperplasia)      Diabetes (H)      Diabetic neuropathy (H)      Diabetic retinopathy associated with diabetes mellitus due to underlying condition (H)      Dry eye syndrome      GERD (gastroesophageal reflux disease)      Goiter      Granulomatous disease (H)      HLD (hyperlipidemia)      HTN (hypertension)      Nonsenile cataract      Peripheral neuropathy        Allergies  No Known Allergies  Medications  Current Outpatient Medications   Medication Sig Dispense Refill     albuterol (VENTOLIN HFA) 108 (90 Base) MCG/ACT inhaler Inhale 2 puffs into the lungs every 6 hours (Patient not taking: Reported on 4/27/2020) 18 g 3     ARTIFICIAL TEAR OP Apply to eye as needed       aspirin 81 MG tablet Take 1 tablet by mouth At Bedtime        atorvastatin (LIPITOR) 20 MG tablet Take 1 tablet (20 mg) by mouth daily (Patient taking differently: Take 20 mg by mouth At Bedtime ) 90 tablet 3     blood glucose (NO BRAND SPECIFIED) lancets standard Lancets that go with device, Test 3 times daily 300 each 3     blood glucose monitoring (NO BRAND SPECIFIED) meter device kit Any meter covered by insurance, not store brand, use as directed. 1 kit 0     blood glucose monitoring (NO BRAND SPECIFIED) test strip Strips that go with meter, covered by insurance.  "Test 3 times daily 300 strip 3     Blood Pressure Monitor KIT Automatic Blood Pressure Monitor 1 kit 0     camphor-menthol (DERMASARRA) 0.5-0.5 % external lotion Apply to arms legs torso 1-2 times a day for itching 1 Bottle 11     clobetasol (TEMOVATE) 0.05 % ointment Apply topically to legs twice daily for 2 weeks.  Then discontinue 30 g 0     clotrimazole (LOTRIMIN) 1 % cream Apply topically 2 times daily 30 g 3     Continuous Blood Gluc  (FREESTYLE PETER READER) JUANCARLOS 1 Device daily Use to read blood sugars   as  instruction 4 times daily 1 Device 0     Continuous Blood Gluc Sensor (FREESTYLE PETER 14 DAY SENSOR) MISC 1 applicator every 14 days Change every 14 days 8 each 3     empagliflozin (JARDIANCE) 10 MG TABS tablet Take 1 tablet (10 mg) by mouth daily (Patient taking differently: Take 10 mg by mouth every morning ) 90 tablet 1     famotidine (PEPCID) 20 MG tablet Take 1 tablet (20 mg) by mouth 2 times daily 180 tablet 3     fenofibrate 54 MG tablet Take 1 tablet (54 mg) by mouth daily (Patient taking differently: Take 54 mg by mouth At Bedtime ) 90 tablet 0     finasteride (PROSCAR) 5 MG tablet Take 1 tablet (5 mg) by mouth daily 90 tablet 2     fluocinonide (LIDEX) 0.05 % external cream APPLY TO AFFECTED AREA TWICE A DAY  3     gabapentin (NEURONTIN) 300 MG capsule TAKE 1 CAPSULE BY MOUTH TWICE A  capsule 1     insulin degludec (TRESIBA FLEXTOUCH) 100 UNIT/ML pen Inject 64 units subcutaneous at bedtime. 60 mL 3     insulin pen needle (B-D U/F) 31G X 5 MM Use 4 times per day.  Please dispense as BD Pen Needle Mini U/F 31G x 5  each 3     insulin syringe 31G X 5/16\" 0.5 ML MISC Use three syringes daily 270 each 1     ketamine 5% gabapentin 8% lidocaine 2.5% topical PLO cream Apply 1 g topically 3 times daily 30 g 3     ketorolac (ACULAR) 0.5 % ophthalmic solution Place 1 drop into the right eye 4 times daily 1 Bottle 0     loratadine (CLARITIN) 10 MG tablet Take 1 tablet (10 " mg) by mouth daily (Patient taking differently: Take 10 mg by mouth At Bedtime ) 90 tablet 0     losartan (COZAAR) 100 MG tablet Take 1 tablet (100 mg) by mouth At Bedtime 30 tablet 11     metFORMIN (GLUCOPHAGE) 1000 MG tablet 1 tab each am only. 180 tablet 3     mupirocin (BACTROBAN) 2 % cream Apply  topically. In very small amounts only as needed 15 g 1     NOVOLOG FLEXPEN 100 UNIT/ML soln Inject 12-14 units with meals, plus correction. Pt uses approx 65 units in 24 hrs. 60 mL 3     ofloxacin (OCUFLOX) 0.3 % ophthalmic solution Apply 1 drop to eye 3 times daily Instill into operative eye(s) per physician instructions. 5 mL 1     Omega-3 Fatty Acids (OMEGA-3 FISH OIL) 1000 MG CAPS Take 1 capsule (1 g) by mouth 2 times daily (Patient taking differently: Take 1 g by mouth as needed (PT last dose a week ago 12.24.19) ) 60 capsule 11     ONETOUCH ULTRA test strip Use to test blood sugar 3 times daily 300 strip 3     prednisoLONE acetate (PRED FORTE) 1 % ophthalmic suspension Apply 1 drop to eye 4 times daily Instill into operative eye(s) per physician instructions. 5 mL 1     ranitidine (ZANTAC) 300 MG tablet Take 1 tablet (300 mg) by mouth At Bedtime 90 tablet 3     sodium bicarbonate 650 MG tablet Take 1 tablet (650 mg) by mouth 3 times daily (Patient taking differently: Take 650 mg by mouth 2 times daily ) 90 tablet 11     tamsulosin (FLOMAX) 0.4 MG capsule Take 1 capsule (0.4 mg) by mouth daily 90 capsule 2     triamcinolone (KENALOG) 0.1 % cream Apply topically 3 times daily 80 g 0     vitamin D3 (CHOLECALCIFEROL) 1000 units (25 mcg) tablet Take 2 tablets (2,000 Units) by mouth daily (Patient taking differently: Take 1,000 Units by mouth 2 times daily ) 30 tablet 11     Family History  family history includes Diabetes in his brother and father; Leukemia (age of onset: 44) in his brother; Myocardial Infarction in his father.  Social History  Social History     Socioeconomic History     Marital status:       Spouse name: Not on file     Number of children: 5     Years of education: Not on file     Highest education level: Not on file   Occupational History     Occupation: private bussiness owner   Social Needs     Financial resource strain: Not on file     Food insecurity     Worry: Not on file     Inability: Not on file     Transportation needs     Medical: Not on file     Non-medical: Not on file   Tobacco Use     Smoking status: Never Smoker     Smokeless tobacco: Never Used   Substance and Sexual Activity     Alcohol use: Yes     Comment: occasionaly     Drug use: No     Sexual activity: Not on file   Lifestyle     Physical activity     Days per week: Not on file     Minutes per session: Not on file     Stress: Not on file   Relationships     Social connections     Talks on phone: Not on file     Gets together: Not on file     Attends Protestant service: Not on file     Active member of club or organization: Not on file     Attends meetings of clubs or organizations: Not on file     Relationship status: Not on file     Intimate partner violence     Fear of current or ex partner: Not on file     Emotionally abused: Not on file     Physically abused: Not on file     Forced sexual activity: Not on file   Other Topics Concern     Parent/sibling w/ CABG, MI or angioplasty before 65F 55M? Not Asked   Social History Narrative     Not on file       ROS  Reports pain with right hand      Physical Exam  There were no vitals taken for this visit.  There is no height or weight on file to calculate BMI.    Exam:  Vital signs and physical exam not available.  However the patient reports feeling well. Psych: Alert and oriented times 3; coherent speech, normal  rate and volume, able to articulate logical thoughts, able to abstract reason, no tangential thoughts, no hallucinations or delusions          RESULTS  Lab Results   Component Value Date    HGB 14.6 01/29/2020     Lab Results   Component Value Date    TSH 1.24 01/09/2020     "T4 1.00 10/21/2014              Lab Results   Component Value Date    LDL 49 06/18/2019        No results found for: MICROALBUMIN  Lab Results   Component Value Date    ALBUMIN 3.7 01/29/2020           Lab Results   Component Value Date    ALT 38 06/18/2019               No results found for: CREATININE          ASSESSMENT:    #1 type 2 diabetes  Pt to increase Novolog to 15 units with meals (ok to take 8 units if smaller meals), continue with the Jardiance at 10 mg daily, metformin at 1000 mg daily, Tresiba at 64 units daily. On lipitor and cozaar and CGMS. Pt to take alpha lipoic acid at 600 mg daily - script sent to pharmacy.     #2 Right sided thyroid nodule  Seen on 6/2017 CT - noted 2 cm nodule in right side.  I recommended thyroid ultrasound with consideration of FNA.  Patient reluctant to consider this option.  Pt would like to talk with Dr. Hare first.     #3 osteoporosis  Noted compression fracture on 6/17 CT scan.  Nephrology has ordered vitamin D which has not been done yet.  Pt doesn't eat calcium. Pt denies any problems with fractures. Hx of reflux. Would recommend DXA. Pt to think about this and would like to discuss with Dr. Hare.     Patient to continue follow-up of diabetes with Karla Laura.  Patient to see me in 6 months for review of aforementioned issues.    The patient has been notified of following:     \"This telephone visit will be conducted via a call between you and your physician/provider. We have found that certain health care needs can be provided without the need for a physical exam.  This service lets us provide the care you need with a short phone conversation.  If a prescription is necessary we can send it directly to your pharmacy.  If lab work is needed we can place an order for that and you can then stop by our lab to have the test done at a later time.    Telephone visits are billed at different rates depending on your insurance coverage. During this emergency period, for " "some insurers they may be billed the same as an in-person visit.  Please reach out to your insurance provider with any questions.    If during the course of the call the physician/provider feels a telephone visit is not appropriate, you will not be charged for this service.\"    Patient has given verbal consent for Telephone visit?  Yes    (Telephone call: start time at 8:04, stop time at 836, total time 32 min)    "

## 2020-05-15 NOTE — TELEPHONE ENCOUNTER
[facility]  Address: [address] [place] [address] [address], [place] [address]  Phone: [phone], [phone]  Fax:  Tisha potter/ Leigh Ann  [employer] medicare forms will be faxed over for signature. Previous forms of 04/24 were not medicare compliant as Pt needed diabetic follow up.   Lorena Bravo RN on 5/15/2020 at 8:41 AM

## 2020-05-19 ENCOUNTER — VIRTUAL VISIT (OUTPATIENT)
Dept: UROLOGY | Facility: CLINIC | Age: 70
End: 2020-05-19
Payer: COMMERCIAL

## 2020-05-19 DIAGNOSIS — N40.1 BENIGN NON-NODULAR PROSTATIC HYPERPLASIA WITH LOWER URINARY TRACT SYMPTOMS: ICD-10-CM

## 2020-05-19 DIAGNOSIS — N40.1 BENIGN PROSTATIC HYPERPLASIA WITH URINARY OBSTRUCTION: ICD-10-CM

## 2020-05-19 DIAGNOSIS — N13.8 BENIGN PROSTATIC HYPERPLASIA WITH URINARY OBSTRUCTION: ICD-10-CM

## 2020-05-19 ASSESSMENT — PAIN SCALES - GENERAL: PAINLEVEL: NO PAIN (0)

## 2020-05-19 NOTE — PROGRESS NOTES
Earle Rene is a 70 year old male who is being evaluated via a billable telephone visit.                UROLOGY TELEPHONE FOLLOW-UP NOTE           Chief Complaint:   BPH/LUTS         Interval Update    Earle Rene is a very pleasant 70-year-old gentleman.    Brief  History: He has previously followed with Dr. weight for presumed localized prostate cancer.  He was identified as having low-grade [place] 6 prostate cancer on a biopsy in 2017.  His subsequent follow-up has been notable for a stable, if not decreasing, PSA level.  Additionally he underwent a multi-parametric prostate MRI 3 months ago which did not show any PI-RADS 3 or greater lesions.  Of note his prostate was noted to be quite large about 100 g and in the context of some progressive lower urinary tract symptoms he was referred to me for discussion of bladder outlet obstruction.    Today notes: He has some moderate bother from his lower urinary tract symptoms.  He describes some slow stream some frequency occasional nocturia 1-2 times.  He is not very familiar with the available treatment options for BPH surgery.    He did recently start Flomax and finasteride.  He believes he has had some modest improvement in his symptoms since then.      Labs and Pathology:    I personally reviewed all applicable laboratory data and went over findings with patient  Significant for:    CBC RESULTS:  Recent Labs   Lab Test 01/29/20  1315 06/18/19  1054 03/13/19  1516 02/06/19  1322  05/01/18  1233   WBC  --  4.6 5.2 6.1  --  4.6   HGB 14.6 13.5 13.7 13.8   < > 14.7   PLT  --  130* 131* 225  --  148*    < > = values in this interval not displayed.        BMP RESULTS:  Recent Labs   Lab Test 01/29/20  1315 01/09/20  0839 12/18/19  1218 06/26/19  1217 06/18/19  1054 05/28/19  0948     --   --  140 136 138   POTASSIUM 4.5 4.4  --  4.3 4.3 4.3   CHLORIDE 104  --   --  110* 106 108   CO2 23  --   --  23 22 21   ANIONGAP 8  --   --  7 8 9   *  --   --  142*  195* 320*   BUN 34*  --   --  19 27 23   CR 1.61* 1.59* 1.83* 1.31* 1.41* 1.31*   GFRESTIMATED 42* 43* 36* 55* 50* 55*   GFRESTBLACK 49* 50* 42* 63 58* 63   INDERJIT 9.2  --   --  9.0 9.3 9.4       UA RESULTS:   Recent Labs   Lab Test 01/09/20  0849 12/18/19  1220 05/01/18  1238   SG 1.015 1.021 1.016   URINEPH 5.0 5.0 5.0   NITRITE Negative Negative Negative   RBCU 1 129* 1   WBCU <1 125* <1       PSA RESULTS  PSA   Date Value Ref Range Status   02/27/2020 1.94 0 - 4 ug/L Final     Comment:     Assay Method:  Chemiluminescence using Siemens Vista analyzer   05/28/2019 3.61 0 - 4 ug/L Final     Comment:     Assay Method:  Chemiluminescence using Siemens Vista analyzer   05/24/2018 2.14 0 - 4 ug/L Final     Comment:     Assay Method:  Chemiluminescence using Siemens Vista analyzer   11/01/2017 2.52 0 - 4 ug/L Final     Comment:     Assay Method:  Chemiluminescence using Siemens Vista analyzer   06/06/2017 8.64 (H) 0 - 4 ug/L Final     Comment:     Assay Method:  Chemiluminescence using Siemens Vista analyzer   01/14/2016 3.75 0 - 4 ug/L Final   09/09/2014 2.92 0 - 4 ug/L Final   06/12/2013 2.18 0 - 4 ug/L Final     Comment:     PSA results are about 7% lower than our prior method due to a methodology   change   on August 30, 2011.   02/02/2012 1.77 0 - 4 ug/L Final     Comment:     PSA results are about 7% lower than our prior method due to a methodology   change   on August 30, 2011.   03/08/2011 1.37 0 - 4 ug/L Final           Imaging:    I personally reviewed all applicable imaging and went over the below findings with patient.    Results for orders placed or performed in visit on 02/09/20   MR Prostate wo & w Contrast    Narrative    MRI PROSTATE: 2/9/2020 4:42 PM    CLINICAL HISTORY: prostate cancer; Malignant neoplasm of prostate (H)  . [place]ame[place] 6, 3+3 at the right base on prior biopsy.    Most Recent PSA: 3.61 ug/L    Comparison: 7/3/2019.    TECHNIQUE:  The following sequences were obtained: High-resolution  axial  T2-weighted, coronal T2-weighted, 3D volumetric T2-weighted, axial  pre-contrast T1, axial diffusion-weighted, axial apparent diffusion  coefficient and axial dynamic contrast-enhanced T1. Postcontrast  images were evaluated on a separate workstation to evaluate dynamic  contrast enhancement. The technique of this exam is PI-RADS v2.1  compliant. Contrast dose: 9 mL Gadavist    FINDINGS:  Size: 103 grams  Hemorrhage: Absent  Peripheral zone: Heterogeneous on T2-weighted images. Regions of  mildly decreased signal on ADC or DWI which are best characterized as  PI-RADS 2 without highly suspicious lesion.  Transition zone: Enlarged with BPH changes. Transition zone nodules  which are circumscribed or mostly encapsulated without diffusion  restriction.  PI-RADS 2.  No highly suspicious nodules. Prominent  median lobe impressing the urinary bladder floor. Extruded BPH nodule  at the right base, 7:00 position.    Neurovascular bundles: No neurovascular bundle involvement by  malignancy.    Seminal vesicles: No seminal vesicle involvement by malignancy.   Lymph nodes: No lymph node involvement   Bones: Heterogeneous bone marrow signal. Degenerative changes of the  spine. Heterogeneous bone enhancement with foci of enhancement in the  bones, for example in the left iliac bone, series 16 image 10 as well  as in the right innominate bone posteriorly, series 16 image 10,  indeterminate however stable. Enthesopathic changes off the pelvis and  hips. Stable wedge compression deformity of the vertebral body L4,  partially visualized.  Other pelvic organs: Urinary bladder wall thickening and trabeculation  likely due to chronic outlet obstructive changes.        Impression    IMPRESSION:  1. Based on the most suspicious abnormality, this exam is  characterized as PIRADS 2 - Clinically significant cancer is unlikely  to be present.  2. No convincing suspicious adenopathy or evidence of pelvic  metastases.  3. Heterogeneous  bone marrow signal and enhancement with scattered  foci of enhancement, indeterminate however stable from 7/3/2019.  Attention on follow-up studies.      PIRADS? v2.1 Assessment Categories   PIRADS 1: Very low (clinically significant cancer is highly unlikely  to be present)   PIRADS 2: Low (clinically significant cancer is unlikely to be  present)   PIRADS 3: Intermediate (the presence of clinically significant cancer  is equivocal)   PIRADS 4: High (clinically significant cancer is likely to be present)    PIRADS 5: Very high (clinically significant cancer is highly likely to  be present)               MARTA MORENO MD              Assessment/Plan   70 year old male with history of large gland prostate as well as low-grade prostate cancer.  -We discussed the various bladder outlet obstruction prostate surgeries and interventions available for a large gland.  We reviewed some of the risks benefits and alternatives to these procedures.  He asked many good questions.  Ultimately he did not feel as though his symptoms were bothersome enough to consider intervention at this time.  -We will plan to monitor his PSA as he did recently start finasteride which could have some effect on further reducing his PSA level.  -We will plan to see him back in 3 to 4 months with an updated PSA and symptom check.  -He will continue Flomax and finasteride in the time being.  We will make sure he has sufficient refills.               Past Medical History:     Past Medical History:   Diagnosis Date     Blepharitis of both eyes      BPH (benign prostatic hyperplasia)      Diabetes (H)      Diabetic neuropathy (H)      Diabetic retinopathy associated with diabetes mellitus due to underlying condition (H)      Dry eye syndrome      GERD (gastroesophageal reflux disease)      Goiter      Granulomatous disease (H)      HLD (hyperlipidemia)      HTN (hypertension)      Nonsenile cataract      Peripheral neuropathy             Past  Surgical History:     Past Surgical History:   Procedure Laterality Date     ------------OTHER-------------      back of neck abscess drainage in OR     AS RAD RESEC TONSIL/PILLARS Bilateral 1961     CATARACT IOL, RT/LT Left      COLONOSCOPY  7/29/2013    Procedure: COLONOSCOPY;;  Surgeon: Montana Pascal MD;  Location: [facility]     EXCISE MASS UPPER EXTREMITY Right 11/11/2019    Procedure: EXCISION, MASS, UPPER EXTREMITY, RIGHT SHOULDER;  Surgeon: Johana Choudhury MD;  Location: [facility]     INTRAVITREAL INJECTION CHEMOTHERAPY Right 12/30/2019    Procedure: INTRAVITREAL Bevacizumab injection;  Surgeon: Milton Maki MD;  Location: [facility]     PHACOEMULSIFICATION CLEAR CORNEA WITH STANDARD INTRAOCULAR LENS IMPLANT Right 12/30/2019    Procedure: PHACOEMULSIFICATION, CATARACT, WITH INTRAOCULAR LENS IMPLANT;  Surgeon: Milton Maki MD;  Location: [facility]     PHACOEMULSIFICATION WITH STANDARD INTRAOCULAR LENS IMPLANT Left 6/21/2019    Procedure: Left Eye Cataract Removal with Intraocular Lens Implant with Intraoperative Avastin Injection;  Surgeon: Lacey Eugene MD;  Location: [facility]     siladenatis  11/2017            Medications     Current Outpatient Medications   Medication     albuterol (VENTOLIN HFA) 108 (90 Base) MCG/ACT inhaler     alpha-lipoic acid 600 MG capsule     ARTIFICIAL TEAR OP     aspirin 81 MG tablet     atorvastatin (LIPITOR) 20 MG tablet     blood glucose (NO BRAND SPECIFIED) lancets standard     blood glucose monitoring (NO BRAND SPECIFIED) meter device kit     blood glucose monitoring (NO BRAND SPECIFIED) test strip     Blood Pressure Monitor KIT     camphor-menthol (DERMASARRA) 0.5-0.5 % external lotion     clobetasol (TEMOVATE) 0.05 % ointment     clotrimazole (LOTRIMIN) 1 % cream     Continuous Blood Gluc  (FREESTYLE PETER READER) JUANCARLOS     Continuous Blood Gluc Sensor (FREESTYLE PETER 14 DAY SENSOR) MISC     empagliflozin (JARDIANCE) 10 MG TABS tablet     famotidine  "(PEPCID) 20 MG tablet     fenofibrate 54 MG tablet     finasteride (PROSCAR) 5 MG tablet     fluocinonide (LIDEX) 0.05 % external cream     gabapentin (NEURONTIN) 300 MG capsule     insulin degludec (TRESIBA FLEXTOUCH) 100 UNIT/ML pen     insulin pen needle (B-D U/F) 31G X 5 MM     insulin syringe 31G X 5/16\" 0.5 ML MISC     ketamine 5% gabapentin 8% lidocaine 2.5% topical PLO cream     ketorolac (ACULAR) 0.5 % ophthalmic solution     loratadine (CLARITIN) 10 MG tablet     losartan (COZAAR) 100 MG tablet     metFORMIN (GLUCOPHAGE) 1000 MG tablet     mupirocin (BACTROBAN) 2 % cream     NOVOLOG FLEXPEN 100 UNIT/ML soln     ofloxacin (OCUFLOX) 0.3 % ophthalmic solution     Omega-3 Fatty Acids (OMEGA-3 FISH OIL) 1000 MG CAPS     ONETOUCH ULTRA test strip     prednisoLONE acetate (PRED FORTE) 1 % ophthalmic suspension     ranitidine (ZANTAC) 300 MG tablet     sodium bicarbonate 650 MG tablet     tamsulosin (FLOMAX) 0.4 MG capsule     triamcinolone (KENALOG) 0.1 % cream     vitamin D3 (CHOLECALCIFEROL) 1000 units (25 mcg) tablet     Current Facility-Administered Medications   Medication     bevacizumab (AVASTIN) intravitreal inj 1.25 mg            Family History:     Family History   Problem Relation Age of Onset     Diabetes Father      Myocardial Infarction Father      Diabetes Brother      Leukemia Brother 44     Glaucoma No family hx of      Macular Degeneration No family hx of      Kidney Disease No family hx of             Social History:     Social History     Socioeconomic History     Marital status:      Spouse name: Not on file     Number of children: 5     Years of education: Not on file     Highest education level: Not on file   Occupational History     Occupation: private bussiness owner   Social Needs     Financial resource strain: Not on file     Food insecurity     Worry: Not on file     Inability: Not on file     Transportation needs     Medical: Not on file     Non-medical: Not on file   Tobacco " "Use     Smoking status: Never Smoker     Smokeless tobacco: Never Used   Substance and Sexual Activity     Alcohol use: Yes     Comment: occasionaly     Drug use: No     Sexual activity: Not on file   Lifestyle     Physical activity     Days per week: Not on file     Minutes per session: Not on file     Stress: Not on file   Relationships     Social connections     Talks on phone: Not on file     Gets together: Not on file     Attends [employer] service: Not on file     Active member of club or organization: Not on file     Attends meetings of clubs or organizations: Not on file     Relationship status: Not on file     Intimate partner violence     Fear of current or ex partner: Not on file     Emotionally abused: Not on file     Physically abused: Not on file     Forced sexual activity: Not on file   Other Topics Concern     Parent/sibling w/ CABG, MI or angioplasty before 65F 55M? Not Asked   Social History Narrative     Not on file            Allergies:   Patient has no known allergies.         Review of Systems:  From intake questionnaire   Negative 14 system review except as noted on HPI, nurse's note.        CC:  Marcio Hare      The patient has been notified of following:     \"This telephone visit will be conducted via a call between you and your physician/provider. We have found that certain health care needs can be provided without the need for a physical exam.  This service lets us provide the care you need with a short phone conversation.  If a prescription is necessary we can send it directly to your pharmacy.  If lab work is needed we can place an order for that and you can then stop by our lab to have the test done at a later time.    Telephone visits are billed at different rates depending on your insurance coverage. During this emergency period, for some insurers they may be billed the same as an in-person visit.  Please reach out to your insurance provider with any questions.    If during the " "course of the call the physician/provider feels a telephone visit is not appropriate, you will not be charged for this service.\"    Patient has given verbal consent for Telephone visit?  Yes    What phone number would you like to be contacted at? [phone]    How would you like to obtain your AVS? Mail a copy    Phone call duration: 14 minutes    Kenrick Glass MD    "

## 2020-05-19 NOTE — LETTER
5/19/2020       RE: Earle Rene  [address]  [place] [address]     Dear Colleague,    Thank you for referring your patient, Earle Rene, to the [facility]OLOG[facility] AND INST FOR PROSTATE AND UROLOGIC CANCERS at [facility] Please see a copy of my visit note below.    Earle Rene is a 70 year old male who is being evaluated via a billable telephone visit.                UROLOGY TELEPHONE FOLLOW-UP NOTE           Chief Complaint:   BPH/LUTS         Interval Update    Earle Rene is a very pleasant 70-year-old gentleman.    Brief  History: He has previously followed with Dr. weight for presumed localized prostate cancer.  He was identified as having low-grade [place] 6 prostate cancer on a biopsy in 2017.  His subsequent follow-up has been notable for a stable, if not decreasing, PSA level.  Additionally he underwent a multi-parametric prostate MRI 3 months ago which did not show any PI-RADS 3 or greater lesions.  Of note his prostate was noted to be quite large about 100 g and in the context of some progressive lower urinary tract symptoms he was referred to me for discussion of bladder outlet obstruction.    Today notes: He has some moderate bother from his lower urinary tract symptoms.  He describes some slow stream some frequency occasional nocturia 1-2 times.  He is not very familiar with the available treatment options for BPH surgery.    He did recently start Flomax and finasteride.  He believes he has had some modest improvement in his symptoms since then.      Labs and Pathology:    I personally reviewed all applicable laboratory data and went over findings with patient  Significant for:    CBC RESULTS:  Recent Labs   Lab Test 01/29/20  1315 06/18/19  1054 03/13/19  1516 02/06/19  1322  05/01/18  1233   WBC  --  4.6 5.2 6.1  --  4.6   HGB 14.6 13.5 13.7 13.8   < > 14.7   PLT  --  130* 131* 225  --  148*    < > = values in this interval not displayed.         BMP RESULTS:  Recent Labs   Lab Test 01/29/20  1315 01/09/20  0839 12/18/19  1218 06/26/19  1217 06/18/19  1054 05/28/19  0948     --   --  140 136 138   POTASSIUM 4.5 4.4  --  4.3 4.3 4.3   CHLORIDE 104  --   --  110* 106 108   CO2 23  --   --  23 22 21   ANIONGAP 8  --   --  7 8 9   *  --   --  142* 195* 320*   BUN 34*  --   --  19 27 23   CR 1.61* 1.59* 1.83* 1.31* 1.41* 1.31*   GFRESTIMATED 42* 43* 36* 55* 50* 55*   GFRESTBLACK 49* 50* 42* 63 58* 63   INDERJIT 9.2  --   --  9.0 9.3 9.4       UA RESULTS:   Recent Labs   Lab Test 01/09/20  0849 12/18/19  1220 05/01/18  1238   SG 1.015 1.021 1.016   URINEPH 5.0 5.0 5.0   NITRITE Negative Negative Negative   RBCU 1 129* 1   WBCU <1 125* <1       PSA RESULTS  PSA   Date Value Ref Range Status   02/27/2020 1.94 0 - 4 ug/L Final     Comment:     Assay Method:  Chemiluminescence using Siemens Vista analyzer   05/28/2019 3.61 0 - 4 ug/L Final     Comment:     Assay Method:  Chemiluminescence using Siemens Vista analyzer   05/24/2018 2.14 0 - 4 ug/L Final     Comment:     Assay Method:  Chemiluminescence using Siemens Vista analyzer   11/01/2017 2.52 0 - 4 ug/L Final     Comment:     Assay Method:  Chemiluminescence using Siemens Vista analyzer   06/06/2017 8.64 (H) 0 - 4 ug/L Final     Comment:     Assay Method:  Chemiluminescence using Siemens Vista analyzer   01/14/2016 3.75 0 - 4 ug/L Final   09/09/2014 2.92 0 - 4 ug/L Final   06/12/2013 2.18 0 - 4 ug/L Final     Comment:     PSA results are about 7% lower than our prior method due to a methodology   change   on August 30, 2011.   02/02/2012 1.77 0 - 4 ug/L Final     Comment:     PSA results are about 7% lower than our prior method due to a methodology   change   on August 30, 2011.   03/08/2011 1.37 0 - 4 ug/L Final           Imaging:    I personally reviewed all applicable imaging and went over the below findings with patient.    Results for orders placed or performed in visit on 02/09/20   MR  Prostate wo & w Contrast    Narrative    MRI PROSTATE: 2/9/2020 4:42 PM    CLINICAL HISTORY: prostate cancer; Malignant neoplasm of prostate (H)  . [place] 6, 3+3 at the right base on prior biopsy.    Most Recent PSA: 3.61 ug/L    Comparison: 7/3/2019.    TECHNIQUE:  The following sequences were obtained: High-resolution axial  T2-weighted, coronal T2-weighted, 3D volumetric T2-weighted, axial  pre-contrast T1, axial diffusion-weighted, axial apparent diffusion  coefficient and axial dynamic contrast-enhanced T1. Postcontrast  images were evaluated on a separate workstation to evaluate dynamic  contrast enhancement. The technique of this exam is PI-RADS v2.1  compliant. Contrast dose: 9 mL Gadavist    FINDINGS:  Size: 103 grams  Hemorrhage: Absent  Peripheral zone: Heterogeneous on T2-weighted images. Regions of  mildly decreased signal on ADC or DWI which are best characterized as  PI-RADS 2 without highly suspicious lesion.  Transition zone: Enlarged with BPH changes. Transition zone nodules  which are circumscribed or mostly encapsulated without diffusion  restriction.  PI-RADS 2.  No highly suspicious nodules. Prominent  median lobe impressing the urinary bladder floor. Extruded BPH nodule  at the right base, 7:00 position.    Neurovascular bundles: No neurovascular bundle involvement by  malignancy.    Seminal vesicles: No seminal vesicle involvement by malignancy.   Lymph nodes: No lymph node involvement   Bones: Heterogeneous bone marrow signal. Degenerative changes of the  spine. Heterogeneous bone enhancement with foci of enhancement in the  bones, for example in the left iliac bone, series 16 image 10 as well  as in the right innominate bone posteriorly, series 16 image 10,  indeterminate however stable. Enthesopathic changes off the pelvis and  hips. Stable wedge compression deformity of the vertebral body L4,  partially visualized.  Other pelvic organs: Urinary bladder wall thickening and  trabeculation  likely due to chronic outlet obstructive changes.        Impression    IMPRESSION:  1. Based on the most suspicious abnormality, this exam is  characterized as PIRADS 2 - Clinically significant cancer is unlikely  to be present.  2. No convincing suspicious adenopathy or evidence of pelvic  metastases.  3. Heterogeneous bone marrow signal and enhancement with scattered  foci of enhancement, indeterminate however stable from 7/3/2019.  Attention on follow-up studies.      PIRADS? v2.1 Assessment Categories   PIRADS 1: Very low (clinically significant cancer is highly unlikely  to be present)   PIRADS 2: Low (clinically significant cancer is unlikely to be  present)   PIRADS 3: Intermediate (the presence of clinically significant cancer  is equivocal)   PIRADS 4: High (clinically significant cancer is likely to be present)    PIRADS 5: Very high (clinically significant cancer is highly likely to  be present)               MARTA MORENO MD              Assessment/Plan   70 year old male with history of large gland prostate as well as low-grade prostate cancer.  -We discussed the various bladder outlet obstruction prostate surgeries and interventions available for a large gland.  We reviewed some of the risks benefits and alternatives to these procedures.  He asked many good questions.  Ultimately he did not feel as though his symptoms were bothersome enough to consider intervention at this time.  -We will plan to monitor his PSA as he did recently start finasteride which could have some effect on further reducing his PSA level.  -We will plan to see him back in 3 to 4 months with an updated PSA and symptom check.  -He will continue Flomax and finasteride in the time being.  We will make sure he has sufficient refills.               Past Medical History:     Past Medical History:   Diagnosis Date     Blepharitis of both eyes      BPH (benign prostatic hyperplasia)      Diabetes (H)      Diabetic  neuropathy (H)      Diabetic retinopathy associated with diabetes mellitus due to underlying condition (H)      Dry eye syndrome      GERD (gastroesophageal reflux disease)      Goiter      Granulomatous disease (H)      HLD (hyperlipidemia)      HTN (hypertension)      Nonsenile cataract      Peripheral neuropathy             Past Surgical History:     Past Surgical History:   Procedure Laterality Date     ------------OTHER-------------      back of neck abscess drainage in OR     AS RAD RESEC TONSIL/PILLARS Bilateral 1961     CATARACT IOL, RT/LT Left      COLONOSCOPY  7/29/2013    Procedure: COLONOSCOPY;;  Surgeon: Montana Pascal MD;  Location: [facility]     EXCISE MASS UPPER EXTREMITY Right 11/11/2019    Procedure: EXCISION, MASS, UPPER EXTREMITY, RIGHT SHOULDER;  Surgeon: Johana Choudhury MD;  Location: [facility]     INTRAVITREAL INJECTION CHEMOTHERAPY Right 12/30/2019    Procedure: INTRAVITREAL Bevacizumab injection;  Surgeon: Milton Maki MD;  Location: [facility]     PHACOEMULSIFICATION CLEAR CORNEA WITH STANDARD INTRAOCULAR LENS IMPLANT Right 12/30/2019    Procedure: PHACOEMULSIFICATION, CATARACT, WITH INTRAOCULAR LENS IMPLANT;  Surgeon: Milton Maki MD;  Location: [facility]     PHACOEMULSIFICATION WITH STANDARD INTRAOCULAR LENS IMPLANT Left 6/21/2019    Procedure: Left Eye Cataract Removal with Intraocular Lens Implant with Intraoperative Avastin Injection;  Surgeon: Lacey Euegne MD;  Location: [facility] OR     siladenatis  11/2017            Medications     Current Outpatient Medications   Medication     albuterol (VENTOLIN HFA) 108 (90 Base) MCG/ACT inhaler     alpha-lipoic acid 600 MG capsule     ARTIFICIAL TEAR OP     aspirin 81 MG tablet     atorvastatin (LIPITOR) 20 MG tablet     blood glucose (NO BRAND SPECIFIED) lancets standard     blood glucose monitoring (NO BRAND SPECIFIED) meter device kit     blood glucose monitoring (NO BRAND SPECIFIED) test strip     Blood Pressure Monitor KIT      "camphor-menthol (DERMASARRA) 0.5-0.5 % external lotion     clobetasol (TEMOVATE) 0.05 % ointment     clotrimazole (LOTRIMIN) 1 % cream     Continuous Blood Gluc  (FREESTYLE PETER READER) JUANCARLOS     Continuous Blood Gluc Sensor (FREESTYLE PETER 14 DAY SENSOR) MISC     empagliflozin (JARDIANCE) 10 MG TABS tablet     famotidine (PEPCID) 20 MG tablet     fenofibrate 54 MG tablet     finasteride (PROSCAR) 5 MG tablet     fluocinonide (LIDEX) 0.05 % external cream     gabapentin (NEURONTIN) 300 MG capsule     insulin degludec (TRESIBA FLEXTOUCH) 100 UNIT/ML pen     insulin pen needle (B-D U/F) 31G X 5 MM     insulin syringe 31G X 5/16\" 0.5 ML MISC     ketamine 5% gabapentin 8% lidocaine 2.5% topical PLO cream     ketorolac (ACULAR) 0.5 % ophthalmic solution     loratadine (CLARITIN) 10 MG tablet     losartan (COZAAR) 100 MG tablet     metFORMIN (GLUCOPHAGE) 1000 MG tablet     mupirocin (BACTROBAN) 2 % cream     NOVOLOG FLEXPEN 100 UNIT/ML soln     ofloxacin (OCUFLOX) 0.3 % ophthalmic solution     Omega-3 Fatty Acids (OMEGA-3 FISH OIL) 1000 MG CAPS     ONETOUCH ULTRA test strip     prednisoLONE acetate (PRED FORTE) 1 % ophthalmic suspension     ranitidine (ZANTAC) 300 MG tablet     sodium bicarbonate 650 MG tablet     tamsulosin (FLOMAX) 0.4 MG capsule     triamcinolone (KENALOG) 0.1 % cream     vitamin D3 (CHOLECALCIFEROL) 1000 units (25 mcg) tablet     Current Facility-Administered Medications   Medication     bevacizumab (AVASTIN) intravitreal inj 1.25 mg            Family History:     Family History   Problem Relation Age of Onset     Diabetes Father      Myocardial Infarction Father      Diabetes Brother      Leukemia Brother 44     Glaucoma No family hx of      Macular Degeneration No family hx of      Kidney Disease No family hx of             Social History:     Social History     Socioeconomic History     Marital status:      Spouse name: Not on file     Number of children: 5     Years of education: " "Not on file     Highest education level: Not on file   Occupational History     Occupation: private bussiness owner   Social Needs     Financial resource strain: Not on file     Food insecurity     Worry: Not on file     Inability: Not on file     Transportation needs     Medical: Not on file     Non-medical: Not on file   Tobacco Use     Smoking status: Never Smoker     Smokeless tobacco: Never Used   Substance and Sexual Activity     Alcohol use: Yes     Comment: occasionaly     Drug use: No     Sexual activity: Not on file   Lifestyle     Physical activity     Days per week: Not on file     Minutes per session: Not on file     Stress: Not on file   Relationships     Social connections     Talks on phone: Not on file     Gets together: Not on file     Attends Anabaptism service: Not on file     Active member of club or organization: Not on file     Attends meetings of clubs or organizations: Not on file     Relationship status: Not on file     Intimate partner violence     Fear of current or ex partner: Not on file     Emotionally abused: Not on file     Physically abused: Not on file     Forced sexual activity: Not on file   Other Topics Concern     Parent/sibling w/ CABG, MI or angioplasty before 65F 55M? Not Asked   Social History Narrative     Not on file            Allergies:   Patient has no known allergies.         Review of Systems:  From intake questionnaire   Negative 14 system review except as noted on HPI, nurse's note.        CC:  Marcio Hare      The patient has been notified of following:     \"This telephone visit will be conducted via a call between you and your physician/provider. We have found that certain health care needs can be provided without the need for a physical exam.  This service lets us provide the care you need with a short phone conversation.  If a prescription is necessary we can send it directly to your pharmacy.  If lab work is needed we can place an order for that and you " "can then stop by our lab to have the test done at a later time.    Telephone visits are billed at different rates depending on your insurance coverage. During this emergency period, for some insurers they may be billed the same as an in-person visit.  Please reach out to your insurance provider with any questions.    If during the course of the call the physician/provider feels a telephone visit is not appropriate, you will not be charged for this service.\"    Patient has given verbal consent for Telephone visit?  Yes    What phone number would you like to be contacted at? [phone]    How would you like to obtain your AVS? Mail a copy    Phone call duration: 14 minutes    Kenrick Glass MD          "

## 2020-05-21 ENCOUNTER — TELEPHONE (OUTPATIENT)
Dept: ENDOCRINOLOGY | Facility: CLINIC | Age: 70
End: 2020-05-21

## 2020-05-21 NOTE — TELEPHONE ENCOUNTER
Left detailed message RE Rx order that Altagracia at [facility] had stated she would email - not received. Left detailed message to please send order or call clinic with questions or concerns.   Lorena Bravo RN on 5/21/2020 at 2:36 PM

## 2020-05-21 NOTE — TELEPHONE ENCOUNTER
These forms are being faxed for signature again.  It seems we are having trouble getting them?    Whatever the case [facility] feels it is absolutely imperative that the Pt receive these signed forms at his appointment scheduled for 5/27.  If he does not they will have to cancel his order which has already taken months to get moving through insurance.    No signed form = no shoes     Please help.

## 2020-05-21 NOTE — TELEPHONE ENCOUNTER
Spoke with Altagracia at [facility]. States they have received the Medicare Letter for diabetic shoes from clinic, but still waiting for Rx order. They will email over the form for completion.   Lorena Bravo RN on 5/21/2020 at 2:03 PM

## 2020-05-21 NOTE — TELEPHONE ENCOUNTER
Rx othotics order faxed and emailed to [facility].   Lorena Bravo RN on 5/21/2020 at 3:17 PM

## 2020-05-25 RX ORDER — FINASTERIDE 5 MG/1
5 TABLET, FILM COATED ORAL DAILY
Qty: 90 TABLET | Refills: 2 | Status: SHIPPED | OUTPATIENT
Start: 2020-05-25 | End: 2021-03-29

## 2020-05-25 RX ORDER — TAMSULOSIN HYDROCHLORIDE 0.4 MG/1
0.4 CAPSULE ORAL DAILY
Qty: 90 CAPSULE | Refills: 2 | Status: SHIPPED | OUTPATIENT
Start: 2020-05-25 | End: 2021-06-14

## 2020-05-26 NOTE — PATIENT INSTRUCTIONS
Please schedule PSA and follow up appointment with Dr. Glass in 3 to 4 months.     It was a pleasure meeting with you today.  Thank you for allowing me and my team the privilege of caring for you today.  YOU are the reason we are here, and I truly hope we provided you with the excellent service you deserve.  Please let us know if there is anything else we can do for you so that we can be sure you are leaving completely satisfied with your care experience.        Avinash Pearson, EMT

## 2020-05-27 ENCOUNTER — TELEPHONE (OUTPATIENT)
Dept: ENDOCRINOLOGY | Facility: CLINIC | Age: 70
End: 2020-05-27

## 2020-05-27 ENCOUNTER — VIRTUAL VISIT (OUTPATIENT)
Dept: NEPHROLOGY | Facility: CLINIC | Age: 70
End: 2020-05-27
Attending: INTERNAL MEDICINE
Payer: COMMERCIAL

## 2020-05-27 DIAGNOSIS — N18.30 CKD (CHRONIC KIDNEY DISEASE) STAGE 3, GFR 30-59 ML/MIN (H): Primary | ICD-10-CM

## 2020-05-27 ASSESSMENT — PAIN SCALES - GENERAL: PAINLEVEL: NO PAIN (0)

## 2020-05-27 NOTE — TELEPHONE ENCOUNTER
Form was completed and faxed x 2 to [facility] 2/27/2020. Form has been re-faxed x 2 5/27/2020.

## 2020-05-27 NOTE — LETTER
"2020       RE: Earle Rene  [address]  [place] [address]     Dear Colleague,    Thank you for referring your patient, Earle Rene, to the [facility] NEPHROLOGY at [facility]. Please see a copy of my visit note below.    Earle Rene is a 70 year old male who is being evaluated via a billable telephone visit.      The patient has been notified of following:     \"This telephone visit will be conducted via a call between you and your physician/provider. We have found that certain health care needs can be provided without the need for a physical exam.  This service lets us provide the care you need with a short phone conversation.  If a prescription is necessary we can send it directly to your pharmacy.  If lab work is needed we can place an order for that and you can then stop by our lab to have the test done at a later time.    Telephone visits are billed at different rates depending on your insurance coverage. During this emergency period, for some insurers they may be billed the same as an in-person visit.  Please reach out to your insurance provider with any questions.    If during the course of the call the physician/provider feels a telephone visit is not appropriate, you will not be charged for this service.\"    Patient has given verbal consent for Telephone visit?  Yes    What phone number would you like to be contacted at? [phone]    How would you like to obtain your AVS? Mail a copy    Phone call duration:  Telephone visit start time 4:05 PM   End time 4:18 PM   Total time 13 minutes    Kiah Ramsey MD          Nephrology Clinic    Kiah Ramsey MD  2020     Name: Earle Rene  MRN: [id number]  Age: 69 year old  : 1950  Referring provider: Marcio Hare     Assessment and Plan:    1. Stage IIIA CKD-baseline serum creatinine of 1.2-1.3 mg/dL and GFR of  53-56 likely from diabetic nephropathy given 20 year history " "of diabetes and diabetic retinopathy. Will recheck creatinine.  - Losartan 100 mg PO daily at night     2. Subnephrotic range proteinuria-likely secondary to diabetic nephropathy. Currently on losartan 100 mg nightly     3. Electrolytes: will order labs     4. Volume status: euvolemic by his report     5. Acid/Base status: continue current dose of sodium bicarbonate 650 mg three times daily.     6. Hypertension: advised on home blood pressure monitoring  --No changes in meds today      7. BMD  continue cholecalciferol 2000U Daily and calcium supplements     8. Type II DM- remains on empagliflozin and blood sugars 120-180   9. Diabetic neuropathy- improved with alpha-lipoic acid     10. Diabetic retinopathy-follows with an ophthalmologist.      11. Obesity-BMI 30. not addressed by me 05/27/20      12. Uremic pruritis- previously recommended  - camphor-menthol (DERMASARRA) 0.5-0.5 % external lotion       Kiah Ramsey MD  [facility]  Department of Medicine  Division of Renal Disease and Hypertension  [phone]     Telephone visit start time 4:05 PM   End time 4:18 PM   Total time 13 minutes    Follow-up: No follow-ups on file.     Reason For Visit:   CKD follow up     HPI:   Earle Rene is a 70 year old male with a history of DM2 for 20+ years, complicated by diabetic neuropathy, mild non proliferative retinopathy (follows with ophthalmologist Dr. Eugene) and nephropathy. I last saw him January 2020 -   For HTN he remains on losartan to 100 mg daily.  Leg swelling is better since starting empagliflozin.  Blood pressure is not monitored.    He has seen Dr. Villanueva and increased calcium supplement and added alpha-lipoic acid and his pain is better since starting this.  His energy is up and down - sometimes he feels \"52\" and other times \"82\".  He wishes he had more strength and better use of knees.  Appetite is good - he is not overeating.    He has some urinary urgency and trouble holding " bladder.      He has been spending a lot of time gardening during COVID-19 pandemic.      Review of Systems:   Pertinent items are noted in HPI or as below, remainder of complete ROS is negative.      Active Medications:   Current Outpatient Medications   Medication Sig Dispense Refill     albuterol (VENTOLIN HFA) 108 (90 Base) MCG/ACT inhaler Inhale 2 puffs into the lungs every 6 hours 18 g 3     alpha-lipoic acid 600 MG capsule Take 1 capsule (600 mg) by mouth daily 90 capsule 3     ARTIFICIAL TEAR OP Apply to eye as needed       aspirin 81 MG tablet Take 1 tablet by mouth At Bedtime        atorvastatin (LIPITOR) 20 MG tablet Take 1 tablet (20 mg) by mouth daily (Patient taking differently: Take 20 mg by mouth At Bedtime ) 90 tablet 3     blood glucose (NO BRAND SPECIFIED) lancets standard Lancets that go with device, Test 3 times daily 300 each 3     blood glucose monitoring (NO BRAND SPECIFIED) meter device kit Any meter covered by insurance, not store brand, use as directed. 1 kit 0     blood glucose monitoring (NO BRAND SPECIFIED) test strip Strips that go with meter, covered by insurance. Test 3 times daily 300 strip 3     Blood Pressure Monitor KIT Automatic Blood Pressure Monitor 1 kit 0     camphor-menthol (DERMASARRA) 0.5-0.5 % external lotion Apply to arms legs torso 1-2 times a day for itching 1 Bottle 11     clobetasol (TEMOVATE) 0.05 % ointment Apply topically to legs twice daily for 2 weeks.  Then discontinue 30 g 0     clotrimazole (LOTRIMIN) 1 % cream Apply topically 2 times daily 30 g 3     Continuous Blood Gluc  (FREESTYLE PETER READER) JUANCARLOS 1 Device daily Use to read blood sugars   as  instruction 4 times daily 1 Device 0     Continuous Blood Gluc Sensor (FREESTYLE PETER 14 DAY SENSOR) MISC 1 applicator every 14 days Change every 14 days 8 each 3     empagliflozin (JARDIANCE) 10 MG TABS tablet Take 1 tablet (10 mg) by mouth daily (Patient taking differently: Take 10 mg by  "mouth every morning ) 90 tablet 1     famotidine (PEPCID) 20 MG tablet Take 1 tablet (20 mg) by mouth 2 times daily 180 tablet 3     fenofibrate 54 MG tablet Take 1 tablet (54 mg) by mouth daily (Patient taking differently: Take 54 mg by mouth At Bedtime ) 90 tablet 0     finasteride (PROSCAR) 5 MG tablet Take 1 tablet (5 mg) by mouth daily 90 tablet 2     fluocinonide (LIDEX) 0.05 % external cream APPLY TO AFFECTED AREA TWICE A DAY  3     gabapentin (NEURONTIN) 300 MG capsule TAKE 1 CAPSULE BY MOUTH TWICE A  capsule 1     insulin degludec (TRESIBA FLEXTOUCH) 100 UNIT/ML pen Inject 64 units subcutaneous at bedtime. 60 mL 3     insulin pen needle (B-D U/F) 31G X 5 MM Use 4 times per day.  Please dispense as BD Pen Needle Mini U/F 31G x 5  each 3     insulin syringe 31G X 5/16\" 0.5 ML MISC Use three syringes daily 270 each 1     ketamine 5% gabapentin 8% lidocaine 2.5% topical PLO cream Apply 1 g topically 3 times daily 30 g 3     ketorolac (ACULAR) 0.5 % ophthalmic solution Place 1 drop into the right eye 4 times daily 1 Bottle 0     loratadine (CLARITIN) 10 MG tablet Take 1 tablet (10 mg) by mouth daily (Patient taking differently: Take 10 mg by mouth At Bedtime ) 90 tablet 0     losartan (COZAAR) 100 MG tablet Take 1 tablet (100 mg) by mouth At Bedtime 30 tablet 11     metFORMIN (GLUCOPHAGE) 1000 MG tablet 1 tab each am only. 180 tablet 3     mupirocin (BACTROBAN) 2 % cream Apply  topically. In very small amounts only as needed 15 g 1     NOVOLOG FLEXPEN 100 UNIT/ML soln Inject 15-17  units with meals, plus correction. Pt uses approx 65 units in 24 hrs. 60 mL 3     ofloxacin (OCUFLOX) 0.3 % ophthalmic solution Apply 1 drop to eye 3 times daily Instill into operative eye(s) per physician instructions. 5 mL 1     Omega-3 Fatty Acids (OMEGA-3 FISH OIL) 1000 MG CAPS Take 1 capsule (1 g) by mouth 2 times daily (Patient taking differently: Take 1 g by mouth as needed (PT last dose a week ago 12.24.19) ) 60 " capsule 11     ONETOUCH ULTRA test strip Use to test blood sugar 3 times daily 300 strip 3     prednisoLONE acetate (PRED FORTE) 1 % ophthalmic suspension Apply 1 drop to eye 4 times daily Instill into operative eye(s) per physician instructions. 5 mL 1     ranitidine (ZANTAC) 300 MG tablet Take 1 tablet (300 mg) by mouth At Bedtime 90 tablet 3     sodium bicarbonate 650 MG tablet Take 1 tablet (650 mg) by mouth 3 times daily (Patient taking differently: Take 650 mg by mouth 2 times daily ) 90 tablet 11     tamsulosin (FLOMAX) 0.4 MG capsule Take 1 capsule (0.4 mg) by mouth daily 90 capsule 2     triamcinolone (KENALOG) 0.1 % cream Apply topically 3 times daily 80 g 0     vitamin D3 (CHOLECALCIFEROL) 1000 units (25 mcg) tablet Take 2 tablets (2,000 Units) by mouth daily (Patient taking differently: Take 1,000 Units by mouth 2 times daily ) 30 tablet 11         Allergies:   Patient has no known allergies.      Past Medical History:  BPH  Diabetes   Diabetic neuropathy   Diabetic retinopathy   GERD   Granulomatous disease   Hyperlipidemia   Hypertension   Mood disorder   ED  Presbyopia   Myopia   Elevated PSA  Nephrolithiasis   Neutropenia   Sarcoidosis of lung   Sialoadenitis   Cataracts      Past Surgical History:  Past Surgical History:   Procedure Laterality Date     ------------OTHER-------------      back of neck abscess drainage in OR     AS RAD RESEC TONSIL/PILLARS Bilateral 1961     CATARACT IOL, RT/LT Left      COLONOSCOPY  7/29/2013    Procedure: COLONOSCOPY;;  Surgeon: Montana Pascal MD;  Location: [facility] GI     EXCISE MASS UPPER EXTREMITY Right 11/11/2019    Procedure: EXCISION, MASS, UPPER EXTREMITY, RIGHT SHOULDER;  Surgeon: Johana Choudhury MD;  Location: [facility] OR     INTRAVITREAL INJECTION CHEMOTHERAPY Right 12/30/2019    Procedure: INTRAVITREAL Bevacizumab injection;  Surgeon: Milton Maki MD;  Location: [facility] OR     PHACOEMULSIFICATION CLEAR CORNEA WITH STANDARD INTRAOCULAR LENS IMPLANT Right  12/30/2019    Procedure: PHACOEMULSIFICATION, CATARACT, WITH INTRAOCULAR LENS IMPLANT;  Surgeon: Milton Maki MD;  Location:  OR     PHACOEMULSIFICATION WITH STANDARD INTRAOCULAR LENS IMPLANT Left 6/21/2019    Procedure: Left Eye Cataract Removal with Intraocular Lens Implant with Intraoperative Avastin Injection;  Surgeon: Lacey Eugene MD;  Location:  OR     [facility]  11/2017       Family History:   Diabetes- father and brother   MI- father   Leukemia- brother   Negative for kidney disease       Social History:   Never a smoker. Drinks alcohol occasionally.      Physical Exam:  There were no vitals taken for this visit.   GENERAL APPEARANCE: alert and no distress  EYES: No scleral icterus, pupils equal  HENT: NC/AT  Endocrine: no goiter, no moon facies  Pulmonary: normal work of breathing, no clubbing  CV: WWP   - Edema - none   MS: no evidence of inflammation in joints, no muscle tenderness  : no Chamberlain  SKIN: no rash, warm, dry, no cyanosis  NEURO: mentation intact and speech normal    Laboratory:  CMP  Recent Labs   Lab Test 01/29/20  1315 01/09/20  0839 12/18/19  1218 07/02/19  0947 06/26/19  1217 06/18/19  1054 05/28/19  0948  03/13/19  1516 02/06/19  1322 08/01/18  1222 05/24/18  1414 05/01/18  1233  03/06/18  1216 11/01/17  1044  06/06/17  1116 01/14/16  0849     --   --   --  140 136 138   < > 138 138 140  --  141  --  136  --    < > 141 140   POTASSIUM 4.5 4.4  --   --  4.3 4.3 4.3   < > 4.5 4.3 4.6  --  4.7  --  4.0 4.4   < > 3.9 3.9   CHLORIDE 104  --   --   --  110* 106 108   < > 106 107 108  --  110*  --  105  --    < > 108 106   CO2 23  --   --   --  23 22 21   < > 24 24 24  --  21  --  23  --    < > 24 28   ANIONGAP 8  --   --   --  7 8 9   < > 7 7 7  --  10  --  9  --    < > 10 6   *  --   --   --  142* 195* 320*   < > 217* 197* 116*  --  149*  --  197*  --    < > 96 191*   BUN 34*  --   --   --  19 27 23   < > 25 22 23  --  20  --  21  --    < > 21 15   CR 1.61*  1.59* 1.83*  --  1.31* 1.41* 1.31*   < > 1.20 1.36* 1.31*  --  1.28*  --  1.33* 1.20   < > 1.26* 1.07   GFRESTIMATED 42* 43* 36*  --  55* 50* 55*   < > 61 52* 54*  --  56*  --  53* 60*   < > 57* 69   GFRESTBLACK 49* 50* 42*  --  63 58* 63   < > 71 61 66  --  67  --  65 73   < > 69 83   INDERJIT 9.2  --   --   --  9.0 9.3 9.4   < > 9.0 9.3 9.5  --  9.8  --  8.8  --    < > 9.2 8.8   MAG  --   --   --  1.9  --   --   --   --   --   --   --  2.1 2.1  --   --   --   --   --   --    PHOS 4.0  --   --   --   --   --   --   --  3.4 3.9 3.1  --  3.3   < >  --   --   --   --   --    PROTTOTAL  --   --   --   --   --  7.0  --   --   --   --   --   --   --   --  7.3  --   --  6.6* 6.8   ALBUMIN 3.7  --   --   --   --  3.8  --   --  3.6 3.2* 3.8  --  4.0  --  3.8  --   --  3.5 3.8   BILITOTAL  --   --   --   --   --  0.6  --   --   --   --   --   --   --   --  0.8  --   --  0.7 0.5   ALKPHOS  --   --   --   --   --  63  --   --   --   --   --   --   --   --  77  --   --  70 71   AST  --   --   --   --   --  24  --   --   --   --   --   --   --   --  25 23  --  20 17   ALT  --   --   --   --   --  38  --   --   --   --   --   --   --   --  35 44  --  49 43    < > = values in this interval not displayed.     CBC  Recent Labs   Lab Test 01/29/20  1315 06/18/19  1054 03/13/19  1516 02/06/19  1322  05/01/18  1233   HGB 14.6 13.5 13.7 13.8   < > 14.7   WBC  --  4.6 5.2 6.1  --  4.6   RBC  --  4.18* 4.38* 4.41  --  4.50   HCT  --  38.7* 40.5 41.4  --  42.5   MCV  --  93 93 94  --  94   MCH  --  32.3 31.3 31.3  --  32.7   MCHC  --  34.9 33.8 33.3  --  34.6   RDW  --  12.6 12.5 11.9  --  12.7   PLT  --  130* 131* 225  --  148*    < > = values in this interval not displayed.     INR  Recent Labs   Lab Test 06/12/13  0949   INR 0.93   PTT 29     URINE STUDIES  Recent Labs   Lab Test 01/09/20  0849 12/18/19  1220 05/01/18  1238 06/06/17  1120  01/14/16  0858   COLOR Straw Yellow Yellow Yellow   < > Yellow   APPEARANCE Clear Slightly Cloudy Clear  Slightly Cloudy   < > Clear   URINEGLC >499* >499* 50* >499*   < > 100*   URINEBILI Negative Negative Negative Negative   < > Negative   URINEKETONE Negative Negative Negative Negative   < > Negative   SG 1.015 1.021 1.016 1.017   < > >1.030   UBLD Negative Large* Negative Negative   < > Negative   URINEPH 5.0 5.0 5.0 5.0   < > 5.5   PROTEIN 100* 100* >499* >499*   < > 100*   UROBILINOGEN  --   --   --   --   --  0.2   NITRITE Negative Negative Negative Negative   < > Negative   LEUKEST Negative Small* Negative Negative   < > Negative   RBCU 1 129* 1 2   < > O - 2   WBCU <1 125* <1 2   < > O - 2    < > = values in this interval not displayed.     Recent Labs   Lab Test 01/29/20  1330 03/13/19  1522 02/06/19  1326 08/01/18  1225 05/01/18  1238   UTPG 1.36* 3.04* 1.49* 1.37* 1.76*     PTH  Recent Labs   Lab Test 03/13/19  1516 05/01/18  1233   PTHI 42 49     IRON STUDIES   Recent Labs   Lab Test 07/02/19  0947 05/01/18  1233   IRON  --  83   FEB  --  344   IRONSAT  --  24   DEANA 178 160       Again, thank you for allowing me to participate in the care of your patient.      Sincerely,    Kaih Ramsey MD

## 2020-05-27 NOTE — TELEPHONE ENCOUNTER
[facility] Call Center    Phone Message    May a detailed message be left on voicemail: yes     Reason for Call: Form or Letter   Type or form/letter needing completion: DMV form to drive  Provider: Dr Ware  Date form needed: ASAP  Once completed: Fax form to: [email or web address].   Thank you,      Action Taken: Message routed to:  [facility] endocrinology    Travel Screening: Not Applicable

## 2020-05-27 NOTE — PROGRESS NOTES
"Earle Rene is a 70 year old male who is being evaluated via a billable telephone visit.      The patient has been notified of following:     \"This telephone visit will be conducted via a call between you and your physician/provider. We have found that certain health care needs can be provided without the need for a physical exam.  This service lets us provide the care you need with a short phone conversation.  If a prescription is necessary we can send it directly to your pharmacy.  If lab work is needed we can place an order for that and you can then stop by our lab to have the test done at a later time.    Telephone visits are billed at different rates depending on your insurance coverage. During this emergency period, for some insurers they may be billed the same as an in-person visit.  Please reach out to your insurance provider with any questions.    If during the course of the call the physician/provider feels a telephone visit is not appropriate, you will not be charged for this service.\"    Patient has given verbal consent for Telephone visit?  Yes    What phone number would you like to be contacted at? [phone]    How would you like to obtain your AVS? Mail a copy    Phone call duration:  Telephone visit start time 4:05 PM   End time 4:18 PM   Total time 13 minutes    Kiah Ramsey MD      "

## 2020-05-27 NOTE — TELEPHONE ENCOUNTER
[facility] Call Center    Phone Message    May a detailed message be left on voicemail: yes     Reason for Call: Form or Letter   Type or form/letter needing completion: DMV paperwork  Provider: Chaz Ladd form needed: ASAP - pt stated they contacted him and have not gotten the paperwork needed to drive.   Once completed: Fax form to: [phone] Phone number: [phone]      Action Taken: Message routed to:  [facility] Endo    Travel Screening: Not Applicable

## 2020-05-27 NOTE — PATIENT INSTRUCTIONS
No change in medications  Please get blood tests drawn at lab for kidney function    Follow up in 6 months    Kiah Ramsey MD

## 2020-05-27 NOTE — PROGRESS NOTES
Nephrology Clinic    Kiah Ramsey MD  2020     Name: Earle Rene  MRN: [id number]  Age: 69 year old  : 1950  Referring provider: Marcio Hare     Assessment and Plan:    1. Stage IIIA CKD-baseline serum creatinine of 1.2-1.3 mg/dL and GFR of  53-56 likely from diabetic nephropathy given 20 year history of diabetes and diabetic retinopathy. Will recheck creatinine.  - Losartan 100 mg PO daily at night     2. Subnephrotic range proteinuria-likely secondary to diabetic nephropathy. Currently on losartan 100 mg nightly     3. Electrolytes: will order labs     4. Volume status: euvolemic by his report     5. Acid/Base status: continue current dose of sodium bicarbonate 650 mg three times daily.     6. Hypertension: advised on home blood pressure monitoring  --No changes in meds today      7. BMD  continue cholecalciferol 2000U Daily and calcium supplements     8. Type II DM- remains on empagliflozin and blood sugars 120-180   9. Diabetic neuropathy- improved with alpha-lipoic acid     10. Diabetic retinopathy-follows with an ophthalmologist.      11. Obesity-BMI 30. not addressed by me 20      12. Uremic pruritis- previously recommended  - camphor-menthol (DERMASARRA) 0.5-0.5 % external lotion       Kiah Ramsey MD  [facility]  Department of Medicine  Division of Renal Disease and Hypertension  [phone]     Telephone visit start time 4:05 PM   End time 4:18 PM   Total time 13 minutes    Follow-up: No follow-ups on file.     Reason For Visit:   CKD follow up     HPI:   Earle Rene is a 70 year old male with a history of DM2 for 20+ years, complicated by diabetic neuropathy, mild non proliferative retinopathy (follows with ophthalmologist Dr. Eugene) and nephropathy. I last saw him 2020 -   For HTN he remains on losartan to 100 mg daily.  Leg swelling is better since starting empagliflozin.  Blood pressure is not monitored.    He has seen   "Villanueva and increased calcium supplement and added alpha-lipoic acid and his pain is better since starting this.  His energy is up and down - sometimes he feels \"52\" and other times \"82\".  He wishes he had more strength and better use of knees.  Appetite is good - he is not overeating.    He has some urinary urgency and trouble holding bladder.      He has been spending a lot of time gardening during COVID-19 pandemic.      Review of Systems:   Pertinent items are noted in HPI or as below, remainder of complete ROS is negative.      Active Medications:   Current Outpatient Medications   Medication Sig Dispense Refill     albuterol (VENTOLIN HFA) 108 (90 Base) MCG/ACT inhaler Inhale 2 puffs into the lungs every 6 hours 18 g 3     alpha-lipoic acid 600 MG capsule Take 1 capsule (600 mg) by mouth daily 90 capsule 3     ARTIFICIAL TEAR OP Apply to eye as needed       aspirin 81 MG tablet Take 1 tablet by mouth At Bedtime        atorvastatin (LIPITOR) 20 MG tablet Take 1 tablet (20 mg) by mouth daily (Patient taking differently: Take 20 mg by mouth At Bedtime ) 90 tablet 3     blood glucose (NO BRAND SPECIFIED) lancets standard Lancets that go with device, Test 3 times daily 300 each 3     blood glucose monitoring (NO BRAND SPECIFIED) meter device kit Any meter covered by insurance, not store brand, use as directed. 1 kit 0     blood glucose monitoring (NO BRAND SPECIFIED) test strip Strips that go with meter, covered by insurance. Test 3 times daily 300 strip 3     Blood Pressure Monitor KIT Automatic Blood Pressure Monitor 1 kit 0     camphor-menthol (DERMASARRA) 0.5-0.5 % external lotion Apply to arms legs torso 1-2 times a day for itching 1 Bottle 11     clobetasol (TEMOVATE) 0.05 % ointment Apply topically to legs twice daily for 2 weeks.  Then discontinue 30 g 0     clotrimazole (LOTRIMIN) 1 % cream Apply topically 2 times daily 30 g 3     Continuous Blood Gluc  (FREESTYLE PETER READER) JUANCARLOS 1 Device daily Use " "to read blood sugars   as  instruction 4 times daily 1 Device 0     Continuous Blood Gluc Sensor (FREESTYLE PETER 14 DAY SENSOR) MISC 1 applicator every 14 days Change every 14 days 8 each 3     empagliflozin (JARDIANCE) 10 MG TABS tablet Take 1 tablet (10 mg) by mouth daily (Patient taking differently: Take 10 mg by mouth every morning ) 90 tablet 1     famotidine (PEPCID) 20 MG tablet Take 1 tablet (20 mg) by mouth 2 times daily 180 tablet 3     fenofibrate 54 MG tablet Take 1 tablet (54 mg) by mouth daily (Patient taking differently: Take 54 mg by mouth At Bedtime ) 90 tablet 0     finasteride (PROSCAR) 5 MG tablet Take 1 tablet (5 mg) by mouth daily 90 tablet 2     fluocinonide (LIDEX) 0.05 % external cream APPLY TO AFFECTED AREA TWICE A DAY  3     gabapentin (NEURONTIN) 300 MG capsule TAKE 1 CAPSULE BY MOUTH TWICE A  capsule 1     insulin degludec (TRESIBA FLEXTOUCH) 100 UNIT/ML pen Inject 64 units subcutaneous at bedtime. 60 mL 3     insulin pen needle (B-D U/F) 31G X 5 MM Use 4 times per day.  Please dispense as BD Pen Needle Mini U/F 31G x 5  each 3     insulin syringe 31G X 5/16\" 0.5 ML MISC Use three syringes daily 270 each 1     ketamine 5% gabapentin 8% lidocaine 2.5% topical PLO cream Apply 1 g topically 3 times daily 30 g 3     ketorolac (ACULAR) 0.5 % ophthalmic solution Place 1 drop into the right eye 4 times daily 1 Bottle 0     loratadine (CLARITIN) 10 MG tablet Take 1 tablet (10 mg) by mouth daily (Patient taking differently: Take 10 mg by mouth At Bedtime ) 90 tablet 0     losartan (COZAAR) 100 MG tablet Take 1 tablet (100 mg) by mouth At Bedtime 30 tablet 11     metFORMIN (GLUCOPHAGE) 1000 MG tablet 1 tab each am only. 180 tablet 3     mupirocin (BACTROBAN) 2 % cream Apply  topically. In very small amounts only as needed 15 g 1     NOVOLOG FLEXPEN 100 UNIT/ML soln Inject 15-17  units with meals, plus correction. Pt uses approx 65 units in 24 hrs. 60 mL 3     ofloxacin " (OCUFLOX) 0.3 % ophthalmic solution Apply 1 drop to eye 3 times daily Instill into operative eye(s) per physician instructions. 5 mL 1     Omega-3 Fatty Acids (OMEGA-3 FISH OIL) 1000 MG CAPS Take 1 capsule (1 g) by mouth 2 times daily (Patient taking differently: Take 1 g by mouth as needed (PT last dose a week ago 12.24.19) ) 60 capsule 11     ONETOUCH ULTRA test strip Use to test blood sugar 3 times daily 300 strip 3     prednisoLONE acetate (PRED FORTE) 1 % ophthalmic suspension Apply 1 drop to eye 4 times daily Instill into operative eye(s) per physician instructions. 5 mL 1     ranitidine (ZANTAC) 300 MG tablet Take 1 tablet (300 mg) by mouth At Bedtime 90 tablet 3     sodium bicarbonate 650 MG tablet Take 1 tablet (650 mg) by mouth 3 times daily (Patient taking differently: Take 650 mg by mouth 2 times daily ) 90 tablet 11     tamsulosin (FLOMAX) 0.4 MG capsule Take 1 capsule (0.4 mg) by mouth daily 90 capsule 2     triamcinolone (KENALOG) 0.1 % cream Apply topically 3 times daily 80 g 0     vitamin D3 (CHOLECALCIFEROL) 1000 units (25 mcg) tablet Take 2 tablets (2,000 Units) by mouth daily (Patient taking differently: Take 1,000 Units by mouth 2 times daily ) 30 tablet 11         Allergies:   Patient has no known allergies.      Past Medical History:  BPH  Diabetes   Diabetic neuropathy   Diabetic retinopathy   GERD   Granulomatous disease   Hyperlipidemia   Hypertension   Mood disorder   ED  Presbyopia   Myopia   Elevated PSA  Nephrolithiasis   Neutropenia   Sarcoidosis of lung   Sialoadenitis   Cataracts      Past Surgical History:  Past Surgical History:   Procedure Laterality Date     ------------OTHER-------------      back of neck abscess drainage in OR     AS RAD RESEC TONSIL/PILLARS Bilateral 1961     CATARACT IOL, RT/LT Left      COLONOSCOPY  7/29/2013    Procedure: COLONOSCOPY;;  Surgeon: Montana Pascal MD;  Location: [facility]     EXCISE MASS UPPER EXTREMITY Right 11/11/2019    Procedure: EXCISION, MASS,  UPPER EXTREMITY, RIGHT SHOULDER;  Surgeon: Johana Choudhury MD;  Location: [facility]     INTRAVITREAL INJECTION CHEMOTHERAPY Right 12/30/2019    Procedure: INTRAVITREAL Bevacizumab injection;  Surgeon: Milton Maki MD;  Location: [facility]     PHACOEMULSIFICATION CLEAR CORNEA WITH STANDARD INTRAOCULAR LENS IMPLANT Right 12/30/2019    Procedure: PHACOEMULSIFICATION, CATARACT, WITH INTRAOCULAR LENS IMPLANT;  Surgeon: Milton Maki MD;  Location: [facility]     PHACOEMULSIFICATION WITH STANDARD INTRAOCULAR LENS IMPLANT Left 6/21/2019    Procedure: Left Eye Cataract Removal with Intraocular Lens Implant with Intraoperative Avastin Injection;  Surgeon: Lacey Eugene MD;  Location: [facility] OR     siladenatis  11/2017       Family History:   Diabetes- father and brother   MI- father   Leukemia- brother   Negative for kidney disease       Social History:   Never a smoker. Drinks alcohol occasionally.      Physical Exam:  There were no vitals taken for this visit.   GENERAL APPEARANCE: alert and no distress  EYES: No scleral icterus, pupils equal  HENT: NC/AT  Endocrine: no goiter, no moon facies  Pulmonary: normal work of breathing, no clubbing  CV: WWP   - Edema - none   MS: no evidence of inflammation in joints, no muscle tenderness  : no Chamberlain  SKIN: no rash, warm, dry, no cyanosis  NEURO: mentation intact and speech normal    Laboratory:  CMP  Recent Labs   Lab Test 01/29/20  1315 01/09/20  0839 12/18/19  1218 07/02/19  0947 06/26/19  1217 06/18/19  1054 05/28/19  0948  03/13/19  1516 02/06/19  1322 08/01/18  1222 05/24/18  1414 05/01/18  1233  03/06/18  1216 11/01/17  1044  06/06/17  1116 01/14/16  0849     --   --   --  140 136 138   < > 138 138 140  --  141  --  136  --    < > 141 140   POTASSIUM 4.5 4.4  --   --  4.3 4.3 4.3   < > 4.5 4.3 4.6  --  4.7  --  4.0 4.4   < > 3.9 3.9   CHLORIDE 104  --   --   --  110* 106 108   < > 106 107 108  --  110*  --  105  --    < > 108 106   CO2 23  --   --   --   23 22 21   < > 24 24 24  --  21  --  23  --    < > 24 28   ANIONGAP 8  --   --   --  7 8 9   < > 7 7 7  --  10  --  9  --    < > 10 6   *  --   --   --  142* 195* 320*   < > 217* 197* 116*  --  149*  --  197*  --    < > 96 191*   BUN 34*  --   --   --  19 27 23   < > 25 22 23  --  20  --  21  --    < > 21 15   CR 1.61* 1.59* 1.83*  --  1.31* 1.41* 1.31*   < > 1.20 1.36* 1.31*  --  1.28*  --  1.33* 1.20   < > 1.26* 1.07   GFRESTIMATED 42* 43* 36*  --  55* 50* 55*   < > 61 52* 54*  --  56*  --  53* 60*   < > 57* 69   GFRESTBLACK 49* 50* 42*  --  63 58* 63   < > 71 61 66  --  67  --  65 73   < > 69 83   INDERJIT 9.2  --   --   --  9.0 9.3 9.4   < > 9.0 9.3 9.5  --  9.8  --  8.8  --    < > 9.2 8.8   MAG  --   --   --  1.9  --   --   --   --   --   --   --  2.1 2.1  --   --   --   --   --   --    PHOS 4.0  --   --   --   --   --   --   --  3.4 3.9 3.1  --  3.3   < >  --   --   --   --   --    PROTTOTAL  --   --   --   --   --  7.0  --   --   --   --   --   --   --   --  7.3  --   --  6.6* 6.8   ALBUMIN 3.7  --   --   --   --  3.8  --   --  3.6 3.2* 3.8  --  4.0  --  3.8  --   --  3.5 3.8   BILITOTAL  --   --   --   --   --  0.6  --   --   --   --   --   --   --   --  0.8  --   --  0.7 0.5   ALKPHOS  --   --   --   --   --  63  --   --   --   --   --   --   --   --  77  --   --  70 71   AST  --   --   --   --   --  24  --   --   --   --   --   --   --   --  25 23  --  20 17   ALT  --   --   --   --   --  38  --   --   --   --   --   --   --   --  35 44  --  49 43    < > = values in this interval not displayed.     CBC  Recent Labs   Lab Test 01/29/20  1315 06/18/19  1054 03/13/19  1516 02/06/19  1322  05/01/18  1233   HGB 14.6 13.5 13.7 13.8   < > 14.7   WBC  --  4.6 5.2 6.1  --  4.6   RBC  --  4.18* 4.38* 4.41  --  4.50   HCT  --  38.7* 40.5 41.4  --  42.5   MCV  --  93 93 94  --  94   MCH  --  32.3 31.3 31.3  --  32.7   MCHC  --  34.9 33.8 33.3  --  34.6   RDW  --  12.6 12.5 11.9  --  12.7   PLT  --  130* 131* 225   --  148*    < > = values in this interval not displayed.     INR  Recent Labs   Lab Test 06/12/13  0949   INR 0.93   PTT 29     URINE STUDIES  Recent Labs   Lab Test 01/09/20  0849 12/18/19  1220 05/01/18  1238 06/06/17  1120  01/14/16  0858   COLOR Straw Yellow Yellow Yellow   < > Yellow   APPEARANCE Clear Slightly Cloudy Clear Slightly Cloudy   < > Clear   URINEGLC >499* >499* 50* >499*   < > 100*   URINEBILI Negative Negative Negative Negative   < > Negative   URINEKETONE Negative Negative Negative Negative   < > Negative   SG 1.015 1.021 1.016 1.017   < > >1.030   UBLD Negative Large* Negative Negative   < > Negative   URINEPH 5.0 5.0 5.0 5.0   < > 5.5   PROTEIN 100* 100* >499* >499*   < > 100*   UROBILINOGEN  --   --   --   --   --  0.2   NITRITE Negative Negative Negative Negative   < > Negative   LEUKEST Negative Small* Negative Negative   < > Negative   RBCU 1 129* 1 2   < > O - 2   WBCU <1 125* <1 2   < > O - 2    < > = values in this interval not displayed.     Recent Labs   Lab Test 01/29/20  1330 03/13/19  1522 02/06/19  1326 08/01/18  1225 05/01/18  1238   UTPG 1.36* 3.04* 1.49* 1.37* 1.76*     PTH  Recent Labs   Lab Test 03/13/19  1516 05/01/18  1233   PTHI 42 49     IRON STUDIES   Recent Labs   Lab Test 07/02/19  0947 05/01/18  1233   IRON  --  83   FEB  --  344   IRONSAT  --  24   DEANA 178 160

## 2020-05-29 DIAGNOSIS — E11.3213 TYPE 2 DIABETES MELLITUS WITH BOTH EYES AFFECTED BY MILD NONPROLIFERATIVE RETINOPATHY AND MACULAR EDEMA, WITH LONG-TERM CURRENT USE OF INSULIN (H): ICD-10-CM

## 2020-05-29 DIAGNOSIS — Z79.4 TYPE 2 DIABETES MELLITUS WITH BOTH EYES AFFECTED BY MILD NONPROLIFERATIVE RETINOPATHY AND MACULAR EDEMA, WITH LONG-TERM CURRENT USE OF INSULIN (H): ICD-10-CM

## 2020-06-01 DIAGNOSIS — N18.30 CHRONIC KIDNEY DISEASE, STAGE III (MODERATE) (H): ICD-10-CM

## 2020-06-01 LAB
ALBUMIN SERPL-MCNC: 3.7 G/DL (ref 3.4–5)
ANION GAP SERPL CALCULATED.3IONS-SCNC: 10 MMOL/L (ref 3–14)
BUN SERPL-MCNC: 25 MG/DL (ref 7–30)
CALCIUM SERPL-MCNC: 9.8 MG/DL (ref 8.5–10.1)
CHLORIDE SERPL-SCNC: 110 MMOL/L (ref 94–109)
CO2 SERPL-SCNC: 22 MMOL/L (ref 20–32)
CREAT SERPL-MCNC: 1.39 MG/DL (ref 0.66–1.25)
GFR SERPL CREATININE-BSD FRML MDRD: 51 ML/MIN/{1.73_M2}
GLUCOSE SERPL-MCNC: 218 MG/DL (ref 70–99)
HGB BLD-MCNC: 14.4 G/DL (ref 13.3–17.7)
PHOSPHATE SERPL-MCNC: 3.7 MG/DL (ref 2.5–4.5)
POTASSIUM SERPL-SCNC: 4.1 MMOL/L (ref 3.4–5.3)
PROT UR-MCNC: 0.82 G/L
PROT/CREAT 24H UR: 1.39 G/G CR (ref 0–0.2)
PTH-INTACT SERPL-MCNC: 22 PG/ML (ref 18–80)
SODIUM SERPL-SCNC: 142 MMOL/L (ref 133–144)

## 2020-06-01 PROCEDURE — 36415 COLL VENOUS BLD VENIPUNCTURE: CPT | Performed by: INTERNAL MEDICINE

## 2020-06-01 PROCEDURE — 82306 VITAMIN D 25 HYDROXY: CPT | Performed by: INTERNAL MEDICINE

## 2020-06-01 PROCEDURE — 85018 HEMOGLOBIN: CPT | Performed by: INTERNAL MEDICINE

## 2020-06-01 PROCEDURE — 84156 ASSAY OF PROTEIN URINE: CPT | Performed by: INTERNAL MEDICINE

## 2020-06-01 PROCEDURE — 83970 ASSAY OF PARATHORMONE: CPT | Performed by: INTERNAL MEDICINE

## 2020-06-01 PROCEDURE — 80069 RENAL FUNCTION PANEL: CPT | Performed by: INTERNAL MEDICINE

## 2020-06-01 RX ORDER — EMPAGLIFLOZIN 10 MG/1
TABLET, FILM COATED ORAL
Qty: 90 TABLET | Refills: 1 | Status: SHIPPED | OUTPATIENT
Start: 2020-06-01 | End: 2020-06-03

## 2020-06-01 NOTE — TELEPHONE ENCOUNTER
JARDIANCE 10 MG TABLET       Last Written Prescription Date:  12-14-19  Last Fill Quantity: 90,   # refills: 1  Last Office Visit : 5-15-20  Future Office visit:  none    Routing refill request to provider for review/approval because:  Abnormal lab: Cr, GRF, Hgb A1c                    ( order by nephrology)

## 2020-06-02 LAB — DEPRECATED CALCIDIOL+CALCIFEROL SERPL-MC: 39 UG/L (ref 20–75)

## 2020-06-11 ENCOUNTER — TELEPHONE (OUTPATIENT)
Dept: NEPHROLOGY | Facility: CLINIC | Age: 70
End: 2020-06-11

## 2020-06-11 DIAGNOSIS — M79.2 POLYNEUROPATHIC PAIN: ICD-10-CM

## 2020-06-11 NOTE — TELEPHONE ENCOUNTER
Dr. Ramsey reporting labs are stable and not worsening. Relayed to patient, and he was very excited about this. Appreciative of the call to inform him of this.

## 2020-06-12 NOTE — TELEPHONE ENCOUNTER
gabapentin (NEURONTIN) 300 MG capsule   Last Written Prescription Date:  4/14/20  Last Fill Quantity: 120,   # refills: 1  Last Office Visit : 8/16/2019  Future Office visit:  None    Routing refill request to provider for review/approval because:   Not on refill protocol

## 2020-06-15 RX ORDER — GABAPENTIN 300 MG/1
CAPSULE ORAL
Qty: 120 CAPSULE | Refills: 1 | Status: SHIPPED | OUTPATIENT
Start: 2020-06-15 | End: 2021-01-02

## 2020-06-17 ENCOUNTER — TELEPHONE (OUTPATIENT)
Dept: NEPHROLOGY | Facility: CLINIC | Age: 70
End: 2020-06-17

## 2020-06-17 DIAGNOSIS — N18.30 CKD (CHRONIC KIDNEY DISEASE) STAGE 3, GFR 30-59 ML/MIN (H): Primary | ICD-10-CM

## 2020-06-17 DIAGNOSIS — E55.9 VITAMIN D DEFICIENCY: ICD-10-CM

## 2020-06-17 NOTE — TELEPHONE ENCOUNTER
[facility] Call Center    Phone Message    May a detailed message be left on voicemail: no     Reason for Call: Medication Refill Request    Has the patient contacted the pharmacy for the refill? Yes   Name of medication being requested: Vitamin D3 (2000 Units) Tablets 1x/daily  Provider who prescribed the medication: Dr. Ramsey  Pharmacy: [facility]  Date medication is needed: ASAP, Pt is OUT, pharmacy confirmed with me they used wrong fax #    Action Taken: Message routed to:  [facility]    Travel Screening: Not Applicable

## 2020-07-05 NOTE — PATIENT INSTRUCTIONS
[facility] Phone Number [phone]  [facility] Medication Refill Request Information:  * Please contact your pharmacy regarding ANY request for medication refills.  ** [facility] Prescription Fax = [phone]  * Please allow 3 business days for routine medication refills.  * Please allow 5 business days for controlled substance medication refills.     [facility] Test Result notification information:  *You will be notified with in 7-10 days of your appointment day regarding the results of your test.  If you are on MyChart you will be notified as soon as the provider has reviewed the results and signed off on them.          
16-Jun-2020 15:14

## 2020-08-06 ENCOUNTER — TRANSFERRED RECORDS (OUTPATIENT)
Dept: HEALTH INFORMATION MANAGEMENT | Facility: CLINIC | Age: 70
End: 2020-08-06

## 2020-08-13 DIAGNOSIS — N18.30 CKD (CHRONIC KIDNEY DISEASE) STAGE 3, GFR 30-59 ML/MIN (H): Primary | ICD-10-CM

## 2020-08-20 ENCOUNTER — OFFICE VISIT (OUTPATIENT)
Dept: OPHTHALMOLOGY | Facility: CLINIC | Age: 70
End: 2020-08-20
Attending: OPHTHALMOLOGY
Payer: COMMERCIAL

## 2020-08-20 DIAGNOSIS — E11.3411 SEVERE NONPROLIFERATIVE DIABETIC RETINOPATHY OF RIGHT EYE WITH MACULAR EDEMA ASSOCIATED WITH TYPE 2 DIABETES MELLITUS (H): ICD-10-CM

## 2020-08-20 PROCEDURE — 92134 CPTRZ OPH DX IMG PST SGM RTA: CPT | Mod: ZF | Performed by: OPHTHALMOLOGY

## 2020-08-20 PROCEDURE — G0463 HOSPITAL OUTPT CLINIC VISIT: HCPCS | Mod: ZF

## 2020-08-20 ASSESSMENT — SLIT LAMP EXAM - LIDS
COMMENTS: BLEPHARITIS, SCURF, DERMATOCHALASIS
COMMENTS: BLEPHARITIS, SCURF, DERMATOCHALASIS

## 2020-08-20 ASSESSMENT — CUP TO DISC RATIO
OS_RATIO: 0.3
OD_RATIO: 0.3

## 2020-08-20 ASSESSMENT — VISUAL ACUITY
METHOD: SNELLEN - LINEAR
OS_SC+: -1
OD_SC+: -1
OS_SC: 20/50
OS_PH_SC: PT DECLINES
OD_SC: 20/25

## 2020-08-20 ASSESSMENT — CONF VISUAL FIELD
METHOD: COUNTING FINGERS
OS_NORMAL: 1
OD_NORMAL: 1

## 2020-08-20 ASSESSMENT — EXTERNAL EXAM - LEFT EYE: OS_EXAM: NORMAL

## 2020-08-20 ASSESSMENT — EXTERNAL EXAM - RIGHT EYE: OD_EXAM: NORMAL

## 2020-08-20 ASSESSMENT — TONOMETRY
IOP_METHOD: TONOPEN
OD_IOP_MMHG: 17
OS_IOP_MMHG: 14

## 2020-08-20 NOTE — PROGRESS NOTES
I have confirmed the patient's and reviewed Past Medical History, Past Surgical History, Social History, Family History, Problem List, Medication List and agree with Tech note.    CC: decreased vision following CEIOL in OS    HPI:  pt of Dr. Eugene, s/p CEIOL left eye on 6/21/19. Has been having blurred vision since CEIOL. Reports that he had significant periorbital pain after the operation and has since had significant visual disturbance, blurred vision, distorted vision, diplopia, and eye pain. He is very concerned about his vision and would not like any more injections in his left eye at this time.     Self-DC'd all gtts about 5 days after surgery. Re-started on gtts about 3 weeks post-op. Since last visit has been taking prednisolone and ofloxacin 2-3 times per day    Avastin injections  Right Eye: 2/26/18, 4/10/18, 5/11/20218, 6/11/18 and last one here on 7/19/2018  Left Eye: 2/26/18, 4/10/18, 5/11/2018, 6/21/19        Assessment/plan:   1. 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 Patient advised that failure to adequately control each may lead to vision loss. The patient expressed understanding.   - FAF extensive ischemia left eye   - ME refractory to avastin. Plan for Eylea os in one wk.    2. Diabetic macular edema left eye   - Worsened since CEIOL on 6/21/19   - Has been on ketorolac and PF twice a day/tid since surgery and will stop when running out    - OCT (12/4/19): fovea involving edema worsened compared to 10/2019   - eylea in 1 wk     3. Pseudophakia ou    4. Moderate NPDR and Diabetic macular edema right eye   - temporal macular edema, stable/reduced  as of 12/4/19; OCT 12/4/19 no fovea involving edema   - had been receiving injections , last injection 6/11/18 at outside clinic and  7/19/2018 here at [facility]    - PRP os 12/2019. FA in future.     5. Stye LL os   - pt deferred maxitrol, agrees to start warm compresses     Óscar Johnson MD  Vitreoretinal Surgery Fellow  [facility]     Attestation:  I have seen and examined the patient with Dr. Óscar Johnson  and agree with the findings in this note, as well as the interpretations of the diagnostic tests.       Jackie Nolasco MD PhD.  Professor & Chair

## 2020-08-20 NOTE — NURSING NOTE
Chief Complaints and History of Present Illnesses   Patient presents with     Follow Up     Severe nonproliferative diabetic retinopathy with macular edema associated with type 2 diabetes mellitus     Chief Complaint(s) and History of Present Illness(es)     Follow Up     Laterality: both eyes    Course: stable    Associated symptoms: eye pain (intermittent), redness (intermittent), double vision (intermittent) and dryness    Treatments tried: artificial tears    Pain scale: 4/10    Comments: Severe nonproliferative diabetic retinopathy with macular edema associated with type 2 diabetes mellitus              Comments     He states that he has intermittent eye pain, mostly with his left eye and redness.  He currently has a red bump on his left lower eye lid.  He struggles to open his eyes in the mornings.  Both eyes are crusted along his lashes when wakes.    Lab Results       Component                Value               Date                       A1C                      9.2                 06/18/2019                 A1C                      9.1                 03/06/2018                 A1C                      10.2                06/06/2017                 A1C                      9.0                 03/16/2016                 A1C                      10.2                09/09/2014        DUTCH Mcclal 9:19 AM  August 20, 2020

## 2020-08-24 ENCOUNTER — TELEPHONE (OUTPATIENT)
Dept: NEPHROLOGY | Facility: CLINIC | Age: 70
End: 2020-08-24

## 2020-08-24 DIAGNOSIS — N18.30 CHRONIC KIDNEY DISEASE, STAGE III (MODERATE) (H): ICD-10-CM

## 2020-08-24 NOTE — TELEPHONE ENCOUNTER
[facility] Call Center    Phone Message    May a detailed message be left on voicemail: yes     Reason for Call: Medication Refill Request    Has the patient contacted the pharmacy for the refill? Yes   Name of medication being requested:   sodium bicarbonate 650 MG tablet     Provider who prescribed the medication: Dr. Bolden  Pharmacy: [facility]  Date medication is needed: asap. He is out    v  Action Taken: Message routed to:  [facility]    Travel Screening: Not Applicable

## 2020-08-25 RX ORDER — SODIUM BICARBONATE 650 MG/1
650 TABLET ORAL 3 TIMES DAILY
Qty: 90 TABLET | Refills: 11 | Status: SHIPPED | OUTPATIENT
Start: 2020-08-25 | End: 2021-10-12

## 2020-08-26 ENCOUNTER — VIRTUAL VISIT (OUTPATIENT)
Dept: NEPHROLOGY | Facility: CLINIC | Age: 70
End: 2020-08-26
Attending: INTERNAL MEDICINE
Payer: COMMERCIAL

## 2020-08-26 DIAGNOSIS — N18.30 CHRONIC KIDNEY DISEASE, STAGE III (MODERATE) (H): Primary | ICD-10-CM

## 2020-08-26 ASSESSMENT — PAIN SCALES - GENERAL: PAINLEVEL: NO PAIN (0)

## 2020-08-26 NOTE — PROGRESS NOTES
[facility]    Kiah Ramsey MD  2020     Name: Earle Rene  MRN: [id number]  Age: 70 year old  : 1950  Referring provider: Marcio Hare     Assessment and Plan:    1. Stage IIIA CKD-baseline serum creatinine of 1.2-1.3 mg/dL and GFR of  53-56 likely from diabetic nephropathy given 20 year history of diabetes and diabetic retinopathy.      2. Subnephrotic range proteinuria-likely secondary to diabetic nephropathy. Currently on losartan 100 mg nightly  - management with low sodium diet, ARB and BP control     3. Electrolytes: stable per 2020 labs     4. Volume status: euvolemic by his report     5. Acid/Base status: continue current dose of sodium bicarbonate 650 mg three times daily.  Bicarb at goal at 22 (but borderline) - consider increasing to 1300 mg bid     6. Hypertension: again advised on home BP monitoring - I will ask nurse to call in 2 weeks, if SBP >130 then add hydrochlorothiazide 12.5 mg daily and recheck BMP 2 weeks after initiating     7. BMD  continue cholecalciferol 2000U Daily and calcium supplements  - PTH is bit on the low side with calcium a bit on the high side - monitor for now but low threshold to stop calcium supplement or decrease vitamin D dose.     8. Type II DM- remains on empagliflozin and 2020 A1C was 9    9. Diabetic neuropathy- no issues discussed today     10. Diabetic retinopathy-follows with an ophthalmologist.      11. Obesity-BMI 30. not addressed by me 20         Kiah Ramsey MD  [facility]  Department of Medicine  Division of Renal Disease and Hypertension  [phone]     Telephone visit start time 4:03  End 4:15 PM   Total time 12 minutes      Follow-up: No follow-ups on file.     Reason For Visit:   CKD follow up     HPI:   Earle Rene is a 70 year old male with a history of DM2 for 20+ years, complicated by diabetic neuropathy, mild non proliferative retinopathy (follows with ophthalmologist  "Dr. Eugene) and nephropathy.   For HTN he remains on losartan to 100 mg daily.  He is on empagliflozin for diabetes.    Last visit 5/27/2020, since that visit.  He is feeling OK.  Has some ups and downs with blood sugars/diabetes.  He has some nocturia that is related to whatever he eats and drinks.    He is checking his blood pressures \"sometimes\"- he cannot remember last time he checked.  No nausea or vomiting.  No chest pain.  He feels occasional shortness of breath that he attributes to being out of shape - he gets tired if he works hard or walks a lot.  He continues to have less swelling since starting empagliflozin for diabetes in December 2019.  Review of Systems:   Pertinent items are noted in HPI or as below, remainder of complete ROS is negative.      Active Medications:   Current Outpatient Medications   Medication Sig Dispense Refill     albuterol (VENTOLIN HFA) 108 (90 Base) MCG/ACT inhaler Inhale 2 puffs into the lungs every 6 hours 18 g 3     alpha-lipoic acid 600 MG capsule Take 1 capsule (600 mg) by mouth daily 90 capsule 3     ARTIFICIAL TEAR OP Apply to eye as needed       aspirin 81 MG tablet Take 1 tablet by mouth At Bedtime        atorvastatin (LIPITOR) 20 MG tablet Take 1 tablet (20 mg) by mouth daily (Patient taking differently: Take 20 mg by mouth At Bedtime ) 90 tablet 3     blood glucose (NO BRAND SPECIFIED) lancets standard Lancets that go with device, Test 3 times daily 300 each 3     blood glucose monitoring (NO BRAND SPECIFIED) meter device kit Any meter covered by insurance, not store brand, use as directed. 1 kit 0     blood glucose monitoring (NO BRAND SPECIFIED) test strip Strips that go with meter, covered by insurance. Test 3 times daily 300 strip 3     Blood Pressure Monitor KIT Automatic Blood Pressure Monitor 1 kit 0     camphor-menthol (DERMASARRA) 0.5-0.5 % external lotion Apply to arms legs torso 1-2 times a day for itching 1 Bottle 11     cholecalciferol (VITAMIN D3) 1000 " "units (25 mcg) capsule Take 2 capsules (2,000 Units) by mouth daily 180 capsule 3     clobetasol (TEMOVATE) 0.05 % ointment Apply topically to legs twice daily for 2 weeks.  Then discontinue 30 g 0     clotrimazole (LOTRIMIN) 1 % cream Apply topically 2 times daily 30 g 3     Continuous Blood Gluc  (FREESTYLE PETER READER) JUANCARLOS 1 Device daily Use to read blood sugars   as  instruction 4 times daily 1 Device 0     Continuous Blood Gluc Sensor (FREESTYLE PETER 14 DAY SENSOR) MISC 1 applicator every 14 days Change every 14 days 8 each 3     empagliflozin (JARDIANCE) 10 MG TABS tablet Take 1 tablet (10 mg) by mouth daily 90 tablet 1     famotidine (PEPCID) 20 MG tablet Take 1 tablet (20 mg) by mouth 2 times daily 180 tablet 3     fenofibrate 54 MG tablet Take 1 tablet (54 mg) by mouth daily (Patient taking differently: Take 54 mg by mouth At Bedtime ) 90 tablet 0     finasteride (PROSCAR) 5 MG tablet Take 1 tablet (5 mg) by mouth daily 90 tablet 2     fluocinonide (LIDEX) 0.05 % external cream APPLY TO AFFECTED AREA TWICE A DAY  3     gabapentin (NEURONTIN) 300 MG capsule TAKE 1 CAPSULE BY MOUTH TWICE A  capsule 1     insulin degludec (TRESIBA FLEXTOUCH) 100 UNIT/ML pen Inject 64 units subcutaneous at bedtime. 60 mL 3     insulin pen needle (B-D U/F) 31G X 5 MM Use 4 times per day.  Please dispense as BD Pen Needle Mini U/F 31G x 5  each 3     insulin syringe 31G X 5/16\" 0.5 ML MISC Use three syringes daily 270 each 1     ketamine 5% gabapentin 8% lidocaine 2.5% topical PLO cream Apply 1 g topically 3 times daily 30 g 3     loratadine (CLARITIN) 10 MG tablet Take 1 tablet (10 mg) by mouth daily (Patient taking differently: Take 10 mg by mouth At Bedtime ) 90 tablet 0     losartan (COZAAR) 100 MG tablet Take 1 tablet (100 mg) by mouth At Bedtime 30 tablet 11     metFORMIN (GLUCOPHAGE) 1000 MG tablet 1 tab each am only. 180 tablet 3     mupirocin (BACTROBAN) 2 % cream Apply  topically. In " very small amounts only as needed 15 g 1     NOVOLOG FLEXPEN 100 UNIT/ML soln Inject 15-17  units with meals, plus correction. Pt uses approx 65 units in 24 hrs. 60 mL 3     Omega-3 Fatty Acids (OMEGA-3 FISH OIL) 1000 MG CAPS Take 1 capsule (1 g) by mouth 2 times daily (Patient taking differently: Take 1 g by mouth as needed (PT last dose a week ago 12.24.19) ) 60 capsule 11     ONETOUCH ULTRA test strip Use to test blood sugar 3 times daily 300 strip 3     sodium bicarbonate 650 MG tablet Take 1 tablet (650 mg) by mouth 3 times daily 90 tablet 11     tamsulosin (FLOMAX) 0.4 MG capsule Take 1 capsule (0.4 mg) by mouth daily 90 capsule 2     triamcinolone (KENALOG) 0.1 % cream Apply topically 3 times daily 80 g 0         Allergies:   Patient has no known allergies.      Past Medical History:  BPH  Diabetes   Diabetic neuropathy   Diabetic retinopathy   GERD   Granulomatous disease   Hyperlipidemia   Hypertension   Mood disorder   ED  Presbyopia   Myopia   Elevated PSA  Nephrolithiasis   Neutropenia   Sarcoidosis of lung   Sialoadenitis   Cataracts      Past Surgical History:  Past Surgical History:   Procedure Laterality Date     ------------OTHER-------------      back of neck abscess drainage in OR     AS RAD RESEC TONSIL/PILLARS Bilateral 1961     CATARACT IOL, RT/LT Left      COLONOSCOPY  7/29/2013    Procedure: COLONOSCOPY;;  Surgeon: Montana Pascal MD;  Location:  GI     EXCISE MASS UPPER EXTREMITY Right 11/11/2019    Procedure: EXCISION, MASS, UPPER EXTREMITY, RIGHT SHOULDER;  Surgeon: Johana Choudhury MD;  Location: [facility] OR     INTRAVITREAL INJECTION CHEMOTHERAPY Right 12/30/2019    Procedure: INTRAVITREAL Bevacizumab injection;  Surgeon: Milton Maki MD;  Location: [facility] OR     PHACOEMULSIFICATION CLEAR CORNEA WITH STANDARD INTRAOCULAR LENS IMPLANT Right 12/30/2019    Procedure: PHACOEMULSIFICATION, CATARACT, WITH INTRAOCULAR LENS IMPLANT;  Surgeon: Milton Maki MD;  Location: [facility] OR      PHACOEMULSIFICATION WITH STANDARD INTRAOCULAR LENS IMPLANT Left 6/21/2019    Procedure: Left Eye Cataract Removal with Intraocular Lens Implant with Intraoperative Avastin Injection;  Surgeon: Lacey Eugene MD;  [facility]  11/2017       Family History:   Diabetes- father and brother   MI- father   Leukemia- brother   Negative for kidney disease       Social History:   Never a smoker. Drinks alcohol occasionally.      Physical Exam:  There were no vitals taken for this visit.   GENERAL APPEARANCE: alert and no distress  Pulmonary: normal work of breathing  NEURO: mentation intact and speech normal    Laboratory:  CMP  Recent Labs   Lab Test 06/01/20  1109 01/29/20  1315 01/09/20  0839 12/18/19  1218 07/02/19  0947 06/26/19  1217 06/18/19  1054  03/13/19  1516 02/06/19  1322  05/24/18  1414 05/01/18  1233 03/06/18  1216 11/01/17  1044  06/06/17  1116 01/14/16  0849    135  --   --   --  140 136   < > 138 138   < >  --  141 136  --    < > 141 140   POTASSIUM 4.1 4.5 4.4  --   --  4.3 4.3   < > 4.5 4.3   < >  --  4.7 4.0 4.4   < > 3.9 3.9   CHLORIDE 110* 104  --   --   --  110* 106   < > 106 107   < >  --  110* 105  --    < > 108 106   CO2 22 23  --   --   --  23 22   < > 24 24   < >  --  21 23  --    < > 24 28   ANIONGAP 10 8  --   --   --  7 8   < > 7 7   < >  --  10 9  --    < > 10 6   * 281*  --   --   --  142* 195*   < > 217* 197*   < >  --  149* 197*  --    < > 96 191*   BUN 25 34*  --   --   --  19 27   < > 25 22   < >  --  20 21  --    < > 21 15   CR 1.39* 1.61* 1.59* 1.83*  --  1.31* 1.41*   < > 1.20 1.36*   < >  --  1.28* 1.33* 1.20   < > 1.26* 1.07   GFRESTIMATED 51* 42* 43* 36*  --  55* 50*   < > 61 52*   < >  --  56* 53* 60*   < > 57* 69   GFRESTBLACK 59* 49* 50* 42*  --  63 58*   < > 71 61   < >  --  67 65 73   < > 69 83   INDERJIT 9.8 9.2  --   --   --  9.0 9.3   < > 9.0 9.3   < >  --  9.8 8.8  --    < > 9.2 8.8   MAG  --   --   --   --  1.9  --   --   --   --   --   --   2.1 2.1  --   --   --   --   --    PHOS 3.7 4.0  --   --   --   --   --   --  3.4 3.9   < >  --  3.3  --   --   --   --   --    PROTTOTAL  --   --   --   --   --   --  7.0  --   --   --   --   --   --  7.3  --   --  6.6* 6.8   ALBUMIN 3.7 3.7  --   --   --   --  3.8  --  3.6 3.2*   < >  --  4.0 3.8  --   --  3.5 3.8   BILITOTAL  --   --   --   --   --   --  0.6  --   --   --   --   --   --  0.8  --   --  0.7 0.5   ALKPHOS  --   --   --   --   --   --  63  --   --   --   --   --   --  77  --   --  70 71   AST  --   --   --   --   --   --  24  --   --   --   --   --   --  25 23  --  20 17   ALT  --   --   --   --   --   --  38  --   --   --   --   --   --  35 44  --  49 43    < > = values in this interval not displayed.     CBC  Recent Labs   Lab Test 06/01/20  1109 01/29/20  1315 06/18/19  1054 03/13/19  1516 02/06/19  1322  05/01/18  1233   HGB 14.4 14.6 13.5 13.7 13.8   < > 14.7   WBC  --   --  4.6 5.2 6.1  --  4.6   RBC  --   --  4.18* 4.38* 4.41  --  4.50   HCT  --   --  38.7* 40.5 41.4  --  42.5   MCV  --   --  93 93 94  --  94   MCH  --   --  32.3 31.3 31.3  --  32.7   MCHC  --   --  34.9 33.8 33.3  --  34.6   RDW  --   --  12.6 12.5 11.9  --  12.7   PLT  --   --  130* 131* 225  --  148*    < > = values in this interval not displayed.     INR  Recent Labs   Lab Test 06/12/13  0949   INR 0.93   PTT 29     URINE STUDIES  Recent Labs   Lab Test 01/09/20  0849 12/18/19  1220 05/01/18  1238 06/06/17  1120  01/14/16  0858   COLOR Straw Yellow Yellow Yellow   < > Yellow   APPEARANCE Clear Slightly Cloudy Clear Slightly Cloudy   < > Clear   URINEGLC >499* >499* 50* >499*   < > 100*   URINEBILI Negative Negative Negative Negative   < > Negative   URINEKETONE Negative Negative Negative Negative   < > Negative   SG 1.015 1.021 1.016 1.017   < > >1.030   UBLD Negative Large* Negative Negative   < > Negative   URINEPH 5.0 5.0 5.0 5.0   < > 5.5   PROTEIN 100* 100* >499* >499*   < > 100*   UROBILINOGEN  --   --   --   --   --   0.2   NITRITE Negative Negative Negative Negative   < > Negative   LEUKEST Negative Small* Negative Negative   < > Negative   RBCU 1 129* 1 2   < > O - 2   WBCU <1 125* <1 2   < > O - 2    < > = values in this interval not displayed.     Recent Labs   Lab Test 06/01/20  1110 01/29/20  1330 03/13/19  1522 02/06/19  1326 08/01/18  1225 05/01/18  1238   UTPG 1.39* 1.36* 3.04* 1.49* 1.37* 1.76*     PTH  Recent Labs   Lab Test 06/01/20  1109 03/13/19  1516 05/01/18  1233   PTHI 22 42 49     IRON STUDIES   Recent Labs   Lab Test 07/02/19  0947 05/01/18  1233   IRON  --  83   FEB  --  344   IRONSAT  --  24   DEANA 178 160

## 2020-08-26 NOTE — LETTER
2020       RE: Earle Rene  [address]  [place] [address]     Dear Colleague,    Thank you for referring your patient, Earle Rene, to the [facility] NEPHROLOGY at [facility]. Please see a copy of my visit note below.    Earle Rene is a 70 year old male who is being evaluated via a billable telephone visit.        Nephrology Clinic    Kiah Ramsey MD  2020     Name: Earle Rene  MRN: [id number]  Age: 70 year old  : 1950  Referring provider: Marcio Hare     Assessment and Plan:    1. Stage IIIA CKD-baseline serum creatinine of 1.2-1.3 mg/dL and GFR of  53-56 likely from diabetic nephropathy given 20 year history of diabetes and diabetic retinopathy.      2. Subnephrotic range proteinuria-likely secondary to diabetic nephropathy. Currently on losartan 100 mg nightly  - management with low sodium diet, ARB and BP control     3. Electrolytes: stable per 2020 labs     4. Volume status: euvolemic by his report     5. Acid/Base status: continue current dose of sodium bicarbonate 650 mg three times daily.  Bicarb at goal at 22 (but borderline) - consider increasing to 1300 mg bid     6. Hypertension: again advised on home BP monitoring - I will ask nurse to call in 2 weeks, if SBP >130 then add hydrochlorothiazide 12.5 mg daily and recheck BMP 2 weeks after initiating     7. BMD  continue cholecalciferol 2000U Daily and calcium supplements  - PTH is bit on the low side with calcium a bit on the high side - monitor for now but low threshold to stop calcium supplement or decrease vitamin D dose.     8. Type II DM- remains on empagliflozin and 2020 A1C was 9    9. Diabetic neuropathy- no issues discussed today     10. Diabetic retinopathy-follows with an ophthalmologist.      11. Obesity-BMI 30. not addressed by me 20         Kiah Ramsey MD  [facility]  Department of Medicine  Division of  "Renal Disease and Hypertension  [phone]     Telephone visit start time 4:03  End 4:15 PM   Total time 12 minutes      Follow-up: No follow-ups on file.     Reason For Visit:   CKD follow up     HPI:   Earle Rene is a 70 year old male with a history of DM2 for 20+ years, complicated by diabetic neuropathy, mild non proliferative retinopathy (follows with ophthalmologist Dr. Eugene) and nephropathy.   For HTN he remains on losartan to 100 mg daily.  He is on empagliflozin for diabetes.    Last visit 5/27/2020, since that visit.  He is feeling OK.  Has some ups and downs with blood sugars/diabetes.  He has some nocturia that is related to whatever he eats and drinks.    He is checking his blood pressures \"sometimes\"- he cannot remember last time he checked.  No nausea or vomiting.  No chest pain.  He feels occasional shortness of breath that he attributes to being out of shape - he gets tired if he works hard or walks a lot.  He continues to have less swelling since starting empagliflozin for diabetes in December 2019.  Review of Systems:   Pertinent items are noted in HPI or as below, remainder of complete ROS is negative.      Active Medications:   Current Outpatient Medications   Medication Sig Dispense Refill     albuterol (VENTOLIN HFA) 108 (90 Base) MCG/ACT inhaler Inhale 2 puffs into the lungs every 6 hours 18 g 3     alpha-lipoic acid 600 MG capsule Take 1 capsule (600 mg) by mouth daily 90 capsule 3     ARTIFICIAL TEAR OP Apply to eye as needed       aspirin 81 MG tablet Take 1 tablet by mouth At Bedtime        atorvastatin (LIPITOR) 20 MG tablet Take 1 tablet (20 mg) by mouth daily (Patient taking differently: Take 20 mg by mouth At Bedtime ) 90 tablet 3     blood glucose (NO BRAND SPECIFIED) lancets standard Lancets that go with device, Test 3 times daily 300 each 3     blood glucose monitoring (NO BRAND SPECIFIED) meter device kit Any meter covered by insurance, not store brand, use as directed. 1 kit 0 " "    blood glucose monitoring (NO BRAND SPECIFIED) test strip Strips that go with meter, covered by insurance. Test 3 times daily 300 strip 3     Blood Pressure Monitor KIT Automatic Blood Pressure Monitor 1 kit 0     camphor-menthol (DERMASARRA) 0.5-0.5 % external lotion Apply to arms legs torso 1-2 times a day for itching 1 Bottle 11     cholecalciferol (VITAMIN D3) 1000 units (25 mcg) capsule Take 2 capsules (2,000 Units) by mouth daily 180 capsule 3     clobetasol (TEMOVATE) 0.05 % ointment Apply topically to legs twice daily for 2 weeks.  Then discontinue 30 g 0     clotrimazole (LOTRIMIN) 1 % cream Apply topically 2 times daily 30 g 3     Continuous Blood Gluc  (FREESTYLE PETER READER) JUANCARLOS 1 Device daily Use to read blood sugars   as  instruction 4 times daily 1 Device 0     Continuous Blood Gluc Sensor (FREESTYLE PEETR 14 DAY SENSOR) MISC 1 applicator every 14 days Change every 14 days 8 each 3     empagliflozin (JARDIANCE) 10 MG TABS tablet Take 1 tablet (10 mg) by mouth daily 90 tablet 1     famotidine (PEPCID) 20 MG tablet Take 1 tablet (20 mg) by mouth 2 times daily 180 tablet 3     fenofibrate 54 MG tablet Take 1 tablet (54 mg) by mouth daily (Patient taking differently: Take 54 mg by mouth At Bedtime ) 90 tablet 0     finasteride (PROSCAR) 5 MG tablet Take 1 tablet (5 mg) by mouth daily 90 tablet 2     fluocinonide (LIDEX) 0.05 % external cream APPLY TO AFFECTED AREA TWICE A DAY  3     gabapentin (NEURONTIN) 300 MG capsule TAKE 1 CAPSULE BY MOUTH TWICE A  capsule 1     insulin degludec (TRESIBA FLEXTOUCH) 100 UNIT/ML pen Inject 64 units subcutaneous at bedtime. 60 mL 3     insulin pen needle (B-D U/F) 31G X 5 MM Use 4 times per day.  Please dispense as BD Pen Needle Mini U/F 31G x 5  each 3     insulin syringe 31G X 5/16\" 0.5 ML MISC Use three syringes daily 270 each 1     ketamine 5% gabapentin 8% lidocaine 2.5% topical PLO cream Apply 1 g topically 3 times daily 30 g 3 "     loratadine (CLARITIN) 10 MG tablet Take 1 tablet (10 mg) by mouth daily (Patient taking differently: Take 10 mg by mouth At Bedtime ) 90 tablet 0     losartan (COZAAR) 100 MG tablet Take 1 tablet (100 mg) by mouth At Bedtime 30 tablet 11     metFORMIN (GLUCOPHAGE) 1000 MG tablet 1 tab each am only. 180 tablet 3     mupirocin (BACTROBAN) 2 % cream Apply  topically. In very small amounts only as needed 15 g 1     NOVOLOG FLEXPEN 100 UNIT/ML soln Inject 15-17  units with meals, plus correction. Pt uses approx 65 units in 24 hrs. 60 mL 3     Omega-3 Fatty Acids (OMEGA-3 FISH OIL) 1000 MG CAPS Take 1 capsule (1 g) by mouth 2 times daily (Patient taking differently: Take 1 g by mouth as needed (PT last dose a week ago 12.24.19) ) 60 capsule 11     ONETOUCH ULTRA test strip Use to test blood sugar 3 times daily 300 strip 3     sodium bicarbonate 650 MG tablet Take 1 tablet (650 mg) by mouth 3 times daily 90 tablet 11     tamsulosin (FLOMAX) 0.4 MG capsule Take 1 capsule (0.4 mg) by mouth daily 90 capsule 2     triamcinolone (KENALOG) 0.1 % cream Apply topically 3 times daily 80 g 0         Allergies:   Patient has no known allergies.      Past Medical History:  BPH  Diabetes   Diabetic neuropathy   Diabetic retinopathy   GERD   Granulomatous disease   Hyperlipidemia   Hypertension   Mood disorder   ED  Presbyopia   Myopia   Elevated PSA  Nephrolithiasis   Neutropenia   Sarcoidosis of lung   Sialoadenitis   Cataracts      Past Surgical History:  Past Surgical History:   Procedure Laterality Date     ------------OTHER-------------      back of neck abscess drainage in OR     AS RAD RESEC TONSIL/PILLARS Bilateral 1961     CATARACT IOL, RT/LT Left      COLONOSCOPY  7/29/2013    Procedure: COLONOSCOPY;;  Surgeon: Montana Pascal MD;  [facility]: [facility]     EXCISE MASS UPPER EXTREMITY Right 11/11/2019    Procedure: EXCISION, MASS, UPPER EXTREMITY, RIGHT SHOULDER;  Surgeon: Johana Choudhury MD;  Location:  OR      INTRAVITREAL INJECTION CHEMOTHERAPY Right 12/30/2019    Procedure: INTRAVITREAL Bevacizumab injection;  Surgeon: Milton Maki MD;  Location: [facility]     PHACOEMULSIFICATION CLEAR CORNEA WITH STANDARD INTRAOCULAR LENS IMPLANT Right 12/30/2019    Procedure: PHACOEMULSIFICATION, CATARACT, WITH INTRAOCULAR LENS IMPLANT;  Surgeon: Milton Maki MD;  Location: [facility] OR     PHACOEMULSIFICATION WITH STANDARD INTRAOCULAR LENS IMPLANT Left 6/21/2019    Procedure: Left Eye Cataract Removal with Intraocular Lens Implant with Intraoperative Avastin Injection;  Surgeon: Lacey Eugene MD;  Location:  OR     siladenatis  11/2017       Family History:   Diabetes- father and brother   MI- father   Leukemia- brother   Negative for kidney disease       Social History:   Never a smoker. Drinks alcohol occasionally.      Physical Exam:  There were no vitals taken for this visit.   GENERAL APPEARANCE: alert and no distress  Pulmonary: normal work of breathing  NEURO: mentation intact and speech normal    Laboratory:  CMP  Recent Labs   Lab Test 06/01/20  1109 01/29/20  1315 01/09/20  0839 12/18/19  1218 07/02/19  0947 06/26/19  1217 06/18/19  1054  03/13/19  1516 02/06/19  1322  05/24/18  1414 05/01/18  1233 03/06/18  1216 11/01/17  1044  06/06/17  1116 01/14/16  0849    135  --   --   --  140 136   < > 138 138   < >  --  141 136  --    < > 141 140   POTASSIUM 4.1 4.5 4.4  --   --  4.3 4.3   < > 4.5 4.3   < >  --  4.7 4.0 4.4   < > 3.9 3.9   CHLORIDE 110* 104  --   --   --  110* 106   < > 106 107   < >  --  110* 105  --    < > 108 106   CO2 22 23  --   --   --  23 22   < > 24 24   < >  --  21 23  --    < > 24 28   ANIONGAP 10 8  --   --   --  7 8   < > 7 7   < >  --  10 9  --    < > 10 6   * 281*  --   --   --  142* 195*   < > 217* 197*   < >  --  149* 197*  --    < > 96 191*   BUN 25 34*  --   --   --  19 27   < > 25 22   < >  --  20 21  --    < > 21 15   CR 1.39* 1.61* 1.59* 1.83*  --  1.31* 1.41*   < >  1.20 1.36*   < >  --  1.28* 1.33* 1.20   < > 1.26* 1.07   GFRESTIMATED 51* 42* 43* 36*  --  55* 50*   < > 61 52*   < >  --  56* 53* 60*   < > 57* 69   GFRESTBLACK 59* 49* 50* 42*  --  63 58*   < > 71 61   < >  --  67 65 73   < > 69 83   INDERJIT 9.8 9.2  --   --   --  9.0 9.3   < > 9.0 9.3   < >  --  9.8 8.8  --    < > 9.2 8.8   MAG  --   --   --   --  1.9  --   --   --   --   --   --  2.1 2.1  --   --   --   --   --    PHOS 3.7 4.0  --   --   --   --   --   --  3.4 3.9   < >  --  3.3  --   --   --   --   --    PROTTOTAL  --   --   --   --   --   --  7.0  --   --   --   --   --   --  7.3  --   --  6.6* 6.8   ALBUMIN 3.7 3.7  --   --   --   --  3.8  --  3.6 3.2*   < >  --  4.0 3.8  --   --  3.5 3.8   BILITOTAL  --   --   --   --   --   --  0.6  --   --   --   --   --   --  0.8  --   --  0.7 0.5   ALKPHOS  --   --   --   --   --   --  63  --   --   --   --   --   --  77  --   --  70 71   AST  --   --   --   --   --   --  24  --   --   --   --   --   --  25 23  --  20 17   ALT  --   --   --   --   --   --  38  --   --   --   --   --   --  35 44  --  49 43    < > = values in this interval not displayed.     CBC  Recent Labs   Lab Test 06/01/20  1109 01/29/20  1315 06/18/19  1054 03/13/19  1516 02/06/19  1322  05/01/18  1233   HGB 14.4 14.6 13.5 13.7 13.8   < > 14.7   WBC  --   --  4.6 5.2 6.1  --  4.6   RBC  --   --  4.18* 4.38* 4.41  --  4.50   HCT  --   --  38.7* 40.5 41.4  --  42.5   MCV  --   --  93 93 94  --  94   MCH  --   --  32.3 31.3 31.3  --  32.7   MCHC  --   --  34.9 33.8 33.3  --  34.6   RDW  --   --  12.6 12.5 11.9  --  12.7   PLT  --   --  130* 131* 225  --  148*    < > = values in this interval not displayed.     INR  Recent Labs   Lab Test 06/12/13  0949   INR 0.93   PTT 29     URINE STUDIES  Recent Labs   Lab Test 01/09/20  0849 12/18/19  1220 05/01/18  1238 06/06/17  1120  01/14/16  0858   COLOR Straw Yellow Yellow Yellow   < > Yellow   APPEARANCE Clear Slightly Cloudy Clear Slightly Cloudy   < > Clear    URINEGLC >499* >499* 50* >499*   < > 100*   URINEBILI Negative Negative Negative Negative   < > Negative   URINEKETONE Negative Negative Negative Negative   < > Negative   SG 1.015 1.021 1.016 1.017   < > >1.030   UBLD Negative Large* Negative Negative   < > Negative   URINEPH 5.0 5.0 5.0 5.0   < > 5.5   PROTEIN 100* 100* >499* >499*   < > 100*   UROBILINOGEN  --   --   --   --   --  0.2   NITRITE Negative Negative Negative Negative   < > Negative   LEUKEST Negative Small* Negative Negative   < > Negative   RBCU 1 129* 1 2   < > O - 2   WBCU <1 125* <1 2   < > O - 2    < > = values in this interval not displayed.     Recent Labs   Lab Test 06/01/20  1110 01/29/20  1330 03/13/19  1522 02/06/19  1326 08/01/18  1225 05/01/18  1238   UTPG 1.39* 1.36* 3.04* 1.49* 1.37* 1.76*     PTH  Recent Labs   Lab Test 06/01/20  1109 03/13/19  1516 05/01/18  1233   PTHI 22 42 49     IRON STUDIES   Recent Labs   Lab Test 07/02/19  0947 05/01/18  1233   IRON  --  83   FEB  --  344   IRONSAT  --  24   DEANA 178 160       Again, thank you for allowing me to participate in the care of your patient.      Sincerely,    Kiah Ramsey MD

## 2020-08-26 NOTE — PATIENT INSTRUCTIONS
Check blood pressure every morning  Will add hydrochlorothiazide 12.5 mg per day if BP not less than 130/80  If we add that medicine, we will watch potassium levels    Follow up in 3 months    Kiah Ramsey MD

## 2020-08-26 NOTE — PROGRESS NOTES
"Earle Rene is a 70 year old male who is being evaluated via a billable telephone visit.      The patient has been notified of following:     \"This telephone visit will be conducted via a call between you and your physician/provider. We have found that certain health care needs can be provided without the need for a physical exam.  This service lets us provide the care you need with a short phone conversation.  If a prescription is necessary we can send it directly to your pharmacy.  If lab work is needed we can place an order for that and you can then stop by our lab to have the test done at a later time.    Telephone visits are billed at different rates depending on your insurance coverage. During this emergency period, for some insurers they may be billed the same as an in-person visit.  Please reach out to your insurance provider with any questions.    If during the course of the call the physician/provider feels a telephone visit is not appropriate, you will not be charged for this service.\"    Patient has given verbal consent for Telephone visit?  Yes    What phone number would you like to be contacted at? [phone]    How would you like to obtain your AVS? Mail a copy    Phone call duration: 12 minutes    Kiah Ramsey MD      "

## 2020-08-27 ENCOUNTER — TELEPHONE (OUTPATIENT)
Dept: ENDOCRINOLOGY | Facility: CLINIC | Age: 70
End: 2020-08-27

## 2020-08-27 ENCOUNTER — ALLIED HEALTH/NURSE VISIT (OUTPATIENT)
Dept: OPHTHALMOLOGY | Facility: CLINIC | Age: 70
End: 2020-08-27
Attending: OPHTHALMOLOGY
Payer: COMMERCIAL

## 2020-08-27 DIAGNOSIS — E13.311 MACULAR EDEMA DUE TO SECONDARY DIABETES (H): ICD-10-CM

## 2020-08-27 DIAGNOSIS — E11.3411 SEVERE NONPROLIFERATIVE DIABETIC RETINOPATHY OF RIGHT EYE WITH MACULAR EDEMA ASSOCIATED WITH TYPE 2 DIABETES MELLITUS (H): Primary | ICD-10-CM

## 2020-08-27 PROCEDURE — 40000269 ZZH STATISTIC NO CHARGE FACILITY FEE: Mod: ZF

## 2020-08-27 PROCEDURE — 67028 INJECTION EYE DRUG: CPT | Mod: LT,ZF | Performed by: OPHTHALMOLOGY

## 2020-08-27 PROCEDURE — 25000128 H RX IP 250 OP 636: Mod: ZF | Performed by: OPHTHALMOLOGY

## 2020-08-27 RX ORDER — SODIUM BICARBONATE 650 MG/1
650 TABLET ORAL 3 TIMES DAILY
Qty: 270 TABLET | Refills: 2 | OUTPATIENT
Start: 2020-08-27

## 2020-08-27 RX ADMIN — AFLIBERCEPT 2 MG: 40 INJECTION, SOLUTION INTRAVITREAL at 09:24

## 2020-08-27 ASSESSMENT — VISUAL ACUITY
METHOD: SNELLEN - LINEAR
OS_SC+: +1
OD_SC+: -3
OS_SC: 20/50
OD_SC: 20/25

## 2020-08-27 ASSESSMENT — TONOMETRY
OS_IOP_MMHG: 14
OD_IOP_MMHG: 14
IOP_METHOD: TONOPEN

## 2020-08-27 NOTE — NURSING NOTE
Chief Complaints and History of Present Illnesses   Patient presents with     Follow Up     Chief Complaint(s) and History of Present Illness(es)     Follow Up     Laterality: left eye    Onset: 1 week ago    Associated symptoms: Negative for eye pain, flashes and floaters    Pain scale: 0/10              Comments     Pt here for injection of the left eye today for NPDR with macular edema extra foveally in left eye  Pt reports no change in vision since last week  Pt has been using hot compresses on the left eye - sometimes skips a day but sometimes does 3x/day  Occasionally notes double vision - sometimes it improves with a blink (usually happens at night)  Uses AT's a couple times daily    BS in clinic today was 176 (just had coffee this AM)  Lab Results       Component                Value               Date                       A1C                      9.2                 06/18/2019                 A1C                      9.1                 03/06/2018                 A1C                      10.2                06/06/2017                 A1C                      9.0                 03/16/2016                 A1C                      10.2                09/09/2014    MEDHAT Holley 9:07 AM August 27, 2020

## 2020-08-27 NOTE — LETTER
Patient:  Earle Rene  :   1950  MRN:     [id number]        Mr.Kurosh Rene  [address] [place] [address]        2020    Dear ,    I see that you do not have a follow-up appointment in endocrinology. I would recommend that you be evaluated by an endocrinologist to minimize complications. If you like to be seen at the [facility], please call [phone] select option #1 for an appointment.    Regards    Lala Villanueva MD

## 2020-08-27 NOTE — PROGRESS NOTES
eylea left eye today for nonproliferative diabetic retinopathy with dme    Exam/OCT in 4 weeks    Jackie Nolasco MD PhD.  Professor & Chair

## 2020-09-03 DIAGNOSIS — E78.5 HYPERLIPIDEMIA LDL GOAL <100: ICD-10-CM

## 2020-09-08 RX ORDER — ATORVASTATIN CALCIUM 20 MG/1
20 TABLET, FILM COATED ORAL DAILY
Qty: 90 TABLET | Refills: 0 | Status: SHIPPED | OUTPATIENT
Start: 2020-09-08 | End: 2020-12-08

## 2020-09-08 NOTE — TELEPHONE ENCOUNTER
Last Clinic Visit: 9/24/2019  [facility]    Atorvastatin: Lab(s)- LDL past due.  Lissette refill was sent for 90 days and FYI to [facility] clinic.   *12 months since last visit.

## 2020-09-09 ENCOUNTER — TRANSFERRED RECORDS (OUTPATIENT)
Dept: HEALTH INFORMATION MANAGEMENT | Facility: CLINIC | Age: 70
End: 2020-09-09

## 2020-09-16 ENCOUNTER — TELEPHONE (OUTPATIENT)
Dept: NEPHROLOGY | Facility: CLINIC | Age: 70
End: 2020-09-16

## 2020-09-16 DIAGNOSIS — E11.3411 SEVERE NONPROLIFERATIVE DIABETIC RETINOPATHY OF RIGHT EYE WITH MACULAR EDEMA ASSOCIATED WITH TYPE 2 DIABETES MELLITUS (H): Primary | ICD-10-CM

## 2020-09-16 DIAGNOSIS — I10 BENIGN ESSENTIAL HYPERTENSION: ICD-10-CM

## 2020-09-16 DIAGNOSIS — N18.30 CHRONIC KIDNEY DISEASE, STAGE III (MODERATE) (H): Primary | ICD-10-CM

## 2020-09-16 NOTE — TELEPHONE ENCOUNTER
Nephrology Note: Nursing Outreach Encounter    REASON FOR CALL:                                                      REASON FOR CALL: Blood Pressure Follow Up                                          SITUATION/BACKROUND:                                                    Patient is being treated for CKD Stage 3.    CKD stage 3      ASSESSMENT:                                                      Average 136/80s; Highest 143/83.    Does not check his HR. Denies swelling. Is hesitant to start hydrochlorothiazide due to being on newer medication called Jardiance for diabetes, as that already makes him urinate more and he is afraid of becoming dehydrated.    Prefers new medications to go to [facility]    Uremic Symptoms: N/A       PLAN:                                                      Follow Up:   Route to provider to further advise     Patient verbalized understanding and will contact the clinic with any further questions or concerns.     Debra Snow RN

## 2020-09-16 NOTE — TELEPHONE ENCOUNTER
----- Message from Debra Snow RN sent at 8/26/2020  4:41 PM CDT -----  Regarding: FW: call mid sept    ----- Message -----  From: Kiah Ramsey MD  Sent: 8/26/2020   4:21 PM CDT  To: Debra Snow RN  Subject: call mid sept                                    Please call this patient mid September to review home BP.  If not <130/80 then add hydrochlorothiazide 12.5 mg daily and arrange BMP 2 weeks after adding.    Kiha Ramsey MD

## 2020-09-17 RX ORDER — AMLODIPINE BESYLATE 5 MG/1
5 TABLET ORAL AT BEDTIME
Qty: 90 TABLET | Refills: 3 | Status: SHIPPED | OUTPATIENT
Start: 2020-09-17 | End: 2021-10-12

## 2020-09-17 NOTE — TELEPHONE ENCOUNTER
Notified Earle of recommendations. He is understanding of the plan and will start the medication tomorrow.

## 2020-09-17 NOTE — TELEPHONE ENCOUNTER
I understand his concern regarding diuretic - I was opting for that because I was under the impression that he still had some edema.  Please start amlodipine 5 mg po at bedtime and follow up on BP and symptoms of edema in 2 weeks. If he has leg edema on follow up then I would advise to re-consider hydrochlorothiazide with close monitoring of labs. Kiah Ramsey MD

## 2020-09-29 ENCOUNTER — TELEPHONE (OUTPATIENT)
Dept: NEPHROLOGY | Facility: CLINIC | Age: 70
End: 2020-09-29

## 2020-09-29 NOTE — TELEPHONE ENCOUNTER
Called patient to see how he has been feeling after medication changes.    Left message for patient to return call.

## 2020-09-30 NOTE — TELEPHONE ENCOUNTER
Patient states he only has one blood pressure reading,113/71. Patient did not keep track of the other ones but says they have come down and they are better. Patient has no swelling in his legs. Just feels tired all the time.

## 2020-10-14 DIAGNOSIS — Z79.4 TYPE 2 DIABETES MELLITUS WITH HYPERGLYCEMIA, WITH LONG-TERM CURRENT USE OF INSULIN (H): ICD-10-CM

## 2020-10-14 DIAGNOSIS — E11.65 TYPE 2 DIABETES MELLITUS WITH HYPERGLYCEMIA, WITH LONG-TERM CURRENT USE OF INSULIN (H): ICD-10-CM

## 2020-10-14 NOTE — TELEPHONE ENCOUNTER
insulin degludec (TRESIBA FLEXTOUCH) 100 UNIT/ML pen        Last Written Prescription Date:  02/27/20  Last Fill Quantity: 60mL,   # refills: 3  Last Office Visit : 05/15/20  Future Office visit:  None Scheduled    Routing refill request to provider for review/approval because:  Insulin - refilled per clinic

## 2020-10-14 NOTE — TELEPHONE ENCOUNTER
Long Acting Insulin Protocol Tckxlu54/14/2020 06:46 AM   HgbA1C in past 3 or 6 months     Lab orders in place per Pamela Laura  Pt notified to schedule via Rx

## 2020-10-28 ENCOUNTER — VIRTUAL VISIT (OUTPATIENT)
Dept: NEPHROLOGY | Facility: CLINIC | Age: 70
End: 2020-10-28
Attending: FAMILY MEDICINE
Payer: COMMERCIAL

## 2020-10-28 VITALS — WEIGHT: 195 LBS | BODY MASS INDEX: 29.65 KG/M2

## 2020-10-28 DIAGNOSIS — N18.31 STAGE 3A CHRONIC KIDNEY DISEASE (H): ICD-10-CM

## 2020-10-28 DIAGNOSIS — I10 BENIGN ESSENTIAL HYPERTENSION: Primary | ICD-10-CM

## 2020-10-28 PROBLEM — N18.30 CHRONIC KIDNEY DISEASE, STAGE 3 (H): Status: ACTIVE | Noted: 2020-10-28

## 2020-10-28 PROCEDURE — 99214 OFFICE O/P EST MOD 30 MIN: CPT | Mod: 95 | Performed by: INTERNAL MEDICINE

## 2020-10-28 NOTE — LETTER
"10/28/2020       RE: Earle Rene  [address]  [place] [address]     Dear Colleague,    Thank you for referring your patient, Earle Rene, to the [facility] [place] [facility]. Please see a copy of my visit note below.      Nephrology Clinic    Kiah Ramsey MD  10/28/2020     Name: Earle Rene  MRN: [id number]  Age: 70 year old  : 1950  Referring provider: Marcio Hare     Assessment and Plan:    1. Stage IIIA CKD-baseline serum creatinine of 1.2-1.3 mg/dL and GFR of  53-56 likely from diabetic nephropathy given 20 year history of diabetes and diabetic retinopathy.   - last check was in 2020 - he will get his labs in [place] at the [facility]     2. Subnephrotic range proteinuria-likely secondary to diabetic nephropathy. Currently on losartan 100 mg nightly  - management with low sodium diet, ARB and BP control (added amlodipine to losartan in 2020)     3. Electrolytes: stable per 2020 labs     4. Volume status: euvolemic by his report     5. Acid/Base status: continue current dose of sodium bicarbonate 650 mg three times daily.  Needs updated serum bicarbonate - consider increasing to 1300 mg bid     6. Hypertension: continue losartan and amlodipine - unclear control with SBP ranging from 120-140's.  Could increase amlodipine to 10 mg daily     7. BMD  continue cholecalciferol 2000U Daily and calcium supplements  - low threshold to discontinue given that PTH has been low side of goal in past     8. Type II DM- remains on empagliflozin and 2020 A1C was 9    9. Diabetic neuropathy- some \"fuzziness\" in feeling of toes     10. Diabetic retinopathy-follows with an ophthalmologist.      11. Obesity-BMI 30. not addressed by me 20         Kiah Ramsey MD  [facility]  Department of Medicine  Division of Renal Disease and Hypertension  [phone] "     Telephone visit start time 4:05  End 4:14 PM    Total time 9 minutes      Follow-up: Return in about 3 months (around 1/28/2021).     Reason For Visit:   CKD follow up     HPI:   Earle Rene is a 70 year old male with a history of DM2 for 20+ years, complicated by diabetic neuropathy, mild non proliferative retinopathy (follows with ophthalmologist Dr. Eugene) and nephropathy.    He is on empagliflozin for diabetes.    Last visit 8/2020  - in September 2020 we added amlodipine 5 mg daily (and continue losartan 100 mg) for BP that was not at goal of <130/80.    Still urinates a lot and cannot hold for a long time.  His blood pressure is up and down, as low as 120/70 and as high as 140/80 but he does not check often enough to know whether BP is usually on higher side or closer to 120 systolic.  No leg edema.  Occasional leg cramps.  No light headed or dizziness.  Has some decreased feeling in his toes.      Yesterday he had some bread and cheese and blood sugar went to 500.  He tries to control his appetite and follow the diet for diabetes. His appetite is good. No fever or chills, nausea or vomiting, no CP, no dyspnea. No dysuria.    Review of Systems:   Pertinent items are noted in HPI or as below, remainder of complete ROS is negative.      Active Medications:   Current Outpatient Medications   Medication Sig Dispense Refill     albuterol (VENTOLIN HFA) 108 (90 Base) MCG/ACT inhaler Inhale 2 puffs into the lungs every 6 hours 18 g 3     alpha-lipoic acid 600 MG capsule Take 1 capsule (600 mg) by mouth daily 90 capsule 3     amLODIPine (NORVASC) 5 MG tablet Take 1 tablet (5 mg) by mouth At Bedtime 90 tablet 3     ARTIFICIAL TEAR OP Apply to eye as needed       aspirin 81 MG tablet Take 1 tablet by mouth At Bedtime        atorvastatin (LIPITOR) 20 MG tablet Take 1 tablet (20 mg) by mouth daily 90 tablet 0     blood glucose (NO BRAND SPECIFIED) lancets standard Lancets that go with device, Test 3 times daily 300  each 3     blood glucose monitoring (NO BRAND SPECIFIED) meter device kit Any meter covered by insurance, not store brand, use as directed. 1 kit 0     blood glucose monitoring (NO BRAND SPECIFIED) test strip Strips that go with meter, covered by insurance. Test 3 times daily 300 strip 3     Blood Pressure Monitor KIT Automatic Blood Pressure Monitor 1 kit 0     camphor-menthol (DERMASARRA) 0.5-0.5 % external lotion Apply to arms legs torso 1-2 times a day for itching 1 Bottle 11     cholecalciferol (VITAMIN D3) 1000 units (25 mcg) capsule Take 2 capsules (2,000 Units) by mouth daily 180 capsule 3     clobetasol (TEMOVATE) 0.05 % ointment Apply topically to legs twice daily for 2 weeks.  Then discontinue 30 g 0     clotrimazole (LOTRIMIN) 1 % cream Apply topically 2 times daily 30 g 3     Continuous Blood Gluc  (FREESTYLE PETER READER) JUANCARLOS 1 Device daily Use to read blood sugars   as  instruction 4 times daily 1 Device 0     Continuous Blood Gluc Sensor (FREESTYLE PETER 14 DAY SENSOR) MISC 1 applicator every 14 days Change every 14 days 8 each 3     empagliflozin (JARDIANCE) 10 MG TABS tablet Take 1 tablet (10 mg) by mouth daily 90 tablet 1     famotidine (PEPCID) 20 MG tablet Take 1 tablet (20 mg) by mouth 2 times daily 180 tablet 3     fenofibrate 54 MG tablet Take 1 tablet (54 mg) by mouth daily (Patient taking differently: Take 54 mg by mouth At Bedtime ) 90 tablet 0     finasteride (PROSCAR) 5 MG tablet Take 1 tablet (5 mg) by mouth daily 90 tablet 2     fluocinonide (LIDEX) 0.05 % external cream APPLY TO AFFECTED AREA TWICE A DAY  3     gabapentin (NEURONTIN) 300 MG capsule TAKE 1 CAPSULE BY MOUTH TWICE A  capsule 1     insulin degludec (TRESIBA FLEXTOUCH) 100 UNIT/ML pen Inject 64 units subcu at bedtime. Lab draw needed. Call  to schedule. 60 mL 1     insulin pen needle (B-D U/F) 31G X 5 MM Use 4 times per day.  Please dispense as BD Pen Needle Mini U/F 31G x 5   "each 3     insulin syringe 31G X 5/16\" 0.5 ML MISC Use three syringes daily 270 each 1     ketamine 5% gabapentin 8% lidocaine 2.5% topical PLO cream Apply 1 g topically 3 times daily 30 g 3     loratadine (CLARITIN) 10 MG tablet Take 1 tablet (10 mg) by mouth daily (Patient taking differently: Take 10 mg by mouth At Bedtime ) 90 tablet 0     losartan (COZAAR) 100 MG tablet Take 1 tablet (100 mg) by mouth At Bedtime 30 tablet 11     metFORMIN (GLUCOPHAGE) 1000 MG tablet 1 tab each am only. 180 tablet 3     mupirocin (BACTROBAN) 2 % cream Apply  topically. In very small amounts only as needed 15 g 1     NOVOLOG FLEXPEN 100 UNIT/ML soln Inject 15-17  units with meals, plus correction. Pt uses approx 65 units in 24 hrs. 60 mL 3     Omega-3 Fatty Acids (OMEGA-3 FISH OIL) 1000 MG CAPS Take 1 capsule (1 g) by mouth 2 times daily (Patient taking differently: Take 1 g by mouth as needed (PT last dose a week ago 12.24.19) ) 60 capsule 11     ONETOUCH ULTRA test strip Use to test blood sugar 3 times daily 300 strip 3     sodium bicarbonate 650 MG tablet Take 1 tablet (650 mg) by mouth 3 times daily 90 tablet 11     tamsulosin (FLOMAX) 0.4 MG capsule Take 1 capsule (0.4 mg) by mouth daily 90 capsule 2     triamcinolone (KENALOG) 0.1 % cream Apply topically 3 times daily 80 g 0         Allergies:   Patient has no known allergies.      Past Medical History:  BPH  Diabetes   Diabetic neuropathy   Diabetic retinopathy   GERD   Granulomatous disease   Hyperlipidemia   Hypertension   Mood disorder   ED  Presbyopia   Myopia   Elevated PSA  Nephrolithiasis   Neutropenia   Sarcoidosis of lung   Sialoadenitis   Cataracts      Past Surgical History:  Past Surgical History:   Procedure Laterality Date     ------------OTHER-------------      back of neck abscess drainage in OR     AS RAD RESEC TONSIL/PILLARS Bilateral 1961     CATARACT IOL, RT/LT Left      COLONOSCOPY  7/29/2013    Procedure: COLONOSCOPY;;  Surgeon: Montana Pascal MD;  " Location: [facility]     EXCISE MASS UPPER EXTREMITY Right 11/11/2019    Procedure: EXCISION, MASS, UPPER EXTREMITY, RIGHT SHOULDER;  Surgeon: Johana Choudhury MD;  Location: [facility] OR     INTRAVITREAL INJECTION CHEMOTHERAPY Right 12/30/2019    Procedure: INTRAVITREAL Bevacizumab injection;  Surgeon: Milton Maki MD;  Location: [facility] OR     PHACOEMULSIFICATION CLEAR CORNEA WITH STANDARD INTRAOCULAR LENS IMPLANT Right 12/30/2019    Procedure: PHACOEMULSIFICATION, CATARACT, WITH INTRAOCULAR LENS IMPLANT;  Surgeon: Milton Maki MD;  Location: [facility] OR     PHACOEMULSIFICATION WITH STANDARD INTRAOCULAR LENS IMPLANT Left 6/21/2019    Procedure: Left Eye Cataract Removal with Intraocular Lens Implant with Intraoperative Avastin Injection;  Surgeon: Lacey Eugene MD;  Location: [facility] OR     siladenatis  11/2017       Family History:   Diabetes- father and brother   MI- father   Leukemia- brother   Negative for kidney disease       Social History:   Never a smoker. Drinks alcohol occasionally.      Physical Exam:  Wt 88.5 kg (195 lb)   BMI 29.65 kg/m     GENERAL APPEARANCE: alert and no distress  Pulmonary: normal work of breathing  NEURO: mentation intact and speech normal    Laboratory:  CMP  Recent Labs   Lab Test 06/01/20  1109 01/29/20  1315 01/09/20  0839 12/18/19  1218 07/02/19  0947 06/26/19  1217 06/18/19  1054 03/13/19  1516 03/13/19  1516 02/06/19  1322 05/24/18  1414 05/24/18  1414 05/01/18  1233 03/06/18  1216 11/01/17  1044 06/06/17  1116 06/06/17  1116 01/14/16  0849    135  --   --   --  140 136   < > 138 138   < >  --  141 136  --    < > 141 140   POTASSIUM 4.1 4.5 4.4  --   --  4.3 4.3   < > 4.5 4.3   < >  --  4.7 4.0 4.4   < > 3.9 3.9   CHLORIDE 110* 104  --   --   --  110* 106   < > 106 107   < >  --  110* 105  --    < > 108 106   CO2 22 23  --   --   --  23 22   < > 24 24   < >  --  21 23  --    < > 24 28   ANIONGAP 10 8  --   --   --  7 8   < > 7 7   < >  --  10 9  --    < > 10 6    * 281*  --   --   --  142* 195*   < > 217* 197*   < >  --  149* 197*  --    < > 96 191*   BUN 25 34*  --   --   --  19 27   < > 25 22   < >  --  20 21  --    < > 21 15   CR 1.39* 1.61* 1.59* 1.83*  --  1.31* 1.41*   < > 1.20 1.36*   < >  --  1.28* 1.33* 1.20   < > 1.26* 1.07   GFRESTIMATED 51* 42* 43* 36*  --  55* 50*   < > 61 52*   < >  --  56* 53* 60*   < > 57* 69   GFRESTBLACK 59* 49* 50* 42*  --  63 58*   < > 71 61   < >  --  67 65 73   < > 69 83   INDERJIT 9.8 9.2  --   --   --  9.0 9.3   < > 9.0 9.3   < >  --  9.8 8.8  --    < > 9.2 8.8   MAG  --   --   --   --  1.9  --   --   --   --   --   --  2.1 2.1  --   --   --   --   --    PHOS 3.7 4.0  --   --   --   --   --   --  3.4 3.9   < >  --  3.3  --   --   --   --   --    PROTTOTAL  --   --   --   --   --   --  7.0  --   --   --   --   --   --  7.3  --   --  6.6* 6.8   ALBUMIN 3.7 3.7  --   --   --   --  3.8  --  3.6 3.2*   < >  --  4.0 3.8  --   --  3.5 3.8   BILITOTAL  --   --   --   --   --   --  0.6  --   --   --   --   --   --  0.8  --   --  0.7 0.5   ALKPHOS  --   --   --   --   --   --  63  --   --   --   --   --   --  77  --   --  70 71   AST  --   --   --   --   --   --  24  --   --   --   --   --   --  25 23  --  20 17   ALT  --   --   --   --   --   --  38  --   --   --   --   --   --  35 44  --  49 43    < > = values in this interval not displayed.     CBC  Recent Labs   Lab Test 06/01/20  1109 01/29/20  1315 06/18/19  1054 03/13/19  1516 02/06/19  1322 05/01/18  1233 05/01/18  1233   HGB 14.4 14.6 13.5 13.7 13.8   < > 14.7   WBC  --   --  4.6 5.2 6.1  --  4.6   RBC  --   --  4.18* 4.38* 4.41  --  4.50   HCT  --   --  38.7* 40.5 41.4  --  42.5   MCV  --   --  93 93 94  --  94   MCH  --   --  32.3 31.3 31.3  --  32.7   MCHC  --   --  34.9 33.8 33.3  --  34.6   RDW  --   --  12.6 12.5 11.9  --  12.7   PLT  --   --  130* 131* 225  --  148*    < > = values in this interval not displayed.     INR  Recent Labs   Lab Test 06/12/13  0949   INR 0.93  "  PTT 29     URINE STUDIES  Recent Labs   Lab Test 01/09/20  0849 12/18/19  1220 05/01/18  1238 06/06/17  1120 01/14/16  0858 01/14/16  0858   COLOR Straw Yellow Yellow Yellow   < > Yellow   APPEARANCE Clear Slightly Cloudy Clear Slightly Cloudy   < > Clear   URINEGLC >499* >499* 50* >499*   < > 100*   URINEBILI Negative Negative Negative Negative   < > Negative   URINEKETONE Negative Negative Negative Negative   < > Negative   SG 1.015 1.021 1.016 1.017   < > >1.030   UBLD Negative Large* Negative Negative   < > Negative   URINEPH 5.0 5.0 5.0 5.0   < > 5.5   PROTEIN 100* 100* >499* >499*   < > 100*   UROBILINOGEN  --   --   --   --   --  0.2   NITRITE Negative Negative Negative Negative   < > Negative   LEUKEST Negative Small* Negative Negative   < > Negative   RBCU 1 129* 1 2   < > O - 2   WBCU <1 125* <1 2   < > O - 2    < > = values in this interval not displayed.     Recent Labs   Lab Test 06/01/20  1110 01/29/20  1330 03/13/19  1522 02/06/19  1326 08/01/18  1225 05/01/18  1238   UTPG 1.39* 1.36* 3.04* 1.49* 1.37* 1.76*     PTH  Recent Labs   Lab Test 06/01/20  1109 03/13/19  1516 05/01/18  1233   PTHI 22 42 49     IRON STUDIES   Recent Labs   Lab Test 07/02/19  0947 05/01/18  1233   IRON  --  83   FEB  --  344   IRONSAT  --  24   DEANA 178 160       Earle Rene is a 70 year old male who is being evaluated via a billable telephone visit.      The patient has been notified of following:     \"This telephone visit will be conducted via a call between you and your physician/provider. We have found that certain health care needs can be provided without the need for a physical exam.  This service lets us provide the care you need with a short phone conversation.  If a prescription is necessary we can send it directly to your pharmacy.  If lab work is needed we can place an order for that and you can then stop by our lab to have the test done at a later time.    Telephone visits are billed at different rates depending " "on your insurance coverage. During this emergency period, for some insurers they may be billed the same as an in-person visit.  Please reach out to your insurance provider with any questions.    If during the course of the call the physician/provider feels a telephone visit is not appropriate, you will not be charged for this service.\"    Patient has given verbal consent for Telephone visit?  Yes    What phone number would you like to be contacted at? [phone]    How would you like to obtain your AVS? Mail a copy    Phone call duration: 9 minutes    Kiah Ramsey MD                "

## 2020-10-28 NOTE — PATIENT INSTRUCTIONS
No change in medication  Please get your labs done  You may request face to face visit for Dec 16th    Or follow up in 3-4 months    Kiah Ramsey MD

## 2020-10-28 NOTE — PROGRESS NOTES
"  Nephrology Clinic    Kiah Ramsey MD  10/28/2020     Name: Earle Rene  MRN: [id number]  Age: 70 year old  : 1950  Referring provider: Marcio Hare     Assessment and Plan:    1. Stage IIIA CKD-baseline serum creatinine of 1.2-1.3 mg/dL and GFR of  53-56 likely from diabetic nephropathy given 20 year history of diabetes and diabetic retinopathy.   - last check was in 2020 - he will get his labs in [place] at the [facility]     2. Subnephrotic range proteinuria-likely secondary to diabetic nephropathy. Currently on losartan 100 mg nightly  - management with low sodium diet, ARB and BP control (added amlodipine to losartan in 2020)     3. Electrolytes: stable per 2020 labs     4. Volume status: euvolemic by his report     5. Acid/Base status: continue current dose of sodium bicarbonate 650 mg three times daily.  Needs updated serum bicarbonate - consider increasing to 1300 mg bid     6. Hypertension: continue losartan and amlodipine - unclear control with SBP ranging from 120-140's.  Could increase amlodipine to 10 mg daily     7. BMD  continue cholecalciferol 2000U Daily and calcium supplements  - low threshold to discontinue given that PTH has been low side of goal in past     8. Type II DM- remains on empagliflozin and 2020 A1C was 9    9. Diabetic neuropathy- some \"fuzziness\" in feeling of toes     10. Diabetic retinopathy-follows with an ophthalmologist.      11. Obesity-BMI 30. not addressed by me 20         Kiah Ramsey MD  [facility]  Department of Medicine  Division of Renal Disease and Hypertension  [phone]     Telephone visit start time 4:05  End 4:14 PM    Total time 9 minutes      Follow-up: Return in about 3 months (around 2021).     Reason For Visit:   CKD follow up     HPI:   Earle Rene is a 70 year old male with a history of DM2 for 20+ years, complicated by diabetic neuropathy, mild non proliferative " retinopathy (follows with ophthalmologist Dr. Eugene) and nephropathy.    He is on empagliflozin for diabetes.    Last visit 8/2020  - in September 2020 we added amlodipine 5 mg daily (and continue losartan 100 mg) for BP that was not at goal of <130/80.    Still urinates a lot and cannot hold for a long time.  His blood pressure is up and down, as low as 120/70 and as high as 140/80 but he does not check often enough to know whether BP is usually on higher side or closer to 120 systolic.  No leg edema.  Occasional leg cramps.  No light headed or dizziness.  Has some decreased feeling in his toes.      Yesterday he had some bread and cheese and blood sugar went to 500.  He tries to control his appetite and follow the diet for diabetes. His appetite is good. No fever or chills, nausea or vomiting, no CP, no dyspnea. No dysuria.    Review of Systems:   Pertinent items are noted in HPI or as below, remainder of complete ROS is negative.      Active Medications:   Current Outpatient Medications   Medication Sig Dispense Refill     albuterol (VENTOLIN HFA) 108 (90 Base) MCG/ACT inhaler Inhale 2 puffs into the lungs every 6 hours 18 g 3     alpha-lipoic acid 600 MG capsule Take 1 capsule (600 mg) by mouth daily 90 capsule 3     amLODIPine (NORVASC) 5 MG tablet Take 1 tablet (5 mg) by mouth At Bedtime 90 tablet 3     ARTIFICIAL TEAR OP Apply to eye as needed       aspirin 81 MG tablet Take 1 tablet by mouth At Bedtime        atorvastatin (LIPITOR) 20 MG tablet Take 1 tablet (20 mg) by mouth daily 90 tablet 0     blood glucose (NO BRAND SPECIFIED) lancets standard Lancets that go with device, Test 3 times daily 300 each 3     blood glucose monitoring (NO BRAND SPECIFIED) meter device kit Any meter covered by insurance, not store brand, use as directed. 1 kit 0     blood glucose monitoring (NO BRAND SPECIFIED) test strip Strips that go with meter, covered by insurance. Test 3 times daily 300 strip 3     Blood Pressure  "Monitor KIT Automatic Blood Pressure Monitor 1 kit 0     camphor-menthol (DERMASARRA) 0.5-0.5 % external lotion Apply to arms legs torso 1-2 times a day for itching 1 Bottle 11     cholecalciferol (VITAMIN D3) 1000 units (25 mcg) capsule Take 2 capsules (2,000 Units) by mouth daily 180 capsule 3     clobetasol (TEMOVATE) 0.05 % ointment Apply topically to legs twice daily for 2 weeks.  Then discontinue 30 g 0     clotrimazole (LOTRIMIN) 1 % cream Apply topically 2 times daily 30 g 3     Continuous Blood Gluc  (FREESTYLE PETER READER) JUANCARLOS 1 Device daily Use to read blood sugars   as  instruction 4 times daily 1 Device 0     Continuous Blood Gluc Sensor (FREESTYLE PETER 14 DAY SENSOR) MISC 1 applicator every 14 days Change every 14 days 8 each 3     empagliflozin (JARDIANCE) 10 MG TABS tablet Take 1 tablet (10 mg) by mouth daily 90 tablet 1     famotidine (PEPCID) 20 MG tablet Take 1 tablet (20 mg) by mouth 2 times daily 180 tablet 3     fenofibrate 54 MG tablet Take 1 tablet (54 mg) by mouth daily (Patient taking differently: Take 54 mg by mouth At Bedtime ) 90 tablet 0     finasteride (PROSCAR) 5 MG tablet Take 1 tablet (5 mg) by mouth daily 90 tablet 2     fluocinonide (LIDEX) 0.05 % external cream APPLY TO AFFECTED AREA TWICE A DAY  3     gabapentin (NEURONTIN) 300 MG capsule TAKE 1 CAPSULE BY MOUTH TWICE A  capsule 1     insulin degludec (TRESIBA FLEXTOUCH) 100 UNIT/ML pen Inject 64 units subcu at bedtime. Lab draw needed. Call  to schedule. 60 mL 1     insulin pen needle (B-D U/F) 31G X 5 MM Use 4 times per day.  Please dispense as BD Pen Needle Mini U/F 31G x 5  each 3     insulin syringe 31G X 5/16\" 0.5 ML MISC Use three syringes daily 270 each 1     ketamine 5% gabapentin 8% lidocaine 2.5% topical PLO cream Apply 1 g topically 3 times daily 30 g 3     loratadine (CLARITIN) 10 MG tablet Take 1 tablet (10 mg) by mouth daily (Patient taking differently: Take 10 mg by " mouth At Bedtime ) 90 tablet 0     losartan (COZAAR) 100 MG tablet Take 1 tablet (100 mg) by mouth At Bedtime 30 tablet 11     metFORMIN (GLUCOPHAGE) 1000 MG tablet 1 tab each am only. 180 tablet 3     mupirocin (BACTROBAN) 2 % cream Apply  topically. In very small amounts only as needed 15 g 1     NOVOLOG FLEXPEN 100 UNIT/ML soln Inject 15-17  units with meals, plus correction. Pt uses approx 65 units in 24 hrs. 60 mL 3     Omega-3 Fatty Acids (OMEGA-3 FISH OIL) 1000 MG CAPS Take 1 capsule (1 g) by mouth 2 times daily (Patient taking differently: Take 1 g by mouth as needed (PT last dose a week ago 12.24.19) ) 60 capsule 11     ONETOUCH ULTRA test strip Use to test blood sugar 3 times daily 300 strip 3     sodium bicarbonate 650 MG tablet Take 1 tablet (650 mg) by mouth 3 times daily 90 tablet 11     tamsulosin (FLOMAX) 0.4 MG capsule Take 1 capsule (0.4 mg) by mouth daily 90 capsule 2     triamcinolone (KENALOG) 0.1 % cream Apply topically 3 times daily 80 g 0         Allergies:   Patient has no known allergies.      Past Medical History:  BPH  Diabetes   Diabetic neuropathy   Diabetic retinopathy   GERD   Granulomatous disease   Hyperlipidemia   Hypertension   Mood disorder   ED  Presbyopia   Myopia   Elevated PSA  Nephrolithiasis   Neutropenia   Sarcoidosis of lung   Sialoadenitis   Cataracts      Past Surgical History:  Past Surgical History:   Procedure Laterality Date     ------------OTHER-------------      back of neck abscess drainage in OR     AS RAD RESEC TONSIL/PILLARS Bilateral 1961     CATARACT IOL, RT/LT Left      COLONOSCOPY  7/29/2013    Procedure: COLONOSCOPY;;  Surgeon: Montana Pascal MD;  Location: [facility]     EXCISE MASS UPPER EXTREMITY Right 11/11/2019    Procedure: EXCISION, MASS, UPPER EXTREMITY, RIGHT SHOULDER;  Surgeon: Johana Choudhury MD;  Location: [facility] OR     INTRAVITREAL INJECTION CHEMOTHERAPY Right 12/30/2019    Procedure: INTRAVITREAL Bevacizumab injection;  Surgeon: Milton Maki  MD [place];  Location: [facility] OR     PHACOEMULSIFICATION CLEAR CORNEA WITH STANDARD INTRAOCULAR LENS IMPLANT Right 12/30/2019    Procedure: PHACOEMULSIFICATION, CATARACT, WITH INTRAOCULAR LENS IMPLANT;  Surgeon: Milton Maki MD;  Location:  OR     PHACOEMULSIFICATION WITH STANDARD INTRAOCULAR LENS IMPLANT Left 6/21/2019    Procedure: Left Eye Cataract Removal with Intraocular Lens Implant with Intraoperative Avastin Injection;  Surgeon: Lacey Eugene MD;  Location:  OR     [facility]  11/2017       Family History:   Diabetes- father and brother   MI- father   Leukemia- brother   Negative for kidney disease       Social History:   Never a smoker. Drinks alcohol occasionally.      Physical Exam:  Wt 88.5 kg (195 lb)   BMI 29.65 kg/m     GENERAL APPEARANCE: alert and no distress  Pulmonary: normal work of breathing  NEURO: mentation intact and speech normal    Laboratory:  CMP  Recent Labs   Lab Test 06/01/20  1109 01/29/20  1315 01/09/20  0839 12/18/19  1218 07/02/19  0947 06/26/19  1217 06/18/19  1054 03/13/19  1516 03/13/19  1516 02/06/19  1322 05/24/18  1414 05/24/18  1414 05/01/18  1233 03/06/18  1216 11/01/17  1044 06/06/17  1116 06/06/17  1116 01/14/16  0849    135  --   --   --  140 136   < > 138 138   < >  --  141 136  --    < > 141 140   POTASSIUM 4.1 4.5 4.4  --   --  4.3 4.3   < > 4.5 4.3   < >  --  4.7 4.0 4.4   < > 3.9 3.9   CHLORIDE 110* 104  --   --   --  110* 106   < > 106 107   < >  --  110* 105  --    < > 108 106   CO2 22 23  --   --   --  23 22   < > 24 24   < >  --  21 23  --    < > 24 28   ANIONGAP 10 8  --   --   --  7 8   < > 7 7   < >  --  10 9  --    < > 10 6   * 281*  --   --   --  142* 195*   < > 217* 197*   < >  --  149* 197*  --    < > 96 191*   BUN 25 34*  --   --   --  19 27   < > 25 22   < >  --  20 21  --    < > 21 15   CR 1.39* 1.61* 1.59* 1.83*  --  1.31* 1.41*   < > 1.20 1.36*   < >  --  1.28* 1.33* 1.20   < > 1.26* 1.07   GFRESTIMATED 51* 42* 43* 36*  --   55* 50*   < > 61 52*   < >  --  56* 53* 60*   < > 57* 69   GFRESTBLACK 59* 49* 50* 42*  --  63 58*   < > 71 61   < >  --  67 65 73   < > 69 83   INDERJIT 9.8 9.2  --   --   --  9.0 9.3   < > 9.0 9.3   < >  --  9.8 8.8  --    < > 9.2 8.8   MAG  --   --   --   --  1.9  --   --   --   --   --   --  2.1 2.1  --   --   --   --   --    PHOS 3.7 4.0  --   --   --   --   --   --  3.4 3.9   < >  --  3.3  --   --   --   --   --    PROTTOTAL  --   --   --   --   --   --  7.0  --   --   --   --   --   --  7.3  --   --  6.6* 6.8   ALBUMIN 3.7 3.7  --   --   --   --  3.8  --  3.6 3.2*   < >  --  4.0 3.8  --   --  3.5 3.8   BILITOTAL  --   --   --   --   --   --  0.6  --   --   --   --   --   --  0.8  --   --  0.7 0.5   ALKPHOS  --   --   --   --   --   --  63  --   --   --   --   --   --  77  --   --  70 71   AST  --   --   --   --   --   --  24  --   --   --   --   --   --  25 23  --  20 17   ALT  --   --   --   --   --   --  38  --   --   --   --   --   --  35 44  --  49 43    < > = values in this interval not displayed.     CBC  Recent Labs   Lab Test 06/01/20  1109 01/29/20  1315 06/18/19  1054 03/13/19  1516 02/06/19  1322 05/01/18  1233 05/01/18  1233   HGB 14.4 14.6 13.5 13.7 13.8   < > 14.7   WBC  --   --  4.6 5.2 6.1  --  4.6   RBC  --   --  4.18* 4.38* 4.41  --  4.50   HCT  --   --  38.7* 40.5 41.4  --  42.5   MCV  --   --  93 93 94  --  94   MCH  --   --  32.3 31.3 31.3  --  32.7   MCHC  --   --  34.9 33.8 33.3  --  34.6   RDW  --   --  12.6 12.5 11.9  --  12.7   PLT  --   --  130* 131* 225  --  148*    < > = values in this interval not displayed.     INR  Recent Labs   Lab Test 06/12/13  0949   INR 0.93   PTT 29     URINE STUDIES  Recent Labs   Lab Test 01/09/20  0849 12/18/19  1220 05/01/18  1238 06/06/17  1120 01/14/16  0858 01/14/16  0858   COLOR Straw Yellow Yellow Yellow   < > Yellow   APPEARANCE Clear Slightly Cloudy Clear Slightly Cloudy   < > Clear   URINEGLC >499* >499* 50* >499*   < > 100*   URINEBILI Negative  "Negative Negative Negative   < > Negative   URINEKETONE Negative Negative Negative Negative   < > Negative   SG 1.015 1.021 1.016 1.017   < > >1.030   UBLD Negative Large* Negative Negative   < > Negative   URINEPH 5.0 5.0 5.0 5.0   < > 5.5   PROTEIN 100* 100* >499* >499*   < > 100*   UROBILINOGEN  --   --   --   --   --  0.2   NITRITE Negative Negative Negative Negative   < > Negative   LEUKEST Negative Small* Negative Negative   < > Negative   RBCU 1 129* 1 2   < > O - 2   WBCU <1 125* <1 2   < > O - 2    < > = values in this interval not displayed.     Recent Labs   Lab Test 06/01/20  1110 01/29/20  1330 03/13/19  1522 02/06/19  1326 08/01/18  1225 05/01/18  1238   UTPG 1.39* 1.36* 3.04* 1.49* 1.37* 1.76*     PTH  Recent Labs   Lab Test 06/01/20  1109 03/13/19  1516 05/01/18  1233   PTHI 22 42 49     IRON STUDIES   Recent Labs   Lab Test 07/02/19  0947 05/01/18  1233   IRON  --  83   FEB  --  344   IRONSAT  --  24   DEANA 178 160       Earle Rene is a 70 year old male who is being evaluated via a billable telephone visit.      The patient has been notified of following:     \"This telephone visit will be conducted via a call between you and your physician/provider. We have found that certain health care needs can be provided without the need for a physical exam.  This service lets us provide the care you need with a short phone conversation.  If a prescription is necessary we can send it directly to your pharmacy.  If lab work is needed we can place an order for that and you can then stop by our lab to have the test done at a later time.    Telephone visits are billed at different rates depending on your insurance coverage. During this emergency period, for some insurers they may be billed the same as an in-person visit.  Please reach out to your insurance provider with any questions.    If during the course of the call the physician/provider feels a telephone visit is not appropriate, you will not be charged for " "this service.\"    Patient has given verbal consent for Telephone visit?  Yes    What phone number would you like to be contacted at? [phone]    How would you like to obtain your AVS? Mail a copy    Phone call duration: 9 minutes    Kiah Ramsey MD      "

## 2020-10-29 ENCOUNTER — TELEPHONE (OUTPATIENT)
Dept: ENDOCRINOLOGY | Facility: CLINIC | Age: 70
End: 2020-10-29

## 2020-10-29 NOTE — LETTER
Patient:  Earle Rene  :   1950  MRN:     [id number]        Mr.Kurosh Rene  [address] [place] [address]        2020    Dear ,    I see that you do not have a follow-up appointment in endocrinology. I would recommend that you be evaluated by an endocrinologist to minimize complications. If you like to be seen at the [facility], please call [phone] select option #1 for an appointment.    Regards    Lala Villanueva MD

## 2020-11-05 ENCOUNTER — TRANSFERRED RECORDS (OUTPATIENT)
Dept: HEALTH INFORMATION MANAGEMENT | Facility: CLINIC | Age: 70
End: 2020-11-05

## 2020-11-17 ENCOUNTER — MEDICAL CORRESPONDENCE (OUTPATIENT)
Dept: HEALTH INFORMATION MANAGEMENT | Facility: CLINIC | Age: 70
End: 2020-11-17

## 2020-12-04 ENCOUNTER — TELEPHONE (OUTPATIENT)
Dept: ENDOCRINOLOGY | Facility: CLINIC | Age: 70
End: 2020-12-04

## 2020-12-04 DIAGNOSIS — E78.5 HYPERLIPIDEMIA LDL GOAL <100: ICD-10-CM

## 2020-12-04 DIAGNOSIS — E11.9 TYPE 2 DIABETES MELLITUS (H): ICD-10-CM

## 2020-12-04 RX ORDER — NAPROXEN SODIUM 220 MG
TABLET ORAL
Qty: 270 EACH | Refills: 1 | Status: SHIPPED | OUTPATIENT
Start: 2020-12-04

## 2020-12-04 NOTE — TELEPHONE ENCOUNTER
[facility] Call Center    Phone Message    May a detailed message be left on voicemail: yes     Reason for Call: Medication Refill Request    Has the patient contacted the pharmacy for the refill? Yes   Name of medication being requested: insulin pen needle (B-D U/F) 31G X 5 MM       Provider who prescribed the medication: LINDSEY Laura    Pharmacy: [id number] - [place], [place] - [address] (Pharmacy)    Date medication is needed: asap           Action Taken: Message routed to:  [facility] endo    Travel Screening: Not Applicable

## 2020-12-04 NOTE — TELEPHONE ENCOUNTER
Centralized Medication Refill Team note:   insulin pen needle (B-D U/F) 31G X 5 MM          Last Written Prescription Date:  5/17/2018  Last Fill Quantity: 400,   # refills: 3  Last Office Visit : 5/15/20  Future Office visit:  None     Scheduling has been notified to contact the pt for appointment.

## 2020-12-07 DIAGNOSIS — E11.3213 TYPE 2 DIABETES MELLITUS WITH BOTH EYES AFFECTED BY MILD NONPROLIFERATIVE RETINOPATHY AND MACULAR EDEMA, WITH LONG-TERM CURRENT USE OF INSULIN (H): Primary | ICD-10-CM

## 2020-12-07 DIAGNOSIS — Z79.4 TYPE 2 DIABETES MELLITUS WITH BOTH EYES AFFECTED BY MILD NONPROLIFERATIVE RETINOPATHY AND MACULAR EDEMA, WITH LONG-TERM CURRENT USE OF INSULIN (H): Primary | ICD-10-CM

## 2020-12-07 RX ORDER — FLURBIPROFEN SODIUM 0.3 MG/ML
SOLUTION/ DROPS OPHTHALMIC
Qty: 360 EACH | Refills: 3 | Status: SHIPPED | OUTPATIENT
Start: 2020-12-07

## 2020-12-08 RX ORDER — ATORVASTATIN CALCIUM 20 MG/1
20 TABLET, FILM COATED ORAL DAILY
Qty: 90 TABLET | Refills: 0 | Status: SHIPPED | OUTPATIENT
Start: 2020-12-08 | End: 2021-03-07

## 2020-12-08 NOTE — TELEPHONE ENCOUNTER
ATORVASTATIN 20 MG TABLET      Last Written Prescription Date:  9/8/20  Last Fill Quantity: 90,   # refills: 0  Last Office Visit : 9/24/20  Future Office visit:  none    Routing refill request to provider for review/approval because:  Overdue appt  Overdue lab  LDL Cholesterol Calculated   Date Value Ref Range Status   06/18/2019 49 <100 mg/dL Final     Comment:     Desirable:       <100 mg/dl   Given 90 day kaycee on 9/8/20  Thank-you, Mayda HERNANDEZ RN Medication Refill Team

## 2020-12-11 DIAGNOSIS — N18.31 STAGE 3A CHRONIC KIDNEY DISEASE (H): Primary | ICD-10-CM

## 2020-12-14 DIAGNOSIS — N18.31 STAGE 3A CHRONIC KIDNEY DISEASE (H): ICD-10-CM

## 2020-12-14 LAB
ALBUMIN SERPL-MCNC: 4 G/DL (ref 3.4–5)
ANION GAP SERPL CALCULATED.3IONS-SCNC: 8 MMOL/L (ref 3–14)
BUN SERPL-MCNC: 30 MG/DL (ref 7–30)
CALCIUM SERPL-MCNC: 9.4 MG/DL (ref 8.5–10.1)
CHLORIDE SERPL-SCNC: 110 MMOL/L (ref 94–109)
CO2 SERPL-SCNC: 20 MMOL/L (ref 20–32)
CREAT SERPL-MCNC: 1.43 MG/DL (ref 0.66–1.25)
CREAT UR-MCNC: 63 MG/DL
GFR SERPL CREATININE-BSD FRML MDRD: 49 ML/MIN/{1.73_M2}
GLUCOSE SERPL-MCNC: 212 MG/DL (ref 70–99)
HGB BLD-MCNC: 14.9 G/DL (ref 13.3–17.7)
PHOSPHATE SERPL-MCNC: 4.4 MG/DL (ref 2.5–4.5)
POTASSIUM SERPL-SCNC: 4.2 MMOL/L (ref 3.4–5.3)
PROT UR-MCNC: 0.96 G/L
PROT/CREAT 24H UR: 1.51 G/G CR (ref 0–0.2)
SODIUM SERPL-SCNC: 138 MMOL/L (ref 133–144)

## 2020-12-14 PROCEDURE — 84156 ASSAY OF PROTEIN URINE: CPT | Performed by: INTERNAL MEDICINE

## 2020-12-14 PROCEDURE — 85018 HEMOGLOBIN: CPT | Performed by: INTERNAL MEDICINE

## 2020-12-14 PROCEDURE — 36415 COLL VENOUS BLD VENIPUNCTURE: CPT | Performed by: INTERNAL MEDICINE

## 2020-12-14 PROCEDURE — 80069 RENAL FUNCTION PANEL: CPT | Performed by: INTERNAL MEDICINE

## 2020-12-28 ENCOUNTER — TELEPHONE (OUTPATIENT)
Dept: NEPHROLOGY | Facility: CLINIC | Age: 70
End: 2020-12-28

## 2020-12-28 NOTE — TELEPHONE ENCOUNTER
Kiah Ramsey MD   12/28/2020  1:54 PM CST      Patient prefers nurse telephone call  - can you please let him know his labs are stable?  Thanks!  Kiah Ramsey MD     Patient notified of above and Had no other questions or concerns.

## 2020-12-29 DIAGNOSIS — M79.2 POLYNEUROPATHIC PAIN: ICD-10-CM

## 2020-12-31 NOTE — TELEPHONE ENCOUNTER
gabapentin (NEURONTIN) 300 MG capsule      Last Written Prescription Date:  6/15/20  Last Fill Quantity: 120,   # refills: 1  Last Office Visit : 8/16/19 recommended follow up in 6 weeks  Future Office visit:  None scheduled    Routing refill request to provider for review/approval because:  Drug not on the [facility], [facility] or [facility] refill protocol

## 2021-01-01 DIAGNOSIS — M79.2 POLYNEUROPATHIC PAIN: ICD-10-CM

## 2021-01-02 NOTE — TELEPHONE ENCOUNTER
gabapentin (NEURONTIN) 300 MG capsule      Last Written Prescription Date:  6/15/2020  Last Fill Quantity: 120 cap,   # refills: 1  Last Office Visit : 9/4/2019  Future Office visit:  none    Routing refill request to provider for review/approval because:  Medication not on the [facility] refill protocol.  - appointment past due.  - message routed to Primary Care scheduling.

## 2021-01-04 RX ORDER — GABAPENTIN 300 MG/1
300 CAPSULE ORAL 2 TIMES DAILY
Qty: 60 CAPSULE | Refills: 1 | Status: SHIPPED | OUTPATIENT
Start: 2021-01-04 | End: 2021-04-16

## 2021-01-05 RX ORDER — GABAPENTIN 300 MG/1
CAPSULE ORAL
Qty: 120 CAPSULE | Refills: 0 | Status: SHIPPED | OUTPATIENT
Start: 2021-01-05 | End: 2023-09-29

## 2021-01-08 DIAGNOSIS — Z79.4 TYPE 2 DIABETES MELLITUS WITH BOTH EYES AFFECTED BY MILD NONPROLIFERATIVE RETINOPATHY AND MACULAR EDEMA, WITH LONG-TERM CURRENT USE OF INSULIN (H): ICD-10-CM

## 2021-01-08 DIAGNOSIS — E11.3213 TYPE 2 DIABETES MELLITUS WITH BOTH EYES AFFECTED BY MILD NONPROLIFERATIVE RETINOPATHY AND MACULAR EDEMA, WITH LONG-TERM CURRENT USE OF INSULIN (H): ICD-10-CM

## 2021-01-11 NOTE — TELEPHONE ENCOUNTER
empagliflozin (JARDIANCE) 10 MG TABS tablet   Last Written Prescription Date:  6/3/2020  Last Fill Quantity: 90,   # refills: 1  Last Office Visit : 12/16/2020  Future Office visit:  1/14/2021    Routing refill request to provider for review/approval because:  Labs due:    A1C      Refer to Provider for review       Kayc Briones RN  Central Triage Red Flags/Med Refills

## 2021-01-14 ENCOUNTER — OFFICE VISIT (OUTPATIENT)
Dept: ENDOCRINOLOGY | Facility: CLINIC | Age: 71
End: 2021-01-14
Payer: COMMERCIAL

## 2021-01-14 VITALS
BODY MASS INDEX: 30.75 KG/M2 | DIASTOLIC BLOOD PRESSURE: 77 MMHG | WEIGHT: 202.9 LBS | HEART RATE: 81 BPM | HEIGHT: 68 IN | SYSTOLIC BLOOD PRESSURE: 119 MMHG

## 2021-01-14 DIAGNOSIS — E11.3213 TYPE 2 DIABETES MELLITUS WITH BOTH EYES AFFECTED BY MILD NONPROLIFERATIVE RETINOPATHY AND MACULAR EDEMA, WITH LONG-TERM CURRENT USE OF INSULIN (H): Primary | ICD-10-CM

## 2021-01-14 DIAGNOSIS — Z79.4 TYPE 2 DIABETES MELLITUS WITH BOTH EYES AFFECTED BY MILD NONPROLIFERATIVE RETINOPATHY AND MACULAR EDEMA, WITH LONG-TERM CURRENT USE OF INSULIN (H): Primary | ICD-10-CM

## 2021-01-14 LAB — HBA1C MFR BLD: 9 % (ref 4.3–6)

## 2021-01-14 PROCEDURE — 83036 HEMOGLOBIN GLYCOSYLATED A1C: CPT | Performed by: PHYSICIAN ASSISTANT

## 2021-01-14 PROCEDURE — 99215 OFFICE O/P EST HI 40 MIN: CPT | Performed by: PHYSICIAN ASSISTANT

## 2021-01-14 ASSESSMENT — MIFFLIN-ST. JEOR: SCORE: 1649.85

## 2021-01-14 NOTE — PATIENT INSTRUCTIONS
Increase Tresiba 68 units at bedtime.  Increase Jardiance 25 mg each am.  Increase your water intake.  Continue current Metformin 1000 mg each am.  Continue Novolog 15-17 units with meals.  See the diabetes educator.  Have your kidney lab work done in 1 month [facility] lab.  See me in 2 months.  Pamela Laura

## 2021-01-14 NOTE — PROGRESS NOTES
Pt seen face to face in clinic today ( 1/14/2021 ).    PATT Rene is a 70 year old male with type 2 diabetes mellitus. Pt is being seen in clinic today for diabetes follow up.  Pt's diabetes is complicated by retinopathy, nephropathy, neuropathy and ED.  Pt also has hx of hyperlipidemia, HTN, obesity, presumed localized prostate cancer, BPH, goiter with right thyroid nodule and osteoporosis.  For his diabetes, patient states he is taking Tresiba 64 units SQ at hs, Humalog 15-17 units with meals, Jardiance 10 mg each am and Metformin 1000 mg each am.  He still misses insulin at lunch intermittently.  Pt's A1C is high at 9.0 % today.  I reviewed his Freestyle Lora sensor download today and his average glucose is 225 with SD 33.  His glucose is in target 31 % of the time, above target 68 % of the time and below target 1 % of the time.  No frequent hypoglycemia.  He denies missing is Tresiba dose. As above, he will intermittently miss taking Novolog with lunch.  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  Decrease auditory acuity.  Chronic numbness in feet.   Polyuria and denies polydipsia. Fatigue and low energy.  He denies groin yeast infection, dysuria or hematuria at this time.  Pt denies frequent headaches, n/v, SOB at rest, cough, fever, chills,chest pain, abd pain or diarrhea.  No foot ulcers on exam    DIABETES CARE:  Retinopathy: yes; severe NPDR with macular edema right eye and moderate NPDR with macular edema left eye. He was seen by Oph here in Aug 2020.  Nephropathy: yes- CKDStage3.  Urine protein 1.51 g/gCr on 12/14/2020. Pt is taking Cozaar daily.  Neuropathy: yes.   Foot exam. Feet - mild swelling. No ulcers. Abnormal monofilamentous exam.  Lipids: LDL 49 in June 2019. Pt is taking Lipitor, Fish Oil and fenofibrate.  Taking ASA: yes.  CAD:no.  Mental health: hx of mood  disorder per chart. Pt denies depression.  Insulin: Basal and meal time insulin.  DM meds: Jardiance and Metformin.  Testing: Freestyle Lora sensor.    ROS   Please see under HPI.     ALLERGIES:  Review of patient's allergies indicates no known allergies.      Current Outpatient Medications   Medication Sig Dispense Refill     albuterol (VENTOLIN HFA) 108 (90 Base) MCG/ACT inhaler Inhale 2 puffs into the lungs every 6 hours 18 g 3     alpha-lipoic acid 600 MG capsule Take 1 capsule (600 mg) by mouth daily 90 capsule 3     amLODIPine (NORVASC) 5 MG tablet Take 1 tablet (5 mg) by mouth At Bedtime 90 tablet 3     ARTIFICIAL TEAR OP Apply to eye as needed       aspirin 81 MG tablet Take 1 tablet by mouth At Bedtime        atorvastatin (LIPITOR) 20 MG tablet Take 1 tablet (20 mg) by mouth daily 90 tablet 0     blood glucose (NO BRAND SPECIFIED) lancets standard Lancets that go with device, Test 3 times daily 300 each 3     blood glucose monitoring (NO BRAND SPECIFIED) meter device kit Any meter covered by insurance, not store brand, use as directed. 1 kit 0     blood glucose monitoring (NO BRAND SPECIFIED) test strip Strips that go with meter, covered by insurance. Test 3 times daily 300 strip 3     Blood Pressure Monitor KIT Automatic Blood Pressure Monitor 1 kit 0     camphor-menthol (DERMASARRA) 0.5-0.5 % external lotion Apply to arms legs torso 1-2 times a day for itching 1 Bottle 11     cholecalciferol (VITAMIN D3) 1000 units (25 mcg) capsule Take 2 capsules (2,000 Units) by mouth daily 180 capsule 3     clobetasol (TEMOVATE) 0.05 % ointment Apply topically to legs twice daily for 2 weeks.  Then discontinue 30 g 0     clotrimazole (LOTRIMIN) 1 % cream Apply topically 2 times daily 30 g 3     Continuous Blood Gluc  (FREESTYLE LORA READER) JUANCARLOS 1 Device daily Use to read blood sugars   as  instruction 4 times daily 1 Device 0     Continuous Blood Gluc Sensor (FREESTYLE LORA 14 DAY SENSOR) MISC 1  "applicator every 14 days Change every 14 days 8 each 3     empagliflozin (JARDIANCE) 10 MG TABS tablet Take 1 tablet (10 mg) by mouth daily 90 tablet 0     famotidine (PEPCID) 20 MG tablet Take 1 tablet (20 mg) by mouth 2 times daily 180 tablet 3     fenofibrate 54 MG tablet Take 1 tablet (54 mg) by mouth daily (Patient taking differently: Take 54 mg by mouth At Bedtime ) 90 tablet 0     finasteride (PROSCAR) 5 MG tablet Take 1 tablet (5 mg) by mouth daily 90 tablet 2     fluocinonide (LIDEX) 0.05 % external cream APPLY TO AFFECTED AREA TWICE A DAY  3     gabapentin (NEURONTIN) 300 MG capsule TAKE 1 CAPSULE BY MOUTH TWICE A  capsule 0     gabapentin (NEURONTIN) 300 MG capsule Take 1 capsule (300 mg) by mouth 2 times daily 60 capsule 1     insulin degludec (TRESIBA FLEXTOUCH) 100 UNIT/ML pen Inject 64 units subcu at bedtime. Lab draw needed. Call  to schedule. 60 mL 1     insulin pen needle (B-D U/F) 31G X 5 MM miscellaneous Use 4 time(s) per day.  Please dispense as BD Pen Needle Mini U/F 31G x 5  each 3     insulin pen needle (B-D U/F) 31G X 5 MM Use 4 times per day.  Please dispense as BD Pen Needle Mini U/F 31G x 5  each 3     insulin syringe 31G X 5/16\" 0.5 ML MISC Use three syringes daily 270 each 1     ketamine 5% gabapentin 8% lidocaine 2.5% topical PLO cream Apply 1 g topically 3 times daily 30 g 3     loratadine (CLARITIN) 10 MG tablet Take 1 tablet (10 mg) by mouth daily (Patient taking differently: Take 10 mg by mouth At Bedtime ) 90 tablet 0     losartan (COZAAR) 100 MG tablet Take 1 tablet (100 mg) by mouth At Bedtime 30 tablet 11     metFORMIN (GLUCOPHAGE) 1000 MG tablet 1 tab each am only. 180 tablet 3     mupirocin (BACTROBAN) 2 % cream Apply  topically. In very small amounts only as needed 15 g 1     NOVOLOG FLEXPEN 100 UNIT/ML soln Inject 15-17  units with meals, plus correction. Pt uses approx 65 units in 24 hrs. 60 mL 3     Omega-3 Fatty Acids (OMEGA-3 FISH OIL) " 1000 MG CAPS Take 1 capsule (1 g) by mouth 2 times daily (Patient taking differently: Take 1 g by mouth as needed (PT last dose a week ago 12.24.19) ) 60 capsule 11     ONETOUCH ULTRA test strip Use to test blood sugar 3 times daily 300 strip 3     sodium bicarbonate 650 MG tablet Take 1 tablet (650 mg) by mouth 3 times daily 90 tablet 11     tamsulosin (FLOMAX) 0.4 MG capsule Take 1 capsule (0.4 mg) by mouth daily 90 capsule 2     triamcinolone (KENALOG) 0.1 % cream Apply topically 3 times daily 80 g 0     Family Hx   No change.     Personal Hx   Smoke: none.   ETOH: none.    with grown children.    PMH   1. Type 2 Diabetes Mellitus dx at age 43.   2. Neuropathy.  3. Nephropathy.   4. ED.   5. Dyslipidemia.   6. Nephrolithiasis.   7. Decrease auditory acuity.   8. Sarcoidosis-lung.   9. Goiter.   10. S/P T & A.   11. S/P FX right heel.   12. Vit D def.   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   14. CT chest- ? granulomas.   15. Retinopathy.  16. Goiter.  Past Medical History:   Diagnosis Date     Blepharitis of both eyes      BPH (benign prostatic hyperplasia)      Diabetes (H)      Diabetic neuropathy (H)      Diabetic retinopathy associated with diabetes mellitus due to underlying condition (H)      Dry eye syndrome      GERD (gastroesophageal reflux disease)      Goiter      Granulomatous disease (H)      HLD (hyperlipidemia)      HTN (hypertension)      Nonsenile cataract      Peripheral neuropathy      Past Surgical History:   Procedure Laterality Date     ------------OTHER-------------      back of neck abscess drainage in OR     AS RAD RESEC TONSIL/PILLARS Bilateral 1961     CATARACT IOL, RT/LT Left      COLONOSCOPY  7/29/2013    Procedure: COLONOSCOPY;;  Surgeon: Montana Pascal MD;  Location: [facility]     EXCISE MASS UPPER EXTREMITY Right 11/11/2019    Procedure: EXCISION, MASS, UPPER EXTREMITY, RIGHT SHOULDER;  Surgeon: Johana Choudhury MD;  Location: [facility]     INTRAVITREAL INJECTION  "CHEMOTHERAPY Right 12/30/2019    Procedure: INTRAVITREAL Bevacizumab injection;  Surgeon: Milton Maki MD;  [facility]     PHACOEMULSIFICATION CLEAR CORNEA WITH STANDARD INTRAOCULAR LENS IMPLANT Right 12/30/2019    Procedure: PHACOEMULSIFICATION, CATARACT, WITH INTRAOCULAR LENS IMPLANT;  Surgeon: Milton Maki MD;  [facility]: [facility]     PHACOEMULSIFICATION WITH STANDARD INTRAOCULAR LENS IMPLANT Left 6/21/2019    Procedure: Left Eye Cataract Removal with Intraocular Lens Implant with Intraoperative Avastin Injection;  Surgeon: Lacey Eugene MD;  Location: [facility]     siladenatis  11/2017     Physical Exam   /77   Pulse 81   Ht 1.727 m (5' 8\")   Wt 92 kg (202 lb 14.4 oz)   BMI 30.85 kg/m    GENERAL: Pt in no distress.  SKIN: Dry and excoriations.  HEENT: Decrease visual acuity. Fundi not examined today.  NECK: Thyroid palpable and nontender.  LUNGS: Clear b/l.  CARDIAC: RRR.  ABDOMEN: Enlarge and nontender.  EXTREMITIES: Trace pretibial edema b/l.  FEET: No ulcers; abnormal monofilamentous exam.    Results   Creatinine   Date Value Ref Range Status   12/14/2020 1.43 (H) 0.66 - 1.25 mg/dL Final     GFR Estimate   Date Value Ref Range Status   12/14/2020 49 (L) >60 mL/min/[1.73_m2] Final     Comment:     Non  GFR Calc  Starting 12/18/2018, serum creatinine based estimated GFR (eGFR) will be   calculated using the Chronic Kidney Disease Epidemiology Collaboration   (CKD-EPI) equation.       Hemoglobin A1C   Date Value Ref Range Status   06/18/2019 9.2 (H) 0 - 5.6 % Final     Comment:     Normal <5.7% Prediabetes 5.7-6.4%  Diabetes 6.5% or higher - adopted from ADA   consensus guidelines.       Potassium   Date Value Ref Range Status   12/14/2020 4.2 3.4 - 5.3 mmol/L Final     ALT   Date Value Ref Range Status   06/18/2019 38 0 - 70 U/L Final     AST   Date Value Ref Range Status   06/18/2019 24 0 - 45 U/L Final     TSH   Date Value Ref Range Status   01/09/2020 1.24 0.40 - " 4.00 mU/L Final     T4 Free   Date Value Ref Range Status   10/21/2014 1.00 0.76 - 1.46 ng/dL Final     Comment:     Effective 7/30/2014, the reference range for this assay has changed to reflect   new instrumentation/methodology.           Cholesterol   Date Value Ref Range Status   06/18/2019 145 <200 mg/dL Final   03/06/2018 101 <200 mg/dL Final     HDL Cholesterol   Date Value Ref Range Status   06/18/2019 38 (L) >39 mg/dL Final   03/06/2018 32 (L) >39 mg/dL Final     LDL Cholesterol Calculated   Date Value Ref Range Status   06/18/2019 49 <100 mg/dL Final     Comment:     Desirable:       <100 mg/dl   03/06/2018 36 <100 mg/dL Final     Comment:     Desirable:       <100 mg/dl     Triglycerides   Date Value Ref Range Status   06/18/2019 289 (H) <150 mg/dL Final     Comment:     Borderline high:  150-199 mg/dl  High:             200-499 mg/dl  Very high:       >499 mg/dl     03/06/2018 166 (H) <150 mg/dL Final     Comment:     Borderline high:  150-199 mg/dl  High:             200-499 mg/dl  Very high:       >499 mg/dl       Cholesterol/HDL Ratio   Date Value Ref Range Status   09/09/2014 3.8 0.0 - 5.0 Final   11/20/2013 5.8 (H) 0.0 - 5.0 Final       A1C     9.0    1/14/2021   A1C     9.0    2/27/2020  A1C     8.5    12/4/2019  A1C     10.0  9/18/2019  A1C      9.2   6/18/2019  A1C      8.4   12/21/2018  A1C      7.4   9/7/2018  A1C      7.1   5/31/2018  A1C      9.1   3/6/2018  A1C      9.6   11/1/2017  A1C      8.9   8/9/2017  A1C      9.7   9/22/2016  A1C      9.7   7/21/2015  A1C     10.2  12/2/2014  A1C     10.2  9/9/2014  A1C      9.8  11/20/2013  A1C      9.3   6/12/2013  A1C      8.0   8/14/2012  A1C      8.2   5/22/2012  A1C      8.0   5/22/2012    ASSESSMENT/PLAN:     1. TYPE 2 DIABETES MELLITUS: Uncontrolled type 2 diabetes mellitus complicated by retinopathy, nephropathy, neuropathy and ED.  Pt's A1C is above target and is 9.0 % today.  Pt instructed to increase his Tresiba 68 units subcutaneous at hs  and increase Jardiance 25 mg each am.  He was instructed to take Novolog 15-17 units with all meals.  Continue Metformin 1000 mg each am.  I placed an order for a creat/GFR and K+ to be done at [facility] in 1 month since I increased his Jardiance dose today.  He was instructed to increase his water intake.  I also discussed use of an OmniPod insulin pump with patient today. He has been referred to diabetes ed to further discuss use of an insulin pump. Pt is aware that he would need to carb count.  Patient could also benefit from diabetes ed update.  He is to continue to use his Freestyle Lora sensor to check his blood sugar fasting each am, prelunch, predinner and at hs DAILY.  Encouraged patient to make healthy food choices, reduce his food portions with meals, avoid snacking and walk on his treadmill.  Pt remains on daily ASA.   Earle states he had the flu vaccine this sesaon.    2. GOITER: Not addressed today. He was seen by Dr. Villanueva in May 2020.    3. NEPHROPATHY: Pt has CKD3 and followed here by renal staff.  Most recent creat was 1.43 with GFR 49 mL/min on 12/14/2020.  Urine protein was 1.51 g/g Cr.  He is taking Cozaar daily.  Pt's BP is good at 119/77 today.  Discussed the need for better glycemic control.    4.  RETINOPATHY:  Seen here by Oph in Aug 2020 as above.    5.  NEUROPATHY: Continue Gabapentin,alpha-lipoic acid and topical cream.  No foot ulcers on exam today.    6. DYSLIPIDEMIA: LDL 49 in [id number].  Pt is taking Lipitor, fish oil and Fenofibrate.    7. OBESITY: See under # 1 above.  He does not want to use a GLP-1.    8.  FOLLOW UP: with me in 2 months  He is to have his creat/GFR and K+ checked in 1 month.  Referral for diabetes ed completed today.    Total time spent reviewing pt's chart notes,labs and Freestyle Lora sensor download today= 8 minutes.  Total time face to face visit today= 31 minutes.  Documentation time= 14 minutes.  Labs ordered today.  Diabetes referral placed  today.    TOTAL TIME FOR VISIT TODAY = 53 minutes.    Pamela Laura PA-C

## 2021-01-25 ENCOUNTER — NURSE TRIAGE (OUTPATIENT)
Dept: NURSING | Facility: CLINIC | Age: 71
End: 2021-01-25

## 2021-01-25 NOTE — TELEPHONE ENCOUNTER
[facility]: Nurse Triage Note  SITUATION/BACKGROUND                                                      Earle Rene is a 70 year old male who calls with a itchy and painful right foot.      Allergies: No Known Allergies    ASSESSMENT      70 year old male with h/o DM2 (poorly controlled), CKD, HTN, and increased cholesterol. He calls today because his right foot has been very itchy lately   He has been itching it and now it is swollen and has dark red spots   He denies drainage,ulcers or warmth to the foot/ankle. He denies fever or chills. He states this has happened before and he was given an antibiotic.  He now has a purple ointment on it and has it wrapped with guaze.  When he changes the dressing tomorrow he will take a picture of it.    He is set up for a virtual visit at 9am tomorrow morning  Instructed him to keep it covered and elevated until visit tomorrow.          RECOMMENDATION/PLAN                                                      RECOMMENDED DISPOSITION:  See above  Will comply with recommendation: Yes    If further questions/concerns or if symptoms do not improve, worsen or new symptoms develop, call your PCP or [phone] to talk with the Resident on call, as soon as possible.    Guideline used: foot problem  Telephone Triage Protocols for Nurses, Fifth Edition, Johana Neely, JOEL, RN

## 2021-01-26 ENCOUNTER — VIRTUAL VISIT (OUTPATIENT)
Dept: INTERNAL MEDICINE | Facility: CLINIC | Age: 71
End: 2021-01-26
Payer: COMMERCIAL

## 2021-01-26 DIAGNOSIS — L03.115 CELLULITIS OF RIGHT LOWER EXTREMITY: Primary | ICD-10-CM

## 2021-01-26 PROCEDURE — 99213 OFFICE O/P EST LOW 20 MIN: CPT | Mod: 95 | Performed by: NURSE PRACTITIONER

## 2021-01-26 RX ORDER — CEPHALEXIN 500 MG/1
500 CAPSULE ORAL 3 TIMES DAILY
Qty: 30 CAPSULE | Refills: 0 | Status: SHIPPED | OUTPATIENT
Start: 2021-01-26 | End: 2021-02-05

## 2021-01-26 NOTE — PROGRESS NOTES
Video start time 9:04    S:  Mr. Rene has developed some right lower leg swelling and warmth and is concerned that he might have a soft skin infection.        He has had several months of itching that he describes as inside his body, all over, but especially his feet.  His right foot is slightly swollen and slightly warm.    His Hgb A1C on 1/14/2021 was 9.0.    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 eye     Colonic polyp     Elevated PSA     Heel fracture     Hyponatremia     Nephrolithiasis     Neutropenia (H)     Pain of finger of right hand     Sarcoidosis of lung (H)     Sialoadenitis     Adhesive capsulitis of shoulder     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)     Chronic kidney disease, stage 3          Past Medical History:   Diagnosis Date     Blepharitis of both eyes      BPH (benign prostatic hyperplasia)      Diabetes (H)      Diabetic neuropathy (H)      Diabetic retinopathy associated with diabetes mellitus due to underlying condition (H)      Dry eye syndrome      GERD (gastroesophageal reflux disease)      Goiter      Granulomatous disease (H)      HLD (hyperlipidemia)      HTN (hypertension)      Nonsenile cataract      Peripheral neuropathy           Past Surgical History:   Procedure Laterality Date     ------------OTHER-------------      back of neck abscess drainage  in OR     AS RAD RESEC TONSIL/PILLARS Bilateral 1961     CATARACT IOL, RT/LT Left      COLONOSCOPY  7/29/2013    Procedure: COLONOSCOPY;;  Surgeon: Montana Pascal MD;  Location: [facility]     EXCISE MASS UPPER EXTREMITY Right 11/11/2019    Procedure: EXCISION, MASS, UPPER EXTREMITY, RIGHT SHOULDER;  Surgeon: Johana Choudhury MD;  [facility]     INTRAVITREAL INJECTION CHEMOTHERAPY Right 12/30/2019    Procedure: INTRAVITREAL Bevacizumab injection;  Surgeon: Milton Maki MD;  [facility]     PHACOEMULSIFICATION CLEAR CORNEA WITH STANDARD INTRAOCULAR LENS IMPLANT Right 12/30/2019    Procedure: PHACOEMULSIFICATION, CATARACT, WITH INTRAOCULAR LENS IMPLANT;  Surgeon: Milton Maki MD;  [facility]: [facility]     PHACOEMULSIFICATION WITH STANDARD INTRAOCULAR LENS IMPLANT Left 6/21/2019    Procedure: Left Eye Cataract Removal with Intraocular Lens Implant with Intraoperative Avastin Injection;  Surgeon: Lacey Eugene MD;  Location: [facility]     siladenatis  11/2017          Social History     Tobacco Use     Smoking status: Never Smoker     Smokeless tobacco: Never Used   Substance Use Topics     Alcohol use: Yes     Comment: occasionaly          Family History   Problem Relation Age of Onset     Diabetes Father      Myocardial Infarction Father      Diabetes Brother      Leukemia Brother 44     Glaucoma No family hx of      Macular Degeneration No family hx of      Kidney Disease No family hx of              No Known Allergies         Current Outpatient Medications   Medication Sig Dispense Refill     albuterol (VENTOLIN HFA) 108 (90 Base) MCG/ACT inhaler Inhale 2 puffs into the lungs every 6 hours 18 g 3     alpha-lipoic acid 600 MG capsule Take 1 capsule (600 mg) by mouth daily 90 capsule 3     amLODIPine (NORVASC) 5 MG tablet Take 1 tablet (5 mg) by mouth At Bedtime 90 tablet 3     ARTIFICIAL TEAR OP Apply to eye as needed       aspirin 81 MG tablet Take 1 tablet by mouth At Bedtime         atorvastatin (LIPITOR) 20 MG tablet Take 1 tablet (20 mg) by mouth daily 90 tablet 0     blood glucose (NO BRAND SPECIFIED) lancets standard Lancets that go with device, Test 3 times daily 300 each 3     blood glucose monitoring (NO BRAND SPECIFIED) meter device kit Any meter covered by insurance, not store brand, use as directed. 1 kit 0     blood glucose monitoring (NO BRAND SPECIFIED) test strip Strips that go with meter, covered by insurance. Test 3 times daily 300 strip 3     Blood Pressure Monitor KIT Automatic Blood Pressure Monitor 1 kit 0     camphor-menthol (DERMASARRA) 0.5-0.5 % external lotion Apply to arms legs torso 1-2 times a day for itching 1 Bottle 11     cephALEXin (KEFLEX) 500 MG capsule Take 1 capsule (500 mg) by mouth 3 times daily for 10 days 30 capsule 0     cholecalciferol (VITAMIN D3) 1000 units (25 mcg) capsule Take 2 capsules (2,000 Units) by mouth daily 180 capsule 3     clobetasol (TEMOVATE) 0.05 % ointment Apply topically to legs twice daily for 2 weeks.  Then discontinue 30 g 0     clotrimazole (LOTRIMIN) 1 % cream Apply topically 2 times daily 30 g 3     Continuous Blood Gluc  (FREESTYLE PETER READER) JUANCARLOS 1 Device daily Use to read blood sugars   as  instruction 4 times daily 1 Device 0     Continuous Blood Gluc Sensor (FREESTYLE PETER 14 DAY SENSOR) MISC 1 applicator every 14 days Change every 14 days 8 each 3     empagliflozin (JARDIANCE) 10 MG TABS tablet Take 1 tablet (10 mg) by mouth daily 90 tablet 0     famotidine (PEPCID) 20 MG tablet Take 1 tablet (20 mg) by mouth 2 times daily 180 tablet 3     fenofibrate 54 MG tablet Take 1 tablet (54 mg) by mouth daily (Patient taking differently: Take 54 mg by mouth At Bedtime ) 90 tablet 0     finasteride (PROSCAR) 5 MG tablet Take 1 tablet (5 mg) by mouth daily 90 tablet 2     fluocinonide (LIDEX) 0.05 % external cream APPLY TO AFFECTED AREA TWICE A DAY  3     gabapentin (NEURONTIN) 300 MG capsule TAKE 1 CAPSULE BY  "MOUTH TWICE A  capsule 0     gabapentin (NEURONTIN) 300 MG capsule Take 1 capsule (300 mg) by mouth 2 times daily 60 capsule 1     insulin degludec (TRESIBA FLEXTOUCH) 100 UNIT/ML pen Inject 64 units subcu at bedtime. Lab draw needed. Call  to schedule. 60 mL 1     insulin pen needle (B-D U/F) 31G X 5 MM miscellaneous Use 4 time(s) per day.  Please dispense as BD Pen Needle Mini U/F 31G x 5  each 3     insulin pen needle (B-D U/F) 31G X 5 MM Use 4 times per day.  Please dispense as BD Pen Needle Mini U/F 31G x 5  each 3     insulin syringe 31G X 5/16\" 0.5 ML MISC Use three syringes daily 270 each 1     ketamine 5% gabapentin 8% lidocaine 2.5% topical PLO cream Apply 1 g topically 3 times daily 30 g 3     loratadine (CLARITIN) 10 MG tablet Take 1 tablet (10 mg) by mouth daily (Patient taking differently: Take 10 mg by mouth At Bedtime ) 90 tablet 0     losartan (COZAAR) 100 MG tablet Take 1 tablet (100 mg) by mouth At Bedtime 30 tablet 11     metFORMIN (GLUCOPHAGE) 1000 MG tablet 1 tab each am only. 180 tablet 3     mupirocin (BACTROBAN) 2 % cream Apply  topically. In very small amounts only as needed 15 g 1     NOVOLOG FLEXPEN 100 UNIT/ML soln Inject 15-17  units with meals, plus correction. Pt uses approx 65 units in 24 hrs. 60 mL 3     Omega-3 Fatty Acids (OMEGA-3 FISH OIL) 1000 MG CAPS Take 1 capsule (1 g) by mouth 2 times daily (Patient taking differently: Take 1 g by mouth as needed (PT last dose a week ago 12.24.19) ) 60 capsule 11     ONETOUCH ULTRA test strip Use to test blood sugar 3 times daily 300 strip 3     sodium bicarbonate 650 MG tablet Take 1 tablet (650 mg) by mouth 3 times daily 90 tablet 11     tamsulosin (FLOMAX) 0.4 MG capsule Take 1 capsule (0.4 mg) by mouth daily 90 capsule 2     triamcinolone (KENALOG) 0.1 % cream Apply topically 3 times daily 80 g 0        REVIEW OF SYSTEMS:  See HPI.    O:   There were no vitals taken for this visit.  GENERAL APPEARANCE: " healthy, alert and no distress  EYES: grossly normal.   RESP: no coughing, wheezing.  NEURO: alert and oriented  SKIN: pt shows me  superficial excoriations anterior right shin.   PSYCHE: normal.     A/P:  Earle was seen today for recheck medication.    Diagnoses and all orders for this visit:    Cellulitis of right lower extremity  -     cephALEXin (KEFLEX) 500 MG capsule; Take 1 capsule (500 mg) by mouth 3 times daily for 10 days    Moisturize with Eucerin or equivalent moisturizer twice a day.    Video finished  time 9:19 a.m.    Total time spent 15 minutes.  More than 50% of the time spent with Mr. Rene on counseling / coordinating his care.    Sola URBAN, CNP

## 2021-01-26 NOTE — NURSING NOTE
Chief Complaint   Patient presents with     Recheck Medication     possible foot infection       Kimberly Nissen, EMT at 8:28 AM on 1/26/2021      Video Visit Technology for this patient: Moisés not working, patient has smart device, please try Doximity Video with patient

## 2021-02-02 DIAGNOSIS — E11.9 DIABETES MELLITUS (H): Primary | ICD-10-CM

## 2021-02-22 ENCOUNTER — PRE VISIT (OUTPATIENT)
Dept: UROLOGY | Facility: CLINIC | Age: 71
End: 2021-02-22

## 2021-02-22 NOTE — TELEPHONE ENCOUNTER
Visit Type : PSA check     Records/Orders:PSA is before appointment     Pt Contacted: no    At Rooming: normal

## 2021-02-24 ENCOUNTER — ANCILLARY PROCEDURE (OUTPATIENT)
Dept: GENERAL RADIOLOGY | Facility: CLINIC | Age: 71
End: 2021-02-24
Attending: PHYSICIAN ASSISTANT
Payer: COMMERCIAL

## 2021-02-24 ENCOUNTER — OFFICE VISIT (OUTPATIENT)
Dept: URGENT CARE | Facility: URGENT CARE | Age: 71
End: 2021-02-24
Payer: COMMERCIAL

## 2021-02-24 VITALS
BODY MASS INDEX: 30.62 KG/M2 | HEIGHT: 68 IN | RESPIRATION RATE: 18 BRPM | SYSTOLIC BLOOD PRESSURE: 138 MMHG | HEART RATE: 84 BPM | TEMPERATURE: 97.6 F | WEIGHT: 202 LBS | OXYGEN SATURATION: 99 % | DIASTOLIC BLOOD PRESSURE: 86 MMHG

## 2021-02-24 DIAGNOSIS — R05.9 COUGH: ICD-10-CM

## 2021-02-24 DIAGNOSIS — J06.9 VIRAL UPPER RESPIRATORY TRACT INFECTION: Primary | ICD-10-CM

## 2021-02-24 LAB
LABORATORY COMMENT REPORT: NORMAL
SARS-COV-2 RNA RESP QL NAA+PROBE: NEGATIVE
SARS-COV-2 RNA RESP QL NAA+PROBE: NORMAL
SPECIMEN SOURCE: NORMAL
SPECIMEN SOURCE: NORMAL

## 2021-02-24 PROCEDURE — 99213 OFFICE O/P EST LOW 20 MIN: CPT | Performed by: PHYSICIAN ASSISTANT

## 2021-02-24 PROCEDURE — 71046 X-RAY EXAM CHEST 2 VIEWS: CPT | Performed by: RADIOLOGY

## 2021-02-24 PROCEDURE — 87635 SARS-COV-2 COVID-19 AMP PRB: CPT | Performed by: PHYSICIAN ASSISTANT

## 2021-02-24 RX ORDER — GUAIFENESIN AND DEXTROMETHORPHAN HYDROBROMIDE 600; 30 MG/1; MG/1
1 TABLET, EXTENDED RELEASE ORAL EVERY 12 HOURS
Qty: 30 TABLET | Refills: 0 | Status: SHIPPED | OUTPATIENT
Start: 2021-02-24 | End: 2023-09-29

## 2021-02-24 RX ORDER — FLUTICASONE PROPIONATE 50 MCG
1 SPRAY, SUSPENSION (ML) NASAL DAILY
Qty: 16 G | Refills: 0 | Status: SHIPPED | OUTPATIENT
Start: 2021-02-24 | End: 2023-09-29

## 2021-02-24 ASSESSMENT — ENCOUNTER SYMPTOMS
COUGH: 1
CHOKING: 0
CONSTITUTIONAL NEGATIVE: 1
VOICE CHANGE: 0
WHEEZING: 0
CHEST TIGHTNESS: 0
EYES NEGATIVE: 1
STRIDOR: 0
RHINORRHEA: 1
SINUS PRESSURE: 0
APNEA: 0
MUSCULOSKELETAL NEGATIVE: 1
CARDIOVASCULAR NEGATIVE: 1
SORE THROAT: 0
GASTROINTESTINAL NEGATIVE: 1
FACIAL SWELLING: 0
PSYCHIATRIC NEGATIVE: 1
NEUROLOGICAL NEGATIVE: 1
SHORTNESS OF BREATH: 0
TROUBLE SWALLOWING: 0
SINUS PAIN: 0

## 2021-02-24 ASSESSMENT — MIFFLIN-ST. JEOR: SCORE: 1645.77

## 2021-02-24 NOTE — PROGRESS NOTES
Cough  - Symptomatic COVID-19 Virus (Coronavirus) by PCR  - XR Chest 2 Views  - dextromethorphan-guaiFENesin (MUCINEX DM)  MG 12 hr tablet; Take 1 tablet by mouth every 12 hours    Viral upper respiratory tract infection  - Symptomatic COVID-19 Virus (Coronavirus) by PCR  - XR Chest 2 Views  - dextromethorphan-guaiFENesin (MUCINEX DM)  MG 12 hr tablet; Take 1 tablet by mouth every 12 hours  Age 12 months or more  Okay to use Zarbee's   Okay to use Rx Children Tylenol if prescribed (Dose based on weight)    Age 2-12:   Okay to use Children Motrin or Tylenol over the counter.    Adults:  Okay to take acetaminophen 500 mg- 2 tabs (Total of 1000 mg) every 8 hrs   Okay to take ibuprofen 200 mg- 3 tabs (Total of 600 mg) every 6 hours        Okay to use Neti pot for sinus lavage up to three times daily for congestion and sinus pressure if present. Daily hot shower can be beneficial for congestion and body aches. Okay to use bedroom vaporizer or humidifier if symptoms are worse at night. Nightly Vicks Vapor rub and 5-10 mg of Melatonin okay to use for sleep.     Over the counter cough medication and decongestants okay if not prescribed by me during this visit. For homeopathic alternatives to cough syrup and decongestant, feel free to try Elderberry extract.    Okay to use salt water gargles, warm tea (or warm water with lemon and honey), and lozenges for any throat discomfort. Chloraseptic spray is also highly encourages for throat pain/irritation.     Patient will need to get plenty of rest and drink at least 1.5-2 liters of fluids daily for adults and 1-1.5 liters for children. If vomiting and not tolerating liquids for more than 24 hrs, please go to your nearest emergency department for IV fluids and further treatment.     Patient is not contagious after 1 week from start of symptoms. If possible, wear mask for first 7 days. Wash hands regularly and vigorously for 30 seconds often.         20 minutes spent  on the date of the encounter doing chart review, history and exam, documentation and further activities as noted above     See Patient Instructions  Patient Instructions       Patient Education     Viral Upper Respiratory Illness (Adult)    You have a viral upper respiratory illness (URI), which is another term for the common cold. This illness is contagious during the first few days. It is spread through the air by coughing and sneezing. It may also be spread by direct contact (touching the sick person and then touching your own eyes, nose, or mouth). Frequent handwashing will decrease risk of spread. Most viral illnesses go away within 7 to 10 days with rest and simple home remedies. Sometimes the illness may last for several weeks. Antibiotics will not kill a virus, and they are generally not prescribed for this condition.  Home care    If symptoms are severe, rest at home for the first 2 to 3 days. When you resume activity, don't let yourself get too tired.    Don't smoke. If you need help stopping, talk with your healthcare provider.    Avoid being exposed to cigarette smoke (yours or others ).    You may use acetaminophen or ibuprofen to control pain and fever, unless another medicine was prescribed. If you have chronic liver or kidney disease, have ever had a stomach ulcer or gastrointestinal bleeding, or are taking blood-thinning medicines, talk with your healthcare provider before using these medicines. Aspirin should never be given to anyone under 18 years of age who is ill with a viral infection or fever. It may cause severe liver or brain damage.    Your appetite may be poor, so a light diet is fine. Stay well hydrated by drinking 6 to 8 glasses of fluids per day (water, soft drinks, juices, tea, or soup). Extra fluids will help loosen secretions in the nose and lungs.    Over-the-counter cold medicines will not shorten the length of time you re sick, but they may be helpful for the following symptoms:  cough, sore throat, and nasal and sinus congestion. If you take prescription medicines, ask your healthcare provider or pharmacist which over-the-counter medicines are safe to use. (Note: Don't use decongestants if you have high blood pressure.)  Follow-up care  Follow up with your healthcare provider, or as advised.  When to seek medical advice  Call your healthcare provider right away if any of these occur:    Cough with lots of colored sputum (mucus)    Severe headache; face, neck, or ear pain    Difficulty swallowing due to throat pain    Fever of 100.4 F (38 C) or higher, or as directed by your healthcare provider  Call 911  Call 911 if any of these occur:    Chest pain, shortness of breath, wheezing, or difficulty breathing    Coughing up blood    Very severe pain with swallowing, especially if it goes along with a muffled voice   [employer] last reviewed this educational content on 6/1/2018 2000-2020 [employer]. [address] [place] [address]. All rights reserved. This information is not intended as a substitute for professional medical care. Always follow your healthcare professional's instructions.               RAJANI Goodman [facility] URGENT CARE    Subjective   70 year old year old who presents to clinic today for the following health issues:    Urgent Care and Cough       HPI     Acute Illness  Acute illness concerns: cough and chest irritation/congestion  Onset/Duration: 4 days  Symptoms:  Fever: no  Chills/Sweats: no  Headache (location?): no  Sinus Pressure: no  Conjunctivitis:  no  Ear Pain: no  Rhinorrhea: YES  Congestion: YES  Sore Throat: no  Cough: YES  Wheeze: no  Decreased Appetite: no  Nausea: no  Vomiting: no  Diarrhea: no  Dysuria/Freq.: no  Dysuria or Hematuria: no  Fatigue/Achiness: no  Sick/Strep Exposure: no  Therapies tried and outcome: None    Review of Systems   Review of Systems   Constitutional: Negative.    HENT: Positive for congestion  and rhinorrhea. Negative for dental problem, drooling, ear discharge, ear pain, facial swelling, hearing loss, mouth sores, nosebleeds, postnasal drip, sinus pressure, sinus pain, sneezing, sore throat, tinnitus, trouble swallowing and voice change.    Eyes: Negative.    Respiratory: Positive for cough. Negative for apnea, choking, chest tightness, shortness of breath, wheezing and stridor.    Cardiovascular: Negative.    Gastrointestinal: Negative.    Genitourinary: Negative.    Musculoskeletal: Negative.    Neurological: Negative.    Psychiatric/Behavioral: Negative.         Objective    Temp: 97.6  F (36.4  C) Temp src: Oral BP: 138/86 Pulse: 84   Resp: 18 SpO2: 99 %       Physical Exam   Physical Exam  Constitutional:       General: He is not in acute distress.     Appearance: Normal appearance. He is normal weight. He is not ill-appearing, toxic-appearing or diaphoretic.   HENT:      Head: Normocephalic and atraumatic.      Right Ear: Tympanic membrane, ear canal and external ear normal. There is no impacted cerumen.      Left Ear: Tympanic membrane, ear canal and external ear normal. There is no impacted cerumen.      Nose: Congestion and rhinorrhea present.      Mouth/Throat:      Mouth: Mucous membranes are moist.      Pharynx: Oropharynx is clear. No oropharyngeal exudate or posterior oropharyngeal erythema.   Neck:      Musculoskeletal: Normal range of motion and neck supple. No muscular tenderness.   Cardiovascular:      Rate and Rhythm: Normal rate and regular rhythm.      Pulses: Normal pulses.      Heart sounds: Normal heart sounds. No murmur. No friction rub. No gallop.    Pulmonary:      Effort: Pulmonary effort is normal. No respiratory distress.      Breath sounds: No stridor. Wheezing present. No rhonchi or rales.   Chest:      Chest wall: No tenderness.   Lymphadenopathy:      Cervical: No cervical adenopathy.   Neurological:      General: No focal deficit present.      Mental Status: He is alert  and oriented to person, place, and time. Mental status is at baseline.   Psychiatric:         Mood and Affect: Mood normal.         Behavior: Behavior normal.         Thought Content: Thought content normal.         Judgment: Judgment normal.          CXR - Reviewed and interpreted by me Normal- no infiltrates, effusions, pneumothoraces, cardiomegaly or masses

## 2021-02-24 NOTE — PATIENT INSTRUCTIONS

## 2021-03-04 ENCOUNTER — DOCUMENTATION ONLY (OUTPATIENT)
Dept: CARE COORDINATION | Facility: CLINIC | Age: 71
End: 2021-03-04

## 2021-03-05 DIAGNOSIS — E78.5 HYPERLIPIDEMIA LDL GOAL <100: ICD-10-CM

## 2021-03-07 RX ORDER — ATORVASTATIN CALCIUM 20 MG/1
20 TABLET, FILM COATED ORAL DAILY
Qty: 30 TABLET | Refills: 0 | Status: SHIPPED | OUTPATIENT
Start: 2021-03-07 | End: 2021-04-07

## 2021-03-07 NOTE — CONFIDENTIAL NOTE
atorvastatin (LIPITOR) 20 MG tablet   Last Written Prescription Date:  12/8/20  Last Fill Quantity: 90,   # refills: 0  Last Office Visit : 1/26/21  Future Office visit:  3/12/21    Routing refill request to provider for review/approval because:  LDL

## 2021-03-12 ENCOUNTER — ANCILLARY PROCEDURE (OUTPATIENT)
Dept: GENERAL RADIOLOGY | Facility: CLINIC | Age: 71
End: 2021-03-12
Attending: INTERNAL MEDICINE
Payer: COMMERCIAL

## 2021-03-12 ENCOUNTER — OFFICE VISIT (OUTPATIENT)
Dept: INTERNAL MEDICINE | Facility: CLINIC | Age: 71
End: 2021-03-12
Payer: COMMERCIAL

## 2021-03-12 VITALS
SYSTOLIC BLOOD PRESSURE: 119 MMHG | WEIGHT: 206 LBS | DIASTOLIC BLOOD PRESSURE: 73 MMHG | BODY MASS INDEX: 31.32 KG/M2 | HEART RATE: 88 BPM | OXYGEN SATURATION: 97 %

## 2021-03-12 DIAGNOSIS — Z79.4 TYPE 2 DIABETES MELLITUS WITH BOTH EYES AFFECTED BY MILD NONPROLIFERATIVE RETINOPATHY AND MACULAR EDEMA, WITH LONG-TERM CURRENT USE OF INSULIN (H): ICD-10-CM

## 2021-03-12 DIAGNOSIS — R05.9 COUGH: ICD-10-CM

## 2021-03-12 DIAGNOSIS — R05.9 COUGH: Primary | ICD-10-CM

## 2021-03-12 DIAGNOSIS — R06.00 DYSPNEA, UNSPECIFIED TYPE: ICD-10-CM

## 2021-03-12 DIAGNOSIS — E11.3213 TYPE 2 DIABETES MELLITUS WITH BOTH EYES AFFECTED BY MILD NONPROLIFERATIVE RETINOPATHY AND MACULAR EDEMA, WITH LONG-TERM CURRENT USE OF INSULIN (H): ICD-10-CM

## 2021-03-12 LAB
ALBUMIN SERPL-MCNC: 3.7 G/DL (ref 3.4–5)
ALP SERPL-CCNC: 108 U/L (ref 40–150)
ALT SERPL W P-5'-P-CCNC: 36 U/L (ref 0–70)
ANION GAP SERPL CALCULATED.3IONS-SCNC: 7 MMOL/L (ref 3–14)
AST SERPL W P-5'-P-CCNC: 24 U/L (ref 0–45)
BASOPHILS # BLD AUTO: 0.1 10E9/L (ref 0–0.2)
BASOPHILS NFR BLD AUTO: 1 %
BILIRUB SERPL-MCNC: 0.4 MG/DL (ref 0.2–1.3)
BUN SERPL-MCNC: 33 MG/DL (ref 7–30)
CALCIUM SERPL-MCNC: 9 MG/DL (ref 8.5–10.1)
CHLORIDE SERPL-SCNC: 107 MMOL/L (ref 94–109)
CO2 SERPL-SCNC: 23 MMOL/L (ref 20–32)
CREAT SERPL-MCNC: 1.68 MG/DL (ref 0.66–1.25)
DIFFERENTIAL METHOD BLD: ABNORMAL
EOSINOPHIL # BLD AUTO: 0.3 10E9/L (ref 0–0.7)
EOSINOPHIL NFR BLD AUTO: 6.1 %
ERYTHROCYTE [DISTWIDTH] IN BLOOD BY AUTOMATED COUNT: 12.3 % (ref 10–15)
GFR SERPL CREATININE-BSD FRML MDRD: 40 ML/MIN/{1.73_M2}
GLUCOSE SERPL-MCNC: 347 MG/DL (ref 70–99)
HCT VFR BLD AUTO: 41.7 % (ref 40–53)
HGB BLD-MCNC: 14.1 G/DL (ref 13.3–17.7)
IMM GRANULOCYTES # BLD: 0 10E9/L (ref 0–0.4)
IMM GRANULOCYTES NFR BLD: 0.4 %
INTERPRETATION ECG - MUSE: NORMAL
LYMPHOCYTES # BLD AUTO: 2 10E9/L (ref 0.8–5.3)
LYMPHOCYTES NFR BLD AUTO: 38.4 %
MCH RBC QN AUTO: 32.7 PG (ref 26.5–33)
MCHC RBC AUTO-ENTMCNC: 33.8 G/DL (ref 31.5–36.5)
MCV RBC AUTO: 97 FL (ref 78–100)
MONOCYTES # BLD AUTO: 0.4 10E9/L (ref 0–1.3)
MONOCYTES NFR BLD AUTO: 7.2 %
NEUTROPHILS # BLD AUTO: 2.4 10E9/L (ref 1.6–8.3)
NEUTROPHILS NFR BLD AUTO: 46.9 %
NRBC # BLD AUTO: 0 10*3/UL
NRBC BLD AUTO-RTO: 0 /100
NT-PROBNP SERPL-MCNC: 64 PG/ML (ref 0–125)
PLATELET # BLD AUTO: 156 10E9/L (ref 150–450)
POTASSIUM SERPL-SCNC: 5.2 MMOL/L (ref 3.4–5.3)
PROT SERPL-MCNC: 6.9 G/DL (ref 6.8–8.8)
RBC # BLD AUTO: 4.31 10E12/L (ref 4.4–5.9)
SODIUM SERPL-SCNC: 137 MMOL/L (ref 133–144)
WBC # BLD AUTO: 5.1 10E9/L (ref 4–11)

## 2021-03-12 PROCEDURE — 80053 COMPREHEN METABOLIC PANEL: CPT | Performed by: PATHOLOGY

## 2021-03-12 PROCEDURE — 36415 COLL VENOUS BLD VENIPUNCTURE: CPT | Performed by: PATHOLOGY

## 2021-03-12 PROCEDURE — 71046 X-RAY EXAM CHEST 2 VIEWS: CPT | Mod: GC | Performed by: RADIOLOGY

## 2021-03-12 PROCEDURE — 93000 ELECTROCARDIOGRAM COMPLETE: CPT | Performed by: INTERNAL MEDICINE

## 2021-03-12 PROCEDURE — 85025 COMPLETE CBC W/AUTO DIFF WBC: CPT | Performed by: PATHOLOGY

## 2021-03-12 PROCEDURE — 83880 ASSAY OF NATRIURETIC PEPTIDE: CPT | Performed by: PATHOLOGY

## 2021-03-12 PROCEDURE — 99215 OFFICE O/P EST HI 40 MIN: CPT | Mod: 25 | Performed by: INTERNAL MEDICINE

## 2021-03-12 RX ORDER — DEXTROMETHORPHAN HBR. AND GUAIFENESIN 10; 100 MG/5ML; MG/5ML
5 SOLUTION ORAL 2 TIMES DAILY PRN
Qty: 118 ML | Refills: 0 | Status: SHIPPED | OUTPATIENT
Start: 2021-03-12 | End: 2023-09-29

## 2021-03-12 RX ORDER — DOXYCYCLINE HYCLATE 100 MG
100 TABLET ORAL 2 TIMES DAILY
Qty: 14 TABLET | Refills: 0 | Status: SHIPPED | OUTPATIENT
Start: 2021-03-12 | End: 2022-01-05

## 2021-03-12 RX ORDER — FLUTICASONE PROPIONATE 110 UG/1
1 AEROSOL, METERED RESPIRATORY (INHALATION) 2 TIMES DAILY
Qty: 12 G | Refills: 1 | Status: SHIPPED | OUTPATIENT
Start: 2021-03-12 | End: 2021-11-16

## 2021-03-12 ASSESSMENT — PAIN SCALES - GENERAL: PAINLEVEL: NO PAIN (0)

## 2021-03-12 NOTE — PROGRESS NOTES
HPI:    Pt. Seen 2/24/2021 for cough. COVID testing negative; CXR no acute findings.  He states still with productive cough yellow sputum that he feels is coming from his lungs. Cough is worse at night. He states similar cough sxs. In the past and was possibly treated with antibiotics and inhalers. He can't remember having to be treated with oral steroids. No other sick contacts. No sore throat. He has some SOB but not worse than his baseline. He has gained some weight during COVID. He does not smoke, nor abuse EtOH. No other HEENT, cardiopulmonary, abdominal, , neurological, systemic, psychiatric, lymphatic, endocrine complaints.     Past Medical History:   Diagnosis Date     Blepharitis of both eyes      BPH (benign prostatic hyperplasia)      Diabetes (H)      Diabetic neuropathy (H)      Diabetic retinopathy associated with diabetes mellitus due to underlying condition (H)      Dry eye syndrome      GERD (gastroesophageal reflux disease)      Goiter      Granulomatous disease (H)      HLD (hyperlipidemia)      HTN (hypertension)      Nonsenile cataract      Peripheral neuropathy      Past Surgical History:   Procedure Laterality Date     ------------OTHER-------------      back of neck abscess drainage in OR     AS RAD RESEC TONSIL/PILLARS Bilateral 1961     CATARACT IOL, RT/LT Left      COLONOSCOPY  7/29/2013    Procedure: COLONOSCOPY;;  Surgeon: Montana Pascal MD;  Location:  GI     EXCISE MASS UPPER EXTREMITY Right 11/11/2019    Procedure: EXCISION, MASS, UPPER EXTREMITY, RIGHT SHOULDER;  Surgeon: Johana Choudhury MD;  Location: [facility] OR     INTRAVITREAL INJECTION CHEMOTHERAPY Right 12/30/2019    Procedure: INTRAVITREAL Bevacizumab injection;  Surgeon: Milton Maki MD;  Location: [facility] OR     PHACOEMULSIFICATION CLEAR CORNEA WITH STANDARD INTRAOCULAR LENS IMPLANT Right 12/30/2019    Procedure: PHACOEMULSIFICATION, CATARACT, WITH INTRAOCULAR LENS IMPLANT;  Surgeon: Milton Maki MD;   Location: [facility] OR     PHACOEMULSIFICATION WITH STANDARD INTRAOCULAR LENS IMPLANT Left 6/21/2019    Procedure: Left Eye Cataract Removal with Intraocular Lens Implant with Intraoperative Avastin Injection;  Surgeon: Lacey Euegne MD;  Location:  OR     [facility]  11/2017     PE:    Vitals noted, gen, nad, cooperative, alert, oropharynx clear, no exudate, no erythema, neck supple, nl rom, lungs with large airway sounds and rhonchi, no wheezing. He is moving air. RRR, S1, S2, no MRG, abdomen, no acute findings. Grossly normal neurological exam    EKG; SR at 83; Chronic Q wave III with flat T wave. No change from 6/18/2019    A/P:    1. He has 4/9/2021 Urology follow up with Dr. Bland for PSA check and localized prostate cancer? (see Dr. Glass's Urology note from 5/19/2020)   2. DM2; 1/14/2021 Endocrinology visit with Ms. Laura on Jardiance, Insulin, Metformin. A1c on 1/14/2021 was 9.0%  3. See Dr. Ramsey's nephrology note from 10/28/2020, CKD; last Cr 1.43 on 12/14/2020  4. Vaccinations; he has gotten the first Moderna COVID vaccination.   5. HTN; on Amlodipine, Losartan  6. Increased cholesterol on Atorvastatin and fenofibrate  7. R shoulder pain; ordered R shoulder X-ray and ortho referral   8. Cough; bronchitis. CXR, EKG, and labs. Sent in Rx. For doxycycline, Flovent steroid inhaler and Cough medication.       40 minutes spent on the date of the encounter doing chart review, history and exam, documentation and further activities as noted above

## 2021-03-12 NOTE — PATIENT INSTRUCTIONS
Radiology:  [phone] [facility] [place]  [phone] [facility]  [phone] [facility] Ortho [phone] (4th Floor [facility])

## 2021-03-12 NOTE — NURSING NOTE
Chief Complaint   Patient presents with     Cough     Pt would like to discuss productive cough      CRISTY Bland at 12:10 PM sign on 3/12/2021

## 2021-03-17 NOTE — TELEPHONE ENCOUNTER
DIAGNOSIS: R shoulder pain/ Dr Hare/ no images   APPOINTMENT DATE: 3.17.21   NOTES STATUS DETAILS   OFFICE NOTE from referring provider Internal 3.12.21 Dr Marcio Hare, [facility]   OFFICE NOTE from other specialist N/A    DISCHARGE SUMMARY from hospital N/A    DISCHARGE REPORT from the ER N/A    OPERATIVE REPORT N/A    EMG report N/A    MEDICATION LIST Internal    MRI N/A    DEXA (osteoporosis/bone health) N/A    CT SCAN N/A    XRAYS (IMAGES & REPORTS) N/A

## 2021-03-19 ENCOUNTER — ANCILLARY PROCEDURE (OUTPATIENT)
Dept: GENERAL RADIOLOGY | Facility: CLINIC | Age: 71
End: 2021-03-19
Attending: FAMILY MEDICINE
Payer: COMMERCIAL

## 2021-03-19 ENCOUNTER — OFFICE VISIT (OUTPATIENT)
Dept: ORTHOPEDICS | Facility: CLINIC | Age: 71
End: 2021-03-19
Payer: COMMERCIAL

## 2021-03-19 ENCOUNTER — PRE VISIT (OUTPATIENT)
Dept: ORTHOPEDICS | Facility: CLINIC | Age: 71
End: 2021-03-19

## 2021-03-19 VITALS — WEIGHT: 206 LBS | HEIGHT: 68 IN | BODY MASS INDEX: 31.22 KG/M2

## 2021-03-19 DIAGNOSIS — M25.511 CHRONIC RIGHT SHOULDER PAIN: ICD-10-CM

## 2021-03-19 DIAGNOSIS — G89.29 CHRONIC RIGHT SHOULDER PAIN: Primary | ICD-10-CM

## 2021-03-19 DIAGNOSIS — R05.9 COUGH: ICD-10-CM

## 2021-03-19 DIAGNOSIS — R06.00 DYSPNEA, UNSPECIFIED TYPE: ICD-10-CM

## 2021-03-19 DIAGNOSIS — M54.2 NECK PAIN: ICD-10-CM

## 2021-03-19 DIAGNOSIS — G89.29 CHRONIC RIGHT SHOULDER PAIN: ICD-10-CM

## 2021-03-19 DIAGNOSIS — M25.511 CHRONIC RIGHT SHOULDER PAIN: Primary | ICD-10-CM

## 2021-03-19 PROCEDURE — 73030 X-RAY EXAM OF SHOULDER: CPT | Mod: RT | Performed by: RADIOLOGY

## 2021-03-19 PROCEDURE — 99203 OFFICE O/P NEW LOW 30 MIN: CPT | Performed by: FAMILY MEDICINE

## 2021-03-19 PROCEDURE — 72040 X-RAY EXAM NECK SPINE 2-3 VW: CPT | Mod: GC | Performed by: RADIOLOGY

## 2021-03-19 ASSESSMENT — MIFFLIN-ST. JEOR: SCORE: 1658.91

## 2021-03-19 NOTE — PROGRESS NOTES
"  [facility] AND SUR[facility]ERY [facility]ENTER  SPORTS & ORTHOPEDIC CLINIC VISIT     Mar 19, 2021        ASSESSMENT & PLAN      71-year-old with neck and right upper extremity pain.  This is likely multifactorial.  He clearly has some degenerative disc disease in his cervical spine that may be contributing to his right upper extremity symptoms as well.  However, he definitely has some shoulder pathology as well, likely tendinopathy of the proximal biceps and rotator cuff    Reviewed imaging and assessment with patient in detail  He would like to try physical therapy for his neck and shoulder initially.  He may consider steroid injection into the right shoulder at a later date.  We did make a decision on that date whether or not to target the biceps tendon or rotator cuff, possibly both.  If he has persistent symptoms of the cervical spine we may consider MRI.    Macario Velazco MD  [facility] ORTHOPEDIC [facility]    -----  Chief Complaint   Patient presents with     Right Shoulder - Pain       SUBJECTIVE  Earle Rene is a/an 71 year old male who is seen in consultation at the request of  Marcio Hare M.D. for evaluation of  R shoulder pain.       Describes 2 years ago that he slipped on ice and fell on his R shoulder.   this started his pain. Describes pain that travels radially up his R arm that intensifies near his shoulder.    The patient is seen by themselves.  The patient is Right handed    Onset: 2 years(s) ago. Reports insidious onset without acute precipitating event.  Location of Pain: right shoulder  Worsened by: motion, overuse from [employer] activities  Better with: PT soft tissue massage  Treatments tried: rest/activity avoidance and physical therapy (\"quite a few, at least\" visits)  Associated symptoms: no distal numbness or tingling; denies swelling or warmth    Is also been having right-sided neck pain.  Has pain when turning his head.  Feels restricted range of motion.  This is " "chronic and progressive.    Orthopedic/Surgical history:  soft tissue growth removed from that area a year ago  Social History/Occupation: sells [employer]        REVIEW OF SYSTEMS:    Do you have fever, chills, weight loss? No    Do you have any vision problems? No    Do you have any chest pain or edema? No    Do you have any shortness of breath or wheezing?  Yes, occasional    Do you have stomach problems? No    Do you have any numbness or focal weakness? No    Do you have diabetes? Yes, Type 2    Do you have problems with bleeding or clotting? No    Do you have an rashes or other skin lesions? No    OBJECTIVE:  Ht 1.727 m (5' 8\")   Wt 93.4 kg (206 lb)   BMI 31.32 kg/m       EXAM:  Alert, pleasant and conversational      right shoulder:   Skin intact. No skin changes, deformity or atrophy    AROM:   Forward Flexion: 180    Abduction: 180    External rotation: ~70    Internal rotation: T7.   symmetric ROM compared to uninjured side      Strength testing:   Abduction: 4+/5,   External rotation: 5/5   Internal rotation 5/5     Deltoids 5/5, Biceps 5/5, Triceps 5/5,  5/5.    Palpation: positive  TTP of the Acromioclavicular joint  negative TTP of Sternoclavicular joint  negative TTP of posterior glenoid  negative TTP of scapular borders  positive  TTP of the bicipital tendon.     Special Tests: positive  Springer test  positive  Neer's test.   positive Hudspeth's test   positive  Speed's test.      Neck/Spine: no TTP of spinous processes in cervical spine. Full ROM with flexion, extension, rotation, tilting  with pain with rotation and side bending. positive  TTP along paraspinous muscles and upper trapezius musculature. negative Periscapular pain.  negative tightness in this area. No noted bruising or skin discoloration. Spurlings negative  Neurologic: SILT throughout upper extremities.         RADIOLOGY:    2 view xrays of cervical spine performed and reviewed independently demonstrating multilevel " degenerative disc disease worse from C4-T1 with endplate changes.  Loss of cervical lordosis. See EMR for formal radiology report.     4 views of right shoulder performed and reviewed independently demonstrating DJD of the acromioclavicular joint.  No significant DJD of the glenohumeral joint.  No acute fracture or dislocation.  See EMR for formal radiology report.

## 2021-03-19 NOTE — PATIENT INSTRUCTIONS
You have degenerative disc disease in your neck.  This can be treated with physical therapy, over-the-counter medications, possibly injections and/or surgery if there is no improvement.  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  Particularly your biceps tendon and rotator cuff.  This would also benefit from physical therapy and home exercises.  A steroid injection may be helpful to reduce the amount of pain in your shoulder prior to doing physical therapy.

## 2021-03-19 NOTE — LETTER
3/19/2021         RE: Earle Rene  [address]  [place] [address]        Dear Colleague,    Thank you for referring your patient, Earle Rene, to the [facility]. Please see a copy of my visit note below.      [facility] AND SURGERY CENTER  SPORTS & ORTHOPEDIC CLINIC VISIT     Mar 19, 2021        ASSESSMENT & PLAN      71-year-old with neck and right upper extremity pain.  This is likely multifactorial.  He clearly has some degenerative disc disease in his cervical spine that may be contributing to his right upper extremity symptoms as well.  However, he definitely has some shoulder pathology as well, likely tendinopathy of the proximal biceps and rotator cuff    Reviewed imaging and assessment with patient in detail  He would like to try physical therapy for his neck and shoulder initially.  He may consider steroid injection into the right shoulder at a later date.  We did make a decision on that date whether or not to target the biceps tendon or rotator cuff, possibly both.  If he has persistent symptoms of the cervical spine we may consider MRI.    Macario Velazco MD  [facility] ORTHOPEDIC [facility]    -----  Chief Complaint   Patient presents with     Right Shoulder - Pain       SUBJECTIVE  Earle Rene is a/an 71 year old male who is seen in consultation at the request of  Marcio Hare M.D. for evaluation of  R shoulder pain.       Describes 2 years ago that he slipped on ice and fell on his R shoulder.   this started his pain. Describes pain that travels radially up his R arm that intensifies near his shoulder.    The patient is seen by themselves.  The patient is Right handed    Onset: 2 years(s) ago. Reports insidious onset without acute precipitating event.  Location of Pain: right shoulder  Worsened by: motion, overuse from [employer] activities  Better with: PT soft tissue massage  Treatments tried:  "rest/activity avoidance and physical therapy (\"quite a few, at least\" visits)  Associated symptoms: no distal numbness or tingling; denies swelling or warmth    Is also been having right-sided neck pain.  Has pain when turning his head.  Feels restricted range of motion.  This is chronic and progressive.    Orthopedic/Surgical history:  soft tissue growth removed from that area a year ago  Social History/Occupation: sells [employer]        REVIEW OF SYSTEMS:    Do you have fever, chills, weight loss? No    Do you have any vision problems? No    Do you have any chest pain or edema? No    Do you have any shortness of breath or wheezing?  Yes, occasional    Do you have stomach problems? No    Do you have any numbness or focal weakness? No    Do you have diabetes? Yes, Type 2    Do you have problems with bleeding or clotting? No    Do you have an rashes or other skin lesions? No    OBJECTIVE:  Ht 1.727 m (5' 8\")   Wt 93.4 kg (206 lb)   BMI 31.32 kg/m       EXAM:  Alert, pleasant and conversational      right shoulder:   Skin intact. No skin changes, deformity or atrophy    AROM:   Forward Flexion: 180    Abduction: 180    External rotation: ~70    Internal rotation: T7.   symmetric ROM compared to uninjured side      Strength testing:   Abduction: 4+/5,   External rotation: 5/5   Internal rotation 5/5     Deltoids 5/5, Biceps 5/5, Triceps 5/5,  5/5.    Palpation: positive  TTP of the Acromioclavicular joint  negative TTP of Sternoclavicular joint  negative TTP of posterior glenoid  negative TTP of scapular borders  positive  TTP of the bicipital tendon.     Special Tests: positive  Springer test  positive  Neer's test.   positive [place]'s test   positive  Speed's test.      Neck/Spine: no TTP of spinous processes in cervical spine. Full ROM with flexion, extension, rotation, tilting  with pain with rotation and side bending. positive  TTP along paraspinous muscles and upper trapezius musculature. negative " Periscapular pain.  negative tightness in this area. No noted bruising or skin discoloration. Spurlings negative  Neurologic: SILT throughout upper extremities.         RADIOLOGY:    2 view xrays of cervical spine performed and reviewed independently demonstrating multilevel degenerative disc disease worse from C4-T1 with endplate changes.  Loss of cervical lordosis. See EMR for formal radiology report.     4 views of right shoulder performed and reviewed independently demonstrating DJD of the acromioclavicular joint.  No significant DJD of the glenohumeral joint.  No acute fracture or dislocation.  See EMR for formal radiology report.

## 2021-03-22 NOTE — PROGRESS NOTES
[facility] Initial Evaluation    Subjective:  Earle Rene is a 71 year old male with a cervical and right shoulder condition. Symptoms commenced as a result of: chronic neck and right shoulder pain for the past 2 years starting after he slipped on ice and fell onto right shoulder. Condition occurred in the following environment: in the community. Onset of symptoms: Date on PT order: 3/19/21. Location of symptoms: right upper trap, right neck, right lateral shoulder. Pain level on number scale: 9-10/10. Quality of pain: ache, sharp. Associated symptoms: stiffness, denies numbness and tingling. Pain frequency (constant/intermittent): constant. Symptoms are exacerbated by: lying on side, reaching, pushing, washing, dressing, carrying. Symptoms are relieved by: tylenol. Progression of symptoms since onset (same/better/worse): worse. Special tests (x-ray, MRI, CT scan, EMG, bone scan): x-ray. Previous treatment: chiropractic. Improvement with previous treatment: no. General health as reported by patient is good. Pertinent medical history includes: See Epic. Medical allergies: see Epic. Other pertinent surgeries: see Epic. Current medications: See Epic. 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 Patient is (working in normal job without restrictions/working in normal job with restrictions/working in an alternate job/not working due to present treatment problem): working in normal job without restrictions. Primary job tasks: lifting, carrying. Barriers at home/work: None reported by patient. Red flags: None reported by patient.    Objective  Posture     Sitting (good/fair/poor):  poor  Standing (good/fair/poor):  poor  Protruded head (yes/no):  no  Wry neck (right/left/nil):  nil  Relevant wry neck (yes/no):  no  Correction of posture (better/worse/no effect): better    Cervical Movement Loss Joseph Mod Min Nil Pain   Protrusion    x +   Flexion    x +   Retraction  x   +   Extension  x   +   Rotation Left   x  +    Rotation Right   x  +   Lateral Flexion Right   x  +   Lateral Flexion Left   x  +     Shoulder AROM (* = pain) Right Left   FL 70* WNL   ABD 65* WNL   ER (neutral) 30 45   IR WNL* WNL   EXT NT NT     Weak right rotator cuff    Assessment/Plan:    Patient is a 71 year old male with cervical and right side shoulder complaints.    Patient has the following significant findings with corresponding treatment plan.                Diagnosis 1:  Right shoulder and neck pain  Pain -  manual therapy, self management, education, directional preference exercise and home program  Decreased ROM/flexibility - manual therapy and therapeutic exercise  Decreased joint mobility - manual therapy and therapeutic exercise  Decreased strength - therapeutic exercise and therapeutic activities  Impaired muscle performance - neuro re-education  Decreased function - therapeutic activities  Impaired posture - neuro re-education    Previous and current functional limitations:  (See Goal Flow Sheet for this information)    Short term and Long term goals: (See Goal Flow Sheet for this information)     Communication ability:  Patient appears to be able to clearly communicate and understand verbal and written communication and follow directions correctly.  Treatment Explanation - The following has been discussed with the patient:   RX ordered/plan of care  Anticipated outcomes  Possible risks and side effects  This patient would benefit from PT intervention to resume normal activities.   Rehab potential is good.    Frequency:  1 X week, once daily  Duration:  for 4 weeks tapering to 2 X a month over 1 month  Discharge Plan:  Achieve all LTG.  Independent in home treatment program.  Reach maximal therapeutic benefit.    Please refer to the daily flowsheet for treatment today, total treatment time and time spent performing 1:1 timed codes.

## 2021-03-23 ENCOUNTER — THERAPY VISIT (OUTPATIENT)
Dept: PHYSICAL THERAPY | Facility: CLINIC | Age: 71
End: 2021-03-23
Attending: FAMILY MEDICINE
Payer: COMMERCIAL

## 2021-03-23 DIAGNOSIS — M25.511 CHRONIC RIGHT SHOULDER PAIN: ICD-10-CM

## 2021-03-23 DIAGNOSIS — G89.29 CHRONIC RIGHT SHOULDER PAIN: ICD-10-CM

## 2021-03-23 DIAGNOSIS — M54.2 NECK PAIN: ICD-10-CM

## 2021-03-23 PROCEDURE — 97110 THERAPEUTIC EXERCISES: CPT | Mod: GP | Performed by: PHYSICAL THERAPIST

## 2021-03-23 PROCEDURE — 97161 PT EVAL LOW COMPLEX 20 MIN: CPT | Mod: GP | Performed by: PHYSICAL THERAPIST

## 2021-03-23 PROCEDURE — 97530 THERAPEUTIC ACTIVITIES: CPT | Mod: GP | Performed by: PHYSICAL THERAPIST

## 2021-03-25 DIAGNOSIS — N40.1 BENIGN PROSTATIC HYPERPLASIA WITH URINARY OBSTRUCTION: ICD-10-CM

## 2021-03-25 DIAGNOSIS — N13.8 BENIGN PROSTATIC HYPERPLASIA WITH URINARY OBSTRUCTION: ICD-10-CM

## 2021-03-28 DIAGNOSIS — E11.65 TYPE 2 DIABETES MELLITUS WITH HYPERGLYCEMIA, WITH LONG-TERM CURRENT USE OF INSULIN (H): ICD-10-CM

## 2021-03-28 DIAGNOSIS — Z79.4 TYPE 2 DIABETES MELLITUS WITH BOTH EYES AFFECTED BY MILD NONPROLIFERATIVE RETINOPATHY AND MACULAR EDEMA, WITH LONG-TERM CURRENT USE OF INSULIN (H): Primary | ICD-10-CM

## 2021-03-28 DIAGNOSIS — E11.3213 TYPE 2 DIABETES MELLITUS WITH BOTH EYES AFFECTED BY MILD NONPROLIFERATIVE RETINOPATHY AND MACULAR EDEMA, WITH LONG-TERM CURRENT USE OF INSULIN (H): Primary | ICD-10-CM

## 2021-03-28 DIAGNOSIS — Z79.4 TYPE 2 DIABETES MELLITUS WITH HYPERGLYCEMIA, WITH LONG-TERM CURRENT USE OF INSULIN (H): ICD-10-CM

## 2021-03-28 RX ORDER — INSULIN LISPRO 100 [IU]/ML
INJECTION, SOLUTION INTRAVENOUS; SUBCUTANEOUS
Qty: 60 ML | Refills: 1 | Status: SHIPPED | OUTPATIENT
Start: 2021-03-28 | End: 2022-06-24

## 2021-03-28 RX ORDER — INSULIN ASPART 100 [IU]/ML
INJECTION, SOLUTION INTRAVENOUS; SUBCUTANEOUS
Qty: 60 ML | Refills: 3 | Status: SHIPPED | OUTPATIENT
Start: 2021-03-28 | End: 2021-03-28

## 2021-03-29 ENCOUNTER — PRE VISIT (OUTPATIENT)
Dept: UROLOGY | Facility: CLINIC | Age: 71
End: 2021-03-29

## 2021-03-29 DIAGNOSIS — R97.20 ELEVATED PROSTATE SPECIFIC ANTIGEN (PSA): Primary | ICD-10-CM

## 2021-03-29 RX ORDER — FINASTERIDE 5 MG/1
5 TABLET, FILM COATED ORAL DAILY
Qty: 90 TABLET | Refills: 0 | Status: SHIPPED | OUTPATIENT
Start: 2021-03-29 | End: 2021-06-23

## 2021-03-29 NOTE — TELEPHONE ENCOUNTER
Last Clinic Visit: 5/19/2020  [facility]  NCV: 4-9-21

## 2021-03-29 NOTE — TELEPHONE ENCOUNTER
Visit Type : year follow up with PSA    Records/Orders: PSA    Pt Contacted: YES left a message to please get PSA done 1 week before     At Rooming: normal

## 2021-03-31 ENCOUNTER — THERAPY VISIT (OUTPATIENT)
Dept: PHYSICAL THERAPY | Facility: CLINIC | Age: 71
End: 2021-03-31
Payer: COMMERCIAL

## 2021-03-31 DIAGNOSIS — M25.511 CHRONIC RIGHT SHOULDER PAIN: ICD-10-CM

## 2021-03-31 DIAGNOSIS — G89.29 CHRONIC RIGHT SHOULDER PAIN: ICD-10-CM

## 2021-03-31 DIAGNOSIS — M54.2 NECK PAIN: ICD-10-CM

## 2021-03-31 PROCEDURE — 97110 THERAPEUTIC EXERCISES: CPT | Mod: GP | Performed by: PHYSICAL THERAPIST

## 2021-04-09 ENCOUNTER — OFFICE VISIT (OUTPATIENT)
Dept: UROLOGY | Facility: CLINIC | Age: 71
End: 2021-04-09
Payer: COMMERCIAL

## 2021-04-09 VITALS
SYSTOLIC BLOOD PRESSURE: 125 MMHG | HEART RATE: 81 BPM | WEIGHT: 200 LBS | DIASTOLIC BLOOD PRESSURE: 75 MMHG | HEIGHT: 68 IN | BODY MASS INDEX: 30.31 KG/M2

## 2021-04-09 DIAGNOSIS — N18.31 STAGE 3A CHRONIC KIDNEY DISEASE (H): ICD-10-CM

## 2021-04-09 DIAGNOSIS — R97.20 ELEVATED PROSTATE SPECIFIC ANTIGEN (PSA): ICD-10-CM

## 2021-04-09 DIAGNOSIS — R39.9 LOWER URINARY TRACT SYMPTOMS (LUTS): ICD-10-CM

## 2021-04-09 DIAGNOSIS — C61 MALIGNANT NEOPLASM OF PROSTATE (H): Primary | ICD-10-CM

## 2021-04-09 LAB — PSA SERPL-MCNC: 2.9 UG/L (ref 0–4)

## 2021-04-09 PROCEDURE — 84153 ASSAY OF PSA TOTAL: CPT | Performed by: PATHOLOGY

## 2021-04-09 PROCEDURE — 99215 OFFICE O/P EST HI 40 MIN: CPT | Performed by: STUDENT IN AN ORGANIZED HEALTH CARE EDUCATION/TRAINING PROGRAM

## 2021-04-09 PROCEDURE — 36415 COLL VENOUS BLD VENIPUNCTURE: CPT | Performed by: PATHOLOGY

## 2021-04-09 RX ORDER — CHOLECALCIFEROL (VITAMIN D3) 50 MCG
1 TABLET ORAL DAILY
COMMUNITY
Start: 2021-03-22 | End: 2021-11-22

## 2021-04-09 ASSESSMENT — MIFFLIN-ST. JEOR: SCORE: 1631.69

## 2021-04-09 ASSESSMENT — PAIN SCALES - GENERAL: PAINLEVEL: SEVERE PAIN (7)

## 2021-04-09 NOTE — PATIENT INSTRUCTIONS
Follow up in one year with Qian Villalta PA-C with a PSA.    It was a pleasure meeting with you today.  Thank you for allowing me and my team the privilege of caring for you today.  YOU are the reason we are here, and I truly hope we provided you with the excellent service you deserve.  Please let us know if there is anything else we can do for you so that we can be sure you are leaving completely satisfied with your care experience.      oSphia Green, CMA

## 2021-04-09 NOTE — LETTER
1/9/2020     RE: Earle Rene  [address]  [place] [address]     Dear Colleague,    Thank you for referring your patient, Earle Rene, to the [facility] at [facility]. Please see a copy of my visit note below.    HPI   Earle Rene is a 69 year old male with type 2 diabetes mellitus here today for a follow up visit.     I last saw him on 12/4/2019 and started Jardiance at that time and also treated him for a UTI.   Pt's diabetes is complicated by retinopathy, nephropathy, neuropathy and ED.  Pt also has hx of hyperlipidemia and HTN.  For his diabetes, he is prescribed to take Tresiba 60 units SQ at hs, Humalog 14 units with meals,Metformin 1000 mg BID and Jardiance 10 mg each am.  He has no interest in carb counting or using an I/C ratio.  Pt's blood sugar values have improved and he looks and feels better.  He enjoys using the Freestyle sensor and has been checking his blood sugar more frequent.  His A1C was 8.5 % on 12/4/2019 and his previous A1C was 10 % on 9/18/2019.   We downloaded his Freestyle device today.  His blood sugar values have improved as above with no frequent hypoglycemia.  On ROS today, he reports intermittent diplopia since his cataract surgery and has f/u with his Oph.  Less leg and feet edema.  Chronic numbness in feet.  His weight is down a few lbs today.  Skin is dry and itchy.   He denies frequent headaches, n/v, SOB at rest, cough, chest pain, abd pain, diarrhea, dysuria or hematuria.  No foot ulcers.  He also report urinary incontinence and is asking to see Dr. Cantu in Urology for follow up.    ROS   Please see under HPI.     ALLERGIES:  Review of patient's allergies indicates no known allergies.    Current Outpatient Medications   Medication Sig Dispense Refill     ARTIFICIAL TEAR OP Apply to eye as needed       aspirin 81 MG tablet Take 1 tablet by mouth At Bedtime        atorvastatin (LIPITOR) 20 MG tablet Take 1  "tablet (20 mg) by mouth daily (Patient taking differently: Take 20 mg by mouth At Bedtime ) 90 tablet 3     blood glucose (NO BRAND SPECIFIED) lancets standard Lancets that go with device, Test 3 times daily 300 each 3     blood glucose monitoring (NO BRAND SPECIFIED) meter device kit Any meter covered by insurance, not store brand, use as directed. 1 kit 0     blood glucose monitoring (NO BRAND SPECIFIED) test strip Strips that go with meter, covered by insurance. Test 3 times daily 300 strip 3     Blood Pressure Monitor KIT Automatic Blood Pressure Monitor 1 kit 0     clobetasol (TEMOVATE) 0.05 % ointment Apply topically to legs twice daily for 2 weeks.  Then discontinue 30 g 0     clotrimazole (LOTRIMIN) 1 % cream Apply topically 2 times daily 30 g 3     Continuous Blood Gluc  (FREESTYLE PETER READER) JUANCARLOS 1 Device daily 1 Device 0     Continuous Blood Gluc Sensor (FREESTYLE PETER 14 DAY SENSOR) MISC 1 applicator every 14 days 8 each 3     empagliflozin (JARDIANCE) 10 MG TABS tablet Take 1 tablet (10 mg) by mouth daily (Patient taking differently: Take 10 mg by mouth every morning ) 90 tablet 1     fenofibrate 54 MG tablet Take 1 tablet (54 mg) by mouth daily (Patient taking differently: Take 54 mg by mouth At Bedtime ) 90 tablet 0     finasteride (PROSCAR) 5 MG tablet Take 1 tablet (5 mg) by mouth daily 90 tablet 2     fluocinonide (LIDEX) 0.05 % external cream APPLY TO AFFECTED AREA TWICE A DAY  3     gabapentin (NEURONTIN) 300 MG capsule TAKE 1 CAPSULE BY MOUTH TWICE A  capsule 1     insulin degludec (TRESIBA FLEXTOUCH) 100 UNIT/ML pen Inject 60 units subcutaneous at bedtime. 60 mL 3     insulin pen needle (B-D U/F) 31G X 5 MM Use 4 times per day.  Please dispense as BD Pen Needle Mini U/F 31G x 5  each 3     insulin syringe 31G X 5/16\" 0.5 ML MISC Use three syringes daily 270 each 1     ketamine 5% gabapentin 8% lidocaine 2.5% topical PLO cream Apply 1 g topically 3 times daily 30 g 3     " ketorolac (ACULAR) 0.5 % ophthalmic solution Place 1 drop into the right eye 4 times daily 1 Bottle 0     loratadine (CLARITIN) 10 MG tablet Take 1 tablet (10 mg) by mouth daily (Patient taking differently: Take 10 mg by mouth At Bedtime ) 90 tablet 0     losartan (COZAAR) 100 MG tablet Take 1 tablet (100 mg) by mouth At Bedtime 30 tablet 11     metFORMIN (GLUCOPHAGE) 1000 MG tablet 1 tab each am only. 180 tablet 3     mupirocin (BACTROBAN) 2 % cream Apply  topically. In very small amounts only as needed 15 g 1     NOVOLOG FLEXPEN 100 UNIT/ML soln Inject 12-14 units with meals, plus correction. Pt uses approx 65 units in 24 hrs. 60 mL 3     ofloxacin (OCUFLOX) 0.3 % ophthalmic solution Apply 1 drop to eye 3 times daily Instill into operative eye(s) per physician instructions. 5 mL 1     Omega-3 Fatty Acids (OMEGA-3 FISH OIL) 1000 MG CAPS Take 1 capsule (1 g) by mouth 2 times daily (Patient taking differently: Take 1 g by mouth as needed (PT last dose a week ago 12.24.19) ) 60 capsule 11     ONETOUCH ULTRA test strip Use to test blood sugar 3 times daily 300 strip 3     prednisoLONE acetate (PRED FORTE) 1 % ophthalmic suspension Apply 1 drop to eye 4 times daily Instill into operative eye(s) per physician instructions. 5 mL 1     sodium bicarbonate 650 MG tablet Take 1 tablet (650 mg) by mouth 3 times daily (Patient taking differently: Take 650 mg by mouth 2 times daily ) 90 tablet 11     tamsulosin (FLOMAX) 0.4 MG capsule Take 1 capsule (0.4 mg) by mouth daily 90 capsule 2     triamcinolone (KENALOG) 0.1 % cream Apply topically 3 times daily 80 g 0     vitamin D3 (CHOLECALCIFEROL) 1000 units (25 mcg) tablet Take 2 tablets (2,000 Units) by mouth daily (Patient taking differently: Take 1,000 Units by mouth 2 times daily ) 30 tablet 11     albuterol (VENTOLIN HFA) 108 (90 Base) MCG/ACT inhaler Inhale 2 puffs into the lungs every 6 hours (Patient not taking: Reported on 1/9/2020) 18 g 3     Family Hx   No change.      Personal Hx   Smoke: none.   ETOH: none.    with grown children.   He owns his own business.    PMH   1. Type 2 Diabetes Mellitus dx at age 43.   2. Neuropathy.  3. Nephropathy.   4. ED.   5. Dyslipidemia.   6. Nephrolithiasis.   7. Decrease auditory acuity.   8. Sarcoidosis-lung.   9. Goiter.   10. S/P T & A.   11. S/P FX right heel.   12. Vit D def.   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   14. CT chest- ? granulomas.   15. Retinopathy.  16. Goiter.  Past Medical History:   Diagnosis Date     Blepharitis of both eyes      BPH (benign prostatic hyperplasia)      Diabetes (H)      Diabetic neuropathy (H)      Diabetic retinopathy associated with diabetes mellitus due to underlying condition (H)      Dry eye syndrome      GERD (gastroesophageal reflux disease)      Goiter      Granulomatous disease (H)      HLD (hyperlipidemia)      HTN (hypertension)      Nonsenile cataract      Peripheral neuropathy      Past Surgical History:   Procedure Laterality Date     ------------OTHER-------------      back of neck abscess drainage in OR     AS RAD RESEC TONSIL/PILLARS Bilateral 1961     CATARACT IOL, RT/LT Left      COLONOSCOPY  7/29/2013    Procedure: COLONOSCOPY;;  Surgeon: Montana Pascal MD;  Location: [facility]     EXCISE MASS UPPER EXTREMITY Right 11/11/2019    Procedure: EXCISION, MASS, UPPER EXTREMITY, RIGHT SHOULDER;  Surgeon: Johana Choudhury MD;  Location: [facility] OR     INTRAVITREAL INJECTION CHEMOTHERAPY Right 12/30/2019    Procedure: INTRAVITREAL Bevacizumab injection;  Surgeon: Milton Maki MD;  Location: [facility] OR     PHACOEMULSIFICATION CLEAR CORNEA WITH STANDARD INTRAOCULAR LENS IMPLANT Right 12/30/2019    Procedure: PHACOEMULSIFICATION, CATARACT, WITH INTRAOCULAR LENS IMPLANT;  Surgeon: Milton Maki MD;  Location: [facility] OR     PHACOEMULSIFICATION WITH STANDARD INTRAOCULAR LENS IMPLANT Left 6/21/2019    Procedure: Left Eye Cataract Removal with Intraocular Lens Implant with  "Intraoperative Avastin Injection;  Surgeon: Lacey Eugene MD;  [facility]: [facility]  11/2017     Physical Exam   General appearance: Vital signs:   /81   Pulse 73   Ht 1.727 m (5' 8\")   Wt 91.9 kg (202 lb 11.2 oz)   BMI 30.82 kg/m     Estimated body mass index is 30.82 kg/m  as calculated from the following:    Height as of this encounter: 1.727 m (5' 8\").    Weight as of this encounter: 91.9 kg (202 lb 11.2 oz).  Head:  Normal.   Eyes: Fundi not examined.  Lungs: clear bilateral.  Cardiovascular system:  RRR.   Abdomen:  nontender.  Musculoskeletal system: no edema.   Neurological:  normal.   Skin:  necrobiosis lipoidica on both legs. Multiple skin excoriations.  FEET: no ulcers. Nails are thick.  Abnormal monofilamentous exam.    Results   Creatinine   Date Value Ref Range Status   01/09/2020 1.59 (H) 0.66 - 1.25 mg/dL Final     GFR Estimate   Date Value Ref Range Status   01/09/2020 43 (L) >60 mL/min/[1.73_m2] Final     Comment:     Non  GFR Calc  Starting 12/18/2018, serum creatinine based estimated GFR (eGFR) will be   calculated using the Chronic Kidney Disease Epidemiology Collaboration   (CKD-EPI) equation.       Hemoglobin A1C   Date Value Ref Range Status   06/18/2019 9.2 (H) 0 - 5.6 % Final     Comment:     Normal <5.7% Prediabetes 5.7-6.4%  Diabetes 6.5% or higher - adopted from ADA   consensus guidelines.       Potassium   Date Value Ref Range Status   01/09/2020 4.4 3.4 - 5.3 mmol/L Final     ALT   Date Value Ref Range Status   06/18/2019 38 0 - 70 U/L Final     AST   Date Value Ref Range Status   06/18/2019 24 0 - 45 U/L Final     TSH   Date Value Ref Range Status   01/09/2020 1.24 0.40 - 4.00 mU/L Final     T4 Free   Date Value Ref Range Status   10/21/2014 1.00 0.76 - 1.46 ng/dL Final     Comment:     Effective 7/30/2014, the reference range for this assay has changed to reflect   new instrumentation/methodology.           Cholesterol   Date Value Ref Range " Status   06/18/2019 145 <200 mg/dL Final   03/06/2018 101 <200 mg/dL Final     HDL Cholesterol   Date Value Ref Range Status   06/18/2019 38 (L) >39 mg/dL Final   03/06/2018 32 (L) >39 mg/dL Final     LDL Cholesterol Calculated   Date Value Ref Range Status   06/18/2019 49 <100 mg/dL Final     Comment:     Desirable:       <100 mg/dl   03/06/2018 36 <100 mg/dL Final     Comment:     Desirable:       <100 mg/dl     Triglycerides   Date Value Ref Range Status   06/18/2019 289 (H) <150 mg/dL Final     Comment:     Borderline high:  150-199 mg/dl  High:             200-499 mg/dl  Very high:       >499 mg/dl     03/06/2018 166 (H) <150 mg/dL Final     Comment:     Borderline high:  150-199 mg/dl  High:             200-499 mg/dl  Very high:       >499 mg/dl       Cholesterol/HDL Ratio   Date Value Ref Range Status   09/09/2014 3.8 0.0 - 5.0 Final   11/20/2013 5.8 (H) 0.0 - 5.0 Final     A1C     8.5    12/4/2019  A1C     10.0  9/18/2019  A1C      9.2   6/18/2019  A1C      8.4   12/21/2018  A1C      7.4   9/7/2018  A1C      7.1   5/31/2018  A1C      9.1   3/6/2018  A1C      9.6   11/1/2017  A1C      8.9   8/9/2017  A1C      9.7   9/22/2016  A1C      9.7   7/21/2015  A1C     10.2  12/2/2014  A1C     10.2  9/9/2014  A1C      9.8  11/20/2013  A1C      9.3   6/12/2013  A1C      8.0   8/14/2012  A1C      8.2   5/22/2012  A1C      8.0   5/22/2012    ASSESSMENT/PLAN:     1. TYPE 2 DIABETES MELLITUS: Uncontrolled type 2 diabetes mellitus complicated by retinopathy, nephropathy, neuropathy and ED.  Pt's blood sugar control has improved since starting Jardiance.  Again, I reviewed how Jardiance works and possible side effects of the medication including dehydration, groin yeast infection, UTI, hypogylcemia, etc.  He is to remain on Jardiance 10 mg each am, reduce Metformin 1000 mg each am only, Tresiba 60 units at bedtime, and Humalog 14 units with meals.  I asked him to be sure and take Humalog with ALL meals and if he does not eat  a meal and his blood sugar is > 150 to take 1-4 units Novolog for correction.  Instructed him to check his blood sugar fasting each am, prelunch, predinner and at bedtime DAILY.  Pt's creat has improved today with creat 1.59  ( was 1.83 ) and GFR 43 mL/min.  Encouraged patient to make healthy food choices, reduce his food portions with meals, avoid snacking and walk daily and to take his insulin and medications as prescribed.  He has been reducing his rice intake.  Pt remains on daily ASA.   Pt had the flu vaccine this season.    2. GOITER: Nontender goiter.  TSH normal today.    3. NEPHROPATHY: Discussed the need for good glycemic control and good BP control.   Pt's creat/GFR as above.   Pt's urine protein +.  He is taking Cozaar. K+ is 4.4 today.  He is seen here by Nephrology.    4.  RETINOPATHY: Pt has f/u with  Oph here next week.   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    5.  NEUROPATHY: Continue Gabapentin.  No foot ulcers.    6. DYSLIPIDEMIA: LDL 49 in 11825.  Pt is taking Lipitor and Fenofibrate.    7. OBESITY: See under # 1 above.  He does not want to use a GLP-1.    8.   Return to Endocrine Clinic to see me in 2 months.    Again, thank you for allowing me to participate in the care of your patient.      Sincerely,    Pamela Laura PA-C       Unknown

## 2021-04-09 NOTE — LETTER
"4/9/2021       RE: Earle Rene  [address]  [place] [address]     Dear Colleague,    Thank you for referring your patient, Earle Rene, to the [facility] [place] at [facility]. Please see a copy of my visit note below.    CHIEF COMPLAINT   It was my pleasure to see Earle Rene who is a 71 year old male for follow-up of  Low risk prostate cancer with BPH.      HPI:  Earle Rene is a 71 year old male being seen for follow up of LUTS.  Duration of problem: 4 years  Previous treatments: on Active surveillance      Reviewed previous notes from [facility]    Exam:  /75   Pulse 81   Ht 1.727 m (5' 8\")   Wt 90.7 kg (200 lb)   BMI 30.41 kg/m    General: age-appropriate appearing male in NAD sitting in an exam chair  HEENT: Head AT/NC, EOMI, CN Grossly intact.  Resp: no respiratory distress  CV: heart rate regular  Abdomen: Degree of obesity is mild. Abdomen is soft and nontender. No organomegaly.   LE: Edema is none   Neuro: grossly non focal. Normal reflexes  Skin: clear of rashes or ecchymoses. No sacral decubitus ulcer.  Motor: excellent strength throughout    Review of Imaging:  The following imaging exams were independently viewed and interpreted by me and discussed with patient:  MRI Abd/Pelvis: Normal    Review of Labs:  The following labs were reviewed by me and discussed with the patient:  Creatinine: Abnormal: 1.68  PSA: Normal 2.90    Assessment & Plan     Malignant neoplasm of prostate (H)  PSA is stable continue PSA on a 6mth-1 year    Stage 3a chronic kidney disease      Lower urinary tract symptoms (LUTS)  Flomax and Finasteride to continue        Silas Bland MD  [facility] [place]      ==========================    Additional Billing and Coding Information:  Review of external notes as documented above   Review of the result(s) of each unique test - Serum " creatinine    Independent interpretation of a test performed by another physician/other qualified health care professional (not separately reported) - MRI, PSA    Discussion of management or test interpretation with external physician/other qualified healthcare professional/appropriate source - NA    Diagnosis or treatment significantly limited by social determinants of health - NA    43 minutes spent on the date of the encounter doing chart review, review of test results, interpretation of tests, patient visit and documentation     ==========================

## 2021-04-09 NOTE — NURSING NOTE
"Chief Complaint   Patient presents with     Follow Up     PSA       Height 1.727 m (5' 8\"), weight 90.7 kg (200 lb). Body mass index is 30.41 kg/m .    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 eye     Colonic polyp     Elevated PSA     Heel fracture     Hyponatremia     Nephrolithiasis     Neutropenia (H)     Pain of finger of right hand     Sarcoidosis of lung (H)     Sialoadenitis     Adhesive capsulitis of shoulder     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)     Chronic kidney disease, stage 3     Neck pain     Chronic right shoulder pain       No Known Allergies    Current Outpatient Medications   Medication Sig Dispense Refill     albuterol (VENTOLIN HFA) 108 (90 Base) MCG/ACT inhaler Inhale 2 puffs into the lungs every 6 hours 18 g 3     alpha-lipoic acid 600 MG capsule Take 1 capsule (600 mg) by mouth daily 90 capsule 3     amLODIPine (NORVASC) 5 MG tablet Take 1 tablet (5 mg) by mouth At Bedtime 90 tablet 3     ARTIFICIAL TEAR OP Apply to eye as needed       aspirin 81 MG tablet Take 1 tablet by mouth At Bedtime        atorvastatin (LIPITOR) 20 MG tablet Take 1 tablet (20 mg) by mouth daily 90 tablet 0     blood glucose (NO BRAND SPECIFIED) lancets standard Lancets that go with device, Test 3 times daily 300 each 3     blood glucose monitoring (NO BRAND SPECIFIED) meter device kit Any " meter covered by insurance, not store brand, use as directed. 1 kit 0     blood glucose monitoring (NO BRAND SPECIFIED) test strip Strips that go with meter, covered by insurance. Test 3 times daily 300 strip 3     Blood Pressure Monitor KIT Automatic Blood Pressure Monitor 1 kit 0     camphor-menthol (DERMASARRA) 0.5-0.5 % external lotion Apply to arms legs torso 1-2 times a day for itching 1 Bottle 11     cholecalciferol (VITAMIN D3) 1000 units (25 mcg) capsule Take 2 capsules (2,000 Units) by mouth daily 180 capsule 3     Cholecalciferol (VITAMIN D3) 50 MCG (2000 UT) TABS Take 1 tablet by mouth daily       clobetasol (TEMOVATE) 0.05 % ointment Apply topically to legs twice daily for 2 weeks.  Then discontinue 30 g 0     clotrimazole (LOTRIMIN) 1 % cream Apply topically 2 times daily 30 g 3     Continuous Blood Gluc  (FREESTYLE PETER READER) JUANCARLOS 1 Device daily Use to read blood sugars   as  instruction 4 times daily 1 Device 0     Continuous Blood Gluc Sensor (FREESTYLE PETER 14 DAY SENSOR) MISC 1 applicator every 14 days Change every 14 days 8 each 3     dextromethorphan-guaiFENesin (MUCINEX DM)  MG 12 hr tablet Take 1 tablet by mouth every 12 hours 30 tablet 0     dextromethorphan-guaiFENesin (TUSSIN DM)  MG/5ML liquid Take 5 mLs by mouth 2 times daily as needed 118 mL 0     doxycycline hyclate (VIBRA-TABS) 100 MG tablet Take 1 tablet (100 mg) by mouth 2 times daily 14 tablet 0     empagliflozin (JARDIANCE) 10 MG TABS tablet Take 1 tablet (10 mg) by mouth daily 90 tablet 0     famotidine (PEPCID) 20 MG tablet Take 1 tablet (20 mg) by mouth 2 times daily 180 tablet 3     fenofibrate 54 MG tablet Take 1 tablet (54 mg) by mouth daily (Patient taking differently: Take 54 mg by mouth At Bedtime ) 90 tablet 0     finasteride (PROSCAR) 5 MG tablet Take 1 tablet (5 mg) by mouth daily 90 tablet 0     fluocinonide (LIDEX) 0.05 % external cream APPLY TO AFFECTED AREA TWICE A DAY  3      "fluticasone (FLONASE) 50 MCG/ACT nasal spray Spray 1 spray into both nostrils daily 16 g 0     fluticasone (FLOVENT HFA) 110 MCG/ACT inhaler Inhale 1 puff into the lungs 2 times daily 12 g 1     gabapentin (NEURONTIN) 300 MG capsule TAKE 1 CAPSULE BY MOUTH TWICE A  capsule 0     gabapentin (NEURONTIN) 300 MG capsule Take 1 capsule (300 mg) by mouth 2 times daily 60 capsule 1     HUMALOG KWIKPEN 100 UNIT/ML soln Inject 15-17  units with meals, plus correction. Pt uses approx 65 units in 24 hrs. 60 mL 1     insulin degludec (TRESIBA FLEXTOUCH) 100 UNIT/ML pen Inject 64 units subcu at bedtime. Lab draw needed. Call  to schedule. 60 mL 1     insulin pen needle (B-D U/F) 31G X 5 MM miscellaneous Use 4 time(s) per day.  Please dispense as BD Pen Needle Mini U/F 31G x 5  each 3     insulin pen needle (B-D U/F) 31G X 5 MM Use 4 times per day.  Please dispense as BD Pen Needle Mini U/F 31G x 5  each 3     insulin syringe 31G X 5/16\" 0.5 ML MISC Use three syringes daily 270 each 1     ketamine 5% gabapentin 8% lidocaine 2.5% topical PLO cream Apply 1 g topically 3 times daily 30 g 3     loratadine (CLARITIN) 10 MG tablet Take 1 tablet (10 mg) by mouth daily (Patient taking differently: Take 10 mg by mouth At Bedtime ) 90 tablet 0     losartan (COZAAR) 100 MG tablet Take 1 tablet (100 mg) by mouth At Bedtime 30 tablet 11     metFORMIN (GLUCOPHAGE) 1000 MG tablet 1 tab each am only. 90 tablet 0     mupirocin (BACTROBAN) 2 % cream Apply  topically. In very small amounts only as needed 15 g 1     Omega-3 Fatty Acids (OMEGA-3 FISH OIL) 1000 MG CAPS Take 1 capsule (1 g) by mouth 2 times daily (Patient taking differently: Take 1 g by mouth as needed (PT last dose a week ago 12.24.19) ) 60 capsule 11     ONETOUCH ULTRA test strip Use to test blood sugar 3 times daily 300 strip 3     sodium bicarbonate 650 MG tablet Take 1 tablet (650 mg) by mouth 3 times daily 90 tablet 11     tamsulosin (FLOMAX) 0.4 MG " capsule Take 1 capsule (0.4 mg) by mouth daily 90 capsule 2     triamcinolone (KENALOG) 0.1 % cream Apply topically 3 times daily 80 g 0       Social History     Tobacco Use     Smoking status: Never Smoker     Smokeless tobacco: Never Used   Substance Use Topics     Alcohol use: Yes     Comment: occasionaly     Drug use: No       Antonio Ramirez EMT  4/9/2021  2:54 PM

## 2021-04-11 PROBLEM — C61 MALIGNANT NEOPLASM OF PROSTATE (H): Status: ACTIVE | Noted: 2021-04-11

## 2021-04-11 PROBLEM — I73.9 PERIPHERAL VASCULAR DISEASE, UNSPECIFIED (H): Status: ACTIVE | Noted: 2021-04-11

## 2021-04-11 NOTE — PROGRESS NOTES
"CHIEF COMPLAINT   It was my pleasure to see Earle Rene who is a 71 year old male for follow-up of  Low risk prostate cancer with BPH.      HPI:  Earle Rene is a 71 year old male being seen for follow up of LUTS.  Duration of problem: 4 years  Previous treatments: on Active surveillance      Reviewed previous notes from [facility]    Exam:  /75   Pulse 81   Ht 1.727 m (5' 8\")   Wt 90.7 kg (200 lb)   BMI 30.41 kg/m    General: age-appropriate appearing male in NAD sitting in an exam chair  HEENT: Head AT/NC, EOMI, CN Grossly intact.  Resp: no respiratory distress  CV: heart rate regular  Abdomen: Degree of obesity is mild. Abdomen is soft and nontender. No organomegaly.   LE: Edema is none   Neuro: grossly non focal. Normal reflexes  Skin: clear of rashes or ecchymoses. No sacral decubitus ulcer.  Motor: excellent strength throughout    Review of Imaging:  The following imaging exams were independently viewed and interpreted by me and discussed with patient:  MRI Abd/Pelvis: Normal    Review of Labs:  The following labs were reviewed by me and discussed with the patient:  Creatinine: Abnormal: 1.68  PSA: Normal 2.90    Assessment & Plan     Malignant neoplasm of prostate (H)  PSA is stable continue PSA on a 6mth-1 year    Stage 3a chronic kidney disease      Lower urinary tract symptoms (LUTS)  Flomax and Finasteride to continue        Silas Bland MD  [facility] [place]      ==========================    Additional Billing and Coding Information:  Review of external notes as documented above   Review of the result(s) of each unique test - Serum creatinine    Independent interpretation of a test performed by another physician/other qualified health care professional (not separately reported) - MRI, PSA    Discussion of management or test interpretation with external physician/other qualified healthcare professional/appropriate source - NA    Diagnosis or " treatment significantly limited by social determinants of health - NA    43 minutes spent on the date of the encounter doing chart review, review of test results, interpretation of tests, patient visit and documentation     ==========================

## 2021-04-12 ENCOUNTER — TELEPHONE (OUTPATIENT)
Dept: UROLOGY | Facility: CLINIC | Age: 71
End: 2021-04-12

## 2021-04-14 ENCOUNTER — TELEPHONE (OUTPATIENT)
Dept: ENDOCRINOLOGY | Facility: CLINIC | Age: 71
End: 2021-04-14

## 2021-04-14 DIAGNOSIS — M79.2 POLYNEUROPATHIC PAIN: ICD-10-CM

## 2021-04-14 NOTE — LETTER
Patient:  Earle Rene  :   1950  MRN:     [id number]        Mr.Kurosh Rene  [address] [place] [address]        2021    Dear ,    I see that you do not have a follow-up appointment in endocrinology. I would recommend that you be evaluated by an endocrinologist to minimize complications. If you like to be seen at the [facility], please call [phone] select option #1 for an appointment.    Regards    Lala Villanueva MD

## 2021-04-16 DIAGNOSIS — E11.3213 TYPE 2 DIABETES MELLITUS WITH BOTH EYES AFFECTED BY MILD NONPROLIFERATIVE RETINOPATHY AND MACULAR EDEMA, WITH LONG-TERM CURRENT USE OF INSULIN (H): ICD-10-CM

## 2021-04-16 DIAGNOSIS — Z79.4 TYPE 2 DIABETES MELLITUS WITH BOTH EYES AFFECTED BY MILD NONPROLIFERATIVE RETINOPATHY AND MACULAR EDEMA, WITH LONG-TERM CURRENT USE OF INSULIN (H): ICD-10-CM

## 2021-04-16 NOTE — TELEPHONE ENCOUNTER
[facility] Call Center    Phone Message    May a detailed message be left on voicemail: yes     Reason for Call: Medication Refill Request    Has the patient contacted the pharmacy for the refill? Yes   Name of medication being requested: empagliflozin (JARDIANCE) 10 MG TABS tablet    Provider who prescribed the medication: LINDSEY Laura    Pharmacy: [id number] - [place], [place] - [address] (Pharmacy    Date medication is needed: asap       Action Taken: Message routed to:  [facility] ([facility] endo    Travel Screening: Not Applicable

## 2021-04-16 NOTE — TELEPHONE ENCOUNTER
gabapentin (NEURONTIN) 300 MG capsule   Take 1 capsule (300 mg) by mouth 2 times daily -     Last Written Prescription Date:  1/5/21  Last Fill Quantity: 120,   # refills: 0  Last Office Visit : 3/12/21  Future Office visit:  none    Routing refill request to provider for review/approval because:  Drug not on the [facility], [facility] or [facility] refill protocol or controlled substance

## 2021-04-16 NOTE — TELEPHONE ENCOUNTER
Centralized Medication Refill Team note:     empagliflozin (JARDIANCE) 10 MG TABS tablet  Last Written Prescription Date:  1/11/2021  Last Fill Quantity: 90,   # refills: 0  Last Office Visit : 1/14/2021  Future Office visit:  None: letter sent by Dr Terry 4/14/21    Routing refill request to provider for review/approval because:  ABNORMAL LABS:     Ref. Range 1/14/2021 00:00   Hemoglobin A1C Latest Ref Range: 4.3 - 6.0 % 9.0 (A)     03/12/21  1324    CR 1.68*     Lab Test 03/12/21  1324   GFRESTIMATED 40*   GFRESTBLACK 46*       NEED RETURN APPT. Scheduling has been notified to contact the pt for appointment.LETTER SENT BY DR TERRY 4/14/21

## 2021-04-19 RX ORDER — GABAPENTIN 300 MG/1
300 CAPSULE ORAL 2 TIMES DAILY
Qty: 60 CAPSULE | Refills: 1 | Status: SHIPPED | OUTPATIENT
Start: 2021-04-19 | End: 2021-10-05

## 2021-04-26 ENCOUNTER — TELEPHONE (OUTPATIENT)
Dept: INTERNAL MEDICINE | Facility: CLINIC | Age: 71
End: 2021-04-26

## 2021-04-26 ENCOUNTER — NURSE TRIAGE (OUTPATIENT)
Dept: NURSING | Facility: CLINIC | Age: 71
End: 2021-04-26

## 2021-04-26 NOTE — TELEPHONE ENCOUNTER
Pt reporting, feeling very dizzy and lightheaded.   Some times he feels like the room is spinning.   Feels a little winded once in a while. Especially when he walks or goes up the steps.   He gets winded.    No chest pain or pressure noted.  No wheezing.   A little bit of a headache, no nausea or vomiting noted.   Pt's blood pressure has been a little emelia.  Yesterday It was 151/76.   Today while on the phone.  His blood pressure was 146/76.       Pt wondering if he needs an adjustment in his medication for Pt care.      Please call Pt back @ [phone] for further assistance.      Kacy Briones RN  Central Triage Red Flags/Med Refills        Additional Information    Negative: Sounds like a life-threatening emergency to the triager    Negative: Pregnant > 20 weeks or postpartum (< 6 weeks after delivery) and new hand or face swelling    Negative: Pregnant > 20 weeks and BP > 140/90    Negative: Systolic BP >= 160 OR Diastolic >= 100, and any cardiac or neurologic symptoms (e.g., chest pain, difficulty breathing, unsteady gait, blurred vision)    Negative: Patient sounds very sick or weak to the triager    Negative: BP Systolic BP >= 140 OR Diastolic >= 90 and postpartum (from 0 to 6 weeks after delivery)    Negative: Systolic BP >= 180 OR Diastolic >= 110, and missed most recent dose of blood pressure medication    Negative: Systolic BP >= 180 OR Diastolic >= 110    Patient wants to be seen    Protocols used: HIGH BLOOD PRESSURE-A-OH

## 2021-04-26 NOTE — TELEPHONE ENCOUNTER
"Called patient and he stated he has been having a lot of elevated readings in the 140-150 range. His blood pressure used average in the 120-130's patient stated. Patient said recently he has been having some shortness of breath when walking, dizzy, lightheaded. Patient stated he does not have any chest pain at this time. Patient currently taking losartan 100 mg at bedtime and amlodipine 5 mg at bedtime for blood pressure medications.     Patient stated he wants to be seen with Dr. Ramsey immediately. Told patient that there are no openings available with her for a few months. Told patient we can schedule him with RO Thornton. Patient stated \"that is not the same level of care, I need to be seen by a doctor.\" Told patient I could forward his message to Dr. Ramsey but would not be able to get him scheduled that soon. Will also send message to schedulers to have appointment set up for soonest available with Dr. Ramsey.   "

## 2021-04-27 NOTE — TELEPHONE ENCOUNTER
Kiah Ramsey MD Stechmann, Kathleen, RN   Caller: Unspecified (Yesterday, 12:05 PM)             I can have a virtual visit with him May 21st at 2pm   It is supposed to be a face to face day for fellows clinic but everyone is scheduled for virtual.   Thanks   Kiah Ramsey MD      Patient stated he doesn't want a virtual visit and it needs to be in person. Told patient we can't offer an in person visit with Dr. Ramsey this soon and that he would have to wait for her next opening for that to happen. Told patient again if he wants to be seen in person he could schedule an appointment with Angela Thornton NP or Darshana Khan PA-C. Patient stated he did not want that and did not want a virtual visit. Told patient if he wants to be seen in person immediately and refuses to be seen by our NP and PAFitzC he will have to make an appointment with his PCP. Patient stated he will do that and doesn't want to schedule anything with nephrology right now.   Told patient I would still send a message to the schedulers for him to get an appointment arrange with Dr. Ramsey on her in person day. Patient verbalized understanding.

## 2021-05-08 DIAGNOSIS — Z79.4 TYPE 2 DIABETES MELLITUS WITH DIABETIC NEPHROPATHY, WITH LONG-TERM CURRENT USE OF INSULIN (H): ICD-10-CM

## 2021-05-08 DIAGNOSIS — E11.21 TYPE 2 DIABETES MELLITUS WITH DIABETIC NEPHROPATHY, WITH LONG-TERM CURRENT USE OF INSULIN (H): ICD-10-CM

## 2021-05-08 DIAGNOSIS — K21.9 GASTROESOPHAGEAL REFLUX DISEASE WITHOUT ESOPHAGITIS: ICD-10-CM

## 2021-05-10 ENCOUNTER — TRANSFERRED RECORDS (OUTPATIENT)
Dept: HEALTH INFORMATION MANAGEMENT | Facility: CLINIC | Age: 71
End: 2021-05-10

## 2021-05-10 RX ORDER — FAMOTIDINE 20 MG/1
20 TABLET, FILM COATED ORAL 2 TIMES DAILY
Qty: 180 TABLET | Refills: 2 | Status: SHIPPED | OUTPATIENT
Start: 2021-05-10 | End: 2021-11-16

## 2021-05-10 RX ORDER — LOSARTAN POTASSIUM 100 MG/1
TABLET ORAL
Qty: 90 TABLET | Refills: 3 | Status: SHIPPED | OUTPATIENT
Start: 2021-05-10 | End: 2022-04-03

## 2021-05-13 DIAGNOSIS — Z79.4 TYPE 2 DIABETES MELLITUS WITH HYPERGLYCEMIA, WITH LONG-TERM CURRENT USE OF INSULIN (H): ICD-10-CM

## 2021-05-13 DIAGNOSIS — E11.65 TYPE 2 DIABETES MELLITUS WITH HYPERGLYCEMIA, WITH LONG-TERM CURRENT USE OF INSULIN (H): ICD-10-CM

## 2021-05-13 RX ORDER — INSULIN DEGLUDEC 100 U/ML
INJECTION, SOLUTION SUBCUTANEOUS
Qty: 60 ML | Refills: 0 | Status: SHIPPED | OUTPATIENT
Start: 2021-05-13 | End: 2021-08-06

## 2021-05-13 NOTE — TELEPHONE ENCOUNTER
Long Acting Insulin Protocol [id number]/13/2021 10:41 AM   HgbA1C in past 3 or 6 months     Lab order placed. Pt notified via Rx.

## 2021-05-13 NOTE — TELEPHONE ENCOUNTER
insulin degludec (TRESIBA FLEXTOUCH) 100 UNIT/ML pen        Last Written Prescription Date:  10/14/20  Last Fill Quantity: 60mL,   # refills: 1  Last Office Visit : 01/14/21  Future Office visit:  05/24/21    Routing refill request to provider for review/approval because:  Insulin - refilled per clinic

## 2021-05-19 DIAGNOSIS — Z79.4 TYPE 2 DIABETES MELLITUS WITH HYPERGLYCEMIA, WITH LONG-TERM CURRENT USE OF INSULIN (H): ICD-10-CM

## 2021-05-19 DIAGNOSIS — E11.65 TYPE 2 DIABETES MELLITUS WITH HYPERGLYCEMIA, WITH LONG-TERM CURRENT USE OF INSULIN (H): ICD-10-CM

## 2021-05-19 RX ORDER — FLASH GLUCOSE SENSOR
1 KIT MISCELLANEOUS
Qty: 8 EACH | Refills: 3 | Status: SHIPPED | OUTPATIENT
Start: 2021-05-19 | End: 2022-03-10

## 2021-05-24 ENCOUNTER — OFFICE VISIT (OUTPATIENT)
Dept: ENDOCRINOLOGY | Facility: CLINIC | Age: 71
End: 2021-05-24
Payer: COMMERCIAL

## 2021-05-24 DIAGNOSIS — Z79.4 TYPE 2 DIABETES MELLITUS WITH MODERATE NONPROLIFERATIVE RETINOPATHY WITHOUT MACULAR EDEMA, WITH LONG-TERM CURRENT USE OF INSULIN, UNSPECIFIED LATERALITY (H): Primary | ICD-10-CM

## 2021-05-24 DIAGNOSIS — E11.3399 TYPE 2 DIABETES MELLITUS WITH MODERATE NONPROLIFERATIVE RETINOPATHY WITHOUT MACULAR EDEMA, WITH LONG-TERM CURRENT USE OF INSULIN, UNSPECIFIED LATERALITY (H): Primary | ICD-10-CM

## 2021-05-24 LAB — HBA1C MFR BLD: 8.8 % (ref 4.3–6)

## 2021-05-24 PROCEDURE — 99215 OFFICE O/P EST HI 40 MIN: CPT | Performed by: PHYSICIAN ASSISTANT

## 2021-05-24 PROCEDURE — 83036 HEMOGLOBIN GLYCOSYLATED A1C: CPT | Performed by: PHYSICIAN ASSISTANT

## 2021-05-24 RX ORDER — SEMAGLUTIDE 1.34 MG/ML
0.25 INJECTION, SOLUTION SUBCUTANEOUS WEEKLY
Qty: 1.5 ML | Refills: 3 | Status: SHIPPED | OUTPATIENT
Start: 2021-05-24 | End: 2021-07-13

## 2021-05-24 NOTE — PATIENT INSTRUCTIONS
-Continue Metformin and Jardiance  -Reduce Tresiba to 60 units daily  -Start Ozempic 0.25 mg weekly  -When you start Ozempic, reduce your Novolog to 12 -15 unit with each meal.       Eat a snack if you are not going to eat lunch, to prevent low blood sugar.     Follow up in 3-4 weeks to review blood sugars.

## 2021-05-24 NOTE — LETTER
"5/24/2021       RE: Earle Rene  [address]  [place] [address]     Dear Colleague,    Thank you for referring your patient, Earle Rene, to the [facility] [place] at [facility]. Please see a copy of my visit note below.    [facility]- Outpatient Diabetes Follow up  Earle Rene is a 71 year old male with a history of T2DM, HTN, HLP, obesity, osteoporosis, presumed localized prostate cancer and right thyroid nodule who is being seen today for routine follow up. He last saw Karla Laura PA-C 1/14/21.     His diabetes regimen is listed below. He sometimes will miss insulin dose with lunch. Last A1c was 9.0% on 1/14/21.   He uses a Freestyle cyrus CGM.   Notes indicate previously \"he does not want to take Byetta, Victoza, or Bydureon\".    He is having an MRI later this week for dizziness. Intermittent, feels off balance  He has been having more elevated BP of recent.  Arthritis of neck, bothering him.     BG have been \"up and down\", per patient, and per cyrus review. He does not carb count. Typically gives 15-18 units of Novolog with a meal. Had huge BG elevation Friday night/Saturday morning; on Friday nights he eats a larger meal for Baptism observation. He also had a large glass of beet juice and two pasta dishes. Gave normal 18 units of Novolog for this.     He has been having some lower BG 60-70's; almost always happen midday. He often skips lunch.   A1c is 8.8% today; feels it could be better. He is on lower Jardiance, Metformin dosing (I believe) related to impaired kidney function. He would like to reduce Tresiba dose to 60 units daily to even out the math with pen running out. He ran out last night and only took 36 units; despite the significantly lower dose, his FBG was 146. If his BG is elevated overnight, he will take some Novolog around 4AM.     He continues to have an interest in insulin pump; " tubeless system like Omnipod would be preferable. He is really not interested in carb counting (discussed that this would be needed if starting a pump).     Discussed GLp1 agonist initiation; this would help even out BG during the day even with some variation in PO intake. It would also reduce need for insulin. He is hoping to loose weight. He is worried about side effects of medications, but would be willing to try. He would prefer weekly GLP1 over a daily injection.     Pt denies headaches, visual changes, n/v, SOB at rest, chest pain, abd pain, nausea/vomiting, diarrhea, dysuria, hematuria or foot ulcers.      Current Diabetes Regimen:   Tresiba 68 units at bedtime  Jardiance 10 mg daily AM  Novolog 15-18 units with meals  Metformin 1000mg daily AM.     Glucometer Settings  Glucometer type: freestyle cyrus  Checking 4-8 times per day on average   Average glucose: 202, with 43% in target, 30% high, and 27% very high  Variability: 33%  Documented hypoglycemia: yes x3, in the setting of skipping lunch    Physical Activity:  Regular activity, no exercise pattern   Nutrition: typically two meals daily.    Diabetes Care  Retinopathy: yes, severe NPDR. last eye exam 2020    Nephropathy: BP well controlled. + Hx proteinuria. Creatinine 1.68(stable). Taking ACEi/ARB: yes    Neuropathy: yes, on alpha lipoic acid    Lipids: Taking ASA: yes, statin: no   LDL Cholesterol Calculated   Date Value Ref Range Status   06/18/2019 49 <100 mg/dL Final     Comment:     Desirable:       <100 mg/dl          ROS: 10 point review of systems completed; pertinent positives and negatives documented in HPI      Allergies  No Known Allergies    Medications  Current Outpatient Medications   Medication Sig Dispense Refill     albuterol (VENTOLIN HFA) 108 (90 Base) MCG/ACT inhaler Inhale 2 puffs into the lungs every 6 hours 18 g 3     alpha-lipoic acid 600 MG capsule Take 1 capsule (600 mg) by mouth daily 90 capsule 3     amLODIPine (NORVASC) 5  MG tablet Take 1 tablet (5 mg) by mouth At Bedtime 90 tablet 3     ARTIFICIAL TEAR OP Apply to eye as needed       aspirin 81 MG tablet Take 1 tablet by mouth At Bedtime        atorvastatin (LIPITOR) 20 MG tablet Take 1 tablet (20 mg) by mouth daily 90 tablet 0     blood glucose (NO BRAND SPECIFIED) lancets standard Lancets that go with device, Test 3 times daily 300 each 3     blood glucose monitoring (NO BRAND SPECIFIED) meter device kit Any meter covered by insurance, not store brand, use as directed. 1 kit 0     blood glucose monitoring (NO BRAND SPECIFIED) test strip Strips that go with meter, covered by insurance. Test 3 times daily 300 strip 3     Blood Pressure Monitor KIT Automatic Blood Pressure Monitor 1 kit 0     camphor-menthol (DERMASARRA) 0.5-0.5 % external lotion Apply to arms legs torso 1-2 times a day for itching 1 Bottle 11     cholecalciferol (VITAMIN D3) 1000 units (25 mcg) capsule Take 2 capsules (2,000 Units) by mouth daily 180 capsule 3     Cholecalciferol (VITAMIN D3) 50 MCG (2000 UT) TABS Take 1 tablet by mouth daily       clobetasol (TEMOVATE) 0.05 % ointment Apply topically to legs twice daily for 2 weeks.  Then discontinue 30 g 0     clotrimazole (LOTRIMIN) 1 % cream Apply topically 2 times daily 30 g 3     Continuous Blood Gluc  (FREESTYLE PETER READER) JUANCARLOS 1 Device daily Use to read blood sugars   as  instruction 4 times daily 1 Device 0     Continuous Blood Gluc Sensor (FREESTYLE PETER 14 DAY SENSOR) MISC 1 applicator every 14 days Change every 14 days 8 each 3     dextromethorphan-guaiFENesin (MUCINEX DM)  MG 12 hr tablet Take 1 tablet by mouth every 12 hours 30 tablet 0     dextromethorphan-guaiFENesin (TUSSIN DM)  MG/5ML liquid Take 5 mLs by mouth 2 times daily as needed 118 mL 0     doxycycline hyclate (VIBRA-TABS) 100 MG tablet Take 1 tablet (100 mg) by mouth 2 times daily 14 tablet 0     empagliflozin (JARDIANCE) 10 MG TABS tablet Take 1 tablet  "(10 mg) by mouth daily Please call for appointment [phone] 30 tablet 0     famotidine (PEPCID) 20 MG tablet Take 1 tablet (20 mg) by mouth 2 times daily 180 tablet 2     fenofibrate 54 MG tablet Take 1 tablet (54 mg) by mouth daily (Patient taking differently: Take 54 mg by mouth At Bedtime ) 90 tablet 0     finasteride (PROSCAR) 5 MG tablet Take 1 tablet (5 mg) by mouth daily 90 tablet 0     fluocinonide (LIDEX) 0.05 % external cream APPLY TO AFFECTED AREA TWICE A DAY  3     fluticasone (FLONASE) 50 MCG/ACT nasal spray Spray 1 spray into both nostrils daily 16 g 0     fluticasone (FLOVENT HFA) 110 MCG/ACT inhaler Inhale 1 puff into the lungs 2 times daily 12 g 1     gabapentin (NEURONTIN) 300 MG capsule Take 1 capsule (300 mg) by mouth 2 times daily 60 capsule 1     gabapentin (NEURONTIN) 300 MG capsule TAKE 1 CAPSULE BY MOUTH TWICE A  capsule 0     HUMALOG KWIKPEN 100 UNIT/ML soln Inject 15-17  units with meals, plus correction. Pt uses approx 65 units in 24 hrs. 60 mL 1     insulin degludec (TRESIBA FLEXTOUCH) 100 UNIT/ML pen Inject 64 units subcu at bedtime. Lab draw needed. Call  to schedule. Please have draw prior to 05/21/2021. 60 mL 0     insulin pen needle (B-D U/F) 31G X 5 MM miscellaneous Use 4 time(s) per day.  Please dispense as BD Pen Needle Mini U/F 31G x 5  each 3     insulin pen needle (B-D U/F) 31G X 5 MM Use 4 times per day.  Please dispense as BD Pen Needle Mini U/F 31G x 5  each 3     insulin syringe 31G X 5/16\" 0.5 ML MISC Use three syringes daily 270 each 1     ketamine 5% gabapentin 8% lidocaine 2.5% topical PLO cream Apply 1 g topically 3 times daily 30 g 3     loratadine (CLARITIN) 10 MG tablet Take 1 tablet (10 mg) by mouth daily (Patient taking differently: Take 10 mg by mouth At Bedtime ) 90 tablet 0     losartan (COZAAR) 100 MG tablet TAKE 1 TABLET BY MOUTH AT BEDTIME 90 tablet 3     metFORMIN (GLUCOPHAGE) 1000 MG tablet 1 tab each am only. 90 tablet " 0     mupirocin (BACTROBAN) 2 % cream Apply  topically. In very small amounts only as needed 15 g 1     Omega-3 Fatty Acids (OMEGA-3 FISH OIL) 1000 MG CAPS Take 1 capsule (1 g) by mouth 2 times daily (Patient taking differently: Take 1 g by mouth as needed (PT last dose a week ago 12.24.19) ) 60 capsule 11     ONETOUCH ULTRA test strip Use to test blood sugar 3 times daily 300 strip 3     sodium bicarbonate 650 MG tablet Take 1 tablet (650 mg) by mouth 3 times daily 90 tablet 11     tamsulosin (FLOMAX) 0.4 MG capsule Take 1 capsule (0.4 mg) by mouth daily 90 capsule 2     triamcinolone (KENALOG) 0.1 % cream Apply topically 3 times daily 80 g 0       Family History  family history includes Diabetes in his brother and father; Leukemia (age of onset: 44) in his brother; Myocardial Infarction in his father.    Social History   reports that he has never smoked. He has never used smokeless tobacco. He reports current alcohol use. He reports that he does not use drugs.     Past Medical History  Past Medical History:   Diagnosis Date     Blepharitis of both eyes      BPH (benign prostatic hyperplasia)      Diabetes (H)      Diabetic neuropathy (H)      Diabetic retinopathy associated with diabetes mellitus due to underlying condition (H)      Dry eye syndrome      GERD (gastroesophageal reflux disease)      Goiter      Granulomatous disease (H)      HLD (hyperlipidemia)      HTN (hypertension)      Nonsenile cataract      Peripheral neuropathy        Past Surgical History:   Procedure Laterality Date     ------------OTHER-------------      back of neck abscess drainage in OR     AS RAD RESEC TONSIL/PILLARS Bilateral 1961     CATARACT IOL, RT/LT Left      COLONOSCOPY  7/29/2013    Procedure: COLONOSCOPY;;  Surgeon: Montana Pascal MD;  Location:  GI     EXCISE MASS UPPER EXTREMITY Right 11/11/2019    Procedure: EXCISION, MASS, UPPER EXTREMITY, RIGHT SHOULDER;  Surgeon: Johana Choudhury MD;  Location:  OR      INTRAVITREAL INJECTION CHEMOTHERAPY Right 12/30/2019    Procedure: INTRAVITREAL Bevacizumab injection;  Surgeon: Milton Maki MD;  Location: [facility] OR     PHACOEMULSIFICATION CLEAR CORNEA WITH STANDARD INTRAOCULAR LENS IMPLANT Right 12/30/2019    Procedure: PHACOEMULSIFICATION, CATARACT, WITH INTRAOCULAR LENS IMPLANT;  Surgeon: Milton Maki MD;  Location: [facility] OR     PHACOEMULSIFICATION WITH STANDARD INTRAOCULAR LENS IMPLANT Left 6/21/2019    Procedure: Left Eye Cataract Removal with Intraocular Lens Implant with Intraoperative Avastin Injection;  Surgeon: Lacey Eugene MD;  Location:  OR     siladenatis  11/2017       Physical Exam  There were no vitals taken for this visit.  There is no height or weight on file to calculate BMI.    GENERAL : In no apparent distress. Wife present and supportive.   SKIN: Normal color, normal temperature, texture.  No  suspicious lesions or rashes  EYES: PERRLA.  No proptosis.  MOUTH: Moist, pink; pharynx clear  NECK: No visible masses. No palpable adenopathy, or masses. No goiter.  RESP: normal respiratory effort.  cough   ABDOMEN: soft, nontender to palpation   NEURO: awake, alert, responds appropriately to questions.  Moves all extremities; Gait normal.       RESULTS    Lab Results   Component Value Date    A1C 9.2 06/18/2019    A1C 9.1 03/06/2018    A1C 10.2 06/06/2017    A1C 9.0 03/16/2016    A1C 10.2 09/09/2014       Creatinine   Date Value Ref Range Status   03/12/2021 1.68 (H) 0.66 - 1.25 mg/dL Final     GFR Estimate   Date Value Ref Range Status   03/12/2021 40 (L) >60 mL/min/[1.73_m2] Final     Comment:     Non  GFR Calc  Starting 12/18/2018, serum creatinine based estimated GFR (eGFR) will be   calculated using the Chronic Kidney Disease Epidemiology Collaboration   (CKD-EPI) equation.       Hemoglobin A1C   Date Value Ref Range Status   06/18/2019 9.2 (H) 0 - 5.6 % Final     Comment:     Normal <5.7% Prediabetes 5.7-6.4%  Diabetes 6.5% or  higher - adopted from ADA   consensus guidelines.       Potassium   Date Value Ref Range Status   03/12/2021 5.2 3.4 - 5.3 mmol/L Final     ALT   Date Value Ref Range Status   03/12/2021 36 0 - 70 U/L Final     AST   Date Value Ref Range Status   03/12/2021 24 0 - 45 U/L Final     TSH   Date Value Ref Range Status   01/09/2020 1.24 0.40 - 4.00 mU/L Final     T4 Free   Date Value Ref Range Status   10/21/2014 1.00 0.76 - 1.46 ng/dL Final     Comment:     Effective 7/30/2014, the reference range for this assay has changed to reflect   new instrumentation/methodology.         TSH   Date Value Ref Range Status   01/09/2020 1.24 0.40 - 4.00 mU/L Final   11/01/2017 1.12 0.40 - 4.00 mU/L Final   10/21/2014 1.13 0.40 - 4.00 mU/L Final     Comment:     Effective 7/30/2014, the reference range for this assay has changed to reflect   new instrumentation/methodology.     05/22/2012 0.78 0.4 - 5.0 mU/L Final   02/02/2012 1.24 0.4 - 5.0 mU/L Final     T4 Free   Date Value Ref Range Status   10/21/2014 1.00 0.76 - 1.46 ng/dL Final     Comment:     Effective 7/30/2014, the reference range for this assay has changed to reflect   new instrumentation/methodology.     05/22/2012 1.01 0.70 - 1.85 ng/dL Final   08/10/2010 1.02 0.70 - 1.85 ng/dL Final   05/27/2010 0.98 0.70 - 1.85 ng/dL Final       Creatinine   Date Value Ref Range Status   03/12/2021 1.68 (H) 0.66 - 1.25 mg/dL Final       Recent Labs   Lab Test 06/18/19  1054 03/06/18  1216 09/09/14  0919 09/09/14  0919 11/20/13  1101   CHOL 145 101   < > 123 183   HDL 38* 32*   < > 32* 31*   LDL 49 36   < > 30 Cannot estimate LDL when triglyceride exceeds 400 mg/dL  87   TRIG 289* 166*   < > 307* 498*   CHOLHDLRATIO  --   --   --  3.8 5.8*    < > = values in this interval not displayed.       No results found for: [id number], [id number], [id number]      ASSESSMENT/PLAN:    1. Diabetes type II, uncontrolled, complicated by retinopathy, neuropathy and CKD.   -most recent A1c 9.0 on  1/2021   -Tresiba reduced to 60 units daily (per pt preference, but its also indicated with hx of lows when not eating a meal; likely this is partially covering prandial needs   -initiate Ozempic 0.25 mg weekly (was going to start on Friday). Goal following titration to full strength, if tolerating, would be to eliminate need for Novolog with meals. Monitor renal function during titration.   -Metformin 1000mg once daily    -Jardiance 10 mg once daily   -Novolog with meals: continue 15-18 units with meals now, then reduce to 12-15 units with meals following initiation of lower dose Ozempic.    -instructed to eat a high protein, low carb snack at lunchtime, if skipping a meal, to prevent hypoglycemia.    -CDE referral; pt is interested in hearing more about Omnipod pump; he would need to learn how to carb count; his wife is confident he would not be willing to do this.         Follow up:  1. With any available provider in 3-4 weeks.   2. Diabetes Educator follow up: within 1 month.     The patient is not enrolled in [employer] services    This patient has met [place] community measures for diabetes control:no (D5: Control blood pressure ,Lower bad cholesterol Maintain blood sugar, Be tobacco-free, Take aspirin as recommended)    This patient is eligible for graduation from [facility] clinic: no      45 minutes spent on the date of the encounter doing chart review, history and exam, documentation and further activities per the note          Amy Rogel PA-C  Diabetes Management Service        Chief Complaint   Patient presents with     RECHECK     Type 2 Diabetes     Capillary puncture performed for Hemoglobin A1C test. Patient tolerated well.  Leigh Ann Lee MA

## 2021-05-24 NOTE — PROGRESS NOTES
"[facility] Endocrinology- Outpatient Diabetes Follow up  Earle Rene is a 71 year old male with a history of T2DM, HTN, HLP, obesity, osteoporosis, presumed localized prostate cancer and right thyroid nodule who is being seen today for routine follow up. He last saw Karla Laura PA-C 1/14/21.     His diabetes regimen is listed below. He sometimes will miss insulin dose with lunch. Last A1c was 9.0% on 1/14/21.   He uses a Freestyle cyrus CGM.   Notes indicate previously \"he does not want to take Byetta, Victoza, or Bydureon\".    He is having an MRI later this week for dizziness. Intermittent, feels off balance  He has been having more elevated BP of recent.  Arthritis of neck, bothering him.     BG have been \"up and down\", per patient, and per cyrus review. He does not carb count. Typically gives 15-18 units of Novolog with a meal. Had huge BG elevation Friday night/Saturday morning; on Friday nights he eats a larger meal for Yazdanism observation. He also had a large glass of beet juice and two pasta dishes. Gave normal 18 units of Novolog for this.     He has been having some lower BG 60-70's; almost always happen midday. He often skips lunch.   A1c is 8.8% today; feels it could be better. He is on lower Jardiance, Metformin dosing (I believe) related to impaired kidney function. He would like to reduce Tresiba dose to 60 units daily to even out the math with pen running out. He ran out last night and only took 36 units; despite the significantly lower dose, his FBG was 146. If his BG is elevated overnight, he will take some Novolog around 4AM.     He continues to have an interest in insulin pump; tubeless system like Omnipod would be preferable. He is really not interested in carb counting (discussed that this would be needed if starting a pump).     Discussed GLp1 agonist initiation; this would help even out BG during the day even with some variation in PO intake. It would also reduce need for insulin. He is " hoping to loose weight. He is worried about side effects of medications, but would be willing to try. He would prefer weekly GLP1 over a daily injection.     Pt denies headaches, visual changes, n/v, SOB at rest, chest pain, abd pain, nausea/vomiting, diarrhea, dysuria, hematuria or foot ulcers.      Current Diabetes Regimen:   Tresiba 68 units at bedtime  Jardiance 10 mg daily AM  Novolog 15-18 units with meals  Metformin 1000mg daily AM.     Glucometer Settings  Glucometer type: freestyle cyrus  Checking 4-8 times per day on average   Average glucose: 202, with 43% in target, 30% high, and 27% very high  Variability: 33%  Documented hypoglycemia: yes x3, in the setting of skipping lunch    Physical Activity:  Regular activity, no exercise pattern   Nutrition: typically two meals daily.    Diabetes Care  Retinopathy: yes, severe NPDR. last eye exam 2020    Nephropathy: BP well controlled. + Hx proteinuria. Creatinine 1.68(stable). Taking ACEi/ARB: yes    Neuropathy: yes, on alpha lipoic acid    Lipids: Taking ASA: yes, statin: no   LDL Cholesterol Calculated   Date Value Ref Range Status   06/18/2019 49 <100 mg/dL Final     Comment:     Desirable:       <100 mg/dl          ROS: 10 point review of systems completed; pertinent positives and negatives documented in HPI      Allergies  No Known Allergies    Medications  Current Outpatient Medications   Medication Sig Dispense Refill     albuterol (VENTOLIN HFA) 108 (90 Base) MCG/ACT inhaler Inhale 2 puffs into the lungs every 6 hours 18 g 3     alpha-lipoic acid 600 MG capsule Take 1 capsule (600 mg) by mouth daily 90 capsule 3     amLODIPine (NORVASC) 5 MG tablet Take 1 tablet (5 mg) by mouth At Bedtime 90 tablet 3     ARTIFICIAL TEAR OP Apply to eye as needed       aspirin 81 MG tablet Take 1 tablet by mouth At Bedtime        atorvastatin (LIPITOR) 20 MG tablet Take 1 tablet (20 mg) by mouth daily 90 tablet 0     blood glucose (NO BRAND SPECIFIED) lancets standard  Lancets that go with device, Test 3 times daily 300 each 3     blood glucose monitoring (NO BRAND SPECIFIED) meter device kit Any meter covered by insurance, not store brand, use as directed. 1 kit 0     blood glucose monitoring (NO BRAND SPECIFIED) test strip Strips that go with meter, covered by insurance. Test 3 times daily 300 strip 3     Blood Pressure Monitor KIT Automatic Blood Pressure Monitor 1 kit 0     camphor-menthol (DERMASARRA) 0.5-0.5 % external lotion Apply to arms legs torso 1-2 times a day for itching 1 Bottle 11     cholecalciferol (VITAMIN D3) 1000 units (25 mcg) capsule Take 2 capsules (2,000 Units) by mouth daily 180 capsule 3     Cholecalciferol (VITAMIN D3) 50 MCG (2000 UT) TABS Take 1 tablet by mouth daily       clobetasol (TEMOVATE) 0.05 % ointment Apply topically to legs twice daily for 2 weeks.  Then discontinue 30 g 0     clotrimazole (LOTRIMIN) 1 % cream Apply topically 2 times daily 30 g 3     Continuous Blood Gluc  (FREESTYLE PETER READER) JUANCARLOS 1 Device daily Use to read blood sugars   as  instruction 4 times daily 1 Device 0     Continuous Blood Gluc Sensor (FREESTYLE PETER 14 DAY SENSOR) MISC 1 applicator every 14 days Change every 14 days 8 each 3     dextromethorphan-guaiFENesin (MUCINEX DM)  MG 12 hr tablet Take 1 tablet by mouth every 12 hours 30 tablet 0     dextromethorphan-guaiFENesin (TUSSIN DM)  MG/5ML liquid Take 5 mLs by mouth 2 times daily as needed 118 mL 0     doxycycline hyclate (VIBRA-TABS) 100 MG tablet Take 1 tablet (100 mg) by mouth 2 times daily 14 tablet 0     empagliflozin (JARDIANCE) 10 MG TABS tablet Take 1 tablet (10 mg) by mouth daily Please call for appointment [phone] 30 tablet 0     famotidine (PEPCID) 20 MG tablet Take 1 tablet (20 mg) by mouth 2 times daily 180 tablet 2     fenofibrate 54 MG tablet Take 1 tablet (54 mg) by mouth daily (Patient taking differently: Take 54 mg by mouth At Bedtime ) 90 tablet 0      "finasteride (PROSCAR) 5 MG tablet Take 1 tablet (5 mg) by mouth daily 90 tablet 0     fluocinonide (LIDEX) 0.05 % external cream APPLY TO AFFECTED AREA TWICE A DAY  3     fluticasone (FLONASE) 50 MCG/ACT nasal spray Spray 1 spray into both nostrils daily 16 g 0     fluticasone (FLOVENT HFA) 110 MCG/ACT inhaler Inhale 1 puff into the lungs 2 times daily 12 g 1     gabapentin (NEURONTIN) 300 MG capsule Take 1 capsule (300 mg) by mouth 2 times daily 60 capsule 1     gabapentin (NEURONTIN) 300 MG capsule TAKE 1 CAPSULE BY MOUTH TWICE A  capsule 0     HUMALOG KWIKPEN 100 UNIT/ML soln Inject 15-17  units with meals, plus correction. Pt uses approx 65 units in 24 hrs. 60 mL 1     insulin degludec (TRESIBA FLEXTOUCH) 100 UNIT/ML pen Inject 64 units subcu at bedtime. Lab draw needed. Call  to schedule. Please have draw prior to 05/21/2021. 60 mL 0     insulin pen needle (B-D U/F) 31G X 5 MM miscellaneous Use 4 time(s) per day.  Please dispense as BD Pen Needle Mini U/F 31G x 5  each 3     insulin pen needle (B-D U/F) 31G X 5 MM Use 4 times per day.  Please dispense as BD Pen Needle Mini U/F 31G x 5  each 3     insulin syringe 31G X 5/16\" 0.5 ML MISC Use three syringes daily 270 each 1     ketamine 5% gabapentin 8% lidocaine 2.5% topical PLO cream Apply 1 g topically 3 times daily 30 g 3     loratadine (CLARITIN) 10 MG tablet Take 1 tablet (10 mg) by mouth daily (Patient taking differently: Take 10 mg by mouth At Bedtime ) 90 tablet 0     losartan (COZAAR) 100 MG tablet TAKE 1 TABLET BY MOUTH AT BEDTIME 90 tablet 3     metFORMIN (GLUCOPHAGE) 1000 MG tablet 1 tab each am only. 90 tablet 0     mupirocin (BACTROBAN) 2 % cream Apply  topically. In very small amounts only as needed 15 g 1     Omega-3 Fatty Acids (OMEGA-3 FISH OIL) 1000 MG CAPS Take 1 capsule (1 g) by mouth 2 times daily (Patient taking differently: Take 1 g by mouth as needed (PT last dose a week ago 12.24.19) ) 60 capsule 11     " ONETOUCH ULTRA test strip Use to test blood sugar 3 times daily 300 strip 3     sodium bicarbonate 650 MG tablet Take 1 tablet (650 mg) by mouth 3 times daily 90 tablet 11     tamsulosin (FLOMAX) 0.4 MG capsule Take 1 capsule (0.4 mg) by mouth daily 90 capsule 2     triamcinolone (KENALOG) 0.1 % cream Apply topically 3 times daily 80 g 0       Family History  family history includes Diabetes in his brother and father; Leukemia (age of onset: 44) in his brother; Myocardial Infarction in his father.    Social History   reports that he has never smoked. He has never used smokeless tobacco. He reports current alcohol use. He reports that he does not use drugs.     Past Medical History  Past Medical History:   Diagnosis Date     Blepharitis of both eyes      BPH (benign prostatic hyperplasia)      Diabetes (H)      Diabetic neuropathy (H)      Diabetic retinopathy associated with diabetes mellitus due to underlying condition (H)      Dry eye syndrome      GERD (gastroesophageal reflux disease)      Goiter      Granulomatous disease (H)      HLD (hyperlipidemia)      HTN (hypertension)      Nonsenile cataract      Peripheral neuropathy        Past Surgical History:   Procedure Laterality Date     ------------OTHER-------------      back of neck abscess drainage in OR     AS RAD RESEC TONSIL/PILLARS Bilateral 1961     CATARACT IOL, RT/LT Left      COLONOSCOPY  7/29/2013    Procedure: COLONOSCOPY;;  Surgeon: Montana Pascal MD;  Location:  GI     EXCISE MASS UPPER EXTREMITY Right 11/11/2019    Procedure: EXCISION, MASS, UPPER EXTREMITY, RIGHT SHOULDER;  Surgeon: Johana Choudhury MD;  Location: [facility] OR     INTRAVITREAL INJECTION CHEMOTHERAPY Right 12/30/2019    Procedure: INTRAVITREAL Bevacizumab injection;  Surgeon: Milton Maki MD;  Location: [facility] OR     PHACOEMULSIFICATION CLEAR CORNEA WITH STANDARD INTRAOCULAR LENS IMPLANT Right 12/30/2019    Procedure: PHACOEMULSIFICATION, CATARACT, WITH INTRAOCULAR LENS  IMPLANT;  Surgeon: Milton Maki MD;  Location:  OR     PHACOEMULSIFICATION WITH STANDARD INTRAOCULAR LENS IMPLANT Left 6/21/2019    Procedure: Left Eye Cataract Removal with Intraocular Lens Implant with Intraoperative Avastin Injection;  Surgeon: Lacey Eugene MD;  Location:  OR     [facility]  11/2017       Physical Exam  There were no vitals taken for this visit.  There is no height or weight on file to calculate BMI.    GENERAL : In no apparent distress. Wife present and supportive.   SKIN: Normal color, normal temperature, texture.  No  suspicious lesions or rashes  EYES: PERRLA.  No proptosis.  MOUTH: Moist, pink; pharynx clear  NECK: No visible masses. No palpable adenopathy, or masses. No goiter.  RESP: normal respiratory effort.  cough   ABDOMEN: soft, nontender to palpation   NEURO: awake, alert, responds appropriately to questions.  Moves all extremities; Gait normal.       RESULTS    Lab Results   Component Value Date    A1C 9.2 06/18/2019    A1C 9.1 03/06/2018    A1C 10.2 06/06/2017    A1C 9.0 03/16/2016    A1C 10.2 09/09/2014       Creatinine   Date Value Ref Range Status   03/12/2021 1.68 (H) 0.66 - 1.25 mg/dL Final     GFR Estimate   Date Value Ref Range Status   03/12/2021 40 (L) >60 mL/min/[1.73_m2] Final     Comment:     Non  GFR Calc  Starting 12/18/2018, serum creatinine based estimated GFR (eGFR) will be   calculated using the Chronic Kidney Disease Epidemiology Collaboration   (CKD-EPI) equation.       Hemoglobin A1C   Date Value Ref Range Status   06/18/2019 9.2 (H) 0 - 5.6 % Final     Comment:     Normal <5.7% Prediabetes 5.7-6.4%  Diabetes 6.5% or higher - adopted from ADA   consensus guidelines.       Potassium   Date Value Ref Range Status   03/12/2021 5.2 3.4 - 5.3 mmol/L Final     ALT   Date Value Ref Range Status   03/12/2021 36 0 - 70 U/L Final     AST   Date Value Ref Range Status   03/12/2021 24 0 - 45 U/L Final     TSH   Date Value Ref Range Status    01/09/2020 1.24 0.40 - 4.00 mU/L Final     T4 Free   Date Value Ref Range Status   10/21/2014 1.00 0.76 - 1.46 ng/dL Final     Comment:     Effective 7/30/2014, the reference range for this assay has changed to reflect   new instrumentation/methodology.         TSH   Date Value Ref Range Status   01/09/2020 1.24 0.40 - 4.00 mU/L Final   11/01/2017 1.12 0.40 - 4.00 mU/L Final   10/21/2014 1.13 0.40 - 4.00 mU/L Final     Comment:     Effective 7/30/2014, the reference range for this assay has changed to reflect   new instrumentation/methodology.     05/22/2012 0.78 0.4 - 5.0 mU/L Final   02/02/2012 1.24 0.4 - 5.0 mU/L Final     T4 Free   Date Value Ref Range Status   10/21/2014 1.00 0.76 - 1.46 ng/dL Final     Comment:     Effective 7/30/2014, the reference range for this assay has changed to reflect   new instrumentation/methodology.     05/22/2012 1.01 0.70 - 1.85 ng/dL Final   08/10/2010 1.02 0.70 - 1.85 ng/dL Final   05/27/2010 0.98 0.70 - 1.85 ng/dL Final       Creatinine   Date Value Ref Range Status   03/12/2021 1.68 (H) 0.66 - 1.25 mg/dL Final       Recent Labs   Lab Test 06/18/19  1054 03/06/18  1216 09/09/14  0919 09/09/14  0919 11/20/13  1101   CHOL 145 101   < > 123 183   HDL 38* 32*   < > 32* 31*   LDL 49 36   < > 30 Cannot estimate LDL when triglyceride exceeds 400 mg/dL  87   TRIG 289* 166*   < > 307* 498*   CHOLHDLRATIO  --   --   --  3.8 5.8*    < > = values in this interval not displayed.       No results found for: [id number], [id number], [id number]      ASSESSMENT/PLAN:    1. Diabetes type II, uncontrolled, complicated by retinopathy, neuropathy and CKD.   -most recent A1c 9.0 on 1/2021   -Tresiba reduced to 60 units daily (per pt preference, but its also indicated with hx of lows when not eating a meal; likely this is partially covering prandial needs   -initiate Ozempic 0.25 mg weekly (was going to start on Friday). Goal following titration to full strength, if tolerating, would be to  eliminate need for Novolog with meals. Monitor renal function during titration.   -Metformin 1000mg once daily    -Jardiance 10 mg once daily   -Novolog with meals: continue 15-18 units with meals now, then reduce to 12-15 units with meals following initiation of lower dose Ozempic.    -instructed to eat a high protein, low carb snack at lunchtime, if skipping a meal, to prevent hypoglycemia.    -CDE referral; pt is interested in hearing more about Omnipod pump; he would need to learn how to carb count; his wife is confident he would not be willing to do this.         Follow up:  1. With any available provider in 3-4 weeks.   2. Diabetes Educator follow up: within 1 month.     The patient is not enrolled in MyChart services    This patient has met [place] community measures for diabetes control:no (D5: Control blood pressure ,Lower bad cholesterol Maintain blood sugar, Be tobacco-free, Take aspirin as recommended)    This patient is eligible for graduation from [facility] Endocrinology clinic: no      45 minutes spent on the date of the encounter doing chart review, history and exam, documentation and further activities per the note          Amy Rogel PA-C  Diabetes Management Service

## 2021-05-27 ENCOUNTER — TELEPHONE (OUTPATIENT)
Dept: ENDOCRINOLOGY | Facility: CLINIC | Age: 71
End: 2021-05-27

## 2021-05-27 NOTE — TELEPHONE ENCOUNTER
[facility] Call Center    Phone Message    May a detailed message be left on voicemail: yes     Reason for Call: Other: Pt called back from  left and is confused about why the call came in.   Pt stated he does not think he needs a pump based off of conversation with Amy Rogel.  He declined Diabetes Educator appt.   Please review and call pt back to clarify.      Action Taken: Message routed to:  [facility] ([facility] endo    Travel Screening: Not Applicable

## 2021-05-27 NOTE — TELEPHONE ENCOUNTER
Earle is using Ozempic now and doesn't feel a pump tubeless or otherwise is needed. Delphine Cooper RN on 5/27/2021 at 3:09 PM

## 2021-05-27 NOTE — PROGRESS NOTES
"S: Pt. seen in the [place] office for F/D of DM shoes and DM FO. Male. 5\"8\", 190 lbs. [place] GERARD. RX: DM shoes and DM FO 3 pr., DX: DM with polyneuropathy type 2.   O: Condition unchanged since initial evaluation. Pt. presently wearing DM shoes and DM FO. Pt. has had DM for many years. No amputations or ulcerations at this time.  A: Pain in AM hours 4/10. Neuropathy from ankles distal. No edema or ulcerations to date. Neutral arches, non flexible feet. Bilateral ankle valgus and HDS. Today I F/D Custom DM FO. FO provide total contact with plantar surface. Reduced sheer forces while ambulating. Lift and support medial long. arches. Toes where relieved to accommodate HDS. Pt. choose [address] 6210, size 9M. Shoes accommodate FO. Shoes where checked for proper length, width and height while weight bearing. Pt. happy with support and comfort.  P: Pt. to be seen as needed.    Mitchell Mendez CO,LO  "

## 2021-05-30 DIAGNOSIS — E11.9 DIABETES MELLITUS (H): ICD-10-CM

## 2021-05-30 DIAGNOSIS — E11.3213 TYPE 2 DIABETES MELLITUS WITH BOTH EYES AFFECTED BY MILD NONPROLIFERATIVE RETINOPATHY AND MACULAR EDEMA, WITH LONG-TERM CURRENT USE OF INSULIN (H): ICD-10-CM

## 2021-05-30 DIAGNOSIS — Z79.4 TYPE 2 DIABETES MELLITUS WITH BOTH EYES AFFECTED BY MILD NONPROLIFERATIVE RETINOPATHY AND MACULAR EDEMA, WITH LONG-TERM CURRENT USE OF INSULIN (H): ICD-10-CM

## 2021-06-02 PROBLEM — G89.29 CHRONIC RIGHT SHOULDER PAIN: Status: RESOLVED | Noted: 2021-03-23 | Resolved: 2021-06-02

## 2021-06-02 PROBLEM — M54.2 NECK PAIN: Status: RESOLVED | Noted: 2021-03-23 | Resolved: 2021-06-02

## 2021-06-02 PROBLEM — M25.511 CHRONIC RIGHT SHOULDER PAIN: Status: RESOLVED | Noted: 2021-03-23 | Resolved: 2021-06-02

## 2021-06-02 NOTE — PROGRESS NOTES
Discharge Note    Progress reporting period is from initial evaluation date (please see noted date below) to Mar 31, 2021.  Linked Episodes   Type: Episode: Status: Noted: Resolved: Last update: Updated by:   PHYSICAL THERAPY right shoulder and neck pain Active 3/23/2021  3/31/2021 10:06 AM Salvador Farias, FARHAT      Comments:       Earle failed to follow up and current status is unknown.  Please see information below for last relevant information on current status.  Patient seen for 2 visits.    SUBJECTIVE  Subjective changes noted by patient:  Patient reports he is performing exercises regularly at home. However, after quick review of exercises - he is not performing correctly. Still pain when lying on side when sleeping. Patient advised to avoid sleeping prone until pain is reduced.  .  Current pain level is 3/10.     Previous pain level was  10/10.   Changes in function:  Yes (See Goal flowsheet attached for changes in current functional level)  Adverse reaction to treatment or activity: None    OBJECTIVE  Changes noted in objective findings: right shoulder AROM FL and ABD = 90 and pain; hypomobile thoracic spine     ASSESSMENT/PLAN  Diagnosis: right shoulder and neck pain   Updated problem list and treatment plan:     STG/LTGs have been met or progress has been made towards goals:  Yes, please see goal flowsheet for most current information  Assessment of Progress: current status is unknown.    Last current status: Pt is progressing as expected   Self Management Plans:  HEP  I have re-evaluated this patient and find that the nature, scope, duration and intensity of the therapy is appropriate for the medical condition of the patient.  Earle continues to require the following intervention to meet STG and LTG's:  HEP.    Recommendations:  Discharge with current home program.  Patient to follow up with MD as needed.    Please refer to the daily flowsheet for treatment today, total treatment time and time spent  performing 1:1 timed codes.

## 2021-06-03 NOTE — TELEPHONE ENCOUNTER
METFORMIN HCL 1,000 MG TABLET      Last Written Prescription Date:  2/4/21  Last Fill Quantity: 90,   # refills: 0  Last Office Visit : 5/24/21  Future Office visit:  7/13/21    Routing refill request to provider for review/approval because:  Abnormal labs    Lab Test 03/12/21  1324   GFRESTIMATED 40*   GFRESTBLACK 46*     Lab Test 03/12/21  1324   CR 1.68

## 2021-06-03 NOTE — TELEPHONE ENCOUNTER
Last Clinic Visit: 5/24/2021  [facility] [place] ( [facility] [address])  NCV: 7-13-21  abnormal Cr- noted in last clinic note

## 2021-06-09 ENCOUNTER — OFFICE VISIT (OUTPATIENT)
Dept: NEPHROLOGY | Facility: CLINIC | Age: 71
End: 2021-06-09
Attending: INTERNAL MEDICINE
Payer: COMMERCIAL

## 2021-06-09 VITALS
OXYGEN SATURATION: 98 % | DIASTOLIC BLOOD PRESSURE: 83 MMHG | WEIGHT: 207.3 LBS | BODY MASS INDEX: 31.52 KG/M2 | HEART RATE: 75 BPM | SYSTOLIC BLOOD PRESSURE: 138 MMHG

## 2021-06-09 DIAGNOSIS — E11.65 TYPE 2 DIABETES MELLITUS WITH HYPERGLYCEMIA, WITH LONG-TERM CURRENT USE OF INSULIN (H): ICD-10-CM

## 2021-06-09 DIAGNOSIS — N18.30 CKD (CHRONIC KIDNEY DISEASE) STAGE 3, GFR 30-59 ML/MIN (H): ICD-10-CM

## 2021-06-09 DIAGNOSIS — E11.3399 TYPE 2 DIABETES MELLITUS WITH MODERATE NONPROLIFERATIVE RETINOPATHY WITHOUT MACULAR EDEMA, WITH LONG-TERM CURRENT USE OF INSULIN, UNSPECIFIED LATERALITY (H): ICD-10-CM

## 2021-06-09 DIAGNOSIS — Z79.4 TYPE 2 DIABETES MELLITUS WITH MODERATE NONPROLIFERATIVE RETINOPATHY WITHOUT MACULAR EDEMA, WITH LONG-TERM CURRENT USE OF INSULIN, UNSPECIFIED LATERALITY (H): ICD-10-CM

## 2021-06-09 DIAGNOSIS — D86.0 SARCOIDOSIS OF LUNG (H): ICD-10-CM

## 2021-06-09 DIAGNOSIS — Z79.4 TYPE 2 DIABETES MELLITUS WITH HYPERGLYCEMIA, WITH LONG-TERM CURRENT USE OF INSULIN (H): ICD-10-CM

## 2021-06-09 DIAGNOSIS — I73.9 PERIPHERAL VASCULAR DISEASE, UNSPECIFIED (H): ICD-10-CM

## 2021-06-09 LAB
ALBUMIN SERPL-MCNC: 3.6 G/DL (ref 3.4–5)
ANION GAP SERPL CALCULATED.3IONS-SCNC: 8 MMOL/L (ref 3–14)
BUN SERPL-MCNC: 20 MG/DL (ref 7–30)
CALCIUM SERPL-MCNC: 8.8 MG/DL (ref 8.5–10.1)
CHLORIDE SERPL-SCNC: 111 MMOL/L (ref 94–109)
CO2 SERPL-SCNC: 22 MMOL/L (ref 20–32)
CREAT SERPL-MCNC: 1.45 MG/DL (ref 0.66–1.25)
CREAT UR-MCNC: 90 MG/DL
GFR SERPL CREATININE-BSD FRML MDRD: 48 ML/MIN/{1.73_M2}
GLUCOSE SERPL-MCNC: 211 MG/DL (ref 70–99)
HBA1C MFR BLD: 8.2 % (ref 0–5.6)
HGB BLD-MCNC: 13.6 G/DL (ref 13.3–17.7)
PHOSPHATE SERPL-MCNC: 2.9 MG/DL (ref 2.5–4.5)
POTASSIUM SERPL-SCNC: 4.2 MMOL/L (ref 3.4–5.3)
PROT UR-MCNC: 2.14 G/L
PROT/CREAT 24H UR: 2.37 G/G CR (ref 0–0.2)
SODIUM SERPL-SCNC: 142 MMOL/L (ref 133–144)
TSH SERPL DL<=0.005 MIU/L-ACNC: 0.98 MU/L (ref 0.4–4)

## 2021-06-09 PROCEDURE — 84443 ASSAY THYROID STIM HORMONE: CPT | Performed by: PATHOLOGY

## 2021-06-09 PROCEDURE — 36415 COLL VENOUS BLD VENIPUNCTURE: CPT | Performed by: PATHOLOGY

## 2021-06-09 PROCEDURE — 99213 OFFICE O/P EST LOW 20 MIN: CPT | Performed by: INTERNAL MEDICINE

## 2021-06-09 PROCEDURE — 83036 HEMOGLOBIN GLYCOSYLATED A1C: CPT | Performed by: PATHOLOGY

## 2021-06-09 PROCEDURE — 80069 RENAL FUNCTION PANEL: CPT | Performed by: PATHOLOGY

## 2021-06-09 PROCEDURE — 84156 ASSAY OF PROTEIN URINE: CPT | Performed by: PATHOLOGY

## 2021-06-09 PROCEDURE — 85018 HEMOGLOBIN: CPT | Performed by: PATHOLOGY

## 2021-06-09 ASSESSMENT — PAIN SCALES - GENERAL: PAINLEVEL: NO PAIN (0)

## 2021-06-09 NOTE — PROGRESS NOTES
"  Nephrology Clinic    Kiah Ramsey MD  2021     Name: Earle Rene  MRN: [id number]  Age: 70 year old  : 1950  Referring provider: Marcio Hare     Assessment and Plan:    1. CKD 3A- with 2 grams proteinuria  - creatinine a little higher last two checks, 1.45 and 1.68 mg/dL but does fluctuate  - current eGFR 48  - likely diabetic nephropathy given 20 year history of diabetes and diabetic retinopathy.   - on SGLT2 inhibitor (empagliflozin) and losartan for renal protection     2. Subnephrotic range proteinuria-likely secondary to diabetic nephropathy. Currently on losartan 100 mg nightly  - management with low sodium diet, ARB and BP control (added amlodipine to losartan in 2020)     3. Electrolytes: stable - potassium 4.2 2021     4. Volume status: euvolemic by his report     5. Acid/Base status: continue current dose of sodium bicarbonate 650 mg three times daily.  Bicarb borderline at 22 (goal >22)     6. Hypertension: continue losartan and amlodipine - unclear control with SBP ranging from 120-140's.  Could increase amlodipine to 10 mg daily     7. BMD  continue cholecalciferol 2000U Daily and calcium supplements  - low threshold to discontinue given that PTH has been low side of goal in past     8. Type II DM- uncontrolled with A1C 8.2  - + retinopathy  - remains on empagliflozin 10 mg daily, metformin 1000 mg daily and novolog  - adding ozempic 0.25 mg weekly 2021  - working with Pamela Laura and Amy Rogel, PAC    9. Diabetic neuropathy- some \"fuzziness\" in feeling of toes     10. Diabetic retinopathy-follows with an ophthalmologist.      11. Obesity-BMI 30. not addressed by me 2021     Kiah Ramsey MD  [facility]  Department of Medicine  Division of Renal Disease and Hypertension  [facility]     Follow-up: Return in about 6 months (around 2021) for with me, in person.     Reason For Visit:   CKD follow up     HPI: "   Earle Rene is a 71 year old male with a history of DM2 for 20+ years, complicated by diabetic neuropathy, severe non proliferative retinopathy (follows with ophthalmologist Dr. Eugene) and nephropathy.    He is on empagliflozin for diabetes.    Last visit was via telephone 10/2020    Has been having dizziness, ~3 times per week.  Sometimes cannot stand on his feet.  Has been looking into neurologic causes and will have MRI.  No leg edema, no chest pain, maybe short of breath.  No nausea or vomiting  Insulin needs are less since starting ozempic and blood sugars 120-130 but as low as 105 and as high as 150 recently.    Home -140/70-80      Review of Systems:   Pertinent items are noted in HPI or as below, remainder of complete ROS is negative.      Active Medications:   Current Outpatient Medications   Medication Sig Dispense Refill     albuterol (VENTOLIN HFA) 108 (90 Base) MCG/ACT inhaler Inhale 2 puffs into the lungs every 6 hours 18 g 3     alpha-lipoic acid 600 MG capsule Take 1 capsule (600 mg) by mouth daily 90 capsule 3     amLODIPine (NORVASC) 5 MG tablet Take 1 tablet (5 mg) by mouth At Bedtime 90 tablet 3     ARTIFICIAL TEAR OP Apply to eye as needed       aspirin 81 MG tablet Take 1 tablet by mouth At Bedtime        atorvastatin (LIPITOR) 20 MG tablet Take 1 tablet (20 mg) by mouth daily 90 tablet 0     blood glucose (NO BRAND SPECIFIED) lancets standard Lancets that go with device, Test 3 times daily 300 each 3     blood glucose monitoring (NO BRAND SPECIFIED) meter device kit Any meter covered by insurance, not store brand, use as directed. 1 kit 0     blood glucose monitoring (NO BRAND SPECIFIED) test strip Strips that go with meter, covered by insurance. Test 3 times daily 300 strip 3     Blood Pressure Monitor KIT Automatic Blood Pressure Monitor 1 kit 0     camphor-menthol (DERMASARRA) 0.5-0.5 % external lotion Apply to arms legs torso 1-2 times a day for itching 1 Bottle 11      cholecalciferol (VITAMIN D3) 1000 units (25 mcg) capsule Take 2 capsules (2,000 Units) by mouth daily 180 capsule 3     Cholecalciferol (VITAMIN D3) 50 MCG (2000 UT) TABS Take 1 tablet by mouth daily       clobetasol (TEMOVATE) 0.05 % ointment Apply topically to legs twice daily for 2 weeks.  Then discontinue 30 g 0     clotrimazole (LOTRIMIN) 1 % cream Apply topically 2 times daily 30 g 3     Continuous Blood Gluc  (FREESTYLE PETER READER) JUANCARLOS 1 Device daily Use to read blood sugars   as  instruction 4 times daily 1 Device 0     Continuous Blood Gluc Sensor (FREESTYLE PETER 14 DAY SENSOR) MISC 1 applicator every 14 days Change every 14 days 8 each 3     dextromethorphan-guaiFENesin (MUCINEX DM)  MG 12 hr tablet Take 1 tablet by mouth every 12 hours 30 tablet 0     dextromethorphan-guaiFENesin (TUSSIN DM)  MG/5ML liquid Take 5 mLs by mouth 2 times daily as needed 118 mL 0     doxycycline hyclate (VIBRA-TABS) 100 MG tablet Take 1 tablet (100 mg) by mouth 2 times daily 14 tablet 0     empagliflozin (JARDIANCE) 10 MG TABS tablet Take 1 tablet (10 mg) by mouth daily Please keep 7-13-21 clinic appt for refills. 30 tablet 1     famotidine (PEPCID) 20 MG tablet Take 1 tablet (20 mg) by mouth 2 times daily 180 tablet 2     fenofibrate 54 MG tablet Take 1 tablet (54 mg) by mouth daily (Patient taking differently: Take 54 mg by mouth At Bedtime ) 90 tablet 0     finasteride (PROSCAR) 5 MG tablet Take 1 tablet (5 mg) by mouth daily 90 tablet 0     fluocinonide (LIDEX) 0.05 % external cream APPLY TO AFFECTED AREA TWICE A DAY  3     fluticasone (FLONASE) 50 MCG/ACT nasal spray Spray 1 spray into both nostrils daily 16 g 0     fluticasone (FLOVENT HFA) 110 MCG/ACT inhaler Inhale 1 puff into the lungs 2 times daily 12 g 1     gabapentin (NEURONTIN) 300 MG capsule Take 1 capsule (300 mg) by mouth 2 times daily 60 capsule 1     gabapentin (NEURONTIN) 300 MG capsule TAKE 1 CAPSULE BY MOUTH TWICE A DAY  "120 capsule 0     HUMALOG KWIKPEN 100 UNIT/ML soln Inject 15-17  units with meals, plus correction. Pt uses approx 65 units in 24 hrs. 60 mL 1     insulin degludec (TRESIBA FLEXTOUCH) 100 UNIT/ML pen Inject 64 units subcu at bedtime. Lab draw needed. Call  to schedule. Please have draw prior to 05/21/2021. 60 mL 0     insulin pen needle (B-D U/F) 31G X 5 MM miscellaneous Use 4 time(s) per day.  Please dispense as BD Pen Needle Mini U/F 31G x 5  each 3     insulin pen needle (B-D U/F) 31G X 5 MM Use 4 times per day.  Please dispense as BD Pen Needle Mini U/F 31G x 5  each 3     insulin syringe 31G X 5/16\" 0.5 ML MISC Use three syringes daily 270 each 1     ketamine 5% gabapentin 8% lidocaine 2.5% topical PLO cream Apply 1 g topically 3 times daily 30 g 3     loratadine (CLARITIN) 10 MG tablet Take 1 tablet (10 mg) by mouth daily (Patient taking differently: Take 10 mg by mouth At Bedtime ) 90 tablet 0     losartan (COZAAR) 100 MG tablet TAKE 1 TABLET BY MOUTH AT BEDTIME 90 tablet 3     metFORMIN (GLUCOPHAGE) 1000 MG tablet TAKE 1 TABLET BY MOUTH EACH AM ONLY 90 tablet 0     mupirocin (BACTROBAN) 2 % cream Apply  topically. In very small amounts only as needed 15 g 1     Omega-3 Fatty Acids (OMEGA-3 FISH OIL) 1000 MG CAPS Take 1 capsule (1 g) by mouth 2 times daily (Patient taking differently: Take 1 g by mouth as needed (PT last dose a week ago 12.24.19) ) 60 capsule 11     ONETOUCH ULTRA test strip Use to test blood sugar 3 times daily 300 strip 3     Semaglutide,0.25 or 0.5MG/DOS, (OZEMPIC, 0.25 OR 0.5 MG/DOSE,) 2 MG/1.5ML SOPN Inject 0.25 mg Subcutaneous once a week 1.5 mL 3     sodium bicarbonate 650 MG tablet Take 1 tablet (650 mg) by mouth 3 times daily 90 tablet 11     tamsulosin (FLOMAX) 0.4 MG capsule Take 1 capsule (0.4 mg) by mouth daily 90 capsule 2     triamcinolone (KENALOG) 0.1 % cream Apply topically 3 times daily 80 g 0         Allergies:   Patient has no known allergies.    "   Past Medical History:  BPH  Diabetes   Diabetic neuropathy   Diabetic retinopathy   GERD   Granulomatous disease   Hyperlipidemia   Hypertension   Mood disorder   ED  Presbyopia   Myopia   Elevated PSA  Nephrolithiasis   Neutropenia   Sarcoidosis of lung   Sialoadenitis   Cataracts      Past Surgical History:  Past Surgical History:   Procedure Laterality Date     ------------OTHER-------------      back of neck abscess drainage in OR     AS RAD RESEC TONSIL/PILLARS Bilateral 1961     CATARACT IOL, RT/LT Left      COLONOSCOPY  7/29/2013    Procedure: COLONOSCOPY;;  Surgeon: Montana Pascal MD;  Location:  GI     EXCISE MASS UPPER EXTREMITY Right 11/11/2019    Procedure: EXCISION, MASS, UPPER EXTREMITY, RIGHT SHOULDER;  Surgeon: Johana Choudhury MD;  Location: [facility] OR     INTRAVITREAL INJECTION CHEMOTHERAPY Right 12/30/2019    Procedure: INTRAVITREAL Bevacizumab injection;  Surgeon: Milton Maki MD;  Location: [facility] OR     PHACOEMULSIFICATION CLEAR CORNEA WITH STANDARD INTRAOCULAR LENS IMPLANT Right 12/30/2019    Procedure: PHACOEMULSIFICATION, CATARACT, WITH INTRAOCULAR LENS IMPLANT;  Surgeon: Milton Maki MD;  Location: [facility] OR     PHACOEMULSIFICATION WITH STANDARD INTRAOCULAR LENS IMPLANT Left 6/21/2019    Procedure: Left Eye Cataract Removal with Intraocular Lens Implant with Intraoperative Avastin Injection;  Surgeon: Lacey Eugene MD;  Location: [facility] OR     siladenatis  11/2017       Family History:   Diabetes- father and brother   MI- father   Leukemia- brother   Negative for kidney disease       Social History:   Never a smoker. Drinks alcohol occasionally.      Physical Exam:  /83   Pulse 75   Wt 94 kg (207 lb 4.8 oz)   SpO2 98%   BMI 31.52 kg/m     GENERAL APPEARANCE: alert and no distress  Pulmonary: normal work of breathing  CV - WWP, no edema  NEURO: mentation intact and speech normal  Psych: affect full, interactive    Laboratory:  CMP  Recent Labs   Lab Test  06/09/21  1524 03/12/21  1324 12/14/20  0831 06/01/20  1109 01/29/20  1315 07/02/19  0947 07/02/19  0947 06/18/19  1054 06/18/19  1054 05/24/18  1414 05/24/18  1414 05/01/18  1233 03/06/18  1216 11/01/17  1044 06/06/17  1116 06/06/17  1116    137 138 142 135  --   --    < > 136   < >  --  141 136  --    < > 141   POTASSIUM 4.2 5.2 4.2 4.1 4.5   < >  --    < > 4.3   < >  --  4.7 4.0 4.4   < > 3.9   CHLORIDE 111* 107 110* 110* 104  --   --    < > 106   < >  --  110* 105  --    < > 108   CO2 22 23 20 22 23  --   --    < > 22   < >  --  21 23  --    < > 24   ANIONGAP 8 7 8 10 8  --   --    < > 8   < >  --  10 9  --    < > 10   * 347* 212* 218* 281*  --   --    < > 195*   < >  --  149* 197*  --    < > 96   BUN 20 33* 30 25 34*  --   --    < > 27   < >  --  20 21  --    < > 21   CR 1.45* 1.68* 1.43* 1.39* 1.61*   < >  --    < > 1.41*   < >  --  1.28* 1.33* 1.20   < > 1.26*   GFRESTIMATED 48* 40* 49* 51* 42*   < >  --    < > 50*   < >  --  56* 53* 60*   < > 57*   GFRESTBLACK 55* 46* 56* 59* 49*   < >  --    < > 58*   < >  --  67 65 73   < > 69   INDERJIT 8.8 9.0 9.4 9.8 9.2  --   --    < > 9.3   < >  --  9.8 8.8  --    < > 9.2   MAG  --   --   --   --   --   --  1.9  --   --   --  2.1 2.1  --   --   --   --    PHOS 2.9  --  4.4 3.7 4.0  --   --   --   --    < >  --  3.3  --   --   --   --    PROTTOTAL  --  6.9  --   --   --   --   --   --  7.0  --   --   --  7.3  --   --  6.6*   ALBUMIN 3.6 3.7 4.0 3.7 3.7  --   --   --  3.8   < >  --  4.0 3.8  --   --  3.5   BILITOTAL  --  0.4  --   --   --   --   --   --  0.6  --   --   --  0.8  --   --  0.7   ALKPHOS  --  108  --   --   --   --   --   --  63  --   --   --  77  --   --  70   AST  --  24  --   --   --   --   --   --  24  --   --   --  25 23  --  20   ALT  --  36  --   --   --   --   --   --  38  --   --   --  35 44  --  49    < > = values in this interval not displayed.     CBC  Recent Labs   Lab Test 06/09/21  1524 03/12/21  1324 12/14/20  0831 06/01/20  1108  06/18/19  1054 06/18/19  1054 03/13/19  1516 02/06/19  1322   HGB 13.6 14.1 14.9 14.4   < > 13.5 13.7 13.8   WBC  --  5.1  --   --   --  4.6 5.2 6.1   RBC  --  4.31*  --   --   --  4.18* 4.38* 4.41   HCT  --  41.7  --   --   --  38.7* 40.5 41.4   MCV  --  97  --   --   --  93 93 94   MCH  --  32.7  --   --   --  32.3 31.3 31.3   MCHC  --  33.8  --   --   --  34.9 33.8 33.3   RDW  --  12.3  --   --   --  12.6 12.5 11.9   PLT  --  156  --   --   --  130* 131* 225    < > = values in this interval not displayed.     INR  Recent Labs   Lab Test 06/12/13  0949   INR 0.93   PTT 29     URINE STUDIES  Recent Labs   Lab Test 01/09/20  0849 12/18/19  1220 05/01/18  1238 06/06/17  1120 01/14/16  0858 01/14/16  0858   COLOR Straw Yellow Yellow Yellow   < > Yellow   APPEARANCE Clear Slightly Cloudy Clear Slightly Cloudy   < > Clear   URINEGLC >499* >499* 50* >499*   < > 100*   URINEBILI Negative Negative Negative Negative   < > Negative   URINEKETONE Negative Negative Negative Negative   < > Negative   SG 1.015 1.021 1.016 1.017   < > >1.030   UBLD Negative Large* Negative Negative   < > Negative   URINEPH 5.0 5.0 5.0 5.0   < > 5.5   PROTEIN 100* 100* >499* >499*   < > 100*   UROBILINOGEN  --   --   --   --   --  0.2   NITRITE Negative Negative Negative Negative   < > Negative   LEUKEST Negative Small* Negative Negative   < > Negative   RBCU 1 129* 1 2   < > O - 2   WBCU <1 125* <1 2   < > O - 2    < > = values in this interval not displayed.     Recent Labs   Lab Test 12/14/20  0848 06/01/20  1110 01/29/20  1330 03/13/19  1522 02/06/19  1326 08/01/18  1225 05/01/18  1238   UTPG 1.51* 1.39* 1.36* 3.04* 1.49* 1.37* 1.76*     PTH  Recent Labs   Lab Test 06/01/20  1109 03/13/19  1516 05/01/18  1233   PTHI 22 42 49     IRON STUDIES   Recent Labs   Lab Test 07/02/19  0947 05/01/18  1233   IRON  --  83   FEB  --  344   IRONSAT  --  24   DEANA 178 160

## 2021-06-09 NOTE — PATIENT INSTRUCTIONS
No change in medications  Return in about 6 months (around 12/9/2021) for with me, in person.     Kiah Ramsey MD  [facility]  Department of Medicine  Division of Renal Disease and Hypertension  [facility]

## 2021-06-09 NOTE — NURSING NOTE
Chief Complaint   Patient presents with     RECHECK     Follow up     Blood pressure 138/83, pulse 75, weight 94 kg (207 lb 4.8 oz), SpO2 98 %.    Radha Finney, CMA

## 2021-06-09 NOTE — LETTER
"2021       RE: Earle Rene  [address]  [place] [address]     Dear Colleague,    Thank you for referring your patient, Earle Rene, to the [facility] NEPHROLOGY CLINIC [place] at [facility]. Please see a copy of my visit note below.      Nephrology Clinic    Kiah Ramsey MD  2021     Name: Earle Rene  MRN: [id number]  Age: 70 year old  : 1950  Referring provider: Marcio Hare     Assessment and Plan:    1. CKD 3A- with 2 grams proteinuria  - creatinine a little higher last two checks, 1.45 and 1.68 mg/dL but does fluctuate  - current eGFR 48  - likely diabetic nephropathy given 20 year history of diabetes and diabetic retinopathy.   - on SGLT2 inhibitor (empagliflozin) and losartan for renal protection     2. Subnephrotic range proteinuria-likely secondary to diabetic nephropathy. Currently on losartan 100 mg nightly  - management with low sodium diet, ARB and BP control (added amlodipine to losartan in 2020)     3. Electrolytes: stable - potassium 4.2 2021     4. Volume status: euvolemic by his report     5. Acid/Base status: continue current dose of sodium bicarbonate 650 mg three times daily.  Bicarb borderline at 22 (goal >22)     6. Hypertension: continue losartan and amlodipine - unclear control with SBP ranging from 120-140's.  Could increase amlodipine to 10 mg daily     7. BMD  continue cholecalciferol 2000U Daily and calcium supplements  - low threshold to discontinue given that PTH has been low side of goal in past     8. Type II DM- uncontrolled with A1C 8.2  - + retinopathy  - remains on empagliflozin 10 mg daily, metformin 1000 mg daily and novolog  - adding ozempic 0.25 mg weekly 2021  - working with Pamela Laura and Amy Rogel, PAC    9. Diabetic neuropathy- some \"fuzziness\" in feeling of toes     10. Diabetic retinopathy-follows with an ophthalmologist.      11. " Obesity-BMI 30. not addressed by me 6/9/2021     Kiah Ramsey MD  [facility]  Department of Medicine  Division of Renal Disease and Hypertension  [facility]  sandi     Follow-up: Return in about 6 months (around 12/9/2021) for with me, in person.     Reason For Visit:   CKD follow up     HPI:   Earle Rene is a 71 year old male with a history of DM2 for 20+ years, complicated by diabetic neuropathy, severe non proliferative retinopathy (follows with ophthalmologist Dr. Eugene) and nephropathy.    He is on empagliflozin for diabetes.    Last visit was via telephone 10/2020    Has been having dizziness, ~3 times per week.  Sometimes cannot stand on his feet.  Has been looking into neurologic causes and will have MRI.  No leg edema, no chest pain, maybe short of breath.  No nausea or vomiting  Insulin needs are less since starting ozempic and blood sugars 120-130 but as low as 105 and as high as 150 recently.    Home -140/70-80      Review of Systems:   Pertinent items are noted in HPI or as below, remainder of complete ROS is negative.      Active Medications:   Current Outpatient Medications   Medication Sig Dispense Refill     albuterol (VENTOLIN HFA) 108 (90 Base) MCG/ACT inhaler Inhale 2 puffs into the lungs every 6 hours 18 g 3     alpha-lipoic acid 600 MG capsule Take 1 capsule (600 mg) by mouth daily 90 capsule 3     amLODIPine (NORVASC) 5 MG tablet Take 1 tablet (5 mg) by mouth At Bedtime 90 tablet 3     ARTIFICIAL TEAR OP Apply to eye as needed       aspirin 81 MG tablet Take 1 tablet by mouth At Bedtime        atorvastatin (LIPITOR) 20 MG tablet Take 1 tablet (20 mg) by mouth daily 90 tablet 0     blood glucose (NO BRAND SPECIFIED) lancets standard Lancets that go with device, Test 3 times daily 300 each 3     blood glucose monitoring (NO BRAND SPECIFIED) meter device kit Any meter covered by insurance, not store brand, use as directed. 1 kit 0     blood glucose  monitoring (NO BRAND SPECIFIED) test strip Strips that go with meter, covered by insurance. Test 3 times daily 300 strip 3     Blood Pressure Monitor KIT Automatic Blood Pressure Monitor 1 kit 0     camphor-menthol (DERMASARRA) 0.5-0.5 % external lotion Apply to arms legs torso 1-2 times a day for itching 1 Bottle 11     cholecalciferol (VITAMIN D3) 1000 units (25 mcg) capsule Take 2 capsules (2,000 Units) by mouth daily 180 capsule 3     Cholecalciferol (VITAMIN D3) 50 MCG (2000 UT) TABS Take 1 tablet by mouth daily       clobetasol (TEMOVATE) 0.05 % ointment Apply topically to legs twice daily for 2 weeks.  Then discontinue 30 g 0     clotrimazole (LOTRIMIN) 1 % cream Apply topically 2 times daily 30 g 3     Continuous Blood Gluc  (FREESTYLE PETER READER) JUANCARLOS 1 Device daily Use to read blood sugars   as  instruction 4 times daily 1 Device 0     Continuous Blood Gluc Sensor (GoTV NetworksSTYLE PETER 14 DAY SENSOR) MISC 1 applicator every 14 days Change every 14 days 8 each 3     dextromethorphan-guaiFENesin (MUCINEX DM)  MG 12 hr tablet Take 1 tablet by mouth every 12 hours 30 tablet 0     dextromethorphan-guaiFENesin (TUSSIN DM)  MG/5ML liquid Take 5 mLs by mouth 2 times daily as needed 118 mL 0     doxycycline hyclate (VIBRA-TABS) 100 MG tablet Take 1 tablet (100 mg) by mouth 2 times daily 14 tablet 0     empagliflozin (JARDIANCE) 10 MG TABS tablet Take 1 tablet (10 mg) by mouth daily Please keep 7-13-21 clinic appt for refills. 30 tablet 1     famotidine (PEPCID) 20 MG tablet Take 1 tablet (20 mg) by mouth 2 times daily 180 tablet 2     fenofibrate 54 MG tablet Take 1 tablet (54 mg) by mouth daily (Patient taking differently: Take 54 mg by mouth At Bedtime ) 90 tablet 0     finasteride (PROSCAR) 5 MG tablet Take 1 tablet (5 mg) by mouth daily 90 tablet 0     fluocinonide (LIDEX) 0.05 % external cream APPLY TO AFFECTED AREA TWICE A DAY  3     fluticasone (FLONASE) 50 MCG/ACT nasal spray  "Spray 1 spray into both nostrils daily 16 g 0     fluticasone (FLOVENT HFA) 110 MCG/ACT inhaler Inhale 1 puff into the lungs 2 times daily 12 g 1     gabapentin (NEURONTIN) 300 MG capsule Take 1 capsule (300 mg) by mouth 2 times daily 60 capsule 1     gabapentin (NEURONTIN) 300 MG capsule TAKE 1 CAPSULE BY MOUTH TWICE A  capsule 0     HUMALOG KWIKPEN 100 UNIT/ML soln Inject 15-17  units with meals, plus correction. Pt uses approx 65 units in 24 hrs. 60 mL 1     insulin degludec (TRESIBA FLEXTOUCH) 100 UNIT/ML pen Inject 64 units subcu at bedtime. Lab draw needed. Call  to schedule. Please have draw prior to 05/21/2021. 60 mL 0     insulin pen needle (B-D U/F) 31G X 5 MM miscellaneous Use 4 time(s) per day.  Please dispense as BD Pen Needle Mini U/F 31G x 5  each 3     insulin pen needle (B-D U/F) 31G X 5 MM Use 4 times per day.  Please dispense as BD Pen Needle Mini U/F 31G x 5  each 3     insulin syringe 31G X 5/16\" 0.5 ML MISC Use three syringes daily 270 each 1     ketamine 5% gabapentin 8% lidocaine 2.5% topical PLO cream Apply 1 g topically 3 times daily 30 g 3     loratadine (CLARITIN) 10 MG tablet Take 1 tablet (10 mg) by mouth daily (Patient taking differently: Take 10 mg by mouth At Bedtime ) 90 tablet 0     losartan (COZAAR) 100 MG tablet TAKE 1 TABLET BY MOUTH AT BEDTIME 90 tablet 3     metFORMIN (GLUCOPHAGE) 1000 MG tablet TAKE 1 TABLET BY MOUTH EACH AM ONLY 90 tablet 0     mupirocin (BACTROBAN) 2 % cream Apply  topically. In very small amounts only as needed 15 g 1     Omega-3 Fatty Acids (OMEGA-3 FISH OIL) 1000 MG CAPS Take 1 capsule (1 g) by mouth 2 times daily (Patient taking differently: Take 1 g by mouth as needed (PT last dose a week ago 12.24.19) ) 60 capsule 11     ONETOUCH ULTRA test strip Use to test blood sugar 3 times daily 300 strip 3     Semaglutide,0.25 or 0.5MG/DOS, (OZEMPIC, 0.25 OR 0.5 MG/DOSE,) 2 MG/1.5ML SOPN Inject 0.25 mg Subcutaneous once a week 1.5 " mL 3     sodium bicarbonate 650 MG tablet Take 1 tablet (650 mg) by mouth 3 times daily 90 tablet 11     tamsulosin (FLOMAX) 0.4 MG capsule Take 1 capsule (0.4 mg) by mouth daily 90 capsule 2     triamcinolone (KENALOG) 0.1 % cream Apply topically 3 times daily 80 g 0         Allergies:   Patient has no known allergies.      Past Medical History:  BPH  Diabetes   Diabetic neuropathy   Diabetic retinopathy   GERD   Granulomatous disease   Hyperlipidemia   Hypertension   Mood disorder   ED  Presbyopia   Myopia   Elevated PSA  Nephrolithiasis   Neutropenia   Sarcoidosis of lung   Sialoadenitis   Cataracts      Past Surgical History:  Past Surgical History:   Procedure Laterality Date     ------------OTHER-------------      back of neck abscess drainage in OR     AS RAD RESEC TONSIL/PILLARS Bilateral 1961     CATARACT IOL, RT/LT Left      COLONOSCOPY  7/29/2013    Procedure: COLONOSCOPY;;  Surgeon: Montana Pascal MD;  Location: [facility]     EXCISE MASS UPPER EXTREMITY Right 11/11/2019    Procedure: EXCISION, MASS, UPPER EXTREMITY, RIGHT SHOULDER;  Surgeon: Johana Choudhury MD;  Location[facility]     INTRAVITREAL INJECTION CHEMOTHERAPY Right 12/30/2019    Procedure: INTRAVITREAL Bevacizumab injection;  Surgeon: Milton Maki MD;  Location: [facility] OR     PHACOEMULSIFICATION CLEAR CORNEA WITH STANDARD INTRAOCULAR LENS IMPLANT Right 12/30/2019    Procedure: PHACOEMULSIFICATION, CATARACT, WITH INTRAOCULAR LENS IMPLANT;  Surgeon: Milton Maki MD;  Location: [facility] OR     PHACOEMULSIFICATION WITH STANDARD INTRAOCULAR LENS IMPLANT Left 6/21/2019    Procedure: Left Eye Cataract Removal with Intraocular Lens Implant with Intraoperative Avastin Injection;  Surgeon: Lacey Eugene MD;  Location: [facility] OR     siladenatis  11/2017       Family History:   Diabetes- father and brother   MI- father   Leukemia- brother   Negative for kidney disease       Social History:   Never a smoker. Drinks alcohol occasionally.       Physical Exam:  /83   Pulse 75   Wt 94 kg (207 lb 4.8 oz)   SpO2 98%   BMI 31.52 kg/m     GENERAL APPEARANCE: alert and no distress  Pulmonary: normal work of breathing  CV - WWP, no edema  NEURO: mentation intact and speech normal  Psych: affect full, interactive    Laboratory:  CMP  Recent Labs   Lab Test 06/09/21  1524 03/12/21  1324 12/14/20  0831 06/01/20  1109 01/29/20  1315 07/02/19  0947 07/02/19  0947 06/18/19  1054 06/18/19  1054 05/24/18  1414 05/24/18  1414 05/01/18  1233 03/06/18  1216 11/01/17  1044 06/06/17  1116 06/06/17  1116    137 138 142 135  --   --    < > 136   < >  --  141 136  --    < > 141   POTASSIUM 4.2 5.2 4.2 4.1 4.5   < >  --    < > 4.3   < >  --  4.7 4.0 4.4   < > 3.9   CHLORIDE 111* 107 110* 110* 104  --   --    < > 106   < >  --  110* 105  --    < > 108   CO2 22 23 20 22 23  --   --    < > 22   < >  --  21 23  --    < > 24   ANIONGAP 8 7 8 10 8  --   --    < > 8   < >  --  10 9  --    < > 10   * 347* 212* 218* 281*  --   --    < > 195*   < >  --  149* 197*  --    < > 96   BUN 20 33* 30 25 34*  --   --    < > 27   < >  --  20 21  --    < > 21   CR 1.45* 1.68* 1.43* 1.39* 1.61*   < >  --    < > 1.41*   < >  --  1.28* 1.33* 1.20   < > 1.26*   GFRESTIMATED 48* 40* 49* 51* 42*   < >  --    < > 50*   < >  --  56* 53* 60*   < > 57*   GFRESTBLACK 55* 46* 56* 59* 49*   < >  --    < > 58*   < >  --  67 65 73   < > 69   INDERJIT 8.8 9.0 9.4 9.8 9.2  --   --    < > 9.3   < >  --  9.8 8.8  --    < > 9.2   MAG  --   --   --   --   --   --  1.9  --   --   --  2.1 2.1  --   --   --   --    PHOS 2.9  --  4.4 3.7 4.0  --   --   --   --    < >  --  3.3  --   --   --   --    PROTTOTAL  --  6.9  --   --   --   --   --   --  7.0  --   --   --  7.3  --   --  6.6*   ALBUMIN 3.6 3.7 4.0 3.7 3.7  --   --   --  3.8   < >  --  4.0 3.8  --   --  3.5   BILITOTAL  --  0.4  --   --   --   --   --   --  0.6  --   --   --  0.8  --   --  0.7   ALKPHOS  --  108  --   --   --   --   --   --  63  --    --   --  77  --   --  70   AST  --  24  --   --   --   --   --   --  24  --   --   --  25 23  --  20   ALT  --  36  --   --   --   --   --   --  38  --   --   --  35 44  --  49    < > = values in this interval not displayed.     CBC  Recent Labs   Lab Test 06/09/21  1524 03/12/21  1324 12/14/20  0831 06/01/20  1109 06/18/19  1054 06/18/19  1054 03/13/19  1516 02/06/19  1322   HGB 13.6 14.1 14.9 14.4   < > 13.5 13.7 13.8   WBC  --  5.1  --   --   --  4.6 5.2 6.1   RBC  --  4.31*  --   --   --  4.18* 4.38* 4.41   HCT  --  41.7  --   --   --  38.7* 40.5 41.4   MCV  --  97  --   --   --  93 93 94   MCH  --  32.7  --   --   --  32.3 31.3 31.3   MCHC  --  33.8  --   --   --  34.9 33.8 33.3   RDW  --  12.3  --   --   --  12.6 12.5 11.9   PLT  --  156  --   --   --  130* 131* 225    < > = values in this interval not displayed.     INR  Recent Labs   Lab Test 06/12/13  0949   INR 0.93   PTT 29     URINE STUDIES  Recent Labs   Lab Test 01/09/20  0849 12/18/19  1220 05/01/18  1238 06/06/17  1120 01/14/16  0858 01/14/16  0858   COLOR Straw Yellow Yellow Yellow   < > Yellow   APPEARANCE Clear Slightly Cloudy Clear Slightly Cloudy   < > Clear   URINEGLC >499* >499* 50* >499*   < > 100*   URINEBILI Negative Negative Negative Negative   < > Negative   URINEKETONE Negative Negative Negative Negative   < > Negative   SG 1.015 1.021 1.016 1.017   < > >1.030   UBLD Negative Large* Negative Negative   < > Negative   URINEPH 5.0 5.0 5.0 5.0   < > 5.5   PROTEIN 100* 100* >499* >499*   < > 100*   UROBILINOGEN  --   --   --   --   --  0.2   NITRITE Negative Negative Negative Negative   < > Negative   LEUKEST Negative Small* Negative Negative   < > Negative   RBCU 1 129* 1 2   < > O - 2   WBCU <1 125* <1 2   < > O - 2    < > = values in this interval not displayed.     Recent Labs   Lab Test 12/14/20  0848 06/01/20  1110 01/29/20  1330 03/13/19  1522 02/06/19  1326 08/01/18  1225 05/01/18  1238   UTPG 1.51* 1.39* 1.36* 3.04* 1.49* 1.37*  1.76*     PTH  Recent Labs   Lab Test 06/01/20  1109 03/13/19  1516 05/01/18  1233   PTHI 22 42 49     IRON STUDIES   Recent Labs   Lab Test 07/02/19  0947 05/01/18  1233   IRON  --  83   FEB  --  344   IRONSAT  --  24   DEANA 178 160     Again, thank you for allowing me to participate in the care of your patient.      Sincerely,    Kiah Ramsey MD

## 2021-06-14 ENCOUNTER — ANCILLARY PROCEDURE (OUTPATIENT)
Dept: MRI IMAGING | Facility: CLINIC | Age: 71
End: 2021-06-14
Attending: OTOLARYNGOLOGY
Payer: COMMERCIAL

## 2021-06-14 DIAGNOSIS — R42 DIZZINESS: ICD-10-CM

## 2021-06-14 DIAGNOSIS — N40.1 BENIGN NON-NODULAR PROSTATIC HYPERPLASIA WITH LOWER URINARY TRACT SYMPTOMS: ICD-10-CM

## 2021-06-14 PROCEDURE — A9585 GADOBUTROL INJECTION: HCPCS | Performed by: RADIOLOGY

## 2021-06-14 PROCEDURE — 70553 MRI BRAIN STEM W/O & W/DYE: CPT | Performed by: RADIOLOGY

## 2021-06-14 RX ORDER — TAMSULOSIN HYDROCHLORIDE 0.4 MG/1
0.4 CAPSULE ORAL DAILY
Qty: 90 CAPSULE | Refills: 2 | Status: SHIPPED | OUTPATIENT
Start: 2021-06-14 | End: 2022-03-15

## 2021-06-14 RX ORDER — GADOBUTROL 604.72 MG/ML
10 INJECTION INTRAVENOUS ONCE
Status: COMPLETED | OUTPATIENT
Start: 2021-06-14 | End: 2021-06-14

## 2021-06-14 RX ADMIN — GADOBUTROL 10 ML: 604.72 INJECTION INTRAVENOUS at 09:32

## 2021-06-14 NOTE — DISCHARGE INSTRUCTIONS
MRI Contrast Discharge Instructions    The IV contrast you received today will pass out of your body in your  urine. This will happen in the next 24 hours. You will not feel this process.  Your urine will not change color.    Drink at least 4 extra glasses of water or juice today (unless your doctor  has restricted your fluids). This reduces the stress on your kidneys.  You may take your regular medicines.    If you are on dialysis: It is best to have dialysis today.    If you have a reaction: Most reactions happen right away. If you have  any new symptoms after leaving the hospital (such as hives or swelling),  call your hospital at the correct number below. Or call your family doctor.  If you have breathing distress or wheezing, call 911.    Special instructions: ***    I have read and understand the above information.    Signature:______________________________________ Date:___________    Staff:__________________________________________ Date:___________     Time:__________    [place] [facility] Departments:    ___[place]: [phone]  ___[facility]: [phone]  ___[place]: [phone] ___[facility]: [phone]  ___[facility]: [phone]  ___[facility]: [phone]  ___[phone]  ___[facility]: [phone]  ___[phone]

## 2021-06-21 ENCOUNTER — TELEPHONE (OUTPATIENT)
Dept: UROLOGY | Facility: CLINIC | Age: 71
End: 2021-06-21

## 2021-06-21 DIAGNOSIS — E55.9 VITAMIN D DEFICIENCY: ICD-10-CM

## 2021-06-21 DIAGNOSIS — N40.1 BENIGN PROSTATIC HYPERPLASIA WITH URINARY OBSTRUCTION: ICD-10-CM

## 2021-06-21 DIAGNOSIS — N18.30 CKD (CHRONIC KIDNEY DISEASE) STAGE 3, GFR 30-59 ML/MIN (H): ICD-10-CM

## 2021-06-21 DIAGNOSIS — N13.8 BENIGN PROSTATIC HYPERPLASIA WITH URINARY OBSTRUCTION: ICD-10-CM

## 2021-06-21 NOTE — TELEPHONE ENCOUNTER
[facility] Call Center    Phone Message    May a detailed message be left on voicemail: yes     Reason for Call: Medication Refill Request    Has the patient contacted the pharmacy for the refill? Yes   Name of medication being requested: Tamsulosin - Pt states that [facility] did not receive the medication  Provider who prescribed the medication: Dr. Glass  Pharmacy: [facility] [id number] [facility] - [place], [place] - [address]  Date medication is needed: ASAP         Action Taken: Message routed to:  [facility] ([facility]    Travel Screening: Not Applicable

## 2021-06-23 RX ORDER — FINASTERIDE 5 MG/1
5 TABLET, FILM COATED ORAL DAILY
Qty: 90 TABLET | Refills: 2 | Status: SHIPPED | OUTPATIENT
Start: 2021-06-23 | End: 2022-05-18

## 2021-06-24 ENCOUNTER — OFFICE VISIT (OUTPATIENT)
Dept: OPHTHALMOLOGY | Facility: CLINIC | Age: 71
End: 2021-06-24
Attending: OPHTHALMOLOGY
Payer: COMMERCIAL

## 2021-06-24 DIAGNOSIS — E11.3411 SEVERE NONPROLIFERATIVE DIABETIC RETINOPATHY OF RIGHT EYE WITH MACULAR EDEMA ASSOCIATED WITH TYPE 2 DIABETES MELLITUS (H): Primary | ICD-10-CM

## 2021-06-24 PROCEDURE — 92235 FLUORESCEIN ANGRPH MLTIFRAME: CPT | Performed by: OPHTHALMOLOGY

## 2021-06-24 PROCEDURE — G0463 HOSPITAL OUTPT CLINIC VISIT: HCPCS | Mod: 25

## 2021-06-24 PROCEDURE — 99214 OFFICE O/P EST MOD 30 MIN: CPT | Performed by: OPHTHALMOLOGY

## 2021-06-24 PROCEDURE — 92134 CPTRZ OPH DX IMG PST SGM RTA: CPT | Performed by: OPHTHALMOLOGY

## 2021-06-24 ASSESSMENT — TONOMETRY
IOP_METHOD: TONOPEN
OD_IOP_MMHG: 26
OD_IOP_MMHG: 24
OS_IOP_MMHG: XX
IOP_METHOD: TONOPEN
OS_IOP_MMHG: 14

## 2021-06-24 ASSESSMENT — VISUAL ACUITY
OD_SC: 20/25 SLOW
OS_SC+: -1
OS_SC: 20/80 SLOW
OD_SC+: -3
OS_PH_SC+: -2
OS_PH_SC: 20/70 SLOW
METHOD: SNELLEN - LINEAR

## 2021-06-24 ASSESSMENT — SLIT LAMP EXAM - LIDS
COMMENTS: BLEPHARITIS, SCURF, DERMATOCHALASIS
COMMENTS: BLEPHARITIS, SCURF, DERMATOCHALASIS

## 2021-06-24 ASSESSMENT — CONF VISUAL FIELD
OS_NORMAL: 1
OD_NORMAL: 1

## 2021-06-24 ASSESSMENT — EXTERNAL EXAM - RIGHT EYE: OD_EXAM: NORMAL

## 2021-06-24 ASSESSMENT — CUP TO DISC RATIO
OD_RATIO: 0.3
OS_RATIO: 0.3

## 2021-06-24 ASSESSMENT — EXTERNAL EXAM - LEFT EYE: OS_EXAM: NORMAL

## 2021-06-24 NOTE — PROGRESS NOTES
I have confirmed the patient's and reviewed Past Medical History, Past Surgical History, Social History, Family History, Problem List, Medication List and agree with Tech note.    CC: decreased vision following CEIOL in OS    HPI:  pt of Dr. Eugene, s/p CEIOL left eye on 6/21/19. Has been having blurred vision since CEIOL. Reports that he had significant periorbital pain after the operation and has since had significant visual disturbance, blurred vision, distorted vision, diplopia, and eye pain. He is very concerned about his vision and would not like any more injections in his left eye at this time.     Self-DC'd all gtts about 5 days after surgery. Re-started on gtts about 3 weeks post-op. Since last visit has been taking prednisolone and ofloxacin 2-3 times per day    Avastin injections  Right Eye: 2/26/18, 4/10/18, 5/11/20218, 6/11/18 and last one here on 7/19/2018  Left Eye: 2/26/18, 4/10/18, 5/11/2018, 6/21/19        Assessment/plan:   1. 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 Patient advised that failure to adequately control each may lead to vision loss. The patient expressed understanding.   - FAF in 2019 extensive ischemia left eye and s/p Panretinal laser photocoagulation (PRP)    - ME refractory to avastin. Plan did Eylea os in 8/2020 and patient did not return to clinic for 10 months.  Will repeat OPTOS IVFA which shows very sever NPDR both eyes     2. Diabetic macular edema left eye   - Worsened since CEIOL on 6/21/19   - Has been on ketorolac and PF twice a day/tid since surgery and will stop when running out    - OCT (12/4/19): fovea involving edema worsened compared to 10/2019   - eylea in 1 wk     3. Pseudophakia ou    4. Moderate NPDR and Diabetic macular edema right eye   - temporal  macular edema, stable/reduced  as of 12/4/19; OCT 12/4/19 no fovea involving edema   - had been receiving injections , last injection 6/11/18 at outside clinic and 7/19/2018 here at [facility]    - PRP os 12/2019. FA in future.     5. Stye LL os   - pt deferred maxitrol, agrees to start warm compresses     Patient needs PRP OS and OD and could use concurrent Eylea left eye first     Refer to Dr. Jaden Nolasco MD PhD.  Professor & Chair

## 2021-06-24 NOTE — NURSING NOTE
Chief Complaint(s) and History of Present Illness(es)     Diabetic Retinopathy Follow Up     In right eye.  Associated symptoms include dryness.  Negative for eye pain, redness, tearing, floaters and flashes.  Pain was noted as 0/10.              Comments     10 month f/u for Severe nonproliferative diabetic retinopathy of right eye with macular edema associated with type 2 diabetes mellitus. Pt denies any changes in vision x the last 10 months. Pt mentions the dist vision is not as good as he would like. Street signs can be difficult some times. When pt wakes up in the AM BE are dry and vision is not as good, but after washing his face and the eyes things clear up.     Lab Results       Component                Value               Date                       A1C                      8.2                 06/09/2021                 A1C                      9.2                 06/18/2019                 A1C                      9.1                 03/06/2018                 A1C                      10.2                06/06/2017                 A1C                      9.0                 03/16/2016              Ocular meds:  ATs HERMAN BE    LANE Ha 8:29 AM June 24, 2021

## 2021-06-29 ENCOUNTER — OFFICE VISIT (OUTPATIENT)
Dept: OPHTHALMOLOGY | Facility: CLINIC | Age: 71
End: 2021-06-29
Attending: OPHTHALMOLOGY
Payer: COMMERCIAL

## 2021-06-29 DIAGNOSIS — E13.311 MACULAR EDEMA DUE TO SECONDARY DIABETES (H): ICD-10-CM

## 2021-06-29 DIAGNOSIS — E11.3411 SEVERE NONPROLIFERATIVE DIABETIC RETINOPATHY OF RIGHT EYE WITH MACULAR EDEMA ASSOCIATED WITH TYPE 2 DIABETES MELLITUS (H): Primary | ICD-10-CM

## 2021-06-29 PROCEDURE — 250N000011 HC RX IP 250 OP 636: Performed by: OPHTHALMOLOGY

## 2021-06-29 PROCEDURE — 99207 PR DROP WITH A PROCEDURE: CPT | Performed by: OPHTHALMOLOGY

## 2021-06-29 PROCEDURE — 250N000009 HC RX 250: Performed by: OPHTHALMOLOGY

## 2021-06-29 PROCEDURE — 67228 TREATMENT X10SV RETINOPATHY: CPT | Performed by: OPHTHALMOLOGY

## 2021-06-29 PROCEDURE — G0463 HOSPITAL OUTPT CLINIC VISIT: HCPCS | Mod: 25

## 2021-06-29 PROCEDURE — 67028 INJECTION EYE DRUG: CPT | Mod: LT | Performed by: OPHTHALMOLOGY

## 2021-06-29 RX ORDER — LIDOCAINE HYDROCHLORIDE 20 MG/ML
0.5 INJECTION, SOLUTION EPIDURAL; INFILTRATION; INTRACAUDAL; PERINEURAL ONCE
Status: COMPLETED | OUTPATIENT
Start: 2021-06-29 | End: 2021-06-29

## 2021-06-29 RX ADMIN — AFLIBERCEPT 2 MG: 40 INJECTION, SOLUTION INTRAVITREAL at 11:46

## 2021-06-29 RX ADMIN — LIDOCAINE HYDROCHLORIDE 0.5 ML: 20 INJECTION, SOLUTION EPIDURAL; INFILTRATION; INTRACAUDAL; PERINEURAL at 11:15

## 2021-06-29 ASSESSMENT — CONF VISUAL FIELD
OS_NORMAL: 1
OD_NORMAL: 1
METHOD: COUNTING FINGERS

## 2021-06-29 ASSESSMENT — VISUAL ACUITY
OD_SC: 20/30
OS_PH_SC+: -1+1
OS_PH_SC: 20/60
OS_SC: 20/100
OS_SC+: -1+1
OD_SC+: -2
METHOD: SNELLEN - LINEAR

## 2021-06-29 ASSESSMENT — TONOMETRY
OS_IOP_MMHG: 12
IOP_METHOD: TONOPEN
OD_IOP_MMHG: 18

## 2021-06-29 ASSESSMENT — SLIT LAMP EXAM - LIDS
COMMENTS: BLEPHARITIS, SCURF, DERMATOCHALASIS
COMMENTS: BLEPHARITIS, SCURF, DERMATOCHALASIS

## 2021-06-29 ASSESSMENT — CUP TO DISC RATIO
OD_RATIO: 0.3
OS_RATIO: 0.3

## 2021-06-29 ASSESSMENT — EXTERNAL EXAM - LEFT EYE: OS_EXAM: NORMAL

## 2021-06-29 ASSESSMENT — EXTERNAL EXAM - RIGHT EYE: OD_EXAM: NORMAL

## 2021-06-29 NOTE — PROGRESS NOTES
CC: decreased vision following CEIOL in OS    HPI: vision stable  s/p CEIOL left eye on 6/21/19.  S/P PRP left eye 12/2019  Multiple avastin injections left eye and then switched to Eylea: last injections 8/2020      Assessment/plan:   1. RVO vs Severe NPDR left eyes with macular edema extra foveally in left eye, involving fovea in OS   - FA 2019 showed extensive ischemia and capillary non perfusion: PRP done   - Blood pressure (<120/80) and blood glucose (HbA1c <7.0) control discussed with patient. Patient advised that failure to adequately control each may lead to vision loss. The patient expressed understanding.     - ME refractory to avastin. did Eylea os in 8/2020 and patient did not return to clinic for 10 months.  Seen by Dr. Nolasco and FA done: plan for Eylea left eye today 6/29/21 and Follow-up in 2-3 months     2. Diabetic macular edema left eye   - Worsened since CEIOL on 6/21/19   - Has been on ketorolac and PF twice a day/tid since surgery and will stop when running out    - OCT (12/4/19): fovea involving edema worsened compared to 10/2019   - see #1    3. Pseudophakia ou    4. Severe NPDR and Diabetic macular edema right eye with severe ischemia in FA   - FA with severe ischemia: PRP today 6/29/21    5. Stye LL os   - pt deferred maxitrol, agrees to start warm compresses     RTC ~2 w for left eye PRP and then in 2-3 months with Dr. Nolasco or with me    Complete documentation of historical and exam elements from today's encounter can be found in the full encounter summary report (not reduplicated in this progress note). I personally obtained the chief complaint(s) and history of present illness.  I confirmed and edited as necessary the review of systems, past medical/surgical history, family history, social history, and examination findings as documented by others; and I examined the patient myself. I personally reviewed the relevant tests, images, and reports as documented above. I formulated and  edited as necessary the assessment and plan and discussed the findings and management plan with the patient and family.    Jaden Johnson MD, PhD

## 2021-06-29 NOTE — NURSING NOTE
Chief Complaints and History of Present Illnesses   Patient presents with     Follow Up     5 day follow up RVO vs Severe NPDR left eyes with macular edema extra foveally in left eye, involving fovea in left eye & DME, left eye     Chief Complaint(s) and History of Present Illness(es)     Follow Up     Laterality: both eyes    Quality: blurred vision    Course: stable    Associated symptoms: Negative for eye pain, floaters and flashes    Pain scale: 0/10    Comments: 5 day follow up RVO vs Severe NPDR left eyes with macular edema extra foveally in left eye, involving fovea in left eye & DME, left eye              Comments     Pt denies any vision changes BE since last visit.  Complains of occasional discomfort LE>RE  Denies any flashes, floaters, and pain.  Ocular meds: AT's PRN BE  Type 2 DM- last BS was 121  Lab Results       Component                Value               Date                       A1C                      8.2                 06/09/2021                 A1C                      9.2                 06/18/2019                 A1C                      9.1                 03/06/2018                 A1C                      10.2                06/06/2017                 A1C                      9.0                 03/16/2016              Elin Post OT 9:06 AM June 29, 2021

## 2021-07-11 DIAGNOSIS — E78.5 HYPERLIPIDEMIA LDL GOAL <100: ICD-10-CM

## 2021-07-13 ENCOUNTER — OFFICE VISIT (OUTPATIENT)
Dept: ENDOCRINOLOGY | Facility: CLINIC | Age: 71
End: 2021-07-13
Payer: COMMERCIAL

## 2021-07-13 VITALS
WEIGHT: 201.2 LBS | DIASTOLIC BLOOD PRESSURE: 67 MMHG | HEART RATE: 83 BPM | SYSTOLIC BLOOD PRESSURE: 102 MMHG | BODY MASS INDEX: 30.59 KG/M2

## 2021-07-13 DIAGNOSIS — E11.3399 TYPE 2 DIABETES MELLITUS WITH MODERATE NONPROLIFERATIVE RETINOPATHY WITHOUT MACULAR EDEMA, WITH LONG-TERM CURRENT USE OF INSULIN, UNSPECIFIED LATERALITY (H): ICD-10-CM

## 2021-07-13 DIAGNOSIS — E11.3213 TYPE 2 DIABETES MELLITUS WITH BOTH EYES AFFECTED BY MILD NONPROLIFERATIVE RETINOPATHY AND MACULAR EDEMA, WITH LONG-TERM CURRENT USE OF INSULIN (H): ICD-10-CM

## 2021-07-13 DIAGNOSIS — Z79.4 TYPE 2 DIABETES MELLITUS WITH MODERATE NONPROLIFERATIVE RETINOPATHY WITHOUT MACULAR EDEMA, WITH LONG-TERM CURRENT USE OF INSULIN, UNSPECIFIED LATERALITY (H): ICD-10-CM

## 2021-07-13 DIAGNOSIS — Z79.4 TYPE 2 DIABETES MELLITUS WITH BOTH EYES AFFECTED BY MILD NONPROLIFERATIVE RETINOPATHY AND MACULAR EDEMA, WITH LONG-TERM CURRENT USE OF INSULIN (H): ICD-10-CM

## 2021-07-13 PROCEDURE — 99215 OFFICE O/P EST HI 40 MIN: CPT | Performed by: PHYSICIAN ASSISTANT

## 2021-07-13 RX ORDER — SEMAGLUTIDE 1.34 MG/ML
0.25 INJECTION, SOLUTION SUBCUTANEOUS WEEKLY
Qty: 1.5 ML | Refills: 3 | Status: SHIPPED | OUTPATIENT
Start: 2021-07-13 | End: 2022-01-06

## 2021-07-13 ASSESSMENT — PAIN SCALES - GENERAL: PAINLEVEL: NO PAIN (0)

## 2021-07-13 NOTE — LETTER
7/13/2021       RE: Earle Rene  [address]  [place] [address]     Dear Colleague,    Thank you for referring your patient, Earle Rene, to the [facility] [place] [facility]. Please see a copy of my visit note below.    Chief Complaint   Patient presents with     RECHECK     Type 2 Diabetes     Leigh Ann Lee MA      PATIENT SEEN FACE TO FACE IN CLINIC TODAY.    HPI   Earle Rene is a 71 year old male with type 2 diabetes mellitus. Pt is being seen in clinic today for diabetes follow up.  Pt's diabetes is complicated by retinopathy, nephropathy, neuropathy and ED.  Pt also has hx of hyperlipidemia, HTN, obesity, presumed localized prostate cancer, BPH, goiter with right thyroid nodule and osteoporosis.  For his diabetes, patient states he is taking Tresiba 60 units SQ at hs, Humalog 10-12 units with meals, Jardiance 10 mg each am and Ozempic 0.25 mg subcutaneous once a week.  Pt's A1C was 8.2 % on 6/9/2021 and his previous A1C was 8.8 % in May 2021.   I reviewed his Freestyle Lora sensor download today and his average glucose is 164 with SD 34.  His glucose is in target 63 % of the time, above target 37 % of the time and below target 0 % of the time.  No frequent hypoglycemia.  On ROS today, blurred vision and he was seen here by [facility] in June 2021 with severe NPDR with macular edema.  Decrease auditory acuity.  Dizziness and headaches.  Chronic numbness in feet.   He denies groin yeast infection, dysuria or hematuria at this time.  Pt denies frequent headaches, n/v, SOB at rest, cough, fever, chills,chest pain, abd pain or diarrhea.  No foot ulcers on exam    DIABETES CARE:  Retinopathy: yes; severe NPDR with macular edema. He was seen by [facility] here in June 2021.  Nephropathy: yes- CKDStage3.  Urine protein 2.37 g/gCr in June 2021.  Pt is taking Cozaar daily.  Neuropathy: yes.   Foot exam. Feet - mild swelling. No  ulcers. Abnormal monofilamentous exam.  Lipids: LDL 49 in June 2019. Pt is taking Lipitor, Fish Oil and fenofibrate.  Taking ASA: yes.  CAD:no.  Mental health: hx of mood disorder per chart. Pt denies depression.  Insulin: Basal and meal time insulin.  DM meds: Jardiance and Ozempic.  Testing: Freestyle Lora sensor.    ROS   Please see under HPI.     ALLERGIES:  Review of patient's allergies indicates no known allergies.      Current Outpatient Medications   Medication Sig Dispense Refill     albuterol (VENTOLIN HFA) 108 (90 Base) MCG/ACT inhaler Inhale 2 puffs into the lungs every 6 hours 18 g 3     alpha-lipoic acid 600 MG capsule Take 1 capsule (600 mg) by mouth daily 90 capsule 3     amLODIPine (NORVASC) 5 MG tablet Take 1 tablet (5 mg) by mouth At Bedtime 90 tablet 3     ARTIFICIAL TEAR OP Apply to eye as needed       aspirin 81 MG tablet Take 1 tablet by mouth At Bedtime        atorvastatin (LIPITOR) 20 MG tablet Take 1 tablet (20 mg) by mouth daily 90 tablet 0     blood glucose (NO BRAND SPECIFIED) lancets standard Lancets that go with device, Test 3 times daily 300 each 3     blood glucose monitoring (NO BRAND SPECIFIED) meter device kit Any meter covered by insurance, not store brand, use as directed. 1 kit 0     blood glucose monitoring (NO BRAND SPECIFIED) test strip Strips that go with meter, covered by insurance. Test 3 times daily 300 strip 3     Blood Pressure Monitor KIT Automatic Blood Pressure Monitor 1 kit 0     camphor-menthol (DERMASARRA) 0.5-0.5 % external lotion Apply to arms legs torso 1-2 times a day for itching 1 Bottle 11     cholecalciferol (VITAMIN D3) 25 mcg (1000 units) capsule Take 2 capsules (50 mcg) by mouth daily 180 capsule 3     Cholecalciferol (VITAMIN D3) 50 MCG (2000 UT) TABS Take 1 tablet by mouth daily       clobetasol (TEMOVATE) 0.05 % ointment Apply topically to legs twice daily for 2 weeks.  Then discontinue 30 g 0     clotrimazole (LOTRIMIN) 1 % cream Apply topically 2  times daily 30 g 3     Continuous Blood Gluc  (FREESTYLE PETER READER) JUANCARLOS 1 Device daily Use to read blood sugars   as  instruction 4 times daily 1 Device 0     Continuous Blood Gluc Sensor (FREESTYLE PETER 14 DAY SENSOR) MISC 1 applicator every 14 days Change every 14 days 8 each 3     dextromethorphan-guaiFENesin (MUCINEX DM)  MG 12 hr tablet Take 1 tablet by mouth every 12 hours 30 tablet 0     dextromethorphan-guaiFENesin (TUSSIN DM)  MG/5ML liquid Take 5 mLs by mouth 2 times daily as needed 118 mL 0     doxycycline hyclate (VIBRA-TABS) 100 MG tablet Take 1 tablet (100 mg) by mouth 2 times daily 14 tablet 0     empagliflozin (JARDIANCE) 10 MG TABS tablet One tab daily. 90 tablet 3     famotidine (PEPCID) 20 MG tablet Take 1 tablet (20 mg) by mouth 2 times daily 180 tablet 2     fenofibrate 54 MG tablet Take 1 tablet (54 mg) by mouth daily (Patient taking differently: Take 54 mg by mouth At Bedtime ) 90 tablet 0     finasteride (PROSCAR) 5 MG tablet Take 1 tablet (5 mg) by mouth daily 90 tablet 2     fluocinonide (LIDEX) 0.05 % external cream APPLY TO AFFECTED AREA TWICE A DAY  3     fluticasone (FLONASE) 50 MCG/ACT nasal spray Spray 1 spray into both nostrils daily 16 g 0     fluticasone (FLOVENT HFA) 110 MCG/ACT inhaler Inhale 1 puff into the lungs 2 times daily 12 g 1     gabapentin (NEURONTIN) 300 MG capsule Take 1 capsule (300 mg) by mouth 2 times daily 60 capsule 1     gabapentin (NEURONTIN) 300 MG capsule TAKE 1 CAPSULE BY MOUTH TWICE A  capsule 0     HUMALOG KWIKPEN 100 UNIT/ML soln Inject 15-17  units with meals, plus correction. Pt uses approx 65 units in 24 hrs. 60 mL 1     insulin degludec (TRESIBA FLEXTOUCH) 100 UNIT/ML pen Inject 64 units subcu at bedtime. Lab draw needed. Call  to schedule. Please have draw prior to 05/21/2021. 60 mL 0     insulin pen needle (B-D U/F) 31G X 5 MM miscellaneous Use 4 time(s) per day.  Please dispense as BD Pen  "Needle Mini U/F 31G x 5  each 3     insulin pen needle (B-D U/F) 31G X 5 MM Use 4 times per day.  Please dispense as BD Pen Needle Mini U/F 31G x 5  each 3     insulin syringe 31G X 5/16\" 0.5 ML MISC Use three syringes daily 270 each 1     ketamine 5% gabapentin 8% lidocaine 2.5% topical PLO cream Apply 1 g topically 3 times daily 30 g 3     loratadine (CLARITIN) 10 MG tablet Take 1 tablet (10 mg) by mouth daily (Patient taking differently: Take 10 mg by mouth At Bedtime ) 90 tablet 0     losartan (COZAAR) 100 MG tablet TAKE 1 TABLET BY MOUTH AT BEDTIME 90 tablet 3     metFORMIN (GLUCOPHAGE) 1000 MG tablet TAKE 1 TABLET BY MOUTH EACH AM ONLY 90 tablet 0     mupirocin (BACTROBAN) 2 % cream Apply  topically. In very small amounts only as needed 15 g 1     Omega-3 Fatty Acids (OMEGA-3 FISH OIL) 1000 MG CAPS Take 1 capsule (1 g) by mouth 2 times daily (Patient taking differently: Take 1 g by mouth as needed (PT last dose a week ago 12.24.19) ) 60 capsule 11     ONETOUCH ULTRA test strip Use to test blood sugar 3 times daily 300 strip 3     Semaglutide,0.25 or 0.5MG/DOS, (OZEMPIC, 0.25 OR 0.5 MG/DOSE,) 2 MG/1.5ML SOPN Inject 0.25 mg Subcutaneous once a week 1.5 mL 3     sodium bicarbonate 650 MG tablet Take 1 tablet (650 mg) by mouth 3 times daily 90 tablet 11     tamsulosin (FLOMAX) 0.4 MG capsule Take 1 capsule (0.4 mg) by mouth daily 90 capsule 2     triamcinolone (KENALOG) 0.1 % cream Apply topically 3 times daily 80 g 0     Family Hx   No change.     Personal Hx   Smoke: none.   ETOH: none.    with grown children.    PMH   1. Type 2 Diabetes Mellitus dx at age 43.   2. Neuropathy.  3. Nephropathy.   4. ED.   5. Dyslipidemia.   6. Nephrolithiasis.   7. Decrease auditory acuity.   8. Sarcoidosis-lung.   9. Goiter.   10. S/P T & A.   11. S/P FX right heel.   12. Vit D def.   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   14. CT chest- ? granulomas.   15. Retinopathy.  16. Goiter.  Past Medical History: "   Diagnosis Date     Blepharitis of both eyes      BPH (benign prostatic hyperplasia)      Diabetes (H)      Diabetic neuropathy (H)      Diabetic retinopathy associated with diabetes mellitus due to underlying condition (H)      Dry eye syndrome      GERD (gastroesophageal reflux disease)      Goiter      Granulomatous disease (H)      HLD (hyperlipidemia)      HTN (hypertension)      Nonsenile cataract      Peripheral neuropathy      Past Surgical History:   Procedure Laterality Date     ------------OTHER-------------      back of neck abscess drainage in OR     AS RAD RESEC TONSIL/PILLARS Bilateral 1961     CATARACT IOL, RT/LT Left      COLONOSCOPY  7/29/2013    Procedure: COLONOSCOPY;;  Surgeon: Montana Pascal MD;  Location: [facility]     EXCISE MASS UPPER EXTREMITY Right 11/11/2019    Procedure: EXCISION, MASS, UPPER EXTREMITY, RIGHT SHOULDER;  Surgeon: Johana Choudhury MD;  Location: [facility] OR     INTRAVITREAL INJECTION CHEMOTHERAPY Right 12/30/2019    Procedure: INTRAVITREAL Bevacizumab injection;  Surgeon: Milton Maki MD;  Location: [facility] OR     PHACOEMULSIFICATION CLEAR CORNEA WITH STANDARD INTRAOCULAR LENS IMPLANT Right 12/30/2019    Procedure: PHACOEMULSIFICATION, CATARACT, WITH INTRAOCULAR LENS IMPLANT;  Surgeon: Milton Maki MD;  Location: [facility] OR     PHACOEMULSIFICATION WITH STANDARD INTRAOCULAR LENS IMPLANT Left 6/21/2019    Procedure: Left Eye Cataract Removal with Intraocular Lens Implant with Intraoperative Avastin Injection;  Surgeon: Lacey Eugene MD;  Location: [facility] OR     siladenatis  11/2017     Physical Exam   /67   Pulse 83   Wt 91.3 kg (201 lb 3.2 oz)   BMI 30.59 kg/m    GENERAL: Pt in no distress.  SKIN: Dry and excoriations.  HEENT: Decrease visual acuity. Fundi not examined today.  NECK: Thyroid palpable and nontender.  LUNGS: Clear b/l.  CARDIAC: RRR.  ABDOMEN: Enlarge and nontender.  EXTREMITIES: Trace pretibial edema b/l.  FEET: No ulcers; abnormal  monofilamentous exam.    Results   Creatinine   Date Value Ref Range Status   06/09/2021 1.45 (H) 0.66 - 1.25 mg/dL Final     GFR Estimate   Date Value Ref Range Status   06/09/2021 48 (L) >60 mL/min/[1.73_m2] Final     Comment:     Non  GFR Calc  Starting 12/18/2018, serum creatinine based estimated GFR (eGFR) will be   calculated using the Chronic Kidney Disease Epidemiology Collaboration   (CKD-EPI) equation.       Hemoglobin A1C   Date Value Ref Range Status   06/09/2021 8.2 (H) 0 - 5.6 % Final     Comment:     Normal <5.7% Prediabetes 5.7-6.4%  Diabetes 6.5% or higher - adopted from ADA   consensus guidelines.       Potassium   Date Value Ref Range Status   06/09/2021 4.2 3.4 - 5.3 mmol/L Final     ALT   Date Value Ref Range Status   03/12/2021 36 0 - 70 U/L Final     AST   Date Value Ref Range Status   03/12/2021 24 0 - 45 U/L Final     TSH   Date Value Ref Range Status   06/09/2021 0.98 0.40 - 4.00 mU/L Final     T4 Free   Date Value Ref Range Status   10/21/2014 1.00 0.76 - 1.46 ng/dL Final     Comment:     Effective 7/30/2014, the reference range for this assay has changed to reflect   new instrumentation/methodology.           Cholesterol   Date Value Ref Range Status   06/18/2019 145 <200 mg/dL Final   03/06/2018 101 <200 mg/dL Final     HDL Cholesterol   Date Value Ref Range Status   06/18/2019 38 (L) >39 mg/dL Final   03/06/2018 32 (L) >39 mg/dL Final     LDL Cholesterol Calculated   Date Value Ref Range Status   06/18/2019 49 <100 mg/dL Final     Comment:     Desirable:       <100 mg/dl   03/06/2018 36 <100 mg/dL Final     Comment:     Desirable:       <100 mg/dl     Triglycerides   Date Value Ref Range Status   06/18/2019 289 (H) <150 mg/dL Final     Comment:     Borderline high:  150-199 mg/dl  High:             200-499 mg/dl  Very high:       >499 mg/dl     03/06/2018 166 (H) <150 mg/dL Final     Comment:     Borderline high:  150-199 mg/dl  High:             200-499 mg/dl  Very  high:       >499 mg/dl       Cholesterol/HDL Ratio   Date Value Ref Range Status   09/09/2014 3.8 0.0 - 5.0 Final   11/20/2013 5.8 (H) 0.0 - 5.0 Final     A1C     8.2    6/9/2021  A1C     8.8    5/24/2021  A1C     9.0    1/14/2021   A1C     9.0    2/27/2020  A1C     8.5    12/4/2019  A1C     10.0  9/18/2019  A1C      9.2   6/18/2019  A1C      8.4   12/21/2018  A1C      7.4   9/7/2018  A1C      7.1   5/31/2018  A1C      9.1   3/6/2018  A1C      9.6   11/1/2017  A1C      8.9   8/9/2017  A1C      9.7   9/22/2016  A1C      9.7   7/21/2015  A1C     10.2  12/2/2014  A1C     10.2  9/9/2014  A1C      9.8  11/20/2013  A1C      9.3   6/12/2013  A1C      8.0   8/14/2012  A1C      8.2   5/22/2012  A1C      8.0   5/22/2012    ASSESSMENT/PLAN:     1. TYPE 2 DIABETES MELLITUS: Uncontrolled type 2 diabetes mellitus complicated by retinopathy, nephropathy- CKD stage 3, neuropathy and ED.  Pt's A1C has improved and is 8.2 % today.  Pt instructed to continue current Tresiba,Novolog, Jardiance and Ozempic doses.  Will check creat/GFR today.   He is not interested in increasing his Jardiance or Ozempic dose today.  He is to continue to use his Freestyle Lora sensor to check his blood sugar fasting each am, prelunch, predinner and at hs DAILY.  Encouraged patient to make healthy food choices, reduce his food portions with meals, avoid snacking and walk on his treadmill.  Pt remains on daily ASA.   Pt received the COVID19 vaccines (Moderna).    2. GOITER: Not addressed today. He was seen by Dr. Villanueva in May 2020.    3. NEPHROPATHY: Pt has CKD3 and followed here by renal staff.  Most recent creat was 1.43 with GFR 49 mL/min on 12/14/2020.  He is taking Cozaar and Jardiance for renal protection.  Pt's BP is good at 102/67 today.  Discussed the need for better glycemic control.    4.  RETINOPATHY:  Seen here by [facility] in June 2021.    5.  NEUROPATHY: Continue Gabapentin,alpha-lipoic acid and topical cream.  No foot ulcers on exam today.    6.  DYSLIPIDEMIA: LDL 49 in [id number].  Pt is taking Lipitor, fish oil and Fenofibrate.    7. OBESITY: See under # 1 above.  Tolerating Ozempic- he does not want to increase the dose today.    8.  FOLLOW UP: with me in 3 months    Total time spent reviewing pt's chart notes,labs and Freestyle Lora sensor download today= 8 minutes.  Total time face to face visit today= 30 minutes.  Documentation time= 15 minutes.    TOTAL TIME FOR VISIT TODAY = 53 minutes    Pamela Laura PA-C

## 2021-07-13 NOTE — PROGRESS NOTES
PATIENT SEEN FACE TO FACE IN CLINIC TODAY.    HPI   Earle Rene is a 71 year old male with type 2 diabetes mellitus. Pt is being seen in clinic today for diabetes follow up.  Pt's diabetes is complicated by retinopathy, nephropathy, neuropathy and ED.  Pt also has hx of hyperlipidemia, HTN, obesity, presumed localized prostate cancer, BPH, goiter with right thyroid nodule and osteoporosis.  For his diabetes, patient states he is taking Tresiba 60 units SQ at hs, Humalog 10-12 units with meals, Jardiance 10 mg each am and Ozempic 0.25 mg subcutaneous once a week.  Pt's A1C was 8.2 % on 6/9/2021 and his previous A1C was 8.8 % in May 2021.   I reviewed his Freestyle Lora sensor download today and his average glucose is 164 with SD 34.  His glucose is in target 63 % of the time, above target 37 % of the time and below target 0 % of the time.  No frequent hypoglycemia.  On ROS today, blurred vision and he was seen here by [facility] in June 2021 with severe NPDR with macular edema.  Decrease auditory acuity.  Dizziness and headaches.  Chronic numbness in feet.   He denies groin yeast infection, dysuria or hematuria at this time.  Pt denies frequent headaches, n/v, SOB at rest, cough, fever, chills,chest pain, abd pain or diarrhea.  No foot ulcers on exam    DIABETES CARE:  Retinopathy: yes; severe NPDR with macular edema. He was seen by [facility] here in June 2021.  Nephropathy: yes- CKDStage3.  Urine protein 2.37 g/gCr in June 2021.  Pt is taking Cozaar daily.  Neuropathy: yes.   Foot exam. Feet - mild swelling. No ulcers. Abnormal monofilamentous exam.  Lipids: LDL 49 in June 2019. Pt is taking Lipitor, Fish Oil and fenofibrate.  Taking ASA: yes.  CAD:no.  Mental health: hx of mood disorder per chart. Pt denies depression.  Insulin: Basal and meal time insulin.  DM meds: Jardiance and Ozempic.  Testing: Freestyle Lora sensor.    ROS   Please see under HPI.     ALLERGIES:  Review of patient's allergies indicates no known  allergies.      Current Outpatient Medications   Medication Sig Dispense Refill     albuterol (VENTOLIN HFA) 108 (90 Base) MCG/ACT inhaler Inhale 2 puffs into the lungs every 6 hours 18 g 3     alpha-lipoic acid 600 MG capsule Take 1 capsule (600 mg) by mouth daily 90 capsule 3     amLODIPine (NORVASC) 5 MG tablet Take 1 tablet (5 mg) by mouth At Bedtime 90 tablet 3     ARTIFICIAL TEAR OP Apply to eye as needed       aspirin 81 MG tablet Take 1 tablet by mouth At Bedtime        atorvastatin (LIPITOR) 20 MG tablet Take 1 tablet (20 mg) by mouth daily 90 tablet 0     blood glucose (NO BRAND SPECIFIED) lancets standard Lancets that go with device, Test 3 times daily 300 each 3     blood glucose monitoring (NO BRAND SPECIFIED) meter device kit Any meter covered by insurance, not store brand, use as directed. 1 kit 0     blood glucose monitoring (NO BRAND SPECIFIED) test strip Strips that go with meter, covered by insurance. Test 3 times daily 300 strip 3     Blood Pressure Monitor KIT Automatic Blood Pressure Monitor 1 kit 0     camphor-menthol (DERMASARRA) 0.5-0.5 % external lotion Apply to arms legs torso 1-2 times a day for itching 1 Bottle 11     cholecalciferol (VITAMIN D3) 25 mcg (1000 units) capsule Take 2 capsules (50 mcg) by mouth daily 180 capsule 3     Cholecalciferol (VITAMIN D3) 50 MCG (2000 UT) TABS Take 1 tablet by mouth daily       clobetasol (TEMOVATE) 0.05 % ointment Apply topically to legs twice daily for 2 weeks.  Then discontinue 30 g 0     clotrimazole (LOTRIMIN) 1 % cream Apply topically 2 times daily 30 g 3     Continuous Blood Gluc  (FREESTYLE PETER READER) JUANCARLOS 1 Device daily Use to read blood sugars   as  instruction 4 times daily 1 Device 0     Continuous Blood Gluc Sensor (FREESTYLE PETER 14 DAY SENSOR) MISC 1 applicator every 14 days Change every 14 days 8 each 3     dextromethorphan-guaiFENesin (MUCINEX DM)  MG 12 hr tablet Take 1 tablet by mouth every 12 hours 30  "tablet 0     dextromethorphan-guaiFENesin (TUSSIN DM)  MG/5ML liquid Take 5 mLs by mouth 2 times daily as needed 118 mL 0     doxycycline hyclate (VIBRA-TABS) 100 MG tablet Take 1 tablet (100 mg) by mouth 2 times daily 14 tablet 0     empagliflozin (JARDIANCE) 10 MG TABS tablet One tab daily. 90 tablet 3     famotidine (PEPCID) 20 MG tablet Take 1 tablet (20 mg) by mouth 2 times daily 180 tablet 2     fenofibrate 54 MG tablet Take 1 tablet (54 mg) by mouth daily (Patient taking differently: Take 54 mg by mouth At Bedtime ) 90 tablet 0     finasteride (PROSCAR) 5 MG tablet Take 1 tablet (5 mg) by mouth daily 90 tablet 2     fluocinonide (LIDEX) 0.05 % external cream APPLY TO AFFECTED AREA TWICE A DAY  3     fluticasone (FLONASE) 50 MCG/ACT nasal spray Spray 1 spray into both nostrils daily 16 g 0     fluticasone (FLOVENT HFA) 110 MCG/ACT inhaler Inhale 1 puff into the lungs 2 times daily 12 g 1     gabapentin (NEURONTIN) 300 MG capsule Take 1 capsule (300 mg) by mouth 2 times daily 60 capsule 1     gabapentin (NEURONTIN) 300 MG capsule TAKE 1 CAPSULE BY MOUTH TWICE A  capsule 0     HUMALOG KWIKPEN 100 UNIT/ML soln Inject 15-17  units with meals, plus correction. Pt uses approx 65 units in 24 hrs. 60 mL 1     insulin degludec (TRESIBA FLEXTOUCH) 100 UNIT/ML pen Inject 64 units subcu at bedtime. Lab draw needed. Call  to schedule. Please have draw prior to 05/21/2021. 60 mL 0     insulin pen needle (B-D U/F) 31G X 5 MM miscellaneous Use 4 time(s) per day.  Please dispense as BD Pen Needle Mini U/F 31G x 5  each 3     insulin pen needle (B-D U/F) 31G X 5 MM Use 4 times per day.  Please dispense as BD Pen Needle Mini U/F 31G x 5  each 3     insulin syringe 31G X 5/16\" 0.5 ML MISC Use three syringes daily 270 each 1     ketamine 5% gabapentin 8% lidocaine 2.5% topical PLO cream Apply 1 g topically 3 times daily 30 g 3     loratadine (CLARITIN) 10 MG tablet Take 1 tablet (10 mg) by " mouth daily (Patient taking differently: Take 10 mg by mouth At Bedtime ) 90 tablet 0     losartan (COZAAR) 100 MG tablet TAKE 1 TABLET BY MOUTH AT BEDTIME 90 tablet 3     metFORMIN (GLUCOPHAGE) 1000 MG tablet TAKE 1 TABLET BY MOUTH EACH AM ONLY 90 tablet 0     mupirocin (BACTROBAN) 2 % cream Apply  topically. In very small amounts only as needed 15 g 1     Omega-3 Fatty Acids (OMEGA-3 FISH OIL) 1000 MG CAPS Take 1 capsule (1 g) by mouth 2 times daily (Patient taking differently: Take 1 g by mouth as needed (PT last dose a week ago 12.24.19) ) 60 capsule 11     ONETOUCH ULTRA test strip Use to test blood sugar 3 times daily 300 strip 3     Semaglutide,0.25 or 0.5MG/DOS, (OZEMPIC, 0.25 OR 0.5 MG/DOSE,) 2 MG/1.5ML SOPN Inject 0.25 mg Subcutaneous once a week 1.5 mL 3     sodium bicarbonate 650 MG tablet Take 1 tablet (650 mg) by mouth 3 times daily 90 tablet 11     tamsulosin (FLOMAX) 0.4 MG capsule Take 1 capsule (0.4 mg) by mouth daily 90 capsule 2     triamcinolone (KENALOG) 0.1 % cream Apply topically 3 times daily 80 g 0     Family Hx   No change.     Personal Hx   Smoke: none.   ETOH: none.    with grown children.    PMH   1. Type 2 Diabetes Mellitus dx at age 43.   2. Neuropathy.  3. Nephropathy.   4. ED.   5. Dyslipidemia.   6. Nephrolithiasis.   7. Decrease auditory acuity.   8. Sarcoidosis-lung.   9. Goiter.   10. S/P T & A.   11. S/P FX right heel.   12. Vit D def.   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   14. CT chest- ? granulomas.   15. Retinopathy.  16. Goiter.  Past Medical History:   Diagnosis Date     Blepharitis of both eyes      BPH (benign prostatic hyperplasia)      Diabetes (H)      Diabetic neuropathy (H)      Diabetic retinopathy associated with diabetes mellitus due to underlying condition (H)      Dry eye syndrome      GERD (gastroesophageal reflux disease)      Goiter      Granulomatous disease (H)      HLD (hyperlipidemia)      HTN (hypertension)      Nonsenile cataract       Peripheral neuropathy      Past Surgical History:   Procedure Laterality Date     ------------OTHER-------------      back of neck abscess drainage in OR     AS RAD RESEC TONSIL/PILLARS Bilateral 1961     CATARACT IOL, RT/LT Left      COLONOSCOPY  7/29/2013    Procedure: COLONOSCOPY;;  Surgeon: Montana Pascal MD;  Location: [facility]     EXCISE MASS UPPER EXTREMITY Right 11/11/2019    Procedure: EXCISION, MASS, UPPER EXTREMITY, RIGHT SHOULDER;  Surgeon: Johana Choudhury MD;  Location: [facility]     INTRAVITREAL INJECTION CHEMOTHERAPY Right 12/30/2019    Procedure: INTRAVITREAL Bevacizumab injection;  Surgeon: Milton Maki MD;  Location: [facility]     PHACOEMULSIFICATION CLEAR CORNEA WITH STANDARD INTRAOCULAR LENS IMPLANT Right 12/30/2019    Procedure: PHACOEMULSIFICATION, CATARACT, WITH INTRAOCULAR LENS IMPLANT;  Surgeon: Milton Maki MD;  Location: [facility]     PHACOEMULSIFICATION WITH STANDARD INTRAOCULAR LENS IMPLANT Left 6/21/2019    Procedure: Left Eye Cataract Removal with Intraocular Lens Implant with Intraoperative Avastin Injection;  Surgeon: Lacey Eugene MD;  Location: [facility] OR     siladenatis  11/2017     Physical Exam   /67   Pulse 83   Wt 91.3 kg (201 lb 3.2 oz)   BMI 30.59 kg/m    GENERAL: Pt in no distress.  SKIN: Dry and excoriations.  HEENT: Decrease visual acuity. Fundi not examined today.  NECK: Thyroid palpable and nontender.  LUNGS: Clear b/l.  CARDIAC: RRR.  ABDOMEN: Enlarge and nontender.  EXTREMITIES: Trace pretibial edema b/l.  FEET: No ulcers; abnormal monofilamentous exam.    Results   Creatinine   Date Value Ref Range Status   06/09/2021 1.45 (H) 0.66 - 1.25 mg/dL Final     GFR Estimate   Date Value Ref Range Status   06/09/2021 48 (L) >60 mL/min/[1.73_m2] Final     Comment:     Non  GFR Calc  Starting 12/18/2018, serum creatinine based estimated GFR (eGFR) will be   calculated using the Chronic Kidney Disease Epidemiology Collaboration   (CKD-EPI)  equation.       Hemoglobin A1C   Date Value Ref Range Status   06/09/2021 8.2 (H) 0 - 5.6 % Final     Comment:     Normal <5.7% Prediabetes 5.7-6.4%  Diabetes 6.5% or higher - adopted from ADA   consensus guidelines.       Potassium   Date Value Ref Range Status   06/09/2021 4.2 3.4 - 5.3 mmol/L Final     ALT   Date Value Ref Range Status   03/12/2021 36 0 - 70 U/L Final     AST   Date Value Ref Range Status   03/12/2021 24 0 - 45 U/L Final     TSH   Date Value Ref Range Status   06/09/2021 0.98 0.40 - 4.00 mU/L Final     T4 Free   Date Value Ref Range Status   10/21/2014 1.00 0.76 - 1.46 ng/dL Final     Comment:     Effective 7/30/2014, the reference range for this assay has changed to reflect   new instrumentation/methodology.           Cholesterol   Date Value Ref Range Status   06/18/2019 145 <200 mg/dL Final   03/06/2018 101 <200 mg/dL Final     HDL Cholesterol   Date Value Ref Range Status   06/18/2019 38 (L) >39 mg/dL Final   03/06/2018 32 (L) >39 mg/dL Final     LDL Cholesterol Calculated   Date Value Ref Range Status   06/18/2019 49 <100 mg/dL Final     Comment:     Desirable:       <100 mg/dl   03/06/2018 36 <100 mg/dL Final     Comment:     Desirable:       <100 mg/dl     Triglycerides   Date Value Ref Range Status   06/18/2019 289 (H) <150 mg/dL Final     Comment:     Borderline high:  150-199 mg/dl  High:             200-499 mg/dl  Very high:       >499 mg/dl     03/06/2018 166 (H) <150 mg/dL Final     Comment:     Borderline high:  150-199 mg/dl  High:             200-499 mg/dl  Very high:       >499 mg/dl       Cholesterol/HDL Ratio   Date Value Ref Range Status   09/09/2014 3.8 0.0 - 5.0 Final   11/20/2013 5.8 (H) 0.0 - 5.0 Final     A1C     8.2    6/9/2021  A1C     8.8    5/24/2021  A1C     9.0    1/14/2021   A1C     9.0    2/27/2020  A1C     8.5    12/4/2019  A1C     10.0  9/18/2019  A1C      9.2   6/18/2019  A1C      8.4   12/21/2018  A1C      7.4   9/7/2018  A1C      7.1   5/31/2018  A1C      9.1    3/6/2018  A1C      9.6   11/1/2017  A1C      8.9   8/9/2017  A1C      9.7   9/22/2016  A1C      9.7   7/21/2015  A1C     10.2  12/2/2014  A1C     10.2  9/9/2014  A1C      9.8  11/20/2013  A1C      9.3   6/12/2013  A1C      8.0   8/14/2012  A1C      8.2   5/22/2012  A1C      8.0   5/22/2012    ASSESSMENT/PLAN:     1. TYPE 2 DIABETES MELLITUS: Uncontrolled type 2 diabetes mellitus complicated by retinopathy, nephropathy- CKD stage 3, neuropathy and ED.  Pt's A1C has improved and is 8.2 % today.  Pt instructed to continue current Tresiba,Novolog, Jardiance and Ozempic doses.  Will check creat/GFR today.   He is not interested in increasing his Jardiance or Ozempic dose today.  He is to continue to use his Freestyle Lora sensor to check his blood sugar fasting each am, prelunch, predinner and at hs DAILY.  Encouraged patient to make healthy food choices, reduce his food portions with meals, avoid snacking and walk on his treadmill.  Pt remains on daily ASA.   Pt received the COVID19 vaccines (Moderna).    2. GOITER: Not addressed today. He was seen by Dr. Villanueva in May 2020.    3. NEPHROPATHY: Pt has CKD3 and followed here by renal staff.  Most recent creat was 1.43 with GFR 49 mL/min on 12/14/2020.  He is taking Cozaar and Jardiance for renal protection.  Pt's BP is good at 102/67 today.  Discussed the need for better glycemic control.    4.  RETINOPATHY:  Seen here by [facility] in June 2021.    5.  NEUROPATHY: Continue Gabapentin,alpha-lipoic acid and topical cream.  No foot ulcers on exam today.    6. DYSLIPIDEMIA: LDL 49 in [id number].  Pt is taking Lipitor, fish oil and Fenofibrate.    7. OBESITY: See under # 1 above.  Tolerating Ozempic- he does not want to increase the dose today.    8.  FOLLOW UP: with me in 3 months    Total time spent reviewing pt's chart notes,labs and Freestyle Lora sensor download today= 8 minutes.  Total time face to face visit today= 30 minutes.  Documentation time= 15 minutes.    TOTAL TIME FOR  VISIT TODAY = 53 minutes    Pamela Laura PA-C

## 2021-07-14 ENCOUNTER — OFFICE VISIT (OUTPATIENT)
Dept: OPHTHALMOLOGY | Facility: CLINIC | Age: 71
End: 2021-07-14
Attending: OPHTHALMOLOGY
Payer: COMMERCIAL

## 2021-07-14 DIAGNOSIS — E11.3411 SEVERE NONPROLIFERATIVE DIABETIC RETINOPATHY OF RIGHT EYE WITH MACULAR EDEMA ASSOCIATED WITH TYPE 2 DIABETES MELLITUS (H): Primary | ICD-10-CM

## 2021-07-14 DIAGNOSIS — E13.311 MACULAR EDEMA DUE TO SECONDARY DIABETES (H): ICD-10-CM

## 2021-07-14 PROCEDURE — 999N000103 HC STATISTIC NO CHARGE FACILITY FEE

## 2021-07-14 PROCEDURE — 67228 TREATMENT X10SV RETINOPATHY: CPT | Mod: LT | Performed by: OPHTHALMOLOGY

## 2021-07-14 RX ORDER — ATORVASTATIN CALCIUM 20 MG/1
20 TABLET, FILM COATED ORAL DAILY
Qty: 90 TABLET | Refills: 0 | Status: SHIPPED | OUTPATIENT
Start: 2021-07-14 | End: 2021-08-30

## 2021-07-14 ASSESSMENT — VISUAL ACUITY
OS_PH_SC: 20/60
OS_PH_SC+: -
OS_SC: 20/80
OS_SC+: +1
OD_SC+: -2
METHOD: SNELLEN - LINEAR
OD_SC: 20/25

## 2021-07-14 ASSESSMENT — SLIT LAMP EXAM - LIDS
COMMENTS: BLEPHARITIS, SCURF, DERMATOCHALASIS
COMMENTS: BLEPHARITIS, SCURF, DERMATOCHALASIS

## 2021-07-14 ASSESSMENT — TONOMETRY
OS_IOP_MMHG: 14
IOP_METHOD: TONOPEN
OD_IOP_MMHG: 17

## 2021-07-14 ASSESSMENT — EXTERNAL EXAM - RIGHT EYE: OD_EXAM: NORMAL

## 2021-07-14 ASSESSMENT — CUP TO DISC RATIO: OS_RATIO: 0.3

## 2021-07-14 ASSESSMENT — EXTERNAL EXAM - LEFT EYE: OS_EXAM: NORMAL

## 2021-07-14 NOTE — NURSING NOTE
Chief Complaints and History of Present Illnesses   Patient presents with     Follow Up      RVO vs Severe NPDR left eyes with macular edema extra foveally in left eye, involving fovea in OS     Chief Complaint(s) and History of Present Illness(es)     Follow Up     Laterality: left eye    Associated symptoms: Negative for floaters, headache, flashes and eye pain    Treatments tried: artificial tears    Pain scale: 0/10    Comments:  RVO vs Severe NPDR left eyes with macular edema extra foveally in left eye, involving fovea in OS              Comments     Pt here for laser left eye     Miriam Maki COT 3:49 PM July 14, 2021

## 2021-07-14 NOTE — PROGRESS NOTES
Here for left eye PRP only     CC: decreased vision following CEIOL in OS  S/P right eye PRP 6/29/21    HPI: vision stable  s/p CEIOL left eye on 6/21/19.  S/P PRP left eye 12/2019  Multiple avastin injections left eye and then switched to Eylea: last injections 8/2020      Assessment/plan:   1. RVO vs Severe NPDR left eyes with macular edema extra foveally in left eye, involving fovea in OS   - FA 2019 showed extensive ischemia and capillary non perfusion: PRP done   - Blood pressure (<120/80) and blood glucose (HbA1c <7.0) control discussed with patient. Patient advised that failure to adequately control each may lead to vision loss. The patient expressed understanding.     - ME refractory to avastin. did Eylea os in 8/2020 and patient did not return to clinic for 10 months.  Seen by Dr. Nolasco and FA done:    Eylea left eye 6/29/21   PRP left eye today   Follow up 6 w       2. Diabetic macular edema left eye   - Worsened since CEIOL on 6/21/19   - Has been on ketorolac and PF twice a day/tid since surgery and will stop when running out    - OCT (12/4/19): fovea involving edema worsened compared to 10/2019   - see #1    3. Pseudophakia ou    4. Severe NPDR and Diabetic macular edema right eye with severe ischemia in FA   - FA with severe ischemia: PRP today 6/29/21    5. Stye LL os   - pt deferred maxitrol, agrees to start warm compresses     RTC in 6 w with Dr. Nolasco or with me    Complete documentation of historical and exam elements from today's encounter can be found in the full encounter summary report (not reduplicated in this progress note). I personally obtained the chief complaint(s) and history of present illness.  I confirmed and edited as necessary the review of systems, past medical/surgical history, family history, social history, and examination findings as documented by others; and I examined the patient myself. I personally reviewed the relevant tests, images, and reports as documented  above. I formulated and edited as necessary the assessment and plan and discussed the findings and management plan with the patient and family.    Jaden Johnson MD, PhD

## 2021-07-20 ENCOUNTER — TRANSFERRED RECORDS (OUTPATIENT)
Dept: HEALTH INFORMATION MANAGEMENT | Facility: CLINIC | Age: 71
End: 2021-07-20

## 2021-07-30 ENCOUNTER — LAB (OUTPATIENT)
Dept: LAB | Facility: CLINIC | Age: 71
End: 2021-07-30
Payer: COMMERCIAL

## 2021-07-30 DIAGNOSIS — E11.3399 TYPE 2 DIABETES MELLITUS WITH MODERATE NONPROLIFERATIVE RETINOPATHY WITHOUT MACULAR EDEMA, WITH LONG-TERM CURRENT USE OF INSULIN, UNSPECIFIED LATERALITY (H): ICD-10-CM

## 2021-07-30 DIAGNOSIS — Z79.4 TYPE 2 DIABETES MELLITUS WITH MODERATE NONPROLIFERATIVE RETINOPATHY WITHOUT MACULAR EDEMA, WITH LONG-TERM CURRENT USE OF INSULIN, UNSPECIFIED LATERALITY (H): ICD-10-CM

## 2021-07-30 LAB
CREAT SERPL-MCNC: 1.84 MG/DL (ref 0.66–1.25)
GFR SERPL CREATININE-BSD FRML MDRD: 36 ML/MIN/1.73M2

## 2021-07-30 PROCEDURE — 82565 ASSAY OF CREATININE: CPT

## 2021-07-30 PROCEDURE — 36415 COLL VENOUS BLD VENIPUNCTURE: CPT

## 2021-08-05 DIAGNOSIS — Z79.4 TYPE 2 DIABETES MELLITUS WITH HYPERGLYCEMIA, WITH LONG-TERM CURRENT USE OF INSULIN (H): ICD-10-CM

## 2021-08-05 DIAGNOSIS — E11.65 TYPE 2 DIABETES MELLITUS WITH HYPERGLYCEMIA, WITH LONG-TERM CURRENT USE OF INSULIN (H): ICD-10-CM

## 2021-08-06 RX ORDER — INSULIN DEGLUDEC 100 U/ML
INJECTION, SOLUTION SUBCUTANEOUS
Qty: 60 ML | Refills: 3 | Status: SHIPPED | OUTPATIENT
Start: 2021-08-06 | End: 2022-10-10

## 2021-08-06 NOTE — TELEPHONE ENCOUNTER
insulin degludec (TRESIBA FLEXTOUCH) 100 UNIT/ML pen    Last Written Prescription Date:  5/13/21  Last Fill Quantity: 60ml,   # refills: 0  Last Office Visit : 7/13/21  Future Office visit:  11/11/21      Routing refill request to provider for review/approval because:endo triage to fill

## 2021-08-10 ENCOUNTER — ANCILLARY PROCEDURE (OUTPATIENT)
Dept: GENERAL RADIOLOGY | Facility: CLINIC | Age: 71
End: 2021-08-10
Attending: NURSE PRACTITIONER
Payer: COMMERCIAL

## 2021-08-10 ENCOUNTER — OFFICE VISIT (OUTPATIENT)
Dept: URGENT CARE | Facility: URGENT CARE | Age: 71
End: 2021-08-10
Payer: COMMERCIAL

## 2021-08-10 VITALS
HEIGHT: 68 IN | WEIGHT: 201 LBS | TEMPERATURE: 97.7 F | OXYGEN SATURATION: 98 % | HEART RATE: 83 BPM | BODY MASS INDEX: 30.46 KG/M2 | DIASTOLIC BLOOD PRESSURE: 69 MMHG | SYSTOLIC BLOOD PRESSURE: 126 MMHG

## 2021-08-10 DIAGNOSIS — M79.651 PAIN OF RIGHT THIGH: ICD-10-CM

## 2021-08-10 DIAGNOSIS — M62.838 MUSCLE SPASM: ICD-10-CM

## 2021-08-10 DIAGNOSIS — M25.561 ACUTE PAIN OF RIGHT KNEE: ICD-10-CM

## 2021-08-10 DIAGNOSIS — M25.561 ACUTE PAIN OF RIGHT KNEE: Primary | ICD-10-CM

## 2021-08-10 PROCEDURE — 73560 X-RAY EXAM OF KNEE 1 OR 2: CPT | Mod: RT | Performed by: RADIOLOGY

## 2021-08-10 PROCEDURE — 73502 X-RAY EXAM HIP UNI 2-3 VIEWS: CPT | Performed by: RADIOLOGY

## 2021-08-10 PROCEDURE — 99214 OFFICE O/P EST MOD 30 MIN: CPT | Performed by: NURSE PRACTITIONER

## 2021-08-10 RX ORDER — METHOCARBAMOL 750 MG/1
750 TABLET, FILM COATED ORAL 3 TIMES DAILY
Qty: 21 TABLET | Refills: 0 | Status: SHIPPED | OUTPATIENT
Start: 2021-08-10 | End: 2021-08-17

## 2021-08-10 ASSESSMENT — ENCOUNTER SYMPTOMS
COUGH: 0
ARTHRALGIAS: 0
SHORTNESS OF BREATH: 0
NUMBNESS: 1
MYALGIAS: 1
LIGHT-HEADEDNESS: 0
DIAPHORESIS: 0
CHEST TIGHTNESS: 0
PARESTHESIAS: 0
FEVER: 0
DIZZINESS: 0
WOUND: 1
FATIGUE: 0
JOINT SWELLING: 1
CHILLS: 0
ABDOMINAL PAIN: 0
ACTIVITY CHANGE: 0
APPETITE CHANGE: 0
WEAKNESS: 0
ADENOPATHY: 0
COLOR CHANGE: 1
VOMITING: 0
WHEEZING: 0
NAUSEA: 0
PALPITATIONS: 0

## 2021-08-10 ASSESSMENT — MIFFLIN-ST. JEOR: SCORE: 1636.23

## 2021-08-10 NOTE — PROGRESS NOTES
Chief Complaint   Patient presents with     Urgent Care     Pt in clinic to have eval for right leg injury due to fall.     Musculoskeletal Problem     SUBJECTIVE:  Earle Rene is a 71 year old male who presents to the clinic today with right lower extremity pain following a fall last night. He stretched out his leg and felt like a muscle or ligament was pulled. Now he has muscle spasms, tightness, shooting, stabbing pain from his right buttocks down to his mid calf. He has trouble bearing weight and walking. He has baseline diabetic neuropathy. Has not taken anything for this.    Past Medical History:   Diagnosis Date     Blepharitis of both eyes      BPH (benign prostatic hyperplasia)      Diabetes (H)      Diabetic neuropathy (H)      Diabetic retinopathy associated with diabetes mellitus due to underlying condition (H)      Dry eye syndrome      GERD (gastroesophageal reflux disease)      Goiter      Granulomatous disease (H)      HLD (hyperlipidemia)      HTN (hypertension)      Nonsenile cataract      Peripheral neuropathy      albuterol (VENTOLIN HFA) 108 (90 Base) MCG/ACT inhaler, Inhale 2 puffs into the lungs every 6 hours  alpha-lipoic acid 600 MG capsule, Take 1 capsule (600 mg) by mouth daily  amLODIPine (NORVASC) 5 MG tablet, Take 1 tablet (5 mg) by mouth At Bedtime  ARTIFICIAL TEAR OP, Apply to eye as needed  aspirin 81 MG tablet, Take 1 tablet by mouth At Bedtime   atorvastatin (LIPITOR) 20 MG tablet, Take 1 tablet (20 mg) by mouth daily  blood glucose (NO BRAND SPECIFIED) lancets standard, Lancets that go with device, Test 3 times daily  blood glucose monitoring (NO BRAND SPECIFIED) meter device kit, Any meter covered by insurance, not store brand, use as directed.  blood glucose monitoring (NO BRAND SPECIFIED) test strip, Strips that go with meter, covered by insurance. Test 3 times daily  Blood Pressure Monitor KIT, Automatic Blood Pressure Monitor  camphor-menthol (DERMASARRA) 0.5-0.5 % external  lotion, Apply to arms legs torso 1-2 times a day for itching  cholecalciferol (VITAMIN D3) 25 mcg (1000 units) capsule, Take 2 capsules (50 mcg) by mouth daily  Cholecalciferol (VITAMIN D3) 50 MCG (2000 UT) TABS, Take 1 tablet by mouth daily  clobetasol (TEMOVATE) 0.05 % ointment, Apply topically to legs twice daily for 2 weeks.  Then discontinue  clotrimazole (LOTRIMIN) 1 % cream, Apply topically 2 times daily  Continuous Blood Gluc  (FREESTYLE PETER READER) JUANCARLOS, 1 Device daily Use to read blood sugars   as  instruction 4 times daily  Continuous Blood Gluc Sensor (FREESTYLE PETER 14 DAY SENSOR) MISC, 1 applicator every 14 days Change every 14 days  dextromethorphan-guaiFENesin (MUCINEX DM)  MG 12 hr tablet, Take 1 tablet by mouth every 12 hours  dextromethorphan-guaiFENesin (TUSSIN DM)  MG/5ML liquid, Take 5 mLs by mouth 2 times daily as needed  doxycycline hyclate (VIBRA-TABS) 100 MG tablet, Take 1 tablet (100 mg) by mouth 2 times daily  empagliflozin (JARDIANCE) 10 MG TABS tablet, One tab daily.  famotidine (PEPCID) 20 MG tablet, Take 1 tablet (20 mg) by mouth 2 times daily  fenofibrate 54 MG tablet, Take 1 tablet (54 mg) by mouth daily (Patient taking differently: Take 54 mg by mouth At Bedtime )  finasteride (PROSCAR) 5 MG tablet, Take 1 tablet (5 mg) by mouth daily  fluocinonide (LIDEX) 0.05 % external cream, APPLY TO AFFECTED AREA TWICE A DAY  fluticasone (FLONASE) 50 MCG/ACT nasal spray, Spray 1 spray into both nostrils daily  fluticasone (FLOVENT HFA) 110 MCG/ACT inhaler, Inhale 1 puff into the lungs 2 times daily  gabapentin (NEURONTIN) 300 MG capsule, Take 1 capsule (300 mg) by mouth 2 times daily  gabapentin (NEURONTIN) 300 MG capsule, TAKE 1 CAPSULE BY MOUTH TWICE A DAY  HUMALOG KWIKPEN 100 UNIT/ML soln, Inject 15-17  units with meals, plus correction. Pt uses approx 65 units in 24 hrs.  insulin degludec (TRESIBA FLEXTOUCH) 100 UNIT/ML pen, Inject 64 units subcu at  "bedtime.  insulin pen needle (B-D U/F) 31G X 5 MM miscellaneous, Use 4 time(s) per day.  Please dispense as BD Pen Needle Mini U/F 31G x 5 MM  insulin pen needle (B-D U/F) 31G X 5 MM, Use 4 times per day.  Please dispense as BD Pen Needle Mini U/F 31G x 5 MM  insulin syringe 31G X 5/16\" 0.5 ML MISC, Use three syringes daily  ketamine 5% gabapentin 8% lidocaine 2.5% topical PLO cream, Apply 1 g topically 3 times daily  loratadine (CLARITIN) 10 MG tablet, Take 1 tablet (10 mg) by mouth daily (Patient taking differently: Take 10 mg by mouth At Bedtime )  losartan (COZAAR) 100 MG tablet, TAKE 1 TABLET BY MOUTH AT BEDTIME  mupirocin (BACTROBAN) 2 % cream, Apply  topically. In very small amounts only as needed  Omega-3 Fatty Acids (OMEGA-3 FISH OIL) 1000 MG CAPS, Take 1 capsule (1 g) by mouth 2 times daily (Patient taking differently: Take 1 g by mouth as needed (PT last dose a week ago 12.24.19) )  ONETOUCH ULTRA test strip, Use to test blood sugar 3 times daily  Semaglutide,0.25 or 0.5MG/DOS, (OZEMPIC, 0.25 OR 0.5 MG/DOSE,) 2 MG/1.5ML SOPN, Inject 0.25 mg Subcutaneous once a week  sodium bicarbonate 650 MG tablet, Take 1 tablet (650 mg) by mouth 3 times daily  tamsulosin (FLOMAX) 0.4 MG capsule, Take 1 capsule (0.4 mg) by mouth daily  triamcinolone (KENALOG) 0.1 % cream, Apply topically 3 times daily    aflibercept (EYLEA) injection vial 2 mg  aflibercept (EYLEA) injection vial 2 mg      Social History     Tobacco Use     Smoking status: Never Smoker     Smokeless tobacco: Never Used   Substance Use Topics     Alcohol use: Yes     Comment: occasionaly     No Known Allergies    Review of Systems   Constitutional: Negative for activity change, appetite change, chills, diaphoresis, fatigue and fever.   Respiratory: Negative for cough, chest tightness, shortness of breath and wheezing.    Cardiovascular: Negative for palpitations and peripheral edema.   Gastrointestinal: Negative for abdominal pain, nausea and vomiting. " "  Musculoskeletal: Positive for gait problem, joint swelling and myalgias. Negative for arthralgias.   Skin: Positive for color change and wound. Negative for rash.   Neurological: Positive for numbness. Negative for dizziness, weakness, light-headedness and paresthesias.   Hematological: Negative for adenopathy.     EXAM:   /69   Pulse 83   Temp 97.7  F (36.5  C) (Tympanic)   Ht 1.727 m (5' 8\")   Wt 91.2 kg (201 lb)   SpO2 98%   BMI 30.56 kg/m      Physical Exam  Vitals reviewed.   Constitutional:       General: He is in acute distress (cannot sit due to pain).      Appearance: Normal appearance.   HENT:      Head: Normocephalic and atraumatic.      Nose: Nose normal.      Mouth/Throat:      Mouth: Mucous membranes are moist.      Pharynx: Oropharynx is clear.   Eyes:      Extraocular Movements: Extraocular movements intact.      Conjunctiva/sclera: Conjunctivae normal.      Pupils: Pupils are equal, round, and reactive to light.   Cardiovascular:      Rate and Rhythm: Normal rate.   Pulmonary:      Effort: Pulmonary effort is normal.   Musculoskeletal:         General: Swelling, tenderness and signs of injury present.      Cervical back: Normal range of motion and neck supple.      Comments: Right lower extremity with tenderness from mid thigh to mid calf.   Skin:     General: Skin is warm and dry.      Findings: No erythema or rash.   Neurological:      General: No focal deficit present.      Mental Status: He is alert and oriented to person, place, and time.      Gait: Gait abnormal (antalgic).   Psychiatric:         Mood and Affect: Mood normal.         Behavior: Behavior normal.       Xrays done in clinic read by me as negative for fracture.  Results for orders placed or performed in visit on 08/10/21   XR Hip Right 2-3 Views     Status: None    Narrative    XR HIP RIGHT 2-3 VIEWS 8/10/2021 2:30 PM     HISTORY: fell yesterday, severe pain from buttocks down to mid calf,  stabbing, shooting, tender, " trouble walking; Pain of right thigh      Impression    IMPRESSION: Joint space width appears within normal limits. No  evidence of fracture. Large and small vessel atherosclerotic  calcification in the thigh.    ANN SMITH MD         SYSTEM ID:  [id number]   Results for orders placed or performed in visit on 08/10/21   XR Knee Standing Right 2 Views     Status: None    Narrative    XR KNEE STANDING RIGHT 2 VIEWS 8/10/2021 2:30 PM     HISTORY: fell yesterday, severe muscle spasm, tearing like pain,  trouble walking; Acute pain of right knee      Impression    IMPRESSION: Negative exam.    ANN SMITH MD         SYSTEM ID:  [id number]     ASSESSMENT:    ICD-10-CM    1. Acute pain of right knee  M25.561 Orthopedic  Referral     CANCELED: XR Knee Right 3 Views   2. Muscle spasm  M62.838 methocarbamol (ROBAXIN) 750 MG tablet   3. Pain of right thigh  M79.651 XR Hip Right 2-3 Views     PLAN:  Patient Instructions   Likely a muscle strain with spasms given history  Pt wants to hold on xray at this time, low suspicion given mechanism of injury  Robaxin as needed, use with caution due to sedation  Tylenol, rest, ice, heat, stretching  Follow-up with ortho in one week if pain lingers or worsens    Follow up with primary care provider with any problems, questions or concerns or if symptoms worsen or fail to improve. Patient agreed to plan and verbalized understanding.    AJ [facility]

## 2021-08-10 NOTE — PATIENT INSTRUCTIONS
Likely a muscle strain with spasms given history  Pt wants to hold on xray at this time, low suspicion given mechanism of injury  Robaxin as needed, use with caution due to sedation  Tylenol, rest, ice, heat, stretching  Follow-up with ortho in one week if pain lingers or worsens

## 2021-08-11 DIAGNOSIS — E11.3411 SEVERE NONPROLIFERATIVE DIABETIC RETINOPATHY OF RIGHT EYE WITH MACULAR EDEMA ASSOCIATED WITH TYPE 2 DIABETES MELLITUS (H): Primary | ICD-10-CM

## 2021-08-11 NOTE — TELEPHONE ENCOUNTER
DIAGNOSIS: Acute pain of right knee/XR/Alea Torres   APPOINTMENT DATE: 8.13.21   NOTES STATUS DETAILS   OFFICE NOTE from referring provider N/A    OFFICE NOTE from other specialist Internal 8.10.21 [facility]   DISCHARGE SUMMARY from hospital N/A    DISCHARGE REPORT from the ER N/A    OPERATIVE REPORT N/A    EMG report N/A    MEDICATION LIST Internal    MRI N/A    DEXA (osteoporosis/bone health) N/A    CT SCAN N/A    XRAYS (IMAGES & REPORTS) Internal 8.10.21 R standing knee, R hip

## 2021-08-13 ENCOUNTER — OFFICE VISIT (OUTPATIENT)
Dept: ORTHOPEDICS | Facility: CLINIC | Age: 71
End: 2021-08-13
Payer: COMMERCIAL

## 2021-08-13 ENCOUNTER — PRE VISIT (OUTPATIENT)
Dept: ORTHOPEDICS | Facility: CLINIC | Age: 71
End: 2021-08-13

## 2021-08-13 VITALS — BODY MASS INDEX: 30.46 KG/M2 | WEIGHT: 201 LBS | HEIGHT: 68 IN

## 2021-08-13 DIAGNOSIS — S76.319A HAMSTRING TEAR: Primary | ICD-10-CM

## 2021-08-13 DIAGNOSIS — M25.561 ACUTE PAIN OF RIGHT KNEE: ICD-10-CM

## 2021-08-13 PROCEDURE — 99214 OFFICE O/P EST MOD 30 MIN: CPT | Performed by: FAMILY MEDICINE

## 2021-08-13 ASSESSMENT — MIFFLIN-ST. JEOR: SCORE: 1636.23

## 2021-08-13 NOTE — LETTER
"  8/13/2021      RE: Earle Rene  [address] [place] [address]         [facility]  SPORTS & ORTHOPEDIC CLINIC VISIT     Aug 13, 2021        ASSESSMENT & PLAN    71-year-old with acute tear of the right hamstring.  Has adequate strength on exam, complete tear not suspected.    Reviewed imaging and assessment with patient in detail  Recommended diclofenac gel, compression, WBAT  Provide referral to physical therapy  He will follow-up in 6 weeks for reevaluation.    Macario Velazco MD  [facility] [place]    -----  Chief Complaint   Patient presents with     Right Knee - Pain       SUBJECTIVE  Earle Rene is a/an 71 year old male who is seen in consultation at the request of  Alea Torres NP. for evaluation of  R knee pain.     The patient is seen with their wife.  The patient is Right handed    Onset: 3 day(s) ago. Patient describes injury as slipping and extending his R leg  Location of Pain: right posterior  Worsened by: standing, walking, sitting  Better with: nothing, hot bath  Treatments tried: rest/activity avoidance and heat  Associated symptoms: numbness lower R leg - unsure if this is related to diabetes    Orthopedic/Surgical history: NO  Social History/Occupation: oriental rug      REVIEW OF SYSTEMS:    Do you have fever, chills, weight loss? No    Do you have any vision problems? No    Do you have any chest pain or edema? No    Do you have any shortness of breath or wheezing?  Yes, nothing new    Do you have stomach problems? No    Do you have any numbness or focal weakness? Yes, numbness on R leg    Do you have diabetes? Yes, Type 2    Do you have problems with bleeding or clotting? No    Do you have an rashes or other skin lesions? No    OBJECTIVE:  Ht 1.727 m (5' 8\")   Wt 91.2 kg (201 lb)   BMI 30.56 kg/m       General: Alert, pleasant, no distress  Right lower extremity: Diffuse swelling of the posterior thigh with " fresh ecchymosis distally in the popliteal area as well as here the proximal hamstring origin.  Diffusely tender throughout the hamstring body as well as just distal to the ischial tuberosity.  No specific TTP over the ischial tuberosity.  Has pain with knee flexion against resistance at 90 and 30 degrees knee flexion but without considerable weakness.    RADIOLOGY:    Independently reviewed previous 2 view x-rays of the right knee dated 8/10/2021 which demonstrates no acute fracture or dislocation.  No significant DJD.               Macario Velazco MD

## 2021-08-13 NOTE — PROGRESS NOTES
"  [facility]  SPORTS & ORTHOPEDIC CLINIC VISIT     Aug 13, 2021        ASSESSMENT & PLAN    71-year-old with acute tear of the right hamstring.  Has adequate strength on exam, complete tear not suspected.    Reviewed imaging and assessment with patient in detail  Recommended diclofenac gel, compression, WBAT  Provide referral to physical therapy  He will follow-up in 6 weeks for reevaluation.    Macario Velazco MD  [facility] SP[facility]TS [facility]IC[facility]    -----  Chief Complaint   Patient presents with     Right Knee - Pain       SUBJECTIVE  Earle Rene is a/an 71 year old male who is seen in consultation at the request of  Alea Torres NP. for evaluation of  R knee pain.     The patient is seen with their wife.  The patient is Right handed    Onset: 3 day(s) ago. Patient describes injury as slipping and extending his R leg  Location of Pain: right posterior  Worsened by: standing, walking, sitting  Better with: nothing, hot bath  Treatments tried: rest/activity avoidance and heat  Associated symptoms: numbness lower R leg - unsure if this is related to diabetes    Orthopedic/Surgical history: NO  Social History/Occupation: oriental rug      REVIEW OF SYSTEMS:    Do you have fever, chills, weight loss? No    Do you have any vision problems? No    Do you have any chest pain or edema? No    Do you have any shortness of breath or wheezing?  Yes, nothing new    Do you have stomach problems? No    Do you have any numbness or focal weakness? Yes, numbness on R leg    Do you have diabetes? Yes, Type 2    Do you have problems with bleeding or clotting? No    Do you have an rashes or other skin lesions? No    OBJECTIVE:  Ht 1.727 m (5' 8\")   Wt 91.2 kg (201 lb)   BMI 30.56 kg/m       General: Alert, pleasant, no distress  Right lower extremity: Diffuse swelling of the posterior thigh with fresh ecchymosis distally in the popliteal area as well as here the proximal hamstring " origin.  Diffusely tender throughout the hamstring body as well as just distal to the ischial tuberosity.  No specific TTP over the ischial tuberosity.  Has pain with knee flexion against resistance at 90 and 30 degrees knee flexion but without considerable weakness.    RADIOLOGY:    Independently reviewed previous 2 view x-rays of the right knee dated 8/10/2021 which demonstrates no acute fracture or dislocation.  No significant DJD.

## 2021-08-19 ENCOUNTER — THERAPY VISIT (OUTPATIENT)
Dept: PHYSICAL THERAPY | Facility: CLINIC | Age: 71
End: 2021-08-19
Payer: COMMERCIAL

## 2021-08-19 DIAGNOSIS — S76.319A HAMSTRING TEAR: Primary | ICD-10-CM

## 2021-08-19 PROCEDURE — 97110 THERAPEUTIC EXERCISES: CPT | Mod: GP | Performed by: PHYSICAL THERAPIST

## 2021-08-19 PROCEDURE — 97161 PT EVAL LOW COMPLEX 20 MIN: CPT | Mod: GP | Performed by: PHYSICAL THERAPIST

## 2021-08-19 NOTE — PROGRESS NOTES
Physical Therapy Initial Examination/Evaluation  August 19, 2021    Earle Rene is a 71 year old male referred to physical therapy by Macario Velazco MD for treatment of right hamstring tear with Precautions/Restrictions/MD instructions none    Therapist Impression:   Earle presents to therapy with an acute hamstring strain. He has difficulty with full extension, gait, prolonged sitting, and active motion of the knee. Skilled therapy is required to address ongoing limitations and to make progress towards therapy goals.     NEXT: manual therapy/modalities to reduce edema if noted and pain, gentle stretching, progress hamstring strengthening as able    Subjective:  DOI/onset: 8/9/2021 DOS: NA  Earle had a fall ten days ago and 'split' his legs and fell to the floor. Since then he has been having pain from his bottom down to his calf. Has severe pain at night, when sitting, and when walking with bruising down the posterior aspect of his leg.   Acute Injury or Gradual Onset?: Acute injury onset  Mechanism of Injury: Fall   Related PMH: broken ankle of right lower extremity Previous Treatment: Ice, Tylenol, Ibuprofen and compression Effect of prior treatment: fair - from ice   Imaging: x-ray 8/10/2021 - hip and knee unremarkable per impression  Chief Complaint/Functional Limitations:   Knee extension, sitting, walking and see below in therapy evaluation codes   Pain: rest 8 /10, activity 15-/10 Location: posterior aspect of right leg Frequency: Constant Described as: sharp, stabbing Alleviated by: Ice Progression of Symptoms: Gradually getting better. Time of day when pain is worse: Activity related, Position related and All times of the day/constant  Sleeping: Interrupted due to current issue   Occupation: self-employed  Job duties: prolonged sitting, lifting/carrying, pushing/pulling  Current HEP/exercise regimen: walking 'a few miles on the weekend'  Patient's goals are see chief complaints prolonged sitting  without pain    Other pertinent PMH/Red Flags: Diabetes   Barriers at home/work: None as reported by patient  Pertinent Surgical History: NA  Medications: High blood pressure  General health as reported by patient: good  Return to MD:  10/4/2021 Dr Velazco     KNEE EVALUATION      Dynamic Movement Screen  Gait: difficulty weightbearing on right lower extremity, left lateral trunk lean     Range of Motion  Hip Joint Screen: WNL      Knee ROM Extension Flexion   Left 0 WNL   Right 0-5 90 prior to pain      Flexibility Quadriceps Hamstrings   Left none/WNL none/WNL   Right Unable to assess due to pain Unable to assess due to pain     Hip and Knee Strength   MMT Knee Flexion   Left 5/5   Right 4+/5 30 and 90 degrees flexion     Knee MMT Quadriceps set Straight Leg Raise   Left Good Good   Right Good Good     Knee ligaments and meniscus: Not assessed    Patellofemoral assessment: not assessed     Palpation  Right: tender on palpation of hamstring distally, proximally, and throughout muscle belly     Assessment/Plan:  Patient is a 71 year old male with right side knee complaints.    Patient has the following significant findings with corresponding treatment plan.                Diagnosis 1:  Right hamstring  Pain -  hot/cold therapy, US, electric stimulation, manual therapy, splint/taping/bracing/orthotics, education and home program  Decreased ROM/flexibility - manual therapy and therapeutic exercise  Decreased strength - therapeutic exercise and therapeutic activities    Therapy Evaluation Codes:     Cumulative Therapy Evaluation is: Low complexity.    Previous and current functional limitations:  (See Goal Flow Sheet for this information)    Short term and Long term goals: (See Goal Flow Sheet for this information)     Communication ability:  Patient appears to be able to clearly communicate and understand verbal and written communication and follow directions correctly.  Treatment Explanation - The following has been  discussed with the patient:   RX ordered/plan of care  Anticipated outcomes  Possible risks and side effects  This patient would benefit from PT intervention to resume normal activities.   Rehab potential is good.    Frequency:  1 X week, once daily  Duration:  for 4 weeks tapering to 2 X a month over 8 weeks  Discharge Plan:  Achieve all LTG.  Independent in home treatment program.  Reach maximal therapeutic benefit.    Please refer to the daily flowsheet for treatment today, total treatment time and time spent performing 1:1 timed codes.     Please refer to the daily flowsheet for treatment today, total treatment time and time spent performing 1:1 timed codes.

## 2021-08-24 DIAGNOSIS — E78.5 HYPERLIPIDEMIA LDL GOAL <100: ICD-10-CM

## 2021-08-26 ENCOUNTER — THERAPY VISIT (OUTPATIENT)
Dept: PHYSICAL THERAPY | Facility: CLINIC | Age: 71
End: 2021-08-26
Payer: COMMERCIAL

## 2021-08-26 DIAGNOSIS — S76.319A HAMSTRING TEAR: Primary | ICD-10-CM

## 2021-08-26 PROCEDURE — 97140 MANUAL THERAPY 1/> REGIONS: CPT | Mod: GP

## 2021-08-26 PROCEDURE — 97110 THERAPEUTIC EXERCISES: CPT | Mod: GP

## 2021-08-27 DIAGNOSIS — N18.30 CKD (CHRONIC KIDNEY DISEASE) STAGE 3, GFR 30-59 ML/MIN (H): ICD-10-CM

## 2021-08-27 DIAGNOSIS — E55.9 VITAMIN D DEFICIENCY: ICD-10-CM

## 2021-08-28 NOTE — CONFIDENTIAL NOTE
atorvastatin (LIPITOR) 20 MG tablet     Last Written Prescription Date:  7/14/21  Last Fill Quantity: 90,   # refills: 0  Last Office Visit : 3/2/21  Future Office visit:  None      Routing refill request to provider for review/approval because: LDL

## 2021-08-30 RX ORDER — ATORVASTATIN CALCIUM 20 MG/1
TABLET, FILM COATED ORAL
Qty: 90 TABLET | Refills: 0 | Status: SHIPPED | OUTPATIENT
Start: 2021-08-30 | End: 2021-11-16

## 2021-09-02 ENCOUNTER — THERAPY VISIT (OUTPATIENT)
Dept: PHYSICAL THERAPY | Facility: CLINIC | Age: 71
End: 2021-09-02
Payer: COMMERCIAL

## 2021-09-02 DIAGNOSIS — S76.319A HAMSTRING TEAR: Primary | ICD-10-CM

## 2021-09-02 PROCEDURE — 97140 MANUAL THERAPY 1/> REGIONS: CPT | Mod: GP | Performed by: PHYSICAL THERAPIST

## 2021-09-02 PROCEDURE — 97110 THERAPEUTIC EXERCISES: CPT | Mod: GP | Performed by: PHYSICAL THERAPIST

## 2021-09-09 ENCOUNTER — TELEPHONE (OUTPATIENT)
Dept: INTERNAL MEDICINE | Facility: CLINIC | Age: 71
End: 2021-09-09

## 2021-09-09 NOTE — TELEPHONE ENCOUNTER
[facility] Call Center    Phone Message    May a detailed message be left on voicemail: yes     Reason for Call: Symptoms or Concerns     If patient has red-flag symptoms, warm transfer to triage line    Current symptom or concern: Diabetic has numbness in both beet    Symptoms have been present for:  few week(s)    Has patient previously been seen for this? No      Are there any new or worsening symptoms? Yes: Continued numbness in both feet has issues walking because of it. Very bothersome to patient      Action Taken: Message routed to:  [facility] ([facility]    Travel Screening: Not Applicable

## 2021-09-10 NOTE — TELEPHONE ENCOUNTER
CONSULT NOTE    INTERNAL MEDICINE   [facility]       Patient Identification:  Name: Joselyn Grant  Age: 51 y.o.  Sex: female  :  1967  MRN: [id number]             Date of Consultation:  10/4/2018      Primary Care Physician: Leodan Nam MD                               Requesting Physician:   JULY Oh MD  Reason for Consultation:  Medical management, HTN.    Chief Complaint:  Post op pain.     History of Present Illness:   Pleasant 51-year-old female with a history of septic left prosthetic knee.  She presents for an elective redo of a left total knee arthroplasty.  She is presently postop.  She has noted a significant amount of nausea postop but no vomiting.  She is complaining of a fair amount of left leg pain postop.  She has a history of hypertension which she states has been under good control.  She is sleepy during the examination secondary to recent dose of IV Phenergan for nausea.        Past Medical History:  Past Medical History:   Diagnosis Date   • Arthritis    • History of kidney stones    • Hypertension    • Knee pain    • Migraine    • Risk factors for obstructive sleep apnea    • Staph infection     LEFT KNEE ON ANTIBIOTICS   • Urinary incontinence     wears pads     Past Surgical History:  Past Surgical History:   Procedure Laterality Date   • COLONOSCOPY N/A 2017    Procedure: COLONOSCOPY to cecum;  Surgeon: Ino Torres MD;  Location: [facility] ENDOSCOPY;  Service:    • EYE SURGERY      lasix   • HYSTERECTOMY      10 years ago for heavy menses and fibroids   • KNEE SURGERY Left     MULTIPLE SURGERIES TOTAL 7   • LAPAROSCOPIC CHOLECYSTECTOMY     • OOPHORECTOMY     • DE REVISE KNEE JOINT REPLACE,1 PART Left 2017    Procedure: LT TOTAL KNEE ARTHROPLASTY REVISION;  Surgeon: Ha Oh MD;  Location: [facility] OR;  Service: Orthopedics   • DE REVISE KNEE JOINT REPLACE,1 PART Left 2017    Procedure: LT TOTAL KNEE ARTHROPLASTY REVISION;   I could see him 10/4/2021 when he comes into see amanda Morgan   Surgeon: Ha Oh MD;  Location: [facility] OR;  Service: Orthopedics   • REPLACEMENT TOTAL KNEE      left   • STOMACH SURGERY      lapband      Home Meds:  Prescriptions Prior to Admission   Medication Sig Dispense Refill Last Dose   • amLODIPine (NORVASC) 10 MG tablet Take 1 tablet by mouth Every Morning. 90 tablet 3 10/4/2018 at 0445   • ferrous sulfate 325 (65 FE) MG tablet Take 325 mg by mouth Daily With Breakfast.   10/3/2018 at 0900   • furosemide (LASIX) 40 MG tablet Take 1 tablet by mouth Every Morning. 90 tablet 3 10/3/2018 at 0900   • HYDROcodone-acetaminophen (NORCO)  MG per tablet Take 1 tablet by mouth Every 6 (Six) Hours As Needed for Moderate Pain .   10/4/2018 at 0445   • melatonin 5 MG tablet tablet Take 5 mg by mouth At Night As Needed.   Past Month at Unknown time   • metoprolol succinate XL (TOPROL-XL) 25 MG 24 hr tablet Take 2 tablets by mouth Daily. 180 tablet 2 10/4/2018 at 0445   • rifAMPin (RIFADIN) 300 MG capsule Take 300 mg by mouth Every 12 (Twelve) Hours.   10/4/2018 at 0445   • celecoxib (CeleBREX) 200 MG capsule Take 200 mg by mouth Daily. PT TO CHECK WITH MD FOR STOP DATE   10/1/2018 at 0900   • EPINEPHrine (EPIPEN) 0.3 MG/0.3ML solution auto-injector injection As Needed.   Not Taking   • estradiol (MINIVELLE, VIVELLE-DOT) 0.075 MG/24HR patch Place 1 patch on the skin as directed by provider 2 (Two) Times a Week. (Patient taking differently: Place 1 patch on the skin as directed by provider 2 (Two) Times a Week. On Tuesdays and Fridays) 24 patch 6 10/2/2018 at 0900   • ibuprofen (ADVIL,MOTRIN) 200 MG tablet Take 200 mg by mouth Every 6 (Six) Hours As Needed for Mild Pain . HELD   9/27/2018   • metoprolol succinate XL (TOPROL-XL) 25 MG 24 hr tablet TAKE ONE TABLET BY MOUTH DAILY 21 tablet 0    • POTASSIUM PO Take 99 mg by mouth Daily. 99 mg   9/28/2018     Current Meds:   [unfilled]  Allergies:  No Known Allergies  Social History:   Social History   Substance Use  "Topics   • Smoking status: Never Smoker   • Smokeless tobacco: Never Used   • Alcohol use 1.8 oz/week     1 Shots of liquor, 2 Glasses of wine per week      Comment: SOCIAL      Family History:  Family History   Problem Relation Age of Onset   • Pancreatic cancer Mother    • Hypertension Mother    • No Known Problems Father    • Fibroids Sister    • Heart murmur Sister    • Lymphoma Maternal Uncle    • Lung cancer Maternal Grandmother    • Bell's palsy Brother    • Hypertension Brother    • No Known Problems Maternal Grandfather    • No Known Problems Brother    • Malig Hyperthermia Neg Hx           Review of Systems  Review of Systems   Reason unable to perform ROS: Sedation.         Vitals:   /93 (BP Location: Left arm, Patient Position: Lying)   Pulse 90   Temp 97.2 °F (36.2 °C) (Oral)   Resp 16   Ht 157.5 cm (62.01\")   Wt 103 kg (227 lb 1.2 oz)   SpO2 94%   BMI 41.52 kg/m²   I/O:   Intake/Output Summary (Last 24 hours) at 10/04/18 4458  Last data filed at 10/04/18 1245   Gross per 24 hour   Intake             1500 ml   Output              225 ml   Net             1275 ml       Exam:  Physical Exam   Constitutional: She appears well-developed and well-nourished. No distress.   Obese.   HENT:   Head: Normocephalic and atraumatic.   Right Ear: External ear normal.   Left Ear: External ear normal.   Nose: Nose normal.   Mouth/Throat: Oropharynx is clear and moist.   Eyes: Conjunctivae and EOM are normal. Right eye exhibits no discharge. Left eye exhibits no discharge. No scleral icterus.   Neck: Neck supple. No tracheal deviation present. No thyromegaly present.   Cardiovascular: Normal rate, regular rhythm, normal heart sounds and intact distal pulses.    Pulmonary/Chest: Effort normal and breath sounds normal. No stridor. No respiratory distress. She exhibits no tenderness.   Abdominal: Soft. Bowel sounds are normal. She exhibits no distension and no mass. There is no tenderness. There is no rebound " and no guarding.   Musculoskeletal: She exhibits no edema.   Neurological: No cranial nerve deficit. She exhibits normal muscle tone.   Sleepy but reasonably easy to awaken.  Oriented ×3 when awake.  Pleasant cooperative and appropriate but falls back asleep quite easily.   Skin: Skin is warm and dry. She is not diaphoretic.   Psychiatric: She has a normal mood and affect. Her behavior is normal. Judgment and thought content normal.       Data Review:  Labs in chart were reviewed.    Assessment:  Active Problems:    Osteoarthritis of knee    Infection of left knee (CMS/HCC)    Hypertension: Appears to be in a very reasonable control at present.  Nausea, post op: Will probably improve on its own in due time.  Symptomatic support time being.  IV fluids.  When take is low.  Morbid obesity: Monitor for signs or symptoms of sleep apnea.    Plan:  Please see above.  We certainly appreciate being involved in this pleasant lady's care.  We'll follow up on her labs in the morning and will be happy to follow her along with you.    Lauri Lo MD   10/4/2018  6:58 PM    EMR Dragon/Transcription disclaimer:   Much of this encounter note is an electronic transcription/translation of spoken language to printed text. The electronic translation of spoken language may permit erroneous, or at times, nonsensical words or phrases to be inadvertently transcribed; Although I have reviewed the note for such errors, some may still exist.

## 2021-09-22 ENCOUNTER — TELEPHONE (OUTPATIENT)
Dept: ORTHOPEDICS | Facility: CLINIC | Age: 71
End: 2021-09-22

## 2021-09-22 NOTE — TELEPHONE ENCOUNTER
Central Prior Authorization Team   Phone: [phone]      PA Initiation    Medication: diclofenac (VOLTAREN) 1 % topical gel-PA initiated  Insurance Company: [employer] - Phone [phone] Fax [phone]  Pharmacy Filling the Rx: [id number] [place] [facility] [place], [place] - [address]  Filling Pharmacy Phone: [phone]  Filling Pharmacy Fax:    Start Date: 9/22/2021

## 2021-09-23 NOTE — TELEPHONE ENCOUNTER
Prior Authorization Approval    Authorization Effective Date: 6/24/2021  Authorization Expiration Date: 12/22/2021  Medication: diclofenac (VOLTAREN) 1 % topical gel-PA approved  Approved Dose/Quantity:   Reference #:     Insurance Company: [employer] - Phone [phone] Fax [phone]  Expected CoPay:       CoPay Card Available:      Foundation Assistance Needed:    Which Pharmacy is filling the prescription (Not needed for infusion/clinic administered): [id number] [place], [place] - [address]  Pharmacy Notified: Yes  Patient Notified: No-pharmacy will contact

## 2021-10-04 ENCOUNTER — OFFICE VISIT (OUTPATIENT)
Dept: INTERNAL MEDICINE | Facility: CLINIC | Age: 71
End: 2021-10-04
Payer: COMMERCIAL

## 2021-10-04 VITALS
OXYGEN SATURATION: 96 % | SYSTOLIC BLOOD PRESSURE: 144 MMHG | BODY MASS INDEX: 30.53 KG/M2 | HEART RATE: 85 BPM | DIASTOLIC BLOOD PRESSURE: 72 MMHG | WEIGHT: 200.8 LBS

## 2021-10-04 DIAGNOSIS — R09.89 OTHER SPECIFIED SYMPTOMS AND SIGNS INVOLVING THE CIRCULATORY AND RESPIRATORY SYSTEMS: ICD-10-CM

## 2021-10-04 DIAGNOSIS — G62.9 NEUROPATHY: Primary | ICD-10-CM

## 2021-10-04 DIAGNOSIS — M79.2 POLYNEUROPATHIC PAIN: ICD-10-CM

## 2021-10-04 PROCEDURE — 99215 OFFICE O/P EST HI 40 MIN: CPT | Mod: 25 | Performed by: INTERNAL MEDICINE

## 2021-10-04 PROCEDURE — 90662 IIV NO PRSV INCREASED AG IM: CPT | Performed by: INTERNAL MEDICINE

## 2021-10-04 PROCEDURE — G0008 ADMIN INFLUENZA VIRUS VAC: HCPCS | Performed by: INTERNAL MEDICINE

## 2021-10-04 RX ORDER — CHOLECALCIFEROL (VITAMIN D3) 25 MCG
TABLET ORAL
COMMUNITY
Start: 2021-06-21 | End: 2023-09-29

## 2021-10-04 ASSESSMENT — PAIN SCALES - GENERAL: PAINLEVEL: MILD PAIN (3)

## 2021-10-04 NOTE — PROGRESS NOTES
At the request of Dr. Marcio Hare, Earle Rene received the Influenza Vaccine Fluzone High-Dose Quadrivalent [id number] Formula For 65 yrs of age and older vaccine today in clinic. The flu shot was given under the supervision of Dr. Marcio Hare if assistance was needed. The immunization site was cleaned with an alcohol prep wipe. The flu shot was given without incident--see immunization list for administration details. No swelling or redness was observed at the site of injection after the immunization was given. The patient was advised to remain in [facility] lobby for 15 minutes after the injection in case of an averse reaction.     Chele Roque, EMT at 11:10 AM on 10/4/2021

## 2021-10-04 NOTE — PATIENT INSTRUCTIONS
[facility] Refill Request Information:    Please contact your pharmacy regarding any request for medication refills. The [facility] prescription fax number is [phone]. Please allow three business days for routine medication refills and five business days for controlled substance medication refills.    [facility] Test Result Notification Information:    You will be notified within seven to ten day of your appointment regarding your test results. If you are on MyChart, you will be notified as soon as the provider has reviewed and signed the results.     If you need anything else, feel free to contact the Primary Care Center at [phone].    To schedule your EMG appointment, please call [phone].    To schedule your ultrasounds, please call radiology scheduling at [phone].

## 2021-10-04 NOTE — PROGRESS NOTES
HPI:    Last visit with me 3/12/2021. He has chronic B knee and L hip pain. He has seen Dr. Velazco Orthopedic in the past but missed his appt. Today (10/4/2021). He still worse B neuropathy numbness and pain. He has had this for several years. He is on Gabapentin 300 mg PO BID at night and this may be beneficial. He has seen neurology in the past Dr. Mayorga (2016). He has had EMG and imaging as well. No other HEENT, cardiopulmonary, abdominal, , neurological, systemic, psychiatric complaints.     Past Medical History:   Diagnosis Date     Blepharitis of both eyes      BPH (benign prostatic hyperplasia)      Diabetes (H)      Diabetic neuropathy (H)      Diabetic retinopathy associated with diabetes mellitus due to underlying condition (H)      Dry eye syndrome      GERD (gastroesophageal reflux disease)      Goiter      Granulomatous disease (H)      HLD (hyperlipidemia)      HTN (hypertension)      Nonsenile cataract      Peripheral neuropathy      Past Surgical History:   Procedure Laterality Date     ------------OTHER-------------      back of neck abscess drainage in OR     AS RAD RESEC TONSIL/PILLARS Bilateral 1961     CATARACT IOL, RT/LT Left      COLONOSCOPY  7/29/2013    Procedure: COLONOSCOPY;;  Surgeon: Montana Pascal MD;  Location:  GI     EXCISE MASS UPPER EXTREMITY Right 11/11/2019    Procedure: EXCISION, MASS, UPPER EXTREMITY, RIGHT SHOULDER;  Surgeon: Johana Choudhury MD;  Location: [facility] OR     INTRAVITREAL INJECTION CHEMOTHERAPY Right 12/30/2019    Procedure: INTRAVITREAL Bevacizumab injection;  Surgeon: Milton Maki MD;  Location: [facility] OR     PHACOEMULSIFICATION CLEAR CORNEA WITH STANDARD INTRAOCULAR LENS IMPLANT Right 12/30/2019    Procedure: PHACOEMULSIFICATION, CATARACT, WITH INTRAOCULAR LENS IMPLANT;  Surgeon: Milton Maki MD;  Location: [facility] OR     PHACOEMULSIFICATION WITH STANDARD INTRAOCULAR LENS IMPLANT Left 6/21/2019    Procedure: Left Eye Cataract Removal with  Intraocular Lens Implant with Intraoperative Avastin Injection;  Surgeon: Lacey Eugene MD;  Location:  OR     [facility]  11/2017     PE:    Vitals noted, gen, nad, cooperative, alert, neck supple nl rom, lungs with good air movement, RRR, S1, S2, no MRG, abdomen, no acute findings. Difficult to palpate B tibials pedis pulses. He has decreased sensation B from mid calf down. No skin breakdown.     A/P:    1. Immunizations; he has completed the Moderna COVID vaccination series, influenza vaccination today (104/2021). Recommend Shingrix vaccination  2. HTN: on Amlodipine, Losartan  3. Increased lipids on Atorvastatin and fenofibrate  4. DM2; on Jardiance, Insulin, and Semaglutide. Seen by Ms. Laura 7/13/2021. A1c 8.2% on 6/9/2021. He has 11/11/2021, Endocrinology follow up  5. BPH on Proscar and Flomax. Seen by Dr. Bland, Urology 4/9/2021. PSA 2.90 on 4/9/2021  6. Seen Dr. Velazco, orthopedics 8/13/2021 for R knee pain and has follow up today 10/4/2021 but missed his appointment, He still has L hip pain and B knee pain. He does not really want to follow up in orthopedics.   7. CKD; seen by Dr. Ramsey, Nephrology 6/9/2021. Cr 7/30/2021 1.84. He has 12/8/2021 follow up with Dr. Ramsey.    8. TSH 0.98 on 6/9/2021  9. No acute findings on brain MRI 6/14/2021 done for dizziness.   10. Neuropathy and B foot complaints getting worse. He is on gabapentin (renal dosed). He states this may be effective. He has had EMG and arterial studies int he past. Reordered EMG and B LE vascular arterial imaging and he will follow up with me in about 3 weeks to go over all the tests.       40 minutes spent on the date of the encounter doing chart review, history and exam, documentation and further activities as noted above

## 2021-10-04 NOTE — NURSING NOTE
Chief Complaint   Patient presents with     Recheck Medication     Follow up; pain/numbness in feet, occasional pain; occasional dizziness       CRISTY Calix at 10:28 AM on 10/4/2021.

## 2021-10-05 NOTE — TELEPHONE ENCOUNTER
GABAPENTIN 300 MG CAPSULE  Last Written Prescription Date:  4/19/2021  Last Fill Quantity: 60,   # refills: 1  Last Office Visit : 10/4/2021  Future Office visit:  11/1/2021    Routing refill request to provider for review/approval because:  Drug not on the [facility], P or [facility] refill protocol or controlled substance      Kacy Briones RN  Central Triage Red Flags/Med Refills

## 2021-10-06 RX ORDER — GABAPENTIN 300 MG/1
300 CAPSULE ORAL 2 TIMES DAILY
Qty: 60 CAPSULE | Refills: 3 | Status: SHIPPED | OUTPATIENT
Start: 2021-10-06 | End: 2022-09-12

## 2021-10-11 DIAGNOSIS — I10 BENIGN ESSENTIAL HYPERTENSION: ICD-10-CM

## 2021-10-11 DIAGNOSIS — N18.30 CHRONIC KIDNEY DISEASE, STAGE III (MODERATE) (H): ICD-10-CM

## 2021-10-12 DIAGNOSIS — N18.30 CHRONIC KIDNEY DISEASE, STAGE III (MODERATE) (H): ICD-10-CM

## 2021-10-12 DIAGNOSIS — N18.32 CHRONIC KIDNEY DISEASE, STAGE 3B (H): Primary | ICD-10-CM

## 2021-10-12 RX ORDER — SODIUM BICARBONATE 650 MG/1
650 TABLET ORAL 3 TIMES DAILY
Qty: 90 TABLET | Refills: 11 | Status: SHIPPED | OUTPATIENT
Start: 2021-10-12 | End: 2023-01-18

## 2021-10-12 RX ORDER — AMLODIPINE BESYLATE 5 MG/1
5 TABLET ORAL AT BEDTIME
Qty: 90 TABLET | Refills: 3 | Status: SHIPPED | OUTPATIENT
Start: 2021-10-12 | End: 2022-10-11

## 2021-10-27 ENCOUNTER — OFFICE VISIT (OUTPATIENT)
Dept: NEUROLOGY | Facility: CLINIC | Age: 71
End: 2021-10-27
Attending: INTERNAL MEDICINE
Payer: COMMERCIAL

## 2021-10-27 DIAGNOSIS — M47.27 OSTEOARTHRITIS OF SPINE WITH RADICULOPATHY, LUMBOSACRAL REGION: Primary | ICD-10-CM

## 2021-10-27 DIAGNOSIS — G62.9 NEUROPATHY: ICD-10-CM

## 2021-10-27 PROCEDURE — 95886 MUSC TEST DONE W/N TEST COMP: CPT | Performed by: PSYCHIATRY & NEUROLOGY

## 2021-10-27 PROCEDURE — 95911 NRV CNDJ TEST 9-10 STUDIES: CPT | Performed by: PSYCHIATRY & NEUROLOGY

## 2021-10-27 NOTE — PROGRESS NOTES
[facility]  Electrodiagnostic Laboratory                 Department of Neurology                                                                                                         Test Date:  10/27/2021    Patient: Earle Rene : 1950 Physician: Daniel Mckenzie MD   Sex: Male AGE: 71 year Ref Phys:    ID#: [id number]   Technician: FELICIA Will     Clinical Information:  71 year old man with diabetes and chronic kidney disease who reports worsening pain and numbness in his feet and legs. Evaluate for polyneuropathy and compare to prior study dated 2019.      Techniques:  Motor and sensory conduction studies were done with surface recording electrodes. EMG was done with a concentric needle electrode.     Results:  Nerve conduction studies:   1. Bilateral sural sensory responses are absent.   2. Right median-D2 sensory response shows normal amplitude and moderately slow CV.   3. Right peroneal-EDB motor response shows mildly prolonged DL, moderately reduced amplitude and moderately slowed CV.   4. Left peroneal-EDB motor response shows borderline prolonged DL and severely reduced amplitude.   5. Right tibial-AH motor response shows normal DL, moderately reduced amplitude and mildly slowed CV.   6. Left tibial-AH motor response shows normal DL and moderately reduced amplitude   7. Right median-APB motor response shows mildly prolonged DL, normal amplitude and normal CV in the forearm.      Needle EM. Fibrillation potentials and positive sharp waves were seen in the left TA, gastrocnemius, and left LS paraspinal muscles.   2. Large amplitude and/or long duration motor unit potentials (MUP) were seen in the left TA, gastrocnemius and glut med muscles.   3. Recruitment patterns were normal.      Interpretation:  This is an abnormal study. There is electrophysiologic evidence of:   1) Moderate length-dependant, axonal sensorimotor polyneuropathy. Compared to the  prior study dated 8/5/2019 motor responses in the lower limbs are smaller. This finding suggests a modest degree of interval neuropathy worsening.  2) Left-sided chronic S1>L5 lumbosacral radiculopathy. These findings are similar to those appreciated on the 8/5/2019 study.   3) Mild right-sided median neuropathy at the wrist (e.g, carpal tunnel syndrome).     Clinical correlation is recommended.      Daniel Mckenzie MD  Department of Neurology      Nerve Conduction Studies  Motor Sites      Latency Amplitude Neg. Amp Diff Segment Distance Velocity Neg. Dur Neg Area Diff Temperature Comment   Site (ms) Norm (mV) Norm %  cm m/s Norm ms %  C    Right Dp Branch Fibular (TA) Motor   Fib Head 3.7  < 4.2 4.2 -      8.6  31.1    Pop Fossa 5.7  < 5.7 4.3 - 2.4 Pop Fossa-Fib Head 10.5 53 - 8.6 *0.53 31    Left Fibular (EDB) Motor   Ankle 6.0  < 6.0 *0.29  > 2.0  Ankle-EDB 8   7.8  23.9    Right Fibular (EDB) Motor   Ankle *6.3  < 6.0 *0.54  > 2.0  Ankle-EDB 8   8.6  31.1    Bel Fib Head 15.4 - 0.34 - -37.0 Bel Fib Head-Ankle 28.5 *31  > 38 - - 31.1    Pop Fossa 19.1 - 0.25 - -26.5 Pop Fossa-Bel Fib Head 12 *32  > 38 9.0 - 31.1    Right Median (APB) Motor   Wrist *4.8  < 4.2 6.4  > 5.0  Wrist-APB 8   6.5  32.1    Elbow 10.2 - 6.2 - -3.1 Elbow-Wrist 26 48  > 48 6.9 -10.7 32.1    Left Tibial (AHB) Motor   Ankle 5.6  < 6.5 *1.13  > 4.4  Ankle-AHB 8   6.0  31.1    Right Tibial (AHB) Motor   Ankle 4.5  < 6.5 *1.24  > 4.4  Ankle-AHB 8   6.3  31    Knee 17.1 - 0.92 - -25.8 Knee-Ankle 41 *33  > 38 6.5 -54.2 31.1      Sensory Sites      Onset Lat Neg Peak Lat Amp (O-P) Amp (P-P) Segment Distance Velocity Temperature Comment   Site ms ms  V Norm  V  cm m/s Norm  C    Right Median Sensory   Wrist-Dig II 3.2 *4.5 12  > 10 18 Wrist-Dig II 14 *44  > 48 32.1    Left Sural Sensory   Calf-Lat Mall *NR *NR *NR  > 5 *NR Calf-Lat Mall 14 *NR  > 38     Right Sural Sensory   Calf-Lat Mall *NR *NR *NR  > 5 *NR Calf-Lat Mall 14 *NR  > 38 31         Electromyography     Side Muscle Ins Act Fibs/PSW Fasc HF Amp Dur Poly Recrt Int Pat Comment   Left Vastus Lat Nml None Nml 0 Nml Nml 0 Nml Nml    Left Tib Anterior *Incr *1+ Nml 0 Nml *2+ *2+ Nml Nml    Left Gastroc *Incr *2+ Nml 0 Nml *2+ *2+ Nml Nml    Left Gluteus Med Nml None Nml 0 Nml *1+ *1+ Nml Nml    Left Lumbo Parasp (Lower) *Incr *1+ Nml 0               NCS Waveforms:    Motor                    Sensory

## 2021-10-27 NOTE — LETTER
10/27/2021       RE: Earle Rene  [address]  [place] [address]     Dear Colleague,    Thank you for referring your patient, Earle Rene, to the [facility] [place] at [facility]. Please see a copy of my visit note below.          [facility]  Electrodiagnostic Laboratory                 Department of Neurology  Test Date:  10/27/2021    Patient: Earle Rene : 1950 Physician: Daniel Mckenzie MD   Sex: Male AGE: 71 year Ref Phys:    ID#: [id number]   Technician: FELICIA Will     Clinical Information:  71 year old man with diabetes and chronic kidney disease who reports worsening pain and numbness in his feet and legs. Evaluate for polyneuropathy and compare to prior study dated 2019.      Techniques:  Motor and sensory conduction studies were done with surface recording electrodes. EMG was done with a concentric needle electrode.     Results:  Nerve conduction studies:   1. Bilateral sural sensory responses are absent.   2. Right median-D2 sensory response shows normal amplitude and moderately slow CV.   3. Right peroneal-EDB motor response shows mildly prolonged DL, moderately reduced amplitude and moderately slowed CV.   4. Left peroneal-EDB motor response shows borderline prolonged DL and severely reduced amplitude.   5. Right tibial-AH motor response shows normal DL, moderately reduced amplitude and mildly slowed CV.   6. Left tibial-AH motor response shows normal DL and moderately reduced amplitude   7. Right median-APB motor response shows mildly prolonged DL, normal amplitude and normal CV in the forearm.      Needle EM. Fibrillation potentials and positive sharp waves were seen in the left TA, gastrocnemius, and left LS paraspinal muscles.   2. Large amplitude and/or long duration motor unit potentials (MUP) were seen in the left TA, gastrocnemius and glut med muscles.   3. Recruitment  patterns were normal.      Interpretation:  This is an abnormal study. There is electrophysiologic evidence of:   1) Moderate length-dependant, axonal sensorimotor polyneuropathy. Compared to the prior study dated 8/5/2019 motor responses in the lower limbs are smaller. This finding suggests a modest degree of interval neuropathy worsening.  2) Left-sided chronic S1>L5 lumbosacral radiculopathy. These findings are similar to those appreciated on the 8/5/2019 study.   3) Mild right-sided median neuropathy at the wrist (e.g, carpal tunnel syndrome).     Clinical correlation is recommended.      Daniel Mckenzie MD  Department of Neurology    Nerve Conduction Studies  Motor Sites      Latency Amplitude Neg. Amp Diff Segment Distance Velocity Neg. Dur Neg Area Diff Temperature Comment   Site (ms) Norm (mV) Norm %  cm m/s Norm ms %  C    Right Dp Branch Fibular (TA) Motor   Fib Head 3.7  < 4.2 4.2 -      8.6  31.1    Pop Fossa 5.7  < 5.7 4.3 - 2.4 Pop Fossa-Fib Head 10.5 53 - 8.6 *0.53 31    Left Fibular (EDB) Motor   Ankle 6.0  < 6.0 *0.29  > 2.0  Ankle-EDB 8   7.8  23.9    Right Fibular (EDB) Motor   Ankle *6.3  < 6.0 *0.54  > 2.0  Ankle-EDB 8   8.6  31.1    Bel Fib Head 15.4 - 0.34 - -37.0 Bel Fib Head-Ankle 28.5 *31  > 38 - - 31.1    Pop Fossa 19.1 - 0.25 - -26.5 Pop Fossa-Bel Fib Head 12 *32  > 38 9.0 - 31.1    Right Median (APB) Motor   Wrist *4.8  < 4.2 6.4  > 5.0  Wrist-APB 8   6.5  32.1    Elbow 10.2 - 6.2 - -3.1 Elbow-Wrist 26 48  > 48 6.9 -10.7 32.1    Left Tibial (AHB) Motor   Ankle 5.6  < 6.5 *1.13  > 4.4  Ankle-AHB 8   6.0  31.1    Right Tibial (AHB) Motor   Ankle 4.5  < 6.5 *1.24  > 4.4  Ankle-AHB 8   6.3  31    Knee 17.1 - 0.92 - -25.8 Knee-Ankle 41 *33  > 38 6.5 -54.2 31.1      Sensory Sites      Onset Lat Neg Peak Lat Amp (O-P) Amp (P-P) Segment Distance Velocity Temperature Comment   Site ms ms  V Norm  V  cm m/s Norm  C    Right Median Sensory   Wrist-Dig II 3.2 *4.5 12  > 10 18 Wrist-Dig II 14 *44  > 48  32.1    Left Sural Sensory   Calf-Lat Mall *NR *NR *NR  > 5 *NR Calf-Lat Mall 14 *NR  > 38     Right Sural Sensory   Calf-Lat Mall *NR *NR *NR  > 5 *NR Calf-Lat Mall 14 *NR  > 38 31        Electromyography     Side Muscle Ins Act Fibs/PSW Fasc HF Amp Dur Poly Recrt Int Pat Comment   Left Vastus Lat Nml None Nml 0 Nml Nml 0 Nml Nml    Left Tib Anterior *Incr *1+ Nml 0 Nml *2+ *2+ Nml Nml    Left Gastroc *Incr *2+ Nml 0 Nml *2+ *2+ Nml Nml    Left Gluteus Med Nml None Nml 0 Nml *1+ *1+ Nml Nml    Left Lumbo Parasp (Lower) *Incr *1+ Nml 0               NCS Waveforms:    Motor                    Sensory             Again, thank you for allowing me to participate in the care of your patient.      Sincerely,    Daniel Mckenzie MD

## 2021-10-28 DIAGNOSIS — E11.9 DIABETES MELLITUS (H): ICD-10-CM

## 2021-10-29 ENCOUNTER — TELEPHONE (OUTPATIENT)
Dept: INTERNAL MEDICINE | Facility: CLINIC | Age: 71
End: 2021-10-29

## 2021-10-29 NOTE — TELEPHONE ENCOUNTER
[facility]    Phone Message    May a detailed message be left on voicemail: yes     Reason for Call: Requesting Results   Name/type of test: Recent testing from Dr. Mckenzie results were sent to Dr. Hare  Date of test: muscle and nerve testing  Was test done at a location other than [facility] (Please fill in the location if not [facility])?: No      Action Taken: Message routed to:  [facility] ([facility]    Travel Screening: Not Applicable

## 2021-10-29 NOTE — TELEPHONE ENCOUNTER
Called patient.  Dr. Hare will discuss the result at his appointment with Dr. Hare on 11/01/21.  Patient was not aware of this appointment.  Patient will see provider on [place].      Adrian Hu CMA ([facility]) at 2:14 PM on 10/29/2021

## 2021-10-31 NOTE — PROGRESS NOTES
HPI:    Last visit with me 10/4/2021. He still states leg pain and cramps. He missed his 10/27/2021 vascular U/S testing. He has worse lower L back pain worse with walking. He had EMG 10/27/2021 with possible chronic L  L5/S1 findings. He has not had recent lumbar MRI imaging. No other HEENT, cardiopulmonary, abdominal, , neurological, systemic, psychiatric, lymphatic complaints.     Past Medical History:   Diagnosis Date     Blepharitis of both eyes      BPH (benign prostatic hyperplasia)      Diabetes (H)      Diabetic neuropathy (H)      Diabetic retinopathy associated with diabetes mellitus due to underlying condition (H)      Dry eye syndrome      GERD (gastroesophageal reflux disease)      Goiter      Granulomatous disease (H)      HLD (hyperlipidemia)      HTN (hypertension)      Nonsenile cataract      Peripheral neuropathy      Past Surgical History:   Procedure Laterality Date     ------------OTHER-------------      back of neck abscess drainage in OR     AS RAD RESEC TONSIL/PILLARS Bilateral 1961     CATARACT IOL, RT/LT Left      COLONOSCOPY  7/29/2013    Procedure: COLONOSCOPY;;  Surgeon: Montana Pascal MD;  Location:  GI     EXCISE MASS UPPER EXTREMITY Right 11/11/2019    Procedure: EXCISION, MASS, UPPER EXTREMITY, RIGHT SHOULDER;  Surgeon: Johana Choudhury MD;  [facility]     INTRAVITREAL INJECTION CHEMOTHERAPY Right 12/30/2019    Procedure: INTRAVITREAL Bevacizumab injection;  Surgeon: Milton Maki MD;  [facility]: [facility]     PHACOEMULSIFICATION CLEAR CORNEA WITH STANDARD INTRAOCULAR LENS IMPLANT Right 12/30/2019    Procedure: PHACOEMULSIFICATION, CATARACT, WITH INTRAOCULAR LENS IMPLANT;  Surgeon: Milton Maki MD;  [facility]     PHACOEMULSIFICATION WITH STANDARD INTRAOCULAR LENS IMPLANT Left 6/21/2019    Procedure: Left Eye Cataract Removal with Intraocular Lens Implant with Intraoperative Avastin Injection;  Surgeon: Lacey Eugene MD;  Location: [facility] OR      jenn  11/2017     PE:    Vitals noted, gen, nad, cooperative, alert, neck supple nl rom, lungs with good air movement, RRR, S1, S2, no MRG, abdomen, no acute findings. Grossly normal neurological exam.     EMG: 10/27/2021:  Interpretation:  This is an abnormal study. There is electrophysiologic evidence of:   1) Moderate length-dependant, axonal sensorimotor polyneuropathy. Compared to the prior study dated 8/5/2019 motor responses in the lower limbs are smaller. This finding suggests a modest degree of interval neuropathy worsening.  2) Left-sided chronic S1>L5 lumbosacral radiculopathy. These findings are similar to those appreciated on the 8/5/2019 study.   3) Mild right-sided median neuropathy at the wrist (e.g, carpal tunnel syndrome).     A/P:    1. Immunizations; he has gotten two doses of the Mod[place] COVID vaccination series. Check with insurance regarding Shingrix. He got the influenza vaccine 10/4/2021  2. CKD; Cr 1.84 on 7/30/2021 and he has renal follow up with Dr. Ramsey 12/8/2021  3. DM2; Endocrinology follow up with Ms. Laura 11/11/2021. A1c was 8.2% 6/9/2021. On Jardiance, insulin and Semaglutide  4. Urology; PSA 4/9/2021 was 2.90 on Flomax for BPH and seen Dr. Bland,  Urology 4/9/2021  5. Increased lipids on Atorvastatin and fenofibrate 6/18/2019 HDL 39 and LDL 40 on 6/18/2019  6. HTN; On Losartan  7. On Vitamin D; 6/1/2020 was 39  8. Colonoscopy; 7/29/2013 and repeat 10 years   7. Gabapentin (300 mg BID) for neuropathy; EMG; 10/27/2021 as above  9. Ordered B LE U/S studies 10/4/2021 for leg pain; unfortunately NSH 10/27/2021. Ordered again today 11/1/2021  10. Ordered Lumbar MRI today given his L sided back pain complaints and EMG findings.     40 minutes spent on the date of the encounter doing chart review, history and exam, documentation and further activities as noted above

## 2021-11-01 ENCOUNTER — OFFICE VISIT (OUTPATIENT)
Dept: INTERNAL MEDICINE | Facility: CLINIC | Age: 71
End: 2021-11-01
Payer: COMMERCIAL

## 2021-11-01 VITALS
SYSTOLIC BLOOD PRESSURE: 134 MMHG | OXYGEN SATURATION: 98 % | HEART RATE: 75 BPM | WEIGHT: 194.8 LBS | BODY MASS INDEX: 28.85 KG/M2 | DIASTOLIC BLOOD PRESSURE: 78 MMHG | RESPIRATION RATE: 16 BRPM | HEIGHT: 69 IN

## 2021-11-01 DIAGNOSIS — R79.89 BLOOD CHOLESTEROL INCREASED COMPARED WITH PRIOR MEASUREMENT: Primary | ICD-10-CM

## 2021-11-01 DIAGNOSIS — R09.89 OTHER SPECIFIED SYMPTOMS AND SIGNS INVOLVING THE CIRCULATORY AND RESPIRATORY SYSTEMS: ICD-10-CM

## 2021-11-01 DIAGNOSIS — M79.606 PAIN OF LOWER EXTREMITY, UNSPECIFIED LATERALITY: ICD-10-CM

## 2021-11-01 DIAGNOSIS — M54.89 OTHER CHRONIC BACK PAIN: ICD-10-CM

## 2021-11-01 DIAGNOSIS — G89.29 OTHER CHRONIC BACK PAIN: ICD-10-CM

## 2021-11-01 DIAGNOSIS — E55.9 VITAMIN D DEFICIENCY: ICD-10-CM

## 2021-11-01 PROCEDURE — 99215 OFFICE O/P EST HI 40 MIN: CPT | Performed by: INTERNAL MEDICINE

## 2021-11-01 ASSESSMENT — PAIN SCALES - GENERAL: PAINLEVEL: NO PAIN (0)

## 2021-11-01 ASSESSMENT — MIFFLIN-ST. JEOR: SCORE: 1622.37

## 2021-11-01 NOTE — NURSING NOTE
Earle Rene is a 71 year old male patient that presents today in clinic for the following:    Chief Complaint   Patient presents with     RECHECK     Three month follow-up     The patient's allergies and medications were reviewed as noted. A set of vitals were recorded as noted without incident. The patient does not have any other questions for the provider.    Chele Roque, EMT at 4:50 PM on 11/1/2021

## 2021-11-01 NOTE — PATIENT INSTRUCTIONS
To schedule your imaging studies (MRI and ultrasound), please call radiology scheduling at [phone].    [phone]

## 2021-11-02 ENCOUNTER — OFFICE VISIT (OUTPATIENT)
Dept: OPHTHALMOLOGY | Facility: CLINIC | Age: 71
End: 2021-11-02
Attending: OPHTHALMOLOGY
Payer: COMMERCIAL

## 2021-11-02 ENCOUNTER — LAB (OUTPATIENT)
Dept: LAB | Facility: CLINIC | Age: 71
End: 2021-11-02
Payer: COMMERCIAL

## 2021-11-02 DIAGNOSIS — R79.89 BLOOD CHOLESTEROL INCREASED COMPARED WITH PRIOR MEASUREMENT: ICD-10-CM

## 2021-11-02 DIAGNOSIS — Z96.1 PSEUDOPHAKIA OF BOTH EYES: ICD-10-CM

## 2021-11-02 DIAGNOSIS — E11.3411 SEVERE NONPROLIFERATIVE DIABETIC RETINOPATHY OF RIGHT EYE WITH MACULAR EDEMA ASSOCIATED WITH TYPE 2 DIABETES MELLITUS (H): Primary | ICD-10-CM

## 2021-11-02 DIAGNOSIS — E13.311 MACULAR EDEMA DUE TO SECONDARY DIABETES (H): ICD-10-CM

## 2021-11-02 DIAGNOSIS — E55.9 VITAMIN D DEFICIENCY: ICD-10-CM

## 2021-11-02 DIAGNOSIS — Z79.4 TYPE 2 DIABETES MELLITUS WITH HYPERGLYCEMIA, WITH LONG-TERM CURRENT USE OF INSULIN (H): ICD-10-CM

## 2021-11-02 DIAGNOSIS — E11.65 TYPE 2 DIABETES MELLITUS WITH HYPERGLYCEMIA, WITH LONG-TERM CURRENT USE OF INSULIN (H): ICD-10-CM

## 2021-11-02 LAB
CHOLEST SERPL-MCNC: 159 MG/DL
DEPRECATED CALCIDIOL+CALCIFEROL SERPL-MC: 38 UG/L (ref 20–75)
FASTING STATUS PATIENT QL REPORTED: YES
HBA1C MFR BLD: 8.3 % (ref 0–5.6)
HDLC SERPL-MCNC: 37 MG/DL
LDLC SERPL CALC-MCNC: 56 MG/DL
NONHDLC SERPL-MCNC: 122 MG/DL
TRIGL SERPL-MCNC: 331 MG/DL

## 2021-11-02 PROCEDURE — 83036 HEMOGLOBIN GLYCOSYLATED A1C: CPT | Performed by: PATHOLOGY

## 2021-11-02 PROCEDURE — 80061 LIPID PANEL: CPT | Performed by: PATHOLOGY

## 2021-11-02 PROCEDURE — 36415 COLL VENOUS BLD VENIPUNCTURE: CPT | Performed by: PATHOLOGY

## 2021-11-02 PROCEDURE — 82306 VITAMIN D 25 HYDROXY: CPT | Performed by: INTERNAL MEDICINE

## 2021-11-02 PROCEDURE — 92014 COMPRE OPH EXAM EST PT 1/>: CPT | Performed by: OPHTHALMOLOGY

## 2021-11-02 PROCEDURE — G0463 HOSPITAL OUTPT CLINIC VISIT: HCPCS

## 2021-11-02 PROCEDURE — 92134 CPTRZ OPH DX IMG PST SGM RTA: CPT | Performed by: OPHTHALMOLOGY

## 2021-11-02 ASSESSMENT — CUP TO DISC RATIO
OS_RATIO: 0.3
OD_RATIO: 0.3

## 2021-11-02 ASSESSMENT — VISUAL ACUITY
OS_PH_SC: 20/50
OS_SC: 20/60
OS_SC+: -1
OD_SC+: -2
METHOD: SNELLEN - LINEAR
OD_SC: 20/30

## 2021-11-02 ASSESSMENT — CONF VISUAL FIELD
OS_NORMAL: 1
OD_NORMAL: 1
METHOD: COUNTING FINGERS

## 2021-11-02 ASSESSMENT — TONOMETRY
IOP_METHOD: TONOPEN
OS_IOP_MMHG: 18
OD_IOP_MMHG: 19

## 2021-11-02 ASSESSMENT — EXTERNAL EXAM - RIGHT EYE: OD_EXAM: NORMAL

## 2021-11-02 ASSESSMENT — SLIT LAMP EXAM - LIDS
COMMENTS: BLEPHARITIS, SCURF, DERMATOCHALASIS
COMMENTS: BLEPHARITIS, SCURF, DERMATOCHALASIS

## 2021-11-02 ASSESSMENT — EXTERNAL EXAM - LEFT EYE: OS_EXAM: NORMAL

## 2021-11-02 NOTE — NURSING NOTE
Chief Complaints and History of Present Illnesses   Patient presents with     Follow Up     NPDR and Diabetic macular edema both eyes     Chief Complaint(s) and History of Present Illness(es)     Follow Up     Laterality: both eyes    Course: stable    Associated symptoms: Negative for eye pain, headache, flashes and floaters    Treatments tried: no treatments and artificial tears    Pain scale: 0/10    Comments: NPDR and Diabetic macular edema both eyes              Comments     Pt states no change in VA since last visit  LBS: 135  Last A1C: 8.?  Lab Results       Component                Value               Date                       A1C                      8.3                 11/02/2021                 A1C                      8.2                 06/09/2021                 A1C                      9.2                 06/18/2019                 A1C                      9.1                 03/06/2018                 A1C                      10.2                06/06/2017                 A1C                      9.0                 03/16/2016              .  Miriam Maki COT 3:11 PM November 2, 2021

## 2021-11-02 NOTE — PROGRESS NOTES
Here for left eye PRP only     CC: decreased vision following CEIOL in OS  S/P right eye PRP 6/29/21    HPI: vision stable  s/p CEIOL left eye on 6/21/19.  S/P PRP left eye 12/2019  Multiple avastin injections left eye and then switched to Eylea: last injections 8/2020      Assessment/plan:   1. RVO vs Severe NPDR left eyes with macular edema extra foveally in left eye, involving fovea in OS   - FA 2019 showed extensive ischemia and capillary non perfusion: PRP done   - Blood pressure (<120/80) and blood glucose (HbA1c <7.0) control discussed with patient. Patient advised that failure to adequately control each may lead to vision loss. The patient expressed understanding.     - ME refractory to avastin. Responded well to Eylea; last injection 7/14/21: significant improvement today 11/2/21   - continue good diabetes control and RTC 4-6 months     2. Diabetic macular edema left eye   - Worsened since CEIOL on 6/21/19   - Has been on ketorolac and PF twice a day/tid since surgery and will stop when running out    - OCT (12/4/19): fovea involving edema worsened compared to 10/2019   - see #1    3. Pseudophakia ou: observe    4. Severe NPDR and Diabetic macular edema right eye with severe ischemia in FA   - FA with severe ischemia: PRP today 6/29/21    5. Stye LL os   - pt deferred maxitrol, agrees to start warm compresses     RTC in 4-6 months for DFE and OCT with Dr. Nolasco or with me    Complete documentation of historical and exam elements from today's encounter can be found in the full encounter summary report (not reduplicated in this progress note). I personally obtained the chief complaint(s) and history of present illness.  I confirmed and edited as necessary the review of systems, past medical/surgical history, family history, social history, and examination findings as documented by others; and I examined the patient myself. I personally reviewed the relevant tests, images, and reports as documented above.  I formulated and edited as necessary the assessment and plan and discussed the findings and management plan with the patient and family.    Jaden Johnson MD, PhD

## 2021-11-02 NOTE — TELEPHONE ENCOUNTER
This Order Has Been Discontinued    Order Status Reason By On   Discontinued None Pamela Laura PA-C 7/13/21 [id number]

## 2021-11-03 ENCOUNTER — ANCILLARY PROCEDURE (OUTPATIENT)
Dept: ULTRASOUND IMAGING | Facility: CLINIC | Age: 71
End: 2021-11-03
Attending: INTERNAL MEDICINE
Payer: COMMERCIAL

## 2021-11-03 DIAGNOSIS — R79.89 BLOOD CHOLESTEROL INCREASED COMPARED WITH PRIOR MEASUREMENT: ICD-10-CM

## 2021-11-03 DIAGNOSIS — M54.89 OTHER CHRONIC BACK PAIN: ICD-10-CM

## 2021-11-03 DIAGNOSIS — R09.89 OTHER SPECIFIED SYMPTOMS AND SIGNS INVOLVING THE CIRCULATORY AND RESPIRATORY SYSTEMS: ICD-10-CM

## 2021-11-03 DIAGNOSIS — G89.29 OTHER CHRONIC BACK PAIN: ICD-10-CM

## 2021-11-03 DIAGNOSIS — E55.9 VITAMIN D DEFICIENCY: ICD-10-CM

## 2021-11-03 DIAGNOSIS — M79.606 PAIN OF LOWER EXTREMITY, UNSPECIFIED LATERALITY: ICD-10-CM

## 2021-11-03 PROCEDURE — 93922 UPR/L XTREMITY ART 2 LEVELS: CPT | Performed by: RADIOLOGY

## 2021-11-03 PROCEDURE — 93925 LOWER EXTREMITY STUDY: CPT | Performed by: RADIOLOGY

## 2021-11-06 ENCOUNTER — TELEPHONE (OUTPATIENT)
Dept: INTERNAL MEDICINE | Facility: CLINIC | Age: 71
End: 2021-11-06

## 2021-11-06 DIAGNOSIS — M79.604 PAIN IN BOTH LOWER EXTREMITIES: Primary | ICD-10-CM

## 2021-11-06 DIAGNOSIS — M79.605 PAIN IN BOTH LOWER EXTREMITIES: Primary | ICD-10-CM

## 2021-11-06 NOTE — TELEPHONE ENCOUNTER
Placed vascular surgery referral this encounter    Dear  Earle;    The results of your ultrasounds are attached and there are some findings, that I recommend you see the vascular provider. I placed a referral today and you can call [phone] to schedule this appointment.    MOISES Hare MD

## 2021-11-12 ENCOUNTER — ANCILLARY PROCEDURE (OUTPATIENT)
Dept: MRI IMAGING | Facility: CLINIC | Age: 71
End: 2021-11-12
Attending: INTERNAL MEDICINE
Payer: COMMERCIAL

## 2021-11-12 DIAGNOSIS — E55.9 VITAMIN D DEFICIENCY: ICD-10-CM

## 2021-11-12 DIAGNOSIS — M79.606 PAIN OF LOWER EXTREMITY, UNSPECIFIED LATERALITY: ICD-10-CM

## 2021-11-12 DIAGNOSIS — M54.89 OTHER CHRONIC BACK PAIN: ICD-10-CM

## 2021-11-12 DIAGNOSIS — R79.89 BLOOD CHOLESTEROL INCREASED COMPARED WITH PRIOR MEASUREMENT: ICD-10-CM

## 2021-11-12 DIAGNOSIS — G89.29 OTHER CHRONIC BACK PAIN: ICD-10-CM

## 2021-11-12 PROCEDURE — 72148 MRI LUMBAR SPINE W/O DYE: CPT | Performed by: RADIOLOGY

## 2021-11-13 ENCOUNTER — TELEPHONE (OUTPATIENT)
Dept: INTERNAL MEDICINE | Facility: CLINIC | Age: 71
End: 2021-11-13

## 2021-11-13 DIAGNOSIS — M54.89 OTHER CHRONIC BACK PAIN: Primary | ICD-10-CM

## 2021-11-13 DIAGNOSIS — G89.29 OTHER CHRONIC BACK PAIN: Primary | ICD-10-CM

## 2021-11-13 NOTE — TELEPHONE ENCOUNTER
Placed orthopedic referral to Dr. Nichols for back pain    MOISES Hare     Dear Mr. Rene;    Your MRI lumbar spine shows some significant findings that I recommend you see Dr. Nichols, orthopedic spine. I placed a referral today and you can call [phone] to schedule this appointment.    MOISES Hare MD

## 2021-11-15 DIAGNOSIS — R06.00 DYSPNEA, UNSPECIFIED TYPE: ICD-10-CM

## 2021-11-15 DIAGNOSIS — E78.5 HYPERLIPIDEMIA LDL GOAL <100: ICD-10-CM

## 2021-11-15 DIAGNOSIS — K21.9 GASTROESOPHAGEAL REFLUX DISEASE WITHOUT ESOPHAGITIS: ICD-10-CM

## 2021-11-15 DIAGNOSIS — R05.9 COUGH: ICD-10-CM

## 2021-11-16 RX ORDER — DEXAMETHASONE 4 MG/1
1 TABLET ORAL 2 TIMES DAILY
Qty: 12 G | Refills: 11 | Status: SHIPPED | OUTPATIENT
Start: 2021-11-16 | End: 2023-09-29

## 2021-11-16 RX ORDER — FAMOTIDINE 20 MG/1
20 TABLET, FILM COATED ORAL 2 TIMES DAILY
Qty: 180 TABLET | Refills: 3 | Status: SHIPPED | OUTPATIENT
Start: 2021-11-16 | End: 2023-01-09

## 2021-11-16 RX ORDER — ATORVASTATIN CALCIUM 20 MG/1
20 TABLET, FILM COATED ORAL DAILY
Qty: 90 TABLET | Refills: 3 | Status: SHIPPED | OUTPATIENT
Start: 2021-11-16 | End: 2023-01-27

## 2021-11-22 DIAGNOSIS — M54.50 LUMBAR PAIN: Primary | ICD-10-CM

## 2021-11-22 DIAGNOSIS — E55.9 VITAMIN D DEFICIENCY: ICD-10-CM

## 2021-11-22 DIAGNOSIS — N18.30 CKD (CHRONIC KIDNEY DISEASE) STAGE 3, GFR 30-59 ML/MIN (H): ICD-10-CM

## 2021-11-30 ENCOUNTER — HOSPITAL ENCOUNTER (OUTPATIENT)
Facility: CLINIC | Age: 71
End: 2021-11-30
Payer: COMMERCIAL

## 2021-11-30 ENCOUNTER — ANCILLARY PROCEDURE (OUTPATIENT)
Dept: GENERAL RADIOLOGY | Facility: CLINIC | Age: 71
End: 2021-11-30
Attending: ORTHOPAEDIC SURGERY
Payer: COMMERCIAL

## 2021-11-30 ENCOUNTER — OFFICE VISIT (OUTPATIENT)
Dept: ORTHOPEDICS | Facility: CLINIC | Age: 71
End: 2021-11-30
Payer: COMMERCIAL

## 2021-11-30 VITALS — WEIGHT: 194 LBS | BODY MASS INDEX: 28.73 KG/M2 | HEIGHT: 69 IN

## 2021-11-30 DIAGNOSIS — Z11.59 ENCOUNTER FOR SCREENING FOR OTHER VIRAL DISEASES: ICD-10-CM

## 2021-11-30 DIAGNOSIS — M54.50 LUMBAR PAIN: Primary | ICD-10-CM

## 2021-11-30 PROCEDURE — 99204 OFFICE O/P NEW MOD 45 MIN: CPT | Mod: GC | Performed by: ORTHOPAEDIC SURGERY

## 2021-11-30 PROCEDURE — 72110 X-RAY EXAM L-2 SPINE 4/>VWS: CPT | Performed by: RADIOLOGY

## 2021-11-30 ASSESSMENT — MIFFLIN-ST. JEOR: SCORE: 1612.42

## 2021-11-30 NOTE — PROGRESS NOTES
"Spine Surgery Consultation    REFERRING PHYSICIAN: F=Referred Self   PRIMARY CARE PHYSICIAN: Marcio Hare           Chief Complaint:   Consult (lumbar spine pain, has been having bilateral radiating pain down his legs, has also been having cramps in his hamstring and in his calfs. is having some dizzyness and well. no previous surgeries )      History of Present Illness:  Symptom Profile Including: location of symptoms, onset, severity, exacerbating/alleviating factors, previous treatments:        Earle Rene is a 71 year old male who presents for evaluation of low back pain. He reports low back pain for the past 3 years, possibly due to a GLF on ice. He notes signifcant leg symptoms, worse than his low back. He reports cramping in his thighs and calves, and numbness in the L4, L5 distributions. R=L. He describes his pain as constant 5-\"20\"/10, worse with walking and bending, better with \"nothing\". He is hoping to try conservative measures. Denies prior spine surgery.    Past treatments tried:  - Physical therapy: no  - Injections: none  - Medications: gabapentin, some relief         Past Medical History:     Past Medical History:   Diagnosis Date     Blepharitis of both eyes      BPH (benign prostatic hyperplasia)      Diabetes (H)      Diabetic neuropathy (H)      Diabetic retinopathy associated with diabetes mellitus due to underlying condition (H)      Dry eye syndrome      GERD (gastroesophageal reflux disease)      Goiter      Granulomatous disease (H)      HLD (hyperlipidemia)      HTN (hypertension)      Nonsenile cataract      Peripheral neuropathy             Past Surgical History:     Past Surgical History:   Procedure Laterality Date     ------------OTHER-------------      back of neck abscess drainage in OR     AS RAD RESEC TONSIL/PILLARS Bilateral 1961     CATARACT IOL, RT/LT Left      COLONOSCOPY  7/29/2013    Procedure: COLONOSCOPY;;  Surgeon: Montana aPscal MD;  Location: [facility]     EXCISE MASS " UPPER EXTREMITY Right 11/11/2019    Procedure: EXCISION, MASS, UPPER EXTREMITY, RIGHT SHOULDER;  Surgeon: Johana Choudhury MD;  [facility]     INTRAVITREAL INJECTION CHEMOTHERAPY Right 12/30/2019    Procedure: INTRAVITREAL Bevacizumab injection;  Surgeon: Milton Maki MD;  [facility]     PHACOEMULSIFICATION CLEAR CORNEA WITH STANDARD INTRAOCULAR LENS IMPLANT Right 12/30/2019    Procedure: PHACOEMULSIFICATION, CATARACT, WITH INTRAOCULAR LENS IMPLANT;  Surgeon: Milton Maki MD;  [facility]     PHACOEMULSIFICATION WITH STANDARD INTRAOCULAR LENS IMPLANT Left 6/21/2019    Procedure: Left Eye Cataract Removal with Intraocular Lens Implant with Intraoperative Avastin Injection;  Surgeon: Lacey Eugene MD;  [facility]: [facility]     siladenatis  11/2017            Social History:     Social History     Tobacco Use     Smoking status: Never Smoker     Smokeless tobacco: Never Used   Substance Use Topics     Alcohol use: Yes     Comment: occasionaly            Family History:     Family History   Problem Relation Age of Onset     Diabetes Father      Myocardial Infarction Father      Diabetes Brother      Leukemia Brother 44     Glaucoma No family hx of      Macular Degeneration No family hx of      Kidney Disease No family hx of             Allergies:   No Known Allergies         Medications:     Current Outpatient Medications   Medication     albuterol (VENTOLIN HFA) 108 (90 Base) MCG/ACT inhaler     alpha-lipoic acid 600 MG capsule     amLODIPine (NORVASC) 5 MG tablet     ARTIFICIAL TEAR OP     aspirin 81 MG tablet     atorvastatin (LIPITOR) 20 MG tablet     blood glucose (NO BRAND SPECIFIED) lancets standard     blood glucose monitoring (NO BRAND SPECIFIED) meter device kit     blood glucose monitoring (NO BRAND SPECIFIED) test strip     Blood Pressure Monitor KIT     camphor-menthol (DERMASARRA) 0.5-0.5 % external lotion     cholecalciferol (VITAMIN D3) 25 mcg (1000 units) capsule  "    clobetasol (TEMOVATE) 0.05 % ointment     clotrimazole (LOTRIMIN) 1 % cream     Continuous Blood Gluc  (FREESTYLE PETER READER) JUANCARLOS     Continuous Blood Gluc Sensor (FREESTYLE PETER 14 DAY SENSOR) MISC     dextromethorphan-guaiFENesin (MUCINEX DM)  MG 12 hr tablet     dextromethorphan-guaiFENesin (TUSSIN DM)  MG/5ML liquid     diclofenac (VOLTAREN) 1 % topical gel     doxycycline hyclate (VIBRA-TABS) 100 MG tablet     empagliflozin (JARDIANCE) 10 MG TABS tablet     famotidine (PEPCID) 20 MG tablet     fenofibrate 54 MG tablet     finasteride (PROSCAR) 5 MG tablet     fluocinonide (LIDEX) 0.05 % external cream     fluticasone (FLONASE) 50 MCG/ACT nasal spray     fluticasone (FLOVENT HFA) 110 MCG/ACT inhaler     gabapentin (NEURONTIN) 300 MG capsule     gabapentin (NEURONTIN) 300 MG capsule     HUMALOG KWIKPEN 100 UNIT/ML soln     insulin degludec (TRESIBA FLEXTOUCH) 100 UNIT/ML pen     insulin pen needle (B-D U/F) 31G X 5 MM miscellaneous     insulin pen needle (B-D U/F) 31G X 5 MM     insulin syringe 31G X 5/16\" 0.5 ML MISC     ketamine 5% gabapentin 8% lidocaine 2.5% topical PLO cream     loratadine (CLARITIN) 10 MG tablet     losartan (COZAAR) 100 MG tablet     mupirocin (BACTROBAN) 2 % cream     Omega-3 Fatty Acids (OMEGA-3 FISH OIL) 1000 MG CAPS     ONETOUCH ULTRA test strip     Semaglutide,0.25 or 0.5MG/DOS, (OZEMPIC, 0.25 OR 0.5 MG/DOSE,) 2 MG/1.5ML SOPN     sodium bicarbonate 650 MG tablet     tamsulosin (FLOMAX) 0.4 MG capsule     triamcinolone (KENALOG) 0.1 % cream     VITAMIN D3 25 MCG (1000 UT) tablet     Current Facility-Administered Medications   Medication     aflibercept (EYLEA) injection prefilled syringe 2 mg     aflibercept (EYLEA) injection vial 2 mg             Review of Systems:     A 10 point ROS was performed and reviewed. Specific responses to these questions are noted at the end of the document.         Physical Exam:   Vitals: Ht 1.74 m (5' 8.5\")   Wt 88 kg (194 " lb)   BMI 29.07 kg/m    Constitutional: awake, alert, cooperative, no apparent distress, appears stated age.    Eyes: The sclera are white.  Ears, Nose, Throat: The trachea is midline.  Psychiatric: The patient has a normal affect.  Respiratory: breathing non-labored  Cardiovascular: The extremities are warm and perfused.  Skin: no obvious rashes or lesions.  Musculoskeletal, Neurologic, and Spine:      Lumbar Spine:    Appearance - No gross stepoffs or deformities    Motor -     L2-3: Hip flexion 4-/5 R and 4-/5 L strength          L3/4:  Knee extension R 5/5 and L 4-/5 strength         L4/5:  Foot dorsiflexion R 5/5 L 4/5 and       EHL dorsiflexion R 3/5 L 3/5 strength         S1:  Plantarflexion/Peroneal Muscles  R 4/5 and L 4/5 strength    Sensation: decreased over L5, S1, bilaterally (has diabetic neuropathy at baseline)      Neurologic:      REFLEXES Left Right   Patella 1+ 1+   Ankle jerk 1+ 1+   Babinski No upgoing great toe No upgoing great toe   Clonus 0 beats 0 beats     Hip Exam:  No pain with hip log roll and no tenderness over the greater trochanters.           Imaging:   We ordered and independently reviewed new radiographs at this clinic visit. The results were discussed with the patient.  Findings include:    Multilevel spondylosis with moderate canal stenosis at L4-5, severe left foraminal stenosis at L4-5 and bilaterally at L5-S1. Compression fracture of the L4 superior endplate, appears chronic.             Assessment and Plan:   Assessment:  71 year old male with multilevel lumbar spondylosis resulting neurogenic claudication.      Reviewed imaging with patient, and while he does have findings consistent with his symptoms and exam findings, he would not like to pursue surgery. He only prefers to discuss non-opertive management. We recommend L4-5 interlaminar injection and PT.    Plan:  1. L4-5 CHARLES ordered  2. Begin PT  3. Follow up with non-op team    Attending MD (Dr. Emmanuel Nichols) :   I reviewed and verified the history and physical exam of the patient and discussed the patient's management with the other clinical providers involved in this patient's care including any involved residents or physicians assistants. I reviewed the above note and agree with the documented findings and plan of care, which were communicated to the patient.      MD Mundo Flaherty MD  Orthopedic Surgery Resident    Patient was seen and examined with Dr. Nichols who independently evaluated the patient and agrees with the assessment and exam.     Respectfully,  Emmanuel Nichols MD  Spine Surgery  [facility]

## 2021-11-30 NOTE — LETTER
"    11/30/2021         RE: Earle Rene  [address]  [place] [address]        Dear Colleague,    Thank you for referring your patient, Earle Rene, to the [facility] [place]. Please see a copy of my visit note below.    Spine Surgery Consultation    REFERRING PHYSICIAN: F=Referred Self   PRIMARY CARE PHYSICIAN: Marcio Hare           Chief Complaint:   Consult (lumbar spine pain, has been having bilateral radiating pain down his legs, has also been having cramps in his hamstring and in his calfs. is having some dizzyness and well. no previous surgeries )      History of Present Illness:  Symptom Profile Including: location of symptoms, onset, severity, exacerbating/alleviating factors, previous treatments:        Earle Rene is a 71 year old male who presents for evaluation of low back pain. He reports low back pain for the past 3 years, possibly due to a GLF on ice. He notes signifcant leg symptoms, worse than his low back. He reports cramping in his thighs and calves, and numbness in the L4, L5 distributions. R=L. He describes his pain as constant 5-\"20\"/10, worse with walking and bending, better with \"nothing\". He is hoping to try conservative measures. Denies prior spine surgery.    Past treatments tried:  - Physical therapy: no  - Injections: none  - Medications: gabapentin, some relief         Past Medical History:     Past Medical History:   Diagnosis Date     Blepharitis of both eyes      BPH (benign prostatic hyperplasia)      Diabetes (H)      Diabetic neuropathy (H)      Diabetic retinopathy associated with diabetes mellitus due to underlying condition (H)      Dry eye syndrome      GERD (gastroesophageal reflux disease)      Goiter      Granulomatous disease (H)      HLD (hyperlipidemia)      HTN (hypertension)      Nonsenile cataract      Peripheral neuropathy             Past Surgical History:     Past Surgical History:   Procedure Laterality Date "     ------------OTHER-------------      back of neck abscess drainage in OR     AS RAD RESEC TONSIL/PILLARS Bilateral 1961     CATARACT IOL, RT/LT Left      COLONOSCOPY  7/29/2013    Procedure: COLONOSCOPY;;  Surgeon: Montana Pascal MD;  [facility]     EXCISE MASS UPPER EXTREMITY Right 11/11/2019    Procedure: EXCISION, MASS, UPPER EXTREMITY, RIGHT SHOULDER;  Surgeon: Johana Choudhury MD;  [facility]     INTRAVITREAL INJECTION CHEMOTHERAPY Right 12/30/2019    Procedure: INTRAVITREAL Bevacizumab injection;  Surgeon: Milton Maki MD;  [facility]     PHACOEMULSIFICATION CLEAR CORNEA WITH STANDARD INTRAOCULAR LENS IMPLANT Right 12/30/2019    Procedure: PHACOEMULSIFICATION, CATARACT, WITH INTRAOCULAR LENS IMPLANT;  Surgeon: Milton Maki MD;  [facility]     PHACOEMULSIFICATION WITH STANDARD INTRAOCULAR LENS IMPLANT Left 6/21/2019    Procedure: Left Eye Cataract Removal with Intraocular Lens Implant with Intraoperative Avastin Injection;  Surgeon: Lacey Eugene MD;  [facility]     siladenatis  11/2017            Social History:     Social History     Tobacco Use     Smoking status: Never Smoker     Smokeless tobacco: Never Used   Substance Use Topics     Alcohol use: Yes     Comment: occasionaly            Family History:     Family History   Problem Relation Age of Onset     Diabetes Father      Myocardial Infarction Father      Diabetes Brother      Leukemia Brother 44     Glaucoma No family hx of      Macular Degeneration No family hx of      Kidney Disease No family hx of             Allergies:   No Known Allergies         Medications:     Current Outpatient Medications   Medication     albuterol (VENTOLIN HFA) 108 (90 Base) MCG/ACT inhaler     alpha-lipoic acid 600 MG capsule     amLODIPine (NORVASC) 5 MG tablet     ARTIFICIAL TEAR OP     aspirin 81 MG tablet     atorvastatin (LIPITOR) 20 MG tablet     blood glucose (NO BRAND SPECIFIED) lancets standard     blood  "glucose monitoring (NO BRAND SPECIFIED) meter device kit     blood glucose monitoring (NO BRAND SPECIFIED) test strip     Blood Pressure Monitor KIT     camphor-menthol (DERMASARRA) 0.5-0.5 % external lotion     cholecalciferol (VITAMIN D3) 25 mcg (1000 units) capsule     clobetasol (TEMOVATE) 0.05 % ointment     clotrimazole (LOTRIMIN) 1 % cream     Continuous Blood Gluc  (FREESTYLE PETER READER) JUANCARLOS     Continuous Blood Gluc Sensor (FREESTYLE PETER 14 DAY SENSOR) MISC     dextromethorphan-guaiFENesin (MUCINEX DM)  MG 12 hr tablet     dextromethorphan-guaiFENesin (TUSSIN DM)  MG/5ML liquid     diclofenac (VOLTAREN) 1 % topical gel     doxycycline hyclate (VIBRA-TABS) 100 MG tablet     empagliflozin (JARDIANCE) 10 MG TABS tablet     famotidine (PEPCID) 20 MG tablet     fenofibrate 54 MG tablet     finasteride (PROSCAR) 5 MG tablet     fluocinonide (LIDEX) 0.05 % external cream     fluticasone (FLONASE) 50 MCG/ACT nasal spray     fluticasone (FLOVENT HFA) 110 MCG/ACT inhaler     gabapentin (NEURONTIN) 300 MG capsule     gabapentin (NEURONTIN) 300 MG capsule     HUMALOG KWIKPEN 100 UNIT/ML soln     insulin degludec (TRESIBA FLEXTOUCH) 100 UNIT/ML pen     insulin pen needle (B-D U/F) 31G X 5 MM miscellaneous     insulin pen needle (B-D U/F) 31G X 5 MM     insulin syringe 31G X 5/16\" 0.5 ML MISC     ketamine 5% gabapentin 8% lidocaine 2.5% topical PLO cream     loratadine (CLARITIN) 10 MG tablet     losartan (COZAAR) 100 MG tablet     mupirocin (BACTROBAN) 2 % cream     Omega-3 Fatty Acids (OMEGA-3 FISH OIL) 1000 MG CAPS     ONETOUCH ULTRA test strip     Semaglutide,0.25 or 0.5MG/DOS, (OZEMPIC, 0.25 OR 0.5 MG/DOSE,) 2 MG/1.5ML SOPN     sodium bicarbonate 650 MG tablet     tamsulosin (FLOMAX) 0.4 MG capsule     triamcinolone (KENALOG) 0.1 % cream     VITAMIN D3 25 MCG (1000 UT) tablet     Current Facility-Administered Medications   Medication     aflibercept (EYLEA) injection prefilled syringe 2 mg " "    aflibercept (EYLEA) injection vial 2 mg             Review of Systems:     A 10 point ROS was performed and reviewed. Specific responses to these questions are noted at the end of the document.         Physical Exam:   Vitals: Ht 1.74 m (5' 8.5\")   Wt 88 kg (194 lb)   BMI 29.07 kg/m    Constitutional: awake, alert, cooperative, no apparent distress, appears stated age.    Eyes: The sclera are white.  Ears, Nose, Throat: The trachea is midline.  Psychiatric: The patient has a normal affect.  Respiratory: breathing non-labored  Cardiovascular: The extremities are warm and perfused.  Skin: no obvious rashes or lesions.  Musculoskeletal, Neurologic, and Spine:      Lumbar Spine:    Appearance - No gross stepoffs or deformities    Motor -     L2-3: Hip flexion 4-/5 R and 4-/5 L strength          L3/4:  Knee extension R 5/5 and L 4-/5 strength         L4/5:  Foot dorsiflexion R 5/5 L 4/5 and       EHL dorsiflexion R 3/5 L 3/5 strength         S1:  Plantarflexion/Peroneal Muscles  R 4/5 and L 4/5 strength    Sensation: decreased over L5, S1, bilaterally (has diabetic neuropathy at baseline)      Neurologic:      REFLEXES Left Right   Patella 1+ 1+   Ankle jerk 1+ 1+   Babinski No upgoing great toe No upgoing great toe   Clonus 0 beats 0 beats     Hip Exam:  No pain with hip log roll and no tenderness over the greater trochanters.           Imaging:   We ordered and independently reviewed new radiographs at this clinic visit. The results were discussed with the patient.  Findings include:    Multilevel spondylosis with moderate canal stenosis at L4-5, severe left foraminal stenosis at L4-5 and bilaterally at L5-S1. Compression fracture of the L4 superior endplate, appears chronic.             Assessment and Plan:   Assessment:  71 year old male with multilevel lumbar spondylosis resulting neurogenic claudication.      Reviewed imaging with patient, and while he does have findings consistent with his symptoms and exam " findings, he would not like to pursue surgery. He only prefers to discuss non-opertive management. We recommend L4-5 interlaminar injection and PT.    Plan:  1. L4-5 CHARLES ordered  2. Begin PT  3. Follow up with non-op team    Attending MD (Dr. Emmanuel Nichols) :  I reviewed and verified the history and physical exam of the patient and discussed the patient's management with the other clinical providers involved in this patient's care including any involved residents or physicians assistants. I reviewed the above note and agree with the documented findings and plan of care, which were communicated to the patient.      MD Mundo Flaherty MD  Orthopedic Surgery Resident    Patient was seen and examined with Dr. Nichols who independently evaluated the patient and agrees with the assessment and exam.

## 2021-12-02 DIAGNOSIS — N18.31 STAGE 3A CHRONIC KIDNEY DISEASE (H): Primary | ICD-10-CM

## 2021-12-02 PROBLEM — S76.319A HAMSTRING TEAR: Status: RESOLVED | Noted: 2021-08-19 | Resolved: 2021-12-02

## 2021-12-02 NOTE — PROGRESS NOTES
Pt is being discharged secondary to lack of follow up. Please refer to initial eval or last progress note for final values.

## 2021-12-03 NOTE — TELEPHONE ENCOUNTER
DIAGNOSIS: pain in lower extremities   DATE RECEIVED: 12.7.21   NOTES STATUS DETAILS   OFFICE NOTE from referring provider Internal 11.6.21 JANKI Hare Internal [facility] [place]   OFFICE NOTE from other specialist N/A    OPERATIVE REPORT N/A    MEDICATION LIST Internal    PERTINENT LABS Internal    CTA (CT ANGIOGRAPHY) N/A    CT N/A    MRI N/A    ULTRASOUND Internal 11.3.21 lower extremities

## 2021-12-07 ENCOUNTER — PRE VISIT (OUTPATIENT)
Dept: VASCULAR SURGERY | Facility: CLINIC | Age: 71
End: 2021-12-07

## 2021-12-08 ENCOUNTER — LAB (OUTPATIENT)
Dept: LAB | Facility: CLINIC | Age: 71
End: 2021-12-08
Payer: COMMERCIAL

## 2021-12-08 ENCOUNTER — OFFICE VISIT (OUTPATIENT)
Dept: NEPHROLOGY | Facility: CLINIC | Age: 71
End: 2021-12-08
Attending: INTERNAL MEDICINE
Payer: COMMERCIAL

## 2021-12-08 VITALS
WEIGHT: 196.6 LBS | OXYGEN SATURATION: 97 % | DIASTOLIC BLOOD PRESSURE: 76 MMHG | HEART RATE: 86 BPM | SYSTOLIC BLOOD PRESSURE: 115 MMHG | BODY MASS INDEX: 29.46 KG/M2

## 2021-12-08 DIAGNOSIS — N18.31 STAGE 3A CHRONIC KIDNEY DISEASE (H): ICD-10-CM

## 2021-12-08 DIAGNOSIS — I12.9 HYPERTENSION, RENAL: ICD-10-CM

## 2021-12-08 DIAGNOSIS — R80.1 PERSISTENT PROTEINURIA: ICD-10-CM

## 2021-12-08 DIAGNOSIS — N18.32 CHRONIC KIDNEY DISEASE, STAGE 3B (H): Primary | ICD-10-CM

## 2021-12-08 LAB
ALBUMIN SERPL-MCNC: 3.8 G/DL (ref 3.4–5)
ANION GAP SERPL CALCULATED.3IONS-SCNC: 9 MMOL/L (ref 3–14)
BUN SERPL-MCNC: 31 MG/DL (ref 7–30)
CALCIUM SERPL-MCNC: 10.2 MG/DL (ref 8.5–10.1)
CHLORIDE BLD-SCNC: 107 MMOL/L (ref 94–109)
CO2 SERPL-SCNC: 24 MMOL/L (ref 20–32)
CREAT SERPL-MCNC: 1.66 MG/DL (ref 0.66–1.25)
CREAT UR-MCNC: 46 MG/DL
GFR SERPL CREATININE-BSD FRML MDRD: 41 ML/MIN/1.73M2
GLUCOSE BLD-MCNC: 242 MG/DL (ref 70–99)
HGB BLD-MCNC: 15 G/DL (ref 13.3–17.7)
PHOSPHATE SERPL-MCNC: 4.6 MG/DL (ref 2.5–4.5)
POTASSIUM BLD-SCNC: 4.2 MMOL/L (ref 3.4–5.3)
PROT UR-MCNC: 0.87 G/L
PROT/CREAT 24H UR: 1.89 G/G CR (ref 0–0.2)
PTH-INTACT SERPL-MCNC: 27 PG/ML (ref 18–80)
SODIUM SERPL-SCNC: 140 MMOL/L (ref 133–144)

## 2021-12-08 PROCEDURE — 85018 HEMOGLOBIN: CPT | Performed by: PATHOLOGY

## 2021-12-08 PROCEDURE — 80069 RENAL FUNCTION PANEL: CPT | Performed by: PATHOLOGY

## 2021-12-08 PROCEDURE — 99215 OFFICE O/P EST HI 40 MIN: CPT | Performed by: INTERNAL MEDICINE

## 2021-12-08 PROCEDURE — 83970 ASSAY OF PARATHORMONE: CPT | Performed by: INTERNAL MEDICINE

## 2021-12-08 PROCEDURE — 84156 ASSAY OF PROTEIN URINE: CPT | Performed by: PATHOLOGY

## 2021-12-08 PROCEDURE — 36415 COLL VENOUS BLD VENIPUNCTURE: CPT | Performed by: PATHOLOGY

## 2021-12-08 ASSESSMENT — PAIN SCALES - GENERAL: PAINLEVEL: NO PAIN (0)

## 2021-12-08 NOTE — LETTER
"2021       RE: Earle Rene  [address]  [place] [address]     Dear Colleague,    Thank you for referring your patient, Earle Rene, to the [facility] [place] [facility]. Please see a copy of my visit note below.      Nephrology Clinic    Kiah Ramsey MD  2021     Name: Earle Rene  MRN: [id number]  : 1950  Referring provider: Marcio Hare     Assessment and Plan:    1. CKD 3b- with 2 grams proteinuria  - creatinine a little higher recently with current level of 1.66 mg/dL with eGFR 41 - has progressed from CKD 3a to 3b in the last year or two.  - likely diabetic nephropathy given 20 year history of diabetes and diabetic retinopathy.   - on SGLT2 inhibitor (empagliflozin) and losartan for renal protection     2. Subnephrotic range proteinuria - current level 1.89 g/g - similar to 2021 but a little worse compared to one year ago -likely secondary to diabetic nephropathy. Currently on losartan 100 mg nightly  - management with low sodium diet, ARB and BP control      3. Electrolytes: stable - potassium 4.2 2021   4. Volume status: no edema     5. Acid/Base status: continue current dose of sodium bicarbonate 650 mg three times daily.  Bicarb at goal at 24 (goal >22)     6. Hypertension: continue losartan and amlodipine - unclear control with SBP ranging from 120-140's.  Could increase amlodipine to 10 mg daily     7. BMD  continue cholecalciferol 2000U Daily and calcium supplements  - low threshold to discontinue given that PTH has been low side of goal in past     8. Type II DM- uncontrolled with A1C 8.2  - + retinopathy  - remains on empagliflozin 10 mg daily, metformin 1000 mg daily and novolog  - adding ozempic 0.25 mg weekly 2021  - working with Pamela Laura and Amy Rogel, PAC    9. Diabetic neuropathy- some \"fuzziness\" in feeling of toes     10. Diabetic " retinopathy-follows with an ophthalmologist.      11. Obesity-BMI 30. He is aware of benefits of weight loss     Kiah Ramsey MD  [facility]  Department of Medicine  Division of Renal Disease and Hypertension  Jung junior     40 minutes spent on visit, meeting with Earle Rene and in chart review and documentation on date of encounter, 12/08/21       Follow-up: Return in about 6 months (around 6/8/2022).     Reason For Visit:   CKD follow up     HPI:   Earle Rene is a 71 year old male with a history of DM2 for 20+ years, complicated by diabetic neuropathy, severe non proliferative retinopathy (follows with ophthalmologist Dr. Eugene) and nephropathy.    He is on empagliflozin for diabetes - now off metformin given decline in kidney function. Also on tresiba and semaglutide. A1C 8.3 on 11/2/2021    Last visit June 9th 2021  Overall feeling ok, no CP, no dyspnea, no vomiting, no fever or chills. Has small amount of edema in both ankles, wears looser fitting socks.    Has pruritis on both arms with skin lesions - ?excoriations that is better compared to previous years, has seen dermatologist outside of our system. Has a lotion given to him by dermatologist that helps somewhat.      Review of Systems:   Pertinent items are noted in HPI or as below, remainder of complete ROS is negative.      Active Medications:   Current Outpatient Medications   Medication Sig Dispense Refill     albuterol (VENTOLIN HFA) 108 (90 Base) MCG/ACT inhaler Inhale 2 puffs into the lungs every 6 hours 18 g 3     alpha-lipoic acid 600 MG capsule Take 1 capsule (600 mg) by mouth daily 90 capsule 3     amLODIPine (NORVASC) 5 MG tablet Take 1 tablet (5 mg) by mouth At Bedtime 90 tablet 3     ARTIFICIAL TEAR OP Apply to eye as needed       aspirin 81 MG tablet Take 1 tablet by mouth At Bedtime        atorvastatin (LIPITOR) 20 MG tablet Take 1 tablet (20 mg) by mouth daily 90 tablet 3     blood glucose (NO  BRAND SPECIFIED) lancets standard Lancets that go with device, Test 3 times daily 300 each 3     blood glucose monitoring (NO BRAND SPECIFIED) meter device kit Any meter covered by insurance, not store brand, use as directed. 1 kit 0     blood glucose monitoring (NO BRAND SPECIFIED) test strip Strips that go with meter, covered by insurance. Test 3 times daily 300 strip 3     Blood Pressure Monitor KIT Automatic Blood Pressure Monitor 1 kit 0     camphor-menthol (DERMASARRA) 0.5-0.5 % external lotion Apply to arms legs torso 1-2 times a day for itching 1 Bottle 11     cholecalciferol (VITAMIN D3) 25 mcg (1000 units) capsule Take 2 capsules (50 mcg) by mouth daily 180 capsule 3     clobetasol (TEMOVATE) 0.05 % ointment Apply topically to legs twice daily for 2 weeks.  Then discontinue 30 g 0     clotrimazole (LOTRIMIN) 1 % cream Apply topically 2 times daily 30 g 3     Continuous Blood Gluc  (FREESTYLE PETER READER) JUANCARLOS 1 Device daily Use to read blood sugars   as  instruction 4 times daily 1 Device 0     Continuous Blood Gluc Sensor (FREESTYLE PETER 14 DAY SENSOR) MISC 1 applicator every 14 days Change every 14 days 8 each 3     dextromethorphan-guaiFENesin (MUCINEX DM)  MG 12 hr tablet Take 1 tablet by mouth every 12 hours 30 tablet 0     dextromethorphan-guaiFENesin (TUSSIN DM)  MG/5ML liquid Take 5 mLs by mouth 2 times daily as needed 118 mL 0     diclofenac (VOLTAREN) 1 % topical gel Apply 4 grams to thigh up to 4 times daily for pain 100 g 1     doxycycline hyclate (VIBRA-TABS) 100 MG tablet Take 1 tablet (100 mg) by mouth 2 times daily 14 tablet 0     empagliflozin (JARDIANCE) 10 MG TABS tablet One tab daily. 90 tablet 3     famotidine (PEPCID) 20 MG tablet Take 1 tablet (20 mg) by mouth 2 times daily 180 tablet 3     fenofibrate 54 MG tablet Take 1 tablet (54 mg) by mouth daily (Patient taking differently: Take 54 mg by mouth At Bedtime ) 90 tablet 0     finasteride (PROSCAR)  "5 MG tablet Take 1 tablet (5 mg) by mouth daily 90 tablet 2     fluocinonide (LIDEX) 0.05 % external cream APPLY TO AFFECTED AREA TWICE A DAY  3     fluticasone (FLONASE) 50 MCG/ACT nasal spray Spray 1 spray into both nostrils daily 16 g 0     fluticasone (FLOVENT HFA) 110 MCG/ACT inhaler Inhale 1 puff into the lungs 2 times daily 12 g 11     gabapentin (NEURONTIN) 300 MG capsule Take 1 capsule (300 mg) by mouth 2 times daily 60 capsule 3     gabapentin (NEURONTIN) 300 MG capsule TAKE 1 CAPSULE BY MOUTH TWICE A  capsule 0     HUMALOG KWIKPEN 100 UNIT/ML soln Inject 15-17  units with meals, plus correction. Pt uses approx 65 units in 24 hrs. 60 mL 1     insulin degludec (TRESIBA FLEXTOUCH) 100 UNIT/ML pen Inject 64 units subcu at bedtime. 60 mL 3     insulin pen needle (B-D U/F) 31G X 5 MM miscellaneous Use 4 time(s) per day.  Please dispense as BD Pen Needle Mini U/F 31G x 5  each 3     insulin pen needle (B-D U/F) 31G X 5 MM Use 4 times per day.  Please dispense as BD Pen Needle Mini U/F 31G x 5  each 3     insulin syringe 31G X 5/16\" 0.5 ML MISC Use three syringes daily 270 each 1     ketamine 5% gabapentin 8% lidocaine 2.5% topical PLO cream Apply 1 g topically 3 times daily 30 g 3     loratadine (CLARITIN) 10 MG tablet Take 1 tablet (10 mg) by mouth daily (Patient taking differently: Take 10 mg by mouth At Bedtime ) 90 tablet 0     losartan (COZAAR) 100 MG tablet TAKE 1 TABLET BY MOUTH AT BEDTIME 90 tablet 3     mupirocin (BACTROBAN) 2 % cream Apply  topically. In very small amounts only as needed 15 g 1     Omega-3 Fatty Acids (OMEGA-3 FISH OIL) 1000 MG CAPS Take 1 capsule (1 g) by mouth 2 times daily (Patient taking differently: Take 1 g by mouth as needed (PT last dose a week ago 12.24.19) ) 60 capsule 11     ONETOUCH ULTRA test strip Use to test blood sugar 3 times daily 300 strip 3     Semaglutide,0.25 or 0.5MG/DOS, (OZEMPIC, 0.25 OR 0.5 MG/DOSE,) 2 MG/1.5ML SOPN Inject 0.25 mg " Subcutaneous once a week 1.5 mL 3     sodium bicarbonate 650 MG tablet Take 1 tablet (650 mg) by mouth 3 times daily 90 tablet 11     tamsulosin (FLOMAX) 0.4 MG capsule Take 1 capsule (0.4 mg) by mouth daily 90 capsule 2     triamcinolone (KENALOG) 0.1 % cream Apply topically 3 times daily 80 g 0     VITAMIN D3 25 MCG (1000 UT) tablet TAKE 2 TABLETS (50 MCG) BY MOUTH DAILY           Allergies:   Patient has no known allergies.      Past Medical History:  BPH  Diabetes   Diabetic neuropathy   Diabetic retinopathy   GERD   Granulomatous disease   Hyperlipidemia   Hypertension   Mood disorder   ED  Presbyopia   Myopia   Elevated PSA  Nephrolithiasis   Neutropenia   Sarcoidosis of lung   Sialoadenitis   Cataracts      Past Surgical History:  Past Surgical History:   Procedure Laterality Date     ------------OTHER-------------      back of neck abscess drainage in OR     AS RAD RESEC TONSIL/PILLARS Bilateral 1961     CATARACT IOL, RT/LT Left      COLONOSCOPY  7/29/2013    Procedure: COLONOSCOPY;;  Surgeon: Montana Pascal MD;  Location:  GI     EXCISE MASS UPPER EXTREMITY Right 11/11/2019    Procedure: EXCISION, MASS, UPPER EXTREMITY, RIGHT SHOULDER;  Surgeon: Johana Choudhury MD;  [facility]     INTRAVITREAL INJECTION CHEMOTHERAPY Right 12/30/2019    Procedure: INTRAVITREAL Bevacizumab injection;  Surgeon: Milton Maki MD;  [facility]     PHACOEMULSIFICATION CLEAR CORNEA WITH STANDARD INTRAOCULAR LENS IMPLANT Right 12/30/2019    Procedure: PHACOEMULSIFICATION, CATARACT, WITH INTRAOCULAR LENS IMPLANT;  Surgeon: Milton Maki MD;  [facility]     PHACOEMULSIFICATION WITH STANDARD INTRAOCULAR LENS IMPLANT Left 6/21/2019    Procedure: Left Eye Cataract Removal with Intraocular Lens Implant with Intraoperative Avastin Injection;  Surgeon: Lacey Eugene MD;  [facility]     siladenatis  11/2017       Family History:   Diabetes- father and brother   MI- father   Leukemia- brother    Negative for kidney disease       Social History:   Never a smoker. Drinks alcohol occasionally.      Physical Exam:  /76   Pulse 86   Wt 89.2 kg (196 lb 9.6 oz)   SpO2 97%   BMI 29.46 kg/m     GENERAL APPEARANCE: alert and no distress  Pulmonary: normal work of breathing  CV - WWP, no edema  NEURO: mentation intact and speech normal  Psych: affect full, interactive  Skin - excoriations, white plaques    Laboratory:  CMP  Recent Labs   Lab Test 07/30/21  1005 06/09/21  1524 03/12/21  1324 12/14/20  0831 06/01/20  1109 01/29/20  1315 12/18/19  1218 07/02/19  0947 06/26/19  1217 06/18/19  1054 08/01/18  1222 05/24/18  1414 05/01/18  1233 03/06/18  1216 11/01/17  1044 06/24/17  2317 06/06/17  1116   NA  --  142 137 138 142 135  --   --    < > 136   < >  --  141 136  --    < > 141   POTASSIUM  --  4.2 5.2 4.2 4.1 4.5   < >  --    < > 4.3   < >  --  4.7 4.0 4.4   < > 3.9   CHLORIDE  --  111* 107 110* 110* 104  --   --    < > 106   < >  --  110* 105  --    < > 108   CO2  --  22 23 20 22 23  --   --    < > 22   < >  --  21 23  --    < > 24   ANIONGAP  --  8 7 8 10 8  --   --    < > 8   < >  --  10 9  --    < > 10   GLC  --  211* 347* 212* 218* 281*  --   --    < > 195*   < >  --  149* 197*  --    < > 96   BUN  --  20 33* 30 25 34*  --   --    < > 27   < >  --  20 21  --    < > 21   CR 1.84* 1.45* 1.68* 1.43* 1.39* 1.61*   < >  --    < > 1.41*   < >  --  1.28* 1.33* 1.20   < > 1.26*   GFRESTIMATED 36* 48* 40* 49* 51* 42*   < >  --    < > 50*   < >  --  56* 53* 60*   < > 57*   GFRESTBLACK  --  55* 46* 56* 59* 49*   < >  --    < > 58*   < >  --  67 65 73   < > 69   INDERJIT  --  8.8 9.0 9.4 9.8 9.2  --   --    < > 9.3   < >  --  9.8 8.8  --    < > 9.2   MAG  --   --   --   --   --   --   --  1.9  --   --   --  2.1 2.1  --   --   --   --    PHOS  --  2.9  --  4.4 3.7 4.0  --   --   --   --    < >  --  3.3  --   --   --   --    PROTTOTAL  --   --  6.9  --   --   --   --   --   --  7.0  --   --   --  7.3  --   --  6.6*    ALBUMIN  --  3.6 3.7 4.0 3.7 3.7  --   --   --  3.8   < >  --  4.0 3.8  --   --  3.5   BILITOTAL  --   --  0.4  --   --   --   --   --   --  0.6  --   --   --  0.8  --   --  0.7   ALKPHOS  --   --  108  --   --   --   --   --   --  63  --   --   --  77  --   --  70   AST  --   --  24  --   --   --   --   --   --  24  --   --   --  25 23  --  20   ALT  --   --  36  --   --   --   --   --   --  38  --   --   --  35 44  --  49    < > = values in this interval not displayed.     CBC  Recent Labs   Lab Test 12/08/21  1428 06/09/21  1524 03/12/21  1324 12/14/20  0831 01/29/20  1315 06/18/19  1054 03/13/19  1516 02/06/19  1322   HGB 15.0 13.6 14.1 14.9   < > 13.5 13.7 13.8   WBC  --   --  5.1  --   --  4.6 5.2 6.1   RBC  --   --  4.31*  --   --  4.18* 4.38* 4.41   HCT  --   --  41.7  --   --  38.7* 40.5 41.4   MCV  --   --  97  --   --  93 93 94   MCH  --   --  32.7  --   --  32.3 31.3 31.3   MCHC  --   --  33.8  --   --  34.9 33.8 33.3   RDW  --   --  12.3  --   --  12.6 12.5 11.9   PLT  --   --  156  --   --  130* 131* 225    < > = values in this interval not displayed.     INR  No lab results found.  URINE STUDIES  Recent Labs   Lab Test 01/09/20  0849 12/18/19  1220 05/01/18  1238 06/06/17  1120 03/16/16  1000 01/14/16  0858   COLOR Straw Yellow Yellow Yellow   < > Yellow   APPEARANCE Clear Slightly Cloudy Clear Slightly Cloudy   < > Clear   URINEGLC >499* >499* 50* >499*   < > 100*   URINEBILI Negative Negative Negative Negative   < > Negative   URINEKETONE Negative Negative Negative Negative   < > Negative   SG 1.015 1.021 1.016 1.017   < > >1.030   UBLD Negative Large* Negative Negative   < > Negative   URINEPH 5.0 5.0 5.0 5.0   < > 5.5   PROTEIN 100* 100* >499* >499*   < > 100*   UROBILINOGEN  --   --   --   --   --  0.2   NITRITE Negative Negative Negative Negative   < > Negative   LEUKEST Negative Small* Negative Negative   < > Negative   RBCU 1 129* 1 2   < > O - 2   WBCU <1 125* <1 2   < > O - 2    < > =  values in this interval not displayed.     Recent Labs   Lab Test 06/09/21  1530 12/14/20  0848 06/01/20  1110 01/29/20  1330 03/13/19  1522 02/06/19  1326 08/01/18  1225 05/01/18  1238   UTPG 2.37* 1.51* 1.39* 1.36* 3.04* 1.49* 1.37* 1.76*     PTH  Recent Labs   Lab Test 06/01/20  1109 03/13/19  1516 05/01/18  1233   PTHI 22 42 49     IRON STUDIES   Recent Labs   Lab Test 07/02/19  0947 05/01/18  1233   IRON  --  83   FEB  --  344   IRONSAT  --  24   DEANA 178 160       Again, thank you for allowing me to participate in the care of your patient.      Sincerely,    Kiah Ramsey MD

## 2021-12-08 NOTE — PATIENT INSTRUCTIONS
You were seen for stage 3 kidney disease.  We will call you with kidney function results.    Return in about 6 months (around 6/8/2022).     .Kiah Ramsey MD  [facility]  Department of Medicine  Division of Renal Disease and Hypertension

## 2021-12-08 NOTE — PROGRESS NOTES
"  Nephrology Clinic    Kiah Ramsey MD  2021     Name: Earle Rene  MRN: [id number]  : 1950  Referring provider: Marcio Hare     Assessment and Plan:    1. CKD 3b- with 2 grams proteinuria  - creatinine a little higher recently with current level of 1.66 mg/dL with eGFR 41 - has progressed from CKD 3a to 3b in the last year or two.  - likely diabetic nephropathy given 20 year history of diabetes and diabetic retinopathy.   - on SGLT2 inhibitor (empagliflozin) and losartan for renal protection     2. Subnephrotic range proteinuria - current level 1.89 g/g - similar to 2021 but a little worse compared to one year ago -likely secondary to diabetic nephropathy. Currently on losartan 100 mg nightly  - management with low sodium diet, ARB and BP control      3. Electrolytes: stable - potassium 4.2 2021   4. Volume status: no edema     5. Acid/Base status: continue current dose of sodium bicarbonate 650 mg three times daily.  Bicarb at goal at 24 (goal >22)     6. Hypertension: continue losartan and amlodipine - unclear control with SBP ranging from 120-140's.  Could increase amlodipine to 10 mg daily     7. BMD  continue cholecalciferol 2000U Daily and calcium supplements  - low threshold to discontinue given that PTH has been low side of goal in past     8. Type II DM- uncontrolled with A1C 8.2  - + retinopathy  - remains on empagliflozin 10 mg daily, metformin 1000 mg daily and novolog  - adding ozempic 0.25 mg weekly 2021  - working with Pamela Laura and Amy Rogel, PAC    9. Diabetic neuropathy- some \"fuzziness\" in feeling of toes     10. Diabetic retinopathy-follows with an ophthalmologist.      11. Obesity-BMI 30. He is aware of benefits of weight loss     Kiah Ramsey MD  [facility]  Department of Medicine  Division of Renal Disease and Hypertension  [employer]  [email or web address]     40 minutes spent on visit, meeting with Earle Rene and in chart " review and documentation on date of encounter, 12/08/21       Follow-up: Return in about 6 months (around 6/8/2022).     Reason For Visit:   CKD follow up     HPI:   Earle Rene is a 71 year old male with a history of DM2 for 20+ years, complicated by diabetic neuropathy, severe non proliferative retinopathy (follows with ophthalmologist Dr. Eugene) and nephropathy.    He is on empagliflozin for diabetes - now off metformin given decline in kidney function. Also on tresiba and semaglutide. A1C 8.3 on 11/2/2021    Last visit June 9th 2021  Overall feeling ok, no CP, no dyspnea, no vomiting, no fever or chills. Has small amount of edema in both ankles, wears looser fitting socks.    Has pruritis on both arms with skin lesions - ?excoriations that is better compared to previous years, has seen dermatologist outside of our system. Has a lotion given to him by dermatologist that helps somewhat.      Review of Systems:   Pertinent items are noted in HPI or as below, remainder of complete ROS is negative.      Active Medications:   Current Outpatient Medications   Medication Sig Dispense Refill     albuterol (VENTOLIN HFA) 108 (90 Base) MCG/ACT inhaler Inhale 2 puffs into the lungs every 6 hours 18 g 3     alpha-lipoic acid 600 MG capsule Take 1 capsule (600 mg) by mouth daily 90 capsule 3     amLODIPine (NORVASC) 5 MG tablet Take 1 tablet (5 mg) by mouth At Bedtime 90 tablet 3     ARTIFICIAL TEAR OP Apply to eye as needed       aspirin 81 MG tablet Take 1 tablet by mouth At Bedtime        atorvastatin (LIPITOR) 20 MG tablet Take 1 tablet (20 mg) by mouth daily 90 tablet 3     blood glucose (NO BRAND SPECIFIED) lancets standard Lancets that go with device, Test 3 times daily 300 each 3     blood glucose monitoring (NO BRAND SPECIFIED) meter device kit Any meter covered by insurance, not store brand, use as directed. 1 kit 0     blood glucose monitoring (NO BRAND SPECIFIED) test strip Strips that go with meter, covered by  insurance. Test 3 times daily 300 strip 3     Blood Pressure Monitor KIT Automatic Blood Pressure Monitor 1 kit 0     camphor-menthol (DERMASARRA) 0.5-0.5 % external lotion Apply to arms legs torso 1-2 times a day for itching 1 Bottle 11     cholecalciferol (VITAMIN D3) 25 mcg (1000 units) capsule Take 2 capsules (50 mcg) by mouth daily 180 capsule 3     clobetasol (TEMOVATE) 0.05 % ointment Apply topically to legs twice daily for 2 weeks.  Then discontinue 30 g 0     clotrimazole (LOTRIMIN) 1 % cream Apply topically 2 times daily 30 g 3     Continuous Blood Gluc  (FREESTYLE PETER READER) JUANCARLOS 1 Device daily Use to read blood sugars   as  instruction 4 times daily 1 Device 0     Continuous Blood Gluc Sensor (FREESTYLE PETER 14 DAY SENSOR) MISC 1 applicator every 14 days Change every 14 days 8 each 3     dextromethorphan-guaiFENesin (MUCINEX DM)  MG 12 hr tablet Take 1 tablet by mouth every 12 hours 30 tablet 0     dextromethorphan-guaiFENesin (TUSSIN DM)  MG/5ML liquid Take 5 mLs by mouth 2 times daily as needed 118 mL 0     diclofenac (VOLTAREN) 1 % topical gel Apply 4 grams to thigh up to 4 times daily for pain 100 g 1     doxycycline hyclate (VIBRA-TABS) 100 MG tablet Take 1 tablet (100 mg) by mouth 2 times daily 14 tablet 0     empagliflozin (JARDIANCE) 10 MG TABS tablet One tab daily. 90 tablet 3     famotidine (PEPCID) 20 MG tablet Take 1 tablet (20 mg) by mouth 2 times daily 180 tablet 3     fenofibrate 54 MG tablet Take 1 tablet (54 mg) by mouth daily (Patient taking differently: Take 54 mg by mouth At Bedtime ) 90 tablet 0     finasteride (PROSCAR) 5 MG tablet Take 1 tablet (5 mg) by mouth daily 90 tablet 2     fluocinonide (LIDEX) 0.05 % external cream APPLY TO AFFECTED AREA TWICE A DAY  3     fluticasone (FLONASE) 50 MCG/ACT nasal spray Spray 1 spray into both nostrils daily 16 g 0     fluticasone (FLOVENT HFA) 110 MCG/ACT inhaler Inhale 1 puff into the lungs 2 times daily  "12 g 11     gabapentin (NEURONTIN) 300 MG capsule Take 1 capsule (300 mg) by mouth 2 times daily 60 capsule 3     gabapentin (NEURONTIN) 300 MG capsule TAKE 1 CAPSULE BY MOUTH TWICE A  capsule 0     HUMALOG KWIKPEN 100 UNIT/ML soln Inject 15-17  units with meals, plus correction. Pt uses approx 65 units in 24 hrs. 60 mL 1     insulin degludec (TRESIBA FLEXTOUCH) 100 UNIT/ML pen Inject 64 units subcu at bedtime. 60 mL 3     insulin pen needle (B-D U/F) 31G X 5 MM miscellaneous Use 4 time(s) per day.  Please dispense as BD Pen Needle Mini U/F 31G x 5  each 3     insulin pen needle (B-D U/F) 31G X 5 MM Use 4 times per day.  Please dispense as BD Pen Needle Mini U/F 31G x 5  each 3     insulin syringe 31G X 5/16\" 0.5 ML MISC Use three syringes daily 270 each 1     ketamine 5% gabapentin 8% lidocaine 2.5% topical PLO cream Apply 1 g topically 3 times daily 30 g 3     loratadine (CLARITIN) 10 MG tablet Take 1 tablet (10 mg) by mouth daily (Patient taking differently: Take 10 mg by mouth At Bedtime ) 90 tablet 0     losartan (COZAAR) 100 MG tablet TAKE 1 TABLET BY MOUTH AT BEDTIME 90 tablet 3     mupirocin (BACTROBAN) 2 % cream Apply  topically. In very small amounts only as needed 15 g 1     Omega-3 Fatty Acids (OMEGA-3 FISH OIL) 1000 MG CAPS Take 1 capsule (1 g) by mouth 2 times daily (Patient taking differently: Take 1 g by mouth as needed (PT last dose a week ago 12.24.19) ) 60 capsule 11     ONETOUCH ULTRA test strip Use to test blood sugar 3 times daily 300 strip 3     Semaglutide,0.25 or 0.5MG/DOS, (OZEMPIC, 0.25 OR 0.5 MG/DOSE,) 2 MG/1.5ML SOPN Inject 0.25 mg Subcutaneous once a week 1.5 mL 3     sodium bicarbonate 650 MG tablet Take 1 tablet (650 mg) by mouth 3 times daily 90 tablet 11     tamsulosin (FLOMAX) 0.4 MG capsule Take 1 capsule (0.4 mg) by mouth daily 90 capsule 2     triamcinolone (KENALOG) 0.1 % cream Apply topically 3 times daily 80 g 0     VITAMIN D3 25 MCG (1000 UT) tablet TAKE 2 " TABLETS (50 MCG) BY MOUTH DAILY           Allergies:   Patient has no known allergies.      Past Medical History:  BPH  Diabetes   Diabetic neuropathy   Diabetic retinopathy   GERD   Granulomatous disease   Hyperlipidemia   Hypertension   Mood disorder   ED  Presbyopia   Myopia   Elevated PSA  Nephrolithiasis   Neutropenia   Sarcoidosis of lung   Sialoadenitis   Cataracts      Past Surgical History:  Past Surgical History:   Procedure Laterality Date     ------------OTHER-------------      back of neck abscess drainage in OR     AS RAD RESEC TONSIL/PILLARS Bilateral 1961     CATARACT IOL, RT/LT Left      COLONOSCOPY  7/29/2013    Procedure: COLONOSCOPY;;  Surgeon: Montana Pascal MD;  Location:  GI     EXCISE MASS UPPER EXTREMITY Right 11/11/2019    Procedure: EXCISION, MASS, UPPER EXTREMITY, RIGHT SHOULDER;  Surgeon: Johana Choudhury MD;  Location: [facility] OR     INTRAVITREAL INJECTION CHEMOTHERAPY Right 12/30/2019    Procedure: INTRAVITREAL Bevacizumab injection;  Surgeon: Miltno Maki MD;  Location: [facility] OR     PHACOEMULSIFICATION CLEAR CORNEA WITH STANDARD INTRAOCULAR LENS IMPLANT Right 12/30/2019    Procedure: PHACOEMULSIFICATION, CATARACT, WITH INTRAOCULAR LENS IMPLANT;  Surgeon: Milton Maki MD;  Location: [facility] OR     PHACOEMULSIFICATION WITH STANDARD INTRAOCULAR LENS IMPLANT Left 6/21/2019    Procedure: Left Eye Cataract Removal with Intraocular Lens Implant with Intraoperative Avastin Injection;  Surgeon: Lacey Eugene MD;  Location: [facility] OR     siladenatis  11/2017       Family History:   Diabetes- father and brother   MI- father   Leukemia- brother   Negative for kidney disease       Social History:   Never a smoker. Drinks alcohol occasionally.      Physical Exam:  /76   Pulse 86   Wt 89.2 kg (196 lb 9.6 oz)   SpO2 97%   BMI 29.46 kg/m     GENERAL APPEARANCE: alert and no distress  Pulmonary: normal work of breathing  CV - WWP, no edema  NEURO: mentation intact and speech  normal  Psych: affect full, interactive  Skin - excoriations, white plaques    Laboratory:  CMP  Recent Labs   Lab Test 07/30/21  1005 06/09/21  1524 03/12/21  1324 12/14/20  0831 06/01/20  1109 01/29/20  1315 12/18/19  1218 07/02/19  0947 06/26/19  1217 06/18/19  1054 08/01/18  1222 05/24/18  1414 05/01/18  1233 03/06/18  1216 11/01/17  1044 06/24/17  2317 06/06/17  1116   NA  --  142 137 138 142 135  --   --    < > 136   < >  --  141 136  --    < > 141   POTASSIUM  --  4.2 5.2 4.2 4.1 4.5   < >  --    < > 4.3   < >  --  4.7 4.0 4.4   < > 3.9   CHLORIDE  --  111* 107 110* 110* 104  --   --    < > 106   < >  --  110* 105  --    < > 108   CO2  --  22 23 20 22 23  --   --    < > 22   < >  --  21 23  --    < > 24   ANIONGAP  --  8 7 8 10 8  --   --    < > 8   < >  --  10 9  --    < > 10   GLC  --  211* 347* 212* 218* 281*  --   --    < > 195*   < >  --  149* 197*  --    < > 96   BUN  --  20 33* 30 25 34*  --   --    < > 27   < >  --  20 21  --    < > 21   CR 1.84* 1.45* 1.68* 1.43* 1.39* 1.61*   < >  --    < > 1.41*   < >  --  1.28* 1.33* 1.20   < > 1.26*   GFRESTIMATED 36* 48* 40* 49* 51* 42*   < >  --    < > 50*   < >  --  56* 53* 60*   < > 57*   GFRESTBLACK  --  55* 46* 56* 59* 49*   < >  --    < > 58*   < >  --  67 65 73   < > 69   INDERJIT  --  8.8 9.0 9.4 9.8 9.2  --   --    < > 9.3   < >  --  9.8 8.8  --    < > 9.2   MAG  --   --   --   --   --   --   --  1.9  --   --   --  2.1 2.1  --   --   --   --    PHOS  --  2.9  --  4.4 3.7 4.0  --   --   --   --    < >  --  3.3  --   --   --   --    PROTTOTAL  --   --  6.9  --   --   --   --   --   --  7.0  --   --   --  7.3  --   --  6.6*   ALBUMIN  --  3.6 3.7 4.0 3.7 3.7  --   --   --  3.8   < >  --  4.0 3.8  --   --  3.5   BILITOTAL  --   --  0.4  --   --   --   --   --   --  0.6  --   --   --  0.8  --   --  0.7   ALKPHOS  --   --  108  --   --   --   --   --   --  63  --   --   --  77  --   --  70   AST  --   --  24  --   --   --   --   --   --  24  --   --   --  25 23   --  20   ALT  --   --  36  --   --   --   --   --   --  38  --   --   --  35 44  --  49    < > = values in this interval not displayed.     CBC  Recent Labs   Lab Test 12/08/21  1428 06/09/21  1524 03/12/21  1324 12/14/20  0831 01/29/20  1315 06/18/19  1054 03/13/19  1516 02/06/19  1322   HGB 15.0 13.6 14.1 14.9   < > 13.5 13.7 13.8   WBC  --   --  5.1  --   --  4.6 5.2 6.1   RBC  --   --  4.31*  --   --  4.18* 4.38* 4.41   HCT  --   --  41.7  --   --  38.7* 40.5 41.4   MCV  --   --  97  --   --  93 93 94   MCH  --   --  32.7  --   --  32.3 31.3 31.3   MCHC  --   --  33.8  --   --  34.9 33.8 33.3   RDW  --   --  12.3  --   --  12.6 12.5 11.9   PLT  --   --  156  --   --  130* 131* 225    < > = values in this interval not displayed.     INR  No lab results found.  URINE STUDIES  Recent Labs   Lab Test 01/09/20  0849 12/18/19  1220 05/01/18  1238 06/06/17  1120 03/16/16  1000 01/14/16  0858   COLOR Straw Yellow Yellow Yellow   < > Yellow   APPEARANCE Clear Slightly Cloudy Clear Slightly Cloudy   < > Clear   URINEGLC >499* >499* 50* >499*   < > 100*   URINEBILI Negative Negative Negative Negative   < > Negative   URINEKETONE Negative Negative Negative Negative   < > Negative   SG 1.015 1.021 1.016 1.017   < > >1.030   UBLD Negative Large* Negative Negative   < > Negative   URINEPH 5.0 5.0 5.0 5.0   < > 5.5   PROTEIN 100* 100* >499* >499*   < > 100*   UROBILINOGEN  --   --   --   --   --  0.2   NITRITE Negative Negative Negative Negative   < > Negative   LEUKEST Negative Small* Negative Negative   < > Negative   RBCU 1 129* 1 2   < > O - 2   WBCU <1 125* <1 2   < > O - 2    < > = values in this interval not displayed.     Recent Labs   Lab Test 06/09/21  1530 12/14/20  0848 06/01/20  1110 01/29/20  1330 03/13/19  1522 02/06/19  1326 08/01/18  1225 05/01/18  1238   UTPG 2.37* 1.51* 1.39* 1.36* 3.04* 1.49* 1.37* 1.76*     PTH  Recent Labs   Lab Test 06/01/20  1109 03/13/19  1516 05/01/18  1233   PTHI 22 42 49     IRON  STUDIES   Recent Labs   Lab Test 07/02/19  0947 05/01/18  1233   IRON  --  83   FEB  --  344   IRONSAT  --  24   DEANA 178 160

## 2021-12-09 ENCOUNTER — TELEPHONE (OUTPATIENT)
Dept: NEPHROLOGY | Facility: CLINIC | Age: 71
End: 2021-12-09
Payer: COMMERCIAL

## 2021-12-09 NOTE — TELEPHONE ENCOUNTER
Patient was called to schedule a 6 month f/u appt with   Dr Ramsey there was no answer and a full voicemail.  I did send letter requesting patient to call us back to schedule.

## 2021-12-09 NOTE — TELEPHONE ENCOUNTER
Nephrology Note: Nursing Outreach Encounter    REASON FOR CALL:                                                      REASON FOR CALL: Results Notification                                          SITUATION/BACKROUND:                                                    Patient is being treated for CKD Stage 3.    Kiah Ramsey MD Engen, Kayla, RN  Please call him and let him know kidney function is stable with GFR 41 - still low but not worse. Will continue to monitor.   Kiah Ramsey MD        ASSESSMENT:                                                      Informed pt that kidney function is stable with GFR 41.       PLAN:                                                      Follow Up:   Results Notification: Results dated 12/08/21 discussed wtih patient over telephone     Patient verbalized understanding and will contact the clinic with any further questions or concerns.     Jessica Contreras, RN

## 2021-12-11 ENCOUNTER — TELEPHONE (OUTPATIENT)
Dept: INTERNAL MEDICINE | Facility: CLINIC | Age: 71
End: 2021-12-11
Payer: COMMERCIAL

## 2021-12-11 DIAGNOSIS — R21 RASH: Primary | ICD-10-CM

## 2021-12-11 NOTE — TELEPHONE ENCOUNTER
Dear Meg;    Please see note from Dr. Ramsey regarding Mr. Rene's rash.    Please dermatology referral this encounter. He could also see anyone in clinic or NP clinic.    Thanks, MOISES Lopez

## 2021-12-11 NOTE — TELEPHONE ENCOUNTER
----- Message from Kiah Ramsey MD sent at 12/8/2021  3:26 PM CST -----  Regarding: pruritis/rash  Bo García, I am seeing Earle, he has pruritis on arms with some rash. I guess he saw outside dermatologist in past but I cannot find records. Maybe something to follow up on.  Thanks  Kiah

## 2021-12-13 ENCOUNTER — TELEPHONE (OUTPATIENT)
Dept: MEDSURG UNIT | Facility: CLINIC | Age: 71
End: 2021-12-13
Payer: COMMERCIAL

## 2021-12-13 NOTE — TELEPHONE ENCOUNTER
RN left voicemail letting patient know Dr. Hare's message, and that a dermatology referral was placed.  RN also gave dermatology scheduling number and [facility] scheduling number.    Sharri Escobar RN on 12/13/2021 at 1:30 PM

## 2021-12-14 NOTE — TELEPHONE ENCOUNTER
DIAGNOSIS: Injection follow up per Dr. Nichols    APPOINTMENT DATE: 12.27.21   NOTES STATUS DETAILS   OFFICE NOTE from referring provider Internal 11.30.21 Dr Emmanuel Nichols, [facility]   OFFICE NOTE from other specialist Internal 11.1.21 Dr Marcio Hare, [facility] IM   DISCHARGE SUMMARY from hospital N/A    DISCHARGE REPORT from the ER N/A    OPERATIVE REPORT Internal 12.13.21   EMG report N/A    MEDICATION LIST Internal    MRI Internal 11.12.21 lumbar spine   DEXA (osteoporosis/bone health) N/A    CT SCAN N/A    XRAYS (IMAGES & REPORTS) Internal 11.30.21 lumbar spine

## 2021-12-16 ENCOUNTER — OFFICE VISIT (OUTPATIENT)
Dept: URGENT CARE | Facility: URGENT CARE | Age: 71
End: 2021-12-16
Payer: COMMERCIAL

## 2021-12-16 VITALS
SYSTOLIC BLOOD PRESSURE: 126 MMHG | TEMPERATURE: 98.1 F | OXYGEN SATURATION: 100 % | DIASTOLIC BLOOD PRESSURE: 76 MMHG | BODY MASS INDEX: 29.37 KG/M2 | HEART RATE: 74 BPM | WEIGHT: 196 LBS

## 2021-12-16 DIAGNOSIS — B02.9 HERPES ZOSTER WITHOUT COMPLICATION: Primary | ICD-10-CM

## 2021-12-16 PROCEDURE — 99213 OFFICE O/P EST LOW 20 MIN: CPT | Performed by: FAMILY MEDICINE

## 2021-12-16 RX ORDER — VALACYCLOVIR HYDROCHLORIDE 1 G/1
1000 TABLET, FILM COATED ORAL 3 TIMES DAILY
Qty: 21 TABLET | Refills: 0 | Status: SHIPPED | OUTPATIENT
Start: 2021-12-16 | End: 2022-01-05

## 2021-12-17 NOTE — PROGRESS NOTES
Assessment & Plan     Herpes zoster without complication  This appears to be a C4-C5 dermatome distribution right side of the body shingles presentation recommended oral antivirals to help. Handout regarding shingles provided.no lesions appear to be infected at this time.   - valACYclovir (VALTREX) 1000 mg tablet  Dispense: 21 tablet; Refill: 0        Layo Rivera MD   [facility]    Katie Og is a 71 year old male who presents to clinic today for the following health issues:  Chief Complaint   Patient presents with     Urgent Care     Rash     c/o rash for 2 weeks     HPI    10 days of a painful rash R side of body has tried various creams/lotion no issues. Returned from [place] after someone he knew passed away and attended the .   He reports no cough/fever.     No previous hx of shingles      Patient Active Problem List    Diagnosis Date Noted     Peripheral vascular disease, unspecified (H) 2021     Priority: Medium     Malignant neoplasm of prostate (H) 2021     Priority: Medium     Chronic kidney disease, stage 3 (H) 10/28/2020     Priority: Medium     Combined form of nonsenile cataract of right eye 2019     Priority: Medium     Added automatically from request for surgery [id number]       Subcutaneous mass 10/03/2019     Priority: Medium     Added automatically from request for surgery [id number]       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) 2018     Priority: Medium     Hyponatremia 2017     Priority: Medium     Sialoadenitis 2017     Priority: Medium     Overview:   Added automatically from request for surgery [id number]       Pain of right thumb 2016     Priority: Medium     Pain of finger of right hand 2016     Priority: Medium     Presbyopia 2015     Priority: Medium     Myopia 2015     Priority: Medium     Cataracts, bilateral 2015     Priority:  Medium     CTS (carpal tunnel syndrome) 10/24/2014     Priority: Medium     Diabetic polyneuropathy (H) 10/24/2014     Priority: Medium     Hyperlipidemia with target LDL less than 100 01/30/2014     Priority: Medium     Diagnosis updated by automated process. Provider to review and confirm.       Erectile dysfunction 01/18/2012     Priority: Medium     Psychological factors associated with another disorder 11/16/2011     Priority: Medium     Problem list name updated by automated process. Provider to review       Mood disorder in conditions classified elsewhere 11/16/2011     Priority: Medium     Occupational problem 11/16/2011     Priority: Medium     Type 2 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, with long-term current use of insulin (H) 11/16/2011     Priority: Medium     Adhesive capsulitis of shoulder 12/01/2008     Priority: Medium     Colonic polyp 08/23/2007     Priority: Medium     Overview:   Colonoscopy 3/13/07 showed a tublar adenoma in distal sigmoid colon. Needs repeat in 2012.       Elevated PSA 08/23/2007     Priority: Medium     Overview:   Reviewed notes from [facility] from March 2007. PSA 1.32 in August 2006 and 2.2 (27% free) in March 2007.  Recommended f/u in 6 months.       Heel fracture 08/23/2007     Priority: Medium     Overview:   Right heel       Nephrolithiasis 08/23/2007     Priority: Medium     Overview:   1984, 1989       Sarcoidosis of lung (H) 08/23/2007     Priority: Medium     Overview:   Diagnosed by bx in late 1990's.         Current Outpatient Medications   Medication     albuterol (VENTOLIN HFA) 108 (90 Base) MCG/ACT inhaler     alpha-lipoic acid 600 MG capsule     amLODIPine (NORVASC) 5 MG tablet     ARTIFICIAL TEAR OP     aspirin 81 MG tablet     atorvastatin (LIPITOR) 20 MG tablet     blood glucose (NO BRAND SPECIFIED) lancets standard     blood glucose monitoring (NO BRAND SPECIFIED) meter device kit     blood glucose  "monitoring (NO BRAND SPECIFIED) test strip     Blood Pressure Monitor KIT     camphor-menthol (DERMASARRA) 0.5-0.5 % external lotion     cholecalciferol (VITAMIN D3) 25 mcg (1000 units) capsule     clobetasol (TEMOVATE) 0.05 % ointment     clotrimazole (LOTRIMIN) 1 % cream     Continuous Blood Gluc  (FREESTYLE PETER READER) JUANCARLOS     Continuous Blood Gluc Sensor (FREESTYLE PETER 14 DAY SENSOR) MISC     dextromethorphan-guaiFENesin (MUCINEX DM)  MG 12 hr tablet     dextromethorphan-guaiFENesin (TUSSIN DM)  MG/5ML liquid     diclofenac (VOLTAREN) 1 % topical gel     doxycycline hyclate (VIBRA-TABS) 100 MG tablet     empagliflozin (JARDIANCE) 10 MG TABS tablet     famotidine (PEPCID) 20 MG tablet     fenofibrate 54 MG tablet     finasteride (PROSCAR) 5 MG tablet     fluocinonide (LIDEX) 0.05 % external cream     fluticasone (FLONASE) 50 MCG/ACT nasal spray     fluticasone (FLOVENT HFA) 110 MCG/ACT inhaler     gabapentin (NEURONTIN) 300 MG capsule     gabapentin (NEURONTIN) 300 MG capsule     HUMALOG KWIKPEN 100 UNIT/ML soln     insulin degludec (TRESIBA FLEXTOUCH) 100 UNIT/ML pen     insulin pen needle (B-D U/F) 31G X 5 MM miscellaneous     insulin pen needle (B-D U/F) 31G X 5 MM     insulin syringe 31G X 5/16\" 0.5 ML MISC     ketamine 5% gabapentin 8% lidocaine 2.5% topical PLO cream     loratadine (CLARITIN) 10 MG tablet     losartan (COZAAR) 100 MG tablet     mupirocin (BACTROBAN) 2 % cream     Omega-3 Fatty Acids (OMEGA-3 FISH OIL) 1000 MG CAPS     ONETOUCH ULTRA test strip     Semaglutide,0.25 or 0.5MG/DOS, (OZEMPIC, 0.25 OR 0.5 MG/DOSE,) 2 MG/1.5ML SOPN     sodium bicarbonate 650 MG tablet     tamsulosin (FLOMAX) 0.4 MG capsule     triamcinolone (KENALOG) 0.1 % cream     valACYclovir (VALTREX) 1000 mg tablet     VITAMIN D3 25 MCG (1000 UT) tablet     Current Facility-Administered Medications   Medication     aflibercept (EYLEA) injection prefilled syringe 2 mg     aflibercept (EYLEA) injection " vial 2 mg     Facility-Administered Medications Ordered in Other Visits   Medication     glucose gel 15-30 g    Or     dextrose 50 % injection 25-50 mL    Or     glucagon injection 1 mg           Objective    /76   Pulse 74   Temp 98.1  F (36.7  C) (Tympanic)   Wt 88.9 kg (196 lb)   SpO2 100%   BMI 29.37 kg/m    Physical Exam     R upper chest and upper back/lower posterior neck clusters of vesicular fluid filled lesions. Sparing of face/ anterior neck/head              No results found for any visits on 12/16/21.                  The use of Dragon/Freever dictation services may have been used to construct the content in this note; any grammatical or spelling errors are non-intentional. Please contact the author of this note directly if you are in need of any clarification.

## 2021-12-27 ENCOUNTER — PRE VISIT (OUTPATIENT)
Dept: ORTHOPEDICS | Facility: CLINIC | Age: 71
End: 2021-12-27
Payer: COMMERCIAL

## 2022-01-04 DIAGNOSIS — E11.3399 TYPE 2 DIABETES MELLITUS WITH MODERATE NONPROLIFERATIVE RETINOPATHY WITHOUT MACULAR EDEMA, WITH LONG-TERM CURRENT USE OF INSULIN, UNSPECIFIED LATERALITY (H): ICD-10-CM

## 2022-01-04 DIAGNOSIS — Z79.4 TYPE 2 DIABETES MELLITUS WITH MODERATE NONPROLIFERATIVE RETINOPATHY WITHOUT MACULAR EDEMA, WITH LONG-TERM CURRENT USE OF INSULIN, UNSPECIFIED LATERALITY (H): ICD-10-CM

## 2022-01-05 ENCOUNTER — OFFICE VISIT (OUTPATIENT)
Dept: URGENT CARE | Facility: URGENT CARE | Age: 72
End: 2022-01-05
Payer: COMMERCIAL

## 2022-01-05 VITALS
HEIGHT: 69 IN | RESPIRATION RATE: 18 BRPM | SYSTOLIC BLOOD PRESSURE: 102 MMHG | WEIGHT: 195 LBS | OXYGEN SATURATION: 98 % | TEMPERATURE: 97.5 F | BODY MASS INDEX: 28.88 KG/M2 | DIASTOLIC BLOOD PRESSURE: 58 MMHG | HEART RATE: 68 BPM

## 2022-01-05 DIAGNOSIS — J18.9 COMMUNITY ACQUIRED PNEUMONIA, UNSPECIFIED LATERALITY: Primary | ICD-10-CM

## 2022-01-05 DIAGNOSIS — L29.89 UREMIC PRURITUS: ICD-10-CM

## 2022-01-05 DIAGNOSIS — B02.29 POST HERPETIC NEURALGIA: ICD-10-CM

## 2022-01-05 PROCEDURE — U0003 INFECTIOUS AGENT DETECTION BY NUCLEIC ACID (DNA OR RNA); SEVERE ACUTE RESPIRATORY SYNDROME CORONAVIRUS 2 (SARS-COV-2) (CORONAVIRUS DISEASE [COVID-19]), AMPLIFIED PROBE TECHNIQUE, MAKING USE OF HIGH THROUGHPUT TECHNOLOGIES AS DESCRIBED BY CMS-2020-01-R: HCPCS | Performed by: PHYSICIAN ASSISTANT

## 2022-01-05 PROCEDURE — 99214 OFFICE O/P EST MOD 30 MIN: CPT | Performed by: PHYSICIAN ASSISTANT

## 2022-01-05 PROCEDURE — U0005 INFEC AGEN DETEC AMPLI PROBE: HCPCS | Performed by: PHYSICIAN ASSISTANT

## 2022-01-05 RX ORDER — DOXYCYCLINE HYCLATE 100 MG
100 TABLET ORAL 2 TIMES DAILY
Qty: 20 TABLET | Refills: 0 | Status: SHIPPED | OUTPATIENT
Start: 2022-01-05 | End: 2022-01-15

## 2022-01-05 ASSESSMENT — MIFFLIN-ST. JEOR: SCORE: 1616.95

## 2022-01-05 NOTE — PATIENT INSTRUCTIONS
START doxycycline for lung infection. Push fluids and rest.   USE humidified air at night  OK to use this lotion that is over the counter. I did send in a prescription, if not improving in the next 2 weeks, follow-up with PCP  camphor-menthol (DERMASARRA) 0.5-0.5 % external lotion    For your shingles pain, follow-up with your PCP to discuss medication management

## 2022-01-05 NOTE — PROGRESS NOTES
Assessment & Plan     1. Community acquired pneumonia, unspecified laterality  Cold symptoms for the past 10 days.  Coarse lung sounds are appreciated on exam, given his protracted illness along with history of diabetes and pneumonia we will treat with medication as below.  Encouraged him to restart his inhalers.  Encourage fluids, rest and humidified air at night.  Worsening symptoms back to clinic or ER.  - Symptomatic; Unknown COVID-19 Virus (Coronavirus) by PCR Nose  - doxycycline hyclate (VIBRA-TABS) 100 MG tablet; Take 1 tablet (100 mg) by mouth 2 times daily for 10 days  Dispense: 20 tablet; Refill: 0    2. Uremic pruritus  Intermittent itchy skin  Has been treated with this in the past, will use again  If not seeing improvement in 2 weeks, follow-up with PCP  - camphor-menthol (DERMASARRA) 0.5-0.5 % external lotion; Apply lotion 2-3 times per day to itchy areas. If not improving in two weeks, follow up with PCP.  Dispense: 222 mL; Refill: 0    3. Post herpetic neuralgia  Patient is currently taking gabapentin.  No evidence of skin infection.  Advised follow-up with PCP to titrate/adjust medication.      Return in about 1 week (around 1/12/2022), or if symptoms worsen or fail to improve.    Diagnosis and treatment plan was reviewed with patient and/or family.   We went over any labs or imaging. Discussed worsening symptoms or little to no relief despite treatment plan to follow-up with PCP or return to clinic.  Patient verbalizes understanding. All questions were addressed and answered.     Leny Fernandes PA-C  [facility] [place]    CHIEF COMPLAINT:   Chief Complaint   Patient presents with     Urgent Care     Pain     still dealing with pain from shingles diagnosed in December.      URI     Coughing  and sneezing x 10 days.     Subjective     Earle is a 71 year old male who presents to clinic today for evaluation.    ##Cough.  Symptoms started 10 days ago, and have been  "worsening.  Cough is productive of yellow sputum.  Additionally has had runny nose, congestion and sneezing.  Symptoms do not seem to be improving.  He has received 2 doses of Covid vaccine.  He has not been around others with the same symptoms.  Denies having fever or chills.  No vomiting or diarrhea.  Does not feel chest pain or shortness of breath.  He has inhalers at home, but has not been using them.    ##Itchy skin.  Intermittently for the past \"years\".  Has recently flared up.  Has been treated in the past before, is unsure of the medication.  Wife will sometimes get itchy skin, but not similar to this.  Denies any recent travel.  He has tried lotion without success.    ##Postherpetic neuralgia.  Was diagnosed with shingles, on 12/16/2021.  Symptoms have been improving, but still complains of having deep pain. Rash has been improving.       Past Medical History:   Diagnosis Date     Blepharitis of both eyes      BPH (benign prostatic hyperplasia)      Diabetes (H)      Diabetic neuropathy (H)      Diabetic retinopathy associated with diabetes mellitus due to underlying condition (H)      Dry eye syndrome      GERD (gastroesophageal reflux disease)      Goiter      Granulomatous disease (H)      HLD (hyperlipidemia)      HTN (hypertension)      Nonsenile cataract      Peripheral neuropathy      Past Surgical History:   Procedure Laterality Date     ------------OTHER-------------      back of neck abscess drainage in OR     AS RAD RESEC TONSIL/PILLARS Bilateral 1961     CATARACT IOL, RT/LT Left      COLONOSCOPY  7/29/2013    Procedure: COLONOSCOPY;;  Surgeon: [place] Pascal MD;  Location:  GI     EXCISE MASS UPPER EXTREMITY Right 11/11/2019    Procedure: EXCISION, MASS, UPPER EXTREMITY, RIGHT SHOULDER;  Surgeon: Johana Choudhury MD;  [facility]     INTRAVITREAL INJECTION CHEMOTHERAPY Right 12/30/2019    Procedure: INTRAVITREAL Bevacizumab injection;  Surgeon: Milton Maki MD;  [facility]: " [facility]     PHACOEMULSIFICATION CLEAR CORNEA WITH STANDARD INTRAOCULAR LENS IMPLANT Right 12/30/2019    Procedure: PHACOEMULSIFICATION, CATARACT, WITH INTRAOCULAR LENS IMPLANT;  Surgeon: Milton Maki MD;  [facility]     PHACOEMULSIFICATION WITH STANDARD INTRAOCULAR LENS IMPLANT Left 6/21/2019    Procedure: Left Eye Cataract Removal with Intraocular Lens Implant with Intraoperative Avastin Injection;  Surgeon: Lacey Eugene MD;  [facility]     siladenatis  11/2017     Social History     Tobacco Use     Smoking status: Never Smoker     Smokeless tobacco: Never Used   Substance Use Topics     Alcohol use: Yes     Comment: occasionaly     Current Outpatient Medications   Medication     albuterol (VENTOLIN HFA) 108 (90 Base) MCG/ACT inhaler     alpha-lipoic acid 600 MG capsule     amLODIPine (NORVASC) 5 MG tablet     ARTIFICIAL TEAR OP     aspirin 81 MG tablet     atorvastatin (LIPITOR) 20 MG tablet     blood glucose (NO BRAND SPECIFIED) lancets standard     blood glucose monitoring (NO BRAND SPECIFIED) meter device kit     blood glucose monitoring (NO BRAND SPECIFIED) test strip     Blood Pressure Monitor KIT     camphor-menthol (DERMASARRA) 0.5-0.5 % external lotion     camphor-menthol (DERMASARRA) 0.5-0.5 % external lotion     cholecalciferol (VITAMIN D3) 25 mcg (1000 units) capsule     clobetasol (TEMOVATE) 0.05 % ointment     clotrimazole (LOTRIMIN) 1 % cream     Continuous Blood Gluc  (FREESTYLE PETER READER) JUANCARLOS     Continuous Blood Gluc Sensor (FREESTYLE PETER 14 DAY SENSOR) MISC     dextromethorphan-guaiFENesin (MUCINEX DM)  MG 12 hr tablet     dextromethorphan-guaiFENesin (TUSSIN DM)  MG/5ML liquid     diclofenac (VOLTAREN) 1 % topical gel     doxycycline hyclate (VIBRA-TABS) 100 MG tablet     empagliflozin (JARDIANCE) 10 MG TABS tablet     famotidine (PEPCID) 20 MG tablet     fenofibrate 54 MG tablet     finasteride (PROSCAR) 5 MG tablet     fluocinonide (LIDEX) 0.05 %  "external cream     fluticasone (FLONASE) 50 MCG/ACT nasal spray     fluticasone (FLOVENT HFA) 110 MCG/ACT inhaler     gabapentin (NEURONTIN) 300 MG capsule     gabapentin (NEURONTIN) 300 MG capsule     HUMALOG KWIKPEN 100 UNIT/ML soln     insulin degludec (TRESIBA FLEXTOUCH) 100 UNIT/ML pen     insulin pen needle (B-D U/F) 31G X 5 MM miscellaneous     insulin pen needle (B-D U/F) 31G X 5 MM     insulin syringe 31G X 5/16\" 0.5 ML MISC     ketamine 5% gabapentin 8% lidocaine 2.5% topical PLO cream     loratadine (CLARITIN) 10 MG tablet     losartan (COZAAR) 100 MG tablet     mupirocin (BACTROBAN) 2 % cream     Omega-3 Fatty Acids (OMEGA-3 FISH OIL) 1000 MG CAPS     ONETOUCH ULTRA test strip     Semaglutide,0.25 or 0.5MG/DOS, (OZEMPIC, 0.25 OR 0.5 MG/DOSE,) 2 MG/1.5ML SOPN     sodium bicarbonate 650 MG tablet     tamsulosin (FLOMAX) 0.4 MG capsule     triamcinolone (KENALOG) 0.1 % cream     VITAMIN D3 25 MCG (1000 UT) tablet     Current Facility-Administered Medications   Medication     aflibercept (EYLEA) injection prefilled syringe 2 mg     No Known Allergies    10 point ROS of systems were all negative except for pertinent positives noted in my HPI.      Exam:   /58   Pulse 68   Temp 97.5  F (36.4  C) (Temporal)   Resp 18   Ht 1.74 m (5' 8.5\")   Wt 88.5 kg (195 lb)   SpO2 98%   BMI 29.22 kg/m    Constitutional: healthy, alert and no distress  Head: Normocephalic, atraumatic.  Eyes: conjunctiva clear, no drainage  ENT: TMs clear and shiny jenna, nasal mucosa pink and moist, throat without tonsillar hypertrophy or erythema  Neck: neck is supple, no cervical lymphadenopathy or nuchal rigidity  Cardiovascular: RRR  Respiratory: Coarse breath sounds on posterior  Gastrointestinal: soft and nontender  Skin: healing rash on right side C4/C5 dermatome. No evidence of secondary infection.   Neurologic: Speech clear, gait normal. Moves all extremities.      "

## 2022-01-06 ENCOUNTER — TELEPHONE (OUTPATIENT)
Dept: URGENT CARE | Facility: URGENT CARE | Age: 72
End: 2022-01-06
Payer: COMMERCIAL

## 2022-01-06 LAB — SARS-COV-2 RNA RESP QL NAA+PROBE: POSITIVE

## 2022-01-06 NOTE — TELEPHONE ENCOUNTER
LVM to confirm Ozempic dose. Clinic number provided.   Lorena Bravo RN on 1/6/2022 at 12:58 PM       RE     GLP-1 Agonists Protocol Failed 01/06/2022 12:53 PM   Protocol Details  Normal serum creatinine on file in past 12 months         Pamela Laura PA-C  [facility] 1 minute ago (12:54 PM)     SD    Could you please confirm current Ozempic dose with patient and order.

## 2022-01-06 NOTE — TELEPHONE ENCOUNTER
Semaglutide,0.25 or 0.5MG/DOS, OZEMPIC   Last Written Prescription Date:  7/13/21  Last Fill Quantity: 1.5ml,   # refills: 3  Last Office Visit : 7/13/21  Future Office visit:  3/10/21   Routing refill request to provider for review/approval because:  90 DAY RF PENDING  ABN CR  Creatinine   Date Value Ref Range Status   12/08/2021 1.66 (H) 0.66 - 1.25 mg/dL Final   06/09/2021 1.45 (H) 0.66 - 1.25 mg/dL Final

## 2022-01-06 NOTE — TELEPHONE ENCOUNTER
Coronavirus (COVID-19) Notification    Reason for call  Notify of POSITIVE  COVID-19 lab result, assess symptoms,  review [facility] recommendations    Lab Result   Lab test for 2019-nCoV rRt-PCR or SARS-COV-2 PCR  Oropharyngeal AND/OR nasopharyngeal swabs were POSITIVE for 2019-nCoV RNA [OR] SARS-COV-2 RNA (COVID-19) RNA     We have been unable to reach Patient by phone at this time to notify of their Positive COVID-19 result.  Left voicemail message requesting a call back to [phone] [facility] for results.        POSITIVE COVID-19 Letter sent.    Nely Ferro

## 2022-01-10 RX ORDER — SEMAGLUTIDE 1.34 MG/ML
0.25 INJECTION, SOLUTION SUBCUTANEOUS WEEKLY
Qty: 1.5 ML | Refills: 1 | Status: SHIPPED | OUTPATIENT
Start: 2022-01-10 | End: 2022-03-10

## 2022-01-10 NOTE — TELEPHONE ENCOUNTER
"Holding package in his hand.   Requesting visit with provider.   0.25 1x every 7 days.  \"Stopped Metformin for 1-2 months now, BS average is not that good now\"  Asking about increasing his Ozempic dose?   Provider notified.   Lorena Bravo RN on 1/10/2022 at 8:24 AM     "

## 2022-01-12 NOTE — TELEPHONE ENCOUNTER
"Coronavirus (COVID-19) Notification    Caller Name (Patient, parent, daughter/son, grandparent, etc)  Patient    Reason for call  Notify of Positive Coronavirus (COVID-19) lab results, assess symptoms,  review  [employer] recommendations    Lab Result    Lab test:  2019-nCoV rRt-PCR or SARS-CoV-2 PCR    Oropharyngeal AND/OR nasopharyngeal swabs is POSITIVE for 2019-nCoV RNA/SARS-COV-2 PCR (COVID-19 virus)    RN Recommendations/Instructions per [facility] Coronavirus COVID-19 recommendations    Brief introduction script  Introduce self then review script:  \"I am calling on behalf of [employer].  We were notified that your Coronavirus test (COVID-19) for was POSITIVE for the virus.  I have some information to relay to you but first I wanted to mention that the [place] Dept of Health will be contacting you shortly [it's possible MD already called Patient] to talk to you more about how you are feeling and other people you have had contact with who might now also have the virus.  Also,  [employer] is Partnering with the [facility] for Covid-19 research, you may be contacted directly by research staff.\"    Assessment (Inquire about Patient's current symptoms)   Assessment   Current Symptoms at time of phone call: (if no symptoms, document No symptoms] None   Symptoms onset (if applicable) NA     If at time of call, Patients symptoms hare worsened, the Patient should contact 911 or have someone drive them to Emergency Dept promptly:      If Patient calling 911, inform 911 personal that you have tested positive for the Coronavirus (COVID-19).  Place mask on and await 911 to arrive.    If Emergency Dept, If possible, please have another adult drive you to the Emergency Dept but you need to wear mask when in contact with other people.      Monoclonal Antibody Administration    You may be eligible to receive a new treatment with a monoclonal antibody for preventing hospitalization in patients at high " risk for complications from COVID-19.   This medication is still experimental and available on a limited basis; it is given through an IV and must be given at an infusion center. Please note that not all people who are eligible will receive the medication since it is in limited supply.     Are you interested in being considered for this medication?  No.   Does the patient fit the criteria: No    Review information with Patient    Your result was positive. This means you have COVID-19 (coronavirus).  We have sent you a letter that reviews the information that I'll be reviewing with you now.    How can I protect others?    If you have symptoms: stay home and away from others (self-isolate) until:    You've had no fever--and no medicine that reduces fever--for 1 full day (24 hours). And       Your other symptoms have gotten better. For example, your cough or breathing has improved. And     At least 10 days have passed since your symptoms started. (If you've been told by a doctor that you have a weak immune system, wait 20 days.)     If you don't have symptoms: Stay home and away from others (self-isolate) until at least 10 days have passed since your first positive COVID-19 test. (Date test collected)    During this time:    Stay in your own room, including for meals. Use your own bathroom if you can.    Stay away from others in your home. No hugging, kissing or shaking hands. No visitors.     Don't go to work, school or anywhere else.     Clean  high touch  surfaces often (doorknobs, counters, handles, etc.). Use a household cleaning spray or wipes. You'll find a full list on the EPA website at www.epa.gov/pesticide-registration/list-n-disinfectants-use-against-sars-cov-2.     Cover your mouth and nose with a mask, tissue or other face covering to avoid spreading germs.    Wash your hands and face often with soap and water.    Make a list of people you have been in close contact with recently, even if either of you  wore a face covering.   - Start your list from 2 days before you became ill or had a positive test.  - Include anyone that was within 6 feet of you for a cumulative total of 15 minutes or more in 24 hours. (Example: if you sat next to [place] for 5 minutes in the morning and 10 minutes in the afternoon, then you were in close contact for 15 minutes total that day. [place] would be added to your list.)    A public health worker will call or text you. It is important that you answer. They will ask you questions about possible exposures to COVID-19, such as people you have been in direct contact with and places you have visited.    Tell the people on your list that you have COVID-19; they should stay away from others for 14 days starting from the last time they were in contact with you (unless you are told something different from a public health worker).     Caregivers in these groups are at risk for severe illness due to COVID-19:  o People 65 years and older  o People who live in a nursing home or long-term care facility  o People with chronic disease (lung, heart, cancer, diabetes, kidney, liver, immunologic)  o People who have a weakened immune system, including those who:  - Are in cancer treatment  - Take medicine that weakens the immune system, such as corticosteroids  - Had a bone marrow or organ transplant  - Have an immune deficiency  - Have poorly controlled HIV or AIDS  - Are obese (body mass index of 40 or higher)  - Smoke regularly    Caregivers should wear gloves while washing dishes, handling laundry and cleaning bedrooms and bathrooms.    Wash and dry laundry with special caution. Don't shake dirty laundry, and use the warmest water setting you can.    If you have a weakened immune system, ask your doctor about other actions you should take.    For more tips, go to www.cdc.gov/coronavirus/2019-ncov/downloads/10Things.pdf.    You should not go back to work until you meet the guidelines above for ending your  home isolation. You don't need to be retested for COVID-19 before going back to work--studies show that you won't spread the virus if it's been at least 10 days since your symptoms started (or 20 days, if you have a weak immune system).    Employers: This document serves as formal notice of your employee's medical guidelines for going back to work. They must meet the above guidelines before going back to work in person.    How can I take care of myself?    1. Get lots of rest. Drink extra fluids (unless a doctor has told you not to).    2. Take Tylenol (acetaminophen) for fever or pain. If you have liver or kidney problems, ask your family doctor if it's okay to take Tylenol.     Take either:     650 mg (two 325 mg pills) every 4 to 6 hours, or     1,000 mg (two 500 mg pills) every 8 hours as needed.     Note: Don't take more than 3,000 mg in one day. Acetaminophen is found in many medicines (both prescribed and over-the-counter medicines). Read all labels to be sure you don't take too much.    For children, check the Tylenol bottle for the right dose (based on their age or weight).    3. If you have other health problems (like cancer, heart failure, an organ transplant or severe kidney disease): Call your specialty clinic if you don't feel better in the next 2 days.    4. Know when to call 911: Emergency warning signs include:    Trouble breathing or shortness of breath    Pain or pressure in the chest that doesn't go away    Feeling confused like you haven't felt before, or not being able to wake up    Bluish-colored lips or face    5. Sign up for [employer]. We know it's scary to hear that you have COVID-19. We want to track your symptoms to make sure you're okay over the next 2 weeks. Please look for an email from [employer]--this is a free, online program that we'll use to keep in touch. To sign up, follow the link in the email. Learn more at [email or web address].    Where can I get more  information?    [facility]: [email or web address]    Coronavirus Basics: www.health.[facility].[place]/diseases/coronavirus/basics.html    What to Do If You're Sick: www.cdc.gov/coronavirus/2019-ncov/about/steps-when-sick.html    Ending Home Isolation: www.cdc.gov/coronavirus/2019-ncov/hcp/disposition-in-home-patients.html     Caring for Someone with COVID-19: www.cdc.gov/coronavirus/2019-ncov/if-you-are-sick/care-for-someone.html     [facility] clinical trials (COVID-19 research studies): clinicalaffairs.[facility]-clinical-trials     A Positive COVID-19 letter will be sent via [employer] or the mail. (Exception, no letters sent to Presurgerical/Preprocedure Patients)    Maryse Santo LPN

## 2022-01-25 ENCOUNTER — OFFICE VISIT (OUTPATIENT)
Dept: VASCULAR SURGERY | Facility: CLINIC | Age: 72
End: 2022-01-25
Payer: COMMERCIAL

## 2022-01-25 VITALS — HEART RATE: 84 BPM | OXYGEN SATURATION: 96 % | DIASTOLIC BLOOD PRESSURE: 78 MMHG | SYSTOLIC BLOOD PRESSURE: 119 MMHG

## 2022-01-25 DIAGNOSIS — I73.9 CLAUDICATION (H): Primary | ICD-10-CM

## 2022-01-25 PROCEDURE — 99205 OFFICE O/P NEW HI 60 MIN: CPT | Performed by: SURGERY

## 2022-01-25 NOTE — PATIENT INSTRUCTIONS
Thank you so much for choosing us for your care. It was a pleasure to see you at the vascular clinic today.     Follow-up recommendations: We will contact you with the results of your GARCIA test.    Additional testing/imaging ordered today: GARCIA's with exercise to be completed at your convenience.      Our scheduling team will get in touch with you to set up any follow-up testing/imaging and/or appointments. Please be aware that any testing/imaging recommended today will need to completed prior to your next visit with the provider. If testing/imaging is not completed prior to your next visit, your visit may be rescheduled.        If you have any questions, please call Marcelina Chambers RN at [phone]. We also encourage the use of [employer] to communicate with your HealthCare Provider.      If you have an urgent need after business hours (8:00 am to 4:30 pm) please call [phone], option 4, and ask for the vascular attending on call. For non-urgent after hours needs, please call the vascular nurse at [phone] and leave a detailed voicemail. For scheduling needs, please call our scheduling line at [phone].    Patient Education     Ankle Brachial Index (GARCIA) Test  The ankle brachial index (GARCIA) is a simple test that compares the blood pressure measured at your ankle with the blood pressure measured at your arm. It's used to check for peripheral artery disease (PAD) in the legs, or to see if PAD is getting worse. It can also be used to check your risk for heart attack and stroke. A low GARCIA number may mean that you have PAD.   What is PAD?  A fatty substance called plaque can build up in your arteries. This causes a narrowing or blockage of arteries. PAD often affects the vessels that bring blood to the legs. The reduced blood flow can cause pain and numbness. A low GARCIA number may mean that your legs and feet aren t getting as much blood as they need. But an GARCIA test won t show exactly which blood vessels have  become narrowed or blocked.   Why is this test done?  Your healthcare provider might want you to have an GARCIA test if you are at risk for PAD. Things that increase your risk for PAD include:     Smoking    Diabetes    Being older than age 70    High cholesterol    Coronary artery disease    Abnormal pulses in your lower legs    Being younger than age 50, with diabetes and another risk factor, such as smoking or high blood pressure  The GARCIA test can:    Diagnose PAD and prevent it from getting worse    Find out if you have a high risk for coronary artery disease  Your healthcare provider also might recommend an GARCIA:    If you have symptoms of PAD, such as pain in your legs with activity. But not everyone with PAD has symptoms. This makes the test even more important.    To see how serious your PAD is. Your provider might order this test every year. This is to see if your condition is getting worse.    To see how well blood is flowing into your legs, if you ve had surgery on the blood vessels of your legs. Sometimes healthcare providers use GARCIA to check your risk for heart attack or stroke.  Before your test  There is very little you need to do to get ready for an GARCIA test:    You can eat as normal on the day of the test.    You should be able to take any medicines as usual before the procedure.    You may want to wear loose, comfortable clothes. This will let the technician easily place the blood pressure cuff on your arm and ankle.    You ll need to rest for at least 15 to 30 minutes before the procedure.    Ask if your healthcare provider has any special instructions.  During your test  The ankle brachial index test is very similar to a standard blood pressure test. During your GARCIA test:     You will lie flat during the procedure.    A technician will place a blood pressure cuff just above your ankle.    The technician will put an ultrasound probe over the artery. He or she will use this to listen to the blood flow  through the vessel.    The technician will inflate the blood pressure cuff. He or she will increase the pressure until the blood stops flowing through the vessel. This may be a little uncomfortable. But it won t hurt.    The technician will slowly release the pressure in the cuff. The systolic pressure is the pressure at which the blood flow is heard again. That is the part of the blood pressure measurement needed for the GARCIA.    The technician will repeat this process on your other ankle and on both of your arms.    Next, the technician will calculate the GARCIA. The top number (numerator) is the higher systolic blood pressure found in the ankles. The lower number (denominator) is the higher systolic blood pressure found in the arms.  Sometimes healthcare providers will combine an GARCIA test with an exercise test. You might have an GARCIA done before and right after exercise, to see how exercise changes this value.   After your test    You should be able to go back to your normal activities right after your GARCIA test.    Be sure to follow up with your healthcare provider about your results. In some cases, you may need follow-up testing to get more information about a blocked vessel. This might include an MRI or an arteriogram.    Talk with your provider about what your GARCIA value means for you.  If you have PAD, you may need treatment. Possible treatments include:    Stopping smoking    Treating high blood pressure, high cholesterol, and diabetes, if needed    Staying physically active    Eating a healthy diet    Taking medicine to increase blood flow to your legs or to prevent blood clots    Having procedures to restore blood flow, like angioplasty    Having surgery to your leg if the blockage is severe  Possible risks and complications  For most people, there are no risks linked to having an GARCIA test. But this test is not recommended if you have a blood clot in your leg. You may need a different type of test if you have  severe pain in your legs.   [employer] last reviewed this educational content on 3/1/2020    7480-8597 [employer]. All rights reserved. This information is not intended as a substitute for professional medical care. Always follow your healthcare professional's instructions.

## 2022-01-25 NOTE — LETTER
1/25/2022       RE: Earle Reen  [address]  [place] [address]     Dear Colleague,    Thank you for referring your patient, Earle Rene, to the [facility]. Please see a copy of my visit note below.      Assessment & Plan   Problem List Items Addressed This Visit     None      Visit Diagnoses     Claudication (H)    -  Primary    Relevant Orders    US GARCIA Doppler with Exercise Bilateral         Mr. Rene is a 72yo referred for bilateral lower extremity leg pain.     - ABIs non-compressible indicating atherosclerotic disease however he has triphasic waveforms at the ankle and no areas of stenosis on duplex indicating no significant stenosis throughout.  - Pain is likely spine related as it happens lying down and sitting as well.   He has documented significant issues at L4-S1 on MRI.  - He does have some thigh cramps after walking about 1 mile so we will go ahead and obtain ABIs with exercise to ensure no vascular source of the cramping. I will call him with these results.   - I recommended he return to the neurosurgeon that offered him surgery. He said he is not interested in surgery but will consider the physical therapy that was recommended.  - Recommend he take ASA 81mg daily due to his underlying atherosclerotic disease with HTN and DM.   - If exercise ABIs normal, no follow up needed.       Review of the result(s) of each unique test - ABIs and duplex reviewed: R GARCIA non-compressible w/ toe pressure 103, L GARCIA 1.19 w/ toe pressure 75. Duplex shows patent vasculature throughout the bilateral lower extremities with triphasic waveforms from the CFA to the ankle. No hemodynamically significant stenoses. Elevated velocity in the R gastrocnemius artery which is not clinically significant.   6 minutes spent on the date of the encounter doing chart review, history and exam, documentation and further activities  per the note    Johana Hood MD    [facility] [place]    Subjective     HPI     72 yo M w/ PMH significant for HTN, HLD, DM who complains of bilateral lower extremity pain that has been going on for several years. Begins in his buttocks and radiates to his thigh and lower legs. Occurs when laying in the bed mostly, sometimes when sitting and also when walking. Reports decreased strength as well especially when waking up. No difference with position, and no focal back pain. He does complain of thigh cramps that will go down the leg that start first in the left leg and then the right that he gets after about 1 mile of walking. Has seen Neurosurgeon and he states they have offered physical therapy, steroid injections and surgery. His wife had similar back symptoms with surgery but did not help her. He does not think physical therapy will help but after our conversation, he seems open to trying it. Reports numbness of his bilateral feet as well which I explained sounds like neuropathy from his diabetes. He has had recent shingles and COVID 1/5/21. Has never smoked and occasionally takes ASA 81mg.       Objective    /78 (BP Location: Right arm, Patient Position: Sitting, Cuff Size: Adult Regular)   Pulse 84   SpO2 96%   There is no height or weight on file to calculate BMI.  Physical Exam   GENERAL: healthy, alert and no distress  EYES: Eyes grossly normal to inspection  NECK: no asymmetry  RESP: non-labored breathing  CV: no tachycardia  ABDOMEN: soft, nontender  MS: no gross musculoskeletal defects noted, no edema; palpable bilateral femoral pulses, strong b/l multiphasic PT signals, L weak DP pulse, R AT signal  SKIN: multiple healing lesions from irritated skin/scratching, no lesions on feet  NEURO: Normal strength and tone, mentation intact and speech normal  PSYCH: mentation appears normal, affect normal/bright    1. GARCIA and TBI  2. Ultrasound lower extremity duplex  bilateral     CLINICAL HISTORY: leg pain; Blood cholesterol increased compared with  prior measurement; Vitamin D deficiency; Other chronic back pain;  Other chronic back pain; Pain of lower extremity, unspecified  laterality; Other specified symptoms and signs involving the  circulatory and respiratory systems.      COMPARISONS: 6/19/2019     REFERRING PROVIDER: KAYLEIGH MCKEON     TECHNIQUE: Bilateral GARCIA and TBI obtained.     Bilateral leg arteries evaluated with grayscale, color Doppler, and  Doppler waveform ultrasound.     FINDINGS:  RIGHT:       Brachial: 140 mmHg       Ankle (PT): > 254 mmHg - non compressible       Ankle (DP): > 254 mmHg - non compressible       1st Digit: 103 mmHg          GARCIA: Non compressible. - unchanged       TBI: 0.74 - previously 0.85          External iliac artery: 98/0 cm/s, triphasic       Common femoral artery: 126/0 cm/s, triphasic       Profundus femoral artery: 119/0 cm/s, triphasic          Superficial femoral artery, proximal: 114/6 cm/s, triphasic       Superficial femoral artery, mid: 86/6 cm/s, triphasic       Superficial femoral artery, distal: 133/9 cm/s, triphasic          Popliteal artery, proximal: 152/0 cm/s, triphasic          Gastrocnemius artery, origin, site of prior stenosis: 439/0 cm/s,  biphasic, 492/0 cm/s biphasic, 361/0 cm/s, biphasic          Popliteal artery, distal: 80/8 cm/s, triphasic          Posterior tibial artery, ankle: 79/9 cm/s, triphasic       Anterior tibial artery, ankle: 33/3 cm/s, triphasic     LEFT:       Brachial: 126 mmHg       Ankle (PT): 166 mmHg        Ankle (DP): 120 mmHg        1st Digit: 75 mmHg          GARCIA: 1.19 - previously non compressible       TBI: 0.54 - previously 0.88          External iliac artery: 118/0 cm/s, triphasic       Common femoral artery: 115/0 cm/s, triphasic       Profundus femoral artery: 102/0 cm/s, triphasic          Superficial femoral artery, proximal: 93/0 cm/s, triphasic       Superficial femoral  artery, mid: 120/0 cm/s, triphasic       Superficial femoral artery, distal: 79/0 cm/s, triphasic          Popliteal artery, proximal: 104/0 cm/s, triphasic       Popliteal artery, distal: 60/0 cm/s, triphasic          Posterior tibial artery, ankle: 72/8 cm/s, triphasic       Anterior tibial artery, ankle: 38/4 cm/s, triphasic                                                                      IMPRESSION:  1. RIGHT:       A. Resting GARCIA is non compressible, unchanged.       B. Resting TBI is normal, 0.74, previously 0.85.       C. Elevated velocities in the gastrocnemius artery thought to be  the site that measured up to 660 cm/s in the previous study. No  significant run-off stenosis demonstrated.     2. LEFT:       A. Resting GARCIA is normal, 1.19, previously non compressible.       B. Resting TBI is ABNORMAL, 0.54, previously 0.88.       C. No significant run-off stenosis demonstrated.    MR LUMBAR SPINE W/O CONTRAST 11/12/2021 6:36 AM     Provided History: Blood cholesterol increased compared with prior  measurement; Vitamin D deficiency; Other chronic back pain; Other  chronic back pain; Pain of lower extremity, unspecified laterality     ICD-10: Blood cholesterol increased compared with prior measurement;  Vitamin D deficiency; Other chronic back pain; Other chronic back  pain; Pain of lower extremity, unspecified laterality     Comparison: No comparison studies     Technique: Sagittal T1-weighted, sagittal STIR, 3D volumetric axial  and sagittal reconstructed T2-weighted images of the lumbar spine were  obtained without intravenous contrast.      Findings:   There are 5 lumbar-type vertebrae.  The tip of the conus medullaris is  at L1.  Normal lumbar vertebral alignment. Multilevel disc height  narrowing and disc desiccation. Schmorl's nodes at the upper and lower  endplate of L4 with associated loss in the vertebral body height.  Normal marrow signal.     On a level by level basis:     T11-12: Left foraminal  protrusion with associated mild to moderate  left neural foraminal stenosis. Bilateral facet hypertrophy. No spinal  canal or right neural foraminal stenosis.     T12-L1: Bilateral facet arthrosis and disc bulge. No significant  spinal canal or neural foraminal stenosis.     L1-2: No spinal canal or neuroforaminal stenosis.     L2-3: Disc bulge and bilateral facet hypertrophy. Mild bilateral  neural foraminal and mild spinal canal stenosis.     L3-4: Disc osteophyte complex and bilateral facet arthrosis.  Thickening of the ligamentum flavum. Mild to moderate bilateral neural  foraminal and mild spinal canal stenosis.     L4-5: Disc bulge and superimposed left foraminal protrusion. Bilateral  facet hypertrophy. Thickening of the ligamentum flavum. Severe left  and moderate right neural foraminal stenosis. Impingement of the  exiting left nerve root. Mild-to-moderate spinal canal stenosis.     L5-S1: Disc osteophyte complex and superimposed central protrusion.  Bilateral subpectoral. Moderate to severe bilateral neural foraminal  stenosis. Impingement of the exiting nerve roots. Mild spinal canal  stenosis with effacement of the lateral recesses.     Paraspinous tissues are within normal limits.                                                                      Impression: Multilevel lumbar spondylosis with mild-to-moderate spinal  canal stenosis at L4-5 and mild spinal canal stenosis at L3-4 and  L5-S1. Severe left neural foraminal stenosis at L4-5 and moderate  severe bilaterally at L5-S1 with impingement of exiting nerve roots.      Johana Hood MD

## 2022-01-25 NOTE — PROGRESS NOTES
Assessment & Plan   Problem List Items Addressed This Visit     None      Visit Diagnoses     Claudication (H)    -  Primary    Relevant Orders    US GARCIA Doppler with Exercise Bilateral         Mr. Rene is a 72yo referred for bilateral lower extremity leg pain.     - ABIs non-compressible indicating atherosclerotic disease however he has triphasic waveforms at the ankle and no areas of stenosis on duplex indicating no significant stenosis throughout.  - Pain is likely spine related as it happens lying down and sitting as well.   He has documented significant issues at L4-S1 on MRI.  - He does have some thigh cramps after walking about 1 mile so we will go ahead and obtain ABIs with exercise to ensure no vascular source of the cramping. I will call him with these results.   - I recommended he return to the neurosurgeon that offered him surgery. He said he is not interested in surgery but will consider the physical therapy that was recommended.  - Recommend he take ASA 81mg daily due to his underlying atherosclerotic disease with HTN and DM.   - If exercise ABIs normal, no follow up needed.       Review of the result(s) of each unique test - ABIs and duplex reviewed: R GARCIA non-compressible w/ toe pressure 103, L GARCIA 1.19 w/ toe pressure 75. Duplex shows patent vasculature throughout the bilateral lower extremities with triphasic waveforms from the CFA to the ankle. No hemodynamically significant stenoses. Elevated velocity in the R gastrocnemius artery which is not clinically significant.   60 minutes spent on the date of the encounter doing chart review, history and exam, documentation and further activities per the note    Johana Hood MD    [facility]    Subjective     HPI     72 yo M w/ PMH significant for HTN, HLD, DM who complains of bilateral lower extremity pain that has been going on for several years. Begins in his buttocks and radiates to his thigh and lower legs. Occurs  when laying in the bed mostly, sometimes when sitting and also when walking. Reports decreased strength as well especially when waking up. No difference with position, and no focal back pain. He does complain of thigh cramps that will go down the leg that start first in the left leg and then the right that he gets after about 1 mile of walking. Has seen Neurosurgeon and he states they have offered physical therapy, steroid injections and surgery. His wife had similar back symptoms with surgery but did not help her. He does not think physical therapy will help but after our conversation, he seems open to trying it. Reports numbness of his bilateral feet as well which I explained sounds like neuropathy from his diabetes. He has had recent shingles and COVID 1/5/21. Has never smoked and occasionally takes ASA 81mg.       Objective    /78 (BP Location: Right arm, Patient Position: Sitting, Cuff Size: Adult Regular)   Pulse 84   SpO2 96%   There is no height or weight on file to calculate BMI.  Physical Exam   GENERAL: healthy, alert and no distress  EYES: Eyes grossly normal to inspection  NECK: no asymmetry  RESP: non-labored breathing  CV: no tachycardia  ABDOMEN: soft, nontender  MS: no gross musculoskeletal defects noted, no edema; palpable bilateral femoral pulses, strong b/l multiphasic PT signals, L weak DP pulse, R AT signal  SKIN: multiple healing lesions from irritated skin/scratching, no lesions on feet  NEURO: Normal strength and tone, mentation intact and speech normal  PSYCH: mentation appears normal, affect normal/bright    1. GARCIA and TBI  2. Ultrasound lower extremity duplex bilateral     CLINICAL HISTORY: leg pain; Blood cholesterol increased compared with  prior measurement; Vitamin D deficiency; Other chronic back pain;  Other chronic back pain; Pain of lower extremity, unspecified  laterality; Other specified symptoms and signs involving the  circulatory and respiratory systems.       COMPARISONS: 6/19/2019     REFERRING PROVIDER: KAYLEIGH MCKEON     TECHNIQUE: Bilateral GARCIA and TBI obtained.     Bilateral leg arteries evaluated with grayscale, color Doppler, and  Doppler waveform ultrasound.     FINDINGS:  RIGHT:       Brachial: 140 mmHg       Ankle (PT): > 254 mmHg - non compressible       Ankle (DP): > 254 mmHg - non compressible       1st Digit: 103 mmHg          GARCIA: Non compressible. - unchanged       TBI: 0.74 - previously 0.85          External iliac artery: 98/0 cm/s, triphasic       Common femoral artery: 126/0 cm/s, triphasic       Profundus femoral artery: 119/0 cm/s, triphasic          Superficial femoral artery, proximal: 114/6 cm/s, triphasic       Superficial femoral artery, mid: 86/6 cm/s, triphasic       Superficial femoral artery, distal: 133/9 cm/s, triphasic          Popliteal artery, proximal: 152/0 cm/s, triphasic          Gastrocnemius artery, origin, site of prior stenosis: 439/0 cm/s,  biphasic, 492/0 cm/s biphasic, 361/0 cm/s, biphasic          Popliteal artery, distal: 80/8 cm/s, triphasic          Posterior tibial artery, ankle: 79/9 cm/s, triphasic       Anterior tibial artery, ankle: 33/3 cm/s, triphasic     LEFT:       Brachial: 126 mmHg       Ankle (PT): 166 mmHg        Ankle (DP): 120 mmHg        1st Digit: 75 mmHg          GARCIA: 1.19 - previously non compressible       TBI: 0.54 - previously 0.88          External iliac artery: 118/0 cm/s, triphasic       Common femoral artery: 115/0 cm/s, triphasic       Profundus femoral artery: 102/0 cm/s, triphasic          Superficial femoral artery, proximal: 93/0 cm/s, triphasic       Superficial femoral artery, mid: 120/0 cm/s, triphasic       Superficial femoral artery, distal: 79/0 cm/s, triphasic          Popliteal artery, proximal: 104/0 cm/s, triphasic       Popliteal artery, distal: 60/0 cm/s, triphasic          Posterior tibial artery, ankle: 72/8 cm/s, triphasic       Anterior tibial artery, ankle: 38/4  cm/s, triphasic                                                                      IMPRESSION:  1. RIGHT:       A. Resting GARCIA is non compressible, unchanged.       B. Resting TBI is normal, 0.74, previously 0.85.       C. Elevated velocities in the gastrocnemius artery thought to be  the site that measured up to 660 cm/s in the previous study. No  significant run-off stenosis demonstrated.     2. LEFT:       A. Resting GARCIA is normal, 1.19, previously non compressible.       B. Resting TBI is ABNORMAL, 0.54, previously 0.88.       C. No significant run-off stenosis demonstrated.    MR LUMBAR SPINE W/O CONTRAST 11/12/2021 6:36 AM     Provided History: Blood cholesterol increased compared with prior  measurement; Vitamin D deficiency; Other chronic back pain; Other  chronic back pain; Pain of lower extremity, unspecified laterality     ICD-10: Blood cholesterol increased compared with prior measurement;  Vitamin D deficiency; Other chronic back pain; Other chronic back  pain; Pain of lower extremity, unspecified laterality     Comparison: No comparison studies     Technique: Sagittal T1-weighted, sagittal STIR, 3D volumetric axial  and sagittal reconstructed T2-weighted images of the lumbar spine were  obtained without intravenous contrast.      Findings:   There are 5 lumbar-type vertebrae.  The tip of the conus medullaris is  at L1.  Normal lumbar vertebral alignment. Multilevel disc height  narrowing and disc desiccation. Schmorl's nodes at the upper and lower  endplate of L4 with associated loss in the vertebral body height.  Normal marrow signal.     On a level by level basis:     T11-12: Left foraminal protrusion with associated mild to moderate  left neural foraminal stenosis. Bilateral facet hypertrophy. No spinal  canal or right neural foraminal stenosis.     T12-L1: Bilateral facet arthrosis and disc bulge. No significant  spinal canal or neural foraminal stenosis.     L1-2: No spinal canal or  neuroforaminal stenosis.     L2-3: Disc bulge and bilateral facet hypertrophy. Mild bilateral  neural foraminal and mild spinal canal stenosis.     L3-4: Disc osteophyte complex and bilateral facet arthrosis.  Thickening of the ligamentum flavum. Mild to moderate bilateral neural  foraminal and mild spinal canal stenosis.     L4-5: Disc bulge and superimposed left foraminal protrusion. Bilateral  facet hypertrophy. Thickening of the ligamentum flavum. Severe left  and moderate right neural foraminal stenosis. Impingement of the  exiting left nerve root. Mild-to-moderate spinal canal stenosis.     L5-S1: Disc osteophyte complex and superimposed central protrusion.  Bilateral subpectoral. Moderate to severe bilateral neural foraminal  stenosis. Impingement of the exiting nerve roots. Mild spinal canal  stenosis with effacement of the lateral recesses.     Paraspinous tissues are within normal limits.                                                                      Impression: Multilevel lumbar spondylosis with mild-to-moderate spinal  canal stenosis at L4-5 and mild spinal canal stenosis at L3-4 and  L5-S1. Severe left neural foraminal stenosis at L4-5 and moderate  severe bilaterally at L5-S1 with impingement of exiting nerve roots.

## 2022-01-25 NOTE — NURSING NOTE
Chief Complaint   Patient presents with     Consult     Consult for PAD.  Pain in BLE as well as bilateral numbness and cramping in his feet.  Pt looking for potential ways to resolve his sxs.       Medications and allergies reconciled.  Vitals collected.    Lashon Harley LPN

## 2022-02-02 ENCOUNTER — TRANSFERRED RECORDS (OUTPATIENT)
Dept: HEALTH INFORMATION MANAGEMENT | Facility: CLINIC | Age: 72
End: 2022-02-02
Payer: COMMERCIAL

## 2022-02-02 LAB
ALT SERPL-CCNC: 30 U/L (ref 9–46)
AST SERPL-CCNC: 23 U/L (ref 10–35)
CREATININE (EXTERNAL): 1.78 MG/DL (ref 0.7–1.18)
GFR ESTIMATED (EXTERNAL): 37 ML/MIN/1.73M2
GFR ESTIMATED (IF AFRICAN AMERICAN) (EXTERNAL): 43 ML/MIN/1.73M2
HEP C HIM: NORMAL
TSH SERPL-ACNC: 1.55 MIU/L (ref 0.4–4.5)

## 2022-02-13 ENCOUNTER — MEDICAL CORRESPONDENCE (OUTPATIENT)
Dept: HEALTH INFORMATION MANAGEMENT | Facility: CLINIC | Age: 72
End: 2022-02-13

## 2022-02-22 ENCOUNTER — TRANSFERRED RECORDS (OUTPATIENT)
Dept: HEALTH INFORMATION MANAGEMENT | Facility: CLINIC | Age: 72
End: 2022-02-22
Payer: COMMERCIAL

## 2022-03-10 ENCOUNTER — TELEPHONE (OUTPATIENT)
Dept: ENDOCRINOLOGY | Facility: CLINIC | Age: 72
End: 2022-03-10

## 2022-03-10 ENCOUNTER — OFFICE VISIT (OUTPATIENT)
Dept: ENDOCRINOLOGY | Facility: CLINIC | Age: 72
End: 2022-03-10
Payer: COMMERCIAL

## 2022-03-10 VITALS
WEIGHT: 198.2 LBS | BODY MASS INDEX: 30.04 KG/M2 | SYSTOLIC BLOOD PRESSURE: 127 MMHG | HEART RATE: 76 BPM | HEIGHT: 68 IN | DIASTOLIC BLOOD PRESSURE: 72 MMHG

## 2022-03-10 DIAGNOSIS — Z79.4 TYPE 2 DIABETES MELLITUS WITH MODERATE NONPROLIFERATIVE RETINOPATHY WITHOUT MACULAR EDEMA, WITH LONG-TERM CURRENT USE OF INSULIN, UNSPECIFIED LATERALITY (H): Primary | ICD-10-CM

## 2022-03-10 DIAGNOSIS — E11.3399 TYPE 2 DIABETES MELLITUS WITH MODERATE NONPROLIFERATIVE RETINOPATHY WITHOUT MACULAR EDEMA, WITH LONG-TERM CURRENT USE OF INSULIN, UNSPECIFIED LATERALITY (H): Primary | ICD-10-CM

## 2022-03-10 LAB — HBA1C MFR BLD: 8.5 % (ref 4.3–?)

## 2022-03-10 PROCEDURE — 99215 OFFICE O/P EST HI 40 MIN: CPT | Performed by: PHYSICIAN ASSISTANT

## 2022-03-10 PROCEDURE — 83036 HEMOGLOBIN GLYCOSYLATED A1C: CPT | Performed by: PHYSICIAN ASSISTANT

## 2022-03-10 RX ORDER — SEMAGLUTIDE 1.34 MG/ML
INJECTION, SOLUTION SUBCUTANEOUS
Qty: 1.5 ML | Refills: 3 | Status: SHIPPED | OUTPATIENT
Start: 2022-03-10 | End: 2022-06-10

## 2022-03-10 ASSESSMENT — PAIN SCALES - GENERAL: PAINLEVEL: NO PAIN (0)

## 2022-03-10 NOTE — PATIENT INSTRUCTIONS
We appreciate your assistance in coordinating your healthcare.     Please upload your insulin pump, blood sugar meter and/or continuous glucose monitor at home 1-2 days before your next diabetes-related appointment.   This will allow your provider to review your  data before your scheduled virtual visit.    To ask a question to your Endocrine care team, please send them a [facility]2 message, or reach them by phone at [phone]     To expedite your medication refill(s), please contact your pharmacy and have them   fax a refill request to: [phone].  *Please allow 3 business days for routine medication refills.  *Please allow 5 business days for controlled substance medication refills.    For after-hours urgent Endocrine issues, that do not require 451, please dial [phone], and ask to speak with the Endocrinologist On-Call

## 2022-03-10 NOTE — LETTER
3/10/2022       RE: Earle Rene  [address]  [place] [address]     Dear Colleague,    Thank you for referring your patient, Earle Rene, to the [facility]. Please see a copy of my visit note below.    HPI   Earle Rene is a 72 year old male with type 2 diabetes mellitus. Pt is being seen in clinic today for diabetes follow up.  Pt's diabetes is complicated by retinopathy, nephropathy, neuropathy and ED.  Pt also has hx of hyperlipidemia, HTN, obesity, presumed localized prostate cancer, BPH, goiter with right thyroid nodule and osteoporosis.  For his diabetes, patient states he is taking Tresiba 60 units SQ at hs, Humalog 15 units with meals, Jardiance 10 mg each am and Ozempic 0.25 mg subcutaneous once a week.  Pt's A1C is 8.5 % today and his previous A1C was 8.3 % in Nov 2021.  I reviewed his Freestyle Lora sensor download today and his average glucose is 211 with SD 30.  His glucose is in target 34 % of the time, above target 66 % of the time and below target 0 % of the time.  On ROS today, blurred vision and he was seen here by [facility] in Nov 2021 with severe NPDR with macular edema.  Decrease auditory acuity.  Chronic numbness in feet.   He tested + for COVID in Jan 2022 with mild URI sx per patient.  He denies groin yeast infection, dysuria or hematuria at this time.  Pt denies frequent headaches, n/v, SOB at rest, cough, fever, chills,chest pain, abd pain or diarrhea.  No foot ulcers on exam    DIABETES CARE:  Retinopathy: yes; severe NPDR with macular edema. He was seen by [facility] here in Nov 2021.  Nephropathy: yes- CKDStage3.  Urine protein + in Dec 2021.  Pt is taking Cozaar daily.  Neuropathy: yes.   Foot exam.  No ulcers. Abnormal monofilamentous exam.  Lipids: LDL 56 in Nov 2021. Pt is taking Lipitor, Fish Oil and fenofibrate.  Taking ASA: yes.  CAD:no.  Mental health: hx of mood disorder  per chart. Pt denies depression.  Insulin: Basal and meal time insulin.  DM meds: Jardiance and Ozempic.  Testing: Freestyle Lora sensor. I placed an order for a Freestyle Libre2 sensor today.    ROS   Please see under HPI.     ALLERGIES:  Review of patient's allergies indicates no known allergies.      Current Outpatient Medications   Medication Sig Dispense Refill     alpha-lipoic acid 600 MG capsule Take 1 capsule (600 mg) by mouth daily 90 capsule 3     amLODIPine (NORVASC) 5 MG tablet Take 1 tablet (5 mg) by mouth At Bedtime 90 tablet 3     ARTIFICIAL TEAR OP Apply to eye as needed       aspirin 81 MG tablet Take 1 tablet by mouth At Bedtime        atorvastatin (LIPITOR) 20 MG tablet Take 1 tablet (20 mg) by mouth daily 90 tablet 3     blood glucose (NO BRAND SPECIFIED) lancets standard Lancets that go with device, Test 3 times daily 300 each 3     blood glucose monitoring (NO BRAND SPECIFIED) meter device kit Any meter covered by insurance, not store brand, use as directed. 1 kit 0     blood glucose monitoring (NO BRAND SPECIFIED) test strip Strips that go with meter, covered by insurance. Test 3 times daily 300 strip 3     Blood Pressure Monitor KIT Automatic Blood Pressure Monitor 1 kit 0     camphor-menthol (DERMASARRA) 0.5-0.5 % external lotion Apply lotion 2-3 times per day to itchy areas. If not improving in two weeks, follow up with PCP. 222 mL 0     camphor-menthol (DERMASARRA) 0.5-0.5 % external lotion Apply to arms legs torso 1-2 times a day for itching 1 Bottle 11     cholecalciferol (VITAMIN D3) 25 mcg (1000 units) capsule Take 2 capsules (50 mcg) by mouth daily 180 capsule 3     clobetasol (TEMOVATE) 0.05 % ointment Apply topically to legs twice daily for 2 weeks.  Then discontinue 30 g 0     clotrimazole (LOTRIMIN) 1 % cream Apply topically 2 times daily 30 g 3     Continuous Blood Gluc Sensor (FREESTYLE LORA 2 SENSOR) MISC 1 each every 14 days 1 each every 14 days. Change every 14 days. 2 each  "5     empagliflozin (JARDIANCE) 10 MG TABS tablet One tab daily. 90 tablet 3     famotidine (PEPCID) 20 MG tablet Take 1 tablet (20 mg) by mouth 2 times daily 180 tablet 3     fenofibrate 54 MG tablet Take 1 tablet (54 mg) by mouth daily (Patient taking differently: Take 54 mg by mouth At Bedtime ) 90 tablet 0     finasteride (PROSCAR) 5 MG tablet Take 1 tablet (5 mg) by mouth daily 90 tablet 2     fluocinonide (LIDEX) 0.05 % external cream APPLY TO AFFECTED AREA TWICE A DAY  3     fluticasone (FLOVENT HFA) 110 MCG/ACT inhaler Inhale 1 puff into the lungs 2 times daily 12 g 11     gabapentin (NEURONTIN) 300 MG capsule Take 1 capsule (300 mg) by mouth 2 times daily 60 capsule 3     gabapentin (NEURONTIN) 300 MG capsule TAKE 1 CAPSULE BY MOUTH TWICE A  capsule 0     HUMALOG KWIKPEN 100 UNIT/ML soln Inject 15-17  units with meals, plus correction. Pt uses approx 65 units in 24 hrs. 60 mL 1     insulin degludec (TRESIBA FLEXTOUCH) 100 UNIT/ML pen Inject 64 units subcu at bedtime. 60 mL 3     insulin pen needle (B-D U/F) 31G X 5 MM miscellaneous Use 4 time(s) per day.  Please dispense as BD Pen Needle Mini U/F 31G x 5  each 3     insulin pen needle (B-D U/F) 31G X 5 MM Use 4 times per day.  Please dispense as BD Pen Needle Mini U/F 31G x 5  each 3     insulin syringe 31G X 5/16\" 0.5 ML MISC Use three syringes daily 270 each 1     ketamine 5% gabapentin 8% lidocaine 2.5% topical PLO cream Apply 1 g topically 3 times daily 30 g 3     losartan (COZAAR) 100 MG tablet TAKE 1 TABLET BY MOUTH AT BEDTIME 90 tablet 3     mupirocin (BACTROBAN) 2 % cream Apply  topically. In very small amounts only as needed 15 g 1     Omega-3 Fatty Acids (OMEGA-3 FISH OIL) 1000 MG CAPS Take 1 capsule (1 g) by mouth 2 times daily (Patient taking differently: Take 1 g by mouth as needed (PT last dose a week ago 12.24.19) ) 60 capsule 11     ONETOUCH ULTRA test strip Use to test blood sugar 3 times daily 300 strip 3     semaglutide " (OZEMPIC, 0.25 OR 0.5 MG/DOSE,) 2 MG/1.5ML SOPN pen Inject 0.5 mg subcutaneous once week. 1.5 mL 3     sodium bicarbonate 650 MG tablet Take 1 tablet (650 mg) by mouth 3 times daily 90 tablet 11     tamsulosin (FLOMAX) 0.4 MG capsule Take 1 capsule (0.4 mg) by mouth daily 90 capsule 2     triamcinolone (KENALOG) 0.1 % cream Apply topically 3 times daily 80 g 0     albuterol (VENTOLIN HFA) 108 (90 Base) MCG/ACT inhaler Inhale 2 puffs into the lungs every 6 hours (Patient not taking: Reported on 1/25/2022) 18 g 3     dextromethorphan-guaiFENesin (MUCINEX DM)  MG 12 hr tablet Take 1 tablet by mouth every 12 hours (Patient not taking: Reported on 1/25/2022) 30 tablet 0     dextromethorphan-guaiFENesin (TUSSIN DM)  MG/5ML liquid Take 5 mLs by mouth 2 times daily as needed (Patient not taking: Reported on 1/25/2022) 118 mL 0     diclofenac (VOLTAREN) 1 % topical gel Apply 4 grams to thigh up to 4 times daily for pain (Patient not taking: Reported on 1/25/2022) 100 g 1     fluticasone (FLONASE) 50 MCG/ACT nasal spray Spray 1 spray into both nostrils daily (Patient not taking: Reported on 1/25/2022) 16 g 0     loratadine (CLARITIN) 10 MG tablet Take 1 tablet (10 mg) by mouth daily (Patient not taking: Reported on 1/25/2022) 90 tablet 0     VITAMIN D3 25 MCG (1000 UT) tablet TAKE 2 TABLETS (50 MCG) BY MOUTH DAILY (Patient not taking: Reported on 1/25/2022)       Family Hx   No change.     Personal Hx   Smoke: none.   ETOH: none.    with grown children.    PMH   1. Type 2 Diabetes Mellitus dx at age 43.   2. Neuropathy.  3. Nephropathy.   4. ED.   5. Dyslipidemia.   6. Nephrolithiasis.   7. Decrease auditory acuity.   8. Sarcoidosis-lung.   9. Goiter.   10. S/P T & A.   11. S/P FX right heel.   12. Vit D def.   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   14. CT chest- ? granulomas.   15. Retinopathy.  16. Goiter.  Past Medical History:   Diagnosis Date     Blepharitis of both eyes      BPH (benign prostatic  "hyperplasia)      Diabetes (H)      Diabetic neuropathy (H)      Diabetic retinopathy associated with diabetes mellitus due to underlying condition (H)      Dry eye syndrome      GERD (gastroesophageal reflux disease)      Goiter      Granulomatous disease (H)      HLD (hyperlipidemia)      HTN (hypertension)      Nonsenile cataract      Peripheral neuropathy      Past Surgical History:   Procedure Laterality Date     ------------OTHER-------------      back of neck abscess drainage in OR     AS RAD RESEC TONSIL/PILLARS Bilateral 1961     CATARACT IOL, RT/LT Left      COLONOSCOPY  7/29/2013    Procedure: COLONOSCOPY;;  Surgeon: Montana Pascal MD;  Location:  GI     EXCISE MASS UPPER EXTREMITY Right 11/11/2019    Procedure: EXCISION, MASS, UPPER EXTREMITY, RIGHT SHOULDER;  Surgeon: Johana Choudhury MD;  Location: [facility] OR     INTRAVITREAL INJECTION CHEMOTHERAPY Right 12/30/2019    Procedure: INTRAVITREAL Bevacizumab injection;  Surgeon: Milton Maki MD;  Location: [facility] OR     PHACOEMULSIFICATION CLEAR CORNEA WITH STANDARD INTRAOCULAR LENS IMPLANT Right 12/30/2019    Procedure: PHACOEMULSIFICATION, CATARACT, WITH INTRAOCULAR LENS IMPLANT;  Surgeon: Milton Maki MD;  Location: [facility] OR     PHACOEMULSIFICATION WITH STANDARD INTRAOCULAR LENS IMPLANT Left 6/21/2019    Procedure: Left Eye Cataract Removal with Intraocular Lens Implant with Intraoperative Avastin Injection;  Surgeon: Lacey Eugene MD;  Location: [facility] OR     siladenatis  11/2017     Physical Exam   /72 (BP Location: Left arm, Patient Position: Sitting, Cuff Size: Adult Regular)   Pulse 76   Ht 1.727 m (5' 8\")   Wt 89.9 kg (198 lb 3.2 oz)   BMI 30.14 kg/m    GENERAL: Pt in no distress.  SKIN: Dry and excoriations.  HEENT: Decrease visual acuity. Fundi not examined today.  NECK: Thyroid palpable and nontender.  LUNGS: Clear b/l.  CARDIAC: RRR.  ABDOMEN: Enlarge and nontender.  EXTREMITIES: Trace pretibial edema b/l.  FEET: No " ulcers; abnormal monofilamentous exam.    Results   Creatinine   Date Value Ref Range Status   12/08/2021 1.66 (H) 0.66 - 1.25 mg/dL Final   06/09/2021 1.45 (H) 0.66 - 1.25 mg/dL Final     GFR Estimate   Date Value Ref Range Status   12/08/2021 41 (L) >60 mL/min/1.73m2 Final     Comment:     As of July 11, 2021, eGFR is calculated by the CKD-EPI creatinine equation, without race adjustment. eGFR can be influenced by muscle mass, exercise, and diet. The reported eGFR is an estimation only and is only applicable if the renal function is stable.   06/09/2021 48 (L) >60 mL/min/[1.73_m2] Final     Comment:     Non  GFR Calc  Starting 12/18/2018, serum creatinine based estimated GFR (eGFR) will be   calculated using the Chronic Kidney Disease Epidemiology Collaboration   (CKD-EPI) equation.       Hemoglobin A1C POCT   Date Value Ref Range Status   06/09/2021 8.2 (H) 0 - 5.6 % Final     Comment:     Normal <5.7% Prediabetes 5.7-6.4%  Diabetes 6.5% or higher - adopted from ADA   consensus guidelines.       Hemoglobin A1C   Date Value Ref Range Status   11/02/2021 8.3 (H) 0.0 - 5.6 % Final     Comment:     Normal <5.7%   Prediabetes 5.7-6.4%    Diabetes 6.5% or higher     Note: Adopted from ADA consensus guidelines.     Potassium   Date Value Ref Range Status   12/08/2021 4.2 3.4 - 5.3 mmol/L Final   06/09/2021 4.2 3.4 - 5.3 mmol/L Final     ALT   Date Value Ref Range Status   03/12/2021 36 0 - 70 U/L Final     AST   Date Value Ref Range Status   03/12/2021 24 0 - 45 U/L Final     TSH   Date Value Ref Range Status   06/09/2021 0.98 0.40 - 4.00 mU/L Final     T4 Free   Date Value Ref Range Status   10/21/2014 1.00 0.76 - 1.46 ng/dL Final     Comment:     Effective 7/30/2014, the reference range for this assay has changed to reflect   new instrumentation/methodology.           Cholesterol   Date Value Ref Range Status   11/02/2021 159 <200 mg/dL Final   06/18/2019 145 <200 mg/dL Final   03/06/2018 101 <200  mg/dL Final     HDL Cholesterol   Date Value Ref Range Status   06/18/2019 38 (L) >39 mg/dL Final   03/06/2018 32 (L) >39 mg/dL Final     Direct Measure HDL   Date Value Ref Range Status   11/02/2021 37 (L) >=40 mg/dL Final     LDL Cholesterol Calculated   Date Value Ref Range Status   11/02/2021 56 <=100 mg/dL Final   06/18/2019 49 <100 mg/dL Final     Comment:     Desirable:       <100 mg/dl   03/06/2018 36 <100 mg/dL Final     Comment:     Desirable:       <100 mg/dl     Triglycerides   Date Value Ref Range Status   11/02/2021 331 (H) <150 mg/dL Final   06/18/2019 289 (H) <150 mg/dL Final     Comment:     Borderline high:  150-199 mg/dl  High:             200-499 mg/dl  Very high:       >499 mg/dl     03/06/2018 166 (H) <150 mg/dL Final     Comment:     Borderline high:  150-199 mg/dl  High:             200-499 mg/dl  Very high:       >499 mg/dl       Cholesterol/HDL Ratio   Date Value Ref Range Status   09/09/2014 3.8 0.0 - 5.0 Final   11/20/2013 5.8 (H) 0.0 - 5.0 Final       ASSESSMENT/PLAN:     1. TYPE 2 DIABETES MELLITUS: Uncontrolled type 2 diabetes mellitus complicated by retinopathy, nephropathy- CKD stage 3, neuropathy and ED.  Increase Ozempic 0.5 mg subcutaneous once a week. Pt denies n/v, abd pain or chronic diarrhea.  Continue current dose of Tresiba, Humalog and Jardiance.  Most recent creat 1.66 with GFR 41 mL/min in Dec 2021.  I placed an order for a  Freestyle Libre2 sensor today which will alert him if his blood sugar is going high or low.  Encouraged patient to make healthy food choices, reduce his food portions with meals, avoid snacking and walk on his treadmill.  Pt remains on daily ASA.   Pt received the COVID19 vaccines (Moderna).   He also received the flu vaccine this season.    2. GOITER: Not addressed today.     3. NEPHROPATHY: Pt has CKD and followed here by renal staff.  Most recent creat was 1.66 with GFR 41 mL/min in Dec 2021.  He is taking Cozaar and Jardiance for renal  protection.  Pt's BP is good at 127/72 today.  Discussed the need for better glycemic control.  4.  RETINOPATHY:  Seen here by Oph in Nov 2021.  5.  NEUROPATHY: Continue Gabapentin,alpha-lipoic acid and topical cream.  No foot ulcers on exam today.  6. DYSLIPIDEMIA: LDL 41 in 12/2021.  Pt is taking Lipitor, fish oil and Fenofibrate.  7. OBESITY: See under # 1 above.  Tolerating Ozempic- increased dose 0.5 mg subcutaneous once a week today.    8.  FOLLOW UP: with me in 3 months in clinic.    Time spent reviewing pt's chart notes,labs and Freestyle Lora sensor download today= 8 minutes.  Time for clinic visit today= 30 minutes.  Time for documentation today = 15 minutes.    TOTAL TIME FOR VISIT TODAY = 53 minutes    Pamela Laura PA-C

## 2022-03-10 NOTE — TELEPHONE ENCOUNTER
Patient needs to be informed that his appt with Laura on 6/8 has been moved to 6/9 at 12:30 due to a scheduling error

## 2022-03-11 ENCOUNTER — ANCILLARY PROCEDURE (OUTPATIENT)
Dept: ULTRASOUND IMAGING | Facility: CLINIC | Age: 72
End: 2022-03-11
Attending: SURGERY
Payer: COMMERCIAL

## 2022-03-11 DIAGNOSIS — N40.1 BENIGN NON-NODULAR PROSTATIC HYPERPLASIA WITH LOWER URINARY TRACT SYMPTOMS: ICD-10-CM

## 2022-03-11 DIAGNOSIS — I73.9 CLAUDICATION (H): ICD-10-CM

## 2022-03-11 PROCEDURE — 93924 LWR XTR VASC STDY BILAT: CPT | Mod: GC | Performed by: RADIOLOGY

## 2022-03-14 NOTE — PROGRESS NOTES
HPI   Earle Rene is a 72 year old male with type 2 diabetes mellitus. Pt is being seen in clinic today for diabetes follow up.  Pt's diabetes is complicated by retinopathy, nephropathy, neuropathy and ED.  Pt also has hx of hyperlipidemia, HTN, obesity, presumed localized prostate cancer, BPH, goiter with right thyroid nodule and osteoporosis.  For his diabetes, patient states he is taking Tresiba 60 units SQ at hs, Humalog 15 units with meals, Jardiance 10 mg each am and Ozempic 0.25 mg subcutaneous once a week.  Pt's A1C is 8.5 % today and his previous A1C was 8.3 % in Nov 2021.  I reviewed his Freestyle Lora sensor download today and his average glucose is 211 with SD 30.  His glucose is in target 34 % of the time, above target 66 % of the time and below target 0 % of the time.  On ROS today, blurred vision and he was seen here by Oph in Nov 2021 with severe NPDR with macular edema.  Decrease auditory acuity.  Chronic numbness in feet.   He tested + for COVID in Jan 2022 with mild URI sx per patient.  He denies groin yeast infection, dysuria or hematuria at this time.  Pt denies frequent headaches, n/v, SOB at rest, cough, fever, chills,chest pain, abd pain or diarrhea.  No foot ulcers on exam    DIABETES CARE:  Retinopathy: yes; severe NPDR with macular edema. He was seen by Oph here in Nov 2021.  Nephropathy: yes- CKDStage3.  Urine protein + in Dec 2021.  Pt is taking Cozaar daily.  Neuropathy: yes.   Foot exam.  No ulcers. Abnormal monofilamentous exam.  Lipids: LDL 56 in Nov 2021. Pt is taking Lipitor, Fish Oil and fenofibrate.  Taking ASA: yes.  CAD:no.  Mental health: hx of mood disorder per chart. Pt denies depression.  Insulin: Basal and meal time insulin.  DM meds: Jardiance and Ozempic.  Testing: Freestyle Lora sensor. I placed an order for a Freestyle Libre2 sensor today.    ROS   Please see under HPI.     ALLERGIES:  Review of patient's allergies indicates no known allergies.      Current  Outpatient Medications   Medication Sig Dispense Refill     alpha-lipoic acid 600 MG capsule Take 1 capsule (600 mg) by mouth daily 90 capsule 3     amLODIPine (NORVASC) 5 MG tablet Take 1 tablet (5 mg) by mouth At Bedtime 90 tablet 3     ARTIFICIAL TEAR OP Apply to eye as needed       aspirin 81 MG tablet Take 1 tablet by mouth At Bedtime        atorvastatin (LIPITOR) 20 MG tablet Take 1 tablet (20 mg) by mouth daily 90 tablet 3     blood glucose (NO BRAND SPECIFIED) lancets standard Lancets that go with device, Test 3 times daily 300 each 3     blood glucose monitoring (NO BRAND SPECIFIED) meter device kit Any meter covered by insurance, not store brand, use as directed. 1 kit 0     blood glucose monitoring (NO BRAND SPECIFIED) test strip Strips that go with meter, covered by insurance. Test 3 times daily 300 strip 3     Blood Pressure Monitor KIT Automatic Blood Pressure Monitor 1 kit 0     camphor-menthol (DERMASARRA) 0.5-0.5 % external lotion Apply lotion 2-3 times per day to itchy areas. If not improving in two weeks, follow up with PCP. 222 mL 0     camphor-menthol (DERMASARRA) 0.5-0.5 % external lotion Apply to arms legs torso 1-2 times a day for itching 1 Bottle 11     cholecalciferol (VITAMIN D3) 25 mcg (1000 units) capsule Take 2 capsules (50 mcg) by mouth daily 180 capsule 3     clobetasol (TEMOVATE) 0.05 % ointment Apply topically to legs twice daily for 2 weeks.  Then discontinue 30 g 0     clotrimazole (LOTRIMIN) 1 % cream Apply topically 2 times daily 30 g 3     Continuous Blood Gluc Sensor (FREESTYLE PETER 2 SENSOR) [facility] 1 each every 14 days 1 each every 14 days. Change every 14 days. 2 each 5     empagliflozin (JARDIANCE) 10 MG TABS tablet One tab daily. 90 tablet 3     famotidine (PEPCID) 20 MG tablet Take 1 tablet (20 mg) by mouth 2 times daily 180 tablet 3     fenofibrate 54 MG tablet Take 1 tablet (54 mg) by mouth daily (Patient taking differently: Take 54 mg by mouth At Bedtime ) 90 tablet 0  "    finasteride (PROSCAR) 5 MG tablet Take 1 tablet (5 mg) by mouth daily 90 tablet 2     fluocinonide (LIDEX) 0.05 % external cream APPLY TO AFFECTED AREA TWICE A DAY  3     fluticasone (FLOVENT HFA) 110 MCG/ACT inhaler Inhale 1 puff into the lungs 2 times daily 12 g 11     gabapentin (NEURONTIN) 300 MG capsule Take 1 capsule (300 mg) by mouth 2 times daily 60 capsule 3     gabapentin (NEURONTIN) 300 MG capsule TAKE 1 CAPSULE BY MOUTH TWICE A  capsule 0     HUMALOG KWIKPEN 100 UNIT/ML soln Inject 15-17  units with meals, plus correction. Pt uses approx 65 units in 24 hrs. 60 mL 1     insulin degludec (TRESIBA FLEXTOUCH) 100 UNIT/ML pen Inject 64 units subcu at bedtime. 60 mL 3     insulin pen needle (B-D U/F) 31G X 5 MM miscellaneous Use 4 time(s) per day.  Please dispense as BD Pen Needle Mini U/F 31G x 5  each 3     insulin pen needle (B-D U/F) 31G X 5 MM Use 4 times per day.  Please dispense as BD Pen Needle Mini U/F 31G x 5  each 3     insulin syringe 31G X 5/16\" 0.5 ML MISC Use three syringes daily 270 each 1     ketamine 5% gabapentin 8% lidocaine 2.5% topical PLO cream Apply 1 g topically 3 times daily 30 g 3     losartan (COZAAR) 100 MG tablet TAKE 1 TABLET BY MOUTH AT BEDTIME 90 tablet 3     mupirocin (BACTROBAN) 2 % cream Apply  topically. In very small amounts only as needed 15 g 1     Omega-3 Fatty Acids (OMEGA-3 FISH OIL) 1000 MG CAPS Take 1 capsule (1 g) by mouth 2 times daily (Patient taking differently: Take 1 g by mouth as needed (PT last dose a week ago 12.24.19) ) 60 capsule 11     ONETOUCH ULTRA test strip Use to test blood sugar 3 times daily 300 strip 3     semaglutide (OZEMPIC, 0.25 OR 0.5 MG/DOSE,) 2 MG/1.5ML SOPN pen Inject 0.5 mg subcutaneous once week. 1.5 mL 3     sodium bicarbonate 650 MG tablet Take 1 tablet (650 mg) by mouth 3 times daily 90 tablet 11     tamsulosin (FLOMAX) 0.4 MG capsule Take 1 capsule (0.4 mg) by mouth daily 90 capsule 2     triamcinolone " (KENALOG) 0.1 % cream Apply topically 3 times daily 80 g 0     albuterol (VENTOLIN HFA) 108 (90 Base) MCG/ACT inhaler Inhale 2 puffs into the lungs every 6 hours (Patient not taking: Reported on 1/25/2022) 18 g 3     dextromethorphan-guaiFENesin (MUCINEX DM)  MG 12 hr tablet Take 1 tablet by mouth every 12 hours (Patient not taking: Reported on 1/25/2022) 30 tablet 0     dextromethorphan-guaiFENesin (TUSSIN DM)  MG/5ML liquid Take 5 mLs by mouth 2 times daily as needed (Patient not taking: Reported on 1/25/2022) 118 mL 0     diclofenac (VOLTAREN) 1 % topical gel Apply 4 grams to thigh up to 4 times daily for pain (Patient not taking: Reported on 1/25/2022) 100 g 1     fluticasone (FLONASE) 50 MCG/ACT nasal spray Spray 1 spray into both nostrils daily (Patient not taking: Reported on 1/25/2022) 16 g 0     loratadine (CLARITIN) 10 MG tablet Take 1 tablet (10 mg) by mouth daily (Patient not taking: Reported on 1/25/2022) 90 tablet 0     VITAMIN D3 25 MCG (1000 UT) tablet TAKE 2 TABLETS (50 MCG) BY MOUTH DAILY (Patient not taking: Reported on 1/25/2022)       Family Hx   No change.     Personal Hx   Smoke: none.   ETOH: none.    with grown children.    PMH   1. Type 2 Diabetes Mellitus dx at age 43.   2. Neuropathy.  3. Nephropathy.   4. ED.   5. Dyslipidemia.   6. Nephrolithiasis.   7. Decrease auditory acuity.   8. Sarcoidosis-lung.   9. Goiter.   10. S/P T & A.   11. S/P FX right heel.   12. Vit D def.   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   14. CT chest- ? granulomas.   15. Retinopathy.  16. Goiter.  Past Medical History:   Diagnosis Date     Blepharitis of both eyes      BPH (benign prostatic hyperplasia)      Diabetes (H)      Diabetic neuropathy (H)      Diabetic retinopathy associated with diabetes mellitus due to underlying condition (H)      Dry eye syndrome      GERD (gastroesophageal reflux disease)      Goiter      Granulomatous disease (H)      HLD (hyperlipidemia)      HTN  "(hypertension)      Nonsenile cataract      Peripheral neuropathy      Past Surgical History:   Procedure Laterality Date     ------------OTHER-------------      back of neck abscess drainage in OR     AS RAD RESEC TONSIL/PILLARS Bilateral 1961     CATARACT IOL, RT/LT Left      COLONOSCOPY  7/29/2013    Procedure: COLONOSCOPY;;  Surgeon: Montana Pascal MD;  Location: [facility]     EXCISE MASS UPPER EXTREMITY Right 11/11/2019    Procedure: EXCISION, MASS, UPPER EXTREMITY, RIGHT SHOULDER;  Surgeon: Johana Choudhury MD;  Location: [facility]     INTRAVITREAL INJECTION CHEMOTHERAPY Right 12/30/2019    Procedure: INTRAVITREAL Bevacizumab injection;  Surgeon: Milton Maki MD;  Location: [facility]     PHACOEMULSIFICATION CLEAR CORNEA WITH STANDARD INTRAOCULAR LENS IMPLANT Right 12/30/2019    Procedure: PHACOEMULSIFICATION, CATARACT, WITH INTRAOCULAR LENS IMPLANT;  Surgeon: Milton Maki MD;  Location: [facility]     PHACOEMULSIFICATION WITH STANDARD INTRAOCULAR LENS IMPLANT Left 6/21/2019    Procedure: Left Eye Cataract Removal with Intraocular Lens Implant with Intraoperative Avastin Injection;  Surgeon: Lacey Eugene MD;  Location: [facility]     siladenatis  11/2017     Physical Exam   /72 (BP Location: Left arm, Patient Position: Sitting, Cuff Size: Adult Regular)   Pulse 76   Ht 1.727 m (5' 8\")   Wt 89.9 kg (198 lb 3.2 oz)   BMI 30.14 kg/m    GENERAL: Pt in no distress.  SKIN: Dry and excoriations.  HEENT: Decrease visual acuity. Fundi not examined today.  NECK: Thyroid palpable and nontender.  LUNGS: Clear b/l.  CARDIAC: RRR.  ABDOMEN: Enlarge and nontender.  EXTREMITIES: Trace pretibial edema b/l.  FEET: No ulcers; abnormal monofilamentous exam.    Results   Creatinine   Date Value Ref Range Status   12/08/2021 1.66 (H) 0.66 - 1.25 mg/dL Final   06/09/2021 1.45 (H) 0.66 - 1.25 mg/dL Final     GFR Estimate   Date Value Ref Range Status   12/08/2021 41 (L) >60 mL/min/1.73m2 Final     Comment:     As of " July 11, 2021, eGFR is calculated by the CKD-EPI creatinine equation, without race adjustment. eGFR can be influenced by muscle mass, exercise, and diet. The reported eGFR is an estimation only and is only applicable if the renal function is stable.   06/09/2021 48 (L) >60 mL/min/[1.73_m2] Final     Comment:     Non  GFR Calc  Starting 12/18/2018, serum creatinine based estimated GFR (eGFR) will be   calculated using the Chronic Kidney Disease Epidemiology Collaboration   (CKD-EPI) equation.       Hemoglobin A1C POCT   Date Value Ref Range Status   06/09/2021 8.2 (H) 0 - 5.6 % Final     Comment:     Normal <5.7% Prediabetes 5.7-6.4%  Diabetes 6.5% or higher - adopted from ADA   consensus guidelines.       Hemoglobin A1C   Date Value Ref Range Status   11/02/2021 8.3 (H) 0.0 - 5.6 % Final     Comment:     Normal <5.7%   Prediabetes 5.7-6.4%    Diabetes 6.5% or higher     Note: Adopted from ADA consensus guidelines.     Potassium   Date Value Ref Range Status   12/08/2021 4.2 3.4 - 5.3 mmol/L Final   06/09/2021 4.2 3.4 - 5.3 mmol/L Final     ALT   Date Value Ref Range Status   03/12/2021 36 0 - 70 U/L Final     AST   Date Value Ref Range Status   03/12/2021 24 0 - 45 U/L Final     TSH   Date Value Ref Range Status   06/09/2021 0.98 0.40 - 4.00 mU/L Final     T4 Free   Date Value Ref Range Status   10/21/2014 1.00 0.76 - 1.46 ng/dL Final     Comment:     Effective 7/30/2014, the reference range for this assay has changed to reflect   new instrumentation/methodology.           Cholesterol   Date Value Ref Range Status   11/02/2021 159 <200 mg/dL Final   06/18/2019 145 <200 mg/dL Final   03/06/2018 101 <200 mg/dL Final     HDL Cholesterol   Date Value Ref Range Status   06/18/2019 38 (L) >39 mg/dL Final   03/06/2018 32 (L) >39 mg/dL Final     Direct Measure HDL   Date Value Ref Range Status   11/02/2021 37 (L) >=40 mg/dL Final     LDL Cholesterol Calculated   Date Value Ref Range Status   11/02/2021 56  <=100 mg/dL Final   06/18/2019 49 <100 mg/dL Final     Comment:     Desirable:       <100 mg/dl   03/06/2018 36 <100 mg/dL Final     Comment:     Desirable:       <100 mg/dl     Triglycerides   Date Value Ref Range Status   11/02/2021 331 (H) <150 mg/dL Final   06/18/2019 289 (H) <150 mg/dL Final     Comment:     Borderline high:  150-199 mg/dl  High:             200-499 mg/dl  Very high:       >499 mg/dl     03/06/2018 166 (H) <150 mg/dL Final     Comment:     Borderline high:  150-199 mg/dl  High:             200-499 mg/dl  Very high:       >499 mg/dl       Cholesterol/HDL Ratio   Date Value Ref Range Status   09/09/2014 3.8 0.0 - 5.0 Final   11/20/2013 5.8 (H) 0.0 - 5.0 Final       ASSESSMENT/PLAN:     1. TYPE 2 DIABETES MELLITUS: Uncontrolled type 2 diabetes mellitus complicated by retinopathy, nephropathy- CKD stage 3, neuropathy and ED.  Increase Ozempic 0.5 mg subcutaneous once a week. Pt denies n/v, abd pain or chronic diarrhea.  Continue current dose of Tresiba, Humalog and Jardiance.  Most recent creat 1.66 with GFR 41 mL/min in Dec 2021.  I placed an order for a  Freestyle Libre2 sensor today which will alert him if his blood sugar is going high or low.  Encouraged patient to make healthy food choices, reduce his food portions with meals, avoid snacking and walk on his treadmill.  Pt remains on daily ASA.   Pt received the COVID19 vaccines (Moderna).   He also received the flu vaccine this season.    2. GOITER: Not addressed today.     3. NEPHROPATHY: Pt has CKD and followed here by renal staff.  Most recent creat was 1.66 with GFR 41 mL/min in Dec 2021.  He is taking Cozaar and Jardiance for renal protection.  Pt's BP is good at 127/72 today.  Discussed the need for better glycemic control.    4.  RETINOPATHY:  Seen here by Oph in Nov 2021.    5.  NEUROPATHY: Continue Gabapentin,alpha-lipoic acid and topical cream.  No foot ulcers on exam today.    6. DYSLIPIDEMIA: LDL 41 in 12/2021.  Pt is taking  Lipitor, fish oil and Fenofibrate.    7. OBESITY: See under # 1 above.  Tolerating Ozempic- increased dose 0.5 mg subcutaneous once a week today.    8.  FOLLOW UP: with me in 3 months in clinic.    Time spent reviewing pt's chart notes,labs and Freestyle Lora sensor download today= 8 minutes.  Time for clinic visit today= 30 minutes.  Time for documentation today = 15 minutes.    TOTAL TIME FOR VISIT TODAY = 53 minutes    Pamela Laura PA-C

## 2022-03-15 RX ORDER — TAMSULOSIN HYDROCHLORIDE 0.4 MG/1
0.4 CAPSULE ORAL DAILY
Qty: 90 CAPSULE | Refills: 0 | Status: SHIPPED | OUTPATIENT
Start: 2022-03-15 | End: 2022-06-15

## 2022-03-15 NOTE — TELEPHONE ENCOUNTER
Last Clinic Visit: 4/9/2021  [facility] [place]  Recommended 6-12 month follow up, no upcoming appointments  90 day refill per protocol, message added to prescription to schedule follow up

## 2022-03-17 NOTE — PROGRESS NOTES
[facility] Initial Evaluation    Subjective:  Earle Rene is a 72 year old male with a lumbar condition. Mechanism of injury: Chronic low back pain for the past year starting for unknown reasons. Where: (home, work, MVA, community, recreation/sport, unknown, other): NA. Onset of symptoms: PT order date: 11/30/2021. Location of symptoms: bilateral low back, bilateral anterior and posterior thighs and posterior lower legs. Pain level on number scale: 3-10/10. Quality of pain: cramping. Associated symptoms: numbness. Pain frequency (constant/intermittent): constant. Symptoms are exacerbated by: walking, bending, lifting, sitting, standing. Symptoms are relieved by: nothing. Progression of symptoms since onset (same/better/worse): same. Special tests (x-ray, MRI, CT scan, EMG, bone scan): x-ray, MRI. Previous treatment: gabapentin. Improvement with previous treatment: yes. General health as reported by patient is good. Pertinent medical history includes:  see Epic. Medical allergies includes: see Epic. Surgical history includes: see Epic. Current medications include: see Epic. Occupation: sells [employer] Patient is (working in normal job without restrictions/working in normal job with restrictions/working in an alternate job/not working due to present treatment problem): working in normal job. Primary job tasks: lifting, carrying. Barriers at home/work: None reported by patient. Red flags: None reported by patient.    Objective  Posture    Sitting (good/fair/poor): poor  Standing (good/fair/poor):  poor  Lordosis (red/acc/normal):  normal  Lateral shift (right/left/nil):  nil  Relevant lateral shift (yes/no):  no  Correction of posture (better/worse/no effect): better    Neurological    Myotomes L R   L1-2 (hip flexion) 5/5 5/5   L3 (knee extension) 5/5 5/5   L4 (ankle DF) 5/5 5/5   L5 (g. toe ext) 5/5 5/5   S1 (ankle PF or knee flex) 5/5 5/5     Sensory Deficit, Reflexes: lumbar dermatomes  WNL    Lumbar Movement Loss Joseph Mod Min Nil Pain   Flexion   x  +   Extension   x  +   Side Gliding L   x  +   Side Gliding R   x  +     Assessment/Plan:    Patient is a 72 year old male with lumbar complaints.    Patient has the following significant findings with corresponding treatment plan.                Diagnosis 1:  LBP with bilateral radiculopathy  Pain -  manual therapy, self management, education, directional preference exercise and home program  Decreased ROM/flexibility - manual therapy and therapeutic exercise  Decreased joint mobility - manual therapy and therapeutic exercise  Decreased strength - therapeutic exercise and therapeutic activities  Impaired balance - neuro re-education and therapeutic activities  Impaired muscle performance - neuro re-education  Decreased function - therapeutic activities  Impaired posture - neuro re-education    Previous and current functional limitations:  (See Goal Flow Sheet for this information)    Short term and Long term goals: (See Goal Flow Sheet for this information)     Communication ability:  Patient appears to be able to clearly communicate and understand verbal and written communication and follow directions correctly.  Treatment Explanation - The following has been discussed with the patient:   RX ordered/plan of care  Anticipated outcomes  Possible risks and side effects  This patient would benefit from PT intervention to resume normal activities.   Rehab potential is good.    Frequency:  1 X week, once daily  Duration:  for 8 weeks  Discharge Plan:  Achieve all LTG.  Independent in home treatment program.  Reach maximal therapeutic benefit.    Please refer to the daily flowsheet for treatment today, total treatment time and time spent performing 1:1 timed codes.

## 2022-03-18 ENCOUNTER — THERAPY VISIT (OUTPATIENT)
Dept: PHYSICAL THERAPY | Facility: CLINIC | Age: 72
End: 2022-03-18
Attending: ORTHOPAEDIC SURGERY
Payer: COMMERCIAL

## 2022-03-18 DIAGNOSIS — M54.41 CHRONIC BILATERAL LOW BACK PAIN WITH BILATERAL SCIATICA: ICD-10-CM

## 2022-03-18 DIAGNOSIS — M54.50 LUMBAR PAIN: ICD-10-CM

## 2022-03-18 DIAGNOSIS — G89.29 CHRONIC BILATERAL LOW BACK PAIN WITH BILATERAL SCIATICA: ICD-10-CM

## 2022-03-18 DIAGNOSIS — M54.42 CHRONIC BILATERAL LOW BACK PAIN WITH BILATERAL SCIATICA: ICD-10-CM

## 2022-03-18 PROCEDURE — 97161 PT EVAL LOW COMPLEX 20 MIN: CPT | Mod: GP | Performed by: PHYSICAL THERAPIST

## 2022-03-18 PROCEDURE — 97530 THERAPEUTIC ACTIVITIES: CPT | Mod: GP | Performed by: PHYSICAL THERAPIST

## 2022-03-18 PROCEDURE — 97110 THERAPEUTIC EXERCISES: CPT | Mod: GP | Performed by: PHYSICAL THERAPIST

## 2022-03-18 NOTE — PROGRESS NOTES
[facility]    OUTPATIENT Physical Therapy ORTHOPEDIC EVALUATION  PLAN OF TREATMENT FOR OUTPATIENT REHABILITATION  (COMPLETE FOR INITIAL CLAIMS ONLY)  Patient's Last Name, First Name, M.I.  YOB: 1950  CherellerealEarle       Provider s Name:  [facility]   Medical Record No.  [id number]   Start of Care Date:  03/18/22   Onset Date:   11/30/21   Type:     _X__PT   ___OT Medical Diagnosis:    Encounter Diagnoses   Name Primary?     Lumbar pain      Chronic bilateral low back pain with bilateral sciatica         Treatment Diagnosis:  lbp with bilateral radiculopathy        Goals:     03/18/22 0500   Body Part   Goals listed below are for lbp   Goal #1   Goal #1 ambulation   Previous Functional Level No restrictions   Current Functional Level Miles patient can walk   Performance Level <1   STG Target Performance Miles patient will be able to  walk   Performance Level 1/2   Rationale for safe community ambulation   Due Date 04/29/22    LTG Target Performance Miles patient will be able to  walk   Performance Level 1   Rationale for safe community ambulation   Due Date 06/10/22       Therapy Frequency:  1x per week  Predicted Duration of Therapy Intervention:  8 weeks    Salvador Farias PT                 I CERTIFY THE NEED FOR THESE SERVICES FURNISHED UNDER        THIS PLAN OF TREATMENT AND WHILE UNDER MY CARE     (Physician attestation of this document indicates review and certification of the therapy plan).                       Certification Date From:  03/18/22   Certification Date To:  06/15/22    Referring Provider:  Emmanuel Madsen*    Initial Assessment        See Epic Evaluation SOC Date: 03/18/22

## 2022-03-22 ENCOUNTER — TELEPHONE (OUTPATIENT)
Dept: VASCULAR SURGERY | Facility: CLINIC | Age: 72
End: 2022-03-22
Payer: COMMERCIAL

## 2022-03-22 DIAGNOSIS — N18.31 STAGE 3A CHRONIC KIDNEY DISEASE (H): Primary | ICD-10-CM

## 2022-03-22 DIAGNOSIS — I73.9 CLAUDICATION (H): Primary | ICD-10-CM

## 2022-03-22 DIAGNOSIS — I73.9 PERIPHERAL VASCULAR DISEASE, UNSPECIFIED (H): ICD-10-CM

## 2022-03-22 NOTE — TELEPHONE ENCOUNTER
I contacted Earle with an update on his vascular follow-up. Our team would like him to get a renal panel drawn to check his creat and GFR prior to proceeding with MRI or CTA. Pt states he recently had a renal panel drawn at his dermatology clinic.    I will request records from them.    VALDEMAR BlackburnN, RN  RN - P Vascular Surgery  Ph: [phone]  Fax: [phone]

## 2022-03-31 NOTE — TELEPHONE ENCOUNTER
Our team received pt's recent lab work from his dermatology clinic. Reviewed with Dr Hood. His creatinine continues to be elevated, however Dr. Hood would like to proceed with MRA of abdomen/pelvis dt concern for worsening vascular disease. I discussed this with the patient and encouraged him to drink extra fluids before and after this test.    I will route the following request to our schedulers:    Vascular Clinic Appointment Request    Imaging needed? Yes. Orders placed.     Visit type: in person visit    Provider: Dr. Hood    Timeframe: Next available    Appt notes: PAD follow-up s/p MRA     Additional info: Please have MRA abdomen and MRA pelvis scheduled before seeing Dr. Hood. These can be done on a different day than his doctor visit if more convenient.    Documentation routed to clinic coordinators for scheduling.      JEFF Blackburn, RN  [facility] - P Vascular Surgery  Ph: [phone]  Fax: [phone]

## 2022-04-01 NOTE — TELEPHONE ENCOUNTER
The pt is stating that he needs imaging without contrast due to having bad kidneys  Also the pt is stating that he needs to be seen after April 22 due to Pass over.  The pt is asking to have the imaging order changed to without contrast please.  Kp Trinidad on 4/1/2022 at 9:08 AM

## 2022-04-03 DIAGNOSIS — Z79.4 TYPE 2 DIABETES MELLITUS WITH DIABETIC NEPHROPATHY, WITH LONG-TERM CURRENT USE OF INSULIN (H): ICD-10-CM

## 2022-04-03 DIAGNOSIS — E11.21 TYPE 2 DIABETES MELLITUS WITH DIABETIC NEPHROPATHY, WITH LONG-TERM CURRENT USE OF INSULIN (H): ICD-10-CM

## 2022-04-03 RX ORDER — LOSARTAN POTASSIUM 100 MG/1
100 TABLET ORAL AT BEDTIME
Qty: 90 TABLET | Refills: 3 | Status: SHIPPED | OUTPATIENT
Start: 2022-04-03 | End: 2023-04-10

## 2022-04-04 NOTE — TELEPHONE ENCOUNTER
The pt is scheduled on 4/27 for imaging at the [facility] and on 5/3 at the [facility] wit .  Kp Trinidad on 4/4/2022 at 11:11 AM

## 2022-04-08 DIAGNOSIS — N18.31 STAGE 3A CHRONIC KIDNEY DISEASE (H): ICD-10-CM

## 2022-04-08 DIAGNOSIS — I73.9 PERIPHERAL VASCULAR DISEASE, UNSPECIFIED (H): ICD-10-CM

## 2022-04-08 DIAGNOSIS — I73.9 CLAUDICATION (H): Primary | ICD-10-CM

## 2022-04-20 ENCOUNTER — TELEPHONE (OUTPATIENT)
Dept: VASCULAR SURGERY | Facility: CLINIC | Age: 72
End: 2022-04-20
Payer: COMMERCIAL

## 2022-04-20 DIAGNOSIS — I73.9 CLAUDICATION (H): Primary | ICD-10-CM

## 2022-04-20 DIAGNOSIS — I73.9 PERIPHERAL VASCULAR DISEASE, UNSPECIFIED (H): ICD-10-CM

## 2022-04-20 NOTE — TELEPHONE ENCOUNTER
Health Call Center    Phone Message    May a detailed message be left on voicemail: yes     Reason for Call: Other: Kacy with [facility] states there are 4 MRI orders. 2 orders for the Pelvis, one with contrast and one without.   2 orders for abdomen, one with contrast and one without.     Kacy would like clarification to use or not use contrast. Kacy states she believes the Pt will refuse the contrast. Please call Kacy back at [phone] to clarify.     Thank you.     Action Taken: Message routed to:  [facility] ([facility] Vascular    Travel Screening: Not Applicable

## 2022-04-21 NOTE — TELEPHONE ENCOUNTER
I returned call to the imaging department and clarified pt needs imaging WITH contrast. I also discussed this with the patient so he is aware. I have discontinued the other orders.    VALDEMAR BlackburnN, RN  [facility] - P Vascular Surgery  Ph: [phone]  Fax: [phone]

## 2022-04-25 NOTE — TELEPHONE ENCOUNTER
MRA has been denied by insurance. Peer to peer by Dr. Hood was also denied. Pt is required to complete 3 month PAD walking program prior to MRA approval.    Our team has placed the order for PAD rehab. I contacted the patient and updated him on the plan.    VALDEMAR BlackburnN, RN  [facility] - P Vascular Surgery  Ph: [phone]  Fax: [phone]

## 2022-05-03 ENCOUNTER — HOSPITAL ENCOUNTER (OUTPATIENT)
Dept: CARDIAC REHAB | Facility: CLINIC | Age: 72
Discharge: HOME | End: 2022-05-03
Attending: SURGERY
Payer: COMMERCIAL

## 2022-05-03 DIAGNOSIS — I73.9 PERIPHERAL VASCULAR DISEASE, UNSPECIFIED (H): ICD-10-CM

## 2022-05-03 DIAGNOSIS — I73.9 CLAUDICATION (H): ICD-10-CM

## 2022-05-03 PROCEDURE — 93668 PERIPHERAL VASCULAR REHAB: CPT

## 2022-05-03 ASSESSMENT — 6 MINUTE WALK TEST (6MWT)
DISTANCE (FT): 882
BLOOD PRESSURE: 108/48
SITTING BLOOD PRESSURE: 96/58
TOTAL DISTANCE WALKED (FT): 1082
TIME: 4.5
TOTAL TIME WALKED: 6

## 2022-05-10 ENCOUNTER — HOSPITAL ENCOUNTER (OUTPATIENT)
Dept: CARDIAC REHAB | Facility: CLINIC | Age: 72
Discharge: HOME | End: 2022-05-10
Attending: SURGERY
Payer: COMMERCIAL

## 2022-05-10 PROCEDURE — 93668 PERIPHERAL VASCULAR REHAB: CPT

## 2022-05-17 DIAGNOSIS — N40.1 BENIGN PROSTATIC HYPERPLASIA WITH URINARY OBSTRUCTION: ICD-10-CM

## 2022-05-17 DIAGNOSIS — N13.8 BENIGN PROSTATIC HYPERPLASIA WITH URINARY OBSTRUCTION: ICD-10-CM

## 2022-05-18 ENCOUNTER — TELEPHONE (OUTPATIENT)
Dept: MULTI SPECIALTY CLINIC | Facility: CLINIC | Age: 72
End: 2022-05-18
Payer: COMMERCIAL

## 2022-05-18 ENCOUNTER — TELEPHONE (OUTPATIENT)
Dept: VASCULAR SURGERY | Facility: CLINIC | Age: 72
End: 2022-05-18
Payer: COMMERCIAL

## 2022-05-18 RX ORDER — FINASTERIDE 5 MG/1
1 TABLET, FILM COATED ORAL DAILY
Qty: 30 TABLET | Refills: 0 | Status: SHIPPED | OUTPATIENT
Start: 2022-05-18 | End: 2022-06-21

## 2022-05-18 NOTE — TELEPHONE ENCOUNTER
----- Message from Marcelina Chambers RN sent at 4/25/2022 10:54 AM CDT -----  Hello,    Please schedule this pt for a return/follow-up in person visit with Dr. Hood in 3-4 months for PAD follow-up s/p walking program. Pt will not need imaging prior to this appt.      Thank you very much!    Marcelina

## 2022-05-18 NOTE — TELEPHONE ENCOUNTER
The pt is stating that he would like a call back in July to schedule imaging and a follow up with .   The pt states that he has become a member of a health club and is now exercising he is requesting that  put in a referral for physical therapy.   The pt is stating that it is the kind of therapy that involves foot massaging.  Kp Trinidad on 5/18/2022 at 4:57 PM

## 2022-05-18 NOTE — TELEPHONE ENCOUNTER
LCV: 4/9/2021  [facility] 30 day  Scheduling has been notified to contact the pt for appointment.

## 2022-05-23 NOTE — TELEPHONE ENCOUNTER
I contacted Earle and discussed his PT referral. He states he did go to a couple sessions of [facility] rehab/walking program, but has now joined a gym and is regularly walking on the treadmill there. He is going to continue to do this on his own, and will follow-up with Dr Hood later this summer.    He is wondering about a referral for PT/massage for his feet. I let him know he should ask his PCP for this.    JEFF Blackburn, RN  [facility]  Ph: [phone]  Fax: [phone]

## 2022-06-06 ENCOUNTER — TELEPHONE (OUTPATIENT)
Dept: ENDOCRINOLOGY | Facility: CLINIC | Age: 72
End: 2022-06-06
Payer: COMMERCIAL

## 2022-06-06 NOTE — TELEPHONE ENCOUNTER
Wasn't able to reach pt about moving in person appt to a different day per Pamela Laura due to high pt volume on 6/9 when he is scheduled.     Please attempt to reach again

## 2022-06-09 ENCOUNTER — TELEPHONE (OUTPATIENT)
Dept: DERMATOLOGY | Facility: CLINIC | Age: 72
End: 2022-06-09

## 2022-06-09 ENCOUNTER — OFFICE VISIT (OUTPATIENT)
Dept: ENDOCRINOLOGY | Facility: CLINIC | Age: 72
End: 2022-06-09
Payer: COMMERCIAL

## 2022-06-09 ENCOUNTER — LAB (OUTPATIENT)
Dept: LAB | Facility: CLINIC | Age: 72
End: 2022-06-09

## 2022-06-09 VITALS
RESPIRATION RATE: 18 BRPM | SYSTOLIC BLOOD PRESSURE: 103 MMHG | BODY MASS INDEX: 29.35 KG/M2 | WEIGHT: 193 LBS | OXYGEN SATURATION: 96 % | DIASTOLIC BLOOD PRESSURE: 61 MMHG | HEART RATE: 97 BPM | TEMPERATURE: 97.4 F

## 2022-06-09 DIAGNOSIS — E11.3399 TYPE 2 DIABETES MELLITUS WITH MODERATE NONPROLIFERATIVE RETINOPATHY WITHOUT MACULAR EDEMA, WITH LONG-TERM CURRENT USE OF INSULIN, UNSPECIFIED LATERALITY (H): Primary | ICD-10-CM

## 2022-06-09 DIAGNOSIS — N18.31 STAGE 3A CHRONIC KIDNEY DISEASE (H): ICD-10-CM

## 2022-06-09 DIAGNOSIS — Z79.4 TYPE 2 DIABETES MELLITUS WITH MODERATE NONPROLIFERATIVE RETINOPATHY WITHOUT MACULAR EDEMA, WITH LONG-TERM CURRENT USE OF INSULIN, UNSPECIFIED LATERALITY (H): Primary | ICD-10-CM

## 2022-06-09 DIAGNOSIS — Z79.4 TYPE 2 DIABETES MELLITUS WITH MODERATE NONPROLIFERATIVE RETINOPATHY WITHOUT MACULAR EDEMA, WITH LONG-TERM CURRENT USE OF INSULIN, UNSPECIFIED LATERALITY (H): ICD-10-CM

## 2022-06-09 DIAGNOSIS — E11.3399 TYPE 2 DIABETES MELLITUS WITH MODERATE NONPROLIFERATIVE RETINOPATHY WITHOUT MACULAR EDEMA, WITH LONG-TERM CURRENT USE OF INSULIN, UNSPECIFIED LATERALITY (H): ICD-10-CM

## 2022-06-09 LAB
ALBUMIN SERPL-MCNC: 3.9 G/DL (ref 3.4–5)
ANION GAP SERPL CALCULATED.3IONS-SCNC: 8 MMOL/L (ref 3–14)
BUN SERPL-MCNC: 33 MG/DL (ref 7–30)
CALCIUM SERPL-MCNC: 9.5 MG/DL (ref 8.5–10.1)
CHLORIDE BLD-SCNC: 107 MMOL/L (ref 94–109)
CO2 SERPL-SCNC: 24 MMOL/L (ref 20–32)
CREAT SERPL-MCNC: 2.04 MG/DL (ref 0.66–1.25)
GFR SERPL CREATININE-BSD FRML MDRD: 34 ML/MIN/1.73M2
GLUCOSE BLD-MCNC: 142 MG/DL (ref 70–99)
HBA1C MFR BLD: 7.9 % (ref 4.3–?)
PHOSPHATE SERPL-MCNC: 4 MG/DL (ref 2.5–4.5)
POTASSIUM BLD-SCNC: 4.3 MMOL/L (ref 3.4–5.3)
SODIUM SERPL-SCNC: 139 MMOL/L (ref 133–144)

## 2022-06-09 PROCEDURE — 80069 RENAL FUNCTION PANEL: CPT | Performed by: PATHOLOGY

## 2022-06-09 PROCEDURE — 36415 COLL VENOUS BLD VENIPUNCTURE: CPT | Performed by: PATHOLOGY

## 2022-06-09 PROCEDURE — 83036 HEMOGLOBIN GLYCOSYLATED A1C: CPT | Performed by: PHYSICIAN ASSISTANT

## 2022-06-09 PROCEDURE — 99215 OFFICE O/P EST HI 40 MIN: CPT | Performed by: PHYSICIAN ASSISTANT

## 2022-06-09 ASSESSMENT — PAIN SCALES - GENERAL: PAINLEVEL: NO PAIN (0)

## 2022-06-09 NOTE — PROGRESS NOTES
PATIENT SEEN FACE TO FACE IN CLINIC TODAY    HPI   Earle Rene is a 72 year old male with type 2 diabetes mellitus. Pt is being seen in clinic today for diabetes follow up.  Pt's diabetes is complicated by retinopathy, nephropathy, neuropathy and ED.  Pt also has hx of hyperlipidemia, HTN, obesity, presumed localized prostate cancer, BPH, goiter with right thyroid nodule and osteoporosis.  For his diabetes, patient states he is taking Tresiba 60 units SQ at hs, Humalog 15 units with meals, Jardiance 10 mg each am and Ozempic 0.5 mg subcutaneous once a week.  Pt's A1C is 7.9 % today and previous A1C was 8.5 %on 3/10/2022.  I reviewed his Freestyle Lora sensor download today and his average glucose is 175 with SD 33.  His glucose is in target 59 % of the time, above target 41 % of the time and below target 0 % of the time.  On ROS today, blurred vision and he was seen here by [facility] in Nov 2021 with severe NPDR with macular edema.  Decrease auditory acuity.  Chronic numbness in feet.   Pt has lost 5 lbs since his last visit in March 2022 and his with is down 8-10 since last summer.  He denies groin yeast infection, dysuria or hematuria at this time.  Pt denies frequent headaches, n/v, SOB at rest, cough, fever, chills,chest pain, abd pain or diarrhea.  No foot ulcers.    DIABETES CARE:  Retinopathy: yes; severe NPDR with macular edema. He was seen by [facility] here in Nov 2021.  Nephropathy: yes- CKDStage3.  Urine protein + in Dec 2021.  Pt is taking Cozaar daily.  Neuropathy: yes.   Foot exam.  No ulcers. Abnormal monofilamentous exam.  Lipids: LDL 56 in Nov 2021. Pt is taking Lipitor, Fish Oil and fenofibrate.  Taking ASA: yes.  CAD:no.  Mental health: hx of mood disorder per chart. Pt denies depression.  Insulin: Basal and meal time insulin.  DM meds: Jardiance and Ozempic.  Testing:  Freestyle Libre2 sensor.    ROS   Please see under HPI.     ALLERGIES:  Review of patient's allergies indicates no known  allergies.      Current Outpatient Medications   Medication Sig Dispense Refill     alpha-lipoic acid 600 MG capsule Take 1 capsule (600 mg) by mouth daily 90 capsule 3     amLODIPine (NORVASC) 5 MG tablet Take 1 tablet (5 mg) by mouth At Bedtime 90 tablet 3     ARTIFICIAL TEAR OP Apply to eye as needed       aspirin 81 MG tablet Take 1 tablet by mouth At Bedtime        atorvastatin (LIPITOR) 20 MG tablet Take 1 tablet (20 mg) by mouth daily 90 tablet 3     blood glucose (NO BRAND SPECIFIED) lancets standard Lancets that go with device, Test 3 times daily 300 each 3     blood glucose monitoring (NO BRAND SPECIFIED) meter device kit Any meter covered by insurance, not store brand, use as directed. 1 kit 0     blood glucose monitoring (NO BRAND SPECIFIED) test strip Strips that go with meter, covered by insurance. Test 3 times daily 300 strip 3     Blood Pressure Monitor KIT Automatic Blood Pressure Monitor 1 kit 0     camphor-menthol (DERMASARRA) 0.5-0.5 % external lotion Apply lotion 2-3 times per day to itchy areas. If not improving in two weeks, follow up with PCP. 222 mL 0     camphor-menthol (DERMASARRA) 0.5-0.5 % external lotion Apply to arms legs torso 1-2 times a day for itching 1 Bottle 11     cholecalciferol (VITAMIN D3) 25 mcg (1000 units) capsule Take 2 capsules (50 mcg) by mouth daily 180 capsule 3     clobetasol (TEMOVATE) 0.05 % ointment Apply topically to legs twice daily for 2 weeks.  Then discontinue 30 g 0     clotrimazole (LOTRIMIN) 1 % cream Apply topically 2 times daily 30 g 3     Continuous Blood Gluc Sensor (FREESTYLE PETER 2 SENSOR) [facility] 1 each every 14 days 1 each every 14 days. Change every 14 days. 2 each 5     empagliflozin (JARDIANCE) 10 MG TABS tablet One tab daily. 90 tablet 3     famotidine (PEPCID) 20 MG tablet Take 1 tablet (20 mg) by mouth 2 times daily 180 tablet 3     fenofibrate 54 MG tablet Take 1 tablet (54 mg) by mouth daily (Patient taking differently: Take 54 mg by mouth At  "Bedtime) 90 tablet 0     finasteride (PROSCAR) 5 MG tablet Take 1 tablet (5 mg) by mouth daily For additional refills, please schedule a follow-up appointment. 30 tablet 0     fluocinonide (LIDEX) 0.05 % external cream APPLY TO AFFECTED AREA TWICE A DAY  3     fluticasone (FLOVENT HFA) 110 MCG/ACT inhaler Inhale 1 puff into the lungs 2 times daily 12 g 11     gabapentin (NEURONTIN) 300 MG capsule Take 1 capsule (300 mg) by mouth 2 times daily 60 capsule 3     gabapentin (NEURONTIN) 300 MG capsule TAKE 1 CAPSULE BY MOUTH TWICE A  capsule 0     HUMALOG KWIKPEN 100 UNIT/ML soln Inject 15-17  units with meals, plus correction. Pt uses approx 65 units in 24 hrs. 60 mL 1     insulin degludec (TRESIBA FLEXTOUCH) 100 UNIT/ML pen Inject 64 units subcu at bedtime. 60 mL 3     insulin pen needle (B-D U/F) 31G X 5 MM miscellaneous Use 4 time(s) per day.  Please dispense as BD Pen Needle Mini U/F 31G x 5  each 3     insulin pen needle (B-D U/F) 31G X 5 MM Use 4 times per day.  Please dispense as BD Pen Needle Mini U/F 31G x 5  each 3     insulin syringe 31G X 5/16\" 0.5 ML MISC Use three syringes daily 270 each 1     ketamine 5% gabapentin 8% lidocaine 2.5% topical PLO cream Apply 1 g topically 3 times daily 30 g 3     losartan (COZAAR) 100 MG tablet Take 1 tablet (100 mg) by mouth At Bedtime 90 tablet 3     mupirocin (BACTROBAN) 2 % cream Apply  topically. In very small amounts only as needed 15 g 1     Omega-3 Fatty Acids (OMEGA-3 FISH OIL) 1000 MG CAPS Take 1 capsule (1 g) by mouth 2 times daily (Patient taking differently: Take 1 g by mouth as needed (PT last dose a week ago 12.24.19)) 60 capsule 11     ONETOUCH ULTRA test strip Use to test blood sugar 3 times daily 300 strip 3     semaglutide (OZEMPIC, 0.25 OR 0.5 MG/DOSE,) 2 MG/1.5ML SOPN pen Inject 0.5 mg subcutaneous once week. 1.5 mL 3     sodium bicarbonate 650 MG tablet Take 1 tablet (650 mg) by mouth 3 times daily 90 tablet 11     tamsulosin " (FLOMAX) 0.4 MG capsule Take 1 capsule (0.4 mg) by mouth daily For additional refills, please schedule a follow-up appointment at [phone] 90 capsule 0     triamcinolone (KENALOG) 0.1 % cream Apply topically 3 times daily 80 g 0     VITAMIN D3 25 MCG (1000 UT) tablet TAKE 2 TABLETS (50 MCG) BY MOUTH DAILY       albuterol (VENTOLIN HFA) 108 (90 Base) MCG/ACT inhaler Inhale 2 puffs into the lungs every 6 hours (Patient not taking: No sig reported) 18 g 3     dextromethorphan-guaiFENesin (MUCINEX DM)  MG 12 hr tablet Take 1 tablet by mouth every 12 hours (Patient not taking: No sig reported) 30 tablet 0     dextromethorphan-guaiFENesin (TUSSIN DM)  MG/5ML liquid Take 5 mLs by mouth 2 times daily as needed (Patient not taking: No sig reported) 118 mL 0     diclofenac (VOLTAREN) 1 % topical gel Apply 4 grams to thigh up to 4 times daily for pain (Patient not taking: No sig reported) 100 g 1     fluticasone (FLONASE) 50 MCG/ACT nasal spray Spray 1 spray into both nostrils daily (Patient not taking: No sig reported) 16 g 0     loratadine (CLARITIN) 10 MG tablet Take 1 tablet (10 mg) by mouth daily (Patient not taking: No sig reported) 90 tablet 0     Family Hx   No change.     Personal Hx   Smoke: none.   ETOH: none.    with grown children.    PMH   1. Type 2 Diabetes Mellitus dx at age 43.   2. Neuropathy.  3. Nephropathy.   4. ED.   5. Dyslipidemia.   6. Nephrolithiasis.   7. Decrease auditory acuity.   8. Sarcoidosis-lung.   9. Goiter.   10. S/P T & A.   11. S/P FX right heel.   12. Vit D def.   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   14. CT chest- ? granulomas.   15. Retinopathy.  16. Goiter.  Past Medical History:   Diagnosis Date     Blepharitis of both eyes      BPH (benign prostatic hyperplasia)      Diabetes (H)      Diabetic neuropathy (H)      Diabetic retinopathy associated with diabetes mellitus due to underlying condition (H)      Dry eye syndrome      GERD (gastroesophageal reflux  disease)      Goiter      Granulomatous disease (H)      HLD (hyperlipidemia)      HTN (hypertension)      Nonsenile cataract      Peripheral neuropathy      Past Surgical History:   Procedure Laterality Date     ------------OTHER-------------      back of neck abscess drainage in OR     AS RAD RESEC TONSIL/PILLARS Bilateral 1961     CATARACT IOL, RT/LT Left      COLONOSCOPY  7/29/2013    Procedure: COLONOSCOPY;;  Surgeon: Montana Pascal MD;  Location: [facility]     EXCISE MASS UPPER EXTREMITY Right 11/11/2019    Procedure: EXCISION, MASS, UPPER EXTREMITY, RIGHT SHOULDER;  Surgeon: Johana Choudhury MD;  Location: [facility]     INTRAVITREAL INJECTION CHEMOTHERAPY Right 12/30/2019    Procedure: INTRAVITREAL Bevacizumab injection;  Surgeon: Milton Maki MD;  Location: [facility] OR     PHACOEMULSIFICATION CLEAR CORNEA WITH STANDARD INTRAOCULAR LENS IMPLANT Right 12/30/2019    Procedure: PHACOEMULSIFICATION, CATARACT, WITH INTRAOCULAR LENS IMPLANT;  Surgeon: Milton Maki MD;  Location: [facility] OR     PHACOEMULSIFICATION WITH STANDARD INTRAOCULAR LENS IMPLANT Left 6/21/2019    Procedure: Left Eye Cataract Removal with Intraocular Lens Implant with Intraoperative Avastin Injection;  Surgeon: Lacey Eugene MD;  Location: [facility] OR     siladenatis  11/2017     Physical Exam   /61   Pulse 97   Temp 97.4  F (36.3  C)   Resp 18   Wt 87.5 kg (193 lb)   SpO2 96%   BMI 29.35 kg/m    GENERAL: Pt in no distress.  SKIN: Dry and excoriations.  HEENT: Decrease visual acuity. Fundi not examined today.  NECK: Thyroid palpable and nontender.  LUNGS: Clear b/l.  CARDIAC: RRR.  ABDOMEN: Enlarge and nontender.  EXTREMITIES: Trace pretibial edema b/l.  FEET: No ulcers; abnormal monofilamentous exam.    Results   Creatinine   Date Value Ref Range Status   12/08/2021 1.66 (H) 0.66 - 1.25 mg/dL Final   06/09/2021 1.45 (H) 0.66 - 1.25 mg/dL Final     GFR Estimate   Date Value Ref Range Status   12/08/2021 41 (L) >60 mL/min/1.73m2  Final     Comment:     As of July 11, 2021, eGFR is calculated by the CKD-EPI creatinine equation, without race adjustment. eGFR can be influenced by muscle mass, exercise, and diet. The reported eGFR is an estimation only and is only applicable if the renal function is stable.   06/09/2021 48 (L) >60 mL/min/[1.73_m2] Final     Comment:     Non  GFR Calc  Starting 12/18/2018, serum creatinine based estimated GFR (eGFR) will be   calculated using the Chronic Kidney Disease Epidemiology Collaboration   (CKD-EPI) equation.       Hemoglobin A1C POCT   Date Value Ref Range Status   06/09/2021 8.2 (H) 0 - 5.6 % Final     Comment:     Normal <5.7% Prediabetes 5.7-6.4%  Diabetes 6.5% or higher - adopted from ADA   consensus guidelines.       Hemoglobin A1C   Date Value Ref Range Status   11/02/2021 8.3 (H) 0.0 - 5.6 % Final     Comment:     Normal <5.7%   Prediabetes 5.7-6.4%    Diabetes 6.5% or higher     Note: Adopted from ADA consensus guidelines.     Potassium   Date Value Ref Range Status   12/08/2021 4.2 3.4 - 5.3 mmol/L Final   06/09/2021 4.2 3.4 - 5.3 mmol/L Final     ALT   Date Value Ref Range Status   03/12/2021 36 0 - 70 U/L Final     AST   Date Value Ref Range Status   03/12/2021 24 0 - 45 U/L Final     TSH   Date Value Ref Range Status   06/09/2021 0.98 0.40 - 4.00 mU/L Final     T4 Free   Date Value Ref Range Status   10/21/2014 1.00 0.76 - 1.46 ng/dL Final     Comment:     Effective 7/30/2014, the reference range for this assay has changed to reflect   new instrumentation/methodology.           Cholesterol   Date Value Ref Range Status   11/02/2021 159 <200 mg/dL Final   06/18/2019 145 <200 mg/dL Final   03/06/2018 101 <200 mg/dL Final     HDL Cholesterol   Date Value Ref Range Status   06/18/2019 38 (L) >39 mg/dL Final   03/06/2018 32 (L) >39 mg/dL Final     Direct Measure HDL   Date Value Ref Range Status   11/02/2021 37 (L) >=40 mg/dL Final     LDL Cholesterol Calculated   Date Value Ref  Range Status   11/02/2021 56 <=100 mg/dL Final   06/18/2019 49 <100 mg/dL Final     Comment:     Desirable:       <100 mg/dl   03/06/2018 36 <100 mg/dL Final     Comment:     Desirable:       <100 mg/dl     Triglycerides   Date Value Ref Range Status   11/02/2021 331 (H) <150 mg/dL Final   06/18/2019 289 (H) <150 mg/dL Final     Comment:     Borderline high:  150-199 mg/dl  High:             200-499 mg/dl  Very high:       >499 mg/dl     03/06/2018 166 (H) <150 mg/dL Final     Comment:     Borderline high:  150-199 mg/dl  High:             200-499 mg/dl  Very high:       >499 mg/dl       Cholesterol/HDL Ratio   Date Value Ref Range Status   09/09/2014 3.8 0.0 - 5.0 Final   11/20/2013 5.8 (H) 0.0 - 5.0 Final       ASSESSMENT/PLAN:     1. TYPE 2 DIABETES MELLITUS: Uncontrolled type 2 diabetes mellitus complicated by retinopathy, nephropathy- CKD stage 3, neuropathy and ED.  Pt's A1C has improved - A1C 7.9 % today.  Continue Ozempic 0.5 mg subcutaneous once a week. Pt denies n/v, abd pain or chronic diarrhea.  Continue current dose of Tresiba, Humalog and Jardiance.  Most recent creat 1.78 with GFR 37 mL/min in 2/2022.  Encouraged patient to make healthy food choices, reduce his food portions with meals, avoid snacking and walk on his treadmill and exercise.  Pt remains on daily ASA.     2. GOITER: Not addressed today.     3. NEPHROPATHY: Pt has CKD and followed here by renal staff.  Most recent creat was 1.78 with GFR 37 mL/min in Feb 2022.  Pt is taking Cozaar.  Pt's BP is good at 103/61 today.    4.  RETINOPATHY:  Seen here by O[facility] in Nov 2021.  Referred to [facility] for follow up visit.    5.  NEUROPATHY: Continue Gabapentin,alpha-lipoic acid and topical cream.  No foot ulcers.    6. DYSLIPIDEMIA: LDL 41 in 12/2021.  Pt is taking Lipitor, fish oil and Fenofibrate.    7. OBESITY: See under # 1 above.  Tolerating Ozempic with weight loss.    8.  FOLLOW UP: with me in 3 months in clinic.  Creat/GFR ordered today.    Time  spent reviewing pt's chart notes,labs and Freestyle Lora sensor download today= 8 minutes.  Time for clinic visit today= 30 minutes.  Time for documentation today = 15 minutes.    TOTAL TIME FOR VISIT TODAY = 53 minutes    Pamela Laura PA-C

## 2022-06-09 NOTE — LETTER
6/9/2022       RE: Earle Rene  [address]  [place] [address]     Dear Colleague,    Thank you for referring your patient, Earle Rene, to the [facility] [place] [facility]. Please see a copy of my visit note below.    PATIENT SEEN FACE TO FACE IN CLINIC TODAY    HPI   Earle Rene is a 72 year old male with type 2 diabetes mellitus. Pt is being seen in clinic today for diabetes follow up.  Pt's diabetes is complicated by retinopathy, nephropathy, neuropathy and ED.  Pt also has hx of hyperlipidemia, HTN, obesity, presumed localized prostate cancer, BPH, goiter with right thyroid nodule and osteoporosis.  For his diabetes, patient states he is taking Tresiba 60 units SQ at hs, Humalog 15 units with meals, Jardiance 10 mg each am and Ozempic 0.5 mg subcutaneous once a week.  Pt's A1C is 7.9 % today and previous A1C was 8.5 %on 3/10/2022.  I reviewed his Freestyle Lora sensor download today and his average glucose is 175 with SD 33.  His glucose is in target 59 % of the time, above target 41 % of the time and below target 0 % of the time.  On ROS today, blurred vision and he was seen here by Oph in Nov 2021 with severe NPDR with macular edema.  Decrease auditory acuity.  Chronic numbness in feet.   Pt has lost 5 lbs since his last visit in March 2022 and his with is down 8-10 since last summer.  He denies groin yeast infection, dysuria or hematuria at this time.  Pt denies frequent headaches, n/v, SOB at rest, cough, fever, chills,chest pain, abd pain or diarrhea.  No foot ulcers.    DIABETES CARE:  Retinopathy: yes; severe NPDR with macular edema. He was seen by Oph here in Nov 2021.  Nephropathy: yes- CKDStage3.  Urine protein + in Dec 2021.  Pt is taking Cozaar daily.  Neuropathy: yes.   Foot exam.  No ulcers. Abnormal monofilamentous exam.  Lipids: LDL 56 in Nov 2021. Pt is taking Lipitor, Fish Oil and  fenofibrate.  Taking ASA: yes.  CAD:no.  Mental health: hx of mood disorder per chart. Pt denies depression.  Insulin: Basal and meal time insulin.  DM meds: Jardiance and Ozempic.  Testing:  Freestyle Libre2 sensor.    ROS   Please see under HPI.     ALLERGIES:  Review of patient's allergies indicates no known allergies.      Current Outpatient Medications   Medication Sig Dispense Refill     alpha-lipoic acid 600 MG capsule Take 1 capsule (600 mg) by mouth daily 90 capsule 3     amLODIPine (NORVASC) 5 MG tablet Take 1 tablet (5 mg) by mouth At Bedtime 90 tablet 3     ARTIFICIAL TEAR OP Apply to eye as needed       aspirin 81 MG tablet Take 1 tablet by mouth At Bedtime        atorvastatin (LIPITOR) 20 MG tablet Take 1 tablet (20 mg) by mouth daily 90 tablet 3     blood glucose (NO BRAND SPECIFIED) lancets standard Lancets that go with device, Test 3 times daily 300 each 3     blood glucose monitoring (NO BRAND SPECIFIED) meter device kit Any meter covered by insurance, not store brand, use as directed. 1 kit 0     blood glucose monitoring (NO BRAND SPECIFIED) test strip Strips that go with meter, covered by insurance. Test 3 times daily 300 strip 3     Blood Pressure Monitor KIT Automatic Blood Pressure Monitor 1 kit 0     camphor-menthol (DERMASARRA) 0.5-0.5 % external lotion Apply lotion 2-3 times per day to itchy areas. If not improving in two weeks, follow up with PCP. 222 mL 0     camphor-menthol (DERMASARRA) 0.5-0.5 % external lotion Apply to arms legs torso 1-2 times a day for itching 1 Bottle 11     cholecalciferol (VITAMIN D3) 25 mcg (1000 units) capsule Take 2 capsules (50 mcg) by mouth daily 180 capsule 3     clobetasol (TEMOVATE) 0.05 % ointment Apply topically to legs twice daily for 2 weeks.  Then discontinue 30 g 0     clotrimazole (LOTRIMIN) 1 % cream Apply topically 2 times daily 30 g 3     Continuous Blood Gluc Sensor (FREESTYLE PETER 2 SENSOR) MISC 1 each every 14 days 1 each every 14 days.  "Change every 14 days. 2 each 5     empagliflozin (JARDIANCE) 10 MG TABS tablet One tab daily. 90 tablet 3     famotidine (PEPCID) 20 MG tablet Take 1 tablet (20 mg) by mouth 2 times daily 180 tablet 3     fenofibrate 54 MG tablet Take 1 tablet (54 mg) by mouth daily (Patient taking differently: Take 54 mg by mouth At Bedtime) 90 tablet 0     finasteride (PROSCAR) 5 MG tablet Take 1 tablet (5 mg) by mouth daily For additional refills, please schedule a follow-up appointment. 30 tablet 0     fluocinonide (LIDEX) 0.05 % external cream APPLY TO AFFECTED AREA TWICE A DAY  3     fluticasone (FLOVENT HFA) 110 MCG/ACT inhaler Inhale 1 puff into the lungs 2 times daily 12 g 11     gabapentin (NEURONTIN) 300 MG capsule Take 1 capsule (300 mg) by mouth 2 times daily 60 capsule 3     gabapentin (NEURONTIN) 300 MG capsule TAKE 1 CAPSULE BY MOUTH TWICE A  capsule 0     HUMALOG KWIKPEN 100 UNIT/ML soln Inject 15-17  units with meals, plus correction. Pt uses approx 65 units in 24 hrs. 60 mL 1     insulin degludec (TRESIBA FLEXTOUCH) 100 UNIT/ML pen Inject 64 units subcu at bedtime. 60 mL 3     insulin pen needle (B-D U/F) 31G X 5 MM miscellaneous Use 4 time(s) per day.  Please dispense as BD Pen Needle Mini U/F 31G x 5  each 3     insulin pen needle (B-D U/F) 31G X 5 MM Use 4 times per day.  Please dispense as BD Pen Needle Mini U/F 31G x 5  each 3     insulin syringe 31G X 5/16\" 0.5 ML MISC Use three syringes daily 270 each 1     ketamine 5% gabapentin 8% lidocaine 2.5% topical PLO cream Apply 1 g topically 3 times daily 30 g 3     losartan (COZAAR) 100 MG tablet Take 1 tablet (100 mg) by mouth At Bedtime 90 tablet 3     mupirocin (BACTROBAN) 2 % cream Apply  topically. In very small amounts only as needed 15 g 1     Omega-3 Fatty Acids (OMEGA-3 FISH OIL) 1000 MG CAPS Take 1 capsule (1 g) by mouth 2 times daily (Patient taking differently: Take 1 g by mouth as needed (PT last dose a week ago 12.24.19)) 60 " capsule 11     ONETOUCH ULTRA test strip Use to test blood sugar 3 times daily 300 strip 3     semaglutide (OZEMPIC, 0.25 OR 0.5 MG/DOSE,) 2 MG/1.5ML SOPN pen Inject 0.5 mg subcutaneous once week. 1.5 mL 3     sodium bicarbonate 650 MG tablet Take 1 tablet (650 mg) by mouth 3 times daily 90 tablet 11     tamsulosin (FLOMAX) 0.4 MG capsule Take 1 capsule (0.4 mg) by mouth daily For additional refills, please schedule a follow-up appointment at [phone] 90 capsule 0     triamcinolone (KENALOG) 0.1 % cream Apply topically 3 times daily 80 g 0     VITAMIN D3 25 MCG (1000 UT) tablet TAKE 2 TABLETS (50 MCG) BY MOUTH DAILY       albuterol (VENTOLIN HFA) 108 (90 Base) MCG/ACT inhaler Inhale 2 puffs into the lungs every 6 hours (Patient not taking: No sig reported) 18 g 3     dextromethorphan-guaiFENesin (MUCINEX DM)  MG 12 hr tablet Take 1 tablet by mouth every 12 hours (Patient not taking: No sig reported) 30 tablet 0     dextromethorphan-guaiFENesin (TUSSIN DM)  MG/5ML liquid Take 5 mLs by mouth 2 times daily as needed (Patient not taking: No sig reported) 118 mL 0     diclofenac (VOLTAREN) 1 % topical gel Apply 4 grams to thigh up to 4 times daily for pain (Patient not taking: No sig reported) 100 g 1     fluticasone (FLONASE) 50 MCG/ACT nasal spray Spray 1 spray into both nostrils daily (Patient not taking: No sig reported) 16 g 0     loratadine (CLARITIN) 10 MG tablet Take 1 tablet (10 mg) by mouth daily (Patient not taking: No sig reported) 90 tablet 0     Family Hx   No change.     Personal Hx   Smoke: none.   ETOH: none.    with grown children.    PMH   1. Type 2 Diabetes Mellitus dx at age 43.   2. Neuropathy.  3. Nephropathy.   4. ED.   5. Dyslipidemia.   6. Nephrolithiasis.   7. Decrease auditory acuity.   8. Sarcoidosis-lung.   9. Goiter.   10. S/P T & A.   11. S/P FX right heel.   12. Vit D def.   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   14. CT chest- ? granulomas.   15. Retinopathy.  16.  Goiter.  Past Medical History:   Diagnosis Date     Blepharitis of both eyes      BPH (benign prostatic hyperplasia)      Diabetes (H)      Diabetic neuropathy (H)      Diabetic retinopathy associated with diabetes mellitus due to underlying condition (H)      Dry eye syndrome      GERD (gastroesophageal reflux disease)      Goiter      Granulomatous disease (H)      HLD (hyperlipidemia)      HTN (hypertension)      Nonsenile cataract      Peripheral neuropathy      Past Surgical History:   Procedure Laterality Date     ------------OTHER-------------      back of neck abscess drainage in OR     AS RAD RESEC TONSIL/PILLARS Bilateral 1961     CATARACT IOL, RT/LT Left      COLONOSCOPY  7/29/2013    Procedure: COLONOSCOPY;;  Surgeon: Montana Pascal MD;  Location: [facility]     EXCISE MASS UPPER EXTREMITY Right 11/11/2019    Procedure: EXCISION, MASS, UPPER EXTREMITY, RIGHT SHOULDER;  Surgeon: Johana Choudhury MD;  Location: [facility] OR     INTRAVITREAL INJECTION CHEMOTHERAPY Right 12/30/2019    Procedure: INTRAVITREAL Bevacizumab injection;  Surgeon: Milton Maik MD;  Location: [facility] OR     PHACOEMULSIFICATION CLEAR CORNEA WITH STANDARD INTRAOCULAR LENS IMPLANT Right 12/30/2019    Procedure: PHACOEMULSIFICATION, CATARACT, WITH INTRAOCULAR LENS IMPLANT;  Surgeon: Milton Maki MD;  Location: [facility] OR     PHACOEMULSIFICATION WITH STANDARD INTRAOCULAR LENS IMPLANT Left 6/21/2019    Procedure: Left Eye Cataract Removal with Intraocular Lens Implant with Intraoperative Avastin Injection;  Surgeon: Lacey Eugene MD;  Location: [facility] OR     siladenatis  11/2017     Physical Exam   /61   Pulse 97   Temp 97.4  F (36.3  C)   Resp 18   Wt 87.5 kg (193 lb)   SpO2 96%   BMI 29.35 kg/m    GENERAL: Pt in no distress.  SKIN: Dry and excoriations.  HEENT: Decrease visual acuity. Fundi not examined today.  NECK: Thyroid palpable and nontender.  LUNGS: Clear b/l.  CARDIAC: RRR.  ABDOMEN: Enlarge and  nontender.  EXTREMITIES: Trace pretibial edema b/l.  FEET: No ulcers; abnormal monofilamentous exam.    Results   Creatinine   Date Value Ref Range Status   12/08/2021 1.66 (H) 0.66 - 1.25 mg/dL Final   06/09/2021 1.45 (H) 0.66 - 1.25 mg/dL Final     GFR Estimate   Date Value Ref Range Status   12/08/2021 41 (L) >60 mL/min/1.73m2 Final     Comment:     As of July 11, 2021, eGFR is calculated by the CKD-EPI creatinine equation, without race adjustment. eGFR can be influenced by muscle mass, exercise, and diet. The reported eGFR is an estimation only and is only applicable if the renal function is stable.   06/09/2021 48 (L) >60 mL/min/[1.73_m2] Final     Comment:     Non  GFR Calc  Starting 12/18/2018, serum creatinine based estimated GFR (eGFR) will be   calculated using the Chronic Kidney Disease Epidemiology Collaboration   (CKD-EPI) equation.       Hemoglobin A1C POCT   Date Value Ref Range Status   06/09/2021 8.2 (H) 0 - 5.6 % Final     Comment:     Normal <5.7% Prediabetes 5.7-6.4%  Diabetes 6.5% or higher - adopted from ADA   consensus guidelines.       Hemoglobin A1C   Date Value Ref Range Status   11/02/2021 8.3 (H) 0.0 - 5.6 % Final     Comment:     Normal <5.7%   Prediabetes 5.7-6.4%    Diabetes 6.5% or higher     Note: Adopted from ADA consensus guidelines.     Potassium   Date Value Ref Range Status   12/08/2021 4.2 3.4 - 5.3 mmol/L Final   06/09/2021 4.2 3.4 - 5.3 mmol/L Final     ALT   Date Value Ref Range Status   03/12/2021 36 0 - 70 U/L Final     AST   Date Value Ref Range Status   03/12/2021 24 0 - 45 U/L Final     TSH   Date Value Ref Range Status   06/09/2021 0.98 0.40 - 4.00 mU/L Final     T4 Free   Date Value Ref Range Status   10/21/2014 1.00 0.76 - 1.46 ng/dL Final     Comment:     Effective 7/30/2014, the reference range for this assay has changed to reflect   new instrumentation/methodology.           Cholesterol   Date Value Ref Range Status   11/02/2021 159 <200 mg/dL  Final   06/18/2019 145 <200 mg/dL Final   03/06/2018 101 <200 mg/dL Final     HDL Cholesterol   Date Value Ref Range Status   06/18/2019 38 (L) >39 mg/dL Final   03/06/2018 32 (L) >39 mg/dL Final     Direct Measure HDL   Date Value Ref Range Status   11/02/2021 37 (L) >=40 mg/dL Final     LDL Cholesterol Calculated   Date Value Ref Range Status   11/02/2021 56 <=100 mg/dL Final   06/18/2019 49 <100 mg/dL Final     Comment:     Desirable:       <100 mg/dl   03/06/2018 36 <100 mg/dL Final     Comment:     Desirable:       <100 mg/dl     Triglycerides   Date Value Ref Range Status   11/02/2021 331 (H) <150 mg/dL Final   06/18/2019 289 (H) <150 mg/dL Final     Comment:     Borderline high:  150-199 mg/dl  High:             200-499 mg/dl  Very high:       >499 mg/dl     03/06/2018 166 (H) <150 mg/dL Final     Comment:     Borderline high:  150-199 mg/dl  High:             200-499 mg/dl  Very high:       >499 mg/dl       Cholesterol/HDL Ratio   Date Value Ref Range Status   09/09/2014 3.8 0.0 - 5.0 Final   11/20/2013 5.8 (H) 0.0 - 5.0 Final       ASSESSMENT/PLAN:     1. TYPE 2 DIABETES MELLITUS: Uncontrolled type 2 diabetes mellitus complicated by retinopathy, nephropathy- CKD stage 3, neuropathy and ED.  Pt's A1C has improved - A1C 7.9 % today.  Continue Ozempic 0.5 mg subcutaneous once a week. Pt denies n/v, abd pain or chronic diarrhea.  Continue current dose of Tresiba, Humalog and Jardiance.  Most recent creat 1.78 with GFR 37 mL/min in 2/2022.  Encouraged patient to make healthy food choices, reduce his food portions with meals, avoid snacking and walk on his treadmill and exercise.  Pt remains on daily ASA.     2. GOITER: Not addressed today.     3. NEPHROPATHY: Pt has CKD and followed here by renal staff.  Most recent creat was 1.78 with GFR 37 mL/min in Feb 2022.  Pt is taking Cozaar.  Pt's BP is good at 103/61 today.  4.  RETINOPATHY:  Seen here by Oph in Nov 2021.  Referred to Oph for follow up visit.  5.   NEUROPATHY: Continue Gabapentin,alpha-lipoic acid and topical cream.  No foot ulcers.  6. DYSLIPIDEMIA: LDL 41 in 12/2021.  Pt is taking Lipitor, fish oil and Fenofibrate.  7. OBESITY: See under # 1 above.  Tolerating Ozempic with weight loss.  8.  FOLLOW UP: with me in 3 months in clinic.  Creat/GFR ordered today.  Time spent reviewing pt's chart notes,labs and Freestyle Lora sensor download today= 8 minutes.  Time for clinic visit today= 30 minutes.  Time for documentation today = 15 minutes.    TOTAL TIME FOR VISIT TODAY = 53 minutes    Pamela Laura PA-C

## 2022-06-09 NOTE — NURSING NOTE
Chief Complaint   Patient presents with     RECHECK     Follow Up - Diabetes     /61   Pulse 97   Temp 97.4  F (36.3  C)   Resp 18   Wt 87.5 kg (193 lb)   SpO2 96%   BMI 29.35 kg/m    Guillermo Estrella on 6/9/2022 at 12:35 PM

## 2022-06-10 ENCOUNTER — TELEPHONE (OUTPATIENT)
Dept: ENDOCRINOLOGY | Facility: CLINIC | Age: 72
End: 2022-06-10

## 2022-06-10 DIAGNOSIS — Z79.4 TYPE 2 DIABETES MELLITUS WITH MODERATE NONPROLIFERATIVE RETINOPATHY WITHOUT MACULAR EDEMA, WITH LONG-TERM CURRENT USE OF INSULIN, UNSPECIFIED LATERALITY (H): Primary | ICD-10-CM

## 2022-06-10 DIAGNOSIS — E11.3399 TYPE 2 DIABETES MELLITUS WITH MODERATE NONPROLIFERATIVE RETINOPATHY WITHOUT MACULAR EDEMA, WITH LONG-TERM CURRENT USE OF INSULIN, UNSPECIFIED LATERALITY (H): Primary | ICD-10-CM

## 2022-06-10 RX ORDER — SEMAGLUTIDE 1.34 MG/ML
INJECTION, SOLUTION SUBCUTANEOUS
Qty: 1.5 ML | Refills: 3 | Status: SHIPPED | OUTPATIENT
Start: 2022-06-10 | End: 2022-08-17

## 2022-06-10 NOTE — TELEPHONE ENCOUNTER
----- Message from Pamela Laura PA-C sent at 6/10/2022  3:17 PM CDT -----  Could one of you please call Derricksrini and let him know his creat is higher at 2.04 and GFR lower 34 mL/min done yesterday.  I would like him to HOLD his Ozempic and continue all other meds and be sure to drink plenty of water.  He will need his creat/GFR rechecked in 1 month- I placed an order.  Thanks rakan

## 2022-06-10 NOTE — TELEPHONE ENCOUNTER
He was notified of HOLD Ozempic the once a week injection , drink plenty of water and recheck lab in one month. Verbal understanding given. Delphine Cooper RN on 6/10/2022 at 4:27 PM

## 2022-06-12 DIAGNOSIS — N40.1 BENIGN NON-NODULAR PROSTATIC HYPERPLASIA WITH LOWER URINARY TRACT SYMPTOMS: ICD-10-CM

## 2022-06-13 ENCOUNTER — TELEPHONE (OUTPATIENT)
Dept: ENDOCRINOLOGY | Facility: CLINIC | Age: 72
End: 2022-06-13
Payer: COMMERCIAL

## 2022-06-13 NOTE — TELEPHONE ENCOUNTER
LVM with review and recommendation from Pamela Laura and number to call with questions. Note sent via [employer].   Lorena Bravo RN on 6/13/2022 at 8:20 AM

## 2022-06-15 ENCOUNTER — TELEPHONE (OUTPATIENT)
Dept: UROLOGY | Facility: CLINIC | Age: 72
End: 2022-06-15
Payer: COMMERCIAL

## 2022-06-15 RX ORDER — TAMSULOSIN HYDROCHLORIDE 0.4 MG/1
0.4 CAPSULE ORAL DAILY
Qty: 30 CAPSULE | Refills: 0 | Status: SHIPPED | OUTPATIENT
Start: 2022-06-15 | End: 2022-07-19

## 2022-06-15 NOTE — TELEPHONE ENCOUNTER
Last Clinic Visit: 4/9/2021  [facility] [address]      Appointment past due: kaycee refill Tamsulosin was sent for 30 days and staff message sent to Urology clinic scheduling.

## 2022-06-17 DIAGNOSIS — N40.1 BENIGN PROSTATIC HYPERPLASIA WITH URINARY OBSTRUCTION: ICD-10-CM

## 2022-06-17 DIAGNOSIS — N13.8 BENIGN PROSTATIC HYPERPLASIA WITH URINARY OBSTRUCTION: ICD-10-CM

## 2022-06-20 ENCOUNTER — TRANSFERRED RECORDS (OUTPATIENT)
Dept: INTERNAL MEDICINE | Facility: CLINIC | Age: 72
End: 2022-06-20

## 2022-06-20 ENCOUNTER — TRANSFERRED RECORDS (OUTPATIENT)
Dept: HEALTH INFORMATION MANAGEMENT | Facility: CLINIC | Age: 72
End: 2022-06-20

## 2022-06-21 RX ORDER — FINASTERIDE 5 MG/1
1 TABLET, FILM COATED ORAL DAILY
Qty: 90 TABLET | Refills: 0 | Status: SHIPPED | OUTPATIENT
Start: 2022-06-21 | End: 2022-09-15

## 2022-06-21 NOTE — TELEPHONE ENCOUNTER
FINASTERIDE 5 MG TABLET  Last Written Prescription Date:   5/18/2022  Last Fill Quantity: 30,   # refills: 0  Last Office Visit :  4/9/2021  Future Office visit:   9/1/2022  90 Tabs sent to pharm to cover Pt until visit in Sept 2022      Kacy Briones RN  Central Triage Red Flags/Med Refills

## 2022-06-23 ENCOUNTER — TELEPHONE (OUTPATIENT)
Dept: ENDOCRINOLOGY | Facility: CLINIC | Age: 72
End: 2022-06-23

## 2022-06-23 NOTE — TELEPHONE ENCOUNTER
Orders have been faxed to [facility] Delphine Cooper RN on 6/23/2022 at 3:27 PM

## 2022-06-23 NOTE — TELEPHONE ENCOUNTER
[facility] Call Center    Phone Message    May a detailed message be left on voicemail: yes     Reason for Call: Other: Pt called stating he would like a referral for diabetic shoes and through [facility]. Per pt [facility] #[phone]. Please advise. Thank you    Action Taken: Message routed to:  Other: endo    Travel Screening: Not Applicable

## 2022-06-24 DIAGNOSIS — Z79.4 TYPE 2 DIABETES MELLITUS WITH BOTH EYES AFFECTED BY MILD NONPROLIFERATIVE RETINOPATHY AND MACULAR EDEMA, WITH LONG-TERM CURRENT USE OF INSULIN (H): ICD-10-CM

## 2022-06-24 DIAGNOSIS — E11.3213 TYPE 2 DIABETES MELLITUS WITH BOTH EYES AFFECTED BY MILD NONPROLIFERATIVE RETINOPATHY AND MACULAR EDEMA, WITH LONG-TERM CURRENT USE OF INSULIN (H): ICD-10-CM

## 2022-06-24 RX ORDER — INSULIN LISPRO 100 [IU]/ML
INJECTION, SOLUTION INTRAVENOUS; SUBCUTANEOUS
Qty: 60 ML | Refills: 1 | Status: SHIPPED | OUTPATIENT
Start: 2022-06-24 | End: 2023-05-12

## 2022-06-24 NOTE — TELEPHONE ENCOUNTER
HUMALOG KWIKPEN 100 UNIT/ML soln      Last Written Prescription Date:  3/28/21  Last Fill Quantity: 60 ml,   # refills: 1  Last Office Visit : 6/9/22  Future Office visit:  10/11/22    Routing refill request to provider for review/approval because:  Insulin - refilled per clinic

## 2022-06-24 NOTE — TELEPHONE ENCOUNTER
[facility] Call Center    Phone Message    May a detailed message be left on voicemail: yes     Reason for Call: Medication Refill Request    Has the patient contacted the pharmacy for the refill? Yes   Name of medication being requested:    HUMALOG KWIKPEN 100 UNIT/ML soln       Provider who prescribed the medication: Pamela Laura  Pharmacy: [id number] - [place], [place] - [address]  Date medication is needed: as soon as possible per patient         Action Taken: Message routed to:  [facility] endo    Travel Screening: Not Applicable

## 2022-06-27 NOTE — MR AVS SNAPSHOT
After Visit Summary   8/20/2018    Earle Rene    MRN: [id number]           Patient Information     Date Of Birth          1950        Visit Information        Provider Department      8/20/2018 1:00 PM Juan Rehman MD [facility]        Today's Diagnoses     Open wound of right knee, leg, and ankle, initial encounter    -  1    Type 2 diabetes mellitus with chronic kidney disease, without long-term current use of insulin, unspecified CKD stage (H)          Care Instructions    Primary Care Center Phone Number [phone]  Primary Care Center Medication Refill Request Information:  * Please contact your pharmacy regarding ANY request for medication refills.  ** [facility] Prescription Fax = [phone]  * Please allow 3 business days for routine medication refills.  * Please allow 5 business days for controlled substance medication refills.     Primary Care Center Test Result notification information:  *You will be notified with in 7-10 days of your appointment day regarding the results of your test.  If you are on MyChart you will be notified as soon as the provider has reviewed the results and signed off on them.                  Follow-ups after your visit        Follow-up notes from your care team     Return in about 1 week (around 8/27/2018), or nurse check for wound.      Your next 10 appointments already scheduled     Sep 07, 2018  8:30 AM CDT   (Arrive by 8:15 AM)   RETURN DIABETES with Pamela Laura PA-C   [facility] ([facility])    [address] [address]   [phone]            Sep 12, 2018  7:30 AM CDT   RETURN RETINA with Jackie Rodriguez MD   [facility] ([facility])    [address]  [facility] [address]   [phone]            Jan 30, 2019 11:30 AM CST   Lab with  LAB   [facility] ([facility]  Patient called back.  Went over pap smear results and recommendations.       Patient in agreement and understands importance of keeping her 8/17/2022 appointment for colop and ECC.    and Surgery Center)    [address]  [address] [facility] [id number] [phone]            Jan 30, 2019 12:30 PM CST   (Arrive by 12:00 PM)   Return Visit with Jayda Bolden MD   [facility] ([facility])    [address]  [address]  [facility] [id number] [phone]              Who to contact     Please call your clinic at [phone] to:    Ask questions about your health    Make or cancel appointments    Discuss your medicines    Learn about your test results    Speak to your doctor            Additional Information About Your Visit        Care EveryWhere ID     This is your Care EveryWhere ID. This could be used by other organizations to access your [place] medical records  [id number]        Your Vitals Were     Pulse Respirations BMI (Body Mass Index)             79 16 28.74 kg/m2          Blood Pressure from Last 3 Encounters:   08/20/18 122/70   08/01/18 143/78   05/31/18 115/78    Weight from Last 3 Encounters:   08/20/18 85.7 kg (189 lb)   08/01/18 86.5 kg (190 lb 12.8 oz)   05/31/18 87.4 kg (192 lb 9.6 oz)              Today, you had the following     No orders found for display       Primary Care Provider Office Phone # Fax #    Marcio Hare [phone]       [address] [place] [address] [address] [facility] [address]        Equal Access to Services     TRUDI [facility] HospitalESTELA AH: Hadii priscilla rubin hadasho Sofranklin, waaxda luqadaha, qaybta kaalmada adejoseyada, ethan bobo. So [facility] [phone].    ATENCIÓN: Si habla español, tiene a márquez disposición servicios gratuitos de asistencia lingüística. Lljamal al [phone].    We comply with applicable federal civil rights laws and [place] laws. We do not discriminate on the basis of race, color, national origin, age, disability, sex, sexual orientation, or gender identity.            Thank you!     Thank you for choosing [facility]  for your care.  Our goal is always to provide you with excellent care. Hearing back from our patients is one way we can continue to improve our services. Please take a few minutes to complete the written survey that you may receive in the mail after your visit with us. Thank you!             Your Updated Medication List - Protect others around you: Learn how to safely use, store and throw away your medicines at www.disposemymeds.org.          This list is accurate as of 8/20/18  1:34 PM.  Always use your most recent med list.                   Brand Name Dispense Instructions for use Diagnosis    alprostadil 20 MCG kit    EDEX    6 each    by Intracavitary route. Inject 3/4 ml (15 ug) as instructed.  Can increase to full dose of 1 ml (20 ug) if necessary.   Can dispense 6 kits.    Erectile dysfunction       amoxicillin-clavulanate 875-125 MG per tablet    AUGMENTIN     TK 1 T PO BID FOR 10 DAYS        aspirin 81 MG tablet      Take 1 tablet by mouth daily.        atorvastatin 20 MG tablet    LIPITOR    90 tablet    Take 1 tablet (20 mg) by mouth daily    Hyperlipidemia LDL goal <100       BASAGLAR 100 UNIT/ML injection     60 mL    Inject 68 units SQ at bedtime        blood glucose lancets standard    no brand specified    300 each    Lancets that go with device, Test 3 times daily    Type 2 diabetes mellitus with diabetic nephropathy (H)       blood glucose monitoring meter device kit    no brand specified    1 kit    Any meter covered by insurance, not store brand, use as directed.    Type 2 diabetes mellitus with diabetic nephropathy (H)       * Blood Pressure Monitor Kit     1 kit    Check blood pressure as directed.    Unspecified essential hypertension       * Blood Pressure Monitor Kit     1 kit    Automatic Blood Pressure Monitor    Benign essential hypertension, Chronic kidney disease, stage 3 (moderate)       clobetasol 0.05 % ointment    TEMOVATE    30 g    Apply topically to legs twice daily for 2 weeks.  Then  "discontinue    Rash and other nonspecific skin eruption       clotrimazole 1 % cream    LOTRIMIN    30 g    Apply topically 2 times daily    Tinea pedis of both feet, Dermatophytosis of nail       fenofibrate 54 MG tablet     90 tablet    Take 1 tablet (54 mg) by mouth daily    Hyperlipidemia LDL goal < 100       finasteride 5 MG tablet    PROSCAR    90 tablet    Take 1 tablet (5 mg) by mouth daily    Benign prostatic hyperplasia with urinary obstruction       fish oil-omega-3 fatty acids 1000 MG capsule     60 capsule    Take 1 capsule (1 g) by mouth 2 times daily    Lymphocytopenia, High cholesterol, Screening for prostate cancer       gabapentin 100 MG capsule    NEURONTIN    180 capsule    Take 1 capsule (100 mg) by mouth 3 times daily    Polyneuropathy in diabetes(357.2)       insulin pen needle 31G X 5 MM    B-D U/F    400 each    Use 4 times per day.  Please dispense as BD Pen Needle Mini U/F 31G x 5 MM    Diabetes mellitus type 2, insulin dependent (H)       insulin syringe 31G X 5/16\" 0.5 ML Misc     270 each    Use three syringes daily    Type 2 diabetes mellitus (H)       loratadine 10 MG tablet    CLARITIN    90 tablet    Take 1 tablet (10 mg) by mouth daily    Seasonal allergies       losartan 25 MG tablet    COZAAR    90 tablet    Take 1 tablet (25 mg) by mouth daily    HTN (hypertension)       metFORMIN 1000 MG tablet    GLUCOPHAGE    180 tablet    1 tab twice daily with meals    Diabetes mellitus, type 2 (H)       mupirocin 2 % cream    BACTROBAN    15 g    Apply  topically. In very small amounts only as needed    Rash and other nonspecific skin eruption       NovoLOG FLEXPEN 100 UNIT/ML injection   Generic drug:  insulin aspart     60 mL    Inject 12-14 units SQ with meals with use of correction insulin. Pt uses approx 60 units in 24 hrs.    Type 2 diabetes mellitus (H)       * ONETOUCH ULTRA test strip   Generic drug:  blood glucose monitoring     300 strip    Use to test blood sugar 3 times daily "    Type 2 diabetes mellitus with diabetic nephropathy (H)       * blood glucose monitoring test strip    no brand specified    300 strip    Strips that go with meter, covered by insurance. Test 3 times daily    Type 2 diabetes mellitus with diabetic nephropathy (H)       sodium bicarbonate 650 MG tablet     90 tablet    Take 1 tablet (650 mg) by mouth 3 times daily    Chronic kidney disease, stage III (moderate)       tacrolimus 0.03 % ointment    PROTOPIC    60 g    Apply to affected area(s) twice daily    Rash and other nonspecific skin eruption       tamsulosin 0.4 MG capsule    FLOMAX    90 capsule    Take 1 capsule (0.4 mg) by mouth daily    Benign non-nodular prostatic hyperplasia with lower urinary tract symptoms       * Notice:  This list has 4 medication(s) that are the same as other medications prescribed for you. Read the directions carefully, and ask your doctor or other care provider to review them with you.

## 2022-07-02 DIAGNOSIS — Z79.4 TYPE 2 DIABETES MELLITUS WITH BOTH EYES AFFECTED BY MILD NONPROLIFERATIVE RETINOPATHY AND MACULAR EDEMA, WITH LONG-TERM CURRENT USE OF INSULIN (H): ICD-10-CM

## 2022-07-02 DIAGNOSIS — E11.3213 TYPE 2 DIABETES MELLITUS WITH BOTH EYES AFFECTED BY MILD NONPROLIFERATIVE RETINOPATHY AND MACULAR EDEMA, WITH LONG-TERM CURRENT USE OF INSULIN (H): ICD-10-CM

## 2022-07-07 NOTE — TELEPHONE ENCOUNTER
JARDIANCE 10 MG TABLET     Last Written Prescription Date:  7/13/21  Last Fill Quantity: 90,   # refills: 3  Last Office Visit : 6/9/22  Future Office visit:  10/11/22    Routing refill request to provider for review/approval because:  Abnormal cr  Creatinine   Date Value Ref Range Status   06/09/2022 2.04 (H) 0.66 - 1.25 mg/dL Final   06/09/2021 1.45 (H) 0.66 - 1.25 mg/dL Final   Thank-you, Mayda HERNANDEZ RN Medication Refill Team

## 2022-07-11 ENCOUNTER — TELEPHONE (OUTPATIENT)
Dept: ENDOCRINOLOGY | Facility: CLINIC | Age: 72
End: 2022-07-11

## 2022-07-11 DIAGNOSIS — E11.42 DIABETIC POLYNEUROPATHY ASSOCIATED WITH TYPE 2 DIABETES MELLITUS (H): Primary | ICD-10-CM

## 2022-07-11 DIAGNOSIS — E11.3213 TYPE 2 DIABETES MELLITUS WITH BOTH EYES AFFECTED BY MILD NONPROLIFERATIVE RETINOPATHY AND MACULAR EDEMA, WITH LONG-TERM CURRENT USE OF INSULIN (H): ICD-10-CM

## 2022-07-11 DIAGNOSIS — Z79.4 TYPE 2 DIABETES MELLITUS WITH BOTH EYES AFFECTED BY MILD NONPROLIFERATIVE RETINOPATHY AND MACULAR EDEMA, WITH LONG-TERM CURRENT USE OF INSULIN (H): ICD-10-CM

## 2022-07-11 NOTE — TELEPHONE ENCOUNTER
[facility] Call Center    Phone Message    May a detailed message be left on voicemail: yes     Reason for Call: Order(s): Other:   Reason for requested: Diabetic shoes  Date needed: whenever possible  Provider name: Keya      Pt stated the [facility] said pt needs an order from MD in order for diabetic shoes to be covered by insurance.   Pt isn't sure if he needs an appt with MD or what needs to be done.  Please call pt.       Action Taken: Message routed to:  [facility] ([facility] endo    Travel Screening: Not Applicable

## 2022-07-12 DIAGNOSIS — E11.3411 SEVERE NONPROLIFERATIVE DIABETIC RETINOPATHY OF RIGHT EYE WITH MACULAR EDEMA ASSOCIATED WITH TYPE 2 DIABETES MELLITUS (H): ICD-10-CM

## 2022-07-12 DIAGNOSIS — E13.311 MACULAR EDEMA DUE TO SECONDARY DIABETES (H): Primary | ICD-10-CM

## 2022-07-14 NOTE — TELEPHONE ENCOUNTER
Orders faxed to [facility] [phone] they will call patient to set up appointment Delphine Cooper RN on 7/14/2022 at 2:58 PM

## 2022-07-15 DIAGNOSIS — N40.1 BENIGN NON-NODULAR PROSTATIC HYPERPLASIA WITH LOWER URINARY TRACT SYMPTOMS: ICD-10-CM

## 2022-07-16 ENCOUNTER — MEDICAL CORRESPONDENCE (OUTPATIENT)
Dept: HEALTH INFORMATION MANAGEMENT | Facility: CLINIC | Age: 72
End: 2022-07-16

## 2022-07-19 RX ORDER — TAMSULOSIN HYDROCHLORIDE 0.4 MG/1
0.4 CAPSULE ORAL DAILY
Qty: 30 CAPSULE | Refills: 1 | Status: SHIPPED | OUTPATIENT
Start: 2022-07-19 | End: 2022-08-20

## 2022-07-19 NOTE — TELEPHONE ENCOUNTER
TAMSULOSIN HCL 0.4 MG CAPSULE     Last Written Prescription Date:   6/15/2022  Last Fill Quantity: 30,   # refills: 0  Last Office Visit :  4/9/2021  Future Office visit:   9/1/2022  30 Caps, 1 Refill sent to pharm       Kacy Briones RN  Central Triage Red Flags/Med Refills

## 2022-07-20 ENCOUNTER — MEDICAL CORRESPONDENCE (OUTPATIENT)
Dept: INTERNAL MEDICINE | Facility: CLINIC | Age: 72
End: 2022-07-20

## 2022-07-20 ENCOUNTER — OFFICE VISIT (OUTPATIENT)
Dept: INTERNAL MEDICINE | Facility: CLINIC | Age: 72
End: 2022-07-20
Payer: COMMERCIAL

## 2022-07-20 VITALS
DIASTOLIC BLOOD PRESSURE: 73 MMHG | OXYGEN SATURATION: 98 % | SYSTOLIC BLOOD PRESSURE: 124 MMHG | WEIGHT: 198 LBS | BODY MASS INDEX: 30.11 KG/M2 | HEART RATE: 69 BPM

## 2022-07-20 DIAGNOSIS — E78.5 HYPERLIPIDEMIA LDL GOAL <100: Primary | ICD-10-CM

## 2022-07-20 PROCEDURE — 99213 OFFICE O/P EST LOW 20 MIN: CPT | Performed by: INTERNAL MEDICINE

## 2022-07-20 ASSESSMENT — PAIN SCALES - GENERAL: PAINLEVEL: NO PAIN (0)

## 2022-07-20 NOTE — NURSING NOTE
Chief Complaint   Patient presents with     RECHECK     Blood pressure 124/73, pulse 69, weight 89.8 kg (198 lb), SpO2 98 %.    Ronnie Javier on 7/20/2022 at 7:31 AM

## 2022-07-20 NOTE — PROGRESS NOTES
HPI  72-year-old diabetic presents today for prescription for diabetic shoes.  He has had ongoing problems with his feet associated with diabetic peripheral neuropathy some callus formation and decreased circulation.  He has forms with him that need to document this.  He is following with endocrinology regarding his diabetic control and he has been doing better in this regard his recent A1c had improved to 7.9%.  He is watching his diet and maintaining physical activity to help with diabetic control.  He has had no hypoglycemic episodes or problems on his present medication  Past Medical History:   Diagnosis Date     Blepharitis of both eyes      BPH (benign prostatic hyperplasia)      Diabetes (H)      Diabetic neuropathy (H)      Diabetic retinopathy associated with diabetes mellitus due to underlying condition (H)      Dry eye syndrome      GERD (gastroesophageal reflux disease)      Goiter      Granulomatous disease (H)      HLD (hyperlipidemia)      HTN (hypertension)      Nonsenile cataract      Peripheral neuropathy      Past Surgical History:   Procedure Laterality Date     ------------OTHER-------------      back of neck abscess drainage in OR     AS RAD RESEC TONSIL/PILLARS Bilateral 1961     CATARACT IOL, RT/LT Left      COLONOSCOPY  7/29/2013    Procedure: COLONOSCOPY;;  Surgeon: Montana Pascal MD;  Location:  GI     EXCISE MASS UPPER EXTREMITY Right 11/11/2019    Procedure: EXCISION, MASS, UPPER EXTREMITY, RIGHT SHOULDER;  Surgeon: Johana Choudhury MD;  Location: [facility] OR     INTRAVITREAL INJECTION CHEMOTHERAPY Right 12/30/2019    Procedure: INTRAVITREAL Bevacizumab injection;  Surgeon: Milton Maki MD;  Location: [facility] OR     PHACOEMULSIFICATION CLEAR CORNEA WITH STANDARD INTRAOCULAR LENS IMPLANT Right 12/30/2019    Procedure: PHACOEMULSIFICATION, CATARACT, WITH INTRAOCULAR LENS IMPLANT;  Surgeon: Milton Maki MD;  Location: [facility] OR     PHACOEMULSIFICATION WITH STANDARD INTRAOCULAR  LENS IMPLANT Left 6/21/2019    Procedure: Left Eye Cataract Removal with Intraocular Lens Implant with Intraoperative Avastin Injection;  Surgeon: Lacey Eugene MD;  Location:  OR     [facility]  11/2017     Family History   Problem Relation Age of Onset     Diabetes Father      Myocardial Infarction Father      Diabetes Brother      Leukemia Brother 44     Glaucoma No family hx of      Macular Degeneration No family hx of      Kidney Disease No family hx of          Exam:  /73 (BP Location: Right arm, Patient Position: Sitting, Cuff Size: Adult Regular)   Pulse 69   Wt 89.8 kg (198 lb)   SpO2 98%   BMI 30.11 kg/m    198 lbs 0 oz  The patient is alert, oriented with a clear sensorium.   Skin shows no lesions or rashes and good turgor.   Head is normocephalic and atraumatic.    Extremities show stasis dermatitis, no edema.  Feet so early bunion formation bilaterally has callus under the first MTPs bilaterally his sensation is intact to monofilament but decreased to vibration left greater than right.  His dorsalis pedis pulses are weakly present and his posterior tibial pulses are absent    ASSESSMENT  1 diabetes mellitus type 2  2 hyperlipidemia  3 history of sarcoidosis  4 history of kidney stones  5 GERD  6 hypertension well controlled  7 peripheral neuropathy related #1 above  8 CKD stage 3b    Plan  We completed his paperwork for the diabetic shoes reinforced his diabetic control have him follow-up with endocrinology as scheduled  This note was completed using Dragon voice recognition software.      Yoshi Warren MD  General Internal Medicine  Primary Care Center  [phone]

## 2022-07-21 ENCOUNTER — OFFICE VISIT (OUTPATIENT)
Dept: OPHTHALMOLOGY | Facility: CLINIC | Age: 72
End: 2022-07-21
Attending: OPHTHALMOLOGY
Payer: COMMERCIAL

## 2022-07-21 DIAGNOSIS — E11.3411 SEVERE NONPROLIFERATIVE DIABETIC RETINOPATHY OF RIGHT EYE WITH MACULAR EDEMA ASSOCIATED WITH TYPE 2 DIABETES MELLITUS (H): ICD-10-CM

## 2022-07-21 PROCEDURE — 92134 CPTRZ OPH DX IMG PST SGM RTA: CPT | Performed by: OPHTHALMOLOGY

## 2022-07-21 PROCEDURE — 99213 OFFICE O/P EST LOW 20 MIN: CPT | Performed by: OPHTHALMOLOGY

## 2022-07-21 PROCEDURE — G0463 HOSPITAL OUTPT CLINIC VISIT: HCPCS | Mod: 25

## 2022-07-21 ASSESSMENT — CONF VISUAL FIELD
OD_NORMAL: 1
OS_NORMAL: 1
METHOD: COUNTING FINGERS

## 2022-07-21 ASSESSMENT — TONOMETRY
OS_IOP_MMHG: 12
IOP_METHOD: TONOPEN
OD_IOP_MMHG: 17

## 2022-07-21 ASSESSMENT — VISUAL ACUITY
METHOD: SNELLEN - LINEAR
OS_PH_SC: 20/30
OS_SC: 20/60
OS_SC+: -2
OD_SC: 20/25

## 2022-07-21 ASSESSMENT — EXTERNAL EXAM - LEFT EYE: OS_EXAM: NORMAL

## 2022-07-21 ASSESSMENT — SLIT LAMP EXAM - LIDS
COMMENTS: BLEPHARITIS, SCURF, DERMATOCHALASIS
COMMENTS: BLEPHARITIS, SCURF, DERMATOCHALASIS

## 2022-07-21 ASSESSMENT — CUP TO DISC RATIO
OD_RATIO: 0.3
OS_RATIO: 0.3

## 2022-07-21 ASSESSMENT — EXTERNAL EXAM - RIGHT EYE: OD_EXAM: NORMAL

## 2022-07-21 NOTE — PROGRESS NOTES
I have confirmed the patient's and reviewed Past Medical History, Past Surgical History, Social History, Family History, Problem List, Medication List and agree with Tech note.    CC: decreased vision following CEIOL in OS    HPI:  pt of Dr. Eugene, s/p CEIOL left eye on 6/21/19. Has been having blurred vision since CEIOL. Reports that he had significant periorbital pain after the operation and has since had significant visual disturbance, blurred vision, distorted vision, diplopia, and eye pain. He is very concerned about his vision and would not like any more injections in his left eye at this time.     Self-DC'd all gtts about 5 days after surgery. Re-started on gtts about 3 weeks post-op. Since last visit has been taking prednisolone and ofloxacin 2-3 times per day    Avastin injections  Right Eye: 2/26/18, 4/10/18, 5/11/20218, 6/11/18 and last one here on 7/19/2018  Left Eye: 2/26/18, 4/10/18, 5/11/2018, 6/21/19        Assessment/plan:   1. 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 Patient advised that failure to adequately control each may lead to vision loss. The patient expressed understanding.   - FAF in 2019 extensive ischemia left eye and s/p Panretinal laser photocoagulation (PRP)    - ME refractory to avastin. Plan did Eylea os in 8/2020 and patient did not return to clinic for 10 months.  Will repeat OPTOS IVFA which showed very severe NPDR both eyes and we did Panretinal laser photocoagulation (PRP) both eyes     2. Diabetic macular edema left eye   - Worsened since CEIOL on 6/21/19   - Has been on ketorolac and PF twice a day/tid since surgery and will stop when running out    - Optical Coherence Tomography Scan shows reduction in fluid both eyes      3. Pseudophakia ou    4. Moderate NPDR  and Diabetic macular edema right eye   - temporal macular edema, stable/reduced  as of 12/4/19; OCT 12/4/19 no fovea involving edema   - had been receiving injections , last injection 6/11/18 at outside clinic and 7/19/2018 here at [facility] adult [facility]    - PRP os 12/2019. FA in future.     5. Stye LL os   - pt deferred maxitrol, agrees to start warm compresses     Return to clinic 9 months for Optical Coherence Tomography Scan    Jackie Nolasco MD PhD.  Professor & Chair

## 2022-07-21 NOTE — NURSING NOTE
Chief Complaints and History of Present Illnesses   Patient presents with     Diabetic Macular Edema Follow Up     Chief Complaint(s) and History of Present Illness(es)     Diabetic Macular Edema Follow Up     Laterality: both eyes    Quality: blurred vision    Frequency: constantly    Timing: throughout the day    Course: stable    Associated symptoms: redness (intermittent in LE).  Negative for eye pain, flashes and floaters    Pain scale: 0/10              Comments     Severe NPDR and DME right eye   DME LE  Infrequent episodes of diplopia at distance.  Avg FBS: 160  Last A1C on 6/09 was 7.1  DUTCH Montelongo COT 2:51 PM 07/21/2022

## 2022-07-22 ENCOUNTER — TELEPHONE (OUTPATIENT)
Dept: INTERNAL MEDICINE | Facility: CLINIC | Age: 72
End: 2022-07-22

## 2022-07-22 NOTE — TELEPHONE ENCOUNTER
Health Call Center    Phone Message    May a detailed message be left on voicemail: yes     Reason for Call: Form or Letter   Type or form/letter needing completion: 07/20/2022 visit notes  Provider: Dr. Warren  Date form needed: asap  Once completed: Fax form to: Fax #: [phone] attention Yas Ashton, please send a copy to patient's email [email or web address]    Patient needs diabetic shoes and has been trying to get them the past 1.5-2 months. Saw Dr. Warren on 07/20/2022 who filled out his paperwork. Patient was told that they want a copy of his visit notes and his medical records. Information has to be sent to the insurance company for approval. They submitted a fax to out clinic inquiring about this.           Action Taken: Message routed to:  [facility]    Travel Screening: Not Applicable

## 2022-07-22 NOTE — TELEPHONE ENCOUNTER
Diabetic shoe order and 07/20 visit notes faxed to orthotic and copy emailed to patient.      Adrian Hu CMA ([facility]) at 11:51 AM on 7/22/2022

## 2022-08-02 ENCOUNTER — OFFICE VISIT (OUTPATIENT)
Dept: VASCULAR SURGERY | Facility: CLINIC | Age: 72
End: 2022-08-02
Payer: COMMERCIAL

## 2022-08-02 VITALS — SYSTOLIC BLOOD PRESSURE: 114 MMHG | DIASTOLIC BLOOD PRESSURE: 66 MMHG | OXYGEN SATURATION: 98 % | HEART RATE: 74 BPM

## 2022-08-02 DIAGNOSIS — M79.661 PAIN IN BOTH LOWER LEGS: Primary | ICD-10-CM

## 2022-08-02 DIAGNOSIS — M79.662 PAIN IN BOTH LOWER LEGS: Primary | ICD-10-CM

## 2022-08-02 PROCEDURE — 99214 OFFICE O/P EST MOD 30 MIN: CPT | Performed by: SURGERY

## 2022-08-02 ASSESSMENT — PAIN SCALES - GENERAL: PAINLEVEL: MILD PAIN (3)

## 2022-08-02 NOTE — LETTER
8/2/2022       RE: Earle Rene  [address]  [place] [address]     Dear Colleague,    Thank you for referring your patient, Earle Rene, to the [facility] [place] at [facility]. Please see a copy of my visit note below.      Assessment & Plan   Problem List Items Addressed This Visit    None     Visit Diagnoses     Pain in both lower legs    -  Primary         Mr. Rene is a 71yo with bilateral lower extremity pain who is here for follow up after a few months of exercise therapy.     - He has documented and known spine disease. He did have a positive GARCIA with exercise test as well. His insurance would not pay for MRA abdomen/pelvis without a walking program first. He has been doing that and does feel he is doing quite a bit better.   - In speaking to him in detail, it sounds like his pain is mostly shooting in nature now as opposed to the aching in his thighs he had previously. This sounds more spinal in nature. He never saw a spine surgeon or pain specialist after our last visit. He does not want spine surgery but he is open to considering injections or PT. I have messaged his PCP to see if he can help coordinate this for him.  - I will see him back in 6 months and see how he is doing after this. No new imaging needed.     Review of the result(s) of each unique test - ABIs with exercise  Independent interpretation of a test performed by another physician/other qualified health care professional (not separately reported) - He did have a significant pressure drop with exercise. ABIs noncompressible.   Discussion of management or test interpretation with external physician/other qualified healthcare professional/appropriate source - Discussing his care with his PCP regarding a possible pain management referral.   30 minutes spent on the date of the encounter doing chart review, history and exam, documentation and further activities  per the note     Johana Hood MD    [facility] JANET Og is a 72 year old presenting for the following health issues:  Follow Up (4 month PAD follow up s/p walking program)      HPI   Mr. Rene has been going to workout 3 days a week. He did go to PT for the first 2-3 visits but since that time has been working out on his own as he joined the health club.He does feel that his neuropathy in his feet has improved since working out. He is also getting special diabetic shoes that he is hopeful will help his walking. He is able to walk 10 minutes on the treadmill without stopping. He doesn't stop because of pain but so that he can workout on another piece of equipment. Each Saturday he walks to the [employer]. He says he will have shooting pain down his legs but does not have to stop and this is a half mile walk. This sounds spinal in nature and not like claudication from vascular disease. At this point, we discussed options for his spine issues. He was adamant before and still is that he would not consider surgery but he will consider injections or PT specific to his back. I recommended he talk to his PCP about this. I have also sent his PCP a message about this. We will see him back in 6 months and see how he is doing as his symptoms at this time are not lifestyle limiting and do not sound vascular in nature.           Objective    /66 (BP Location: Left arm, Patient Position: Sitting, Cuff Size: Adult Regular)   Pulse 74   SpO2 98%   There is no height or weight on file to calculate BMI.  Physical Exam   GENERAL: healthy, alert and no distress  RESP: no increased work of breathing  CV: regular rate  MS: no gross musculoskeletal defects noted, no edema  SKIN: no suspicious lesions or rashes  NEURO: Normal strength and tone, mentation intact and speech normal  PSYCH: affect normal    Narrative & Impression   BILATERAL LOWER EXTREMITY ANKLE-BRACHIAL INDICES (ABIS) WITH  EXERCISE  3/11/2022 10:55 AM     CLINICAL HISTORY: Claudication (H) .     COMPARISON: Ultrasound lower extremity arterial duplex bilateral     REFERRING PROVIDER: CYNDI NAVARRO     TECHNIQUE: Baseline GARCIA, TBI, VPR, and 1st digit PPG obtained at rest,  followed by exercise ankle brachial index using Saldaña-Baron  Exercise Protocol at treadmill speed 2 mph, beginning at 0 percent  grade increased to 10 percent for a total of 12 minutes. Exercise  stopped due to dyspnea.     FINDINGS:     Resting GARCIA:          RIGHT:            Brachial: 140 mmHg            Ankle (PT): > 254 mmHg - non compressible             Ankle (DP): > 254 mmHg - non compressible             1st Digit: 131 mmHg               GARCIA: Non compressible            TBI: 0.88               Pulse volume recordings or VPR:                  High thigh: Normal                 Lower thigh: Normal                 Proximal calf: Normal                 Ankle: Normal               1st Digit PPG: Normal          LEFT:            Brachial: 149 mmHg            Ankle (PT): 165 mmHg            Ankle (DP): 109 mmHg            1st Digit: 95 mmHg               GARCIA: 1.11            TBI: 0.64               Pulse volume recordings or VPR:                  High thigh: Normal                 Lower thigh: Normal                 Proximal calf: Normal                 Ankle: Normal               1st Digit PPG: Normal     EXERCISE STUDY:     Baseline severity of pain in legs prior to exercise on a scale of  1-10: 0/10     Severity of pain during exercise:           Right: Onset of pain is at 4 minutes with 3/10 thigh pain. Pain  increases to 4/10 by 5 minutes, 5/10 by 9 minutes, 6/10 by 10 minutes,  and 7/10 by 11 minutes.           Left: Onset of pain is at 4 minutes with 3/10 thigh pain. Pain  increases to 4/10 by 5 minutes, 5/10 by 9 minutes, 6/10 by 10 minutes,  and 7/10 by 11 minutes.      Post exercise GARCIA:          Right leg: Not measured, non compressible.       Left leg:  0.65. 54 mmHg pressure drop.     Post exercise recovery time:          Right leg: Time to cessation of symptoms: 4 minutes.       Left leg: Time to cessation of symptoms: 4 minutes. Time to  recovery of pressures > 11 minutes.     MR LUMBAR SPINE W/O CONTRAST 11/12/2021 6:36 AM     Provided History: Blood cholesterol increased compared with prior  measurement; Vitamin D deficiency; Other chronic back pain; Other  chronic back pain; Pain of lower extremity, unspecified laterality     ICD-10: Blood cholesterol increased compared with prior measurement;  Vitamin D deficiency; Other chronic back pain; Other chronic back  pain; Pain of lower extremity, unspecified laterality     Comparison: No comparison studies     Technique: Sagittal T1-weighted, sagittal STIR, 3D volumetric axial  and sagittal reconstructed T2-weighted images of the lumbar spine were  obtained without intravenous contrast.      Findings:   There are 5 lumbar-type vertebrae.  The tip of the conus medullaris is  at L1.  Normal lumbar vertebral alignment. Multilevel disc height  narrowing and disc desiccation. Schmorl's nodes at the upper and lower  endplate of L4 with associated loss in the vertebral body height.  Normal marrow signal.     On a level by level basis:     T11-12: Left foraminal protrusion with associated mild to moderate  left neural foraminal stenosis. Bilateral facet hypertrophy. No spinal  canal or right neural foraminal stenosis.     T12-L1: Bilateral facet arthrosis and disc bulge. No significant  spinal canal or neural foraminal stenosis.     L1-2: No spinal canal or neuroforaminal stenosis.     L2-3: Disc bulge and bilateral facet hypertrophy. Mild bilateral  neural foraminal and mild spinal canal stenosis.     L3-4: Disc osteophyte complex and bilateral facet arthrosis.  Thickening of the ligamentum flavum. Mild to moderate bilateral neural  foraminal and mild spinal canal stenosis.     L4-5: Disc bulge and superimposed left  foraminal protrusion. Bilateral  facet hypertrophy. Thickening of the ligamentum flavum. Severe left  and moderate right neural foraminal stenosis. Impingement of the  exiting left nerve root. Mild-to-moderate spinal canal stenosis.     L5-S1: Disc osteophyte complex and superimposed central protrusion.  Bilateral subpectoral. Moderate to severe bilateral neural foraminal  stenosis. Impingement of the exiting nerve roots. Mild spinal canal  stenosis with effacement of the lateral recesses.     Paraspinous tissues are within normal limits.                                                                      Impression: Multilevel lumbar spondylosis with mild-to-moderate spinal  canal stenosis at L4-5 and mild spinal canal stenosis at L3-4 and  L5-S1. Severe left neural foraminal stenosis at L4-5 and moderate  severe bilaterally at L5-S1 with impingement of exiting nerve roots.         Sincerely,    Johana Hood MD

## 2022-08-02 NOTE — PATIENT INSTRUCTIONS
Thank you so much for choosing us for your care. It was a pleasure to see you at the vascular clinic today.     Follow-up recommendations: We will see you back in 6 months. Dr Hood will reach out to your primary doctor to request a referral to the pain service.    Additional testing/imaging ordered today: None      Our scheduling team will get in touch with you to set up any follow-up testing/imaging and/or appointments. Please be aware that any testing/imaging recommended today will need to completed prior to your next visit with the provider. If testing/imaging is not completed prior to your next visit, your visit may be rescheduled.        If you have any questions, please call Marcelina Chambers RN at [phone] or contact our clinic at [phone]. We also encourage the use of [employer] to communicate with your HealthCare Provider.      If you have an urgent need after business hours (8:00 am to 4:30 pm) please call [phone], option 4, and ask for the vascular attending on call. For non-urgent after hours needs, please call the vascular nurse at [phone] and leave a detailed voicemail. For scheduling needs, please call our clinic directly at [phone].

## 2022-08-02 NOTE — NURSING NOTE
Chief Complaint   Patient presents with     Follow Up     4 month PAD follow up s/p walking program       Vitals were taken and medications were reconciled.    Mary Verduzco  9:25 AM

## 2022-08-02 NOTE — PROGRESS NOTES
Assessment & Plan   Problem List Items Addressed This Visit    None     Visit Diagnoses     Pain in both lower legs    -  Primary         Mr. Rene is a 73yo with bilateral lower extremity pain who is here for follow up after a few months of exercise therapy.     - He has documented and known spine disease. He did have a positive GARCIA with exercise test as well. His insurance would not pay for MRA abdomen/pelvis without a walking program first. He has been doing that and does feel he is doing quite a bit better.   - In speaking to him in detail, it sounds like his pain is mostly shooting in nature now as opposed to the aching in his thighs he had previously. This sounds more spinal in nature. He never saw a spine surgeon or pain specialist after our last visit. He does not want spine surgery but he is open to considering injections or PT. I have messaged his PCP to see if he can help coordinate this for him.  - I will see him back in 6 months and see how he is doing after this. No new imaging needed.     Review of the result(s) of each unique test - ABIs with exercise  Independent interpretation of a test performed by another physician/other qualified health care professional (not separately reported) - He did have a significant pressure drop with exercise. ABIs noncompressible.   Discussion of management or test interpretation with external physician/other qualified healthcare professional/appropriate source - Discussing his care with his PCP regarding a possible pain management referral.   30 minutes spent on the date of the encounter doing chart review, history and exam, documentation and further activities per the note     Johana Hood MD    [facility] JANET Og is a 72 year old presenting for the following health issues:  Follow Up (4 month PAD follow up s/p walking program)      HPI   Mr. Rene has been going to workout 3 days a week. He did go to PT for the  first 2-3 visits but since that time has been working out on his own as he joined the health club.He does feel that his neuropathy in his feet has improved since working out. He is also getting special diabetic shoes that he is hopeful will help his walking. He is able to walk 10 minutes on the treadmill without stopping. He doesn't stop because of pain but so that he can workout on another piece of equipment. Each [place] [employer] He says he will have shooting pain down his legs but does not have to stop and this is a half mile walk. This sounds spinal in nature and not like claudication from vascular disease. At this point, we discussed options for his spine issues. He was adamant before and still is that he would not consider surgery but he will consider injections or PT specific to his back. I recommended he talk to his PCP about this. I have also sent his PCP a message about this. We will see him back in 6 months and see how he is doing as his symptoms at this time are not lifestyle limiting and do not sound vascular in nature.           Objective    /66 (BP Location: Left arm, Patient Position: Sitting, Cuff Size: Adult Regular)   Pulse 74   SpO2 98%   There is no height or weight on file to calculate BMI.  Physical Exam   GENERAL: healthy, alert and no distress  RESP: no increased work of breathing  CV: regular rate  MS: no gross musculoskeletal defects noted, no edema  SKIN: no suspicious lesions or rashes  NEURO: Normal strength and tone, mentation intact and speech normal  PSYCH: affect normal    Narrative & Impression   BILATERAL LOWER EXTREMITY ANKLE-BRACHIAL INDICES (ABIS) WITH EXERCISE  3/11/2022 10:55 AM     CLINICAL HISTORY: Claudication (H) .     COMPARISON: Ultrasound lower extremity arterial duplex bilateral     REFERRING PROVIDER: CYNDI NAVARRO     TECHNIQUE: Baseline GARCIA, TBI, VPR, and 1st digit PPG obtained at rest,  followed by exercise ankle brachial index using  Saldaña-Baron  Exercise Protocol at treadmill speed 2 mph, beginning at 0 percent  grade increased to 10 percent for a total of 12 minutes. Exercise  stopped due to dyspnea.     FINDINGS:     Resting GARCIA:          RIGHT:            Brachial: 140 mmHg            Ankle (PT): > 254 mmHg - non compressible             Ankle (DP): > 254 mmHg - non compressible             1st Digit: 131 mmHg               GARCIA: Non compressible            TBI: 0.88               Pulse volume recordings or VPR:                  High thigh: Normal                 Lower thigh: Normal                 Proximal calf: Normal                 Ankle: Normal               1st Digit PPG: Normal          LEFT:            Brachial: 149 mmHg            Ankle (PT): 165 mmHg            Ankle (DP): 109 mmHg            1st Digit: 95 mmHg               GARCIA: 1.11            TBI: 0.64               Pulse volume recordings or VPR:                  High thigh: Normal                 Lower thigh: Normal                 Proximal calf: Normal                 Ankle: Normal               1st Digit PPG: Normal     EXERCISE STUDY:     Baseline severity of pain in legs prior to exercise on a scale of  1-10: 0/10     Severity of pain during exercise:           Right: Onset of pain is at 4 minutes with 3/10 thigh pain. Pain  increases to 4/10 by 5 minutes, 5/10 by 9 minutes, 6/10 by 10 minutes,  and 7/10 by 11 minutes.           Left: Onset of pain is at 4 minutes with 3/10 thigh pain. Pain  increases to 4/10 by 5 minutes, 5/10 by 9 minutes, 6/10 by 10 minutes,  and 7/10 by 11 minutes.      Post exercise GARCIA:          Right leg: Not measured, non compressible.       Left le.65. 54 mmHg pressure drop.     Post exercise recovery time:          Right leg: Time to cessation of symptoms: 4 minutes.       Left leg: Time to cessation of symptoms: 4 minutes. Time to  recovery of pressures > 11 minutes.     MR LUMBAR SPINE W/O CONTRAST 2021 6:36 AM     Provided  History: Blood cholesterol increased compared with prior  measurement; Vitamin D deficiency; Other chronic back pain; Other  chronic back pain; Pain of lower extremity, unspecified laterality     ICD-10: Blood cholesterol increased compared with prior measurement;  Vitamin D deficiency; Other chronic back pain; Other chronic back  pain; Pain of lower extremity, unspecified laterality     Comparison: No comparison studies     Technique: Sagittal T1-weighted, sagittal STIR, 3D volumetric axial  and sagittal reconstructed T2-weighted images of the lumbar spine were  obtained without intravenous contrast.      Findings:   There are 5 lumbar-type vertebrae.  The tip of the conus medullaris is  at L1.  Normal lumbar vertebral alignment. Multilevel disc height  narrowing and disc desiccation. Schmorl's nodes at the upper and lower  endplate of L4 with associated loss in the vertebral body height.  Normal marrow signal.     On a level by level basis:     T11-12: Left foraminal protrusion with associated mild to moderate  left neural foraminal stenosis. Bilateral facet hypertrophy. No spinal  canal or right neural foraminal stenosis.     T12-L1: Bilateral facet arthrosis and disc bulge. No significant  spinal canal or neural foraminal stenosis.     L1-2: No spinal canal or neuroforaminal stenosis.     L2-3: Disc bulge and bilateral facet hypertrophy. Mild bilateral  neural foraminal and mild spinal canal stenosis.     L3-4: Disc osteophyte complex and bilateral facet arthrosis.  Thickening of the ligamentum flavum. Mild to moderate bilateral neural  foraminal and mild spinal canal stenosis.     L4-5: Disc bulge and superimposed left foraminal protrusion. Bilateral  facet hypertrophy. Thickening of the ligamentum flavum. Severe left  and moderate right neural foraminal stenosis. Impingement of the  exiting left nerve root. Mild-to-moderate spinal canal stenosis.     L5-S1: Disc osteophyte complex and superimposed central  protrusion.  Bilateral subpectoral. Moderate to severe bilateral neural foraminal  stenosis. Impingement of the exiting nerve roots. Mild spinal canal  stenosis with effacement of the lateral recesses.     Paraspinous tissues are within normal limits.                                                                      Impression: Multilevel lumbar spondylosis with mild-to-moderate spinal  canal stenosis at L4-5 and mild spinal canal stenosis at L3-4 and  L5-S1. Severe left neural foraminal stenosis at L4-5 and moderate  severe bilaterally at L5-S1 with impingement of exiting nerve roots.

## 2022-08-03 ENCOUNTER — TELEPHONE (OUTPATIENT)
Dept: VASCULAR SURGERY | Facility: CLINIC | Age: 72
End: 2022-08-03

## 2022-08-03 NOTE — TELEPHONE ENCOUNTER
----- Message from Marcelina Chambers RN sent at 8/2/2022 10:52 AM CDT -----  Hello,    Please schedule this pt for a return/follow-up in person visit with [facility] in 6 months for claudication follow-up. Pt will not need imaging prior to this appt.     Thank you very much!    Marcelina

## 2022-08-04 ENCOUNTER — TELEPHONE (OUTPATIENT)
Dept: INTERNAL MEDICINE | Facility: CLINIC | Age: 72
End: 2022-08-04

## 2022-08-04 DIAGNOSIS — M25.50 MULTIPLE JOINT PAIN: Primary | ICD-10-CM

## 2022-08-04 NOTE — TELEPHONE ENCOUNTER
Dear Adrian;    Please see attached note from Dr. Hood;    I placed a pain clinic referral this encounter. Please help coordinate    Thanks, MOISES Hare

## 2022-08-04 NOTE — TELEPHONE ENCOUNTER
Spoke to patient regarding a referral that was placed. Patient states he would only like to have injections for his pain management. Patient is requesting a return call from Dr. Hare's clinical staff to clarify the purpose of this referral

## 2022-08-04 NOTE — TELEPHONE ENCOUNTER
----- Message from Johana Hood MD sent at 8/2/2022  9:50 AM CDT -----  Regarding: Pain Referral  Hello Dr. Hare,     I saw Mr. Rene back in clinic today after he has been doing a walking program and working out regularly. It sounds like he is able to walk any distance without significant claudication but his pain that he does get sounds more like it is spine related. He has these shooting pains down his leg, worse on the left. I know he has spine disease but previously was uninterested in surgery or a pain referral. After talking to him today, it sounds like he is open to considering a pain specialist and possible injections. Is there any way you could help him with that and place a referral? I will see him back in 6 months to see how he is feeling. Thank you very much!    - Johana Hood MD, RPVI  , Vascular Surgery  [facility]  Cell: [phone]  [email or web address]

## 2022-08-05 NOTE — TELEPHONE ENCOUNTER
Called patient and left VM.    Dr. Hood reached out to Dr Hare about patient shooting pain relating to spine and that pt is interested in pain management and possible spinal injection.  Dr. Hare placed a pain clinic referral for this.  Pt will be reached to schedule.  If not, call [phone].      Adrian Hu CMA ([facility]) at 9:14 AM on 8/5/2022

## 2022-08-05 NOTE — TELEPHONE ENCOUNTER
Called patient and left VM.    Dr. Hood reached out to Dr Hare about patient shooting pain relating to spine and that pt is interested in pain management and possible spinal injection.  Dr. Hare placed a pain clinic referral for this.  Pt will be reached to schedule.  If not, call [phone].      Adrian Hu CMA ([facility]) at 9:14 AM on 8/5/2022

## 2022-08-10 ENCOUNTER — TRANSFERRED RECORDS (OUTPATIENT)
Dept: HEALTH INFORMATION MANAGEMENT | Facility: CLINIC | Age: 72
End: 2022-08-10

## 2022-08-17 ENCOUNTER — TELEPHONE (OUTPATIENT)
Dept: ENDOCRINOLOGY | Facility: CLINIC | Age: 72
End: 2022-08-17

## 2022-08-17 DIAGNOSIS — Z79.4 TYPE 2 DIABETES MELLITUS WITH MODERATE NONPROLIFERATIVE RETINOPATHY WITHOUT MACULAR EDEMA, WITH LONG-TERM CURRENT USE OF INSULIN, UNSPECIFIED LATERALITY (H): ICD-10-CM

## 2022-08-17 DIAGNOSIS — E11.3399 TYPE 2 DIABETES MELLITUS WITH MODERATE NONPROLIFERATIVE RETINOPATHY WITHOUT MACULAR EDEMA, WITH LONG-TERM CURRENT USE OF INSULIN, UNSPECIFIED LATERALITY (H): ICD-10-CM

## 2022-08-17 DIAGNOSIS — N40.1 BENIGN NON-NODULAR PROSTATIC HYPERPLASIA WITH LOWER URINARY TRACT SYMPTOMS: ICD-10-CM

## 2022-08-17 RX ORDER — SEMAGLUTIDE 1.34 MG/ML
INJECTION, SOLUTION SUBCUTANEOUS
Qty: 1.5 ML | Refills: 3 | Status: SHIPPED | OUTPATIENT
Start: 2022-08-17 | End: 2023-06-06

## 2022-08-17 NOTE — TELEPHONE ENCOUNTER
----- Message from Pamela Laura PA-C sent at 8/17/2022 12:16 PM CDT -----  Please call pt and have him restart his Ozempic 0.5 mg subcutaneous once a week and drink plenty of water.  I'll recheck his creat/GFR in 1 month.  Thanks rakan laura

## 2022-08-17 NOTE — TELEPHONE ENCOUNTER
Spoke w/ Pt: confirms understanding of review and recommendations from Pamela jolly and will have labs darwn in one month.   Lorena Bravo, RN on 8/17/2022 at 1:34 PM       Pamela Llamas PA-C Miller, Deanne M, RN; Delphine Cooper, RN  Please call pt and have him restart his Ozempic 0.5 mg subcutaneous once a week and drink plenty of water.   I'll recheck his creat/GFR in 1 month.

## 2022-08-19 ENCOUNTER — TELEPHONE (OUTPATIENT)
Dept: ANESTHESIOLOGY | Facility: CLINIC | Age: 72
End: 2022-08-19

## 2022-08-19 NOTE — TELEPHONE ENCOUNTER
Called pt to see if they could push back their appt by an hour so that their appt on Sep 2nd would be at 11 AM instead of 10 AM.    *Two pts are currently scheduled for 10 AM and one needs to be moved to 11 AM      Danika Roman, EMT

## 2022-08-20 RX ORDER — TAMSULOSIN HYDROCHLORIDE 0.4 MG/1
0.4 CAPSULE ORAL DAILY
Qty: 30 CAPSULE | Refills: 0 | Status: SHIPPED | OUTPATIENT
Start: 2022-08-20 | End: 2022-09-15

## 2022-08-20 NOTE — TELEPHONE ENCOUNTER
tamsulosin (FLOMAX) 0.4 MG capsule      Last Written Prescription Date: 7/19/22  Last Fill Quantity: 30,   # refills: 1  Last Office Visit : 4/9/21  Future Office visit:  9/1/22

## 2022-08-23 ENCOUNTER — PRE VISIT (OUTPATIENT)
Dept: UROLOGY | Facility: CLINIC | Age: 72
End: 2022-08-23

## 2022-08-23 NOTE — TELEPHONE ENCOUNTER
Reason for visit: follow up     Relevant information: hx prostate cancer    Records/imaging/labs/orders: PSA in epic    Pt called: no    At Rooming: virtual

## 2022-09-01 ENCOUNTER — VIRTUAL VISIT (OUTPATIENT)
Dept: UROLOGY | Facility: CLINIC | Age: 72
End: 2022-09-01
Payer: COMMERCIAL

## 2022-09-01 DIAGNOSIS — C61 MALIGNANT NEOPLASM OF PROSTATE (H): Primary | ICD-10-CM

## 2022-09-01 DIAGNOSIS — Z53.9 NO SHOW: ICD-10-CM

## 2022-09-01 DIAGNOSIS — C61 MALIGNANT NEOPLASM OF PROSTATE (H): ICD-10-CM

## 2022-09-01 DIAGNOSIS — N40.1 BENIGN NON-NODULAR PROSTATIC HYPERPLASIA WITH LOWER URINARY TRACT SYMPTOMS: Primary | ICD-10-CM

## 2022-09-01 NOTE — PROGRESS NOTES
DOCUMENTATION:  Patient was a no show for his virtual visit today.    We will reschedule him.    He needs a PSA first. I will place an order and ask our coordinators to get him a lab and office visit appt.     [facility]     Mr. Rene is a 72M with PMH significant for HTN, HLD, IDDM, neuropathy (on neurontin), copd, gerd, low risk prostate cancer with BPH (on proscar and tamsulosin 0.4mg daily) who was last seen in Urology on 4/9/21 by Dr. Bland.  At that time, PSA was 2.90ug/L and sCr was elevated (1.68mg/dL). Dr. Bland recommended follow up in 6-12 months for recheck PSA.    REVIEW OF DIAGNOSTICS:  4/9/21 - PSA 2.90ug/L  2/27/20 - PSA 1.94  2/9/20 - Prostate MRI - prostate volume 103 grams, PIRADS 2  7/3/19 - Prostate MRI  - prostate volume 86 grams, PIRADS 2  5/28/19 - PSA 3.61  5/24/18 - PSA 2.14  11/1/17 - PSA 2.52  8/3/17 - FINASTERIDE WAS BEGUN  7/27/17 - prostate biopsy (Dr. Cantu)  - Jameel 6 (3+3) in 1mm involving 1 core, otherwise benign  6/18/17 - Prostate MRI - prostate volume 110 grams, PIRADS 4  6/6/17 - PSA 8.64   1/14/16 - PSA 3.75

## 2022-09-01 NOTE — PROGRESS NOTES
"Arnoldo is a 72 year old who is being evaluated via a billable video visit.      How would you like to obtain your AVS? Mail a copy  If the video visit is dropped, the invitation should be resent by: Send to e-mail at: [email or web address]  Will anyone else be joining your video visit? No  {If patient encounters technical issues they should call [phone] :[phone]}      Video-Visit Details    Video Start Time: {video visit start/end time for provider to select:864529}    Type of service:  Video Visit    Video End Time:{video visit start/end time for provider to select:936396}    Originating Location (pt. Location): {video visit patient location:843221::\"Home\"}    Distant Location (provider location):  [facility] [place]     Platform used for Video Visit: {Virtual Visit Platforms:159820::\"eGames\"}  "

## 2022-09-01 NOTE — LETTER
"9/1/2022       RE: Earle Rene  [address]  [place] [address]     Dear Colleague,    Thank you for referring your patient, Earle Rene, to the [facility] [place] at [facility]. Please see a copy of my visit note below.    Mr. Earle Rene is a 72 year old year old male who was \"checked in\" for his virtual appt today, but then was unable to be available for his virtual appointment.      NO SHOW - Patient prefers to be rescheduled.         Documentation (in preparation for his next visit):  He has PMH significant for HTN, HLD, IDDM, neuropathy (on neurontin), copd, gerd, low risk prostate cancer with BPH (on proscar and tamsulosin 0.4mg daily) who was last seen in Urology on 4/9/21 by Dr. Bland.  At that time, PSA was 2.90ug/L and sCr was elevated (1.68mg/dL). Dr. Bland recommended follow up in 6-12 months for recheck PSA.    REVIEW OF DIAGNOSTICS:  4/9/21 - PSA 2.90ug/L  2/27/20 - PSA 1.94  2/9/20 - Prostate MRI - prostate volume 103 grams, PIRADS 2  7/3/19 - Prostate MRI  - prostate volume 86 grams, PIRADS 2  5/28/19 - PSA 3.61  5/24/18 - PSA 2.14  11/1/17 - PSA 2.52  8/3/17 - FINASTERIDE WAS BEGUN  7/27/17 - prostate biopsy (Dr. Cantu)  - Jameel 6 (3+3) in 1mm involving 1 core, otherwise benign  6/18/17 - Prostate MRI - prostate volume 110 grams, PIRADS 4  6/6/17 - PSA 8.64   1/14/16 - PSA 3.75      Again, thank you for allowing me to participate in the care of your patient.      Sincerely,    RIVERA Brandon      "

## 2022-09-01 NOTE — LETTER
Date:September 7, 2022      Provider requested that no letter be sent. Do not send.       [facility]

## 2022-09-01 NOTE — PROGRESS NOTES
"Mr. Earle Rene is a 72 year old year old male who was \"checked in\" for his virtual appt today, but then was unable to be available for his virtual appointment.      NO SHOW - Patient prefers to be rescheduled.         Documentation (in preparation for his next visit):  He has PMH significant for HTN, HLD, IDDM, neuropathy (on neurontin), copd, gerd, low risk prostate cancer with BPH (on proscar and tamsulosin 0.4mg daily) who was last seen in Urology on 4/9/21 by Dr. Bland.  At that time, PSA was 2.90ug/L and sCr was elevated (1.68mg/dL). Dr. Bland recommended follow up in 6-12 months for recheck PSA.    REVIEW OF DIAGNOSTICS:  4/9/21 - PSA 2.90ug/L  2/27/20 - PSA 1.94  2/9/20 - Prostate MRI - prostate volume 103 grams, PIRADS 2  7/3/19 - Prostate MRI  - prostate volume 86 grams, PIRADS 2  5/28/19 - PSA 3.61  5/24/18 - PSA 2.14  11/1/17 - PSA 2.52  8/3/17 - FINASTERIDE WAS BEGUN  7/27/17 - prostate biopsy (Dr. Cantu)  - Jameel 6 (3+3) in 1mm involving 1 core, otherwise benign  6/18/17 - Prostate MRI - prostate volume 110 grams, PIRADS 4  6/6/17 - PSA 8.64   1/14/16 - PSA 3.75  "

## 2022-09-02 ENCOUNTER — OFFICE VISIT (OUTPATIENT)
Dept: ANESTHESIOLOGY | Facility: CLINIC | Age: 72
End: 2022-09-02
Attending: INTERNAL MEDICINE
Payer: COMMERCIAL

## 2022-09-02 VITALS — SYSTOLIC BLOOD PRESSURE: 119 MMHG | HEART RATE: 87 BPM | DIASTOLIC BLOOD PRESSURE: 56 MMHG | OXYGEN SATURATION: 98 %

## 2022-09-02 DIAGNOSIS — M47.816 LUMBAR FACET ARTHROPATHY: ICD-10-CM

## 2022-09-02 DIAGNOSIS — M54.10 RADICULAR PAIN OF BOTH LOWER EXTREMITIES: ICD-10-CM

## 2022-09-02 DIAGNOSIS — M53.3 PAIN OF BOTH SACROILIAC JOINTS: Primary | ICD-10-CM

## 2022-09-02 DIAGNOSIS — M25.50 MULTIPLE JOINT PAIN: ICD-10-CM

## 2022-09-02 DIAGNOSIS — M47.816 LUMBAR SPONDYLOSIS: ICD-10-CM

## 2022-09-02 DIAGNOSIS — M79.18 MYOFASCIAL PAIN: ICD-10-CM

## 2022-09-02 PROCEDURE — 99205 OFFICE O/P NEW HI 60 MIN: CPT

## 2022-09-02 ASSESSMENT — ANXIETY QUESTIONNAIRES
GAD7 TOTAL SCORE: 1
1. FEELING NERVOUS, ANXIOUS, OR ON EDGE: NOT AT ALL
4. TROUBLE RELAXING: SEVERAL DAYS
GAD7 TOTAL SCORE: 1
7. FEELING AFRAID AS IF SOMETHING AWFUL MIGHT HAPPEN: NOT AT ALL
IF YOU CHECKED OFF ANY PROBLEMS ON THIS QUESTIONNAIRE, HOW DIFFICULT HAVE THESE PROBLEMS MADE IT FOR YOU TO DO YOUR WORK, TAKE CARE OF THINGS AT HOME, OR GET ALONG WITH OTHER PEOPLE: NOT DIFFICULT AT ALL
3. WORRYING TOO MUCH ABOUT DIFFERENT THINGS: NOT AT ALL
GAD7 TOTAL SCORE: 1
5. BEING SO RESTLESS THAT IT IS HARD TO SIT STILL: NOT AT ALL
8. IF YOU CHECKED OFF ANY PROBLEMS, HOW DIFFICULT HAVE THESE MADE IT FOR YOU TO DO YOUR WORK, TAKE CARE OF THINGS AT HOME, OR GET ALONG WITH OTHER PEOPLE?: NOT DIFFICULT AT ALL
7. FEELING AFRAID AS IF SOMETHING AWFUL MIGHT HAPPEN: NOT AT ALL
2. NOT BEING ABLE TO STOP OR CONTROL WORRYING: NOT AT ALL
6. BECOMING EASILY ANNOYED OR IRRITABLE: NOT AT ALL

## 2022-09-02 ASSESSMENT — PAIN SCALES - PAIN ENJOYMENT GENERAL ACTIVITY SCALE (PEG)
INTERFERED_ENJOYMENT_LIFE: 2
PEG_TOTALSCORE: 6.67
INTERFERED_GENERAL_ACTIVITY: 10
INTERFERED_GENERAL_ACTIVITY: 10 - COMPLETELY INTERFERES
PEG_TOTALSCORE: 6.67
AVG_PAIN_PASTWEEK: 8
INTERFERED_ENJOYMENT_LIFE: 2
AVG_PAIN_PASTWEEK: 8

## 2022-09-02 ASSESSMENT — PAIN SCALES - GENERAL: PAINLEVEL: EXTREME PAIN (8)

## 2022-09-02 NOTE — PROGRESS NOTES
Patient presents with:  Consult: New Consult Neck and Shoulder Lower Back pain level 8/10      Extreme Pain (8)     Pain Medications     Salicylates Refills Start End     aspirin 81 MG tablet          Sig - Route: Take 1 tablet by mouth At Bedtime  - Oral    Class: Historical          What medications are you using for pain? Tylenol    (New patients only) Have you been seen by another pain clinic/ provider? no    (Return Patients only) What refills are you needing today? No    Expectation possible injections not sure was told to come by pcp

## 2022-09-02 NOTE — LETTER
9/2/2022       RE: Earle Rene  [address]  [place] [address]     Dear Colleague,    Thank you for referring your patient, Earle Rene, to the [facility] [place] at [facility]. Please see a copy of my visit note below.    Patient presents with:  Consult: New Consult Neck and Shoulder Lower Back pain level 8/10      Extreme Pain (8)     Pain Medications     Salicylates Refills Start End     aspirin 81 MG tablet          Sig - Route: Take 1 tablet by mouth At Bedtime  - Oral    Class: Historical          What medications are you using for pain? Tylenol    (New patients only) Have you been seen by another pain clinic/ provider? no    (Return Patients only) What refills are you needing today? No    Expectation possible injections not sure was told to come by pcp    RAJNI Peralta CNP

## 2022-09-02 NOTE — CONFIDENTIAL NOTE
[facility] Pain Management     Date of visit: 9/2/2022    Assessment:  Earle Rene is a 72 year old male with a past medical history significant for BPH, HTN, HLD, GERD, DM 2,  who presents with complaints of neck and shoulder pain, low back pain.     1. Low back pain - Onset of back pain occurred a few years ago without specific precipitating injurious event. He also experiences intermittent BLE pain that fluctuates in intensity with prolonged walking. He was evaluated by Dr. Emmanuel Nichols in [facility] and was referred PT to focus on non-surgical strategies to help better manage his pain. I reviewed his lumbar MRI from 11/18/21, which demonstrated multi-level degenerative changes, including facet arthropathy noted at L3-5. Of note, he also had a positive GARCIA study done on 3/11/22 (see result in chart). On exam today, he had positive facet loading with flex/ext/rot and myofascial tenderness most notably in his lumbar paraspinals. Bilateral SI focal tenderness, positive ANGY, positive SI compression test, and positive sacral thrust. SLR was negative and neuro exam was normal, which is reassuring. Etiology of his pain is likely multifactorial, including but certainly not limited to lumbar spondylosis, SI joint pain, lumbar facet arthropathy, and myofascial component.       1. Pain of both sacroiliac joints    2. Lumbar spondylosis    3. Radicular pain of both lower extremities    4. Myofascial pain    5. Multiple joint pain    6. Lumbar facet arthropathy        Plan:  The following recommendations were given to the patient. Diagnosis, treatment options, risks, benefits, and alternatives were discussed, and all questions were answered. The patient expressed understanding of the plan for management.     I am recommending a multidisciplinary treatment plan to help this patient better manage his pain.  This includes:      1.  Pain Physical Therapy:  NO   - He has done PT in the past, which was not  particularly helpful. We may consider referring in the future to pain PT if needed to focus on stretching, strengthening, and core exercises.    2.  Pain Psychologist to address relaxation, behavioral change, coping style, and other factors important to improvement.  NO - He seems to be coping fairly well with his chronic pain at this time. We may consider referring in the future as needed.    3.  Diagnostic Studies:  Reviewed lumbar MRI from 11/12/21 - demonstrated multi-level spondylosis, facet arthropathy noted at L3-5.    4.  Medication Management: None     5.  Potential procedures:     1. I am recommending bilateral sacroiliac joint injection with steroid. You will be contacted to schedule your procedure with Dr. Collin Mata.     2. We will consider lumbar MBB/RFA in the future, pending outcome of SI joint injection.    6.  Referrals: None    7.  Follow up with RAJNI Peralta CNP in 2 to 4 weeks after the procedure.     Review of Electronic Chart: Today I have also reviewed available medical information in the patient's medical record at [facility] ([facility]), including relevant provider notes, laboratory work, and imaging.       Mariann Rand DNP, APRN, AGNP-C  [facility] Pain Management       -------------------------------------------------    Subjective:    Reason for consultation:    Earle Rene is a 72 year old male who is seen in consultation today at the request of his PCP,  Marcio Hare for evaluation of his pain issues and recommendations for management, with specific emphasis on low back pain.      Please see the [facility] health questionnaire which the patient completed and reviewed with me in detail.    Review of [place] Prescription Monitoring Program (): No concern for abuse or misuse of controlled medications based on this report. Reviewed on 9/2/22.     Review of Electronic Chart: Today I have also reviewed available medical information in the patient's  "medical record at [facility] ([facility]), including relevant provider notes, laboratory work, and imaging.     Pain medications are being prescribed by - N/A.     Chief Complaint:    Chief Complaint   Patient presents with     Consult     New Consult Neck and Shoulder Lower Back pain level 8/10       Pain history:  Earle Rene is a 72 year old male who presents for initial evaluation of chief complaint of low back pain.      He is here today for further evaluation and treatment of his low back pain. He first started having problems with low back pain a few years ago, no clear precipitating injurious event prior to onset. The pain starts in his low back and radiates down into his buttocks and legs; the pain worsens with prolonged walking and certain activities. Of note, he reports that his right foot is \"shorter\" than the left foot and was recently fitted for custom orthotic shoes. So far, he thinks things are going well with the new shoes, though he has not appreciated much difference in back or leg pain. It sounds like Earle tries to push through the pain to remain as active as possible. He belongs to a fitness center and goes at least once per week to exercise. He has been to formal PT multiple times in the past, though he does report quitting in the middle of the most recent series (around March 2022) because he felt the exercises were \"repetitive\" and not helpful. He states that he is Taoist and has Rashashauna coming up in a couple of weeks. During this time he is very active, and walking is a significant part of the celebrations. He states that he typically walks 10-12 miles per day during this time. He would like to explore possible treatments (injections) that would afford him pain relief during his holiday celebrations.     Social hx - He moved from [facility] to [place] in 1970 and worked as a  for the [employer]. He reports living with wife, mother in law, and daughter with granddaughter. 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 He is a big sports fan, loves the [employer].     Other painful areas:   -Arthritis in shoulder and neck.   -Knee pain   -Peripheral neuropathy (has h/o DM 2)      Pain description:  Location: Low back   Quality: aching, cramping, sharp  Severity/Intensity: 8/10 on average  Aggravating factors include: Prolonged walking  Relieving factors include: Rest     The patient otherwise denies bowel or bladder incontinence, parasthesias, weakness, saddle anesthesia, unintentional weight loss, or fever/chills/sweats.     Earle Rene has not been seen at a pain clinic in the past.      Pain Treatments:  (H--helped; HI--Helped initially; SWH--Somewhat helpful; NH--No help; W--worse; SE--side effects; ?--Unsure if helpful)   1. Medications:       Current pain medications:   Gabapentin 300 mg BID - clarify if taking at next visit, may consider titration up on dose if injection not helpful.       Current calculated MME: 0    1. Previous Pain Relevant Medications:  NOTE: This medication information taken from patient's intake form, not medical records.    Opiates:    NSAIDS:     Muscle Relaxants:    Anti-migraine mediations:    Anti-depressants:    Sleep aids:   Anxiolytics:    Neuropathics:     Topicals:    Other medications not covered above:     2. Physical Therapy: Yes, multiple times but not helpful and felt redundant   3. Pain Psychology:   4. Surgery:   5. Injections:   6. Chiropractic: Yes   7. Acupuncture:  8. TENS Unit:     Past Medical History:  Past Medical History:   Diagnosis Date     Blepharitis of both eyes      BPH (benign prostatic hyperplasia)      Diabetes (H)      Diabetic neuropathy (H)      Diabetic retinopathy associated with diabetes mellitus due to underlying condition (H)      Dry eye syndrome      GERD (gastroesophageal reflux disease)      Goiter      Granulomatous disease (H)      HLD (hyperlipidemia)      HTN (hypertension)      Nonsenile cataract       Peripheral neuropathy        Past Surgical History:  Past Surgical History:   Procedure Laterality Date     ------------OTHER-------------      back of neck abscess drainage in OR     AS RAD RESEC TONSIL/PILLARS Bilateral 1961     CATARACT IOL, RT/LT Left      COLONOSCOPY  7/29/2013    Procedure: COLONOSCOPY;;  Surgeon: Montana Pascal MD;  Location: [facility]     EXCISE MASS UPPER EXTREMITY Right 11/11/2019    Procedure: EXCISION, MASS, UPPER EXTREMITY, RIGHT SHOULDER;  Surgeon: Johana Choudhury MD;  Location[facility]     INTRAVITREAL INJECTION CHEMOTHERAPY Right 12/30/2019    Procedure: INTRAVITREAL Bevacizumab injection;  Surgeon: Milton Maki MD;  [facility]     PHACOEMULSIFICATION CLEAR CORNEA WITH STANDARD INTRAOCULAR LENS IMPLANT Right 12/30/2019    Procedure: PHACOEMULSIFICATION, CATARACT, WITH INTRAOCULAR LENS IMPLANT;  Surgeon: Milton Maki MD;  [facility]     PHACOEMULSIFICATION WITH STANDARD INTRAOCULAR LENS IMPLANT Left 6/21/2019    Procedure: Left Eye Cataract Removal with Intraocular Lens Implant with Intraoperative Avastin Injection;  Surgeon: Lacey Eugene MD;  Location: [facility] OR     siladenatis  11/2017       Medications:  Current Outpatient Medications   Medication Sig Dispense Refill     albuterol (VENTOLIN HFA) 108 (90 Base) MCG/ACT inhaler Inhale 2 puffs into the lungs every 6 hours 18 g 3     alpha-lipoic acid 600 MG capsule Take 1 capsule (600 mg) by mouth daily 90 capsule 3     amLODIPine (NORVASC) 5 MG tablet Take 1 tablet (5 mg) by mouth At Bedtime 90 tablet 3     ARTIFICIAL TEAR OP Apply to eye as needed       aspirin 81 MG tablet Take 1 tablet by mouth At Bedtime        atorvastatin (LIPITOR) 20 MG tablet Take 1 tablet (20 mg) by mouth daily 90 tablet 3     blood glucose (NO BRAND SPECIFIED) lancets standard Lancets that go with device, Test 3 times daily 300 each 3     blood glucose monitoring (NO BRAND SPECIFIED) meter device kit Any meter covered by  insurance, not store brand, use as directed. 1 kit 0     blood glucose monitoring (NO BRAND SPECIFIED) test strip Strips that go with meter, covered by insurance. Test 3 times daily 300 strip 3     Blood Pressure Monitor KIT Automatic Blood Pressure Monitor 1 kit 0     camphor-menthol (DERMASARRA) 0.5-0.5 % external lotion Apply lotion 2-3 times per day to itchy areas. If not improving in two weeks, follow up with PCP. 222 mL 0     camphor-menthol (DERMASARRA) 0.5-0.5 % external lotion Apply to arms legs torso 1-2 times a day for itching 1 Bottle 11     cholecalciferol (VITAMIN D3) 25 mcg (1000 units) capsule Take 2 capsules (50 mcg) by mouth daily 180 capsule 3     clobetasol (TEMOVATE) 0.05 % ointment Apply topically to legs twice daily for 2 weeks.  Then discontinue (Patient taking differently: Apply topically to legs twice daily for 2 weeks.  Then discontinue) 30 g 0     clotrimazole (LOTRIMIN) 1 % cream Apply topically 2 times daily 30 g 3     Continuous Blood Gluc Sensor (FREESTYLE PETER 2 SENSOR) [facility] 1 each every 14 days 1 each every 14 days. Change every 14 days. 2 each 5     dextromethorphan-guaiFENesin (MUCINEX DM)  MG 12 hr tablet Take 1 tablet by mouth every 12 hours 30 tablet 0     dextromethorphan-guaiFENesin (TUSSIN DM)  MG/5ML liquid Take 5 mLs by mouth 2 times daily as needed 118 mL 0     diclofenac (VOLTAREN) 1 % topical gel Apply 4 grams to thigh up to 4 times daily for pain 100 g 1     empagliflozin (JARDIANCE) 10 MG TABS tablet Take 1 tablet (10 mg) by mouth daily 90 tablet 0     famotidine (PEPCID) 20 MG tablet Take 1 tablet (20 mg) by mouth 2 times daily 180 tablet 3     fenofibrate 54 MG tablet Take 1 tablet (54 mg) by mouth daily (Patient taking differently: Take 54 mg by mouth At Bedtime) 90 tablet 0     finasteride (PROSCAR) 5 MG tablet Take 1 tablet (5 mg) by mouth daily (Please keep visit for 9/1/2022 for further refills) Thank you 90 tablet 0     fluocinonide (LIDEX) 0.05 %  "external cream   3     fluticasone (FLONASE) 50 MCG/ACT nasal spray Spray 1 spray into both nostrils daily 16 g 0     fluticasone (FLOVENT HFA) 110 MCG/ACT inhaler Inhale 1 puff into the lungs 2 times daily 12 g 11     gabapentin (NEURONTIN) 300 MG capsule Take 1 capsule (300 mg) by mouth 2 times daily 60 capsule 3     gabapentin (NEURONTIN) 300 MG capsule TAKE 1 CAPSULE BY MOUTH TWICE A  capsule 0     HUMALOG KWIKPEN 100 UNIT/ML soln Inject 15-17  units with meals, plus correction. Pt uses approx 65 units in 24 hrs. 60 mL 1     insulin degludec (TRESIBA FLEXTOUCH) 100 UNIT/ML pen Inject 64 units subcu at bedtime. 60 mL 3     insulin pen needle (B-D U/F) 31G X 5 MM miscellaneous Use 4 time(s) per day.  Please dispense as BD Pen Needle Mini U/F 31G x 5  each 3     insulin pen needle (B-D U/F) 31G X 5 MM Use 4 times per day.  Please dispense as BD Pen Needle Mini U/F 31G x 5  each 3     insulin syringe 31G X 5/16\" 0.5 ML MISC Use three syringes daily 270 each 1     ketamine 5% gabapentin 8% lidocaine 2.5% topical PLO cream Apply 1 g topically 3 times daily 30 g 3     loratadine (CLARITIN) 10 MG tablet Take 1 tablet (10 mg) by mouth daily 90 tablet 0     losartan (COZAAR) 100 MG tablet Take 1 tablet (100 mg) by mouth At Bedtime 90 tablet 3     mupirocin (BACTROBAN) 2 % cream Apply  topically. In very small amounts only as needed 15 g 1     Omega-3 Fatty Acids (OMEGA-3 FISH OIL) 1000 MG CAPS Take 1 capsule (1 g) by mouth 2 times daily 60 capsule 11     ONETOUCH ULTRA test strip Use to test blood sugar 3 times daily 300 strip 3     semaglutide (OZEMPIC, 0.25 OR 0.5 MG/DOSE,) 2 MG/1.5ML SOPN pen Inject 0.5 mg subcutaneous once a week. 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  HEAD/EYES:  headache, dizziness, or vision changes.    EARS/NOSE/THROAT: nosebleeds, hearing loss, sinus infection, earache, tinnitus.  IMMUNE:  allergies, cancer, immune deficiency, or infections.  SKIN:  itching, rash, hives  HEME/Lymphatic: anemia, easy bruising, easy bleeding.  RESPIRATORY: cough, wheezing, or shortness of breath  CARDIOVASCULAR/Circulation: extremity edema, syncope, hypertension, tachycardia, or angina.  GASTROINTESTINAL: abdominal pain, nausea/emesis, diarrhea, constipation,  hematochezia, or melena.  ENDOCRINE:  diabetes, steroid use,  thyroid disease or osteoporosis.  MUSCULOSKELETAL: joint pain, stiffness, neck pain, back pain, arthritis, or gout.  GENITOURINARY: frequency, urgency, dysuria, difficulty voiding, hematuria or incontinence.  NEUROLOGIC: weakness, numbness, paresthesias, seizure, tremor, stroke or memory loss.  PSYCHIATRIC: depression, anxiety, stress, suicidal thoughts or mood swings.     Objective:    Imaging:  MR LUMBAR SPINE W/O CONTRAST 11/12/2021 6:36 AM     Provided History: Blood cholesterol increased compared with  prior  measurement; Vitamin D deficiency; Other chronic back pain; Other  chronic back pain; Pain of lower extremity, unspecified laterality     ICD-10: Blood cholesterol increased compared with prior measurement;  Vitamin D deficiency; Other chronic back pain; Other chronic back  pain; Pain of lower extremity, unspecified laterality     Comparison: No comparison studies     Technique: Sagittal T1-weighted, sagittal STIR, 3D volumetric axial  and sagittal reconstructed T2-weighted images of the lumbar spine were  obtained without intravenous contrast.      Findings:   There are 5 lumbar-type vertebrae.  The tip of the conus medullaris is  at L1.  Normal lumbar vertebral alignment. Multilevel disc height  narrowing and disc desiccation. Schmorl's nodes at the upper and lower  endplate of L4 with associated loss in the vertebral body height.  Normal marrow signal.     On a level by level basis:     T11-12: Left foraminal protrusion with associated mild to moderate  left neural foraminal stenosis. Bilateral facet hypertrophy. No spinal  canal or right neural foraminal stenosis.     T12-L1: Bilateral facet arthrosis and disc bulge. No significant  spinal canal or neural foraminal stenosis.     L1-2: No spinal canal or neuroforaminal stenosis.     L2-3: Disc bulge and bilateral facet hypertrophy. Mild bilateral  neural foraminal and mild spinal canal stenosis.     L3-4: Disc osteophyte complex and bilateral facet arthrosis.  Thickening of the ligamentum flavum. Mild to moderate bilateral neural  foraminal and mild spinal canal stenosis.     L4-5: Disc bulge and superimposed left foraminal protrusion. Bilateral  facet hypertrophy. Thickening of the ligamentum flavum. Severe left  and moderate right neural foraminal stenosis. Impingement of the  exiting left nerve root. Mild-to-moderate spinal canal stenosis.     L5-S1: Disc osteophyte complex and superimposed central protrusion.  Bilateral subpectoral. Moderate to severe  bilateral neural foraminal  stenosis. Impingement of the exiting nerve roots. Mild spinal canal  stenosis with effacement of the lateral recesses.     Paraspinous tissues are within normal limits.                                                                      Impression: Multilevel lumbar spondylosis with mild-to-moderate spinal  canal stenosis at L4-5 and mild spinal canal stenosis at L3-4 and  L5-S1. Severe left neural foraminal stenosis at L4-5 and moderate  severe bilaterally at L5-S1 with impingement of exiting nerve roots.     LAKSHMI ÁLVAREZ MD     Physical Exam:  Vitals:    09/02/22 1005   BP: 119/56   BP Location: Right arm   Patient Position: Chair   Cuff Size: Adult Large   Pulse: 87   SpO2: 98%     Exam:  Constitutional: Well developed, well nourished, appears stated age.  HEENT: Head atraumatic, normocephalic. Eyes without conjunctival injection or jaundice. Oropharynx clear. Neck supple. No obvious neck masses.  Gastrointestinal: Normoactive bowel sounds. Abdomen soft, non-tender, without guarding/rebound.  : Deferred.   Skin: No rash, lesions, or petechiae of exposed skin.   Extremities: Peripheral pulses intact. No clubbing, cyanosis, or edema.  Psychiatric/mental status: Alert, without lethargy or stupor. Speech fluent. Appropriate affect. Mood normal. Able to follow commands without difficulty.     Musculoskeletal exam:  Able to walk on the heels and toes without difficulty. Patient has non-antalgic gait.   Normal bulk and tone. Unremarkable spinal curvature.     Cervical spine:  Range of motion within normal limits   Tenderness in the cervical paraspinal muscles.Yes  Rotation/ext to right: pain free  Rotation/ext to left: pain free    Thoracic spine:    Kyphosis. No   Tenderness in the thoracic paraspinal muscles.Yes - lower thoracic paraspinals.    Lumbar spine:    Flex:  60 degrees   Ext: 20 degrees   Tenderness in the lumbar paraspinal muscles.Yes   Rotation/ext to right: painful     Rotation/ext to left: painful     Myofascial tenderness:  Yes - as noted in focused assessment   Focal tenderness: Positive - bilateral SI joint, gluteal, piriformis, or GT tenderness  Straight leg raise: Negative   ANGY, SI compression test, sacral thrust: positive bilaterally     Hip exam:   Normal internal and external range of motion bilaterally. FADIR - negative.     Neurologic exam:  CN:  Cranial nerves 2-12 are grossly intact  Motor:  5/5 UE and LE strength            Reflexes:     Biceps:     R:  2/4 L: 2/4   Brachioradialis   R:  2/4 L: 2/4   Patella:  R:  2/4 L: 2/4   Achilles:  R:  2/4 L: 2/4    Sensory:   Light touch: normal bilateral upper and lower extremities    No allodynia, dysesthesia, or hyperalgesia.      BILLING TIME DOCUMENTATION:   The total TIME spent on this patient on the date of the encounter/appointment was 75 minutes.      TOTAL TIME includes:   Time spent preparing to see the patient (reviewing records and tests)   Time spent face to face (or over the phone) with the patient   Time spent ordering tests, medications, procedures and referrals   Time spent Referring and communicating with other healthcare professionals   Time spent documenting clinical information in Epic

## 2022-09-02 NOTE — PATIENT INSTRUCTIONS
1.  Pain Physical Therapy:  NO   - He has done PT in the past, which was not particularly helpful. We may consider referring in the future to pain PT if needed to focus on stretching, strengthening, and core exercises.    2.  Pain Psychologist to address relaxation, behavioral change, coping style, and other factors important to improvement.  LIANNE - Richy seems to be coping fairly well with his chronic pain at this time. We may consider referring in the future as needed.    3.  Diagnostic Studies:  Reviewed lumbar MRI from 11/12/21   4.  Medication Management: None     5.  Potential procedures: I am recommending bilateral sacroiliac joint injection with steroid. You will be contacted to schedule your procedure with Dr. Collin Mata.     6.  Referrals: None    7.  Follow up with RAJNI Peralta CNP in 2 to 4 weeks after the procedure.

## 2022-09-06 ENCOUNTER — TELEPHONE (OUTPATIENT)
Dept: ANESTHESIOLOGY | Facility: CLINIC | Age: 72
End: 2022-09-06

## 2022-09-06 ENCOUNTER — TELEPHONE (OUTPATIENT)
Dept: PALLIATIVE MEDICINE | Facility: CLINIC | Age: 72
End: 2022-09-06

## 2022-09-06 PROBLEM — M53.3 PAIN OF BOTH SACROILIAC JOINTS: Status: ACTIVE | Noted: 2022-09-06

## 2022-09-06 NOTE — TELEPHONE ENCOUNTER
RN called patient and left a voicemail to discuss pre-procedure instructions. See other telephone encounter 9/6/22.     Cindy Moore RN

## 2022-09-06 NOTE — LETTER
September 6, 2022      Hello,     Below are your pre-procedure instructions for your upcoming procedure on 9/13 with Dr. Mata.      Procedure Information related to COVID-19     Please call [phone] to schedule, reschedule, or cancel your procedure appointment.   Phones are answered Monday - Friday from 08:00 - 4:30pm.  Leave a voicemail with your name, birth date, and phone number if no one is available to take your call.         You will need to be tested for COVID-19 before your procedure. If you're going home on the same day as your procedure (surgery), you may take an at-home rapid antigen test 1 to 2 days before your procedure. If your test is negative, please take a photo of the test. Show the photo to the nurse when you come in for your procedure. If your test is positive, please update our office right away and your procedure may have to be postponed.     If you do not have access to an at-home rapid test, you will have to be tested at a lab within 4 days of your procedure.  You will be called to schedule your COVID test by a central scheduling team. If you have not been contacted to schedule your test within 4 days of the scheduled procedure, call [phone]     Please be aware that the turn around time for the test is approximately 24-72 hours.   If your results are still pending the day of your procedure, you will be notified as soon as possible as the procedure may be cancelled.     Please note: You will only be contacted for positive and pending results.      The procedure center staff will call you several days before the procedure to review important information that you will need to know for the day of the procedure.   Please contact the clinic if you have further questions about this information.         Information related to Scheduling and Pre-Procedure Instructions:    If you must reschedule your procedure more than two times, you must follow up in clinic before rescheduling  again.    Preparing for your procedure    CAUTION - FAILURE TO FOLLOW THESE PRE-PROCEDURE INSTRUCTIONS WILL RESULT IN YOUR PROCEDURE BEING RESCHEDULED.    Your Procedure: Bilateral sacroiliac joint injection            You must have a  take you home after your procedure. Transportation by taxi or para-transit is okay as long as you have a responsible adult accompany you. You must provide your 's full name and contact number at time of check in.     Fasting Protocol  Please have nothing to eat and drink for 2 hours before the procedure.      Medications If you take any medications, DO NOT STOP. Take your medications as usual the day of your procedure with a sip of water AT LEAST 2 HOURS PRIOR TO ARRIVAL.    Antibiotics If you are currently taking antibiotics, you must complete the entire dose 7 days prior to your scheduled procedure. You must be clear of any signs or symptoms of infection. If you begin antibiotics, please contact our clinic for instructions.     Fever, Chills, or Rash If you experience a fever of higher than 100 degrees, chills, rash, or open wounds during the one week before your procedure, please call the clinic to see if you may proceed with your procedure.      Medication Hold List  **Patients under Cardiology/Neurology care should consult their provider prior to the pain procedure to verify pre-procedure medication instructions. The information below contains general guidelines.**      Blood Thinners If you are taking daily ASPIRIN, PLAVIX, OR OTHER BLOOD THINNERS SUCH AS COUMADIN/WARFARIN, we will need your prescribing doctor to sign a release permitting you to stop these medications. Once approved by your prescribing doctor - STOP ALL BLOOD THINNERS BASED ON THE TIME TABLE BELOW PRIOR TO YOUR PROCEDURE. If you have been instructed to stop WARFARIN(COUMADIN), you must have an INR lab drawn the day before your procedure. . Your INR must be within normal limits before we can  perform your injection. MEDICATIONS CAN BE RESTARTED AFTER YOUR PROCEDURE.    14 DAY HOLD  Ticlid (ticlopidine)    10 DAY HOLD  Effient (Prasugel)    3 DAY HOLD  Xarelto (rivaroxaban) 7 DAY HOLD  Anacin, Bufferin, Ecotrin, Excedrin, Aggrenox (Aspirin)  Brilinta (ticagrelor)  Coumadin (Warfarin)  Pradexa (Dabigatran)  Elmiron (Pentosan)  Plavix (Clopidogrel Bisulfate)  Pletal (Cilostazol)    24 HOUR HOLD  Lovenox (enoxaparin)  Agrylin (Anagrelide)        Non-steroidal Anti-inflammatories (NSAIDs) DO NOT TAKE any non-steroidal anti-inflammatory medications (NSAIDs) listed on the table below. MEDICATIONS CAN BE RESTARTED AFTER YOUR PROCEDURE. Celebrex is OK to take and does not need to be discontinued.     Medications to stop:  3 DAY HOLD  Advil, Motrin (Ibuprofen)  Arthrotec (diciofenac sodium/misoprostol)  Clinoril (Sulindac)  Indocin (Indomethacin)  Lodine (Etodolac)  Toradol (Ketorolac)  Vicoprofen (Hydrocodone and Ibuprofen)  Voltaren (Diclotenac)    14 DAY HOLD  Daypro (Oxaprozin)  Feldene (Piroxicam)   7 DAY HOLD  Aleve (Naproxen sodium)  Darvon compound (contains aspirin)  Naprosyn (Naproxen)  Norgesic Forte (contains aspirin)  Mobic (Meloxicam)  Oruvall (Ketoprofen)  Percodan (contains aspirin)  Relafen (Nabumetone)  Salsalate  Trilisate  Vitamin E (more than 400 mg per day)  Any medication containing aspirin                To speak with a nurse, schedule/reschedule/cancel a clinic appointment, or request a medication refill call: [phone]    You can also reach us by [employer]: [email or web address]

## 2022-09-06 NOTE — TELEPHONE ENCOUNTER
RN called patient and left a voicemail to discuss pre-procedure instructions for upcoming procedure on 9/13 with Dr. Mata. Left call back number to discuss instructions. Writer will mail pre-op instructions to patient's mailing address.     Cindy Moore RN

## 2022-09-06 NOTE — TELEPHONE ENCOUNTER
Pt calling to follow up with Mariann Rand on the last appointment.        Soumya Elaine    [facility] [place]

## 2022-09-06 NOTE — TELEPHONE ENCOUNTER
Patient is scheduled for procedure with Dr. Mata    Spoke with: Patient    Date of Procedure: 09-13-22    Location: [facility] [place] [facility]    Informed patient they will need an adult  Yes    Pre-procedure COVID-19 Test: 09-12-22    Additional comments: N/A    Patient is aware pre-op RN will call 2-3 days prior to procedure with arrival time and instructions. Yes      Vida Alford on 9/6/2022 at 10:52 AM

## 2022-09-08 ENCOUNTER — PRE VISIT (OUTPATIENT)
Dept: UROLOGY | Facility: CLINIC | Age: 72
End: 2022-09-08

## 2022-09-08 DIAGNOSIS — M79.2 POLYNEUROPATHIC PAIN: ICD-10-CM

## 2022-09-08 NOTE — TELEPHONE ENCOUNTER
Reason for visit: follow up     Relevant information: BPH w/LUTS, hx prostate cancer    Records/imaging/labs/orders: in epic    Pt called: no    At Rooming: normal

## 2022-09-11 NOTE — TELEPHONE ENCOUNTER
gabapentin (NEURONTIN) 300 MG capsule      Last Written Prescription Date:  10/6/21  Last Fill Quantity: 60,   # refills: 3  Last Office Visit :7/20/22  Future Office visit:  none    Routing refill request to provider for review/approval because: gap in med rf.  Not on protocol.

## 2022-09-12 ENCOUNTER — LAB (OUTPATIENT)
Dept: LAB | Facility: CLINIC | Age: 72
End: 2022-09-12
Payer: COMMERCIAL

## 2022-09-12 ENCOUNTER — TELEPHONE (OUTPATIENT)
Dept: ENDOCRINOLOGY | Facility: CLINIC | Age: 72
End: 2022-09-12

## 2022-09-12 DIAGNOSIS — C61 MALIGNANT NEOPLASM OF PROSTATE (H): ICD-10-CM

## 2022-09-12 DIAGNOSIS — Z79.4 TYPE 2 DIABETES MELLITUS WITH MODERATE NONPROLIFERATIVE RETINOPATHY WITHOUT MACULAR EDEMA, WITH LONG-TERM CURRENT USE OF INSULIN, UNSPECIFIED LATERALITY (H): ICD-10-CM

## 2022-09-12 DIAGNOSIS — R97.20 ELEVATED PROSTATE SPECIFIC ANTIGEN (PSA): Primary | ICD-10-CM

## 2022-09-12 DIAGNOSIS — E11.3399 TYPE 2 DIABETES MELLITUS WITH MODERATE NONPROLIFERATIVE RETINOPATHY WITHOUT MACULAR EDEMA, WITH LONG-TERM CURRENT USE OF INSULIN, UNSPECIFIED LATERALITY (H): ICD-10-CM

## 2022-09-12 LAB
CREAT SERPL-MCNC: 1.72 MG/DL (ref 0.67–1.17)
GFR SERPL CREATININE-BSD FRML MDRD: 41 ML/MIN/1.73M2
PSA SERPL-MCNC: 2.11 NG/ML (ref 0–6.5)
SARS-COV-2 RNA RESP QL NAA+PROBE: NEGATIVE

## 2022-09-12 PROCEDURE — 36415 COLL VENOUS BLD VENIPUNCTURE: CPT | Performed by: PATHOLOGY

## 2022-09-12 PROCEDURE — 82565 ASSAY OF CREATININE: CPT | Performed by: PATHOLOGY

## 2022-09-12 PROCEDURE — U0003 INFECTIOUS AGENT DETECTION BY NUCLEIC ACID (DNA OR RNA); SEVERE ACUTE RESPIRATORY SYNDROME CORONAVIRUS 2 (SARS-COV-2) (CORONAVIRUS DISEASE [COVID-19]), AMPLIFIED PROBE TECHNIQUE, MAKING USE OF HIGH THROUGHPUT TECHNOLOGIES AS DESCRIBED BY CMS-2020-01-R: HCPCS

## 2022-09-12 PROCEDURE — 84153 ASSAY OF PSA TOTAL: CPT | Performed by: PHYSICIAN ASSISTANT

## 2022-09-12 RX ORDER — GABAPENTIN 300 MG/1
CAPSULE ORAL
Qty: 60 CAPSULE | Refills: 3 | Status: SHIPPED | OUTPATIENT
Start: 2022-09-12 | End: 2023-05-22

## 2022-09-12 NOTE — TELEPHONE ENCOUNTER
He was happy to hear his creatinine  was  Better- improving.  He wanted you to know he take Ozempic 0.5 which is helping the blood sugars Delphine Cooper RN on 9/12/2022 at 3:20 PM

## 2022-09-12 NOTE — TELEPHONE ENCOUNTER
----- Message from Pamela Laura PA-C sent at 9/12/2022  2:56 PM CDT -----  Hi:  Could you please call pt and let him know his kidney lab work has improved with creatinine 1.72.  Thanks rakan laura

## 2022-09-13 ENCOUNTER — HOSPITAL ENCOUNTER (OUTPATIENT)
Facility: AMBULATORY SURGERY CENTER | Age: 72
Discharge: HOME | End: 2022-09-13
Payer: COMMERCIAL

## 2022-09-13 VITALS
HEIGHT: 68 IN | DIASTOLIC BLOOD PRESSURE: 66 MMHG | HEART RATE: 77 BPM | OXYGEN SATURATION: 98 % | WEIGHT: 198 LBS | RESPIRATION RATE: 16 BRPM | SYSTOLIC BLOOD PRESSURE: 112 MMHG | BODY MASS INDEX: 30.01 KG/M2 | TEMPERATURE: 97.1 F

## 2022-09-13 DIAGNOSIS — E11.3399 TYPE 2 DIABETES MELLITUS WITH MODERATE NONPROLIFERATIVE RETINOPATHY WITHOUT MACULAR EDEMA, WITH LONG-TERM CURRENT USE OF INSULIN, UNSPECIFIED LATERALITY (H): ICD-10-CM

## 2022-09-13 DIAGNOSIS — Z79.4 TYPE 2 DIABETES MELLITUS WITH MODERATE NONPROLIFERATIVE RETINOPATHY WITHOUT MACULAR EDEMA, WITH LONG-TERM CURRENT USE OF INSULIN, UNSPECIFIED LATERALITY (H): ICD-10-CM

## 2022-09-13 DIAGNOSIS — M53.3 PAIN OF BOTH SACROILIAC JOINTS: ICD-10-CM

## 2022-09-13 DIAGNOSIS — N40.1 BENIGN PROSTATIC HYPERPLASIA WITH URINARY OBSTRUCTION: ICD-10-CM

## 2022-09-13 DIAGNOSIS — N40.1 BENIGN NON-NODULAR PROSTATIC HYPERPLASIA WITH LOWER URINARY TRACT SYMPTOMS: ICD-10-CM

## 2022-09-13 DIAGNOSIS — N13.8 BENIGN PROSTATIC HYPERPLASIA WITH URINARY OBSTRUCTION: ICD-10-CM

## 2022-09-13 PROCEDURE — G0260 INJ FOR SACROILIAC JT ANESTH: HCPCS | Mod: 50

## 2022-09-13 RX ORDER — LIDOCAINE HYDROCHLORIDE 10 MG/ML
INJECTION, SOLUTION EPIDURAL; INFILTRATION; INTRACAUDAL; PERINEURAL PRN
Status: DISCONTINUED | OUTPATIENT
Start: 2022-09-13 | End: 2022-09-13 | Stop reason: HOSPADM

## 2022-09-13 RX ORDER — METHYLPREDNISOLONE ACETATE 40 MG/ML
INJECTION, SUSPENSION INTRA-ARTICULAR; INTRALESIONAL; INTRAMUSCULAR; SOFT TISSUE PRN
Status: DISCONTINUED | OUTPATIENT
Start: 2022-09-13 | End: 2022-09-13 | Stop reason: HOSPADM

## 2022-09-13 RX ORDER — BUPIVACAINE HYDROCHLORIDE 2.5 MG/ML
INJECTION, SOLUTION EPIDURAL; INFILTRATION; INTRACAUDAL PRN
Status: DISCONTINUED | OUTPATIENT
Start: 2022-09-13 | End: 2022-09-13 | Stop reason: HOSPADM

## 2022-09-13 NOTE — DISCHARGE INSTRUCTIONS
Home Care Instructions after a Sacroiliac Joint Injection        Activity  -You may resume most normal activity levels with the exception of strenuous activity. It is important for us to know if your pain with normal activity is relieved after this injection.  -DO NOT shower for 24 hours  -DO NOT remove bandaid for 24 hours    Pain  -You may experience soreness at the injection site for one or two days  -You may use an ice pack for 20 minutes every 2 hours for the first 24 hours  -You may use a heating pad after the first 24 hours  -You may use Tylenol (acetaminophen) every 4 hours or other pain medicines as directed by your physician    You may experience numbness radiating into your legs or arms (depending on the procedure location). This numbness may last several hours. Until sensation returns to normal; please use caution in walking, climbing stairs, and stepping out of your vehicle, etc.      DID YOU RECEIVE STEROIDS TODAY?  Yes    Common side effects of steroids:  Not everyone will experience corticosteroid side effects. If side effects are experienced, they will gradually subside in the 7-10 day period following an injection. Most common side effects include:  -Flushed face and/or chest  -Feeling of warmth, particularly in the face but could be an overall feeling of warmth  -Increased blood sugar in diabetic patients  -Menstrual irregularities my occur. If taking hormone-based birth control an alternate method of birth control is recommended  -Sleep disturbances and/or mood swings are possible  -Leg cramps      PLEASE KEEP TRACK OF YOUR SYMPTOMS AND NOTE YOUR IMPROVEMENT FOR YOUR DOCTOR.     Please contact us if you have:  -Severe pain  -Fever more than 101.5 degrees Fahrenheit  -Signs of infection at the injection site (redness, swelling, or drainage)    If you have questions, please contact our office at [phone] between the hours of 7:00 am and 3:00 pm Monday through Friday. After office hours you can  contact the on call provider by dialing [phone]. If you need immediate attention, we recommend that you go to a hospital emergency room or dial [phone].

## 2022-09-15 ENCOUNTER — OFFICE VISIT (OUTPATIENT)
Dept: UROLOGY | Facility: CLINIC | Age: 72
End: 2022-09-15
Payer: COMMERCIAL

## 2022-09-15 VITALS
HEIGHT: 68 IN | HEART RATE: 71 BPM | SYSTOLIC BLOOD PRESSURE: 122 MMHG | DIASTOLIC BLOOD PRESSURE: 74 MMHG | WEIGHT: 195 LBS | BODY MASS INDEX: 29.55 KG/M2

## 2022-09-15 DIAGNOSIS — R39.15 URINARY URGENCY: Primary | ICD-10-CM

## 2022-09-15 LAB
ALBUMIN UR-MCNC: 300 MG/DL
APPEARANCE UR: CLEAR
BILIRUB UR QL STRIP: NEGATIVE
COLOR UR AUTO: YELLOW
GLUCOSE UR STRIP-MCNC: 1000 MG/DL
HGB UR QL STRIP: ABNORMAL
KETONES UR STRIP-MCNC: NEGATIVE MG/DL
LEUKOCYTE ESTERASE UR QL STRIP: NEGATIVE
MUCOUS THREADS #/AREA URNS LPF: PRESENT /LPF
NITRATE UR QL: NEGATIVE
PH UR STRIP: 5 [PH] (ref 5–7)
RBC URINE: 0 /HPF
SP GR UR STRIP: 1.02 (ref 1–1.03)
SQUAMOUS EPITHELIAL: <1 /HPF
UROBILINOGEN UR STRIP-MCNC: NORMAL MG/DL
WBC URINE: <1 /HPF

## 2022-09-15 PROCEDURE — 51798 US URINE CAPACITY MEASURE: CPT | Performed by: PHYSICIAN ASSISTANT

## 2022-09-15 PROCEDURE — 81001 URINALYSIS AUTO W/SCOPE: CPT | Performed by: PATHOLOGY

## 2022-09-15 PROCEDURE — 99213 OFFICE O/P EST LOW 20 MIN: CPT | Mod: 25 | Performed by: PHYSICIAN ASSISTANT

## 2022-09-15 RX ORDER — SEMAGLUTIDE 1.34 MG/ML
INJECTION, SOLUTION SUBCUTANEOUS
Refills: 3 | OUTPATIENT
Start: 2022-09-15

## 2022-09-15 RX ORDER — MIRABEGRON 25 MG/1
25 TABLET, FILM COATED, EXTENDED RELEASE ORAL DAILY
Qty: 30 TABLET | Refills: 5 | Status: SHIPPED | OUTPATIENT
Start: 2022-09-15 | End: 2023-02-03

## 2022-09-15 ASSESSMENT — PAIN SCALES - GENERAL: PAINLEVEL: NO PAIN (0)

## 2022-09-15 NOTE — LETTER
9/15/2022       RE: Earle Rene  [address]  [place] [address]     Dear Colleague,    Thank you for referring your patient, Earle Rene, to the [facility] [place] [facility]. Please see a copy of my visit note below.    HPI: Mr. Earle Rene is a 72 year old year old male presenting today for evaluation of chief complaint(s): Follow Up (PSA)    Today, he is unaccompanied    Mr. Rene is a 72M with PMH significant for HTN, HLD, IDDM, neuropathy (on neurontin), copd, gerd, low risk prostate cancer with BPH (on proscar and tamsulosin 0.4mg daily) who was last seen in Urology on 4/9/21 by Dr. Bland.  At that time, PSA was 2.90ug/L and sCr was elevated (1.68mg/dL). Dr. Bland recommended follow up in 6-12 months for recheck PSA.    Urinary symptoms:   - Nocturia x 2.  Can have some urgency and can have urge incontinence.   Can have some pain at the tip of the penis with urinates. Stream is medium caliber.  Feels he isn't emptying properly the first time but then double voids and he empties.    - Sometimes drinks caffeinated tea.  One cup per day but he does like it strong.    - Fluid intake - 3-4 bottles of water (important to drink a lot of water with his diabetic medications), 1 cup of tea.    - DIabetes has been under decent control (6/9/22 - 7.9%).    - Just had an injection in his back (for pain) - unsure whether or not it is helping.    - already has problem with constipation.  BMs are erratic (either doesn't go for 2-3 days or goes every 1/2 hour) - this is a new problem for him.      REVIEW OF DIAGNOSTICS:  9/12/22 - PSA 2.11ug/L  4/9/21 - PSA 2.90ug/L  2/27/20 - PSA 1.94  2/9/20 - Prostate MRI - prostate volume 103 grams, PIRADS 2  7/3/19 - Prostate MRI  - prostate volume 86 grams, PIRADS 2  5/28/19 - PSA 3.61  5/24/18 - PSA 2.14  11/1/17 - PSA 2.52  8/3/17 - FINASTERIDE WAS BEGUN  7/27/17 - prostate biopsy (  Weight)  - [place] 6 (3+3) in 1mm involving 1 core, otherwise benign  6/18/17 - Prostate MRI - prostate volume 110 grams, PIRADS 4  6/6/17 - PSA 8.64   1/14/16 - PSA 3.75    Current Outpatient Medications   Medication Sig Dispense Refill     albuterol (VENTOLIN HFA) 108 (90 Base) MCG/ACT inhaler Inhale 2 puffs into the lungs every 6 hours 18 g 3     alpha-lipoic acid 600 MG capsule Take 1 capsule (600 mg) by mouth daily 90 capsule 3     amLODIPine (NORVASC) 5 MG tablet Take 1 tablet (5 mg) by mouth At Bedtime 90 tablet 3     ARTIFICIAL TEAR OP Apply to eye as needed       aspirin 81 MG tablet Take 1 tablet by mouth At Bedtime        atorvastatin (LIPITOR) 20 MG tablet Take 1 tablet (20 mg) by mouth daily 90 tablet 3     blood glucose (NO BRAND SPECIFIED) lancets standard Lancets that go with device, Test 3 times daily 300 each 3     blood glucose monitoring (NO BRAND SPECIFIED) meter device kit Any meter covered by insurance, not store brand, use as directed. 1 kit 0     blood glucose monitoring (NO BRAND SPECIFIED) test strip Strips that go with meter, covered by insurance. Test 3 times daily 300 strip 3     Blood Pressure Monitor KIT Automatic Blood Pressure Monitor 1 kit 0     camphor-menthol (DERMASARRA) 0.5-0.5 % external lotion Apply lotion 2-3 times per day to itchy areas. If not improving in two weeks, follow up with PCP. 222 mL 0     camphor-menthol (DERMASARRA) 0.5-0.5 % external lotion Apply to arms legs torso 1-2 times a day for itching 1 Bottle 11     cholecalciferol (VITAMIN D3) 25 mcg (1000 units) capsule Take 2 capsules (50 mcg) by mouth daily 180 capsule 3     clobetasol (TEMOVATE) 0.05 % ointment Apply topically to legs twice daily for 2 weeks.  Then discontinue (Patient taking differently: Apply topically to legs twice daily for 2 weeks.  Then discontinue) 30 g 0     clotrimazole (LOTRIMIN) 1 % cream Apply topically 2 times daily 30 g 3     Continuous Blood Gluc Sensor (FREESTYLE PETER 2 SENSOR)  "MISC 1 each every 14 days 1 each every 14 days. Change every 14 days. 2 each 5     dextromethorphan-guaiFENesin (MUCINEX DM)  MG 12 hr tablet Take 1 tablet by mouth every 12 hours 30 tablet 0     dextromethorphan-guaiFENesin (TUSSIN DM)  MG/5ML liquid Take 5 mLs by mouth 2 times daily as needed 118 mL 0     diclofenac (VOLTAREN) 1 % topical gel Apply 4 grams to thigh up to 4 times daily for pain 100 g 1     empagliflozin (JARDIANCE) 10 MG TABS tablet Take 1 tablet (10 mg) by mouth daily 90 tablet 0     famotidine (PEPCID) 20 MG tablet Take 1 tablet (20 mg) by mouth 2 times daily 180 tablet 3     fenofibrate 54 MG tablet Take 1 tablet (54 mg) by mouth daily (Patient taking differently: Take 54 mg by mouth At Bedtime) 90 tablet 0     finasteride (PROSCAR) 5 MG tablet Take 1 tablet (5 mg) by mouth daily (Please keep visit for 9/1/2022 for further refills) Thank you 90 tablet 0     fluocinonide (LIDEX) 0.05 % external cream   3     fluticasone (FLONASE) 50 MCG/ACT nasal spray Spray 1 spray into both nostrils daily 16 g 0     fluticasone (FLOVENT HFA) 110 MCG/ACT inhaler Inhale 1 puff into the lungs 2 times daily 12 g 11     gabapentin (NEURONTIN) 300 MG capsule TAKE 1 CAPSULE BY MOUTH TWICE A DAY 60 capsule 3     gabapentin (NEURONTIN) 300 MG capsule TAKE 1 CAPSULE BY MOUTH TWICE A  capsule 0     HUMALOG KWIKPEN 100 UNIT/ML soln Inject 15-17  units with meals, plus correction. Pt uses approx 65 units in 24 hrs. 60 mL 1     insulin degludec (TRESIBA FLEXTOUCH) 100 UNIT/ML pen Inject 64 units subcu at bedtime. 60 mL 3     insulin pen needle (B-D U/F) 31G X 5 MM miscellaneous Use 4 time(s) per day.  Please dispense as BD Pen Needle Mini U/F 31G x 5  each 3     insulin pen needle (B-D U/F) 31G X 5 MM Use 4 times per day.  Please dispense as BD Pen Needle Mini U/F 31G x 5  each 3     insulin syringe 31G X 5/16\" 0.5 ML MISC Use three syringes daily 270 each 1     ketamine 5% gabapentin 8% " "lidocaine 2.5% topical PLO cream Apply 1 g topically 3 times daily 30 g 3     loratadine (CLARITIN) 10 MG tablet Take 1 tablet (10 mg) by mouth daily 90 tablet 0     losartan (COZAAR) 100 MG tablet Take 1 tablet (100 mg) by mouth At Bedtime 90 tablet 3     mupirocin (BACTROBAN) 2 % cream Apply  topically. In very small amounts only as needed 15 g 1     Omega-3 Fatty Acids (OMEGA-3 FISH OIL) 1000 MG CAPS Take 1 capsule (1 g) by mouth 2 times daily 60 capsule 11     ONETOUCH ULTRA test strip Use to test blood sugar 3 times daily 300 strip 3     semaglutide (OZEMPIC, 0.25 OR 0.5 MG/DOSE,) 2 MG/1.5ML SOPN pen Inject 0.5 mg subcutaneous once a week. 1.5 mL 3     sodium bicarbonate 650 MG tablet Take 1 tablet (650 mg) by mouth 3 times daily 90 tablet 11     tamsulosin (FLOMAX) 0.4 MG capsule Take 1 capsule (0.4 mg) by mouth daily Please keep appt 9/1/22 30 capsule 0     triamcinolone (KENALOG) 0.1 % cream Apply topically 3 times daily 80 g 0     VITAMIN D3 25 MCG (1000 UT) tablet TAKE 2 TABLETS (50 MCG) BY MOUTH DAILY         ALLERGIES: Patient has no known allergies.      REVIEW OF SYSTEMS:  As above in HPI    GENERAL PHYSICAL EXAM:   Vitals: /74   Pulse 71   Ht 1.727 m (5' 8\")   Wt 88.5 kg (195 lb)   BMI 29.65 kg/m    Body mass index is 29.65 kg/m .    GENERAL: Well groomed, well developed, well nourished male in NAD.  NEURO: Alert and oriented x 3.  PSYCH: Normal mood and affect, pleasant and agreeable during interview and exam.    NALLELY:      Normal rectal tone     Very large prostate, without tenderness, nodules. + firmness on right side    PVR: Residual urine by ultrasound was 25 ml.      RADIOLOGY: The following tests were reviewed:   MRI PROSTATE: 2/9/2020 4:42 PM     CLINICAL HISTORY: prostate cancer; Malignant neoplasm of prostate (H)  . [place] 6, 3+3 at the right base on prior biopsy.     Most Recent PSA: 3.61 ug/L     Comparison: 7/3/2019.     TECHNIQUE:  The following sequences were obtained: " High-resolution axial  T2-weighted, coronal T2-weighted, 3D volumetric T2-weighted, axial  pre-contrast T1, axial diffusion-weighted, axial apparent diffusion  coefficient and axial dynamic contrast-enhanced T1. Postcontrast  images were evaluated on a separate workstation to evaluate dynamic  contrast enhancement. The technique of this exam is PI-RADS v2.1  compliant. Contrast dose: 9 mL Gadavist     FINDINGS:  Size: 103 grams  Hemorrhage: Absent  Peripheral zone: Heterogeneous on T2-weighted images. Regions of  mildly decreased signal on ADC or DWI which are best characterized as  PI-RADS 2 without highly suspicious lesion.  Transition zone: Enlarged with BPH changes. Transition zone nodules  which are circumscribed or mostly encapsulated without diffusion  restriction.  PI-RADS 2.  No highly suspicious nodules. Prominent  median lobe impressing the urinary bladder floor. Extruded BPH nodule  at the right base, 7:00 position.     Neurovascular bundles: No neurovascular bundle involvement by  malignancy.    Seminal vesicles: No seminal vesicle involvement by malignancy.   Lymph nodes: No lymph node involvement   Bones: Heterogeneous bone marrow signal. Degenerative changes of the  spine. Heterogeneous bone enhancement with foci of enhancement in the  bones, for example in the left iliac bone, series 16 image 10 as well  as in the right innominate bone posteriorly, series 16 image 10,  indeterminate however stable. Enthesopathic changes off the pelvis and  hips. Stable wedge compression deformity of the vertebral body L4,  partially visualized.  Other pelvic organs: Urinary bladder wall thickening and trabeculation  likely due to chronic outlet obstructive changes.                                                                   IMPRESSION:  1. Based on the most suspicious abnormality, this exam is  characterized as PIRADS 2 - Clinically significant cancer is unlikely  to be present.  2. No convincing suspicious  adenopathy or evidence of pelvic  metastases.  3. Heterogeneous bone marrow signal and enhancement with scattered  foci of enhancement, indeterminate however stable from 7/3/2019.  Attention on follow-up studies.    LABS: The last test results for Mr. Earle Rene were reviewed:  PSA -   Lab Results   Component Value Date    PSA 2.11 09/12/2022    PSA 2.90 04/09/2021    PSA 1.94 02/27/2020    PSA 3.61 05/28/2019    PSA 2.14 05/24/2018    PSA 2.52 11/01/2017    PSA 8.64 06/06/2017    PSA 3.75 01/14/2016    PSA 2.92 09/09/2014    PSA 2.18 06/12/2013    PSA 1.77 02/02/2012     BMP -   Recent Labs   Lab Test 09/12/22  0941 06/09/22  1333 12/08/21  1428 07/30/21  1005 06/09/21  1524 12/18/19  1218 07/02/19  0947 08/01/18  1222 05/24/18  1414 05/01/18  1233   NA  --  139 140  --  142   < >  --    < >  --  141   POTASSIUM  --  4.3 4.2  --  4.2   < >  --    < >  --  4.7   CHLORIDE  --  107 107  --  111*   < >  --    < >  --  110*   CO2  --  24 24  --  22   < >  --    < >  --  21   BUN  --  33* 31*  --  20   < >  --    < >  --  20   CR 1.72* 2.04* 1.66*   < > 1.45*   < >  --    < >  --  1.28*   GLC  --  142* 242*  --  211*   < >  --    < >  --  149*   INDERJIT  --  9.5 10.2*  --  8.8   < >  --    < >  --  9.8   MAG  --   --   --   --   --   --  1.9  --  2.1 2.1   PHOS  --  4.0 4.6*  --  2.9   < >  --    < >  --  3.3    < > = values in this interval not displayed.       CBC -   Recent Labs   Lab Test 12/08/21  1428 06/09/21  1524 03/12/21  1324 01/29/20  1315 06/18/19  1054 03/13/19  1516   WBC  --   --  5.1  --  4.6 5.2   HGB 15.0 13.6 14.1   < > 13.5 13.7   PLT  --   --  156  --  130* 131*    < > = values in this interval not displayed.       ASSESSMENT:   1) prostate cancer - stable PSA  2) urinary urgency  3) abnormal NALLELY    PLAN:   - Continue tamsulosin and finasteride - refilled  - No previous DREs for comparison  - Urinalysis  - Postvoid residual 25cc  - Watch caffeine. Switch to tea with less caffeine.    - Start  Myrbetriq 25mg daily.  May take a couple weeks before you notice less urinary urgency. Let me know if this medication is too expensive.    - Please schedule an appointment with your PCP to discuss your change in bowel habits.  You may need a colonoscopy.   - REturn in 3 months to re-evaluate urinary symptoms and recheck prostate exam  - PSA in 1 year.      VISIT DURATION: 25 minutes (3:29 - 3:49 + 5 minutes documentation on DOS)    Qian Villalta PA-C  Department of Urologic Surgery

## 2022-09-15 NOTE — PROGRESS NOTES
HPI: Mr. Earle Rene is a 72 year old year old male presenting today for evaluation of chief complaint(s): Follow Up (PSA)    Today, he is unaccompanied    Mr. Rene is a 72M with PMH significant for HTN, HLD, IDDM, neuropathy (on neurontin), copd, gerd, low risk prostate cancer with BPH (on proscar and tamsulosin 0.4mg daily) who was last seen in Urology on 4/9/21 by Dr. Bland.  At that time, PSA was 2.90ug/L and sCr was elevated (1.68mg/dL). Dr. Bland recommended follow up in 6-12 months for recheck PSA.    Urinary symptoms:   - Nocturia x 2.  Can have some urgency and can have urge incontinence.   Can have some pain at the tip of the penis with urinates. Stream is medium caliber.  Feels he isn't emptying properly the first time but then double voids and he empties.    - Sometimes drinks caffeinated tea.  One cup per day but he does like it strong.    - Fluid intake - 3-4 bottles of water (important to drink a lot of water with his diabetic medications), 1 cup of tea.    - DIabetes has been under decent control (6/9/22 - 7.9%).    - Just had an injection in his back (for pain) - unsure whether or not it is helping.    - already has problem with constipation.  BMs are erratic (either doesn't go for 2-3 days or goes every 1/2 hour) - this is a new problem for him.      REVIEW OF DIAGNOSTICS:  9/12/22 - PSA 2.11ug/L  4/9/21 - PSA 2.90ug/L  2/27/20 - PSA 1.94  2/9/20 - Prostate MRI - prostate volume 103 grams, PIRADS 2  7/3/19 - Prostate MRI  - prostate volume 86 grams, PIRADS 2  5/28/19 - PSA 3.61  5/24/18 - PSA 2.14  11/1/17 - PSA 2.52  8/3/17 - FINASTERIDE WAS BEGUN  7/27/17 - prostate biopsy (Dr. Cantu)  - Jameel 6 (3+3) in 1mm involving 1 core, otherwise benign  6/18/17 - Prostate MRI - prostate volume 110 grams, PIRADS 4  6/6/17 - PSA 8.64   1/14/16 - PSA 3.75    Current Outpatient Medications   Medication Sig Dispense Refill     albuterol (VENTOLIN HFA) 108 (90 Base) MCG/ACT inhaler Inhale 2 puffs into the  lungs every 6 hours 18 g 3     alpha-lipoic acid 600 MG capsule Take 1 capsule (600 mg) by mouth daily 90 capsule 3     amLODIPine (NORVASC) 5 MG tablet Take 1 tablet (5 mg) by mouth At Bedtime 90 tablet 3     ARTIFICIAL TEAR OP Apply to eye as needed       aspirin 81 MG tablet Take 1 tablet by mouth At Bedtime        atorvastatin (LIPITOR) 20 MG tablet Take 1 tablet (20 mg) by mouth daily 90 tablet 3     blood glucose (NO BRAND SPECIFIED) lancets standard Lancets that go with device, Test 3 times daily 300 each 3     blood glucose monitoring (NO BRAND SPECIFIED) meter device kit Any meter covered by insurance, not store brand, use as directed. 1 kit 0     blood glucose monitoring (NO BRAND SPECIFIED) test strip Strips that go with meter, covered by insurance. Test 3 times daily 300 strip 3     Blood Pressure Monitor KIT Automatic Blood Pressure Monitor 1 kit 0     camphor-menthol (DERMASARRA) 0.5-0.5 % external lotion Apply lotion 2-3 times per day to itchy areas. If not improving in two weeks, follow up with PCP. 222 mL 0     camphor-menthol (DERMASARRA) 0.5-0.5 % external lotion Apply to arms legs torso 1-2 times a day for itching 1 Bottle 11     cholecalciferol (VITAMIN D3) 25 mcg (1000 units) capsule Take 2 capsules (50 mcg) by mouth daily 180 capsule 3     clobetasol (TEMOVATE) 0.05 % ointment Apply topically to legs twice daily for 2 weeks.  Then discontinue (Patient taking differently: Apply topically to legs twice daily for 2 weeks.  Then discontinue) 30 g 0     clotrimazole (LOTRIMIN) 1 % cream Apply topically 2 times daily 30 g 3     Continuous Blood Gluc Sensor (FREESTYLE PETER 2 SENSOR) MISC 1 each every 14 days 1 each every 14 days. Change every 14 days. 2 each 5     dextromethorphan-guaiFENesin (MUCINEX DM)  MG 12 hr tablet Take 1 tablet by mouth every 12 hours 30 tablet 0     dextromethorphan-guaiFENesin (TUSSIN DM)  MG/5ML liquid Take 5 mLs by mouth 2 times daily as needed 118 mL 0      "diclofenac (VOLTAREN) 1 % topical gel Apply 4 grams to thigh up to 4 times daily for pain 100 g 1     empagliflozin (JARDIANCE) 10 MG TABS tablet Take 1 tablet (10 mg) by mouth daily 90 tablet 0     famotidine (PEPCID) 20 MG tablet Take 1 tablet (20 mg) by mouth 2 times daily 180 tablet 3     fenofibrate 54 MG tablet Take 1 tablet (54 mg) by mouth daily (Patient taking differently: Take 54 mg by mouth At Bedtime) 90 tablet 0     finasteride (PROSCAR) 5 MG tablet Take 1 tablet (5 mg) by mouth daily (Please keep visit for 9/1/2022 for further refills) Thank you 90 tablet 0     fluocinonide (LIDEX) 0.05 % external cream   3     fluticasone (FLONASE) 50 MCG/ACT nasal spray Spray 1 spray into both nostrils daily 16 g 0     fluticasone (FLOVENT HFA) 110 MCG/ACT inhaler Inhale 1 puff into the lungs 2 times daily 12 g 11     gabapentin (NEURONTIN) 300 MG capsule TAKE 1 CAPSULE BY MOUTH TWICE A DAY 60 capsule 3     gabapentin (NEURONTIN) 300 MG capsule TAKE 1 CAPSULE BY MOUTH TWICE A  capsule 0     HUMALOG KWIKPEN 100 UNIT/ML soln Inject 15-17  units with meals, plus correction. Pt uses approx 65 units in 24 hrs. 60 mL 1     insulin degludec (TRESIBA FLEXTOUCH) 100 UNIT/ML pen Inject 64 units subcu at bedtime. 60 mL 3     insulin pen needle (B-D U/F) 31G X 5 MM miscellaneous Use 4 time(s) per day.  Please dispense as BD Pen Needle Mini U/F 31G x 5  each 3     insulin pen needle (B-D U/F) 31G X 5 MM Use 4 times per day.  Please dispense as BD Pen Needle Mini U/F 31G x 5  each 3     insulin syringe 31G X 5/16\" 0.5 ML MISC Use three syringes daily 270 each 1     ketamine 5% gabapentin 8% lidocaine 2.5% topical PLO cream Apply 1 g topically 3 times daily 30 g 3     loratadine (CLARITIN) 10 MG tablet Take 1 tablet (10 mg) by mouth daily 90 tablet 0     losartan (COZAAR) 100 MG tablet Take 1 tablet (100 mg) by mouth At Bedtime 90 tablet 3     mupirocin (BACTROBAN) 2 % cream Apply  topically. In very small " "amounts only as needed 15 g 1     Omega-3 Fatty Acids (OMEGA-3 FISH OIL) 1000 MG CAPS Take 1 capsule (1 g) by mouth 2 times daily 60 capsule 11     ONETOUCH ULTRA test strip Use to test blood sugar 3 times daily 300 strip 3     semaglutide (OZEMPIC, 0.25 OR 0.5 MG/DOSE,) 2 MG/1.5ML SOPN pen Inject 0.5 mg subcutaneous once a week. 1.5 mL 3     sodium bicarbonate 650 MG tablet Take 1 tablet (650 mg) by mouth 3 times daily 90 tablet 11     tamsulosin (FLOMAX) 0.4 MG capsule Take 1 capsule (0.4 mg) by mouth daily Please keep appt 9/1/22 30 capsule 0     triamcinolone (KENALOG) 0.1 % cream Apply topically 3 times daily 80 g 0     VITAMIN D3 25 MCG (1000 UT) tablet TAKE 2 TABLETS (50 MCG) BY MOUTH DAILY         ALLERGIES: Patient has no known allergies.      REVIEW OF SYSTEMS:  As above in HPI    GENERAL PHYSICAL EXAM:   Vitals: /74   Pulse 71   Ht 1.727 m (5' 8\")   Wt 88.5 kg (195 lb)   BMI 29.65 kg/m    Body mass index is 29.65 kg/m .    GENERAL: Well groomed, well developed, well nourished male in NAD.  NEURO: Alert and oriented x 3.  PSYCH: Normal mood and affect, pleasant and agreeable during interview and exam.    NALLELY:      Normal rectal tone     Very large prostate, without tenderness, nodules. + firmness on right side    PVR: Residual urine by ultrasound was 25 ml.      RADIOLOGY: The following tests were reviewed:   MRI PROSTATE: 2/9/2020 4:42 PM     CLINICAL HISTORY: prostate cancer; Malignant neoplasm of prostate (H)  . Jameel 6, 3+3 at the right base on prior biopsy.     Most Recent PSA: 3.61 ug/L     Comparison: 7/3/2019.     TECHNIQUE:  The following sequences were obtained: High-resolution axial  T2-weighted, coronal T2-weighted, 3D volumetric T2-weighted, axial  pre-contrast T1, axial diffusion-weighted, axial apparent diffusion  coefficient and axial dynamic contrast-enhanced T1. Postcontrast  images were evaluated on a separate workstation to evaluate dynamic  contrast enhancement. The " technique of this exam is PI-RADS v2.1  compliant. Contrast dose: 9 mL Gadavist     FINDINGS:  Size: 103 grams  Hemorrhage: Absent  Peripheral zone: Heterogeneous on T2-weighted images. Regions of  mildly decreased signal on ADC or DWI which are best characterized as  PI-RADS 2 without highly suspicious lesion.  Transition zone: Enlarged with BPH changes. Transition zone nodules  which are circumscribed or mostly encapsulated without diffusion  restriction.  PI-RADS 2.  No highly suspicious nodules. Prominent  median lobe impressing the urinary bladder floor. Extruded BPH nodule  at the right base, 7:00 position.     Neurovascular bundles: No neurovascular bundle involvement by  malignancy.    Seminal vesicles: No seminal vesicle involvement by malignancy.   Lymph nodes: No lymph node involvement   Bones: Heterogeneous bone marrow signal. Degenerative changes of the  spine. Heterogeneous bone enhancement with foci of enhancement in the  bones, for example in the left iliac bone, series 16 image 10 as well  as in the right innominate bone posteriorly, series 16 image 10,  indeterminate however stable. Enthesopathic changes off the pelvis and  hips. Stable wedge compression deformity of the vertebral body L4,  partially visualized.  Other pelvic organs: Urinary bladder wall thickening and trabeculation  likely due to chronic outlet obstructive changes.                                                                   IMPRESSION:  1. Based on the most suspicious abnormality, this exam is  characterized as PIRADS 2 - Clinically significant cancer is unlikely  to be present.  2. No convincing suspicious adenopathy or evidence of pelvic  metastases.  3. Heterogeneous bone marrow signal and enhancement with scattered  foci of enhancement, indeterminate however stable from 7/3/2019.  Attention on follow-up studies.    LABS: The last test results for Mr. Earle Rene were reviewed:  PSA -   Lab Results   Component Value  Date    PSA 2.11 09/12/2022    PSA 2.90 04/09/2021    PSA 1.94 02/27/2020    PSA 3.61 05/28/2019    PSA 2.14 05/24/2018    PSA 2.52 11/01/2017    PSA 8.64 06/06/2017    PSA 3.75 01/14/2016    PSA 2.92 09/09/2014    PSA 2.18 06/12/2013    PSA 1.77 02/02/2012     BMP -   Recent Labs   Lab Test 09/12/22  0941 06/09/22  1333 12/08/21  1428 07/30/21  1005 06/09/21  1524 12/18/19  1218 07/02/19  0947 08/01/18  1222 05/24/18  1414 05/01/18  1233   NA  --  139 140  --  142   < >  --    < >  --  141   POTASSIUM  --  4.3 4.2  --  4.2   < >  --    < >  --  4.7   CHLORIDE  --  107 107  --  111*   < >  --    < >  --  110*   CO2  --  24 24  --  22   < >  --    < >  --  21   BUN  --  33* 31*  --  20   < >  --    < >  --  20   CR 1.72* 2.04* 1.66*   < > 1.45*   < >  --    < >  --  1.28*   GLC  --  142* 242*  --  211*   < >  --    < >  --  149*   INDERJIT  --  9.5 10.2*  --  8.8   < >  --    < >  --  9.8   MAG  --   --   --   --   --   --  1.9  --  2.1 2.1   PHOS  --  4.0 4.6*  --  2.9   < >  --    < >  --  3.3    < > = values in this interval not displayed.       CBC -   Recent Labs   Lab Test 12/08/21  1428 06/09/21  1524 03/12/21  1324 01/29/20  1315 06/18/19  1054 03/13/19  1516   WBC  --   --  5.1  --  4.6 5.2   HGB 15.0 13.6 14.1   < > 13.5 13.7   PLT  --   --  156  --  130* 131*    < > = values in this interval not displayed.       ASSESSMENT:   1) prostate cancer - stable PSA  2) urinary urgency  3) abnormal NALLELY    PLAN:   - Continue tamsulosin and finasteride - refilled  - No previous DREs for comparison  - Urinalysis  - Postvoid residual 25cc  - Watch caffeine. Switch to tea with less caffeine.    - Start Myrbetriq 25mg daily.  May take a couple weeks before you notice less urinary urgency. Let me know if this medication is too expensive.    - Please schedule an appointment with your PCP to discuss your change in bowel habits.  You may need a colonoscopy.   - REturn in 3 months to re-evaluate urinary symptoms and recheck  prostate exam  - PSA in 1 year.      VISIT DURATION: 25 minutes (3:29 - 3:49 + 5 minutes documentation on DOS)    Qian Villalta PA-C  Department of Urologic Surgery

## 2022-09-15 NOTE — TELEPHONE ENCOUNTER
TAMSULOSIN HCL 0.4 MG CAPSULE      Last Written Prescription Date:  8/20/22  Last Fill Quantity: 30,   # refills: 0  Last Office Visit : 9/15/22  Future Office visit:  None scheduled    Routing refill request to provider for review/approval because:  Being seen at 3 today, dictation note not done  Medication changes?      FINASTERIDE 5 MG TABLET      Last Written Prescription Date:  6/21/22  Last Fill Quantity: 90,   # refills: 0  Last Office Visit : 9/15/22  Future Office visit:  None scheduled    Routing refill request to provider for review/approval because:  Being seen at 3 today, dictation note not done  Medication changes?

## 2022-09-15 NOTE — LETTER
September 19, 2022      Earle Rene  [address] [place] [address]        Dear Mr. Rene,    We are writing to inform you of your test results.  Your urinalysis does not show any infection for us to treat.  There is also no significant blood. These are good things.     There is quite a bit of protein in your urine.  We see this in people who have kidney disease.  You do have kidney disease - because of diabetes.     I forwarded this lab to Pamela Laura PA-C endocrinology as well as to Dr. Hare for their review.     Resulted Orders   Routine UA with micro reflex to culture   Result Value Ref Range    Color Urine Yellow Colorless, Straw, Light Yellow, Yellow    Appearance Urine Clear Clear    Glucose Urine 1000  (A) Negative mg/dL    Bilirubin Urine Negative Negative    Ketones Urine Negative Negative mg/dL    Specific Gravity Urine 1.020 1.003 - 1.035    Blood Urine Trace (A) Negative    pH Urine 5.0 5.0 - 7.0    Protein Albumin Urine 300  (A) Negative mg/dL    Urobilinogen Urine Normal Normal, 2.0 mg/dL    Nitrite Urine Negative Negative    Leukocyte Esterase Urine Negative Negative    Mucus Urine Present (A) None Seen /LPF    RBC Urine 0 <=2 /HPF    WBC Urine <1 <=5 /HPF    Squamous Epithelials Urine <1 <=1 /HPF    Narrative    Urine Culture not indicated       If you have any questions or concerns, please call the clinic at the number listed below.       Sincerely,        RIVERA Pineda  [phone]

## 2022-09-15 NOTE — PATIENT INSTRUCTIONS
PLAN:   - Continue tamsulosin and finasteride - refilled  - No previous DREs for comparison  - Urinalysis  - Postvoid residual   - Watch caffeine. Switch to tea with less caffeine.    - Start Myrbetriq 25mg daily.  May take a couple weeks before you notice less urinary urgency. Let me know if this medication is too expensive.    - Please schedule an appointment with your PCP to discuss your change in bowel habits.  You may need a colonoscopy.   - REturn in 3 months to re-evaluate urinary symptoms and recheck prostate exam  - PSA in 1 year.      [facility]

## 2022-09-20 NOTE — PROCEDURES
Sacro-iliac Joint Injection    The patient s identity, the procedure to be performed and the specific site of the procedure was verified in accordance with [facility] Protocol.  Diagnosis:  Sacroiliac Arthropathy  Pre-Procedure Pain Score: 6/10  Procedure Note:   Informed consent was obtained and the patient was positioned comfortably in the prone position.  There was no evidence of infection at the sites of needle insertion.  The patient was prepped and draped in a sterile fashion.  Skeletal landmarks were identified with fluoroscopy guidance.  22 gauge spinal needle was then placed within the Sacro-iliac joint in the lower 1/3 of the joint.    The ligaments medial and superior to the joint were not infiltrated with local anesthetic and steroid.     SIDE:   bilateral  Medication: 40 mg methylprednisolone  3 ml 0.25% Bupivacaine  Post-Procedure Pain Score: 0/10  The patient was given discharge instructions and verbalizes understanding, including understanding of those signs and symptoms that would require emergency care.     Counseling: Greater than 50% of this patient visit was spent in counseling the patient regarding the treatment of their pain, coordinating their overall treatment plan and assessing their progress.

## 2022-10-03 DIAGNOSIS — E11.3213 TYPE 2 DIABETES MELLITUS WITH BOTH EYES AFFECTED BY MILD NONPROLIFERATIVE RETINOPATHY AND MACULAR EDEMA, WITH LONG-TERM CURRENT USE OF INSULIN (H): ICD-10-CM

## 2022-10-03 DIAGNOSIS — Z79.4 TYPE 2 DIABETES MELLITUS WITH BOTH EYES AFFECTED BY MILD NONPROLIFERATIVE RETINOPATHY AND MACULAR EDEMA, WITH LONG-TERM CURRENT USE OF INSULIN (H): ICD-10-CM

## 2022-10-06 NOTE — TELEPHONE ENCOUNTER
Sodium Glucose Co-Transport Inhibitor Agents Failed 10/06/2022 12:20 PM   Protocol Details  No creatinine >1.4 or GFR <45 within the past 12 mos

## 2022-10-06 NOTE — TELEPHONE ENCOUNTER
empagliflozin (JARDIANCE) 10 MG TABS tablet 90 tablet 0 7/7/2022       Last Written Prescription Date:  7/7/22  Last Fill Quantity: 90 tabs,   # refills: 0  Last Office Visit : 6/9/22  Future Office visit:  10/11/22    Routing refill request to provider for review/approval because:  Refill per clinic    Karen Brumfield RN

## 2022-10-09 DIAGNOSIS — Z79.4 TYPE 2 DIABETES MELLITUS WITH HYPERGLYCEMIA, WITH LONG-TERM CURRENT USE OF INSULIN (H): ICD-10-CM

## 2022-10-09 DIAGNOSIS — N18.30 CHRONIC KIDNEY DISEASE, STAGE III (MODERATE) (H): ICD-10-CM

## 2022-10-09 DIAGNOSIS — I10 BENIGN ESSENTIAL HYPERTENSION: ICD-10-CM

## 2022-10-09 DIAGNOSIS — E11.65 TYPE 2 DIABETES MELLITUS WITH HYPERGLYCEMIA, WITH LONG-TERM CURRENT USE OF INSULIN (H): ICD-10-CM

## 2022-10-10 RX ORDER — INSULIN DEGLUDEC 100 U/ML
INJECTION, SOLUTION SUBCUTANEOUS
Qty: 60 ML | Refills: 3 | Status: SHIPPED | OUTPATIENT
Start: 2022-10-10 | End: 2023-05-12

## 2022-10-10 NOTE — TELEPHONE ENCOUNTER
TRESIBA FLEXTOUCH 100 UNIT/ML      Last Written Prescription Date:  8-6-21  Last Fill Quantity: 60 ML,   # refills: 3  Last Office Visit : 6-9-22  Future Office visit:  -10-11-22    Routing refill request to provider for review/approval because:  Insulin - refilled per clinic

## 2022-10-11 RX ORDER — AMLODIPINE BESYLATE 5 MG/1
TABLET ORAL
Qty: 90 TABLET | Refills: 3 | OUTPATIENT
Start: 2022-10-11

## 2022-10-11 RX ORDER — AMLODIPINE BESYLATE 5 MG/1
5 TABLET ORAL AT BEDTIME
Qty: 90 TABLET | Refills: 3 | Status: SHIPPED | OUTPATIENT
Start: 2022-10-11 | End: 2023-09-28

## 2022-10-14 ENCOUNTER — NURSE TRIAGE (OUTPATIENT)
Dept: NURSING | Facility: CLINIC | Age: 72
End: 2022-10-14

## 2022-10-14 DIAGNOSIS — R30.0 DYSURIA: Primary | ICD-10-CM

## 2022-10-14 RX ORDER — TAMSULOSIN HYDROCHLORIDE 0.4 MG/1
0.4 CAPSULE ORAL DAILY
Qty: 90 CAPSULE | Refills: 3 | Status: SHIPPED | OUTPATIENT
Start: 2022-10-14 | End: 2023-03-06

## 2022-10-14 RX ORDER — FINASTERIDE 5 MG/1
1 TABLET, FILM COATED ORAL DAILY
Qty: 90 TABLET | Refills: 3 | Status: SHIPPED | OUTPATIENT
Start: 2022-10-14

## 2022-10-14 NOTE — TELEPHONE ENCOUNTER
Attempted to call pt. Left detailed message to call clinic back at [phone].   To inform pt that refills were sent for tamsulosin and finasteride. Also to advise to complete UA/UC as soon as possible. Order have been placed.     Ciera Dover RN MSN    Orders Placed This Encounter   Procedures     Routine UA with microscopic - No culture     Standing Status:   Future     Standing Expiration Date:   10/14/2023     Urine Culture Aerobic Bacterial     Standing Status:   Future     Standing Expiration Date:   10/14/2023

## 2022-10-14 NOTE — TELEPHONE ENCOUNTER
Last Clinic Visit: 9/15/2022  [facility] 12/15/22

## 2022-10-20 NOTE — TELEPHONE ENCOUNTER
Spoke to pt. Pt reports his symptoms have improved with use of the refilled medications. Discussed that if improvement of symptoms, then UA/UC is not needed unless symptoms were persisting or worsening. Pt verbalized understanding.     Ciera Dover RN MSN

## 2022-10-26 DIAGNOSIS — Z79.4 TYPE 2 DIABETES MELLITUS WITH MODERATE NONPROLIFERATIVE RETINOPATHY WITHOUT MACULAR EDEMA, WITH LONG-TERM CURRENT USE OF INSULIN, UNSPECIFIED LATERALITY (H): ICD-10-CM

## 2022-10-26 DIAGNOSIS — E11.3399 TYPE 2 DIABETES MELLITUS WITH MODERATE NONPROLIFERATIVE RETINOPATHY WITHOUT MACULAR EDEMA, WITH LONG-TERM CURRENT USE OF INSULIN, UNSPECIFIED LATERALITY (H): ICD-10-CM

## 2022-10-31 ENCOUNTER — TELEPHONE (OUTPATIENT)
Dept: ENDOCRINOLOGY | Facility: CLINIC | Age: 72
End: 2022-10-31

## 2022-10-31 NOTE — TELEPHONE ENCOUNTER
[facility] Call Center    Phone Message    May a detailed message be left on voicemail: yes     Reason for Call: Medication Refill Request    Has the patient contacted the pharmacy for the refill? Yes   Name of medication being requested: Continuous Blood Gluc Sensor (FREESTYLE PETER 2 SENSOR) MISC [Pharmacy Med Name: FREESTYLE PETER 2 SENSOR]      Provider who prescribed the medication: THERESA Laura     Pharmacy: [id number] - [place], [place] - [address]    Date medication is needed: asap           Action Taken: Other: ENDO    Travel Screening: Not Applicable

## 2022-10-31 NOTE — TELEPHONE ENCOUNTER
Continuous Blood Gluc Sensor (FREESTYLE PETER 2 SENSOR) MISC: addressed in 10-26-22 RF encounter

## 2022-12-03 DIAGNOSIS — N18.30 CKD (CHRONIC KIDNEY DISEASE) STAGE 3, GFR 30-59 ML/MIN (H): ICD-10-CM

## 2022-12-03 DIAGNOSIS — E55.9 VITAMIN D DEFICIENCY: ICD-10-CM

## 2022-12-05 RX ORDER — CHOLECALCIFEROL (VITAMIN D3) 25 MCG
TABLET ORAL
Qty: 180 TABLET | Refills: 3 | Status: SHIPPED | OUTPATIENT
Start: 2022-12-05 | End: 2023-08-18

## 2022-12-06 ENCOUNTER — PRE VISIT (OUTPATIENT)
Dept: UROLOGY | Facility: CLINIC | Age: 72
End: 2022-12-06

## 2022-12-06 NOTE — TELEPHONE ENCOUNTER
Reason for visit: Consult     Relevant information: urgency    Records/imaging/labs/orders: in epic    Pt called: no    At Rooming: normal

## 2023-01-06 ENCOUNTER — TELEPHONE (OUTPATIENT)
Dept: VASCULAR SURGERY | Facility: CLINIC | Age: 73
End: 2023-01-06

## 2023-01-06 DIAGNOSIS — K21.9 GASTROESOPHAGEAL REFLUX DISEASE WITHOUT ESOPHAGITIS: ICD-10-CM

## 2023-01-07 NOTE — TELEPHONE ENCOUNTER
I have been unsuccessful in my efforts to contact the pt by phone and Mychart to schedule the requested appointments.  I am sending out a letter to the pt today.  Kp Trinidad on 1/6/2023 at 7:44 PM      RC 1/6 Letter sent see telephone encounter.   RC 1/3/23 LVM on Both Home and Cell Phones The pt does not have Mychart.   12/28 LVM   ST 8/3 spoke with pt.  Not ready to schedule. Patient requests a call back in Dec.

## 2023-01-08 ENCOUNTER — OFFICE VISIT (OUTPATIENT)
Dept: URGENT CARE | Facility: URGENT CARE | Age: 73
End: 2023-01-08
Payer: COMMERCIAL

## 2023-01-08 ENCOUNTER — ANCILLARY PROCEDURE (OUTPATIENT)
Dept: GENERAL RADIOLOGY | Facility: CLINIC | Age: 73
End: 2023-01-08
Attending: PHYSICIAN ASSISTANT
Payer: COMMERCIAL

## 2023-01-08 VITALS
BODY MASS INDEX: 28.14 KG/M2 | HEIGHT: 69 IN | TEMPERATURE: 97.9 F | WEIGHT: 190 LBS | HEART RATE: 72 BPM | SYSTOLIC BLOOD PRESSURE: 138 MMHG | OXYGEN SATURATION: 98 % | DIASTOLIC BLOOD PRESSURE: 63 MMHG

## 2023-01-08 DIAGNOSIS — I73.9 PERIPHERAL VASCULAR DISEASE, UNSPECIFIED (H): ICD-10-CM

## 2023-01-08 DIAGNOSIS — M79.674 PAIN OF TOE OF RIGHT FOOT: ICD-10-CM

## 2023-01-08 DIAGNOSIS — E11.3213 TYPE 2 DIABETES MELLITUS WITH BOTH EYES AFFECTED BY MILD NONPROLIFERATIVE RETINOPATHY AND MACULAR EDEMA, WITH LONG-TERM CURRENT USE OF INSULIN (H): ICD-10-CM

## 2023-01-08 DIAGNOSIS — N18.32 CHRONIC KIDNEY DISEASE, STAGE 3B (H): ICD-10-CM

## 2023-01-08 DIAGNOSIS — Z79.4 TYPE 2 DIABETES MELLITUS WITH BOTH EYES AFFECTED BY MILD NONPROLIFERATIVE RETINOPATHY AND MACULAR EDEMA, WITH LONG-TERM CURRENT USE OF INSULIN (H): ICD-10-CM

## 2023-01-08 DIAGNOSIS — M79.674 PAIN OF TOE OF RIGHT FOOT: Primary | ICD-10-CM

## 2023-01-08 DIAGNOSIS — L03.031 CELLULITIS OF TOE OF RIGHT FOOT: ICD-10-CM

## 2023-01-08 LAB
BASOPHILS # BLD AUTO: 0 10E3/UL (ref 0–0.2)
BASOPHILS NFR BLD AUTO: 1 %
EOSINOPHIL # BLD AUTO: 0.4 10E3/UL (ref 0–0.7)
EOSINOPHIL NFR BLD AUTO: 7 %
ERYTHROCYTE [DISTWIDTH] IN BLOOD BY AUTOMATED COUNT: 12.4 % (ref 10–15)
HCT VFR BLD AUTO: 41.2 % (ref 40–53)
HGB BLD-MCNC: 14.4 G/DL (ref 13.3–17.7)
IMM GRANULOCYTES # BLD: 0 10E3/UL
IMM GRANULOCYTES NFR BLD: 0 %
LYMPHOCYTES # BLD AUTO: 2.6 10E3/UL (ref 0.8–5.3)
LYMPHOCYTES NFR BLD AUTO: 47 %
MCH RBC QN AUTO: 31.9 PG (ref 26.5–33)
MCHC RBC AUTO-ENTMCNC: 35 G/DL (ref 31.5–36.5)
MCV RBC AUTO: 91 FL (ref 78–100)
MONOCYTES # BLD AUTO: 0.4 10E3/UL (ref 0–1.3)
MONOCYTES NFR BLD AUTO: 7 %
NEUTROPHILS # BLD AUTO: 2.1 10E3/UL (ref 1.6–8.3)
NEUTROPHILS NFR BLD AUTO: 38 %
PLATELET # BLD AUTO: 145 10E3/UL (ref 150–450)
RBC # BLD AUTO: 4.51 10E6/UL (ref 4.4–5.9)
WBC # BLD AUTO: 5.4 10E3/UL (ref 4–11)

## 2023-01-08 PROCEDURE — 99214 OFFICE O/P EST MOD 30 MIN: CPT | Performed by: PHYSICIAN ASSISTANT

## 2023-01-08 PROCEDURE — 80048 BASIC METABOLIC PNL TOTAL CA: CPT | Performed by: PHYSICIAN ASSISTANT

## 2023-01-08 PROCEDURE — 85025 COMPLETE CBC W/AUTO DIFF WBC: CPT | Performed by: PHYSICIAN ASSISTANT

## 2023-01-08 PROCEDURE — 73660 X-RAY EXAM OF TOE(S): CPT | Mod: TC | Performed by: RADIOLOGY

## 2023-01-08 PROCEDURE — 36415 COLL VENOUS BLD VENIPUNCTURE: CPT | Performed by: PHYSICIAN ASSISTANT

## 2023-01-08 PROCEDURE — 84550 ASSAY OF BLOOD/URIC ACID: CPT | Performed by: PHYSICIAN ASSISTANT

## 2023-01-08 RX ORDER — CEPHALEXIN 500 MG/1
500 CAPSULE ORAL 4 TIMES DAILY
Qty: 40 CAPSULE | Refills: 0 | Status: SHIPPED | OUTPATIENT
Start: 2023-01-08 | End: 2023-01-18

## 2023-01-08 NOTE — PROGRESS NOTES
Chief Complaint   Patient presents with     Urgent Care     Pt in clinic to have eval for right foot pinky toe pain, swelling and redness.       ASSESSMENT/PLAN:  Earle was seen today for urgent care.    Diagnoses and all orders for this visit:    Pain of toe of right foot  -     XR Toe Right G/E 2 Views; Future  -     Uric acid; Future  -     Primary Care Referral; Future  -     Uric acid    Cellulitis of toe of right foot  -     CBC with platelets and differential; Future  -     Basic metabolic panel  (Ca, Cl, CO2, Creat, Gluc, K, Na, BUN); Future  -     cephALEXin (KEFLEX) 500 MG capsule; Take 1 capsule (500 mg) by mouth 4 times daily for 10 days  -     Primary Care Referral; Future  -     CBC with platelets and differential  -     Basic metabolic panel  (Ca, Cl, CO2, Creat, Gluc, K, Na, BUN)    Chronic kidney disease, stage 3b (H)    Peripheral vascular disease, unspecified (H)    Type 2 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, with long-term current use of insulin (H)    Patient with many risk factors for cellulitis.  Big toe is swollen, erythematous and acutely tender with no history of gout.  Will check CBC and this did not show significant elevation of white blood cell.  RecheckingBasic metabolic panel and if this is below 30 with his GFR we should decrease Keflex to 3 times a day instead of 4.  No evidence of osteomyelitis on x-ray.  Recommend he follow-up with primary care provider over the next Week      Gio Perez PA-C      SUBJECTIVE:  Earle is a 72 year old male who presents to urgent care with pain in his pinky toe he has had a callus on this toe that he has been trying to take care of with over-the-counter medications and his wife has been removing some skin.  Over the last 2 to 3 days the toe has become painful, red and swollen.  Does not have a history of gout.    ROS: Pertinent ROS neg other than the symptoms noted above in the HPI.  "    OBJECTIVE:  /63   Pulse 72   Temp 97.9  F (36.6  C) (Temporal)   Ht 1.74 m (5' 8.5\")   Wt 86.2 kg (190 lb)   SpO2 98%   BMI 28.47 kg/m     GENERAL: healthy, alert and no distress  MS: Swelling of the left pinky toe with general erythema, no breaks in skin,  onychomycosis, pain with movement of the toe    DIAGNOSTICS  Xray - Reviewed and interpreted by me.   no bony lytic lesions   Results for orders placed or performed in visit on 01/08/23   XR Toe Right G/E 2 Views     Status: None    Narrative    EXAM: XR TOE RIGHT G/E 2 VIEWS  LOCATION: [facility]  DATE/TIME: 1/8/2023 5:42 PM    INDICATION: toe tenderness with overlying cellulitis. Infection  COMPARISON: None.      Impression    IMPRESSION: There is soft tissue swelling over the fifth digit. No osseous erosion or periosteal reaction to suggest osteomyelitis. If there is persistent concern, MRI would be more sensitive.   Results for orders placed or performed in visit on 01/08/23   CBC with platelets and differential     Status: Abnormal   Result Value Ref Range    WBC Count 5.4 4.0 - 11.0 10e3/uL    RBC Count 4.51 4.40 - 5.90 10e6/uL    Hemoglobin 14.4 13.3 - 17.7 g/dL    Hematocrit 41.2 40.0 - 53.0 %    MCV 91 78 - 100 fL    MCH 31.9 26.5 - 33.0 pg    MCHC 35.0 31.5 - 36.5 g/dL    RDW 12.4 10.0 - 15.0 %    Platelet Count 145 (L) 150 - 450 10e3/uL    % Neutrophils 38 %    % Lymphocytes 47 %    % Monocytes 7 %    % Eosinophils 7 %    % Basophils 1 %    % Immature Granulocytes 0 %    Absolute Neutrophils 2.1 1.6 - 8.3 10e3/uL    Absolute Lymphocytes 2.6 0.8 - 5.3 10e3/uL    Absolute Monocytes 0.4 0.0 - 1.3 10e3/uL    Absolute Eosinophils 0.4 0.0 - 0.7 10e3/uL    Absolute Basophils 0.0 0.0 - 0.2 10e3/uL    Absolute Immature Granulocytes 0.0 <=0.4 10e3/uL   CBC with platelets and differential     Status: Abnormal    Narrative    The following orders were created for panel order CBC with platelets and " "differential.  Procedure                               Abnormality         Status                     ---------                               -----------         ------                     CBC with platelets and d...[[id number]]  Abnormal            Final result                 Please view results for these tests on the individual orders.       Current Outpatient Medications   Medication     albuterol (VENTOLIN HFA) 108 (90 Base) MCG/ACT inhaler     alpha-lipoic acid 600 MG capsule     amLODIPine (NORVASC) 5 MG tablet     ARTIFICIAL TEAR OP     aspirin 81 MG tablet     atorvastatin (LIPITOR) 20 MG tablet     blood glucose (NO BRAND SPECIFIED) lancets standard     blood glucose monitoring (NO BRAND SPECIFIED) meter device kit     blood glucose monitoring (NO BRAND SPECIFIED) test strip     Blood Pressure Monitor KIT     camphor-menthol (DERMASARRA) 0.5-0.5 % external lotion     camphor-menthol (DERMASARRA) 0.5-0.5 % external lotion     clobetasol (TEMOVATE) 0.05 % ointment     clotrimazole (LOTRIMIN) 1 % cream     Continuous Blood Gluc Sensor (FREESTYLE PETER 2 SENSOR) MISC     dextromethorphan-guaiFENesin (MUCINEX DM)  MG 12 hr tablet     dextromethorphan-guaiFENesin (TUSSIN DM)  MG/5ML liquid     diclofenac (VOLTAREN) 1 % topical gel     empagliflozin (JARDIANCE) 10 MG TABS tablet     famotidine (PEPCID) 20 MG tablet     fenofibrate 54 MG tablet     finasteride (PROSCAR) 5 MG tablet     fluocinonide (LIDEX) 0.05 % external cream     fluticasone (FLONASE) 50 MCG/ACT nasal spray     fluticasone (FLOVENT HFA) 110 MCG/ACT inhaler     gabapentin (NEURONTIN) 300 MG capsule     gabapentin (NEURONTIN) 300 MG capsule     HUMALOG KWIKPEN 100 UNIT/ML soln     insulin degludec (TRESIBA FLEXTOUCH) 100 UNIT/ML pen     insulin pen needle (B-D U/F) 31G X 5 MM miscellaneous     insulin pen needle (B-D U/F) 31G X 5 MM     insulin syringe 31G X 5/16\" 0.5 ML MISC     ketamine 5% gabapentin 8% lidocaine 2.5% topical PLO " cream     loratadine (CLARITIN) 10 MG tablet     losartan (COZAAR) 100 MG tablet     mirabegron (MYRBETRIQ) 25 MG 24 hr tablet     mupirocin (BACTROBAN) 2 % cream     Omega-3 Fatty Acids (OMEGA-3 FISH OIL) 1000 MG CAPS     ONETOUCH ULTRA test strip     semaglutide (OZEMPIC, 0.25 OR 0.5 MG/DOSE,) 2 MG/1.5ML SOPN pen     sodium bicarbonate 650 MG tablet     tamsulosin (FLOMAX) 0.4 MG capsule     triamcinolone (KENALOG) 0.1 % cream     VITAMIN D3 25 MCG (1000 UT) tablet     VITAMIN D3 25 MCG (1000 UT) tablet     Current Facility-Administered Medications   Medication     aflibercept (EYLEA) injection prefilled syringe 2 mg      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 eye     Colonic polyp     Elevated PSA     Heel fracture     Hyponatremia     Nephrolithiasis     Pain of finger of right hand     Sarcoidosis of lung (H)     Sialoadenitis     Adhesive capsulitis of shoulder     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)     Chronic kidney disease, stage 3 (H)     Peripheral vascular disease, unspecified (H)     Malignant neoplasm of prostate (H)     Chronic bilateral low back pain with bilateral sciatica     Pain of both sacroiliac joints      Past Medical History:   Diagnosis Date     Blepharitis of both eyes      BPH (benign prostatic hyperplasia)      Diabetes (H)      Diabetic neuropathy (H)       Diabetic retinopathy associated with diabetes mellitus due to underlying condition (H)      Dry eye syndrome      GERD (gastroesophageal reflux disease)      Goiter      Granulomatous disease (H)      HLD (hyperlipidemia)      HTN (hypertension)      Nonsenile cataract      Peripheral neuropathy      Past Surgical History:   Procedure Laterality Date     ------------OTHER-------------      back of neck abscess drainage in OR     AS RAD RESEC TONSIL/PILLARS Bilateral 1961     CATARACT IOL, RT/LT Left      COLONOSCOPY  7/29/2013    Procedure: COLONOSCOPY;;  Surgeon: Montana Pascal MD;  Location: [facility]     EXCISE MASS UPPER EXTREMITY Right 11/11/2019    Procedure: EXCISION, MASS, UPPER EXTREMITY, RIGHT SHOULDER;  Surgeon: Johana Choudhury MD;  Location: [facility]     INJECT SACROILIAC JOINT Bilateral 9/13/2022    Procedure: Bilateral sacroiliac joint injection;  Surgeon: Collin Mata MD;  Location: [facility]     INTRAVITREAL INJECTION CHEMOTHERAPY Right 12/30/2019    Procedure: INTRAVITREAL Bevacizumab injection;  Surgeon: Milton Maki MD;  Location: [facility]     PHACOEMULSIFICATION CLEAR CORNEA WITH STANDARD INTRAOCULAR LENS IMPLANT Right 12/30/2019    Procedure: PHACOEMULSIFICATION, CATARACT, WITH INTRAOCULAR LENS IMPLANT;  Surgeon: Milton Maki MD;  Location: [facility]     PHACOEMULSIFICATION WITH STANDARD INTRAOCULAR LENS IMPLANT Left 6/21/2019    Procedure: Left Eye Cataract Removal with Intraocular Lens Implant with Intraoperative Avastin Injection;  Surgeon: Lacey Eugene MD;  Location: [facility]     siladenatis  11/2017     Family History   Problem Relation Age of Onset     Diabetes Father      Myocardial Infarction Father      Diabetes Brother      Leukemia Brother 44     Glaucoma No family hx of      Macular Degeneration No family hx of      Kidney Disease No family hx of      Social History     Tobacco Use     Smoking status: Never     Smokeless tobacco: Never   Substance Use Topics      Alcohol use: Yes     Comment: occasionaly              The plan of care was discussed with the patient. They understand and agree with the course of treatment prescribed. A printed summary was given including instructions and medications.  The use of Dragon/[employer] dictation services may have been used to construct the content in this note; any grammatical or spelling errors are non-intentional. Please contact the author of this note directly if you are in need of any clarification.

## 2023-01-09 ENCOUNTER — TELEPHONE (OUTPATIENT)
Dept: URGENT CARE | Facility: URGENT CARE | Age: 73
End: 2023-01-09

## 2023-01-09 LAB
ANION GAP SERPL CALCULATED.3IONS-SCNC: 13 MMOL/L (ref 7–15)
BUN SERPL-MCNC: 30.1 MG/DL (ref 8–23)
CALCIUM SERPL-MCNC: 10.1 MG/DL (ref 8.8–10.2)
CHLORIDE SERPL-SCNC: 106 MMOL/L (ref 98–107)
CREAT SERPL-MCNC: 1.62 MG/DL (ref 0.67–1.17)
DEPRECATED HCO3 PLAS-SCNC: 20 MMOL/L (ref 22–29)
GFR SERPL CREATININE-BSD FRML MDRD: 45 ML/MIN/1.73M2
GLUCOSE SERPL-MCNC: 100 MG/DL (ref 70–99)
POTASSIUM SERPL-SCNC: 4.6 MMOL/L (ref 3.4–5.3)
SODIUM SERPL-SCNC: 139 MMOL/L (ref 136–145)
URATE SERPL-MCNC: 6.4 MG/DL (ref 3.4–7)

## 2023-01-09 RX ORDER — FAMOTIDINE 20 MG/1
TABLET, FILM COATED ORAL
Qty: 180 TABLET | Refills: 1 | Status: SHIPPED | OUTPATIENT
Start: 2023-01-09 | End: 2023-07-17

## 2023-01-09 NOTE — TELEPHONE ENCOUNTER
FAMOTIDINE 20 MG TABLET  Passed protocol    Office Visit  7/20/2022  [facility]  [facility]

## 2023-01-09 NOTE — TELEPHONE ENCOUNTER
PT was seen in [facility] on 1/8/23.  PT is wanting his kidney lab results.    PT felt someone called him.  I don't see any message for pt since yesterday.  BMP and Uric acid labs not back. Still in process.      Will send this to [facility] PROVIDERS.  Please call pt on cell phone. [phone].  OK to leave a detailed message.    PLEASE HAVE [facility] STAFF CALL PT.  Madisyn Flores RN-[facility]

## 2023-01-13 ENCOUNTER — TELEPHONE (OUTPATIENT)
Dept: UROLOGY | Facility: CLINIC | Age: 73
End: 2023-01-13

## 2023-01-13 NOTE — TELEPHONE ENCOUNTER
I was able to finally get some spots open to offer this patient. Spoke with him and he is scheduled in person 2/3

## 2023-01-17 DIAGNOSIS — N18.32 CHRONIC KIDNEY DISEASE, STAGE 3B (H): ICD-10-CM

## 2023-01-18 RX ORDER — SODIUM BICARBONATE 650 MG/1
650 TABLET ORAL 3 TIMES DAILY
Qty: 270 TABLET | Refills: 3 | Status: SHIPPED | OUTPATIENT
Start: 2023-01-18 | End: 2023-09-29

## 2023-01-20 DIAGNOSIS — Z79.4 TYPE 2 DIABETES MELLITUS WITH BOTH EYES AFFECTED BY MILD NONPROLIFERATIVE RETINOPATHY AND MACULAR EDEMA, WITH LONG-TERM CURRENT USE OF INSULIN (H): ICD-10-CM

## 2023-01-20 DIAGNOSIS — E11.3213 TYPE 2 DIABETES MELLITUS WITH BOTH EYES AFFECTED BY MILD NONPROLIFERATIVE RETINOPATHY AND MACULAR EDEMA, WITH LONG-TERM CURRENT USE OF INSULIN (H): ICD-10-CM

## 2023-01-24 ENCOUNTER — TELEPHONE (OUTPATIENT)
Dept: INTERNAL MEDICINE | Facility: CLINIC | Age: 73
End: 2023-01-24
Payer: COMMERCIAL

## 2023-01-24 DIAGNOSIS — E78.5 HYPERLIPIDEMIA LDL GOAL <100: Primary | ICD-10-CM

## 2023-01-24 NOTE — TELEPHONE ENCOUNTER
empagliflozin (JARDIANCE) 10 MG TABS tablet       Last Written Prescription Date:  10-  Last Fill Quantity: 90,   # refills: 0  Last Office Visit : 06-  Future Office visit:  Not on file    Routing refill request to provider for review/approval because:  Sodium Glucose Co-Transport Inhibitor Agents Failed 01/20/2023 12:33 AM   Protocol Details  Patient has documented A1c within the specified period of time.    No creatinine >1.4 or GFR <45 within the past 12 mos    Recent (6 mo) or future (30 days) visit within the authorizing provider's specialty     Lab Test 06/09/22  1241 03/10/22  0923 11/02/21  0911   A1C  --   --  8.3*   HEMOGLOBINA1 7.9   < >  --     < > = values in this interval not displayed.     Lab Test 01/08/23  1758 06/09/22  1333 02/02/22  1311 07/30/21  1005 06/09/21  1524   GFRESTIMATED 45*   < >  --    < > 48*   GFRESTBLACK  --   --   --   --  55*   [id number]  --   --  43*  --   --    [id number]  --   --  37*       Scheduling has been notified to contact the pt for appointment.

## 2023-01-27 RX ORDER — ATORVASTATIN CALCIUM 20 MG/1
20 TABLET, FILM COATED ORAL DAILY
Qty: 90 TABLET | Refills: 0 | Status: SHIPPED | OUTPATIENT
Start: 2023-01-27 | End: 2023-05-01

## 2023-01-27 NOTE — TELEPHONE ENCOUNTER
atorvastatin (LIPITOR) 20 MG tablet     Last Written Prescription Date:  11-  Last Fill Quantity: 90,   # refills: 3  Last Office Visit : 07-  Future Office visit:  Not on file    Routing refill request to provider for review/approval because:  Statins Protocol Failed 01/24/2023 12:36 AM   Protocol Details  LDL on file in past 12 months     Lab Test 11/02/21  0911   LDL 56     Lissette refill

## 2023-02-03 ENCOUNTER — TELEPHONE (OUTPATIENT)
Dept: UROLOGY | Facility: CLINIC | Age: 73
End: 2023-02-03

## 2023-02-03 ENCOUNTER — OFFICE VISIT (OUTPATIENT)
Dept: UROLOGY | Facility: CLINIC | Age: 73
End: 2023-02-03
Payer: COMMERCIAL

## 2023-02-03 VITALS
SYSTOLIC BLOOD PRESSURE: 125 MMHG | OXYGEN SATURATION: 99 % | BODY MASS INDEX: 28.14 KG/M2 | DIASTOLIC BLOOD PRESSURE: 78 MMHG | HEART RATE: 79 BPM | HEIGHT: 69 IN | WEIGHT: 190 LBS

## 2023-02-03 DIAGNOSIS — N13.8 BENIGN PROSTATIC HYPERPLASIA WITH URINARY OBSTRUCTION: Primary | ICD-10-CM

## 2023-02-03 DIAGNOSIS — R39.15 URINARY URGENCY: ICD-10-CM

## 2023-02-03 DIAGNOSIS — N40.1 BENIGN PROSTATIC HYPERPLASIA WITH URINARY OBSTRUCTION: Primary | ICD-10-CM

## 2023-02-03 PROCEDURE — 99213 OFFICE O/P EST LOW 20 MIN: CPT | Performed by: PHYSICIAN ASSISTANT

## 2023-02-03 RX ORDER — CHLORHEXIDINE GLUCONATE ORAL RINSE 1.2 MG/ML
SOLUTION DENTAL
COMMUNITY
Start: 2022-06-08 | End: 2023-09-29

## 2023-02-03 RX ORDER — IBUPROFEN 600 MG/1
TABLET, FILM COATED ORAL
COMMUNITY
Start: 2022-06-08 | End: 2023-09-29

## 2023-02-03 RX ORDER — AMOXICILLIN 500 MG/1
CAPSULE ORAL
COMMUNITY
Start: 2022-06-13

## 2023-02-03 RX ORDER — MIRABEGRON 25 MG/1
25 TABLET, FILM COATED, EXTENDED RELEASE ORAL DAILY
Qty: 90 TABLET | Refills: 3 | Status: SHIPPED | OUTPATIENT
Start: 2023-02-03 | End: 2024-06-19

## 2023-02-03 ASSESSMENT — PAIN SCALES - GENERAL: PAINLEVEL: SEVERE PAIN (7)

## 2023-02-03 NOTE — PROGRESS NOTES
HPI: Mr. Earle Rene is a 72 year old year old male presenting today for evaluation of chief complaint(s): Follow Up (Urinary urgency)    Today, he is unaccompanied     Mr. Rene is a 72M with PMH significant for HTN, HLD, IDDM, neuropathy (on neurontin), copd, gerd, low risk prostate cancer with BPH (on proscar and tamsulosin 0.4mg daily) who was last seen in Urology on 4/9/21 by Dr. lBand.  At that time, PSA was 2.90ug/L and sCr was elevated (1.68mg/dL). Dr. Bland recommended follow up in 6-12 months for recheck PSA.     Urinary symptoms:   - Nocturia x 2.  Can have some urgency and can have urge incontinence.   Can have some pain at the tip of the penis with urinates. Stream is medium caliber.  Feels he isn't emptying properly the first time but then double voids and he empties.    - Sometimes drinks caffeinated tea.  One cup per day but he does like it strong.    - Fluid intake - 3-4 bottles of water (important to drink a lot of water with his diabetic medications), 1 cup of tea.    - DIabetes has been under decent control (6/9/22 - 7.9%).    - Just had an injection in his back (for pain) - unsure whether or not it is helping.    - already has problem with constipation.  BMs are erratic (either doesn't go for 2-3 days or goes every 1/2 hour) - this is a new problem for him.       REVIEW OF DIAGNOSTICS:  9/12/22 - PSA 2.11ug/L  4/9/21 - PSA 2.90ug/L  2/27/20 - PSA 1.94  2/9/20 - Prostate MRI - prostate volume 103 grams, PIRADS 2  7/3/19 - Prostate MRI  - prostate volume 86 grams, PIRADS 2  5/28/19 - PSA 3.61  5/24/18 - PSA 2.14  11/1/17 - PSA 2.52  8/3/17 - FINASTERIDE WAS BEGUN  7/27/17 - prostate biopsy (Dr. Cantu)  - [place] 6 (3+3) in 1mm involving 1 core, otherwise benign  6/18/17 - Prostate MRI - prostate volume 110 grams, PIRADS 4  6/6/17 - PSA 8.64   1/14/16 - PSA 3.75    Current Outpatient Medications   Medication Sig Dispense Refill     albuterol (VENTOLIN HFA) 108 (90 Base) MCG/ACT inhaler Inhale 2  puffs into the lungs every 6 hours 18 g 3     alpha-lipoic acid 600 MG capsule Take 1 capsule (600 mg) by mouth daily 90 capsule 3     amLODIPine (NORVASC) 5 MG tablet Take 1 tablet (5 mg) by mouth At Bedtime 90 tablet 3     amoxicillin (AMOXIL) 500 MG capsule TAKE 1 CAPSULE ORAL EVERY EIGHT HOURS AS DIRECTED       ARTIFICIAL TEAR OP Apply to eye as needed       aspirin 81 MG tablet Take 1 tablet by mouth At Bedtime        atorvastatin (LIPITOR) 20 MG tablet Take 1 tablet (20 mg) by mouth daily 90 tablet 0     blood glucose (NO BRAND SPECIFIED) lancets standard Lancets that go with device, Test 3 times daily 300 each 3     blood glucose monitoring (NO BRAND SPECIFIED) meter device kit Any meter covered by insurance, not store brand, use as directed. 1 kit 0     blood glucose monitoring (NO BRAND SPECIFIED) test strip Strips that go with meter, covered by insurance. Test 3 times daily 300 strip 3     Blood Pressure Monitor KIT Automatic Blood Pressure Monitor 1 kit 0     camphor-menthol (DERMASARRA) 0.5-0.5 % external lotion Apply lotion 2-3 times per day to itchy areas. If not improving in two weeks, follow up with PCP. 222 mL 0     camphor-menthol (DERMASARRA) 0.5-0.5 % external lotion Apply to arms legs torso 1-2 times a day for itching 1 Bottle 11     chlorhexidine (PERIDEX) 0.12 % solution PLEASE SEE ATTACHED FOR DETAILED DIRECTIONS       clobetasol (TEMOVATE) 0.05 % ointment Apply topically to legs twice daily for 2 weeks.  Then discontinue (Patient taking differently: Apply topically to legs twice daily for 2 weeks.  Then discontinue) 30 g 0     clotrimazole (LOTRIMIN) 1 % cream Apply topically 2 times daily 30 g 3     Continuous Blood Gluc  (FREESTYLE PETER 2 READER) JUANCARLOS 1 each once FOR 1 DOSE       Continuous Blood Gluc Sensor (FREESTYLE PETER 2 SENSOR) MISC 1 EACH EVERY 14 DAYS 1 EACH EVERY 14 DAYS. CHANGE EVERY 14 DAYS. 2 each 5     dextromethorphan-guaiFENesin (MUCINEX DM)  MG 12 hr tablet  "Take 1 tablet by mouth every 12 hours 30 tablet 0     dextromethorphan-guaiFENesin (TUSSIN DM)  MG/5ML liquid Take 5 mLs by mouth 2 times daily as needed 118 mL 0     diclofenac (VOLTAREN) 1 % topical gel Apply 4 grams to thigh up to 4 times daily for pain 100 g 1     empagliflozin (JARDIANCE) 10 MG TABS tablet Take 1 tablet (10 mg) by mouth daily 30 tablet 1     famotidine (PEPCID) 20 MG tablet TAKE 1 TABLET BY MOUTH TWICE A  tablet 1     fenofibrate 54 MG tablet Take 1 tablet (54 mg) by mouth daily (Patient taking differently: Take 54 mg by mouth At Bedtime) 90 tablet 0     finasteride (PROSCAR) 5 MG tablet Take 1 tablet (5 mg) by mouth daily 90 tablet 3     fluocinonide (LIDEX) 0.05 % external cream   3     fluticasone (FLONASE) 50 MCG/ACT nasal spray Spray 1 spray into both nostrils daily 16 g 0     fluticasone (FLOVENT HFA) 110 MCG/ACT inhaler Inhale 1 puff into the lungs 2 times daily 12 g 11     gabapentin (NEURONTIN) 300 MG capsule TAKE 1 CAPSULE BY MOUTH TWICE A DAY 60 capsule 3     gabapentin (NEURONTIN) 300 MG capsule TAKE 1 CAPSULE BY MOUTH TWICE A  capsule 0     HUMALOG KWIKPEN 100 UNIT/ML soln Inject 15-17  units with meals, plus correction. Pt uses approx 65 units in 24 hrs. 60 mL 1     ibuprofen (ADVIL/MOTRIN) 600 MG tablet TAKE 1 TABLET ORAL EVERY SIX HOURS AS DIRECTED       insulin degludec (TRESIBA FLEXTOUCH) 100 UNIT/ML pen INJECT 64 UNITS SUBCUTANEOUSLY AT BEDTIME. 60 mL 3     insulin pen needle (B-D U/F) 31G X 5 MM miscellaneous Use 4 time(s) per day.  Please dispense as BD Pen Needle Mini U/F 31G x 5  each 3     insulin pen needle (B-D U/F) 31G X 5 MM Use 4 times per day.  Please dispense as BD Pen Needle Mini U/F 31G x 5  each 3     insulin syringe 31G X 5/16\" 0.5 ML MISC Use three syringes daily 270 each 1     ketamine 5% gabapentin 8% lidocaine 2.5% topical PLO cream Apply 1 g topically 3 times daily 30 g 3     loratadine (CLARITIN) 10 MG tablet Take 1 tablet " "(10 mg) by mouth daily 90 tablet 0     losartan (COZAAR) 100 MG tablet Take 1 tablet (100 mg) by mouth At Bedtime 90 tablet 3     mirabegron (MYRBETRIQ) 25 MG 24 hr tablet Take 1 tablet (25 mg) by mouth daily 30 tablet 5     mupirocin (BACTROBAN) 2 % cream Apply  topically. In very small amounts only as needed 15 g 1     Omega-3 Fatty Acids (OMEGA-3 FISH OIL) 1000 MG CAPS Take 1 capsule (1 g) by mouth 2 times daily 60 capsule 11     ONETOUCH ULTRA test strip Use to test blood sugar 3 times daily 300 strip 3     semaglutide (OZEMPIC, 0.25 OR 0.5 MG/DOSE,) 2 MG/1.5ML SOPN pen Inject 0.5 mg subcutaneous once a week. 1.5 mL 3     sodium bicarbonate 650 MG tablet TAKE 1 TABLET (650 MG) BY MOUTH 3 TIMES DAILY 270 tablet 3     tamsulosin (FLOMAX) 0.4 MG capsule Take 1 capsule (0.4 mg) by mouth daily 90 capsule 3     triamcinolone (KENALOG) 0.1 % cream Apply topically 3 times daily 80 g 0     VITAMIN D3 25 MCG (1000 UT) tablet TAKE 2 TABLETS (50 MCG) BY MOUTH DAILY 180 tablet 3     VITAMIN D3 25 MCG (1000 UT) tablet TAKE 2 TABLETS (50 MCG) BY MOUTH DAILY         ALLERGIES: Patient has no known allergies.      REVIEW OF SYSTEMS:  As above in HPI    GENERAL PHYSICAL EXAM:   Vitals: /78 (BP Location: Left arm, Patient Position: Sitting, Cuff Size: Adult Regular)   Pulse 79   Ht 1.74 m (5' 8.5\")   Wt 86.2 kg (190 lb)   SpO2 99%   BMI 28.47 kg/m    Body mass index is 28.47 kg/m .    GENERAL: Well groomed, well developed, well nourished male in NAD.  NEURO: Alert and oriented x 3.  PSYCH: Normal mood and affect, pleasant and agreeable during interview and exam.      NALLELY:      normal rectal tone, small soft amount of stool in the rectal vault.      large sized prostate, without tenderness or asymmetry. + prominence along right apex.     RADIOLOGY: The following tests were reviewed:  MRI PROSTATE: 2/9/2020 4:42 PM     CLINICAL HISTORY: prostate cancer; Malignant neoplasm of prostate (H)  . [place] 6, 3+3 at the right " base on prior biopsy.     Most Recent PSA: 3.61 ug/L     Comparison: 7/3/2019.     TECHNIQUE:  The following sequences were obtained: High-resolution axial  T2-weighted, coronal T2-weighted, 3D volumetric T2-weighted, axial  pre-contrast T1, axial diffusion-weighted, axial apparent diffusion  coefficient and axial dynamic contrast-enhanced T1. Postcontrast  images were evaluated on a separate workstation to evaluate dynamic  contrast enhancement. The technique of this exam is PI-RADS v2.1  compliant. Contrast dose: 9 mL Gadavist     FINDINGS:  Size: 103 grams  Hemorrhage: Absent  Peripheral zone: Heterogeneous on T2-weighted images. Regions of  mildly decreased signal on ADC or DWI which are best characterized as  PI-RADS 2 without highly suspicious lesion.  Transition zone: Enlarged with BPH changes. Transition zone nodules  which are circumscribed or mostly encapsulated without diffusion  restriction.  PI-RADS 2.  No highly suspicious nodules. Prominent  median lobe impressing the urinary bladder floor. Extruded BPH nodule  at the right base, 7:00 position.     Neurovascular bundles: No neurovascular bundle involvement by  malignancy.    Seminal vesicles: No seminal vesicle involvement by malignancy.   Lymph nodes: No lymph node involvement   Bones: Heterogeneous bone marrow signal. Degenerative changes of the  spine. Heterogeneous bone enhancement with foci of enhancement in the  bones, for example in the left iliac bone, series 16 image 10 as well  as in the right innominate bone posteriorly, series 16 image 10,  indeterminate however stable. Enthesopathic changes off the pelvis and  hips. Stable wedge compression deformity of the vertebral body L4,  partially visualized.  Other pelvic organs: Urinary bladder wall thickening and trabeculation  likely due to chronic outlet obstructive changes.                                                                         IMPRESSION:  1. Based on the most suspicious  abnormality, this exam is  characterized as PIRADS 2 - Clinically significant cancer is unlikely  to be present.  2. No convincing suspicious adenopathy or evidence of pelvic  metastases.  3. Heterogeneous bone marrow signal and enhancement with scattered  foci of enhancement, indeterminate however stable from 7/3/2019.  Attention on follow-up studies.    LABS: The last test results for Mr. Earle Rene were reviewed:  PSA -   Lab Results   Component Value Date    PSA 2.11 09/12/2022    PSA 2.90 04/09/2021    PSA 1.94 02/27/2020    PSA 3.61 05/28/2019    PSA 2.14 05/24/2018    PSA 2.52 11/01/2017    PSA 8.64 06/06/2017    PSA 3.75 01/14/2016    PSA 2.92 09/09/2014    PSA 2.18 06/12/2013    PSA 1.77 02/02/2012     BMP -   Recent Labs   Lab Test 01/08/23  1758 09/12/22  0941 06/09/22  1333 12/08/21  1428 07/30/21  1005 06/09/21  1524 12/18/19  1218 07/02/19  0947 08/01/18  1222 05/24/18  1414 05/01/18  1233     --  139 140  --  142   < >  --    < >  --  141   POTASSIUM 4.6  --  4.3 4.2  --  4.2   < >  --    < >  --  4.7   CHLORIDE 106  --  107 107  --  111*   < >  --    < >  --  110*   CO2 20*  --  24 24  --  22   < >  --    < >  --  21   BUN 30.1*  --  33* 31*  --  20   < >  --    < >  --  20   CR 1.62* 1.72* 2.04* 1.66*   < > 1.45*   < >  --    < >  --  1.28*   *  --  142* 242*  --  211*   < >  --    < >  --  149*   INDERJIT 10.1  --  9.5 10.2*  --  8.8   < >  --    < >  --  9.8   MAG  --   --   --   --   --   --   --  1.9  --  2.1 2.1   PHOS  --   --  4.0 4.6*  --  2.9   < >  --    < >  --  3.3    < > = values in this interval not displayed.       CBC -   Recent Labs   Lab Test 01/08/23  1758 12/08/21  1428 06/09/21  1524 03/12/21  1324 01/29/20  1315 06/18/19  1054   WBC 5.4  --   --  5.1  --  4.6   HGB 14.4 15.0 13.6 14.1   < > 13.5   *  --   --  156  --  130*    < > = values in this interval not displayed.       ASSESSMENT:   1) prostate cancer - stable PSA  2) urinary urgency  3) abnormal NALLELY -  prominence along right apex.  Previous prostate MRi on 2/9/20 suggests there is a BPH nodule in that location.  Although he also had a prostate biopsy in 2017 showing [place] 6 adenocarcinoma involving 5% of 1 core from right base.  PSA was 8.64ug/L at that time, then quickly returned to normal (2.52) a few months later.      PLAN:   - Get another PSA when you have labs with Dr. Hare  - Continue tamsulosin, finasteride and myrbetriq 25mg.  Let me know if urinary symptoms change  - Try metamucil or Citrucel - over the counter - these are fiber supplements that thicken loose stools and soften hard stools.  They make consistency more normal.  You would take the fiber every day.   - Return in 6 months to recheck symptoms.  Would consider repeat prostate MRI for increasing PSA, but could also consider repeating given prominence of right prostate in the setting of [place] 6 prostate cancer on AS.  Will discuss with Mr. Sandoval at that time.     VISIT DURATION: 29  minutes (3:29 - 3:55 +3 minutes documentation on DOS)    Qian Villalta PA-C  Department of Urologic Surgery

## 2023-02-03 NOTE — PATIENT INSTRUCTIONS
PLAN:   - Get another PSA when you have labs with Dr. Hare  - Continue tamsulosin, finasteride and myrbetriq 25mg.  Let me know if urinary symptoms change  - Try metamucil or Citrucel - over the counter - these are fiber supplements that thicken loose stools and soften hard stools.  They make consistency more normal.  You would take the fiber every day.   - Return in 6 months to recheck symptoms     [facility]

## 2023-02-03 NOTE — NURSING NOTE
"Chief Complaint   Patient presents with     Follow Up     Urinary urgency       Blood pressure 125/78, pulse 79, height 1.74 m (5' 8.5\"), weight 86.2 kg (190 lb), SpO2 99 %. Body mass index is 28.47 kg/m .    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 eye     Colonic polyp     Elevated PSA     Heel fracture     Hyponatremia     Nephrolithiasis     Pain of finger of right hand     Sarcoidosis of lung (H)     Sialoadenitis     Adhesive capsulitis of shoulder     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)     Chronic kidney disease, stage 3 (H)     Peripheral vascular disease, unspecified (H)     Malignant neoplasm of prostate (H)     Chronic bilateral low back pain with bilateral sciatica     Pain of both sacroiliac joints       No Known Allergies    Current Outpatient Medications   Medication Sig Dispense Refill     albuterol (VENTOLIN HFA) 108 (90 Base) MCG/ACT inhaler Inhale 2 puffs into the lungs every 6 hours 18 g 3     alpha-lipoic acid 600 MG capsule Take 1 capsule (600 mg) by mouth daily 90 capsule 3     amLODIPine (NORVASC) 5 MG tablet Take 1 tablet (5 mg) by mouth At Bedtime 90 tablet 3     amoxicillin (AMOXIL) 500 MG capsule TAKE 1 CAPSULE ORAL EVERY EIGHT HOURS AS DIRECTED       ARTIFICIAL TEAR OP Apply to eye as needed       aspirin 81 MG tablet Take 1 tablet by mouth At Bedtime        " atorvastatin (LIPITOR) 20 MG tablet Take 1 tablet (20 mg) by mouth daily 90 tablet 0     blood glucose (NO BRAND SPECIFIED) lancets standard Lancets that go with device, Test 3 times daily 300 each 3     blood glucose monitoring (NO BRAND SPECIFIED) meter device kit Any meter covered by insurance, not store brand, use as directed. 1 kit 0     blood glucose monitoring (NO BRAND SPECIFIED) test strip Strips that go with meter, covered by insurance. Test 3 times daily 300 strip 3     Blood Pressure Monitor KIT Automatic Blood Pressure Monitor 1 kit 0     camphor-menthol (DERMASARRA) 0.5-0.5 % external lotion Apply lotion 2-3 times per day to itchy areas. If not improving in two weeks, follow up with PCP. 222 mL 0     camphor-menthol (DERMASARRA) 0.5-0.5 % external lotion Apply to arms legs torso 1-2 times a day for itching 1 Bottle 11     chlorhexidine (PERIDEX) 0.12 % solution PLEASE SEE ATTACHED FOR DETAILED DIRECTIONS       clobetasol (TEMOVATE) 0.05 % ointment Apply topically to legs twice daily for 2 weeks.  Then discontinue (Patient taking differently: Apply topically to legs twice daily for 2 weeks.  Then discontinue) 30 g 0     clotrimazole (LOTRIMIN) 1 % cream Apply topically 2 times daily 30 g 3     Continuous Blood Gluc  (FREESTYLE PETER 2 READER) JUANCARLOS 1 each once FOR 1 DOSE       Continuous Blood Gluc Sensor (FREESTYLE PETER 2 SENSOR) MISC 1 EACH EVERY 14 DAYS 1 EACH EVERY 14 DAYS. CHANGE EVERY 14 DAYS. 2 each 5     dextromethorphan-guaiFENesin (MUCINEX DM)  MG 12 hr tablet Take 1 tablet by mouth every 12 hours 30 tablet 0     dextromethorphan-guaiFENesin (TUSSIN DM)  MG/5ML liquid Take 5 mLs by mouth 2 times daily as needed 118 mL 0     diclofenac (VOLTAREN) 1 % topical gel Apply 4 grams to thigh up to 4 times daily for pain 100 g 1     empagliflozin (JARDIANCE) 10 MG TABS tablet Take 1 tablet (10 mg) by mouth daily 30 tablet 1     famotidine (PEPCID) 20 MG tablet TAKE 1 TABLET BY MOUTH  "TWICE A  tablet 1     fenofibrate 54 MG tablet Take 1 tablet (54 mg) by mouth daily (Patient taking differently: Take 54 mg by mouth At Bedtime) 90 tablet 0     finasteride (PROSCAR) 5 MG tablet Take 1 tablet (5 mg) by mouth daily 90 tablet 3     fluocinonide (LIDEX) 0.05 % external cream   3     fluticasone (FLONASE) 50 MCG/ACT nasal spray Spray 1 spray into both nostrils daily 16 g 0     fluticasone (FLOVENT HFA) 110 MCG/ACT inhaler Inhale 1 puff into the lungs 2 times daily 12 g 11     gabapentin (NEURONTIN) 300 MG capsule TAKE 1 CAPSULE BY MOUTH TWICE A DAY 60 capsule 3     gabapentin (NEURONTIN) 300 MG capsule TAKE 1 CAPSULE BY MOUTH TWICE A  capsule 0     HUMALOG KWIKPEN 100 UNIT/ML soln Inject 15-17  units with meals, plus correction. Pt uses approx 65 units in 24 hrs. 60 mL 1     ibuprofen (ADVIL/MOTRIN) 600 MG tablet TAKE 1 TABLET ORAL EVERY SIX HOURS AS DIRECTED       insulin degludec (TRESIBA FLEXTOUCH) 100 UNIT/ML pen INJECT 64 UNITS SUBCUTANEOUSLY AT BEDTIME. 60 mL 3     insulin pen needle (B-D U/F) 31G X 5 MM miscellaneous Use 4 time(s) per day.  Please dispense as BD Pen Needle Mini U/F 31G x 5  each 3     insulin pen needle (B-D U/F) 31G X 5 MM Use 4 times per day.  Please dispense as BD Pen Needle Mini U/F 31G x 5  each 3     insulin syringe 31G X 5/16\" 0.5 ML MISC Use three syringes daily 270 each 1     ketamine 5% gabapentin 8% lidocaine 2.5% topical PLO cream Apply 1 g topically 3 times daily 30 g 3     loratadine (CLARITIN) 10 MG tablet Take 1 tablet (10 mg) by mouth daily 90 tablet 0     losartan (COZAAR) 100 MG tablet Take 1 tablet (100 mg) by mouth At Bedtime 90 tablet 3     mirabegron (MYRBETRIQ) 25 MG 24 hr tablet Take 1 tablet (25 mg) by mouth daily 30 tablet 5     mupirocin (BACTROBAN) 2 % cream Apply  topically. In very small amounts only as needed 15 g 1     Omega-3 Fatty Acids (OMEGA-3 FISH OIL) 1000 MG CAPS Take 1 capsule (1 g) by mouth 2 times daily 60 " capsule 11     ONETOUCH ULTRA test strip Use to test blood sugar 3 times daily 300 strip 3     semaglutide (OZEMPIC, 0.25 OR 0.5 MG/DOSE,) 2 MG/1.5ML SOPN pen Inject 0.5 mg subcutaneous once a week. 1.5 mL 3     sodium bicarbonate 650 MG tablet TAKE 1 TABLET (650 MG) BY MOUTH 3 TIMES DAILY 270 tablet 3     tamsulosin (FLOMAX) 0.4 MG capsule Take 1 capsule (0.4 mg) by mouth daily 90 capsule 3     triamcinolone (KENALOG) 0.1 % cream Apply topically 3 times daily 80 g 0     VITAMIN D3 25 MCG (1000 UT) tablet TAKE 2 TABLETS (50 MCG) BY MOUTH DAILY 180 tablet 3     VITAMIN D3 25 MCG (1000 UT) tablet TAKE 2 TABLETS (50 MCG) BY MOUTH DAILY         Social History     Tobacco Use     Smoking status: Never     Smokeless tobacco: Never   Substance Use Topics     Alcohol use: Yes     Comment: occasionaly     Drug use: No       Suleman Enriquez, EMT  2/3/2023  2:56 PM

## 2023-02-03 NOTE — LETTER
2/3/2023       RE: Earle Rene  [address]  [place] [address]     Dear Colleague,    Thank you for referring your patient, Earle Rene, to the [facility] [place] [facility]. Please see a copy of my visit note below.    HPI: Mr. Earle Rene is a 72 year old year old male presenting today for evaluation of chief complaint(s): Follow Up (Urinary urgency)    Today, he is unaccompanied     Mr. Rene is a 72M with PMH significant for HTN, HLD, IDDM, neuropathy (on neurontin), copd, gerd, low risk prostate cancer with BPH (on proscar and tamsulosin 0.4mg daily) who was last seen in Urology on 4/9/21 by Dr. Bland.  At that time, PSA was 2.90ug/L and sCr was elevated (1.68mg/dL). Dr. Bland recommended follow up in 6-12 months for recheck PSA.     Urinary symptoms:   - Nocturia x 2.  Can have some urgency and can have urge incontinence.   Can have some pain at the tip of the penis with urinates. Stream is medium caliber.  Feels he isn't emptying properly the first time but then double voids and he empties.    - Sometimes drinks caffeinated tea.  One cup per day but he does like it strong.    - Fluid intake - 3-4 bottles of water (important to drink a lot of water with his diabetic medications), 1 cup of tea.    - DIabetes has been under decent control (6/9/22 - 7.9%).    - Just had an injection in his back (for pain) - unsure whether or not it is helping.    - already has problem with constipation.  BMs are erratic (either doesn't go for 2-3 days or goes every 1/2 hour) - this is a new problem for him.       REVIEW OF DIAGNOSTICS:  9/12/22 - PSA 2.11ug/L  4/9/21 - PSA 2.90ug/L  2/27/20 - PSA 1.94  2/9/20 - Prostate MRI - prostate volume 103 grams, PIRADS 2  7/3/19 - Prostate MRI  - prostate volume 86 grams, PIRADS 2  5/28/19 - PSA 3.61  5/24/18 - PSA 2.14  11/1/17 - PSA 2.52  8/3/17 - FINASTERIDE WAS BEGUN  7/27/17 - prostate  biopsy (Dr. Cantu)  - [place] 6 (3+3) in 1mm involving 1 core, otherwise benign  6/18/17 - Prostate MRI - prostate volume 110 grams, PIRADS 4  6/6/17 - PSA 8.64   1/14/16 - PSA 3.75    Current Outpatient Medications   Medication Sig Dispense Refill     albuterol (VENTOLIN HFA) 108 (90 Base) MCG/ACT inhaler Inhale 2 puffs into the lungs every 6 hours 18 g 3     alpha-lipoic acid 600 MG capsule Take 1 capsule (600 mg) by mouth daily 90 capsule 3     amLODIPine (NORVASC) 5 MG tablet Take 1 tablet (5 mg) by mouth At Bedtime 90 tablet 3     amoxicillin (AMOXIL) 500 MG capsule TAKE 1 CAPSULE ORAL EVERY EIGHT HOURS AS DIRECTED       ARTIFICIAL TEAR OP Apply to eye as needed       aspirin 81 MG tablet Take 1 tablet by mouth At Bedtime        atorvastatin (LIPITOR) 20 MG tablet Take 1 tablet (20 mg) by mouth daily 90 tablet 0     blood glucose (NO BRAND SPECIFIED) lancets standard Lancets that go with device, Test 3 times daily 300 each 3     blood glucose monitoring (NO BRAND SPECIFIED) meter device kit Any meter covered by insurance, not store brand, use as directed. 1 kit 0     blood glucose monitoring (NO BRAND SPECIFIED) test strip Strips that go with meter, covered by insurance. Test 3 times daily 300 strip 3     Blood Pressure Monitor KIT Automatic Blood Pressure Monitor 1 kit 0     camphor-menthol (DERMASARRA) 0.5-0.5 % external lotion Apply lotion 2-3 times per day to itchy areas. If not improving in two weeks, follow up with PCP. 222 mL 0     camphor-menthol (DERMASARRA) 0.5-0.5 % external lotion Apply to arms legs torso 1-2 times a day for itching 1 Bottle 11     chlorhexidine (PERIDEX) 0.12 % solution PLEASE SEE ATTACHED FOR DETAILED DIRECTIONS       clobetasol (TEMOVATE) 0.05 % ointment Apply topically to legs twice daily for 2 weeks.  Then discontinue (Patient taking differently: Apply topically to legs twice daily for 2 weeks.  Then discontinue) 30 g 0     clotrimazole (LOTRIMIN) 1 % cream Apply topically 2  times daily 30 g 3     Continuous Blood Gluc  (FREESTYLE PETER 2 READER) JUANCARLOS 1 each once FOR 1 DOSE       Continuous Blood Gluc Sensor (FREESTYLE PETER 2 SENSOR) MISC 1 EACH EVERY 14 DAYS 1 EACH EVERY 14 DAYS. CHANGE EVERY 14 DAYS. 2 each 5     dextromethorphan-guaiFENesin (MUCINEX DM)  MG 12 hr tablet Take 1 tablet by mouth every 12 hours 30 tablet 0     dextromethorphan-guaiFENesin (TUSSIN DM)  MG/5ML liquid Take 5 mLs by mouth 2 times daily as needed 118 mL 0     diclofenac (VOLTAREN) 1 % topical gel Apply 4 grams to thigh up to 4 times daily for pain 100 g 1     empagliflozin (JARDIANCE) 10 MG TABS tablet Take 1 tablet (10 mg) by mouth daily 30 tablet 1     famotidine (PEPCID) 20 MG tablet TAKE 1 TABLET BY MOUTH TWICE A  tablet 1     fenofibrate 54 MG tablet Take 1 tablet (54 mg) by mouth daily (Patient taking differently: Take 54 mg by mouth At Bedtime) 90 tablet 0     finasteride (PROSCAR) 5 MG tablet Take 1 tablet (5 mg) by mouth daily 90 tablet 3     fluocinonide (LIDEX) 0.05 % external cream   3     fluticasone (FLONASE) 50 MCG/ACT nasal spray Spray 1 spray into both nostrils daily 16 g 0     fluticasone (FLOVENT HFA) 110 MCG/ACT inhaler Inhale 1 puff into the lungs 2 times daily 12 g 11     gabapentin (NEURONTIN) 300 MG capsule TAKE 1 CAPSULE BY MOUTH TWICE A DAY 60 capsule 3     gabapentin (NEURONTIN) 300 MG capsule TAKE 1 CAPSULE BY MOUTH TWICE A  capsule 0     HUMALOG KWIKPEN 100 UNIT/ML soln Inject 15-17  units with meals, plus correction. Pt uses approx 65 units in 24 hrs. 60 mL 1     ibuprofen (ADVIL/MOTRIN) 600 MG tablet TAKE 1 TABLET ORAL EVERY SIX HOURS AS DIRECTED       insulin degludec (TRESIBA FLEXTOUCH) 100 UNIT/ML pen INJECT 64 UNITS SUBCUTANEOUSLY AT BEDTIME. 60 mL 3     insulin pen needle (B-D U/F) 31G X 5 MM miscellaneous Use 4 time(s) per day.  Please dispense as BD Pen Needle Mini U/F 31G x 5  each 3     insulin pen needle (B-D U/F) 31G X 5 MM Use  "4 times per day.  Please dispense as BD Pen Needle Mini U/F 31G x 5  each 3     insulin syringe 31G X 5/16\" 0.5 ML MISC Use three syringes daily 270 each 1     ketamine 5% gabapentin 8% lidocaine 2.5% topical PLO cream Apply 1 g topically 3 times daily 30 g 3     loratadine (CLARITIN) 10 MG tablet Take 1 tablet (10 mg) by mouth daily 90 tablet 0     losartan (COZAAR) 100 MG tablet Take 1 tablet (100 mg) by mouth At Bedtime 90 tablet 3     mirabegron (MYRBETRIQ) 25 MG 24 hr tablet Take 1 tablet (25 mg) by mouth daily 30 tablet 5     mupirocin (BACTROBAN) 2 % cream Apply  topically. In very small amounts only as needed 15 g 1     Omega-3 Fatty Acids (OMEGA-3 FISH OIL) 1000 MG CAPS Take 1 capsule (1 g) by mouth 2 times daily 60 capsule 11     ONETOUCH ULTRA test strip Use to test blood sugar 3 times daily 300 strip 3     semaglutide (OZEMPIC, 0.25 OR 0.5 MG/DOSE,) 2 MG/1.5ML SOPN pen Inject 0.5 mg subcutaneous once a week. 1.5 mL 3     sodium bicarbonate 650 MG tablet TAKE 1 TABLET (650 MG) BY MOUTH 3 TIMES DAILY 270 tablet 3     tamsulosin (FLOMAX) 0.4 MG capsule Take 1 capsule (0.4 mg) by mouth daily 90 capsule 3     triamcinolone (KENALOG) 0.1 % cream Apply topically 3 times daily 80 g 0     VITAMIN D3 25 MCG (1000 UT) tablet TAKE 2 TABLETS (50 MCG) BY MOUTH DAILY 180 tablet 3     VITAMIN D3 25 MCG (1000 UT) tablet TAKE 2 TABLETS (50 MCG) BY MOUTH DAILY         ALLERGIES: Patient has no known allergies.      REVIEW OF SYSTEMS:  As above in HPI    GENERAL PHYSICAL EXAM:   Vitals: /78 (BP Location: Left arm, Patient Position: Sitting, Cuff Size: Adult Regular)   Pulse 79   Ht 1.74 m (5' 8.5\")   Wt 86.2 kg (190 lb)   SpO2 99%   BMI 28.47 kg/m    Body mass index is 28.47 kg/m .    GENERAL: Well groomed, well developed, well nourished male in NAD.  NEURO: Alert and oriented x 3.  PSYCH: Normal mood and affect, pleasant and agreeable during interview and exam.      NALLELY:      normal rectal tone, small soft " amount of stool in the rectal vault.      large sized prostate, without tenderness or asymmetry. + prominence along right apex.     RADIOLOGY: The following tests were reviewed:  MRI PROSTATE: 2/9/2020 4:42 PM     CLINICAL HISTORY: prostate cancer; Malignant neoplasm of prostate (H)  . [place] 6, 3+3 at the right base on prior biopsy.     Most Recent PSA: 3.61 ug/L     Comparison: 7/3/2019.     TECHNIQUE:  The following sequences were obtained: High-resolution axial  T2-weighted, coronal T2-weighted, 3D volumetric T2-weighted, axial  pre-contrast T1, axial diffusion-weighted, axial apparent diffusion  coefficient and axial dynamic contrast-enhanced T1. Postcontrast  images were evaluated on a separate workstation to evaluate dynamic  contrast enhancement. The technique of this exam is PI-RADS v2.1  compliant. Contrast dose: 9 mL Gadavist     FINDINGS:  Size: 103 grams  Hemorrhage: Absent  Peripheral zone: Heterogeneous on T2-weighted images. Regions of  mildly decreased signal on ADC or DWI which are best characterized as  PI-RADS 2 without highly suspicious lesion.  Transition zone: Enlarged with BPH changes. Transition zone nodules  which are circumscribed or mostly encapsulated without diffusion  restriction.  PI-RADS 2.  No highly suspicious nodules. Prominent  median lobe impressing the urinary bladder floor. Extruded BPH nodule  at the right base, 7:00 position.     Neurovascular bundles: No neurovascular bundle involvement by  malignancy.    Seminal vesicles: No seminal vesicle involvement by malignancy.   Lymph nodes: No lymph node involvement   Bones: Heterogeneous bone marrow signal. Degenerative changes of the  spine. Heterogeneous bone enhancement with foci of enhancement in the  bones, for example in the left iliac bone, series 16 image 10 as well  as in the right innominate bone posteriorly, series 16 image 10,  indeterminate however stable. Enthesopathic changes off the pelvis and  hips. Stable wedge  compression deformity of the vertebral body L4,  partially visualized.  Other pelvic organs: Urinary bladder wall thickening and trabeculation  likely due to chronic outlet obstructive changes.                                                                         IMPRESSION:  1. Based on the most suspicious abnormality, this exam is  characterized as PIRADS 2 - Clinically significant cancer is unlikely  to be present.  2. No convincing suspicious adenopathy or evidence of pelvic  metastases.  3. Heterogeneous bone marrow signal and enhancement with scattered  foci of enhancement, indeterminate however stable from 7/3/2019.  Attention on follow-up studies.    LABS: The last test results for Mr. Earle Rene were reviewed:  PSA -   Lab Results   Component Value Date    PSA 2.11 09/12/2022    PSA 2.90 04/09/2021    PSA 1.94 02/27/2020    PSA 3.61 05/28/2019    PSA 2.14 05/24/2018    PSA 2.52 11/01/2017    PSA 8.64 06/06/2017    PSA 3.75 01/14/2016    PSA 2.92 09/09/2014    PSA 2.18 06/12/2013    PSA 1.77 02/02/2012     BMP -   Recent Labs   Lab Test 01/08/23  1758 09/12/22  0941 06/09/22  1333 12/08/21  1428 07/30/21  1005 06/09/21  1524 12/18/19  1218 07/02/19  0947 08/01/18  1222 05/24/18  1414 05/01/18  1233     --  139 140  --  142   < >  --    < >  --  141   POTASSIUM 4.6  --  4.3 4.2  --  4.2   < >  --    < >  --  4.7   CHLORIDE 106  --  107 107  --  111*   < >  --    < >  --  110*   CO2 20*  --  24 24  --  22   < >  --    < >  --  21   BUN 30.1*  --  33* 31*  --  20   < >  --    < >  --  20   CR 1.62* 1.72* 2.04* 1.66*   < > 1.45*   < >  --    < >  --  1.28*   *  --  142* 242*  --  211*   < >  --    < >  --  149*   INDERJIT 10.1  --  9.5 10.2*  --  8.8   < >  --    < >  --  9.8   MAG  --   --   --   --   --   --   --  1.9  --  2.1 2.1   PHOS  --   --  4.0 4.6*  --  2.9   < >  --    < >  --  3.3    < > = values in this interval not displayed.       CBC -   Recent Labs   Lab Test 01/08/23  6533  12/08/21  1428 06/09/21  1524 03/12/21  1324 01/29/20  1315 06/18/19  1054   WBC 5.4  --   --  5.1  --  4.6   HGB 14.4 15.0 13.6 14.1   < > 13.5   *  --   --  156  --  130*    < > = values in this interval not displayed.       ASSESSMENT:   1) prostate cancer - stable PSA  2) urinary urgency  3) abnormal NALLELY - prominence along right apex.  Previous prostate MRi on 2/9/20 suggests there is a BPH nodule in that location.  Although he also had a prostate biopsy in 2017 showing Jameel 6 adenocarcinoma involving 5% of 1 core from right base.  PSA was 8.64ug/L at that time, then quickly returned to normal (2.52) a few months later.      PLAN:   - Get another PSA when you have labs with Dr. Hare  - Continue tamsulosin, finasteride and myrbetriq 25mg.  Let me know if urinary symptoms change  - Try metamucil or Citrucel - over the counter - these are fiber supplements that thicken loose stools and soften hard stools.  They make consistency more normal.  You would take the fiber every day.   - Return in 6 months to recheck symptoms.  Would consider repeat prostate MRI for increasing PSA, but could also consider repeating given prominence of right prostate in the setting of Jameel 6 prostate cancer on AS.  Will discuss with Mr. Sandoval at that time.     VISIT DURATION: 29  minutes (3:29 - 3:55 +3 minutes documentation on DOS)    Qian Villalta PA-C  Department of Urologic Surgery

## 2023-02-05 NOTE — PROGRESS NOTES
HPI:    Chronic L sided leg and radicular pain. He had lumbar MRI imaging 11/12/2021 (Multilevel spondylosis with moderate canal stenosis at L4-5, severe left foraminal stenosis at L4-5 and bilaterally at L5-S1. Compression fracture of the L4 superior endplate, appears chronic)  and saw Dr. Nichols, Orthopedic spine 11/30/2021 and he recommended PT and an interlaminar L4-5 injection.  He had EMG from Dr. Mckenzie, Neurology 10/27/2021 showing neuropathy (most likely from diabetes) and chronic L sided S1-L5 lumbosacral radiculopathy. He had an sacro-iliac joint injection 9/13/2022 that he did not feel that was beneficial. It's not sure if he has had an epidural lower lumbar injection? He remains on gabapentin that may be useful. He also has chronic dry skin with itching; worse in the winter. He would like to remain active. He does not want any back surgery. No other HEENT, cardiopulmonary, abdominal, , neurological, systemic, psychiatric, lymphatic, endocrine complaints.       Past Medical History:   Diagnosis Date     Blepharitis of both eyes      BPH (benign prostatic hyperplasia)      Diabetes (H)      Diabetic neuropathy (H)      Diabetic retinopathy associated with diabetes mellitus due to underlying condition (H)      Dry eye syndrome      GERD (gastroesophageal reflux disease)      Goiter      Granulomatous disease (H)      HLD (hyperlipidemia)      HTN (hypertension)      Nonsenile cataract      Peripheral neuropathy      Past Surgical History:   Procedure Laterality Date     ------------OTHER-------------      back of neck abscess drainage in OR     AS RAD RESEC TONSIL/PILLARS Bilateral 1961     CATARACT IOL, RT/LT Left      COLONOSCOPY  7/29/2013    Procedure: COLONOSCOPY;;  Surgeon: Montana Pascal MD;  Location:  GI     EXCISE MASS UPPER EXTREMITY Right 11/11/2019    Procedure: EXCISION, MASS, UPPER EXTREMITY, RIGHT SHOULDER;  Surgeon: Johana Choudhury MD;  Location:  OR     INJECT SACROILIAC  JOINT Bilateral 9/13/2022    Procedure: Bilateral sacroiliac joint injection;  Surgeon: Collin Mata MD;  Location: [facility] OR     INTRAVITREAL INJECTION CHEMOTHERAPY Right 12/30/2019    Procedure: INTRAVITREAL Bevacizumab injection;  Surgeon: Milton Maki MD;  [facility]     PHACOEMULSIFICATION CLEAR CORNEA WITH STANDARD INTRAOCULAR LENS IMPLANT Right 12/30/2019    Procedure: PHACOEMULSIFICATION, CATARACT, WITH INTRAOCULAR LENS IMPLANT;  Surgeon: Milton Maki MD;  [facility]: [facility] OR     PHACOEMULSIFICATION WITH STANDARD INTRAOCULAR LENS IMPLANT Left 6/21/2019    Procedure: Left Eye Cataract Removal with Intraocular Lens Implant with Intraoperative Avastin Injection;  Surgeon: Lacey Eugene MD;  Location: [facility] OR     siladenatis  11/2017     PE:    Vitals noted, gen, nad, cooperative, alert, neck supple nl rom, lungs with good air movement, RRR, S1, S2, no MRG, abdomen, no acute findings. Grossly normal neurological exam.     A/P:    1. Immunizations;  Modera COVID vaccination x 2. Check with insurance regarding Shingrix. Td/Tdap 6/12/2013. Prevnar 13 done 1/12/2016 and Pneumococcal 23 done 12/17/2007. Prevnar 20; he declines all vaccinations (influenza, Pneumococcal, and Bi-valent COVID).   2. CKD; Cr 1.62 on 1/8/2023; he was seen byDr. Ramsey on 12/8/2021  3. DM2; Endocrinology seen by Ms. Laura 6/9/2022. A1c was 7.9% 6/9/2022. On Jardiance, insulin and Semaglutide  4. Urology; PSA 9/12/2022 was 2.11 on Flomax for BPH and seen Dr. Bland,  Urology 4/9/2021. He was seen again by Ms. Villalta 2/3/2023  5. Increased lipids on Atorvastatin and fenofibrate.   6. HTN; On Losartan and Amlodipine, K+ was 4.6 on 1/8/2023  7. On Vitamin D; 11/2/2021 was 38  8. Colonoscopy; 7/29/2013 and repeat 10 years   9.  Gabapentin (300 mg BID) for neuropathy; EMG was done 10/27/2021 with findings.   10.  Lumbar MRI was done 11/12/2021 and he was seen by Dr. Nichols, ortho spine 11/30/2021. May recommend an L  sided L4-5 back injection.   11. He had an SI joint injection 9/13/2022 with Dr. Mata, Pain Clinic.   12. He had a vascular surgery visit with Dr. Hood on 8/2/2022 and 1/25/2022 for positive GARCIA with exercise (3/11/2022)  13. Podiatry appt. On 6/20/2022; note scanned into the system. He had a plain X-ray 1/8/2023 of the R foot. Uric acid 6.4 on 1/8/2023  14. [facility] scanned in note from 2/2/2022     40 minutes spent on the date of the encounter doing chart review, history and exam, documentation and further activities as noted above

## 2023-02-06 ENCOUNTER — LAB (OUTPATIENT)
Dept: LAB | Facility: CLINIC | Age: 73
End: 2023-02-06
Payer: COMMERCIAL

## 2023-02-06 ENCOUNTER — OFFICE VISIT (OUTPATIENT)
Dept: INTERNAL MEDICINE | Facility: CLINIC | Age: 73
End: 2023-02-06
Payer: COMMERCIAL

## 2023-02-06 VITALS
SYSTOLIC BLOOD PRESSURE: 125 MMHG | OXYGEN SATURATION: 100 % | DIASTOLIC BLOOD PRESSURE: 68 MMHG | TEMPERATURE: 97.9 F | HEIGHT: 68 IN | WEIGHT: 196.7 LBS | HEART RATE: 77 BPM | BODY MASS INDEX: 29.81 KG/M2

## 2023-02-06 DIAGNOSIS — R73.09 INCREASED GLUCOSE LEVEL: Primary | ICD-10-CM

## 2023-02-06 DIAGNOSIS — R30.0 DYSURIA: ICD-10-CM

## 2023-02-06 DIAGNOSIS — N13.8 BENIGN PROSTATIC HYPERPLASIA WITH URINARY OBSTRUCTION: ICD-10-CM

## 2023-02-06 DIAGNOSIS — N40.1 BENIGN PROSTATIC HYPERPLASIA WITH URINARY OBSTRUCTION: ICD-10-CM

## 2023-02-06 DIAGNOSIS — E78.5 HYPERLIPIDEMIA LDL GOAL <100: ICD-10-CM

## 2023-02-06 LAB
ALBUMIN UR-MCNC: 70 MG/DL
APPEARANCE UR: CLEAR
BILIRUB UR QL STRIP: NEGATIVE
CHOLEST SERPL-MCNC: 153 MG/DL
COLOR UR AUTO: ABNORMAL
GLUCOSE UR STRIP-MCNC: >=1000 MG/DL
HDLC SERPL-MCNC: 40 MG/DL
HGB UR QL STRIP: NEGATIVE
HYALINE CASTS: 1 /LPF
KETONES UR STRIP-MCNC: NEGATIVE MG/DL
LDLC SERPL CALC-MCNC: 57 MG/DL
LEUKOCYTE ESTERASE UR QL STRIP: NEGATIVE
MUCOUS THREADS #/AREA URNS LPF: PRESENT /LPF
NITRATE UR QL: NEGATIVE
NONHDLC SERPL-MCNC: 113 MG/DL
PH UR STRIP: 5.5 [PH] (ref 5–7)
PSA SERPL-MCNC: 1.73 NG/ML (ref 0–6.5)
RBC URINE: 1 /HPF
SP GR UR STRIP: 1.02 (ref 1–1.03)
TRIGL SERPL-MCNC: 279 MG/DL
UROBILINOGEN UR STRIP-MCNC: NORMAL MG/DL
WBC URINE: 1 /HPF

## 2023-02-06 PROCEDURE — 84153 ASSAY OF PSA TOTAL: CPT | Performed by: PATHOLOGY

## 2023-02-06 PROCEDURE — 36415 COLL VENOUS BLD VENIPUNCTURE: CPT | Performed by: PATHOLOGY

## 2023-02-06 PROCEDURE — 81001 URINALYSIS AUTO W/SCOPE: CPT | Performed by: PATHOLOGY

## 2023-02-06 PROCEDURE — 87086 URINE CULTURE/COLONY COUNT: CPT | Performed by: PATHOLOGY

## 2023-02-06 PROCEDURE — 80061 LIPID PANEL: CPT | Performed by: PATHOLOGY

## 2023-02-06 PROCEDURE — 99215 OFFICE O/P EST HI 40 MIN: CPT | Performed by: INTERNAL MEDICINE

## 2023-02-06 NOTE — NURSING NOTE
"Earle Rene is a 72 year old male patient that presents today in clinic for the following:    Chief Complaint   Patient presents with     RECHECK     Follow up.  Discuss labs.  Pain on both feet. Mostly left foot.difficulty walking.  Itchiness of skin.  Discuss diabetic shoes.     The patient's allergies and medications were reviewed as noted. A set of vitals were recorded as noted without incident: /68 (BP Location: Left arm, Patient Position: Sitting, Cuff Size: Adult Large)   Pulse 77   Temp 97.9  F (36.6  C) (Oral)   Ht 1.727 m (5' 8\")   Wt 89.2 kg (196 lb 11.2 oz)   SpO2 100%   BMI 29.91 kg/m  . The patient does not have any other questions for the provider.    Melody Diggs, EMT at 1:46 PM on 2/6/2023.  Primary care clinic: [phone]  "

## 2023-02-08 ENCOUNTER — TELEPHONE (OUTPATIENT)
Dept: OPHTHALMOLOGY | Facility: CLINIC | Age: 73
End: 2023-02-08
Payer: COMMERCIAL

## 2023-02-08 LAB — BACTERIA UR CULT: NORMAL

## 2023-02-09 ENCOUNTER — DOCUMENTATION ONLY (OUTPATIENT)
Dept: INTERNAL MEDICINE | Facility: CLINIC | Age: 73
End: 2023-02-09
Payer: COMMERCIAL

## 2023-02-09 NOTE — PROGRESS NOTES
Type of Form Received: order    Form Received (Date) 2/9/23   Form Filled out Yes 2/13/23   Placed in provider folder Yes

## 2023-02-09 NOTE — PROGRESS NOTES
Type of Form Received: orders    Form Received (Date) 2/9/23   Form Filled out Yes 2/13/23   Placed in provider folder Yes

## 2023-02-17 ENCOUNTER — DOCUMENTATION ONLY (OUTPATIENT)
Dept: INTERNAL MEDICINE | Facility: CLINIC | Age: 73
End: 2023-02-17
Payer: COMMERCIAL

## 2023-02-17 NOTE — PROGRESS NOTES
Type of Form Received: order    Form Received (Date) 2/17/23   Form Filled out Yes 1/17/23   Placed in provider folder Yes

## 2023-02-19 ENCOUNTER — TELEPHONE (OUTPATIENT)
Dept: INTERNAL MEDICINE | Facility: CLINIC | Age: 73
End: 2023-02-19
Payer: COMMERCIAL

## 2023-02-19 DIAGNOSIS — M54.9 BACK PAIN, UNSPECIFIED BACK LOCATION, UNSPECIFIED BACK PAIN LATERALITY, UNSPECIFIED CHRONICITY: Primary | ICD-10-CM

## 2023-02-19 NOTE — TELEPHONE ENCOUNTER
Dear Adrian;    I saw Mr. Newman 2/6/2023 and I have reviewed his records regarding his back/hip/L sided radicular sxs. And I recommend he see Dr. Nichols, ortho spine again. I placed a referral this encounter and please help coordinate    Thanks, MOISES Hare

## 2023-02-20 ENCOUNTER — TRANSFERRED RECORDS (OUTPATIENT)
Dept: HEALTH INFORMATION MANAGEMENT | Facility: CLINIC | Age: 73
End: 2023-02-20

## 2023-02-21 ENCOUNTER — TELEPHONE (OUTPATIENT)
Dept: OPHTHALMOLOGY | Facility: CLINIC | Age: 73
End: 2023-02-21
Payer: COMMERCIAL

## 2023-02-27 ENCOUNTER — TELEPHONE (OUTPATIENT)
Dept: ENDOCRINOLOGY | Facility: CLINIC | Age: 73
End: 2023-02-27
Payer: COMMERCIAL

## 2023-02-27 DIAGNOSIS — Z79.4 TYPE 2 DIABETES MELLITUS WITH BOTH EYES AFFECTED BY MILD NONPROLIFERATIVE RETINOPATHY AND MACULAR EDEMA, WITH LONG-TERM CURRENT USE OF INSULIN (H): Primary | ICD-10-CM

## 2023-02-27 DIAGNOSIS — E11.3213 TYPE 2 DIABETES MELLITUS WITH BOTH EYES AFFECTED BY MILD NONPROLIFERATIVE RETINOPATHY AND MACULAR EDEMA, WITH LONG-TERM CURRENT USE OF INSULIN (H): Primary | ICD-10-CM

## 2023-02-27 RX ORDER — FLASH GLUCOSE SCANNING READER
EACH MISCELLANEOUS
Qty: 1 EACH | Refills: 0 | Status: SHIPPED | OUTPATIENT
Start: 2023-02-27 | End: 2024-01-04

## 2023-02-27 NOTE — TELEPHONE ENCOUNTER
[facility] Call Center    Phone Message    May a detailed message be left on voicemail: yes     Reason for Call: Medication Refill Request    Has the patient contacted the pharmacy for the refill? Yes   Name of medication being requested: patient lost his freestyle cyrus  Meter and needs orders for a new one sent to pharmacy     Provider who prescribed the medication: RIVERA Laura     Pharmacy: [id number] - [place], [place] - [address]      Date medication is needed: asap         Action Taken: Other: ENDO    Travel Screening: Not Applicable

## 2023-02-27 NOTE — TELEPHONE ENCOUNTER
Freestyle cyrus reader sent to the [facility]. Delphine Cooper RN on 2/27/2023 at 10:37 AM

## 2023-03-03 DIAGNOSIS — N40.1 BENIGN NON-NODULAR PROSTATIC HYPERPLASIA WITH LOWER URINARY TRACT SYMPTOMS: ICD-10-CM

## 2023-03-06 ENCOUNTER — TELEPHONE (OUTPATIENT)
Dept: UROLOGY | Facility: CLINIC | Age: 73
End: 2023-03-06
Payer: COMMERCIAL

## 2023-03-06 RX ORDER — TAMSULOSIN HYDROCHLORIDE 0.4 MG/1
0.4 CAPSULE ORAL DAILY
Qty: 30 CAPSULE | Refills: 5 | Status: SHIPPED | OUTPATIENT
Start: 2023-03-06 | End: 2023-08-10

## 2023-03-06 NOTE — TELEPHONE ENCOUNTER
[facility] Call Center    Phone Message    May a detailed message be left on voicemail: yes     Reason for Call: Medication Refill Request    Has the patient contacted the pharmacy for the refill? Yes   Name of medication being requested: tamsulosin (FLOMAX) 0.4 MG capsule [[id number]] (Order [id number])  Provider who prescribed the medication: Kenrick Glass MD  Pharmacy: [id number] - [place], [place] - [address]  Date medication is needed: ASAP    Patient is very upset that this medication has not been sent to his pharmacy.        Action Taken: Message routed to:  [facility] URology    Travel Screening: Not Applicable

## 2023-03-10 ENCOUNTER — OFFICE VISIT (OUTPATIENT)
Dept: URGENT CARE | Facility: URGENT CARE | Age: 73
End: 2023-03-10
Payer: COMMERCIAL

## 2023-03-10 VITALS
DIASTOLIC BLOOD PRESSURE: 68 MMHG | OXYGEN SATURATION: 99 % | SYSTOLIC BLOOD PRESSURE: 121 MMHG | TEMPERATURE: 97.8 F | WEIGHT: 196 LBS | HEART RATE: 80 BPM | BODY MASS INDEX: 29.8 KG/M2

## 2023-03-10 DIAGNOSIS — S00.01XA ABRASION OF SCALP, INITIAL ENCOUNTER: ICD-10-CM
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
## 2023-03-10 PROCEDURE — 99213 OFFICE O/P EST LOW 20 MIN: CPT | Performed by: FAMILY MEDICINE
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
## 2023-03-20 ENCOUNTER — TRANSFERRED RECORDS (OUTPATIENT)
Dept: HEALTH INFORMATION MANAGEMENT | Facility: CLINIC | Age: 73
End: 2023-03-20
Payer: COMMERCIAL

## 2023-03-22 ENCOUNTER — OFFICE VISIT (OUTPATIENT)
Dept: INTERNAL MEDICINE | Facility: CLINIC | Age: 73
End: 2023-03-22
Payer: COMMERCIAL

## 2023-03-22 VITALS
SYSTOLIC BLOOD PRESSURE: 120 MMHG | HEIGHT: 68 IN | HEART RATE: 84 BPM | BODY MASS INDEX: 29.93 KG/M2 | DIASTOLIC BLOOD PRESSURE: 71 MMHG | OXYGEN SATURATION: 99 % | WEIGHT: 197.5 LBS

## 2023-03-22 DIAGNOSIS — M54.9 BACK PAIN, UNSPECIFIED BACK LOCATION, UNSPECIFIED BACK PAIN LATERALITY, UNSPECIFIED CHRONICITY: Primary | ICD-10-CM

## 2023-03-22 DIAGNOSIS — Z79.4 TYPE 2 DIABETES MELLITUS WITH BOTH EYES AFFECTED BY MILD NONPROLIFERATIVE RETINOPATHY AND MACULAR EDEMA, WITH LONG-TERM CURRENT USE OF INSULIN (H): ICD-10-CM

## 2023-03-22 DIAGNOSIS — E11.3213 TYPE 2 DIABETES MELLITUS WITH BOTH EYES AFFECTED BY MILD NONPROLIFERATIVE RETINOPATHY AND MACULAR EDEMA, WITH LONG-TERM CURRENT USE OF INSULIN (H): ICD-10-CM

## 2023-03-22 DIAGNOSIS — E11.3411 SEVERE NONPROLIFERATIVE DIABETIC RETINOPATHY OF RIGHT EYE WITH MACULAR EDEMA ASSOCIATED WITH TYPE 2 DIABETES MELLITUS (H): Primary | ICD-10-CM

## 2023-03-22 PROCEDURE — 99214 OFFICE O/P EST MOD 30 MIN: CPT | Performed by: INTERNAL MEDICINE

## 2023-03-22 NOTE — NURSING NOTE
"Earle Rene is a 73 year old male patient that presents today in clinic for the following:    Chief Complaint   Patient presents with     Follow Up     Pt here for follow up.     The patient's allergies and medications were reviewed as noted. A set of vitals were recorded as noted without incident: /71 (BP Location: Right arm, Patient Position: Sitting, Cuff Size: Adult Regular)   Pulse 84   Ht 1.727 m (5' 7.99\")   Wt 89.6 kg (197 lb 8 oz)   SpO2 99%   BMI 30.04 kg/m  . The patient does not have any other questions for the provider.    Maliha Cortes, EMT at 3:39 PM on 3/22/2023  "
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
## 2023-03-23 NOTE — TELEPHONE ENCOUNTER
DIAGNOSIS: Back pain, unspecified back location, unspecified back pain laterality, unspecified chronicity [M54.9]  - Primary    APPOINTMENT DATE: 03/28/2023   NOTES STATUS DETAILS   OFFICE NOTE from referring provider Internal 03/22/2023 - Marcio Hare MD - [facility] IM   OFFICE NOTE from other specialist Internal 03/10/2023 - Hema Severino MD - [facility] Urgent Care    03/18/2022 - Salvador Farias PT - [facility] PT    11/30/2021 - Emmanuel Nichols MD- [facility] Ortho    More..   MEDICATION LIST Internal    MRI PACS Internal:  11/12/2021 - [facility]   XRAYS (IMAGES & REPORTS) PACS Internal:  11/30/2021 - [facility]

## 2023-03-23 NOTE — TELEPHONE ENCOUNTER
empagliflozin (JARDIANCE) 10 MG TABS tablet      Last Written Prescription Date:  01-  Last Fill Quantity: 30,   # refills: 1  Last Office Visit : 06-  Future Office visit: 06-     Routing refill request to provider for review/approval because:  Sodium Glucose Co-Transport Inhibitor Agents Failed 03/22/2023 01:26 AM   Protocol Details  Patient has documented A1c within the specified period of time.    No creatinine >1.4 or GFR <45 within the past 12 mos    Recent (6 mo) or future (30 days) visit within the authorizing provider's specialty     Lab Test 06/09/22  1241     A1C  --      HEMOGLOBINA1 7.9       Lab Test 01/08/23  1758       GFRESTIMATED 45*

## 2023-03-24 DIAGNOSIS — M54.50 LUMBAR PAIN: Primary | ICD-10-CM

## 2023-03-28 ENCOUNTER — TELEPHONE (OUTPATIENT)
Dept: ANESTHESIOLOGY | Facility: CLINIC | Age: 73
End: 2023-03-28

## 2023-03-28 ENCOUNTER — ANCILLARY PROCEDURE (OUTPATIENT)
Dept: GENERAL RADIOLOGY | Facility: CLINIC | Age: 73
End: 2023-03-28
Attending: ORTHOPAEDIC SURGERY
Payer: COMMERCIAL

## 2023-03-28 ENCOUNTER — OFFICE VISIT (OUTPATIENT)
Dept: ORTHOPEDICS | Facility: CLINIC | Age: 73
End: 2023-03-28
Payer: COMMERCIAL

## 2023-03-28 ENCOUNTER — PRE VISIT (OUTPATIENT)
Dept: ORTHOPEDICS | Facility: CLINIC | Age: 73
End: 2023-03-28

## 2023-03-28 VITALS — WEIGHT: 190 LBS | BODY MASS INDEX: 28.14 KG/M2 | HEIGHT: 69 IN

## 2023-03-28 DIAGNOSIS — M54.50 LUMBAR PAIN: Primary | ICD-10-CM

## 2023-03-28 DIAGNOSIS — M54.16 LUMBAR RADICULOPATHY: Primary | ICD-10-CM

## 2023-03-28 DIAGNOSIS — M54.50 LUMBAR PAIN: ICD-10-CM

## 2023-03-28 DIAGNOSIS — M54.9 BACK PAIN, UNSPECIFIED BACK LOCATION, UNSPECIFIED BACK PAIN LATERALITY, UNSPECIFIED CHRONICITY: ICD-10-CM

## 2023-03-28 PROCEDURE — 99214 OFFICE O/P EST MOD 30 MIN: CPT | Mod: GC | Performed by: ORTHOPAEDIC SURGERY

## 2023-03-28 PROCEDURE — 72110 X-RAY EXAM L-2 SPINE 4/>VWS: CPT | Performed by: STUDENT IN AN ORGANIZED HEALTH CARE EDUCATION/TRAINING PROGRAM

## 2023-03-28 RX ORDER — DIAZEPAM 5 MG
5-10 TABLET ORAL
Status: CANCELLED | OUTPATIENT
Start: 2023-03-28

## 2023-03-28 NOTE — LETTER
3/28/2023         RE: Earle Rene  [address]  [place] [address]        Dear Colleague,    Thank you for referring your patient, Earle Rene, to the [facility] [place]. Please see a copy of my visit note below.    Spine Surgery Consultation    REFERRING PHYSICIAN: Referred Self   PRIMARY CARE PHYSICIAN: Marcio Hare           Chief Complaint:   Consult (Return patient last seen 11/30/21, LBP/pinched nerve causing left side radicular Sx.  Sx increase with prolonged walking.)      History of Present Illness:  Symptom Profile Including: location of symptoms, onset, severity, exacerbating/alleviating factors, previous treatments:        Earle Rene is a 73 year old male with a history of diabetes, peripheral neuropathy, and hypertension who presents for repeat evaluation of left lower extremity radicular symptoms.  He had been previously seen by Dr. Nichols and November 30, 2021, at which time it was recommended he undergo an L4-L5 interlaminar injection and physical therapy.  At that time he was very adamant that he did not want to have surgery.  He has been following up his internal medicine doctor Dr. Hare, but has not noticed any improvement in his pain.  He presents today for repeat evaluation.  He states that he continues to have ongoing low back pain, and radiating pain from his left gluteal region down the outside of his leg to his foot.  The leg and gluteal pain bother him more than the low back pain.  He states he did undergo an injection (per chart review looks like it was a bilateral SI joint injection) without any improvement of his symptoms.  He did do physical therapy after his visit with Dr. Nichols in 2021, but it has been over a year at least since he last did physical therapy.  He takes Tylenol for the pain occasionally.  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  He again reiterates that he is  not interested in surgery and would only like conservative management options.         Past Medical History:     Past Medical History:   Diagnosis Date     Blepharitis of both eyes      BPH (benign prostatic hyperplasia)      Diabetes (H)      Diabetic neuropathy (H)      Diabetic retinopathy associated with diabetes mellitus due to underlying condition (H)      Dry eye syndrome      GERD (gastroesophageal reflux disease)      Goiter      Granulomatous disease (H)      HLD (hyperlipidemia)      HTN (hypertension)      Nonsenile cataract      Peripheral neuropathy             Past Surgical History:     Past Surgical History:   Procedure Laterality Date     ------------OTHER-------------      back of neck abscess drainage in OR     AS RAD RESEC TONSIL/PILLARS Bilateral 1961     CATARACT IOL, RT/LT Left      COLONOSCOPY  7/29/2013    Procedure: COLONOSCOPY;;  Surgeon: Montana Pascal MD;  [facility]     EXCISE MASS UPPER EXTREMITY Right 11/11/2019    Procedure: EXCISION, MASS, UPPER EXTREMITY, RIGHT SHOULDER;  Surgeon: Johana Choudhury MD;  [facility]     INJECT SACROILIAC JOINT Bilateral 9/13/2022    Procedure: Bilateral sacroiliac joint injection;  Surgeon: Collin Mata MD;  [facility]     INTRAVITREAL INJECTION CHEMOTHERAPY Right 12/30/2019    Procedure: INTRAVITREAL Bevacizumab injection;  Surgeon: Milton Maki MD;  [facility]     PHACOEMULSIFICATION CLEAR CORNEA WITH STANDARD INTRAOCULAR LENS IMPLANT Right 12/30/2019    Procedure: PHACOEMULSIFICATION, CATARACT, WITH INTRAOCULAR LENS IMPLANT;  Surgeon: Milton Maki MD;  [facility]     PHACOEMULSIFICATION WITH STANDARD INTRAOCULAR LENS IMPLANT Left 6/21/2019    Procedure: Left Eye Cataract Removal with Intraocular Lens Implant with Intraoperative Avastin Injection;  Surgeon: Lacey Eugene MD;  [facility]     [facility]  11/2017            Social History:     Social History     Tobacco Use     Smoking  status: Never     Smokeless tobacco: Never   Substance Use Topics     Alcohol use: Yes     Comment: occasionaly            Family History:     Family History   Problem Relation Age of Onset     Diabetes Father      Myocardial Infarction Father      Diabetes Brother      Leukemia Brother 44     Glaucoma No family hx of      Macular Degeneration No family hx of      Kidney Disease No family hx of             Allergies:   No Known Allergies         Medications:     Current Outpatient Medications   Medication     albuterol (VENTOLIN HFA) 108 (90 Base) MCG/ACT inhaler     alpha-lipoic acid 600 MG capsule     amLODIPine (NORVASC) 5 MG tablet     amoxicillin (AMOXIL) 500 MG capsule     ARTIFICIAL TEAR OP     aspirin 81 MG tablet     atorvastatin (LIPITOR) 20 MG tablet     blood glucose (NO BRAND SPECIFIED) lancets standard     blood glucose monitoring (NO BRAND SPECIFIED) meter device kit     blood glucose monitoring (NO BRAND SPECIFIED) test strip     Blood Pressure Monitor KIT     camphor-menthol (DERMASARRA) 0.5-0.5 % external lotion     camphor-menthol (DERMASARRA) 0.5-0.5 % external lotion     chlorhexidine (PERIDEX) 0.12 % solution     clobetasol (TEMOVATE) 0.05 % ointment     clotrimazole (LOTRIMIN) 1 % cream     Continuous Blood Gluc  (FREESTYLE PETER 14 DAY READER) JUANCARLOS     Continuous Blood Gluc  (FREESTYLE PETER 2 READER) JUANCARLOS     Continuous Blood Gluc Sensor (FREESTYLE PETER 2 SENSOR) MISC     dextromethorphan-guaiFENesin (MUCINEX DM)  MG 12 hr tablet     dextromethorphan-guaiFENesin (TUSSIN DM)  MG/5ML liquid     diclofenac (VOLTAREN) 1 % topical gel     empagliflozin (JARDIANCE) 10 MG TABS tablet     famotidine (PEPCID) 20 MG tablet     fenofibrate 54 MG tablet     finasteride (PROSCAR) 5 MG tablet     fluocinonide (LIDEX) 0.05 % external cream     fluticasone (FLONASE) 50 MCG/ACT nasal spray     fluticasone (FLOVENT HFA) 110 MCG/ACT inhaler     gabapentin (NEURONTIN) 300 MG capsule  "    gabapentin (NEURONTIN) 300 MG capsule     HUMALOG KWIKPEN 100 UNIT/ML soln     ibuprofen (ADVIL/MOTRIN) 600 MG tablet     insulin degludec (TRESIBA FLEXTOUCH) 100 UNIT/ML pen     insulin pen needle (B-D U/F) 31G X 5 MM miscellaneous     insulin pen needle (B-D U/F) 31G X 5 MM     insulin syringe 31G X 5/16\" 0.5 ML MISC     ketamine 5% gabapentin 8% lidocaine 2.5% topical PLO cream     loratadine (CLARITIN) 10 MG tablet     losartan (COZAAR) 100 MG tablet     mirabegron (MYRBETRIQ) 25 MG 24 hr tablet     mupirocin (BACTROBAN) 2 % cream     Omega-3 Fatty Acids (OMEGA-3 FISH OIL) 1000 MG CAPS     ONETOUCH ULTRA test strip     semaglutide (OZEMPIC, 0.25 OR 0.5 MG/DOSE,) 2 MG/1.5ML SOPN pen     sodium bicarbonate 650 MG tablet     tamsulosin (FLOMAX) 0.4 MG capsule     triamcinolone (KENALOG) 0.1 % cream     VITAMIN D3 25 MCG (1000 UT) tablet     VITAMIN D3 25 MCG (1000 UT) tablet     Current Facility-Administered Medications   Medication     aflibercept (EYLEA) injection prefilled syringe 2 mg             Review of Systems:     A 10 point ROS was performed and reviewed. Specific responses to these questions are noted at the end of the document.         Physical Exam:   Vitals: Ht 1.75 m (5' 8.9\")   Wt 86.2 kg (190 lb)   BMI 28.14 kg/m    Constitutional: awake, alert, cooperative, no apparent distress, appears stated age.    Eyes: The sclera are white.  Ears, Nose, Throat: The trachea is midline.  Psychiatric: The patient has a normal affect.  Respiratory: breathing non-labored  Cardiovascular: The extremities are warm and perfused.  Skin: no obvious rashes or lesions.  Musculoskeletal, Neurologic, and Spine:          Lumbar Spine:    Appearance - No gross stepoffs or deformities    Motor -     L2-3: Hip flexion R 5/5  And L 4-/5 strength          L3/4:  Knee extension R 5/5 and L 4/5 strength         L4/5:  Foot dorsiflexion R 5/5 L 4/5 and       EHL dorsiflexion R 5/5 L 4-/5 strength         S1:  " Plantarflexion/Peroneal Muscles  R 5/5 and L 5/5 strength    Sensation: diminished to light touch L3-S1 distribution BLE (hx of diabetic neuropathy), decreased sensation in left L5 distribution         Straight leg raise: Positive on left       Special tests for SI joint:    Negative Tierney    + TTP over right SI joint       Neurologic:      REFLEXES Right Left   Patella 1+ 1+   Ankle jerk 1+ 1+   Babinski No upgoing great toe No upgoing great toe   Clonus 0 beats 0 beats     Hip Exam:  No pain with hip log roll and no tenderness over the greater trochanters.    Alignment:  Patient stands with a neutral standing sagittal balance.           Imaging:   We ordered and independently reviewed new radiographs at this clinic visit. The results were discussed with the patient.  Findings include:  X-ray lumbar spine with flexion and extension views obtained today.  No acute fracture or dislocation.  There are some chronic degenerative changes with loss of disc space in the L3-L4 disc.    Previous MRI reviewed: moderate canal stenosis at L4-5, severe left foraminal stenosis at L4-5 and bilaterally at L5-S1. Compression fracture of the L4 superior endplate, appears chronic.           Assessment and Plan:   Assessment:  73 year old male with multilevel lumbar spondylosis resulting in neurogenic claudication, left-sided.     We again reviewed the imaging with the patient.  He was very adamant that he is not interested in surgical fixation.  We did  the patient that there are nonoperative measures available, though if these are not working the definitive treatment would be surgery.  Patient states he is unable to take any time off of work and does not want surgery.  He is interested in pursuing the original L4-5 interlaminar injection again, as his previous injection was in the SI joint.  He is not interested in returning to physical therapy, which point is reasonable given that he did not have relief from this.  He is  already taking gabapentin.     Plan:  1. L4-5 intralaminar epidural steroid injection ordered  2. Follow-up PRN or via [employer] message to update Dr. Nichols on his pain after injection or if he changes his mind or would like further discussion on surgery.       Alena Mobley MD   Orthopaedic resident, PGY-1    Attending MD (Dr. Emmanuel Nichols) :  I reviewed and verified the history and physical exam of the patient and discussed the patient's management with the other clinical providers involved in this patient's care including any involved residents or physicians assistants. I reviewed the above note and agree with the documented findings and plan of care, which were communicated to the patient.      Emmanuel Nichols MD

## 2023-03-28 NOTE — PROGRESS NOTES
Spine Surgery Consultation    REFERRING PHYSICIAN: Referred Self   PRIMARY CARE PHYSICIAN: Marcio Hare           Chief Complaint:   Consult (Return patient last seen 11/30/21, LBP/pinched nerve causing left side radicular Sx.  Sx increase with prolonged walking.)      History of Present Illness:  Symptom Profile Including: location of symptoms, onset, severity, exacerbating/alleviating factors, previous treatments:        Earle Rene is a 73 year old male with a history of diabetes, peripheral neuropathy, and hypertension who presents for repeat evaluation of left lower extremity radicular symptoms.  He had been previously seen by Dr. Nichols and November 30, 2021, at which time it was recommended he undergo an L4-L5 interlaminar injection and physical therapy.  At that time he was very adamant that he did not want to have surgery.  He has been following up his internal medicine doctor Dr. Hare, but has not noticed any improvement in his pain.  He presents today for repeat evaluation.  He states that he continues to have ongoing low back pain, and radiating pain from his left gluteal region down the outside of his leg to his foot.  The leg and gluteal pain bother him more than the low back pain.  He states he did undergo an injection (per chart review looks like it was a bilateral SI joint injection) without any improvement of his symptoms.  He did do physical therapy after his visit with Dr. Nichols in 2021, but it has been over a year at least since he last did physical therapy.  He takes Tylenol for the pain occasionally.  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  He again reiterates that he is not interested in surgery and would only like conservative management options.         Past Medical History:     Past Medical History:   Diagnosis Date     Blepharitis of both eyes      BPH (benign prostatic hyperplasia)      Diabetes (H)      Diabetic neuropathy  (H)      Diabetic retinopathy associated with diabetes mellitus due to underlying condition (H)      Dry eye syndrome      GERD (gastroesophageal reflux disease)      Goiter      Granulomatous disease (H)      HLD (hyperlipidemia)      HTN (hypertension)      Nonsenile cataract      Peripheral neuropathy             Past Surgical History:     Past Surgical History:   Procedure Laterality Date     ------------OTHER-------------      back of neck abscess drainage in OR     AS RAD RESEC TONSIL/PILLARS Bilateral 1961     CATARACT IOL, RT/LT Left      COLONOSCOPY  7/29/2013    Procedure: COLONOSCOPY;;  Surgeon: Montana Pascal MD;  Location: [facility]     EXCISE MASS UPPER EXTREMITY Right 11/11/2019    Procedure: EXCISION, MASS, UPPER EXTREMITY, RIGHT SHOULDER;  Surgeon: Johana Choudhury MD;  [facility]     INJECT SACROILIAC JOINT Bilateral 9/13/2022    Procedure: Bilateral sacroiliac joint injection;  Surgeon: Collin Mata MD;  Location: [facility]     INTRAVITREAL INJECTION CHEMOTHERAPY Right 12/30/2019    Procedure: INTRAVITREAL Bevacizumab injection;  Surgeon: Milton Maki MD;  [facility]     PHACOEMULSIFICATION CLEAR CORNEA WITH STANDARD INTRAOCULAR LENS IMPLANT Right 12/30/2019    Procedure: PHACOEMULSIFICATION, CATARACT, WITH INTRAOCULAR LENS IMPLANT;  Surgeon: Milton Maki MD;  [facility]     PHACOEMULSIFICATION WITH STANDARD INTRAOCULAR LENS IMPLANT Left 6/21/2019    Procedure: Left Eye Cataract Removal with Intraocular Lens Implant with Intraoperative Avastin Injection;  Surgeon: Lacey Eugene MD;  [facility]     siladenatis  11/2017            Social History:     Social History     Tobacco Use     Smoking status: Never     Smokeless tobacco: Never   Substance Use Topics     Alcohol use: Yes     Comment: occasionaly            Family History:     Family History   Problem Relation Age of Onset     Diabetes Father      Myocardial Infarction Father      Diabetes  Brother      Leukemia Brother 44     Glaucoma No family hx of      Macular Degeneration No family hx of      Kidney Disease No family hx of             Allergies:   No Known Allergies         Medications:     Current Outpatient Medications   Medication     albuterol (VENTOLIN HFA) 108 (90 Base) MCG/ACT inhaler     alpha-lipoic acid 600 MG capsule     amLODIPine (NORVASC) 5 MG tablet     amoxicillin (AMOXIL) 500 MG capsule     ARTIFICIAL TEAR OP     aspirin 81 MG tablet     atorvastatin (LIPITOR) 20 MG tablet     blood glucose (NO BRAND SPECIFIED) lancets standard     blood glucose monitoring (NO BRAND SPECIFIED) meter device kit     blood glucose monitoring (NO BRAND SPECIFIED) test strip     Blood Pressure Monitor KIT     camphor-menthol (DERMASARRA) 0.5-0.5 % external lotion     camphor-menthol (DERMASARRA) 0.5-0.5 % external lotion     chlorhexidine (PERIDEX) 0.12 % solution     clobetasol (TEMOVATE) 0.05 % ointment     clotrimazole (LOTRIMIN) 1 % cream     Continuous Blood Gluc  (FREESTYLE PETER 14 DAY READER) JUANCARLOS     Continuous Blood Gluc  (FREESTYLE PETER 2 READER) JUANCARLOS     Continuous Blood Gluc Sensor (FREESTYLE PETER 2 SENSOR) MISC     dextromethorphan-guaiFENesin (MUCINEX DM)  MG 12 hr tablet     dextromethorphan-guaiFENesin (TUSSIN DM)  MG/5ML liquid     diclofenac (VOLTAREN) 1 % topical gel     empagliflozin (JARDIANCE) 10 MG TABS tablet     famotidine (PEPCID) 20 MG tablet     fenofibrate 54 MG tablet     finasteride (PROSCAR) 5 MG tablet     fluocinonide (LIDEX) 0.05 % external cream     fluticasone (FLONASE) 50 MCG/ACT nasal spray     fluticasone (FLOVENT HFA) 110 MCG/ACT inhaler     gabapentin (NEURONTIN) 300 MG capsule     gabapentin (NEURONTIN) 300 MG capsule     HUMALOG KWIKPEN 100 UNIT/ML soln     ibuprofen (ADVIL/MOTRIN) 600 MG tablet     insulin degludec (TRESIBA FLEXTOUCH) 100 UNIT/ML pen     insulin pen needle (B-D U/F) 31G X 5 MM miscellaneous     insulin pen  "needle (B-D U/F) 31G X 5 MM     insulin syringe 31G X 5/16\" 0.5 ML MISC     ketamine 5% gabapentin 8% lidocaine 2.5% topical PLO cream     loratadine (CLARITIN) 10 MG tablet     losartan (COZAAR) 100 MG tablet     mirabegron (MYRBETRIQ) 25 MG 24 hr tablet     mupirocin (BACTROBAN) 2 % cream     Omega-3 Fatty Acids (OMEGA-3 FISH OIL) 1000 MG CAPS     ONETOUCH ULTRA test strip     semaglutide (OZEMPIC, 0.25 OR 0.5 MG/DOSE,) 2 MG/1.5ML SOPN pen     sodium bicarbonate 650 MG tablet     tamsulosin (FLOMAX) 0.4 MG capsule     triamcinolone (KENALOG) 0.1 % cream     VITAMIN D3 25 MCG (1000 UT) tablet     VITAMIN D3 25 MCG (1000 UT) tablet     Current Facility-Administered Medications   Medication     aflibercept (EYLEA) injection prefilled syringe 2 mg             Review of Systems:     A 10 point ROS was performed and reviewed. Specific responses to these questions are noted at the end of the document.         Physical Exam:   Vitals: Ht 1.75 m (5' 8.9\")   Wt 86.2 kg (190 lb)   BMI 28.14 kg/m    Constitutional: awake, alert, cooperative, no apparent distress, appears stated age.    Eyes: The sclera are white.  Ears, Nose, Throat: The trachea is midline.  Psychiatric: The patient has a normal affect.  Respiratory: breathing non-labored  Cardiovascular: The extremities are warm and perfused.  Skin: no obvious rashes or lesions.  Musculoskeletal, Neurologic, and Spine:          Lumbar Spine:    Appearance - No gross stepoffs or deformities    Motor -     L2-3: Hip flexion R 5/5  And L 4-/5 strength          L3/4:  Knee extension R 5/5 and L 4/5 strength         L4/5:  Foot dorsiflexion R 5/5 L 4/5 and       EHL dorsiflexion R 5/5 L 4-/5 strength         S1:  Plantarflexion/Peroneal Muscles  R 5/5 and L 5/5 strength    Sensation: diminished to light touch L3-S1 distribution BLE (hx of diabetic neuropathy), decreased sensation in left L5 distribution         Straight leg raise: Positive on left       Special tests for SI " joint:    Negative Tierney    + TTP over right SI joint       Neurologic:      REFLEXES Right Left   Patella 1+ 1+   Ankle jerk 1+ 1+   Babinski No upgoing great toe No upgoing great toe   Clonus 0 beats 0 beats     Hip Exam:  No pain with hip log roll and no tenderness over the greater trochanters.    Alignment:  Patient stands with a neutral standing sagittal balance.           Imaging:   We ordered and independently reviewed new radiographs at this clinic visit. The results were discussed with the patient.  Findings include:  X-ray lumbar spine with flexion and extension views obtained today.  No acute fracture or dislocation.  There are some chronic degenerative changes with loss of disc space in the L3-L4 disc.    Previous MRI reviewed: moderate canal stenosis at L4-5, severe left foraminal stenosis at L4-5 and bilaterally at L5-S1. Compression fracture of the L4 superior endplate, appears chronic.           Assessment and Plan:   Assessment:  73 year old male with multilevel lumbar spondylosis resulting in neurogenic claudication, left-sided.     We again reviewed the imaging with the patient.  He was very adamant that he is not interested in surgical fixation.  We did  the patient that there are nonoperative measures available, though if these are not working the definitive treatment would be surgery.  Patient states he is unable to take any time off of work and does not want surgery.  He is interested in pursuing the original L4-5 interlaminar injection again, as his previous injection was in the SI joint.  He is not interested in returning to physical therapy, which point is reasonable given that he did not have relief from this.  He is already taking gabapentin.     Plan:  1. L4-5 intralaminar epidural steroid injection ordered  2. Follow-up PRN or via [employer] message to update Dr. Nichols on his pain after injection or if he changes his mind or would like further discussion on surgery.       Alena  MD Itz   Orthopaedic resident, PGY-1    Attending MD (Dr. Emmanuel Nichols) :  I reviewed and verified the history and physical exam of the patient and discussed the patient's management with the other clinical providers involved in this patient's care including any involved residents or physicians assistants. I reviewed the above note and agree with the documented findings and plan of care, which were communicated to the patient.      Emmanuel Nichols MD      Respectfully,  Emmanuel Nichols MD  Spine Surgery  [facility]

## 2023-03-28 NOTE — TELEPHONE ENCOUNTER
Patient is scheduled with Dr. Vázquez   Spoke with: the patient   Date of Procedure: 04/20   Location: [facility]   Informed patient they will need an adult : yes   Additional comments: n/a    Patient is aware pre-op RN will call 2-3 days prior to procedure with arrival time and instructions     Mitchell Prescott on 3/28/2023 at 4:28 PM

## 2023-03-28 NOTE — NURSING NOTE
"Reason For Visit:   Chief Complaint   Patient presents with     Consult     Return patient last seen 11/30/21, LBP/pinched nerve causing left side radicular Sx.  Sx increase with prolonged walking.       Primary MD: Marcio Hare  Ref. MD: est    ?  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 Yes.  Work status?  Full time.  Date of injury: NA  Type of injury: Chronic.  Date of surgery: NA  Type of surgery: NA.  Smoker: No  Request smoking cessation information: No    Ht 1.75 m (5' 8.9\")   Wt 86.2 kg (190 lb)   BMI 28.14 kg/m      Pain Assessment  Patient Currently in Pain: Yes  0-10 Pain Scale: 3 (pain increases with walking)    Oswestry (ALRS) Questionnaire    OSWESTRY DISABILITY INDEX 3/18/2022   Count 9   Sum 17   Oswestry Score (%) 37.78   Some recent data might be hidden            Neck Disability Index (NDI) Questionnaire    No flowsheet data found.                Promis 10 Assessment    No flowsheet data found.             Kenrick Currie, ATC    "

## 2023-03-29 DIAGNOSIS — Z79.4 TYPE 2 DIABETES MELLITUS WITH DIABETIC NEPHROPATHY, WITH LONG-TERM CURRENT USE OF INSULIN (H): ICD-10-CM

## 2023-03-29 DIAGNOSIS — E11.21 TYPE 2 DIABETES MELLITUS WITH DIABETIC NEPHROPATHY, WITH LONG-TERM CURRENT USE OF INSULIN (H): ICD-10-CM

## 2023-03-29 NOTE — TELEPHONE ENCOUNTER
RN read through the instructions with the patient for the recommended procedure: INJECTION, SPINE, LUMBAR, EPIDURAL (L4-L5)  Patient verbalized understanding to holding appropriate medication per protocol and was agreeable to NPO policy and needing a .    Anticoagulant: None Reported    Recommended Follow Up: Follow up as needed    Margie James RNCC

## 2023-03-30 ENCOUNTER — OFFICE VISIT (OUTPATIENT)
Dept: OPHTHALMOLOGY | Facility: CLINIC | Age: 73
End: 2023-03-30
Attending: OPHTHALMOLOGY
Payer: COMMERCIAL

## 2023-03-30 DIAGNOSIS — E11.3411 SEVERE NONPROLIFERATIVE DIABETIC RETINOPATHY OF RIGHT EYE WITH MACULAR EDEMA ASSOCIATED WITH TYPE 2 DIABETES MELLITUS (H): ICD-10-CM

## 2023-03-30 PROCEDURE — G0463 HOSPITAL OUTPT CLINIC VISIT: HCPCS | Performed by: OPHTHALMOLOGY

## 2023-03-30 PROCEDURE — 99213 OFFICE O/P EST LOW 20 MIN: CPT | Performed by: OPHTHALMOLOGY

## 2023-03-30 PROCEDURE — 92134 CPTRZ OPH DX IMG PST SGM RTA: CPT | Performed by: OPHTHALMOLOGY

## 2023-03-30 ASSESSMENT — VISUAL ACUITY
OS_SC: 20/40
OD_SC+: +1
OD_SC: 20/25
METHOD: SNELLEN - LINEAR
OS_SC+: -2

## 2023-03-30 ASSESSMENT — CONF VISUAL FIELD
OD_SUPERIOR_TEMPORAL_RESTRICTION: 0
OD_SUPERIOR_NASAL_RESTRICTION: 0
OS_INFERIOR_NASAL_RESTRICTION: 0
OD_INFERIOR_TEMPORAL_RESTRICTION: 0
OS_NORMAL: 1
OD_NORMAL: 1
OD_INFERIOR_NASAL_RESTRICTION: 0
OS_SUPERIOR_TEMPORAL_RESTRICTION: 0
METHOD: COUNTING FINGERS
OS_INFERIOR_TEMPORAL_RESTRICTION: 0
OS_SUPERIOR_NASAL_RESTRICTION: 0

## 2023-03-30 ASSESSMENT — TONOMETRY
OS_IOP_MMHG: 15
IOP_METHOD: TONOPEN
OD_IOP_MMHG: 22

## 2023-03-30 ASSESSMENT — CUP TO DISC RATIO
OS_RATIO: 0.3
OD_RATIO: 0.3

## 2023-03-30 ASSESSMENT — SLIT LAMP EXAM - LIDS
COMMENTS: BLEPHARITIS, SCURF, DERMATOCHALASIS
COMMENTS: BLEPHARITIS, SCURF, DERMATOCHALASIS

## 2023-03-30 ASSESSMENT — EXTERNAL EXAM - LEFT EYE: OS_EXAM: NORMAL

## 2023-03-30 ASSESSMENT — EXTERNAL EXAM - RIGHT EYE: OD_EXAM: NORMAL

## 2023-03-30 NOTE — PROGRESS NOTES
I have confirmed the patient's and reviewed Past Medical History, Past Surgical History, Social History, Family History, Problem List, Medication List and agree with Tech note.    CC: decreased vision following CEIOL in OS    HPI:  pt of Dr. Eugene, s/p CEIOL left eye on 6/21/19. Has been having blurred vision since CEIOL. Reports that he had significant periorbital pain after the operation and has since had significant visual disturbance, blurred vision, distorted vision, diplopia, and eye pain. He is very concerned about his vision and would not like any more injections in his left eye at this time.     Self-DC'd all gtts about 5 days after surgery. Re-started on gtts about 3 weeks post-op. Since last visit has been taking prednisolone and ofloxacin 2-3 times per day    Avastin injections  Right Eye: 2/26/18, 4/10/18, 5/11/20218, 6/11/18 and last one here on 7/19/2018  Left Eye: 2/26/18, 4/10/18, 5/11/2018, 6/21/19        Assessment/plan:   1. 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 Patient advised that failure to adequately control each may lead to vision loss. The patient expressed understanding.   - FAF in 2019 extensive ischemia left eye and s/p Panretinal laser photocoagulation (PRP)    - ME refractory to avastin. Plan did Eylea os in 8/2020 and patient did not return to clinic for 10 months.  Will repeat OPTOS IVFA which showed very severe NPDR both eyes and we did Panretinal laser photocoagulation (PRP) both eyes     2. Diabetic macular edema left eye   - Worsened since CEIOL on 6/21/19   - Has been on ketorolac and PF twice a day/tid since surgery and will stop when running out    - Optical Coherence Tomography Scan shows reduction in fluid both eyes and is stable 3/2023.     3. Pseudophakia  ou    4. Moderate NPDR and Diabetic macular edema right eye   - temporal macular edema, stable/reduced  as of 12/4/19; OCT 12/4/19 no fovea involving edema   - had been receiving injections , last injection 6/11/18 at outside clinic and 7/19/2018 [facility] at [facility]    - PRP os 12/2019. FA in future.     5. Stye LL os   - pt deferred maxitrol, agrees to start warm compresses     Return to clinic 6 months for Optical Coherence Tomography Scan with Dr. Jaden Johnson MD.  patient aware Dr. Rodriguez is retiring    Jackie Rodriguez MD PhD.  Professor & Chair

## 2023-03-30 NOTE — NURSING NOTE
Chief Complaints and History of Present Illnesses   Patient presents with     Diabetic Eye Exam Follow Up     Chief Complaint(s) and History of Present Illness(es)     Diabetic Eye Exam Follow Up            Diabetes Type: Type 2    Treatments tried: eye drops    Pain scale: 0/10          Comments    Pt states his vision has been stable since his last visit.  Sometimes he notices double vision. This only last for a few seconds.     DM2 BS: 256 currently. Pt states his last a1c was 7 something.   Lab Results       Component                Value               Date                       A1C                      8.3                 11/02/2021                 A1C                      8.2                 06/09/2021                 A1C                      9.2                 06/18/2019                 A1C                      9.1                 03/06/2018                 A1C                      10.2                06/06/2017                 A1C                      9.0                 03/16/2016        ELPIDIO SUAREZ COA March 30, 2023 10:10 AM

## 2023-04-10 ENCOUNTER — TELEPHONE (OUTPATIENT)
Dept: NEPHROLOGY | Facility: CLINIC | Age: 73
End: 2023-04-10
Payer: COMMERCIAL

## 2023-04-10 RX ORDER — LOSARTAN POTASSIUM 100 MG/1
TABLET ORAL
Qty: 90 TABLET | Refills: 0 | Status: SHIPPED | OUTPATIENT
Start: 2023-04-10 | End: 2023-07-14

## 2023-04-10 NOTE — TELEPHONE ENCOUNTER
Overdue for follow up. Please arrange follow up visit with Angela Chandler or Darshana Khan.  Kiah Ramsey MD

## 2023-04-10 NOTE — TELEPHONE ENCOUNTER
Attempted to schedule follow up appt within 3 months time frame, unable to reach pt at this time. Vm left with service line call back number.    Akila Walters    Nephrology Clinic Navigator

## 2023-04-20 ENCOUNTER — ANCILLARY PROCEDURE (OUTPATIENT)
Dept: RADIOLOGY | Facility: AMBULATORY SURGERY CENTER | Age: 73
End: 2023-04-20
Attending: ANESTHESIOLOGY
Payer: COMMERCIAL

## 2023-04-20 ENCOUNTER — HOSPITAL ENCOUNTER (OUTPATIENT)
Facility: AMBULATORY SURGERY CENTER | Age: 73
Discharge: HOME | End: 2023-04-20
Attending: ANESTHESIOLOGY | Admitting: ANESTHESIOLOGY
Payer: COMMERCIAL

## 2023-04-20 VITALS
WEIGHT: 190 LBS | DIASTOLIC BLOOD PRESSURE: 67 MMHG | RESPIRATION RATE: 16 BRPM | HEIGHT: 69 IN | BODY MASS INDEX: 28.14 KG/M2 | SYSTOLIC BLOOD PRESSURE: 134 MMHG | TEMPERATURE: 96.4 F | HEART RATE: 78 BPM | OXYGEN SATURATION: 98 %

## 2023-04-20 DIAGNOSIS — M54.16 LUMBAR RADICULOPATHY: ICD-10-CM

## 2023-04-20 DIAGNOSIS — R52 PAIN: ICD-10-CM

## 2023-04-20 PROCEDURE — 62323 NJX INTERLAMINAR LMBR/SAC: CPT | Mod: GC | Performed by: ANESTHESIOLOGY

## 2023-04-20 PROCEDURE — 62323 NJX INTERLAMINAR LMBR/SAC: CPT

## 2023-04-20 RX ORDER — IOPAMIDOL 408 MG/ML
INJECTION, SOLUTION INTRATHECAL DAILY PRN
Status: DISCONTINUED | OUTPATIENT
Start: 2023-04-20 | End: 2023-04-20 | Stop reason: HOSPADM

## 2023-04-20 RX ORDER — DIAZEPAM 5 MG
5-10 TABLET ORAL
Status: DISCONTINUED | OUTPATIENT
Start: 2023-04-20 | End: 2023-04-21 | Stop reason: HOSPADM

## 2023-04-20 RX ORDER — METHYLPREDNISOLONE ACETATE 40 MG/ML
INJECTION, SUSPENSION INTRA-ARTICULAR; INTRALESIONAL; INTRAMUSCULAR; SOFT TISSUE DAILY PRN
Status: DISCONTINUED | OUTPATIENT
Start: 2023-04-20 | End: 2023-04-20 | Stop reason: HOSPADM

## 2023-04-20 RX ORDER — LIDOCAINE HYDROCHLORIDE 10 MG/ML
INJECTION, SOLUTION EPIDURAL; INFILTRATION; INTRACAUDAL; PERINEURAL DAILY PRN
Status: DISCONTINUED | OUTPATIENT
Start: 2023-04-20 | End: 2023-04-20 | Stop reason: HOSPADM

## 2023-04-20 RX ORDER — BUPIVACAINE HYDROCHLORIDE 2.5 MG/ML
INJECTION, SOLUTION EPIDURAL; INFILTRATION; INTRACAUDAL DAILY PRN
Status: DISCONTINUED | OUTPATIENT
Start: 2023-04-20 | End: 2023-04-20 | Stop reason: HOSPADM

## 2023-04-20 NOTE — DISCHARGE INSTRUCTIONS
Home Care Instructions after an Epidural Steroid Pain Injection    A lumbar epidural steroid injection delivers steroid medication directly into the area that may be causing your lower back pain and/or leg pain. A cervical or thoracic epidural steroid injection delivers steroids into the epidural space surrounding spinal nerve roots to help relieve pain in the upper spine/neck.    Activity  -Rest today  -Do not work today  -Resume normal activity tomorrow  -DO NOT shower for 24 hours  -DO NOT remove bandaid for 24 hours    Pain  -You may experience soreness at the injection site for one or two days  -You may use an ice pack for 20 minutes every 2 hours for the first 24 hours  -You may use a heating pad after the first 24 hours  -You may use Tylenol (acetaminophen) every 4 hours or other pain medicines as     directed by your physician    You may experience numbness radiating into your legs or arms (depending on the procedure location). This numbness may last several hours. Until sensation returns to normal; please use caution in walking, climbing stairs, and stepping out of your vehicle, etc.      Common side effects of steroids:  Not everyone will experience corticosteroid side effects. If side effects are experienced, they will gradually subside in the 7-10 day period following an injection. Most common side effects include:  -Flushed face and/or chest  -Feeling of warmth, particularly in the face but could be an overall feeling of warmth  -Increased blood sugar in diabetic patients  -Menstrual irregularities my occur. If taking hormone-based birth control an alternate method of birth control is recommended  -Sleep disturbances and/or mood swings are possible  -Leg cramps    Please contact us if you have:  -Severe pain  -Fever more than 101.5 degrees Fahrenheit  -Signs of infection at the injection site (redness, swelling, or drainage)    FOR PAIN CENTER PATIENTS:  If you have questions, please contact the Pain  Clinic at [phone] Option #1 between the hours of 7:00 am and 3:00 pm Monday through Friday. After office hours you can contact the on call provider by dialing [phone]. If you need immediate attention, we recommend that you go to a hospital emergency room or dial [phone].

## 2023-04-20 NOTE — OP NOTE
Patient: Earle Rene Age: 73 year old   MRN: [id number] Attending: Dr. Vázquez     Date of Visit: April 20, 2023      PAIN MEDICINE CLINIC PROCEDURE NOTE    ATTENDING CLINICIAN:    Mahamed Vázquez MD    ASSISTANT CLINICIAN:  Hemanth Grimes MD    PREPROCEDURE DIAGNOSES:  1.  Lumbar radiculopathy  2.  Chronic low back pain       PROCEDURE(S) PERFORMED:  1.  Interlaminar lumbar epidural steroid injection (L4/5)   2.  Fluoroscopic guidance for the above-named procedure(s)      ANESTHESIA:  Local.    INDICATIONS:  aErle Rene is a 73 year old male with a history of  chronic low back pain secondary to bilateral lumbosacral radiculopathy .  The patient stated that the patient was in their usual state of health and denied recent anticoagulant use or recent infections.  Therefore, the plan is to perform above mentioned procedures.     Procedure Details:  The patient was met in the procedure room, where the patient was identified by name, medical record number and date of birth.  All of the patient s last minute questions were answered. Written informed consent was obtained and saved in the electronic medical record, after the risks, benefits, and alternatives were discussed with the patient.      A formal time-out procedure was performed, as per protocol, including patient name, title of procedure, and site of procedure, and all in the room concurred.  Routine monitors were applied.      The patient was placed in the prone position on the procedure room table.  All pressure points were checked and comfortably padded.  Routine monitors were placed.  Vital signs were stable.    A chlorhexidine prep was completed followed by sterile draping per standard procedure.     The AP fluoroscopic view was optimized for approach at   interspace.  The skin over the interspace was infiltrated with 4-5 mL of 1% Lidocaine using a 25 gauge, 1.5 inch needle.  A 18-gauge 3-1/2 inch Tuohy needle was advanced under fluoroscopic guidance with  right paramedial approach until it touched lamina. Mutiple AP and lateral fluoroscopic images at this time  are taken as Tuohy needle was advanced to the epidural space. The epidural space was identified, without evidence of blood, cerebrospinal fluid, or parasthesia throughout. Needle tip placement within the epidural space was further confirmed with 1-2 mL of nonionic contrast agent, with the epidural space visualized in the AP and lateral fluoroscopic view(s) with appropriate spread of the agent with fat vacuolization and no intravascular or intrathecal spread noted. Next, 8 mL of a treatment solution containing 2 mL of preservative free 0.25% bupivacaine, 1 mL of Depo-Medrol 40 mg/mL and 5 mL preservative free normal saline was administered slowly. The needle was withdrawn.      Light pressure was held at the puncture site(s) to prevent ecchymosis and oozing.  The patient's skin was cleansed, and hemostasis was confirmed.  Band-aids were applied to the needle injection site(s).      Condition:    The patient remained awake and alert throughout the procedure.  The patient tolerated the procedure well and was monitored for approximately 15 minutes afterward in the post procedure area.  There were no immediate post procedure complications noted.  The patient was then discharged to home as per protocol.      Pre-procedure pain score: 6/10  Post-procedure pain score: 0/10

## 2023-04-27 DIAGNOSIS — E78.5 HYPERLIPIDEMIA LDL GOAL <100: ICD-10-CM

## 2023-05-01 RX ORDER — ATORVASTATIN CALCIUM 20 MG/1
TABLET, FILM COATED ORAL
Qty: 90 TABLET | Refills: 2 | Status: SHIPPED | OUTPATIENT
Start: 2023-05-01 | End: 2024-02-06

## 2023-05-12 ENCOUNTER — TELEPHONE (OUTPATIENT)
Dept: ENDOCRINOLOGY | Facility: CLINIC | Age: 73
End: 2023-05-12

## 2023-05-12 DIAGNOSIS — E11.3213 TYPE 2 DIABETES MELLITUS WITH BOTH EYES AFFECTED BY MILD NONPROLIFERATIVE RETINOPATHY AND MACULAR EDEMA, WITH LONG-TERM CURRENT USE OF INSULIN (H): ICD-10-CM

## 2023-05-12 DIAGNOSIS — Z79.4 TYPE 2 DIABETES MELLITUS WITH BOTH EYES AFFECTED BY MILD NONPROLIFERATIVE RETINOPATHY AND MACULAR EDEMA, WITH LONG-TERM CURRENT USE OF INSULIN (H): ICD-10-CM

## 2023-05-12 DIAGNOSIS — E11.65 TYPE 2 DIABETES MELLITUS WITH HYPERGLYCEMIA, WITH LONG-TERM CURRENT USE OF INSULIN (H): ICD-10-CM

## 2023-05-12 DIAGNOSIS — Z79.4 TYPE 2 DIABETES MELLITUS WITH HYPERGLYCEMIA, WITH LONG-TERM CURRENT USE OF INSULIN (H): ICD-10-CM

## 2023-05-12 RX ORDER — INSULIN DEGLUDEC 100 U/ML
INJECTION, SOLUTION SUBCUTANEOUS
Qty: 60 ML | Refills: 3 | Status: SHIPPED | OUTPATIENT
Start: 2023-05-12 | End: 2024-04-08

## 2023-05-12 RX ORDER — INSULIN LISPRO 100 [IU]/ML
INJECTION, SOLUTION INTRAVENOUS; SUBCUTANEOUS
Qty: 60 ML | Refills: 1 | Status: SHIPPED | OUTPATIENT
Start: 2023-05-12 | End: 2024-02-16

## 2023-05-12 NOTE — TELEPHONE ENCOUNTER
[facility] Call Center    Phone Message    May a detailed message be left on voicemail: yes     Reason for Call: Medication Refill Request    Has the patient contacted the pharmacy for the refill? Yes   Name of medication being requested: HUMALOG KWIKPEN 100 UNIT/ML soln    insulin degludec (TRESIBA FLEXTOUCH) 100 UNIT/ML pen    Provider who prescribed the medication: RIVERA Laura     Pharmacy: [id number] - [place], [place] - [address]      Date medication is needed: asap           Action Taken: Other: ENDO    Travel Screening: Not Applicable

## 2023-05-12 NOTE — TELEPHONE ENCOUNTER
PA Initiation    Medication: Tresiba -pa pending   Insurance Company: [employer] - Phone [phone] Fax [phone]  Pharmacy Filling the Rx:    Filling Pharmacy Phone:    Filling Pharmacy Fax:    Start Date: 5/12/2023    Key: [id number]

## 2023-05-12 NOTE — TELEPHONE ENCOUNTER
HUMALOG KWIKPEN 100 UNIT/ML soln   Last Written Prescription Date: 6/24/22  Last Fill Quantity: 60ml,   # refills: 1  Last Office Visit : 6/9/22  Laura  Future Office visit:  6/6/23      insulin degludec (TRESIBA FLEXTOUCH) 100 UNIT/ML pen  Last Written Prescription Date: 10/10/22  Last Fill Quantity:60ml    # refills: 3    Routing refill request to provider for review/approval because: request per pt call. Endo triage to fill

## 2023-05-15 NOTE — TELEPHONE ENCOUNTER
Prior Authorization Approval    Authorization Effective Date: 2/11/2023  Authorization Expiration Date: 5/12/2024  Medication: Tresiba -Approved  Approved Dose/Quantity: 45ml/70 days   Reference #: Key: [id number]   Insurance Company: [employer] - Phone [phone] Fax [phone]  Expected CoPay: 0.00     CoPay Card Available:      Foundation Assistance Needed:    Which Pharmacy is filling the prescription (Not needed for infusion/clinic administered):    Pharmacy Notified:    Patient Notified:       Warm/Dry

## 2023-05-18 DIAGNOSIS — E11.3399 TYPE 2 DIABETES MELLITUS WITH MODERATE NONPROLIFERATIVE RETINOPATHY WITHOUT MACULAR EDEMA, WITH LONG-TERM CURRENT USE OF INSULIN, UNSPECIFIED LATERALITY (H): ICD-10-CM

## 2023-05-18 DIAGNOSIS — M79.2 POLYNEUROPATHIC PAIN: ICD-10-CM

## 2023-05-18 DIAGNOSIS — Z79.4 TYPE 2 DIABETES MELLITUS WITH MODERATE NONPROLIFERATIVE RETINOPATHY WITHOUT MACULAR EDEMA, WITH LONG-TERM CURRENT USE OF INSULIN, UNSPECIFIED LATERALITY (H): ICD-10-CM

## 2023-05-19 NOTE — TELEPHONE ENCOUNTER
GABAPENTIN 300 MG CAPSULE   TAKE 1 CAPSULE BY MOUTH TWICE A DAY  Last Written Prescription Date:  9/12/22  Last Fill Quantity: 60,   # refills: 3  Last Office Visit : 2/6/23  Future Office visit:  None    Routing refill request to provider for review/approval because:   Refill team is not authorized to refill Gabapentin.

## 2023-05-22 ENCOUNTER — TELEPHONE (OUTPATIENT)
Dept: ENDOCRINOLOGY | Facility: CLINIC | Age: 73
End: 2023-05-22
Payer: COMMERCIAL

## 2023-05-22 DIAGNOSIS — Z79.4 TYPE 2 DIABETES MELLITUS WITH MODERATE NONPROLIFERATIVE RETINOPATHY WITHOUT MACULAR EDEMA, WITH LONG-TERM CURRENT USE OF INSULIN, UNSPECIFIED LATERALITY (H): ICD-10-CM

## 2023-05-22 DIAGNOSIS — E11.3399 TYPE 2 DIABETES MELLITUS WITH MODERATE NONPROLIFERATIVE RETINOPATHY WITHOUT MACULAR EDEMA, WITH LONG-TERM CURRENT USE OF INSULIN, UNSPECIFIED LATERALITY (H): ICD-10-CM

## 2023-05-22 RX ORDER — GABAPENTIN 300 MG/1
CAPSULE ORAL
Qty: 60 CAPSULE | Refills: 3 | Status: SHIPPED | OUTPATIENT
Start: 2023-05-22 | End: 2023-09-29

## 2023-05-22 NOTE — TELEPHONE ENCOUNTER
Freestyle cyrus 2 sensors sent to [facility].Delphine Cooper RN on 5/22/2023 at 1:09 PM

## 2023-05-22 NOTE — TELEPHONE ENCOUNTER
[facility] Call Center    Phone Message    May a detailed message be left on voicemail: yes     Reason for Call: Medication Refill Request    Has the patient contacted the pharmacy for the refill? Yes   Name of medication being requested:  Continuous Blood Gluc Sensor (FREESTYLE PETER 2 SENSOR) [facility]  Provider who prescribed the medication: Pamela Laura PA-C  Pharmacy: [id number] - [place], [place] - [address]  Date medication is needed:asp      Action Taken: Other: endo    Travel Screening: Not Applicable

## 2023-05-23 NOTE — TELEPHONE ENCOUNTER
[employer] PETER 2 SENSOR    Office Visit    6/9/2022  [facility] Pamela Mina PA-C  Endocrinology, Diabetes, and Metabolism       Last ordered 5/22/23. Verified same pharmacy. Duplicate request.

## 2023-05-31 DIAGNOSIS — E11.3213 TYPE 2 DIABETES MELLITUS WITH BOTH EYES AFFECTED BY MILD NONPROLIFERATIVE RETINOPATHY AND MACULAR EDEMA, WITH LONG-TERM CURRENT USE OF INSULIN (H): ICD-10-CM

## 2023-05-31 DIAGNOSIS — Z79.4 TYPE 2 DIABETES MELLITUS WITH BOTH EYES AFFECTED BY MILD NONPROLIFERATIVE RETINOPATHY AND MACULAR EDEMA, WITH LONG-TERM CURRENT USE OF INSULIN (H): ICD-10-CM

## 2023-06-03 RX ORDER — EMPAGLIFLOZIN 10 MG/1
TABLET, FILM COATED ORAL
Qty: 90 TABLET | Refills: 1 | Status: SHIPPED | OUTPATIENT
Start: 2023-06-03 | End: 2024-05-02

## 2023-06-03 NOTE — TELEPHONE ENCOUNTER
JARDIANCE 10 MG TABLET      Last Written Prescription Date:  3-23-23  Last Fill Quantity: 30,   # refills: 1  Last Office Visit : 6-9-22  Future Office visit:  6-6-23 1-8-23  Creatinine 0.67 - 1.17 mg/dL 1.62         Routing refill request to provider for review/approval because:  Overdue A1c  Abn Cr  Last rx limited quantity

## 2023-06-06 ENCOUNTER — LAB (OUTPATIENT)
Dept: LAB | Facility: CLINIC | Age: 73
End: 2023-06-06
Payer: COMMERCIAL

## 2023-06-06 ENCOUNTER — OFFICE VISIT (OUTPATIENT)
Dept: ENDOCRINOLOGY | Facility: CLINIC | Age: 73
End: 2023-06-06
Payer: COMMERCIAL

## 2023-06-06 VITALS
DIASTOLIC BLOOD PRESSURE: 67 MMHG | HEART RATE: 86 BPM | WEIGHT: 199.7 LBS | SYSTOLIC BLOOD PRESSURE: 114 MMHG | BODY MASS INDEX: 29.58 KG/M2 | OXYGEN SATURATION: 94 %

## 2023-06-06 DIAGNOSIS — E11.3213 TYPE 2 DIABETES MELLITUS WITH BOTH EYES AFFECTED BY MILD NONPROLIFERATIVE RETINOPATHY AND MACULAR EDEMA, WITH LONG-TERM CURRENT USE OF INSULIN (H): Primary | ICD-10-CM

## 2023-06-06 DIAGNOSIS — E11.3213 TYPE 2 DIABETES MELLITUS WITH BOTH EYES AFFECTED BY MILD NONPROLIFERATIVE RETINOPATHY AND MACULAR EDEMA, WITH LONG-TERM CURRENT USE OF INSULIN (H): ICD-10-CM

## 2023-06-06 DIAGNOSIS — Z79.4 TYPE 2 DIABETES MELLITUS WITH BOTH EYES AFFECTED BY MILD NONPROLIFERATIVE RETINOPATHY AND MACULAR EDEMA, WITH LONG-TERM CURRENT USE OF INSULIN (H): Primary | ICD-10-CM

## 2023-06-06 DIAGNOSIS — Z79.4 TYPE 2 DIABETES MELLITUS WITH BOTH EYES AFFECTED BY MILD NONPROLIFERATIVE RETINOPATHY AND MACULAR EDEMA, WITH LONG-TERM CURRENT USE OF INSULIN (H): ICD-10-CM

## 2023-06-06 LAB
CREAT SERPL-MCNC: 1.77 MG/DL (ref 0.67–1.17)
GFR SERPL CREATININE-BSD FRML MDRD: 40 ML/MIN/1.73M2
HBA1C MFR BLD: 8.4 % (ref 4.3–?)
TSH SERPL DL<=0.005 MIU/L-ACNC: 1.18 UIU/ML (ref 0.3–4.2)

## 2023-06-06 PROCEDURE — 36415 COLL VENOUS BLD VENIPUNCTURE: CPT | Performed by: PATHOLOGY

## 2023-06-06 PROCEDURE — 84443 ASSAY THYROID STIM HORMONE: CPT | Performed by: PATHOLOGY

## 2023-06-06 PROCEDURE — 83036 HEMOGLOBIN GLYCOSYLATED A1C: CPT | Performed by: PHYSICIAN ASSISTANT

## 2023-06-06 PROCEDURE — 82565 ASSAY OF CREATININE: CPT | Performed by: PATHOLOGY

## 2023-06-06 PROCEDURE — 99215 OFFICE O/P EST HI 40 MIN: CPT | Performed by: PHYSICIAN ASSISTANT

## 2023-06-06 ASSESSMENT — PAIN SCALES - GENERAL: PAINLEVEL: SEVERE PAIN (6)

## 2023-06-06 NOTE — NURSING NOTE
Anabel Cuba comes to the clinic for follow up anticoagulation management visit.    Patient denies any complaints related to anticoagulation therapy at this time. Patient reports no change in medication, diet or health. Reinforced with patient to call clinic with any medication changes as this can impact INR. Reinforced signs and symptoms bleeding/clotting with patient. Patient aware to seek medical care if signs and symptoms develop. Advised that if patient falls and/or hits their head, they should seek medical attention. Verbalized understanding.     Dosing instructions given to patient both verbally and in writing. Advised to call the clinic with any questions or concerns. Patient verbalized understanding. Clinic number provided.    Anticoagulation Summary  As of 2019    INR goal:   2.0-3.0   TTR:   76.3 % (6.6 mo)   INR used for dosin.7! (2019)   Warfarin maintenance plan:   5 mg (5 mg x 1) every Tu; 7.5 mg (5 mg x 1.5) all other days   Weekly warfarin total:   50 mg   Plan last modified:   Lila Ceja RN (2019)   Next INR check:   2019   Target end date:       Indications    Subclavian artery disorder (CMS/HCC) [I74.8]             Anticoagulation Episode Summary     INR check location:   [facility]    Preferred lab:       Send INR reminders to:   ANTICOAG (OPEN ENROLLMENT) ACS CARD/EP    Comments:   Next STAC due on 19; ACL; 5 mg tablets; NO Limitations; Low HGB (8.5 on 10/16/18) tested 18: PASSED      Anticoagulation Care Providers     Provider Role Specialty Phone number    Douglas Kinney MD Referring Cardiovascular Disease [phone]          Supervising provider: KEREN Martinez   Chief Complaint   Patient presents with     RECHECK     dm2     Lupillo Hansen, CMA

## 2023-06-06 NOTE — PROGRESS NOTES
PATIENT SEEN FACE TO FACE IN CLINIC TODAY    HPI   Earle Rene is a 72 year old male with type 2 diabetes mellitus. Pt is being seen in clinic today for diabetes follow up.  Pt's diabetes is complicated by retinopathy, nephropathy, neuropathy and ED.  Pt also has hx of hyperlipidemia, HTN, obesity, presumed localized prostate cancer, BPH, goiter with right thyroid nodule and osteoporosis.  For his diabetes, patient states he is taking Tresiba 60 units SQ at hs, Humalog 15 units with meals, Jardiance 10 mg each am and Ozempic 0.5 mg subcutaneous once a week.  Pt's A1C is 8.4 % today.  I reviewed his Freestyle Libre2 sensor download today and his average glucose is 187 with SD 33.  His glucose is in target 53 % of the time, above target 47 % of the time and below target 0 % of the time.  On ROS today, blurred vision.  Decrease auditory acuity.  Chronic numbness in feet.  Sore right 5 th toe.  He denies groin yeast infection, dysuria or hematuria at this time.  Pt denies frequent headaches, n/v, SOB at rest, cough, fever, chills,chest pain, abd pain or diarrhea.  No foot ulcers.    DIABETES CARE:  Retinopathy: yes; severe NPDR with macular edema. He was seen by Oph here in 3/2023.  Nephropathy: yes- CKDStage3.  Pt is taking Cozaar daily.  Neuropathy: yes.   Foot exam.  Mild swelling bilateral. Sore right 5th toe.   Lipids: LDL 57 in 2/2023. Pt is taking Lipitor, Fish Oil and fenofibrate.  Taking ASA: yes.  CAD:no.  Mental health: hx of mood disorder per chart. Pt denies depression.  Insulin: Basal and meal time insulin.  DM meds: Jardiance and Ozempic.  Testing:  Freestyle Libre2 sensor.    ROS   Please see under HPI.     ALLERGIES:  Review of patient's allergies indicates no known allergies.      Current Outpatient Medications   Medication Sig Dispense Refill     albuterol (VENTOLIN HFA) 108 (90 Base) MCG/ACT inhaler Inhale 2 puffs into the lungs every 6 hours 18 g 3     alpha-lipoic acid 600 MG capsule Take 1  capsule (600 mg) by mouth daily 90 capsule 3     amLODIPine (NORVASC) 5 MG tablet Take 1 tablet (5 mg) by mouth At Bedtime 90 tablet 3     amoxicillin (AMOXIL) 500 MG capsule TAKE 1 CAPSULE ORAL EVERY EIGHT HOURS AS DIRECTED       ARTIFICIAL TEAR OP Apply to eye as needed       aspirin 81 MG tablet Take 1 tablet by mouth At Bedtime        atorvastatin (LIPITOR) 20 MG tablet TAKE 1 TABLET BY MOUTH EVERY DAY 90 tablet 2     blood glucose (NO BRAND SPECIFIED) lancets standard Lancets that go with device, Test 3 times daily 300 each 3     blood glucose monitoring (NO BRAND SPECIFIED) meter device kit Any meter covered by insurance, not store brand, use as directed. 1 kit 0     blood glucose monitoring (NO BRAND SPECIFIED) test strip Strips that go with meter, covered by insurance. Test 3 times daily 300 strip 3     Blood Pressure Monitor KIT Automatic Blood Pressure Monitor 1 kit 0     camphor-menthol (DERMASARRA) 0.5-0.5 % external lotion Apply lotion 2-3 times per day to itchy areas. If not improving in two weeks, follow up with PCP. 222 mL 0     camphor-menthol (DERMASARRA) 0.5-0.5 % external lotion Apply to arms legs torso 1-2 times a day for itching 1 Bottle 11     chlorhexidine (PERIDEX) 0.12 % solution PLEASE SEE ATTACHED FOR DETAILED DIRECTIONS       clobetasol (TEMOVATE) 0.05 % ointment Apply topically to legs twice daily for 2 weeks.  Then discontinue (Patient taking differently: Apply topically to legs twice daily for 2 weeks.  Then discontinue) 30 g 0     clotrimazole (LOTRIMIN) 1 % cream Apply topically 2 times daily 30 g 3     Continuous Blood Gluc  (FREESTYLE PETER 14 DAY READER) JUANCARLOS Use to read blood sugars as per 's instructions. 1 each 0     Continuous Blood Gluc Sensor (FREESTYLE PETER 2 SENSOR) MISC 1 each every 14 days 1 each every 14 days. Change every 14 days. 2 each 5     dextromethorphan-guaiFENesin (MUCINEX DM)  MG 12 hr tablet Take 1 tablet by mouth every 12 hours 30  "tablet 0     dextromethorphan-guaiFENesin (TUSSIN DM)  MG/5ML liquid Take 5 mLs by mouth 2 times daily as needed 118 mL 0     diclofenac (VOLTAREN) 1 % topical gel Apply 4 grams to thigh up to 4 times daily for pain 100 g 1     famotidine (PEPCID) 20 MG tablet TAKE 1 TABLET BY MOUTH TWICE A  tablet 1     fenofibrate 54 MG tablet Take 1 tablet (54 mg) by mouth daily (Patient taking differently: Take 54 mg by mouth At Bedtime) 90 tablet 0     finasteride (PROSCAR) 5 MG tablet Take 1 tablet (5 mg) by mouth daily 90 tablet 3     fluocinonide (LIDEX) 0.05 % external cream   3     fluticasone (FLONASE) 50 MCG/ACT nasal spray Spray 1 spray into both nostrils daily 16 g 0     fluticasone (FLOVENT HFA) 110 MCG/ACT inhaler Inhale 1 puff into the lungs 2 times daily 12 g 11     gabapentin (NEURONTIN) 300 MG capsule TAKE 1 CAPSULE BY MOUTH TWICE A DAY 60 capsule 3     gabapentin (NEURONTIN) 300 MG capsule TAKE 1 CAPSULE BY MOUTH TWICE A  capsule 0     HUMALOG KWIKPEN 100 UNIT/ML soln Inject 15-17  units with meals, plus correction. Pt uses approx 65 units in 24 hrs. 60 mL 1     ibuprofen (ADVIL/MOTRIN) 600 MG tablet TAKE 1 TABLET ORAL EVERY SIX HOURS AS DIRECTED       insulin degludec (TRESIBA FLEXTOUCH) 100 UNIT/ML pen INJECT 64 UNITS SUBCUTANEOUSLY AT BEDTIME. 60 mL 3     insulin pen needle (B-D U/F) 31G X 5 MM miscellaneous Use 4 time(s) per day.  Please dispense as BD Pen Needle Mini U/F 31G x 5  each 3     insulin pen needle (B-D U/F) 31G X 5 MM Use 4 times per day.  Please dispense as BD Pen Needle Mini U/F 31G x 5  each 3     insulin syringe 31G X 5/16\" 0.5 ML MISC Use three syringes daily 270 each 1     JARDIANCE 10 MG TABS tablet TAKE 1 TABLET (10 MG) BY MOUTH DAILY. 90 tablet 1     ketamine 5% gabapentin 8% lidocaine 2.5% topical PLO cream Apply 1 g topically 3 times daily 30 g 3     loratadine (CLARITIN) 10 MG tablet Take 1 tablet (10 mg) by mouth daily 90 tablet 0     losartan " (COZAAR) 100 MG tablet TAKE 1 TABLET BY MOUTH AT BEDTIME 90 tablet 0     mirabegron (MYRBETRIQ) 25 MG 24 hr tablet Take 1 tablet (25 mg) by mouth daily 90 tablet 3     mupirocin (BACTROBAN) 2 % cream Apply  topically. In very small amounts only as needed 15 g 1     Omega-3 Fatty Acids (OMEGA-3 FISH OIL) 1000 MG CAPS Take 1 capsule (1 g) by mouth 2 times daily 60 capsule 11     ONETOUCH ULTRA test strip Use to test blood sugar 3 times daily 300 strip 3     Semaglutide, 1 MG/DOSE, (OZEMPIC) 4 MG/3ML pen Inject 1 mg Subcutaneous every 7 days 3 mL 0     sodium bicarbonate 650 MG tablet TAKE 1 TABLET (650 MG) BY MOUTH 3 TIMES DAILY 270 tablet 3     tamsulosin (FLOMAX) 0.4 MG capsule Take 1 capsule (0.4 mg) by mouth daily 30 capsule 5     triamcinolone (KENALOG) 0.1 % cream Apply topically 3 times daily 80 g 0     VITAMIN D3 25 MCG (1000 UT) tablet TAKE 2 TABLETS (50 MCG) BY MOUTH DAILY 180 tablet 3     VITAMIN D3 25 MCG (1000 UT) tablet TAKE 2 TABLETS (50 MCG) BY MOUTH DAILY       Family Hx   No change.     Personal Hx   Smoke: none.   ETOH: none.    with grown children.    PMH   1. Type 2 Diabetes Mellitus dx at age 43.   2. Neuropathy.  3. Nephropathy.   4. ED.   5. Dyslipidemia.   6. Nephrolithiasis.   7. Decrease auditory acuity.   8. Sarcoidosis-lung.   9. Goiter.   10. S/P T & A.   11. S/P FX right heel.   12. Vit D def.   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   14. CT chest- ? granulomas.   15. Retinopathy.  16. Goiter.  Past Medical History:   Diagnosis Date     Blepharitis of both eyes      BPH (benign prostatic hyperplasia)      Diabetes (H)      Diabetic neuropathy (H)      Diabetic retinopathy associated with diabetes mellitus due to underlying condition (H)      Dry eye syndrome      GERD (gastroesophageal reflux disease)      Goiter      Granulomatous disease (H)      HLD (hyperlipidemia)      HTN (hypertension)      Nonsenile cataract      Peripheral neuropathy      Past Surgical History:    Procedure Laterality Date     ------------OTHER-------------      back of neck abscess drainage in OR     AS RAD RESEC TONSIL/PILLARS Bilateral 1961     CATARACT IOL, RT/LT Left      COLONOSCOPY  7/29/2013    Procedure: COLONOSCOPY;;  Surgeon: Montana Pascal MD;  Location: [facility]     EXCISE MASS UPPER EXTREMITY Right 11/11/2019    Procedure: EXCISION, MASS, UPPER EXTREMITY, RIGHT SHOULDER;  Surgeon: Johana Choudhury MD;  Location: [facility]     INJECT EPIDURAL LUMBAR Left 4/20/2023    Procedure: INJECTION, SPINE, LUMBAR, EPIDURAL (L4-L5);  Surgeon: Mahamed Vázquez MD;  Location: [facility] OR     INJECT SACROILIAC JOINT Bilateral 9/13/2022    Procedure: Bilateral sacroiliac joint injection;  Surgeon: Collin Mata MD;  Location: [facility] OR     INTRAVITREAL INJECTION CHEMOTHERAPY Right 12/30/2019    Procedure: INTRAVITREAL Bevacizumab injection;  Surgeon: Milton Maki MD;  Location: [facility]     PHACOEMULSIFICATION CLEAR CORNEA WITH STANDARD INTRAOCULAR LENS IMPLANT Right 12/30/2019    Procedure: PHACOEMULSIFICATION, CATARACT, WITH INTRAOCULAR LENS IMPLANT;  Surgeon: Milton Maki MD;  Location: [facility]     PHACOEMULSIFICATION WITH STANDARD INTRAOCULAR LENS IMPLANT Left 6/21/2019    Procedure: Left Eye Cataract Removal with Intraocular Lens Implant with Intraoperative Avastin Injection;  Surgeon: Lacey Eugene MD;  Location: [facility]     siladenatis  11/2017     Physical Exam   /67   Pulse 86   Wt 90.6 kg (199 lb 11.2 oz)   SpO2 94%   BMI 29.58 kg/m      FEET: Mild edema; small sore 5th toe right foot.    Results   Creatinine   Date Value Ref Range Status   06/06/2023 1.77 (H) 0.67 - 1.17 mg/dL Final   06/09/2021 1.45 (H) 0.66 - 1.25 mg/dL Final     GFR Estimate   Date Value Ref Range Status   06/06/2023 40 (L) >60 mL/min/1.73m2 Final     Comment:     eGFR calculated using 2021 CKD-EPI equation.   06/09/2021 48 (L) >60 mL/min/[1.73_m2] Final     Comment:     Non  GFR  Calc  Starting 12/18/2018, serum creatinine based estimated GFR (eGFR) will be   calculated using the Chronic Kidney Disease Epidemiology Collaboration   (CKD-EPI) equation.       Hemoglobin A1C   Date Value Ref Range Status   11/02/2021 8.3 (H) 0.0 - 5.6 % Final     Comment:     Normal <5.7%   Prediabetes 5.7-6.4%    Diabetes 6.5% or higher     Note: Adopted from ADA consensus guidelines.   06/09/2021 8.2 (H) 0 - 5.6 % Final     Comment:     Normal <5.7% Prediabetes 5.7-6.4%  Diabetes 6.5% or higher - adopted from ADA   consensus guidelines.       Potassium   Date Value Ref Range Status   01/08/2023 4.6 3.4 - 5.3 mmol/L Final   06/09/2022 4.3 3.4 - 5.3 mmol/L Final   06/09/2021 4.2 3.4 - 5.3 mmol/L Final     ALT   Date Value Ref Range Status   03/12/2021 36 0 - 70 U/L Final     AST   Date Value Ref Range Status   03/12/2021 24 0 - 45 U/L Final     TSH   Date Value Ref Range Status   06/09/2021 0.98 0.40 - 4.00 mU/L Final     T4 Free   Date Value Ref Range Status   10/21/2014 1.00 0.76 - 1.46 ng/dL Final     Comment:     Effective 7/30/2014, the reference range for this assay has changed to reflect   new instrumentation/methodology.           Cholesterol   Date Value Ref Range Status   02/06/2023 153 <200 mg/dL Final   11/02/2021 159 <200 mg/dL Final   06/18/2019 145 <200 mg/dL Final   03/06/2018 101 <200 mg/dL Final     HDL Cholesterol   Date Value Ref Range Status   06/18/2019 38 (L) >39 mg/dL Final   03/06/2018 32 (L) >39 mg/dL Final     Direct Measure HDL   Date Value Ref Range Status   02/06/2023 40 >=40 mg/dL Final   11/02/2021 37 (L) >=40 mg/dL Final     LDL Cholesterol Calculated   Date Value Ref Range Status   02/06/2023 57 <=100 mg/dL Final   11/02/2021 56 <=100 mg/dL Final   06/18/2019 49 <100 mg/dL Final     Comment:     Desirable:       <100 mg/dl   03/06/2018 36 <100 mg/dL Final     Comment:     Desirable:       <100 mg/dl     Triglycerides   Date Value Ref Range Status   02/06/2023 279 (H) <150 mg/dL  Final   11/02/2021 331 (H) <150 mg/dL Final   06/18/2019 289 (H) <150 mg/dL Final     Comment:     Borderline high:  150-199 mg/dl  High:             200-499 mg/dl  Very high:       >499 mg/dl     03/06/2018 166 (H) <150 mg/dL Final     Comment:     Borderline high:  150-199 mg/dl  High:             200-499 mg/dl  Very high:       >499 mg/dl       Cholesterol/HDL Ratio   Date Value Ref Range Status   09/09/2014 3.8 0.0 - 5.0 Final   11/20/2013 5.8 (H) 0.0 - 5.0 Final       ASSESSMENT/PLAN:     1. TYPE 2 DIABETES MELLITUS: Type 2 diabetes mellitus complicated by retinopathy, nephropathy- CKD stage 3, neuropathy and ED.  Pt's A1C is higher at 8.4 % today.  Increase Ozempic 1 mg subcutaneous once a week.  Continue current dose of Tresiba, Humalog and Jardiance.  Most recent creat 1.62 with GFR 45mL/min in 2/2023.  Encouraged patient to make healthy food choices, reduce his food portions with meals, avoid snacking and walk on his treadmill and exercise.  Pt remains on daily ASA.     2. GOITER: Not addressed today.   TSH ordered today.    3. NEPHROPATHY: Pt has CKD and followed here by renal staff.  Most recent creat was 1.62 with GFR 45 mL/min in Feb 2023  Pt is taking Cozaar.  Pt's BP is good at 114/67 today.    4.  RETINOPATHY:  Seen here by Oph in 3/2023.  Referred to Oph for follow up visit.    5.  NEUROPATHY: Continue Gabapentin,alpha-lipoic acid and topical cream.  Small sore 5th toe right foot.  Referred to Podiatry here.    6. DYSLIPIDEMIA: LDL 57 in 2/2023.  Pt is taking Lipitor, fish oil and Fenofibrate.    7. OBESITY: See under # 1 above.  Increase Ozempic 1 mg subcutaneous once a week.    8.  FOLLOW UP: with me in 3 months and 6 months.  Ozempic 1 mg subcutaneous once a week today.  Creat/GFR and TSH ordered today.  Podiatry referral placed today.    Time spent reviewing pt's chart notes,labs and Freestyle Lora sensor download today= 6 minutes.  Time for clinic visit today= 25 minutes.  Time for  documentation today = 15 minutes.    TOTAL TIME FOR VISIT TODAY = 46 minutes    Pamela Laura PA-C

## 2023-06-06 NOTE — LETTER
6/6/2023       RE: Earle Rene  [address]  [place] [address]     Dear Colleague,    Thank you for referring your patient, Earle Rene, to the [facility] ENDOCRINOLOGY CLINIC [place] at [facility]. Please see a copy of my visit note below.    Patient is showing 5/5 MNCM met.   RASHID Akers      PATIENT SEEN FACE TO FACE IN CLINIC TODAY    HPI   Earle Rene is a 72 year old male with type 2 diabetes mellitus. Pt is being seen in clinic today for diabetes follow up.  Pt's diabetes is complicated by retinopathy, nephropathy, neuropathy and ED.  Pt also has hx of hyperlipidemia, HTN, obesity, presumed localized prostate cancer, BPH, goiter with right thyroid nodule and osteoporosis.  For his diabetes, patient states he is taking Tresiba 60 units SQ at hs, Humalog 15 units with meals, Jardiance 10 mg each am and Ozempic 0.5 mg subcutaneous once a week.  Pt's A1C is 8.4 % today.  I reviewed his Freestyle Libre2 sensor download today and his average glucose is 187 with SD 33.  His glucose is in target 53 % of the time, above target 47 % of the time and below target 0 % of the time.  On ROS today, blurred vision.  Decrease auditory acuity.  Chronic numbness in feet.  Sore right 5 th toe.  He denies groin yeast infection, dysuria or hematuria at this time.  Pt denies frequent headaches, n/v, SOB at rest, cough, fever, chills,chest pain, abd pain or diarrhea.  No foot ulcers.    DIABETES CARE:  Retinopathy: yes; severe NPDR with macular edema. He was seen by Oph here in 3/2023.  Nephropathy: yes- CKDStage3.  Pt is taking Cozaar daily.  Neuropathy: yes.   Foot exam.  Mild swelling bilateral. Sore right 5th toe.   Lipids: LDL 57 in 2/2023. Pt is taking Lipitor, Fish Oil and fenofibrate.  Taking ASA: yes.  CAD:no.  Mental health: hx of mood disorder per chart. Pt denies depression.  Insulin: Basal and meal time insulin.  DM meds: Jardiance and  Ozempic.  Testing:  Freestyle Libre2 sensor.    ROS   Please see under HPI.     ALLERGIES:  Review of patient's allergies indicates no known allergies.      Current Outpatient Medications   Medication Sig Dispense Refill    albuterol (VENTOLIN HFA) 108 (90 Base) MCG/ACT inhaler Inhale 2 puffs into the lungs every 6 hours 18 g 3    alpha-lipoic acid 600 MG capsule Take 1 capsule (600 mg) by mouth daily 90 capsule 3    amLODIPine (NORVASC) 5 MG tablet Take 1 tablet (5 mg) by mouth At Bedtime 90 tablet 3    amoxicillin (AMOXIL) 500 MG capsule TAKE 1 CAPSULE ORAL EVERY EIGHT HOURS AS DIRECTED      ARTIFICIAL TEAR OP Apply to eye as needed      aspirin 81 MG tablet Take 1 tablet by mouth At Bedtime       atorvastatin (LIPITOR) 20 MG tablet TAKE 1 TABLET BY MOUTH EVERY DAY 90 tablet 2    blood glucose (NO BRAND SPECIFIED) lancets standard Lancets that go with device, Test 3 times daily 300 each 3    blood glucose monitoring (NO BRAND SPECIFIED) meter device kit Any meter covered by insurance, not store brand, use as directed. 1 kit 0    blood glucose monitoring (NO BRAND SPECIFIED) test strip Strips that go with meter, covered by insurance. Test 3 times daily 300 strip 3    Blood Pressure Monitor KIT Automatic Blood Pressure Monitor 1 kit 0    camphor-menthol (DERMASARRA) 0.5-0.5 % external lotion Apply lotion 2-3 times per day to itchy areas. If not improving in two weeks, follow up with PCP. 222 mL 0    camphor-menthol (DERMASARRA) 0.5-0.5 % external lotion Apply to arms legs torso 1-2 times a day for itching 1 Bottle 11    chlorhexidine (PERIDEX) 0.12 % solution PLEASE SEE ATTACHED FOR DETAILED DIRECTIONS      clobetasol (TEMOVATE) 0.05 % ointment Apply topically to legs twice daily for 2 weeks.  Then discontinue (Patient taking differently: Apply topically to legs twice daily for 2 weeks.  Then discontinue) 30 g 0    clotrimazole (LOTRIMIN) 1 % cream Apply topically 2 times daily 30 g 3    Continuous Blood Gluc   (FREESTYLE PETER 14 DAY READER) JUANCARLOS Use to read blood sugars as per 's instructions. 1 each 0    Continuous Blood Gluc Sensor (FREESTYLE PETER 2 SENSOR) MISC 1 each every 14 days 1 each every 14 days. Change every 14 days. 2 each 5    dextromethorphan-guaiFENesin (MUCINEX DM)  MG 12 hr tablet Take 1 tablet by mouth every 12 hours 30 tablet 0    dextromethorphan-guaiFENesin (TUSSIN DM)  MG/5ML liquid Take 5 mLs by mouth 2 times daily as needed 118 mL 0    diclofenac (VOLTAREN) 1 % topical gel Apply 4 grams to thigh up to 4 times daily for pain 100 g 1    famotidine (PEPCID) 20 MG tablet TAKE 1 TABLET BY MOUTH TWICE A  tablet 1    fenofibrate 54 MG tablet Take 1 tablet (54 mg) by mouth daily (Patient taking differently: Take 54 mg by mouth At Bedtime) 90 tablet 0    finasteride (PROSCAR) 5 MG tablet Take 1 tablet (5 mg) by mouth daily 90 tablet 3    fluocinonide (LIDEX) 0.05 % external cream   3    fluticasone (FLONASE) 50 MCG/ACT nasal spray Spray 1 spray into both nostrils daily 16 g 0    fluticasone (FLOVENT HFA) 110 MCG/ACT inhaler Inhale 1 puff into the lungs 2 times daily 12 g 11    gabapentin (NEURONTIN) 300 MG capsule TAKE 1 CAPSULE BY MOUTH TWICE A DAY 60 capsule 3    gabapentin (NEURONTIN) 300 MG capsule TAKE 1 CAPSULE BY MOUTH TWICE A  capsule 0    HUMALOG KWIKPEN 100 UNIT/ML soln Inject 15-17  units with meals, plus correction. Pt uses approx 65 units in 24 hrs. 60 mL 1    ibuprofen (ADVIL/MOTRIN) 600 MG tablet TAKE 1 TABLET ORAL EVERY SIX HOURS AS DIRECTED      insulin degludec (TRESIBA FLEXTOUCH) 100 UNIT/ML pen INJECT 64 UNITS SUBCUTANEOUSLY AT BEDTIME. 60 mL 3    insulin pen needle (B-D U/F) 31G X 5 MM miscellaneous Use 4 time(s) per day.  Please dispense as BD Pen Needle Mini U/F 31G x 5  each 3    insulin pen needle (B-D U/F) 31G X 5 MM Use 4 times per day.  Please dispense as BD Pen Needle Mini U/F 31G x 5  each 3    insulin syringe 31G X  "5/16\" 0.5 ML MISC Use three syringes daily 270 each 1    JARDIANCE 10 MG TABS tablet TAKE 1 TABLET (10 MG) BY MOUTH DAILY. 90 tablet 1    ketamine 5% gabapentin 8% lidocaine 2.5% topical PLO cream Apply 1 g topically 3 times daily 30 g 3    loratadine (CLARITIN) 10 MG tablet Take 1 tablet (10 mg) by mouth daily 90 tablet 0    losartan (COZAAR) 100 MG tablet TAKE 1 TABLET BY MOUTH AT BEDTIME 90 tablet 0    mirabegron (MYRBETRIQ) 25 MG 24 hr tablet Take 1 tablet (25 mg) by mouth daily 90 tablet 3    mupirocin (BACTROBAN) 2 % cream Apply  topically. In very small amounts only as needed 15 g 1    Omega-3 Fatty Acids (OMEGA-3 FISH OIL) 1000 MG CAPS Take 1 capsule (1 g) by mouth 2 times daily 60 capsule 11    ONETOUCH ULTRA test strip Use to test blood sugar 3 times daily 300 strip 3    Semaglutide, 1 MG/DOSE, (OZEMPIC) 4 MG/3ML pen Inject 1 mg Subcutaneous every 7 days 3 mL 0    sodium bicarbonate 650 MG tablet TAKE 1 TABLET (650 MG) BY MOUTH 3 TIMES DAILY 270 tablet 3    tamsulosin (FLOMAX) 0.4 MG capsule Take 1 capsule (0.4 mg) by mouth daily 30 capsule 5    triamcinolone (KENALOG) 0.1 % cream Apply topically 3 times daily 80 g 0    VITAMIN D3 25 MCG (1000 UT) tablet TAKE 2 TABLETS (50 MCG) BY MOUTH DAILY 180 tablet 3    VITAMIN D3 25 MCG (1000 UT) tablet TAKE 2 TABLETS (50 MCG) BY MOUTH DAILY       Family Hx   No change.     Personal Hx   Smoke: none.   ETOH: none.    with grown children.    PMH   1. Type 2 Diabetes Mellitus dx at age 43.   2. Neuropathy.  3. Nephropathy.   4. ED.   5. Dyslipidemia.   6. Nephrolithiasis.   7. Decrease auditory acuity.   8. Sarcoidosis-lung.   9. Goiter.   10. S/P T & A.   11. S/P FX right heel.   12. Vit D def.   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   14. CT chest- ? granulomas.   15. Retinopathy.  16. Goiter.  Past Medical History:   Diagnosis Date    Blepharitis of both eyes     BPH (benign prostatic hyperplasia)     Diabetes (H)     Diabetic neuropathy (H)     Diabetic " retinopathy associated with diabetes mellitus due to underlying condition (H)     Dry eye syndrome     GERD (gastroesophageal reflux disease)     Goiter     Granulomatous disease (H)     HLD (hyperlipidemia)     HTN (hypertension)     Nonsenile cataract     Peripheral neuropathy      Past Surgical History:   Procedure Laterality Date    ------------OTHER-------------      back of neck abscess drainage in OR    AS RAD RESEC TONSIL/PILLARS Bilateral 1961    CATARACT IOL, RT/LT Left     COLONOSCOPY  7/29/2013    Procedure: COLONOSCOPY;;  Surgeon: Montana Pascal MD;  Location: [facility]    EXCISE MASS UPPER EXTREMITY Right 11/11/2019    Procedure: EXCISION, MASS, UPPER EXTREMITY, RIGHT SHOULDER;  Surgeon: Johana Choudhury MD;  [facility]    INJECT EPIDURAL LUMBAR Left 4/20/2023    Procedure: INJECTION, SPINE, LUMBAR, EPIDURAL (L4-L5);  Surgeon: Mahamed Vázquez MD;  Location: [facility]    INJECT SACROILIAC JOINT Bilateral 9/13/2022    Procedure: Bilateral sacroiliac joint injection;  Surgeon: Collin Mata MD;  Location: [facility]    INTRAVITREAL INJECTION CHEMOTHERAPY Right 12/30/2019    Procedure: INTRAVITREAL Bevacizumab injection;  Surgeon: Milton Maki MD;  [facility]    PHACOEMULSIFICATION CLEAR CORNEA WITH STANDARD INTRAOCULAR LENS IMPLANT Right 12/30/2019    Procedure: PHACOEMULSIFICATION, CATARACT, WITH INTRAOCULAR LENS IMPLANT;  Surgeon: Milton Maki MD;  [facility]    PHACOEMULSIFICATION WITH STANDARD INTRAOCULAR LENS IMPLANT Left 6/21/2019    Procedure: Left Eye Cataract Removal with Intraocular Lens Implant with Intraoperative Avastin Injection;  Surgeon: Lacye Eugene MD;  [facility]    siladenatis  11/2017     Physical Exam   /67   Pulse 86   Wt 90.6 kg (199 lb 11.2 oz)   SpO2 94%   BMI 29.58 kg/m      FEET: Mild edema; small sore 5th toe right foot.    Results   Creatinine   Date Value Ref Range Status   06/06/2023 1.77 (H) 0.67 - 1.17 mg/dL Final    06/09/2021 1.45 (H) 0.66 - 1.25 mg/dL Final     GFR Estimate   Date Value Ref Range Status   06/06/2023 40 (L) >60 mL/min/1.73m2 Final     Comment:     eGFR calculated using 2021 CKD-EPI equation.   06/09/2021 48 (L) >60 mL/min/[1.73_m2] Final     Comment:     Non  GFR Calc  Starting 12/18/2018, serum creatinine based estimated GFR (eGFR) will be   calculated using the Chronic Kidney Disease Epidemiology Collaboration   (CKD-EPI) equation.       Hemoglobin A1C   Date Value Ref Range Status   11/02/2021 8.3 (H) 0.0 - 5.6 % Final     Comment:     Normal <5.7%   Prediabetes 5.7-6.4%    Diabetes 6.5% or higher     Note: Adopted from ADA consensus guidelines.   06/09/2021 8.2 (H) 0 - 5.6 % Final     Comment:     Normal <5.7% Prediabetes 5.7-6.4%  Diabetes 6.5% or higher - adopted from ADA   consensus guidelines.       Potassium   Date Value Ref Range Status   01/08/2023 4.6 3.4 - 5.3 mmol/L Final   06/09/2022 4.3 3.4 - 5.3 mmol/L Final   06/09/2021 4.2 3.4 - 5.3 mmol/L Final     ALT   Date Value Ref Range Status   03/12/2021 36 0 - 70 U/L Final     AST   Date Value Ref Range Status   03/12/2021 24 0 - 45 U/L Final     TSH   Date Value Ref Range Status   06/09/2021 0.98 0.40 - 4.00 mU/L Final     T4 Free   Date Value Ref Range Status   10/21/2014 1.00 0.76 - 1.46 ng/dL Final     Comment:     Effective 7/30/2014, the reference range for this assay has changed to reflect   new instrumentation/methodology.           Cholesterol   Date Value Ref Range Status   02/06/2023 153 <200 mg/dL Final   11/02/2021 159 <200 mg/dL Final   06/18/2019 145 <200 mg/dL Final   03/06/2018 101 <200 mg/dL Final     HDL Cholesterol   Date Value Ref Range Status   06/18/2019 38 (L) >39 mg/dL Final   03/06/2018 32 (L) >39 mg/dL Final     Direct Measure HDL   Date Value Ref Range Status   02/06/2023 40 >=40 mg/dL Final   11/02/2021 37 (L) >=40 mg/dL Final     LDL Cholesterol Calculated   Date Value Ref Range Status   02/06/2023 57  <=100 mg/dL Final   11/02/2021 56 <=100 mg/dL Final   06/18/2019 49 <100 mg/dL Final     Comment:     Desirable:       <100 mg/dl   03/06/2018 36 <100 mg/dL Final     Comment:     Desirable:       <100 mg/dl     Triglycerides   Date Value Ref Range Status   02/06/2023 279 (H) <150 mg/dL Final   11/02/2021 331 (H) <150 mg/dL Final   06/18/2019 289 (H) <150 mg/dL Final     Comment:     Borderline high:  150-199 mg/dl  High:             200-499 mg/dl  Very high:       >499 mg/dl     03/06/2018 166 (H) <150 mg/dL Final     Comment:     Borderline high:  150-199 mg/dl  High:             200-499 mg/dl  Very high:       >499 mg/dl       Cholesterol/HDL Ratio   Date Value Ref Range Status   09/09/2014 3.8 0.0 - 5.0 Final   11/20/2013 5.8 (H) 0.0 - 5.0 Final       ASSESSMENT/PLAN:     1. TYPE 2 DIABETES MELLITUS: Type 2 diabetes mellitus complicated by retinopathy, nephropathy- CKD stage 3, neuropathy and ED.  Pt's A1C is higher at 8.4 % today.  Increase Ozempic 1 mg subcutaneous once a week.  Continue current dose of Tresiba, Humalog and Jardiance.  Most recent creat 1.62 with GFR 45mL/min in 2/2023.  Encouraged patient to make healthy food choices, reduce his food portions with meals, avoid snacking and walk on his treadmill and exercise.  Pt remains on daily ASA.     2. GOITER: Not addressed today.   TSH ordered today.    3. NEPHROPATHY: Pt has CKD and followed here by renal staff.  Most recent creat was 1.62 with GFR 45 mL/min in Feb 2023  Pt is taking Cozaar.  Pt's BP is good at 114/67 today.    4.  RETINOPATHY:  Seen here by Oph in 3/2023.  Referred to Oph for follow up visit.    5.  NEUROPATHY: Continue Gabapentin,alpha-lipoic acid and topical cream.  Small sore 5th toe right foot.  Referred to Podiatry here.    6. DYSLIPIDEMIA: LDL 57 in 2/2023.  Pt is taking Lipitor, fish oil and Fenofibrate.    7. OBESITY: See under # 1 above.  Increase Ozempic 1 mg subcutaneous once a week.    8.  FOLLOW UP: with me in 3 months  and 6 months.  Ozempic 1 mg subcutaneous once a week today.  Creat/GFR and TSH ordered today.  Podiatry referral placed today.    Time spent reviewing pt's chart notes,labs and Freestyle Lora sensor download today= 6 minutes.  Time for clinic visit today= 25 minutes.  Time for documentation today = 15 minutes.    TOTAL TIME FOR VISIT TODAY = 46 minutes    Pamela Laura PA-C

## 2023-06-07 ENCOUNTER — TELEPHONE (OUTPATIENT)
Dept: ENDOCRINOLOGY | Facility: CLINIC | Age: 73
End: 2023-06-07
Payer: COMMERCIAL

## 2023-06-07 NOTE — TELEPHONE ENCOUNTER
Spoke w/ Pt: confirms understanding of review and recommendation.   No questions.   Lorena Bravo RN on 6/7/2023 at 8:34 AM       RE    ----- Message from Pamela Laura PA-C sent at 6/6/2023  4:33 PM CDT -----  Can you please call pt and let him know his thyroid blood test is normal.  His creatinine is up slightly 1.77 with GFR 40 mL/min. He needs to be sure to drink plenty of water.  Thanks  Karla

## 2023-06-07 NOTE — TELEPHONE ENCOUNTER
----- Message from Pamela Laura PA-C sent at 6/6/2023  4:33 PM CDT -----  Can you please call pt and let him know his thyroid blood test is normal.  His creatinine is up slightly 1.77 with GFR 40 mL/min. He needs to be sure to drink plenty of water.  Thanks  Karla       no Active ROM deficits were identified

## 2023-06-08 ENCOUNTER — OFFICE VISIT (OUTPATIENT)
Dept: ENDOCRINOLOGY | Facility: CLINIC | Age: 73
End: 2023-06-08
Payer: COMMERCIAL

## 2023-06-08 DIAGNOSIS — L84 TYPE 2 DIABETES MELLITUS WITH PRESSURE CALLUS (H): ICD-10-CM

## 2023-06-08 DIAGNOSIS — E11.3213 TYPE 2 DIABETES MELLITUS WITH BOTH EYES AFFECTED BY MILD NONPROLIFERATIVE RETINOPATHY AND MACULAR EDEMA, WITH LONG-TERM CURRENT USE OF INSULIN (H): ICD-10-CM

## 2023-06-08 DIAGNOSIS — Z79.4 TYPE 2 DIABETES MELLITUS WITH BOTH EYES AFFECTED BY MILD NONPROLIFERATIVE RETINOPATHY AND MACULAR EDEMA, WITH LONG-TERM CURRENT USE OF INSULIN (H): ICD-10-CM

## 2023-06-08 DIAGNOSIS — L97.511 SKIN ULCER OF TOE OF RIGHT FOOT, LIMITED TO BREAKDOWN OF SKIN (H): ICD-10-CM

## 2023-06-08 DIAGNOSIS — B35.3 TINEA PEDIS OF BOTH FEET: ICD-10-CM

## 2023-06-08 DIAGNOSIS — E11.49 TYPE II OR UNSPECIFIED TYPE DIABETES MELLITUS WITH NEUROLOGICAL MANIFESTATIONS, NOT STATED AS UNCONTROLLED(250.60) (H): Primary | ICD-10-CM

## 2023-06-08 DIAGNOSIS — B35.1 OM (ONYCHOMYCOSIS): ICD-10-CM

## 2023-06-08 DIAGNOSIS — E11.628 TYPE 2 DIABETES MELLITUS WITH PRESSURE CALLUS (H): ICD-10-CM

## 2023-06-08 PROCEDURE — 97597 DBRDMT OPN WND 1ST 20 CM/<: CPT | Performed by: PODIATRIST

## 2023-06-08 PROCEDURE — 99204 OFFICE O/P NEW MOD 45 MIN: CPT | Mod: 25 | Performed by: PODIATRIST

## 2023-06-08 RX ORDER — UREA 200 MG/G
CREAM TOPICAL PRN
Qty: 120 G | Refills: 5 | Status: SHIPPED | OUTPATIENT
Start: 2023-06-08 | End: 2023-09-29

## 2023-06-08 NOTE — LETTER
6/8/2023       RE: Earle Rene  [address]  [place] [address]     Dear Colleague,    Thank you for referring your patient, Earle Rene, to the [facility] ENDOCRINOLOGY CLINIC [place] at [facility]. Please see a copy of my visit note below.    Past Medical History:   Diagnosis Date    Blepharitis of both eyes     BPH (benign prostatic hyperplasia)     Diabetes (H)     Diabetic neuropathy (H)     Diabetic retinopathy associated with diabetes mellitus due to underlying condition (H)     Dry eye syndrome     GERD (gastroesophageal reflux disease)     Goiter     Granulomatous disease (H)     HLD (hyperlipidemia)     HTN (hypertension)     Nonsenile cataract     Peripheral neuropathy      Patient Active Problem List   Diagnosis    Psychological factors associated with another disorder    Mood disorder in conditions classified elsewhere    Occupational problem    Type 2 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, with long-term current use of insulin (H)    Erectile dysfunction    Hyperlipidemia with target LDL less than 100    CTS (carpal tunnel syndrome)    Diabetic polyneuropathy (H)    Presbyopia    Myopia    Cataracts, bilateral    Pain of right thumb    Subcutaneous mass    Combined form of nonsenile cataract of right eye    Colonic polyp    Elevated PSA    Heel fracture    Hyponatremia    Nephrolithiasis    Pain of finger of right hand    Sarcoidosis of lung (H)    Sialoadenitis    Adhesive capsulitis of shoulder    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)    Chronic kidney disease, stage 3 (H)    Peripheral vascular disease, unspecified (H)    Malignant neoplasm of prostate (H)    Chronic bilateral low back pain with bilateral sciatica    Pain of both sacroiliac joints    Lumbar radiculopathy     Past Surgical History:    Procedure Laterality Date    ------------OTHER-------------      back of neck abscess drainage in OR    AS RAD RESEC TONSIL/PILLARS Bilateral 1961    CATARACT IOL, RT/LT Left     COLONOSCOPY  7/29/2013    Procedure: COLONOSCOPY;;  Surgeon: Montana Pascal MD;  [facility]: [facility]    EXCISE MASS UPPER EXTREMITY Right 11/11/2019    Procedure: EXCISION, MASS, UPPER EXTREMITY, RIGHT SHOULDER;  Surgeon: Johana Choudhury MD;  [facility]    INJECT EPIDURAL LUMBAR Left 4/20/2023    Procedure: INJECTION, SPINE, LUMBAR, EPIDURAL (L4-L5);  Surgeon: Mahamed Vázquez MD;  Location: [facility]    INJECT SACROILIAC JOINT Bilateral 9/13/2022    Procedure: Bilateral sacroiliac joint injection;  Surgeon: Collin Mata MD;  Location: [facility]    INTRAVITREAL INJECTION CHEMOTHERAPY Right 12/30/2019    Procedure: INTRAVITREAL Bevacizumab injection;  Surgeon: Milton Maki MD;  [facility]    PHACOEMULSIFICATION CLEAR CORNEA WITH STANDARD INTRAOCULAR LENS IMPLANT Right 12/30/2019    Procedure: PHACOEMULSIFICATION, CATARACT, WITH INTRAOCULAR LENS IMPLANT;  Surgeon: Milton Maki MD;  [facility]    PHACOEMULSIFICATION WITH STANDARD INTRAOCULAR LENS IMPLANT Left 6/21/2019    Procedure: Left Eye Cataract Removal with Intraocular Lens Implant with Intraoperative Avastin Injection;  Surgeon: Lacey Eugene MD;  [facility]    siladenatis  11/2017     Social History     Socioeconomic History    Marital status:      Spouse name: Not on file    Number of children: 5    Years of education: Not on file    Highest education level: Not on file   Occupational History    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 Not Asked   Social History Narrative    Not on file     Social Determinants of Health     Financial Resource Strain: Not on file   Food Insecurity: Not on file   Transportation Needs: Not on file   Physical Activity: Not on file   Stress: Not on file   Social Connections: Not on file   Intimate Partner Violence: Not on file   Housing Stability: Not on file     Family History   Problem Relation Age of Onset    Diabetes Father     Myocardial Infarction Father     Diabetes Brother     Leukemia Brother 44    Glaucoma No family hx of     Macular Degeneration No family hx of     Kidney Disease No family hx of        Lab Results   Component Value Date    A1C 8.3 11/02/2021    A1C 8.2 06/09/2021    A1C 9.2 06/18/2019    A1C 9.1 03/06/2018    A1C 10.2 06/06/2017    A1C 9.0 03/16/2016     A1c     8.4      6/06/23    Uric Acid   Date Value Ref Range Status   01/08/2023 6.4 3.4 - 7.0 mg/dL Final     Last Comprehensive Metabolic Panel:  Lab Results   Component Value Date     01/08/2023    POTASSIUM 4.6 01/08/2023    CHLORIDE 106 01/08/2023    CO2 20 (L) 01/08/2023    ANIONGAP 13 01/08/2023     (H) 01/08/2023    BUN 30.1 (H) 01/08/2023    CR 1.77 (H) 06/06/2023    GFRESTIMATED 40 (L) 06/06/2023    INDERJIT 10.1 01/08/2023       Lab Results   Component Value Date    WBC 5.4 01/08/2023    WBC 5.1 03/12/2021     Lab Results   Component Value Date    RBC 4.51 01/08/2023    RBC 4.31 03/12/2021     Lab Results   Component Value Date    HGB 14.4 01/08/2023    HGB 13.6 06/09/2021     Lab Results   Component Value Date    HCT 41.2 01/08/2023    HCT 41.7 03/12/2021     No components found for: MCT  Lab Results   Component Value Date    MCV 91 01/08/2023    MCV 97 03/12/2021     Lab Results   Component Value Date    MCH 31.9 01/08/2023    MCH 32.7 03/12/2021     Lab Results   Component Value Date    MCHC 35.0 01/08/2023    MCHC 33.8 03/12/2021     Lab Results   Component Value Date    RDW 12.4 01/08/2023    RDW 12.3  03/12/2021     Lab Results   Component Value Date     01/08/2023     03/12/2021                 Subjective findings- 73-year-old presents for right fifth toe, relates he gets a callus that builds up and hurts.  Relates that he has Diabetic shoes and he wears those, relates he is Diabetic and does have a peripheral Neuropathy, relates to no specific injuries, relates he has seen a physician in the past for the callus and it has helped but it has not resolved anything.  Relates he uses lotion on his feet, looks like he is on Lotrimin cream from med list.  Relates he needs his toenails cut.    Objective findings- DP and PT are 2 out of 4 bilaterally, decreased hair growth bilaterally, dorsally contracted digits 1 through 5 bilaterally, Cavovarus fourth and fifth toes bilaterally, there is no erythema, no drainage, no odor, no calor bilaterally, no gross tendon voids bilaterally, decreased ankle joint dorsiflexion bilaterally, hyperkeratotic tissue buildup distal and plantar medial Hallux bilaterally, first and fifth MPJ on the right, and dorsal lateral fifth toe on the left x2.  Dry scaly skin in moccasin pattern bilaterally, sharp dull is decreased with 5.07 [place] monofilament bilaterally, incurvated dystrophic thickened brittle nails with subungual debris dystrophy discoloration to differing degrees 1 through 5 bilaterally.  Right 5th toe hyperkeratotic tissue build up has an underlying ulcer that is through the Dermis there is some maceration, no odor, no calor, no erythema, no active drainage.  Reviewed x-rays of his right toe from 1/8/2023 reviewed ultrasound Doppler from 3/11/2022 those results are as noted in the EMR.    Assessment and plan- Tyloma right fifth fifth toe with underlying ulceration.  Onychomycosis and Tinea Pedis bilaterally.  Diabetes with peripheral Neuropathy.  Diabetes with foot deformities.  Diabetes with callus.  Diagnosis and treatment options discussed with the  patient.  All the toenails were debrided 1 through 5 upon consent.  The right fifth MPJ ulcer was sharp debrided through the Dermis with tissue cutter, no anesthesia needed, and local wound care done upon consent today.  I applied Betadine and a Band-Aid to the fifth toe, these are dispensed and use discussed with him. Right fourth toenail bled upon debridement, applied Betadine and Band-Aid upon consent and use discussed with him.  Prescription for urea cream given and use discussed with him, continue Diabetic shoes and insoles, return to clinic and see me in 1 month.  Previous notes reviewed.      Moderate to high level of medical decision making.        Again, thank you for allowing me to participate in the care of your patient.      Sincerely,    Hemanth Nuñez DPM

## 2023-06-08 NOTE — PROGRESS NOTES
Past Medical History:   Diagnosis Date     Blepharitis of both eyes      BPH (benign prostatic hyperplasia)      Diabetes (H)      Diabetic neuropathy (H)      Diabetic retinopathy associated with diabetes mellitus due to underlying condition (H)      Dry eye syndrome      GERD (gastroesophageal reflux disease)      Goiter      Granulomatous disease (H)      HLD (hyperlipidemia)      HTN (hypertension)      Nonsenile cataract      Peripheral neuropathy      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 eye     Colonic polyp     Elevated PSA     Heel fracture     Hyponatremia     Nephrolithiasis     Pain of finger of right hand     Sarcoidosis of lung (H)     Sialoadenitis     Adhesive capsulitis of shoulder     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)     Chronic kidney disease, stage 3 (H)     Peripheral vascular disease, unspecified (H)     Malignant neoplasm of prostate (H)     Chronic bilateral low back pain with bilateral sciatica     Pain of both sacroiliac joints     Lumbar radiculopathy     Past Surgical History:   Procedure Laterality Date     ------------OTHER-------------      back of neck abscess drainage in OR     AS RAD RESEC TONSIL/PILLARS Bilateral 1961     CATARACT IOL, RT/LT Left      COLONOSCOPY  7/29/2013    Procedure: COLONOSCOPY;;  Surgeon: Montana Pascal MD;  Location: [facility]      EXCISE MASS UPPER EXTREMITY Right 11/11/2019    Procedure: EXCISION, MASS, UPPER EXTREMITY, RIGHT SHOULDER;  Surgeon: Johana Choudhury MD;  [facility]: [facility]     INJECT EPIDURAL LUMBAR Left 4/20/2023    Procedure: INJECTION, SPINE, LUMBAR, EPIDURAL (L4-L5);  Surgeon: Mahamed Vázquez MD;  Location: [facility] OR     INJECT SACROILIAC JOINT Bilateral 9/13/2022    Procedure: Bilateral sacroiliac joint injection;  Surgeon: Collin Mata MD;  Location: [facility] OR     INTRAVITREAL INJECTION CHEMOTHERAPY Right 12/30/2019    Procedure: INTRAVITREAL Bevacizumab injection;  Surgeon: Milton Maki MD;  [facility]     PHACOEMULSIFICATION CLEAR CORNEA WITH STANDARD INTRAOCULAR LENS IMPLANT Right 12/30/2019    Procedure: PHACOEMULSIFICATION, CATARACT, WITH INTRAOCULAR LENS IMPLANT;  Surgeon: Milton Maki MD;  [facility]     PHACOEMULSIFICATION WITH STANDARD INTRAOCULAR LENS IMPLANT Left 6/21/2019    Procedure: Left Eye Cataract Removal with Intraocular Lens Implant with Intraoperative Avastin Injection;  Surgeon: Lacey Eugene MD;  [facility]     siladenatis  11/2017     Social History     Socioeconomic History     Marital status:      Spouse name: Not on file     Number of children: 5     Years of education: Not on file     Highest education level: Not on file   Occupational History     Occupation: private bussiness owner   Tobacco Use     Smoking status: Never     Smokeless tobacco: Never   Vaping Use     Vaping status: Not on file   Substance and Sexual Activity     Alcohol use: Yes     Comment: occasionaly     Drug use: No     Sexual activity: Not on file   Other Topics Concern     Parent/sibling w/ CABG, MI or angioplasty before 65F 55M? Not Asked   Social History Narrative     Not on file     Social Determinants of Health     Financial Resource Strain: Not on file   Food Insecurity: Not on file   Transportation Needs: Not on file   Physical Activity: Not on file   Stress: Not  on file   Social Connections: Not on file   Intimate Partner Violence: Not on file   Housing Stability: Not on file     Family History   Problem Relation Age of Onset     Diabetes Father      Myocardial Infarction Father      Diabetes Brother      Leukemia Brother 44     Glaucoma No family hx of      Macular Degeneration No family hx of      Kidney Disease No family hx of        Lab Results   Component Value Date    A1C 8.3 11/02/2021    A1C 8.2 06/09/2021    A1C 9.2 06/18/2019    A1C 9.1 03/06/2018    A1C 10.2 06/06/2017    A1C 9.0 03/16/2016     A1c     8.4      6/06/23    Uric Acid   Date Value Ref Range Status   01/08/2023 6.4 3.4 - 7.0 mg/dL Final     Last Comprehensive Metabolic Panel:  Lab Results   Component Value Date     01/08/2023    POTASSIUM 4.6 01/08/2023    CHLORIDE 106 01/08/2023    CO2 20 (L) 01/08/2023    ANIONGAP 13 01/08/2023     (H) 01/08/2023    BUN 30.1 (H) 01/08/2023    CR 1.77 (H) 06/06/2023    GFRESTIMATED 40 (L) 06/06/2023    INDERJIT 10.1 01/08/2023       Lab Results   Component Value Date    WBC 5.4 01/08/2023    WBC 5.1 03/12/2021     Lab Results   Component Value Date    RBC 4.51 01/08/2023    RBC 4.31 03/12/2021     Lab Results   Component Value Date    HGB 14.4 01/08/2023    HGB 13.6 06/09/2021     Lab Results   Component Value Date    HCT 41.2 01/08/2023    HCT 41.7 03/12/2021     No components found for: MCT  Lab Results   Component Value Date    MCV 91 01/08/2023    MCV 97 03/12/2021     Lab Results   Component Value Date    MCH 31.9 01/08/2023    MCH 32.7 03/12/2021     Lab Results   Component Value Date    MCHC 35.0 01/08/2023    MCHC 33.8 03/12/2021     Lab Results   Component Value Date    RDW 12.4 01/08/2023    RDW 12.3 03/12/2021     Lab Results   Component Value Date     01/08/2023     03/12/2021                 Subjective findings- 73-year-old presents for right fifth toe, relates he gets a callus that builds up and hurts.  Relates that he has  Diabetic shoes and he wears those, relates he is Diabetic and does have a peripheral Neuropathy, relates to no specific injuries, relates he has seen a physician in the past for the callus and it has helped but it has not resolved anything.  Relates he uses lotion on his feet, looks like he is on Lotrimin cream from med list.  Relates he needs his toenails cut.    Objective findings- DP and PT are 2 out of 4 bilaterally, decreased hair growth bilaterally, dorsally contracted digits 1 through 5 bilaterally, Cavovarus fourth and fifth toes bilaterally, there is no erythema, no drainage, no odor, no calor bilaterally, no gross tendon voids bilaterally, decreased ankle joint dorsiflexion bilaterally, hyperkeratotic tissue buildup distal and plantar medial Hallux bilaterally, first and fifth MPJ on the right, and dorsal lateral fifth toe on the left x2.  Dry scaly skin in moccasin pattern bilaterally, sharp dull is decreased with 5.07 Spencer Jeff monofilament bilaterally, incurvated dystrophic thickened brittle nails with subungual debris dystrophy discoloration to differing degrees 1 through 5 bilaterally.  Right 5th toe hyperkeratotic tissue build up has an underlying ulcer that is through the Dermis there is some maceration, no odor, no calor, no erythema, no active drainage.  Reviewed x-rays of his right toe from 1/8/2023 reviewed ultrasound Doppler from 3/11/2022 those results are as noted in the EMR.    Assessment and plan- Tyloma right fifth fifth toe with underlying ulceration.  Onychomycosis and Tinea Pedis bilaterally.  Diabetes with peripheral Neuropathy.  Diabetes with foot deformities.  Diabetes with callus.  Diagnosis and treatment options discussed with the patient.  All the toenails were debrided 1 through 5 upon consent.  The right fifth MPJ ulcer was sharp debrided through the Dermis with tissue cutter, no anesthesia needed, and local wound care done upon consent today.  I applied Betadine and a  Band-Aid to the fifth toe, these are dispensed and use discussed with him. Right fourth toenail bled upon debridement, applied Betadine and Band-Aid upon consent and use discussed with him.  Prescription for urea cream given and use discussed with him, continue Diabetic shoes and insoles, return to clinic and see me in 1 month.  Previous notes reviewed.                        Moderate to high level of medical decision making.

## 2023-07-13 DIAGNOSIS — Z79.4 TYPE 2 DIABETES MELLITUS WITH BOTH EYES AFFECTED BY MILD NONPROLIFERATIVE RETINOPATHY AND MACULAR EDEMA, WITH LONG-TERM CURRENT USE OF INSULIN (H): ICD-10-CM

## 2023-07-13 DIAGNOSIS — Z79.4 TYPE 2 DIABETES MELLITUS WITH DIABETIC NEPHROPATHY, WITH LONG-TERM CURRENT USE OF INSULIN (H): ICD-10-CM

## 2023-07-13 DIAGNOSIS — E11.3213 TYPE 2 DIABETES MELLITUS WITH BOTH EYES AFFECTED BY MILD NONPROLIFERATIVE RETINOPATHY AND MACULAR EDEMA, WITH LONG-TERM CURRENT USE OF INSULIN (H): ICD-10-CM

## 2023-07-13 DIAGNOSIS — K21.9 GASTROESOPHAGEAL REFLUX DISEASE WITHOUT ESOPHAGITIS: ICD-10-CM

## 2023-07-13 DIAGNOSIS — E11.21 TYPE 2 DIABETES MELLITUS WITH DIABETIC NEPHROPATHY, WITH LONG-TERM CURRENT USE OF INSULIN (H): ICD-10-CM

## 2023-07-13 RX ORDER — SEMAGLUTIDE 1.34 MG/ML
INJECTION, SOLUTION SUBCUTANEOUS
Qty: 3 ML | Refills: 1 | Status: SHIPPED | OUTPATIENT
Start: 2023-07-13 | End: 2023-09-29

## 2023-07-13 NOTE — TELEPHONE ENCOUNTER
OZEMPIC 4 MG/3 ML (1 MG/DOSE)      Last Written Prescription Date:  6-6-23  Last Fill Quantity: 3 ml,   # refills: 0  Last Office Visit : 6-6-23  Future Office visit:  9-19-23    Routing refill request to provider for review/approval because:  Last rx limited quantity/0rf  Abn Cr

## 2023-07-14 ENCOUNTER — TELEPHONE (OUTPATIENT)
Dept: NEPHROLOGY | Facility: CLINIC | Age: 73
End: 2023-07-14
Payer: COMMERCIAL

## 2023-07-14 RX ORDER — LOSARTAN POTASSIUM 100 MG/1
TABLET ORAL
Qty: 90 TABLET | Refills: 0 | Status: SHIPPED | OUTPATIENT
Start: 2023-07-14 | End: 2023-09-28

## 2023-07-17 RX ORDER — FAMOTIDINE 20 MG/1
20 TABLET, FILM COATED ORAL 2 TIMES DAILY
Qty: 180 TABLET | Refills: 2 | Status: SHIPPED | OUTPATIENT
Start: 2023-07-17 | End: 2023-09-29

## 2023-07-17 NOTE — LETTER
11/6/2019     RE: Earle Rene  [address]  [place] [address]     Dear Colleague,    Thank you for referring your patient, Earle Rene, to the [facility] ENDOCRINOLOGY at [facility]. Please see a copy of my visit note below.    HPI   Earle Rene is a 69 year old male with type 2 diabetes mellitus here today for a follow up visit.      His wife is present.  Pt's diabetes is complicated by retinopathy, nephropathy, neuropathy and ED.  Pt also has hx of hyperlipidemia and HTN.  For his diabetes, he is prescribed to take Tresiba 60 units SQ at hs, Novolog 14 units with meals and Metformin 1000 mg BID. He has no interest in carb counting or using an I/C ratio.  I gave him a RX for the Freestyle Lora device/sensor last visit. He enjoys using the Freestyle sensor and has been checking his blood sugar more frequent.  His A1C was 10 % on 9/18/2019. His previous A1C values were 8.4 % and 7.4 %.  We downloaded his Freestyle device today.  He ate a salad ad had beans for dinner last night and his FBS is 133 this am.  He did not eat after his dinner last pm.  He did taking his Tresiba 60 units subcutaneous.  Pt ate oatmeal with almond mild this am and took 14 units Novolog and his blood sugar is now 169 1 hr postbreakfast.  He continues to miss taking insulin at noon/lunch.  His downloaded showed an average glucose of 236 with SD 71.  No hypoglycemia.  On ROS today, chronic blurred vision and he will be seeing Oph on 11/20 and 12/4 here.   Pt has numbness in both feet from his neuropathy.  Pt denies frequent headaches, n/v, SOB at rest, chest pain, abd pain, diarrhea, dysuria or hematuria.  No foot ulcers .    ROS   Please see under HPI.     ALLERGIES:  Review of patient's allergies indicates no known allergies.    Current Outpatient Medications   Medication Sig Dispense Refill     albuterol (VENTOLIN HFA) 108 (90 Base) MCG/ACT inhaler Inhale 2 puffs into the lungs every 6  "hours 18 g 3     ARTIFICIAL TEAR OP Apply to eye as needed       aspirin 81 MG tablet Take 1 tablet by mouth daily.       atorvastatin (LIPITOR) 20 MG tablet Take 1 tablet (20 mg) by mouth daily 90 tablet 3     blood glucose (NO BRAND SPECIFIED) lancets standard Lancets that go with device, Test 3 times daily 300 each 3     blood glucose monitoring (NO BRAND SPECIFIED) meter device kit Any meter covered by insurance, not store brand, use as directed. 1 kit 0     blood glucose monitoring (NO BRAND SPECIFIED) test strip Strips that go with meter, covered by insurance. Test 3 times daily 300 strip 3     Blood Pressure Monitor KIT Automatic Blood Pressure Monitor 1 kit 0     clobetasol (TEMOVATE) 0.05 % ointment Apply topically to legs twice daily for 2 weeks.  Then discontinue 30 g 0     clotrimazole (LOTRIMIN) 1 % cream Apply topically 2 times daily 30 g 3     Continuous Blood Gluc  (FREESTYLE PETER READER) JUANCARLOS 1 Device daily 1 Device 0     Continuous Blood Gluc Sensor (FREESTYLE PETER 14 DAY SENSOR) MISC 1 applicator every 14 days 8 each 3     erythromycin (ROMYCIN) 5 MG/GM ophthalmic ointment Place 0.5 inches Into the left eye 3 times daily 1 g 0     fenofibrate 54 MG tablet Take 1 tablet (54 mg) by mouth daily 90 tablet 0     finasteride (PROSCAR) 5 MG tablet Take 1 tablet (5 mg) by mouth daily 90 tablet 2     fluocinonide (LIDEX) 0.05 % external cream APPLY TO AFFECTED AREA TWICE A DAY  3     gabapentin (NEURONTIN) 100 MG capsule Take 1 capsule (100 mg) by mouth 3 times daily 90 capsule 3     gabapentin (NEURONTIN) 300 MG capsule Take 1 capsule (300 mg) by mouth 2 times daily 120 capsule 1     insulin degludec (TRESIBA FLEXTOUCH) 100 UNIT/ML pen Inject 60 units subcutaneous at bedtime. 60 mL 3     insulin pen needle (B-D U/F) 31G X 5 MM Use 4 times per day.  Please dispense as BD Pen Needle Mini U/F 31G x 5  each 3     insulin syringe 31G X 5/16\" 0.5 ML MISC Use three syringes daily 270 each 1     " ketamine 5% gabapentin 8% lidocaine 2.5% topical PLO cream Apply 1 g topically 3 times daily 30 g 3     ketorolac (ACULAR) 0.5 % ophthalmic solution Place 1 drop Into the left eye 4 times daily 1 Bottle 1     ketorolac (ACULAR) 0.5 % ophthalmic solution 1 drop in surgical eye as directed - start 1 week after surgery, 4x daily until follow-up visit 1 Bottle 1     loratadine (CLARITIN) 10 MG tablet Take 1 tablet (10 mg) by mouth daily 90 tablet 0     losartan (COZAAR) 100 MG tablet Take 1 tablet (100 mg) by mouth At Bedtime 30 tablet 11     losartan (COZAAR) 25 MG tablet Take 1 tablet (25 mg) by mouth daily With a 50mg tablet for total dose of 75mg daily 30 tablet 11     losartan (COZAAR) 50 MG tablet Take 1 tablet (50 mg) by mouth daily With 25mg tablet for a total dose of 75mg daily 30 tablet 11     metFORMIN (GLUCOPHAGE) 1000 MG tablet 1 tab twice daily with meals 180 tablet 3     moxifloxacin (VIGAMOX) 0.5 % ophthalmic solution Place 1 drop Into the left eye every hour (while awake) 1 Bottle 3     mupirocin (BACTROBAN) 2 % cream Apply  topically. In very small amounts only as needed 15 g 1     neomycin-polymyxin-dexamethasone (MAXITROL) 3.5-24571-1.1 ophthalmic ointment Place 0.5 inches Into the left eye At Bedtime 1 Tube 0     NOVOLOG FLEXPEN 100 UNIT/ML soln Inject 12-14 units with meals, plus correction. Pt uses approx 65 units in 24 hrs. 60 mL 3     ofloxacin (OCUFLOX) 0.3 % ophthalmic solution 1 drop 4x daily in the surgical eye for 1 week after surgery, then stop 1 Bottle 0     Omega-3 Fatty Acids (OMEGA-3 FISH OIL) 1000 MG CAPS Take 1 capsule (1 g) by mouth 2 times daily 60 capsule 11     ONETOUCH ULTRA test strip Use to test blood sugar 3 times daily 300 strip 3     prednisoLONE acetate (PRED FORTE) 1 % ophthalmic suspension Place 1 drop Into the left eye 6 times daily 1 Bottle 3     prednisoLONE acetate (PRED FORTE) 1 % ophthalmic suspension Place 1 drop Into the left eye every hour (while awake) 1 Bottle  3     prednisoLONE acetate (PRED FORTE) 1 % ophthalmic suspension 1 drop in surgical eye as directed, 4x daily after surgery until follow-up 1 Bottle 1     ranitidine (ZANTAC) 300 MG tablet Take 1 tablet (300 mg) by mouth At Bedtime 90 tablet 3     senna-docusate (SENOKOT-S/PERICOLACE) 8.6-50 MG tablet Take 1 tablet by mouth       sodium bicarbonate 650 MG tablet Take 1 tablet (650 mg) by mouth 3 times daily 90 tablet 11     tamsulosin (FLOMAX) 0.4 MG capsule Take 1 capsule (0.4 mg) by mouth daily 90 capsule 2     triamcinolone (KENALOG) 0.1 % cream Apply topically 3 times daily 80 g 0     vitamin D3 (CHOLECALCIFEROL) 1000 units (25 mcg) tablet Take 2 tablets (2,000 Units) by mouth daily 30 tablet 11     vitamin D3 (CHOLECALCIFEROL) 2000 units (50 mcg) tablet Take 1 tablet (2,000 Units) by mouth daily 90 tablet 3     Family Hx   No change.     Personal Hx   Smoke: none.   ETOH: none.    with grown children.   He owns his own business.    PMH   1. Type 2 Diabetes Mellitus dx at age 43.   2. Neuropathy.  3. Nephropathy.   4. ED.   5. Dyslipidemia.   6. Nephrolithiasis.   7. Decrease auditory acuity.   8. Sarcoidosis-lung.   9. Goiter.   10. S/P T & A.   11. S/P FX right heel.   12. Vit D def.   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   14. CT chest- ? granulomas.   15. Retinopathy.  16. Goiter.  Past Medical History:   Diagnosis Date     Blepharitis of both eyes      BPH (benign prostatic hyperplasia)      Diabetes (H)      Diabetic neuropathy (H)      Diabetic retinopathy associated with diabetes mellitus due to underlying condition (H)      Dry eye syndrome      Goiter      Granulomatous disease (H)      Nonsenile cataract      Peripheral neuropathy      Past Surgical History:   Procedure Laterality Date     AS RAD RESEC TONSIL/PILLARS Bilateral 1961     CATARACT IOL, RT/LT Left      COLONOSCOPY  7/29/2013    Procedure: COLONOSCOPY;;  Surgeon: Montana Pascal MD;  Location: [facility]     PHACOEMULSIFICATION WITH  "STANDARD INTRAOCULAR LENS IMPLANT Left 6/21/2019    Procedure: Left Eye Cataract Removal with Intraocular Lens Implant with Intraoperative Avastin Injection;  Surgeon: Lacey Eugene MD;  Location: [facility]  11/2017     SURGICAL PATHOLOGY EXAM       Physical Exam   General appearance: Vital signs:   /81   Pulse 77   Ht 1.727 m (5' 8\")   Wt 93.3 kg (205 lb 11.2 oz)   BMI 31.28 kg/m     Estimated body mass index is 31.28 kg/m  as calculated from the following:    Height as of this encounter: 1.727 m (5' 8\").    Weight as of this encounter: 93.3 kg (205 lb 11.2 oz).  Head:  Normal.   Eyes: Decrease visual acuity; fundi not visualized.   Lungs: clear bilateral.  Cardiovascular system:  RRR.   Abdomen:  nontender.  Musculoskeletal system: no edema.   Neurological:  normal.   Skin:  necrobiosis lipoidica on both legs.   FEET: no ulcers. Nails are thick.  Abnormal monofilamentous exam.    Results   Creatinine   Date Value Ref Range Status   06/26/2019 1.31 (H) 0.66 - 1.25 mg/dL Final     GFR Estimate   Date Value Ref Range Status   06/26/2019 55 (L) >60 mL/min/[1.73_m2] Final     Comment:     Non  GFR Calc  Starting 12/18/2018, serum creatinine based estimated GFR (eGFR) will be   calculated using the Chronic Kidney Disease Epidemiology Collaboration   (CKD-EPI) equation.       Hemoglobin A1C   Date Value Ref Range Status   06/18/2019 9.2 (H) 0 - 5.6 % Final     Comment:     Normal <5.7% Prediabetes 5.7-6.4%  Diabetes 6.5% or higher - adopted from ADA   consensus guidelines.       Potassium   Date Value Ref Range Status   06/26/2019 4.3 3.4 - 5.3 mmol/L Final     ALT   Date Value Ref Range Status   06/18/2019 38 0 - 70 U/L Final     AST   Date Value Ref Range Status   06/18/2019 24 0 - 45 U/L Final     TSH   Date Value Ref Range Status   11/01/2017 1.12 0.40 - 4.00 mU/L Final     T4 Free   Date Value Ref Range Status   10/21/2014 1.00 0.76 - 1.46 ng/dL Final     Comment:     " Effective 7/30/2014, the reference range for this assay has changed to reflect   new instrumentation/methodology.           Cholesterol   Date Value Ref Range Status   06/18/2019 145 <200 mg/dL Final   03/06/2018 101 <200 mg/dL Final     HDL Cholesterol   Date Value Ref Range Status   06/18/2019 38 (L) >39 mg/dL Final   03/06/2018 32 (L) >39 mg/dL Final     LDL Cholesterol Calculated   Date Value Ref Range Status   06/18/2019 49 <100 mg/dL Final     Comment:     Desirable:       <100 mg/dl   03/06/2018 36 <100 mg/dL Final     Comment:     Desirable:       <100 mg/dl     Triglycerides   Date Value Ref Range Status   06/18/2019 289 (H) <150 mg/dL Final     Comment:     Borderline high:  150-199 mg/dl  High:             200-499 mg/dl  Very high:       >499 mg/dl     03/06/2018 166 (H) <150 mg/dL Final     Comment:     Borderline high:  150-199 mg/dl  High:             200-499 mg/dl  Very high:       >499 mg/dl       Cholesterol/HDL Ratio   Date Value Ref Range Status   09/09/2014 3.8 0.0 - 5.0 Final   11/20/2013 5.8 (H) 0.0 - 5.0 Final       A1C     10.0  9/18/2019  A1C      9.2   6/18/2019  A1C      8.4   12/21/2018  A1C      7.4   9/7/2018  A1C      7.1   5/31/2018  A1C      9.1   3/6/2018  A1C      9.6   11/1/2017  A1C      8.9   8/9/2017  A1C      9.7   9/22/2016  A1C      9.7   7/21/2015  A1C     10.2  12/2/2014  A1C     10.2  9/9/2014  A1C      9.8  11/20/2013  A1C      9.3   6/12/2013  A1C      8.0   8/14/2012  A1C      8.2   5/22/2012  A1C      8.0   5/22/2012    ASSESSMENT/PLAN:   1. TYPE 2 DIABETES MELLITUS: Uncontrolled type 2 diabetes mellitus complicated by retinopathy, nephropathy, neuropathy and ED.  I asked him to be sure and take Novolog with ALL meals and if he does not eat a meal and his blood sugar is > 150 to take 1-4 units Novolog for correction.  No change in insulin doses.  Instructed him to check his blood sugar fasting each am, prelunch, predinner and at bedtime DAILY.  He is to return to  clinic with his Freestyle device in 4 weeks to see me.  He is to remain on Metformin 1000 mg BID.  May consider adding Jardiance next visit.  Pt's most recent creat was 1.31 with GFR 55 mL/min on 6/26/2019.  Encouraged patient to make healthy food choices, reduce his food portions with meals, avoid snacking and walk daily and to take his insulin and medications as prescribed.  Pt remains on daily ASA.   Pt had the flu vaccine this season.    2. GOITER: Nontender goiter.  TSH normal in Nov 2017.    3. NEPHROPATHY: Discussed the need for good glycemic control and good BP control.   Pt's creat/GFR as above.   Pt's urine protein +.  He is taking Cozaar.  He is seen here by Nephrology.    4.  RETINOPATHY: Pt seen by Oph here on 10/28/2019. 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    5.  NEUROPATHY: Continue Gabapentin.  No foot ulcers.    6. DYSLIPIDEMIA: LDL 36 on 3/6/2018.   Pt is taking Lipitor and Fenofibrate.    7. OBESITY: See under # 1 above.  He does not want to use a GLP-1.    8.   Return to Endocrine Clinic to see me in 4 weeks.    Again, thank you for allowing me to participate in the care of your patient.      Sincerely,    Paemla Laura PA-C       2

## 2023-08-08 ENCOUNTER — TELEPHONE (OUTPATIENT)
Dept: ENDOCRINOLOGY | Facility: CLINIC | Age: 73
End: 2023-08-08
Payer: COMMERCIAL

## 2023-08-08 NOTE — TELEPHONE ENCOUNTER
LVM with review and recommendation from Pamela Laura. Reminded Pt to keep upcoming visit.   Lorena Bravo, RN on 8/10/2023 at 8:11 AM             Medication Question  (Newest Message First)  View All Conversations on this Encounter   Pamela Laura PA-C  You 17 hours ago (2:09 PM)     SD  If he is not feeling well on Ozempic, then  he should discontinue Ozempic and notify me if his blood sugars go high.   Karla          RE    [facility]    Phone Message    May a detailed message be left on voicemail: yes     Reason for Call: Medication Question or concern regarding medication   Prescription Clarification    Name of Medication:     OZEMPIC, 1 MG/DOSE, 4 MG/3ML pen     Prescribing Provider: Pamela Laura      Pharmacy: [id number] - [place], [place] - [address]      What on the order needs clarification? Per Patient states when he takes the medication he gets severe diarrhea and a lot of gas. Patient states when he had searched it on google a lot of people are having the same issues. Patient states he is concerned about himself and what he should do. Patient states he has not used it this week and is feeling better. Patient states he is not wanting to use the medication anymore and wanting to know what he should do. Patient is wanting to get a call back. Please advise.       Action Taken: Message routed to:  [facility] End[facility]    Travel Screening: Not Applicable

## 2023-08-10 ENCOUNTER — OFFICE VISIT (OUTPATIENT)
Dept: UROLOGY | Facility: CLINIC | Age: 73
End: 2023-08-10
Payer: COMMERCIAL

## 2023-08-10 ENCOUNTER — TELEPHONE (OUTPATIENT)
Dept: ENDOCRINOLOGY | Facility: CLINIC | Age: 73
End: 2023-08-10

## 2023-08-10 VITALS
SYSTOLIC BLOOD PRESSURE: 111 MMHG | HEART RATE: 78 BPM | HEIGHT: 68 IN | WEIGHT: 192 LBS | RESPIRATION RATE: 18 BRPM | DIASTOLIC BLOOD PRESSURE: 75 MMHG | BODY MASS INDEX: 29.1 KG/M2 | OXYGEN SATURATION: 98 %

## 2023-08-10 DIAGNOSIS — R30.0 DYSURIA: ICD-10-CM

## 2023-08-10 DIAGNOSIS — R39.15 URINARY URGENCY: ICD-10-CM

## 2023-08-10 DIAGNOSIS — N40.1 BENIGN NON-NODULAR PROSTATIC HYPERPLASIA WITH LOWER URINARY TRACT SYMPTOMS: ICD-10-CM

## 2023-08-10 DIAGNOSIS — C61 MALIGNANT NEOPLASM OF PROSTATE (H): Primary | ICD-10-CM

## 2023-08-10 LAB
ALBUMIN UR-MCNC: 50 MG/DL
APPEARANCE UR: CLEAR
BILIRUB UR QL STRIP: NEGATIVE
COLOR UR AUTO: ABNORMAL
GLUCOSE UR STRIP-MCNC: >=1000 MG/DL
HGB UR QL STRIP: NEGATIVE
HYALINE CASTS: 1 /LPF
KETONES UR STRIP-MCNC: NEGATIVE MG/DL
LEUKOCYTE ESTERASE UR QL STRIP: NEGATIVE
MUCOUS THREADS #/AREA URNS LPF: PRESENT /LPF
NITRATE UR QL: NEGATIVE
PH UR STRIP: 5.5 [PH] (ref 5–7)
RBC URINE: <1 /HPF
SP GR UR STRIP: 1.02 (ref 1–1.03)
SQUAMOUS EPITHELIAL: <1 /HPF
UROBILINOGEN UR STRIP-MCNC: NORMAL MG/DL
WBC URINE: <1 /HPF

## 2023-08-10 PROCEDURE — 99213 OFFICE O/P EST LOW 20 MIN: CPT | Mod: 25 | Performed by: PHYSICIAN ASSISTANT

## 2023-08-10 PROCEDURE — 51798 US URINE CAPACITY MEASURE: CPT | Performed by: PHYSICIAN ASSISTANT

## 2023-08-10 PROCEDURE — 81001 URINALYSIS AUTO W/SCOPE: CPT | Performed by: PATHOLOGY

## 2023-08-10 RX ORDER — TAMSULOSIN HYDROCHLORIDE 0.4 MG/1
0.8 CAPSULE ORAL DAILY
Qty: 180 CAPSULE | Refills: 5 | Status: SHIPPED | OUTPATIENT
Start: 2023-08-10 | End: 2024-08-21

## 2023-08-10 ASSESSMENT — PAIN SCALES - GENERAL: PAINLEVEL: NO PAIN (0)

## 2023-08-10 NOTE — LETTER
August 11, 2023      Earle Rene  [address] [place] [address]        Dear ,    We are writing to inform you of your test results.  Your urinalysis is within normal limits.  There is no blood in your urine and there are no concerns for infection.      You do have glucose (sugar) in your urine.  This is caused by your diabetes and your diabetes medications.  You also have some protein in your urine, which may suggest some mild kidney disease.  Feel free to discuss this with your primary care provider at your next appointment, but reassuringly the amount of protein looks to be less that the amount seen in previous samples.  I do not think there is cause for alarm.       Resulted Orders   Routine UA with micro reflex to culture   Result Value Ref Range    Color Urine Straw Colorless, Straw, Light Yellow, Yellow    Appearance Urine Clear Clear    Glucose Urine >=1000 (A) Negative mg/dL    Bilirubin Urine Negative Negative    Ketones Urine Negative Negative mg/dL    Specific Gravity Urine 1.023 1.003 - 1.035    Blood Urine Negative Negative    pH Urine 5.5 5.0 - 7.0    Protein Albumin Urine 50 (A) Negative mg/dL    Urobilinogen Urine Normal Normal, 2.0 mg/dL    Nitrite Urine Negative Negative    Leukocyte Esterase Urine Negative Negative    Mucus Urine Present (A) None Seen /LPF    RBC Urine <1 <=2 /HPF    WBC Urine <1 <=5 /HPF    Squamous Epithelials Urine <1 <=1 /HPF    Hyaline Casts Urine 1 <=2 /LPF    Narrative    Urine Culture not indicated       It was nice seeing you yesterday. If you have any questions or concerns, please call the clinic at the number listed below.        Sincerely,      RIVERA Pineda  [phone]

## 2023-08-10 NOTE — PROGRESS NOTES
HPI: Mr. Earle Rene is a 73 year old year old male presenting today for evaluation of chief complaint(s): Follow Up (I don't know. When I pee the pain level is at a 6)    Today, he is accompanied by his wife    Mr. Rene is a 72M with PMH significant for HTN, HLD, IDDM, neuropathy (on neurontin), copd, gerd, low risk prostate cancer with BPH (on proscar and tamsulosin 0.4mg daily) who seen in Urology on 4/9/21 by Dr. Bland.  At that time, PSA was 2.90ug/L and sCr was elevated (1.68mg/dL). Dr. Bland recommended follow up in 6-12 months for recheck PSA.     - 2/3/23 - last seen by me with nocturia x 2 with urgency and urge incontinence and pain at the tip of the penis with urination.  He was having new erratic BMs.  His NALLELY showed large prostate with prominence along right apex, no nodules or tenderness. We started fiber and continued tamsulosin, finasteride and myrbetriq 25mg daily.      Today he presents in routine followup.  Wants a prostate exam.  Dad and brother had prostate cancer in age 60's Nocturia x 3-4 but does drink lots of water before bed.    Taking Myrbetriq but not sure it is helping at all  Also has had elevated blood sugars.  Was given Ozempic but it caused diarrhea (Pamela Laura MD) so he stopped.  Will monitor blood sugars and get back with his doctor about what needs to do next.    Urinary symptoms are pretty much the same as they have always been.  Has dysuria.  Quite a bit of straining, slow stream, intermittency, sensation of incomplete emptying.      Sister passed away 2 days ago in [place].  He and his wife are in mourning.     REVIEW OF DIAGNOSTICS:  2/6/23 - PSA 1.73  9/12/22 - PSA 2.11ug/L  4/9/21 - PSA 2.90ug/L  2/27/20 - PSA 1.94  2/9/20 - Prostate MRI - prostate volume 103 grams, PIRADS 2  7/3/19 - Prostate MRI  - prostate volume 86 grams, PIRADS 2  5/28/19 - PSA 3.61  5/24/18 - PSA 2.14  11/1/17 - PSA 2.52  8/3/17 - FINASTERIDE WAS BEGUN  7/27/17 - prostate biopsy (Dr. Cantu)  -  Jameel 6 (3+3) in 1mm involving 1 core, otherwise benign  6/18/17 - Prostate MRI - prostate volume 110 grams, PIRADS 4  6/6/17 - PSA 8.64   1/14/16 - PSA 3.75       Current Outpatient Medications   Medication Sig Dispense Refill    albuterol (VENTOLIN HFA) 108 (90 Base) MCG/ACT inhaler Inhale 2 puffs into the lungs every 6 hours 18 g 3    alpha-lipoic acid 600 MG capsule Take 1 capsule (600 mg) by mouth daily 90 capsule 3    amLODIPine (NORVASC) 5 MG tablet Take 1 tablet (5 mg) by mouth At Bedtime 90 tablet 3    amoxicillin (AMOXIL) 500 MG capsule TAKE 1 CAPSULE ORAL EVERY EIGHT HOURS AS DIRECTED      ARTIFICIAL TEAR OP Apply to eye as needed      aspirin 81 MG tablet Take 1 tablet by mouth At Bedtime       atorvastatin (LIPITOR) 20 MG tablet TAKE 1 TABLET BY MOUTH EVERY DAY 90 tablet 2    blood glucose (NO BRAND SPECIFIED) lancets standard Lancets that go with device, Test 3 times daily 300 each 3    blood glucose monitoring (NO BRAND SPECIFIED) meter device kit Any meter covered by insurance, not store brand, use as directed. 1 kit 0    blood glucose monitoring (NO BRAND SPECIFIED) test strip Strips that go with meter, covered by insurance. Test 3 times daily 300 strip 3    Blood Pressure Monitor KIT Automatic Blood Pressure Monitor 1 kit 0    camphor-menthol (DERMASARRA) 0.5-0.5 % external lotion Apply lotion 2-3 times per day to itchy areas. If not improving in two weeks, follow up with PCP. 222 mL 0    camphor-menthol (DERMASARRA) 0.5-0.5 % external lotion Apply to arms legs torso 1-2 times a day for itching 1 Bottle 11    chlorhexidine (PERIDEX) 0.12 % solution PLEASE SEE ATTACHED FOR DETAILED DIRECTIONS      clobetasol (TEMOVATE) 0.05 % ointment Apply topically to legs twice daily for 2 weeks.  Then discontinue (Patient taking differently: Apply topically to legs twice daily for 2 weeks.  Then discontinue) 30 g 0    clotrimazole (LOTRIMIN) 1 % cream Apply topically 2 times daily 30 g 3    Continuous Blood  Gluc  (mWaterSTYLE PETER 14 DAY READER) JUANCARLOS Use to read blood sugars as per 's instructions. 1 each 0    Continuous Blood Gluc Sensor (FREESTYLE PETER 2 SENSOR) MISC 1 each every 14 days 1 each every 14 days. Change every 14 days. 2 each 5    dextromethorphan-guaiFENesin (MUCINEX DM)  MG 12 hr tablet Take 1 tablet by mouth every 12 hours 30 tablet 0    dextromethorphan-guaiFENesin (TUSSIN DM)  MG/5ML liquid Take 5 mLs by mouth 2 times daily as needed 118 mL 0    diclofenac (VOLTAREN) 1 % topical gel Apply 4 grams to thigh up to 4 times daily for pain 100 g 1    famotidine (PEPCID) 20 MG tablet Take 1 tablet (20 mg) by mouth 2 times daily 180 tablet 2    fenofibrate 54 MG tablet Take 1 tablet (54 mg) by mouth daily (Patient taking differently: Take 54 mg by mouth At Bedtime) 90 tablet 0    finasteride (PROSCAR) 5 MG tablet Take 1 tablet (5 mg) by mouth daily 90 tablet 3    fluocinonide (LIDEX) 0.05 % external cream   3    fluticasone (FLONASE) 50 MCG/ACT nasal spray Spray 1 spray into both nostrils daily 16 g 0    fluticasone (FLOVENT HFA) 110 MCG/ACT inhaler Inhale 1 puff into the lungs 2 times daily 12 g 11    gabapentin (NEURONTIN) 300 MG capsule TAKE 1 CAPSULE BY MOUTH TWICE A DAY 60 capsule 3    gabapentin (NEURONTIN) 300 MG capsule TAKE 1 CAPSULE BY MOUTH TWICE A  capsule 0    HUMALOG KWIKPEN 100 UNIT/ML soln Inject 15-17  units with meals, plus correction. Pt uses approx 65 units in 24 hrs. 60 mL 1    ibuprofen (ADVIL/MOTRIN) 600 MG tablet TAKE 1 TABLET ORAL EVERY SIX HOURS AS DIRECTED      insulin degludec (TRESIBA FLEXTOUCH) 100 UNIT/ML pen INJECT 64 UNITS SUBCUTANEOUSLY AT BEDTIME. 60 mL 3    insulin pen needle (B-D U/F) 31G X 5 MM miscellaneous Use 4 time(s) per day.  Please dispense as BD Pen Needle Mini U/F 31G x 5  each 3    insulin pen needle (B-D U/F) 31G X 5 MM Use 4 times per day.  Please dispense as BD Pen Needle Mini U/F 31G x 5  each 3    insulin  "syringe 31G X 5/16\" 0.5 ML MISC Use three syringes daily 270 each 1    JARDIANCE 10 MG TABS tablet TAKE 1 TABLET (10 MG) BY MOUTH DAILY. 90 tablet 1    ketamine 5% gabapentin 8% lidocaine 2.5% topical PLO cream Apply 1 g topically 3 times daily 30 g 3    loratadine (CLARITIN) 10 MG tablet Take 1 tablet (10 mg) by mouth daily 90 tablet 0    losartan (COZAAR) 100 MG tablet TAKE 1 TABLET BY MOUTH EVERYDAY AT BEDTIME 90 tablet 0    mirabegron (MYRBETRIQ) 25 MG 24 hr tablet Take 1 tablet (25 mg) by mouth daily 90 tablet 3    mupirocin (BACTROBAN) 2 % cream Apply  topically. In very small amounts only as needed 15 g 1    Omega-3 Fatty Acids (OMEGA-3 FISH OIL) 1000 MG CAPS Take 1 capsule (1 g) by mouth 2 times daily 60 capsule 11    ONETOUCH ULTRA test strip Use to test blood sugar 3 times daily 300 strip 3    OZEMPIC, 1 MG/DOSE, 4 MG/3ML pen INJECT 1 MG SUBCUTANEOUS EVERY 7 DAYS 3 mL 1    sodium bicarbonate 650 MG tablet TAKE 1 TABLET (650 MG) BY MOUTH 3 TIMES DAILY 270 tablet 3    tamsulosin (FLOMAX) 0.4 MG capsule Take 1 capsule (0.4 mg) by mouth daily 30 capsule 5    triamcinolone (KENALOG) 0.1 % cream Apply topically 3 times daily 80 g 0    urea (GORMEL) 20 % external cream Apply topically as needed To feet, calluses and toenails. 120 g 5    VITAMIN D3 25 MCG (1000 UT) tablet TAKE 2 TABLETS (50 MCG) BY MOUTH DAILY 180 tablet 3    VITAMIN D3 25 MCG (1000 UT) tablet TAKE 2 TABLETS (50 MCG) BY MOUTH DAILY         ALLERGIES: Patient has no known allergies.      REVIEW OF SYSTEMS:  As above in HPI    GENERAL PHYSICAL EXAM:   Vitals: /75 (BP Location: Right arm, Patient Position: Sitting, Cuff Size: Adult Small)   Pulse 78   Resp 18   Ht 1.727 m (5' 8\")   Wt 87.1 kg (192 lb)   SpO2 98%   BMI 29.19 kg/m    Body mass index is 29.19 kg/m .    GENERAL: Well groomed, well developed, well nourished male in NAD.  NEURO: Alert and oriented x 3.  PSYCH: Normal mood and affect, pleasant and agreeable during interview and " exam.    NALLELY:      normal rectal tone, small soft amount of stool in the rectal vault.      large sized prostate, without tenderness,.  + asymmetry, firm ridge right side apex.      PVR: Residual urine by ultrasound was 124 ml.      RADIOLOGY: The following tests were reviewed:   Narrative & Impression   MRI PROSTATE: 2/9/2020 4:42 PM     CLINICAL HISTORY: prostate cancer; Malignant neoplasm of prostate (H)  . Jameel 6, 3+3 at the right base on prior biopsy.     Most Recent PSA: 3.61 ug/L     Comparison: 7/3/2019.     TECHNIQUE:  The following sequences were obtained: High-resolution axial  T2-weighted, coronal T2-weighted, 3D volumetric T2-weighted, axial  pre-contrast T1, axial diffusion-weighted, axial apparent diffusion  coefficient and axial dynamic contrast-enhanced T1. Postcontrast  images were evaluated on a separate workstation to evaluate dynamic  contrast enhancement. The technique of this exam is PI-RADS v2.1  compliant. Contrast dose: 9 mL Gadavist     FINDINGS:  Size: 103 grams  Hemorrhage: Absent  Peripheral zone: Heterogeneous on T2-weighted images. Regions of  mildly decreased signal on ADC or DWI which are best characterized as  PI-RADS 2 without highly suspicious lesion.  Transition zone: Enlarged with BPH changes. Transition zone nodules  which are circumscribed or mostly encapsulated without diffusion  restriction.  PI-RADS 2.  No highly suspicious nodules. Prominent  median lobe impressing the urinary bladder floor. Extruded BPH nodule  at the right base, 7:00 position.     Neurovascular bundles: No neurovascular bundle involvement by  malignancy.    Seminal vesicles: No seminal vesicle involvement by malignancy.   Lymph nodes: No lymph node involvement   Bones: Heterogeneous bone marrow signal. Degenerative changes of the  spine. Heterogeneous bone enhancement with foci of enhancement in the  bones, for example in the left iliac bone, series 16 image 10 as well  as in the right innominate  bone posteriorly, series 16 image 10,  indeterminate however stable. Enthesopathic changes off the pelvis and  hips. Stable wedge compression deformity of the vertebral body L4,  partially visualized.  Other pelvic organs: Urinary bladder wall thickening and trabeculation  likely due to chronic outlet obstructive changes.                                                                         IMPRESSION:  1. Based on the most suspicious abnormality, this exam is  characterized as PIRADS 2 - Clinically significant cancer is unlikely  to be present.  2. No convincing suspicious adenopathy or evidence of pelvic  metastases.  3. Heterogeneous bone marrow signal and enhancement with scattered  foci of enhancement, indeterminate however stable from 7/3/2019.  Attention on follow-up studies.       LABS: The last test results for Mr. Earle Rene were reviewed:  PSA -   Lab Results   Component Value Date    PSA 1.73 02/06/2023    PSA 2.11 09/12/2022    PSA 2.90 04/09/2021    PSA 1.94 02/27/2020    PSA 3.61 05/28/2019    PSA 2.14 05/24/2018    PSA 2.52 11/01/2017    PSA 8.64 06/06/2017    PSA 3.75 01/14/2016    PSA 2.92 09/09/2014    PSA 2.18 06/12/2013    PSA 1.77 02/02/2012     BMP -   Recent Labs   Lab Test 06/06/23  1126 01/08/23  1758 09/12/22  0941 06/09/22  1333 12/08/21  1428 07/30/21  1005 06/09/21  1524 12/18/19  1218 07/02/19  0947 08/01/18  1222 05/24/18  1414 05/01/18  1233   NA  --  139  --  139 140  --  142   < >  --    < >  --  141   POTASSIUM  --  4.6  --  4.3 4.2  --  4.2   < >  --    < >  --  4.7   CHLORIDE  --  106  --  107 107  --  111*   < >  --    < >  --  110*   CO2  --  20*  --  24 24  --  22   < >  --    < >  --  21   BUN  --  30.1*  --  33* 31*  --  20   < >  --    < >  --  20   CR 1.77* 1.62* 1.72* 2.04* 1.66*   < > 1.45*   < >  --    < >  --  1.28*   GLC  --  100*  --  142* 242*  --  211*   < >  --    < >  --  149*   INDERJIT  --  10.1  --  9.5 10.2*  --  8.8   < >  --    < >  --  9.8   MAG  --   --    --   --   --   --   --   --  1.9  --  2.1 2.1   PHOS  --   --   --  4.0 4.6*  --  2.9   < >  --    < >  --  3.3    < > = values in this interval not displayed.       CBC -   Recent Labs   Lab Test 01/08/23  1758 12/08/21  1428 06/09/21  1524 03/12/21  1324 01/29/20  1315 06/18/19  1054   WBC 5.4  --   --  5.1  --  4.6   HGB 14.4 15.0 13.6 14.1   < > 13.5   *  --   --  156  --  130*    < > = values in this interval not displayed.       ASSESSMENT:   1) prostate cancer - stable PSA  2) urinary urgency  3) abnormal NALLELY - prominence along right apex.  Previous prostate MRi on 2/9/20 suggests there is a BPH nodule in that location.  Although he also had a prostate biopsy in 2017 showing [place] 6 adenocarcinoma involving 5% of 1 core from right base.  PSA was 8.64ug/L at that time, then quickly returned to normal (2.52) a few months later.      PLAN:   - Drink a lot in the daytime.   - Limit fluids before bed  - Stop myrbetriq since it isn't helping.   - Increase tamsulosin to 0.8mg daily (two tablets).   - Continue finasteride   - Urinalysis today  - Postvoid residual today shows incomplete emptying 124cc.  Discussed next step would be an outlet procedure.  He thinks his brother may have had this and was very pleased with results.  He will consider.   - Prostate MRI to evaluate the nodule   - Return 6 months with a PSA first     VISIT DURATION: 21 minutes (4:05 - 4:26 on DOS)    Qian Villalta PA-C  Department of Urologic Surgery

## 2023-08-10 NOTE — PATIENT INSTRUCTIONS
PLAN:   - Drink a lot in the daytime.   - Limit fluids before bed  - Stop myrbetriq since it isn't helping.   - Increase tamsulosin to 0.8mg daily (two tablets).   - Urinalysis today  - Postvoid residual today.  - Continue finasteride and tamsulosin  - Prostate MRI   - Return in 6 months with a PSA first.  We can discuss surgery.     [facility]

## 2023-08-10 NOTE — NURSING NOTE
"Chief Complaint   Patient presents with    Follow Up     I don't know. When I pee the pain level is at a 6       Blood pressure 111/75, pulse 78, resp. rate 18, height 1.727 m (5' 8\"), weight 87.1 kg (192 lb), SpO2 98 %. Body mass index is 29.19 kg/m .    Patient Active Problem List   Diagnosis    Psychological factors associated with another disorder    Mood disorder in conditions classified elsewhere    Occupational problem    Type 2 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, with long-term current use of insulin (H)    Erectile dysfunction    Hyperlipidemia with target LDL less than 100    CTS (carpal tunnel syndrome)    Diabetic polyneuropathy (H)    Presbyopia    Myopia    Cataracts, bilateral    Pain of right thumb    Subcutaneous mass    Combined form of nonsenile cataract of right eye    Colonic polyp    Elevated PSA    Heel fracture    Hyponatremia    Nephrolithiasis    Pain of finger of right hand    Sarcoidosis of lung (H)    Sialoadenitis    Adhesive capsulitis of shoulder    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)    Chronic kidney disease, stage 3 (H)    Peripheral vascular disease, unspecified (H)    Malignant neoplasm of prostate (H)    Chronic bilateral low back pain with bilateral sciatica    Pain of both sacroiliac joints    Lumbar radiculopathy       No Known Allergies    Current Outpatient Medications   Medication Sig Dispense Refill    albuterol (VENTOLIN HFA) 108 (90 Base) MCG/ACT inhaler Inhale 2 puffs into the lungs every 6 hours 18 g 3    alpha-lipoic acid 600 MG capsule Take 1 capsule (600 mg) by mouth daily 90 capsule 3    amLODIPine (NORVASC) 5 MG tablet Take 1 tablet (5 mg) by mouth At Bedtime 90 tablet 3    amoxicillin (AMOXIL) 500 MG capsule TAKE 1 CAPSULE ORAL EVERY EIGHT HOURS AS DIRECTED      ARTIFICIAL TEAR OP Apply to eye as needed      aspirin 81 MG tablet Take 1 " tablet by mouth At Bedtime       atorvastatin (LIPITOR) 20 MG tablet TAKE 1 TABLET BY MOUTH EVERY DAY 90 tablet 2    blood glucose (NO BRAND SPECIFIED) lancets standard Lancets that go with device, Test 3 times daily 300 each 3    blood glucose monitoring (NO BRAND SPECIFIED) meter device kit Any meter covered by insurance, not store brand, use as directed. 1 kit 0    blood glucose monitoring (NO BRAND SPECIFIED) test strip Strips that go with meter, covered by insurance. Test 3 times daily 300 strip 3    Blood Pressure Monitor KIT Automatic Blood Pressure Monitor 1 kit 0    camphor-menthol (DERMASARRA) 0.5-0.5 % external lotion Apply lotion 2-3 times per day to itchy areas. If not improving in two weeks, follow up with PCP. 222 mL 0    camphor-menthol (DERMASARRA) 0.5-0.5 % external lotion Apply to arms legs torso 1-2 times a day for itching 1 Bottle 11    chlorhexidine (PERIDEX) 0.12 % solution PLEASE SEE ATTACHED FOR DETAILED DIRECTIONS      clobetasol (TEMOVATE) 0.05 % ointment Apply topically to legs twice daily for 2 weeks.  Then discontinue (Patient taking differently: Apply topically to legs twice daily for 2 weeks.  Then discontinue) 30 g 0    clotrimazole (LOTRIMIN) 1 % cream Apply topically 2 times daily 30 g 3    Continuous Blood Gluc  (FREESTYLE PETER 14 DAY READER) JUANCARLOS Use to read blood sugars as per 's instructions. 1 each 0    Continuous Blood Gluc Sensor (FREESTYLE PETER 2 SENSOR) MISC 1 each every 14 days 1 each every 14 days. Change every 14 days. 2 each 5    dextromethorphan-guaiFENesin (MUCINEX DM)  MG 12 hr tablet Take 1 tablet by mouth every 12 hours 30 tablet 0    dextromethorphan-guaiFENesin (TUSSIN DM)  MG/5ML liquid Take 5 mLs by mouth 2 times daily as needed 118 mL 0    diclofenac (VOLTAREN) 1 % topical gel Apply 4 grams to thigh up to 4 times daily for pain 100 g 1    famotidine (PEPCID) 20 MG tablet Take 1 tablet (20 mg) by mouth 2 times daily 180 tablet  "2    fenofibrate 54 MG tablet Take 1 tablet (54 mg) by mouth daily (Patient taking differently: Take 54 mg by mouth At Bedtime) 90 tablet 0    finasteride (PROSCAR) 5 MG tablet Take 1 tablet (5 mg) by mouth daily 90 tablet 3    fluocinonide (LIDEX) 0.05 % external cream   3    fluticasone (FLONASE) 50 MCG/ACT nasal spray Spray 1 spray into both nostrils daily 16 g 0    fluticasone (FLOVENT HFA) 110 MCG/ACT inhaler Inhale 1 puff into the lungs 2 times daily 12 g 11    gabapentin (NEURONTIN) 300 MG capsule TAKE 1 CAPSULE BY MOUTH TWICE A DAY 60 capsule 3    gabapentin (NEURONTIN) 300 MG capsule TAKE 1 CAPSULE BY MOUTH TWICE A  capsule 0    HUMALOG KWIKPEN 100 UNIT/ML soln Inject 15-17  units with meals, plus correction. Pt uses approx 65 units in 24 hrs. 60 mL 1    ibuprofen (ADVIL/MOTRIN) 600 MG tablet TAKE 1 TABLET ORAL EVERY SIX HOURS AS DIRECTED      insulin degludec (TRESIBA FLEXTOUCH) 100 UNIT/ML pen INJECT 64 UNITS SUBCUTANEOUSLY AT BEDTIME. 60 mL 3    insulin pen needle (B-D U/F) 31G X 5 MM miscellaneous Use 4 time(s) per day.  Please dispense as BD Pen Needle Mini U/F 31G x 5  each 3    insulin pen needle (B-D U/F) 31G X 5 MM Use 4 times per day.  Please dispense as BD Pen Needle Mini U/F 31G x 5  each 3    insulin syringe 31G X 5/16\" 0.5 ML MISC Use three syringes daily 270 each 1    JARDIANCE 10 MG TABS tablet TAKE 1 TABLET (10 MG) BY MOUTH DAILY. 90 tablet 1    ketamine 5% gabapentin 8% lidocaine 2.5% topical PLO cream Apply 1 g topically 3 times daily 30 g 3    loratadine (CLARITIN) 10 MG tablet Take 1 tablet (10 mg) by mouth daily 90 tablet 0    losartan (COZAAR) 100 MG tablet TAKE 1 TABLET BY MOUTH EVERYDAY AT BEDTIME 90 tablet 0    mirabegron (MYRBETRIQ) 25 MG 24 hr tablet Take 1 tablet (25 mg) by mouth daily 90 tablet 3    mupirocin (BACTROBAN) 2 % cream Apply  topically. In very small amounts only as needed 15 g 1    Omega-3 Fatty Acids (OMEGA-3 FISH OIL) 1000 MG CAPS Take 1 " capsule (1 g) by mouth 2 times daily 60 capsule 11    ONETOUCH ULTRA test strip Use to test blood sugar 3 times daily 300 strip 3    OZEMPIC, 1 MG/DOSE, 4 MG/3ML pen INJECT 1 MG SUBCUTANEOUS EVERY 7 DAYS 3 mL 1    sodium bicarbonate 650 MG tablet TAKE 1 TABLET (650 MG) BY MOUTH 3 TIMES DAILY 270 tablet 3    tamsulosin (FLOMAX) 0.4 MG capsule Take 1 capsule (0.4 mg) by mouth daily 30 capsule 5    triamcinolone (KENALOG) 0.1 % cream Apply topically 3 times daily 80 g 0    urea (GORMEL) 20 % external cream Apply topically as needed To feet, calluses and toenails. 120 g 5    VITAMIN D3 25 MCG (1000 UT) tablet TAKE 2 TABLETS (50 MCG) BY MOUTH DAILY 180 tablet 3    VITAMIN D3 25 MCG (1000 UT) tablet TAKE 2 TABLETS (50 MCG) BY MOUTH DAILY         Social History     Tobacco Use    Smoking status: Never    Smokeless tobacco: Never   Substance Use Topics    Alcohol use: Yes     Comment: occasionaly    Drug use: No       Steve Wagner  8/10/2023  3:56 PM

## 2023-08-10 NOTE — TELEPHONE ENCOUNTER
Spoke w/ Pt: Has stopped taking Ozempic. Asking what he should do about blood sugars over 200.   Ozempic 0.25 and 0.5 dose did not have GI effects that the current dose. Have not been taking glucose results for a while. Confirms understanding to record glucose results 4x daily for the next 3-4 days and report to provider for review.   Lorena Bravo RN on 8/10/2023 at 8:48 AM

## 2023-08-10 NOTE — LETTER
8/10/2023       RE: Earle Rene  [address]  [place] [address]     Dear Colleague,    Thank you for referring your patient, Earle Rene, to the [facility] [place] [facility]. Please see a copy of my visit note below.    HPI: Mr. Earle Rene is a 73 year old year old male presenting today for evaluation of chief complaint(s): Follow Up (I don't know. When I pee the pain level is at a 6)    Today, he is accompanied by his wife    Mr. Rene is a 72M with PMH significant for HTN, HLD, IDDM, neuropathy (on neurontin), copd, gerd, low risk prostate cancer with BPH (on proscar and tamsulosin 0.4mg daily) who seen in Urology on 4/9/21 by Dr. Bland.  At that time, PSA was 2.90ug/L and sCr was elevated (1.68mg/dL). Dr. Bland recommended follow up in 6-12 months for recheck PSA.     - 2/3/23 - last seen by me with nocturia x 2 with urgency and urge incontinence and pain at the tip of the penis with urination.  He was having new erratic BMs.  His NALLELY showed large prostate with prominence along right apex, no nodules or tenderness. We started fiber and continued tamsulosin, finasteride and myrbetriq 25mg daily.      Today he presents in routine followup.  Wants a prostate exam.  Dad and brother had prostate cancer in age 60's Nocturia x 3-4 but does drink lots of water before bed.    Taking Myrbetriq but not sure it is helping at all  Also has had elevated blood sugars.  Was given Ozempic but it caused diarrhea (Pamela Laura MD) so he stopped.  Will monitor blood sugars and get back with his doctor about what needs to do next.    Urinary symptoms are pretty much the same as they have always been.  Has dysuria.  Quite a bit of straining, slow stream, intermittency, sensation of incomplete emptying.      Sister passed away 2 days ago in [place].  He and his wife are in mourning.     REVIEW OF DIAGNOSTICS:  2/6/23 - PSA 1.73  9/12/22 -  PSA 2.11ug/L  4/9/21 - PSA 2.90ug/L  2/27/20 - PSA 1.94  2/9/20 - Prostate MRI - prostate volume 103 grams, PIRADS 2  7/3/19 - Prostate MRI  - prostate volume 86 grams, PIRADS 2  5/28/19 - PSA 3.61  5/24/18 - PSA 2.14  11/1/17 - PSA 2.52  8/3/17 - FINASTERIDE WAS BEGUN  7/27/17 - prostate biopsy (Dr. Cantu)  - [place] 6 (3+3) in 1mm involving 1 core, otherwise benign  6/18/17 - Prostate MRI - prostate volume 110 grams, PIRADS 4  6/6/17 - PSA 8.64   1/14/16 - PSA 3.75       Current Outpatient Medications   Medication Sig Dispense Refill    albuterol (VENTOLIN HFA) 108 (90 Base) MCG/ACT inhaler Inhale 2 puffs into the lungs every 6 hours 18 g 3    alpha-lipoic acid 600 MG capsule Take 1 capsule (600 mg) by mouth daily 90 capsule 3    amLODIPine (NORVASC) 5 MG tablet Take 1 tablet (5 mg) by mouth At Bedtime 90 tablet 3    amoxicillin (AMOXIL) 500 MG capsule TAKE 1 CAPSULE ORAL EVERY EIGHT HOURS AS DIRECTED      ARTIFICIAL TEAR OP Apply to eye as needed      aspirin 81 MG tablet Take 1 tablet by mouth At Bedtime       atorvastatin (LIPITOR) 20 MG tablet TAKE 1 TABLET BY MOUTH EVERY DAY 90 tablet 2    blood glucose (NO BRAND SPECIFIED) lancets standard Lancets that go with device, Test 3 times daily 300 each 3    blood glucose monitoring (NO BRAND SPECIFIED) meter device kit Any meter covered by insurance, not store brand, use as directed. 1 kit 0    blood glucose monitoring (NO BRAND SPECIFIED) test strip Strips that go with meter, covered by insurance. Test 3 times daily 300 strip 3    Blood Pressure Monitor KIT Automatic Blood Pressure Monitor 1 kit 0    camphor-menthol (DERMASARRA) 0.5-0.5 % external lotion Apply lotion 2-3 times per day to itchy areas. If not improving in two weeks, follow up with PCP. 222 mL 0    camphor-menthol (DERMASARRA) 0.5-0.5 % external lotion Apply to arms legs torso 1-2 times a day for itching 1 Bottle 11    chlorhexidine (PERIDEX) 0.12 % solution PLEASE SEE ATTACHED FOR DETAILED  DIRECTIONS      clobetasol (TEMOVATE) 0.05 % ointment Apply topically to legs twice daily for 2 weeks.  Then discontinue (Patient taking differently: Apply topically to legs twice daily for 2 weeks.  Then discontinue) 30 g 0    clotrimazole (LOTRIMIN) 1 % cream Apply topically 2 times daily 30 g 3    Continuous Blood Gluc  (FREESTYLE PETER 14 DAY READER) JUANCARLOS Use to read blood sugars as per 's instructions. 1 each 0    Continuous Blood Gluc Sensor (FREESTYLE PETER 2 SENSOR) MISC 1 each every 14 days 1 each every 14 days. Change every 14 days. 2 each 5    dextromethorphan-guaiFENesin (MUCINEX DM)  MG 12 hr tablet Take 1 tablet by mouth every 12 hours 30 tablet 0    dextromethorphan-guaiFENesin (TUSSIN DM)  MG/5ML liquid Take 5 mLs by mouth 2 times daily as needed 118 mL 0    diclofenac (VOLTAREN) 1 % topical gel Apply 4 grams to thigh up to 4 times daily for pain 100 g 1    famotidine (PEPCID) 20 MG tablet Take 1 tablet (20 mg) by mouth 2 times daily 180 tablet 2    fenofibrate 54 MG tablet Take 1 tablet (54 mg) by mouth daily (Patient taking differently: Take 54 mg by mouth At Bedtime) 90 tablet 0    finasteride (PROSCAR) 5 MG tablet Take 1 tablet (5 mg) by mouth daily 90 tablet 3    fluocinonide (LIDEX) 0.05 % external cream   3    fluticasone (FLONASE) 50 MCG/ACT nasal spray Spray 1 spray into both nostrils daily 16 g 0    fluticasone (FLOVENT HFA) 110 MCG/ACT inhaler Inhale 1 puff into the lungs 2 times daily 12 g 11    gabapentin (NEURONTIN) 300 MG capsule TAKE 1 CAPSULE BY MOUTH TWICE A DAY 60 capsule 3    gabapentin (NEURONTIN) 300 MG capsule TAKE 1 CAPSULE BY MOUTH TWICE A  capsule 0    HUMALOG KWIKPEN 100 UNIT/ML soln Inject 15-17  units with meals, plus correction. Pt uses approx 65 units in 24 hrs. 60 mL 1    ibuprofen (ADVIL/MOTRIN) 600 MG tablet TAKE 1 TABLET ORAL EVERY SIX HOURS AS DIRECTED      insulin degludec (TRESIBA FLEXTOUCH) 100 UNIT/ML pen INJECT 64 UNITS  "SUBCUTANEOUSLY AT BEDTIME. 60 mL 3    insulin pen needle (B-D U/F) 31G X 5 MM miscellaneous Use 4 time(s) per day.  Please dispense as BD Pen Needle Mini U/F 31G x 5  each 3    insulin pen needle (B-D U/F) 31G X 5 MM Use 4 times per day.  Please dispense as BD Pen Needle Mini U/F 31G x 5  each 3    insulin syringe 31G X 5/16\" 0.5 ML MISC Use three syringes daily 270 each 1    JARDIANCE 10 MG TABS tablet TAKE 1 TABLET (10 MG) BY MOUTH DAILY. 90 tablet 1    ketamine 5% gabapentin 8% lidocaine 2.5% topical PLO cream Apply 1 g topically 3 times daily 30 g 3    loratadine (CLARITIN) 10 MG tablet Take 1 tablet (10 mg) by mouth daily 90 tablet 0    losartan (COZAAR) 100 MG tablet TAKE 1 TABLET BY MOUTH EVERYDAY AT BEDTIME 90 tablet 0    mirabegron (MYRBETRIQ) 25 MG 24 hr tablet Take 1 tablet (25 mg) by mouth daily 90 tablet 3    mupirocin (BACTROBAN) 2 % cream Apply  topically. In very small amounts only as needed 15 g 1    Omega-3 Fatty Acids (OMEGA-3 FISH OIL) 1000 MG CAPS Take 1 capsule (1 g) by mouth 2 times daily 60 capsule 11    ONETOUCH ULTRA test strip Use to test blood sugar 3 times daily 300 strip 3    OZEMPIC, 1 MG/DOSE, 4 MG/3ML pen INJECT 1 MG SUBCUTANEOUS EVERY 7 DAYS 3 mL 1    sodium bicarbonate 650 MG tablet TAKE 1 TABLET (650 MG) BY MOUTH 3 TIMES DAILY 270 tablet 3    tamsulosin (FLOMAX) 0.4 MG capsule Take 1 capsule (0.4 mg) by mouth daily 30 capsule 5    triamcinolone (KENALOG) 0.1 % cream Apply topically 3 times daily 80 g 0    urea (GORMEL) 20 % external cream Apply topically as needed To feet, calluses and toenails. 120 g 5    VITAMIN D3 25 MCG (1000 UT) tablet TAKE 2 TABLETS (50 MCG) BY MOUTH DAILY 180 tablet 3    VITAMIN D3 25 MCG (1000 UT) tablet TAKE 2 TABLETS (50 MCG) BY MOUTH DAILY         ALLERGIES: Patient has no known allergies.      REVIEW OF SYSTEMS:  As above in HPI    GENERAL PHYSICAL EXAM:   Vitals: /75 (BP Location: Right arm, Patient Position: Sitting, Cuff Size: " "Adult Small)   Pulse 78   Resp 18   Ht 1.727 m (5' 8\")   Wt 87.1 kg (192 lb)   SpO2 98%   BMI 29.19 kg/m    Body mass index is 29.19 kg/m .    GENERAL: Well groomed, well developed, well nourished male in NAD.  NEURO: Alert and oriented x 3.  PSYCH: Normal mood and affect, pleasant and agreeable during interview and exam.    NALLELY:      normal rectal tone, small soft amount of stool in the rectal vault.      large sized prostate, without tenderness,.  + asymmetry, firm ridge right side apex.      PVR: Residual urine by ultrasound was 124 ml.      RADIOLOGY: The following tests were reviewed:   Narrative & Impression   MRI PROSTATE: 2/9/2020 4:42 PM     CLINICAL HISTORY: prostate cancer; Malignant neoplasm of prostate (H)  . [place] 6, 3+3 at the right base on prior biopsy.     Most Recent PSA: 3.61 ug/L     Comparison: 7/3/2019.     TECHNIQUE:  The following sequences were obtained: High-resolution axial  T2-weighted, coronal T2-weighted, 3D volumetric T2-weighted, axial  pre-contrast T1, axial diffusion-weighted, axial apparent diffusion  coefficient and axial dynamic contrast-enhanced T1. Postcontrast  images were evaluated on a separate workstation to evaluate dynamic  contrast enhancement. The technique of this exam is PI-RADS v2.1  compliant. Contrast dose: 9 mL Gadavist     FINDINGS:  Size: 103 grams  Hemorrhage: Absent  Peripheral zone: Heterogeneous on T2-weighted images. Regions of  mildly decreased signal on ADC or DWI which are best characterized as  PI-RADS 2 without highly suspicious lesion.  Transition zone: Enlarged with BPH changes. Transition zone nodules  which are circumscribed or mostly encapsulated without diffusion  restriction.  PI-RADS 2.  No highly suspicious nodules. Prominent  median lobe impressing the urinary bladder floor. Extruded BPH nodule  at the right base, 7:00 position.     Neurovascular bundles: No neurovascular bundle involvement by  malignancy.    Seminal vesicles: No " seminal vesicle involvement by malignancy.   Lymph nodes: No lymph node involvement   Bones: Heterogeneous bone marrow signal. Degenerative changes of the  spine. Heterogeneous bone enhancement with foci of enhancement in the  bones, for example in the left iliac bone, series 16 image 10 as well  as in the right innominate bone posteriorly, series 16 image 10,  indeterminate however stable. Enthesopathic changes off the pelvis and  hips. Stable wedge compression deformity of the vertebral body L4,  partially visualized.  Other pelvic organs: Urinary bladder wall thickening and trabeculation  likely due to chronic outlet obstructive changes.                                                                         IMPRESSION:  1. Based on the most suspicious abnormality, this exam is  characterized as PIRADS 2 - Clinically significant cancer is unlikely  to be present.  2. No convincing suspicious adenopathy or evidence of pelvic  metastases.  3. Heterogeneous bone marrow signal and enhancement with scattered  foci of enhancement, indeterminate however stable from 7/3/2019.  Attention on follow-up studies.       LABS: The last test results for Mr. Earle Rene were reviewed:  PSA -   Lab Results   Component Value Date    PSA 1.73 02/06/2023    PSA 2.11 09/12/2022    PSA 2.90 04/09/2021    PSA 1.94 02/27/2020    PSA 3.61 05/28/2019    PSA 2.14 05/24/2018    PSA 2.52 11/01/2017    PSA 8.64 06/06/2017    PSA 3.75 01/14/2016    PSA 2.92 09/09/2014    PSA 2.18 06/12/2013    PSA 1.77 02/02/2012     BMP -   Recent Labs   Lab Test 06/06/23  1126 01/08/23  1758 09/12/22  0941 06/09/22  1333 12/08/21  1428 07/30/21  1005 06/09/21  1524 12/18/19  1218 07/02/19  0947 08/01/18  1222 05/24/18  1414 05/01/18  1233   NA  --  139  --  139 140  --  142   < >  --    < >  --  141   POTASSIUM  --  4.6  --  4.3 4.2  --  4.2   < >  --    < >  --  4.7   CHLORIDE  --  106  --  107 107  --  111*   < >  --    < >  --  110*   CO2  --  20*  --   24 24  --  22   < >  --    < >  --  21   BUN  --  30.1*  --  33* 31*  --  20   < >  --    < >  --  20   CR 1.77* 1.62* 1.72* 2.04* 1.66*   < > 1.45*   < >  --    < >  --  1.28*   GLC  --  100*  --  142* 242*  --  211*   < >  --    < >  --  149*   INDERJIT  --  10.1  --  9.5 10.2*  --  8.8   < >  --    < >  --  9.8   MAG  --   --   --   --   --   --   --   --  1.9  --  2.1 2.1   PHOS  --   --   --  4.0 4.6*  --  2.9   < >  --    < >  --  3.3    < > = values in this interval not displayed.       CBC -   Recent Labs   Lab Test 01/08/23  1758 12/08/21  1428 06/09/21  1524 03/12/21  1324 01/29/20  1315 06/18/19  1054   WBC 5.4  --   --  5.1  --  4.6   HGB 14.4 15.0 13.6 14.1   < > 13.5   *  --   --  156  --  130*    < > = values in this interval not displayed.       ASSESSMENT:   1) prostate cancer - stable PSA  2) urinary urgency  3) abnormal NALLELY - prominence along right apex.  Previous prostate MRi on 2/9/20 suggests there is a BPH nodule in that location.  Although he also had a prostate biopsy in 2017 showing Jameel 6 adenocarcinoma involving 5% of 1 core from right base.  PSA was 8.64ug/L at that time, then quickly returned to normal (2.52) a few months later.      PLAN:   - Drink a lot in the daytime.   - Limit fluids before bed  - Stop myrbetriq since it isn't helping.   - Increase tamsulosin to 0.8mg daily (two tablets).   - Continue finasteride   - Urinalysis today  - Postvoid residual today shows incomplete emptying 124cc.  Discussed next step would be an outlet procedure.  He thinks his brother may have had this and was very pleased with results.  He will consider.   - Prostate MRI to evaluate the nodule   - Return 6 months with a PSA first     VISIT DURATION: 21 minutes (4:05 - 4:26 on DOS)    Qian Villalta PA-C  Department of Urologic Surgery

## 2023-08-16 DIAGNOSIS — N18.30 CKD (CHRONIC KIDNEY DISEASE) STAGE 3, GFR 30-59 ML/MIN (H): ICD-10-CM

## 2023-08-16 DIAGNOSIS — E55.9 VITAMIN D DEFICIENCY: ICD-10-CM

## 2023-08-18 DIAGNOSIS — N18.31 STAGE 3A CHRONIC KIDNEY DISEASE (H): Primary | ICD-10-CM

## 2023-08-18 RX ORDER — CHOLECALCIFEROL (VITAMIN D3) 25 MCG
TABLET ORAL
Qty: 180 TABLET | Refills: 3 | Status: SHIPPED | OUTPATIENT
Start: 2023-08-18

## 2023-09-13 ENCOUNTER — ANCILLARY PROCEDURE (OUTPATIENT)
Dept: MRI IMAGING | Facility: CLINIC | Age: 73
End: 2023-09-13
Attending: PHYSICIAN ASSISTANT
Payer: COMMERCIAL

## 2023-09-13 ENCOUNTER — LAB (OUTPATIENT)
Dept: LAB | Facility: CLINIC | Age: 73
End: 2023-09-13
Payer: COMMERCIAL

## 2023-09-13 DIAGNOSIS — N18.31 STAGE 3A CHRONIC KIDNEY DISEASE (H): ICD-10-CM

## 2023-09-13 DIAGNOSIS — C61 MALIGNANT NEOPLASM OF PROSTATE (H): ICD-10-CM

## 2023-09-13 LAB
ALBUMIN MFR UR ELPH: 83.4 MG/DL
ALBUMIN SERPL BCG-MCNC: 4.3 G/DL (ref 3.5–5.2)
ALBUMIN UR-MCNC: 70 MG/DL
ANION GAP SERPL CALCULATED.3IONS-SCNC: 14 MMOL/L (ref 7–15)
APPEARANCE UR: CLEAR
BASOPHILS # BLD AUTO: 0.1 10E3/UL (ref 0–0.2)
BASOPHILS NFR BLD AUTO: 1 %
BILIRUB UR QL STRIP: NEGATIVE
BUN SERPL-MCNC: 37.6 MG/DL (ref 8–23)
CALCIUM SERPL-MCNC: 9.8 MG/DL (ref 8.8–10.2)
CHLORIDE SERPL-SCNC: 103 MMOL/L (ref 98–107)
COLOR UR AUTO: ABNORMAL
CREAT SERPL-MCNC: 1.69 MG/DL (ref 0.67–1.17)
CREAT UR-MCNC: 41.9 MG/DL
DEPRECATED HCO3 PLAS-SCNC: 18 MMOL/L (ref 22–29)
EGFRCR SERPLBLD CKD-EPI 2021: 42 ML/MIN/1.73M2
EOSINOPHIL # BLD AUTO: 0.4 10E3/UL (ref 0–0.7)
EOSINOPHIL NFR BLD AUTO: 6 %
ERYTHROCYTE [DISTWIDTH] IN BLOOD BY AUTOMATED COUNT: 12.7 % (ref 10–15)
GLUCOSE SERPL-MCNC: 276 MG/DL (ref 70–99)
GLUCOSE UR STRIP-MCNC: >=1000 MG/DL
HCT VFR BLD AUTO: 41.5 % (ref 40–53)
HGB BLD-MCNC: 15.2 G/DL (ref 13.3–17.7)
HGB UR QL STRIP: NEGATIVE
IMM GRANULOCYTES # BLD: 0 10E3/UL
IMM GRANULOCYTES NFR BLD: 0 %
KETONES UR STRIP-MCNC: NEGATIVE MG/DL
LEUKOCYTE ESTERASE UR QL STRIP: NEGATIVE
LYMPHOCYTES # BLD AUTO: 3.1 10E3/UL (ref 0.8–5.3)
LYMPHOCYTES NFR BLD AUTO: 48 %
MCH RBC QN AUTO: 33.2 PG (ref 26.5–33)
MCHC RBC AUTO-ENTMCNC: 36.6 G/DL (ref 31.5–36.5)
MCV RBC AUTO: 91 FL (ref 78–100)
MONOCYTES # BLD AUTO: 0.5 10E3/UL (ref 0–1.3)
MONOCYTES NFR BLD AUTO: 8 %
MUCOUS THREADS #/AREA URNS LPF: PRESENT /LPF
NEUTROPHILS # BLD AUTO: 2.3 10E3/UL (ref 1.6–8.3)
NEUTROPHILS NFR BLD AUTO: 37 %
NITRATE UR QL: NEGATIVE
NRBC # BLD AUTO: 0 10E3/UL
NRBC BLD AUTO-RTO: 0 /100
PH UR STRIP: 5.5 [PH] (ref 5–7)
PHOSPHATE SERPL-MCNC: 4.3 MG/DL (ref 2.5–4.5)
PLATELET # BLD AUTO: 150 10E3/UL (ref 150–450)
POTASSIUM SERPL-SCNC: 4.5 MMOL/L (ref 3.4–5.3)
PROT/CREAT 24H UR: 1.99 MG/MG CR (ref 0–0.2)
RBC # BLD AUTO: 4.58 10E6/UL (ref 4.4–5.9)
RBC URINE: <1 /HPF
SODIUM SERPL-SCNC: 135 MMOL/L (ref 136–145)
SP GR UR STRIP: 1.02 (ref 1–1.03)
SQUAMOUS EPITHELIAL: <1 /HPF
UROBILINOGEN UR STRIP-MCNC: NORMAL MG/DL
WBC # BLD AUTO: 6.4 10E3/UL (ref 4–11)
WBC URINE: 0 /HPF

## 2023-09-13 PROCEDURE — 99000 SPECIMEN HANDLING OFFICE-LAB: CPT | Performed by: PATHOLOGY

## 2023-09-13 PROCEDURE — 36415 COLL VENOUS BLD VENIPUNCTURE: CPT | Performed by: PATHOLOGY

## 2023-09-13 PROCEDURE — 72197 MRI PELVIS W/O & W/DYE: CPT | Performed by: STUDENT IN AN ORGANIZED HEALTH CARE EDUCATION/TRAINING PROGRAM

## 2023-09-13 PROCEDURE — 81001 URINALYSIS AUTO W/SCOPE: CPT | Performed by: PATHOLOGY

## 2023-09-13 PROCEDURE — 85025 COMPLETE CBC W/AUTO DIFF WBC: CPT | Performed by: PATHOLOGY

## 2023-09-13 PROCEDURE — A9585 GADOBUTROL INJECTION: HCPCS | Mod: JZ | Performed by: STUDENT IN AN ORGANIZED HEALTH CARE EDUCATION/TRAINING PROGRAM

## 2023-09-13 PROCEDURE — 84156 ASSAY OF PROTEIN URINE: CPT | Performed by: PATHOLOGY

## 2023-09-13 PROCEDURE — 82570 ASSAY OF URINE CREATININE: CPT

## 2023-09-13 PROCEDURE — 80069 RENAL FUNCTION PANEL: CPT | Performed by: PATHOLOGY

## 2023-09-13 RX ORDER — GADOBUTROL 604.72 MG/ML
10 INJECTION INTRAVENOUS ONCE
Status: COMPLETED | OUTPATIENT
Start: 2023-09-13 | End: 2023-09-13

## 2023-09-13 RX ADMIN — GADOBUTROL 8.5 ML: 604.72 INJECTION INTRAVENOUS at 17:47

## 2023-09-13 NOTE — DISCHARGE INSTRUCTIONS
MRI Contrast Discharge Instructions    The IV contrast you received today will pass out of your body in your  urine. This will happen in the next 24 hours. You will not feel this process.  Your urine will not change color.    Drink at least 4 extra glasses of water or juice today (unless your doctor  has restricted your fluids). This reduces the stress on your kidneys.  You may take your regular medicines.    If you are on dialysis: It is best to have dialysis today.    If you have a reaction: Most reactions happen right away. If you have  any new symptoms after leaving the hospital (such as hives or swelling),  call your hospital at the correct number below. Or call your family doctor.  If you have breathing distress or wheezing, call 911.    Special instructions: ***    I have read and understand the above information.    Signature:______________________________________ Date:___________    Staff:__________________________________________ Date:___________     Time:__________    [facility] Departments:    ___[place]: [phone]  ___[facility]: [phone]  ___[place]: [phone] ___[facility]: [phone]  ___[facility]: [phone]  ___[facility]: [phone]  ___[phone]  ___[facility]: [phone]  ___[phone]

## 2023-09-14 LAB
CREAT UR-MCNC: 41.2 MG/DL
MICROALBUMIN UR-MCNC: 616 MG/L
MICROALBUMIN/CREAT UR: 1495.15 MG/G CR (ref 0–17)

## 2023-09-19 ENCOUNTER — PATIENT OUTREACH (OUTPATIENT)
Dept: UROLOGY | Facility: CLINIC | Age: 73
End: 2023-09-19

## 2023-09-19 NOTE — TELEPHONE ENCOUNTER
Writer reached out to pt at the request of the provider to inform him of his prostate MRI results.     No answer. Writer left a detailed VM with call back name and number.     Will continue to follow as needed.

## 2023-09-22 DIAGNOSIS — E11.3411 SEVERE NONPROLIFERATIVE DIABETIC RETINOPATHY OF RIGHT EYE WITH MACULAR EDEMA ASSOCIATED WITH TYPE 2 DIABETES MELLITUS (H): Primary | ICD-10-CM

## 2023-09-26 DIAGNOSIS — Z79.4 TYPE 2 DIABETES MELLITUS WITH BOTH EYES AFFECTED BY MILD NONPROLIFERATIVE RETINOPATHY AND MACULAR EDEMA, WITH LONG-TERM CURRENT USE OF INSULIN (H): ICD-10-CM

## 2023-09-26 DIAGNOSIS — E11.3213 TYPE 2 DIABETES MELLITUS WITH BOTH EYES AFFECTED BY MILD NONPROLIFERATIVE RETINOPATHY AND MACULAR EDEMA, WITH LONG-TERM CURRENT USE OF INSULIN (H): ICD-10-CM

## 2023-09-26 DIAGNOSIS — Z79.4 TYPE 2 DIABETES MELLITUS WITH DIABETIC NEPHROPATHY, WITH LONG-TERM CURRENT USE OF INSULIN (H): ICD-10-CM

## 2023-09-26 DIAGNOSIS — N18.30 CHRONIC KIDNEY DISEASE, STAGE III (MODERATE) (H): ICD-10-CM

## 2023-09-26 DIAGNOSIS — E11.21 TYPE 2 DIABETES MELLITUS WITH DIABETIC NEPHROPATHY, WITH LONG-TERM CURRENT USE OF INSULIN (H): ICD-10-CM

## 2023-09-26 DIAGNOSIS — I10 BENIGN ESSENTIAL HYPERTENSION: ICD-10-CM

## 2023-09-28 ENCOUNTER — OFFICE VISIT (OUTPATIENT)
Dept: ENDOCRINOLOGY | Facility: CLINIC | Age: 73
End: 2023-09-28
Payer: COMMERCIAL

## 2023-09-28 DIAGNOSIS — E11.49 TYPE II OR UNSPECIFIED TYPE DIABETES MELLITUS WITH NEUROLOGICAL MANIFESTATIONS, NOT STATED AS UNCONTROLLED(250.60) (H): Primary | ICD-10-CM

## 2023-09-28 DIAGNOSIS — Z87.2 HISTORY OF FOOT ULCER: ICD-10-CM

## 2023-09-28 DIAGNOSIS — B35.1 OM (ONYCHOMYCOSIS): ICD-10-CM

## 2023-09-28 DIAGNOSIS — L84 TYPE 2 DIABETES MELLITUS WITH PRESSURE CALLUS (H): ICD-10-CM

## 2023-09-28 DIAGNOSIS — E11.628 TYPE 2 DIABETES MELLITUS WITH PRESSURE CALLUS (H): ICD-10-CM

## 2023-09-28 PROCEDURE — 11057 PARNG/CUTG B9 HYPRKR LES >4: CPT | Performed by: PODIATRIST

## 2023-09-28 PROCEDURE — 99214 OFFICE O/P EST MOD 30 MIN: CPT | Mod: 25 | Performed by: PODIATRIST

## 2023-09-28 PROCEDURE — 11721 DEBRIDE NAIL 6 OR MORE: CPT | Mod: XS | Performed by: PODIATRIST

## 2023-09-28 RX ORDER — LOSARTAN POTASSIUM 100 MG/1
TABLET ORAL
Qty: 90 TABLET | Refills: 0 | Status: SHIPPED | OUTPATIENT
Start: 2023-09-28 | End: 2024-01-17

## 2023-09-28 RX ORDER — AMLODIPINE BESYLATE 5 MG/1
5 TABLET ORAL AT BEDTIME
Qty: 90 TABLET | Refills: 3 | Status: SHIPPED | OUTPATIENT
Start: 2023-09-28 | End: 2024-03-27

## 2023-09-28 NOTE — PROGRESS NOTES
Past Medical History:   Diagnosis Date    Blepharitis of both eyes     BPH (benign prostatic hyperplasia)     Diabetes (H)     Diabetic neuropathy (H)     Diabetic retinopathy associated with diabetes mellitus due to underlying condition (H)     Dry eye syndrome     GERD (gastroesophageal reflux disease)     Goiter     Granulomatous disease (H)     HLD (hyperlipidemia)     HTN (hypertension)     Nonsenile cataract     Peripheral neuropathy      Patient Active Problem List   Diagnosis    Psychological factors associated with another disorder    Mood disorder in conditions classified elsewhere    Occupational problem    Type 2 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, with long-term current use of insulin (H)    Erectile dysfunction    Hyperlipidemia with target LDL less than 100    CTS (carpal tunnel syndrome)    Diabetic polyneuropathy (H)    Presbyopia    Myopia    Cataracts, bilateral    Pain of right thumb    Subcutaneous mass    Combined form of nonsenile cataract of right eye    Colonic polyp    Elevated PSA    Heel fracture    Hyponatremia    Nephrolithiasis    Pain of finger of right hand    Sarcoidosis of lung (H)    Sialoadenitis    Adhesive capsulitis of shoulder    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)    Chronic kidney disease, stage 3 (H)    Peripheral vascular disease, unspecified (H)    Malignant neoplasm of prostate (H)    Chronic bilateral low back pain with bilateral sciatica    Pain of both sacroiliac joints    Lumbar radiculopathy     Past Surgical History:   Procedure Laterality Date    ------------OTHER-------------      back of neck abscess drainage in OR    AS RAD RESEC TONSIL/PILLARS Bilateral 1961    CATARACT IOL, RT/LT Left     COLONOSCOPY  7/29/2013    Procedure: COLONOSCOPY;;  Surgeon: Montana Pascal MD;  Location: [facility]    EXCISE MASS UPPER EXTREMITY Right 11/11/2019     Procedure: EXCISION, MASS, UPPER EXTREMITY, RIGHT SHOULDER;  Surgeon: Johana Choudhury MD;  [facility]    INJECT EPIDURAL LUMBAR Left 4/20/2023    Procedure: INJECTION, SPINE, LUMBAR, EPIDURAL (L4-L5);  Surgeon: Mahamed Vázquez MD;  Location: [facility] OR    INJECT SACROILIAC JOINT Bilateral 9/13/2022    Procedure: Bilateral sacroiliac joint injection;  Surgeon: Collin Mata MD;  Location: [facility] OR    INTRAVITREAL INJECTION CHEMOTHERAPY Right 12/30/2019    Procedure: INTRAVITREAL Bevacizumab injection;  Surgeon: Milton Maki MD;  [facility]    PHACOEMULSIFICATION CLEAR CORNEA WITH STANDARD INTRAOCULAR LENS IMPLANT Right 12/30/2019    Procedure: PHACOEMULSIFICATION, CATARACT, WITH INTRAOCULAR LENS IMPLANT;  Surgeon: Milton Maki MD;  [facility]    PHACOEMULSIFICATION WITH STANDARD INTRAOCULAR LENS IMPLANT Left 6/21/2019    Procedure: Left Eye Cataract Removal with Intraocular Lens Implant with Intraoperative Avastin Injection;  Surgeon: Lacey Eugene MD;  Location: [facility]    siladenatis  11/2017     Social History     Socioeconomic History    Marital status:      Spouse name: Not on file    Number of children: 5    Years of education: Not on file    Highest education level: Not on file   Occupational History    Occupation: private bussiness owner   Tobacco Use    Smoking status: Never    Smokeless tobacco: Never   Substance and Sexual Activity    Alcohol use: Yes     Comment: occasionaly    Drug use: No    Sexual activity: Not on file   Other Topics Concern    Parent/sibling w/ CABG, MI or angioplasty before 65F 55M? Not Asked   Social History Narrative    Not on file     Social Determinants of Health     Financial Resource Strain: Not on file   Food Insecurity: Not on file   Transportation Needs: Not on file   Physical Activity: Not on file   Stress: Not on file   Social Connections: Not on file   Interpersonal Safety: Not on file   Housing Stability: Not on file      Family History   Problem Relation Age of Onset    Diabetes Father     Myocardial Infarction Father     Diabetes Brother     Leukemia Brother 44    Glaucoma No family hx of     Macular Degeneration No family hx of     Kidney Disease No family hx of      Lab Results   Component Value Date    A1C 8.3 11/02/2021    A1C 8.2 06/09/2021    A1C 9.2 06/18/2019    A1C 9.1 03/06/2018    A1C 10.2 06/06/2017    A1C 9.0 03/16/2016     Lab Results   Component Value Date    WBC 6.4 09/13/2023    WBC 5.1 03/12/2021     Lab Results   Component Value Date    RBC 4.58 09/13/2023    RBC 4.31 03/12/2021     Lab Results   Component Value Date    HGB 15.2 09/13/2023    HGB 13.6 06/09/2021     Lab Results   Component Value Date    HCT 41.5 09/13/2023    HCT 41.7 03/12/2021     No components found for: MCT  Lab Results   Component Value Date    MCV 91 09/13/2023    MCV 97 03/12/2021     Lab Results   Component Value Date    MCH 33.2 09/13/2023    MCH 32.7 03/12/2021     Lab Results   Component Value Date    MCHC 36.6 09/13/2023    MCHC 33.8 03/12/2021     Lab Results   Component Value Date    RDW 12.7 09/13/2023    RDW 12.3 03/12/2021     Lab Results   Component Value Date     09/13/2023     03/12/2021     Last Renal Panel:  Sodium   Date Value Ref Range Status   09/13/2023 135 (L) 136 - 145 mmol/L Final   06/09/2021 142 133 - 144 mmol/L Final     Potassium   Date Value Ref Range Status   09/13/2023 4.5 3.4 - 5.3 mmol/L Final   06/09/2022 4.3 3.4 - 5.3 mmol/L Final   06/09/2021 4.2 3.4 - 5.3 mmol/L Final     Chloride   Date Value Ref Range Status   09/13/2023 103 98 - 107 mmol/L Final   06/09/2022 107 94 - 109 mmol/L Final   06/09/2021 111 (H) 94 - 109 mmol/L Final     Carbon Dioxide   Date Value Ref Range Status   06/09/2021 22 20 - 32 mmol/L Final     Carbon Dioxide (CO2)   Date Value Ref Range Status   09/13/2023 18 (L) 22 - 29 mmol/L Final   06/09/2022 24 20 - 32 mmol/L Final     Anion Gap   Date Value Ref Range Status    09/13/2023 14 7 - 15 mmol/L Final   06/09/2022 8 3 - 14 mmol/L Final   06/09/2021 8 3 - 14 mmol/L Final     Glucose   Date Value Ref Range Status   09/13/2023 276 (H) 70 - 99 mg/dL Final   06/09/2022 142 (H) 70 - 99 mg/dL Final   06/09/2021 211 (H) 70 - 99 mg/dL Final     Urea Nitrogen   Date Value Ref Range Status   09/13/2023 37.6 (H) 8.0 - 23.0 mg/dL Final   06/09/2022 33 (H) 7 - 30 mg/dL Final   06/09/2021 20 7 - 30 mg/dL Final     Creatinine   Date Value Ref Range Status   09/13/2023 1.69 (H) 0.67 - 1.17 mg/dL Final   06/09/2021 1.45 (H) 0.66 - 1.25 mg/dL Final     GFR Estimate   Date Value Ref Range Status   09/13/2023 42 (L) >60 mL/min/1.73m2 Final   06/09/2021 48 (L) >60 mL/min/[1.73_m2] Final     Comment:     Non  GFR Calc  Starting 12/18/2018, serum creatinine based estimated GFR (eGFR) will be   calculated using the Chronic Kidney Disease Epidemiology Collaboration   (CKD-EPI) equation.       Calcium   Date Value Ref Range Status   09/13/2023 9.8 8.8 - 10.2 mg/dL Final   06/09/2021 8.8 8.5 - 10.1 mg/dL Final     Phosphorus   Date Value Ref Range Status   09/13/2023 4.3 2.5 - 4.5 mg/dL Final   06/09/2021 2.9 2.5 - 4.5 mg/dL Final     Albumin   Date Value Ref Range Status   09/13/2023 4.3 3.5 - 5.2 g/dL Final   06/09/2022 3.9 3.4 - 5.0 g/dL Final   06/09/2021 3.6 3.4 - 5.0 g/dL Final           Subjective findings 73-year-old returns clinic for diabetic foot cares and ulcer right fifth toe.  Relates to intermittent numbness tingling neuropathy pains in his feet, no ulcers or sores since we seen him last, is using diabetic shoes, got over-the-counter urea cream which she has been using is working very well, no injuries, needs his toenails cut and callus on right fifth toe.    Objective findings- DP and PT are 2 out of 4 bilaterally, decreased hair growth bilaterally, venous stasis bilaterally.  Has dorsally contracted digits 1 through 5 bilaterally.  Has incurvated thickened brittle nails  with subungual debris dystrophy and discoloration 1 through 5 bilaterally to differing degrees.  Has subungual vesicle on the right hallux nail.  Has decreased dry scaly skin in moccasin pattern bilaterally.  Right dorsal lateral fifth toe hyperkeratotic tissue buildup.  There is no erythema, no drainage, no odor, no calor, no pain on palpation bilaterally.     Assessment and plan- Onychomycosis, Tyloma right fifth toe, Tinea Pedis, Diabetes with peripheral Neuropathy, Diabetes with callus and Hammertoes.  Diagnosis and treatment options discussed with him.  All the toenails 1 through 5 bilaterally were debrided upon consent 6+ toenails were debrided.  The right Hallux subungual vesicle bled upon debridement, Betadine and Band-Aid applied to the right hallux upon consent and use discussed with the patient.  The right fifth toe tyloma sharp debrided with a 15 blade upon consent.  Continue the Urea cream.  Continue diabetic shoes.  Return to clinic and see me in 2 to 3 months.  Previous notes reviewed.              Moderate level of medical decision making.

## 2023-09-28 NOTE — LETTER
9/28/2023       RE: Earle Rene  [address]  [place] [address]     Dear Colleague,    Thank you for referring your patient, Earle Rene, to the [facility] ENDOCRINOLOGY CLINIC [place] at [facility]. Please see a copy of my visit note below.    Past Medical History:   Diagnosis Date    Blepharitis of both eyes     BPH (benign prostatic hyperplasia)     Diabetes (H)     Diabetic neuropathy (H)     Diabetic retinopathy associated with diabetes mellitus due to underlying condition (H)     Dry eye syndrome     GERD (gastroesophageal reflux disease)     Goiter     Granulomatous disease (H)     HLD (hyperlipidemia)     HTN (hypertension)     Nonsenile cataract     Peripheral neuropathy      Patient Active Problem List   Diagnosis    Psychological factors associated with another disorder    Mood disorder in conditions classified elsewhere    Occupational problem    Type 2 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, with long-term current use of insulin (H)    Erectile dysfunction    Hyperlipidemia with target LDL less than 100    CTS (carpal tunnel syndrome)    Diabetic polyneuropathy (H)    Presbyopia    Myopia    Cataracts, bilateral    Pain of right thumb    Subcutaneous mass    Combined form of nonsenile cataract of right eye    Colonic polyp    Elevated PSA    Heel fracture    Hyponatremia    Nephrolithiasis    Pain of finger of right hand    Sarcoidosis of lung (H)    Sialoadenitis    Adhesive capsulitis of shoulder    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)    Chronic kidney disease, stage 3 (H)    Peripheral vascular disease, unspecified (H)    Malignant neoplasm of prostate (H)    Chronic bilateral low back pain with bilateral sciatica    Pain of both sacroiliac joints    Lumbar radiculopathy     Past Surgical History:    Procedure Laterality Date    ------------OTHER-------------      back of neck abscess drainage in OR    AS RAD RESEC TONSIL/PILLARS Bilateral 1961    CATARACT IOL, RT/LT Left     COLONOSCOPY  7/29/2013    Procedure: COLONOSCOPY;;  Surgeon: Montana Pascal MD;  Location: [facility]    EXCISE MASS UPPER EXTREMITY Right 11/11/2019    Procedure: EXCISION, MASS, UPPER EXTREMITY, RIGHT SHOULDER;  Surgeon: Johana Choudhury MD;  [facility]    INJECT EPIDURAL LUMBAR Left 4/20/2023    Procedure: INJECTION, SPINE, LUMBAR, EPIDURAL (L4-L5);  Surgeon: Mahamed Vázquez MD;  Location: [facility]    INJECT SACROILIAC JOINT Bilateral 9/13/2022    Procedure: Bilateral sacroiliac joint injection;  Surgeon: Collin Mata MD;  Location: [facility] OR    INTRAVITREAL INJECTION CHEMOTHERAPY Right 12/30/2019    Procedure: INTRAVITREAL Bevacizumab injection;  Surgeon: Milton Maki MD;  [facility]    PHACOEMULSIFICATION CLEAR CORNEA WITH STANDARD INTRAOCULAR LENS IMPLANT Right 12/30/2019    Procedure: PHACOEMULSIFICATION, CATARACT, WITH INTRAOCULAR LENS IMPLANT;  Surgeon: Milton Maki MD;  [facility]    PHACOEMULSIFICATION WITH STANDARD INTRAOCULAR LENS IMPLANT Left 6/21/2019    Procedure: Left Eye Cataract Removal with Intraocular Lens Implant with Intraoperative Avastin Injection;  Surgeon: Lacey Eugene MD;  Location: [facility]    siladenatis  11/2017     Social History     Socioeconomic History    Marital status:      Spouse name: Not on file    Number of children: 5    Years of education: Not on file    Highest education level: Not on file   Occupational History    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 Not Asked   Social History  Narrative    Not on file     Social Determinants of Health     Financial Resource Strain: Not on file   Food Insecurity: Not on file   Transportation Needs: Not on file   Physical Activity: Not on file   Stress: Not on file   Social Connections: Not on file   Interpersonal Safety: Not on file   Housing Stability: Not on file     Family History   Problem Relation Age of Onset    Diabetes Father     Myocardial Infarction Father     Diabetes Brother     Leukemia Brother 44    Glaucoma No family hx of     Macular Degeneration No family hx of     Kidney Disease No family hx of      Lab Results   Component Value Date    A1C 8.3 11/02/2021    A1C 8.2 06/09/2021    A1C 9.2 06/18/2019    A1C 9.1 03/06/2018    A1C 10.2 06/06/2017    A1C 9.0 03/16/2016     Lab Results   Component Value Date    WBC 6.4 09/13/2023    WBC 5.1 03/12/2021     Lab Results   Component Value Date    RBC 4.58 09/13/2023    RBC 4.31 03/12/2021     Lab Results   Component Value Date    HGB 15.2 09/13/2023    HGB 13.6 06/09/2021     Lab Results   Component Value Date    HCT 41.5 09/13/2023    HCT 41.7 03/12/2021     No components found for: MCT  Lab Results   Component Value Date    MCV 91 09/13/2023    MCV 97 03/12/2021     Lab Results   Component Value Date    MCH 33.2 09/13/2023    MCH 32.7 03/12/2021     Lab Results   Component Value Date    MCHC 36.6 09/13/2023    MCHC 33.8 03/12/2021     Lab Results   Component Value Date    RDW 12.7 09/13/2023    RDW 12.3 03/12/2021     Lab Results   Component Value Date     09/13/2023     03/12/2021     Last Renal Panel:  Sodium   Date Value Ref Range Status   09/13/2023 135 (L) 136 - 145 mmol/L Final   06/09/2021 142 133 - 144 mmol/L Final     Potassium   Date Value Ref Range Status   09/13/2023 4.5 3.4 - 5.3 mmol/L Final   06/09/2022 4.3 3.4 - 5.3 mmol/L Final   06/09/2021 4.2 3.4 - 5.3 mmol/L Final     Chloride   Date Value Ref Range Status   09/13/2023 103 98 - 107 mmol/L Final   06/09/2022 107  94 - 109 mmol/L Final   06/09/2021 111 (H) 94 - 109 mmol/L Final     Carbon Dioxide   Date Value Ref Range Status   06/09/2021 22 20 - 32 mmol/L Final     Carbon Dioxide (CO2)   Date Value Ref Range Status   09/13/2023 18 (L) 22 - 29 mmol/L Final   06/09/2022 24 20 - 32 mmol/L Final     Anion Gap   Date Value Ref Range Status   09/13/2023 14 7 - 15 mmol/L Final   06/09/2022 8 3 - 14 mmol/L Final   06/09/2021 8 3 - 14 mmol/L Final     Glucose   Date Value Ref Range Status   09/13/2023 276 (H) 70 - 99 mg/dL Final   06/09/2022 142 (H) 70 - 99 mg/dL Final   06/09/2021 211 (H) 70 - 99 mg/dL Final     Urea Nitrogen   Date Value Ref Range Status   09/13/2023 37.6 (H) 8.0 - 23.0 mg/dL Final   06/09/2022 33 (H) 7 - 30 mg/dL Final   06/09/2021 20 7 - 30 mg/dL Final     Creatinine   Date Value Ref Range Status   09/13/2023 1.69 (H) 0.67 - 1.17 mg/dL Final   06/09/2021 1.45 (H) 0.66 - 1.25 mg/dL Final     GFR Estimate   Date Value Ref Range Status   09/13/2023 42 (L) >60 mL/min/1.73m2 Final   06/09/2021 48 (L) >60 mL/min/[1.73_m2] Final     Comment:     Non  GFR Calc  Starting 12/18/2018, serum creatinine based estimated GFR (eGFR) will be   calculated using the Chronic Kidney Disease Epidemiology Collaboration   (CKD-EPI) equation.       Calcium   Date Value Ref Range Status   09/13/2023 9.8 8.8 - 10.2 mg/dL Final   06/09/2021 8.8 8.5 - 10.1 mg/dL Final     Phosphorus   Date Value Ref Range Status   09/13/2023 4.3 2.5 - 4.5 mg/dL Final   06/09/2021 2.9 2.5 - 4.5 mg/dL Final     Albumin   Date Value Ref Range Status   09/13/2023 4.3 3.5 - 5.2 g/dL Final   06/09/2022 3.9 3.4 - 5.0 g/dL Final   06/09/2021 3.6 3.4 - 5.0 g/dL Final           Subjective findings 73-year-old returns clinic for diabetic foot cares and ulcer right fifth toe.  Relates to intermittent numbness tingling neuropathy pains in his feet, no ulcers or sores since we seen him last, is using diabetic shoes, got over-the-counter urea cream which  she has been using is working very well, no injuries, needs his toenails cut and callus on right fifth toe.    Objective findings- DP and PT are 2 out of 4 bilaterally, decreased hair growth bilaterally, venous stasis bilaterally.  Has dorsally contracted digits 1 through 5 bilaterally.  Has incurvated thickened brittle nails with subungual debris dystrophy and discoloration 1 through 5 bilaterally to differing degrees.  Has subungual vesicle on the right hallux nail.  Has decreased dry scaly skin in moccasin pattern bilaterally.  Right dorsal lateral fifth toe hyperkeratotic tissue buildup.  There is no erythema, no drainage, no odor, no calor, no pain on palpation bilaterally.     Assessment and plan- Onychomycosis, Tyloma right fifth toe, Tinea Pedis, Diabetes with peripheral Neuropathy, Diabetes with callus and Hammertoes.  Diagnosis and treatment options discussed with him.  All the toenails 1 through 5 bilaterally were debrided upon consent 6+ toenails were debrided.  The right Hallux subungual vesicle bled upon debridement, Betadine and Band-Aid applied to the right hallux upon consent and use discussed with the patient.  The right fifth toe tyloma sharp debrided with a 15 blade upon consent.  Continue the Urea cream.  Continue diabetic shoes.  Return to clinic and see me in 2 to 3 months.  Previous notes reviewed.      Moderate level of medical decision making.      Again, thank you for allowing me to participate in the care of your patient.      Sincerely,    Hemanth Nuñez DPM

## 2023-09-29 ENCOUNTER — OFFICE VISIT (OUTPATIENT)
Dept: NEPHROLOGY | Facility: CLINIC | Age: 73
End: 2023-09-29
Payer: COMMERCIAL

## 2023-09-29 VITALS
OXYGEN SATURATION: 100 % | HEART RATE: 81 BPM | SYSTOLIC BLOOD PRESSURE: 109 MMHG | BODY MASS INDEX: 29.76 KG/M2 | WEIGHT: 195.7 LBS | DIASTOLIC BLOOD PRESSURE: 71 MMHG

## 2023-09-29 DIAGNOSIS — M79.2 POLYNEUROPATHIC PAIN: ICD-10-CM

## 2023-09-29 DIAGNOSIS — E55.9 VITAMIN D DEFICIENCY: ICD-10-CM

## 2023-09-29 DIAGNOSIS — N18.32 STAGE 3B CHRONIC KIDNEY DISEASE (H): Primary | ICD-10-CM

## 2023-09-29 DIAGNOSIS — N18.32 CHRONIC KIDNEY DISEASE, STAGE 3B (H): ICD-10-CM

## 2023-09-29 PROCEDURE — 99214 OFFICE O/P EST MOD 30 MIN: CPT

## 2023-09-29 PROCEDURE — G0463 HOSPITAL OUTPT CLINIC VISIT: HCPCS

## 2023-09-29 RX ORDER — EMPAGLIFLOZIN 10 MG/1
TABLET, FILM COATED ORAL
Qty: 90 TABLET | Refills: 1 | OUTPATIENT
Start: 2023-09-29

## 2023-09-29 NOTE — PROGRESS NOTES
Chief Complaint   Patient presents with    RECHECK     Had labs drawn before appt     Blood pressure 109/71, pulse 81, weight 88.8 kg (195 lb 11.2 oz), SpO2 100 %.  Patient doesn't know all of his medications. Berkley Quach, CMA

## 2023-09-29 NOTE — LETTER
9/29/2023       RE: Earle Rene  [address] [place] [address]     Dear Colleague,    Thank you for referring your patient, Earle Rene, to the [facility] NEPHROLOGY CLINIC [place] at [facility]. Please see a copy of my visit note below.    Chief Complaint   Patient presents with    RECHECK     Had labs drawn before appt     Blood pressure 109/71, pulse 81, weight 88.8 kg (195 lb 11.2 oz), SpO2 100 %.  Patient doesn't know all of his medications. Berkley Quach [facility]       Nephrology Clinic Visit 9/29/23    Assessment and Plan:    CKD3b w/proteinuria - Stable. Creat 1.6, eGFR 42 ml/mn, UPCR 1.9 mg/mg Cr    - Etiology for his CKD is felt to be DM given retinopathy    - Baseline creat mid to upper 1's since 12/19    - On ARB, SGLT2    - B/p well controlled.     - Diabetes with suboptimal control. A1c 8.4%    - On statin for CV risk reduction    - Reviewed importance of regular Nephrology follow up    2. Volume status - Trace edema w/o dyspnea. Not on diuretic therapy other than Jardiance. Weight 195.7 #. Albumin 4.3   - Would consider Spironolactone to assist with proteinuria but would likely need to reduce Amlodipine and may be intolerable given his chronic urinary frequency. Will d/w patient next visit    3. HTN - Well controlled w/trace edema. Clinic b/ps 109-111/71-72  Current regimen:     Losartan 100 mg every day    Amlodipine 5 mg every day    4. DM2 - Uncontrolled with A1c 8.4% 6/23 on Jardiance and insulin   - Reviewed the benefits of improved glycemic control, one of which may be less urination    5. Electrolytes - No acute concerns. K 4.5 Na 136    6. Acid base - Bicarb 18   - Increase bicarb to 1300 mg bid    7. BMD - Ca 9.8 Phos 4.3 Albumin 4.3   - No recent Vit D/PTH. Will check next visit   - On Vit D3 - 50 mcg every day    8. Heme - Hgb is normal at 15.2    9. BPH - Uncontrolled on Finasteride/Flomax and Mirabegron   - Prostate  MRI 9/13/23 showed low likelihood for clinically significant cancer   - Per Urology    10. Disposition - RTC 6 months for follow up with labs prior    Assessment and plan was discussed with patient and he voiced his understanding and agreement.    Reason for Visit:  CKD3b follow up    HPI:  Mr Rene is a 72 yo male with CKD3b, DM2, HTN, Diabetic retinopathy, BPH, Nephrolothiasis, GERD, Sarcoid of the lung, COPD, present today for routine CKD follow up. Last seen in clinic by Dr Ramsey 12/8/21.   Baseline creat mid to upper 1's since 2019    ROS:   A comprehensive review of systems was obtained and negative, except as noted in the HPI or PMH.  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 NS, ETOH rare    Allergies:  No Known Allergies    Medications:  Current Outpatient Medications   Medication Sig    alpha-lipoic acid 600 MG capsule Take 1 capsule (600 mg) by mouth daily    amLODIPine (NORVASC) 5 MG tablet TAKE 1 TABLET BY MOUTH AT BEDTIME    amoxicillin (AMOXIL) 500 MG capsule TAKE 1 CAPSULE ORAL EVERY EIGHT HOURS AS DIRECTED    ARTIFICIAL TEAR OP Apply to eye as needed    atorvastatin (LIPITOR) 20 MG tablet TAKE 1 TABLET BY MOUTH EVERY DAY    blood glucose (NO BRAND SPECIFIED) lancets standard Lancets that go with device, Test 3 times daily    Continuous Blood Gluc  (FREESTYLE PETER 14  "DAY READER) JUANCARLOS Use to read blood sugars as per 's instructions.    Continuous Blood Gluc Sensor (FREESTYLE PETER 2 SENSOR) MISC 1 each every 14 days 1 each every 14 days. Change every 14 days.    fenofibrate 54 MG tablet Take 1 tablet (54 mg) by mouth daily (Patient taking differently: Take 54 mg by mouth At Bedtime)    finasteride (PROSCAR) 5 MG tablet Take 1 tablet (5 mg) by mouth daily    gabapentin (NEURONTIN) 300 MG capsule TAKE 1 CAPSULE BY MOUTH TWICE A DAY    HUMALOG KWIKPEN 100 UNIT/ML soln Inject 15-17  units with meals, plus correction. Pt uses approx 65 units in 24 hrs.    insulin degludec (TRESIBA FLEXTOUCH) 100 UNIT/ML pen INJECT 64 UNITS SUBCUTANEOUSLY AT BEDTIME.    insulin pen needle (B-D U/F) 31G X 5 MM miscellaneous Use 4 time(s) per day.  Please dispense as BD Pen Needle Mini U/F 31G x 5 MM    insulin pen needle (B-D U/F) 31G X 5 MM Use 4 times per day.  Please dispense as BD Pen Needle Mini U/F 31G x 5 MM    insulin syringe 31G X 5/16\" 0.5 ML MISC Use three syringes daily    JARDIANCE 10 MG TABS tablet TAKE 1 TABLET (10 MG) BY MOUTH DAILY.    loratadine (CLARITIN) 10 MG tablet Take 1 tablet (10 mg) by mouth daily    losartan (COZAAR) 100 MG tablet TAKE 1 TABLET BY MOUTH EVERYDAY AT BEDTIME    mirabegron (MYRBETRIQ) 25 MG 24 hr tablet Take 1 tablet (25 mg) by mouth daily    sodium bicarbonate 650 MG tablet TAKE 1 TABLET (650 MG) BY MOUTH 3 TIMES DAILY    tamsulosin (FLOMAX) 0.4 MG capsule Take 2 capsules (0.8 mg) by mouth daily    VITAMIN D3 25 MCG (1000 UT) tablet TAKE 2 TABLETS (50 MCG) BY MOUTH DAILY     No current facility-administered medications for this visit.      Vitals:  /71   Pulse 81   Wt 88.8 kg (195 lb 11.2 oz)   SpO2 100%   BMI 29.76 kg/m      Exam:  GENERAL APPEARANCE: alert and no distress  RESP: lungs clear to auscultation   CV: regular rhythm, normal rate  EDEMA: trace edema bilaterally  ABDOMEN: soft, nondistended, nontender  MS: extremities normal - no " gross deformities noted, no evidence of inflammation in joints, no muscle tenderness  SKIN: no rash  NEURO: mentation intact and speech normal  PSYCH: affect normal/bright    LABS:   CMP  Recent Labs   Lab Test 09/13/23  1622 06/06/23  1126 01/08/23  1758 09/12/22  0941 06/09/22  1333 12/08/21  1428 07/30/21  1005 06/09/21  1524 03/12/21  1324 12/14/20  0831 06/01/20  1109   *  --  139  --  139 140  --  142 137 138 142   POTASSIUM 4.5  --  4.6  --  4.3 4.2  --  4.2 5.2 4.2 4.1   CHLORIDE 103  --  106  --  107 107  --  111* 107 110* 110*   CO2 18*  --  20*  --  24 24  --  22 23 20 22   ANIONGAP 14  --  13  --  8 9  --  8 7 8 10   *  --  100*  --  142* 242*  --  211* 347* 212* 218*   BUN 37.6*  --  30.1*  --  33* 31*  --  20 33* 30 25   CR 1.69* 1.77* 1.62* 1.72* 2.04* 1.66*   < > 1.45* 1.68* 1.43* 1.39*   GFRESTIMATED 42* 40* 45* 41* 34* 41*   < > 48* 40* 49* 51*   GFRESTBLACK  --   --   --   --   --   --   --  55* 46* 56* 59*   INDERJIT 9.8  --  10.1  --  9.5 10.2*  --  8.8 9.0 9.4 9.8    < > = values in this interval not displayed.     Recent Labs   Lab Test 03/12/21  1324 06/18/19  1054 03/06/18  1216 11/01/17  1044 06/06/17  1116   BILITOTAL 0.4 0.6 0.8  --  0.7   ALKPHOS 108 63 77  --  70   ALT 36 38 35 44 49   AST 24 24 25 23 20     CBC  Recent Labs   Lab Test 09/13/23  1622 01/08/23  1758 12/08/21  1428 06/09/21  1524 03/12/21  1324 01/29/20  1315 06/18/19  1054   HGB 15.2 14.4 15.0 13.6 14.1   < > 13.5   WBC 6.4 5.4  --   --  5.1  --  4.6   RBC 4.58 4.51  --   --  4.31*  --  4.18*   HCT 41.5 41.2  --   --  41.7  --  38.7*   MCV 91 91  --   --  97  --  93   MCH 33.2* 31.9  --   --  32.7  --  32.3   MCHC 36.6* 35.0  --   --  33.8  --  34.9   RDW 12.7 12.4  --   --  12.3  --  12.6    145*  --   --  156  --  130*    < > = values in this interval not displayed.     URINE STUDIES  Recent Labs   Lab Test 09/13/23  1811 08/10/23  1650 02/06/23  1343 09/15/22  1604 03/16/16  1000 01/14/16  0858   COLOR  Straw Straw Straw Yellow   < > Yellow   APPEARANCE Clear Clear Clear Clear   < > Clear   URINEGLC >=1000* >=1000* >=1000* 1000*   < > 100*   URINEBILI Negative Negative Negative Negative   < > Negative   URINEKETONE Negative Negative Negative Negative   < > Negative   SG 1.019 1.023 1.018 1.020   < > >1.030   UBLD Negative Negative Negative Trace*   < > Negative   URINEPH 5.5 5.5 5.5 5.0   < > 5.5   PROTEIN 70* 50* 70* 300*   < > 100*   UROBILINOGEN  --   --   --   --   --  0.2   NITRITE Negative Negative Negative Negative   < > Negative   LEUKEST Negative Negative Negative Negative   < > Negative   RBCU <1 <1 1 0   < > O - 2   WBCU 0 <1 1 <1   < > O - 2    < > = values in this interval not displayed.     Recent Labs   Lab Test 12/08/21  1438 06/09/21  1530 12/14/20  0848 06/01/20  1110   UTPG 1.89* 2.37* 1.51* 1.39*     PTH  Recent Labs   Lab Test 12/08/21  1428 06/01/20  1109 03/13/19  1516   PTHI 27 22 42     IRON STUDIES  Recent Labs   Lab Test 07/02/19  0947 05/01/18  1233   IRON  --  83   FEB  --  344   IRONSAT  --  24   DEANA 178 160           Again, thank you for allowing me to participate in the care of your patient.      Sincerely,    Tracy Chandler, NP

## 2023-09-29 NOTE — TELEPHONE ENCOUNTER
JARDIANCE 10 MG TABS tablet     90 tablet 1 6/3/2023       Sodium Glucose Co-Transport Inhibitor Agents [id number]/26/2023 05:31 PM   Protocol Details Patient has documented A1c within the specified period of time.    No creatinine >1.4 or GFR <45 within the past 12 mos     Lab Test 06/06/23  1030 03/10/22  0923 11/02/21  0911   A1C  --   --  8.3*   HEMOGLOBINA1 8.4*   < >  --     < > = values in this interval not displayed.       Lab Test 09/13/23  1622 06/09/22  1333 02/02/22  1311 07/30/21  1005 06/09/21  1524   GFRESTIMATED 42*   < >  --    < > 48*   GFRESTBLACK  --   --   --   --  55*   15119  --   --  43*  --   --    09858  --   --  37*  --   --      Lab Test 09/13/23  1622   CR 1.69*

## 2023-10-01 RX ORDER — GABAPENTIN 300 MG/1
300 CAPSULE ORAL AT BEDTIME
Qty: 120 CAPSULE | Refills: 0 | COMMUNITY
Start: 2023-09-29 | End: 2023-12-11

## 2023-10-01 RX ORDER — SODIUM BICARBONATE 650 MG/1
1300 TABLET ORAL 2 TIMES DAILY
Qty: 270 TABLET | Refills: 3 | Status: SHIPPED | OUTPATIENT
Start: 2023-10-01

## 2023-10-01 NOTE — PROGRESS NOTES
Nephrology Clinic Visit 9/29/23    Assessment and Plan:    CKD3b w/proteinuria - Stable. Creat 1.6, eGFR 42 ml/mn, UPCR 1.9 mg/mg Cr    - Etiology for his CKD is felt to be DM given retinopathy    - Baseline creat mid to upper 1's since 12/19    - On ARB, SGLT2    - B/p well controlled.     - Diabetes with suboptimal control. A1c 8.4%    - On statin for CV risk reduction    - Reviewed importance of regular Nephrology follow up    2. Volume status - Trace edema w/o dyspnea. Not on diuretic therapy other than Jardiance. Weight 195.7 #. Albumin 4.3   - Would consider Spironolactone to assist with proteinuria but would likely need to reduce Amlodipine and may be intolerable given his chronic urinary frequency. Will d/w patient next visit    3. HTN - Well controlled w/trace edema. Clinic b/ps 109-111/71-72  Current regimen:     Losartan 100 mg every day    Amlodipine 5 mg every day    4. DM2 - Uncontrolled with A1c 8.4% 6/23 on Jardiance and insulin   - Reviewed the benefits of improved glycemic control, one of which may be less urination    5. Electrolytes - No acute concerns. K 4.5 Na 136    6. Acid base - Bicarb 18   - Increase bicarb to 1300 mg bid    7. BMD - Ca 9.8 Phos 4.3 Albumin 4.3   - No recent Vit D/PTH. Will check next visit   - On Vit D3 - 50 mcg every day    8. Heme - Hgb is normal at 15.2    9. BPH - Uncontrolled on Finasteride/Flomax and Mirabegron   - Prostate MRI 9/13/23 showed low likelihood for clinically significant cancer   - Per Urology    10. Disposition - RTC 6 months for follow up with labs prior    Assessment and plan was discussed with patient and he voiced his understanding and agreement.    Reason for Visit:  CKD3b follow up    HPI:  Mr Rene is a 72 yo male with CKD3b, DM2, HTN, Diabetic retinopathy, BPH, Nephrolothiasis, GERD, Sarcoid of the lung, COPD, present today for routine CKD follow up. Last seen in clinic by Dr Ramsey 12/8/21.   Baseline creat mid to upper 1's since  2019    ROS:   A comprehensive review of systems was obtained and negative, except as noted in the HPI or PMH.  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 NS, ETOH rare    Allergies:  No Known Allergies    Medications:  Current Outpatient Medications   Medication Sig    alpha-lipoic acid 600 MG capsule Take 1 capsule (600 mg) by mouth daily    amLODIPine (NORVASC) 5 MG tablet TAKE 1 TABLET BY MOUTH AT BEDTIME    amoxicillin (AMOXIL) 500 MG capsule TAKE 1 CAPSULE ORAL EVERY EIGHT HOURS AS DIRECTED    ARTIFICIAL TEAR OP Apply to eye as needed    atorvastatin (LIPITOR) 20 MG tablet TAKE 1 TABLET BY MOUTH EVERY DAY    blood glucose (NO BRAND SPECIFIED) lancets standard Lancets that go with device, Test 3 times daily    Continuous Blood Gluc  (FREESTYLE PETER 14 DAY READER) JUANCARLOS Use to read blood sugars as per 's instructions.    Continuous Blood Gluc Sensor (FREESTYLE PETER 2 SENSOR) MISC 1 each every 14 days 1 each every 14 days. Change every 14 days.    fenofibrate 54 MG tablet Take 1 tablet (54 mg) by mouth daily (Patient taking differently: Take 54 mg by mouth At Bedtime)    finasteride (PROSCAR) 5 MG tablet Take 1 tablet (5 mg) by mouth daily    gabapentin (NEURONTIN) 300 MG capsule TAKE 1 CAPSULE BY MOUTH TWICE A DAY    HUMALOG KWIKPEN 100 UNIT/ML soln Inject 15-17  units  "with meals, plus correction. Pt uses approx 65 units in 24 hrs.    insulin degludec (TRESIBA FLEXTOUCH) 100 UNIT/ML pen INJECT 64 UNITS SUBCUTANEOUSLY AT BEDTIME.    insulin pen needle (B-D U/F) 31G X 5 MM miscellaneous Use 4 time(s) per day.  Please dispense as BD Pen Needle Mini U/F 31G x 5 MM    insulin pen needle (B-D U/F) 31G X 5 MM Use 4 times per day.  Please dispense as BD Pen Needle Mini U/F 31G x 5 MM    insulin syringe 31G X 5/16\" 0.5 ML MISC Use three syringes daily    JARDIANCE 10 MG TABS tablet TAKE 1 TABLET (10 MG) BY MOUTH DAILY.    loratadine (CLARITIN) 10 MG tablet Take 1 tablet (10 mg) by mouth daily    losartan (COZAAR) 100 MG tablet TAKE 1 TABLET BY MOUTH EVERYDAY AT BEDTIME    mirabegron (MYRBETRIQ) 25 MG 24 hr tablet Take 1 tablet (25 mg) by mouth daily    sodium bicarbonate 650 MG tablet TAKE 1 TABLET (650 MG) BY MOUTH 3 TIMES DAILY    tamsulosin (FLOMAX) 0.4 MG capsule Take 2 capsules (0.8 mg) by mouth daily    VITAMIN D3 25 MCG (1000 UT) tablet TAKE 2 TABLETS (50 MCG) BY MOUTH DAILY     No current facility-administered medications for this visit.      Vitals:  /71   Pulse 81   Wt 88.8 kg (195 lb 11.2 oz)   SpO2 100%   BMI 29.76 kg/m      Exam:  GENERAL APPEARANCE: alert and no distress  RESP: lungs clear to auscultation   CV: regular rhythm, normal rate  EDEMA: trace edema bilaterally  ABDOMEN: soft, nondistended, nontender  MS: extremities normal - no gross deformities noted, no evidence of inflammation in joints, no muscle tenderness  SKIN: no rash  NEURO: mentation intact and speech normal  PSYCH: affect normal/bright    LABS:   CMP  Recent Labs   Lab Test 09/13/23  1622 06/06/23  1126 01/08/23  1758 09/12/22  0941 06/09/22  1333 12/08/21  1428 07/30/21  1005 06/09/21  1524 03/12/21  1324 12/14/20  0831 06/01/20  1109   *  --  139  --  139 140  --  142 137 138 142   POTASSIUM 4.5  --  4.6  --  4.3 4.2  --  4.2 5.2 4.2 4.1   CHLORIDE 103  --  106  --  107 107  --  111* 107 " 110* 110*   CO2 18*  --  20*  --  24 24  --  22 23 20 22   ANIONGAP 14  --  13  --  8 9  --  8 7 8 10   *  --  100*  --  142* 242*  --  211* 347* 212* 218*   BUN 37.6*  --  30.1*  --  33* 31*  --  20 33* 30 25   CR 1.69* 1.77* 1.62* 1.72* 2.04* 1.66*   < > 1.45* 1.68* 1.43* 1.39*   GFRESTIMATED 42* 40* 45* 41* 34* 41*   < > 48* 40* 49* 51*   GFRESTBLACK  --   --   --   --   --   --   --  55* 46* 56* 59*   INDERJIT 9.8  --  10.1  --  9.5 10.2*  --  8.8 9.0 9.4 9.8    < > = values in this interval not displayed.     Recent Labs   Lab Test 03/12/21  1324 06/18/19  1054 03/06/18  1216 11/01/17  1044 06/06/17  1116   BILITOTAL 0.4 0.6 0.8  --  0.7   ALKPHOS 108 63 77  --  70   ALT 36 38 35 44 49   AST 24 24 25 23 20     CBC  Recent Labs   Lab Test 09/13/23  1622 01/08/23  1758 12/08/21  1428 06/09/21  1524 03/12/21  1324 01/29/20  1315 06/18/19  1054   HGB 15.2 14.4 15.0 13.6 14.1   < > 13.5   WBC 6.4 5.4  --   --  5.1  --  4.6   RBC 4.58 4.51  --   --  4.31*  --  4.18*   HCT 41.5 41.2  --   --  41.7  --  38.7*   MCV 91 91  --   --  97  --  93   MCH 33.2* 31.9  --   --  32.7  --  32.3   MCHC 36.6* 35.0  --   --  33.8  --  34.9   RDW 12.7 12.4  --   --  12.3  --  12.6    145*  --   --  156  --  130*    < > = values in this interval not displayed.     URINE STUDIES  Recent Labs   Lab Test 09/13/23  1811 08/10/23  1650 02/06/23  1343 09/15/22  1604 03/16/16  1000 01/14/16  0858   COLOR Straw Straw Straw Yellow   < > Yellow   APPEARANCE Clear Clear Clear Clear   < > Clear   URINEGLC >=1000* >=1000* >=1000* 1000*   < > 100*   URINEBILI Negative Negative Negative Negative   < > Negative   URINEKETONE Negative Negative Negative Negative   < > Negative   SG 1.019 1.023 1.018 1.020   < > >1.030   UBLD Negative Negative Negative Trace*   < > Negative   URINEPH 5.5 5.5 5.5 5.0   < > 5.5   PROTEIN 70* 50* 70* 300*   < > 100*   UROBILINOGEN  --   --   --   --   --  0.2   NITRITE Negative Negative Negative Negative   < >  Negative   LEUKEST Negative Negative Negative Negative   < > Negative   RBCU <1 <1 1 0   < > O - 2   WBCU 0 <1 1 <1   < > O - 2    < > = values in this interval not displayed.     Recent Labs   Lab Test 12/08/21  1438 06/09/21  1530 12/14/20  0848 06/01/20  1110   UTPG 1.89* 2.37* 1.51* 1.39*     PTH  Recent Labs   Lab Test 12/08/21  1428 06/01/20  1109 03/13/19  1516   PTHI 27 22 42     IRON STUDIES  Recent Labs   Lab Test 07/02/19  0947 05/01/18  1233   IRON  --  83   FEB  --  344   IRONSAT  --  24   DEANA 178 160       Tracy Chandler, NP

## 2023-10-06 ENCOUNTER — TELEPHONE (OUTPATIENT)
Dept: NEPHROLOGY | Facility: CLINIC | Age: 73
End: 2023-10-06
Payer: COMMERCIAL

## 2023-10-06 NOTE — TELEPHONE ENCOUNTER
called pt to schedule 6 month return neph with Angela Geraldine - pt stated they would like to be called back to schedule in a few months - deferred 2.5 months // first attempt, AN 10.6.23

## 2023-10-31 ENCOUNTER — NURSE TRIAGE (OUTPATIENT)
Dept: OPHTHALMOLOGY | Facility: CLINIC | Age: 73
End: 2023-10-31
Payer: COMMERCIAL

## 2023-10-31 NOTE — TELEPHONE ENCOUNTER
"73 year old diabetic calls with blurred vision  He states it started a month ago.. It comes and goesbut getting more frequent  He notes it in the left eye      Forwarding to eye team     Reason for Disposition   Patient wants to be seen    Additional Information   Negative: Weakness of the face, arm or leg on one side of the body   Negative: Followed getting substance in the eye   Negative: Foreign body stuck in the eye   Negative: Followed an eye injury   Negative: Followed sun lamp or sun exposure (UV keratitis)   Negative: Yellow or green discharge (pus) in the eye   Negative: Pregnant   Negative: Complete loss of vision in one or both eyes   Negative: SEVERE eye pain   Negative: Flashes of light  (Exception: Brief from pressing on the eyeball.)   Negative: Many floaters in the eye (Exception: Floater(s) are a chronic symptom and this is unchanged from patient's baseline pattern.)   Negative: Eye pain and brief (now gone) blurred vision or visual changes   Negative: Taking digoxin (e.g., Lanoxin, Digitek, Cardoxin, Lanoxicaps; Toloxin in [place]) and blurred vision, yellow vision, or yellow-green halos   Negative: SEVERE headache   Negative: Double vision   Negative: Blurred vision or visual changes and present now and sudden onset or new (e.g., minutes, hours, days)  (Exception: Seeing floaters / black specks OR previously diagnosed migraine headaches with same symptoms.)   Negative: Patient sounds very sick or weak to the triager    Answer Assessment - Initial Assessment Questions  1. DESCRIPTION: \"How has your vision changed?\" (e.g., complete vision loss, blurred vision, double vision, floaters, etc.)      Blurred vision left eye    2. LOCATION: \"One or both eyes?\" If one, ask: \"Which eye?\"      Left eye  3. SEVERITY: \"Can you see anything?\" If Yes, ask: \"What can you see?\" (e.g., fine print)      Yes  the blurred vision comes and goes  4. ONSET: \"When did this begin?\" \"Did it start suddenly or has this been " "gradual?\"      A month ago  5. PATTERN: \"Does this come and go, or has it been constant since it started?\"      Comes and goes  getting worse  6. PAIN: \"Is there any pain in your eye(s)?\"  (Scale 1-10; or mild, moderate, severe)    - NONE (0): No pain.    - MILD (1-3): Doesn't interfere with normal activities.    - MODERATE (4-7): Interferes with normal activities or awakens from sleep.     - SEVERE (8-10): Excruciating pain, unable to do any normal activities.      no  7. CONTACTS-GLASSES: \"Do you wear contacts or glasses?\"      Reading glasses  8. CAUSE: \"What do you think is causing this visual problem?\"      Not sure  diabetic  9. OTHER SYMPTOMS: \"Do you have any other symptoms?\" (e.g., confusion, headache, arm or leg weakness, speech problems)      no  10. PREGNANCY: \"Is there any chance you are pregnant?\" \"When was your last menstrual period?\"        no    Protocols used: Vision Loss or Change-A-OH    "

## 2023-10-31 NOTE — TELEPHONE ENCOUNTER
Caller reporting the following red-flag symptom(s): Pt is seeing double vision onset 10/31/23 pt is diabetic    Per the system red-flag symptom policy, patient was instructed to:  speak with a Registered Nurse    Action:  Patient warm transferred to a Registered Nurse Rosa

## 2023-11-01 NOTE — TELEPHONE ENCOUNTER
Spoke to pt at 0830    Intermittent vision changes for past month    No new floaters  No flashing  No eye pain  No redness    Pt due for 6 month follow up with retina provider    Scheduled November 29th with Dr. Johnson    Pt aware of date/time/location    Encouraged OTC lubricating eye drops and warm compresses.    Pt aware to reach out for more constant or new vision changes and/or eye symptoms between now and appt.    Miles Anne RN 8:36 AM 11/01/23

## 2023-11-29 ENCOUNTER — OFFICE VISIT (OUTPATIENT)
Dept: OPHTHALMOLOGY | Facility: CLINIC | Age: 73
End: 2023-11-29
Attending: OPHTHALMOLOGY
Payer: COMMERCIAL

## 2023-11-29 DIAGNOSIS — H26.492 PCO (POSTERIOR CAPSULAR OPACIFICATION), LEFT: ICD-10-CM

## 2023-11-29 DIAGNOSIS — Z96.1 PSEUDOPHAKIA OF BOTH EYES: ICD-10-CM

## 2023-11-29 DIAGNOSIS — E11.3411 SEVERE NONPROLIFERATIVE DIABETIC RETINOPATHY OF RIGHT EYE WITH MACULAR EDEMA ASSOCIATED WITH TYPE 2 DIABETES MELLITUS (H): Primary | ICD-10-CM

## 2023-11-29 PROCEDURE — 99214 OFFICE O/P EST MOD 30 MIN: CPT | Mod: GC | Performed by: OPHTHALMOLOGY

## 2023-11-29 PROCEDURE — 92134 CPTRZ OPH DX IMG PST SGM RTA: CPT | Performed by: OPHTHALMOLOGY

## 2023-11-29 PROCEDURE — G0463 HOSPITAL OUTPT CLINIC VISIT: HCPCS | Performed by: OPHTHALMOLOGY

## 2023-11-29 ASSESSMENT — VISUAL ACUITY
OS_PH_SC+: -1
OD_SC: 20/25
OS_SC: 20/60
OD_SC+: +1
OS_PH_SC: 20/50
METHOD: SNELLEN - LINEAR

## 2023-11-29 ASSESSMENT — EXTERNAL EXAM - RIGHT EYE: OD_EXAM: NORMAL

## 2023-11-29 ASSESSMENT — TONOMETRY
OS_IOP_MMHG: 19
IOP_METHOD: TONOPEN
OD_IOP_MMHG: 21

## 2023-11-29 ASSESSMENT — SLIT LAMP EXAM - LIDS
COMMENTS: BLEPHARITIS, SCURF, DERMATOCHALASIS
COMMENTS: BLEPHARITIS, SCURF, DERMATOCHALASIS

## 2023-11-29 ASSESSMENT — EXTERNAL EXAM - LEFT EYE: OS_EXAM: NORMAL

## 2023-11-29 ASSESSMENT — CUP TO DISC RATIO
OS_RATIO: 0.3
OD_RATIO: 0.3

## 2023-11-29 NOTE — PROGRESS NOTES
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 Vision seems more blurry. Previous patient of Dr. Rodriguez, last seen 03/2023.    HPI:  pt of Dr. Eugene, s/p CEIOL left eye on 6/21/19. Has been having blurred vision since CEIOL. Reports that he had significant periorbital pain after the operation and has since had significant visual disturbance, blurred vision, distorted vision, diplopia, and eye pain. He is very concerned about his vision and would not like any more injections in his left eye at this time.     Self-DC'd all gtts about 5 days after surgery. Re-started on gtts about 3 weeks post-op. Since last visit has been taking prednisolone and ofloxacin 2-3 times per day    Avastin injections  Right Eye: 2/26/18, 4/10/18, 5/11/20218, 6/11/18 and last one here on 7/19/2018  Left Eye: 2/26/18, 4/10/18, 5/11/2018, 6/21/19        Assessment/plan:  # RVO vs Severe NPDR left eyes with macular edema extra foveally in left eye, involving fovea in OS   - FA showed extensive ischemia and capillary non perfusion   - Blood pressure (<120/80) and blood glucose (HbA1c <7.0) control discussed with patient. Patient advised that failure to adequately control each may lead to vision loss. The patient expressed understanding.   - FAF in 2019 extensive ischemia left eye and s/p Panretinal laser photocoagulation (PRP)    - ME refractory to avastin. Last Eylea 06/2021. Had PRP OS 01/2020 and 07/2021 OS    - Stable to improved macular edema today     # Diabetic macular edema left eye   - Worsened since CEIOL on 6/21/19   - OCT with improvement compared to 03/2023    # Pseudophakia ou    # Moderate NPDR and Diabetic macular edema right eye   - had been receiving injections , last injection 6/11/18 at outside clinic and 7/19/2018 here at [facility] adult eye    - PRP os 12/2019   - No edema on OCT today      Return to clinic 6-12 months for VTD, OCT liseth Turner,  MD  Resident Physician, PGY-3  Department of Ophthalmology    Complete documentation of historical and exam elements from today's encounter can be found in the full encounter summary report (not reduplicated in this progress note). I personally obtained the chief complaint(s) and history of present illness.  I confirmed and edited as necessary the review of systems, past medical/surgical history, family history, social history, and examination findings as documented by others; and I examined the patient myself. I personally reviewed the relevant tests, images, and reports as documented above. I formulated and edited as necessary the assessment and plan and discussed the findings and management plan with the patient and family.    Jaden Johnson MD, PhD

## 2023-11-29 NOTE — NURSING NOTE
Chief Complaints and History of Present Illnesses   Patient presents with    Follow Up     Severe nonproliferative diabetic retinopathy of right eye with macular edema associated with type 2 diabetes mellitus     Chief Complaint(s) and History of Present Illness(es)       Follow Up              Comments: Severe nonproliferative diabetic retinopathy of right eye with macular edema associated with type 2 diabetes mellitus              Comments    Pt states no change in VA since last visit  States occ double vision, not double now  States no flashes, floaters or eye pain  LBS:  263    Last A1C:8.3  Lab Results       Component                Value               Date                       A1C                      8.3                 11/02/2021                 A1C                      8.2                 06/09/2021                 A1C                      9.2                 06/18/2019                 A1C                      9.1                 03/06/2018                 A1C                      10.2                06/06/2017                 A1C                      9.0                 03/16/2016              Miriam Maki COT 10:03 AM November 29, 2023

## 2023-12-05 DIAGNOSIS — M79.2 POLYNEUROPATHIC PAIN: ICD-10-CM

## 2023-12-07 NOTE — TELEPHONE ENCOUNTER
gabapentin (NEURONTIN) 300 MG capsule 120 capsule 0 9/29/2023     Last Office Visit : 3/22/2023  [facility] [place]     Marcio Hare MD     Future Office visit:  0    Routing refill request to provider for review/approval because:  Drug not on the G, P or [facility] refill protocol or controlled substance

## 2023-12-11 RX ORDER — GABAPENTIN 300 MG/1
300 CAPSULE ORAL 2 TIMES DAILY
Qty: 60 CAPSULE | Refills: 3 | Status: SHIPPED | OUTPATIENT
Start: 2023-12-11 | End: 2024-07-05

## 2023-12-14 ENCOUNTER — TELEPHONE (OUTPATIENT)
Dept: ENDOCRINOLOGY | Facility: CLINIC | Age: 73
End: 2023-12-14
Payer: COMMERCIAL

## 2023-12-14 NOTE — TELEPHONE ENCOUNTER
Talked to pt and scheduled him  01/04  virtual he rather to see only rosenda jolly  and  in person but since no other openings he agreed  virtual visit, he will be [facility] , sent message to  nurse Reddick too see  if    we need to have A1c checked with labs  bec he says his blood sugar is sometimes high and he has itchy  upper body issues    Added him on wait list     Jocelynn Patel on 12/14/2023 at 3:34 PM

## 2023-12-15 DIAGNOSIS — E11.49 TYPE II OR UNSPECIFIED TYPE DIABETES MELLITUS WITH NEUROLOGICAL MANIFESTATIONS, NOT STATED AS UNCONTROLLED(250.60) (H): Primary | ICD-10-CM

## 2023-12-18 ENCOUNTER — TELEPHONE (OUTPATIENT)
Dept: NEPHROLOGY | Facility: CLINIC | Age: 73
End: 2023-12-18
Payer: COMMERCIAL

## 2023-12-18 ENCOUNTER — TELEPHONE (OUTPATIENT)
Dept: ENDOCRINOLOGY | Facility: CLINIC | Age: 73
End: 2023-12-18
Payer: COMMERCIAL

## 2023-12-18 DIAGNOSIS — Z79.4 TYPE 2 DIABETES MELLITUS WITH MODERATE NONPROLIFERATIVE RETINOPATHY WITHOUT MACULAR EDEMA, WITH LONG-TERM CURRENT USE OF INSULIN, UNSPECIFIED LATERALITY (H): ICD-10-CM

## 2023-12-18 DIAGNOSIS — E11.3399 TYPE 2 DIABETES MELLITUS WITH MODERATE NONPROLIFERATIVE RETINOPATHY WITHOUT MACULAR EDEMA, WITH LONG-TERM CURRENT USE OF INSULIN, UNSPECIFIED LATERALITY (H): ICD-10-CM

## 2023-12-18 NOTE — TELEPHONE ENCOUNTER
ROBERTM with lab clarification from Pamela Laura.   Lorena Bravo RN on 12/18/2023 at 8:31 AM          Message  Received: 3 days ago  Pamela Laura, RAJANI Bravo, Lorena M, RN  Caller: Unspecified (1 week ago,  8:45 AM)  Hi:   Can you let him know I ordered an A1C and other labs and he will need to fast 8 hrs overnight and have labs done fasting in the am.   Thanks rakan

## 2023-12-18 NOTE — TELEPHONE ENCOUNTER
Patient Contacted for the patient to call back and schedule the following:    Appointment type: Return Neph  Provider: Tracy Chandler  Return date: March 27th 2024  Additional appointment(s) needed: Labs one hr prior to appt  Additonal Notes: NA

## 2023-12-19 NOTE — TELEPHONE ENCOUNTER
6/9/2022  [facility] [place]     Pamela Laura PA-C  Endocrinology, Diabetes, and Metabolism   Next 1/4/24

## 2023-12-28 NOTE — PROGRESS NOTES
Outcome for 01/02/24 2:31 PM: Left Voicemail   Karen Hawk MA  Outcome for 01/03/24 9:26 AM: Reached patient but requests call back at later time today.  Italia Still LPN   Outcome for 01/03/24 4:07 PM: Data obtained via phone and located below  Italia Still LPN     7 day average- 244  14 day average- 283  30 day average- 285    1/3  10 AM- 237  Took Insulin   11:17 AM- 137  12:30 PM- 181  4 PM- 354    Patient read off other numbers from [place]:     281  236  222  226  246  286  270  260  315  265  275  257  303      Patient is showing 3/5 MNCM met. A1c not in range and Aspirin not prescribed   Karen Hawk MA

## 2024-01-02 ENCOUNTER — TELEPHONE (OUTPATIENT)
Dept: ENDOCRINOLOGY | Facility: CLINIC | Age: 74
End: 2024-01-02
Payer: COMMERCIAL

## 2024-01-02 NOTE — TELEPHONE ENCOUNTER
Called patient and left voicemail. Patient has an appointment on  1/4/23 . Need patient to upload their Lora device to site for provider to review prior to their appointment.  Karen Hawk MA

## 2024-01-03 ENCOUNTER — OFFICE VISIT (OUTPATIENT)
Dept: URGENT CARE | Facility: URGENT CARE | Age: 74
End: 2024-01-03
Payer: COMMERCIAL

## 2024-01-03 ENCOUNTER — LAB (OUTPATIENT)
Dept: LAB | Facility: CLINIC | Age: 74
End: 2024-01-03
Payer: COMMERCIAL

## 2024-01-03 VITALS
HEART RATE: 73 BPM | HEIGHT: 68 IN | RESPIRATION RATE: 16 BRPM | SYSTOLIC BLOOD PRESSURE: 120 MMHG | TEMPERATURE: 97.9 F | OXYGEN SATURATION: 97 % | BODY MASS INDEX: 29.55 KG/M2 | WEIGHT: 195 LBS | DIASTOLIC BLOOD PRESSURE: 72 MMHG

## 2024-01-03 DIAGNOSIS — L03.115 CELLULITIS OF RIGHT LOWER EXTREMITY: Primary | ICD-10-CM

## 2024-01-03 DIAGNOSIS — L30.9 DERMATITIS: ICD-10-CM

## 2024-01-03 DIAGNOSIS — E11.49 TYPE II OR UNSPECIFIED TYPE DIABETES MELLITUS WITH NEUROLOGICAL MANIFESTATIONS, NOT STATED AS UNCONTROLLED(250.60) (H): ICD-10-CM

## 2024-01-03 DIAGNOSIS — C61 MALIGNANT NEOPLASM OF PROSTATE (H): ICD-10-CM

## 2024-01-03 LAB
AST SERPL W P-5'-P-CCNC: 23 U/L (ref 0–45)
CHOLEST SERPL-MCNC: 184 MG/DL
CREAT SERPL-MCNC: 1.85 MG/DL (ref 0.67–1.17)
EGFRCR SERPLBLD CKD-EPI 2021: 38 ML/MIN/1.73M2
FASTING STATUS PATIENT QL REPORTED: YES
HBA1C MFR BLD: 10.3 % (ref 0–5.6)
HDLC SERPL-MCNC: 32 MG/DL
LDLC SERPL CALC-MCNC: 88 MG/DL
NONHDLC SERPL-MCNC: 152 MG/DL
PSA SERPL DL<=0.01 NG/ML-MCNC: 1.41 NG/ML (ref 0–6.5)
TRIGL SERPL-MCNC: 319 MG/DL
TSH SERPL DL<=0.005 MIU/L-ACNC: 1.76 UIU/ML (ref 0.3–4.2)
VIT B12 SERPL-MCNC: 609 PG/ML (ref 232–1245)

## 2024-01-03 PROCEDURE — 84153 ASSAY OF PSA TOTAL: CPT

## 2024-01-03 PROCEDURE — 80061 LIPID PANEL: CPT

## 2024-01-03 PROCEDURE — 99213 OFFICE O/P EST LOW 20 MIN: CPT | Performed by: PHYSICIAN ASSISTANT

## 2024-01-03 PROCEDURE — 82565 ASSAY OF CREATININE: CPT

## 2024-01-03 PROCEDURE — 82607 VITAMIN B-12: CPT

## 2024-01-03 PROCEDURE — 84443 ASSAY THYROID STIM HORMONE: CPT

## 2024-01-03 PROCEDURE — 84450 TRANSFERASE (AST) (SGOT): CPT

## 2024-01-03 PROCEDURE — 83036 HEMOGLOBIN GLYCOSYLATED A1C: CPT

## 2024-01-03 PROCEDURE — 36415 COLL VENOUS BLD VENIPUNCTURE: CPT

## 2024-01-03 RX ORDER — TRIAMCINOLONE ACETONIDE 1 MG/G
CREAM TOPICAL 2 TIMES DAILY
Qty: 30 G | Refills: 0 | Status: SHIPPED | OUTPATIENT
Start: 2024-01-03

## 2024-01-03 RX ORDER — CEPHALEXIN 500 MG/1
500 CAPSULE ORAL 3 TIMES DAILY
Qty: 21 CAPSULE | Refills: 0 | Status: SHIPPED | OUTPATIENT
Start: 2024-01-03 | End: 2024-01-10

## 2024-01-03 ASSESSMENT — ENCOUNTER SYMPTOMS
COLOR CHANGE: 1
WOUND: 1
VOMITING: 0
FEVER: 0

## 2024-01-03 NOTE — PROGRESS NOTES
Assessment & Plan:        ICD-10-CM    1. Cellulitis of right lower extremity  L03.115 cephALEXin (KEFLEX) 500 MG capsule      2. Dermatitis  L30.9 triamcinolone (KENALOG) 0.1 % external cream            Plan/Clinical Decision Making:    Patient with abrasions to right lower leg from falling off exercise equipement a week ago. Has developed increase pain. Has erythema, swelling, tenderness around abrasions. Has multiple excoriations of skin of shin. Has been using lotions. Discussed continued use of lotion for pruritic skin and can use triamcinolone for pruritus on lower leg. Can avoid using on infected abrasions and use antibiotic ointment instead. I prescribed course of Keflex.     MA dressed abrasions today.   Reviewed AIC of 10.3 result today. Has appointment with endocrinology tomorrow.       Return if symptoms worsen or fail to improve, for in 2-3 days.     At the end of the encounter, I discussed results, diagnosis, medications. Discussed red flags for immediate return to clinic/ER, as well as indications for follow up if no improvement. Patient understood and agreed to plan. Patient was stable for discharge.        Pooja Prather PA-C on 1/3/2024 at 10:19 AM          Subjective:     HPI:    Earle is a 73 year old male who presents to clinic today for the following health issues:  Chief Complaint   Patient presents with    Urgent Care    Derm Problem     Wound on right lower leg since last week, burn on lower leg near foot. H/O diabetes.     HPI    Patient diabetic has wounds on lower legs. Patient was on stair stepper. Didn't stop when he wanted today and fell last week and hit lower right legs. Has had a bit of itching on lower leg and has excoriations and wound on lower right anterior leg. Pain has increased. No fever.     GFR Estimate   Date Value Ref Range Status   09/13/2023 42 (L) >60 mL/min/1.73m2 Final   06/09/2021 48 (L) >60 mL/min/[1.73_m2] Final     Comment:     Non  GFR  Calc  Starting 12/18/2018, serum creatinine based estimated GFR (eGFR) will be   calculated using the Chronic Kidney Disease Epidemiology Collaboration   (CKD-EPI) equation.       GFR Estimate If Black   Date Value Ref Range Status   06/09/2021 55 (L) >60 mL/min/[1.73_m2] Final     Comment:      GFR Calc  Starting 12/18/2018, serum creatinine based estimated GFR (eGFR) will be   calculated using the Chronic Kidney Disease Epidemiology Collaboration   (CKD-EPI) equation.          Review of Systems   Constitutional:  Negative for fever.   Gastrointestinal:  Negative for vomiting.   Skin:  Positive for color change and wound.         Patient Active Problem List   Diagnosis    Psychological factors associated with another disorder    Mood disorder in conditions classified elsewhere    Occupational problem    Type 2 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, with long-term current use of insulin (H)    Erectile dysfunction    Hyperlipidemia with target LDL less than 100    CTS (carpal tunnel syndrome)    Diabetic polyneuropathy (H)    Presbyopia    Myopia    Cataracts, bilateral    Pain of right thumb    Subcutaneous mass    Combined form of nonsenile cataract of right eye    Colonic polyp    Elevated PSA    Heel fracture    Hyponatremia    Nephrolithiasis    Pain of finger of right hand    Sarcoidosis of lung (H24)    Sialoadenitis    Adhesive capsulitis of shoulder    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)    Chronic kidney disease, stage 3 (H)    Peripheral vascular disease, unspecified (H24)    Malignant neoplasm of prostate (H)    Chronic bilateral low back pain with bilateral sciatica    Pain of both sacroiliac joints    Lumbar radiculopathy        Past Medical History:   Diagnosis Date    Blepharitis of both eyes     BPH (benign prostatic hyperplasia)     Diabetes (H)     Diabetic  "neuropathy (H)     Diabetic retinopathy associated with diabetes mellitus due to underlying condition (H)     Dry eye syndrome     GERD (gastroesophageal reflux disease)     Goiter     Granulomatous disease (H)     HLD (hyperlipidemia)     HTN (hypertension)     Nonsenile cataract     Peripheral neuropathy        Social History     Tobacco Use    Smoking status: Never    Smokeless tobacco: Never   Substance Use Topics    Alcohol use: Yes     Comment: occasionaly             Objective:     Vitals:    01/03/24 1008   BP: 120/72   Pulse: 73   Resp: 16   Temp: 97.9  F (36.6  C)   TempSrc: Temporal   SpO2: 97%   Weight: 88.5 kg (195 lb)   Height: 1.727 m (5' 8\")         Physical Exam   EXAM:   Pleasant, alert, appropriate appearance. NAD.  Ext/musculoskeletal: Grossly intact, Good ROM of right ankle and foot. DP pulse 2+  Neuro: sensation intact to touch of right foot, though has change in sensation  Skin: Right lower leg with numerous excoriations. Several abrasions distal lower right leg with swelling, erythema and tenderness.       Results:  Results for orders placed or performed in visit on 01/03/24   Hemoglobin A1c     Status: Abnormal   Result Value Ref Range    Hemoglobin A1C 10.3 (H) 0.0 - 5.6 %       "

## 2024-01-03 NOTE — TELEPHONE ENCOUNTER
Spoke with patient in regards to obtaining blood glucose data for appointment scheduled on 1/4/24. Data obtained via phone and located below. Patient is using cyrus device and unable to upload at home.    7 day average- 244  14 day average- 283  30 day average- 285    281  236  222  226  246  286  270  260  315  265  275  257  303    Italia Still LPN 01/03/24 4:06 PM

## 2024-01-03 NOTE — TELEPHONE ENCOUNTER
Spoke with patient in regards to obtaining blood glucose data for appointment scheduled 1/4/2024. Patient requests call back tomorrow.     Italia Still LPN 01/03/24 9:26 AM

## 2024-01-04 ENCOUNTER — VIRTUAL VISIT (OUTPATIENT)
Dept: ENDOCRINOLOGY | Facility: CLINIC | Age: 74
End: 2024-01-04
Payer: COMMERCIAL

## 2024-01-04 ENCOUNTER — TELEPHONE (OUTPATIENT)
Dept: ENDOCRINOLOGY | Facility: CLINIC | Age: 74
End: 2024-01-04

## 2024-01-04 DIAGNOSIS — E11.3399 TYPE 2 DIABETES MELLITUS WITH MODERATE NONPROLIFERATIVE RETINOPATHY WITHOUT MACULAR EDEMA, WITH LONG-TERM CURRENT USE OF INSULIN, UNSPECIFIED LATERALITY (H): Primary | ICD-10-CM

## 2024-01-04 DIAGNOSIS — Z79.4 TYPE 2 DIABETES MELLITUS WITH MODERATE NONPROLIFERATIVE RETINOPATHY WITHOUT MACULAR EDEMA, WITH LONG-TERM CURRENT USE OF INSULIN, UNSPECIFIED LATERALITY (H): Primary | ICD-10-CM

## 2024-01-04 PROCEDURE — 99214 OFFICE O/P EST MOD 30 MIN: CPT | Mod: 95 | Performed by: PHYSICIAN ASSISTANT

## 2024-01-04 RX ORDER — TIRZEPATIDE 2.5 MG/.5ML
2.5 INJECTION, SOLUTION SUBCUTANEOUS
Qty: 5 ML | Refills: 0 | Status: SHIPPED | OUTPATIENT
Start: 2024-01-04 | End: 2024-04-16

## 2024-01-04 NOTE — NURSING NOTE
Patient notes injury to foot that became infected and he notes he saw urgent care.   Is the patient currently in the state of [place]? Unknown, patient asked multiple times but he did not give an answer.    Visit mode:VIDEO    If the visit is dropped, the patient can be reconnected by: VIDEO VISIT: Text to cell phone:   Telephone Information:   Mobile [phone]       Will anyone else be joining the visit? NO  (If patient encounters technical issues they should call [phone] :[phone])    How would you like to obtain your AVS? MyChart    Are changes needed to the allergy or medication list? Pt declined med review and Pt stated no med changes. When asked about allergies, patient referenced how he was itching all over but he isn't sure what is causing it. Patient didn't state any allergies he knew of.    Reason for visit: RECHECK (Patient notes fluctuation in blood sugar. Patient notes itching and is wondering what may be causing it. Ozempic caused diarrhea.)    Priscilla HERNÁNDEZF

## 2024-01-04 NOTE — LETTER
1/4/2024       RE: Earle Rene  [address]  [place] [address]     Dear Colleague,    Thank you for referring your patient, Earle Rene, to the [facility] [place] [facility]. Please see a copy of my visit note below.    Outcome for 01/02/24 2:31 PM: Left Voicemail   Karen Hawk MA  Outcome for 01/03/24 9:26 AM: Reached patient but requests call back at later time today.  Italia Still LPN   Outcome for 01/03/24 4:07 PM: Data obtained via phone and located below  Italia Still LPN     7 day average- 244  14 day average- 283  30 day average- 285    1/3  10 AM- 237  Took Insulin   11:17 AM- 137  12:30 PM- 181  4 PM- 354    Patient read off other numbers from cyrus:     281  236  222  226  246  286  270  260  315  265  275  257  303      Patient is showing 3/5 [place]CM met. A1c not in range and Aspirin not prescribed   Karen Hawk MA      Time of start: 10:00 am   Time of end: 10:18 am  Total duration of telephone visit:  18 minutes.  Providers location: offsite.  Patients location: [place].    HPI   Earle Rene is a 73 year old male with type 2 diabetes mellitus.  Pt's diabetes is complicated by retinopathy, nephropathy, neuropathy and ED.  Pt also has hx of hyperlipidemia, HTN, PVD, obesity, presumed localized prostate cancer, BPH, goiter with right thyroid nodule and osteoporosis.  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  For his diabetes, patient states he is taking Tresiba 60 units SQ at hs, Humalog 15-17  units with meals and  Jardiance 10 mg each am.  He stopped Ozempic due to severe diarrhea.  Most recent A1C 10.3 % on 1/2/2024.   He tells me he is using a Freestyle Libre2 sensor to monitor his blood sugar.  Pt provided me with blood sugar data today which I scanned in his note below.  His blood sugar values are too high. He has missed some Humalog doses.  On ROS today, fatigue.   Pt is being tx for  right leg cellulitis.  Skin is dry and pruritic.  Constipation.  Urinary incontinence.  Feet are painful, sore and numb. Denies foot ulcers.  Decrease auditory acuity.  He denies groin yeast infection, dysuria or hematuria at this time.  Pt denies n/v, SOB at rest, cough, fever, chills,chest pain, abd pain or diarrhea.    DIABETES CARE:  Retinopathy: yes; severe NPDR with macular edema. He was seen by Oph here in Nov 2023.   Nephropathy: yes- CKDStage3.  Pt is taking Cozaar daily.  Neuropathy: yes.   Foot exam.  No exam today.  Lipids: LDL 88 in Jan 2024.  Pt is taking Lipitor, Fish Oil and fenofibrate.  Taking ASA: yes.  CAD:no.  Mental health: hx of mood disorder per chart. Pt denies depression.  Insulin: Basal and meal time insulin.    DM meds: Jardiance.  Pt did NOT tolerate Ozempic- diarrhea.  MTM referral placed today to start Mounjaro.  Testing:  Freestyle Libre2 sensor.      ROS   Please see under HPI.     ALLERGIES:  Review of patient's allergies indicates no known allergies.      Current Outpatient Medications   Medication Sig Dispense Refill    tirzepatide (MOUNJARO) 2.5 MG/0.5ML pen Inject 2.5 mg Subcutaneous every 7 days 5 mL 0    alpha-lipoic acid 600 MG capsule Take 1 capsule (600 mg) by mouth daily 90 capsule 3    amLODIPine (NORVASC) 5 MG tablet TAKE 1 TABLET BY MOUTH AT BEDTIME 90 tablet 3    amoxicillin (AMOXIL) 500 MG capsule TAKE 1 CAPSULE ORAL EVERY EIGHT HOURS AS DIRECTED      ARTIFICIAL TEAR OP Apply to eye as needed      atorvastatin (LIPITOR) 20 MG tablet TAKE 1 TABLET BY MOUTH EVERY DAY 90 tablet 2    blood glucose (NO BRAND SPECIFIED) lancets standard Lancets that go with device, Test 3 times daily 300 each 3    cephALEXin (KEFLEX) 500 MG capsule Take 1 capsule (500 mg) by mouth 3 times daily for 7 days 21 capsule 0    Continuous Blood Gluc Sensor (FREESTYLE PETER 2 SENSOR) MISC 1 EACH EVERY 14 DAYS 1 EACH EVERY 14 DAYS. CHANGE EVERY 14 DAYS. 2 each 1    fenofibrate 54 MG tablet Take 1  "tablet (54 mg) by mouth daily (Patient taking differently: Take 54 mg by mouth at bedtime) 90 tablet 0    finasteride (PROSCAR) 5 MG tablet Take 1 tablet (5 mg) by mouth daily 90 tablet 3    gabapentin (NEURONTIN) 300 MG capsule Take 1 capsule (300 mg) by mouth 2 times daily 60 capsule 3    HUMALOG KWIKPEN 100 UNIT/ML soln Inject 15-17  units with meals, plus correction. Pt uses approx 65 units in 24 hrs. 60 mL 1    insulin degludec (TRESIBA FLEXTOUCH) 100 UNIT/ML pen INJECT 64 UNITS SUBCUTANEOUSLY AT BEDTIME. 60 mL 3    insulin pen needle (B-D U/F) 31G X 5 MM miscellaneous Use 4 time(s) per day.  Please dispense as BD Pen Needle Mini U/F 31G x 5  each 3    insulin pen needle (B-D U/F) 31G X 5 MM Use 4 times per day.  Please dispense as BD Pen Needle Mini U/F 31G x 5  each 3    insulin syringe 31G X 5/16\" 0.5 ML MISC Use three syringes daily 270 each 1    JARDIANCE 10 MG TABS tablet TAKE 1 TABLET (10 MG) BY MOUTH DAILY. 90 tablet 1    loratadine (CLARITIN) 10 MG tablet Take 1 tablet (10 mg) by mouth daily 90 tablet 0    losartan (COZAAR) 100 MG tablet TAKE 1 TABLET BY MOUTH EVERYDAY AT BEDTIME 90 tablet 0    mirabegron (MYRBETRIQ) 25 MG 24 hr tablet Take 1 tablet (25 mg) by mouth daily 90 tablet 3    sodium bicarbonate 650 MG tablet Take 2 tablets (1,300 mg) by mouth 2 times daily 270 tablet 3    tamsulosin (FLOMAX) 0.4 MG capsule Take 2 capsules (0.8 mg) by mouth daily 180 capsule 5    triamcinolone (KENALOG) 0.1 % external cream Apply topically 2 times daily 30 g 0    VITAMIN D3 25 MCG (1000 UT) tablet TAKE 2 TABLETS (50 MCG) BY MOUTH DAILY 180 tablet 3     Family Hx   No change.     Personal Hx   Smoke: none.   ETOH: none.    with grown children.    PMH   1. Type 2 Diabetes Mellitus dx at age 43.   2. Neuropathy.  3. Nephropathy.   4. ED.   5. Dyslipidemia.   6. Nephrolithiasis.   7. Decrease auditory acuity.   8. Sarcoidosis-lung.   9. Goiter.   10. S/P T & A.   11. S/P FX right heel.   12. " Vit D def.   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   14. CT chest- ? granulomas.   15. Retinopathy.  16. Goiter.  Past Medical History:   Diagnosis Date    Blepharitis of both eyes     BPH (benign prostatic hyperplasia)     Diabetes (H)     Diabetic neuropathy (H)     Diabetic retinopathy associated with diabetes mellitus due to underlying condition (H)     Dry eye syndrome     GERD (gastroesophageal reflux disease)     Goiter     Granulomatous disease (H)     HLD (hyperlipidemia)     HTN (hypertension)     Nonsenile cataract     Peripheral neuropathy      Past Surgical History:   Procedure Laterality Date    ------------OTHER-------------      back of neck abscess drainage in OR    AS RAD RESEC TONSIL/PILLARS Bilateral 1961    CATARACT IOL, RT/LT Left     COLONOSCOPY  7/29/2013    Procedure: COLONOSCOPY;;  Surgeon: Montana Pascal MD;  Location: [facility]    EXCISE MASS UPPER EXTREMITY Right 11/11/2019    Procedure: EXCISION, MASS, UPPER EXTREMITY, RIGHT SHOULDER;  Surgeon: Johana Choudhury MD;  [facility]    INJECT EPIDURAL LUMBAR Left 4/20/2023    Procedure: INJECTION, SPINE, LUMBAR, EPIDURAL (L4-L5);  Surgeon: Mahamed Vázquez MD;  Location: [facility] OR    INJECT SACROILIAC JOINT Bilateral 9/13/2022    Procedure: Bilateral sacroiliac joint injection;  Surgeon: Collin Mata MD;  Location: [facility] OR    INTRAVITREAL INJECTION CHEMOTHERAPY Right 12/30/2019    Procedure: INTRAVITREAL Bevacizumab injection;  Surgeon: Milton Maki MD;  [facility]    PHACOEMULSIFICATION CLEAR CORNEA WITH STANDARD INTRAOCULAR LENS IMPLANT Right 12/30/2019    Procedure: PHACOEMULSIFICATION, CATARACT, WITH INTRAOCULAR LENS IMPLANT;  Surgeon: Milton Maki MD;  [facility]    PHACOEMULSIFICATION WITH STANDARD INTRAOCULAR LENS IMPLANT Left 6/21/2019    Procedure: Left Eye Cataract Removal with Intraocular Lens Implant with Intraoperative Avastin Injection;  Surgeon: Lacey Eugene MD;  Location: [facility]     siladenatis  11/2017     Physical Exam     No exam today.    Results   Creatinine   Date Value Ref Range Status   01/03/2024 1.85 (H) 0.67 - 1.17 mg/dL Final   06/09/2021 1.45 (H) 0.66 - 1.25 mg/dL Final     GFR Estimate   Date Value Ref Range Status   01/03/2024 38 (L) >60 mL/min/1.73m2 Final   06/09/2021 48 (L) >60 mL/min/[1.73_m2] Final     Comment:     Non  GFR Calc  Starting 12/18/2018, serum creatinine based estimated GFR (eGFR) will be   calculated using the Chronic Kidney Disease Epidemiology Collaboration   (CKD-EPI) equation.       Hemoglobin A1C   Date Value Ref Range Status   01/03/2024 10.3 (H) 0.0 - 5.6 % Final     Comment:     Normal <5.7%   Prediabetes 5.7-6.4%    Diabetes 6.5% or higher     Note: Adopted from ADA consensus guidelines.   06/09/2021 8.2 (H) 0 - 5.6 % Final     Comment:     Normal <5.7% Prediabetes 5.7-6.4%  Diabetes 6.5% or higher - adopted from ADA   consensus guidelines.       Potassium   Date Value Ref Range Status   09/13/2023 4.5 3.4 - 5.3 mmol/L Final   06/09/2022 4.3 3.4 - 5.3 mmol/L Final   06/09/2021 4.2 3.4 - 5.3 mmol/L Final     ALT   Date Value Ref Range Status   03/12/2021 36 0 - 70 U/L Final     AST   Date Value Ref Range Status   01/03/2024 23 0 - 45 U/L Final   03/12/2021 24 0 - 45 U/L Final     TSH   Date Value Ref Range Status   01/03/2024 1.76 0.30 - 4.20 uIU/mL Final   06/09/2021 0.98 0.40 - 4.00 mU/L Final     T4 Free   Date Value Ref Range Status   10/21/2014 1.00 0.76 - 1.46 ng/dL Final     Comment:     Effective 7/30/2014, the reference range for this assay has changed to reflect   new instrumentation/methodology.           Cholesterol   Date Value Ref Range Status   01/03/2024 184 <200 mg/dL Final   02/06/2023 153 <200 mg/dL Final   06/18/2019 145 <200 mg/dL Final   03/06/2018 101 <200 mg/dL Final     HDL Cholesterol   Date Value Ref Range Status   06/18/2019 38 (L) >39 mg/dL Final   03/06/2018 32 (L) >39 mg/dL Final     Direct Measure HDL    Date Value Ref Range Status   01/03/2024 32 (L) >=40 mg/dL Final   02/06/2023 40 >=40 mg/dL Final     LDL Cholesterol Calculated   Date Value Ref Range Status   01/03/2024 88 <=100 mg/dL Final   02/06/2023 57 <=100 mg/dL Final   06/18/2019 49 <100 mg/dL Final     Comment:     Desirable:       <100 mg/dl   03/06/2018 36 <100 mg/dL Final     Comment:     Desirable:       <100 mg/dl     Triglycerides   Date Value Ref Range Status   01/03/2024 319 (H) <150 mg/dL Final   02/06/2023 279 (H) <150 mg/dL Final   06/18/2019 289 (H) <150 mg/dL Final     Comment:     Borderline high:  150-199 mg/dl  High:             200-499 mg/dl  Very high:       >499 mg/dl     03/06/2018 166 (H) <150 mg/dL Final     Comment:     Borderline high:  150-199 mg/dl  High:             200-499 mg/dl  Very high:       >499 mg/dl       Cholesterol/HDL Ratio   Date Value Ref Range Status   09/09/2014 3.8 0.0 - 5.0 Final   11/20/2013 5.8 (H) 0.0 - 5.0 Final       ASSESSMENT/PLAN:     1. TYPE 2 DIABETES MELLITUS: Type 2 diabetes mellitus complicated by severe diabetic retinopathy, nephropathy- CKD stage 3, neuropathy and ED. Pt also has PVD.  A1C and blood sugars are too high.  Reminded pt to take Humalog for ALL food intake and take Humalog 10-15 minutes BEFORE eating.  Start Mounjaro 2.5 mg subcutaneous once a week and I placed MTM referral for additional titration of Mounjaro.  I reviewed how Mounjaro works and possible side effects of the drug including n/v, GI distress, constipation, hypoglycemia and rare risk of pancreatitis.  Pt denies hx of pancreatitis or gastroparesis.  Continue current dose of Tresiba, Humalog and Jardiance.  Most recent creat 1.85 with GFR 38 mL/min on 1/3/2024.  Reminded pt keep himself well hydrated while taking Jardiance.  Encouraged patient to make healthy food choices, reduce his food portions with meals, avoid snacking and walk on his treadmill and exercise.  Pt remains on daily ASA.     2. GOITER: Not addressed  today.   TSH normal in Jan 2024.    3. NEPHROPATHY: Pt has CKDstage3 and followed here by renal staff.  Most recent creat/GFR as above.    Pt is taking Cozaar.  Pt on ARB and SGLT-2.   Recent /72.    4.  RETINOPATHY:  Seen here by Oph in 11/2023.    5.  NEUROPATHY: Continue Gabapentin,alpha-lipoic acid and topical cream.  Seen by Podiatry in 9/2023.    6. DYSLIPIDEMIA: LDL 88 in 1/2024.  Pt is taking Lipitor, fish oil and Fenofibrate.    7. OBESITY: See under # 1 above.  Start Mounjaro as above.    8.  FOLLOW UP: with me in 2, 3 and 5 months.  MTM referral placed to to assist with titration of Mounjaro dose and lowering of insulin doses if needed.  Mounjaro 2.5 mg pen ordered today.    Time spent reviewing pt's chart notes,labs and glucose data today= 6 minutes.  Time for telephone visit today=  18 minutes.  Time for documentation today = 15 minutes.    TOTAL TIME FOR VISIT TODAY = 39 minutes    Pamela Laura PA-C

## 2024-01-04 NOTE — TELEPHONE ENCOUNTER
PA Initiation    Medication: MOUNJARO 2.5 MG/0.5ML SC SOPN  Insurance Company: [employer] - Phone [phone] Fax [phone]  Pharmacy Filling the Rx: [id number] - [place], [place] - [address]  Filling Pharmacy Phone:    Filling Pharmacy Fax:    Start Date: 1/4/2024       CHRIS MONTOYA (Key: [id number])

## 2024-01-05 NOTE — PROGRESS NOTES
Time of start: 10:00 am   Time of end: 10:18 am  Total duration of telephone visit:  18 minutes.  Providers location: offsite.  Patients location: [place].    HPI   Earle Rene is a 73 year old male with type 2 diabetes mellitus.  Pt's diabetes is complicated by retinopathy, nephropathy, neuropathy and ED.  Pt also has hx of hyperlipidemia, HTN, PVD, obesity, presumed localized prostate cancer, BPH, goiter with right thyroid nodule and osteoporosis.  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  For his diabetes, patient states he is taking Tresiba 60 units SQ at hs, Humalog 15-17  units with meals and  Jardiance 10 mg each am.  He stopped Ozempic due to severe diarrhea.  Most recent A1C 10.3 % on 1/2/2024.   He tells me he is using a Freestyle Libre2 sensor to monitor his blood sugar.  Pt provided me with blood sugar data today which I scanned in his note below.  His blood sugar values are too high. He has missed some Humalog doses.  On ROS today, fatigue.   Pt is being tx for right leg cellulitis.  Skin is dry and pruritic.  Constipation.  Urinary incontinence.  Feet are painful, sore and numb. Denies foot ulcers.  Decrease auditory acuity.  He denies groin yeast infection, dysuria or hematuria at this time.  Pt denies n/v, SOB at rest, cough, fever, chills,chest pain, abd pain or diarrhea.    DIABETES CARE:  Retinopathy: yes; severe NPDR with macular edema. He was seen by Oph here in Nov 2023.   Nephropathy: yes- CKDStage3.  Pt is taking Cozaar daily.  Neuropathy: yes.   Foot exam.  No exam today.  Lipids: LDL 88 in Jan 2024.  Pt is taking Lipitor, Fish Oil and fenofibrate.  Taking ASA: yes.  CAD:no.  Mental health: hx of mood disorder per chart. Pt denies depression.  Insulin: Basal and meal time insulin.    DM meds: Jardiance.  Pt did NOT tolerate Ozempic- diarrhea.  MTM referral placed today to start Mounjaro.  Testing:  Freestyle Libre2 sensor.      ROS   Please see under HPI.     ALLERGIES:  Review of  "patient's allergies indicates no known allergies.      Current Outpatient Medications   Medication Sig Dispense Refill    tirzepatide (MOUNJARO) 2.5 MG/0.5ML pen Inject 2.5 mg Subcutaneous every 7 days 5 mL 0    alpha-lipoic acid 600 MG capsule Take 1 capsule (600 mg) by mouth daily 90 capsule 3    amLODIPine (NORVASC) 5 MG tablet TAKE 1 TABLET BY MOUTH AT BEDTIME 90 tablet 3    amoxicillin (AMOXIL) 500 MG capsule TAKE 1 CAPSULE ORAL EVERY EIGHT HOURS AS DIRECTED      ARTIFICIAL TEAR OP Apply to eye as needed      atorvastatin (LIPITOR) 20 MG tablet TAKE 1 TABLET BY MOUTH EVERY DAY 90 tablet 2    blood glucose (NO BRAND SPECIFIED) lancets standard Lancets that go with device, Test 3 times daily 300 each 3    cephALEXin (KEFLEX) 500 MG capsule Take 1 capsule (500 mg) by mouth 3 times daily for 7 days 21 capsule 0    Continuous Blood Gluc Sensor (FREESTYLE PETER 2 SENSOR) MISC 1 EACH EVERY 14 DAYS 1 EACH EVERY 14 DAYS. CHANGE EVERY 14 DAYS. 2 each 1    fenofibrate 54 MG tablet Take 1 tablet (54 mg) by mouth daily (Patient taking differently: Take 54 mg by mouth at bedtime) 90 tablet 0    finasteride (PROSCAR) 5 MG tablet Take 1 tablet (5 mg) by mouth daily 90 tablet 3    gabapentin (NEURONTIN) 300 MG capsule Take 1 capsule (300 mg) by mouth 2 times daily 60 capsule 3    HUMALOG KWIKPEN 100 UNIT/ML soln Inject 15-17  units with meals, plus correction. Pt uses approx 65 units in 24 hrs. 60 mL 1    insulin degludec (TRESIBA FLEXTOUCH) 100 UNIT/ML pen INJECT 64 UNITS SUBCUTANEOUSLY AT BEDTIME. 60 mL 3    insulin pen needle (B-D U/F) 31G X 5 MM miscellaneous Use 4 time(s) per day.  Please dispense as BD Pen Needle Mini U/F 31G x 5  each 3    insulin pen needle (B-D U/F) 31G X 5 MM Use 4 times per day.  Please dispense as BD Pen Needle Mini U/F 31G x 5  each 3    insulin syringe 31G X 5/16\" 0.5 ML MISC Use three syringes daily 270 each 1    JARDIANCE 10 MG TABS tablet TAKE 1 TABLET (10 MG) BY MOUTH DAILY. 90 " tablet 1    loratadine (CLARITIN) 10 MG tablet Take 1 tablet (10 mg) by mouth daily 90 tablet 0    losartan (COZAAR) 100 MG tablet TAKE 1 TABLET BY MOUTH EVERYDAY AT BEDTIME 90 tablet 0    mirabegron (MYRBETRIQ) 25 MG 24 hr tablet Take 1 tablet (25 mg) by mouth daily 90 tablet 3    sodium bicarbonate 650 MG tablet Take 2 tablets (1,300 mg) by mouth 2 times daily 270 tablet 3    tamsulosin (FLOMAX) 0.4 MG capsule Take 2 capsules (0.8 mg) by mouth daily 180 capsule 5    triamcinolone (KENALOG) 0.1 % external cream Apply topically 2 times daily 30 g 0    VITAMIN D3 25 MCG (1000 UT) tablet TAKE 2 TABLETS (50 MCG) BY MOUTH DAILY 180 tablet 3     Family Hx   No change.     Personal Hx   Smoke: none.   ETOH: none.    with grown children.    PMH   1. Type 2 Diabetes Mellitus dx at age 43.   2. Neuropathy.  3. Nephropathy.   4. ED.   5. Dyslipidemia.   6. Nephrolithiasis.   7. Decrease auditory acuity.   8. Sarcoidosis-lung.   9. Goiter.   10. S/P T & A.   11. S/P FX right heel.   12. Vit D def.   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   14. CT chest- ? granulomas.   15. Retinopathy.  16. Goiter.  Past Medical History:   Diagnosis Date    Blepharitis of both eyes     BPH (benign prostatic hyperplasia)     Diabetes (H)     Diabetic neuropathy (H)     Diabetic retinopathy associated with diabetes mellitus due to underlying condition (H)     Dry eye syndrome     GERD (gastroesophageal reflux disease)     Goiter     Granulomatous disease (H)     HLD (hyperlipidemia)     HTN (hypertension)     Nonsenile cataract     Peripheral neuropathy      Past Surgical History:   Procedure Laterality Date    ------------OTHER-------------      back of neck abscess drainage in OR    AS RAD RESEC TONSIL/PILLARS Bilateral 1961    CATARACT IOL, RT/LT Left     COLONOSCOPY  7/29/2013    Procedure: COLONOSCOPY;;  Surgeon: Montana Pascal MD;  Location: [facility]    EXCISE MASS UPPER EXTREMITY Right 11/11/2019    Procedure: EXCISION, MASS, UPPER  EXTREMITY, RIGHT SHOULDER;  Surgeon: Johana Choudhury MD;  [facility]    INJECT EPIDURAL LUMBAR Left 4/20/2023    Procedure: INJECTION, SPINE, LUMBAR, EPIDURAL (L4-L5);  Surgeon: Mahamed Vázquez MD;  Location: [facility]    INJECT SACROILIAC JOINT Bilateral 9/13/2022    Procedure: Bilateral sacroiliac joint injection;  Surgeon: Collin Mata MD;  Location: [facility] OR    INTRAVITREAL INJECTION CHEMOTHERAPY Right 12/30/2019    Procedure: INTRAVITREAL Bevacizumab injection;  Surgeon: Milton Maki MD;  [facility]    PHACOEMULSIFICATION CLEAR CORNEA WITH STANDARD INTRAOCULAR LENS IMPLANT Right 12/30/2019    Procedure: PHACOEMULSIFICATION, CATARACT, WITH INTRAOCULAR LENS IMPLANT;  Surgeon: Milton Maki MD;  [facility]    PHACOEMULSIFICATION WITH STANDARD INTRAOCULAR LENS IMPLANT Left 6/21/2019    Procedure: Left Eye Cataract Removal with Intraocular Lens Implant with Intraoperative Avastin Injection;  Surgeon: Lacey Eugene MD;  [facility]    siladenatis  11/2017     Physical Exam     No exam today.    Results   Creatinine   Date Value Ref Range Status   01/03/2024 1.85 (H) 0.67 - 1.17 mg/dL Final   06/09/2021 1.45 (H) 0.66 - 1.25 mg/dL Final     GFR Estimate   Date Value Ref Range Status   01/03/2024 38 (L) >60 mL/min/1.73m2 Final   06/09/2021 48 (L) >60 mL/min/[1.73_m2] Final     Comment:     Non  GFR Calc  Starting 12/18/2018, serum creatinine based estimated GFR (eGFR) will be   calculated using the Chronic Kidney Disease Epidemiology Collaboration   (CKD-EPI) equation.       Hemoglobin A1C   Date Value Ref Range Status   01/03/2024 10.3 (H) 0.0 - 5.6 % Final     Comment:     Normal <5.7%   Prediabetes 5.7-6.4%    Diabetes 6.5% or higher     Note: Adopted from ADA consensus guidelines.   06/09/2021 8.2 (H) 0 - 5.6 % Final     Comment:     Normal <5.7% Prediabetes 5.7-6.4%  Diabetes 6.5% or higher - adopted from ADA   consensus guidelines.       Potassium    Date Value Ref Range Status   09/13/2023 4.5 3.4 - 5.3 mmol/L Final   06/09/2022 4.3 3.4 - 5.3 mmol/L Final   06/09/2021 4.2 3.4 - 5.3 mmol/L Final     ALT   Date Value Ref Range Status   03/12/2021 36 0 - 70 U/L Final     AST   Date Value Ref Range Status   01/03/2024 23 0 - 45 U/L Final   03/12/2021 24 0 - 45 U/L Final     TSH   Date Value Ref Range Status   01/03/2024 1.76 0.30 - 4.20 uIU/mL Final   06/09/2021 0.98 0.40 - 4.00 mU/L Final     T4 Free   Date Value Ref Range Status   10/21/2014 1.00 0.76 - 1.46 ng/dL Final     Comment:     Effective 7/30/2014, the reference range for this assay has changed to reflect   new instrumentation/methodology.           Cholesterol   Date Value Ref Range Status   01/03/2024 184 <200 mg/dL Final   02/06/2023 153 <200 mg/dL Final   06/18/2019 145 <200 mg/dL Final   03/06/2018 101 <200 mg/dL Final     HDL Cholesterol   Date Value Ref Range Status   06/18/2019 38 (L) >39 mg/dL Final   03/06/2018 32 (L) >39 mg/dL Final     Direct Measure HDL   Date Value Ref Range Status   01/03/2024 32 (L) >=40 mg/dL Final   02/06/2023 40 >=40 mg/dL Final     LDL Cholesterol Calculated   Date Value Ref Range Status   01/03/2024 88 <=100 mg/dL Final   02/06/2023 57 <=100 mg/dL Final   06/18/2019 49 <100 mg/dL Final     Comment:     Desirable:       <100 mg/dl   03/06/2018 36 <100 mg/dL Final     Comment:     Desirable:       <100 mg/dl     Triglycerides   Date Value Ref Range Status   01/03/2024 319 (H) <150 mg/dL Final   02/06/2023 279 (H) <150 mg/dL Final   06/18/2019 289 (H) <150 mg/dL Final     Comment:     Borderline high:  150-199 mg/dl  High:             200-499 mg/dl  Very high:       >499 mg/dl     03/06/2018 166 (H) <150 mg/dL Final     Comment:     Borderline high:  150-199 mg/dl  High:             200-499 mg/dl  Very high:       >499 mg/dl       Cholesterol/HDL Ratio   Date Value Ref Range Status   09/09/2014 3.8 0.0 - 5.0 Final   11/20/2013 5.8 (H) 0.0 - 5.0 Final        ASSESSMENT/PLAN:     1. TYPE 2 DIABETES MELLITUS: Type 2 diabetes mellitus complicated by severe diabetic retinopathy, nephropathy- CKD stage 3, neuropathy and ED. Pt also has PVD.  A1C and blood sugars are too high.  Reminded pt to take Humalog for ALL food intake and take Humalog 10-15 minutes BEFORE eating.  Start Mounjaro 2.5 mg subcutaneous once a week and I placed MTM referral for additional titration of Mounjaro.  I reviewed how Mounjaro works and possible side effects of the drug including n/v, GI distress, constipation, hypoglycemia and rare risk of pancreatitis.  Pt denies hx of pancreatitis or gastroparesis.  Continue current dose of Tresiba, Humalog and Jardiance.  Most recent creat 1.85 with GFR 38 mL/min on 1/3/2024.  Reminded pt keep himself well hydrated while taking Jardiance.  Encouraged patient to make healthy food choices, reduce his food portions with meals, avoid snacking and walk on his treadmill and exercise.  Pt remains on daily ASA.     2. GOITER: Not addressed today.   TSH normal in Jan 2024.    3. NEPHROPATHY: Pt has CKDstage3 and followed here by renal staff.  Most recent creat/GFR as above.    Pt is taking Cozaar.  Pt on ARB and SGLT-2.   Recent /72.    4.  RETINOPATHY:  Seen here by Oph in 11/2023.    5.  NEUROPATHY: Continue Gabapentin,alpha-lipoic acid and topical cream.  Seen by Podiatry in 9/2023.    6. DYSLIPIDEMIA: LDL 88 in 1/2024.  Pt is taking Lipitor, fish oil and Fenofibrate.    7. OBESITY: See under # 1 above.  Start Mounjaro as above.    8.  FOLLOW UP: with me in 2, 3 and 5 months.  MTM referral placed to to assist with titration of Mounjaro dose and lowering of insulin doses if needed.  Mounjaro 2.5 mg pen ordered today.    Time spent reviewing pt's chart notes,labs and glucose data today= 6 minutes.  Time for telephone visit today=  18 minutes.  Time for documentation today = 15 minutes.    TOTAL TIME FOR VISIT TODAY = 39 minutes    Pamela Laura  RAJANI

## 2024-01-08 NOTE — TELEPHONE ENCOUNTER
Prior Authorization Approval    Medication: MOUNJARO 2.5 MG/0.5ML SC SOPN  Authorization Effective Date: 1/1/2024  Authorization Expiration Date: 1/6/2025  Approved Dose/Quantity: 2ml per 28 days  Reference #: [id number]   Insurance Company: [employer] - Phone [phone] Fax [phone]  Expected CoPay: $ 11.2  CoPay Card Available: No    Financial Assistance Needed: no  Which Pharmacy is filling the prescription: [id number] - [place], [place] - [address]  Pharmacy Notified: yes  Patient Notified: pharmacy will notify pt.

## 2024-01-10 ENCOUNTER — TELEPHONE (OUTPATIENT)
Dept: ENDOCRINOLOGY | Facility: CLINIC | Age: 74
End: 2024-01-10
Payer: COMMERCIAL

## 2024-01-10 NOTE — TELEPHONE ENCOUNTER
TALKED TO PT WANTS ME CALL HIM BACK   IN 45 MIN AROUND 3:15 PM TODAY 01/10/2024 TO FINISH SCHEDULING HIS RETURN DIABETES VISITS      PER CHAZ GRANADOS  CHECK OUT INSTRUCTIONS HE NEEDS TO BE SEEN   IN 2,3  AND 5 MONTHS , AND PT PREFER IN PERSON VISITS   SCHEDULED THEM WILL VERIFY WITH HIM THEY WILL WORK OUT FOR HIM   Jocelynn Patel on 1/10/2024 at 2:31 PM

## 2024-01-10 NOTE — TELEPHONE ENCOUNTER
Talked to patient  as of 330 pm and let him know all the appointments are scheduled     with rosenda jolly  April, May and June 2024   He verified they will be ge Patel on 1/10/2024 at 3:31 PM    
Below [place]mbilicus

## 2024-01-17 DIAGNOSIS — Z79.4 TYPE 2 DIABETES MELLITUS WITH DIABETIC NEPHROPATHY, WITH LONG-TERM CURRENT USE OF INSULIN (H): ICD-10-CM

## 2024-01-17 DIAGNOSIS — E11.21 TYPE 2 DIABETES MELLITUS WITH DIABETIC NEPHROPATHY, WITH LONG-TERM CURRENT USE OF INSULIN (H): ICD-10-CM

## 2024-01-17 RX ORDER — LOSARTAN POTASSIUM 100 MG/1
100 TABLET ORAL AT BEDTIME
Qty: 90 TABLET | Refills: 3 | Status: SHIPPED | OUTPATIENT
Start: 2024-01-17 | End: 2024-07-09

## 2024-01-17 NOTE — TELEPHONE ENCOUNTER
Losartan Potassium 100mg daily at h.s.        Last written prescription date: 9/28/2023        Last fill quantity: 90, # refills: 0        Last office visit: 9/29/2023        Future office visit: 3/27/2024        Is this a controlled substance?  No         Last crt on 1/3/24 stable at 1.85.

## 2024-02-03 DIAGNOSIS — E78.5 HYPERLIPIDEMIA LDL GOAL <100: ICD-10-CM

## 2024-02-06 RX ORDER — ATORVASTATIN CALCIUM 20 MG/1
20 TABLET, FILM COATED ORAL DAILY
Qty: 90 TABLET | Refills: 0 | Status: SHIPPED | OUTPATIENT
Start: 2024-02-06 | End: 2024-03-21

## 2024-02-06 NOTE — TELEPHONE ENCOUNTER
Last Clinic Visit: 3/22/2023 [facility] [place]    Required Lab(s)- due.  Lissette refill of Atorvastatin was sent for a 90 day supply and FYI to [facility].  - >12 months last ALT    Lab Results   Component Value Date    ALT 36 03/12/2021

## 2024-02-15 DIAGNOSIS — Z79.4 TYPE 2 DIABETES MELLITUS WITH BOTH EYES AFFECTED BY MILD NONPROLIFERATIVE RETINOPATHY AND MACULAR EDEMA, WITH LONG-TERM CURRENT USE OF INSULIN (H): ICD-10-CM

## 2024-02-15 DIAGNOSIS — E11.3213 TYPE 2 DIABETES MELLITUS WITH BOTH EYES AFFECTED BY MILD NONPROLIFERATIVE RETINOPATHY AND MACULAR EDEMA, WITH LONG-TERM CURRENT USE OF INSULIN (H): ICD-10-CM

## 2024-02-15 NOTE — TELEPHONE ENCOUNTER
HUMALOG 100 UNIT/ML KWIKPEN     Last Written Prescription Date:  5/12/23  Last Fill Quantity: 60 ml ,   # refills: 1  Last Office Visit : 1/4/24  Future Office visit:  4/16/24    Routing refill request to provider for review/approval because:  Insulin - refilled per clinic

## 2024-02-16 RX ORDER — INSULIN LISPRO 100 [IU]/ML
INJECTION, SOLUTION INTRAVENOUS; SUBCUTANEOUS
Qty: 75 ML | Refills: 1 | Status: SHIPPED | OUTPATIENT
Start: 2024-02-16 | End: 2024-04-16

## 2024-02-21 ENCOUNTER — VIRTUAL VISIT (OUTPATIENT)
Dept: PHARMACY | Facility: CLINIC | Age: 74
End: 2024-02-21
Payer: COMMERCIAL

## 2024-02-21 DIAGNOSIS — Z78.9 TAKES DIETARY SUPPLEMENTS: ICD-10-CM

## 2024-02-21 DIAGNOSIS — E11.3311 TYPE 2 DIABETES MELLITUS WITH MODERATE NONPROLIFERATIVE RETINOPATHY OF RIGHT EYE AND MACULAR EDEMA, UNSPECIFIED WHETHER LONG TERM INSULIN USE (H): Primary | ICD-10-CM

## 2024-02-21 DIAGNOSIS — E78.5 HYPERLIPIDEMIA WITH TARGET LDL LESS THAN 100: ICD-10-CM

## 2024-02-21 DIAGNOSIS — E11.42 DIABETIC POLYNEUROPATHY ASSOCIATED WITH TYPE 2 DIABETES MELLITUS (H): ICD-10-CM

## 2024-02-21 DIAGNOSIS — N18.31 STAGE 3A CHRONIC KIDNEY DISEASE (H): ICD-10-CM

## 2024-02-21 DIAGNOSIS — I10 ESSENTIAL HYPERTENSION: ICD-10-CM

## 2024-02-21 DIAGNOSIS — N40.1 BENIGN NON-NODULAR PROSTATIC HYPERPLASIA WITH LOWER URINARY TRACT SYMPTOMS: ICD-10-CM

## 2024-02-21 PROCEDURE — 99605 MTMS BY PHARM NP 15 MIN: CPT | Mod: 93

## 2024-02-21 NOTE — LETTER
February 21, 2024  Earle Rene  [address] [place] [address]    Dear Mr. Rene, LISA [facility] DIABETES [facility]     Thank you for talking with me on Feb 21, 2024 about your health and medications. As a follow-up to our conversation, I have included two documents:      Your Recommended To-Do List has steps you should take to get the best results from your medications.  Your Medication List will help you keep track of your medications and how to take them.    If you want to talk about these documents, please call Theo Gong RPH at phone: [phone], Monday-Friday 8-4:30pm.    I look forward to working with you and your doctors to make sure your medications work well for you.    Sincerely,  Teho Gong RPH  [facility] Pharmacist, [facility]

## 2024-02-21 NOTE — LETTER
"Recommended To-Do List      Prepared on: 02/21/2024       You can get the best results from your medications by completing the items on this \"To-Do List.\"      Bring your To-Do List when you go to your doctor. And, share it with your family or caregivers.    My To-Do List:  What we talked about: What I should do:    We talked about your current dose of Mounjaro   CONTINUE Mounjaro 2.5 mg weekly for the next 2 months. We will increase next time we speak if the constipation improves.              "

## 2024-02-21 NOTE — PROGRESS NOTES
Medication Therapy Management (MTM) Encounter    ASSESSMENT:                            Medication Adherence/Access: See below for considerations    Type 2 Diabetes: A1C above goal of < 7%.  Marked improvement in FPG and RPG since initiation of Mounjaro. FPG is nearing goal of < 130.  Patient has been able to decrease both his short and long-acting insulin by a small amount on his own and is comfortable continuing to do this over the next 2 months. Given patient has 2-month supply of 2.5 mg Mounjaro and is still experiencing constipation, reasonable to continue the 2.5 mg dose for the next few months and increase further as tolerated at next follow-up.    Hypertension/CKD: Consistently achieving JNC8 goal < 140/90. Continue current therapy    Hyperlipidemia: Stable    UI/BPH: Appears stable    Supplements/OTC: Stable    PLAN:                            Continue Mounjaro 2.5 mg weekly. No changes today. Keep up the great work!    Let me know if you have any lows < 70    Endocrine Team & Next Follow-Up:  4/1/2024 with Theo  4/16/2024 with Karla Laura PA-C    SUBJECTIVE/OBJECTIVE:                          Earle Rene is a 73 year old male called for an initial visit. He was referred to me from Karla Laura PA-C.      Reason for visit: Medication Therapy Management (MTM).    Allergies/ADRs: Reviewed in chart  Past Medical History: Reviewed in chart  Social History     Tobacco Use    Smoking status: Never    Smokeless tobacco: Never   Substance Use Topics    Alcohol use: Yes     Comment: occasionaly    Drug use: No        Medication Adherence/Access: Adherence is reflected well in the dispense report.     Type 2 Diabetes:   Diabetes Medication(s)       Insulin       HUMALOG KWIKPEN 100 UNIT/ML soln INJECT 15 units twice daily      insulin degludec (TRESIBA FLEXTOUCH) 100 UNIT/ML pen INJECT 60 UNITS SUBCUTANEOUSLY AT BEDTIME.       Sodium-Glucose Co-Transporter 2 (SGLT2) Inhibitors       JARDIANCE 10 MG TABS tablet TAKE  "1 TABLET (10 MG) BY MOUTH DAILY.       Incretin Mimetic Agents       tirzepatide (MOUNJARO) 2.5 MG/0.5ML pen Inject 2.5 mg Subcutaneous every 7 days. Some constipation, but tolerable per patient. Very pleased with appetite suppression and improvement in sugars.     mg once daily  Gabapentin 300 mg twice daily for neuropathic pain       Blood sugar monitoring: Prior to Mounjaro, was 300-350. Now since he has started this, he rarely goes above 200. FPG average is 140-150. No lows <  70.        Urine Albumin:   Lab Results   Component Value Date    UMALCR 1,495.15 (H) 09/13/2023    UMALCR 1,264.01 (H) 01/09/2020    UMALCR 1,331.90 (H) 12/18/2019      Lab Results   Component Value Date    A1C 10.3 01/03/2024    A1C 8.3 11/02/2021    A1C 8.2 06/09/2021    A1C 9.2 06/18/2019    A1C 9.1 03/06/2018    A1C 10.2 06/06/2017    A1C 9.0 03/16/2016     Lab Results   Component Value Date    GFRESTIMATED 38 (L) 01/03/2024    GFRESTIMATED 42 (L) 09/13/2023    GFRESTIMATED 40 (L) 06/06/2023     Most Recent Immunizations   Administered Date(s) Administered    COVID-19 Monovalent 18+ (Moderna) 03/26/2021    Influenza (High Dose) 3 valent vaccine 09/24/2019    Influenza (IIV3) PF 11/11/2008    Influenza Vaccine 65+ (Fluzone HD) 10/04/2021    Influenza Vaccine >6 months,quad, PF 11/20/2013    Mantoux Tuberculin Skin Test 06/29/2016    Pneumo Conj 13-V (2010&after) 01/12/2016    Pneumococcal 23 valent 12/17/2007    TDAP Vaccine (Boostrix) 06/12/2013      Estimated body mass index is 29.65 kg/m  as calculated from the following:    Height as of 1/3/24: 5' 8\" (1.727 m).    Weight as of 1/3/24: 195 lb (88.5 kg).     Hypertension/CKD: Current medications:   Losartan 100 mg hs   Sodium bicarb 1300 mg twice daily   Amlodipine 5 mg   No reports of side effects.     BP Readings from Last 6 Encounters:   01/03/24 120/72   09/29/23 109/71   08/10/23 111/75   06/06/23 114/67   04/20/23 134/67   03/22/23 120/71        Hyperlipidemia: Current " "medications:   Fenofibrate 54 mg   Lipitor 20 mg daily  No reports of side effects.     Recent Labs   Lab Test 01/03/24  0929 02/06/23  1324   CHOL 184 153   HDL 32* 40   LDL 88 57   TRIG 319* 279*       UI/BPH: Current medications:   Myrbetriq 24 mg daily  Flomax 0.8 mg daily   Proscar 5 mg daily   No reports of side effects.     Supplements/OTC: Current medications:   Vitamin D 2000 units daily  Cetrizine 10 mg daily  No reports of side effects.     BP Readings from Last 1 Encounters:   01/03/24 120/72     Pulse Readings from Last 1 Encounters:   01/03/24 73     Wt Readings from Last 1 Encounters:   01/03/24 195 lb (88.5 kg)     Ht Readings from Last 1 Encounters:   01/03/24 5' 8\" (1.727 m)     Estimated body mass index is 29.65 kg/m  as calculated from the following:    Height as of 1/3/24: 5' 8\" (1.727 m).    Weight as of 1/3/24: 195 lb (88.5 kg).    Temp Readings from Last 1 Encounters:   01/03/24 97.9  F (36.6  C) (Temporal)       ----------------      I spent 15 minutes with this patient today. Karla Laura PA-C was provided the recommendations above via routed note and is the authorizing prescriber for this visit through the pharmacist collaborative practice agreement. A copy of the visit note was provided to the patient's provider(s).    The patient was given to the patient a summary of these recommendations.     Theo Gong, PharmD, [facility]  Endocrine & Diabetes [facility] Pharmacist  [address] [place] [id number]  Direct Voicemail: [phone]    Telemedicine Visit Details  Type of service:  Telephone visit  Start Time: 9:30AM  End Time: 9:45AM  Originating Location (pt. Location): Home  Provider has received verbal consent for a visit from the patient? Yes     Medication Therapy Recommendations  No medication therapy recommendations to display        "

## 2024-02-21 NOTE — LETTER
_  Medication List        Prepared on: 02/21/2024     Bring your Medication List when you go to the doctor, hospital, or   emergency room. And, share it with your family or caregivers.     Note any changes to how you take your medications.  Cross out medications when you no longer use them.    Medication How I take it Why I use it Prescriber   alpha-lipoic acid 600 MG capsule Take 1 capsule (600 mg) by mouth daily Neuropathy Lala Villanueva MD   amLODIPine (NORVASC) 5 MG tablet TAKE 1 TABLET BY MOUTH AT BEDTIME Chronic Kidney Disease, Stage III (Moderate) (H); Benign Essential Hypertension Tracy Chandler, RO   atorvastatin (LIPITOR) 20 MG tablet Take 1 tablet (20 mg) by mouth daily Hyperlipidemia LDL Goal <100 Marcio Hare MD   blood glucose (NO BRAND SPECIFIED) lancets standard Lancets that go with device, Test 3 times daily Type 2 diabetes mellitus with diabetic nephropathy (H) Pamela Laura PA-C   Continuous Blood Gluc Sensor (FREESTYLE PETER 2 SENSOR) MISC 1 EACH EVERY 14 DAYS 1 EACH EVERY 14 DAYS. CHANGE EVERY 14 DAYS. 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 PT USES APPROX 65 UNITS IN 24 HRS. Type 2 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, with long-term current use of insulin (H) Pamela Laura PA-C   insulin degludec  (TRESIBA FLEXTOUCH) 100 UNIT/ML pen INJECT 64 UNITS SUBCUTANEOUSLY AT BEDTIME. Type 2 diabetes mellitus with hyperglycemia, with long-term current use of insulin (H) Pamela Laura PA-C   JARDIANCE 10 MG TABS tablet TAKE 1 TABLET (10 MG) BY MOUTH DAILY. Type 2 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, with long-term current use of insulin (H) Pamela Laura PA-C   loratadine (CLARITIN) 10 MG tablet Take 1 tablet (10 mg) by mouth daily Seasonal Allergies Marcio Hare MD   losartan (COZAAR) 100 MG tablet Take 1 tablet (100 mg) by mouth at bedtime Type 2 diabetes mellitus with diabetic nephropathy, with long-term current use of insulin (H) Tracy Chandler NP   mirabegron (MYRBETRIQ) 25 MG 24 hr tablet Take 1 tablet (25 mg) by mouth daily Urinary Urgency RIVERA Pineda   sodium bicarbonate 650 MG tablet Take 2 tablets (1,300 mg) by mouth 2 times daily Chronic kidney disease, stage 3b (H) Tracy Chandler NP   tamsulosin (FLOMAX) 0.4 MG capsule Take 2 capsules (0.8 mg) by mouth daily Benign non-nodular prostatic hyperplasia with lower urinary tract symptoms RIVERA Pineda   tirzepatide (MOUNJARO) 2.5 MG/0.5ML pen Inject 2.5 mg Subcutaneous every 7 days Type 2 diabetes mellitus with moderate nonproliferative retinopathy without macular edema, with long-term current use of insulin, unspecified laterality (H) Pamela Laura PA-C   triamcinolone (KENALOG) 0.1 % external cream Apply topically 2 times daily Dermatitis Pooja Prather PA-C   VITAMIN D3 25 MCG (1000 UT) tablet TAKE 2 TABLETS (50 MCG) BY MOUTH DAILY CKD (Chronic Kidney Disease) Stage 3, GFR 30-59 ml/min (H); Vitamin D Deficiency Kiah Ramsey MD         Add new medications, over-the-counter drugs, herbals, vitamins, or  minerals in the blank rows below.    Medication How I take it Why I use it Prescriber                                      Allergies:       No Known Allergies        Side effects I have had:               Other Information:              My notes and questions:

## 2024-02-27 DIAGNOSIS — Z79.4 TYPE 2 DIABETES MELLITUS WITH MODERATE NONPROLIFERATIVE RETINOPATHY WITHOUT MACULAR EDEMA, WITH LONG-TERM CURRENT USE OF INSULIN, UNSPECIFIED LATERALITY (H): ICD-10-CM

## 2024-02-27 DIAGNOSIS — E11.3399 TYPE 2 DIABETES MELLITUS WITH MODERATE NONPROLIFERATIVE RETINOPATHY WITHOUT MACULAR EDEMA, WITH LONG-TERM CURRENT USE OF INSULIN, UNSPECIFIED LATERALITY (H): ICD-10-CM

## 2024-02-29 NOTE — TELEPHONE ENCOUNTER
1/4/2024  [facility] [place]     Pamela Laura PA-C  Endocrinology, Diabetes, and Metabolism

## 2024-03-15 DIAGNOSIS — E78.5 HYPERLIPIDEMIA LDL GOAL <100: ICD-10-CM

## 2024-03-21 RX ORDER — ATORVASTATIN CALCIUM 20 MG/1
20 TABLET, FILM COATED ORAL DAILY
Qty: 90 TABLET | Refills: 0 | Status: SHIPPED | OUTPATIENT
Start: 2024-03-21 | End: 2024-07-08

## 2024-03-21 NOTE — TELEPHONE ENCOUNTER
Last Clinic Visit: 3/22/2023 [facility] Internal Medicine [place]     Refilled per protocol.   LDL Cholesterol Calculated   Date Value Ref Range Status   01/03/2024 88 <=100 mg/dL Final   06/18/2019 49 <100 mg/dL Final     Comment:     Desirable:       <100 mg/dl         
same

## 2024-03-22 ENCOUNTER — TELEPHONE (OUTPATIENT)
Dept: NEPHROLOGY | Facility: CLINIC | Age: 74
End: 2024-03-22
Payer: COMMERCIAL

## 2024-03-22 NOTE — TELEPHONE ENCOUNTER
Called patient with a appointment reminder for Wed. 3/27/24 @ 10:00 am with Angela Chandler and a lab draw @ 9:00 am    Chandra Desir on 3/22/2024 at 10:02 AM

## 2024-03-27 ENCOUNTER — TELEPHONE (OUTPATIENT)
Dept: ENDOCRINOLOGY | Facility: CLINIC | Age: 74
End: 2024-03-27

## 2024-03-27 ENCOUNTER — LAB (OUTPATIENT)
Dept: LAB | Facility: CLINIC | Age: 74
End: 2024-03-27
Payer: COMMERCIAL

## 2024-03-27 ENCOUNTER — OFFICE VISIT (OUTPATIENT)
Dept: NEPHROLOGY | Facility: CLINIC | Age: 74
End: 2024-03-27
Payer: COMMERCIAL

## 2024-03-27 VITALS
WEIGHT: 193.8 LBS | OXYGEN SATURATION: 98 % | SYSTOLIC BLOOD PRESSURE: 103 MMHG | DIASTOLIC BLOOD PRESSURE: 66 MMHG | HEART RATE: 86 BPM | BODY MASS INDEX: 29.47 KG/M2

## 2024-03-27 DIAGNOSIS — E55.9 VITAMIN D DEFICIENCY: ICD-10-CM

## 2024-03-27 DIAGNOSIS — N18.32 CHRONIC KIDNEY DISEASE, STAGE 3B (H): Primary | ICD-10-CM

## 2024-03-27 DIAGNOSIS — R30.0 DYSURIA: ICD-10-CM

## 2024-03-27 DIAGNOSIS — N18.32 STAGE 3B CHRONIC KIDNEY DISEASE (H): ICD-10-CM

## 2024-03-27 DIAGNOSIS — N18.32 CHRONIC KIDNEY DISEASE, STAGE 3B (H): ICD-10-CM

## 2024-03-27 LAB
ALBUMIN MFR UR ELPH: 189 MG/DL
ALBUMIN SERPL BCG-MCNC: 4.1 G/DL (ref 3.5–5.2)
ANION GAP SERPL CALCULATED.3IONS-SCNC: 11 MMOL/L (ref 7–15)
BUN SERPL-MCNC: 33 MG/DL (ref 8–23)
CALCIUM SERPL-MCNC: 9.6 MG/DL (ref 8.8–10.2)
CHLORIDE SERPL-SCNC: 108 MMOL/L (ref 98–107)
CREAT SERPL-MCNC: 2.12 MG/DL (ref 0.67–1.17)
CREAT UR-MCNC: 72.4 MG/DL
DEPRECATED HCO3 PLAS-SCNC: 21 MMOL/L (ref 22–29)
EGFRCR SERPLBLD CKD-EPI 2021: 32 ML/MIN/1.73M2
GLUCOSE SERPL-MCNC: 185 MG/DL (ref 70–99)
HGB BLD-MCNC: 15 G/DL (ref 13.3–17.7)
PHOSPHATE SERPL-MCNC: 3.7 MG/DL (ref 2.5–4.5)
POTASSIUM SERPL-SCNC: 4.3 MMOL/L (ref 3.4–5.3)
PROT/CREAT 24H UR: 2.61 MG/MG CR (ref 0–0.2)
PTH-INTACT SERPL-MCNC: 74 PG/ML (ref 15–65)
SODIUM SERPL-SCNC: 140 MMOL/L (ref 135–145)
VIT D+METAB SERPL-MCNC: 22 NG/ML (ref 20–50)

## 2024-03-27 PROCEDURE — 99214 OFFICE O/P EST MOD 30 MIN: CPT

## 2024-03-27 PROCEDURE — 83970 ASSAY OF PARATHORMONE: CPT | Performed by: PATHOLOGY

## 2024-03-27 PROCEDURE — 84156 ASSAY OF PROTEIN URINE: CPT | Performed by: PATHOLOGY

## 2024-03-27 PROCEDURE — 36415 COLL VENOUS BLD VENIPUNCTURE: CPT | Performed by: PATHOLOGY

## 2024-03-27 PROCEDURE — 85018 HEMOGLOBIN: CPT | Performed by: PATHOLOGY

## 2024-03-27 PROCEDURE — G0463 HOSPITAL OUTPT CLINIC VISIT: HCPCS

## 2024-03-27 PROCEDURE — 81001 URINALYSIS AUTO W/SCOPE: CPT | Performed by: PATHOLOGY

## 2024-03-27 PROCEDURE — 80069 RENAL FUNCTION PANEL: CPT | Performed by: PATHOLOGY

## 2024-03-27 PROCEDURE — 99000 SPECIMEN HANDLING OFFICE-LAB: CPT | Performed by: PATHOLOGY

## 2024-03-27 PROCEDURE — 82306 VITAMIN D 25 HYDROXY: CPT

## 2024-03-27 ASSESSMENT — PAIN SCALES - GENERAL: PAINLEVEL: NO PAIN (0)

## 2024-03-27 NOTE — LETTER
3/27/2024       RE: Earle Rene  [address]  [place] [address]     Dear Colleague,    Thank you for referring your patient, Earle Rene, to the [facility] [place] at [facility]. Please see a copy of my visit note below.    Nephrology Clinic Visit 3/27/24    Assessment and Plan:    CKD3b w/proteinuria - Creat has jumped to 2.1 from unclear source. No NSAIDs, recent Abx but b/p is soft, he notes dysuria and difficulty emptying his bladder. Denies N/V/D, No change in his ARB or SGLT2 dose. Was started on Tirzepatide but no loose stools. He thinks he is drinking fluids normally     - PVR today about 1 hr after voiding was 242 ml    -  UPCR 2.6 mg/mg Cr    - UA today neg for infection. Scheduled for renal US next week.     - Etiology for his CKD is felt to be DM given retinopathy    - Baseline creat mid to upper 1's since 12/19    - On ARB, SGLT2    - B/ps soft in clinic today so will hold Amlodipine    - Diabetes with suboptimal control. A1c 10.3 % ( 1/23)    - On statin for CV risk reduction    2. Volume status - I suspect he is on the dry side given soft b/ps, no edema, on Jardiance. No dyspnea. Weight 193# from 195.7 # ( but on Tirzepatide). Albumin 4.1   - Encouraged fluid intake    3. HTN - B/ps soft in clinic w/o edema. Clinic b/ps /60-66. No home b/ps but does have a device. HR 86  Current regimen:     Losartan 100 mg every day    Amlodipine 5 mg every day     - Discontinue Amlodipine     - check b/ps and RN will call next week for update. May have to resume low dose of Amlodipine but would like to see b/ps higher to provide improved perfusion to his kidneys and rule that out as the etiology for his elevated creat    4. DM2 - Uncontrolled with A1c 10.3 % 1/24 on Jardiance, insulin and Tirzepatide   - Patient notes that since the addition of Tirzepatide in January his glycemic control has improved. Random BS today  "was 185   - Reviewed the benefits of improved glycemic control, one of which may be less urination    5. Electrolytes - No acute concerns. K 4.3 Na 140    6. Acid base - Bicarb 21   - Continue bicarb 1300 mg bid    7. BMD - Ca 9.6 Phos 3.7 Albumin 4.1   -  Vit D 22, PTH 74 ( 3/24)   - Continue Vit D3 - 50 mcg every day    8. Heme - Hgb is normal at 15.0    9. BPH - Uncontrolled on Finasteride/Flomax and Mirabegron   - Prostate MRI 9/13/23 showed low likelihood for clinically significant cancer   - Per Urology 8/23: \" Urinary symptoms are pretty much the same as they have always been.  Has dysuria.  Quite a bit of straining, slow stream, intermittency, sensation of incomplete emptying.   PVR per US was 124 ml.\" Discussion at that visit regarding his incomplete emptying was for an outlet procedure. He is over due for his follow up.    - Will get the UA/UC and renal US. Will then refer back to Urology    10. Disposition - RTC 5/29/24 for follow up with labs prior    Assessment and plan was discussed with patient and he voiced his understanding and agreement.    Reason for Visit:  CKD3b follow up    HPI:  Mr Rene is a 73 yo male with CKD3b, DM2, HTN, Diabetic retinopathy, BPH, Nephrolothiasis, GERD, Sarcoid of the lung, COPD, present today for routine CKD follow up.   Last seen in clinic by me 9/29/23  Bicarb increased to 1300 mg bid   Baseline creat mid to upper 1's since 2019    ROS:   A comprehensive review of systems was obtained and negative, except as noted in the HPI or PMH.  Patient notes urinary frequency ( chronic) and chronic dysuria, worse over the last 1-2 months  Walks daily for exercise  Follows a sodium restricted diet  No home b/ps  Notes generalized pruritus    Chronic Health Problems:    CKD3b  DM2  HTN  Diabetic retinopathy  BPH  Nephrolithiasis  GERD  Sarcoid of the lung  COPD  HLD  Diabetic polyneuropathy    Family Hx:   Family History   Problem Relation Age of Onset     Diabetes Father      " "Myocardial Infarction Father      Diabetes Brother      Leukemia Brother 44     Glaucoma No family hx of      Macular Degeneration No family hx of      Kidney Disease No family hx of      Personal Hx:   , 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 NS, ETOH rare    Allergies:  No Known Allergies    Medications:  Current Outpatient Medications   Medication Sig     alpha-lipoic acid 600 MG capsule Take 1 capsule (600 mg) by mouth daily     amoxicillin (AMOXIL) 500 MG capsule TAKE 1 CAPSULE ORAL EVERY EIGHT HOURS AS DIRECTED     ARTIFICIAL TEAR OP Apply to eye as needed     atorvastatin (LIPITOR) 20 MG tablet TAKE 1 TABLET BY MOUTH EVERY DAY     blood glucose (NO BRAND SPECIFIED) lancets standard Lancets that go with device, Test 3 times daily     Continuous Blood Gluc Sensor (FREESTYLE PETER 2 SENSOR) MISC 1 EACH EVERY 14 DAYS 1 EACH EVERY 14 DAYS. CHANGE EVERY 14 DAYS.     fenofibrate 54 MG tablet Take 1 tablet (54 mg) by mouth daily (Patient taking differently: Take 54 mg by mouth at bedtime)     finasteride (PROSCAR) 5 MG tablet Take 1 tablet (5 mg) by mouth daily     gabapentin (NEURONTIN) 300 MG capsule Take 1 capsule (300 mg) by mouth 2 times daily     HUMALOG KWIKPEN 100 UNIT/ML soln INJECT 15-17 UNITS WITH MEALS, PLUS CORRECTION. PT USES APPROX 65 UNITS IN 24 HRS.     insulin degludec (TRESIBA FLEXTOUCH) 100 UNIT/ML pen INJECT 64 UNITS SUBCUTANEOUSLY AT BEDTIME.     insulin pen needle (B-D U/F) 31G X 5 MM miscellaneous Use 4 time(s) per day.  Please dispense as BD Pen Needle Mini U/F 31G x 5 MM     insulin pen needle (B-D U/F) 31G X 5 MM Use 4 times per day.  Please dispense as BD Pen Needle Mini U/F 31G x 5 MM     insulin syringe 31G X 5/16\" 0.5 ML MISC Use three syringes daily     JARDIANCE 10 MG TABS tablet TAKE 1 TABLET (10 MG) BY MOUTH DAILY.     loratadine (CLARITIN) 10 MG tablet Take 1 tablet (10 mg) by mouth daily     losartan " (COZAAR) 100 MG tablet Take 1 tablet (100 mg) by mouth at bedtime     mirabegron (MYRBETRIQ) 25 MG 24 hr tablet Take 1 tablet (25 mg) by mouth daily     sodium bicarbonate 650 MG tablet Take 2 tablets (1,300 mg) by mouth 2 times daily     tamsulosin (FLOMAX) 0.4 MG capsule Take 2 capsules (0.8 mg) by mouth daily     tirzepatide (MOUNJARO) 2.5 MG/0.5ML pen Inject 2.5 mg Subcutaneous every 7 days     triamcinolone (KENALOG) 0.1 % external cream Apply topically 2 times daily     VITAMIN D3 25 MCG (1000 UT) tablet TAKE 2 TABLETS (50 MCG) BY MOUTH DAILY     No current facility-administered medications for this visit.      Vitals:  /66   Pulse 86   Wt 87.9 kg (193 lb 12.8 oz)   SpO2 98%   BMI 29.47 kg/m      Exam:  GENERAL APPEARANCE: alert and no distress  RESP: lungs clear to auscultation   CV: regular rhythm, normal rate  EDEMA: no edema  ABDOMEN: soft, nondistended, nontender  :  ml  MS: extremities normal - no gross deformities noted  SKIN: no visible rash despite pruritis  NEURO: mentation intact and speech normal  PSYCH: affect normal/bright    LABS:   CMP  Recent Labs   Lab Test 03/27/24  0914 01/03/24  0929 09/13/23  1622 06/06/23  1126 01/08/23  1758 09/12/22  0941 06/09/22  1333 07/30/21  1005 06/09/21  1524 03/12/21  1324 12/14/20  0831 06/01/20  1109     --  135*  --  139  --  139   < > 142 137 138 142   POTASSIUM 4.3  --  4.5  --  4.6  --  4.3   < > 4.2 5.2 4.2 4.1   CHLORIDE 108*  --  103  --  106  --  107   < > 111* 107 110* 110*   CO2 21*  --  18*  --  20*  --  24   < > 22 23 20 22   ANIONGAP 11  --  14  --  13  --  8   < > 8 7 8 10   *  --  276*  --  100*  --  142*   < > 211* 347* 212* 218*   BUN 33.0*  --  37.6*  --  30.1*  --  33*   < > 20 33* 30 25   CR 2.12* 1.85* 1.69* 1.77* 1.62*   < > 2.04*   < > 1.45* 1.68* 1.43* 1.39*   GFRESTIMATED 32* 38* 42* 40* 45*   < > 34*   < > 48* 40* 49* 51*   GFRESTBLACK  --   --   --   --   --   --   --   --  55* 46* 56* 59*   INDERJIT 9.6   --  9.8  --  10.1  --  9.5   < > 8.8 9.0 9.4 9.8    < > = values in this interval not displayed.     Recent Labs   Lab Test 01/03/24  0929 03/12/21  1324 06/18/19  1054 03/06/18  1216 11/01/17  1044 06/06/17  1116   BILITOTAL  --  0.4 0.6 0.8  --  0.7   ALKPHOS  --  108 63 77  --  70   ALT  --  36 38 35 44 49   AST 23 24 24 25 23 20     CBC  Recent Labs   Lab Test 03/27/24  0914 09/13/23  1622 01/08/23  1758 12/08/21  1428 06/09/21  1524 03/12/21  1324 01/29/20  1315 06/18/19  1054   HGB 15.0 15.2 14.4 15.0   < > 14.1   < > 13.5   WBC  --  6.4 5.4  --   --  5.1  --  4.6   RBC  --  4.58 4.51  --   --  4.31*  --  4.18*   HCT  --  41.5 41.2  --   --  41.7  --  38.7*   MCV  --  91 91  --   --  97  --  93   MCH  --  33.2* 31.9  --   --  32.7  --  32.3   MCHC  --  36.6* 35.0  --   --  33.8  --  34.9   RDW  --  12.7 12.4  --   --  12.3  --  12.6   PLT  --  150 145*  --   --  156  --  130*    < > = values in this interval not displayed.     URINE STUDIES  Recent Labs   Lab Test 09/13/23  1811 08/10/23  1650 02/06/23  1343 09/15/22  1604 03/16/16  1000 01/14/16  0858   COLOR Straw Straw Straw Yellow   < > Yellow   APPEARANCE Clear Clear Clear Clear   < > Clear   URINEGLC >=1000* >=1000* >=1000* 1000*   < > 100*   URINEBILI Negative Negative Negative Negative   < > Negative   URINEKETONE Negative Negative Negative Negative   < > Negative   SG 1.019 1.023 1.018 1.020   < > >1.030   UBLD Negative Negative Negative Trace*   < > Negative   URINEPH 5.5 5.5 5.5 5.0   < > 5.5   PROTEIN 70* 50* 70* 300*   < > 100*   UROBILINOGEN  --   --   --   --   --  0.2   NITRITE Negative Negative Negative Negative   < > Negative   LEUKEST Negative Negative Negative Negative   < > Negative   RBCU <1 <1 1 0   < > O - 2   WBCU 0 <1 1 <1   < > O - 2    < > = values in this interval not displayed.     Recent Labs   Lab Test 12/08/21  1438 06/09/21  1530 12/14/20  0848 06/01/20  1110   UTPG 1.89* 2.37* 1.51* 1.39*     PTH  Recent Labs   Lab Test  03/27/24  0914 12/08/21  1428 06/01/20  1109   PTHI 74* 27 22     IRON STUDIES  Recent Labs   Lab Test 07/02/19  0947 05/01/18  1233   IRON  --  83   FEB  --  344   IRONSAT  --  24   DEANA 178 160       Tracy Chandler, NP

## 2024-03-27 NOTE — PATIENT INSTRUCTIONS
Stop Amlodipine  Check blood pressure daily around noon and record  RN will call you next week for b/p update

## 2024-03-27 NOTE — TELEPHONE ENCOUNTER
Pt comes to lobby today asking to speak with Pamela Laura. [facility] notified to offer visit tomorrow. WED MAR 28 with provider per pt request.   Lorena Bravo RN on 3/27/2024 at 11:10 AM

## 2024-03-28 DIAGNOSIS — N18.32 CHRONIC KIDNEY DISEASE, STAGE 3B (H): Primary | ICD-10-CM

## 2024-03-28 LAB
ALBUMIN UR-MCNC: 100 MG/DL
APPEARANCE UR: CLEAR
BILIRUB UR QL STRIP: NEGATIVE
COLOR UR AUTO: ABNORMAL
GLUCOSE UR STRIP-MCNC: >=1000 MG/DL
HGB UR QL STRIP: NEGATIVE
KETONES UR STRIP-MCNC: NEGATIVE MG/DL
LEUKOCYTE ESTERASE UR QL STRIP: NEGATIVE
NITRATE UR QL: NEGATIVE
PH UR STRIP: 6 [PH] (ref 5–7)
RBC URINE: <1 /HPF
SP GR UR STRIP: 1.02 (ref 1–1.03)
SQUAMOUS EPITHELIAL: <1 /HPF
UROBILINOGEN UR STRIP-MCNC: NORMAL MG/DL
WBC URINE: <1 /HPF

## 2024-03-28 NOTE — PROGRESS NOTES
Nephrology Clinic Visit 3/27/24    Assessment and Plan:    CKD3b w/proteinuria - Creat has jumped to 2.1 from unclear source. No NSAIDs, recent Abx but b/p is soft, he notes dysuria and difficulty emptying his bladder. Denies N/V/D, No change in his ARB or SGLT2 dose. Was started on Tirzepatide but no loose stools. He thinks he is drinking fluids normally     - PVR today about 1 hr after voiding was 242 ml    -  UPCR 2.6 mg/mg Cr    - UA today neg for infection. Scheduled for renal US next week.     - Etiology for his CKD is felt to be DM given retinopathy    - Baseline creat mid to upper 1's since 12/19    - On ARB, SGLT2    - B/ps soft in clinic today so will hold Amlodipine    - Diabetes with suboptimal control. A1c 10.3 % ( 1/23)    - On statin for CV risk reduction    2. Volume status - I suspect he is on the dry side given soft b/ps, no edema, on Jardiance. No dyspnea. Weight 193# from 195.7 # ( but on Tirzepatide). Albumin 4.1   - Encouraged fluid intake    3. HTN - B/ps soft in clinic w/o edema. Clinic b/ps /60-66. No home b/ps but does have a device. HR 86  Current regimen:     Losartan 100 mg every day    Amlodipine 5 mg every day     - Discontinue Amlodipine     - check b/ps and RN will call next week for update. May have to resume low dose of Amlodipine but would like to see b/ps higher to provide improved perfusion to his kidneys and rule that out as the etiology for his elevated creat    4. DM2 - Uncontrolled with A1c 10.3 % 1/24 on Jardiance, insulin and Tirzepatide   - Patient notes that since the addition of Tirzepatide in January his glycemic control has improved. Random BS today was 185   - Reviewed the benefits of improved glycemic control, one of which may be less urination    5. Electrolytes - No acute concerns. K 4.3 Na 140    6. Acid base - Bicarb 21   - Continue bicarb 1300 mg bid    7. BMD - Ca 9.6 Phos 3.7 Albumin 4.1   -  Vit D 22, PTH 74 ( 3/24)   - Continue Vit D3 - 50 mcg every  "day    8. Heme - Hgb is normal at 15.0    9. BPH - Uncontrolled on Finasteride/Flomax and Mirabegron   - Prostate MRI 9/13/23 showed low likelihood for clinically significant cancer   - Per Urology 8/23: \" Urinary symptoms are pretty much the same as they have always been.  Has dysuria.  Quite a bit of straining, slow stream, intermittency, sensation of incomplete emptying.   PVR per US was 124 ml.\" Discussion at that visit regarding his incomplete emptying was for an outlet procedure. He is over due for his follow up.    - Will get the UA/UC and renal US. Will then refer back to Urology    10. Disposition - RTC 5/29/24 for follow up with labs prior    Assessment and plan was discussed with patient and he voiced his understanding and agreement.    Reason for Visit:  CKD3b follow up    HPI:  Mr Rene is a 73 yo male with CKD3b, DM2, HTN, Diabetic retinopathy, BPH, Nephrolothiasis, GERD, Sarcoid of the lung, COPD, present today for routine CKD follow up.   Last seen in clinic by me 9/29/23  Bicarb increased to 1300 mg bid   Baseline creat mid to upper 1's since 2019    ROS:   A comprehensive review of systems was obtained and negative, except as noted in the HPI or PMH.  Patient notes urinary frequency ( chronic) and chronic dysuria, worse over the last 1-2 months  Walks daily for exercise  Follows a sodium restricted diet  No home b/ps  Notes generalized pruritus    Chronic Health Problems:    CKD3b  DM2  HTN  Diabetic retinopathy  BPH  Nephrolithiasis  GERD  Sarcoid of the lung  COPD  HLD  Diabetic polyneuropathy    Family Hx:   Family History   Problem Relation Age of Onset    Diabetes Father     Myocardial Infarction Father     Diabetes Brother     Leukemia Brother 44    Glaucoma No family hx of     Macular Degeneration No family hx of     Kidney Disease No family hx of      Personal Hx:   , 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 NS, " "ETOH rare    Allergies:  No Known Allergies    Medications:  Current Outpatient Medications   Medication Sig    alpha-lipoic acid 600 MG capsule Take 1 capsule (600 mg) by mouth daily    amoxicillin (AMOXIL) 500 MG capsule TAKE 1 CAPSULE ORAL EVERY EIGHT HOURS AS DIRECTED    ARTIFICIAL TEAR OP Apply to eye as needed    atorvastatin (LIPITOR) 20 MG tablet TAKE 1 TABLET BY MOUTH EVERY DAY    blood glucose (NO BRAND SPECIFIED) lancets standard Lancets that go with device, Test 3 times daily    Continuous Blood Gluc Sensor (FREESTYLE PETER 2 SENSOR) MISC 1 EACH EVERY 14 DAYS 1 EACH EVERY 14 DAYS. CHANGE EVERY 14 DAYS.    fenofibrate 54 MG tablet Take 1 tablet (54 mg) by mouth daily (Patient taking differently: Take 54 mg by mouth at bedtime)    finasteride (PROSCAR) 5 MG tablet Take 1 tablet (5 mg) by mouth daily    gabapentin (NEURONTIN) 300 MG capsule Take 1 capsule (300 mg) by mouth 2 times daily    HUMALOG KWIKPEN 100 UNIT/ML soln INJECT 15-17 UNITS WITH MEALS, PLUS CORRECTION. PT USES APPROX 65 UNITS IN 24 HRS.    insulin degludec (TRESIBA FLEXTOUCH) 100 UNIT/ML pen INJECT 64 UNITS SUBCUTANEOUSLY AT BEDTIME.    insulin pen needle (B-D U/F) 31G X 5 MM miscellaneous Use 4 time(s) per day.  Please dispense as BD Pen Needle Mini U/F 31G x 5 MM    insulin pen needle (B-D U/F) 31G X 5 MM Use 4 times per day.  Please dispense as BD Pen Needle Mini U/F 31G x 5 MM    insulin syringe 31G X 5/16\" 0.5 ML MISC Use three syringes daily    JARDIANCE 10 MG TABS tablet TAKE 1 TABLET (10 MG) BY MOUTH DAILY.    loratadine (CLARITIN) 10 MG tablet Take 1 tablet (10 mg) by mouth daily    losartan (COZAAR) 100 MG tablet Take 1 tablet (100 mg) by mouth at bedtime    mirabegron (MYRBETRIQ) 25 MG 24 hr tablet Take 1 tablet (25 mg) by mouth daily    sodium bicarbonate 650 MG tablet Take 2 tablets (1,300 mg) by mouth 2 times daily    tamsulosin (FLOMAX) 0.4 MG capsule Take 2 capsules (0.8 mg) by mouth daily    tirzepatide (MOUNJARO) 2.5 " MG/0.5ML pen Inject 2.5 mg Subcutaneous every 7 days    triamcinolone (KENALOG) 0.1 % external cream Apply topically 2 times daily    VITAMIN D3 25 MCG (1000 UT) tablet TAKE 2 TABLETS (50 MCG) BY MOUTH DAILY     No current facility-administered medications for this visit.      Vitals:  /66   Pulse 86   Wt 87.9 kg (193 lb 12.8 oz)   SpO2 98%   BMI 29.47 kg/m      Exam:  GENERAL APPEARANCE: alert and no distress  RESP: lungs clear to auscultation   CV: regular rhythm, normal rate  EDEMA: no edema  ABDOMEN: soft, nondistended, nontender  :  ml  MS: extremities normal - no gross deformities noted  SKIN: no visible rash despite pruritis  NEURO: mentation intact and speech normal  PSYCH: affect normal/bright    LABS:   CMP  Recent Labs   Lab Test 03/27/24  0914 01/03/24  0929 09/13/23  1622 06/06/23  1126 01/08/23  1758 09/12/22  0941 06/09/22  1333 07/30/21  1005 06/09/21  1524 03/12/21  1324 12/14/20  0831 06/01/20  1109     --  135*  --  139  --  139   < > 142 137 138 142   POTASSIUM 4.3  --  4.5  --  4.6  --  4.3   < > 4.2 5.2 4.2 4.1   CHLORIDE 108*  --  103  --  106  --  107   < > 111* 107 110* 110*   CO2 21*  --  18*  --  20*  --  24   < > 22 23 20 22   ANIONGAP 11  --  14  --  13  --  8   < > 8 7 8 10   *  --  276*  --  100*  --  142*   < > 211* 347* 212* 218*   BUN 33.0*  --  37.6*  --  30.1*  --  33*   < > 20 33* 30 25   CR 2.12* 1.85* 1.69* 1.77* 1.62*   < > 2.04*   < > 1.45* 1.68* 1.43* 1.39*   GFRESTIMATED 32* 38* 42* 40* 45*   < > 34*   < > 48* 40* 49* 51*   GFRESTBLACK  --   --   --   --   --   --   --   --  55* 46* 56* 59*   INDERJIT 9.6  --  9.8  --  10.1  --  9.5   < > 8.8 9.0 9.4 9.8    < > = values in this interval not displayed.     Recent Labs   Lab Test 01/03/24  0929 03/12/21  1324 06/18/19  1054 03/06/18  1216 11/01/17  1044 06/06/17  1116   BILITOTAL  --  0.4 0.6 0.8  --  0.7   ALKPHOS  --  108 63 77  --  70   ALT  --  36 38 35 44 49   AST 23 24 24 25 23 20      CBC  Recent Labs   Lab Test 03/27/24  0914 09/13/23  1622 01/08/23  1758 12/08/21  1428 06/09/21  1524 03/12/21  1324 01/29/20  1315 06/18/19  1054   HGB 15.0 15.2 14.4 15.0   < > 14.1   < > 13.5   WBC  --  6.4 5.4  --   --  5.1  --  4.6   RBC  --  4.58 4.51  --   --  4.31*  --  4.18*   HCT  --  41.5 41.2  --   --  41.7  --  38.7*   MCV  --  91 91  --   --  97  --  93   MCH  --  33.2* 31.9  --   --  32.7  --  32.3   MCHC  --  36.6* 35.0  --   --  33.8  --  34.9   RDW  --  12.7 12.4  --   --  12.3  --  12.6   PLT  --  150 145*  --   --  156  --  130*    < > = values in this interval not displayed.     URINE STUDIES  Recent Labs   Lab Test 09/13/23  1811 08/10/23  1650 02/06/23  1343 09/15/22  1604 03/16/16  1000 01/14/16  0858   COLOR Straw Straw Straw Yellow   < > Yellow   APPEARANCE Clear Clear Clear Clear   < > Clear   URINEGLC >=1000* >=1000* >=1000* 1000*   < > 100*   URINEBILI Negative Negative Negative Negative   < > Negative   URINEKETONE Negative Negative Negative Negative   < > Negative   SG 1.019 1.023 1.018 1.020   < > >1.030   UBLD Negative Negative Negative Trace*   < > Negative   URINEPH 5.5 5.5 5.5 5.0   < > 5.5   PROTEIN 70* 50* 70* 300*   < > 100*   UROBILINOGEN  --   --   --   --   --  0.2   NITRITE Negative Negative Negative Negative   < > Negative   LEUKEST Negative Negative Negative Negative   < > Negative   RBCU <1 <1 1 0   < > O - 2   WBCU 0 <1 1 <1   < > O - 2    < > = values in this interval not displayed.     Recent Labs   Lab Test 12/08/21  1438 06/09/21  1530 12/14/20  0848 06/01/20  1110   UTPG 1.89* 2.37* 1.51* 1.39*     PTH  Recent Labs   Lab Test 03/27/24  0914 12/08/21  1428 06/01/20  1109   PTHI 74* 27 22     IRON STUDIES  Recent Labs   Lab Test 07/02/19  0947 05/01/18  1233   IRON  --  83   FEB  --  344   IRONSAT  --  24   DEANA 178 160       Tracy Chandler, NP

## 2024-04-01 ENCOUNTER — VIRTUAL VISIT (OUTPATIENT)
Dept: PHARMACY | Facility: CLINIC | Age: 74
End: 2024-04-01
Payer: COMMERCIAL

## 2024-04-01 DIAGNOSIS — E11.3311 TYPE 2 DIABETES MELLITUS WITH MODERATE NONPROLIFERATIVE RETINOPATHY OF RIGHT EYE AND MACULAR EDEMA, UNSPECIFIED WHETHER LONG TERM INSULIN USE (H): Primary | ICD-10-CM

## 2024-04-01 PROCEDURE — 99606 MTMS BY PHARM EST 15 MIN: CPT | Mod: 93

## 2024-04-01 RX ORDER — TIRZEPATIDE 5 MG/.5ML
5 INJECTION, SOLUTION SUBCUTANEOUS
Qty: 2 ML | Refills: 3 | Status: SHIPPED | OUTPATIENT
Start: 2024-04-01 | End: 2024-04-16

## 2024-04-01 NOTE — PROGRESS NOTES
Medication Therapy Management (MTM) Encounter    ASSESSMENT:                            Medication Adherence/Access: See below for considerations    Type 2 Diabetes: A1C above goal of < 7%.  Marked improvement in FPG and RPG since initiation of Mounjaro.  Achieving FBG goal of less than 130 most days.  Given tolerating Mounjaro well, would benefit from dose increase today.  In light of current control, will decrease Tresiba with plan to self taper to mitigate risk of hypoglycemia.  Will continue to follow to titrate.    PLAN:                            INCREASE Mounjaro to 5 mg weekly. No changes today. Keep up the great work!    DECREASE Tresiba to 50 units daily. Continue to decrease by 2 units every 3 days if fasting sugars are < 100.     Let me know if you have any lows < 70    Endocrine Team & Next Follow-Up:  5/2/2024 with Theo  4/16/2024 and 5/14/2024 with Karla Laura PA-C    SUBJECTIVE/OBJECTIVE:                          Earle Rene is a 74 year old male called for a follow-up visit. He was referred to me from Karla Laura PA-C.      Reason for visit: Medication Therapy Management (MTM).    Allergies/ADRs: Reviewed in chart  Past Medical History: Reviewed in chart  Social History     Tobacco Use    Smoking status: Never    Smokeless tobacco: Never   Substance Use Topics    Alcohol use: Yes     Comment: occasionaly    Drug use: No        Medication Adherence/Access: Adherence is reflected well in the dispense report.     Type 2 Diabetes:   Diabetes Medication(s)       Insulin       HUMALOG KWIKPEN 100 UNIT/ML soln INJECT 15 units twice daily **has not been using recently due to good control**      insulin degludec (TRESIBA FLEXTOUCH) 100 UNIT/ML pen INJECT 60 UNITS SUBCUTANEOUSLY AT BEDTIME.       Sodium-Glucose Co-Transporter 2 (SGLT2) Inhibitors       JARDIANCE 10 MG TABS tablet TAKE 1 TABLET (10 MG) BY MOUTH DAILY.       Incretin Mimetic Agents       tirzepatide (MOUNJARO) 2.5 MG/0.5ML pen Inject 2.5 mg  "Subcutaneous every 7 days on Fridays. Tolerating well other than some tolerable diarrhea/constipation. Does feel more full overall    mg once daily  Gabapentin 300 mg twice daily for neuropathic pain       Blood sugar monitoring: Prior to Mounjaro, was 300-350. Now since he has started this, he rarely goes above 200. FPG average is . No lows <  70.        Urine Albumin:   Lab Results   Component Value Date    UMALCR 1,495.15 (H) 09/13/2023    UMALCR 1,264.01 (H) 01/09/2020    UMALCR 1,331.90 (H) 12/18/2019      Lab Results   Component Value Date    A1C 10.3 01/03/2024    A1C 8.3 11/02/2021    A1C 8.2 06/09/2021    A1C 9.2 06/18/2019    A1C 9.1 03/06/2018    A1C 10.2 06/06/2017    A1C 9.0 03/16/2016     Lab Results   Component Value Date    GFRESTIMATED 32 (L) 03/27/2024    GFRESTIMATED 38 (L) 01/03/2024    GFRESTIMATED 42 (L) 09/13/2023     Most Recent Immunizations   Administered Date(s) Administered    COVID-19 Monovalent 18+ (Moderna) 03/26/2021    Influenza (High Dose) 3 valent vaccine 09/24/2019    Influenza (IIV3) PF 11/11/2008    Influenza Vaccine 65+ (Fluzone HD) 10/04/2021    Influenza Vaccine >6 months,quad, PF 11/20/2013    Mantoux Tuberculin Skin Test 06/29/2016    Pneumo Conj 13-V (2010&after) 01/12/2016    Pneumococcal 23 valent 12/17/2007    TDAP Vaccine (Boostrix) 06/12/2013      BP Readings from Last 1 Encounters:   03/27/24 103/66     Pulse Readings from Last 1 Encounters:   03/27/24 86     Wt Readings from Last 1 Encounters:   03/27/24 193 lb 12.8 oz (87.9 kg)     Ht Readings from Last 1 Encounters:   01/03/24 5' 8\" (1.727 m)     Estimated body mass index is 29.47 kg/m  as calculated from the following:    Height as of 1/3/24: 5' 8\" (1.727 m).    Weight as of 3/27/24: 193 lb 12.8 oz (87.9 kg).      Temp Readings from Last 1 Encounters:   01/03/24 97.9  F (36.6  C) (Temporal)       ----------------      I spent 15 minutes with this patient today. Karla Laura PA-C was provided the " recommendations above via routed note and is the authorizing prescriber for this visit through the pharmacist collaborative practice agreement. A copy of the visit note was provided to the patient's provider(s).    The patient was given to the patient a summary of these recommendations.     Theo Gong, PharmD, [facility] [place]  Endocrine & Diabetes [facility] Pharmacist  [address] [place] [address]  Direct Voicemail: [phone]    Telemedicine Visit Details  Type of service:  Telephone visit  Start Time: 9:30AM  End Time: 9:45AM  Originating Location (pt. Location): Home  Provider has received verbal consent for a visit from the patient? 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

## 2024-04-03 ENCOUNTER — ANCILLARY PROCEDURE (OUTPATIENT)
Dept: ULTRASOUND IMAGING | Facility: CLINIC | Age: 74
End: 2024-04-03
Payer: COMMERCIAL

## 2024-04-03 DIAGNOSIS — N18.32 CHRONIC KIDNEY DISEASE, STAGE 3B (H): ICD-10-CM

## 2024-04-03 PROCEDURE — 76770 US EXAM ABDO BACK WALL COMP: CPT | Mod: GC | Performed by: RADIOLOGY

## 2024-04-05 DIAGNOSIS — Z79.4 TYPE 2 DIABETES MELLITUS WITH HYPERGLYCEMIA, WITH LONG-TERM CURRENT USE OF INSULIN (H): ICD-10-CM

## 2024-04-05 DIAGNOSIS — E11.65 TYPE 2 DIABETES MELLITUS WITH HYPERGLYCEMIA, WITH LONG-TERM CURRENT USE OF INSULIN (H): ICD-10-CM

## 2024-04-08 RX ORDER — INSULIN DEGLUDEC 100 U/ML
INJECTION, SOLUTION SUBCUTANEOUS
Qty: 60 ML | Refills: 1 | Status: SHIPPED | OUTPATIENT
Start: 2024-04-08 | End: 2024-07-10

## 2024-04-08 NOTE — TELEPHONE ENCOUNTER
TRESIBA FLEXTOUCH 100 UNIT/ML       Last Written Prescription Date:  5-12-23  Last Fill Quantity: 60ml,   # refills: 3  Last Office Visit : 1-4-24  Future Office visit:  4-16-24    Routing refill request to provider for review/approval because:  Insulin and insulin pump supplies - refilled per Endocrine clinic.

## 2024-04-08 NOTE — TELEPHONE ENCOUNTER
Long Acting Insulin Protocol Failed 04/08/2024 06:58 AM   Protocol Details  HgbA1C in past 3 or 6 months

## 2024-04-08 NOTE — PROGRESS NOTES
Patient is showing 3/5 MNCM met. Aspirin not prescribed   Bailee Gregory, VF  Outcome for 04/15/24 11:21 AM: Unable to upload Lora at home, will bring reader to appt to upload.  Appointment reminder phone call made to patient.   Bailee Gregory

## 2024-04-16 ENCOUNTER — OFFICE VISIT (OUTPATIENT)
Dept: ENDOCRINOLOGY | Facility: CLINIC | Age: 74
End: 2024-04-16
Payer: COMMERCIAL

## 2024-04-16 VITALS
SYSTOLIC BLOOD PRESSURE: 131 MMHG | BODY MASS INDEX: 29.04 KG/M2 | DIASTOLIC BLOOD PRESSURE: 73 MMHG | HEART RATE: 76 BPM | OXYGEN SATURATION: 97 % | WEIGHT: 191 LBS

## 2024-04-16 DIAGNOSIS — Z79.4 TYPE 2 DIABETES MELLITUS WITH BOTH EYES AFFECTED BY MILD NONPROLIFERATIVE RETINOPATHY AND MACULAR EDEMA, WITH LONG-TERM CURRENT USE OF INSULIN (H): ICD-10-CM

## 2024-04-16 DIAGNOSIS — E11.3213 TYPE 2 DIABETES MELLITUS WITH BOTH EYES AFFECTED BY MILD NONPROLIFERATIVE RETINOPATHY AND MACULAR EDEMA, WITH LONG-TERM CURRENT USE OF INSULIN (H): ICD-10-CM

## 2024-04-16 DIAGNOSIS — E11.3311 TYPE 2 DIABETES MELLITUS WITH MODERATE NONPROLIFERATIVE RETINOPATHY OF RIGHT EYE AND MACULAR EDEMA, UNSPECIFIED WHETHER LONG TERM INSULIN USE (H): ICD-10-CM

## 2024-04-16 LAB — HBA1C MFR BLD: 8.8 %

## 2024-04-16 PROCEDURE — 99215 OFFICE O/P EST HI 40 MIN: CPT | Performed by: PHYSICIAN ASSISTANT

## 2024-04-16 PROCEDURE — 83036 HEMOGLOBIN GLYCOSYLATED A1C: CPT | Performed by: PATHOLOGY

## 2024-04-16 RX ORDER — TIRZEPATIDE 5 MG/.5ML
5 INJECTION, SOLUTION SUBCUTANEOUS
Qty: 2 ML | Refills: 3 | Status: SHIPPED | OUTPATIENT
Start: 2024-04-16 | End: 2024-05-14

## 2024-04-16 RX ORDER — INSULIN LISPRO 100 [IU]/ML
INJECTION, SOLUTION INTRAVENOUS; SUBCUTANEOUS
Qty: 75 ML | Refills: 1 | Status: SHIPPED | OUTPATIENT
Start: 2024-04-16 | End: 2024-07-10

## 2024-04-16 ASSESSMENT — PAIN SCALES - GENERAL: PAINLEVEL: NO PAIN (0)

## 2024-04-16 NOTE — PROGRESS NOTES
HPI   Earle Rene is a 74 year old male with type 2 diabetes mellitus.  Last visit with me was in January 2024.  Patient was started on Mounjaro and his A1C has improved and he is requiring less mealtime insulin.  Pt's diabetes is complicated by retinopathy, nephropathy, neuropathy and ED.  Pt also has hx of hyperlipidemia, HTN, PVD, obesity, presumed localized prostate cancer, BPH, goiter with right thyroid nodule and osteoporosis.  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  For his diabetes, patient states he is taking Mounjaro 5 mg subcutaneous once a week (he states he took his first dose of 5 mg Mounjaro last Friday), Tresiba 60 units SQ at hs and Jardiance 10 mg each am.  Patient states he has not been taking Humalog for meals.  His A1C is 8.8% today.  Previous A1C 10.3 % on 1/2/2024.   I reviewed and scanned his freestyle cyrus 2 sensor download data in his note below.  His average glucose is 223.  Fasting blood sugar was 120 this a.m. and blood sugar was 240 post breakfast.  His blood sugar at this time is 223.  He did not take any Humalog with breakfast today.  On ROS today, reports increase gas.  Some mild GERD.  No vomiting or abdominal pain.  Intermittent loose stools.  Denies blood in stool or melena.  Symptoms of diabetic neuropathy in both feet.  No foot ulcers.  Decrease auditory acuity.  He denies groin yeast infection, dysuria or hematuria at this time.  Pt denies SOB at rest, cough, fever or chest pain.    DIABETES CARE:  Retinopathy: yes; severe NPDR with macular edema. He was seen by Oph here in Nov 2023.   Nephropathy: yes- CKDStage3.  Pt is taking Cozaar daily.  Neuropathy: yes.   Foot exam.  No exam today.  Lipids: LDL 88 in Jan 2024.  Pt is taking Lipitor, Fish Oil and fenofibrate.  Taking ASA: yes.  CAD:no.  Mental health: hx of mood disorder per chart. Pt denies depression.  Insulin: Basal and meal time insulin.    DM meds: Jardiance and Mounjaro.  Pt did NOT tolerate Ozempic-  "diarrhea.    Testing:  Freestyle Libre2 sensor.        ROS   Please see under HPI.     ALLERGIES:  Review of patient's allergies indicates no known allergies.      Current Outpatient Medications   Medication Sig Dispense Refill    alpha-lipoic acid 600 MG capsule Take 1 capsule (600 mg) by mouth daily 90 capsule 3    amoxicillin (AMOXIL) 500 MG capsule TAKE 1 CAPSULE ORAL EVERY EIGHT HOURS AS DIRECTED      ARTIFICIAL TEAR OP Apply to eye as needed      atorvastatin (LIPITOR) 20 MG tablet TAKE 1 TABLET BY MOUTH EVERY DAY 90 tablet 0    blood glucose (NO BRAND SPECIFIED) lancets standard Lancets that go with device, Test 3 times daily 300 each 3    Continuous Blood Gluc Sensor (FREESTYLE PETER 2 SENSOR) MISC 1 EACH EVERY 14 DAYS 1 EACH EVERY 14 DAYS. CHANGE EVERY 14 DAYS. 2 each 5    fenofibrate 54 MG tablet Take 1 tablet (54 mg) by mouth daily (Patient taking differently: Take 54 mg by mouth at bedtime) 90 tablet 0    finasteride (PROSCAR) 5 MG tablet Take 1 tablet (5 mg) by mouth daily 90 tablet 3    gabapentin (NEURONTIN) 300 MG capsule Take 1 capsule (300 mg) by mouth 2 times daily 60 capsule 3    HUMALOG KWIKPEN 100 UNIT/ML soln INJECT 15-17 UNITS WITH MEALS, PLUS CORRECTION. PT USES APPROX 65 UNITS IN 24 HRS. 75 mL 1    insulin degludec (TRESIBA FLEXTOUCH) 100 UNIT/ML pen INJECT 60 UNITS SUBCUTANEOUSLY AT BEDTIME. 60 mL 1    insulin pen needle (B-D U/F) 31G X 5 MM miscellaneous Use 4 time(s) per day.  Please dispense as BD Pen Needle Mini U/F 31G x 5  each 3    insulin pen needle (B-D U/F) 31G X 5 MM Use 4 times per day.  Please dispense as BD Pen Needle Mini U/F 31G x 5  each 3    insulin syringe 31G X 5/16\" 0.5 ML MISC Use three syringes daily 270 each 1    JARDIANCE 10 MG TABS tablet TAKE 1 TABLET (10 MG) BY MOUTH DAILY. 90 tablet 1    loratadine (CLARITIN) 10 MG tablet Take 1 tablet (10 mg) by mouth daily 90 tablet 0    losartan (COZAAR) 100 MG tablet Take 1 tablet (100 mg) by mouth at bedtime 90 " tablet 3    mirabegron (MYRBETRIQ) 25 MG 24 hr tablet Take 1 tablet (25 mg) by mouth daily 90 tablet 3    sodium bicarbonate 650 MG tablet Take 2 tablets (1,300 mg) by mouth 2 times daily 270 tablet 3    tamsulosin (FLOMAX) 0.4 MG capsule Take 2 capsules (0.8 mg) by mouth daily 180 capsule 5    tirzepatide (MOUNJARO) 2.5 MG/0.5ML pen Inject 2.5 mg Subcutaneous every 7 days 5 mL 0    tirzepatide (MOUNJARO) 5 MG/0.5ML pen Inject 5 mg Subcutaneous every 7 days 2 mL 3    triamcinolone (KENALOG) 0.1 % external cream Apply topically 2 times daily 30 g 0    VITAMIN D3 25 MCG (1000 UT) tablet TAKE 2 TABLETS (50 MCG) BY MOUTH DAILY 180 tablet 3     Family Hx   No change.     Personal Hx   Smoke: none.   ETOH: none.    with grown children.    PMH   1. Type 2 Diabetes Mellitus dx at age 43.   2. Neuropathy.  3. Nephropathy.   4. ED.   5. Dyslipidemia.   6. Nephrolithiasis.   7. Decrease auditory acuity.   8. Sarcoidosis-lung.   9. Goiter.   10. S/P T & A.   11. S/P FX right heel.   12. Vit D def.   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   14. CT chest- ? granulomas.   15. Retinopathy.  16. Goiter.  Past Medical History:   Diagnosis Date    Blepharitis of both eyes     BPH (benign prostatic hyperplasia)     Diabetes (H)     Diabetic neuropathy (H)     Diabetic retinopathy associated with diabetes mellitus due to underlying condition (H)     Dry eye syndrome     GERD (gastroesophageal reflux disease)     Goiter     Granulomatous disease (H)     HLD (hyperlipidemia)     HTN (hypertension)     Nonsenile cataract     Peripheral neuropathy      Past Surgical History:   Procedure Laterality Date    ------------OTHER-------------      back of neck abscess drainage in OR    AS RAD RESEC TONSIL/PILLARS Bilateral 1961    CATARACT IOL, RT/LT Left     COLONOSCOPY  7/29/2013    Procedure: COLONOSCOPY;;  Surgeon: Montana Pascal MD;  Location: [facility]    EXCISE MASS UPPER EXTREMITY Right 11/11/2019    Procedure: EXCISION, MASS, UPPER  EXTREMITY, RIGHT SHOULDER;  Surgeon: Johana Choudhury MD;  Location: [facility]    INJECT EPIDURAL LUMBAR Left 4/20/2023    Procedure: INJECTION, SPINE, LUMBAR, EPIDURAL (L4-L5);  Surgeon: Mahamed Vázquez MD;  Location: [facility]    INJECT SACROILIAC JOINT Bilateral 9/13/2022    Procedure: Bilateral sacroiliac joint injection;  Surgeon: Collin Mata MD;  Location: [facility] OR    INTRAVITREAL INJECTION CHEMOTHERAPY Right 12/30/2019    Procedure: INTRAVITREAL Bevacizumab injection;  Surgeon: Milton Maki MD;  Location: [facility]    PHACOEMULSIFICATION CLEAR CORNEA WITH STANDARD INTRAOCULAR LENS IMPLANT Right 12/30/2019    Procedure: PHACOEMULSIFICATION, CATARACT, WITH INTRAOCULAR LENS IMPLANT;  Surgeon: Milton Maki MD;  Location: [facility]    PHACOEMULSIFICATION WITH STANDARD INTRAOCULAR LENS IMPLANT Left 6/21/2019    Procedure: Left Eye Cataract Removal with Intraocular Lens Implant with Intraoperative Avastin Injection;  Surgeon: Lacey Eugene MD;  Location: [facility]    siladenatis  11/2017     Physical Exam     /73   Pulse 76   Wt 86.6 kg (191 lb)   SpO2 97%   BMI 29.04 kg/m       Results   Creatinine   Date Value Ref Range Status   03/27/2024 2.12 (H) 0.67 - 1.17 mg/dL Final   06/09/2021 1.45 (H) 0.66 - 1.25 mg/dL Final     GFR Estimate   Date Value Ref Range Status   03/27/2024 32 (L) >60 mL/min/1.73m2 Final   06/09/2021 48 (L) >60 mL/min/[1.73_m2] Final     Comment:     Non  GFR Calc  Starting 12/18/2018, serum creatinine based estimated GFR (eGFR) will be   calculated using the Chronic Kidney Disease Epidemiology Collaboration   (CKD-EPI) equation.       Hemoglobin A1C   Date Value Ref Range Status   01/03/2024 10.3 (H) 0.0 - 5.6 % Final     Comment:     Normal <5.7%   Prediabetes 5.7-6.4%    Diabetes 6.5% or higher     Note: Adopted from ADA consensus guidelines.   06/09/2021 8.2 (H) 0 - 5.6 % Final     Comment:     Normal <5.7% Prediabetes 5.7-6.4%  Diabetes 6.5% or  higher - adopted from ADA   consensus guidelines.       Potassium   Date Value Ref Range Status   03/27/2024 4.3 3.4 - 5.3 mmol/L Final   06/09/2022 4.3 3.4 - 5.3 mmol/L Final   06/09/2021 4.2 3.4 - 5.3 mmol/L Final     ALT   Date Value Ref Range Status   03/12/2021 36 0 - 70 U/L Final     AST   Date Value Ref Range Status   01/03/2024 23 0 - 45 U/L Final   03/12/2021 24 0 - 45 U/L Final     TSH   Date Value Ref Range Status   01/03/2024 1.76 0.30 - 4.20 uIU/mL Final   06/09/2021 0.98 0.40 - 4.00 mU/L Final     T4 Free   Date Value Ref Range Status   10/21/2014 1.00 0.76 - 1.46 ng/dL Final     Comment:     Effective 7/30/2014, the reference range for this assay has changed to reflect   new instrumentation/methodology.           Cholesterol   Date Value Ref Range Status   01/03/2024 184 <200 mg/dL Final   02/06/2023 153 <200 mg/dL Final   06/18/2019 145 <200 mg/dL Final   03/06/2018 101 <200 mg/dL Final     HDL Cholesterol   Date Value Ref Range Status   06/18/2019 38 (L) >39 mg/dL Final   03/06/2018 32 (L) >39 mg/dL Final     Direct Measure HDL   Date Value Ref Range Status   01/03/2024 32 (L) >=40 mg/dL Final   02/06/2023 40 >=40 mg/dL Final     LDL Cholesterol Calculated   Date Value Ref Range Status   01/03/2024 88 <=100 mg/dL Final   02/06/2023 57 <=100 mg/dL Final   06/18/2019 49 <100 mg/dL Final     Comment:     Desirable:       <100 mg/dl   03/06/2018 36 <100 mg/dL Final     Comment:     Desirable:       <100 mg/dl     Triglycerides   Date Value Ref Range Status   01/03/2024 319 (H) <150 mg/dL Final   02/06/2023 279 (H) <150 mg/dL Final   06/18/2019 289 (H) <150 mg/dL Final     Comment:     Borderline high:  150-199 mg/dl  High:             200-499 mg/dl  Very high:       >499 mg/dl     03/06/2018 166 (H) <150 mg/dL Final     Comment:     Borderline high:  150-199 mg/dl  High:             200-499 mg/dl  Very high:       >499 mg/dl       Cholesterol/HDL Ratio   Date Value Ref Range Status   09/09/2014 3.8 0.0  - 5.0 Final   11/20/2013 5.8 (H) 0.0 - 5.0 Final       ASSESSMENT/PLAN:     1. TYPE 2 DIABETES MELLITUS: Type 2 diabetes mellitus complicated by severe diabetic retinopathy, nephropathy- CKD stage 3, neuropathy and ED. Pt also has PVD.  A1C and blood sugar values have improved with addition of Mounjaro.  Continue Mounjaro 5 mg subcu once a week.  Will continue Tresiba 60 units daily and I asked patient to take Humalog 4 units with meals.  Reminded pt to take Humalog 10-15 minutes BEFORE eating.  Again, I reviewed how Mounjaro works and possible side effects of the drug including n/v, GI distress, constipation, hypoglycemia and rare risk of pancreatitis.  Pt denies hx of pancreatitis or gastroparesis.  Continue Jardiance 10 mg each a.m.  Most recent creat 2.12 with GFR 32 mL/min in March 2024.  Reminded pt to keep himself well hydrated while taking Jardiance.  Encouraged patient to make healthy food choices, reduce his food portions with meals, avoid snacking and walk on his treadmill and exercise.  Pt remains on daily ASA.   BP at home 120/70 range.  /73 today in clinic.    2. GOITER: Not addressed today.   TSH normal in Jan 2024.    3. NEPHROPATHY: Pt has CKDstage3 and followed here by renal staff.  Most recent creat/GFR as above.    Pt is taking Cozaar.  Pt on ARB and SGLT-2.       4.  RETINOPATHY:  Seen here by Oph in 11/2023.    5.  NEUROPATHY: Continue Gabapentin,alpha-lipoic acid and topical cream.  Referred to podiatry for follow-up.    6. DYSLIPIDEMIA: LDL 88 in January 2024.  Pt is taking Lipitor, fish oil and Fenofibrate.    7. OBESITY: See under # 1 above.  Continue Mounjaro.    8.  FOLLOW UP: with me in May 2024.  Patient has follow-up with Theo Gong on 5/2/2024.  Prescription for Mounjaro 5 mg subcutaneous once a day sent to pharmacy today.    Time spent reviewing pt's chart notes,labs and freestyle cyrus 2 sensor download data today=5 minutes.  Time for clinic visit today=  20 minutes.  Time  for documentation today = 15 minutes.    TOTAL TIME FOR VISIT TODAY = 40 minutes    Pamela Laura PA-C

## 2024-04-16 NOTE — LETTER
4/16/2024       RE: Earle Rene  [address]  [place] [address]     Dear Colleague,    Thank you for referring your patient, Earle Rene, to the [facility] ENDO[facility] [place] at [facility]. Please see a copy of my visit note below.    Patient is showing 3/5 MNCM met. Aspirin not prescribed   RASHID Akers  Outcome for 04/15/24 11:21 AM: Unable to upload Lora at home, will bring reader to appt to upload.  Appointment reminder phone call made to patient.   Bailee Gregory        HPI   Earle Rene is a 74 year old male with type 2 diabetes mellitus.  Last visit with me was in January 2024.  Patient was started on Mounjaro and his A1C has improved and he is requiring less mealtime insulin.  Pt's diabetes is complicated by retinopathy, nephropathy, neuropathy and ED.  Pt also has hx of hyperlipidemia, HTN, PVD, obesity, presumed localized prostate cancer, BPH, goiter with right thyroid nodule and osteoporosis.  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  For his diabetes, patient states he is taking Mounjaro 5 mg subcutaneous once a week (he states he took his first dose of 5 mg Mounjaro last Friday), Tresiba 60 units SQ at hs and Jardiance 10 mg each am.  Patient states he has not been taking Humalog for meals.  His A1C is 8.8% today.  Previous A1C 10.3 % on 1/2/2024.   I reviewed and scanned his freestyle lora 2 sensor download data in his note below.  His average glucose is 223.  Fasting blood sugar was 120 this a.m. and blood sugar was 240 post breakfast.  His blood sugar at this time is 223.  He did not take any Humalog with breakfast today.  On ROS today, reports increase gas.  Some mild GERD.  No vomiting or abdominal pain.  Intermittent loose stools.  Denies blood in stool or melena.  Symptoms of diabetic neuropathy in both feet.  No foot ulcers.  Decrease auditory acuity.  He denies groin yeast infection, dysuria or  hematuria at this time.  Pt denies SOB at rest, cough, fever or chest pain.    DIABETES CARE:  Retinopathy: yes; severe NPDR with macular edema. He was seen by Oph here in Nov 2023.   Nephropathy: yes- CKDStage3.  Pt is taking Cozaar daily.  Neuropathy: yes.   Foot exam.  No exam today.  Lipids: LDL 88 in Jan 2024.  Pt is taking Lipitor, Fish Oil and fenofibrate.  Taking ASA: yes.  CAD:no.  Mental health: hx of mood disorder per chart. Pt denies depression.  Insulin: Basal and meal time insulin.    DM meds: Jardiance and Mounjaro.  Pt did NOT tolerate Ozempic- diarrhea.    Testing:  Freestyle Libre2 sensor.        ROS   Please see under HPI.     ALLERGIES:  Review of patient's allergies indicates no known allergies.      Current Outpatient Medications   Medication Sig Dispense Refill    alpha-lipoic acid 600 MG capsule Take 1 capsule (600 mg) by mouth daily 90 capsule 3    amoxicillin (AMOXIL) 500 MG capsule TAKE 1 CAPSULE ORAL EVERY EIGHT HOURS AS DIRECTED      ARTIFICIAL TEAR OP Apply to eye as needed      atorvastatin (LIPITOR) 20 MG tablet TAKE 1 TABLET BY MOUTH EVERY DAY 90 tablet 0    blood glucose (NO BRAND SPECIFIED) lancets standard Lancets that go with device, Test 3 times daily 300 each 3    Continuous Blood Gluc Sensor (FREESTYLE PETER 2 SENSOR) MISC 1 EACH EVERY 14 DAYS 1 EACH EVERY 14 DAYS. CHANGE EVERY 14 DAYS. 2 each 5    fenofibrate 54 MG tablet Take 1 tablet (54 mg) by mouth daily (Patient taking differently: Take 54 mg by mouth at bedtime) 90 tablet 0    finasteride (PROSCAR) 5 MG tablet Take 1 tablet (5 mg) by mouth daily 90 tablet 3    gabapentin (NEURONTIN) 300 MG capsule Take 1 capsule (300 mg) by mouth 2 times daily 60 capsule 3    HUMALOG KWIKPEN 100 UNIT/ML soln INJECT 15-17 UNITS WITH MEALS, PLUS CORRECTION. PT USES APPROX 65 UNITS IN 24 HRS. 75 mL 1    insulin degludec (TRESIBA FLEXTOUCH) 100 UNIT/ML pen INJECT 60 UNITS SUBCUTANEOUSLY AT BEDTIME. 60 mL 1    insulin pen needle (B-D U/F)  "31G X 5 MM miscellaneous Use 4 time(s) per day.  Please dispense as BD Pen Needle Mini U/F 31G x 5  each 3    insulin pen needle (B-D U/F) 31G X 5 MM Use 4 times per day.  Please dispense as BD Pen Needle Mini U/F 31G x 5  each 3    insulin syringe 31G X 5/16\" 0.5 ML MISC Use three syringes daily 270 each 1    JARDIANCE 10 MG TABS tablet TAKE 1 TABLET (10 MG) BY MOUTH DAILY. 90 tablet 1    loratadine (CLARITIN) 10 MG tablet Take 1 tablet (10 mg) by mouth daily 90 tablet 0    losartan (COZAAR) 100 MG tablet Take 1 tablet (100 mg) by mouth at bedtime 90 tablet 3    mirabegron (MYRBETRIQ) 25 MG 24 hr tablet Take 1 tablet (25 mg) by mouth daily 90 tablet 3    sodium bicarbonate 650 MG tablet Take 2 tablets (1,300 mg) by mouth 2 times daily 270 tablet 3    tamsulosin (FLOMAX) 0.4 MG capsule Take 2 capsules (0.8 mg) by mouth daily 180 capsule 5    tirzepatide (MOUNJARO) 2.5 MG/0.5ML pen Inject 2.5 mg Subcutaneous every 7 days 5 mL 0    tirzepatide (MOUNJARO) 5 MG/0.5ML pen Inject 5 mg Subcutaneous every 7 days 2 mL 3    triamcinolone (KENALOG) 0.1 % external cream Apply topically 2 times daily 30 g 0    VITAMIN D3 25 MCG (1000 UT) tablet TAKE 2 TABLETS (50 MCG) BY MOUTH DAILY 180 tablet 3     Family Hx   No change.     Personal Hx   Smoke: none.   ETOH: none.    with grown children.    PMH   1. Type 2 Diabetes Mellitus dx at age 43.   2. Neuropathy.  3. Nephropathy.   4. ED.   5. Dyslipidemia.   6. Nephrolithiasis.   7. Decrease auditory acuity.   8. Sarcoidosis-lung.   9. Goiter.   10. S/P T & A.   11. S/P FX right heel.   12. Vit D def.   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   14. CT chest- ? granulomas.   15. Retinopathy.  16. Goiter.  Past Medical History:   Diagnosis Date    Blepharitis of both eyes     BPH (benign prostatic hyperplasia)     Diabetes (H)     Diabetic neuropathy (H)     Diabetic retinopathy associated with diabetes mellitus due to underlying condition (H)     Dry eye syndrome     " GERD (gastroesophageal reflux disease)     Goiter     Granulomatous disease (H)     HLD (hyperlipidemia)     HTN (hypertension)     Nonsenile cataract     Peripheral neuropathy      Past Surgical History:   Procedure Laterality Date    ------------OTHER-------------      back of neck abscess drainage in OR    AS RAD RESEC TONSIL/PILLARS Bilateral 1961    CATARACT IOL, RT/LT Left     COLONOSCOPY  7/29/2013    Procedure: COLONOSCOPY;;  Surgeon: Montana Pascal MD;  Location: [facility]    EXCISE MASS UPPER EXTREMITY Right 11/11/2019    Procedure: EXCISION, MASS, UPPER EXTREMITY, RIGHT SHOULDER;  Surgeon: Johana Choudhury MD;  [facility]: [facility]    INJECT EPIDURAL LUMBAR Left 4/20/2023    Procedure: INJECTION, SPINE, LUMBAR, EPIDURAL (L4-L5);  Surgeon: Mahamed Vázquez MD;  Location: [facility] OR    INJECT SACROILIAC JOINT Bilateral 9/13/2022    Procedure: Bilateral sacroiliac joint injection;  Surgeon: Collin Mata MD;  Location: [facility] OR    INTRAVITREAL INJECTION CHEMOTHERAPY Right 12/30/2019    Procedure: INTRAVITREAL Bevacizumab injection;  Surgeon: Milton Maki MD;  [facility]    PHACOEMULSIFICATION CLEAR CORNEA WITH STANDARD INTRAOCULAR LENS IMPLANT Right 12/30/2019    Procedure: PHACOEMULSIFICATION, CATARACT, WITH INTRAOCULAR LENS IMPLANT;  Surgeon: Milton Maki MD;  [facility]: [facility]    PHACOEMULSIFICATION WITH STANDARD INTRAOCULAR LENS IMPLANT Left 6/21/2019    Procedure: Left Eye Cataract Removal with Intraocular Lens Implant with Intraoperative Avastin Injection;  Surgeon: Lacey Eugene MD;  Location: [facility]    siladenatis  11/2017     Physical Exam     /73   Pulse 76   Wt 86.6 kg (191 lb)   SpO2 97%   BMI 29.04 kg/m       Results   Creatinine   Date Value Ref Range Status   03/27/2024 2.12 (H) 0.67 - 1.17 mg/dL Final   06/09/2021 1.45 (H) 0.66 - 1.25 mg/dL Final     GFR Estimate   Date Value Ref Range Status   03/27/2024 32 (L) >60 mL/min/1.73m2 Final   06/09/2021 48 (L) >60  mL/min/[1.73_m2] Final     Comment:     Non  GFR Calc  Starting 12/18/2018, serum creatinine based estimated GFR (eGFR) will be   calculated using the Chronic Kidney Disease Epidemiology Collaboration   (CKD-EPI) equation.       Hemoglobin A1C   Date Value Ref Range Status   01/03/2024 10.3 (H) 0.0 - 5.6 % Final     Comment:     Normal <5.7%   Prediabetes 5.7-6.4%    Diabetes 6.5% or higher     Note: Adopted from ADA consensus guidelines.   06/09/2021 8.2 (H) 0 - 5.6 % Final     Comment:     Normal <5.7% Prediabetes 5.7-6.4%  Diabetes 6.5% or higher - adopted from ADA   consensus guidelines.       Potassium   Date Value Ref Range Status   03/27/2024 4.3 3.4 - 5.3 mmol/L Final   06/09/2022 4.3 3.4 - 5.3 mmol/L Final   06/09/2021 4.2 3.4 - 5.3 mmol/L Final     ALT   Date Value Ref Range Status   03/12/2021 36 0 - 70 U/L Final     AST   Date Value Ref Range Status   01/03/2024 23 0 - 45 U/L Final   03/12/2021 24 0 - 45 U/L Final     TSH   Date Value Ref Range Status   01/03/2024 1.76 0.30 - 4.20 uIU/mL Final   06/09/2021 0.98 0.40 - 4.00 mU/L Final     T4 Free   Date Value Ref Range Status   10/21/2014 1.00 0.76 - 1.46 ng/dL Final     Comment:     Effective 7/30/2014, the reference range for this assay has changed to reflect   new instrumentation/methodology.           Cholesterol   Date Value Ref Range Status   01/03/2024 184 <200 mg/dL Final   02/06/2023 153 <200 mg/dL Final   06/18/2019 145 <200 mg/dL Final   03/06/2018 101 <200 mg/dL Final     HDL Cholesterol   Date Value Ref Range Status   06/18/2019 38 (L) >39 mg/dL Final   03/06/2018 32 (L) >39 mg/dL Final     Direct Measure HDL   Date Value Ref Range Status   01/03/2024 32 (L) >=40 mg/dL Final   02/06/2023 40 >=40 mg/dL Final     LDL Cholesterol Calculated   Date Value Ref Range Status   01/03/2024 88 <=100 mg/dL Final   02/06/2023 57 <=100 mg/dL Final   06/18/2019 49 <100 mg/dL Final     Comment:     Desirable:       <100 mg/dl   03/06/2018 36  <100 mg/dL Final     Comment:     Desirable:       <100 mg/dl     Triglycerides   Date Value Ref Range Status   01/03/2024 319 (H) <150 mg/dL Final   02/06/2023 279 (H) <150 mg/dL Final   06/18/2019 289 (H) <150 mg/dL Final     Comment:     Borderline high:  150-199 mg/dl  High:             200-499 mg/dl  Very high:       >499 mg/dl     03/06/2018 166 (H) <150 mg/dL Final     Comment:     Borderline high:  150-199 mg/dl  High:             200-499 mg/dl  Very high:       >499 mg/dl       Cholesterol/HDL Ratio   Date Value Ref Range Status   09/09/2014 3.8 0.0 - 5.0 Final   11/20/2013 5.8 (H) 0.0 - 5.0 Final       ASSESSMENT/PLAN:     1. TYPE 2 DIABETES MELLITUS: Type 2 diabetes mellitus complicated by severe diabetic retinopathy, nephropathy- CKD stage 3, neuropathy and ED. Pt also has PVD.  A1C and blood sugar values have improved with addition of Mounjaro.  Continue Mounjaro 5 mg subcu once a week.  Will continue Tresiba 60 units daily and I asked patient to take Humalog 4 units with meals.  Reminded pt to take Humalog 10-15 minutes BEFORE eating.  Again, I reviewed how Mounjaro works and possible side effects of the drug including n/v, GI distress, constipation, hypoglycemia and rare risk of pancreatitis.  Pt denies hx of pancreatitis or gastroparesis.  Continue Jardiance 10 mg each a.m.  Most recent creat 2.12 with GFR 32 mL/min in March 2024.  Reminded pt to keep himself well hydrated while taking Jardiance.  Encouraged patient to make healthy food choices, reduce his food portions with meals, avoid snacking and walk on his treadmill and exercise.  Pt remains on daily ASA.   BP at home 120/70 range.  /73 today in clinic.    2. GOITER: Not addressed today.   TSH normal in Jan 2024.    3. NEPHROPATHY: Pt has CKDstage3 and followed here by renal staff.  Most recent creat/GFR as above.    Pt is taking Cozaar.  Pt on ARB and SGLT-2.       4.  RETINOPATHY:  Seen here by Oph in 11/2023.    5.  NEUROPATHY:  Continue Gabapentin,alpha-lipoic acid and topical cream.  Referred to podiatry for follow-up.    6. DYSLIPIDEMIA: LDL 88 in January 2024.  Pt is taking Lipitor, fish oil and Fenofibrate.    7. OBESITY: See under # 1 above.  Continue Mounjaro.    8.  FOLLOW UP: with me in May 2024.  Patient has follow-up with Theo Gong on 5/2/2024.  Prescription for Mounjaro 5 mg subcutaneous once a day sent to pharmacy today.    Time spent reviewing pt's chart notes,labs and freestyle cyrus 2 sensor download data today=5 minutes.  Time for clinic visit today=  20 minutes.  Time for documentation today = 15 minutes.    TOTAL TIME FOR VISIT TODAY = 40 minutes    Pamela Laura PA-C

## 2024-04-17 ENCOUNTER — TELEPHONE (OUTPATIENT)
Dept: CARDIOLOGY | Facility: CLINIC | Age: 74
End: 2024-04-17
Payer: COMMERCIAL

## 2024-04-17 NOTE — TELEPHONE ENCOUNTER
I mailed the patient's AVS from [facility], because he requested it when he was seeing Pamela Laura yesterday.  Bailee Gregory

## 2024-05-02 ENCOUNTER — VIRTUAL VISIT (OUTPATIENT)
Dept: PHARMACY | Facility: CLINIC | Age: 74
End: 2024-05-02
Payer: COMMERCIAL

## 2024-05-02 DIAGNOSIS — E11.3213 TYPE 2 DIABETES MELLITUS WITH BOTH EYES AFFECTED BY MILD NONPROLIFERATIVE RETINOPATHY AND MACULAR EDEMA, WITH LONG-TERM CURRENT USE OF INSULIN (H): ICD-10-CM

## 2024-05-02 DIAGNOSIS — E11.3311 TYPE 2 DIABETES MELLITUS WITH MODERATE NONPROLIFERATIVE RETINOPATHY OF RIGHT EYE AND MACULAR EDEMA, UNSPECIFIED WHETHER LONG TERM INSULIN USE (H): Primary | ICD-10-CM

## 2024-05-02 DIAGNOSIS — Z79.4 TYPE 2 DIABETES MELLITUS WITH BOTH EYES AFFECTED BY MILD NONPROLIFERATIVE RETINOPATHY AND MACULAR EDEMA, WITH LONG-TERM CURRENT USE OF INSULIN (H): ICD-10-CM

## 2024-05-02 PROCEDURE — 99606 MTMS BY PHARM EST 15 MIN: CPT

## 2024-05-02 NOTE — PROGRESS NOTES
Medication Therapy Management (MTM) Encounter    ASSESSMENT:                            Medication Adherence/Access: See below for considerations    Type 2 Diabetes: A1C above goal of < 7%.  Marked improvement in FPG and RPG since initiation of Mounjaro.  Achieving FBG goal of less than 130 most days.  Still having some issues with side effects on Mounjaro 5 mg dose. Shortage also limits us from increasing. Will have him continue current plan for another month and evaluate appropriateness of increasing in future visits.     PLAN:                            CONTINUE Mounjaro to 5 mg weekly. No changes today. Keep up the great work!    Try Gas-X for bloating/burping    CONTINUE Tresiba to 55 units daily. Continue to decrease by 2 units every 3 days if fasting sugars are < 100.     Let me know if you have any lows < 70    Endocrine Team & Next Follow-Up:  6/28/2024 with Theo  5/14/2024 with Karla Laura PA-C    SUBJECTIVE/OBJECTIVE:                          Earle Rene is a 74 year old male called for a follow-up visit. He was referred to me from Karla Laura PA-C.      Reason for visit: Medication Therapy Management (MTM).    Allergies/ADRs: Reviewed in chart  Past Medical History: Reviewed in chart  Social History     Tobacco Use    Smoking status: Never    Smokeless tobacco: Never   Substance Use Topics    Alcohol use: Yes     Comment: occasionaly    Drug use: No        Medication Adherence/Access: Adherence is reflected well in the dispense report.     Type 2 Diabetes:   Diabetes Medication(s)       Insulin       HUMALOG KWIKPEN 100 UNIT/ML soln INJECT 15 units twice daily **has not been using recently due to good control**      insulin degludec (TRESIBA FLEXTOUCH) 100 UNIT/ML pen INJECT 55 UNITS SUBCUTANEOUSLY AT BEDTIME.       Sodium-Glucose Co-Transporter 2 (SGLT2) Inhibitors       JARDIANCE 10 MG TABS tablet TAKE 1 TABLET (10 MG) BY MOUTH DAILY.       Incretin Mimetic Agents       tirzepatide (MOUNJARO) 5  "MG/0.5ML pen Inject 5 mg Subcutaneous every 7 days on Fridays. Tolerating well other than some tolerable diarrhea/constipation. Also gas/bloating Does feel more full overall. 3 doses so far. Down about 3 lb.     mg once daily  Gabapentin 300 mg twice daily for neuropathic pain       Blood sugar monitoring: Prior to Mounjaro, was 300-350. Now since he has started this, he rarely goes above 200. FPG average is . No lows <  70. Nothing less than 80.     Last night  post-glass of wine, out of norm    Urine Albumin:   Lab Results   Component Value Date    UMALCR 1,495.15 (H) 09/13/2023    UMALCR 1,264.01 (H) 01/09/2020    UMALCR 1,331.90 (H) 12/18/2019      Lab Results   Component Value Date    A1C 10.3 01/03/2024    A1C 8.3 11/02/2021    A1C 8.2 06/09/2021    A1C 9.2 06/18/2019    A1C 9.1 03/06/2018    A1C 10.2 06/06/2017    A1C 9.0 03/16/2016     Lab Results   Component Value Date    GFRESTIMATED 32 (L) 03/27/2024    GFRESTIMATED 38 (L) 01/03/2024    GFRESTIMATED 42 (L) 09/13/2023     Most Recent Immunizations   Administered Date(s) Administered    COVID-19 Monovalent 18+ (Moderna) 03/26/2021    Influenza (High Dose) 3 valent vaccine 09/24/2019    Influenza (IIV3) PF 11/11/2008    Influenza Vaccine 65+ (Fluzone HD) 10/04/2021    Influenza Vaccine >6 months,quad, PF 11/20/2013    Mantoux Tuberculin Skin Test 06/29/2016    Pneumo Conj 13-V (2010&after) 01/12/2016    Pneumococcal 23 valent 12/17/2007    TDAP Vaccine (Boostrix) 06/12/2013      BP Readings from Last 1 Encounters:   04/16/24 131/73     Pulse Readings from Last 1 Encounters:   04/16/24 76     Wt Readings from Last 1 Encounters:   04/16/24 191 lb (86.6 kg)     Ht Readings from Last 1 Encounters:   01/03/24 5' 8\" (1.727 m)     Estimated body mass index is 29.04 kg/m  as calculated from the following:    Height as of 1/3/24: 5' 8\" (1.727 m).    Weight as of 4/16/24: 191 lb (86.6 kg).      Temp Readings from Last 1 Encounters:   01/03/24 97.9 "  F (36.6  C) (Temporal)       ----------------      I spent 15 minutes with this patient today. Karla Laura PA-C was provided the recommendations above via routed note and is the authorizing prescriber for this visit through the pharmacist collaborative practice agreement. A copy of the visit note was provided to the patient's provider(s).    The patient was given to the patient a summary of these recommendations.     Theo Gong, PharmD, [facility]  Endocrine & Diabetes [facility] Pharmacist  [address] [place] [id number]  Direct Voicemail: [phone]    Telemedicine Visit Details  Type of service:  Telephone visit  Start Time: 9:30AM  End Time: 9:45AM  Originating Location (pt. Location): Home  Provider has received verbal consent for a visit from the patient? Yes     Medication Therapy Recommendations  No medication therapy recommendations to display

## 2024-05-08 ENCOUNTER — TELEPHONE (OUTPATIENT)
Dept: ENDOCRINOLOGY | Facility: CLINIC | Age: 74
End: 2024-05-08
Payer: COMMERCIAL

## 2024-05-08 NOTE — TELEPHONE ENCOUNTER
PA Initiation    Medication: TRESIBA FLEXTOUCH 100 UNIT/ML SC SOPN  Insurance Company: [employer] - Phone [phone] Fax [phone]  Pharmacy Filling the Rx:    Filling Pharmacy Phone:    Filling Pharmacy Fax:    Start Date: 5/8/2024    Key:[id number]

## 2024-05-08 NOTE — PROGRESS NOTES
Patient is showing 3/5 MNCM met. Aspirin not prescribed   RENE Akers  Outcome for 05/13/24 1:06 PM: Patient will bring Lora reader to appointment.  RENE Akers    PATIENT IS HAVING GREAT RESULTS WITH MOUNJARO BUT HAS RUN OUT AND HIS PHARMACY WOULD NOT REFILL HIS RX.  Bailee Gregory    Outcome for 05/14/24 3:06 PM: Data obtained via [employer] website  RENE Akers

## 2024-05-10 NOTE — TELEPHONE ENCOUNTER
Prior Authorization Approval    Medication: TRESIBA FLEXTOUCH 100 UNIT/ML SC SOPN  Authorization Effective Date: 2/10/2024  Authorization Expiration Date: 5/10/2025  Approved Dose/Quantity:   Reference #: [id number]   Insurance Company: [employer] - Phone [phone] Fax [phone]  Expected CoPay: $    CoPay Card Available:      Financial Assistance Needed:   Which Pharmacy is filling the prescription:    Pharmacy Notified:   Patient Notified:

## 2024-05-14 ENCOUNTER — OFFICE VISIT (OUTPATIENT)
Dept: ENDOCRINOLOGY | Facility: CLINIC | Age: 74
End: 2024-05-14
Payer: COMMERCIAL

## 2024-05-14 VITALS
OXYGEN SATURATION: 99 % | BODY MASS INDEX: 29.73 KG/M2 | SYSTOLIC BLOOD PRESSURE: 113 MMHG | DIASTOLIC BLOOD PRESSURE: 73 MMHG | HEART RATE: 79 BPM | WEIGHT: 195.5 LBS

## 2024-05-14 DIAGNOSIS — E11.3311 TYPE 2 DIABETES MELLITUS WITH MODERATE NONPROLIFERATIVE RETINOPATHY OF RIGHT EYE AND MACULAR EDEMA, UNSPECIFIED WHETHER LONG TERM INSULIN USE (H): ICD-10-CM

## 2024-05-14 PROCEDURE — 99215 OFFICE O/P EST HI 40 MIN: CPT | Performed by: PHYSICIAN ASSISTANT

## 2024-05-14 RX ORDER — TIRZEPATIDE 5 MG/.5ML
5 INJECTION, SOLUTION SUBCUTANEOUS
Qty: 2 ML | Refills: 3 | Status: SHIPPED | OUTPATIENT
Start: 2024-05-14 | End: 2024-09-05

## 2024-05-14 ASSESSMENT — PAIN SCALES - GENERAL: PAINLEVEL: NO PAIN (0)

## 2024-05-14 NOTE — NURSING NOTE
Chief Complaint   Patient presents with    Diabetes     Unable to get Mounjaro, hasn't had it for 3 or 4 days.     Blood pressure 113/73, pulse 79, weight 88.7 kg (195 lb 8 oz), SpO2 99%.    Bailee Gregory

## 2024-05-14 NOTE — LETTER
5/14/2024       RE: Earle Rene  [address]  [place] [address]     Dear Colleague,    Thank you for referring your patient, Earle Rene, to the [facility] [place] at [facility]. Please see a copy of my visit note below.    Patient is showing 3/5 MNCM met. Aspirin not prescribed   RENE Akers  Outcome for 05/13/24 1:06 PM: Patient will bring Lora reader to appointment.  RENE Akers    PATIENT IS HAVING GREAT RESULTS WITH MOUNJARO BUT HAS RUN OUT AND HIS PHARMACY WOULD NOT REFILL HIS RX.  Bailee Gregory    Outcome for 05/14/24 3:06 PM: Data obtained via [employer] website  RENE Akers              HPI   Earle Rene is a 74 year old male with type 2 diabetes mellitus.  Patient was started on Mounjaro and his A1C and blood sugar values have improved and he is no longer requiring mealtime insulin.  Pt's diabetes is complicated by retinopathy, nephropathy, neuropathy and ED.  Pt also has hx of hyperlipidemia, HTN, PVD, obesity, presumed localized prostate cancer, BPH, goiter with right thyroid nodule and osteoporosis.  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  For his diabetes, patient states he is taking Mounjaro 5 mg subcutaneous once a week (he needs a refill on his Mounjaro today), Tresiba 60 units SQ at hs and Jardiance 10 mg each am.  No longer taking Humalog.  His A1C was 8.8 % on 4/16/2024.  Previous A1C 10.3 % on 1/2/2024.   I reviewed and scanned his freestyle lora 2 sensor download data in his note below.  His average glucose is 168.  Glucose is in target range 70% of the time.  Fasting blood sugar values have been less than 130 per patient.  No frequent hypoglycemia.    On ROS today, reports increase gas.  Some mild GERD.  Both improved per patient  No vomiting or abdominal pain.  Intermittent loose stools.  Denies blood in stool or melena.  Symptoms of diabetic neuropathy in both feet.  No  foot ulcers.  Decrease auditory acuity.  He denies groin yeast infection, dysuria or hematuria at this time.  Pt denies SOB at rest, cough, fever or chest pain.    DIABETES CARE:  Retinopathy: yes; severe NPDR with macular edema. He was seen by Oph here in Nov 2023.   Nephropathy: yes- CKDStage3.  Pt is taking Cozaar daily.  Neuropathy: yes.   Foot exam.  Patient seen podiatry next week.  Lipids: LDL 88 in Jan 2024.  Pt is taking Lipitor, Fish Oil and fenofibrate.  Taking ASA: yes.  CAD:no.  Mental health: hx of mood disorder per chart. Pt denies depression.  Insulin: Basal and meal time insulin.    DM meds: Jardiance and Mounjaro.  Pt did NOT tolerate Ozempic- diarrhea.    Testing:  Freestyle Libre2 sensor.        ROS   Please see under HPI.     ALLERGIES:  Review of patient's allergies indicates no known allergies.      Current Outpatient Medications   Medication Sig Dispense Refill    alpha-lipoic acid 600 MG capsule Take 1 capsule (600 mg) by mouth daily 90 capsule 3    amoxicillin (AMOXIL) 500 MG capsule TAKE 1 CAPSULE ORAL EVERY EIGHT HOURS AS DIRECTED      ARTIFICIAL TEAR OP Apply to eye as needed      atorvastatin (LIPITOR) 20 MG tablet TAKE 1 TABLET BY MOUTH EVERY DAY 90 tablet 0    blood glucose (NO BRAND SPECIFIED) lancets standard Lancets that go with device, Test 3 times daily 300 each 3    Continuous Blood Gluc Sensor (FREESTYLE PETER 2 SENSOR) MISC 1 EACH EVERY 14 DAYS 1 EACH EVERY 14 DAYS. CHANGE EVERY 14 DAYS. 2 each 5    empagliflozin (JARDIANCE) 10 MG TABS tablet Take 1 tablet (10 mg) by mouth daily 90 tablet 1    fenofibrate 54 MG tablet Take 1 tablet (54 mg) by mouth daily (Patient taking differently: Take 54 mg by mouth at bedtime) 90 tablet 0    finasteride (PROSCAR) 5 MG tablet Take 1 tablet (5 mg) by mouth daily 90 tablet 3    gabapentin (NEURONTIN) 300 MG capsule Take 1 capsule (300 mg) by mouth 2 times daily 60 capsule 3    HUMALOG KWIKPEN 100 UNIT/ML soln Inject 4 units with meals, plus  "correction. Pt uses approx 50 units in 24 hrs. 75 mL 1    insulin degludec (TRESIBA FLEXTOUCH) 100 UNIT/ML pen INJECT 60 UNITS SUBCUTANEOUSLY AT BEDTIME. 60 mL 1    insulin pen needle (B-D U/F) 31G X 5 MM miscellaneous Use 4 time(s) per day.  Please dispense as BD Pen Needle Mini U/F 31G x 5  each 3    insulin pen needle (B-D U/F) 31G X 5 MM Use 4 times per day.  Please dispense as BD Pen Needle Mini U/F 31G x 5  each 3    insulin syringe 31G X 5/16\" 0.5 ML MISC Use three syringes daily 270 each 1    loratadine (CLARITIN) 10 MG tablet Take 1 tablet (10 mg) by mouth daily 90 tablet 0    losartan (COZAAR) 100 MG tablet Take 1 tablet (100 mg) by mouth at bedtime 90 tablet 3    mirabegron (MYRBETRIQ) 25 MG 24 hr tablet Take 1 tablet (25 mg) by mouth daily 90 tablet 3    sodium bicarbonate 650 MG tablet Take 2 tablets (1,300 mg) by mouth 2 times daily 270 tablet 3    tamsulosin (FLOMAX) 0.4 MG capsule Take 2 capsules (0.8 mg) by mouth daily 180 capsule 5    tirzepatide (MOUNJARO) 5 MG/0.5ML pen Inject 5 mg Subcutaneous every 7 days 2 mL 3    triamcinolone (KENALOG) 0.1 % external cream Apply topically 2 times daily 30 g 0    VITAMIN D3 25 MCG (1000 UT) tablet TAKE 2 TABLETS (50 MCG) BY MOUTH DAILY 180 tablet 3     Family Hx   No change.     Personal Hx   Smoke: none.   ETOH: none.    with grown children.    PMH   1. Type 2 Diabetes Mellitus dx at age 43.   2. Neuropathy.  3. Nephropathy.   4. ED.   5. Dyslipidemia.   6. Nephrolithiasis.   7. Decrease auditory acuity.   8. Sarcoidosis-lung.   9. Goiter.   10. S/P T & A.   11. S/P FX right heel.   12. Vit D def.   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   14. CT chest- ? granulomas.   15. Retinopathy.  16. Goiter.  Past Medical History:   Diagnosis Date    Blepharitis of both eyes     BPH (benign prostatic hyperplasia)     Diabetes (H)     Diabetic neuropathy (H)     Diabetic retinopathy associated with diabetes mellitus due to underlying condition (H)  "    Dry eye syndrome     GERD (gastroesophageal reflux disease)     Goiter     Granulomatous disease (H)     HLD (hyperlipidemia)     HTN (hypertension)     Nonsenile cataract     Peripheral neuropathy      Past Surgical History:   Procedure Laterality Date    ------------OTHER-------------      back of neck abscess drainage in OR    AS RAD RESEC TONSIL/PILLARS Bilateral 1961    CATARACT IOL, RT/LT Left     COLONOSCOPY  7/29/2013    Procedure: COLONOSCOPY;;  Surgeon: Montana Pascal MD;  Location: [facility]    EXCISE MASS UPPER EXTREMITY Right 11/11/2019    Procedure: EXCISION, MASS, UPPER EXTREMITY, RIGHT SHOULDER;  Surgeon: Johana Choudhury MD;  Location: [facility]    INJECT EPIDURAL LUMBAR Left 4/20/2023    Procedure: INJECTION, SPINE, LUMBAR, EPIDURAL (L4-L5);  Surgeon: Mahamed Vázquez MD;  Location: [facility] OR    INJECT SACROILIAC JOINT Bilateral 9/13/2022    Procedure: Bilateral sacroiliac joint injection;  Surgeon: Collin Mata MD;  Location: [facility] OR    INTRAVITREAL INJECTION CHEMOTHERAPY Right 12/30/2019    Procedure: INTRAVITREAL Bevacizumab injection;  Surgeon: Milton Maki MD;  Location: [facility]    PHACOEMULSIFICATION CLEAR CORNEA WITH STANDARD INTRAOCULAR LENS IMPLANT Right 12/30/2019    Procedure: PHACOEMULSIFICATION, CATARACT, WITH INTRAOCULAR LENS IMPLANT;  Surgeon: Milton Maki MD;  Location: [facility]    PHACOEMULSIFICATION WITH STANDARD INTRAOCULAR LENS IMPLANT Left 6/21/2019    Procedure: Left Eye Cataract Removal with Intraocular Lens Implant with Intraoperative Avastin Injection;  Surgeon: Lacey Eugene MD;  Location: [facility]    siladenatis  11/2017     Physical Exam     /73 (BP Location: Right arm)   Pulse 79   Wt 88.7 kg (195 lb 8 oz)   SpO2 99%   BMI 29.73 kg/m       Results   Creatinine   Date Value Ref Range Status   03/27/2024 2.12 (H) 0.67 - 1.17 mg/dL Final   06/09/2021 1.45 (H) 0.66 - 1.25 mg/dL Final     GFR Estimate   Date Value Ref Range Status   03/27/2024 32  (L) >60 mL/min/1.73m2 Final   06/09/2021 48 (L) >60 mL/min/[1.73_m2] Final     Comment:     Non  GFR Calc  Starting 12/18/2018, serum creatinine based estimated GFR (eGFR) will be   calculated using the Chronic Kidney Disease Epidemiology Collaboration   (CKD-EPI) equation.       Hemoglobin A1C   Date Value Ref Range Status   01/03/2024 10.3 (H) 0.0 - 5.6 % Final     Comment:     Normal <5.7%   Prediabetes 5.7-6.4%    Diabetes 6.5% or higher     Note: Adopted from ADA consensus guidelines.   06/09/2021 8.2 (H) 0 - 5.6 % Final     Comment:     Normal <5.7% Prediabetes 5.7-6.4%  Diabetes 6.5% or higher - adopted from ADA   consensus guidelines.       Afinion Hemoglobin A1c POCT   Date Value Ref Range Status   04/16/2024 8.8 (H) <=5.7 % Final     Comment:     Normal <5.7%   Prediabetes 5.7-6.4%     Diabetes 6.5% or higher       Note: Adopted from ADA consensus guidelines.     Potassium   Date Value Ref Range Status   03/27/2024 4.3 3.4 - 5.3 mmol/L Final   06/09/2022 4.3 3.4 - 5.3 mmol/L Final   06/09/2021 4.2 3.4 - 5.3 mmol/L Final     ALT   Date Value Ref Range Status   03/12/2021 36 0 - 70 U/L Final     AST   Date Value Ref Range Status   01/03/2024 23 0 - 45 U/L Final   03/12/2021 24 0 - 45 U/L Final     TSH   Date Value Ref Range Status   01/03/2024 1.76 0.30 - 4.20 uIU/mL Final   06/09/2021 0.98 0.40 - 4.00 mU/L Final     T4 Free   Date Value Ref Range Status   10/21/2014 1.00 0.76 - 1.46 ng/dL Final     Comment:     Effective 7/30/2014, the reference range for this assay has changed to reflect   new instrumentation/methodology.           Cholesterol   Date Value Ref Range Status   01/03/2024 184 <200 mg/dL Final   02/06/2023 153 <200 mg/dL Final   06/18/2019 145 <200 mg/dL Final   03/06/2018 101 <200 mg/dL Final     HDL Cholesterol   Date Value Ref Range Status   06/18/2019 38 (L) >39 mg/dL Final   03/06/2018 32 (L) >39 mg/dL Final     Direct Measure HDL   Date Value Ref Range Status   01/03/2024  32 (L) >=40 mg/dL Final   02/06/2023 40 >=40 mg/dL Final     LDL Cholesterol Calculated   Date Value Ref Range Status   01/03/2024 88 <=100 mg/dL Final   02/06/2023 57 <=100 mg/dL Final   06/18/2019 49 <100 mg/dL Final     Comment:     Desirable:       <100 mg/dl   03/06/2018 36 <100 mg/dL Final     Comment:     Desirable:       <100 mg/dl     Triglycerides   Date Value Ref Range Status   01/03/2024 319 (H) <150 mg/dL Final   02/06/2023 279 (H) <150 mg/dL Final   06/18/2019 289 (H) <150 mg/dL Final     Comment:     Borderline high:  150-199 mg/dl  High:             200-499 mg/dl  Very high:       >499 mg/dl     03/06/2018 166 (H) <150 mg/dL Final     Comment:     Borderline high:  150-199 mg/dl  High:             200-499 mg/dl  Very high:       >499 mg/dl       Cholesterol/HDL Ratio   Date Value Ref Range Status   09/09/2014 3.8 0.0 - 5.0 Final   11/20/2013 5.8 (H) 0.0 - 5.0 Final       ASSESSMENT/PLAN:     1. TYPE 2 DIABETES MELLITUS: Type 2 diabetes mellitus complicated by severe diabetic retinopathy, nephropathy- CKD stage 3, neuropathy and ED. Pt also has PVD.  A1C and blood sugar values have improved with addition of Mounjaro.  Continue Mounjaro 5 mg subcutaneous once a week.  He is no longer requiring Humalog.  Continue current dose of Tresiba.  Again, I reviewed how Mounjaro works and possible side effects of the drug including n/v, GI distress, constipation, hypoglycemia and rare risk of pancreatitis.  Pt denies hx of pancreatitis or gastroparesis.  Continue Jardiance 10 mg each a.m.  Most recent creat 2.12 with GFR 32 mL/min in March 2024.  Reminded pt to keep himself well hydrated while taking Jardiance.  Encouraged patient to make healthy food choices, reduce his food portions with meals, avoid snacking and walk on his treadmill and exercise.  Pt remains on daily ASA.   /73 today in clinic.    2. GOITER: Not addressed today.   TSH normal in Jan 2024.    3. NEPHROPATHY: Pt has CKDstage3 and followed  here by renal staff.  Most recent creat 2.12/GFR 32 mL/min in 3/2024. K+ 4.3 .  Pt is taking Cozaar.  Pt on ARB and SGLT-2.       4.  RETINOPATHY:  Seen here by Oph in 11/2023.  5.  NEUROPATHY: Continue Gabapentin,alpha-lipoic acid and topical cream.  Pt seeing Podiatry next week.  6. DYSLIPIDEMIA: LDL 88 in January 2024.  Pt is taking Lipitor, fish oil and Fenofibrate.  7. OBESITY: See under # 1 above.  Continue Mounjaro.  8.  FOLLOW UP: with me in 1 months.  Patient has follow-up with Theo Gong on 6/28/2024.  Prescription for Mounjaro 5 mg subcutaneous once a day sent to pharmacy today.    Time spent reviewing pt's chart notes,labs and freestyle cyrus 2 sensor download data today=5 minutes.  Time for clinic visit today=  20 minutes.  Time for documentation today = 15 minutes.    TOTAL TIME FOR VISIT TODAY = 40 minutes  Pamela Laura PA-C

## 2024-05-15 NOTE — PROGRESS NOTES
PATT Rene is a 74 year old male with type 2 diabetes mellitus.  Patient was started on Mounjaro and his A1C and blood sugar values have improved and he is no longer requiring mealtime insulin.  Pt's diabetes is complicated by retinopathy, nephropathy, neuropathy and ED.  Pt also has hx of hyperlipidemia, HTN, PVD, obesity, presumed localized prostate cancer, BPH, goiter with right thyroid nodule and osteoporosis.  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  For his diabetes, patient states he is taking Mounjaro 5 mg subcutaneous once a week (he needs a refill on his Mounjaro today), Tresiba 60 units SQ at hs and Jardiance 10 mg each am.  No longer taking Humalog.  His A1C was 8.8 % on 4/16/2024.  Previous A1C 10.3 % on 1/2/2024.   I reviewed and scanned his freestyle cyrus 2 sensor download data in his note below.  His average glucose is 168.  Glucose is in target range 70% of the time.  Fasting blood sugar values have been less than 130 per patient.  No frequent hypoglycemia.    On ROS today, reports increase gas.  Some mild GERD.  Both improved per patient  No vomiting or abdominal pain.  Intermittent loose stools.  Denies blood in stool or melena.  Symptoms of diabetic neuropathy in both feet.  No foot ulcers.  Decrease auditory acuity.  He denies groin yeast infection, dysuria or hematuria at this time.  Pt denies SOB at rest, cough, fever or chest pain.    DIABETES CARE:  Retinopathy: yes; severe NPDR with macular edema. He was seen by Oph here in Nov 2023.   Nephropathy: yes- CKDStage3.  Pt is taking Cozaar daily.  Neuropathy: yes.   Foot exam.  Patient seen podiatry next week.  Lipids: LDL 88 in Jan 2024.  Pt is taking Lipitor, Fish Oil and fenofibrate.  Taking ASA: yes.  CAD:no.  Mental health: hx of mood disorder per chart. Pt denies depression.  Insulin: Basal and meal time insulin.    DM meds: Jardiance and Mounjaro.  Pt did NOT tolerate Ozempic- diarrhea.    Testing:  Freestyle Libre2  "sensor.        ROS   Please see under HPI.     ALLERGIES:  Review of patient's allergies indicates no known allergies.      Current Outpatient Medications   Medication Sig Dispense Refill    alpha-lipoic acid 600 MG capsule Take 1 capsule (600 mg) by mouth daily 90 capsule 3    amoxicillin (AMOXIL) 500 MG capsule TAKE 1 CAPSULE ORAL EVERY EIGHT HOURS AS DIRECTED      ARTIFICIAL TEAR OP Apply to eye as needed      atorvastatin (LIPITOR) 20 MG tablet TAKE 1 TABLET BY MOUTH EVERY DAY 90 tablet 0    blood glucose (NO BRAND SPECIFIED) lancets standard Lancets that go with device, Test 3 times daily 300 each 3    Continuous Blood Gluc Sensor (FREESTYLE PETER 2 SENSOR) MISC 1 EACH EVERY 14 DAYS 1 EACH EVERY 14 DAYS. CHANGE EVERY 14 DAYS. 2 each 5    empagliflozin (JARDIANCE) 10 MG TABS tablet Take 1 tablet (10 mg) by mouth daily 90 tablet 1    fenofibrate 54 MG tablet Take 1 tablet (54 mg) by mouth daily (Patient taking differently: Take 54 mg by mouth at bedtime) 90 tablet 0    finasteride (PROSCAR) 5 MG tablet Take 1 tablet (5 mg) by mouth daily 90 tablet 3    gabapentin (NEURONTIN) 300 MG capsule Take 1 capsule (300 mg) by mouth 2 times daily 60 capsule 3    HUMALOG KWIKPEN 100 UNIT/ML soln Inject 4 units with meals, plus correction. Pt uses approx 50 units in 24 hrs. 75 mL 1    insulin degludec (TRESIBA FLEXTOUCH) 100 UNIT/ML pen INJECT 60 UNITS SUBCUTANEOUSLY AT BEDTIME. 60 mL 1    insulin pen needle (B-D U/F) 31G X 5 MM miscellaneous Use 4 time(s) per day.  Please dispense as BD Pen Needle Mini U/F 31G x 5  each 3    insulin pen needle (B-D U/F) 31G X 5 MM Use 4 times per day.  Please dispense as BD Pen Needle Mini U/F 31G x 5  each 3    insulin syringe 31G X 5/16\" 0.5 ML MISC Use three syringes daily 270 each 1    loratadine (CLARITIN) 10 MG tablet Take 1 tablet (10 mg) by mouth daily 90 tablet 0    losartan (COZAAR) 100 MG tablet Take 1 tablet (100 mg) by mouth at bedtime 90 tablet 3    mirabegron " (MYRBETRIQ) 25 MG 24 hr tablet Take 1 tablet (25 mg) by mouth daily 90 tablet 3    sodium bicarbonate 650 MG tablet Take 2 tablets (1,300 mg) by mouth 2 times daily 270 tablet 3    tamsulosin (FLOMAX) 0.4 MG capsule Take 2 capsules (0.8 mg) by mouth daily 180 capsule 5    tirzepatide (MOUNJARO) 5 MG/0.5ML pen Inject 5 mg Subcutaneous every 7 days 2 mL 3    triamcinolone (KENALOG) 0.1 % external cream Apply topically 2 times daily 30 g 0    VITAMIN D3 25 MCG (1000 UT) tablet TAKE 2 TABLETS (50 MCG) BY MOUTH DAILY 180 tablet 3     Family Hx   No change.     Personal Hx   Smoke: none.   ETOH: none.    with grown children.    PMH   1. Type 2 Diabetes Mellitus dx at age 43.   2. Neuropathy.  3. Nephropathy.   4. ED.   5. Dyslipidemia.   6. Nephrolithiasis.   7. Decrease auditory acuity.   8. Sarcoidosis-lung.   9. Goiter.   10. S/P T & A.   11. S/P FX right heel.   12. Vit D def.   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   14. CT chest- ? granulomas.   15. Retinopathy.  16. Goiter.  Past Medical History:   Diagnosis Date    Blepharitis of both eyes     BPH (benign prostatic hyperplasia)     Diabetes (H)     Diabetic neuropathy (H)     Diabetic retinopathy associated with diabetes mellitus due to underlying condition (H)     Dry eye syndrome     GERD (gastroesophageal reflux disease)     Goiter     Granulomatous disease (H)     HLD (hyperlipidemia)     HTN (hypertension)     Nonsenile cataract     Peripheral neuropathy      Past Surgical History:   Procedure Laterality Date    ------------OTHER-------------      back of neck abscess drainage in OR    AS RAD RESEC TONSIL/PILLARS Bilateral 1961    CATARACT IOL, RT/LT Left     COLONOSCOPY  7/29/2013    Procedure: COLONOSCOPY;;  Surgeon: Montana Pascal MD;  Location: [facility]    EXCISE MASS UPPER EXTREMITY Right 11/11/2019    Procedure: EXCISION, MASS, UPPER EXTREMITY, RIGHT SHOULDER;  Surgeon: Johana Choudhury MD;  [facility]    INJECT EPIDURAL LUMBAR Left  4/20/2023    Procedure: INJECTION, SPINE, LUMBAR, EPIDURAL (L4-L5);  Surgeon: Mahamed Vázquez MD;  Location: [facility]    INJECT SACROILIAC JOINT Bilateral 9/13/2022    Procedure: Bilateral sacroiliac joint injection;  Surgeon: Collin Mata MD;  Location: [facility]    INTRAVITREAL INJECTION CHEMOTHERAPY Right 12/30/2019    Procedure: INTRAVITREAL Bevacizumab injection;  Surgeon: Milton Maki MD;  [facility]    PHACOEMULSIFICATION CLEAR CORNEA WITH STANDARD INTRAOCULAR LENS IMPLANT Right 12/30/2019    Procedure: PHACOEMULSIFICATION, CATARACT, WITH INTRAOCULAR LENS IMPLANT;  Surgeon: Milton Maki MD;  [facility]    PHACOEMULSIFICATION WITH STANDARD INTRAOCULAR LENS IMPLANT Left 6/21/2019    Procedure: Left Eye Cataract Removal with Intraocular Lens Implant with Intraoperative Avastin Injection;  Surgeon: Lacey Eugene MD;  [facility]    siladenatis  11/2017     Physical Exam     /73 (BP Location: Right arm)   Pulse 79   Wt 88.7 kg (195 lb 8 oz)   SpO2 99%   BMI 29.73 kg/m       Results   Creatinine   Date Value Ref Range Status   03/27/2024 2.12 (H) 0.67 - 1.17 mg/dL Final   06/09/2021 1.45 (H) 0.66 - 1.25 mg/dL Final     GFR Estimate   Date Value Ref Range Status   03/27/2024 32 (L) >60 mL/min/1.73m2 Final   06/09/2021 48 (L) >60 mL/min/[1.73_m2] Final     Comment:     Non  GFR Calc  Starting 12/18/2018, serum creatinine based estimated GFR (eGFR) will be   calculated using the Chronic Kidney Disease Epidemiology Collaboration   (CKD-EPI) equation.       Hemoglobin A1C   Date Value Ref Range Status   01/03/2024 10.3 (H) 0.0 - 5.6 % Final     Comment:     Normal <5.7%   Prediabetes 5.7-6.4%    Diabetes 6.5% or higher     Note: Adopted from ADA consensus guidelines.   06/09/2021 8.2 (H) 0 - 5.6 % Final     Comment:     Normal <5.7% Prediabetes 5.7-6.4%  Diabetes 6.5% or higher - adopted from ADA   consensus guidelines.       Afinion Hemoglobin A1c POCT    Date Value Ref Range Status   04/16/2024 8.8 (H) <=5.7 % Final     Comment:     Normal <5.7%   Prediabetes 5.7-6.4%     Diabetes 6.5% or higher       Note: Adopted from ADA consensus guidelines.     Potassium   Date Value Ref Range Status   03/27/2024 4.3 3.4 - 5.3 mmol/L Final   06/09/2022 4.3 3.4 - 5.3 mmol/L Final   06/09/2021 4.2 3.4 - 5.3 mmol/L Final     ALT   Date Value Ref Range Status   03/12/2021 36 0 - 70 U/L Final     AST   Date Value Ref Range Status   01/03/2024 23 0 - 45 U/L Final   03/12/2021 24 0 - 45 U/L Final     TSH   Date Value Ref Range Status   01/03/2024 1.76 0.30 - 4.20 uIU/mL Final   06/09/2021 0.98 0.40 - 4.00 mU/L Final     T4 Free   Date Value Ref Range Status   10/21/2014 1.00 0.76 - 1.46 ng/dL Final     Comment:     Effective 7/30/2014, the reference range for this assay has changed to reflect   new instrumentation/methodology.           Cholesterol   Date Value Ref Range Status   01/03/2024 184 <200 mg/dL Final   02/06/2023 153 <200 mg/dL Final   06/18/2019 145 <200 mg/dL Final   03/06/2018 101 <200 mg/dL Final     HDL Cholesterol   Date Value Ref Range Status   06/18/2019 38 (L) >39 mg/dL Final   03/06/2018 32 (L) >39 mg/dL Final     Direct Measure HDL   Date Value Ref Range Status   01/03/2024 32 (L) >=40 mg/dL Final   02/06/2023 40 >=40 mg/dL Final     LDL Cholesterol Calculated   Date Value Ref Range Status   01/03/2024 88 <=100 mg/dL Final   02/06/2023 57 <=100 mg/dL Final   06/18/2019 49 <100 mg/dL Final     Comment:     Desirable:       <100 mg/dl   03/06/2018 36 <100 mg/dL Final     Comment:     Desirable:       <100 mg/dl     Triglycerides   Date Value Ref Range Status   01/03/2024 319 (H) <150 mg/dL Final   02/06/2023 279 (H) <150 mg/dL Final   06/18/2019 289 (H) <150 mg/dL Final     Comment:     Borderline high:  150-199 mg/dl  High:             200-499 mg/dl  Very high:       >499 mg/dl     03/06/2018 166 (H) <150 mg/dL Final     Comment:     Borderline high:  150-199  mg/dl  High:             200-499 mg/dl  Very high:       >499 mg/dl       Cholesterol/HDL Ratio   Date Value Ref Range Status   09/09/2014 3.8 0.0 - 5.0 Final   11/20/2013 5.8 (H) 0.0 - 5.0 Final       ASSESSMENT/PLAN:     1. TYPE 2 DIABETES MELLITUS: Type 2 diabetes mellitus complicated by severe diabetic retinopathy, nephropathy- CKD stage 3, neuropathy and ED. Pt also has PVD.  A1C and blood sugar values have improved with addition of Mounjaro.  Continue Mounjaro 5 mg subcutaneous once a week.  He is no longer requiring Humalog.  Continue current dose of Tresiba.  Again, I reviewed how Mounjaro works and possible side effects of the drug including n/v, GI distress, constipation, hypoglycemia and rare risk of pancreatitis.  Pt denies hx of pancreatitis or gastroparesis.  Continue Jardiance 10 mg each a.m.  Most recent creat 2.12 with GFR 32 mL/min in March 2024.  Reminded pt to keep himself well hydrated while taking Jardiance.  Encouraged patient to make healthy food choices, reduce his food portions with meals, avoid snacking and walk on his treadmill and exercise.  Pt remains on daily ASA.   /73 today in clinic.    2. GOITER: Not addressed today.   TSH normal in Jan 2024.    3. NEPHROPATHY: Pt has CKDstage3 and followed here by renal staff.  Most recent creat 2.12/GFR 32 mL/min in 3/2024. K+ 4.3 .  Pt is taking Cozaar.  Pt on ARB and SGLT-2.       4.  RETINOPATHY:  Seen here by Oph in 11/2023.    5.  NEUROPATHY: Continue Gabapentin,alpha-lipoic acid and topical cream.  Pt seeing Podiatry next week.    6. DYSLIPIDEMIA: LDL 88 in January 2024.  Pt is taking Lipitor, fish oil and Fenofibrate.    7. OBESITY: See under # 1 above.  Continue Mounjaro.    8.  FOLLOW UP: with me in 1 months.  Patient has follow-up with Theo Gnog on 6/28/2024.  Prescription for Mounjaro 5 mg subcutaneous once a day sent to pharmacy today.    Time spent reviewing pt's chart notes,labs and freestyle cyrus 2 sensor download data  today=5 minutes.  Time for clinic visit today=  20 minutes.  Time for documentation today = 15 minutes.    TOTAL TIME FOR VISIT TODAY = 40 minutes    Pamela Laura PA-C

## 2024-05-21 ENCOUNTER — TELEPHONE (OUTPATIENT)
Dept: NEPHROLOGY | Facility: CLINIC | Age: 74
End: 2024-05-21

## 2024-05-21 NOTE — TELEPHONE ENCOUNTER
Called PT on 05/21/2024, and spoke to PT reminding PT of upcoming visit and lab on 05/29/2024 with Angela Thornton. PT confirmed he had the appt listed and appreciated the phone call.    Emmanuel Skinner CMA on 5/21/2024 at 11:55 AM

## 2024-05-23 ENCOUNTER — OFFICE VISIT (OUTPATIENT)
Dept: ENDOCRINOLOGY | Facility: CLINIC | Age: 74
End: 2024-05-23
Payer: COMMERCIAL

## 2024-05-23 DIAGNOSIS — E11.628 TYPE 2 DIABETES MELLITUS WITH PRESSURE CALLUS (H): ICD-10-CM

## 2024-05-23 DIAGNOSIS — M20.41 HAMMERTOES OF BOTH FEET: ICD-10-CM

## 2024-05-23 DIAGNOSIS — E11.49 TYPE II OR UNSPECIFIED TYPE DIABETES MELLITUS WITH NEUROLOGICAL MANIFESTATIONS, NOT STATED AS UNCONTROLLED(250.60) (H): Primary | ICD-10-CM

## 2024-05-23 DIAGNOSIS — L84 TYPE 2 DIABETES MELLITUS WITH PRESSURE CALLUS (H): ICD-10-CM

## 2024-05-23 DIAGNOSIS — B35.1 OM (ONYCHOMYCOSIS): ICD-10-CM

## 2024-05-23 DIAGNOSIS — Z87.2 HISTORY OF FOOT ULCER: ICD-10-CM

## 2024-05-23 DIAGNOSIS — M20.42 HAMMERTOES OF BOTH FEET: ICD-10-CM

## 2024-05-23 PROCEDURE — 99214 OFFICE O/P EST MOD 30 MIN: CPT | Mod: 25 | Performed by: PODIATRIST

## 2024-05-23 PROCEDURE — 11055 PARING/CUTG B9 HYPRKER LES 1: CPT | Performed by: PODIATRIST

## 2024-05-23 PROCEDURE — 11721 DEBRIDE NAIL 6 OR MORE: CPT | Mod: XS | Performed by: PODIATRIST

## 2024-05-23 NOTE — LETTER
5/23/2024       RE: Earle Rene  [address]  [place] [address]     Dear Colleague,    Thank you for referring your patient, Earle Rene, to the [facility] [place] at [facility]. Please see a copy of my visit note below.    Past Medical History:   Diagnosis Date    Blepharitis of both eyes     BPH (benign prostatic hyperplasia)     Diabetes (H)     Diabetic neuropathy (H)     Diabetic retinopathy associated with diabetes mellitus due to underlying condition (H)     Dry eye syndrome     GERD (gastroesophageal reflux disease)     Goiter     Granulomatous disease (H)     HLD (hyperlipidemia)     HTN (hypertension)     Nonsenile cataract     Peripheral neuropathy      Patient Active Problem List   Diagnosis    Psychological factors associated with another disorder    Mood disorder in conditions classified elsewhere    Occupational problem    Type 2 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, with long-term current use of insulin (H)    Erectile dysfunction    Hyperlipidemia with target LDL less than 100    CTS (carpal tunnel syndrome)    Diabetic polyneuropathy (H)    Presbyopia    Myopia    Cataracts, bilateral    Pain of right thumb    Subcutaneous mass    Combined form of nonsenile cataract of right eye    Colonic polyp    Elevated PSA    Heel fracture    Hyponatremia    Nephrolithiasis    Pain of finger of right hand    Sarcoidosis of lung (H24)    Sialoadenitis    Adhesive capsulitis of shoulder    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)    Chronic kidney disease, stage 3 (H)    Peripheral vascular disease, unspecified (H24)    Malignant neoplasm of prostate (H)    Chronic bilateral low back pain with bilateral sciatica    Pain of both sacroiliac joints    Lumbar radiculopathy     Past Surgical History:    Procedure Laterality Date    ------------OTHER-------------      back of neck abscess drainage in OR    AS RAD RESEC TONSIL/PILLARS Bilateral 1961    CATARACT IOL, RT/LT Left     COLONOSCOPY  7/29/2013    Procedure: COLONOSCOPY;;  Surgeon: Montana Pascal MD;  Location: [facility]    EXCISE MASS UPPER EXTREMITY Right 11/11/2019    Procedure: EXCISION, MASS, UPPER EXTREMITY, RIGHT SHOULDER;  Surgeon: Johana Choudhury MD;  [facility]    INJECT EPIDURAL LUMBAR Left 4/20/2023    Procedure: INJECTION, SPINE, LUMBAR, EPIDURAL (L4-L5);  Surgeon: Mahamed Vázquez MD;  Location: [facility]    INJECT SACROILIAC JOINT Bilateral 9/13/2022    Procedure: Bilateral sacroiliac joint injection;  Surgeon: Collin Mata MD;  Location: [facility]    INTRAVITREAL INJECTION CHEMOTHERAPY Right 12/30/2019    Procedure: INTRAVITREAL Bevacizumab injection;  Surgeon: Milton Maki MD;  [facility]    PHACOEMULSIFICATION CLEAR CORNEA WITH STANDARD INTRAOCULAR LENS IMPLANT Right 12/30/2019    Procedure: PHACOEMULSIFICATION, CATARACT, WITH INTRAOCULAR LENS IMPLANT;  Surgeon: Milton Maki MD;  [facility]    PHACOEMULSIFICATION WITH STANDARD INTRAOCULAR LENS IMPLANT Left 6/21/2019    Procedure: Left Eye Cataract Removal with Intraocular Lens Implant with Intraoperative Avastin Injection;  Surgeon: Lacey Eugene MD;  [facility]    siladenatis  11/2017     Social History     Socioeconomic History    Marital status:      Spouse name: Not on file    Number of children: 5    Years of education: Not on file    Highest education level: Not on file   Occupational History    Occupation: [employer] owner   Tobacco Use    Smoking status: Never    Smokeless tobacco: Never   Substance and Sexual Activity    Alcohol use: Yes     Comment: occasionaly    Drug use: No    Sexual activity: Not on file   Other Topics Concern    Parent/sibling w/ CABG, MI or angioplasty before 65F 55M? Not Asked   Social History  Narrative    Not on file     Social Determinants of Health     Financial Resource Strain: Not on file   Food Insecurity: Not on file   Transportation Needs: Not on file   Physical Activity: Not on file   Stress: Not on file   Social Connections: Not on file   Interpersonal Safety: Not on file   Housing Stability: Not on file     Family History   Problem Relation Age of Onset    Diabetes Father     Myocardial Infarction Father     Diabetes Brother     Leukemia Brother 44    Glaucoma No family hx of     Macular Degeneration No family hx of     Kidney Disease No family hx of            Lab Results   Component Value Date    A1C 8.8 04/16/2024    A1C 10.3 01/03/2024    A1C 8.3 11/02/2021    A1C 8.2 06/09/2021    A1C 9.2 06/18/2019    A1C 9.1 03/06/2018    A1C 10.2 06/06/2017    A1C 9.0 03/16/2016     Last Renal Panel:  Sodium   Date Value Ref Range Status   03/27/2024 140 135 - 145 mmol/L Final     Comment:     Reference intervals for this test were updated on 09/26/2023 to more accurately reflect our healthy population. There may be differences in the flagging of prior results with similar values performed with this method. Interpretation of those prior results can be made in the context of the updated reference intervals.    06/09/2021 142 133 - 144 mmol/L Final     Potassium   Date Value Ref Range Status   03/27/2024 4.3 3.4 - 5.3 mmol/L Final   06/09/2022 4.3 3.4 - 5.3 mmol/L Final   06/09/2021 4.2 3.4 - 5.3 mmol/L Final     Chloride   Date Value Ref Range Status   03/27/2024 108 (H) 98 - 107 mmol/L Final   06/09/2022 107 94 - 109 mmol/L Final   06/09/2021 111 (H) 94 - 109 mmol/L Final     Carbon Dioxide   Date Value Ref Range Status   06/09/2021 22 20 - 32 mmol/L Final     Carbon Dioxide (CO2)   Date Value Ref Range Status   03/27/2024 21 (L) 22 - 29 mmol/L Final   06/09/2022 24 20 - 32 mmol/L Final     Anion Gap   Date Value Ref Range Status   03/27/2024 11 7 - 15 mmol/L Final   06/09/2022 8 3 - 14 mmol/L Final    06/09/2021 8 3 - 14 mmol/L Final     Glucose   Date Value Ref Range Status   03/27/2024 185 (H) 70 - 99 mg/dL Final   06/09/2022 142 (H) 70 - 99 mg/dL Final   06/09/2021 211 (H) 70 - 99 mg/dL Final     Urea Nitrogen   Date Value Ref Range Status   03/27/2024 33.0 (H) 8.0 - 23.0 mg/dL Final   06/09/2022 33 (H) 7 - 30 mg/dL Final   06/09/2021 20 7 - 30 mg/dL Final     Creatinine   Date Value Ref Range Status   03/27/2024 2.12 (H) 0.67 - 1.17 mg/dL Final   06/09/2021 1.45 (H) 0.66 - 1.25 mg/dL Final     GFR Estimate   Date Value Ref Range Status   03/27/2024 32 (L) >60 mL/min/1.73m2 Final   06/09/2021 48 (L) >60 mL/min/[1.73_m2] Final     Comment:     Non  GFR Calc  Starting 12/18/2018, serum creatinine based estimated GFR (eGFR) will be   calculated using the Chronic Kidney Disease Epidemiology Collaboration   (CKD-EPI) equation.       Calcium   Date Value Ref Range Status   03/27/2024 9.6 8.8 - 10.2 mg/dL Final   06/09/2021 8.8 8.5 - 10.1 mg/dL Final     Phosphorus   Date Value Ref Range Status   03/27/2024 3.7 2.5 - 4.5 mg/dL Final   06/09/2021 2.9 2.5 - 4.5 mg/dL Final     Albumin   Date Value Ref Range Status   03/27/2024 4.1 3.5 - 5.2 g/dL Final   06/09/2022 3.9 3.4 - 5.0 g/dL Final   06/09/2021 3.6 3.4 - 5.0 g/dL Final     Subjective findings- 74-year-old presents for right fifth toe, relates he gets a callus that builds up and hurts.  Relates that he has Diabetic shoes and he wears those, relates he is Diabetic and does have numbness and tingling in feet, relates to no specific injuries, relates that the insurance did not cover the urea cream so he got an over-the-counter cream that he feels works well, relates he has compression socks he wears, relates he gets small abrasions on his feet and he is not sure how those occur because he has no injuries, relates has been using the Betadine as well on the fifth toe, relates he needs his toenails cut.     Objective findings- DP and PT are 2 out of 4  bilaterally, decreased hair growth bilaterally, dorsally contracted digits 1 through 5 bilaterally, Cavovarus fourth and fifth toes bilaterally.  Has venous stasis bilaterally.  Has small intact abrasions medial right ankle and dorsal left foot that appear to be sherri.  There is no erythema, no drainage, no odor, no calor bilaterally, decreased ankle joint dorsiflexion bilaterally.  Hyperkeratotic tissue buildup dorsal lateral fifth toe on the right with pain on palpation.  Sharp/dull is decreased with 5.07 [place] Jeff monofilament bilaterally, deep tendon reflexes are intact bilaterally, proprioception is intact bilaterally, no tendon voids bilaterally, incurvated dystrophic thickened brittle nails with subungual debris dystrophy discoloration to differing degrees 1 through 5 bilaterally.     Assessment and plan- Tyloma right fifth fifth toe causing pain and history of ulceration.  Onychomycosis bilaterally.  Diabetes with peripheral Neuropathy.  Diabetes with foot deformities.  Diabetes with callus.  Diagnosis and treatment options discussed with the patient.  All the toenails were debrided 1 through 5 upon consent, six plus nails debrided.  The right fifth toe tyloma was sharp debrided with a tissue cutter upon consent.  He relates he has Betadine and I advised him to use this on the eschared abrasions.  Continue over-the-counter moisturizing lotion.  Prescription for diabetic shoes and orthotics given and he is given the phone number address to orthotics and prosthetics for these.  Previous notes reviewed.  Return to clinic and see me in 2 to 3 months.    Moderate level of medical decision making.      Again, thank you for allowing me to participate in the care of your patient.      Sincerely,    Hemanth Nuñez DPM

## 2024-05-23 NOTE — PROGRESS NOTES
Past Medical History:   Diagnosis Date    Blepharitis of both eyes     BPH (benign prostatic hyperplasia)     Diabetes (H)     Diabetic neuropathy (H)     Diabetic retinopathy associated with diabetes mellitus due to underlying condition (H)     Dry eye syndrome     GERD (gastroesophageal reflux disease)     Goiter     Granulomatous disease (H)     HLD (hyperlipidemia)     HTN (hypertension)     Nonsenile cataract     Peripheral neuropathy      Patient Active Problem List   Diagnosis    Psychological factors associated with another disorder    Mood disorder in conditions classified elsewhere    Occupational problem    Type 2 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, with long-term current use of insulin (H)    Erectile dysfunction    Hyperlipidemia with target LDL less than 100    CTS (carpal tunnel syndrome)    Diabetic polyneuropathy (H)    Presbyopia    Myopia    Cataracts, bilateral    Pain of right thumb    Subcutaneous mass    Combined form of nonsenile cataract of right eye    Colonic polyp    Elevated PSA    Heel fracture    Hyponatremia    Nephrolithiasis    Pain of finger of right hand    Sarcoidosis of lung (H24)    Sialoadenitis    Adhesive capsulitis of shoulder    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)    Chronic kidney disease, stage 3 (H)    Peripheral vascular disease, unspecified (H24)    Malignant neoplasm of prostate (H)    Chronic bilateral low back pain with bilateral sciatica    Pain of both sacroiliac joints    Lumbar radiculopathy     Past Surgical History:   Procedure Laterality Date    ------------OTHER-------------      back of neck abscess drainage in OR    AS RAD RESEC TONSIL/PILLARS Bilateral 1961    CATARACT IOL, RT/LT Left     COLONOSCOPY  7/29/2013    Procedure: COLONOSCOPY;;  Surgeon: Montana Pascal MD;  Location: [facility]    EXCISE MASS UPPER EXTREMITY Right  11/11/2019    Procedure: EXCISION, MASS, UPPER EXTREMITY, RIGHT SHOULDER;  Surgeon: Johana Choudhury MD;  [facility]    INJECT EPIDURAL LUMBAR Left 4/20/2023    Procedure: INJECTION, SPINE, LUMBAR, EPIDURAL (L4-L5);  Surgeon: Mahamed Vázquez MD;  Location: [facility]    INJECT SACROILIAC JOINT Bilateral 9/13/2022    Procedure: Bilateral sacroiliac joint injection;  Surgeon: Collin Mata MD;  Location: [facility]    INTRAVITREAL INJECTION CHEMOTHERAPY Right 12/30/2019    Procedure: INTRAVITREAL Bevacizumab injection;  Surgeon: Milton Maki MD;  [facility]    PHACOEMULSIFICATION CLEAR CORNEA WITH STANDARD INTRAOCULAR LENS IMPLANT Right 12/30/2019    Procedure: PHACOEMULSIFICATION, CATARACT, WITH INTRAOCULAR LENS IMPLANT;  Surgeon: Milton Maki MD;  [facility]    PHACOEMULSIFICATION WITH STANDARD INTRAOCULAR LENS IMPLANT Left 6/21/2019    Procedure: Left Eye Cataract Removal with Intraocular Lens Implant with Intraoperative Avastin Injection;  Surgeon: Lacey Eugene MD;  Location: [facility]    siladenatis  11/2017     Social History     Socioeconomic History    Marital status:      Spouse name: Not on file    Number of children: 5    Years of education: Not on file    Highest education level: Not on file   Occupational History    Occupation: private bussiness owner   Tobacco Use    Smoking status: Never    Smokeless tobacco: Never   Substance and Sexual Activity    Alcohol use: Yes     Comment: occasionaly    Drug use: No    Sexual activity: Not on file   Other Topics Concern    Parent/sibling w/ CABG, MI or angioplasty before 65F 55M? Not Asked   Social History Narrative    Not on file     Social Determinants of Health     Financial Resource Strain: Not on file   Food Insecurity: Not on file   Transportation Needs: Not on file   Physical Activity: Not on file   Stress: Not on file   Social Connections: Not on file   Interpersonal Safety: Not on file   Housing Stability:  Not on file     Family History   Problem Relation Age of Onset    Diabetes Father     Myocardial Infarction Father     Diabetes Brother     Leukemia Brother 44    Glaucoma No family hx of     Macular Degeneration No family hx of     Kidney Disease No family hx of            Lab Results   Component Value Date    A1C 8.8 04/16/2024    A1C 10.3 01/03/2024    A1C 8.3 11/02/2021    A1C 8.2 06/09/2021    A1C 9.2 06/18/2019    A1C 9.1 03/06/2018    A1C 10.2 06/06/2017    A1C 9.0 03/16/2016     Last Renal Panel:  Sodium   Date Value Ref Range Status   03/27/2024 140 135 - 145 mmol/L Final     Comment:     Reference intervals for this test were updated on 09/26/2023 to more accurately reflect our healthy population. There may be differences in the flagging of prior results with similar values performed with this method. Interpretation of those prior results can be made in the context of the updated reference intervals.    06/09/2021 142 133 - 144 mmol/L Final     Potassium   Date Value Ref Range Status   03/27/2024 4.3 3.4 - 5.3 mmol/L Final   06/09/2022 4.3 3.4 - 5.3 mmol/L Final   06/09/2021 4.2 3.4 - 5.3 mmol/L Final     Chloride   Date Value Ref Range Status   03/27/2024 108 (H) 98 - 107 mmol/L Final   06/09/2022 107 94 - 109 mmol/L Final   06/09/2021 111 (H) 94 - 109 mmol/L Final     Carbon Dioxide   Date Value Ref Range Status   06/09/2021 22 20 - 32 mmol/L Final     Carbon Dioxide (CO2)   Date Value Ref Range Status   03/27/2024 21 (L) 22 - 29 mmol/L Final   06/09/2022 24 20 - 32 mmol/L Final     Anion Gap   Date Value Ref Range Status   03/27/2024 11 7 - 15 mmol/L Final   06/09/2022 8 3 - 14 mmol/L Final   06/09/2021 8 3 - 14 mmol/L Final     Glucose   Date Value Ref Range Status   03/27/2024 185 (H) 70 - 99 mg/dL Final   06/09/2022 142 (H) 70 - 99 mg/dL Final   06/09/2021 211 (H) 70 - 99 mg/dL Final     Urea Nitrogen   Date Value Ref Range Status   03/27/2024 33.0 (H) 8.0 - 23.0 mg/dL Final   06/09/2022 33 (H) 7 -  30 mg/dL Final   06/09/2021 20 7 - 30 mg/dL Final     Creatinine   Date Value Ref Range Status   03/27/2024 2.12 (H) 0.67 - 1.17 mg/dL Final   06/09/2021 1.45 (H) 0.66 - 1.25 mg/dL Final     GFR Estimate   Date Value Ref Range Status   03/27/2024 32 (L) >60 mL/min/1.73m2 Final   06/09/2021 48 (L) >60 mL/min/[1.73_m2] Final     Comment:     Non  GFR Calc  Starting 12/18/2018, serum creatinine based estimated GFR (eGFR) will be   calculated using the Chronic Kidney Disease Epidemiology Collaboration   (CKD-EPI) equation.       Calcium   Date Value Ref Range Status   03/27/2024 9.6 8.8 - 10.2 mg/dL Final   06/09/2021 8.8 8.5 - 10.1 mg/dL Final     Phosphorus   Date Value Ref Range Status   03/27/2024 3.7 2.5 - 4.5 mg/dL Final   06/09/2021 2.9 2.5 - 4.5 mg/dL Final     Albumin   Date Value Ref Range Status   03/27/2024 4.1 3.5 - 5.2 g/dL Final   06/09/2022 3.9 3.4 - 5.0 g/dL Final   06/09/2021 3.6 3.4 - 5.0 g/dL Final           Subjective findings- 74-year-old presents for right fifth toe, relates he gets a callus that builds up and hurts.  Relates that he has Diabetic shoes and he wears those, relates he is Diabetic and does have numbness and tingling in feet, relates to no specific injuries, relates that the insurance did not cover the urea cream so he got an over-the-counter cream that he feels works well, relates he has compression socks he wears, relates he gets small abrasions on his feet and he is not sure how those occur because he has no injuries, relates has been using the Betadine as well on the fifth toe, relates he needs his toenails cut.     Objective findings- DP and PT are 2 out of 4 bilaterally, decreased hair growth bilaterally, dorsally contracted digits 1 through 5 bilaterally, Cavovarus fourth and fifth toes bilaterally.  Has venous stasis bilaterally.  Has small intact abrasions medial right ankle and dorsal left foot that appear to be sherri.  There is no erythema, no  drainage, no odor, no calor bilaterally, decreased ankle joint dorsiflexion bilaterally.  Hyperkeratotic tissue buildup dorsal lateral fifth toe on the right with pain on palpation.  Sharp/dull is decreased with 5.07 [place] monofilament bilaterally, deep tendon reflexes are intact bilaterally, proprioception is intact bilaterally, no tendon voids bilaterally, incurvated dystrophic thickened brittle nails with subungual debris dystrophy discoloration to differing degrees 1 through 5 bilaterally.     Assessment and plan- Tyloma right fifth fifth toe causing pain and history of ulceration.  Onychomycosis bilaterally.  Diabetes with peripheral Neuropathy.  Diabetes with foot deformities.  Diabetes with callus.  Diagnosis and treatment options discussed with the patient.  All the toenails were debrided 1 through 5 upon consent, six plus nails debrided.  The right fifth toe tyloma was sharp debrided with a tissue cutter upon consent.  He relates he has Betadine and I advised him to use this on the eschared abrasions.  Continue over-the-counter moisturizing lotion.  Prescription for diabetic shoes and orthotics given and he is given the phone number address to orthotics and prosthetics for these.  Previous notes reviewed.  Return to clinic and see me in 2 to 3 months.                                               Moderate level of medical decision making.

## 2024-05-29 ENCOUNTER — LAB (OUTPATIENT)
Dept: LAB | Facility: CLINIC | Age: 74
End: 2024-05-29
Payer: COMMERCIAL

## 2024-05-29 ENCOUNTER — OFFICE VISIT (OUTPATIENT)
Dept: NEPHROLOGY | Facility: CLINIC | Age: 74
End: 2024-05-29
Payer: COMMERCIAL

## 2024-05-29 VITALS
WEIGHT: 193.8 LBS | DIASTOLIC BLOOD PRESSURE: 71 MMHG | BODY MASS INDEX: 29.47 KG/M2 | SYSTOLIC BLOOD PRESSURE: 126 MMHG | OXYGEN SATURATION: 100 % | HEART RATE: 66 BPM

## 2024-05-29 DIAGNOSIS — N18.32 CHRONIC KIDNEY DISEASE, STAGE 3B (H): ICD-10-CM

## 2024-05-29 DIAGNOSIS — N18.32 CHRONIC KIDNEY DISEASE, STAGE 3B (H): Primary | ICD-10-CM

## 2024-05-29 LAB
ALBUMIN MFR UR ELPH: 161 MG/DL
ALBUMIN SERPL BCG-MCNC: 3.9 G/DL (ref 3.5–5.2)
ALBUMIN UR-MCNC: 100 MG/DL
ANION GAP SERPL CALCULATED.3IONS-SCNC: 10 MMOL/L (ref 7–15)
APPEARANCE UR: CLEAR
BILIRUB UR QL STRIP: NEGATIVE
BUN SERPL-MCNC: 31.8 MG/DL (ref 8–23)
CALCIUM SERPL-MCNC: 9.3 MG/DL (ref 8.8–10.2)
CHLORIDE SERPL-SCNC: 109 MMOL/L (ref 98–107)
COLOR UR AUTO: ABNORMAL
CREAT SERPL-MCNC: 1.93 MG/DL (ref 0.67–1.17)
CREAT UR-MCNC: 74 MG/DL
DEPRECATED HCO3 PLAS-SCNC: 22 MMOL/L (ref 22–29)
EGFRCR SERPLBLD CKD-EPI 2021: 36 ML/MIN/1.73M2
GLUCOSE SERPL-MCNC: 109 MG/DL (ref 70–99)
GLUCOSE UR STRIP-MCNC: >=1000 MG/DL
HGB UR QL STRIP: NEGATIVE
KETONES UR STRIP-MCNC: NEGATIVE MG/DL
LEUKOCYTE ESTERASE UR QL STRIP: NEGATIVE
MUCOUS THREADS #/AREA URNS LPF: PRESENT /LPF
NITRATE UR QL: NEGATIVE
PH UR STRIP: 6 [PH] (ref 5–7)
PHOSPHATE SERPL-MCNC: 3.9 MG/DL (ref 2.5–4.5)
POTASSIUM SERPL-SCNC: 4.5 MMOL/L (ref 3.4–5.3)
PROT/CREAT 24H UR: 2.18 MG/MG CR (ref 0–0.2)
RBC URINE: <1 /HPF
SODIUM SERPL-SCNC: 141 MMOL/L (ref 135–145)
SP GR UR STRIP: 1.01 (ref 1–1.03)
SQUAMOUS EPITHELIAL: <1 /HPF
UROBILINOGEN UR STRIP-MCNC: NORMAL MG/DL
WBC URINE: <1 /HPF

## 2024-05-29 PROCEDURE — 36415 COLL VENOUS BLD VENIPUNCTURE: CPT | Performed by: PATHOLOGY

## 2024-05-29 PROCEDURE — 99214 OFFICE O/P EST MOD 30 MIN: CPT

## 2024-05-29 PROCEDURE — 80069 RENAL FUNCTION PANEL: CPT | Performed by: PATHOLOGY

## 2024-05-29 PROCEDURE — 84156 ASSAY OF PROTEIN URINE: CPT | Performed by: PATHOLOGY

## 2024-05-29 PROCEDURE — 81001 URINALYSIS AUTO W/SCOPE: CPT | Performed by: PATHOLOGY

## 2024-05-29 PROCEDURE — G0463 HOSPITAL OUTPT CLINIC VISIT: HCPCS

## 2024-05-29 ASSESSMENT — PAIN SCALES - GENERAL: PAINLEVEL: SEVERE PAIN (6)

## 2024-05-29 NOTE — NURSING NOTE
Chief Complaint   Patient presents with    RECHECK     2 month follow up. Has frequent urination and cannot control when he goes. Has pain when he urinates.      Vitals:    05/29/24 1037 05/29/24 1041 05/29/24 1042 05/29/24 1043   BP: 118/65 125/75 135/73 126/71   BP Location: Left arm Left arm     Patient Position: Sitting Sitting     Cuff Size: Adult Regular Adult Regular     Pulse: 66      SpO2: 100%      Weight: 87.9 kg (193 lb 12.8 oz)          BP Readings from Last 3 Encounters:   05/29/24 126/71   05/14/24 113/73   04/16/24 131/73       /71   Pulse 66   Wt 87.9 kg (193 lb 12.8 oz)   SpO2 100%   BMI 29.47 kg/m       Sabashmikal King

## 2024-05-29 NOTE — LETTER
5/29/2024       RE: Earle Rene  [address] [place] [address]     Dear Colleague,    Thank you for referring your patient, Earle Rene, to the [facility] [place] at [facility]. Please see a copy of my visit note below.    Nephrology Clinic Visit 5/29/24    Assessment and Plan:    CKD3b w/proteinuria - Back to baseline. Creat 1.9. eGFR 36 ml/mn, UPCR 2.1  mg/mgCr     - Has chronic urinary retention as evidenced by our clinic PVR and Renal US PVR of 198 ml     -  UA today is neg for blood/RBC, has protein, glucose    - Etiology for his CKD is felt to be DM given retinopathy    - Baseline creat mid to upper 1's since 12/19    - On ARB, SGLT2, GLP1/GLP    - B/ps well controlled     - Diabetes with improving control. Most recent A1c 8.8 %    - On statin for CV risk reduction    2. Volume status - No edema/dyspnea. B/ps controlled. On Jardiance and has intermit diarrhea 2/2 Tirzepatide. Weight 193# and stable from last visit. Was 195.7 # prior. Albumin 3.9    3. HTN - Well controlled w/o edema. Clinic b/ps 125-135/71-75. HR 66. Clinic b/p 5/14/24 113/73.   Current regimen:     Losartan 100 mg every day     - Continue current regimen    4. DM2 - Uncontrolled but improved with A1c 8.8% (4/24) from 10.3 % (1/24) on Jardiance, insulin and Tirzepatide   - Patient notes that since the addition of Tirzepatide in January his glycemic control has improved. Random BS today was 109   - Would increase his Jardiance to 25 mg qd   - Reviewed the benefits of improved glycemic control, one of which may be less urination   - Follows with RIVERA Laura in Endo    5. Electrolytes - No acute concerns. K 4.5 Na 141    6. Acid base - No acute concerns. Bicarb 22   - Continue bicarb 1300 mg bid    7. BMD - Ca 9.3 Phos 3.9 Albumin 3.9   -  Vit D 22, PTH 74 ( 3/24)   - Continue Vit D3 - 50 mcg every day    8. Heme - Hgb is normal at 15.0    9. BPH -  "Uncontrolled on Finasteride/Flomax and Mirabegron   - Prostate MRI 9/13/23 showed low likelihood for clinically significant cancer   - Per Urology 8/23: \" Urinary symptoms are pretty much the same as they have always been.  Has dysuria.  Quite a bit of straining, slow stream, intermittency, sensation of incomplete emptying.   PVR per US was 124 ml.\" Discussion at that visit regarding his incomplete emptying was for an outlet procedure. He is over due for his follow up.    - Renal US 4/23/24 neg for hydro. Did have PVR of 198 ml   - UA today w/o blood/RBC, but does have glucose ( Jardiance) and protein.    - Recommended return to Urology but he is resistant    10. Disposition - RTC 3 months for follow up with labs prior    Assessment and plan was discussed with patient and he voiced his understanding and agreement.    Reason for Visit:  CKD3b follow up    HPI:  Mr Rene is a 73 yo male with CKD3b, DM2, HTN, Diabetic retinopathy, BPH, Nephrolothiasis, GERD, Sarcoid of the lung, COPD, present today for routine CKD follow up.   Last seen in clinic by me 3/27/24  Amlodipine discontinued  Baseline creat mid to upper 1's since 2019    ROS:   A comprehensive review of systems was obtained and negative, except as noted in the HPI or PMH.  Patient notes urinary frequency ( chronic) and chronic dysuria. No change  Exercise limited due to neuropathy  Follows a sodium restricted diet  No home b/ps  Had decreased appetite but weight stable  BS improved with Tirzepatide  Notes intermit abdominal gassiness/diarrhea but tolerable    Chronic Health Problems:    CKD3b  DM2  HTN  Diabetic retinopathy  BPH  Nephrolithiasis  GERD  Sarcoid of the lung  COPD  HLD  Diabetic polyneuropathy    Family Hx:   Family History   Problem Relation Age of Onset     Diabetes Father      Myocardial Infarction Father      Diabetes Brother      Leukemia Brother 44     Glaucoma No family hx of      Macular Degeneration No family hx of      Kidney Disease " "No family hx of      Personal Hx:   , 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 NS, ETOH rare    Allergies:  No Known Allergies    Medications:  Current Outpatient Medications   Medication Sig Dispense Refill     alpha-lipoic acid 600 MG capsule Take 1 capsule (600 mg) by mouth daily 90 capsule 3     amoxicillin (AMOXIL) 500 MG capsule TAKE 1 CAPSULE ORAL EVERY EIGHT HOURS AS DIRECTED       ARTIFICIAL TEAR OP Apply to eye as needed       atorvastatin (LIPITOR) 20 MG tablet TAKE 1 TABLET BY MOUTH EVERY DAY 90 tablet 0     blood glucose (NO BRAND SPECIFIED) lancets standard Lancets that go with device, Test 3 times daily 300 each 3     Continuous Blood Gluc Sensor (FREESTYLE PETER 2 SENSOR) MISC 1 EACH EVERY 14 DAYS 1 EACH EVERY 14 DAYS. CHANGE EVERY 14 DAYS. 2 each 5     empagliflozin (JARDIANCE) 10 MG TABS tablet Take 1 tablet (10 mg) by mouth daily 90 tablet 1     fenofibrate 54 MG tablet Take 1 tablet (54 mg) by mouth daily (Patient taking differently: Take 54 mg by mouth at bedtime) 90 tablet 0     finasteride (PROSCAR) 5 MG tablet Take 1 tablet (5 mg) by mouth daily 90 tablet 3     gabapentin (NEURONTIN) 300 MG capsule Take 1 capsule (300 mg) by mouth 2 times daily 60 capsule 3     HUMALOG KWIKPEN 100 UNIT/ML soln Inject 4 units with meals, plus correction. Pt uses approx 50 units in 24 hrs. 75 mL 1     insulin degludec (TRESIBA FLEXTOUCH) 100 UNIT/ML pen INJECT 60 UNITS SUBCUTANEOUSLY AT BEDTIME. 60 mL 1     insulin pen needle (B-D U/F) 31G X 5 MM miscellaneous Use 4 time(s) per day.  Please dispense as BD Pen Needle Mini U/F 31G x 5  each 3     insulin pen needle (B-D U/F) 31G X 5 MM Use 4 times per day.  Please dispense as BD Pen Needle Mini U/F 31G x 5  each 3     insulin syringe 31G X 5/16\" 0.5 ML MISC Use three syringes daily 270 each 1     loratadine (CLARITIN) 10 MG tablet Take 1 tablet (10 mg) by mouth daily 90 " tablet 0     losartan (COZAAR) 100 MG tablet Take 1 tablet (100 mg) by mouth at bedtime 90 tablet 3     mirabegron (MYRBETRIQ) 25 MG 24 hr tablet Take 1 tablet (25 mg) by mouth daily 90 tablet 3     sodium bicarbonate 650 MG tablet Take 2 tablets (1,300 mg) by mouth 2 times daily 270 tablet 3     tamsulosin (FLOMAX) 0.4 MG capsule Take 2 capsules (0.8 mg) by mouth daily 180 capsule 5     tirzepatide (MOUNJARO) 5 MG/0.5ML pen Inject 5 mg Subcutaneous every 7 days 2 mL 3     triamcinolone (KENALOG) 0.1 % external cream Apply topically 2 times daily 30 g 0     VITAMIN D3 25 MCG (1000 UT) tablet TAKE 2 TABLETS (50 MCG) BY MOUTH DAILY 180 tablet 3     No current facility-administered medications for this visit.      Vitals:  /71   Pulse 66   Wt 87.9 kg (193 lb 12.8 oz)   SpO2 100%   BMI 29.47 kg/m      Exam:  GENERAL APPEARANCE: Pleasant male in NAD. He is English speaking and does not require interpretor  RESP: lungs clear to auscultation   CV: regular rhythm, normal rate  EDEMA: no edema  ABDOMEN: soft, nondistended, nontender  MS: extremities normal - no gross deformities noted  SKIN: no visible rash   NEURO: mentation intact and speech normal  PSYCH: affect normal/bright    LABS:   CMP  Recent Labs   Lab Test 05/29/24  1017 03/27/24  0914 01/03/24  0929 09/13/23  1622 06/06/23  1126 01/08/23  1758 07/30/21  1005 06/09/21  1524 03/12/21  1324 12/14/20  0831 06/01/20  1109    140  --  135*  --  139   < > 142 137 138 142   POTASSIUM 4.5 4.3  --  4.5  --  4.6   < > 4.2 5.2 4.2 4.1   CHLORIDE 109* 108*  --  103  --  106   < > 111* 107 110* 110*   CO2 22 21*  --  18*  --  20*   < > 22 23 20 22   ANIONGAP 10 11  --  14  --  13   < > 8 7 8 10   * 185*  --  276*  --  100*   < > 211* 347* 212* 218*   BUN 31.8* 33.0*  --  37.6*  --  30.1*   < > 20 33* 30 25   CR 1.93* 2.12* 1.85* 1.69*   < > 1.62*   < > 1.45* 1.68* 1.43* 1.39*   GFRESTIMATED 36* 32* 38* 42*   < > 45*   < > 48* 40* 49* 51*   GFRESTBLACK   --   --   --   --   --   --   --  55* 46* 56* 59*   INDERJIT 9.3 9.6  --  9.8  --  10.1   < > 8.8 9.0 9.4 9.8    < > = values in this interval not displayed.     Recent Labs   Lab Test 01/03/24  0929 03/12/21  1324 06/18/19  1054 03/06/18  1216 11/01/17  1044 06/06/17  1116   BILITOTAL  --  0.4 0.6 0.8  --  0.7   ALKPHOS  --  108 63 77  --  70   ALT  --  36 38 35 44 49   AST 23 24 24 25 23 20     CBC  Recent Labs   Lab Test 03/27/24  0914 09/13/23  1622 01/08/23  1758 12/08/21  1428 06/09/21  1524 03/12/21  1324 01/29/20  1315 06/18/19  1054   HGB 15.0 15.2 14.4 15.0   < > 14.1   < > 13.5   WBC  --  6.4 5.4  --   --  5.1  --  4.6   RBC  --  4.58 4.51  --   --  4.31*  --  4.18*   HCT  --  41.5 41.2  --   --  41.7  --  38.7*   MCV  --  91 91  --   --  97  --  93   MCH  --  33.2* 31.9  --   --  32.7  --  32.3   MCHC  --  36.6* 35.0  --   --  33.8  --  34.9   RDW  --  12.7 12.4  --   --  12.3  --  12.6   PLT  --  150 145*  --   --  156  --  130*    < > = values in this interval not displayed.     URINE STUDIES  Recent Labs   Lab Test 05/29/24  1028 03/27/24  0908 09/13/23  1811 08/10/23  1650   COLOR Light Yellow Straw Straw Straw   APPEARANCE Clear Clear Clear Clear   URINEGLC >=1000* >=1000* >=1000* >=1000*   URINEBILI Negative Negative Negative Negative   URINEKETONE Negative Negative Negative Negative   SG 1.013 1.021 1.019 1.023   UBLD Negative Negative Negative Negative   URINEPH 6.0 6.0 5.5 5.5   PROTEIN 100* 100* 70* 50*   NITRITE Negative Negative Negative Negative   LEUKEST Negative Negative Negative Negative   RBCU <1 <1 <1 <1   WBCU <1 <1 0 <1     Recent Labs   Lab Test 12/08/21  1438 06/09/21  1530 12/14/20  0848 06/01/20  1110   UTPG 1.89* 2.37* 1.51* 1.39*     PTH  Recent Labs   Lab Test 03/27/24  0914 12/08/21  1428 06/01/20  1109   PTHI 74* 27 22     IRON STUDIES  Recent Labs   Lab Test 07/02/19  0947 05/01/18  1233   IRON  --  83   FEB  --  344   IRONSAT  --  24   DEANA 178 160     EXAMINATION: US RENAL  COMPLETE NON-VASCULAR, 4/3/2024 9:20 AM      COMPARISON: CT 6/6/2017     HISTORY: CKD     TECHNIQUE: The kidneys and bladder were scanned in the standard  fashion with specialized ultrasound transducer(s) using both gray  scale and limited color/spectral Doppler techniques.     FINDINGS:     Right kidney: Measures 10.8 cm in length. Parenchyma is of normal  thickness and mildly increased echogenicity. No focal mass. No  hydronephrosis.     Left kidney: Measures 11.6 cm in length. Parenchyma is of normal  thickness and mildly increased echogenicity. No focal mass. No  hydronephrosis.      Bladder: Urinary bladder is well-distended and appears unremarkable.  Postvoid residual volume of 198 mL                                                                      IMPRESSION:  1.  Mildly echogenic renal parenchyma, compatible with medical renal  disease. Otherwise normal sonographic appearance of the kidneys.  2.  Abnormal postvoid residual volume of 198 mL.     I have personally reviewed the examination and initial interpretation  and I agree with the findings.      Tracy Chandler, NP

## 2024-05-29 NOTE — PROGRESS NOTES
"Nephrology Clinic Visit 5/29/24    Assessment and Plan:    CKD3b w/proteinuria - Back to baseline. Creat 1.9. eGFR 36 ml/mn, UPCR 2.1  mg/mgCr     - Has chronic urinary retention as evidenced by our clinic PVR and Renal US PVR of 198 ml     -  UA today is neg for blood/RBC, has protein, glucose    - Etiology for his CKD is felt to be DM given retinopathy    - Baseline creat mid to upper 1's since 12/19    - On ARB, SGLT2, GLP1/GLP    - B/ps well controlled     - Diabetes with improving control. Most recent A1c 8.8 %    - On statin for CV risk reduction    2. Volume status - No edema/dyspnea. B/ps controlled. On Jardiance and has intermit diarrhea 2/2 Tirzepatide. Weight 193# and stable from last visit. Was 195.7 # prior. Albumin 3.9    3. HTN - Well controlled w/o edema. Clinic b/ps 125-135/71-75. HR 66. Clinic b/p 5/14/24 113/73.   Current regimen:     Losartan 100 mg every day     - Continue current regimen    4. DM2 - Uncontrolled but improved with A1c 8.8% (4/24) from 10.3 % (1/24) on Jardiance, insulin and Tirzepatide   - Patient notes that since the addition of Tirzepatide in January his glycemic control has improved. Random BS today was 109   - Would increase his Jardiance to 25 mg qd   - Reviewed the benefits of improved glycemic control, one of which may be less urination   - Follows with RIVERA Laura in Endo    5. Electrolytes - No acute concerns. K 4.5 Na 141    6. Acid base - No acute concerns. Bicarb 22   - Continue bicarb 1300 mg bid    7. BMD - Ca 9.3 Phos 3.9 Albumin 3.9   -  Vit D 22, PTH 74 ( 3/24)   - Continue Vit D3 - 50 mcg every day    8. Heme - Hgb is normal at 15.0    9. BPH - Uncontrolled on Finasteride/Flomax and Mirabegron   - Prostate MRI 9/13/23 showed low likelihood for clinically significant cancer   - Per Urology 8/23: \" Urinary symptoms are pretty much the same as they have always been.  Has dysuria.  Quite a bit of straining, slow stream, intermittency, sensation of incomplete " "emptying.   PVR per US was 124 ml.\" Discussion at that visit regarding his incomplete emptying was for an outlet procedure. He is over due for his follow up.    - Renal US 4/23/24 neg for hydro. Did have PVR of 198 ml   - UA today w/o blood/RBC, but does have glucose ( Jardiance) and protein.    - Recommended return to Urology but he is resistant    10. Disposition - RTC 3 months for follow up with labs prior    Assessment and plan was discussed with patient and he voiced his understanding and agreement.    Reason for Visit:  CKD3b follow up    HPI:  Mr Rene is a 75 yo male with CKD3b, DM2, HTN, Diabetic retinopathy, BPH, Nephrolothiasis, GERD, Sarcoid of the lung, COPD, present today for routine CKD follow up.   Last seen in clinic by me 3/27/24  Amlodipine discontinued  Baseline creat mid to upper 1's since 2019    ROS:   A comprehensive review of systems was obtained and negative, except as noted in the HPI or PMH.  Patient notes urinary frequency ( chronic) and chronic dysuria. No change  Exercise limited due to neuropathy  Follows a sodium restricted diet  No home b/ps  Had decreased appetite but weight stable  BS improved with Tirzepatide  Notes intermit abdominal gassiness/diarrhea but tolerable    Chronic Health Problems:    CKD3b  DM2  HTN  Diabetic retinopathy  BPH  Nephrolithiasis  GERD  Sarcoid of the lung  COPD  HLD  Diabetic polyneuropathy    Family Hx:   Family History   Problem Relation Age of Onset    Diabetes Father     Myocardial Infarction Father     Diabetes Brother     Leukemia Brother 44    Glaucoma No family hx of     Macular Degeneration No family hx of     Kidney Disease No family hx of      Personal Hx:   , 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 NS, ETOH rare    Allergies:  No Known Allergies    Medications:  Current Outpatient Medications   Medication Sig Dispense Refill    alpha-lipoic acid 600 MG capsule " "Take 1 capsule (600 mg) by mouth daily 90 capsule 3    amoxicillin (AMOXIL) 500 MG capsule TAKE 1 CAPSULE ORAL EVERY EIGHT HOURS AS DIRECTED      ARTIFICIAL TEAR OP Apply to eye as needed      atorvastatin (LIPITOR) 20 MG tablet TAKE 1 TABLET BY MOUTH EVERY DAY 90 tablet 0    blood glucose (NO BRAND SPECIFIED) lancets standard Lancets that go with device, Test 3 times daily 300 each 3    Continuous Blood Gluc Sensor (FREESTYLE PETER 2 SENSOR) MISC 1 EACH EVERY 14 DAYS 1 EACH EVERY 14 DAYS. CHANGE EVERY 14 DAYS. 2 each 5    empagliflozin (JARDIANCE) 10 MG TABS tablet Take 1 tablet (10 mg) by mouth daily 90 tablet 1    fenofibrate 54 MG tablet Take 1 tablet (54 mg) by mouth daily (Patient taking differently: Take 54 mg by mouth at bedtime) 90 tablet 0    finasteride (PROSCAR) 5 MG tablet Take 1 tablet (5 mg) by mouth daily 90 tablet 3    gabapentin (NEURONTIN) 300 MG capsule Take 1 capsule (300 mg) by mouth 2 times daily 60 capsule 3    HUMALOG KWIKPEN 100 UNIT/ML soln Inject 4 units with meals, plus correction. Pt uses approx 50 units in 24 hrs. 75 mL 1    insulin degludec (TRESIBA FLEXTOUCH) 100 UNIT/ML pen INJECT 60 UNITS SUBCUTANEOUSLY AT BEDTIME. 60 mL 1    insulin pen needle (B-D U/F) 31G X 5 MM miscellaneous Use 4 time(s) per day.  Please dispense as BD Pen Needle Mini U/F 31G x 5  each 3    insulin pen needle (B-D U/F) 31G X 5 MM Use 4 times per day.  Please dispense as BD Pen Needle Mini U/F 31G x 5  each 3    insulin syringe 31G X 5/16\" 0.5 ML MISC Use three syringes daily 270 each 1    loratadine (CLARITIN) 10 MG tablet Take 1 tablet (10 mg) by mouth daily 90 tablet 0    losartan (COZAAR) 100 MG tablet Take 1 tablet (100 mg) by mouth at bedtime 90 tablet 3    mirabegron (MYRBETRIQ) 25 MG 24 hr tablet Take 1 tablet (25 mg) by mouth daily 90 tablet 3    sodium bicarbonate 650 MG tablet Take 2 tablets (1,300 mg) by mouth 2 times daily 270 tablet 3    tamsulosin (FLOMAX) 0.4 MG capsule Take 2 " capsules (0.8 mg) by mouth daily 180 capsule 5    tirzepatide (MOUNJARO) 5 MG/0.5ML pen Inject 5 mg Subcutaneous every 7 days 2 mL 3    triamcinolone (KENALOG) 0.1 % external cream Apply topically 2 times daily 30 g 0    VITAMIN D3 25 MCG (1000 UT) tablet TAKE 2 TABLETS (50 MCG) BY MOUTH DAILY 180 tablet 3     No current facility-administered medications for this visit.      Vitals:  /71   Pulse 66   Wt 87.9 kg (193 lb 12.8 oz)   SpO2 100%   BMI 29.47 kg/m      Exam:  GENERAL APPEARANCE: Pleasant male in NAD. He is English speaking and does not require interpretor  RESP: lungs clear to auscultation   CV: regular rhythm, normal rate  EDEMA: no edema  ABDOMEN: soft, nondistended, nontender  MS: extremities normal - no gross deformities noted  SKIN: no visible rash   NEURO: mentation intact and speech normal  PSYCH: affect normal/bright    LABS:   CMP  Recent Labs   Lab Test 05/29/24  1017 03/27/24  0914 01/03/24  0929 09/13/23  1622 06/06/23  1126 01/08/23  1758 07/30/21  1005 06/09/21  1524 03/12/21  1324 12/14/20  0831 06/01/20  1109    140  --  135*  --  139   < > 142 137 138 142   POTASSIUM 4.5 4.3  --  4.5  --  4.6   < > 4.2 5.2 4.2 4.1   CHLORIDE 109* 108*  --  103  --  106   < > 111* 107 110* 110*   CO2 22 21*  --  18*  --  20*   < > 22 23 20 22   ANIONGAP 10 11  --  14  --  13   < > 8 7 8 10   * 185*  --  276*  --  100*   < > 211* 347* 212* 218*   BUN 31.8* 33.0*  --  37.6*  --  30.1*   < > 20 33* 30 25   CR 1.93* 2.12* 1.85* 1.69*   < > 1.62*   < > 1.45* 1.68* 1.43* 1.39*   GFRESTIMATED 36* 32* 38* 42*   < > 45*   < > 48* 40* 49* 51*   GFRESTBLACK  --   --   --   --   --   --   --  55* 46* 56* 59*   INDERJIT 9.3 9.6  --  9.8  --  10.1   < > 8.8 9.0 9.4 9.8    < > = values in this interval not displayed.     Recent Labs   Lab Test 01/03/24  0929 03/12/21  1324 06/18/19  1054 03/06/18  1216 11/01/17  1044 06/06/17  1116   BILITOTAL  --  0.4 0.6 0.8  --  0.7   ALKPHOS  --  108 63 77  --  70    ALT  --  36 38 35 44 49   AST 23 24 24 25 23 20     CBC  Recent Labs   Lab Test 03/27/24  0914 09/13/23  1622 01/08/23  1758 12/08/21  1428 06/09/21  1524 03/12/21  1324 01/29/20  1315 06/18/19  1054   HGB 15.0 15.2 14.4 15.0   < > 14.1   < > 13.5   WBC  --  6.4 5.4  --   --  5.1  --  4.6   RBC  --  4.58 4.51  --   --  4.31*  --  4.18*   HCT  --  41.5 41.2  --   --  41.7  --  38.7*   MCV  --  91 91  --   --  97  --  93   MCH  --  33.2* 31.9  --   --  32.7  --  32.3   MCHC  --  36.6* 35.0  --   --  33.8  --  34.9   RDW  --  12.7 12.4  --   --  12.3  --  12.6   PLT  --  150 145*  --   --  156  --  130*    < > = values in this interval not displayed.     URINE STUDIES  Recent Labs   Lab Test 05/29/24  1028 03/27/24  0908 09/13/23  1811 08/10/23  1650   COLOR Light Yellow Straw Straw Straw   APPEARANCE Clear Clear Clear Clear   URINEGLC >=1000* >=1000* >=1000* >=1000*   URINEBILI Negative Negative Negative Negative   URINEKETONE Negative Negative Negative Negative   SG 1.013 1.021 1.019 1.023   UBLD Negative Negative Negative Negative   URINEPH 6.0 6.0 5.5 5.5   PROTEIN 100* 100* 70* 50*   NITRITE Negative Negative Negative Negative   LEUKEST Negative Negative Negative Negative   RBCU <1 <1 <1 <1   WBCU <1 <1 0 <1     Recent Labs   Lab Test 12/08/21  1438 06/09/21  1530 12/14/20  0848 06/01/20  1110   UTPG 1.89* 2.37* 1.51* 1.39*     PTH  Recent Labs   Lab Test 03/27/24  0914 12/08/21  1428 06/01/20  1109   PTHI 74* 27 22     IRON STUDIES  Recent Labs   Lab Test 07/02/19  0947 05/01/18  1233   IRON  --  83   FEB  --  344   IRONSAT  --  24   DEANA 178 160     EXAMINATION: US RENAL COMPLETE NON-VASCULAR, 4/3/2024 9:20 AM      COMPARISON: CT 6/6/2017     HISTORY: CKD     TECHNIQUE: The kidneys and bladder were scanned in the standard  fashion with specialized ultrasound transducer(s) using both gray  scale and limited color/spectral Doppler techniques.     FINDINGS:     Right kidney: Measures 10.8 cm in length. Parenchyma  is of normal  thickness and mildly increased echogenicity. No focal mass. No  hydronephrosis.     Left kidney: Measures 11.6 cm in length. Parenchyma is of normal  thickness and mildly increased echogenicity. No focal mass. No  hydronephrosis.      Bladder: Urinary bladder is well-distended and appears unremarkable.  Postvoid residual volume of 198 mL                                                                      IMPRESSION:  1.  Mildly echogenic renal parenchyma, compatible with medical renal  disease. Otherwise normal sonographic appearance of the kidneys.  2.  Abnormal postvoid residual volume of 198 mL.     I have personally reviewed the examination and initial interpretation  and I agree with the findings.      Tracy Chandler, NP

## 2024-06-06 DIAGNOSIS — K21.9 GASTROESOPHAGEAL REFLUX DISEASE WITHOUT ESOPHAGITIS: ICD-10-CM

## 2024-06-11 DIAGNOSIS — R39.15 URINARY URGENCY: ICD-10-CM

## 2024-06-15 RX ORDER — FAMOTIDINE 20 MG/1
20 TABLET, FILM COATED ORAL 2 TIMES DAILY
Qty: 180 TABLET | Refills: 2 | OUTPATIENT
Start: 2024-06-15

## 2024-06-18 ENCOUNTER — TELEPHONE (OUTPATIENT)
Dept: ENDOCRINOLOGY | Facility: CLINIC | Age: 74
End: 2024-06-18
Payer: COMMERCIAL

## 2024-06-18 NOTE — TELEPHONE ENCOUNTER
Left Voicemail (1st Attempt) for the patient to call back and schedule the following:    Appointment type: return diabetes   Provider: Keya   Return date: sooner appt than oct   Specialty phone number: [phone]  Additional appointment(s) needed:   Additonal Notes: pt called for sooner appt     He like to be seen in person.  Do I have any spots in clinic open or DAVE spots?  If not, you can add him on at noon on a Tuesday in Aug 2024.  Thanks,  Pamela Laura     Available:   6/18 in person   8/6, 8/13, 8/20, 8/27 at 12 in person add on     Please note that the above appointment(s) will require manual scheduling as they are marked as DAVE and will not appear using auto search. Do not schedule the patient if another patient has already been scheduled in the requested appointment slot.       Dianna Harley on 6/18/2024 at 9:31 AM

## 2024-06-19 ENCOUNTER — TELEPHONE (OUTPATIENT)
Dept: NEPHROLOGY | Facility: CLINIC | Age: 74
End: 2024-06-19
Payer: COMMERCIAL

## 2024-06-19 ENCOUNTER — TELEPHONE (OUTPATIENT)
Dept: INTERNAL MEDICINE | Facility: CLINIC | Age: 74
End: 2024-06-19
Payer: COMMERCIAL

## 2024-06-19 RX ORDER — MIRABEGRON 25 MG/1
25 TABLET, FILM COATED, EXTENDED RELEASE ORAL DAILY
Qty: 90 TABLET | Refills: 1 | Status: SHIPPED | OUTPATIENT
Start: 2024-06-19

## 2024-06-19 NOTE — TELEPHONE ENCOUNTER
Left additional, same day message returning patient's call, but unfortunately was not able to reach him. prasad

## 2024-06-19 NOTE — TELEPHONE ENCOUNTER
Called patient and lvm- I need to know what medications he needs refills on. And to what pharmacy. More info needed.    Vicente James RN on 6/19/2024 at 10:24 AM

## 2024-06-19 NOTE — TELEPHONE ENCOUNTER
Left Voicemail (1st Attempt) for the patient to schedule:    Appointment type: Return Nephrology  Provider: Angela Chandler  Return date: Approx. 8/29  Phone number: [phone]  Add'l appt(s) needed: Lab 1-hour prior to clinic visit (or 1-week if video visit)

## 2024-06-19 NOTE — TELEPHONE ENCOUNTER
[facility]    Phone Message    May a detailed message be left on voicemail: yes     Reason for Call: Patient would like a call back regarding his medications. He called his pharmacy and they have been trying to reach the clinic for refills and it has not heard anything. Thank you.     Action Taken: Message routed to:  [facility]    Travel Screening: Not Applicable     Date of Service:

## 2024-06-19 NOTE — TELEPHONE ENCOUNTER
Called pharmacy- they just were requesting refills on myrbetriq and famotidine. Myrbetriq is in process but will send to uro to get expedited. Famotidine was dc'd but will look into this. If patient calls back need to know if he is still taking. As I can't find specific documentation as to neph dc'd back in December.    Vicente James RN on 6/19/2024 at 10:29 AM

## 2024-06-20 ENCOUNTER — TELEPHONE (OUTPATIENT)
Dept: ENDOCRINOLOGY | Facility: CLINIC | Age: 74
End: 2024-06-20
Payer: COMMERCIAL

## 2024-06-20 NOTE — TELEPHONE ENCOUNTER
Patient confirmed scheduled appointment:  Date: 7/9   Time: 12   Visit type: return diabetes   Provider: rik   Location: [facility]   Testing/imaging:   Additional notes:     Dianna Harley on 6/20/2024 at 8:15 AM

## 2024-06-21 NOTE — TELEPHONE ENCOUNTER
Left Voicemail (2nd Attempt) for the patient to schedule:    Appointment type: Return Nephrology  Provider: Angela Chandler  Return date: Approx. 8/29  Phone number: [phone]  Add'l appt(s) needed: Lab 1-hour prior to clinic visit (or 1-week if video visit)

## 2024-06-24 NOTE — PROGRESS NOTES
Patient is showing 3/5 MNCM met. Aspirin not prescribed   RENE Akers  Outcome for 07/08/24 10:45 AM: Called to remind patient to bring Lora meter to visit to upload.  RENE Akers  Appointment reminder phone call made to patient.   Bailee Gregory

## 2024-06-28 ENCOUNTER — TELEPHONE (OUTPATIENT)
Dept: ENDOCRINOLOGY | Facility: CLINIC | Age: 74
End: 2024-06-28
Payer: COMMERCIAL

## 2024-06-28 ENCOUNTER — VIRTUAL VISIT (OUTPATIENT)
Dept: CARDIOLOGY | Facility: CLINIC | Age: 74
End: 2024-06-28
Attending: PHYSICIAN ASSISTANT
Payer: COMMERCIAL

## 2024-06-28 DIAGNOSIS — E11.3213 TYPE 2 DIABETES MELLITUS WITH BOTH EYES AFFECTED BY MILD NONPROLIFERATIVE RETINOPATHY AND MACULAR EDEMA, WITH LONG-TERM CURRENT USE OF INSULIN (H): Primary | ICD-10-CM

## 2024-06-28 DIAGNOSIS — L84 TYPE 2 DIABETES MELLITUS WITH PRESSURE CALLUS (H): Primary | ICD-10-CM

## 2024-06-28 DIAGNOSIS — E78.5 HYPERLIPIDEMIA LDL GOAL <100: ICD-10-CM

## 2024-06-28 DIAGNOSIS — E11.628 TYPE 2 DIABETES MELLITUS WITH PRESSURE CALLUS (H): Primary | ICD-10-CM

## 2024-06-28 DIAGNOSIS — Z79.4 TYPE 2 DIABETES MELLITUS WITH BOTH EYES AFFECTED BY MILD NONPROLIFERATIVE RETINOPATHY AND MACULAR EDEMA, WITH LONG-TERM CURRENT USE OF INSULIN (H): Primary | ICD-10-CM

## 2024-06-28 NOTE — Clinical Note
NOAH only -- he would like to continue 5 mg for another month -- you meet with him 7/9 and I will follow-up with him 4 weeks from now to see if interested in dose increase.

## 2024-06-28 NOTE — PROGRESS NOTES
Medication Therapy Management (MTM) Encounter    ASSESSMENT:                            Medication Adherence/Access: See below for considerations    Type 2 Diabetes: A1C above goal of < 7%.  Marked improvement in FPG and RPG since initiation of Mounjaro.  Achieving FBG goal of less than 130 most days.  Still having some issues with side effects on Mounjaro 5 mg dose.  Will have him continue current plan for another month and evaluate appropriateness of increasing in future visits.     PLAN:                            CONTINUE Mounjaro to 5 mg weekly. No changes today. Keep up the great work!    Try Gas-X for bloating/burping    CONTINUE Tresiba to 55 units daily. Continue to decrease by 2 units every 3 days if fasting sugars are < 100.     Let me know if you have any lows < 70    Endocrine Team & Next Follow-Up:  7/25/2024 with Theo  7/9/2024 with Karla Laura PA-C    SUBJECTIVE/OBJECTIVE:                          Earle Rene is a 74 year old male called for a follow-up visit. He was referred to me from Karla Laura PA-C.      Reason for visit: Medication Therapy Management (MTM).    Allergies/ADRs: Reviewed in chart  Past Medical History: Reviewed in chart  Social History     Tobacco Use    Smoking status: Never    Smokeless tobacco: Never   Substance Use Topics    Alcohol use: Yes     Comment: occasionaly    Drug use: No        Medication Adherence/Access: Adherence is reflected well in the dispense report.     Type 2 Diabetes:   Diabetes Medication(s)       Insulin       HUMALOG KWIKPEN 100 UNIT/ML soln INJECT 15 units twice daily **has not been using recently due to good control**      insulin degludec (TRESIBA FLEXTOUCH) 100 UNIT/ML pen INJECT 55 UNITS SUBCUTANEOUSLY AT BEDTIME.       Sodium-Glucose Co-Transporter 2 (SGLT2) Inhibitors       JARDIANCE 10 MG TABS tablet TAKE 1 TABLET (10 MG) BY MOUTH DAILY.       Incretin Mimetic Agents       tirzepatide (MOUNJARO) 5 MG/0.5ML pen Inject 5 mg Subcutaneous every 7  "days on Fridays. Tolerating well other than some tolerable diarrhea/constipation. Also gas/bloating Does feel more full overall.     mg once daily  Gabapentin 300 mg twice daily for neuropathic pain       Blood sugar monitoring: Prior to Mounjaro, was 300-350. Now since he has started this, he rarely goes above 200. FPG average is . No lows <  70. Nothing less than 80.     Urine Albumin:   Lab Results   Component Value Date    UMALCR 1,495.15 (H) 09/13/2023    UMALCR 1,264.01 (H) 01/09/2020    UMALCR 1,331.90 (H) 12/18/2019      Lab Results   Component Value Date    A1C 10.3 01/03/2024    A1C 8.3 11/02/2021    A1C 8.2 06/09/2021    A1C 9.2 06/18/2019    A1C 9.1 03/06/2018    A1C 10.2 06/06/2017    A1C 9.0 03/16/2016     Lab Results   Component Value Date    GFRESTIMATED 36 (L) 05/29/2024    GFRESTIMATED 32 (L) 03/27/2024    GFRESTIMATED 38 (L) 01/03/2024     BP Readings from Last 1 Encounters:   05/29/24 126/71     Pulse Readings from Last 1 Encounters:   05/29/24 66     Wt Readings from Last 1 Encounters:   05/29/24 87.9 kg (193 lb 12.8 oz)     Ht Readings from Last 1 Encounters:   01/03/24 1.727 m (5' 8\")     Estimated body mass index is 29.47 kg/m  as calculated from the following:    Height as of 1/3/24: 1.727 m (5' 8\").    Weight as of 5/29/24: 87.9 kg (193 lb 12.8 oz).      Temp Readings from Last 1 Encounters:   01/03/24 97.9  F (36.6  C) (Temporal)       ----------------      I spent 15 minutes with this patient today. Karla Laura PA-C was provided the recommendations above via routed note and is the authorizing prescriber for this visit through the pharmacist collaborative practice agreement. A copy of the visit note was provided to the patient's provider(s).    The patient was given to the patient a summary of these recommendations.     Theo Gong, PharmD, [facility]  Endocrine & [facility] Pharmacist  [address] [place] [address]  Direct Voicemail: [phone]    Telemedicine " Visit Details  Type of service:  Telephone visit  Start Time: 9:30AM  End Time: 9:45AM  Originating Location (pt. Location): Home  Provider has received verbal consent for a visit from the patient? Yes     Medication Therapy Recommendations  No medication therapy recommendations to display

## 2024-06-28 NOTE — LETTER
6/28/2024      RE: Earle Rene  [address]  [place] [address]       Dear Colleague,    Thank you for the opportunity to participate in the care of your patient, Earle Rene, at the [facility] [place] [facility]. Please see a copy of my visit note below.    Medication Therapy Management (MTM) Encounter    ASSESSMENT:                            Medication Adherence/Access: See below for considerations    Type 2 Diabetes: A1C above goal of < 7%.  Marked improvement in FPG and RPG since initiation of Mounjaro.  Achieving FBG goal of less than 130 most days.  Still having some issues with side effects on Mounjaro 5 mg dose.  Will have him continue current plan for another month and evaluate appropriateness of increasing in future visits.     PLAN:                            CONTINUE Mounjaro to 5 mg weekly. No changes today. Keep up the great work!    Try Gas-X for bloating/burping    CONTINUE Tresiba to 55 units daily. Continue to decrease by 2 units every 3 days if fasting sugars are < 100.     Let me know if you have any lows < 70    Endocrine Team & Next Follow-Up:  7/25/2024 with Theo  7/9/2024 with Karla Laura PA-C    SUBJECTIVE/OBJECTIVE:                          Earle Rene is a 74 year old male called for a follow-up visit. He was referred to me from Karla Laura PA-C.      Reason for visit: Medication Therapy Management (MTM).    Allergies/ADRs: Reviewed in chart  Past Medical History: Reviewed in chart  Social History     Tobacco Use    Smoking status: Never    Smokeless tobacco: Never   Substance Use Topics    Alcohol use: Yes     Comment: occasionaly    Drug use: No        Medication Adherence/Access: Adherence is reflected well in the dispense report.     Type 2 Diabetes:   Diabetes Medication(s)       Insulin       HUMALOG KWIKPEN 100 UNIT/ML soln INJECT 15 units twice daily **has not been using recently due to good  "control**      insulin degludec (TRESIBA FLEXTOUCH) 100 UNIT/ML pen INJECT 55 UNITS SUBCUTANEOUSLY AT BEDTIME.       Sodium-Glucose Co-Transporter 2 (SGLT2) Inhibitors       JARDIANCE 10 MG TABS tablet TAKE 1 TABLET (10 MG) BY MOUTH DAILY.       Incretin Mimetic Agents       tirzepatide (MOUNJARO) 5 MG/0.5ML pen Inject 5 mg Subcutaneous every 7 days on Fridays. Tolerating well other than some tolerable diarrhea/constipation. Also gas/bloating Does feel more full overall.     mg once daily  Gabapentin 300 mg twice daily for neuropathic pain       Blood sugar monitoring: Prior to Mounjaro, was 300-350. Now since he has started this, he rarely goes above 200. FPG average is . No lows <  70. Nothing less than 80.     Urine Albumin:   Lab Results   Component Value Date    UMALCR 1,495.15 (H) 09/13/2023    UMALCR 1,264.01 (H) 01/09/2020    UMALCR 1,331.90 (H) 12/18/2019      Lab Results   Component Value Date    A1C 10.3 01/03/2024    A1C 8.3 11/02/2021    A1C 8.2 06/09/2021    A1C 9.2 06/18/2019    A1C 9.1 03/06/2018    A1C 10.2 06/06/2017    A1C 9.0 03/16/2016     Lab Results   Component Value Date    GFRESTIMATED 36 (L) 05/29/2024    GFRESTIMATED 32 (L) 03/27/2024    GFRESTIMATED 38 (L) 01/03/2024     BP Readings from Last 1 Encounters:   05/29/24 126/71     Pulse Readings from Last 1 Encounters:   05/29/24 66     Wt Readings from Last 1 Encounters:   05/29/24 87.9 kg (193 lb 12.8 oz)     Ht Readings from Last 1 Encounters:   01/03/24 1.727 m (5' 8\")     Estimated body mass index is 29.47 kg/m  as calculated from the following:    Height as of 1/3/24: 1.727 m (5' 8\").    Weight as of 5/29/24: 87.9 kg (193 lb 12.8 oz).      Temp Readings from Last 1 Encounters:   01/03/24 97.9  F (36.6  C) (Temporal)       ----------------      I spent 15 minutes with this patient today. Karla Laura PA-C was provided the recommendations above via routed note and is the authorizing prescriber for this visit through the " pharmacist collaborative practice agreement. A copy of the visit note was provided to the patient's provider(s).    The patient was given to the patient a summary of these recommendations.     Theo Gong, PharmD, [facility]  Endocrine & Diabetes [facility] Pharmacist  [address] [place] [address]  Direct Voicemail: [phone]    Telemedicine Visit Details  Type of service:  Telephone visit  Start Time: 9:30AM  End Time: 9:45AM  Originating Location (pt. Location): Home  Provider has received verbal consent for a visit from the patient? Yes     Medication Therapy Recommendations  No medication therapy recommendations to display

## 2024-07-05 DIAGNOSIS — M79.2 POLYNEUROPATHIC PAIN: ICD-10-CM

## 2024-07-05 RX ORDER — GABAPENTIN 300 MG/1
300 CAPSULE ORAL 2 TIMES DAILY
Qty: 60 CAPSULE | Refills: 0 | Status: SHIPPED | OUTPATIENT
Start: 2024-07-05 | End: 2024-07-09

## 2024-07-05 NOTE — TELEPHONE ENCOUNTER
gabapentin (NEURONTIN) 300 MG capsule 60 capsule 3 12/11/2023 -- No   Sig - Route: Take 1 capsule (300 mg) by mouth 2 times daily - Oral     ----------------------  Last Office Visit : 3/22/2023  [facility] Office visit:     7/9/2024 9:30 AM (60 min)  Justino   Arrive by:  9:15 AM   P RETURN   [facility] ([facility])   Lizzie Wang MD     ---------------------    Routing refill request to provider for review/approval because:   Drug not on the [facility], [facility] or [facility] refill protocol or controlled substance

## 2024-07-08 ENCOUNTER — MEDICAL CORRESPONDENCE (OUTPATIENT)
Dept: HEALTH INFORMATION MANAGEMENT | Facility: CLINIC | Age: 74
End: 2024-07-08
Payer: COMMERCIAL

## 2024-07-08 RX ORDER — ATORVASTATIN CALCIUM 20 MG/1
20 TABLET, FILM COATED ORAL DAILY
Qty: 90 TABLET | Refills: 0 | Status: SHIPPED | OUTPATIENT
Start: 2024-07-08 | End: 2024-07-09

## 2024-07-09 ENCOUNTER — OFFICE VISIT (OUTPATIENT)
Dept: INTERNAL MEDICINE | Facility: CLINIC | Age: 74
End: 2024-07-09
Payer: COMMERCIAL

## 2024-07-09 ENCOUNTER — OFFICE VISIT (OUTPATIENT)
Dept: ENDOCRINOLOGY | Facility: CLINIC | Age: 74
End: 2024-07-09
Payer: COMMERCIAL

## 2024-07-09 VITALS
OXYGEN SATURATION: 99 % | BODY MASS INDEX: 28.89 KG/M2 | WEIGHT: 190 LBS | DIASTOLIC BLOOD PRESSURE: 60 MMHG | HEART RATE: 86 BPM | SYSTOLIC BLOOD PRESSURE: 103 MMHG

## 2024-07-09 VITALS
SYSTOLIC BLOOD PRESSURE: 145 MMHG | OXYGEN SATURATION: 100 % | DIASTOLIC BLOOD PRESSURE: 47 MMHG | BODY MASS INDEX: 29.19 KG/M2 | HEART RATE: 91 BPM | WEIGHT: 192 LBS

## 2024-07-09 DIAGNOSIS — Z79.4 TYPE 2 DIABETES MELLITUS WITH HYPERGLYCEMIA, WITH LONG-TERM CURRENT USE OF INSULIN (H): ICD-10-CM

## 2024-07-09 DIAGNOSIS — Z12.11 SCREEN FOR COLON CANCER: ICD-10-CM

## 2024-07-09 DIAGNOSIS — E11.3311 TYPE 2 DIABETES MELLITUS WITH MODERATE NONPROLIFERATIVE RETINOPATHY OF RIGHT EYE AND MACULAR EDEMA, UNSPECIFIED WHETHER LONG TERM INSULIN USE (H): Primary | ICD-10-CM

## 2024-07-09 DIAGNOSIS — Z79.4 TYPE 2 DIABETES MELLITUS WITH DIABETIC NEPHROPATHY, WITH LONG-TERM CURRENT USE OF INSULIN (H): ICD-10-CM

## 2024-07-09 DIAGNOSIS — E11.3213 TYPE 2 DIABETES MELLITUS WITH BOTH EYES AFFECTED BY MILD NONPROLIFERATIVE RETINOPATHY AND MACULAR EDEMA, WITH LONG-TERM CURRENT USE OF INSULIN (H): Primary | ICD-10-CM

## 2024-07-09 DIAGNOSIS — J30.2 SEASONAL ALLERGIES: ICD-10-CM

## 2024-07-09 DIAGNOSIS — Z79.4 TYPE 2 DIABETES MELLITUS WITH BOTH EYES AFFECTED BY MILD NONPROLIFERATIVE RETINOPATHY AND MACULAR EDEMA, WITH LONG-TERM CURRENT USE OF INSULIN (H): ICD-10-CM

## 2024-07-09 DIAGNOSIS — E11.40 TYPE 2 DIABETES MELLITUS WITH DIABETIC NEUROPATHY, WITHOUT LONG-TERM CURRENT USE OF INSULIN (H): ICD-10-CM

## 2024-07-09 DIAGNOSIS — E11.65 TYPE 2 DIABETES MELLITUS WITH HYPERGLYCEMIA, WITH LONG-TERM CURRENT USE OF INSULIN (H): ICD-10-CM

## 2024-07-09 DIAGNOSIS — E11.3213 TYPE 2 DIABETES MELLITUS WITH BOTH EYES AFFECTED BY MILD NONPROLIFERATIVE RETINOPATHY AND MACULAR EDEMA, WITH LONG-TERM CURRENT USE OF INSULIN (H): ICD-10-CM

## 2024-07-09 DIAGNOSIS — E78.5 HYPERLIPIDEMIA LDL GOAL <100: ICD-10-CM

## 2024-07-09 DIAGNOSIS — Z79.4 TYPE 2 DIABETES MELLITUS WITH BOTH EYES AFFECTED BY MILD NONPROLIFERATIVE RETINOPATHY AND MACULAR EDEMA, WITH LONG-TERM CURRENT USE OF INSULIN (H): Primary | ICD-10-CM

## 2024-07-09 DIAGNOSIS — E11.21 TYPE 2 DIABETES MELLITUS WITH DIABETIC NEPHROPATHY, WITH LONG-TERM CURRENT USE OF INSULIN (H): ICD-10-CM

## 2024-07-09 DIAGNOSIS — M79.2 POLYNEUROPATHIC PAIN: ICD-10-CM

## 2024-07-09 LAB — HBA1C MFR BLD: 7.7 %

## 2024-07-09 PROCEDURE — 99214 OFFICE O/P EST MOD 30 MIN: CPT | Mod: 25

## 2024-07-09 PROCEDURE — G0009 ADMIN PNEUMOCOCCAL VACCINE: HCPCS

## 2024-07-09 PROCEDURE — 90677 PCV20 VACCINE IM: CPT

## 2024-07-09 PROCEDURE — 83036 HEMOGLOBIN GLYCOSYLATED A1C: CPT | Performed by: PATHOLOGY

## 2024-07-09 PROCEDURE — 99214 OFFICE O/P EST MOD 30 MIN: CPT | Performed by: PHYSICIAN ASSISTANT

## 2024-07-09 RX ORDER — LORATADINE 10 MG/1
10 TABLET ORAL DAILY
Qty: 90 TABLET | Refills: 0 | Status: SHIPPED | OUTPATIENT
Start: 2024-07-09

## 2024-07-09 RX ORDER — GABAPENTIN 300 MG/1
300 CAPSULE ORAL 2 TIMES DAILY
Qty: 60 CAPSULE | Refills: 3 | Status: SHIPPED | OUTPATIENT
Start: 2024-07-09

## 2024-07-09 RX ORDER — ATORVASTATIN CALCIUM 20 MG/1
20 TABLET, FILM COATED ORAL DAILY
Qty: 90 TABLET | Refills: 0 | Status: SHIPPED | OUTPATIENT
Start: 2024-07-09

## 2024-07-09 RX ORDER — RESPIRATORY SYNCYTIAL VIRUS VACCINE 120MCG/0.5
0.5 KIT INTRAMUSCULAR ONCE
Qty: 1 EACH | Refills: 0 | Status: CANCELLED | OUTPATIENT
Start: 2024-07-09 | End: 2024-07-09

## 2024-07-09 RX ORDER — LOSARTAN POTASSIUM 100 MG/1
100 TABLET ORAL AT BEDTIME
Qty: 90 TABLET | Refills: 3 | Status: SHIPPED | OUTPATIENT
Start: 2024-07-09

## 2024-07-09 ASSESSMENT — PAIN SCALES - GENERAL: PAINLEVEL: NO PAIN (0)

## 2024-07-09 NOTE — PROGRESS NOTES
"  Assessment & Plan     Earle Rene is a 75 yo male coming in today for medication refills, an orthotics order, and to discuss his healthcare maintenance. He has no acute concerns today and is doing well. Is seeing endocrinology after this appt, follows with Pamela Laura.     (E11.3213,  Z79.4) Type 2 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, with long-term current use of insulin (H)  (primary encounter diagnosis)  Comment: In need of his order for orthotics renewed. A brief foot exam and leg exam was done today given his hx of diabetes with polyneuropathy and no non-healing ulcers or new joint changes were noted. No warmth or discoloration. Signed order for orthotics and printed pt out a copy.   Plan: Orthotics, Mastectomy and Custom Compression         Orders    (E78.5) Hyperlipidemia LDL goal <100  Comment: Refilled as needed.  Plan: atorvastatin (LIPITOR) 20 MG tablet    (M79.2) Polyneuropathic pain  Comment: Refilled as needed.   Plan: gabapentin (NEURONTIN) 300 MG capsule    (J30.2) Seasonal allergies  Comment: Refilled as needed.   Plan: loratadine (CLARITIN) 10 MG tablet    (Z12.11) Screen for colon cancer  Comment: Pt's last colonoscopy was completed in 2013 and was completely normal. He was recommended to repeat in 10 years and is one year past that. Recommended ordered another colonoscopy for him today and this was completed -- pt is comfortable with this plan.   Plan: Colonoscopy Screening  Referral    (E11.21,  Z79.4) Type 2 diabetes mellitus with diabetic nephropathy, with long-term current use of insulin (H)  Comment: Refilled medication as needed. BP wnl today at visit.   Plan: losartan (COZAAR) 100 MG tablet       BMI  Estimated body mass index is 28.89 kg/m  as calculated from the following:    Height as of 1/3/24: 1.727 m (5' 8\").    Weight as of this encounter: 86.2 kg (190 lb).     Earle will follow up as needed. No plan for " follow up made today.       Katie Og is a 74 year old, presenting for the following health issues: Medication refills; Orthotics order; Colonoscopy     Has orthotics already and has needed a prescription for new orthotics for the last six months. They help significantly with his balance and helping make sure he does not get new injuries.     Does have one wound to his R lower leg but the endocrinologists have seen this and are making sure this is healing well. He has no drainage or pain to this area right now and no worries regarding the healing.     Pt also thinks he is due for a colonoscopy and wants to double check/schedule this.    RECHECK (Medication refill./Need for diabetic shoes.)      7/9/2024     9:30 AM   Additional Questions   Roomed by KTR            Objective    /60 (BP Location: Right arm, Patient Position: Sitting, Cuff Size: Adult Regular)   Pulse 86   Wt 86.2 kg (190 lb)   SpO2 99%   BMI 28.89 kg/m    Body mass index is 28.89 kg/m .  Physical Exam  Constitutional:       Appearance: Normal appearance.   Cardiovascular:      Rate and Rhythm: Normal rate and regular rhythm.      Pulses: Normal pulses.      Heart sounds: Normal heart sounds.   Pulmonary:      Effort: Pulmonary effort is normal.      Breath sounds: Normal breath sounds.   Musculoskeletal:         General: No tenderness. Normal range of motion.      Right lower leg: Edema (1+) present.      Left lower leg: Edema (1+) present.   Skin:     General: Skin is warm and dry.      Capillary Refill: Capillary refill takes 2 to 3 seconds.      Findings: Lesion (scabbed lesion to anterior R shin, no erythema, drainage, or warmth. Wound appears to be healing well.) present. No bruising or erythema.   Neurological:      General: No focal deficit present.      Mental Status: He is alert. Mental status is at baseline.   Psychiatric:         Mood and Affect: Mood normal.         Behavior: Behavior normal.              [place]  MD Kathleen  Internal Medicine Resident, PGY1    Pt was seen and examined with Dr. Wang.  I agree with her documentation as noted above.    My additional comments: None    Yoshi Warren MD

## 2024-07-09 NOTE — LETTER
7/9/2024       RE: Earle Rene  [address]  [place] [address]     Dear Colleague,    Thank you for referring your patient, Earle Rene, to the [facility] [place] [facility]. Please see a copy of my visit note below.    Patient is showing 3/5 MNCM met. Aspirin not prescribed   RENE Akers  Outcome for 07/08/24 10:45 AM: Called to remind patient to bring Lora meter to visit to upload.  RENE Akers  Appointment reminder phone call made to patient.   Bailee Gregory                  HPI   Earle Rene is a 74 year old male with type 2 diabetes mellitus.  Patient was started on Mounjaro and his A1C and blood sugar values have improved and he is no longer requiring mealtime insulin.  Pt's diabetes is complicated by retinopathy, nephropathy, neuropathy and ED.  Pt also has hx of hyperlipidemia, HTN, PVD, obesity, presumed localized prostate cancer, BPH, goiter with right thyroid nodule and osteoporosis.  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  For his diabetes, patient states he is taking Mounjaro 5 mg subcutaneous once a week, Tresiba 50 units SQ at hs and Jardiance 10 mg each am.  No longer requiring Humalog.  A1C is 7.7% today.  Previous A1C was 8.8 % on 4/16/2024 and A1C 10.3 % on 1/2/2024.   I reviewed and scanned his freestyle lora 2 sensor download data in his note below.  His average glucose is 170 with an estimated AC of 7.4% for the past 2 weeks.  Glucose is in target range 66% of the time with 1% hypoglycemia.  Fasting blood sugar 86 this morning.  Patient states his fasting blood sugars are often less than 100.  On ROS today, reports feeling well and has more energy.  Less GERD symptoms.  Using MiraLAX intermittently for constipation.  No nausea, vomiting or abdominal pain.  Intermittent loose stools.  Denies blood in stool or melena.  Symptoms of diabetic neuropathy in both feet.  No foot  ulcers.  Decrease auditory acuity.  He denies groin yeast infection, dysuria or hematuria at this time.  Pt denies SOB at rest, cough, fever or chest pain.    DIABETES CARE:  Retinopathy: yes; severe NPDR with macular edema. He was seen by Oph here in Nov 2023.  Reminded patient to see ophthalmology and follow-up.  Nephropathy: yes- CKDStage3.  Pt is taking Cozaar daily.  Neuropathy: yes.   Foot exam.  Patient has follow-up with podiatry on 8/22/2024.  Lipids: LDL 88 in Jan 2024.  Pt is taking Lipitor and fenofibrate.  Taking ASA: yes.  CAD:no.  Mental health: hx of mood disorder per chart. Pt denies depression.  Insulin: Basal insulin.    DM meds: Jardiance and Mounjaro.  Pt did NOT tolerate Ozempic- diarrhea.  AVOID Metformin-CKD.  Testing:  Freestyle Libre2 sensor.        ROS   Please see under HPI.     ALLERGIES:  Review of patient's allergies indicates no known allergies.      Current Outpatient Medications   Medication Sig Dispense Refill    alpha-lipoic acid 600 MG capsule Take 1 capsule (600 mg) by mouth daily 90 capsule 3    ARTIFICIAL TEAR OP Apply to eye as needed      atorvastatin (LIPITOR) 20 MG tablet Take 1 tablet (20 mg) by mouth daily 90 tablet 0    blood glucose (NO BRAND SPECIFIED) lancets standard Lancets that go with device, Test 3 times daily 300 each 3    Continuous Blood Gluc Sensor (FREESTYLE PETER 2 SENSOR) MISC 1 EACH EVERY 14 DAYS 1 EACH EVERY 14 DAYS. CHANGE EVERY 14 DAYS. 2 each 5    empagliflozin (JARDIANCE) 10 MG TABS tablet Take 1 tablet (10 mg) by mouth daily 90 tablet 1    fenofibrate 54 MG tablet Take 1 tablet (54 mg) by mouth daily (Patient taking differently: Take 54 mg by mouth at bedtime) 90 tablet 0    finasteride (PROSCAR) 5 MG tablet Take 1 tablet (5 mg) by mouth daily 90 tablet 3    gabapentin (NEURONTIN) 300 MG capsule Take 1 capsule (300 mg) by mouth 2 times daily 60 capsule 3    insulin degludec (TRESIBA FLEXTOUCH) 100 UNIT/ML pen INJECT 60 UNITS SUBCUTANEOUSLY AT  "BEDTIME. (Patient taking differently: INJECT 50 UNITS SUBCUTANEOUSLY AT BEDTIME.) 60 mL 1    insulin pen needle (B-D U/F) 31G X 5 MM miscellaneous Use 4 time(s) per day.  Please dispense as BD Pen Needle Mini U/F 31G x 5  each 3    insulin pen needle (B-D U/F) 31G X 5 MM Use 4 times per day.  Please dispense as BD Pen Needle Mini U/F 31G x 5  each 3    insulin syringe 31G X 5/16\" 0.5 ML MISC Use three syringes daily 270 each 1    loratadine (CLARITIN) 10 MG tablet Take 1 tablet (10 mg) by mouth daily 90 tablet 0    losartan (COZAAR) 100 MG tablet Take 1 tablet (100 mg) by mouth at bedtime 90 tablet 3    mirabegron (MYRBETRIQ) 25 MG 24 hr tablet TAKE 1 TABLET BY MOUTH EVERY DAY 90 tablet 1    sodium bicarbonate 650 MG tablet Take 2 tablets (1,300 mg) by mouth 2 times daily 270 tablet 3    tamsulosin (FLOMAX) 0.4 MG capsule Take 2 capsules (0.8 mg) by mouth daily 180 capsule 5    tirzepatide (MOUNJARO) 5 MG/0.5ML pen Inject 5 mg Subcutaneous every 7 days 2 mL 3    triamcinolone (KENALOG) 0.1 % external cream Apply topically 2 times daily 30 g 0    VITAMIN D3 25 MCG (1000 UT) tablet TAKE 2 TABLETS (50 MCG) BY MOUTH DAILY 180 tablet 3    amoxicillin (AMOXIL) 500 MG capsule TAKE 1 CAPSULE ORAL EVERY EIGHT HOURS AS DIRECTED (Patient not taking: Reported on 7/9/2024)      HUMALOG KWIKPEN 100 UNIT/ML soln Inject 4 units with meals, plus correction. Pt uses approx 50 units in 24 hrs. (Patient not taking: Reported on 7/9/2024) 75 mL 1     Family Hx   No change.     Personal Hx   Smoke: none.   ETOH: none.    with grown children.    PMH   1. Type 2 Diabetes Mellitus dx at age 43.   2. Neuropathy.  3. Nephropathy.   4. ED.   5. Dyslipidemia.   6. Nephrolithiasis.   7. Decrease auditory acuity.   8. Sarcoidosis-lung.   9. Goiter.   10. S/P T & A.   11. S/P FX right heel.   12. Vit D def.   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   14. CT chest- ? granulomas.   15. Retinopathy.  16. Goiter.  Past Medical " History:   Diagnosis Date    Blepharitis of both eyes     BPH (benign prostatic hyperplasia)     Diabetes (H)     Diabetic neuropathy (H)     Diabetic retinopathy associated with diabetes mellitus due to underlying condition (H)     Dry eye syndrome     GERD (gastroesophageal reflux disease)     Goiter     Granulomatous disease (H)     HLD (hyperlipidemia)     HTN (hypertension)     Nonsenile cataract     Peripheral neuropathy      Past Surgical History:   Procedure Laterality Date    ------------OTHER-------------      back of neck abscess drainage in OR    AS RAD RESEC TONSIL/PILLARS Bilateral 1961    CATARACT IOL, RT/LT Left     COLONOSCOPY  7/29/2013    Procedure: COLONOSCOPY;;  Surgeon: Montana Pascal MD;  Location: [facility]    EXCISE MASS UPPER EXTREMITY Right 11/11/2019    Procedure: EXCISION, MASS, UPPER EXTREMITY, RIGHT SHOULDER;  Surgeon: Johana Choudhury MD;  Location: [facility]    INJECT EPIDURAL LUMBAR Left 4/20/2023    Procedure: INJECTION, SPINE, LUMBAR, EPIDURAL (L4-L5);  Surgeon: Mahamed Vázquez MD;  Location: [facility] OR    INJECT SACROILIAC JOINT Bilateral 9/13/2022    Procedure: Bilateral sacroiliac joint injection;  Surgeon: Collin Mata MD;  Location: [facility] OR    INTRAVITREAL INJECTION CHEMOTHERAPY Right 12/30/2019    Procedure: INTRAVITREAL Bevacizumab injection;  Surgeon: Milton Maki MD;  [facility]: [facility]    PHACOEMULSIFICATION CLEAR CORNEA WITH STANDARD INTRAOCULAR LENS IMPLANT Right 12/30/2019    Procedure: PHACOEMULSIFICATION, CATARACT, WITH INTRAOCULAR LENS IMPLANT;  Surgeon: Milton Maki MD;  Location: [facility]    PHACOEMULSIFICATION WITH STANDARD INTRAOCULAR LENS IMPLANT Left 6/21/2019    Procedure: Left Eye Cataract Removal with Intraocular Lens Implant with Intraoperative Avastin Injection;  Surgeon: Lacey Eugene MD;  Location: [facility]    siladenatis  11/2017     Physical Exam     BP (!) 145/47   Pulse 91   Wt 87.1 kg (192 lb)   SpO2 100%   BMI 29.19 kg/m        Results   Creatinine   Date Value Ref Range Status   05/29/2024 1.93 (H) 0.67 - 1.17 mg/dL Final   06/09/2021 1.45 (H) 0.66 - 1.25 mg/dL Final     GFR Estimate   Date Value Ref Range Status   05/29/2024 36 (L) >60 mL/min/1.73m2 Final   06/09/2021 48 (L) >60 mL/min/[1.73_m2] Final     Comment:     Non  GFR Calc  Starting 12/18/2018, serum creatinine based estimated GFR (eGFR) will be   calculated using the Chronic Kidney Disease Epidemiology Collaboration   (CKD-EPI) equation.       Hemoglobin A1C   Date Value Ref Range Status   01/03/2024 10.3 (H) 0.0 - 5.6 % Final     Comment:     Normal <5.7%   Prediabetes 5.7-6.4%    Diabetes 6.5% or higher     Note: Adopted from ADA consensus guidelines.   06/09/2021 8.2 (H) 0 - 5.6 % Final     Comment:     Normal <5.7% Prediabetes 5.7-6.4%  Diabetes 6.5% or higher - adopted from ADA   consensus guidelines.       Afinion Hemoglobin A1c POCT   Date Value Ref Range Status   07/09/2024 7.7 (H) <=5.7 % Final     Comment:     Normal <5.7%   Prediabetes 5.7-6.4%     Diabetes 6.5% or higher       Note: Adopted from ADA consensus guidelines.     Potassium   Date Value Ref Range Status   05/29/2024 4.5 3.4 - 5.3 mmol/L Final   06/09/2022 4.3 3.4 - 5.3 mmol/L Final   06/09/2021 4.2 3.4 - 5.3 mmol/L Final     ALT   Date Value Ref Range Status   03/12/2021 36 0 - 70 U/L Final     AST   Date Value Ref Range Status   01/03/2024 23 0 - 45 U/L Final   03/12/2021 24 0 - 45 U/L Final     TSH   Date Value Ref Range Status   01/03/2024 1.76 0.30 - 4.20 uIU/mL Final   06/09/2021 0.98 0.40 - 4.00 mU/L Final     T4 Free   Date Value Ref Range Status   10/21/2014 1.00 0.76 - 1.46 ng/dL Final     Comment:     Effective 7/30/2014, the reference range for this assay has changed to reflect   new instrumentation/methodology.           Cholesterol   Date Value Ref Range Status   01/03/2024 184 <200 mg/dL Final   02/06/2023 153 <200 mg/dL Final   06/18/2019 145 <200 mg/dL Final    03/06/2018 101 <200 mg/dL Final     HDL Cholesterol   Date Value Ref Range Status   06/18/2019 38 (L) >39 mg/dL Final   03/06/2018 32 (L) >39 mg/dL Final     Direct Measure HDL   Date Value Ref Range Status   01/03/2024 32 (L) >=40 mg/dL Final   02/06/2023 40 >=40 mg/dL Final     LDL Cholesterol Calculated   Date Value Ref Range Status   01/03/2024 88 <=100 mg/dL Final   02/06/2023 57 <=100 mg/dL Final   06/18/2019 49 <100 mg/dL Final     Comment:     Desirable:       <100 mg/dl   03/06/2018 36 <100 mg/dL Final     Comment:     Desirable:       <100 mg/dl     Triglycerides   Date Value Ref Range Status   01/03/2024 319 (H) <150 mg/dL Final   02/06/2023 279 (H) <150 mg/dL Final   06/18/2019 289 (H) <150 mg/dL Final     Comment:     Borderline high:  150-199 mg/dl  High:             200-499 mg/dl  Very high:       >499 mg/dl     03/06/2018 166 (H) <150 mg/dL Final     Comment:     Borderline high:  150-199 mg/dl  High:             200-499 mg/dl  Very high:       >499 mg/dl       Cholesterol/HDL Ratio   Date Value Ref Range Status   09/09/2014 3.8 0.0 - 5.0 Final   11/20/2013 5.8 (H) 0.0 - 5.0 Final       ASSESSMENT/PLAN:     1. TYPE 2 DIABETES MELLITUS: Type 2 diabetes mellitus complicated by severe diabetic retinopathy, nephropathy- CKD stage 3, neuropathy and ED. Pt also has PVD.  A1C and blood sugar values have improved with addition of Mounjaro.  Continue Mounjaro 5 mg subcutaneous once a week.  He is no longer requiring Humalog.    Patient instructed to decrease Tresiba 40 units subcutaneous daily and continue Jardiance 10 mg each a.m.  Will avoid increasing Jardiance dose given patient's history of urinary incontinence and dehydration.  Again, I reviewed how Mounjaro works and possible side effects of the drug including n/v, GI distress, constipation, hypoglycemia and rare risk of pancreatitis.  Pt denies hx of pancreatitis or gastroparesis.  Reminded pt to keep himself well hydrated while taking  Kirk.  Encouraged patient to make healthy food choices, reduce his food portions with meals, avoid snacking and walk on his treadmill and exercise.  Pt remains on daily ASA.   /60 today in clinic.    2. GOITER: Not addressed today.   TSH normal in Jan 2024.    3. NEPHROPATHY: Pt has CKDstage3 and followed here by renal staff.  Most recent creat 1.93 with GFR 36 mL/min in 5/2024.   Pt is taking Cozaar.  Pt on ARB,SGLT-2 and Mounjaro.      4.  RETINOPATHY:  Seen here by Oph in 11/2023.    5.  NEUROPATHY: Continue alpha-lipoic acid and Gabapentin.  Pt follow-up with Dr. Nuñez on 8/22/2024.    6. DYSLIPIDEMIA: LDL 88 in January 2024.  Pt is taking Lipitor and Fenofibrate.    7. OBESITY: See under # 1 above.  Continue Mounjaro.    8.  FOLLOW UP: with me in Oct 2024 and Jan 2025.  Patient has follow-up with Theo G[facility] on 7/25/2024.  Oph follow up requested today.    Time spent reviewing pt's chart notes,labs and freestyle cyrus 2 sensor download data today=5 minutes.  Time for clinic visit today=  20 minutes.  Time for documentation today = 10 minutes.    TOTAL TIME FOR VISIT TODAY = 35  minutes    Pamela Laura PA-C

## 2024-07-10 RX ORDER — INSULIN DEGLUDEC 100 U/ML
INJECTION, SOLUTION SUBCUTANEOUS
Qty: 60 ML | Refills: 1 | Status: SHIPPED | OUTPATIENT
Start: 2024-07-10 | End: 2024-09-16

## 2024-07-10 NOTE — PROGRESS NOTES
PATT Rene is a 74 year old male with type 2 diabetes mellitus.  Patient was started on Mounjaro and his A1C and blood sugar values have improved and he is no longer requiring mealtime insulin.  Pt's diabetes is complicated by retinopathy, nephropathy, neuropathy and ED.  Pt also has hx of hyperlipidemia, HTN, PVD, obesity, presumed localized prostate cancer, BPH, goiter with right thyroid nodule and osteoporosis.  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  For his diabetes, patient states he is taking Mounjaro 5 mg subcutaneous once a week, Tresiba 50 units SQ at hs and Jardiance 10 mg each am.  No longer requiring Humalog.  A1C is 7.7% today.  Previous A1C was 8.8 % on 4/16/2024 and A1C 10.3 % on 1/2/2024.   I reviewed and scanned his freestyle cyrus 2 sensor download data in his note below.  His average glucose is 170 with an estimated AC of 7.4% for the past 2 weeks.  Glucose is in target range 66% of the time with 1% hypoglycemia.  Fasting blood sugar 86 this morning.  Patient states his fasting blood sugars are often less than 100.  On ROS today, reports feeling well and has more energy.  Less GERD symptoms.  Using MiraLAX intermittently for constipation.  No nausea, vomiting or abdominal pain.  Intermittent loose stools.  Denies blood in stool or melena.  Symptoms of diabetic neuropathy in both feet.  No foot ulcers.  Decrease auditory acuity.  He denies groin yeast infection, dysuria or hematuria at this time.  Pt denies SOB at rest, cough, fever or chest pain.    DIABETES CARE:  Retinopathy: yes; severe NPDR with macular edema. He was seen by Oph here in Nov 2023.  Reminded patient to see ophthalmology and follow-up.  Nephropathy: yes- CKDStage3.  Pt is taking Cozaar daily.  Neuropathy: yes.   Foot exam.  Patient has follow-up with podiatry on 8/22/2024.  Lipids: LDL 88 in Jan 2024.  Pt is taking Lipitor and fenofibrate.  Taking ASA: yes.  CAD:no.  Mental health: hx of mood disorder per  "chart. Pt denies depression.  Insulin: Basal insulin.    DM meds: Jardiance and Mounjaro.  Pt did NOT tolerate Ozempic- diarrhea.  AVOID Metformin-CKD.  Testing:  Freestyle Libre2 sensor.        ROS   Please see under HPI.     ALLERGIES:  Review of patient's allergies indicates no known allergies.      Current Outpatient Medications   Medication Sig Dispense Refill    alpha-lipoic acid 600 MG capsule Take 1 capsule (600 mg) by mouth daily 90 capsule 3    ARTIFICIAL TEAR OP Apply to eye as needed      atorvastatin (LIPITOR) 20 MG tablet Take 1 tablet (20 mg) by mouth daily 90 tablet 0    blood glucose (NO BRAND SPECIFIED) lancets standard Lancets that go with device, Test 3 times daily 300 each 3    Continuous Blood Gluc Sensor (FREESTYLE PETER 2 SENSOR) MISC 1 EACH EVERY 14 DAYS 1 EACH EVERY 14 DAYS. CHANGE EVERY 14 DAYS. 2 each 5    empagliflozin (JARDIANCE) 10 MG TABS tablet Take 1 tablet (10 mg) by mouth daily 90 tablet 1    fenofibrate 54 MG tablet Take 1 tablet (54 mg) by mouth daily (Patient taking differently: Take 54 mg by mouth at bedtime) 90 tablet 0    finasteride (PROSCAR) 5 MG tablet Take 1 tablet (5 mg) by mouth daily 90 tablet 3    gabapentin (NEURONTIN) 300 MG capsule Take 1 capsule (300 mg) by mouth 2 times daily 60 capsule 3    insulin degludec (TRESIBA FLEXTOUCH) 100 UNIT/ML pen INJECT 60 UNITS SUBCUTANEOUSLY AT BEDTIME. (Patient taking differently: INJECT 50 UNITS SUBCUTANEOUSLY AT BEDTIME.) 60 mL 1    insulin pen needle (B-D U/F) 31G X 5 MM miscellaneous Use 4 time(s) per day.  Please dispense as BD Pen Needle Mini U/F 31G x 5  each 3    insulin pen needle (B-D U/F) 31G X 5 MM Use 4 times per day.  Please dispense as BD Pen Needle Mini U/F 31G x 5  each 3    insulin syringe 31G X 5/16\" 0.5 ML MISC Use three syringes daily 270 each 1    loratadine (CLARITIN) 10 MG tablet Take 1 tablet (10 mg) by mouth daily 90 tablet 0    losartan (COZAAR) 100 MG tablet Take 1 tablet (100 mg) by mouth " at bedtime 90 tablet 3    mirabegron (MYRBETRIQ) 25 MG 24 hr tablet TAKE 1 TABLET BY MOUTH EVERY DAY 90 tablet 1    sodium bicarbonate 650 MG tablet Take 2 tablets (1,300 mg) by mouth 2 times daily 270 tablet 3    tamsulosin (FLOMAX) 0.4 MG capsule Take 2 capsules (0.8 mg) by mouth daily 180 capsule 5    tirzepatide (MOUNJARO) 5 MG/0.5ML pen Inject 5 mg Subcutaneous every 7 days 2 mL 3    triamcinolone (KENALOG) 0.1 % external cream Apply topically 2 times daily 30 g 0    VITAMIN D3 25 MCG (1000 UT) tablet TAKE 2 TABLETS (50 MCG) BY MOUTH DAILY 180 tablet 3    amoxicillin (AMOXIL) 500 MG capsule TAKE 1 CAPSULE ORAL EVERY EIGHT HOURS AS DIRECTED (Patient not taking: Reported on 7/9/2024)      HUMALOG KWIKPEN 100 UNIT/ML soln Inject 4 units with meals, plus correction. Pt uses approx 50 units in 24 hrs. (Patient not taking: Reported on 7/9/2024) 75 mL 1     Family Hx   No change.     Personal Hx   Smoke: none.   ETOH: none.    with grown children.    PMH   1. Type 2 Diabetes Mellitus dx at age 43.   2. Neuropathy.  3. Nephropathy.   4. ED.   5. Dyslipidemia.   6. Nephrolithiasis.   7. Decrease auditory acuity.   8. Sarcoidosis-lung.   9. Goiter.   10. S/P T & A.   11. S/P FX right heel.   12. Vit D def.   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   14. CT chest- ? granulomas.   15. Retinopathy.  16. Goiter.  Past Medical History:   Diagnosis Date    Blepharitis of both eyes     BPH (benign prostatic hyperplasia)     Diabetes (H)     Diabetic neuropathy (H)     Diabetic retinopathy associated with diabetes mellitus due to underlying condition (H)     Dry eye syndrome     GERD (gastroesophageal reflux disease)     Goiter     Granulomatous disease (H)     HLD (hyperlipidemia)     HTN (hypertension)     Nonsenile cataract     Peripheral neuropathy      Past Surgical History:   Procedure Laterality Date    ------------OTHER-------------      back of neck abscess drainage in OR    AS RAD RESEC TONSIL/PILLARS Bilateral  1961    CATARACT IOL, RT/LT Left     COLONOSCOPY  7/29/2013    Procedure: COLONOSCOPY;;  Surgeon: Montana Pascal MD;  Location: [facility]    EXCISE MASS UPPER EXTREMITY Right 11/11/2019    Procedure: EXCISION, MASS, UPPER EXTREMITY, RIGHT SHOULDER;  Surgeon: Johana Choudhury MD;  [facility]    INJECT EPIDURAL LUMBAR Left 4/20/2023    Procedure: INJECTION, SPINE, LUMBAR, EPIDURAL (L4-L5);  Surgeon: Mahamed Vázquez MD;  Location: [facility]    INJECT SACROILIAC JOINT Bilateral 9/13/2022    Procedure: Bilateral sacroiliac joint injection;  Surgeon: Collin Mata MD;  Location: [facility]    INTRAVITREAL INJECTION CHEMOTHERAPY Right 12/30/2019    Procedure: INTRAVITREAL Bevacizumab injection;  Surgeon: Milton Maki MD;  [facility]    PHACOEMULSIFICATION CLEAR CORNEA WITH STANDARD INTRAOCULAR LENS IMPLANT Right 12/30/2019    Procedure: PHACOEMULSIFICATION, CATARACT, WITH INTRAOCULAR LENS IMPLANT;  Surgeon: Milton Maki MD;  [facility]    PHACOEMULSIFICATION WITH STANDARD INTRAOCULAR LENS IMPLANT Left 6/21/2019    Procedure: Left Eye Cataract Removal with Intraocular Lens Implant with Intraoperative Avastin Injection;  Surgeon: Lacey Eugene MD;  [facility]    siladenatis  11/2017     Physical Exam     BP (!) 145/47   Pulse 91   Wt 87.1 kg (192 lb)   SpO2 100%   BMI 29.19 kg/m       Results   Creatinine   Date Value Ref Range Status   05/29/2024 1.93 (H) 0.67 - 1.17 mg/dL Final   06/09/2021 1.45 (H) 0.66 - 1.25 mg/dL Final     GFR Estimate   Date Value Ref Range Status   05/29/2024 36 (L) >60 mL/min/1.73m2 Final   06/09/2021 48 (L) >60 mL/min/[1.73_m2] Final     Comment:     Non  GFR Calc  Starting 12/18/2018, serum creatinine based estimated GFR (eGFR) will be   calculated using the Chronic Kidney Disease Epidemiology Collaboration   (CKD-EPI) equation.       Hemoglobin A1C   Date Value Ref Range Status   01/03/2024 10.3 (H) 0.0 - 5.6 % Final     Comment:      Normal <5.7%   Prediabetes 5.7-6.4%    Diabetes 6.5% or higher     Note: Adopted from ADA consensus guidelines.   06/09/2021 8.2 (H) 0 - 5.6 % Final     Comment:     Normal <5.7% Prediabetes 5.7-6.4%  Diabetes 6.5% or higher - adopted from ADA   consensus guidelines.       Afinion Hemoglobin A1c POCT   Date Value Ref Range Status   07/09/2024 7.7 (H) <=5.7 % Final     Comment:     Normal <5.7%   Prediabetes 5.7-6.4%     Diabetes 6.5% or higher       Note: Adopted from ADA consensus guidelines.     Potassium   Date Value Ref Range Status   05/29/2024 4.5 3.4 - 5.3 mmol/L Final   06/09/2022 4.3 3.4 - 5.3 mmol/L Final   06/09/2021 4.2 3.4 - 5.3 mmol/L Final     ALT   Date Value Ref Range Status   03/12/2021 36 0 - 70 U/L Final     AST   Date Value Ref Range Status   01/03/2024 23 0 - 45 U/L Final   03/12/2021 24 0 - 45 U/L Final     TSH   Date Value Ref Range Status   01/03/2024 1.76 0.30 - 4.20 uIU/mL Final   06/09/2021 0.98 0.40 - 4.00 mU/L Final     T4 Free   Date Value Ref Range Status   10/21/2014 1.00 0.76 - 1.46 ng/dL Final     Comment:     Effective 7/30/2014, the reference range for this assay has changed to reflect   new instrumentation/methodology.           Cholesterol   Date Value Ref Range Status   01/03/2024 184 <200 mg/dL Final   02/06/2023 153 <200 mg/dL Final   06/18/2019 145 <200 mg/dL Final   03/06/2018 101 <200 mg/dL Final     HDL Cholesterol   Date Value Ref Range Status   06/18/2019 38 (L) >39 mg/dL Final   03/06/2018 32 (L) >39 mg/dL Final     Direct Measure HDL   Date Value Ref Range Status   01/03/2024 32 (L) >=40 mg/dL Final   02/06/2023 40 >=40 mg/dL Final     LDL Cholesterol Calculated   Date Value Ref Range Status   01/03/2024 88 <=100 mg/dL Final   02/06/2023 57 <=100 mg/dL Final   06/18/2019 49 <100 mg/dL Final     Comment:     Desirable:       <100 mg/dl   03/06/2018 36 <100 mg/dL Final     Comment:     Desirable:       <100 mg/dl     Triglycerides   Date Value Ref Range Status    01/03/2024 319 (H) <150 mg/dL Final   02/06/2023 279 (H) <150 mg/dL Final   06/18/2019 289 (H) <150 mg/dL Final     Comment:     Borderline high:  150-199 mg/dl  High:             200-499 mg/dl  Very high:       >499 mg/dl     03/06/2018 166 (H) <150 mg/dL Final     Comment:     Borderline high:  150-199 mg/dl  High:             200-499 mg/dl  Very high:       >499 mg/dl       Cholesterol/HDL Ratio   Date Value Ref Range Status   09/09/2014 3.8 0.0 - 5.0 Final   11/20/2013 5.8 (H) 0.0 - 5.0 Final       ASSESSMENT/PLAN:     1. TYPE 2 DIABETES MELLITUS: Type 2 diabetes mellitus complicated by severe diabetic retinopathy, nephropathy- CKD stage 3, neuropathy and ED. Pt also has PVD.  A1C and blood sugar values have improved with addition of Mounjaro.  Continue Mounjaro 5 mg subcutaneous once a week.  He is no longer requiring Humalog.    Patient instructed to decrease Tresiba 40 units subcutaneous daily and continue Jardiance 10 mg each a.m.  Will avoid increasing Jardiance dose given patient's history of urinary incontinence and dehydration.  Again, I reviewed how Mounjaro works and possible side effects of the drug including n/v, GI distress, constipation, hypoglycemia and rare risk of pancreatitis.  Pt denies hx of pancreatitis or gastroparesis.  Reminded pt to keep himself well hydrated while taking Jardiance.  Encouraged patient to make healthy food choices, reduce his food portions with meals, avoid snacking and walk on his treadmill and exercise.  Pt remains on daily ASA.   /60 today in clinic.    2. GOITER: Not addressed today.   TSH normal in Jan 2024.    3. NEPHROPATHY: Pt has CKDstage3 and followed here by renal staff.  Most recent creat 1.93 with GFR 36 mL/min in 5/2024.   Pt is taking Cozaar.  Pt on ARB,SGLT-2 and Mounjaro.      4.  RETINOPATHY:  Seen here by Oph in 11/2023.    5.  NEUROPATHY: Continue alpha-lipoic acid and Gabapentin.  Pt follow-up with Dr. Nuñez on 8/22/2024.    6.  DYSLIPIDEMIA: LDL 88 in January 2024.  Pt is taking Lipitor and Fenofibrate.    7. OBESITY: See under # 1 above.  Continue Mounjaro.    8.  FOLLOW UP: with me in Oct 2024 and Jan 2025.  Patient has follow-up with Theo Gong [facility] on 7/25/2024.  Oph follow up requested today.    Time spent reviewing pt's chart notes,labs and freestyle cyrus 2 sensor download data today=5 minutes.  Time for clinic visit today=  20 minutes.  Time for documentation today = 10 minutes.    TOTAL TIME FOR VISIT TODAY = 35  minutes    Pamela Laura PA-C

## 2024-07-17 ENCOUNTER — OFFICE VISIT (OUTPATIENT)
Dept: URGENT CARE | Facility: URGENT CARE | Age: 74
End: 2024-07-17
Payer: COMMERCIAL

## 2024-07-17 VITALS
SYSTOLIC BLOOD PRESSURE: 132 MMHG | WEIGHT: 188 LBS | OXYGEN SATURATION: 100 % | DIASTOLIC BLOOD PRESSURE: 74 MMHG | RESPIRATION RATE: 20 BRPM | BODY MASS INDEX: 27.85 KG/M2 | HEIGHT: 69 IN | TEMPERATURE: 99.1 F | HEART RATE: 98 BPM

## 2024-07-17 DIAGNOSIS — J06.9 VIRAL URI WITH COUGH: Primary | ICD-10-CM

## 2024-07-17 DIAGNOSIS — R06.02 SHORTNESS OF BREATH: ICD-10-CM

## 2024-07-17 PROCEDURE — 99213 OFFICE O/P EST LOW 20 MIN: CPT | Performed by: STUDENT IN AN ORGANIZED HEALTH CARE EDUCATION/TRAINING PROGRAM

## 2024-07-17 PROCEDURE — 87635 SARS-COV-2 COVID-19 AMP PRB: CPT | Performed by: STUDENT IN AN ORGANIZED HEALTH CARE EDUCATION/TRAINING PROGRAM

## 2024-07-18 ENCOUNTER — ANCILLARY PROCEDURE (OUTPATIENT)
Dept: GENERAL RADIOLOGY | Facility: CLINIC | Age: 74
End: 2024-07-18
Attending: STUDENT IN AN ORGANIZED HEALTH CARE EDUCATION/TRAINING PROGRAM
Payer: COMMERCIAL

## 2024-07-18 DIAGNOSIS — J06.9 VIRAL URI WITH COUGH: ICD-10-CM

## 2024-07-18 DIAGNOSIS — R06.02 SHORTNESS OF BREATH: ICD-10-CM

## 2024-07-18 PROCEDURE — 71046 X-RAY EXAM CHEST 2 VIEWS: CPT | Mod: TC | Performed by: RADIOLOGY

## 2024-07-18 NOTE — PROGRESS NOTES
Assessment & Plan     Viral URI with cough  Shortness of breath  Presentation most consistent with viral respiratory infection. Intermittent SOB is most concerning feature, but reassuringly his oxygen saturations were high 90s and chest radiograph was not suggestive of infiltrate.  Despite lack of chest pain, recommended EKG given his shortness of breath in the context of his significant diabetes history.  Unfortunately due to Internet downtime, we were unable to perform EKG in our clinic today.  Referred patient to [facility] urgent care where he had an EKG performed, which is not suggestive of ischemia per my read and the read of the  provider at that clinic.  Sent COVID PCR in case patient qualifies for antiviral therapy (he has only had a single COVID shot several years ago).  Discussed return precautions and patient expressed understanding.        No follow-ups on file.    Ezequiel Neil MD  [facility]    Katie Og is a 74 year old male who presents to clinic today for the following health issues:  Chief Complaint   Patient presents with    Urgent Care    Shortness of Breath     Patient presents with congestion, cough, body aches and shortness of breath for 4x days.       HPI  Presents with 3-4 days of rhinorrhea, cough, fatigue, headache and mild shortness of breath.  Frequently goes to [employer] and thinks he was probably exposed to some sick people there, though he cannot think of a specific exposure.  Cough has been nonproductive, no chest pain, no facial pain.  Notes that his breathing has been more labored although he attributes this largely to the surgical mask he is wearing in the clinic today.  Feels like the shortness of breath comes and goes and was already starting to get better by the end of her visit.  No fever or chills.  Denies prior significant lung or cardiac pathology, but notes that he is on medication for diabetes.      Review of  "Systems  See above.      Objective    /74 (BP Location: Right arm)   Pulse 98   Temp 99.1  F (37.3  C) (Oral)   Resp 20   Ht 1.74 m (5' 8.5\")   Wt 85.3 kg (188 lb)   SpO2 100%   BMI 28.17 kg/m    Physical Exam   GENERAL: alert and no distress  EYES: Eyes grossly normal to inspection and conjunctivae and sclerae normal  NECK: no adenopathy, no asymmetry, masses, or scars  RESP: occasional scattered rales, lungs otherwise clear to auscultation - no rhonchi or wheezes  CV: regular rate and rhythm, normal S1 S2, no S3 or S4, no murmur, click or rub  MS: no gross musculoskeletal defects noted  SKIN: no suspicious lesions or rashes  NEURO: Normal strength and tone, mentation intact and speech normal  PSYCH: mentation appears normal, affect normal/bright    Xray - Reviewed and interpreted by me.  Appears clear of infiltrates on my read, possible nodules versus vascular bundles near left pulmonary hilum.      "

## 2024-07-19 LAB — SARS-COV-2 RNA RESP QL NAA+PROBE: POSITIVE

## 2024-07-25 ENCOUNTER — DOCUMENTATION ONLY (OUTPATIENT)
Dept: INTERNAL MEDICINE | Facility: CLINIC | Age: 74
End: 2024-07-25
Payer: COMMERCIAL

## 2024-07-25 NOTE — PROGRESS NOTES
Type of Form Received: [place] Statement of Certifying Physician    Form Received (Date) 7/24/24   Form Filled out Yes, faxed 7/31   Placed in provider folder Yes

## 2024-07-26 ENCOUNTER — TELEPHONE (OUTPATIENT)
Dept: ENDOCRINOLOGY | Facility: CLINIC | Age: 74
End: 2024-07-26
Payer: COMMERCIAL

## 2024-07-26 NOTE — TELEPHONE ENCOUNTER
MTM appointment canceled, we made one more attempt to reschedule.     Routing back to referring provider and MTM Pharmacist Team        Chloe Ferro  MTM

## 2024-07-26 NOTE — TELEPHONE ENCOUNTER
Will try to reach patient again in two weeks.    Theo Gong, PharmD, [facility]  Endocrine & Diabetes [facility] Pharmacist  [address] [place] [id number]

## 2024-08-17 DIAGNOSIS — N40.1 BENIGN NON-NODULAR PROSTATIC HYPERPLASIA WITH LOWER URINARY TRACT SYMPTOMS: ICD-10-CM

## 2024-08-19 DIAGNOSIS — E11.3411 SEVERE NONPROLIFERATIVE DIABETIC RETINOPATHY OF RIGHT EYE WITH MACULAR EDEMA ASSOCIATED WITH TYPE 2 DIABETES MELLITUS (H): Primary | ICD-10-CM

## 2024-08-21 RX ORDER — TAMSULOSIN HYDROCHLORIDE 0.4 MG/1
0.8 CAPSULE ORAL DAILY
Qty: 180 CAPSULE | Refills: 0 | Status: SHIPPED | OUTPATIENT
Start: 2024-08-21

## 2024-08-21 NOTE — TELEPHONE ENCOUNTER
Last Clinic Visit: 8/10/2023  [facility]  Due for follow-up   90 day kaycee refill sent to the pharmacy - including instructions for patient to call the clinic and schedule an appointment.

## 2024-08-22 ENCOUNTER — VIRTUAL VISIT (OUTPATIENT)
Dept: CARDIOLOGY | Facility: CLINIC | Age: 74
End: 2024-08-22
Attending: PHYSICIAN ASSISTANT
Payer: COMMERCIAL

## 2024-08-22 DIAGNOSIS — E11.3213 TYPE 2 DIABETES MELLITUS WITH BOTH EYES AFFECTED BY MILD NONPROLIFERATIVE RETINOPATHY AND MACULAR EDEMA, WITH LONG-TERM CURRENT USE OF INSULIN (H): Primary | ICD-10-CM

## 2024-08-22 DIAGNOSIS — Z79.4 TYPE 2 DIABETES MELLITUS WITH BOTH EYES AFFECTED BY MILD NONPROLIFERATIVE RETINOPATHY AND MACULAR EDEMA, WITH LONG-TERM CURRENT USE OF INSULIN (H): Primary | ICD-10-CM

## 2024-08-22 DIAGNOSIS — R12 HEARTBURN: ICD-10-CM

## 2024-08-22 NOTE — PROGRESS NOTES
Medication Therapy Management (MTM) Encounter    ASSESSMENT:                            Medication Adherence/Access: See below for considerations    Type 2 Diabetes: A1C above goal of < 7%.  Marked improvement in FPG and RPG since initiation of Mounjaro. Achieving FBG goal of less than 130 most days. Patient still experiencing side effects on Mounjaro 5 mg with little improvement. Will have him continue current plan for another month and evaluate appropriateness of increasing in future visits.     Additionally, patient experiencing issues with Lora malfunctioning after spending time in the pool, so would benefit from exploring coverage for Dexcom which is more reliable with frequent swimming.    Acid Reflux/Heartburn  Consider restarting on lower dose of Famotidine due to recurring acid reflux/heartburn symptoms. Historical dose is 20 mg twice daily, and with current kidney function the  recommends a dose of 20 mg daily.       PLAN:                            CONTINUE Mounjaro to 5 mg weekly    CONTINUE Tresiba and decrease by 2 units every 3 days if fasting sugars are < 100.     Let me know if you have any lows < 70    Pharmacist to check on insurance coverage for Dexcom    Restarting famotidine at renally adjusted dose of 20 mg daily     Endocrine Team & Next Follow-Up:  9/19/2024 with Theo  10/15/2024 with Karla Laura PA-C    SUBJECTIVE/OBJECTIVE:                          Earle Rene is a 74 year old male called for a follow-up visit. He was referred to me from Karla Laura PA-C.      Reason for visit: Medication Therapy Management (MTM).    Allergies/ADRs: Reviewed in chart  Past Medical History: Reviewed in chart  Social History     Tobacco Use    Smoking status: Never    Smokeless tobacco: Never   Substance Use Topics    Alcohol use: Yes     Comment: occasionaly    Drug use: No        Medication Adherence/Access: Adherence is reflected well in the dispense report.     Type 2 Diabetes:   Humalog  "Kwikpen 100 unit/ml soln - inject 15 units twice daily - no longer needing to take  Insulin degludec (Tresiba flextouch) 100 unit/ml pen - injects 40-50 units at bedtime  Jardiance 10 mg take once daily   Tirzepatide (Mounjaro) 5mg/0.5ml pen - inject 5mg weekly on Fridays  Free Style Lora 2 Sensor change every 14 days  Alpha-lipoic acid 600 mg once daily  Gabapentin 300 mg twice daily for neuropathic pain    Blood sugar monitoring: FPG average is 90-120s. No lows <70 reported.     Patient has been on Mounjaro 5 mg dose for about 4 months and continues to have some diarrhea, constipation, and constant gas. He tried Gas-X as needed to relieve symptoms but only had slight improvement. He finds the diarrhea to be bothersome and occurs 2 days per week. Despite the side effects, he is willing to continue using Mounjaro as he sees the benefits with it controlling his blood sugar levels and also reports having lost significant weight (16-20 lbs). His Lora 2 sensor stopped functioning after going to a pool a few weeks ago. He currently pays $28 for a monthly supply of Lora 2 sensor, but would consider other options if insurance will cover at lower cost.      Urine Albumin:   Lab Results   Component Value Date    UMALCR 1,495.15 (H) 09/13/2023    UMALCR 1,264.01 (H) 01/09/2020    UMALCR 1,331.90 (H) 12/18/2019      A1C 7.7% on 7/9/2024  A1C 8.8 % on 4/16/2024   A1C 10.3 % on 1/2/2024.     Lab Results   Component Value Date    GFRESTIMATED 36 (L) 05/29/2024    GFRESTIMATED 32 (L) 03/27/2024    GFRESTIMATED 38 (L) 01/03/2024     BP Readings from Last 1 Encounters:   07/17/24 100/65     Pulse Readings from Last 1 Encounters:   07/17/24 92     Wt Readings from Last 1 Encounters:   07/17/24 84.8 kg (187 lb)     Ht Readings from Last 1 Encounters:   07/17/24 1.74 m (5' 8.5\")     Estimated body mass index is 28.17 kg/m  as calculated from the following:    Height as of 7/17/24: 1.74 m (5' 8.5\").    Weight as of 7/17/24: 85.3 kg " (188 lb).      Temp Readings from Last 1 Encounters:   07/17/24 98.7  F (37.1  C) (Tympanic)     Acid Reflux/Heartburn  Patient reports taking famotidine 20 mg twice daily a long time ago to manage his acid reflux/heartburn but stopped taking per nephrology team recommendation. Unclear when he stopped taking it, but he wants to be on it again due to reoccurring heartburn symptoms.      ----------------      I spent 20 minutes with this patient today. Karla Laura PA-C was provided the recommendations above via routed note and is the authorizing prescriber for this visit through the pharmacist collaborative practice agreement. A copy of the visit note was provided to the patient's provider(s).    The patient was given to the patient a summary of these recommendations.     Theo Gong, PharmD, [facility]  Endocrine & Diabetes [facility] Pharmacist  [address] [place] [id number]    Lisa Francis, Pharmacy Student Year 4    Telemedicine Visit Details  Type of service:  Telephone visit  Start Time: 11:00 AM  End Time: 11:20 AM  Originating Location (pt. Location): Home  Provider has received verbal consent for a visit from the patient? Yes     Medication Therapy Recommendations  No medication therapy recommendations to display

## 2024-08-22 NOTE — Clinical Note
8/22/2024      RE: Earle Rene  [address]  [place] [address]       Dear Colleague,    Thank you for the opportunity to participate in the care of your patient, Earle Rene, at the [facility] [place] [facility]. Please see a copy of my visit note below.    Medication Therapy Management (MTM) Encounter    ASSESSMENT:                            Medication Adherence/Access: See below for considerations    Type 2 Diabetes: A1C above goal of < 7%.  Marked improvement in FPG and RPG since initiation of Mounjaro. Achieving FBG goal of less than 130 most days. Patient still experiencing side effects on Mounjaro 5 mg with little improvement. Will have him continue current plan for another month and evaluate appropriateness of increasing in future visits.     Acid Reflux/Heartburn  Consider restarting on lower dose of Famotidine due to recurring acid reflux/heartburn symptoms.        PLAN:                            CONTINUE Mounjaro to 5 mg weekly    CONTINUE Tresiba and decrease by 2 units every 3 days if fasting sugars are < 100.     Let me know if you have any lows < 70    Pharmacist to check on insurance coverage for Dexcom and will reach out to nephology team about restarting Famotidine.     Endocrine Team & Next Follow-Up:  7/25/2024 with Theo  7/9/2024 with Karla Laura PA-C    SUBJECTIVE/OBJECTIVE:                          Earle Rene is a 74 year old male called for a follow-up visit. He was referred to me from Karla Laura PA-C.      Reason for visit: Medication Therapy Management (MTM).    Allergies/ADRs: Reviewed in chart  Past Medical History: Reviewed in chart  Social History     Tobacco Use    Smoking status: Never    Smokeless tobacco: Never   Substance Use Topics    Alcohol use: Yes     Comment: occasionaly    Drug use: No        Medication Adherence/Access: Adherence is reflected well in the dispense report.     Type 2  "Diabetes:    - not taking  Insulin degludec (Tresiba flextough) 100 unit/ml pen - currently injects 40-50 units at bedtime  Jardiance 10 mg take once daily   Tirzepatide (Mounjaro) 5mg/0.5ml pen - inject 5mg weekly on Fridays  Free Style Lora 2 Sensor change every 14 days  Alpha-lipoic acid 600 mg once daily  Gabapentin 300 mg twice daily for neuropathic pain    Blood sugar monitoring: FPG average is 90-120s. No lows <70 reported.     Patient has been on Mounjaro 5 mg dose for about 4 months and continues to have some diarrhea, constipation, and constant gas. He tried Gas-X as needed to relieve symptoms but only had slight improvement. He finds the diarrhea to be bothersome as it occurs 2 days per week. Despite the side effects, he is willing to continue because he sees the benefit of using Mounjaro for controlling his blood sugars and has also lost significant weight (16-20 lbs). Reports Lora 2 sensor stopped functioning after going to a pool a few weeks ago. He currently pays $28 a month for 2 sensors of Lora, but would consider other options if insurance will cover at lower cost.      Urine Albumin:   Lab Results   Component Value Date    UMALCR 1,495.15 (H) 09/13/2023    UMALCR 1,264.01 (H) 01/09/2020    UMALCR 1,331.90 (H) 12/18/2019      A1C 7.7% on 7/9/2024  A1C 8.8 % on 4/16/2024   A1C 10.3 % on 1/2/2024.     Lab Results   Component Value Date    GFRESTIMATED 36 (L) 05/29/2024    GFRESTIMATED 32 (L) 03/27/2024    GFRESTIMATED 38 (L) 01/03/2024     BP Readings from Last 1 Encounters:   07/17/24 100/65     Pulse Readings from Last 1 Encounters:   07/17/24 92     Wt Readings from Last 1 Encounters:   07/17/24 84.8 kg (187 lb)     Ht Readings from Last 1 Encounters:   07/17/24 1.74 m (5' 8.5\")     Estimated body mass index is 28.17 kg/m  as calculated from the following:    Height as of 7/17/24: 1.74 m (5' 8.5\").    Weight as of 7/17/24: 85.3 kg (188 lb).      Temp Readings from Last 1 Encounters:   07/17/24 " 98.7  F (37.1  C) (Tympanic)     Acid Reflux/Heartburn  Patient reports he used to take famotidine to manage his acid reflux/heartburn but stopped taking per nephrology team recommendation. Unclear when he stopped but he wants to be on it again due to reoccurring heartburn symptoms.      ----------------      I spent 20 minutes with this patient today. Karla Laura PA-C was provided the recommendations above via routed note and is the authorizing prescriber for this visit through the pharmacist collaborative practice agreement. A copy of the visit note was provided to the patient's provider(s).    The patient was given to the patient a summary of these recommendations.     Theo Gong, PharmD, [facility] [place]  Endocrine & Diabetes [facility] Pharmacist  [address] [place] [id number]  Direct Voicemail: [phone]    Lisa Francis, Pharmacy Student Year 4    Telemedicine Visit Details  Type of service:  Telephone visit  Start Time: 11L00 AM  End Time: 11:20 AM  Originating Location (pt. Location): Home  Provider has received verbal consent for a visit from the patient? Yes     Medication Therapy Recommendations  No medication therapy recommendations to display              Please do not hesitate to contact me if you have any questions/concerns.     Sincerely,     Theo Gong RPH

## 2024-08-23 RX ORDER — FAMOTIDINE 20 MG/1
20 TABLET, FILM COATED ORAL DAILY
Qty: 30 TABLET | Refills: 5 | Status: SHIPPED | OUTPATIENT
Start: 2024-08-23

## 2024-09-01 DIAGNOSIS — E11.3311 TYPE 2 DIABETES MELLITUS WITH MODERATE NONPROLIFERATIVE RETINOPATHY OF RIGHT EYE AND MACULAR EDEMA, UNSPECIFIED WHETHER LONG TERM INSULIN USE (H): ICD-10-CM

## 2024-09-03 NOTE — TELEPHONE ENCOUNTER
"[facility]        Patient is here for a consult to discuss LLE edema with DVT.    Pt is currently taking Statin and Warfarin.    /82 (BP Location: Left arm, Patient Position: Sitting, Cuff Size: Adult Regular)   Pulse 110   Ht 6' 6\" (1.981 m)   Wt 260 lb 6.4 oz (118.1 kg)   SpO2 95%   BMI 30.09 kg/m      The provider has been notified that the patient has no concerns.     Questions patient would like addressed today are: N/A.    Refills are needed: N/A    Has homecare services and agency name:  Roxanne Garcia MA            " ----- Message from Lorena Bravo RN sent at 5/31/2022  7:35 AM CDT -----  Regarding: please reschedule per Pamela Laura    ----- Message -----  From: Pamela Laura PA-C  Sent: 5/30/2022   6:30 PM CDT  To: [facility]    Could you please check with patient to see if he is able to see me on Tuesday 6/28 in clinic- I have openings.  He is now scheduled with me in clinic on 6/9, but I have 18 patients scheduled that day.  Thanks   Karla

## 2024-09-04 ENCOUNTER — OFFICE VISIT (OUTPATIENT)
Dept: OPHTHALMOLOGY | Facility: CLINIC | Age: 74
End: 2024-09-04
Attending: OPHTHALMOLOGY
Payer: COMMERCIAL

## 2024-09-04 DIAGNOSIS — H04.123 DRY EYE SYNDROME OF BOTH EYES: ICD-10-CM

## 2024-09-04 DIAGNOSIS — E11.3213 TYPE 2 DIABETES MELLITUS WITH BOTH EYES AFFECTED BY MILD NONPROLIFERATIVE RETINOPATHY AND MACULAR EDEMA, WITH LONG-TERM CURRENT USE OF INSULIN (H): ICD-10-CM

## 2024-09-04 DIAGNOSIS — Z79.4 TYPE 2 DIABETES MELLITUS WITH BOTH EYES AFFECTED BY MILD NONPROLIFERATIVE RETINOPATHY AND MACULAR EDEMA, WITH LONG-TERM CURRENT USE OF INSULIN (H): ICD-10-CM

## 2024-09-04 DIAGNOSIS — E11.3411 SEVERE NONPROLIFERATIVE DIABETIC RETINOPATHY OF RIGHT EYE WITH MACULAR EDEMA ASSOCIATED WITH TYPE 2 DIABETES MELLITUS (H): Primary | ICD-10-CM

## 2024-09-04 DIAGNOSIS — Z96.1 PSEUDOPHAKIA OF BOTH EYES: ICD-10-CM

## 2024-09-04 PROCEDURE — G0463 HOSPITAL OUTPT CLINIC VISIT: HCPCS | Performed by: OPHTHALMOLOGY

## 2024-09-04 PROCEDURE — 92134 CPTRZ OPH DX IMG PST SGM RTA: CPT | Performed by: OPHTHALMOLOGY

## 2024-09-04 PROCEDURE — 99214 OFFICE O/P EST MOD 30 MIN: CPT | Performed by: OPHTHALMOLOGY

## 2024-09-04 ASSESSMENT — SLIT LAMP EXAM - LIDS
COMMENTS: BLEPHARITIS, SCURF, DERMATOCHALASIS
COMMENTS: BLEPHARITIS, SCURF, DERMATOCHALASIS

## 2024-09-04 ASSESSMENT — TONOMETRY
OS_IOP_MMHG: 12
OS_IOP_MMHG: 14
IOP_METHOD: TONOPEN
OD_IOP_MMHG: 24
OD_IOP_MMHG: 21
IOP_METHOD: TONOPEN

## 2024-09-04 ASSESSMENT — CUP TO DISC RATIO
OS_RATIO: 0.3
OD_RATIO: 0.3

## 2024-09-04 ASSESSMENT — CONF VISUAL FIELD
OS_SUPERIOR_TEMPORAL_RESTRICTION: 0
OD_NORMAL: 1
OS_SUPERIOR_NASAL_RESTRICTION: 0
OD_SUPERIOR_TEMPORAL_RESTRICTION: 0
OS_INFERIOR_NASAL_RESTRICTION: 0
OD_SUPERIOR_NASAL_RESTRICTION: 0
OD_INFERIOR_TEMPORAL_RESTRICTION: 0
OD_INFERIOR_NASAL_RESTRICTION: 0
OS_INFERIOR_TEMPORAL_RESTRICTION: 0
METHOD: COUNTING FINGERS
OS_NORMAL: 1

## 2024-09-04 ASSESSMENT — VISUAL ACUITY
OD_SC: 20/25
METHOD: SNELLEN - LINEAR
OS_PH_SC: 20/50+2
OD_SC+: +3
OS_SC: 20/60+1

## 2024-09-04 ASSESSMENT — EXTERNAL EXAM - LEFT EYE: OS_EXAM: NORMAL

## 2024-09-04 ASSESSMENT — EXTERNAL EXAM - RIGHT EYE: OD_EXAM: NORMAL

## 2024-09-04 NOTE — PROGRESS NOTES
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 Vision seems more blurry. Previous patient of Dr. Rodriguez, last seen 03/2023.    HPI:  s/p CEIOL left eye on 6/21/19. Has been having blurred vision since CEIOL. Reports that he had significant periorbital pain after the operation and has since had significant visual disturbance, blurred vision, distorted vision, diplopia, and eye pain. He is very concerned about his vision and would not like any more injections in his left eye at this time.     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 Patient advised that failure to adequately control each may lead to vision loss. The patient expressed understanding.   - FAF in 2019 extensive ischemia left eye and s/p Panretinal laser photocoagulation (PRP)    - ME was refractory to avastin so switched to Eylea and ME improved. Had PRP OS 01/2020 and 07/2021 OS    - Stable to improved macular edema today; VA suboptimal likely due to previous ischemic damage    # Diabetic macular edema left eye   - Worsened after  CEIOL on 6/21/19   - OCT with improvement compared to previous : observe    # Pseudophakia ou    # Moderate NPDR and Diabetic macular edema right eye   - S/P PRP and injections (last inj  2018)   - PRP os 12/2019   - No edema on OCT today      Return to clinic 12 months for VTD, OCT mac    Complete documentation of historical and exam elements from today's encounter can be found in the full encounter summary report (not reduplicated in this progress note). I personally obtained the chief complaint(s) and history of present illness.  I confirmed and edited as necessary the review of systems, past medical/surgical history,  family history, social history, and examination findings as documented by others; and I examined the patient myself. I personally reviewed the relevant tests, images, and reports as documented above. I formulated and edited as necessary the assessment and plan and discussed the findings and management plan with the patient and family.    Jaden Johnson MD, PhD

## 2024-09-04 NOTE — NURSING NOTE
Chief Complaints and History of Present Illnesses   Patient presents with    Diabetic Retinopathy Follow Up     Chief Complaint(s) and History of Present Illness(es)       Diabetic Retinopathy Follow Up              Laterality: both eyes              Comments    Patient states no blurry or fuzzy vision overall since last visit. Occasional dryness both eyes, will put in tear drop which improves the dryness. Some discharge or tearing coming out of eyes at times. No floaters or flashes. No ocular pain.     Lab Results       Component                Value               Date                       A1C                      7.7                 07/09/2024                 A1C                      8.8                 04/16/2024                 A1C                      10.3                01/03/2024                 A1C                      8.3                 11/02/2021                 A1C                      8.2                 06/09/2021                 A1C                      9.2                 06/18/2019                 A1C                      9.1                 03/06/2018                 A1C                      10.2                06/06/2017                 A1C                      9.0                 03/16/2016              Leigh Ann Stern on 9/4/2024 at 3:29 PM

## 2024-09-05 RX ORDER — TIRZEPATIDE 5 MG/.5ML
5 INJECTION, SOLUTION SUBCUTANEOUS
Qty: 2 ML | Refills: 5 | Status: SHIPPED | OUTPATIENT
Start: 2024-09-05 | End: 2024-10-07

## 2024-09-05 NOTE — TELEPHONE ENCOUNTER
MOUNJARO 5 MG/0.5 ML PEN      Last Written Prescription Date:  5/14/24  Last Fill Quantity: 2 ml ,   # refills: 3  Last Office Visit : 7/9/24 Continue Mounjaro 5 mg subcutaneous once a week.   Future Office visit:  10/15/24    Routing refill request to provider for review/approval because:   ABN( and stable over 5 years)  GFR( 36) on 5/29/24.

## 2024-09-14 DIAGNOSIS — E11.65 TYPE 2 DIABETES MELLITUS WITH HYPERGLYCEMIA, WITH LONG-TERM CURRENT USE OF INSULIN (H): ICD-10-CM

## 2024-09-14 DIAGNOSIS — E11.3213 TYPE 2 DIABETES MELLITUS WITH BOTH EYES AFFECTED BY MILD NONPROLIFERATIVE RETINOPATHY AND MACULAR EDEMA, WITH LONG-TERM CURRENT USE OF INSULIN (H): ICD-10-CM

## 2024-09-14 DIAGNOSIS — Z79.4 TYPE 2 DIABETES MELLITUS WITH HYPERGLYCEMIA, WITH LONG-TERM CURRENT USE OF INSULIN (H): ICD-10-CM

## 2024-09-14 DIAGNOSIS — Z79.4 TYPE 2 DIABETES MELLITUS WITH BOTH EYES AFFECTED BY MILD NONPROLIFERATIVE RETINOPATHY AND MACULAR EDEMA, WITH LONG-TERM CURRENT USE OF INSULIN (H): ICD-10-CM

## 2024-09-14 RX ORDER — EMPAGLIFLOZIN 10 MG/1
10 TABLET, FILM COATED ORAL DAILY
Qty: 90 TABLET | Refills: 2 | Status: SHIPPED | OUTPATIENT
Start: 2024-09-14

## 2024-09-14 NOTE — TELEPHONE ENCOUNTER
empagliflozin (JARDIANCE) 10 MG TABS tablet   Disp-90 tablet, R-1   Start: 05/02/2024     insulin degludec (TRESIBA FLEXTOUCH) 100 UNIT/ML pen   , Disp-60 mL, R-1,   Start: 07/10/2024     7/9/2024  [facility] [place]    Pamela Laura PA-C  Endocrinology, Diabetes, and Metabolism    Routed because: endo triage to fill

## 2024-09-16 RX ORDER — INSULIN DEGLUDEC 100 U/ML
INJECTION, SOLUTION SUBCUTANEOUS
Qty: 45 ML | Refills: 3 | Status: SHIPPED | OUTPATIENT
Start: 2024-09-16 | End: 2024-10-07

## 2024-09-20 ENCOUNTER — TELEPHONE (OUTPATIENT)
Dept: ENDOCRINOLOGY | Facility: CLINIC | Age: 74
End: 2024-09-20
Payer: COMMERCIAL

## 2024-09-20 NOTE — TELEPHONE ENCOUNTER
MTM appointment no showed, we made one more attempt to reschedule. Patient was not available to reschedule at the moment, but will call back to reschedule his appt.     Routing back to referring provider and MTM Pharmacist Team        Chloe Ferro  MTM

## 2024-09-20 NOTE — CONFIDENTIAL NOTE
Will try to reach patient again in 2 weeks    Theo Gong, PharmD, [facility]  Endocrine & Diabetes [facility] Pharmacist  [address] [place] [phone]

## 2024-09-20 NOTE — TELEPHONE ENCOUNTER
Will try to reach patient again in 2 weeks    Theo Gong, PharmD, [facility]  Endocrine & Diabetes [facility] Pharmacist  [address] [place] [id number]

## 2024-10-07 ENCOUNTER — VIRTUAL VISIT (OUTPATIENT)
Dept: PHARMACY | Facility: CLINIC | Age: 74
End: 2024-10-07
Attending: PHYSICIAN ASSISTANT
Payer: COMMERCIAL

## 2024-10-07 DIAGNOSIS — Z79.4 TYPE 2 DIABETES MELLITUS WITH MODERATE NONPROLIFERATIVE RETINOPATHY WITHOUT MACULAR EDEMA, WITH LONG-TERM CURRENT USE OF INSULIN, UNSPECIFIED LATERALITY (H): Primary | ICD-10-CM

## 2024-10-07 DIAGNOSIS — E11.3399 TYPE 2 DIABETES MELLITUS WITH MODERATE NONPROLIFERATIVE RETINOPATHY WITHOUT MACULAR EDEMA, WITH LONG-TERM CURRENT USE OF INSULIN, UNSPECIFIED LATERALITY (H): Primary | ICD-10-CM

## 2024-10-07 RX ORDER — FLURBIPROFEN SODIUM 0.3 MG/ML
SOLUTION/ DROPS OPHTHALMIC
Qty: 360 EACH | Refills: 3 | Status: SHIPPED | OUTPATIENT
Start: 2024-10-07

## 2024-10-07 RX ORDER — INSULIN DEGLUDEC 100 U/ML
INJECTION, SOLUTION SUBCUTANEOUS
Qty: 45 ML | Refills: 3 | Status: SHIPPED | OUTPATIENT
Start: 2024-10-07

## 2024-10-07 NOTE — PROGRESS NOTES
Medication Therapy Management (MTM) Encounter    ASSESSMENT:                            Medication Adherence/Access: See below for considerations    Type 2 Diabetes: A1C above goal of < 7%.  Marked improvement in FPG and RPG since initiation of Mounjaro. Achieving FBG goal of less than 130 most days. Patient still experiencing side effects on Mounjaro 5 mg with little improvement. However, he would prefer to continue his current dose as opposed to trialing 2.5 mg again as he feels the glycemic benefit outweighs the side effects he is experiencing.    PLAN:                            CONTINUE Mounjaro to 5 mg weekly    CONTINUE Tresiba and decrease by 2 units every 3 days if fasting sugars are < 100.     Let me know if you have any lows < 70    Endocrine Team & Next Follow-Up:  As needed with Theo  1/14/2025 with Karla Laura PA-C    SUBJECTIVE/OBJECTIVE:                          Earle Rnee is a 74 year old male called for a follow-up visit. He was referred to me from Karla Laura PA-C.      Reason for visit: Medication Therapy Management (MTM).    Allergies/ADRs: Reviewed in chart  Past Medical History: Reviewed in chart  Social History     Tobacco Use    Smoking status: Never    Smokeless tobacco: Never   Substance Use Topics    Alcohol use: Yes     Comment: occasionaly    Drug use: No        Medication Adherence/Access: Adherence is reflected well in the dispense report.     Type 2 Diabetes:   Humalog Kwikpen 100 unit/ml soln - inject 15 units twice daily - no longer needing to take  Insulin degludec (Tresiba flextouch) 100 unit/ml pen - injects 50 units at bedtime  Jardiance 10 mg take once daily   Tirzepatide (Mounjaro) 5mg/0.5ml pen - inject 5mg weekly on Fridays  Free Style Lora 2 Sensor change every 14 days  Alpha-lipoic acid 600 mg once daily  Gabapentin 300 mg twice daily for neuropathic pain    Blood sugar monitoring: FPG average is 90-120s. No lows <70 reported.     Patient has been on Mounjaro 5 mg  "dose for about 4 months and continues to have some diarrhea, constipation, and constant gas. He tried Gas-X as needed to relieve symptoms but only had slight improvement. He finds the diarrhea to be bothersome and occurs 2 days per week. Despite the side effects, he is willing to continue using Mounjaro as he sees the benefits with it controlling his blood sugar levels and also reports having lost significant weight (16-20 lbs).     Urine Albumin:   Lab Results   Component Value Date    UMALCR 1,495.15 (H) 09/13/2023    UMALCR 1,264.01 (H) 01/09/2020    UMALCR 1,331.90 (H) 12/18/2019      A1C 7.7% on 7/9/2024  A1C 8.8 % on 4/16/2024   A1C 10.3 % on 1/2/2024.     Lab Results   Component Value Date    GFRESTIMATED 36 (L) 05/29/2024    GFRESTIMATED 32 (L) 03/27/2024    GFRESTIMATED 38 (L) 01/03/2024     BP Readings from Last 1 Encounters:   07/17/24 100/65     Pulse Readings from Last 1 Encounters:   07/17/24 92     Wt Readings from Last 1 Encounters:   07/17/24 187 lb (84.8 kg)     Ht Readings from Last 1 Encounters:   07/17/24 5' 8.5\" (1.74 m)     Estimated body mass index is 28.17 kg/m  as calculated from the following:    Height as of 7/17/24: 5' 8.5\" (1.74 m).    Weight as of 7/17/24: 188 lb (85.3 kg).      Temp Readings from Last 1 Encounters:   07/17/24 98.7  F (37.1  C) (Tympanic)         ----------------      I spent 20 minutes with this patient today. Karla Laura PA-C was provided the recommendations above via routed note and is the authorizing prescriber for this visit through the pharmacist collaborative practice agreement. A copy of the visit note was provided to the patient's provider(s).    The patient was given to the patient a summary of these recommendations.     Theo Gong, PharmD, [facility]  Endocrine & Diabetes [facility] Pharmacist  [address] [place], [place] [id number]    Telemedicine Visit Details  Type of service:  Telephone visit  Start Time: 11:00 AM  End Time: 11:20 AM  Originating Location " (pt. Location): Home  Provider has received verbal consent for a visit from the patient? Yes     Medication Therapy Recommendations  No medication therapy recommendations to display

## 2024-10-09 ENCOUNTER — OFFICE VISIT (OUTPATIENT)
Dept: URGENT CARE | Facility: URGENT CARE | Age: 74
End: 2024-10-09
Payer: COMMERCIAL

## 2024-10-09 VITALS
HEIGHT: 69 IN | DIASTOLIC BLOOD PRESSURE: 70 MMHG | TEMPERATURE: 98.1 F | OXYGEN SATURATION: 97 % | RESPIRATION RATE: 19 BRPM | SYSTOLIC BLOOD PRESSURE: 121 MMHG | BODY MASS INDEX: 27.4 KG/M2 | WEIGHT: 185 LBS | HEART RATE: 75 BPM

## 2024-10-09 DIAGNOSIS — H60.392 OTHER INFECTIVE ACUTE OTITIS EXTERNA OF LEFT EAR: Primary | ICD-10-CM

## 2024-10-09 DIAGNOSIS — H66.003 NON-RECURRENT ACUTE SUPPURATIVE OTITIS MEDIA OF BOTH EARS WITHOUT SPONTANEOUS RUPTURE OF TYMPANIC MEMBRANES: ICD-10-CM

## 2024-10-09 PROCEDURE — 99213 OFFICE O/P EST LOW 20 MIN: CPT | Performed by: NURSE PRACTITIONER

## 2024-10-09 RX ORDER — CIPROFLOXACIN AND DEXAMETHASONE 3; 1 MG/ML; MG/ML
4 SUSPENSION/ DROPS AURICULAR (OTIC) 2 TIMES DAILY
Qty: 7.5 ML | Refills: 0 | Status: SHIPPED | OUTPATIENT
Start: 2024-10-09 | End: 2024-10-13 | Stop reason: ALTCHOICE

## 2024-10-09 RX ORDER — AMOXICILLIN 500 MG/1
500 CAPSULE ORAL 2 TIMES DAILY
Qty: 14 CAPSULE | Refills: 0 | Status: SHIPPED | OUTPATIENT
Start: 2024-10-09 | End: 2024-10-13 | Stop reason: ALTCHOICE

## 2024-10-09 NOTE — PROGRESS NOTES
Chief Complaint   Patient presents with    Otalgia     Left ear pain x3 weeks, continuing to worsen      SUBJECTIVE:  Earle Rene is a 74 year old male presenting with left ear pain swelling tenderness over the last 3 weeks.  Also having dental jaw pain and lymphadenopathy for weeks.  Declines fever sweats chills trismus discharge.  He is unable to put his hearing aid in. Last GFR 36.    Estimated Creatinine Clearance: 35.2 mL/min (A) (based on SCr of 1.93 mg/dL (H)).    Past Medical History:   Diagnosis Date    Blepharitis of both eyes     BPH (benign prostatic hyperplasia)     Diabetes (H)     Diabetic neuropathy (H)     Diabetic retinopathy associated with diabetes mellitus due to underlying condition (H)     Dry eye syndrome     GERD (gastroesophageal reflux disease)     Goiter     Granulomatous disease (H)     HLD (hyperlipidemia)     HTN (hypertension)     Nonsenile cataract     Peripheral neuropathy      Current Outpatient Medications   Medication Sig Dispense Refill    alpha-lipoic acid 600 MG capsule Take 1 capsule (600 mg) by mouth daily 90 capsule 3    amoxicillin (AMOXIL) 500 MG capsule Take 1 capsule (500 mg) by mouth 2 times daily for 7 days. 14 capsule 0    amoxicillin (AMOXIL) 500 MG capsule       ARTIFICIAL TEAR OP Apply to eye as needed      atorvastatin (LIPITOR) 20 MG tablet Take 1 tablet (20 mg) by mouth daily 90 tablet 0    blood glucose (NO BRAND SPECIFIED) lancets standard Lancets that go with device, Test 3 times daily 300 each 3    ciprofloxacin-dexAMETHasone (CIPRODEX) 0.3-0.1 % otic suspension Place 4 drops Into the left ear 2 times daily for 7 days. 7.5 mL 0    Continuous Glucose Sensor (FREESTYLE PETER 2 SENSOR) MISC 1 each every 14 days. 1 each every 14 days. Change every 14 days. 2 each 5    empagliflozin (JARDIANCE) 10 MG TABS tablet TAKE 1 TABLET (10 MG) BY MOUTH DAILY. 90 tablet 2    famotidine (PEPCID) 20 MG tablet Take 1 tablet (20 mg) by mouth daily. 30 tablet 5    fenofibrate  "54 MG tablet Take 1 tablet (54 mg) by mouth daily (Patient taking differently: Take 54 mg by mouth at bedtime.) 90 tablet 0    finasteride (PROSCAR) 5 MG tablet Take 1 tablet (5 mg) by mouth daily 90 tablet 3    gabapentin (NEURONTIN) 300 MG capsule Take 1 capsule (300 mg) by mouth 2 times daily 60 capsule 3    insulin degludec (TRESIBA FLEXTOUCH) 100 UNIT/ML pen INJECT 50 UNITS SUBCUTANEOUSLY AT BEDTIME. 45 mL 3    insulin pen needle (B-D U/F) 31G X 5 MM miscellaneous Use 4 time(s) per day.  Please dispense as BD Pen Needle Mini U/F 31G x 5  each 3    loratadine (CLARITIN) 10 MG tablet Take 1 tablet (10 mg) by mouth daily 90 tablet 0    losartan (COZAAR) 100 MG tablet Take 1 tablet (100 mg) by mouth at bedtime 90 tablet 3    mirabegron (MYRBETRIQ) 25 MG 24 hr tablet TAKE 1 TABLET BY MOUTH EVERY DAY 90 tablet 1    sodium bicarbonate 650 MG tablet Take 2 tablets (1,300 mg) by mouth 2 times daily 270 tablet 3    tamsulosin (FLOMAX) 0.4 MG capsule Take 2 capsules (0.8 mg) by mouth daily. For more refills,schedule an appointment at [phone] 180 capsule 0    tirzepatide (MOUNJARO) 5 MG/0.5ML pen Inject 5 mg subcutaneously every 7 days. 2 mL 5    triamcinolone (KENALOG) 0.1 % external cream Apply topically 2 times daily 30 g 0    VITAMIN D3 25 MCG (1000 UT) tablet TAKE 2 TABLETS (50 MCG) BY MOUTH DAILY 180 tablet 3     No current facility-administered medications for this visit.     Social History     Tobacco Use    Smoking status: Never    Smokeless tobacco: Never   Substance Use Topics    Alcohol use: Yes     Comment: occasionaly     No Known Allergies    Review of Systems  All systems negative except for those listed above in HPI.    OBJECTIVE:   /70   Pulse 75   Temp 98.1  F (36.7  C) (Temporal)   Resp 19   Ht 1.74 m (5' 8.5\")   Wt 83.9 kg (185 lb)   SpO2 97%   BMI 27.72 kg/m      Physical Exam  Vitals reviewed.   Constitutional:       General: He is not in acute distress.     Appearance: Normal " appearance. He is not ill-appearing, toxic-appearing or diaphoretic.   HENT:      Head: Normocephalic and atraumatic.      Ears:      Comments: Left external auditory canal with erythema edema tragus and canal tenderness.  TM is not visualized beyond.  Right TM erythematous and bulging.     Nose: Nose normal.      Mouth/Throat:      Mouth: Mucous membranes are moist.      Pharynx: Oropharynx is clear.   Eyes:      Extraocular Movements: Extraocular movements intact.      Conjunctiva/sclera: Conjunctivae normal.      Pupils: Pupils are equal, round, and reactive to light.   Cardiovascular:      Rate and Rhythm: Normal rate.      Pulses: Normal pulses.   Pulmonary:      Effort: Pulmonary effort is normal.   Musculoskeletal:         General: Normal range of motion.      Cervical back: Normal range of motion and neck supple.   Skin:     General: Skin is warm and dry.      Findings: No rash.   Neurological:      General: No focal deficit present.      Mental Status: He is alert and oriented to person, place, and time.   Psychiatric:         Mood and Affect: Mood normal.         Behavior: Behavior normal.       ASSESSMENT:    ICD-10-CM    1. Other infective acute otitis externa of left ear  H60.392 ciprofloxacin-dexAMETHasone (CIPRODEX) 0.3-0.1 % otic suspension      2. Non-recurrent acute suppurative otitis media of both ears without spontaneous rupture of tympanic membranes  H66.003 amoxicillin (AMOXIL) 500 MG capsule        PLAN:     Ciprodex drop left ear 3-4 twice daily for 7 days  Amox for otitis media inner ear involvement, renally dosed  No Qtips in ear canal. You may clean drainage from external ear with Qtip.  Keep water out of ear until the infection is cleared.  Avoid submerging head in bath.  Improvement in 2-3 days, reevaluation if worsening    Follow up with primary care provider with any problems, questions or concerns or if symptoms worsen or fail to improve. Patient agreed to plan and verbalized  understanding.    Alea Torres, FNP-[facility]

## 2024-10-09 NOTE — PATIENT INSTRUCTIONS
Ciprodex drop left ear 3-4 twice daily for 7 days  Amox for otitis media inner ear involvement  No Qtips in ear canal. You may clean drainage from external ear with Qtip.  Keep water out of ear until the infection is cleared.  Avoid submerging head in bath.  Improvement in 2-3 days, reevaluation if worsening

## 2024-10-13 ENCOUNTER — OFFICE VISIT (OUTPATIENT)
Dept: URGENT CARE | Facility: URGENT CARE | Age: 74
End: 2024-10-13
Payer: COMMERCIAL

## 2024-10-13 VITALS
WEIGHT: 185 LBS | BODY MASS INDEX: 27.4 KG/M2 | SYSTOLIC BLOOD PRESSURE: 118 MMHG | HEART RATE: 81 BPM | TEMPERATURE: 97.2 F | HEIGHT: 69 IN | OXYGEN SATURATION: 100 % | RESPIRATION RATE: 18 BRPM | DIASTOLIC BLOOD PRESSURE: 84 MMHG

## 2024-10-13 DIAGNOSIS — H60.312 ACUTE DIFFUSE OTITIS EXTERNA OF LEFT EAR: Primary | ICD-10-CM

## 2024-10-13 PROCEDURE — 99213 OFFICE O/P EST LOW 20 MIN: CPT | Performed by: INTERNAL MEDICINE

## 2024-10-13 RX ORDER — PREDNISONE 20 MG/1
40 TABLET ORAL DAILY
Qty: 10 TABLET | Refills: 0 | Status: SHIPPED | OUTPATIENT
Start: 2024-10-13 | End: 2024-10-18

## 2024-10-13 NOTE — PROGRESS NOTES
"SUBJECTIVE:  Chief complaint of ongoing ear pain.  The pain has been present for four weeks total.  Was seen last week and prescribed ciprodex and amoxicillin.  The patient states that his symptoms are not better.      OBJECTIVE:  /84   Pulse 81   Temp 97.2  F (36.2  C) (Temporal)   Resp 18   Ht 1.74 m (5' 8.5\")   Wt 83.9 kg (185 lb)   SpO2 100%   BMI 27.72 kg/m    GENERAL: alert and oriented  HENT: examination of the left external canal shows a wax occlusion, perhaps not total; significant tragal tenderness  NECK: no adenopathy, no asymmetry, masses, or scars and thyroid normal to palpation    Attempted to clean the ear canal using curette, alligator forceps and warm water flush.  The curettage was modestly successful due to crumbly nature of the ceruminous debris in the canal.  The warm water flush was poorly tolerated.  Following these attempts, the left ear was re-examined and superficial debris had been removed allowing visualization of a portion of the tympanic membrane but significant edema and erythema of the external canal was present.    ASSESSMENT/PLAN:    ICD-10-CM    1. Acute diffuse otitis externa of left ear  H60.312 amoxicillin-clavulanate (AUGMENTIN) 875-125 MG tablet     predniSONE (DELTASONE) 20 MG tablet     Adult ENT  Referral        Failing standard therapy    Madhu Parra MD   "

## 2024-10-14 ENCOUNTER — TRANSFERRED RECORDS (OUTPATIENT)
Dept: HEALTH INFORMATION MANAGEMENT | Facility: CLINIC | Age: 74
End: 2024-10-14
Payer: COMMERCIAL

## 2024-10-28 ENCOUNTER — TRANSFERRED RECORDS (OUTPATIENT)
Dept: HEALTH INFORMATION MANAGEMENT | Facility: CLINIC | Age: 74
End: 2024-10-28
Payer: COMMERCIAL

## 2024-10-29 ENCOUNTER — TELEPHONE (OUTPATIENT)
Dept: INTERNAL MEDICINE | Facility: CLINIC | Age: 74
End: 2024-10-29
Payer: COMMERCIAL

## 2024-10-29 NOTE — TELEPHONE ENCOUNTER
[facility] Call Center    Phone Message    May a detailed message be left on voicemail: yes     Reason for Call: Other: Pt spouse is wanting to update his doctor that he is going to [facility] to get his ear checked out and they may admit his as well. Please advise.       Action Taken: Other: PCC    Travel Screening: Not Applicable     Date of Service: 10/29/24

## 2024-10-31 DIAGNOSIS — N18.30 CKD (CHRONIC KIDNEY DISEASE) STAGE 3, GFR 30-59 ML/MIN (H): ICD-10-CM

## 2024-10-31 DIAGNOSIS — E55.9 VITAMIN D DEFICIENCY: ICD-10-CM

## 2024-11-01 ENCOUNTER — PATIENT OUTREACH (OUTPATIENT)
Dept: NEPHROLOGY | Facility: CLINIC | Age: 74
End: 2024-11-01
Payer: COMMERCIAL

## 2024-11-01 RX ORDER — CHOLECALCIFEROL (VITAMIN D3) 25 MCG
TABLET ORAL
Qty: 180 TABLET | Refills: 3 | Status: SHIPPED | OUTPATIENT
Start: 2024-11-01

## 2024-11-01 NOTE — PROGRESS NOTES
11/1- Called pt to discuss the following message he sent to clinic.  Patient called in and is concerned about his kidneys. States he had a recent infection and has taken a lot of pain killers, has concerns that this has damaged his kidneys. Patient is looking to speak with his care team for further guidance and is wondering if he needs an appointment. Please call patient to further advise regarding this.    I spoke to pt and he's about to be discharged from [facility] this afternoon. He was in for an ear infection. He said that they did run his renal labs and his kidney function is unchanged and about the same as it's been in the past. He is due to see Angela and I will route to schedulers to reach out to him to schedule. Narendra NEAL RN  Nephrology care coordinator  [facility]

## 2024-11-04 ENCOUNTER — TELEPHONE (OUTPATIENT)
Dept: NEPHROLOGY | Facility: CLINIC | Age: 74
End: 2024-11-04
Payer: COMMERCIAL

## 2024-11-04 ENCOUNTER — TRANSFERRED RECORDS (OUTPATIENT)
Dept: HEALTH INFORMATION MANAGEMENT | Facility: CLINIC | Age: 74
End: 2024-11-04
Payer: COMMERCIAL

## 2024-11-04 NOTE — TELEPHONE ENCOUNTER
Patient Contacted for the patient to call back and schedule the following:    Appointment type: Return Nephrology  Provider: Angela Chandler  Return date: 11/8/24  Specialty phone number: [phone]  Additional appointment(s) needed: Labs prior  Additonal Notes: overdue follow up

## 2024-11-07 DIAGNOSIS — R12 HEARTBURN: ICD-10-CM

## 2024-11-08 ENCOUNTER — LAB (OUTPATIENT)
Dept: LAB | Facility: CLINIC | Age: 74
End: 2024-11-08
Payer: COMMERCIAL

## 2024-11-08 ENCOUNTER — OFFICE VISIT (OUTPATIENT)
Dept: NEPHROLOGY | Facility: CLINIC | Age: 74
End: 2024-11-08
Payer: COMMERCIAL

## 2024-11-08 VITALS
HEIGHT: 68 IN | DIASTOLIC BLOOD PRESSURE: 70 MMHG | BODY MASS INDEX: 27.77 KG/M2 | OXYGEN SATURATION: 100 % | WEIGHT: 183.2 LBS | HEART RATE: 74 BPM | SYSTOLIC BLOOD PRESSURE: 117 MMHG

## 2024-11-08 DIAGNOSIS — E55.9 VITAMIN D DEFICIENCY: ICD-10-CM

## 2024-11-08 DIAGNOSIS — N18.32 CHRONIC KIDNEY DISEASE, STAGE 3B (H): ICD-10-CM

## 2024-11-08 DIAGNOSIS — N18.30 CHRONIC KIDNEY DISEASE, STAGE 3 (H): Primary | ICD-10-CM

## 2024-11-08 DIAGNOSIS — E11.3311: ICD-10-CM

## 2024-11-08 DIAGNOSIS — N18.32 CHRONIC KIDNEY DISEASE, STAGE 3B (H): Primary | ICD-10-CM

## 2024-11-08 LAB
ALBUMIN MFR UR ELPH: 186 MG/DL
ALBUMIN SERPL BCG-MCNC: 3.8 G/DL (ref 3.5–5.2)
ANION GAP SERPL CALCULATED.3IONS-SCNC: 10 MMOL/L (ref 7–15)
BUN SERPL-MCNC: 34.3 MG/DL (ref 8–23)
CALCIUM SERPL-MCNC: 9.5 MG/DL (ref 8.8–10.4)
CHLORIDE SERPL-SCNC: 108 MMOL/L (ref 98–107)
CREAT SERPL-MCNC: 1.94 MG/DL (ref 0.67–1.17)
CREAT UR-MCNC: 76.6 MG/DL
CREAT UR-MCNC: 77 MG/DL
EGFRCR SERPLBLD CKD-EPI 2021: 35 ML/MIN/1.73M2
EST. AVERAGE GLUCOSE BLD GHB EST-MCNC: 194 MG/DL
GLUCOSE SERPL-MCNC: 149 MG/DL (ref 70–99)
HBA1C MFR BLD: 8.4 %
HCO3 SERPL-SCNC: 20 MMOL/L (ref 22–29)
HGB BLD-MCNC: 14.1 G/DL (ref 13.3–17.7)
MICROALBUMIN UR-MCNC: 1409 MG/L
MICROALBUMIN/CREAT UR: 1839.43 MG/G CR (ref 0–17)
PHOSPHATE SERPL-MCNC: 3.6 MG/DL (ref 2.5–4.5)
POTASSIUM SERPL-SCNC: 4.8 MMOL/L (ref 3.4–5.3)
PROT/CREAT 24H UR: 2.42 MG/MG CR (ref 0–0.2)
SODIUM SERPL-SCNC: 138 MMOL/L (ref 135–145)

## 2024-11-08 PROCEDURE — 83036 HEMOGLOBIN GLYCOSYLATED A1C: CPT

## 2024-11-08 PROCEDURE — 99214 OFFICE O/P EST MOD 30 MIN: CPT

## 2024-11-08 PROCEDURE — 82043 UR ALBUMIN QUANTITATIVE: CPT

## 2024-11-08 PROCEDURE — 85018 HEMOGLOBIN: CPT | Performed by: PATHOLOGY

## 2024-11-08 PROCEDURE — G0463 HOSPITAL OUTPT CLINIC VISIT: HCPCS

## 2024-11-08 PROCEDURE — 36415 COLL VENOUS BLD VENIPUNCTURE: CPT | Performed by: PATHOLOGY

## 2024-11-08 PROCEDURE — 84156 ASSAY OF PROTEIN URINE: CPT | Performed by: PATHOLOGY

## 2024-11-08 PROCEDURE — 99000 SPECIMEN HANDLING OFFICE-LAB: CPT | Performed by: PATHOLOGY

## 2024-11-08 PROCEDURE — 80069 RENAL FUNCTION PANEL: CPT | Performed by: PATHOLOGY

## 2024-11-08 ASSESSMENT — PAIN SCALES - GENERAL: PAINLEVEL_OUTOF10: MODERATE PAIN (5)

## 2024-11-08 NOTE — LETTER
11/8/2024       RE: Earle Rene  [address] [place] [address]     Dear Colleague,    Thank you for referring your patient, Earle Rene, to the [facility] [place] at [facility]. Please see a copy of my visit note below.    Nephrology Clinic Visit 11/8/24    Assessment and Plan:    CKD3b w/proteinuria - Stable. Creat 1.9. eGFR 35 ml/mn, UPCR 2.4  mg/mgCr     - Has chronic urinary retention as evidenced by our clinic PVR and Renal US PVR of 198 ml     - Etiology for his CKD is felt to be DM given retinopathy    - Baseline creat mid to upper 1's since 12/19    - On ARB, SGLT2, GLP1/GLP    - B/ps well controlled     - Diabetes with improving control. Most recent A1c 8.4 %    - On statin for CV risk reduction    2. Volume status - No edema/dyspnea. B/ps controlled. On Jardiance and has intermit diarrhea 2/2 Tirzepatide. Weight 183 # down from 193#, 195.7 #. Albumin 3.8    3. HTN - Well controlled w/o edema. Clinic b/ps 115-123/64-75. HR 74.   Current regimen:     Losartan 100 mg every day     - Continue current regimen    4. DM2 - Uncontrolled but improved with A1c 8.4% (11/24) from 10.3 % (1/24) on Jardiance, insulin and Tirzepatide   - Patient notes that since the addition of Tirzepatide in January his glycemic control has improved. Random BS today was 149   - Would increase his Jardiance to 25 mg qd   - Reviewed the benefits of improved glycemic control, one of which may be less urination   - Follows with RIVERA Laura in Endo    5. Electrolytes - No acute concerns. K 4.8 Na 138    6. Acid base - No acute concerns. Bicarb 20   - Continue bicarb 1300 mg bid    7. BMD - Ca 9.5 Phos 3.6 Albumin 3.8   -  Vit D 22, PTH 74 ( 3/24)   - Continue Vit D3 - 50 mcg every day    8. Heme - Hgb is normal at 14.1    9. BPH - Uncontrolled on Finasteride/Flomax and Mirabegron   - Prostate MRI 9/13/23 showed low likelihood for clinically  "significant cancer   - Per Urology 8/23: \" Urinary symptoms are pretty much the same as they have always been.  Has dysuria.  Quite a bit of straining, slow stream, intermittency, sensation of incomplete emptying.   PVR per US was 124 ml.\" Discussion at that visit regarding his incomplete emptying was for an outlet procedure. He is over due for his follow up.    - Renal US 4/23/24 neg for hydro. Did have PVR of 198 ml   - Recommended return to Urology but he is resistant    10. Disposition - RTC 5/9/25 for follow up with labs prior    Assessment and plan was discussed with patient and he voiced his understanding and agreement.    Reason for Visit:  CKD3b follow up    HPI:  Mr Rene is a 73 yo male with CKD3b, DM2, HTN, Diabetic retinopathy, BPH, Nephrolothiasis, GERD, Sarcoid of the lung, COPD, present today for routine CKD follow up.   Last seen in clinic by me 5/29/24  Baseline creat mid to upper 1's since 2019    ROS:   A comprehensive review of systems was obtained and negative, except as noted in the HPI or PMH.  Patient notes urinary frequency ( chronic) and chronic dysuria. No change  Exercise limited due to neuropathy  Follows a sodium restricted diet  No home b/ps  Had decreased appetite but weight stable  BS  and weight improved with Tirzepatide  Struggling with otomastoiditis. Has been treated with several rounds of abx. 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 NS, " ETOH rare    Allergies:  No Known Allergies    Medications:  Current Outpatient Medications   Medication Sig Dispense Refill     alpha-lipoic acid 600 MG capsule Take 1 capsule (600 mg) by mouth daily 90 capsule 3     ARTIFICIAL TEAR OP Apply to eye as needed       atorvastatin (LIPITOR) 20 MG tablet Take 1 tablet (20 mg) by mouth daily 90 tablet 0     blood glucose (NO BRAND SPECIFIED) lancets standard Lancets that go with device, Test 3 times daily 300 each 3     Continuous Glucose Sensor (FREESTYLE PETER 2 SENSOR) MISC 1 each every 14 days. 1 each every 14 days. Change every 14 days. 2 each 5     empagliflozin (JARDIANCE) 10 MG TABS tablet TAKE 1 TABLET (10 MG) BY MOUTH DAILY. 90 tablet 2     famotidine (PEPCID) 20 MG tablet Take 1 tablet (20 mg) by mouth daily. 30 tablet 5     fenofibrate 54 MG tablet Take 1 tablet (54 mg) by mouth daily 90 tablet 0     finasteride (PROSCAR) 5 MG tablet Take 1 tablet (5 mg) by mouth daily 90 tablet 3     gabapentin (NEURONTIN) 300 MG capsule Take 1 capsule (300 mg) by mouth 2 times daily 60 capsule 3     insulin degludec (TRESIBA FLEXTOUCH) 100 UNIT/ML pen INJECT 50 UNITS SUBCUTANEOUSLY AT BEDTIME. 45 mL 3     insulin pen needle (B-D U/F) 31G X 5 MM miscellaneous Use 4 time(s) per day.  Please dispense as BD Pen Needle Mini U/F 31G x 5  each 3     loratadine (CLARITIN) 10 MG tablet Take 1 tablet (10 mg) by mouth daily 90 tablet 0     losartan (COZAAR) 100 MG tablet Take 1 tablet (100 mg) by mouth at bedtime 90 tablet 3     mirabegron (MYRBETRIQ) 25 MG 24 hr tablet TAKE 1 TABLET BY MOUTH EVERY DAY 90 tablet 1     sodium bicarbonate 650 MG tablet Take 2 tablets (1,300 mg) by mouth 2 times daily 270 tablet 3     tamsulosin (FLOMAX) 0.4 MG capsule Take 2 capsules (0.8 mg) by mouth daily. For more refills,schedule an appointment at [phone] 180 capsule 0     tirzepatide (MOUNJARO) 5 MG/0.5ML pen Inject 5 mg subcutaneously every 7 days. 2 mL 5     triamcinolone (KENALOG) 0.1  "% external cream Apply topically 2 times daily 30 g 0     VITAMIN D3 25 MCG (1000 UT) tablet TAKE 2 TABLETS (50 MCG) BY MOUTH DAILY 180 tablet 3     No current facility-administered medications for this visit.      Vitals:  /70   Pulse 74   Ht 1.727 m (5' 8\")   Wt 83.1 kg (183 lb 3.2 oz)   SpO2 100%   BMI 27.86 kg/m      Exam:  GENERAL APPEARANCE: Pleasant male in NAD. He is English speaking and does not require interpretor  RESP: lungs clear to auscultation   CV: regular rhythm, normal rate  EDEMA: no edema  ABDOMEN: soft, nondistended, nontender  MS: extremities normal - no gross deformities noted  SKIN: no visible rash   NEURO: mentation intact and speech normal  PSYCH: affect normal/bright    LABS:   CMP  Recent Labs   Lab Test 11/08/24  0823 05/29/24  1017 03/27/24  0914 01/03/24  0929 09/13/23  1622 07/30/21  1005 06/09/21  1524 03/12/21  1324 12/14/20  0831 06/01/20  1109    141 140  --  135*   < > 142 137 138 142   POTASSIUM 4.8 4.5 4.3  --  4.5   < > 4.2 5.2 4.2 4.1   CHLORIDE 108* 109* 108*  --  103   < > 111* 107 110* 110*   CO2 20* 22 21*  --  18*   < > 22 23 20 22   ANIONGAP 10 10 11  --  14   < > 8 7 8 10   * 109* 185*  --  276*   < > 211* 347* 212* 218*   BUN 34.3* 31.8* 33.0*  --  37.6*   < > 20 33* 30 25   CR 1.94* 1.93* 2.12* 1.85* 1.69*   < > 1.45* 1.68* 1.43* 1.39*   GFRESTIMATED 35* 36* 32* 38* 42*   < > 48* 40* 49* 51*   GFRESTBLACK  --   --   --   --   --   --  55* 46* 56* 59*   INDERJIT 9.5 9.3 9.6  --  9.8   < > 8.8 9.0 9.4 9.8    < > = values in this interval not displayed.     Recent Labs   Lab Test 01/03/24  0929 03/12/21  1324 06/18/19  1054 03/06/18  1216 11/01/17  1044 06/06/17  1116   BILITOTAL  --  0.4 0.6 0.8  --  0.7   ALKPHOS  --  108 63 77  --  70   ALT  --  36 38 35 44 49   AST 23 24 24 25 23 20     CBC  Recent Labs   Lab Test 11/08/24  0823 03/27/24  0914 09/13/23  1622 01/08/23  1758 06/09/21  1524 03/12/21  1324 01/29/20  1315 06/18/19  1054   HGB 14.1 15.0 " 15.2 14.4   < > 14.1   < > 13.5   WBC  --   --  6.4 5.4  --  5.1  --  4.6   RBC  --   --  4.58 4.51  --  4.31*  --  4.18*   HCT  --   --  41.5 41.2  --  41.7  --  38.7*   MCV  --   --  91 91  --  97  --  93   MCH  --   --  33.2* 31.9  --  32.7  --  32.3   MCHC  --   --  36.6* 35.0  --  33.8  --  34.9   RDW  --   --  12.7 12.4  --  12.3  --  12.6   PLT  --   --  150 145*  --  156  --  130*    < > = values in this interval not displayed.     URINE STUDIES  Recent Labs   Lab Test 05/29/24  1028 03/27/24  0908 09/13/23  1811 08/10/23  1650   COLOR Light Yellow Straw Straw Straw   APPEARANCE Clear Clear Clear Clear   URINEGLC >=1000* >=1000* >=1000* >=1000*   URINEBILI Negative Negative Negative Negative   URINEKETONE Negative Negative Negative Negative   SG 1.013 1.021 1.019 1.023   UBLD Negative Negative Negative Negative   URINEPH 6.0 6.0 5.5 5.5   PROTEIN 100* 100* 70* 50*   NITRITE Negative Negative Negative Negative   LEUKEST Negative Negative Negative Negative   RBCU <1 <1 <1 <1   WBCU <1 <1 0 <1     Recent Labs   Lab Test 12/08/21  1438 06/09/21  1530 12/14/20  0848 06/01/20  1110   UTPG 1.89* 2.37* 1.51* 1.39*     PTH  Recent Labs   Lab Test 03/27/24  0914 12/08/21  1428 06/01/20  1109   PTHI 74* 27 22     IRON STUDIES  Recent Labs   Lab Test 07/02/19  0947 05/01/18  1233   IRON  --  83   FEB  --  344   IRONSAT  --  24   DEANA 178 160     EXAMINATION: US RENAL COMPLETE NON-VASCULAR, 4/3/2024 9:20 AM      COMPARISON: CT 6/6/2017     HISTORY: CKD     TECHNIQUE: The kidneys and bladder were scanned in the standard  fashion with specialized ultrasound transducer(s) using both gray  scale and limited color/spectral Doppler techniques.     FINDINGS:     Right kidney: Measures 10.8 cm in length. Parenchyma is of normal  thickness and mildly increased echogenicity. No focal mass. No  hydronephrosis.     Left kidney: Measures 11.6 cm in length. Parenchyma is of normal  thickness and mildly increased echogenicity. No focal  mass. No  hydronephrosis.      Bladder: Urinary bladder is well-distended and appears unremarkable.  Postvoid residual volume of 198 mL                                                                      IMPRESSION:  1.  Mildly echogenic renal parenchyma, compatible with medical renal  disease. Otherwise normal sonographic appearance of the kidneys.  2.  Abnormal postvoid residual volume of 198 mL.     I have personally reviewed the examination and initial interpretation  and I agree with the findings.      Tracy Chandler NP        Again, thank you for allowing me to participate in the care of your patient.      Sincerely,    Tracy Chandler NP

## 2024-11-08 NOTE — NURSING NOTE
"Chief Complaint   Patient presents with    RECHECK     6 month follow up.Stated that he has an infection in his left ear. He is taking different pain killers. Stated he has severe itching at night. Stated it starts in one location and goes all around his body.      Vitals:    11/08/24 0844 11/08/24 0845 11/08/24 0846 11/08/24 0847   BP: 115/75 114/64 123/70 117/70   BP Location: Left arm Left arm Left arm    Patient Position: Sitting Sitting Sitting    Cuff Size: Adult Regular Adult Regular Adult Regular    Pulse: 74      SpO2: 100%      Weight: 83.1 kg (183 lb 3.2 oz)      Height: 1.727 m (5' 8\")          BP Readings from Last 3 Encounters:   11/08/24 117/70   10/13/24 118/84   10/09/24 121/70       /70   Pulse 74   Ht 1.727 m (5' 8\")   Wt 83.1 kg (183 lb 3.2 oz)   SpO2 100%   BMI 27.86 kg/m       Macrina King    "

## 2024-11-09 NOTE — PROGRESS NOTES
"Nephrology Clinic Visit 11/8/24    Assessment and Plan:    CKD3b w/proteinuria - Stable. Creat 1.9. eGFR 35 ml/mn, UPCR 2.4  mg/mgCr     - Has chronic urinary retention as evidenced by our clinic PVR and Renal US PVR of 198 ml     - Etiology for his CKD is felt to be DM given retinopathy    - Baseline creat mid to upper 1's since 12/19    - On ARB, SGLT2, GLP1/GLP    - B/ps well controlled     - Diabetes with improving control. Most recent A1c 8.4 %    - On statin for CV risk reduction    2. Volume status - No edema/dyspnea. B/ps controlled. On Jardiance and has intermit diarrhea 2/2 Tirzepatide. Weight 183 # down from 193#, 195.7 #. Albumin 3.8    3. HTN - Well controlled w/o edema. Clinic b/ps 115-123/64-75. HR 74.   Current regimen:     Losartan 100 mg every day     - Continue current regimen    4. DM2 - Uncontrolled but improved with A1c 8.4% (11/24) from 10.3 % (1/24) on Jardiance, insulin and Tirzepatide   - Patient notes that since the addition of Tirzepatide in January his glycemic control has improved. Random BS today was 149   - Would increase his Jardiance to 25 mg qd   - Reviewed the benefits of improved glycemic control, one of which may be less urination   - Follows with RIVERA Laura in Endo    5. Electrolytes - No acute concerns. K 4.8 Na 138    6. Acid base - No acute concerns. Bicarb 20   - Continue bicarb 1300 mg bid    7. BMD - Ca 9.5 Phos 3.6 Albumin 3.8   -  Vit D 22, PTH 74 ( 3/24)   - Continue Vit D3 - 50 mcg every day    8. Heme - Hgb is normal at 14.1    9. BPH - Uncontrolled on Finasteride/Flomax and Mirabegron   - Prostate MRI 9/13/23 showed low likelihood for clinically significant cancer   - Per Urology 8/23: \" Urinary symptoms are pretty much the same as they have always been.  Has dysuria.  Quite a bit of straining, slow stream, intermittency, sensation of incomplete emptying.   PVR per US was 124 ml.\" Discussion at that visit regarding his incomplete emptying was for an outlet procedure. " He is over due for his follow up.    - Renal US 4/23/24 neg for hydro. Did have PVR of 198 ml   - Recommended return to Urology but he is resistant    10. Disposition - RTC 5/9/25 for follow up with labs prior    Assessment and plan was discussed with patient and he voiced his understanding and agreement.    Reason for Visit:  CKD3b follow up    HPI:  Mr Rene is a 75 yo male with CKD3b, DM2, HTN, Diabetic retinopathy, BPH, Nephrolothiasis, GERD, Sarcoid of the lung, COPD, present today for routine CKD follow up.   Last seen in clinic by me 5/29/24  Baseline creat mid to upper 1's since 2019    ROS:   A comprehensive review of systems was obtained and negative, except as noted in the HPI or PMH.  Patient notes urinary frequency ( chronic) and chronic dysuria. No change  Exercise limited due to neuropathy  Follows a sodium restricted diet  No home b/ps  Had decreased appetite but weight stable  BS  and weight improved with Tirzepatide  Struggling with otomastoiditis. Has been treated with several rounds of abx. 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 NS, ETOH rare    Allergies:  No Known Allergies    Medications:  Current Outpatient Medications   Medication Sig Dispense Refill    alpha-lipoic acid 600 MG capsule Take 1 capsule (600 mg) by mouth daily 90 capsule 3    ARTIFICIAL TEAR OP Apply to eye as needed      atorvastatin (LIPITOR) 20 MG tablet Take 1 tablet (20 mg) by mouth daily  "90 tablet 0    blood glucose (NO BRAND SPECIFIED) lancets standard Lancets that go with device, Test 3 times daily 300 each 3    Continuous Glucose Sensor (FREESTYLE PETER 2 SENSOR) MISC 1 each every 14 days. 1 each every 14 days. Change every 14 days. 2 each 5    empagliflozin (JARDIANCE) 10 MG TABS tablet TAKE 1 TABLET (10 MG) BY MOUTH DAILY. 90 tablet 2    famotidine (PEPCID) 20 MG tablet Take 1 tablet (20 mg) by mouth daily. 30 tablet 5    fenofibrate 54 MG tablet Take 1 tablet (54 mg) by mouth daily 90 tablet 0    finasteride (PROSCAR) 5 MG tablet Take 1 tablet (5 mg) by mouth daily 90 tablet 3    gabapentin (NEURONTIN) 300 MG capsule Take 1 capsule (300 mg) by mouth 2 times daily 60 capsule 3    insulin degludec (TRESIBA FLEXTOUCH) 100 UNIT/ML pen INJECT 50 UNITS SUBCUTANEOUSLY AT BEDTIME. 45 mL 3    insulin pen needle (B-D U/F) 31G X 5 MM miscellaneous Use 4 time(s) per day.  Please dispense as BD Pen Needle Mini U/F 31G x 5  each 3    loratadine (CLARITIN) 10 MG tablet Take 1 tablet (10 mg) by mouth daily 90 tablet 0    losartan (COZAAR) 100 MG tablet Take 1 tablet (100 mg) by mouth at bedtime 90 tablet 3    mirabegron (MYRBETRIQ) 25 MG 24 hr tablet TAKE 1 TABLET BY MOUTH EVERY DAY 90 tablet 1    sodium bicarbonate 650 MG tablet Take 2 tablets (1,300 mg) by mouth 2 times daily 270 tablet 3    tamsulosin (FLOMAX) 0.4 MG capsule Take 2 capsules (0.8 mg) by mouth daily. For more refills,schedule an appointment at [phone] 180 capsule 0    tirzepatide (MOUNJARO) 5 MG/0.5ML pen Inject 5 mg subcutaneously every 7 days. 2 mL 5    triamcinolone (KENALOG) 0.1 % external cream Apply topically 2 times daily 30 g 0    VITAMIN D3 25 MCG (1000 UT) tablet TAKE 2 TABLETS (50 MCG) BY MOUTH DAILY 180 tablet 3     No current facility-administered medications for this visit.      Vitals:  /70   Pulse 74   Ht 1.727 m (5' 8\")   Wt 83.1 kg (183 lb 3.2 oz)   SpO2 100%   BMI 27.86 kg/m      Exam:  GENERAL " APPEARANCE: Pleasant male in NAD. He is English speaking and does not require interpretor  RESP: lungs clear to auscultation   CV: regular rhythm, normal rate  EDEMA: no edema  ABDOMEN: soft, nondistended, nontender  MS: extremities normal - no gross deformities noted  SKIN: no visible rash   NEURO: mentation intact and speech normal  PSYCH: affect normal/bright    LABS:   CMP  Recent Labs   Lab Test 11/08/24  0823 05/29/24  1017 03/27/24  0914 01/03/24  0929 09/13/23  1622 07/30/21  1005 06/09/21  1524 03/12/21  1324 12/14/20  0831 06/01/20  1109    141 140  --  135*   < > 142 137 138 142   POTASSIUM 4.8 4.5 4.3  --  4.5   < > 4.2 5.2 4.2 4.1   CHLORIDE 108* 109* 108*  --  103   < > 111* 107 110* 110*   CO2 20* 22 21*  --  18*   < > 22 23 20 22   ANIONGAP 10 10 11  --  14   < > 8 7 8 10   * 109* 185*  --  276*   < > 211* 347* 212* 218*   BUN 34.3* 31.8* 33.0*  --  37.6*   < > 20 33* 30 25   CR 1.94* 1.93* 2.12* 1.85* 1.69*   < > 1.45* 1.68* 1.43* 1.39*   GFRESTIMATED 35* 36* 32* 38* 42*   < > 48* 40* 49* 51*   GFRESTBLACK  --   --   --   --   --   --  55* 46* 56* 59*   INDERJIT 9.5 9.3 9.6  --  9.8   < > 8.8 9.0 9.4 9.8    < > = values in this interval not displayed.     Recent Labs   Lab Test 01/03/24  0929 03/12/21  1324 06/18/19  1054 03/06/18  1216 11/01/17  1044 06/06/17  1116   BILITOTAL  --  0.4 0.6 0.8  --  0.7   ALKPHOS  --  108 63 77  --  70   ALT  --  36 38 35 44 49   AST 23 24 24 25 23 20     CBC  Recent Labs   Lab Test 11/08/24  0823 03/27/24  0914 09/13/23  1622 01/08/23  1758 06/09/21  1524 03/12/21  1324 01/29/20  1315 06/18/19  1054   HGB 14.1 15.0 15.2 14.4   < > 14.1   < > 13.5   WBC  --   --  6.4 5.4  --  5.1  --  4.6   RBC  --   --  4.58 4.51  --  4.31*  --  4.18*   HCT  --   --  41.5 41.2  --  41.7  --  38.7*   MCV  --   --  91 91  --  97  --  93   MCH  --   --  33.2* 31.9  --  32.7  --  32.3   MCHC  --   --  36.6* 35.0  --  33.8  --  34.9   RDW  --   --  12.7 12.4  --  12.3  --  12.6    PLT  --   --  150 145*  --  156  --  130*    < > = values in this interval not displayed.     URINE STUDIES  Recent Labs   Lab Test 05/29/24  1028 03/27/24  0908 09/13/23  1811 08/10/23  1650   COLOR Light Yellow Straw Straw Straw   APPEARANCE Clear Clear Clear Clear   URINEGLC >=1000* >=1000* >=1000* >=1000*   URINEBILI Negative Negative Negative Negative   URINEKETONE Negative Negative Negative Negative   SG 1.013 1.021 1.019 1.023   UBLD Negative Negative Negative Negative   URINEPH 6.0 6.0 5.5 5.5   PROTEIN 100* 100* 70* 50*   NITRITE Negative Negative Negative Negative   LEUKEST Negative Negative Negative Negative   RBCU <1 <1 <1 <1   WBCU <1 <1 0 <1     Recent Labs   Lab Test 12/08/21  1438 06/09/21  1530 12/14/20  0848 06/01/20  1110   UTPG 1.89* 2.37* 1.51* 1.39*     PTH  Recent Labs   Lab Test 03/27/24  0914 12/08/21  1428 06/01/20  1109   PTHI 74* 27 22     IRON STUDIES  Recent Labs   Lab Test 07/02/19  0947 05/01/18  1233   IRON  --  83   FEB  --  344   IRONSAT  --  24   DEANA 178 160     EXAMINATION: US RENAL COMPLETE NON-VASCULAR, 4/3/2024 9:20 AM      COMPARISON: CT 6/6/2017     HISTORY: CKD     TECHNIQUE: The kidneys and bladder were scanned in the standard  fashion with specialized ultrasound transducer(s) using both gray  scale and limited color/spectral Doppler techniques.     FINDINGS:     Right kidney: Measures 10.8 cm in length. Parenchyma is of normal  thickness and mildly increased echogenicity. No focal mass. No  hydronephrosis.     Left kidney: Measures 11.6 cm in length. Parenchyma is of normal  thickness and mildly increased echogenicity. No focal mass. No  hydronephrosis.      Bladder: Urinary bladder is well-distended and appears unremarkable.  Postvoid residual volume of 198 mL                                                                      IMPRESSION:  1.  Mildly echogenic renal parenchyma, compatible with medical renal  disease. Otherwise normal sonographic appearance of the  kidneys.  2.  Abnormal postvoid residual volume of 198 mL.     I have personally reviewed the examination and initial interpretation  and I agree with the findings.      Tracy Chandler, NP

## 2024-11-11 RX ORDER — FAMOTIDINE 20 MG/1
20 TABLET, FILM COATED ORAL DAILY
Qty: 90 TABLET | Refills: 1 | OUTPATIENT
Start: 2024-11-11

## 2024-11-11 NOTE — TELEPHONE ENCOUNTER
famotidine (PEPCID) 20 MG tablet 30 tablet 5 8/23/2024     Should have refills on file. Pharmacy sent message. Rx refill denied    Hetal Box RN  P Red Flag Triage/MRT

## 2024-11-13 DIAGNOSIS — R12 HEARTBURN: ICD-10-CM

## 2024-11-16 RX ORDER — FAMOTIDINE 20 MG/1
20 TABLET, FILM COATED ORAL DAILY
Qty: 90 TABLET | Refills: 1 | Status: SHIPPED | OUTPATIENT
Start: 2024-11-16

## 2024-11-17 DIAGNOSIS — E78.5 HYPERLIPIDEMIA LDL GOAL <100: ICD-10-CM

## 2024-11-19 RX ORDER — ATORVASTATIN CALCIUM 20 MG/1
20 TABLET, FILM COATED ORAL DAILY
Qty: 90 TABLET | Refills: 1 | Status: SHIPPED | OUTPATIENT
Start: 2024-11-19

## 2024-11-19 NOTE — TELEPHONE ENCOUNTER
LVD:  7/9/2024  [facility] Lizzie Barajas MD  Internal Medicine     LDL Cholesterol Calculated   Date Value Ref Range Status   01/03/2024 88 <=100 mg/dL Final   06/18/2019 49 <100 mg/dL Final     Comment:     Desirable:       <100 mg/dl       Refilled per protocol.

## 2024-11-20 ENCOUNTER — TRANSFERRED RECORDS (OUTPATIENT)
Dept: HEALTH INFORMATION MANAGEMENT | Facility: CLINIC | Age: 74
End: 2024-11-20

## 2024-11-20 PROCEDURE — 87070 CULTURE OTHR SPECIMN AEROBIC: CPT | Mod: ORL | Performed by: OTOLARYNGOLOGY

## 2024-11-21 ENCOUNTER — LAB REQUISITION (OUTPATIENT)
Dept: LAB | Facility: CLINIC | Age: 74
End: 2024-11-21
Payer: COMMERCIAL

## 2024-11-21 DIAGNOSIS — H60.92 UNSPECIFIED OTITIS EXTERNA, LEFT EAR: ICD-10-CM

## 2024-11-23 LAB — BACTERIA SPEC CULT: NO GROWTH

## 2024-11-26 ENCOUNTER — TRANSFERRED RECORDS (OUTPATIENT)
Dept: HEALTH INFORMATION MANAGEMENT | Facility: CLINIC | Age: 74
End: 2024-11-26
Payer: COMMERCIAL

## 2024-12-11 ENCOUNTER — PATIENT OUTREACH (OUTPATIENT)
Dept: CARE COORDINATION | Facility: CLINIC | Age: 74
End: 2024-12-11
Payer: COMMERCIAL

## 2024-12-18 ENCOUNTER — TELEPHONE (OUTPATIENT)
Dept: ENDOCRINOLOGY | Facility: CLINIC | Age: 74
End: 2024-12-18
Payer: COMMERCIAL

## 2024-12-18 NOTE — TELEPHONE ENCOUNTER
Prior Authorization Not Needed per Insurance    Medication: MOUNJARO 5 MG/0.5ML SC SOAJ  Insurance Company: [employer] - Phone [phone] Fax [phone]  Expected CoPay: $    Pharmacy Filling the Rx: [id number] - [place], [place] - [address]    Key: [id number]   Prior Authorization not required for patient/medication

## 2024-12-19 ENCOUNTER — TELEPHONE (OUTPATIENT)
Dept: INTERNAL MEDICINE | Facility: CLINIC | Age: 74
End: 2024-12-19
Payer: COMMERCIAL

## 2024-12-19 NOTE — PROGRESS NOTES
"Clinic Care Coordination Contact  Transitions of Care Outreach  Chief Complaint   Patient presents with    Clinic Care Coordination - Post [facility]       Most Recent Admission Date: 6/24/2017   Most Recent Admission Diagnosis:      Most Recent Discharge Date: 6/25/2017   Most Recent Discharge Diagnosis: Sialolithiasis of submandibular gland - K11.5  Sialoadenitis of submandibular gland - K11.20     Transitions of Care Assessment    Discharge Assessment  How are you doing now that you are home?: \"Dinorah had ups and downs, not just good or bad, im recovering and i still have pain but tis not severe and I use my antibiotics as perscribed. I have pain here and there. Blood is coming ot from my ear. Is that normal\"  How are your symptoms? (Red Flag symptoms escalate to triage hotline per guidelines): Unchanged  Do you know how to contact your clinic care team if you have future questions or changes to your health status? : Yes  Does the patient have their discharge instructions? : Yes  Does the patient have questions regarding their discharge instructions? : No  Were you started on any new medications or were there changes to any of your previous medications? : Yes  Does the patient have all of their medications?: Yes  Do you have questions regarding any of your medications? : Yes (see comment)  Do you have all of your needed medical supplies or equipment (DME)?  (i.e. oxygen tank, CPAP, cane, etc.): Yes     CHW spoke with patient and patient stated \"\"Dinorah had ups and downs, not just good or bad, im recovering and i still have pain but its not severe and I use my antibiotics as perscribed. I have pain here and there. Blood is coming out from my ear. Is that normal?\". CHW sent message to the nurse pool to do outreach to patient regarding the blood in patients ear because he doesn't know if that is normal or not. Patient is currently in [place] and will worry about scheduling follow-up APPT when he gets back. Patient gets " back next week Tuesday the 12/31.Patient declined CC and there are no other needs at this time.         [facility] Explained and offered Care Coordination support to eligible patients: Yes    Patient accepted? No    Follow up Plan     Discharge Follow-Up  Discharge follow up appointment scheduled in alignment with recommended follow up timeframe or Transitions of Risk Category? (Low = within 30 days; Moderate= within 14 days; High= within 7 days): No  Patient's follow up appointment not scheduled: Patient declined scheduling support. Education on the importance of transitions of care follow up. Provided scheduling phone number.    Future Appointments   Date Time Provider Department Center   1/14/2025  9:30 AM Pamela Laura PA-C [facility]   4/8/2025  9:30 AM Pamela Laura PA-C [facility]   5/9/2025  8:30 AM [facility]   5/9/2025  9:30 AM Tracy Chandler NP [facility]       Outpatient Plan as outlined on AVS reviewed with patient.    For any urgent concerns, please contact our 24 hour nurse triage line: [phone])       Sherri WISEMAN Community Health Worker  Clinic Care Coordination  [facility]   Clinic #: [phone]  Direct #: [phone]

## 2024-12-19 NOTE — PROGRESS NOTES
12/19/24 11:49 AM  PATIENT LAB/IMAGING STATUS : No pending lab orders   Patient is showing 4/5 MNCM met. Aspirin not prescribed     Juliana Coley CMA

## 2024-12-19 NOTE — TELEPHONE ENCOUNTER
Called patient- he says that he is actually contacting outside ENT for this issue, given mastoid resection and infection. He would like to schedule with PCP, which I did schedule. He said he will get into see ENT ASAP and already called them.    Vicente James RN on 12/19/2024 at 2:27 PM

## 2025-01-14 ENCOUNTER — OFFICE VISIT (OUTPATIENT)
Dept: ENDOCRINOLOGY | Facility: CLINIC | Age: 75
End: 2025-01-14
Payer: COMMERCIAL

## 2025-01-14 ENCOUNTER — LAB (OUTPATIENT)
Dept: LAB | Facility: CLINIC | Age: 75
End: 2025-01-14
Payer: COMMERCIAL

## 2025-01-14 VITALS
HEIGHT: 68 IN | WEIGHT: 183 LBS | DIASTOLIC BLOOD PRESSURE: 71 MMHG | HEART RATE: 74 BPM | SYSTOLIC BLOOD PRESSURE: 122 MMHG | OXYGEN SATURATION: 97 % | BODY MASS INDEX: 27.74 KG/M2

## 2025-01-14 DIAGNOSIS — E11.3311 TYPE 2 DIABETES MELLITUS WITH MODERATE NONPROLIFERATIVE RETINOPATHY OF RIGHT EYE AND MACULAR EDEMA, UNSPECIFIED WHETHER LONG TERM INSULIN USE (H): Primary | ICD-10-CM

## 2025-01-14 DIAGNOSIS — E11.3311 TYPE 2 DIABETES MELLITUS WITH MODERATE NONPROLIFERATIVE RETINOPATHY OF RIGHT EYE AND MACULAR EDEMA, UNSPECIFIED WHETHER LONG TERM INSULIN USE (H): ICD-10-CM

## 2025-01-14 LAB
CREAT SERPL-MCNC: 1.75 MG/DL (ref 0.67–1.17)
EGFRCR SERPLBLD CKD-EPI 2021: 40 ML/MIN/1.73M2
POTASSIUM SERPL-SCNC: 4.4 MMOL/L (ref 3.4–5.3)

## 2025-01-14 PROCEDURE — 84132 ASSAY OF SERUM POTASSIUM: CPT | Performed by: PATHOLOGY

## 2025-01-14 PROCEDURE — 36415 COLL VENOUS BLD VENIPUNCTURE: CPT | Performed by: PATHOLOGY

## 2025-01-14 PROCEDURE — 82565 ASSAY OF CREATININE: CPT | Performed by: PATHOLOGY

## 2025-01-14 RX ORDER — MULTIVIT WITH MINERALS/LUTEIN
250 TABLET ORAL
COMMUNITY

## 2025-01-14 RX ORDER — AMLODIPINE BESYLATE 5 MG/1
1 TABLET ORAL DAILY
COMMUNITY
Start: 2024-12-10

## 2025-01-14 ASSESSMENT — PAIN SCALES - GENERAL: PAINLEVEL_OUTOF10: MODERATE PAIN (5)

## 2025-01-14 NOTE — LETTER
1/14/2025       RE: Earle Rene  [address] [place] [facility] [address]     Dear Colleague,    Thank you for referring your patient, Earle Rene, to the [facility] [place] [facility]. Please see a copy of my visit note below.    12/19/24 11:49 AM  PATIENT LAB/IMAGING STATUS : No pending lab orders   Patient is showing 4/5 MNCM met. Aspirin not prescribed     Juliana Coley CMA                        HPI   Earle Rene is a 74 year old male with type 2 diabetes mellitus.  Since his last visit, patient was admitted 12/5/2024-12/10/2024 with mastoiditis- left side.  He has follow-up with ENT.  I had started patient on Mounjaro in 20024 and his glycemic control has improved. Pt is no longer requiring mealtime insulin, his glycemic control has not improved and he has lost weight.  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  Pt also has hx of hyperlipidemia, HTN, PVD, obesity, presumed localized prostate cancer, BPH, goiter with right thyroid nodule and osteoporosis.  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  For his diabetes, patient states he is taking Mounjaro 5 mg subcutaneous once a week, Tresiba 50 units SQ at hs and Jardiance 10 mg each am.  No longer requiring Humalog.  A1C 8.4 % in November 2024 and A1C was 7.7 % in July 2024.  Pt's A1C was 10.3 % in Jan 2024.  I reviewed and scanned his freestyle libre2 sensor download data in his note below.  Average glucose 160.  Glucose in target range 64 % of the time.  No frequent hypoglycemia.  On ROS today, continues to have left ear and left mastoid pain, but less overall per patient.    No fevers or rigors at this time.    Patient denies nausea, vomiting or abdominal pain.  Using MiraLAX intermittently for constipation.  Symptoms of diabetic neuropathy  in both feet.  Patient denies dysuria, hematuria or symptoms of groin yeast infection.  Pt denies SOB at rest, cough or chest pain.    DIABETES CARE:  Retinopathy: Yes.  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  Patient seen by ophthalmology here in September 2024.  Nephropathy: Yes.  [id number].  Pt thinks he is taking Cozaar daily.  Followed by Nephrology staff.    Neuropathy: yes.   Foot exam.  No ulcers.    Lipids: LDL 88 in Jan 2024.  Pt is taking Lipitor and fenofibrate.  Taking ASA: No  Insulin: Basal insulin.    DM meds: Jardiance and Mounjaro.  Pt did NOT tolerate Ozempic- diarrhea.  AVOID Metformin-CKD.  Testing:  Freestyle Libre2 sensor.        ROS   Please see under HPI.     ALLERGIES:  Review of patient's allergies indicates no known allergies.      Current Outpatient Medications   Medication Sig Dispense Refill     amLODIPine (NORVASC) 5 MG tablet Take 1 tablet by mouth daily.       alpha-lipoic acid 600 MG capsule Take 1 capsule (600 mg) by mouth daily 90 capsule 3     ARTIFICIAL TEAR OP Apply to eye as needed       atorvastatin (LIPITOR) 20 MG tablet TAKE 1 TABLET BY MOUTH EVERY DAY 90 tablet 1     blood glucose (NO BRAND SPECIFIED) lancets standard Lancets that go with device, Test 3 times daily 300 each 3     Continuous Glucose Sensor (FREESTYLE PETER 2 SENSOR) MISC 1 each every 14 days. 1 each every 14 days. Change every 14 days. 2 each 5     empagliflozin (JARDIANCE) 10 MG TABS tablet TAKE 1 TABLET (10 MG) BY MOUTH DAILY. 90 tablet 2     famotidine (PEPCID) 20 MG tablet TAKE 1 TABLET BY MOUTH EVERY DAY 90 tablet 1     fenofibrate 54 MG tablet Take 1 tablet (54 mg) by mouth daily 90 tablet 0     finasteride (PROSCAR) 5 MG tablet Take 1 tablet (5 mg) by mouth daily 90 tablet 3     gabapentin (NEURONTIN) 300 MG capsule Take 1 capsule (300 mg) by mouth 2 times daily 60 capsule 3     insulin degludec (TRESIBA FLEXTOUCH) 100 UNIT/ML pen INJECT 50 UNITS SUBCUTANEOUSLY  AT BEDTIME. 45 mL 3     insulin pen needle (B-D U/F) 31G X 5 MM miscellaneous Use 4 time(s) per day.  Please dispense as BD Pen Needle Mini U/F 31G x 5  each 3     loratadine (CLARITIN) 10 MG tablet Take 1 tablet (10 mg) by mouth daily 90 tablet 0     losartan (COZAAR) 100 MG tablet Take 1 tablet (100 mg) by mouth at bedtime 90 tablet 3     mirabegron (MYRBETRIQ) 25 MG 24 hr tablet TAKE 1 TABLET BY MOUTH EVERY DAY 90 tablet 1     sodium bicarbonate 650 MG tablet Take 2 tablets (1,300 mg) by mouth 2 times daily 270 tablet 3     tamsulosin (FLOMAX) 0.4 MG capsule Take 2 capsules (0.8 mg) by mouth daily. For more refills,schedule an appointment at [phone] 180 capsule 0     tirzepatide (MOUNJARO) 5 MG/0.5ML pen Inject 5 mg subcutaneously every 7 days. 2 mL 5     triamcinolone (KENALOG) 0.1 % external cream Apply topically 2 times daily 30 g 0     vitamin C 250 MG tablet Take 250 mg by mouth.       VITAMIN D3 25 MCG (1000 UT) tablet TAKE 2 TABLETS (50 MCG) BY MOUTH DAILY 180 tablet 3     Family Hx   No change.     Personal Hx   Smoke: none.   ETOH: none.    with grown children.    PMH   1. Type 2 Diabetes Mellitus dx at age 43.   2. Neuropathy.  3. Nephropathy.   4. ED.   5. Dyslipidemia.   6. Nephrolithiasis.   7. Decrease auditory acuity.   8. Sarcoidosis-lung.   9. Goiter.   10. S/P T & A.   11. S/P FX right heel.   12. Vit D def.   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   14. CT chest- ? granulomas.   15. Retinopathy.  16. Goiter.  Past Medical History:   Diagnosis Date     Blepharitis of both eyes      BPH (benign prostatic hyperplasia)      Diabetes (H)      Diabetic neuropathy (H)      Diabetic retinopathy associated with diabetes mellitus due to underlying condition (H)      Dry eye syndrome      GERD (gastroesophageal reflux disease)      Goiter      Granulomatous disease (H)      HLD (hyperlipidemia)      HTN (hypertension)      Nonsenile cataract      Peripheral neuropathy      Past Surgical  "History:   Procedure Laterality Date     ------------OTHER-------------      back of neck abscess drainage in OR     AS RAD RESEC TONSIL/PILLARS Bilateral 1961     CATARACT IOL, RT/LT Left      COLONOSCOPY  7/29/2013    Procedure: COLONOSCOPY;;  Surgeon: Montana Pascal MD;  Location: [facility]     EXCISE MASS UPPER EXTREMITY Right 11/11/2019    Procedure: EXCISION, MASS, UPPER EXTREMITY, RIGHT SHOULDER;  Surgeon: Johana Choudhury MD;  Location: [facility]     INJECT EPIDURAL LUMBAR Left 4/20/2023    Procedure: INJECTION, SPINE, LUMBAR, EPIDURAL (L4-L5);  Surgeon: Mahamed Vázquez MD;  Location: [facility] OR     INJECT SACROILIAC JOINT Bilateral 9/13/2022    Procedure: Bilateral sacroiliac joint injection;  Surgeon: Collin Mata MD;  Location: [facility] OR     INTRAVITREAL INJECTION CHEMOTHERAPY Right 12/30/2019    Procedure: INTRAVITREAL Bevacizumab injection;  Surgeon: Milton Maki MD;  [facility]     PHACOEMULSIFICATION CLEAR CORNEA WITH STANDARD INTRAOCULAR LENS IMPLANT Right 12/30/2019    Procedure: PHACOEMULSIFICATION, CATARACT, WITH INTRAOCULAR LENS IMPLANT;  Surgeon: Milton Maki MD;  Location: [facility]     PHACOEMULSIFICATION WITH STANDARD INTRAOCULAR LENS IMPLANT Left 6/21/2019    Procedure: Left Eye Cataract Removal with Intraocular Lens Implant with Intraoperative Avastin Injection;  Surgeon: Lacey Eugene MD;  Location: [facility]     siladenatis  11/2017     Physical Exam     /71   Pulse 74   Ht 1.727 m (5' 8\")   Wt 83 kg (183 lb)   SpO2 97%   BMI 27.83 kg/m     FEET: no ulcers.  Results   Creatinine   Date Value Ref Range Status   01/14/2025 1.75 (H) 0.67 - 1.17 mg/dL Final   06/09/2021 1.45 (H) 0.66 - 1.25 mg/dL Final     GFR Estimate   Date Value Ref Range Status   01/14/2025 40 (L) >60 mL/min/1.73m2 Final     Comment:     eGFR calculated using 2021 CKD-EPI equation.   06/09/2021 48 (L) >60 mL/min/[1.73_m2] Final     Comment:     Non  GFR Calc  Starting " 12/18/2018, serum creatinine based estimated GFR (eGFR) will be   calculated using the Chronic Kidney Disease Epidemiology Collaboration   (CKD-EPI) equation.       Hemoglobin A1C   Date Value Ref Range Status   11/08/2024 8.4 (H) <5.7 % Final     Comment:     Normal <5.7%   Prediabetes 5.7-6.4%    Diabetes 6.5% or higher     Note: Adopted from ADA consensus guidelines.   06/09/2021 8.2 (H) 0 - 5.6 % Final     Comment:     Normal <5.7% Prediabetes 5.7-6.4%  Diabetes 6.5% or higher - adopted from ADA   consensus guidelines.       Potassium   Date Value Ref Range Status   01/14/2025 4.4 3.4 - 5.3 mmol/L Final   06/09/2022 4.3 3.4 - 5.3 mmol/L Final   06/09/2021 4.2 3.4 - 5.3 mmol/L Final     ALT   Date Value Ref Range Status   03/12/2021 36 0 - 70 U/L Final     AST   Date Value Ref Range Status   01/03/2024 23 0 - 45 U/L Final   03/12/2021 24 0 - 45 U/L Final     TSH   Date Value Ref Range Status   01/03/2024 1.76 0.30 - 4.20 uIU/mL Final   06/09/2021 0.98 0.40 - 4.00 mU/L Final     T4 Free   Date Value Ref Range Status   10/21/2014 1.00 0.76 - 1.46 ng/dL Final     Comment:     Effective 7/30/2014, the reference range for this assay has changed to reflect   new instrumentation/methodology.           Cholesterol   Date Value Ref Range Status   01/03/2024 184 <200 mg/dL Final   02/06/2023 153 <200 mg/dL Final   06/18/2019 145 <200 mg/dL Final   03/06/2018 101 <200 mg/dL Final     HDL Cholesterol   Date Value Ref Range Status   06/18/2019 38 (L) >39 mg/dL Final   03/06/2018 32 (L) >39 mg/dL Final     Direct Measure HDL   Date Value Ref Range Status   01/03/2024 32 (L) >=40 mg/dL Final   02/06/2023 40 >=40 mg/dL Final     LDL Cholesterol Calculated   Date Value Ref Range Status   01/03/2024 88 <=100 mg/dL Final   02/06/2023 57 <=100 mg/dL Final   06/18/2019 49 <100 mg/dL Final     Comment:     Desirable:       <100 mg/dl   03/06/2018 36 <100 mg/dL Final     Comment:     Desirable:       <100 mg/dl     Triglycerides   Date  Value Ref Range Status   01/03/2024 319 (H) <150 mg/dL Final   02/06/2023 279 (H) <150 mg/dL Final   06/18/2019 289 (H) <150 mg/dL Final     Comment:     Borderline high:  150-199 mg/dl  High:             200-499 mg/dl  Very high:       >499 mg/dl     03/06/2018 166 (H) <150 mg/dL Final     Comment:     Borderline high:  150-199 mg/dl  High:             200-499 mg/dl  Very high:       >499 mg/dl       Cholesterol/HDL Ratio   Date Value Ref Range Status   09/09/2014 3.8 0.0 - 5.0 Final   11/20/2013 5.8 (H) 0.0 - 5.0 Final       ASSESSMENT/PLAN:   1. 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 Pt also has PVD.  A1C and blood sugar values have improved with addition of Mounjaro.  Continue Mounjaro 5 mg subcutaneous once a week.  He is no longer requiring mealtime insulin.  Patient instructed to continue Tresiba 50 units subcutaneous daily and Jardiance 10 mg each a.m.  Will avoid increasing Jardiance dose given patient's history of urinary incontinence and dehydration.  Continue current Mounjaro dose for now. May consider increasing Mounjaro 7.5 mg subcutaneous once a week when I see him in April 2024.  Again, I reviewed how Mounjaro works and possible side effects of the drug including n/v, GI distress, constipation, hypoglycemia and rare risk of pancreatitis.  Pt denies hx of pancreatitis or gastroparesis.  Reminded pt to keep himself well hydrated while taking Jardiance.  Encouraged patient to make healthy food choices, reduce his food portions with meals, avoid snacking and walk for exercise.      2. GOITER: Not addressed today.   TSH normal in Jan 2024.    3. NEPHROPATHY: Pt has [id number] and followed here by renal staff.  Creat 1.75 with GFR 40 mL/min today. K+ normal.  He states he is taking Cozaar.  Also taking Jardiance and Mounjaro.  BP today 122/71.  Blood  sugar values have improved.    4.  RETINOPATHY:  Seen here by [facility] in September 2024.  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    5.  NEUROPATHY: Continue alpha-lipoic acid and Gabapentin.  No foot ulcers.    6. DYSLIPIDEMIA: LDL 88 in January 2024.  Pt is taking Lipitor and Fenofibrate.    7. OBESITY: See under # 1 above.  Continue Mounjaro 5 mg subcutaneous once a week.  Encourage patient to make healthy food choices and remain active.    8. HEALTH MAINTENANCE: See primary care provider for health maintenance.    9.  FOLLOW UP: with me in April 2025, August 2025 and December 2025.  Creatinine/GFR and K+ ordered today.    Time spent reviewing pt's chart notes,labs and freestyle libre2 sensor download data today=5 minutes.  Time for clinic visit today=  20 minutes.  Time for documentation today = 10 minutes.    TOTAL TIME FOR VISIT TODAY = 35  minutes    Pamela Laura PA-C                      Again, thank you for allowing me to participate in the care of your patient.      Sincerely,    Pamela Laura PA-C

## 2025-01-15 NOTE — PROGRESS NOTES
PATT Rene is a 74 year old male with type 2 diabetes mellitus.  Since his last visit, patient was admitted 12/5/2024-12/10/2024 with mastoiditis- left side.  He has follow-up with ENT.  I had started patient on Mounjaro in 20024 and his glycemic control has improved. Pt is no longer requiring mealtime insulin, his glycemic control has not improved and he has lost weight.  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  Pt also has hx of hyperlipidemia, HTN, PVD, obesity, presumed localized prostate cancer, BPH, goiter with right thyroid nodule and osteoporosis.  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  For his diabetes, patient states he is taking Mounjaro 5 mg subcutaneous once a week, Tresiba 50 units SQ at hs and Jardiance 10 mg each am.  No longer requiring Humalog.  A1C 8.4 % in November 2024 and A1C was 7.7 % in July 2024.  Pt's A1C was 10.3 % in Jan 2024.  I reviewed and scanned his freestyle libre2 sensor download data in his note below.  Average glucose 160.  Glucose in target range 64 % of the time.  No frequent hypoglycemia.  On ROS today, continues to have left ear and left mastoid pain, but less overall per patient.    No fevers or rigors at this time.    Patient denies nausea, vomiting or abdominal pain.  Using MiraLAX intermittently for constipation.  Symptoms of diabetic neuropathy in both feet.  Patient denies dysuria, hematuria or symptoms of groin yeast infection.  Pt denies SOB at rest, cough or chest pain.    DIABETES CARE:  Retinopathy: Yes.  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  Patient seen by ophthalmology here in September 2024.  Nephropathy: Yes.  HEAgkrsp4p.  Pt thinks he is taking Cozaar daily.  Followed by Nephrology staff.    Neuropathy: yes.   Foot exam.  No ulcers.    Lipids: LDL 88 in Jan 2024.  Pt is taking  Lipitor and fenofibrate.  Taking ASA: No  Insulin: Basal insulin.    DM meds: Jardiance and Mounjaro.  Pt did NOT tolerate Ozempic- diarrhea.  AVOID Metformin-CKD.  Testing:  Freestyle Libre2 sensor.        ROS   Please see under HPI.     ALLERGIES:  Review of patient's allergies indicates no known allergies.      Current Outpatient Medications   Medication Sig Dispense Refill    amLODIPine (NORVASC) 5 MG tablet Take 1 tablet by mouth daily.      alpha-lipoic acid 600 MG capsule Take 1 capsule (600 mg) by mouth daily 90 capsule 3    ARTIFICIAL TEAR OP Apply to eye as needed      atorvastatin (LIPITOR) 20 MG tablet TAKE 1 TABLET BY MOUTH EVERY DAY 90 tablet 1    blood glucose (NO BRAND SPECIFIED) lancets standard Lancets that go with device, Test 3 times daily 300 each 3    Continuous Glucose Sensor (FREESTYLE PETER 2 SENSOR) MISC 1 each every 14 days. 1 each every 14 days. Change every 14 days. 2 each 5    empagliflozin (JARDIANCE) 10 MG TABS tablet TAKE 1 TABLET (10 MG) BY MOUTH DAILY. 90 tablet 2    famotidine (PEPCID) 20 MG tablet TAKE 1 TABLET BY MOUTH EVERY DAY 90 tablet 1    fenofibrate 54 MG tablet Take 1 tablet (54 mg) by mouth daily 90 tablet 0    finasteride (PROSCAR) 5 MG tablet Take 1 tablet (5 mg) by mouth daily 90 tablet 3    gabapentin (NEURONTIN) 300 MG capsule Take 1 capsule (300 mg) by mouth 2 times daily 60 capsule 3    insulin degludec (TRESIBA FLEXTOUCH) 100 UNIT/ML pen INJECT 50 UNITS SUBCUTANEOUSLY AT BEDTIME. 45 mL 3    insulin pen needle (B-D U/F) 31G X 5 MM miscellaneous Use 4 time(s) per day.  Please dispense as BD Pen Needle Mini U/F 31G x 5  each 3    loratadine (CLARITIN) 10 MG tablet Take 1 tablet (10 mg) by mouth daily 90 tablet 0    losartan (COZAAR) 100 MG tablet Take 1 tablet (100 mg) by mouth at bedtime 90 tablet 3    mirabegron (MYRBETRIQ) 25 MG 24 hr tablet TAKE 1 TABLET BY MOUTH EVERY DAY 90 tablet 1    sodium bicarbonate 650 MG tablet Take 2 tablets (1,300 mg) by mouth 2  times daily 270 tablet 3    tamsulosin (FLOMAX) 0.4 MG capsule Take 2 capsules (0.8 mg) by mouth daily. For more refills,schedule an appointment at [phone] 180 capsule 0    tirzepatide (MOUNJARO) 5 MG/0.5ML pen Inject 5 mg subcutaneously every 7 days. 2 mL 5    triamcinolone (KENALOG) 0.1 % external cream Apply topically 2 times daily 30 g 0    vitamin C 250 MG tablet Take 250 mg by mouth.      VITAMIN D3 25 MCG (1000 UT) tablet TAKE 2 TABLETS (50 MCG) BY MOUTH DAILY 180 tablet 3     Family Hx   No change.     Personal Hx   Smoke: none.   ETOH: none.    with grown children.    PMH   1. Type 2 Diabetes Mellitus dx at age 43.   2. Neuropathy.  3. Nephropathy.   4. ED.   5. Dyslipidemia.   6. Nephrolithiasis.   7. Decrease auditory acuity.   8. Sarcoidosis-lung.   9. Goiter.   10. S/P T & A.   11. S/P FX right heel.   12. Vit D def.   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   14. CT chest- ? granulomas.   15. Retinopathy.  16. Goiter.  Past Medical History:   Diagnosis Date    Blepharitis of both eyes     BPH (benign prostatic hyperplasia)     Diabetes (H)     Diabetic neuropathy (H)     Diabetic retinopathy associated with diabetes mellitus due to underlying condition (H)     Dry eye syndrome     GERD (gastroesophageal reflux disease)     Goiter     Granulomatous disease (H)     HLD (hyperlipidemia)     HTN (hypertension)     Nonsenile cataract     Peripheral neuropathy      Past Surgical History:   Procedure Laterality Date    ------------OTHER-------------      back of neck abscess drainage in OR    AS RAD RESEC TONSIL/PILLARS Bilateral 1961    CATARACT IOL, RT/LT Left     COLONOSCOPY  7/29/2013    Procedure: COLONOSCOPY;;  Surgeon: Montana Pascal MD;  Location: [facility]    EXCISE MASS UPPER EXTREMITY Right 11/11/2019    Procedure: EXCISION, MASS, UPPER EXTREMITY, RIGHT SHOULDER;  Surgeon: Johana Choudhury MD;  Location: [facility]    INJECT EPIDURAL LUMBAR Left 4/20/2023    Procedure: INJECTION, SPINE,  "LUMBAR, EPIDURAL (L4-L5);  Surgeon: Mahamed Vázquez MD;  Location: [facility]    INJECT SACROILIAC JOINT Bilateral 9/13/2022    Procedure: Bilateral sacroiliac joint injection;  Surgeon: Collin Mata MD;  Location: [facility]    INTRAVITREAL INJECTION CHEMOTHERAPY Right 12/30/2019    Procedure: INTRAVITREAL Bevacizumab injection;  Surgeon: Milton Maki MD;  [facility]    PHACOEMULSIFICATION CLEAR CORNEA WITH STANDARD INTRAOCULAR LENS IMPLANT Right 12/30/2019    Procedure: PHACOEMULSIFICATION, CATARACT, WITH INTRAOCULAR LENS IMPLANT;  Surgeon: Milton Maki MD;  Location: [facility]    PHACOEMULSIFICATION WITH STANDARD INTRAOCULAR LENS IMPLANT Left 6/21/2019    Procedure: Left Eye Cataract Removal with Intraocular Lens Implant with Intraoperative Avastin Injection;  Surgeon: Lacey Eugene MD;  Location: [facility]    siladenatis  11/2017     Physical Exam     /71   Pulse 74   Ht 1.727 m (5' 8\")   Wt 83 kg (183 lb)   SpO2 97%   BMI 27.83 kg/m     FEET: no ulcers.  Results   Creatinine   Date Value Ref Range Status   01/14/2025 1.75 (H) 0.67 - 1.17 mg/dL Final   06/09/2021 1.45 (H) 0.66 - 1.25 mg/dL Final     GFR Estimate   Date Value Ref Range Status   01/14/2025 40 (L) >60 mL/min/1.73m2 Final     Comment:     eGFR calculated using 2021 CKD-EPI equation.   06/09/2021 48 (L) >60 mL/min/[1.73_m2] Final     Comment:     Non  GFR Calc  Starting 12/18/2018, serum creatinine based estimated GFR (eGFR) will be   calculated using the Chronic Kidney Disease Epidemiology Collaboration   (CKD-EPI) equation.       Hemoglobin A1C   Date Value Ref Range Status   11/08/2024 8.4 (H) <5.7 % Final     Comment:     Normal <5.7%   Prediabetes 5.7-6.4%    Diabetes 6.5% or higher     Note: Adopted from ADA consensus guidelines.   06/09/2021 8.2 (H) 0 - 5.6 % Final     Comment:     Normal <5.7% Prediabetes 5.7-6.4%  Diabetes 6.5% or higher - adopted from ADA   consensus guidelines.       Potassium "   Date Value Ref Range Status   01/14/2025 4.4 3.4 - 5.3 mmol/L Final   06/09/2022 4.3 3.4 - 5.3 mmol/L Final   06/09/2021 4.2 3.4 - 5.3 mmol/L Final     ALT   Date Value Ref Range Status   03/12/2021 36 0 - 70 U/L Final     AST   Date Value Ref Range Status   01/03/2024 23 0 - 45 U/L Final   03/12/2021 24 0 - 45 U/L Final     TSH   Date Value Ref Range Status   01/03/2024 1.76 0.30 - 4.20 uIU/mL Final   06/09/2021 0.98 0.40 - 4.00 mU/L Final     T4 Free   Date Value Ref Range Status   10/21/2014 1.00 0.76 - 1.46 ng/dL Final     Comment:     Effective 7/30/2014, the reference range for this assay has changed to reflect   new instrumentation/methodology.           Cholesterol   Date Value Ref Range Status   01/03/2024 184 <200 mg/dL Final   02/06/2023 153 <200 mg/dL Final   06/18/2019 145 <200 mg/dL Final   03/06/2018 101 <200 mg/dL Final     HDL Cholesterol   Date Value Ref Range Status   06/18/2019 38 (L) >39 mg/dL Final   03/06/2018 32 (L) >39 mg/dL Final     Direct Measure HDL   Date Value Ref Range Status   01/03/2024 32 (L) >=40 mg/dL Final   02/06/2023 40 >=40 mg/dL Final     LDL Cholesterol Calculated   Date Value Ref Range Status   01/03/2024 88 <=100 mg/dL Final   02/06/2023 57 <=100 mg/dL Final   06/18/2019 49 <100 mg/dL Final     Comment:     Desirable:       <100 mg/dl   03/06/2018 36 <100 mg/dL Final     Comment:     Desirable:       <100 mg/dl     Triglycerides   Date Value Ref Range Status   01/03/2024 319 (H) <150 mg/dL Final   02/06/2023 279 (H) <150 mg/dL Final   06/18/2019 289 (H) <150 mg/dL Final     Comment:     Borderline high:  150-199 mg/dl  High:             200-499 mg/dl  Very high:       >499 mg/dl     03/06/2018 166 (H) <150 mg/dL Final     Comment:     Borderline high:  150-199 mg/dl  High:             200-499 mg/dl  Very high:       >499 mg/dl       Cholesterol/HDL Ratio   Date Value Ref Range Status   09/09/2014 3.8 0.0 - 5.0 Final   11/20/2013 5.8 (H) 0.0 - 5.0 Final        ASSESSMENT/PLAN:   1. 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 Pt also has PVD.  A1C and blood sugar values have improved with addition of Mounjaro.  Continue Mounjaro 5 mg subcutaneous once a week.  He is no longer requiring mealtime insulin.  Patient instructed to continue Tresiba 50 units subcutaneous daily and Jardiance 10 mg each a.m.  Will avoid increasing Jardiance dose given patient's history of urinary incontinence and dehydration.  Continue current Mounjaro dose for now. May consider increasing Mounjaro 7.5 mg subcutaneous once a week when I see him in April 2024.  Again, I reviewed how Mounjaro works and possible side effects of the drug including n/v, GI distress, constipation, hypoglycemia and rare risk of pancreatitis.  Pt denies hx of pancreatitis or gastroparesis.  Reminded pt to keep himself well hydrated while taking Jardiance.  Encouraged patient to make healthy food choices, reduce his food portions with meals, avoid snacking and walk for exercise.      2. GOITER: Not addressed today.   TSH normal in Jan 2024.    3. NEPHROPATHY: Pt has [id number] and followed here by renal staff.  Creat 1.75 with GFR 40 mL/min today. K+ normal.  He states he is taking Cozaar.  Also taking Jardiance and Mounjaro.  BP today 122/71.  Blood sugar values have improved.    4.  RETINOPATHY:  Seen here by [facility] in September 2024.  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    5.  NEUROPATHY: Continue alpha-lipoic acid and Gabapentin.  No foot ulcers.    6. DYSLIPIDEMIA: LDL 88 in January 2024.  Pt is taking Lipitor and Fenofibrate.    7. OBESITY: See under # 1 above.  Continue Mounjaro 5 mg subcutaneous once a week.  Encourage patient to make  healthy food choices and remain active.    8. HEALTH MAINTENANCE: See primary care provider for health maintenance.    9.  FOLLOW UP: with me in April 2025, August 2025 and December 2025.  Creatinine/GFR and K+ ordered today.    Time spent reviewing pt's chart notes,labs and freestyle libre2 sensor download data today=5 minutes.  Time for clinic visit today=  20 minutes.  Time for documentation today = 10 minutes.    TOTAL TIME FOR VISIT TODAY = 35  minutes    Pamela Laura PA-C

## 2025-01-22 ENCOUNTER — TRANSFERRED RECORDS (OUTPATIENT)
Dept: HEALTH INFORMATION MANAGEMENT | Facility: CLINIC | Age: 75
End: 2025-01-22
Payer: COMMERCIAL

## 2025-01-23 ENCOUNTER — TRANSFERRED RECORDS (OUTPATIENT)
Dept: HEALTH INFORMATION MANAGEMENT | Facility: CLINIC | Age: 75
End: 2025-01-23
Payer: COMMERCIAL

## 2025-01-28 ENCOUNTER — MEDICAL CORRESPONDENCE (OUTPATIENT)
Dept: HEALTH INFORMATION MANAGEMENT | Facility: CLINIC | Age: 75
End: 2025-01-28
Payer: COMMERCIAL

## 2025-01-28 ENCOUNTER — TRANSCRIBE ORDERS (OUTPATIENT)
Dept: OTHER | Age: 75
End: 2025-01-28

## 2025-01-28 DIAGNOSIS — H66.92 ACUTE OTITIS MEDIA, LEFT: Primary | ICD-10-CM

## 2025-01-28 DIAGNOSIS — H92.02 OTALGIA OF LEFT EAR: ICD-10-CM

## 2025-02-05 ENCOUNTER — TRANSFERRED RECORDS (OUTPATIENT)
Dept: HEALTH INFORMATION MANAGEMENT | Facility: CLINIC | Age: 75
End: 2025-02-05
Payer: COMMERCIAL

## 2025-02-11 ENCOUNTER — OFFICE VISIT (OUTPATIENT)
Dept: INTERNAL MEDICINE | Facility: CLINIC | Age: 75
End: 2025-02-11
Payer: COMMERCIAL

## 2025-02-11 ENCOUNTER — LAB (OUTPATIENT)
Dept: LAB | Facility: CLINIC | Age: 75
End: 2025-02-11
Payer: COMMERCIAL

## 2025-02-11 VITALS
TEMPERATURE: 97.5 F | BODY MASS INDEX: 27.11 KG/M2 | HEART RATE: 79 BPM | RESPIRATION RATE: 14 BRPM | WEIGHT: 178.3 LBS | OXYGEN SATURATION: 100 % | SYSTOLIC BLOOD PRESSURE: 121 MMHG | DIASTOLIC BLOOD PRESSURE: 77 MMHG

## 2025-02-11 DIAGNOSIS — E11.3311: Primary | ICD-10-CM

## 2025-02-11 DIAGNOSIS — M79.2 POLYNEUROPATHIC PAIN: ICD-10-CM

## 2025-02-11 DIAGNOSIS — G89.29 CHRONIC BILATERAL LOW BACK PAIN WITH BILATERAL SCIATICA: ICD-10-CM

## 2025-02-11 DIAGNOSIS — Z79.4 TYPE 2 DIABETES MELLITUS WITH BOTH EYES AFFECTED BY MILD NONPROLIFERATIVE RETINOPATHY AND MACULAR EDEMA, WITH LONG-TERM CURRENT USE OF INSULIN (H): ICD-10-CM

## 2025-02-11 DIAGNOSIS — M54.42 CHRONIC BILATERAL LOW BACK PAIN WITH BILATERAL SCIATICA: ICD-10-CM

## 2025-02-11 DIAGNOSIS — H70.92 MASTOIDITIS OF LEFT SIDE: ICD-10-CM

## 2025-02-11 DIAGNOSIS — E11.3213 TYPE 2 DIABETES MELLITUS WITH BOTH EYES AFFECTED BY MILD NONPROLIFERATIVE RETINOPATHY AND MACULAR EDEMA, WITH LONG-TERM CURRENT USE OF INSULIN (H): ICD-10-CM

## 2025-02-11 DIAGNOSIS — M54.41 CHRONIC BILATERAL LOW BACK PAIN WITH BILATERAL SCIATICA: ICD-10-CM

## 2025-02-11 DIAGNOSIS — M79.2 POLYNEUROPATHIC PAIN: Primary | ICD-10-CM

## 2025-02-11 DIAGNOSIS — N18.31 STAGE 3A CHRONIC KIDNEY DISEASE (H): ICD-10-CM

## 2025-02-11 LAB
ANION GAP SERPL CALCULATED.3IONS-SCNC: 11 MMOL/L (ref 7–15)
BUN SERPL-MCNC: 28.1 MG/DL (ref 8–23)
CALCIUM SERPL-MCNC: 9.9 MG/DL (ref 8.8–10.4)
CHLORIDE SERPL-SCNC: 113 MMOL/L (ref 98–107)
CHOLEST SERPL-MCNC: 148 MG/DL
CREAT SERPL-MCNC: 1.86 MG/DL (ref 0.67–1.17)
EGFRCR SERPLBLD CKD-EPI 2021: 37 ML/MIN/1.73M2
EST. AVERAGE GLUCOSE BLD GHB EST-MCNC: 183 MG/DL
FASTING STATUS PATIENT QL REPORTED: ABNORMAL
FASTING STATUS PATIENT QL REPORTED: ABNORMAL
GLUCOSE SERPL-MCNC: 87 MG/DL (ref 70–99)
HBA1C MFR BLD: 8 %
HCO3 SERPL-SCNC: 21 MMOL/L (ref 22–29)
HDLC SERPL-MCNC: 39 MG/DL
LDLC SERPL CALC-MCNC: 65 MG/DL
NONHDLC SERPL-MCNC: 109 MG/DL
POTASSIUM SERPL-SCNC: 4.2 MMOL/L (ref 3.4–5.3)
SODIUM SERPL-SCNC: 145 MMOL/L (ref 135–145)
TRIGL SERPL-MCNC: 219 MG/DL

## 2025-02-11 PROCEDURE — 80048 BASIC METABOLIC PNL TOTAL CA: CPT | Performed by: PATHOLOGY

## 2025-02-11 PROCEDURE — 36415 COLL VENOUS BLD VENIPUNCTURE: CPT | Performed by: PATHOLOGY

## 2025-02-11 PROCEDURE — 83036 HEMOGLOBIN GLYCOSYLATED A1C: CPT | Performed by: HOSPITALIST

## 2025-02-11 PROCEDURE — 99000 SPECIMEN HANDLING OFFICE-LAB: CPT | Performed by: PATHOLOGY

## 2025-02-11 PROCEDURE — 80061 LIPID PANEL: CPT | Performed by: PATHOLOGY

## 2025-02-11 RX ORDER — GABAPENTIN 300 MG/1
300 CAPSULE ORAL 2 TIMES DAILY
Qty: 60 CAPSULE | Refills: 2 | Status: SHIPPED | OUTPATIENT
Start: 2025-02-11

## 2025-02-11 RX ORDER — GABAPENTIN 300 MG/1
300 CAPSULE ORAL 2 TIMES DAILY
Qty: 60 CAPSULE | Refills: 0 | Status: SHIPPED | OUTPATIENT
Start: 2025-02-11 | End: 2025-02-11

## 2025-02-11 RX ORDER — GABAPENTIN 300 MG/1
300 CAPSULE ORAL 2 TIMES DAILY
Qty: 60 CAPSULE | Refills: 3 | OUTPATIENT
Start: 2025-02-11

## 2025-02-11 NOTE — ASSESSMENT & PLAN NOTE
Likely from diabetes and peripheral vascular disease in both feet.   - Continued on gabapentin 300mg BID.   - Patient to follow up again with podiatry.   - Ordering DME for diabetic shoes.

## 2025-02-11 NOTE — ASSESSMENT & PLAN NOTE
Last A1c was 8.4 on November 8th. Does have CKD stage 3 and PVD. Poor monofilament exam today with difficult to illicit pulses of both feet, about a 3 second capillary refill to toes.   - Recheck A1c.   - Continued on tresiba, patient reports ranging from 30-50 units daily, mostly takes 40 units daily.   - Continued on mounjaro 5mg weekly.   - Continued on jardiance 10mg daily.   - Continued on losartan 100mg daily, atorvastatin 20mg daily.   - DME for diabetic shoes given to patient today.

## 2025-02-11 NOTE — TELEPHONE ENCOUNTER
Patient calling to see when this medication will be approved. Please respond. He missed his appointments and he knows this. He will make will make sure he makes the next appointment.

## 2025-02-11 NOTE — ASSESSMENT & PLAN NOTE
- Continued on gabapentin 300mg twice a day. Continue monitor renal function, has history of CKD stage 3 while on gabapentin.

## 2025-02-11 NOTE — ASSESSMENT & PLAN NOTE
Had right upper extremity PICC line, placed on 1/31/25. Had pseudomonas growth from left mastoid abscess. S/p tympanomastoidectomy, meatoplasty on 12/6/24.   - Continued on ceftazidime for a 6 week course.   - Patient to keep follow up with ENT.   - Patient to see ID on 2/19/25.   - Recheck BMP lab.

## 2025-02-11 NOTE — PATIENT INSTRUCTIONS
- Labs for A1c and BMP.   - DME order for diabetic shoes.   - Refills for monthly gabapentin with 2 refills.  - Keep follow up with podiatrist.   - Keep follow up with ENT.     Follow up again in 2-3 months with Dr. Wang.

## 2025-02-11 NOTE — PROGRESS NOTES
Assessment & Plan   Problem List Items Addressed This Visit       Type 2 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, with long-term current use of insulin (H)     Last A1c was 8.4 on November 8th. Does have CKD stage 3 and PVD. Poor monofilament exam today with difficult to illicit pulses of both feet, about a 3 second capillary refill to toes.   - Recheck A1c.   - Continued on tresiba, patient reports ranging from 30-50 units daily, mostly takes 40 units daily.   - Continued on mounjaro 5mg weekly.   - Continued on jardiance 10mg daily.   - Continued on losartan 100mg daily, atorvastatin 20mg daily.   - DME for diabetic shoes given to patient today.          Relevant Orders    Hemoglobin A1c (Completed)    Orthotics, Mastectomy and Custom Compression Orders    Polyneuropathic pain - Primary     Likely from diabetes and peripheral vascular disease in both feet.   - Continued on gabapentin 300mg BID.   - Patient to follow up again with podiatry.   - Ordering DME for diabetic shoes.          Relevant Medications    gabapentin (NEURONTIN) 300 MG capsule    Other Relevant Orders    Orthotics, Mastectomy and Custom Compression Orders    Mastoiditis of left side     Had right upper extremity PICC line, placed on 1/31/25. Had pseudomonas growth from left mastoid abscess. S/p tympanomastoidectomy, meatoplasty on 12/6/24.   - Continued on ceftazidime for a 6 week course.   - Patient to keep follow up with ENT.   - Patient to see ID on 2/19/25.   - Recheck BMP lab.          Chronic kidney disease, stage 3 (H)     - Recheck BMP today. Baseline Creatinine appears to be 1.6-1.9.          Relevant Orders    Basic metabolic panel  (Ca, Cl, CO2, Creat, Gluc, K, Na, BUN) (Completed)    Chronic bilateral low back pain with bilateral sciatica     - Continued on gabapentin 300mg twice a day. Continue monitor renal function, has history of CKD stage 3 while on gabapentin.           "Relevant Medications    gabapentin (NEURONTIN) 300 MG capsule           MED REC REQUIRED  Post Medication Reconciliation Status: discharge medications reconciled and changed, per note/orders  BMI  Estimated body mass index is 27.11 kg/m  as calculated from the following:    Height as of 1/14/25: 1.727 m (5' 8\").    Weight as of this encounter: 80.9 kg (178 lb 4.8 oz).           Return in about 2 months (around 4/11/2025). Next visit with PCP.       Katie Og is a 74 year old, presenting for the following health issues:  Hospital F/U (29 Jan 2025 - 03 Feb 2025, ear infection is healing), Dme (Diabetic shoes), and Medication Question      2/11/2025     1:33 PM   Additional Questions   Roomed by mulugeta BELTRE   Continues with left hip pains for a long time, for many months. Comes and goes when standing or sitting. Has numbness/tingling in his feet. Has been without gabapentin now for a few weeks. Mentions gabapentin helps with left hip pain.     No fevers or chills. Was diagnosed with left mastoiditis with abscess and chronic otitis media. Last hospitalized from 12/5/24 to 12/10/24 at [facility]. Continues to follow up with ENT and will be seeing ID this month. Had surgery of bone behind his left ear. ENT mentions he needed to be on IV antibiotics (ceftazidime) twice a day, told was for 6 weeks. Had growth of Pseudomonas. Had PICC placed in right arm on 1/31/25 at [facility]. Has some left ear pains at times but better.     Does continue to have skin itching, tries to use lotion. Scratches along left upper arm.      Will be seeing podiatry. Had toenails clipped with podiatry.     Glucoses ranging from . Does range tresiba at night, ranges from 30-50 units, mostly takes 40 units. He does follow with Dr. Irizarry with endocrinology.      Review of Systems  Constitutional, neuro, ENT, endocrine, pulmonary, cardiac, gastrointestinal, genitourinary, musculoskeletal, integument and psychiatric systems are " negative, except as otherwise noted.      Objective    /77 (BP Location: Left arm, Patient Position: Sitting, Cuff Size: Adult Regular)   Pulse 79   Temp 97.5  F (36.4  C) (Oral)   Resp 14   Wt 80.9 kg (178 lb 4.8 oz)   SpO2 100%   BMI 27.11 kg/m    Body mass index is 27.11 kg/m .  Physical Exam   GENERAL: alert and no distress  EYES: Eyes grossly normal to inspection, PERRL and conjunctivae and sclerae normal  HENT: left ear canal with yellow material present, unable to visualized TM, nose and mouth without ulcers or lesions. Mild tenderness along left mastoid process.   RESP: lungs clear to auscultation - no rales, rhonchi or wheezes  CV: regular rate and rhythm, normal S1 S2, no S3 or S4, no murmur, click or rub, no peripheral edema. Difficult to illicit pulses in both feet, cold sensation to feet. About 3 second capillary refill of toes in both feet.   MS: no gross musculoskeletal defects noted, no edema  SKIN: no suspicious lesions or rashes  NEURO: Normal strength and tone, mentation intact and speech normal. Decreased sensation with monofilament to both feet: both feet with decreased sensation along distal dorsal regions of feet including toes. Had monofilament sensation of left foot along plantar surfaces with exception of left heel, and sensation to dorsum of digits 2 and 4. Had monofilament sensation of right foot along plantar surface. Had sensation to dorsum regions of digits 2 and 3. No ulcerations along both feet. Callus along distal metatarsal along plantar surface of both feet. Some callus build up along both heels.    PSYCH: mentation appears normal, affect normal/bright            Signed Electronically by: Dixon Parada MD

## 2025-02-12 ENCOUNTER — TELEPHONE (OUTPATIENT)
Dept: INTERNAL MEDICINE | Facility: CLINIC | Age: 75
End: 2025-02-12
Payer: COMMERCIAL

## 2025-02-12 NOTE — TELEPHONE ENCOUNTER
Okay. He should keep our clinic informed if glucoses are persistently measuring above 200.     Dixon Parada MD  Internal Medicine  [facility], [place], [place]

## 2025-02-12 NOTE — TELEPHONE ENCOUNTER
Pt was informed the results and recommendations.  He mentioned that his fasting blood glucose has been low, around 72, and he does not want to increase his bedtime insulin (Tresiba). He prefers to keep the dosage at 40 units at bed time.    Soon-Mi  --------------------------------------------------------------------------------------------      Dixon Parada MD P [facility]  May let patient know his kidneys are likely stable with Creatinine measured at 1.8 this time. His A1c is still increased at 8.0. He should consider taking tresiba a little higher at 42 units if he usually takes 40 units at a time. Long acting insulin are usually adjusted on a weekly basis. If glucoses remain above 200, he should keep our clinic informed.    Dixon Parada MD  Internal Medicine  [facility], [place], [place]

## 2025-02-12 NOTE — TELEPHONE ENCOUNTER
gabapentin (NEURONTIN) 300 MG capsule 60 capsule 2 2/11/2025 -- No   Sig - Route: Take 1 capsule (300 mg) by mouth 2 times daily. - Oral   [id number]

## 2025-02-13 ENCOUNTER — TRANSFERRED RECORDS (OUTPATIENT)
Dept: HEALTH INFORMATION MANAGEMENT | Facility: CLINIC | Age: 75
End: 2025-02-13
Payer: COMMERCIAL

## 2025-02-24 ENCOUNTER — OFFICE VISIT (OUTPATIENT)
Dept: ORTHOPEDICS | Facility: CLINIC | Age: 75
End: 2025-02-24
Payer: COMMERCIAL

## 2025-02-24 DIAGNOSIS — M21.969 TYPE 2 DIABETES MELLITUS WITH DIABETIC FOOT DEFORMITY (H): ICD-10-CM

## 2025-02-24 DIAGNOSIS — E11.49 TYPE II OR UNSPECIFIED TYPE DIABETES MELLITUS WITH NEUROLOGICAL MANIFESTATIONS, NOT STATED AS UNCONTROLLED(250.60) (H): Primary | ICD-10-CM

## 2025-02-24 DIAGNOSIS — L84 TYLOMA: ICD-10-CM

## 2025-02-24 DIAGNOSIS — E11.51 DIABETES MELLITUS WITH PERIPHERAL VASCULAR DISEASE (H): ICD-10-CM

## 2025-02-24 DIAGNOSIS — E11.69 TYPE 2 DIABETES MELLITUS WITH DIABETIC FOOT DEFORMITY (H): ICD-10-CM

## 2025-02-24 DIAGNOSIS — B35.3 TINEA PEDIS OF BOTH FEET: ICD-10-CM

## 2025-02-24 DIAGNOSIS — B35.1 ONYCHOMYCOSIS: ICD-10-CM

## 2025-02-24 RX ORDER — ECONAZOLE NITRATE 10 MG/G
CREAM TOPICAL DAILY
Qty: 85 G | Refills: 5 | Status: SHIPPED | OUTPATIENT
Start: 2025-02-24

## 2025-02-24 NOTE — PROGRESS NOTES
Past Medical History:   Diagnosis Date    Blepharitis of both eyes     BPH (benign prostatic hyperplasia)     Diabetes (H)     Diabetic neuropathy (H)     Diabetic retinopathy associated with diabetes mellitus due to underlying condition (H)     Dry eye syndrome     GERD (gastroesophageal reflux disease)     Goiter     Granulomatous disease (H)     HLD (hyperlipidemia)     HTN (hypertension)     Nonsenile cataract     Peripheral neuropathy      Patient Active Problem List   Diagnosis    Psychological factors associated with another disorder    Mood disorder in conditions classified elsewhere    Occupational problem    Type 2 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, with long-term current use of insulin (H)    Erectile dysfunction    Hyperlipidemia with target LDL less than 100    CTS (carpal tunnel syndrome)    Diabetic polyneuropathy (H)    Presbyopia    Myopia    Cataracts, bilateral    Pain of right thumb    Subcutaneous mass    Combined form of nonsenile cataract of right eye    Colonic polyp    Elevated PSA    Heel fracture    Hyponatremia    Nephrolithiasis    Pain of finger of right hand    Sarcoidosis of lung    Sialoadenitis    Adhesive capsulitis of shoulder    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)    Chronic kidney disease, stage 3 (H)    Peripheral vascular disease, unspecified    Malignant neoplasm of prostate (H)    Chronic bilateral low back pain with bilateral sciatica    Pain of both sacroiliac joints    Lumbar radiculopathy    Mastoiditis of left side    Polyneuropathic pain     Past Surgical History:   Procedure Laterality Date    ------------OTHER-------------      back of neck abscess drainage in OR    AS RAD RESEC TONSIL/PILLARS Bilateral 1961    CATARACT IOL, RT/LT Left     COLONOSCOPY  7/29/2013    Procedure: COLONOSCOPY;;  Surgeon: Montana Pascal MD;  Location: [facility]     EXCISE MASS UPPER EXTREMITY Right 11/11/2019    Procedure: EXCISION, MASS, UPPER EXTREMITY, RIGHT SHOULDER;  Surgeon: Johana Choudhury MD;  [facility]    INJECT EPIDURAL LUMBAR Left 4/20/2023    Procedure: INJECTION, SPINE, LUMBAR, EPIDURAL (L4-L5);  Surgeon: Mahamed Vázquez MD;  Location: [facility]    INJECT SACROILIAC JOINT Bilateral 9/13/2022    Procedure: Bilateral sacroiliac joint injection;  Surgeon: Collin Mata MD;  Location: [facility]    INTRAVITREAL INJECTION CHEMOTHERAPY Right 12/30/2019    Procedure: INTRAVITREAL Bevacizumab injection;  Surgeon: Milton Maki MD;  [facility]    PHACOEMULSIFICATION CLEAR CORNEA WITH STANDARD INTRAOCULAR LENS IMPLANT Right 12/30/2019    Procedure: PHACOEMULSIFICATION, CATARACT, WITH INTRAOCULAR LENS IMPLANT;  Surgeon: Milton Maki MD;  [facility]    PHACOEMULSIFICATION WITH STANDARD INTRAOCULAR LENS IMPLANT Left 6/21/2019    Procedure: Left Eye Cataract Removal with Intraocular Lens Implant with Intraoperative Avastin Injection;  Surgeon: Lacey Eugene MD;  [facility]    siladenatis  11/2017     Social History     Socioeconomic History    Marital status:      Spouse name: Not on file    Number of children: 5    Years of education: Not on file    Highest education level: Not on file   Occupational History    Occupation: private bussiness owner   Tobacco Use    Smoking status: Never    Smokeless tobacco: Never   Vaping Use    Vaping status: Never Used   Substance and Sexual Activity    Alcohol use: Yes     Comment: occasionaly    Drug use: No    Sexual activity: Not on file   Other Topics Concern    Parent/sibling w/ CABG, MI or angioplasty before 65F 55M? Not Asked   Social History Narrative    Not on file     Social Drivers of Health     Financial Resource Strain: Low Risk  (10/29/2024)    Received from [facility]    Financial Resource Strain     Difficulty of Paying Living  Expenses: 3     Difficulty of Paying Living Expenses: Not on file   Food Insecurity: No Food Insecurity (1/30/2025)    Received from [facility]    Food Insecurity     Do you worry your food will run out before you are able to buy more?: 1   Transportation Needs: Unmet Transportation Needs (1/30/2025)    Received from [facility]    Transportation Needs     Does lack of transportation keep you from medical appointments?: 1     Does lack of transportation keep you from work, meetings or getting things that you need?: 2   Physical Activity: Not on file   Stress: Not on file   Social Connections: Socially Integrated (1/30/2025)    [facility]    Social Connections     Do you often feel lonely or isolated from those around you?: 0   Interpersonal Safety: Low Risk  (2/11/2025)    Interpersonal Safety     Do you feel physically and emotionally safe where you currently live?: Yes     Within the past 12 months, have you been hit, slapped, kicked or otherwise physically hurt by someone?: No     Within the past 12 months, have you been humiliated or emotionally abused in other ways by your partner or ex-partner?: No   Housing Stability: Low Risk  (1/30/2025)    Received from [facility]    Housing Stability     What is your housing situation today?: 1     Family History   Problem Relation Age of Onset    Diabetes Father     Myocardial Infarction Father     Diabetes Brother     Leukemia Brother 44    Glaucoma No family hx of     Macular Degeneration No family hx of     Kidney Disease No family hx of          Lab Results   Component Value Date    A1C 8.0 02/11/2025    A1C 8.4 11/08/2024    A1C 7.7 07/09/2024    A1C 8.8 04/16/2024    A1C 10.3 01/03/2024    A1C 8.3 11/02/2021    A1C 8.2 06/09/2021    A1C 9.2 06/18/2019    A1C 9.1 03/06/2018    A1C 10.2 06/06/2017    A1C 9.0 03/16/2016          2/19/2025 creatinine 1.92 with EGFR 36 as noted in the EMR.  2/19/2025 CBC and CRP reviewed as noted in the EMR.  Last Comprehensive Metabolic Panel:  Lab Results   Component Value Date     02/11/2025    POTASSIUM 4.2 02/11/2025    CHLORIDE 113 (H) 02/11/2025    CO2 21 (L) 02/11/2025    ANIONGAP 11 02/11/2025    GLC 87 02/11/2025    BUN 28.1 (H) 02/11/2025    CR 1.86 (H) 02/11/2025    GFRESTIMATED 37 (L) 02/11/2025    INDERJIT 9.9 02/11/2025         Subjective findings- 74-year-old returns clinic for painful feet.  He relates he has numbness tingling and neuropathy in his feet.  Relates to no ulcers, no sores, no injuries since we seen him last.  Relates he is getting shooting pain from his hip down to his foot left greater than right.  Relates he is wearing his diabetic shoes and those have helped, feels they have helped with the fifth toe but he still has a callus there that is painful.  Relates he needs his toenails cut.  Relates he did use the urea cream and he felt that helped previously but it he ran out.    Objective findings- DP and PT are 1 out of 4 bilaterally.  Has decreased hair growth bilaterally.  Has venous stasis bilaterally.  Has minimal peripheral edema bilaterally.  Has dorsally contracted digits 1 through 5 bilaterally.  Has decreased ankle joint dorsiflexion bilaterally.  Has hyperkeratotic tissue buildup dorsal lateral fifth toe with ecchymosis.  Has mild hyperkeratotic tissue buildup plantar first MPJ right.  Has generalized decreased range of motion bilaterally.  Sharp/dull is decreased with 5.07 [place] monofilament on 2+ spots on the right foot and 2+ spots on the left foot, proprioception is intact on the left foot and decreased on the right foot, deep tendon reflexes are intact bilaterally, no tendon voids bilaterally.  Has dystrophic thickened brittle nails with subungual debris dystrophy and discoloration 1 through 5 bilaterally to differing degrees with pain on  palpation.  Has dry scaly skin moccasin pattern bilaterally.  There is no erythema, no drainage, no odor, no calor bilaterally.    Assessment and plan- Diabetes with peripheral Neuropathy.  Diabetes with peripheral Vascular disease.  Diabetes with Hammertoes and Equinus.  Tinea Pedis bilaterally.  Onychomycosis bilaterally.  Tyoma right 5th toe with pre ulcerative changes.  Diagnosis and treatment options discussed with the patient.  All the toenails 1 through 5 bilaterally were debrided upon consent, 6+ toenails were debrided.  The Tyloma on the right dorsal lateral fifth toe was sharp debrided with a tissue cutter and a 15 blade upon consent.  He is advised on runner stretch.  Rescription for Econazole for the Onychomycosis and the Tinea Pedis cream given and use discussed with the patient.  He relates he still has Betadine, he can use this as needed on the fifth toe callus.  Referral to sports medicine for sciatica symptoms given and use discussed with the patient.  Referral to vascular interventional radiology given and use discussed with them.  Previous notes reviewed.  Return to clinic and see me in 2 to 3 months.                                                                          Moderate level of medical decision making.

## 2025-02-24 NOTE — LETTER
2/24/2025      Earle Rene  [address]  [place] [address]      Dear Colleague,      Thank you for referring your patient, Earle Rene, to the [facility] [place]. Please see a copy of my visit note below.    Past Medical History:   Diagnosis Date    Blepharitis of both eyes     BPH (benign prostatic hyperplasia)     Diabetes (H)     Diabetic neuropathy (H)     Diabetic retinopathy associated with diabetes mellitus due to underlying condition (H)     Dry eye syndrome     GERD (gastroesophageal reflux disease)     Goiter     Granulomatous disease (H)     HLD (hyperlipidemia)     HTN (hypertension)     Nonsenile cataract     Peripheral neuropathy      Patient Active Problem List   Diagnosis    Psychological factors associated with another disorder    Mood disorder in conditions classified elsewhere    Occupational problem    Type 2 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, with long-term current use of insulin (H)    Erectile dysfunction    Hyperlipidemia with target LDL less than 100    CTS (carpal tunnel syndrome)    Diabetic polyneuropathy (H)    Presbyopia    Myopia    Cataracts, bilateral    Pain of right thumb    Subcutaneous mass    Combined form of nonsenile cataract of right eye    Colonic polyp    Elevated PSA    Heel fracture    Hyponatremia    Nephrolithiasis    Pain of finger of right hand    Sarcoidosis of lung    Sialoadenitis    Adhesive capsulitis of shoulder    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)    Chronic kidney disease, stage 3 (H)    Peripheral vascular disease, unspecified    Malignant neoplasm of prostate (H)    Chronic bilateral low back pain with bilateral sciatica    Pain of both sacroiliac joints    Lumbar radiculopathy    Mastoiditis of left side    Polyneuropathic pain     Past Surgical History:   Procedure Laterality  Date    ------------OTHER-------------      back of neck abscess drainage in OR    AS RAD RESEC TONSIL/PILLARS Bilateral 1961    CATARACT IOL, RT/LT Left     COLONOSCOPY  7/29/2013    Procedure: COLONOSCOPY;;  Surgeon: Montana Pascal MD;  [facility]    EXCISE MASS UPPER EXTREMITY Right 11/11/2019    Procedure: EXCISION, MASS, UPPER EXTREMITY, RIGHT SHOULDER;  Surgeon: Johana Choudhury MD;  [facility]    INJECT EPIDURAL LUMBAR Left 4/20/2023    Procedure: INJECTION, SPINE, LUMBAR, EPIDURAL (L4-L5);  Surgeon: Mahamed Vázquez MD;  [facility]    INJECT SACROILIAC JOINT Bilateral 9/13/2022    Procedure: Bilateral sacroiliac joint injection;  Surgeon: Collin Mata MD;  Location[facility]    INTRAVITREAL INJECTION CHEMOTHERAPY Right 12/30/2019    Procedure: INTRAVITREAL Bevacizumab injection;  Surgeon: Milton Maki MD;  [facility]    PHACOEMULSIFICATION CLEAR CORNEA WITH STANDARD INTRAOCULAR LENS IMPLANT Right 12/30/2019    Procedure: PHACOEMULSIFICATION, CATARACT, WITH INTRAOCULAR LENS IMPLANT;  Surgeon: Milton Maki MD;  [facility]    PHACOEMULSIFICATION WITH STANDARD INTRAOCULAR LENS IMPLANT Left 6/21/2019    Procedure: Left Eye Cataract Removal with Intraocular Lens Implant with Intraoperative Avastin Injection;  Surgeon: Lacey Eugene MD;  [facility]    siladenatis  11/2017     Social History     Socioeconomic History    Marital status:      Spouse name: Not on file    Number of children: 5    Years of education: Not on file    Highest education level: Not on file   Occupational History    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 Not Asked    Social History Narrative    Not on file     Social Drivers of Health     Financial Resource Strain: Low Risk  (10/29/2024)    [facility]    Financial Resource Strain     Difficulty of Paying Living Expenses: 3     Difficulty of Paying Living Expenses: Not on file   Food Insecurity: No Food Insecurity (1/30/2025)    [facility]    Food Insecurity     Do you worry your food will run out before you are able to buy more?: 1   Transportation Needs: Unmet Transportation Needs (1/30/2025)    Received from [facility]    Transportation Needs     Does lack of transportation keep you from medical appointments?: 1     Does lack of transportation keep you from work, meetings or getting things that you need?: 2   Physical Activity: Not on file   Stress: Not on file   Social Connections: Socially Integrated (1/30/2025)    Received from [facility]    Social Connections     Do you often feel lonely or isolated from those around you?: 0   Interpersonal Safety: Low Risk  (2/11/2025)    Interpersonal Safety     Do you feel physically and emotionally safe where you currently live?: Yes     Within the past 12 months, have you been hit, slapped, kicked or otherwise physically hurt by someone?: No     Within the past 12 months, have you been humiliated or emotionally abused in other ways by your partner or ex-partner?: No   Housing Stability: Low Risk  (1/30/2025)    [facility]    Housing Stability     What is your housing situation today?: 1     Family History   Problem Relation Age of Onset    Diabetes Father     Myocardial Infarction Father     Diabetes Brother     Leukemia Brother 44    Glaucoma No family hx of     Macular Degeneration No family hx of     Kidney Disease No family hx of          Lab Results   Component Value  Date    A1C 8.0 02/11/2025    A1C 8.4 11/08/2024    A1C 7.7 07/09/2024    A1C 8.8 04/16/2024    A1C 10.3 01/03/2024    A1C 8.3 11/02/2021    A1C 8.2 06/09/2021    A1C 9.2 06/18/2019    A1C 9.1 03/06/2018    A1C 10.2 06/06/2017    A1C 9.0 03/16/2016 2/19/2025 creatinine 1.92 with EGFR 36 as noted in the EMR.  2/19/2025 CBC and CRP reviewed as noted in the EMR.  Last Comprehensive Metabolic Panel:  Lab Results   Component Value Date     02/11/2025    POTASSIUM 4.2 02/11/2025    CHLORIDE 113 (H) 02/11/2025    CO2 21 (L) 02/11/2025    ANIONGAP 11 02/11/2025    GLC 87 02/11/2025    BUN 28.1 (H) 02/11/2025    CR 1.86 (H) 02/11/2025    GFRESTIMATED 37 (L) 02/11/2025    INDERJIT 9.9 02/11/2025         Subjective findings- 74-year-old returns clinic for painful feet.  He relates he has numbness tingling and neuropathy in his feet.  Relates to no ulcers, no sores, no injuries since we seen him last.  Relates he is getting shooting pain from his hip down to his foot left greater than right.  Relates he is wearing his diabetic shoes and those have helped, feels they have helped with the fifth toe but he still has a callus there that is painful.  Relates he needs his toenails cut.  Relates he did use the urea cream and he felt that helped previously but it he ran out.    Objective findings- DP and PT are 1 out of 4 bilaterally.  Has decreased hair growth bilaterally.  Has venous stasis bilaterally.  Has minimal peripheral edema bilaterally.  Has dorsally contracted digits 1 through 5 bilaterally.  Has decreased ankle joint dorsiflexion bilaterally.  Has hyperkeratotic tissue buildup dorsal lateral fifth toe with ecchymosis.  Has mild hyperkeratotic tissue buildup plantar first MPJ right.  Has generalized decreased range of motion bilaterally.  Sharp/dull is decreased with 5.07 [place] monofilament on 2+ spots on the right foot and 2+ spots on the left foot, proprioception is intact on the left foot and  decreased on the right foot, deep tendon reflexes are intact bilaterally, no tendon voids bilaterally.  Has dystrophic thickened brittle nails with subungual debris dystrophy and discoloration 1 through 5 bilaterally to differing degrees with pain on palpation.  Has dry scaly skin moccasin pattern bilaterally.  There is no erythema, no drainage, no odor, no calor bilaterally.    Assessment and plan- Diabetes with peripheral Neuropathy.  Diabetes with peripheral Vascular disease.  Diabetes with Hammertoes and Equinus.  Tinea Pedis bilaterally.  Onychomycosis bilaterally.  Tyoma right 5th toe with pre ulcerative changes.  Diagnosis and treatment options discussed with the patient.  All the toenails 1 through 5 bilaterally were debrided upon consent, 6+ toenails were debrided.  The Tyloma on the right dorsal lateral fifth toe was sharp debrided with a tissue cutter and a 15 blade upon consent.  He is advised on runner stretch.  Rescription for Econazole for the Onychomycosis and the Tinea Pedis cream given and use discussed with the patient.  He relates he still has Betadine, he can use this as needed on the fifth toe callus.  Referral to sports medicine for sciatica symptoms given and use discussed with the patient.  Referral to vascular interventional radiology given and use discussed with them.  Previous notes reviewed.  Return to clinic and see me in 2 to 3 months.    Moderate level of medical decision making.      Again, thank you for allowing me to participate in the care of your patient.      Sincerely,    Hemanth Nuñez DPM    Electronically signed

## 2025-02-25 ENCOUNTER — TELEPHONE (OUTPATIENT)
Dept: VASCULAR SURGERY | Facility: CLINIC | Age: 75
End: 2025-02-25
Payer: COMMERCIAL

## 2025-02-25 ENCOUNTER — PATIENT OUTREACH (OUTPATIENT)
Dept: CARE COORDINATION | Facility: CLINIC | Age: 75
End: 2025-02-25
Payer: COMMERCIAL

## 2025-02-25 DIAGNOSIS — I70.293 OTHER ATHEROSCLEROSIS OF NATIVE ARTERIES OF EXTREMITIES, BILATERAL LEGS: ICD-10-CM

## 2025-02-25 DIAGNOSIS — I83.893 SYMPTOMATIC VARICOSE VEINS OF BOTH LOWER EXTREMITIES: Primary | ICD-10-CM

## 2025-02-25 NOTE — TELEPHONE ENCOUNTER
Patient confirmed scheduled appointment:     Date: 3/20/25  Time: 9:30 AM  Appt type: New Vascular Patient  Appt mode: In Person  Provider: Dr. Kenrick Ramon  Location: [facility] [place] [place] [facility]    Is Imaging Needed: Yes, CT and Yes, US - both scheduled prior to appt

## 2025-02-27 ENCOUNTER — PATIENT OUTREACH (OUTPATIENT)
Dept: CARE COORDINATION | Facility: CLINIC | Age: 75
End: 2025-02-27
Payer: COMMERCIAL

## 2025-03-07 ENCOUNTER — ANCILLARY PROCEDURE (OUTPATIENT)
Dept: CT IMAGING | Facility: CLINIC | Age: 75
End: 2025-03-07
Attending: STUDENT IN AN ORGANIZED HEALTH CARE EDUCATION/TRAINING PROGRAM
Payer: COMMERCIAL

## 2025-03-07 DIAGNOSIS — I83.893 SYMPTOMATIC VARICOSE VEINS OF BOTH LOWER EXTREMITIES: ICD-10-CM

## 2025-03-07 DIAGNOSIS — I70.293 OTHER ATHEROSCLEROSIS OF NATIVE ARTERIES OF EXTREMITIES, BILATERAL LEGS: ICD-10-CM

## 2025-03-07 LAB
CREAT BLD-MCNC: 1.9 MG/DL (ref 0.7–1.3)
EGFRCR SERPLBLD CKD-EPI 2021: 36 ML/MIN/1.73M2

## 2025-03-07 PROCEDURE — 82565 ASSAY OF CREATININE: CPT | Performed by: PATHOLOGY

## 2025-03-07 PROCEDURE — 75635 CT ANGIO ABDOMINAL ARTERIES: CPT | Mod: GC | Performed by: RADIOLOGY

## 2025-03-07 RX ORDER — IOPAMIDOL 755 MG/ML
135 INJECTION, SOLUTION INTRAVASCULAR ONCE
Status: COMPLETED | OUTPATIENT
Start: 2025-03-07 | End: 2025-03-07

## 2025-03-07 RX ADMIN — IOPAMIDOL 135 ML: 755 INJECTION, SOLUTION INTRAVASCULAR at 08:25

## 2025-03-07 NOTE — DISCHARGE INSTRUCTIONS

## 2025-03-13 ENCOUNTER — MEDICAL CORRESPONDENCE (OUTPATIENT)
Dept: HEALTH INFORMATION MANAGEMENT | Facility: CLINIC | Age: 75
End: 2025-03-13
Payer: COMMERCIAL

## 2025-03-18 ENCOUNTER — ANCILLARY PROCEDURE (OUTPATIENT)
Dept: ULTRASOUND IMAGING | Facility: CLINIC | Age: 75
End: 2025-03-18
Attending: STUDENT IN AN ORGANIZED HEALTH CARE EDUCATION/TRAINING PROGRAM
Payer: COMMERCIAL

## 2025-03-18 DIAGNOSIS — I70.293 OTHER ATHEROSCLEROSIS OF NATIVE ARTERIES OF EXTREMITIES, BILATERAL LEGS: ICD-10-CM

## 2025-03-18 DIAGNOSIS — I83.893 SYMPTOMATIC VARICOSE VEINS OF BOTH LOWER EXTREMITIES: ICD-10-CM

## 2025-03-18 PROCEDURE — 93970 EXTREMITY STUDY: CPT | Performed by: RADIOLOGY

## 2025-03-19 ENCOUNTER — TRANSFERRED RECORDS (OUTPATIENT)
Dept: HEALTH INFORMATION MANAGEMENT | Facility: CLINIC | Age: 75
End: 2025-03-19
Payer: COMMERCIAL

## 2025-03-20 ENCOUNTER — TELEPHONE (OUTPATIENT)
Dept: ENDOCRINOLOGY | Facility: CLINIC | Age: 75
End: 2025-03-20

## 2025-04-02 ENCOUNTER — HOSPITAL ENCOUNTER (OUTPATIENT)
Facility: CLINIC | Age: 75
Discharge: HOME | DRG: 271 | End: 2025-04-02
Attending: STUDENT IN AN ORGANIZED HEALTH CARE EDUCATION/TRAINING PROGRAM | Admitting: STUDENT IN AN ORGANIZED HEALTH CARE EDUCATION/TRAINING PROGRAM
Payer: COMMERCIAL

## 2025-04-02 ENCOUNTER — APPOINTMENT (OUTPATIENT)
Dept: MEDSURG UNIT | Facility: CLINIC | Age: 75
DRG: 271 | End: 2025-04-02
Attending: STUDENT IN AN ORGANIZED HEALTH CARE EDUCATION/TRAINING PROGRAM
Payer: COMMERCIAL

## 2025-04-02 ENCOUNTER — APPOINTMENT (OUTPATIENT)
Dept: INTERVENTIONAL RADIOLOGY/VASCULAR | Facility: CLINIC | Age: 75
DRG: 271 | End: 2025-04-02
Attending: STUDENT IN AN ORGANIZED HEALTH CARE EDUCATION/TRAINING PROGRAM
Payer: COMMERCIAL

## 2025-04-02 VITALS
DIASTOLIC BLOOD PRESSURE: 72 MMHG | HEART RATE: 66 BPM | HEIGHT: 68 IN | OXYGEN SATURATION: 99 % | TEMPERATURE: 97.5 F | BODY MASS INDEX: 27.93 KG/M2 | SYSTOLIC BLOOD PRESSURE: 145 MMHG | WEIGHT: 184.3 LBS | RESPIRATION RATE: 15 BRPM

## 2025-04-02 DIAGNOSIS — I70.213 ATHEROSCLEROSIS OF NATIVE ARTERY OF BOTH LOWER EXTREMITIES WITH INTERMITTENT CLAUDICATION: ICD-10-CM

## 2025-04-02 LAB
ACT BLD: 198 SECONDS (ref 74–150)
ACT BLD: 320 SECONDS (ref 74–150)
ANION GAP SERPL CALCULATED.3IONS-SCNC: 12 MMOL/L (ref 7–15)
BUN SERPL-MCNC: 37.9 MG/DL (ref 8–23)
CALCIUM SERPL-MCNC: 9.4 MG/DL (ref 8.8–10.4)
CHLORIDE SERPL-SCNC: 108 MMOL/L (ref 98–107)
CREAT SERPL-MCNC: 1.89 MG/DL (ref 0.67–1.17)
EGFRCR SERPLBLD CKD-EPI 2021: 36 ML/MIN/1.73M2
ERYTHROCYTE [DISTWIDTH] IN BLOOD BY AUTOMATED COUNT: 12.8 % (ref 10–15)
GLUCOSE BLDC GLUCOMTR-MCNC: 149 MG/DL (ref 70–99)
GLUCOSE SERPL-MCNC: 123 MG/DL (ref 70–99)
HCO3 SERPL-SCNC: 19 MMOL/L (ref 22–29)
HCT VFR BLD AUTO: 42.3 % (ref 40–53)
HGB BLD-MCNC: 14.4 G/DL (ref 13.3–17.7)
INR PPP: 1.02 (ref 0.85–1.15)
MCH RBC QN AUTO: 31 PG (ref 26.5–33)
MCHC RBC AUTO-ENTMCNC: 34 G/DL (ref 31.5–36.5)
MCV RBC AUTO: 91 FL (ref 78–100)
PLATELET # BLD AUTO: 131 10E3/UL (ref 150–450)
POTASSIUM SERPL-SCNC: 4.8 MMOL/L (ref 3.4–5.3)
RBC # BLD AUTO: 4.64 10E6/UL (ref 4.4–5.9)
SODIUM SERPL-SCNC: 139 MMOL/L (ref 135–145)
WBC # BLD AUTO: 4.1 10E3/UL (ref 4–11)

## 2025-04-02 PROCEDURE — C1725 CATH, TRANSLUMIN NON-LASER: HCPCS

## 2025-04-02 PROCEDURE — 047S3Z1 DILATION OF LEFT POSTERIOR TIBIAL ARTERY USING DRUG-COATED BALLOON, PERCUTANEOUS APPROACH: ICD-10-PCS | Performed by: STUDENT IN AN ORGANIZED HEALTH CARE EDUCATION/TRAINING PROGRAM

## 2025-04-02 PROCEDURE — 99152 MOD SED SAME PHYS/QHP 5/>YRS: CPT

## 2025-04-02 PROCEDURE — 37228 PR REVASC TIB/PERON ART, ANGIOPLASTY INIT VESSEL: CPT | Mod: LT | Performed by: STUDENT IN AN ORGANIZED HEALTH CARE EDUCATION/TRAINING PROGRAM

## 2025-04-02 PROCEDURE — 36415 COLL VENOUS BLD VENIPUNCTURE: CPT | Performed by: NURSE PRACTITIONER

## 2025-04-02 PROCEDURE — 999N000142 HC STATISTIC PROCEDURE PREP ONLY

## 2025-04-02 PROCEDURE — 85610 PROTHROMBIN TIME: CPT | Performed by: NURSE PRACTITIONER

## 2025-04-02 PROCEDURE — 82962 GLUCOSE BLOOD TEST: CPT

## 2025-04-02 PROCEDURE — C1887 CATHETER, GUIDING: HCPCS

## 2025-04-02 PROCEDURE — 85027 COMPLETE CBC AUTOMATED: CPT | Performed by: NURSE PRACTITIONER

## 2025-04-02 PROCEDURE — 37224 PR REVASCULARIZE FEM/POP ARTERY, ANGIOPLASTY: CPT | Mod: LT | Performed by: STUDENT IN AN ORGANIZED HEALTH CARE EDUCATION/TRAINING PROGRAM

## 2025-04-02 PROCEDURE — 258N000003 HC RX IP 258 OP 636: Performed by: NURSE PRACTITIONER

## 2025-04-02 PROCEDURE — 272N000143 HC KIT CR3

## 2025-04-02 PROCEDURE — 999N000134 HC STATISTIC PP CARE STAGE 3

## 2025-04-02 PROCEDURE — 85347 COAGULATION TIME ACTIVATED: CPT

## 2025-04-02 PROCEDURE — 76937 US GUIDE VASCULAR ACCESS: CPT | Mod: 26 | Performed by: STUDENT IN AN ORGANIZED HEALTH CARE EDUCATION/TRAINING PROGRAM

## 2025-04-02 PROCEDURE — 75716 ARTERY X-RAYS ARMS/LEGS: CPT | Mod: 26 | Performed by: STUDENT IN AN ORGANIZED HEALTH CARE EDUCATION/TRAINING PROGRAM

## 2025-04-02 PROCEDURE — 80048 BASIC METABOLIC PNL TOTAL CA: CPT | Performed by: NURSE PRACTITIONER

## 2025-04-02 PROCEDURE — 250N000011 HC RX IP 250 OP 636: Performed by: STUDENT IN AN ORGANIZED HEALTH CARE EDUCATION/TRAINING PROGRAM

## 2025-04-02 PROCEDURE — 250N000009 HC RX 250

## 2025-04-02 PROCEDURE — 272N000280 HC DEVICE COMPRESSION CR5

## 2025-04-02 PROCEDURE — 37228 HC REVASC TIB-PERON ART ANGIOPLASTY INIT VESSEL: CPT

## 2025-04-02 PROCEDURE — 272N000570 HC SHEATH CR7

## 2025-04-02 PROCEDURE — 250N000009 HC RX 250: Mod: JW

## 2025-04-02 PROCEDURE — C1769 GUIDE WIRE: HCPCS

## 2025-04-02 PROCEDURE — 37224 HC REVASCULARIZE FEM-POP ARTERY ANGIOPLASTY: CPT | Mod: LT

## 2025-04-02 PROCEDURE — 272N000302 HC DEVICE INFLATION CR5

## 2025-04-02 PROCEDURE — 99153 MOD SED SAME PHYS/QHP EA: CPT

## 2025-04-02 PROCEDURE — 047L3Z1 DILATION OF LEFT FEMORAL ARTERY USING DRUG-COATED BALLOON, PERCUTANEOUS APPROACH: ICD-10-PCS | Performed by: STUDENT IN AN ORGANIZED HEALTH CARE EDUCATION/TRAINING PROGRAM

## 2025-04-02 PROCEDURE — C2623 CATH, TRANSLUMIN, DRUG-COAT: HCPCS

## 2025-04-02 PROCEDURE — 255N000002 HC RX 255 OP 636: Performed by: STUDENT IN AN ORGANIZED HEALTH CARE EDUCATION/TRAINING PROGRAM

## 2025-04-02 PROCEDURE — 272N000504 HC NEEDLE CR4

## 2025-04-02 PROCEDURE — 99152 MOD SED SAME PHYS/QHP 5/>YRS: CPT | Mod: GC | Performed by: STUDENT IN AN ORGANIZED HEALTH CARE EDUCATION/TRAINING PROGRAM

## 2025-04-02 PROCEDURE — 250N000011 HC RX IP 250 OP 636

## 2025-04-02 PROCEDURE — 75774 ARTERY X-RAY EACH VESSEL: CPT | Mod: 26 | Performed by: STUDENT IN AN ORGANIZED HEALTH CARE EDUCATION/TRAINING PROGRAM

## 2025-04-02 PROCEDURE — 250N000009 HC RX 250: Performed by: STUDENT IN AN ORGANIZED HEALTH CARE EDUCATION/TRAINING PROGRAM

## 2025-04-02 RX ORDER — DIPHENHYDRAMINE HYDROCHLORIDE 50 MG/ML
25-50 INJECTION, SOLUTION INTRAMUSCULAR; INTRAVENOUS
Status: COMPLETED | OUTPATIENT
Start: 2025-04-02 | End: 2025-04-02

## 2025-04-02 RX ORDER — NITROGLYCERIN 5 MG/ML
200 VIAL (ML) INTRAVENOUS ONCE
Status: COMPLETED | OUTPATIENT
Start: 2025-04-02 | End: 2025-04-02

## 2025-04-02 RX ORDER — HEPARIN SODIUM 200 [USP'U]/100ML
1 INJECTION, SOLUTION INTRAVENOUS EVERY 5 MIN PRN
Status: DISCONTINUED | OUTPATIENT
Start: 2025-04-02 | End: 2025-04-02 | Stop reason: HOSPADM

## 2025-04-02 RX ORDER — NALOXONE HYDROCHLORIDE 0.4 MG/ML
0.2 INJECTION, SOLUTION INTRAMUSCULAR; INTRAVENOUS; SUBCUTANEOUS
Status: DISCONTINUED | OUTPATIENT
Start: 2025-04-02 | End: 2025-04-02 | Stop reason: HOSPADM

## 2025-04-02 RX ORDER — IODIXANOL 320 MG/ML
150 INJECTION, SOLUTION INTRAVASCULAR ONCE
Status: COMPLETED | OUTPATIENT
Start: 2025-04-02 | End: 2025-04-02

## 2025-04-02 RX ORDER — NALOXONE HYDROCHLORIDE 0.4 MG/ML
0.4 INJECTION, SOLUTION INTRAMUSCULAR; INTRAVENOUS; SUBCUTANEOUS
Status: DISCONTINUED | OUTPATIENT
Start: 2025-04-02 | End: 2025-04-02 | Stop reason: HOSPADM

## 2025-04-02 RX ORDER — HEPARIN SODIUM 1000 [USP'U]/ML
2500 INJECTION, SOLUTION INTRAVENOUS; SUBCUTANEOUS
Status: COMPLETED | OUTPATIENT
Start: 2025-04-02 | End: 2025-04-02

## 2025-04-02 RX ORDER — DEXTROSE MONOHYDRATE 25 G/50ML
25-50 INJECTION, SOLUTION INTRAVENOUS
Status: DISCONTINUED | OUTPATIENT
Start: 2025-04-02 | End: 2025-04-02 | Stop reason: HOSPADM

## 2025-04-02 RX ORDER — HEPARIN SODIUM 1000 [USP'U]/ML
5000 INJECTION, SOLUTION INTRAVENOUS; SUBCUTANEOUS
Status: COMPLETED | OUTPATIENT
Start: 2025-04-02 | End: 2025-04-02

## 2025-04-02 RX ORDER — NICOTINE POLACRILEX 4 MG
15-30 LOZENGE BUCCAL
Status: DISCONTINUED | OUTPATIENT
Start: 2025-04-02 | End: 2025-04-02 | Stop reason: HOSPADM

## 2025-04-02 RX ORDER — FLUMAZENIL 0.1 MG/ML
0.2 INJECTION, SOLUTION INTRAVENOUS
Status: DISCONTINUED | OUTPATIENT
Start: 2025-04-02 | End: 2025-04-02 | Stop reason: HOSPADM

## 2025-04-02 RX ORDER — FENTANYL CITRATE 50 UG/ML
25-50 INJECTION, SOLUTION INTRAMUSCULAR; INTRAVENOUS EVERY 5 MIN PRN
Status: DISCONTINUED | OUTPATIENT
Start: 2025-04-02 | End: 2025-04-02 | Stop reason: HOSPADM

## 2025-04-02 RX ORDER — SODIUM CHLORIDE 9 MG/ML
INJECTION, SOLUTION INTRAVENOUS CONTINUOUS
Status: DISCONTINUED | OUTPATIENT
Start: 2025-04-02 | End: 2025-04-02 | Stop reason: HOSPADM

## 2025-04-02 RX ORDER — NITROGLYCERIN 5 MG/ML
100-500 VIAL (ML) INTRAVENOUS 2 TIMES DAILY PRN
Status: DISCONTINUED | OUTPATIENT
Start: 2025-04-02 | End: 2025-04-02 | Stop reason: HOSPADM

## 2025-04-02 RX ORDER — VERAPAMIL HYDROCHLORIDE 2.5 MG/ML
2.5-1 INJECTION, SOLUTION INTRAVENOUS
Status: COMPLETED | OUTPATIENT
Start: 2025-04-02 | End: 2025-04-02

## 2025-04-02 RX ORDER — LIDOCAINE 40 MG/G
CREAM TOPICAL
Status: DISCONTINUED | OUTPATIENT
Start: 2025-04-02 | End: 2025-04-02 | Stop reason: HOSPADM

## 2025-04-02 RX ADMIN — FENTANYL CITRATE 25 MCG: 50 INJECTION, SOLUTION INTRAMUSCULAR; INTRAVENOUS at 17:04

## 2025-04-02 RX ADMIN — MIDAZOLAM 0.5 MG: 1 INJECTION INTRAMUSCULAR; INTRAVENOUS at 16:26

## 2025-04-02 RX ADMIN — FENTANYL CITRATE 25 MCG: 50 INJECTION, SOLUTION INTRAMUSCULAR; INTRAVENOUS at 15:20

## 2025-04-02 RX ADMIN — HEPARIN SODIUM 7000 UNITS: 1000 INJECTION INTRAVENOUS; SUBCUTANEOUS at 14:50

## 2025-04-02 RX ADMIN — NITROGLYCERIN 150 MCG: 10 INJECTION INTRAVENOUS at 15:14

## 2025-04-02 RX ADMIN — FENTANYL CITRATE 25 MCG: 50 INJECTION, SOLUTION INTRAMUSCULAR; INTRAVENOUS at 16:57

## 2025-04-02 RX ADMIN — NITROGLYCERIN 200 MCG: 10 INJECTION INTRAVENOUS at 15:57

## 2025-04-02 RX ADMIN — FENTANYL CITRATE 25 MCG: 50 INJECTION, SOLUTION INTRAMUSCULAR; INTRAVENOUS at 14:15

## 2025-04-02 RX ADMIN — NITROGLYCERIN 100 MCG: 10 INJECTION INTRAVENOUS at 15:33

## 2025-04-02 RX ADMIN — FENTANYL CITRATE 50 MCG: 50 INJECTION, SOLUTION INTRAMUSCULAR; INTRAVENOUS at 13:32

## 2025-04-02 RX ADMIN — LIDOCAINE HYDROCHLORIDE 5 ML: 10 INJECTION, SOLUTION EPIDURAL; INFILTRATION; INTRACAUDAL; PERINEURAL at 13:47

## 2025-04-02 RX ADMIN — FENTANYL CITRATE 25 MCG: 50 INJECTION, SOLUTION INTRAMUSCULAR; INTRAVENOUS at 15:29

## 2025-04-02 RX ADMIN — FENTANYL CITRATE 25 MCG: 50 INJECTION, SOLUTION INTRAMUSCULAR; INTRAVENOUS at 14:53

## 2025-04-02 RX ADMIN — SODIUM CHLORIDE: 0.9 INJECTION, SOLUTION INTRAVENOUS at 10:48

## 2025-04-02 RX ADMIN — HEPARIN SODIUM 2500 UNITS: 1000 INJECTION INTRAVENOUS; SUBCUTANEOUS at 16:57

## 2025-04-02 RX ADMIN — NITROGLYCERIN 200 MCG: 10 INJECTION INTRAVENOUS at 16:04

## 2025-04-02 RX ADMIN — MIDAZOLAM 0.5 MG: 1 INJECTION INTRAMUSCULAR; INTRAVENOUS at 14:22

## 2025-04-02 RX ADMIN — NITROGLYCERIN 200 MCG: 10 INJECTION INTRAVENOUS at 15:23

## 2025-04-02 RX ADMIN — MIDAZOLAM 0.5 MG: 1 INJECTION INTRAMUSCULAR; INTRAVENOUS at 14:46

## 2025-04-02 RX ADMIN — DIPHENHYDRAMINE HYDROCHLORIDE 25 MG: 50 INJECTION, SOLUTION INTRAMUSCULAR; INTRAVENOUS at 14:28

## 2025-04-02 RX ADMIN — MIDAZOLAM 0.5 MG: 1 INJECTION INTRAMUSCULAR; INTRAVENOUS at 15:29

## 2025-04-02 RX ADMIN — HEPARIN SODIUM: 1000 INJECTION INTRAVENOUS; SUBCUTANEOUS at 13:53

## 2025-04-02 RX ADMIN — MIDAZOLAM 0.5 MG: 1 INJECTION INTRAMUSCULAR; INTRAVENOUS at 16:20

## 2025-04-02 RX ADMIN — FENTANYL CITRATE 25 MCG: 50 INJECTION, SOLUTION INTRAMUSCULAR; INTRAVENOUS at 16:20

## 2025-04-02 RX ADMIN — IODIXANOL 100 ML: 320 INJECTION, SOLUTION INTRAVASCULAR at 17:21

## 2025-04-02 RX ADMIN — VERAPAMIL HYDROCHLORIDE 2.5 MG: 2.5 INJECTION INTRAVENOUS at 15:41

## 2025-04-02 RX ADMIN — MIDAZOLAM 1 MG: 1 INJECTION INTRAMUSCULAR; INTRAVENOUS at 13:52

## 2025-04-02 RX ADMIN — FENTANYL CITRATE 25 MCG: 50 INJECTION, SOLUTION INTRAMUSCULAR; INTRAVENOUS at 14:23

## 2025-04-02 RX ADMIN — MIDAZOLAM 0.5 MG: 1 INJECTION INTRAMUSCULAR; INTRAVENOUS at 16:57

## 2025-04-02 RX ADMIN — FENTANYL CITRATE 25 MCG: 50 INJECTION, SOLUTION INTRAMUSCULAR; INTRAVENOUS at 16:26

## 2025-04-02 RX ADMIN — NITROGLYCERIN 200 MCG: 10 INJECTION INTRAVENOUS at 17:02

## 2025-04-02 RX ADMIN — MIDAZOLAM 1 MG: 1 INJECTION INTRAMUSCULAR; INTRAVENOUS at 13:32

## 2025-04-02 RX ADMIN — NITROGLYCERIN 200 MCG: 10 INJECTION INTRAVENOUS at 14:54

## 2025-04-02 RX ADMIN — MIDAZOLAM 0.5 MG: 1 INJECTION INTRAMUSCULAR; INTRAVENOUS at 15:44

## 2025-04-02 RX ADMIN — HEPARIN SODIUM IN SODIUM CHLORIDE 2 BAG: 200 INJECTION INTRAVENOUS at 14:01

## 2025-04-02 RX ADMIN — FENTANYL CITRATE 25 MCG: 50 INJECTION, SOLUTION INTRAMUSCULAR; INTRAVENOUS at 13:52

## 2025-04-02 ASSESSMENT — ACTIVITIES OF DAILY LIVING (ADL)
ADLS_ACUITY_SCORE: 41

## 2025-04-02 NOTE — PRE-PROCEDURE
GENERAL PRE-PROCEDURE:   Procedure:  BLE angiogram with possible intervention  Date/Time:  4/2/2025 12:32 PM    Written consent obtained?: Yes    Risks and benefits: Risks, benefits and alternatives were discussed    Consent given by:  Patient  Patient states understanding of procedure being performed: Yes    Patient's understanding of procedure matches consent: Yes    Procedure consent matches procedure scheduled: Yes    Expected level of sedation:  Moderate  Appropriately NPO:  Yes  ASA Class:  2  Mallampati  :  Grade 2- soft palate, base of uvula, tonsillar pillars, and portion of posterior pharyngeal wall visible  Lungs:  Lungs clear with good breath sounds bilaterally  Heart:  Normal heart sounds and rate  History & Physical reviewed:  History and physical reviewed and no updates needed  Statement of review:  I have reviewed the lab findings, diagnostic data, medications, and the plan for sedation

## 2025-04-02 NOTE — DISCHARGE INSTRUCTIONS
[facility]   Interventional Radiology  Discharge Instructions Post Peripheral Angiogram     AFTER YOU GO HOME    If you were given sedation, for the first 24 hours: we recommend that an adult stay with you, DO NOT drive or operate machinery at home or at work, DO NOT smoke, DO NOT make any important or legal decisions.  DO NOT drink alcoholic beverages the day of your procedure  DO NOT take a shower for at least 12 hours following your procedure. No tub bath, hot tubs, or swimming for 5 days. Do NOT scrub the procedure site vigorously for 5 days.  DO relax and take it easy for 24 hours  DO drink plenty of fluids  DO resume your regular diet, unless otherwise instructed by your Primary Physician    Care of wrist or arm site  It is normal to have soreness at the puncture site and mild tingling in your hand for up to 3 days.  For 2 days, do not use your hand or arm to support your weight (such as rising from a chair) or bend your wrist (such as lifting a garage door).  For 2 days, do not do any strenuous exercise, do not lift more than 5 pounds or exercise your arm (tennis, golf or bowling).  No lotion or powder to the puncture site for 3 days  If you start bleeding from the site in your arm:  Sit down and press firmly on the site with your fingers for 10 minutes. Call your doctor as soon as you can.  If the bleeding stops, sit still and keep your wrist straight for 2 hours.  Call 911 right away if you have: Heavy bleeding, bleeding that does not stop after 10 minutes of pressure.    CALL THE PHYSICIAN IF:      - You start bleeding from the procedure site.  If you do start to bleed from the site, lie down flat and hold pressure on the site. A small lump or bruise is common at the puncture site.Your physician will tell you if you need to return to the hospital.      - You develop numbness, coolness or a change in color of the arm that was punctured.      - You experience increased pain or redness at  the puncture site.      - You develop hives or a rash or unexplained itching.      - You develop a temperature of 101 degrees F or greater    Additional Information:    No tub bath, hot tubs, or swimming for 5 days  No lotion or powder to the puncture site for 3 days    [facility] INTERVENTIONAL RADIOLOGY DEPARTMENT         Procedure Physician: Dr. Ramon and Dr. Wright                             Date of Procedure: April 2, 2025       Telephone Numbers:   [phone]......Monday-Friday 7:30am to 4:00pm                                        [phone].....After 4:00 pm Monday-Friday, Weekends and  Holidays. Ask for the Interventional Radiologist on call. Someone is on call 24 hrs/day.

## 2025-04-02 NOTE — PROGRESS NOTES
Pt returned to 2A room 3 from IR post- BLE angiogram accompanied by IR RN. Drowsy, but VSS on RA. LUE TR band in place, CMS WNL. Bruising and swelling from vessel spasm marked with skin marker. TR band deflation to begin at 1905. RN called wife to update her on recovery time, will call again closer to discharge for her to pick him up.

## 2025-04-02 NOTE — PROGRESS NOTES
Pt arrived and prepped for lower extremity angiogram; labs resulted, PIV infusing NS at 100/hr, groins prepped and pulses marked. Awaiting consent. Pt's wife to provide transportation home post-procedure.

## 2025-04-02 NOTE — PROCEDURES
[facility]    Procedure: BLE angiograms with intervention    Date/Time: 4/2/2025 5:21 PM    Performed by: Ye Wright MD  Authorized by: Kenrick Ramon MD  IR Fellow Physician:    Pre Procedure Diagnosis: PAD  Post Procedure Diagnosis: PAD    UNIVERSAL PROTOCOL   Site Marked: NA  Prior Images Obtained and Reviewed:  Yes  Required items: Required blood products, implants, devices and special equipment available    Patient identity confirmed:  Arm band, provided demographic data, hospital-assigned identification number and verbally with patient  Patient was reevaluated immediately before administering moderate or deep sedation or anesthesia  Confirmation Checklist:  Correct equipment/implants were available, procedure was appropriate and matched the consent or emergent situation, relevant allergies and patient's identity using two indicators  Time out: Immediately prior to the procedure a time out was called    Universal Protocol: the Joint Commission Universal Protocol was followed    Preparation: Patient was prepped and draped in usual sterile fashion       ANESTHESIA    Anesthesia:  Local infiltration  Local Anesthetic:  Lidocaine 1% without epinephrine      SEDATION    Patient Sedated: No    See dictated procedure note for full details.  Findings: BLE angiograms.    Specimens: none    Procedural Complications: None    Condition: Stable      PROCEDURE    Patient Tolerance:  Patient tolerated the procedure well with no immediate complications  Length of time physician/provider present for 1:1 monitoring during sedation:  0 min

## 2025-04-02 NOTE — IR NOTE
Patient Name: Earle Rene  Medical Record Number: [id number]  Today's Date: 4/2/2025    Procedure: Bilateral Lower Extremity angiogram with intervention  Proceduralist: Kyle Ramon    Sedation medications administered: 5.5 mg versed, 300 mcg fentanyl, Benadryl 25 mg    Other Medications: Nitroglycerine 1550 mcg; Heparin 9500 units  Total sedation time: 210 min     Procedure start time: 1342  Procedure end time: 1706    Report given to: 2A JOEL Parry  : n/a    Other Notes: Pt arrived to IR room 1 from . Consent reviewed, pt confirmed. Pt denies any questions or concerns regarding procedure. Pt positioned supine and monitored per protocol. Site cleansed and dressed per protocol. Pt tolerated procedure without any noted complications. Pt transferred back to .     Bilateral pedal pulses are +1 right +1 left prior to procedure.  Left wrist accessed with 6 fr sheath. Sheath removal at .      TR band applied at 1705 with 11mL

## 2025-04-03 ENCOUNTER — DOCUMENTATION ONLY (OUTPATIENT)
Dept: INTERNAL MEDICINE | Facility: CLINIC | Age: 75
End: 2025-04-03
Payer: COMMERCIAL

## 2025-04-03 NOTE — PROGRESS NOTES
Tolerated recovery well. TR band deflated and removed. Some swelling post band removal, manual pressure held and site softened within ten minutes of pressure, no recurrence. Site is CDI without bleeding, bandage applied, no arm board. He is alert and oriented x 4 and able to make needs known. He has ambulated and voided, tolerated liquids, did not want to eat. He has verbalized understanding of all discharge instructions, all questions answered. PIV removed. He was assisted to the main entrance with all belongings via wheelchair to meet his wife with the car for discharge home.

## 2025-04-04 ENCOUNTER — HOSPITAL ENCOUNTER (INPATIENT)
Facility: CLINIC | Age: 75
DRG: 271 | End: 2025-04-04
Attending: EMERGENCY MEDICINE | Admitting: INTERNAL MEDICINE
Payer: COMMERCIAL

## 2025-04-04 ENCOUNTER — ANCILLARY ORDERS (OUTPATIENT)
Dept: VASCULAR SURGERY | Facility: CLINIC | Age: 75
End: 2025-04-04

## 2025-04-04 ENCOUNTER — APPOINTMENT (OUTPATIENT)
Dept: INTERVENTIONAL RADIOLOGY/VASCULAR | Facility: CLINIC | Age: 75
DRG: 271 | End: 2025-04-04
Payer: COMMERCIAL

## 2025-04-04 ENCOUNTER — ANCILLARY PROCEDURE (OUTPATIENT)
Dept: ULTRASOUND IMAGING | Facility: CLINIC | Age: 75
End: 2025-04-04
Attending: STUDENT IN AN ORGANIZED HEALTH CARE EDUCATION/TRAINING PROGRAM
Payer: COMMERCIAL

## 2025-04-04 DIAGNOSIS — I70.213 ATHEROSCLEROSIS OF NATIVE ARTERY OF BOTH LOWER EXTREMITIES WITH INTERMITTENT CLAUDICATION: ICD-10-CM

## 2025-04-04 DIAGNOSIS — I70.202 POPLITEAL ARTERY OCCLUSION, LEFT: ICD-10-CM

## 2025-04-04 DIAGNOSIS — I73.9 PERIPHERAL VASCULAR DISEASE, UNSPECIFIED: ICD-10-CM

## 2025-04-04 DIAGNOSIS — M54.41 CHRONIC BILATERAL LOW BACK PAIN WITH BILATERAL SCIATICA: ICD-10-CM

## 2025-04-04 DIAGNOSIS — I73.9 PAD (PERIPHERAL ARTERY DISEASE): Primary | ICD-10-CM

## 2025-04-04 DIAGNOSIS — N39.0 URINARY TRACT INFECTION WITH HEMATURIA, SITE UNSPECIFIED: ICD-10-CM

## 2025-04-04 DIAGNOSIS — I82.409 DVT (DEEP VENOUS THROMBOSIS) (H): ICD-10-CM

## 2025-04-04 DIAGNOSIS — K21.9 GASTROESOPHAGEAL REFLUX DISEASE WITHOUT ESOPHAGITIS: ICD-10-CM

## 2025-04-04 DIAGNOSIS — M54.42 CHRONIC BILATERAL LOW BACK PAIN WITH BILATERAL SCIATICA: ICD-10-CM

## 2025-04-04 DIAGNOSIS — M79.2 POLYNEUROPATHIC PAIN: ICD-10-CM

## 2025-04-04 DIAGNOSIS — E11.42 DIABETIC POLYNEUROPATHY ASSOCIATED WITH TYPE 2 DIABETES MELLITUS (H): ICD-10-CM

## 2025-04-04 DIAGNOSIS — M53.3 PAIN OF BOTH SACROILIAC JOINTS: ICD-10-CM

## 2025-04-04 DIAGNOSIS — I70.213 ATHEROSCLEROSIS OF NATIVE ARTERY OF BOTH LOWER EXTREMITIES WITH INTERMITTENT CLAUDICATION: Primary | ICD-10-CM

## 2025-04-04 DIAGNOSIS — G89.29 CHRONIC BILATERAL LOW BACK PAIN WITH BILATERAL SCIATICA: ICD-10-CM

## 2025-04-04 DIAGNOSIS — R31.9 URINARY TRACT INFECTION WITH HEMATURIA, SITE UNSPECIFIED: ICD-10-CM

## 2025-04-04 DIAGNOSIS — K91.89 ILEUS, POSTOPERATIVE (H): ICD-10-CM

## 2025-04-04 DIAGNOSIS — K56.7 ILEUS, POSTOPERATIVE (H): ICD-10-CM

## 2025-04-04 LAB
ACT BLD: 194 SECONDS (ref 74–150)
ACT BLD: 206 SECONDS (ref 74–150)
ERYTHROCYTE [DISTWIDTH] IN BLOOD BY AUTOMATED COUNT: 12.6 % (ref 10–15)
HCT VFR BLD AUTO: 40.5 % (ref 40–53)
HGB BLD-MCNC: 14.5 G/DL (ref 13.3–17.7)
MCH RBC QN AUTO: 33.2 PG (ref 26.5–33)
MCHC RBC AUTO-ENTMCNC: 35.8 G/DL (ref 31.5–36.5)
MCV RBC AUTO: 93 FL (ref 78–100)
PLATELET # BLD AUTO: 133 10E3/UL (ref 150–450)
RBC # BLD AUTO: 4.37 10E6/UL (ref 4.4–5.9)
WBC # BLD AUTO: 4.9 10E3/UL (ref 4–11)

## 2025-04-04 PROCEDURE — 96376 TX/PRO/DX INJ SAME DRUG ADON: CPT | Performed by: EMERGENCY MEDICINE

## 2025-04-04 PROCEDURE — 272N000193 HC ACCESSORY CR20

## 2025-04-04 PROCEDURE — 272N000571 HC SHEATH CR8

## 2025-04-04 PROCEDURE — X2CT3T7 EXTIRPATION OF MATTER FROM LEFT LOWER EXTREMITY ARTERY USING COMPUTER-AIDED MECHANICAL ASPIRATION, PERCUTANEOUS APPROACH, NEW TECHNOLOGY GROUP 7: ICD-10-PCS | Performed by: STUDENT IN AN ORGANIZED HEALTH CARE EDUCATION/TRAINING PROGRAM

## 2025-04-04 PROCEDURE — 85347 COAGULATION TIME ACTIVATED: CPT

## 2025-04-04 PROCEDURE — 250N000011 HC RX IP 250 OP 636: Mod: JZ

## 2025-04-04 PROCEDURE — 99291 CRITICAL CARE FIRST HOUR: CPT | Performed by: EMERGENCY MEDICINE

## 2025-04-04 PROCEDURE — 76937 US GUIDE VASCULAR ACCESS: CPT

## 2025-04-04 PROCEDURE — 3E05317 INTRODUCTION OF OTHER THROMBOLYTIC INTO PERIPHERAL ARTERY, PERCUTANEOUS APPROACH: ICD-10-PCS | Performed by: STUDENT IN AN ORGANIZED HEALTH CARE EDUCATION/TRAINING PROGRAM

## 2025-04-04 PROCEDURE — 75710 ARTERY X-RAYS ARM/LEG: CPT | Mod: LT

## 2025-04-04 PROCEDURE — 99285 EMERGENCY DEPT VISIT HI MDM: CPT | Mod: 25 | Performed by: EMERGENCY MEDICINE

## 2025-04-04 PROCEDURE — 99153 MOD SED SAME PHYS/QHP EA: CPT

## 2025-04-04 PROCEDURE — C1769 GUIDE WIRE: HCPCS

## 2025-04-04 PROCEDURE — 272N000302 HC DEVICE INFLATION CR5

## 2025-04-04 PROCEDURE — 272N000143 HC KIT CR3

## 2025-04-04 PROCEDURE — C1757 CATH, THROMBECTOMY/EMBOLECT: HCPCS

## 2025-04-04 PROCEDURE — 37184 PRIM ART M-THRMBC 1ST VSL: CPT | Mod: LT

## 2025-04-04 PROCEDURE — 76937 US GUIDE VASCULAR ACCESS: CPT | Mod: 26 | Performed by: STUDENT IN AN ORGANIZED HEALTH CARE EDUCATION/TRAINING PROGRAM

## 2025-04-04 PROCEDURE — C1725 CATH, TRANSLUMIN NON-LASER: HCPCS

## 2025-04-04 PROCEDURE — 36415 COLL VENOUS BLD VENIPUNCTURE: CPT | Performed by: EMERGENCY MEDICINE

## 2025-04-04 PROCEDURE — 99152 MOD SED SAME PHYS/QHP 5/>YRS: CPT

## 2025-04-04 PROCEDURE — 96375 TX/PRO/DX INJ NEW DRUG ADDON: CPT | Performed by: EMERGENCY MEDICINE

## 2025-04-04 PROCEDURE — 272N000506 HC NEEDLE CR6

## 2025-04-04 PROCEDURE — 250N000011 HC RX IP 250 OP 636: Performed by: STUDENT IN AN ORGANIZED HEALTH CARE EDUCATION/TRAINING PROGRAM

## 2025-04-04 PROCEDURE — 272N000504 HC NEEDLE CR4

## 2025-04-04 PROCEDURE — 120N000005 HC R&B MS OVERFLOW UMMC

## 2025-04-04 PROCEDURE — 37224 HC REVASCULARIZE FEM-POP ARTERY ANGIOPLASTY: CPT | Mod: LT

## 2025-04-04 PROCEDURE — C1887 CATHETER, GUIDING: HCPCS

## 2025-04-04 PROCEDURE — 37184 PRIM ART M-THRMBC 1ST VSL: CPT | Mod: GC | Performed by: STUDENT IN AN ORGANIZED HEALTH CARE EDUCATION/TRAINING PROGRAM

## 2025-04-04 PROCEDURE — 75716 ARTERY X-RAYS ARMS/LEGS: CPT

## 2025-04-04 PROCEDURE — 99223 1ST HOSP IP/OBS HIGH 75: CPT | Mod: GC | Performed by: INTERNAL MEDICINE

## 2025-04-04 PROCEDURE — 96374 THER/PROPH/DIAG INJ IV PUSH: CPT | Performed by: EMERGENCY MEDICINE

## 2025-04-04 PROCEDURE — 272N000566 HC SHEATH CR3

## 2025-04-04 PROCEDURE — 36140 INTRO NDL ICATH UPR/LXTR ART: CPT | Mod: RT | Performed by: STUDENT IN AN ORGANIZED HEALTH CARE EDUCATION/TRAINING PROGRAM

## 2025-04-04 PROCEDURE — 93926 LOWER EXTREMITY STUDY: CPT | Mod: LT | Performed by: RADIOLOGY

## 2025-04-04 PROCEDURE — 255N000002 HC RX 255 OP 636

## 2025-04-04 PROCEDURE — 75710 ARTERY X-RAYS ARM/LEG: CPT | Mod: 26 | Performed by: STUDENT IN AN ORGANIZED HEALTH CARE EDUCATION/TRAINING PROGRAM

## 2025-04-04 PROCEDURE — 85014 HEMATOCRIT: CPT | Performed by: EMERGENCY MEDICINE

## 2025-04-04 RX ORDER — ATORVASTATIN CALCIUM 20 MG/1
20 TABLET, FILM COATED ORAL DAILY
Status: DISCONTINUED | OUTPATIENT
Start: 2025-04-05 | End: 2025-04-11 | Stop reason: HOSPADM

## 2025-04-04 RX ORDER — ONDANSETRON 4 MG/1
4 TABLET, ORALLY DISINTEGRATING ORAL EVERY 6 HOURS PRN
Status: DISCONTINUED | OUTPATIENT
Start: 2025-04-04 | End: 2025-04-11 | Stop reason: HOSPADM

## 2025-04-04 RX ORDER — GABAPENTIN 300 MG/1
300 CAPSULE ORAL 2 TIMES DAILY
Status: DISCONTINUED | OUTPATIENT
Start: 2025-04-05 | End: 2025-04-11 | Stop reason: HOSPADM

## 2025-04-04 RX ORDER — ASPIRIN 81 MG/1
81 TABLET, CHEWABLE ORAL DAILY
Status: DISCONTINUED | OUTPATIENT
Start: 2025-04-05 | End: 2025-04-09

## 2025-04-04 RX ORDER — DIPHENHYDRAMINE HYDROCHLORIDE 50 MG/ML
25-50 INJECTION, SOLUTION INTRAMUSCULAR; INTRAVENOUS
Status: COMPLETED | OUTPATIENT
Start: 2025-04-04 | End: 2025-04-04

## 2025-04-04 RX ORDER — MULTIVIT WITH MINERALS/LUTEIN
250 TABLET ORAL DAILY
Status: DISCONTINUED | OUTPATIENT
Start: 2025-04-05 | End: 2025-04-11 | Stop reason: HOSPADM

## 2025-04-04 RX ORDER — FINASTERIDE 5 MG/1
5 TABLET, FILM COATED ORAL DAILY
Status: DISCONTINUED | OUTPATIENT
Start: 2025-04-05 | End: 2025-04-05

## 2025-04-04 RX ORDER — AMOXICILLIN 250 MG
2 CAPSULE ORAL 2 TIMES DAILY PRN
Status: DISCONTINUED | OUTPATIENT
Start: 2025-04-04 | End: 2025-04-09

## 2025-04-04 RX ORDER — NALOXONE HYDROCHLORIDE 0.4 MG/ML
0.2 INJECTION, SOLUTION INTRAMUSCULAR; INTRAVENOUS; SUBCUTANEOUS
Status: DISCONTINUED | OUTPATIENT
Start: 2025-04-04 | End: 2025-04-04

## 2025-04-04 RX ORDER — ACETAMINOPHEN 325 MG/1
650 TABLET ORAL EVERY 4 HOURS PRN
Status: DISCONTINUED | OUTPATIENT
Start: 2025-04-04 | End: 2025-04-05

## 2025-04-04 RX ORDER — IODIXANOL 320 MG/ML
150 INJECTION, SOLUTION INTRAVASCULAR ONCE
Status: COMPLETED | OUTPATIENT
Start: 2025-04-04 | End: 2025-04-04

## 2025-04-04 RX ORDER — HEPARIN SODIUM 1000 [USP'U]/ML
500-10000 INJECTION, SOLUTION INTRAVENOUS; SUBCUTANEOUS EVERY 10 MIN PRN
Status: DISCONTINUED | OUTPATIENT
Start: 2025-04-04 | End: 2025-04-04

## 2025-04-04 RX ORDER — TRIAMCINOLONE ACETONIDE 1 MG/G
CREAM TOPICAL 2 TIMES DAILY
Status: DISCONTINUED | OUTPATIENT
Start: 2025-04-05 | End: 2025-04-11 | Stop reason: HOSPADM

## 2025-04-04 RX ORDER — ONDANSETRON 2 MG/ML
4 INJECTION INTRAMUSCULAR; INTRAVENOUS EVERY 6 HOURS PRN
Status: DISCONTINUED | OUTPATIENT
Start: 2025-04-04 | End: 2025-04-11 | Stop reason: HOSPADM

## 2025-04-04 RX ORDER — ASPIRIN 81 MG/1
81 TABLET, CHEWABLE ORAL DAILY
Status: DISCONTINUED | OUTPATIENT
Start: 2025-04-05 | End: 2025-04-04

## 2025-04-04 RX ORDER — AMOXICILLIN 250 MG
1 CAPSULE ORAL 2 TIMES DAILY PRN
Status: DISCONTINUED | OUTPATIENT
Start: 2025-04-04 | End: 2025-04-09

## 2025-04-04 RX ORDER — LOSARTAN POTASSIUM 100 MG/1
100 TABLET ORAL AT BEDTIME
Status: DISCONTINUED | OUTPATIENT
Start: 2025-04-05 | End: 2025-04-10

## 2025-04-04 RX ORDER — HEPARIN SODIUM 10000 [USP'U]/100ML
0-5000 INJECTION, SOLUTION INTRAVENOUS CONTINUOUS
Status: DISCONTINUED | OUTPATIENT
Start: 2025-04-04 | End: 2025-04-04

## 2025-04-04 RX ORDER — HEPARIN SODIUM 10000 [USP'U]/100ML
0-5000 INJECTION, SOLUTION INTRAVENOUS CONTINUOUS
Status: DISCONTINUED | OUTPATIENT
Start: 2025-04-05 | End: 2025-04-05

## 2025-04-04 RX ORDER — HYDRALAZINE HYDROCHLORIDE 20 MG/ML
10 INJECTION INTRAMUSCULAR; INTRAVENOUS EVERY 10 MIN PRN
Status: DISCONTINUED | OUTPATIENT
Start: 2025-04-04 | End: 2025-04-04

## 2025-04-04 RX ORDER — FENTANYL CITRATE 50 UG/ML
25-50 INJECTION, SOLUTION INTRAMUSCULAR; INTRAVENOUS EVERY 5 MIN PRN
Status: DISCONTINUED | OUTPATIENT
Start: 2025-04-04 | End: 2025-04-04

## 2025-04-04 RX ORDER — NALOXONE HYDROCHLORIDE 0.4 MG/ML
0.4 INJECTION, SOLUTION INTRAMUSCULAR; INTRAVENOUS; SUBCUTANEOUS
Status: DISCONTINUED | OUTPATIENT
Start: 2025-04-04 | End: 2025-04-04

## 2025-04-04 RX ORDER — SODIUM BICARBONATE 650 MG/1
1300 TABLET ORAL 2 TIMES DAILY
Status: DISCONTINUED | OUTPATIENT
Start: 2025-04-05 | End: 2025-04-05

## 2025-04-04 RX ORDER — TAMSULOSIN HYDROCHLORIDE 0.4 MG/1
0.8 CAPSULE ORAL DAILY
Status: DISCONTINUED | OUTPATIENT
Start: 2025-04-05 | End: 2025-04-11 | Stop reason: HOSPADM

## 2025-04-04 RX ORDER — VITAMIN B COMPLEX
50 TABLET ORAL DAILY
Status: DISCONTINUED | OUTPATIENT
Start: 2025-04-05 | End: 2025-04-11 | Stop reason: HOSPADM

## 2025-04-04 RX ORDER — FAMOTIDINE 20 MG/1
20 TABLET, FILM COATED ORAL DAILY
Status: DISCONTINUED | OUTPATIENT
Start: 2025-04-05 | End: 2025-04-06

## 2025-04-04 RX ORDER — NICOTINE POLACRILEX 4 MG
15-30 LOZENGE BUCCAL
Status: DISCONTINUED | OUTPATIENT
Start: 2025-04-04 | End: 2025-04-11 | Stop reason: HOSPADM

## 2025-04-04 RX ORDER — PROCHLORPERAZINE MALEATE 5 MG/1
5 TABLET ORAL EVERY 6 HOURS PRN
Status: DISCONTINUED | OUTPATIENT
Start: 2025-04-04 | End: 2025-04-10 | Stop reason: ALTCHOICE

## 2025-04-04 RX ORDER — DEXTROSE MONOHYDRATE 25 G/50ML
25-50 INJECTION, SOLUTION INTRAVENOUS
Status: DISCONTINUED | OUTPATIENT
Start: 2025-04-04 | End: 2025-04-11 | Stop reason: HOSPADM

## 2025-04-04 RX ORDER — AMLODIPINE BESYLATE 5 MG/1
5 TABLET ORAL DAILY
Status: DISCONTINUED | OUTPATIENT
Start: 2025-04-05 | End: 2025-04-11 | Stop reason: HOSPADM

## 2025-04-04 RX ORDER — MIRABEGRON 25 MG/1
25 TABLET, FILM COATED, EXTENDED RELEASE ORAL DAILY
Status: DISCONTINUED | OUTPATIENT
Start: 2025-04-05 | End: 2025-04-11 | Stop reason: HOSPADM

## 2025-04-04 RX ORDER — ACETAMINOPHEN 650 MG/1
650 SUPPOSITORY RECTAL EVERY 4 HOURS PRN
Status: DISCONTINUED | OUTPATIENT
Start: 2025-04-04 | End: 2025-04-05

## 2025-04-04 RX ADMIN — FENTANYL CITRATE 50 MCG: 50 INJECTION, SOLUTION INTRAMUSCULAR; INTRAVENOUS at 22:36

## 2025-04-04 RX ADMIN — HEPARIN SODIUM 5000 UNITS: 1000 INJECTION INTRAVENOUS; SUBCUTANEOUS at 20:06

## 2025-04-04 RX ADMIN — HEPARIN SODIUM 2500 UNITS: 1000 INJECTION INTRAVENOUS; SUBCUTANEOUS at 20:58

## 2025-04-04 RX ADMIN — FENTANYL CITRATE 50 MCG: 50 INJECTION, SOLUTION INTRAMUSCULAR; INTRAVENOUS at 22:01

## 2025-04-04 RX ADMIN — FENTANYL CITRATE 50 MCG: 50 INJECTION, SOLUTION INTRAMUSCULAR; INTRAVENOUS at 22:13

## 2025-04-04 RX ADMIN — FENTANYL CITRATE 50 MCG: 50 INJECTION, SOLUTION INTRAMUSCULAR; INTRAVENOUS at 19:58

## 2025-04-04 RX ADMIN — HEPARIN SODIUM 2500 UNITS: 1000 INJECTION INTRAVENOUS; SUBCUTANEOUS at 21:27

## 2025-04-04 RX ADMIN — FENTANYL CITRATE 50 MCG: 50 INJECTION, SOLUTION INTRAMUSCULAR; INTRAVENOUS at 22:46

## 2025-04-04 RX ADMIN — IODIXANOL 120 ML: 320 INJECTION, SOLUTION INTRAVASCULAR at 23:27

## 2025-04-04 RX ADMIN — HEPARIN SODIUM 1000 UNITS/HR: 10000 INJECTION, SOLUTION INTRAVENOUS at 22:57

## 2025-04-04 RX ADMIN — DIPHENHYDRAMINE HYDROCHLORIDE 25 MG: 50 INJECTION, SOLUTION INTRAMUSCULAR; INTRAVENOUS at 20:48

## 2025-04-04 RX ADMIN — FENTANYL CITRATE 50 MCG: 50 INJECTION, SOLUTION INTRAMUSCULAR; INTRAVENOUS at 21:44

## 2025-04-04 RX ADMIN — FENTANYL CITRATE 50 MCG: 50 INJECTION, SOLUTION INTRAMUSCULAR; INTRAVENOUS at 20:47

## 2025-04-04 RX ADMIN — FENTANYL CITRATE 50 MCG: 50 INJECTION, SOLUTION INTRAMUSCULAR; INTRAVENOUS at 20:12

## 2025-04-04 RX ADMIN — DIPHENHYDRAMINE HYDROCHLORIDE 25 MG: 50 INJECTION, SOLUTION INTRAMUSCULAR; INTRAVENOUS at 19:58

## 2025-04-04 RX ADMIN — HEPARIN SODIUM 2000 UNITS: 1000 INJECTION INTRAVENOUS; SUBCUTANEOUS at 22:10

## 2025-04-04 RX ADMIN — ALTEPLASE 2 MG: 2.2 INJECTION, POWDER, LYOPHILIZED, FOR SOLUTION INTRAVENOUS at 21:27

## 2025-04-04 RX ADMIN — HYDRALAZINE HYDROCHLORIDE 10 MG: 20 INJECTION INTRAMUSCULAR; INTRAVENOUS at 20:55

## 2025-04-04 ASSESSMENT — COLUMBIA-SUICIDE SEVERITY RATING SCALE - C-SSRS
2. HAVE YOU ACTUALLY HAD ANY THOUGHTS OF KILLING YOURSELF IN THE PAST MONTH?: NO
1. IN THE PAST MONTH, HAVE YOU WISHED YOU WERE DEAD OR WISHED YOU COULD GO TO SLEEP AND NOT WAKE UP?: NO
6. HAVE YOU EVER DONE ANYTHING, STARTED TO DO ANYTHING, OR PREPARED TO DO ANYTHING TO END YOUR LIFE?: NO

## 2025-04-04 ASSESSMENT — ACTIVITIES OF DAILY LIVING (ADL)
ADLS_ACUITY_SCORE: 41

## 2025-04-04 NOTE — ED TRIAGE NOTES
Patient states he was diagnosed with a left leg dvt today and was told to come to the ER. States left calf pain. Denies cp or sob.

## 2025-04-04 NOTE — CONSULTS
"    Interventional Radiology Consult Service Note    Patient is on IR schedule today for a left lower extremity angiogram. POD#2 of BLE angiogram and was found to have a thrombus in distal popliteal artery. Labs WNL for procedure. Sammy contact the IR charge RN at [phone] for estimated time of procedure.     Case discussed with Dr. Ramon.     Expected date of discharge: 4/5/2025     Vitals:   /83   Pulse 87   Temp 98.2  F (36.8  C)   Resp 18   Ht 1.727 m (5' 8\")   Wt 82.6 kg (182 lb)   SpO2 100%   BMI 27.67 kg/m      Pertinent Labs:     Lab Results   Component Value Date    WBC 4.1 04/02/2025    WBC 6.4 09/13/2023    WBC 5.4 01/08/2023    WBC 5.1 03/12/2021    WBC 4.6 06/18/2019    WBC 5.2 03/13/2019       Lab Results   Component Value Date    HGB 14.4 04/02/2025    HGB 14.1 11/08/2024    HGB 15.0 03/27/2024    HGB 13.6 06/09/2021    HGB 14.1 03/12/2021    HGB 14.9 12/14/2020       Lab Results   Component Value Date     04/02/2025     09/13/2023     01/08/2023     03/12/2021     06/18/2019     03/13/2019       Lab Results   Component Value Date    INR 1.02 04/02/2025    INR 0.93 06/12/2013    PTT 29 06/12/2013       Lab Results   Component Value Date    POTASSIUM 4.8 04/02/2025    POTASSIUM 4.3 06/09/2022    POTASSIUM 4.2 06/09/2021        Ye Wright MD  Interventional Radiology  Pager: [phone]    "

## 2025-04-04 NOTE — ED PROVIDER NOTES
ED Provider Note  [facility]      History     Chief Complaint   Patient presents with    DVT LLE     The history is provided by the patient and medical records.     Earle Rene is a 75 year old male who presents to the ED as advised by vascular surgery for left leg DVT.  He has history of DM 2 with peripheral neuropathy and atherosclerosis of native artery of both lower extremities with intermittent claudication.  He underwent left lower extremity angiogram 2 days ago with IR.  He has had ongoing pain so was sent in for LLE ultrasound which showed DVT and patient also had a popliteal artery occlusion.  He was also instructed to take 81 mg aspirin and a prescription for Eliquis was sent to his pharmacy.  Vascular called him and asked that he present to the ED with plan for LLE angiogram with IR tonight.  He states that he last ate/drink 1 hour ago.  He reports that he has significant pain in both legs and is unable to tell which is worse.  Pain is especially bad at night and first thing in the morning.  In the morning his feet appear very dark in color and are so painful that he cannot stand.  Color does improve throughout the day, but continues to be painful.  He denies any chest pain or shortness of breath.  Also denies any recent head trauma, signs or symptoms of GI bleed, or any recent surgeries.    US lower extremity arterial duplex:  Narrative & Impression   Examinations 4/4/2025 4:22 PM:  1. GARCIA, TBI, PVR, and 1st digit PPG  2. Ultrasound lower extremity arterial duplex left     CLINICAL HISTORY: post left lower extremity angiogram;ongoing pain;  please assess; Atherosclerosis of native artery of both lower  extremities with intermittent claudication; DVT (deep venous  thrombosis) (H).      COMPARISONS: Angiography and intervention 4/2/2025. CTA 3/7/2025      REFERRING PROVIDER: BEVERLY LOPEZ     TECHNIQUE: Bilateral GARCIA, TBI, PVR, and 1st digit PPG obtained.     Left leg  arteries evaluated with grayscale, color Doppler, and  spectral pulsed wave Doppler ultrasound.     FINDINGS:  RIGHT:       Brachial: 146 mmHg       Ankle (PT): >240 mmHg - non compressible        Ankle (DP): >240 mmHg - non compressible        1st Digit: 88 mmHg          GARCIA: non compressible       TBI: 0.60          PVR:            High thigh: Normal            Low thigh: Normal            Calf: Normal            Ankle: Normal          1st Digit PPG: Normal     LEFT:       Brachial: 144 mmHg       Ankle (PT): >240 mmHg - non compressible        Ankle (DP): >240 mmHg - non compressible        1st Digit: 57 mmHg          GARCIA: non compressible       TBI: 0.39          PVR:            High thigh: Normal            Low thigh: Normal            Calf: Normal            Ankle: Diminished          1st Digit PPG: Diminished     External iliac artery: 78/0 cm/s, multiphasic  Common femoral artery: 71/0 cm/s, multiphasic  Profundus femoral artery: 78/0 cm/s, multiphasic     Superficial femoral artery, proximal: 83/0 cm/s, multiphasic  Superficial femoral artery, mid: 53/0 cm/s, multiphasic  Superficial femoral artery, distal: 70/0 cm/s, multiphasic     Popliteal artery, proximal: 52/0 cm/s, multiphasic     Branch from proximal popliteal artery: 77/0 cm/s, multiphasic     Popliteal artery, mid: nearly occlusive filling defect. 29/0 cm/s,  multiphasic  Popliteal artery, distal: occlusive filling defect. 0 cm/s     Posterior tibial artery, ankle: 16/6 cm/s, monophasic     Anterior tibial artery, ankle, proximal to collateral: 11/0 cm/s,  multiphasic, RETROGRADE  Dorsalis pedis artery: 24/7 cm/s, monophasic, antegrade                                                                      IMPRESSION:  1. RIGHT:       A. Resting GARCIA is non compressible.       B. Resting TBI is ABNORMAL, 0.60.     2. LEFT:       A. Resting GARCIA is non compressible.       B. Resting TBI is ABNORMAL, 0.39       C. Distal popliteal artery occlusive  filling defect, likely  thrombus.       D. Nearly occlusive filling defect in the mid popliteal artery,  likely thrombus.       E. Dopplerable flow in anterior and posterior tibial arteries at  the ankle.        F. Retrograde flow in the anterior tibial artery. Known proximal  occlusion.        IR lower extremity angiogram 4/2/25    IMPRESSION:     Right:     Mild multifocal stenoses of the superficial femoral artery and  popliteal artery. The anterior tibial artery is occluded proximally.  Multifocal moderate to severe stenoses of the TP trunk and both  peroneal and posterior tibial arteries. Dorsalis pedis artery is  occluded.     Left:      Multifocal moderate stenosis of the distal superficial femoral  artery/popliteal artery, which was treated with 5 x 150 mm drug-coated  balloon. Multifocal moderate to severe stenoses of the TP trunk and  proximal posterior tibial artery was treated with 2mm and 3mm balloon  sequentially. Proximal anterior tibial artery is occluded with  reconstituted flow at the level of ankle. Dorsalis pedis artery is  patent. Multifocal moderate-to-severe stenoses of the proximal  peroneal artery.      Past Medical History  Past Medical History:   Diagnosis Date    Blepharitis of both eyes     BPH (benign prostatic hyperplasia)     Diabetes (H)     Diabetic neuropathy (H)     Diabetic retinopathy associated with diabetes mellitus due to underlying condition (H)     Dry eye syndrome     GERD (gastroesophageal reflux disease)     Goiter     Granulomatous disease (H)     HLD (hyperlipidemia)     HTN (hypertension)     Nonsenile cataract     Peripheral neuropathy      Past Surgical History:   Procedure Laterality Date    ------------OTHER-------------      back of neck abscess drainage in OR    AS RAD RESEC TONSIL/PILLARS Bilateral 1961    CATARACT IOL, RT/LT Left     COLONOSCOPY  7/29/2013    Procedure: COLONOSCOPY;;  Surgeon: Montana Pascal MD;  Location: [facility]    EXCISE MASS UPPER EXTREMITY  "Right 11/11/2019    Procedure: EXCISION, MASS, UPPER EXTREMITY, RIGHT SHOULDER;  Surgeon: Johana Choudhury MD;  Location: [facility]    INJECT EPIDURAL LUMBAR Left 4/20/2023    Procedure: INJECTION, SPINE, LUMBAR, EPIDURAL (L4-L5);  Surgeon: Mahamed Vázquez MD;  Location: [facility] OR    INJECT SACROILIAC JOINT Bilateral 9/13/2022    Procedure: Bilateral sacroiliac joint injection;  Surgeon: Collin Mata MD;  Location: [facility] OR    INTRAVITREAL INJECTION CHEMOTHERAPY Right 12/30/2019    Procedure: INTRAVITREAL Bevacizumab injection;  Surgeon: Milton Maki MD;  Location: [facility]    PHACOEMULSIFICATION CLEAR CORNEA WITH STANDARD INTRAOCULAR LENS IMPLANT Right 12/30/2019    Procedure: PHACOEMULSIFICATION, CATARACT, WITH INTRAOCULAR LENS IMPLANT;  Surgeon: Milton Maki MD;  Location: [facility]    PHACOEMULSIFICATION WITH STANDARD INTRAOCULAR LENS IMPLANT Left 6/21/2019    Procedure: Left Eye Cataract Removal with Intraocular Lens Implant with Intraoperative Avastin Injection;  Surgeon: Lacey Eugene MD;  Location: [facility] OR    siladenatis  11/2017     No current outpatient medications on file.    No Known Allergies  Family History  Family History   Problem Relation Age of Onset    Diabetes Father     Myocardial Infarction Father     Diabetes Brother     Leukemia Brother 44    Glaucoma No family hx of     Macular Degeneration No family hx of     Kidney Disease No family hx of      Social History   Social History     Tobacco Use    Smoking status: Never    Smokeless tobacco: Never   Vaping Use    Vaping status: Never Used   Substance Use Topics    Alcohol use: Yes     Comment: rare    Drug use: No      A medically appropriate review of systems was performed with pertinent positives and negatives noted in the HPI, and all other systems negative.    Physical Exam   BP: 134/83  Pulse: 87  Temp: 98.2  F (36.8  C)  Resp: 18  Height: 172.7 cm (5' 8\")  Weight: 82.6 kg (182 lb)  SpO2: 100 %  Physical Exam  General: " awake, alert, NAD  Head: normal cephalic  HEENT: pupils equal, conjugate gaze intact  Neck: Supple  CV: regular rate and rhythm without murmur  Lungs: clear to auscultation  Abd: soft, non-tender, no guarding, no peritoneal signs  EXT: Patient has dark appearing toes bilaterally.  Slightly cooler on the left when compared to the right.  No pallor appreciated.  Normal capillary refill.  Unable to palpate DP pulses bilaterally.  Neuro: awake, answers questions appropriately. No focal deficits noted     ED Course, Procedures, & Data      Procedures          Results for orders placed or performed during the hospital encounter of 04/04/25   IR Procedure Note     Status: None    Narrative    Magalie Dugan, DO     4/4/2025 11:14 PM  [facility]    Procedure: IR Procedure Note    Date/Time: 4/4/2025 10:57 PM    Performed by: Ye Wright MD  Authorized by: Kenrick Ramon MD  IR Fellow Physician:    Pre Procedure Diagnosis: PAD  Post Procedure Diagnosis: PAD    UNIVERSAL PROTOCOL   Site Marked: NA  Prior Images Obtained and Reviewed:  Yes  Required items: Required blood products, implants, devices and special   equipment available    Patient identity confirmed:  Arm band, provided demographic data,   hospital-assigned identification number and verbally with patient  Patient was reevaluated immediately before administering moderate or deep   sedation or anesthesia  Confirmation Checklist:  Correct equipment/implants were available,   procedure was appropriate and matched the consent or emergent situation,   relevant allergies and patient's identity using two indicators  Time out: Immediately prior to the procedure a time out was called    Universal Protocol: the Joint Commission Universal Protocol was followed    Preparation: Patient was prepped and draped in usual sterile fashion    ESBL (mL):  15     ANESTHESIA    Anesthesia:  Local infiltration  Local Anesthetic:  Lidocaine  1% without epinephrine      SEDATION  Patient Sedated: Yes    Sedation Type:  Moderate (conscious) sedation  Sedation:  Fentanyl and midazolam  Vital signs: Vital signs monitored during sedation    See dictated procedure note for full details.  Findings: Left groin puncture. Left lower extremity angiogram performed.   Procedure complicated by extravasation of the DAJA, which was resolved.   Right groin was also accessed with micropuncture set.     Specimens: none    Procedural Complications: None    Condition: Stable    Plan: -Admit for observation  -6 hour bedrest  -Low intensity heparin ggt   -ASA 81 mg daily     -Arterial ultrasound of the bilateral lower extremities tomorrow (ordered)  -Transition to full strength Xarelto tomorrow (10 mg BID for 21 days then   20 mg daily for 6 months)        PROCEDURE    Patient Tolerance:  Patient tolerated the procedure well with no immediate   complications  Length of time physician/provider present for 1:1 monitoring during   sedation:  172-186 min   CBC with platelets     Status: Abnormal   Result Value Ref Range    WBC Count 4.9 4.0 - 11.0 10e3/uL    RBC Count 4.37 (L) 4.40 - 5.90 10e6/uL    Hemoglobin 14.5 13.3 - 17.7 g/dL    Hematocrit 40.5 40.0 - 53.0 %    MCV 93 78 - 100 fL    MCH 33.2 (H) 26.5 - 33.0 pg    MCHC 35.8 31.5 - 36.5 g/dL    RDW 12.6 10.0 - 15.0 %    Platelet Count 133 (L) 150 - 450 10e3/uL   Activated clotting time celite, POCT     Status: Abnormal   Result Value Ref Range    Activated Clotting Time (Celite) POCT 194 (H) 74 - 150 seconds   Activated clotting time celite, POCT     Status: Abnormal   Result Value Ref Range    Activated Clotting Time (Celite) POCT 206 (H) 74 - 150 seconds   Results for orders placed or performed in visit on 04/04/25   US Lower Extremity Arterial Duplex Left     Status: None    Narrative    Examinations 4/4/2025 4:22 PM:  1. GARCIA, TBI, PVR, and 1st digit PPG  2. Ultrasound lower extremity arterial duplex left    CLINICAL  HISTORY: post left lower extremity angiogram;ongoing pain;  please assess; Atherosclerosis of native artery of both lower  extremities with intermittent claudication; DVT (deep venous  thrombosis) (H).     COMPARISONS: Angiography and intervention 4/2/2025. CTA 3/7/2025     REFERRING PROVIDER: BEVERLY LOPEZ    TECHNIQUE: Bilateral GARCIA, TBI, PVR, and 1st digit PPG obtained.    Left leg arteries evaluated with grayscale, color Doppler, and  spectral pulsed wave Doppler ultrasound.    FINDINGS:  RIGHT:       Brachial: 146 mmHg       Ankle (PT): >240 mmHg - non compressible        Ankle (DP): >240 mmHg - non compressible        1st Digit: 88 mmHg         GARCIA: non compressible       TBI: 0.60         PVR:            High thigh: Normal            Low thigh: Normal            Calf: Normal            Ankle: Normal         1st Digit PPG: Normal    LEFT:       Brachial: 144 mmHg       Ankle (PT): >240 mmHg - non compressible        Ankle (DP): >240 mmHg - non compressible        1st Digit: 57 mmHg         GARCIA: non compressible       TBI: 0.39         PVR:            High thigh: Normal            Low thigh: Normal            Calf: Normal            Ankle: Diminished         1st Digit PPG: Diminished    External iliac artery: 78/0 cm/s, multiphasic  Common femoral artery: 71/0 cm/s, multiphasic  Profundus femoral artery: 78/0 cm/s, multiphasic     Superficial femoral artery, proximal: 83/0 cm/s, multiphasic  Superficial femoral artery, mid: 53/0 cm/s, multiphasic  Superficial femoral artery, distal: 70/0 cm/s, multiphasic    Popliteal artery, proximal: 52/0 cm/s, multiphasic    Branch from proximal popliteal artery: 77/0 cm/s, multiphasic    Popliteal artery, mid: nearly occlusive filling defect. 29/0 cm/s,  multiphasic  Popliteal artery, distal: occlusive filling defect. 0 cm/s    Posterior tibial artery, ankle: 16/6 cm/s, monophasic    Anterior tibial artery, ankle, proximal to collateral: 11/0 cm/s,  multiphasic,  RETROGRADE  Dorsalis pedis artery: 24/7 cm/s, monophasic, antegrade      Impression    IMPRESSION:  1. RIGHT:       A. Resting GARCIA is non compressible.       B. Resting TBI is ABNORMAL, 0.60.    2. LEFT:       A. Resting GARCIA is non compressible.       B. Resting TBI is ABNORMAL, 0.39       C. Distal popliteal artery occlusive filling defect, likely  thrombus.       D. Nearly occlusive filling defect in the mid popliteal artery,  likely thrombus.       E. Dopplerable flow in anterior and posterior tibial arteries at  the ankle.        F. Retrograde flow in the anterior tibial artery. Known proximal  occlusion.    -------------    GARCIA Interpretation:     Normal: 1.00 - 1.40     Borderline: 0.91 - 0.99     Abnormal: ? 0.90     Noncompressible: > 1.40    TBI Interpretation:     Normal: > 0.70     Abnormal: ? 0.70    Kayleigh HL, Jeramy HD, Viktoria PP, Mc S, et al.; Peer Review  Committee Members. 2024  ACC/AHA/AACVPR/APMA/ABC/SCAI/SVM/SVN/SVS/SIR/VESS Guideline for the  Management of Lower Extremity Peripheral Artery Disease: A Report of  the American College of Cardiology/American Heart Association Joint  Committee on Clinical Practice Guidelines. Circulation. 2024 Jun 11;149(24):x6087-p8038. doi: 10.1161/CIR.[id number]. Epub 2024  May 14. PMID: [id number].    VIVIAN HAMMONDS MD         SYSTEM ID:  [id number]       Labs Ordered and Resulted from Time of ED Arrival to Time of ED Departure - No data to display  IR Lower Extremity Stent/Atherectomy/PTA    (Results Pending)          Critical care: Patient presented with an acute limb threatening injury that required emergent intervention with IR.  Time was spent coordinating care with IR, reviewing his previous workup, ordering heparin, examining the patient and documentation.  Total critical care for this patient was 45 minutes    Assessment & Plan    Patient was seen and evaluated in the ER.  He has some coolness to the left foot but normal capillary refill.  Some chronic  discoloration but no concerning pallor.  Unable to palpate DP pulses. Have already spoken to IR.  Patient was placed on a heparin; as there are no contraindications to this.  IR was consulted.  Patient was prepped and ready to go to IR prior to return of laboratory studies.  Patient's care was transferred to the IR service.     I have reviewed the nursing notes. I have reviewed the findings, diagnosis, plan and need for follow up with the patient.    Current Discharge Medication List          Final diagnoses:   Popliteal artery occlusion, left   IIsabella, andres serving as a trained medical scribe to document services personally performed by Kevin Benavides MD, based on the provider's statements to me.     I, Kevin Benavides MD, was physically present and have reviewed and verified the accuracy of this note documented by Isabella Alfonso.      Kevin Benavides MD  [facility] EMERGENCY DEPARTMENT  4/4/2025        Kevin Benavides MD  04/04/25 0517

## 2025-04-05 ENCOUNTER — APPOINTMENT (OUTPATIENT)
Dept: INTERVENTIONAL RADIOLOGY/VASCULAR | Facility: CLINIC | Age: 75
DRG: 271 | End: 2025-04-05
Attending: STUDENT IN AN ORGANIZED HEALTH CARE EDUCATION/TRAINING PROGRAM
Payer: COMMERCIAL

## 2025-04-05 ENCOUNTER — APPOINTMENT (OUTPATIENT)
Dept: ULTRASOUND IMAGING | Facility: CLINIC | Age: 75
DRG: 271 | End: 2025-04-05
Attending: STUDENT IN AN ORGANIZED HEALTH CARE EDUCATION/TRAINING PROGRAM
Payer: COMMERCIAL

## 2025-04-05 LAB
ACT BLD: 223 SECONDS (ref 74–150)
ANION GAP SERPL CALCULATED.3IONS-SCNC: 12 MMOL/L (ref 7–15)
APTT PPP: 27 SECONDS (ref 22–38)
BASE EXCESS BLDV CALC-SCNC: -7.9 MMOL/L (ref -3–3)
BUN SERPL-MCNC: 41.9 MG/DL (ref 8–23)
CALCIUM SERPL-MCNC: 8.9 MG/DL (ref 8.8–10.4)
CHLORIDE SERPL-SCNC: 107 MMOL/L (ref 98–107)
CREAT SERPL-MCNC: 2.26 MG/DL (ref 0.67–1.17)
EGFRCR SERPLBLD CKD-EPI 2021: 29 ML/MIN/1.73M2
ERYTHROCYTE [DISTWIDTH] IN BLOOD BY AUTOMATED COUNT: 13.1 % (ref 10–15)
GLUCOSE BLDC GLUCOMTR-MCNC: 171 MG/DL (ref 70–99)
GLUCOSE BLDC GLUCOMTR-MCNC: 179 MG/DL (ref 70–99)
GLUCOSE BLDC GLUCOMTR-MCNC: 184 MG/DL (ref 70–99)
GLUCOSE BLDC GLUCOMTR-MCNC: 204 MG/DL (ref 70–99)
GLUCOSE BLDC GLUCOMTR-MCNC: 220 MG/DL (ref 70–99)
GLUCOSE BLDC GLUCOMTR-MCNC: 243 MG/DL (ref 70–99)
GLUCOSE BLDC GLUCOMTR-MCNC: 282 MG/DL (ref 70–99)
GLUCOSE BLDC GLUCOMTR-MCNC: 285 MG/DL (ref 70–99)
GLUCOSE BLDC GLUCOMTR-MCNC: 337 MG/DL (ref 70–99)
GLUCOSE BLDC GLUCOMTR-MCNC: 337 MG/DL (ref 70–99)
GLUCOSE BLDC GLUCOMTR-MCNC: 342 MG/DL (ref 70–99)
GLUCOSE BLDC GLUCOMTR-MCNC: 373 MG/DL (ref 70–99)
GLUCOSE BLDC GLUCOMTR-MCNC: 415 MG/DL (ref 70–99)
GLUCOSE SERPL-MCNC: 279 MG/DL (ref 70–99)
HCO3 BLDV-SCNC: 17 MMOL/L (ref 21–28)
HCO3 SERPL-SCNC: 17 MMOL/L (ref 22–29)
HCT VFR BLD AUTO: 35 % (ref 40–53)
HGB BLD-MCNC: 10.4 G/DL (ref 13.3–17.7)
HGB BLD-MCNC: 11.8 G/DL (ref 13.3–17.7)
MCH RBC QN AUTO: 31.6 PG (ref 26.5–33)
MCHC RBC AUTO-ENTMCNC: 33.7 G/DL (ref 31.5–36.5)
MCV RBC AUTO: 94 FL (ref 78–100)
O2/TOTAL GAS SETTING VFR VENT: 21 %
OXYHGB MFR BLDV: 77 % (ref 70–75)
PCO2 BLDV: 33 MM HG (ref 40–50)
PH BLDV: 7.33 [PH] (ref 7.32–7.43)
PLATELET # BLD AUTO: 137 10E3/UL (ref 150–450)
PO2 BLDV: 44 MM HG (ref 25–47)
POTASSIUM SERPL-SCNC: 5.6 MMOL/L (ref 3.4–5.3)
RBC # BLD AUTO: 3.73 10E6/UL (ref 4.4–5.9)
SAO2 % BLDV: 78.7 % (ref 70–75)
SODIUM SERPL-SCNC: 136 MMOL/L (ref 135–145)
WBC # BLD AUTO: 9.3 10E3/UL (ref 4–11)

## 2025-04-05 PROCEDURE — 250N000009 HC RX 250: Performed by: STUDENT IN AN ORGANIZED HEALTH CARE EDUCATION/TRAINING PROGRAM

## 2025-04-05 PROCEDURE — 85730 THROMBOPLASTIN TIME PARTIAL: CPT | Performed by: STUDENT IN AN ORGANIZED HEALTH CARE EDUCATION/TRAINING PROGRAM

## 2025-04-05 PROCEDURE — 82435 ASSAY OF BLOOD CHLORIDE: CPT

## 2025-04-05 PROCEDURE — 93925 LOWER EXTREMITY STUDY: CPT

## 2025-04-05 PROCEDURE — 250N000012 HC RX MED GY IP 250 OP 636 PS 637

## 2025-04-05 PROCEDURE — 36415 COLL VENOUS BLD VENIPUNCTURE: CPT

## 2025-04-05 PROCEDURE — 85027 COMPLETE CBC AUTOMATED: CPT | Performed by: STUDENT IN AN ORGANIZED HEALTH CARE EDUCATION/TRAINING PROGRAM

## 2025-04-05 PROCEDURE — 999N000248 HC STATISTIC IV INSERT WITH US BY RN

## 2025-04-05 PROCEDURE — 250N000013 HC RX MED GY IP 250 OP 250 PS 637

## 2025-04-05 PROCEDURE — 3E053GC INTRODUCTION OF OTHER THERAPEUTIC SUBSTANCE INTO PERIPHERAL ARTERY, PERCUTANEOUS APPROACH: ICD-10-PCS | Performed by: STUDENT IN AN ORGANIZED HEALTH CARE EDUCATION/TRAINING PROGRAM

## 2025-04-05 PROCEDURE — 80048 BASIC METABOLIC PNL TOTAL CA: CPT

## 2025-04-05 PROCEDURE — 76942 ECHO GUIDE FOR BIOPSY: CPT

## 2025-04-05 PROCEDURE — 82805 BLOOD GASES W/O2 SATURATION: CPT

## 2025-04-05 PROCEDURE — 36002 PSEUDOANEURYSM INJECTION TRT: CPT | Mod: LT | Performed by: STUDENT IN AN ORGANIZED HEALTH CARE EDUCATION/TRAINING PROGRAM

## 2025-04-05 PROCEDURE — 120N000005 HC R&B MS OVERFLOW UMMC

## 2025-04-05 PROCEDURE — 85018 HEMOGLOBIN: CPT

## 2025-04-05 PROCEDURE — 76942 ECHO GUIDE FOR BIOPSY: CPT | Mod: 26 | Performed by: STUDENT IN AN ORGANIZED HEALTH CARE EDUCATION/TRAINING PROGRAM

## 2025-04-05 PROCEDURE — 250N000011 HC RX IP 250 OP 636: Mod: JZ | Performed by: STUDENT IN AN ORGANIZED HEALTH CARE EDUCATION/TRAINING PROGRAM

## 2025-04-05 PROCEDURE — 250N000011 HC RX IP 250 OP 636: Mod: JZ

## 2025-04-05 PROCEDURE — 36002 PSEUDOANEURYSM INJECTION TRT: CPT

## 2025-04-05 PROCEDURE — 99232 SBSQ HOSP IP/OBS MODERATE 35: CPT | Mod: GC | Performed by: STUDENT IN AN ORGANIZED HEALTH CARE EDUCATION/TRAINING PROGRAM

## 2025-04-05 PROCEDURE — 93925 LOWER EXTREMITY STUDY: CPT | Mod: 26 | Performed by: STUDENT IN AN ORGANIZED HEALTH CARE EDUCATION/TRAINING PROGRAM

## 2025-04-05 RX ORDER — HEPARIN SODIUM 10000 [USP'U]/100ML
0-5000 INJECTION, SOLUTION INTRAVENOUS CONTINUOUS
Status: DISCONTINUED | OUTPATIENT
Start: 2025-04-05 | End: 2025-04-06 | Stop reason: ALTCHOICE

## 2025-04-05 RX ORDER — NALOXONE HYDROCHLORIDE 0.4 MG/ML
0.2 INJECTION, SOLUTION INTRAMUSCULAR; INTRAVENOUS; SUBCUTANEOUS
Status: DISCONTINUED | OUTPATIENT
Start: 2025-04-05 | End: 2025-04-11 | Stop reason: HOSPADM

## 2025-04-05 RX ORDER — ACETAMINOPHEN 325 MG/1
975 TABLET ORAL EVERY 6 HOURS
Status: DISCONTINUED | OUTPATIENT
Start: 2025-04-05 | End: 2025-04-07

## 2025-04-05 RX ORDER — METHOCARBAMOL 500 MG/1
1000 TABLET, FILM COATED ORAL 3 TIMES DAILY PRN
Status: DISCONTINUED | OUTPATIENT
Start: 2025-04-05 | End: 2025-04-11 | Stop reason: HOSPADM

## 2025-04-05 RX ORDER — NALOXONE HYDROCHLORIDE 0.4 MG/ML
0.4 INJECTION, SOLUTION INTRAMUSCULAR; INTRAVENOUS; SUBCUTANEOUS
Status: DISCONTINUED | OUTPATIENT
Start: 2025-04-05 | End: 2025-04-11 | Stop reason: HOSPADM

## 2025-04-05 RX ORDER — OXYCODONE HYDROCHLORIDE 5 MG/1
5 TABLET ORAL EVERY 4 HOURS PRN
Status: DISCONTINUED | OUTPATIENT
Start: 2025-04-05 | End: 2025-04-11 | Stop reason: HOSPADM

## 2025-04-05 RX ORDER — HYDROMORPHONE HYDROCHLORIDE 1 MG/ML
0.5 INJECTION, SOLUTION INTRAMUSCULAR; INTRAVENOUS; SUBCUTANEOUS
Status: DISCONTINUED | OUTPATIENT
Start: 2025-04-05 | End: 2025-04-08

## 2025-04-05 RX ORDER — METHOCARBAMOL 500 MG/1
1000 TABLET, FILM COATED ORAL 3 TIMES DAILY
Status: DISCONTINUED | OUTPATIENT
Start: 2025-04-05 | End: 2025-04-05

## 2025-04-05 RX ORDER — NICOTINE POLACRILEX 4 MG
15-30 LOZENGE BUCCAL
Status: DISCONTINUED | OUTPATIENT
Start: 2025-04-05 | End: 2025-04-05

## 2025-04-05 RX ORDER — HYDROMORPHONE HYDROCHLORIDE 1 MG/ML
0.5 INJECTION, SOLUTION INTRAMUSCULAR; INTRAVENOUS; SUBCUTANEOUS
Status: COMPLETED | OUTPATIENT
Start: 2025-04-05 | End: 2025-04-05

## 2025-04-05 RX ORDER — LIDOCAINE HYDROCHLORIDE 10 MG/ML
1-30 INJECTION, SOLUTION EPIDURAL; INFILTRATION; INTRACAUDAL; PERINEURAL
Status: COMPLETED | OUTPATIENT
Start: 2025-04-05 | End: 2025-04-05

## 2025-04-05 RX ORDER — DEXTROSE MONOHYDRATE 25 G/50ML
25-50 INJECTION, SOLUTION INTRAVENOUS
Status: DISCONTINUED | OUTPATIENT
Start: 2025-04-05 | End: 2025-04-05

## 2025-04-05 RX ADMIN — LOSARTAN POTASSIUM 100 MG: 100 TABLET, FILM COATED ORAL at 21:40

## 2025-04-05 RX ADMIN — HYDROMORPHONE HYDROCHLORIDE 0.5 MG: 1 INJECTION, SOLUTION INTRAMUSCULAR; INTRAVENOUS; SUBCUTANEOUS at 01:13

## 2025-04-05 RX ADMIN — HYDROMORPHONE HYDROCHLORIDE 0.5 MG: 1 INJECTION, SOLUTION INTRAMUSCULAR; INTRAVENOUS; SUBCUTANEOUS at 03:06

## 2025-04-05 RX ADMIN — AMLODIPINE BESYLATE 5 MG: 5 TABLET ORAL at 08:09

## 2025-04-05 RX ADMIN — GABAPENTIN 300 MG: 300 CAPSULE ORAL at 00:32

## 2025-04-05 RX ADMIN — MIRABEGRON 25 MG: 25 TABLET, FILM COATED, EXTENDED RELEASE ORAL at 08:09

## 2025-04-05 RX ADMIN — Medication 50 MCG: at 08:09

## 2025-04-05 RX ADMIN — HEPARIN SODIUM 1000 UNITS/HR: 10000 INJECTION, SOLUTION INTRAVENOUS at 22:34

## 2025-04-05 RX ADMIN — LOSARTAN POTASSIUM 100 MG: 100 TABLET, FILM COATED ORAL at 00:32

## 2025-04-05 RX ADMIN — ACETAMINOPHEN 975 MG: 325 TABLET, FILM COATED ORAL at 03:05

## 2025-04-05 RX ADMIN — HYDROMORPHONE HYDROCHLORIDE 0.5 MG: 1 INJECTION, SOLUTION INTRAMUSCULAR; INTRAVENOUS; SUBCUTANEOUS at 19:32

## 2025-04-05 RX ADMIN — HYDROMORPHONE HYDROCHLORIDE 0.5 MG: 1 INJECTION, SOLUTION INTRAMUSCULAR; INTRAVENOUS; SUBCUTANEOUS at 13:58

## 2025-04-05 RX ADMIN — INSULIN ASPART 1 UNITS: 100 INJECTION, SOLUTION INTRAVENOUS; SUBCUTANEOUS at 01:04

## 2025-04-05 RX ADMIN — Medication 250 MG: at 08:10

## 2025-04-05 RX ADMIN — HYDROMORPHONE HYDROCHLORIDE 0.5 MG: 1 INJECTION, SOLUTION INTRAMUSCULAR; INTRAVENOUS; SUBCUTANEOUS at 08:17

## 2025-04-05 RX ADMIN — GABAPENTIN 300 MG: 300 CAPSULE ORAL at 20:34

## 2025-04-05 RX ADMIN — METHOCARBAMOL 1000 MG: 500 TABLET, FILM COATED ORAL at 05:34

## 2025-04-05 RX ADMIN — OXYCODONE HYDROCHLORIDE 5 MG: 5 TABLET ORAL at 00:32

## 2025-04-05 RX ADMIN — ATORVASTATIN CALCIUM 20 MG: 20 TABLET, FILM COATED ORAL at 08:10

## 2025-04-05 RX ADMIN — HYDROMORPHONE HYDROCHLORIDE 0.5 MG: 1 INJECTION, SOLUTION INTRAMUSCULAR; INTRAVENOUS; SUBCUTANEOUS at 10:41

## 2025-04-05 RX ADMIN — GABAPENTIN 300 MG: 300 CAPSULE ORAL at 08:09

## 2025-04-05 RX ADMIN — HYDROMORPHONE HYDROCHLORIDE 0.5 MG: 1 INJECTION, SOLUTION INTRAMUSCULAR; INTRAVENOUS; SUBCUTANEOUS at 11:14

## 2025-04-05 RX ADMIN — METHOCARBAMOL 1000 MG: 500 TABLET, FILM COATED ORAL at 14:46

## 2025-04-05 RX ADMIN — Medication 600 UNITS: at 14:23

## 2025-04-05 RX ADMIN — INSULIN DEGLUDEC INJECTION 25 UNITS: 100 INJECTION, SOLUTION SUBCUTANEOUS at 01:04

## 2025-04-05 RX ADMIN — FAMOTIDINE 20 MG: 20 TABLET, FILM COATED ORAL at 08:09

## 2025-04-05 RX ADMIN — ACETAMINOPHEN 975 MG: 325 TABLET, FILM COATED ORAL at 08:09

## 2025-04-05 RX ADMIN — LIDOCAINE HYDROCHLORIDE 6 ML: 10 INJECTION, SOLUTION EPIDURAL; INFILTRATION; INTRACAUDAL; PERINEURAL at 14:13

## 2025-04-05 RX ADMIN — ACETAMINOPHEN 975 MG: 325 TABLET, FILM COATED ORAL at 17:11

## 2025-04-05 RX ADMIN — TAMSULOSIN HYDROCHLORIDE 0.8 MG: 0.4 CAPSULE ORAL at 10:38

## 2025-04-05 RX ADMIN — ACETAMINOPHEN 975 MG: 325 TABLET, FILM COATED ORAL at 21:40

## 2025-04-05 ASSESSMENT — ACTIVITIES OF DAILY LIVING (ADL)
ADLS_ACUITY_SCORE: 23
ADLS_ACUITY_SCORE: 22
ADLS_ACUITY_SCORE: 41
ADLS_ACUITY_SCORE: 22
ADLS_ACUITY_SCORE: 23
ADLS_ACUITY_SCORE: 22
ADLS_ACUITY_SCORE: 23
ADLS_ACUITY_SCORE: 23
ADLS_ACUITY_SCORE: 18
ADLS_ACUITY_SCORE: 22

## 2025-04-05 NOTE — DISCHARGE INSTRUCTIONS
[facility]  Department of Vascular and Interventional Radiology (IR)   Drain Placement Discharge Instructions    You had a percutaneous nephrostomy tube (PNT) /Percutaneous Nephroureteral Catheter, Ureterostomy tube placed or exchanged.      If you received sedation:     For 24 hours:   Have an adult stay with you for 24 hours.   Drink plenty of fluids and resume your regular diet.  Do not drive or operate machinery at home or at work  No alcoholic beverages  Do not make any important legal decisions  No heavy lifting greater than 10 lb unless otherwise instructed by your physician.    Slowly advance toward your normal activities after two days    Bathing  We recommend sponge baths.  If you must shower, please secure the catheter, connecting tubes and collection device into plastic bag around the body with adhesive taping.   Be sure to keep the catheter site as dry as possible.        Components of Drainage Catheter System          How to  Care for Your Catheter (Print Out Link with Video QR codes)  Inspect the catheter dressing and change the dressing if it is soiled, loose or wet.  Inspect the catheter for any signs of kinking.    Do not flush the drain unless otherwise instructed.  If catheter is not to be flushed, the 3 way stopcock is removed.      The collection device  If you have a collection bag:  Twist the blue valve at the bottom of the bag and empty contents into measuring cup.  Wipe the bottom of blue valve.    Contact Information  Please contact IR for the following:    -Fever >=101F and/or shaking chills  -worsening redness, warmth and pus drainage at/or around the site of catheter placement  -Inability to flush the catheter  -Significant leakage around the catheter site  -Catheter pulled out or falls out  -New or increasing pain that does not respond to over the counter pain medication    The Interventional Radiology Nursing line is available at [phone] Monday to  Friday (7:30AM-4:00PM).   Any voicemails left will be returned within 24 hours.      For emergencies (that cannot wait until normal business hours) during night time hours and weekends,  please call [facility]  at [phone] and ask to page [facility] on call Interventional Radiology team.      Follow-Up  Your next appointment has been scheduled.  If it is not, please call Interventional Radiology Scheduling Line at [phone] to schedule follow-up.      What to bring at your next IR appointment  List of current medications    [facility] Locations (for filling of syringe prescriptions):  [facility]  [address]/ [address]  [facility] [address]  [phone] [facility] [facility]  [address]  [place] [place] [facility] [address]  [phone]     For Dressings and other medical supplies):  [employer] [facility] [phone] [email or web address]    [facility] [email or web address] [phone] [email or web address]

## 2025-04-05 NOTE — PLAN OF CARE
Goal Outcome Evaluation:      Plan of Care Reviewed With: patient    Overall Patient Progress: improvingOverall Patient Progress: improving    Outcome Evaluation: Patient is complaining of severe pain rated 10/10. PRN oxycodone given x1. dilaudid given x2, robaxin given x1. Patients groin site hematoma increased beyond marking. MD notified. Femostop applied. -400. MD notifed. 20U of insulin coverage administered.    Neuro: A&Ox4.   Cardiac: SBP 160s. HR 90s. Afebrile.   Respiratory: Sating well on RA.  GI/: Patient uses urinal. Patient has urinary hesitancy and urgency. Last BM prior to admission.   Diet/appetite: Regular diet. Advance as tolerated. Patient only consumed gingerale overnight. Tolerated well.   Activity:  Bedrest. Patient is on bedrest d/t procedure.   Pain: Patient is complaining of severe pain in LLE rated 10/10. PRN oxycodone given x1. dilaudid given x2, robaxin given x1.   Skin: No new deficits noted. Patient has groin site with large marked hematoma on L side. L CMS check abnormal. Cool, numb, sensation absent, mobility decreased, pulse not palpable only auscultated on doppler. Patients hematoma increased in size exceeding marking 0305. MD notified. IR recommended applying femostop. Femostop applied and is now removed. Site stable. R side site covered with quick clot. R CMS checks WDL.   LDA's: Patient has RPIV saline locked.     Plan: Continue with POC. Notify primary team with changes. Monitor CMS. Monitor groin sites. Hold aspirin and heparin gtt until bleeding risk decreases.

## 2025-04-05 NOTE — H&P
[facility]    History and Physical - Medicine Service, [facility] TEAM        Date of Admission:  4/4/2025    Assessment & Plan      Earle Rene is a 75 year old male with a PMH of Type 2 diabetes, hypertension, peripheral artery disease, CKD3, and diabetic neuropathy who   Is admitted as advised by vascular surgery 2/2 DVT, now s/p LE angiogram.    #Peripheral artery disease  Pt with h/o HTN and T2DM with long standing bilateral LE pain which worsens on exertion and recently has been present at rest as well. Follows with IR outpatient and found to have aramis class 4 chronic limb ischemia. Seen in IR clinic on 03/20 with plan for bilateral LE angiogram on 02/04 showing R multifocal stenosis at various levels with occluded dorsalis pedis, not requiring intervention. Left LE angiogram showing multifocal lesions  requiring 3 x  drug-coated balloons  (TP trunk, proximal posterior tibial artery and distal superficial femoral artery/popliteal artery) with patent dorsalis pedis. Pt presented with ongoing significant pain found to have DVT and hence advised to come to the ED. Vitally stable. IR performed left lower extremity angiogram . Procedure was complicated by extravasation of the DAJA, which was resolved. Pt having severe pain and swelling with bruising of L groin hematoma which is marked by a marker. Neurovascularly intact. Dorsalis pedis pulses are dopplerable.  -Hold apixaban  -6 hour bedrest  -Low intensity heparin gtt after bedrest completed if no sign of bleeding  -ASA 81mg- after bedrest completed if no sign of bleeding  -Femostop placed temporarily  -Pain management:  -Scheduled tylenol 975mg q6h  -Robaxin 1000mg TID PRN  -Oxycodone 5mg Q4H PRN  -Dilaudid 0.5mg Q2H PRN   -Bilateral LE arterial US tomorrow  -Full strength Xarelto tomorrow   -10 mg BID for 21 days  -Followed by 20 mg daily for 6 months  -Continue atorvastatin 20mg    #Type 2 diabetes  "mellitus  #Mild nonproliferative retinopathy and macular edema   Pt with long standing T2DM. With last HbA1c of 8% (2/11/25).  -Continue 50U degludec   -Pt got 25U degludec and 10U aspart overnight on 04/04  -Hold terzepatide and jiardiance while inpatient  -MDSSI    #Diabetic neuropathy  Pt with bilateral LE pain 2/2 diabetic neuropathy and PAD. Uses diabetic shoes. Has seen podiatry in the past.  -Continue PTA gabapentin 300mg TID    #Thrombocytopenia   Pt with platelet count of 133 in the setting of DVT, now s/p thrombectomy.  -CTM    #Hypertension  BP well controlled during admission.  -Continue PTA losartan 100mg  -Continue PTA amlodipine 5mg    #Hyponatremia  -Continue PTA sodium bicarb 1300mg BID    #BPH  #Low risk prostate cancer  Follows with urology outpatient. Denies any current hesitancy  -Continue PTA tamsulosin 0.8mg  -Continue PTA finasteride 5mg    #Urinary urgency  Follows with urology outpatient. Denies any current urinary urgency.  -Continue PTA mirabegron 25mg    #GERD  -Continue PTA famotidine       Diet: Regular Diet Adult    DVT Prophylaxis: low intensity heparin after bedrest completed  Chamberlain Catheter: Not present  Fluids: None  Lines: None     Cardiac Monitoring: None  Code Status: Full Code      Clinically Significant Risk Factors Present on Admission                # Drug Induced Coagulation Defect: home medication list includes an anticoagulant medication    # Hypertension: Home medication list includes antihypertensive(s)          # DMII: A1C = 8.0 % (Ref range: <5.7 %) within past 6 months    # Overweight: Estimated body mass index is 27.67 kg/m  as calculated from the following:    Height as of this encounter: 1.727 m (5' 8\").    Weight as of this encounter: 82.6 kg (182 lb).              Disposition Plan      Expected Discharge Date: 04/05/2025                  This pt will be staffed in the AM.    Aisha Shabbir, MD  Medicine Service, [facility]" [facility]  Securely message with Vocera (more info)  Text page via [facility] Paging/Directory   See signed in provider for up to date coverage information  ______________________________________________________________________    Chief Complaint   DVT left lower extremity    History is obtained from the patient    History of Present Illness   Earle Rene is a 75 year old male with a PMH of Type 2 diabetes, hypertension, peripheral artery disease, CKD3, and diabetic neuropathy who   Is admitted as advised by vascular surgery 2/2 DVT, now s/p LE angiogram.    Patient has a long standing history of bilateral calf and thigh pain and spasms while walking short distances that requires him to stop and rest. Pain usually improves after stopping for about 5 minutes. However, he has now been having pain when he rests as well, especially at night.  This has been limiting his ADLs. He had a lower extremity aniogram done 2 days ago, however,he has been having continuous worsening pain over the past 2 days. He was then sent for the LE US showing DVT and popliteal vein occlusion, after which he was asked to come to the ED for an angiogram.    He reports 10/10 continuous left leg pain which feels worse after the procedure  It feels like a cramping and shooting pain. Pain not present on the right side. He has bilateral hematomas post procedure which are not painful. He denies any numbness or weakness. Denies any back pain, or buttock pain. Denies fever, chills, night sweats, chest pain or abdominal pain/    ROS was unremarkable elsewise. He drinks alcohol socially. He denies smoking, or recreational drug use. He lives with his wife and 1 child. He feels well supported at home.      Past Medical History    Past Medical History:   Diagnosis Date    Blepharitis of both eyes     BPH (benign prostatic hyperplasia)     Diabetes (H)     Diabetic neuropathy (H)     Diabetic retinopathy associated with diabetes mellitus due to  underlying condition (H)     Dry eye syndrome     GERD (gastroesophageal reflux disease)     Goiter     Granulomatous disease (H)     HLD (hyperlipidemia)     HTN (hypertension)     Nonsenile cataract     Peripheral neuropathy        Past Surgical History   Past Surgical History:   Procedure Laterality Date    ------------OTHER-------------      back of neck abscess drainage in OR    AS RAD RESEC TONSIL/PILLARS Bilateral     CATARACT IOL, RT/LT Left     COLONOSCOPY  2013    Procedure: COLONOSCOPY;;  Surgeon: Montana Pascal MD;  Location: [facility]    EXCISE MASS UPPER EXTREMITY Right 2019    Procedure: EXCISION, MASS, UPPER EXTREMITY, RIGHT SHOULDER;  Surgeon: Johana Choudhury MD;  Location: [facility]    INJECT EPIDURAL LUMBAR Left 2023    Procedure: INJECTION, SPINE, LUMBAR, EPIDURAL (L4-L5);  Surgeon: Mahamed Vázquez MD;  Location: [facility] OR    INJECT SACROILIAC JOINT Bilateral 2022    Procedure: Bilateral sacroiliac joint injection;  Surgeon: Collin Mata MD;  Location: [facility] OR    INTRAVITREAL INJECTION CHEMOTHERAPY Right 2019    Procedure: INTRAVITREAL Bevacizumab injection;  Surgeon: Milton Maki MD;  [facility]: [facility]    PHACOEMULSIFICATION CLEAR CORNEA WITH STANDARD INTRAOCULAR LENS IMPLANT Right 2019    Procedure: PHACOEMULSIFICATION, CATARACT, WITH INTRAOCULAR LENS IMPLANT;  Surgeon: Milton Maki MD;  Location: [facility]    PHACOEMULSIFICATION WITH STANDARD INTRAOCULAR LENS IMPLANT Left 2019    Procedure: Left Eye Cataract Removal with Intraocular Lens Implant with Intraoperative Avastin Injection;  Surgeon: Lacey Eugene MD;  Location: [facility] OR    siladenatis  2017       Prior to Admission Medications   Prior to Admission Medications   Prescriptions Last Dose Informant Patient Reported? Taking?   ARTIFICIAL TEAR OP   Yes No   Sig: Apply to eye as needed.   Continuous Glucose Sensor (FREESTYLE PETER 2 SENSOR) [facility]   No No   Si each every 14  days. 1 each every 14 days. Change every 14 days.   VITAMIN D3 25 MCG (1000 UT) tablet   No No   Sig: TAKE 2 TABLETS (50 MCG) BY MOUTH DAILY   alpha-lipoic acid 600 MG capsule   No No   Sig: Take 1 capsule (600 mg) by mouth daily   amLODIPine (NORVASC) 5 MG tablet   Yes No   Sig: Take 1 tablet by mouth daily.   apixaban ANTICOAGULANT (ELIQUIS ANTICOAGULANT) 2.5 MG tablet   No No   Sig: Take 1 tablet (2.5 mg) by mouth 2 times daily.   atorvastatin (LIPITOR) 20 MG tablet   No No   Sig: TAKE 1 TABLET BY MOUTH EVERY DAY   blood glucose (NO BRAND SPECIFIED) lancets standard   No No   Sig: Lancets that go with device, Test 3 times daily   econazole nitrate 1 % external cream   No No   Sig: Apply topically daily. To feet and toenails.   empagliflozin (JARDIANCE) 10 MG TABS tablet   No No   Sig: TAKE 1 TABLET (10 MG) BY MOUTH DAILY.   famotidine (PEPCID) 20 MG tablet   No No   Sig: TAKE 1 TABLET BY MOUTH EVERY DAY   finasteride (PROSCAR) 5 MG tablet   No No   Sig: Take 1 tablet (5 mg) by mouth daily   gabapentin (NEURONTIN) 300 MG capsule   No No   Sig: Take 1 capsule (300 mg) by mouth 2 times daily.   insulin degludec (TRESIBA FLEXTOUCH) 100 UNIT/ML pen   No No   Sig: INJECT 50 UNITS SUBCUTANEOUSLY AT BEDTIME.   insulin pen needle (B-D U/F) 31G X 5 MM miscellaneous   No No   Sig: Use 4 time(s) per day.  Please dispense as BD Pen Needle Mini U/F 31G x 5 MM   loratadine (CLARITIN) 10 MG tablet   No No   Sig: Take 1 tablet (10 mg) by mouth daily   losartan (COZAAR) 100 MG tablet   No No   Sig: Take 1 tablet (100 mg) by mouth at bedtime   mirabegron (MYRBETRIQ) 25 MG 24 hr tablet   No No   Sig: TAKE 1 TABLET BY MOUTH EVERY DAY   sodium bicarbonate 650 MG tablet   No No   Sig: Take 2 tablets (1,300 mg) by mouth 2 times daily   tamsulosin (FLOMAX) 0.4 MG capsule   No No   Sig: Take 2 capsules (0.8 mg) by mouth daily. For more refills,schedule an appointment at [phone]   tirzepatide (MOUNJARO) 5 MG/0.5ML pen   No No   Sig:  Inject 5 mg subcutaneously every 7 days.   triamcinolone (KENALOG) 0.1 % external cream   No No   Sig: Apply topically 2 times daily   vitamin C 250 MG tablet   Yes No   Sig: Take 250 mg by mouth.      Facility-Administered Medications: None         Physical Exam   Vital Signs: Temp: 98.2  F (36.8  C)   BP: 119/63 Pulse: 85   Resp: 17 SpO2: 100 % O2 Device: None (Room air)    Weight: 182 lbs 0 oz    General Appearance: A&Ox3. Uncomfortable appearing  Respiratory: Kungs clear to auscultation bilaterally  Cardiovascular: Regular rate and rhythm  GI: Soft, nontender, nondistended  Skin: No rashes on exposed skin  Extremities: No pitting edema bilaterally   Left:: Dopplerable pulses. Sole cooler on left foot. Normal capillary refill. Nontender to palpation   Right: Dopplerable pulses. Warm, and pale. Normal capillary refill. Nontender to palpation  Other: Large nontender left hematoma regressing from marker. Slight bleeding from right groin.     Medical Decision Making

## 2025-04-05 NOTE — PROCEDURES
[facility]    Procedure: IR Procedure Note    Date/Time: 4/5/2025 2:49 PM    Performed by: Magalie Dugan DO  Authorized by: Kenrick Ramon MD  IR Fellow Physician:    Pre Procedure Diagnosis: Left CFA PSA  Post Procedure Diagnosis: Same    UNIVERSAL PROTOCOL   Site Marked: NA  Prior Images Obtained and Reviewed:  Yes  Required items: Required blood products, implants, devices and special equipment available    Patient identity confirmed:  Arm band, provided demographic data, hospital-assigned identification number and verbally with patient  Patient was reevaluated immediately before administering moderate or deep sedation or anesthesia  Confirmation Checklist:  Correct equipment/implants were available, procedure was appropriate and matched the consent or emergent situation, relevant allergies and patient's identity using two indicators  Time out: Immediately prior to the procedure a time out was called    Universal Protocol: the Joint Commission Universal Protocol was followed    Preparation: Patient was prepped and draped in usual sterile fashion    ESBL (mL):  0     ANESTHESIA    Anesthesia:  Local infiltration  Local Anesthetic:  Lidocaine 1% without epinephrine  Anesthetic Total (mL):  6      SEDATION    Patient Sedated: No    See dictated procedure note for full details.  Findings: Left CFA PSA injected with 600U thrombin. PSA closed at end of injection. No immediate complications.   Left DAJA dopplerable  Left PTA dopplerable    New marks for DAJA and PTA were marked on left lower extremity.    Specimens: none    Procedural Complications: None    Condition: Stable    Plan: -Bedrest with left leg straight (END 1000AM 4/6)  -Very low intensity heparin ggt start at 2200 4/5  -ASA 81 mg daily start tomorrow AM  -Arterial ultrasound of the left lower extremity tomorrow AM        PROCEDURE    Patient Tolerance:  Patient tolerated the procedure well with no immediate  complications  Length of time physician/provider present for 1:1 monitoring during sedation:  0 min

## 2025-04-05 NOTE — PROGRESS NOTES
Transfer  Transferred from: IR  Via:bed  Reason for transfer: Pt appropriate for 6B- worsened patient condition  Family: Aware of transfer  Belongings: Received with pt  Chart: Received with pt  Medications: No meds received from old unit with pt  Code Status verified on armband: yes  2 RN Skin Assessment Completed By: Lynne Espinal RN & Gina RN (unable to perform full assessment d/t post surgical bedrest orders.   Med rec completed: no  Suction/Ambu bag/Flowmeter at bedside: yes    Report received from: Terri JACOB RN   Pt status: Patient A&Ox4. SBP 160s. HR 100s. Sating well on RA. Patient urinates in urinal. Patient has large hematoma on L groin marked. R groin site WDL with quick clot. Scattered bruising noted on skin. RPIV saline locked.

## 2025-04-05 NOTE — PROGRESS NOTES
"Brief IR Progress Note  S:  Patient had increasing swelling/hematoma overnight requiring femstop.    O:  Vital signs:  Temp: 97.8  F (36.6  C) Temp src: Oral BP: 132/67 Pulse: 80   Resp: 18 SpO2: 100 % O2 Device: None (Room air)   Height: 172.7 cm (5' 8\") Weight: 83.4 kg (183 lb 13.8 oz)  Estimated body mass index is 27.96 kg/m  as calculated from the following:    Height as of this encounter: 1.727 m (5' 8\").    Weight as of this encounter: 83.4 kg (183 lb 13.8 oz).      Physical Exam  HENT:      Head: Normocephalic and atraumatic.   Cardiovascular:      Rate and Rhythm: Normal rate.      Comments: Left DAJA and PTA are dopplerable  Pulmonary:      Effort: Pulmonary effort is normal.   Abdominal:      General: Abdomen is flat.   Musculoskeletal:      Comments: Left groin with swelling and ecchymosis.  Left foot cool to the touch, stable compared to post procedure 4/4.   Skin:     General: Skin is warm and dry.   Neurological:      General: No focal deficit present.      Mental Status: He is alert.   Psychiatric:         Mood and Affect: Mood normal.         Bilateral lower extremity arterial ultrasound:  Left lower extremity:  Pseudoaneurysm arising from the left CFA measuring 1.5 x 1.0 x 1.5 cm with neck of 0.9 cm. LLE CFA, SFA, popliteal artery, DAJA, DPA PTA are patent.    Right lower extremity:  Patent arteries of the right lower extremity    A/P:  Earle Rene is a 76 yo M w/ pmh T2DM, HTN, CKD, and PAD c/b popliteal artery thrombus, now POD#1 post left lower extremity angiogram and thrombectomy. Post-operative course was complicated by left CFA pseudoaneurysm formation.     Ultrasound guided compression of the pseudoaneurysm was performed by myself for 20 minutes, with decreased size of the pseudoaneurysm, but persistent flow within the aneurysm. Decision was made to perform thrombin injection into the pseudoaneurysm. Please see procedure note and dictation for further details.     -Continue bedrest with head " flat and left leg straight until 1000 AM 4/6.  -Start very low heparin ggt protocol at 2200  -Start ASA 81 mg tomorrow AM  -Arterial ultrasound of the left lower extremity tomorrow AM      Magalie Dugan DO  Interventional Radiology  PGY-6  [phone]

## 2025-04-05 NOTE — PROCEDURES
[facility]    Procedure: IR Procedure Note    Date/Time: 4/4/2025 10:57 PM    Performed by: Ye Wright MD  Authorized by: Kenrick Ramon MD  IR Fellow Physician:    Pre Procedure Diagnosis: PAD  Post Procedure Diagnosis: PAD    UNIVERSAL PROTOCOL   Site Marked: NA  Prior Images Obtained and Reviewed:  Yes  Required items: Required blood products, implants, devices and special equipment available    Patient identity confirmed:  Arm band, provided demographic data, hospital-assigned identification number and verbally with patient  Patient was reevaluated immediately before administering moderate or deep sedation or anesthesia  Confirmation Checklist:  Correct equipment/implants were available, procedure was appropriate and matched the consent or emergent situation, relevant allergies and patient's identity using two indicators  Time out: Immediately prior to the procedure a time out was called    Universal Protocol: the Joint Commission Universal Protocol was followed    Preparation: Patient was prepped and draped in usual sterile fashion    ESBL (mL):  15     ANESTHESIA    Anesthesia:  Local infiltration  Local Anesthetic:  Lidocaine 1% without epinephrine      SEDATION  Patient Sedated: Yes    Sedation Type:  Moderate (conscious) sedation  Sedation:  Fentanyl and midazolam  Vital signs: Vital signs monitored during sedation    See dictated procedure note for full details.  Findings: Left groin puncture. Left lower extremity angiogram performed. Procedure complicated by extravasation of the DAJA, which was resolved. Right groin was also accessed with micropuncture set.     Addendum:  Bilateral groin hematomas present. Manual compression held over left groin for 45 minutes. Manual compression held over right groin     Specimens: none    Procedural Complications: None    Condition: Stable    Plan: -Admit for observation  -6 hour bedrest  -Low intensity heparin ggt -  To be started after bedrest is finished.  -ASA 81 mg daily - first dose after bedrest    -Arterial ultrasound of the bilateral lower extremities tomorrow (ordered)  -Transition to full strength Xarelto tomorrow (10 mg BID for 21 days then 20 mg daily for 6 months)        PROCEDURE    Patient Tolerance:  Patient tolerated the procedure well with no immediate complications  Length of time physician/provider present for 1:1 monitoring during sedation:  172-186 min

## 2025-04-05 NOTE — PROGRESS NOTES
[facility]    Progress Note - Medicine Service, MAROON TEAM 2       Date of Admission:  4/4/2025    Assessment & Plan   Earle Rene is a 75 year old male with a PMH of Type 2 diabetes, hypertension, peripheral artery disease, CKD3, and diabetic neuropathy who   Is admitted as advised by vascular surgery 2/2 DVT, now s/p LE angiogram.     #Peripheral artery disease  Pt with h/o HTN and T2DM with long standing bilateral LE pain, follows with IR outpatient and found to have aramis class 4 chronic limb ischemia. Angiogram on 02/04 showing R multifocal stenosis at various levels with occluded dorsalis pedis, not requiring intervention. Left LE angiogram with multifocal lesions requiring 3 x  drug-coated balloons  (TP trunk, proximal posterior tibial artery and distal superficial femoral artery/popliteal artery) with patent dorsalis pedis. Pt presented with ongoing significant pain, POD#2 of BLE angiogram and was found to have a thrombus in distal popliteal artery and hence advised to come to the ED. Vitally stable. IR performed left lower extremity angiogram with angioplasty and thrombectomy. Procedure was complicated by extravasation of the DAJA, and L groin hematoma. Neurovascularly intact. Dorsalis pedis pulses are dopplerable.  -Hold apixaban  -24 hour bedrest, can go 30 degrees post 12 huors since the morning   -Holding Low intensity heparin gtt   -ASA 81mg- Holding   -Femostop placed temporarily  -Pain management:  -Scheduled tylenol 975mg q6h  -Robaxin 1000mg TID PRN  -Oxycodone 5mg Q4H PRN  -Dilaudid 0.5mg Q2H PRN   -Bilateral LE arterial US done   -Per IR recs Full strength Xarelto once hemodynamically stable              -10 mg BID for 21 days             -Followed by 20 mg daily for 6 months  -Continue atorvastatin 20mg     #Type 2 diabetes mellitus  #Mild nonproliferative retinopathy and macular edema   Pt with long standing T2DM. With last HbA1c of 8%  "(2/11/25).  -Continue 50U degludec  -Hold terzepatide and jiardiance while inpatient  -HDSSI     #Diabetic neuropathy  Pt with bilateral LE pain 2/2 diabetic neuropathy and PAD. Uses diabetic shoes. Has seen podiatry in the past.  -Continue PTA gabapentin 300mg TID     #Thrombocytopenia   Pt with platelet count of 133 in the setting of DVT, now s/p thrombectomy.  -CTM     #Hypertension  BP well controlled during admission.  -Continue PTA losartan 100mg  -Continue PTA amlodipine 5mg     #Hyponatremia  -Continue PTA sodium bicarb 1300mg BID     #BPH  #Low risk prostate cancer  Follows with urology outpatient. Denies any current hesitancy  -Continue PTA tamsulosin 0.8mg  -Continue PTA finasteride 5mg     #Urinary urgency  Follows with urology outpatient. Denies any current urinary urgency.  -Continue PTA mirabegron 25mg     #GERD  -Continue PTA famotidine     Diet: Regular Diet Adult    DVT Prophylaxis: low intensity heparin after bedrest completed  Chamberlain Catheter: Not present  Fluids: None  Lines: None     Cardiac Monitoring: None  Code Status: Full Code      Clinically Significant Risk Factors Present on Admission        # Hyperkalemia: Highest K = 5.6 mmol/L in last 2 days, will monitor as appropriate         # Drug Induced Coagulation Defect: home medication list includes an anticoagulant medication   # Acute Kidney Injury, unspecified: based on a >150% or 0.3 mg/dL increase in last creatinine compared to past 90 day average, will monitor renal function  # Hypertension: Home medication list includes antihypertensive(s)          # DMII: A1C = 8.0 % (Ref range: <5.7 %) within past 6 months    # Overweight: Estimated body mass index is 27.96 kg/m  as calculated from the following:    Height as of this encounter: 1.727 m (5' 8\").    Weight as of this encounter: 83.4 kg (183 lb 13.8 oz).              Social Drivers of Health   Transportation Needs: Low Risk  (4/5/2025)    Transportation Needs     Within the past 12 " months, has lack of transportation kept you from medical appointments, getting your medicines, non-medical meetings or appointments, work, or from getting things that you need?: No   Recent Concern: Transportation Needs - Unmet Transportation Needs (1/30/2025)    [facility]    Transportation Needs     Does lack of transportation keep you from medical appointments?: 1     Does lack of transportation keep you from work, meetings or getting things that you need?: 2         Disposition Plan     Medically Ready for Discharge: Anticipated in 2-4 Days         The patient's care was discussed with the Attending Physician, Dr. Garcia .    Crissy Olivares MD  Medicine Service, [address]  Securely message with [employer] (more info)  Text page via [facility] Paging/Directory   See signed in provider for up to date coverage information  ______________________________________________________________________    Interval History   Reports significant pain since yesterday with periodic relief with dilaudid. IR evaluated at bedside and recommend 24 hr bed rest while holding the AC and ASA.     Physical Exam   Vital Signs: Temp: 98.1  F (36.7  C) Temp src: Axillary BP: 129/66 Pulse: 87   Resp: 15 SpO2: 100 % O2 Device: None (Room air)    Weight: 183 lbs 13.82 oz    General Appearance:  A&Ox3. Uncomfortable appearing  Respiratory: Kungs clear to auscultation bilaterally  Cardiovascular: Regular rate and rhythm  GI: Soft, nontender, nondistended  Skin: No rashes on exposed skin  Extremities: No pitting edema bilaterally              Left:: Dopplerable pulses. Sole cooler on left foot. Normal capillary refill. Nontender to palpation              Right: Dopplerable pulses. Warm, and pale. Normal capillary refill. Nontender to palpation  Other:  Large nontender left hematoma regressing from marker. Slight bleeding from right  sandro.     Medical Decision Making     Please see A&P for additional details of medical decision making.      Data   ------------------------- PAST 24 HR DATA REVIEWED -----------------------------------------------

## 2025-04-05 NOTE — IR NOTE
Patient Name: Earle Rene  Medical Record Number: [id number]  Today's Date: 4/5/2025    Procedure: Thrombin injection to left groin  Proceduralist: Dr. Ramon, Dr. Dugan  Pathology present: na    Procedure Start: 1408  Procedure end: 1432  Sedation medications administered: na     Report given to: Bedside report to Kathya  : ludin    Other Notes: Procedure done at bedside on 6B. Consent reviewed. Pt denies any questions or concerns regarding procedure. Pt positioned supine and monitored per protocol. Pt tolerated procedure without any noted complications.

## 2025-04-05 NOTE — PHARMACY-ADMISSION MEDICATION HISTORY
Pharmacist Admission Medication History    Admission medication history is complete. The information provided in this note is only as accurate as the sources available at the time of the update.    Information Source(s): Patient, Clinic records, and [facility]    Pertinent Information: Apixaban appears to be a new RX and patient doesn't believe he was taking a blood thinner prior to admission.   Amlodipine was last filled 12/2024 for 30 day supply but patient believes he is taking this medication.   Sodium bicarbonate was last filled 4/2024 and patient reports the pharmacy was unable to get in touch with his doctor to get a new Rx for this medication and that he should still be taking it despite it being over 6 months since he has taken medication.  Tamsulosin last filled 8/2024 but patient believes he is still taking this medication.  Mounjaro dose day is unknown but last taken ~10 days ago.  Unsure if patient is taking triamcinolone cream, famotidine, alpha lipoic acid, artificial tears, and loratadine.    Changes made to PTA medication list:  Added: None  Deleted: Finasteride (last filled in 2022, reports no longer taking but would like to discuss restarting medication)  Changed: Insulin degludec 50 units QHS--> 40-60 units QHS depending on blood glucose, vitamin D 50mcg daily-->25mcg BID    Allergies reviewed with patient and updates made in EHR: yes    Medication History Completed By: Loretta Alfred [facility] 4/5/2025 9:27 AM    PTA Med List   Medication Sig Note Last Dose/Taking    amLODIPine (NORVASC) 5 MG tablet Take 1 tablet by mouth daily.  Taking    atorvastatin (LIPITOR) 20 MG tablet TAKE 1 TABLET BY MOUTH EVERY DAY  Taking    econazole nitrate 1 % external cream Apply topically daily. To feet and toenails.  Taking    empagliflozin (JARDIANCE) 10 MG TABS tablet TAKE 1 TABLET (10 MG) BY MOUTH DAILY.  Past Week    famotidine (PEPCID) 20 MG tablet TAKE 1 TABLET BY MOUTH EVERY DAY   Taking    gabapentin (NEURONTIN) 300 MG capsule Take 1 capsule (300 mg) by mouth 2 times daily.  Taking    insulin degludec (TRESIBA FLEXTOUCH) 100 UNIT/ML pen INJECT 50 UNITS SUBCUTANEOUSLY AT BEDTIME. (Patient taking differently: Inject 40-60 Units subcutaneously at bedtime. INJECT 50 UNITS SUBCUTANEOUSLY AT BEDTIME.)  Taking Differently    losartan (COZAAR) 100 MG tablet Take 1 tablet (100 mg) by mouth at bedtime  Taking    mirabegron (MYRBETRIQ) 25 MG 24 hr tablet TAKE 1 TABLET BY MOUTH EVERY DAY  Taking    tamsulosin (FLOMAX) 0.4 MG capsule Take 2 capsules (0.8 mg) by mouth daily. For more refills,schedule an appointment at [phone]  Taking    tirzepatide (MOUNJARO) 5 MG/0.5ML pen Inject 5 mg subcutaneously every 7 days. 4/5/2025: Took ~10 days ago, unsure last dose. Past Month    vitamin C 250 MG tablet Take 250 mg by mouth daily.  Taking    VITAMIN D3 25 MCG (1000 UT) tablet TAKE 2 TABLETS (50 MCG) BY MOUTH DAILY (Patient taking differently: Take 1 tablet by mouth 2 times daily.)  Taking Differently

## 2025-04-05 NOTE — PLAN OF CARE
"Hours of Care: [id number]    /56   Pulse 88   Temp 98.1  F (36.7  C) (Axillary)   Resp 15   Ht 1.727 m (5' 8\")   Wt 83.4 kg (183 lb 13.8 oz)   SpO2 98%   BMI 27.96 kg/m       Neuro: A&O x4. Denied dizziness.   Cardiac: S1, S2 heart sounds noted.  Sinus rhythm with BBB. IR came and put manual pressure on L groin site. Pt's hematoma worsened and grew larger. IR team came up and injected thrombin to the pseudoaneurysm. CMS checks done q30 min for 2 hrs post procedure. Bilateral LE pulses unable to be palpated, ausculted by doppler.   Respiratory: Clear lung sounds bilaterally. Pt on RA satting in the 100s.   GI: Denied nausea, bowel sounds hypoactive. Regular diet. Pt unable to eat due to bedrest orders.   : Pt retaining urine. Pt stated he was unable to void due to not being able to stand up. Pt straight cathed for 400. Took multiple attempts.   Skin: See PCS for assessment and treatment of wounds and surgical incisions.  Pain: Reported left leg pain and left foot, was given dilaudid, robaxin and tylenol. Patient reported minimal improvement of pain.   Msk: Bedrest until 10 am tomorrow  PRN's: Dilaudid x 4, robaxin x1      Goal Outcome Evaluation:      Plan of Care Reviewed With: patient    Overall Patient Progress: decliningOverall Patient Progress: declining    Outcome Evaluation: Severe leg pain. IR came up and held manual pressure on L groin site. L groin site increased. IR injected L groin site with thrombin.      "

## 2025-04-05 NOTE — IR NOTE
Patient Name: Earle Rene  Medical Record Number: [id number]  Today's Date: 4/4/2025    Procedure: Left leg angiogram with plasty and thrombectomy  Proceduralist: Dr. Ramon, Dr. Wright  Pathology present: na    Procedure Start: 1953  Procedure end: 2345    Sedation medications administered:     Fentanyl 400 mcg   Versed - not given patient last ate at 1600  Benadryl 50 mg    Other:    2006 - 5000 units of IV Heparin  2055 - Hydralazine 10 mg for bp 181/84  2058  - 2500 units of IV Heparin  2126  - 2500 units of IV Heparin  2128 - 2 mg TPA Intraarterial  2210 - 2000 units of IV Heparin  2257- Started on low intensity heparin drip at 1000 units/hr  2319 - Heparin stopped. Hematoma in both groins.  2330 -     Report given to: ludin  : ludin    Other Notes: Pt arrived to IR room 4 from ED. Consent reviewed and used from 4/2 procedure. Pt denies any questions or concerns regarding procedure. Pt positioned supine and monitored per protocol.     Pt tolerated procedure but had significant pain afterwards. Leg painful with cramps. Could not get dorsalis pulse with doppler at previous site before angiogram. Slight, faint pulse heard proximal to previous site but fellow did not think it was his pulse. Foot sensitive with numbess/tingling which was present prior to angiogram.    Attempted right Groin Access;  Manual pressure held.   Poke only - No large sheath     Left Groin Access:  Sheath out 2245.  No closure device.   Manual pressure held X 45 minutes due to bleeding.  FLAT Bedrest X 6 hours. Keep leg straight. Minimal movement.   Hematoma present and marked with pen.    Ambulation time: 0530  DO NOT START HEPARIN UNTIL BEDREST IS OVER    Pt transferred to .

## 2025-04-06 ENCOUNTER — APPOINTMENT (OUTPATIENT)
Dept: ULTRASOUND IMAGING | Facility: CLINIC | Age: 75
DRG: 271 | End: 2025-04-06
Attending: STUDENT IN AN ORGANIZED HEALTH CARE EDUCATION/TRAINING PROGRAM
Payer: COMMERCIAL

## 2025-04-06 LAB
ANION GAP SERPL CALCULATED.3IONS-SCNC: 12 MMOL/L (ref 7–15)
APTT PPP: 92 SECONDS (ref 22–38)
BUN SERPL-MCNC: 52.9 MG/DL (ref 8–23)
CALCIUM SERPL-MCNC: 8.7 MG/DL (ref 8.8–10.4)
CHLORIDE SERPL-SCNC: 107 MMOL/L (ref 98–107)
CREAT SERPL-MCNC: 3.29 MG/DL (ref 0.67–1.17)
EGFRCR SERPLBLD CKD-EPI 2021: 19 ML/MIN/1.73M2
ERYTHROCYTE [DISTWIDTH] IN BLOOD BY AUTOMATED COUNT: 13 % (ref 10–15)
GLUCOSE BLDC GLUCOMTR-MCNC: 119 MG/DL (ref 70–99)
GLUCOSE BLDC GLUCOMTR-MCNC: 151 MG/DL (ref 70–99)
GLUCOSE BLDC GLUCOMTR-MCNC: 164 MG/DL (ref 70–99)
GLUCOSE BLDC GLUCOMTR-MCNC: 192 MG/DL (ref 70–99)
GLUCOSE BLDC GLUCOMTR-MCNC: 204 MG/DL (ref 70–99)
GLUCOSE BLDC GLUCOMTR-MCNC: 248 MG/DL (ref 70–99)
GLUCOSE SERPL-MCNC: 142 MG/DL (ref 70–99)
HCO3 SERPL-SCNC: 17 MMOL/L (ref 22–29)
HCT VFR BLD AUTO: 30 % (ref 40–53)
HGB BLD-MCNC: 10 G/DL (ref 13.3–17.7)
MCH RBC QN AUTO: 31.7 PG (ref 26.5–33)
MCHC RBC AUTO-ENTMCNC: 33.3 G/DL (ref 31.5–36.5)
MCV RBC AUTO: 95 FL (ref 78–100)
PLATELET # BLD AUTO: 103 10E3/UL (ref 150–450)
POTASSIUM SERPL-SCNC: 4.7 MMOL/L (ref 3.4–5.3)
RBC # BLD AUTO: 3.15 10E6/UL (ref 4.4–5.9)
SODIUM SERPL-SCNC: 136 MMOL/L (ref 135–145)
UFH PPP CHRO-ACNC: 0.55 IU/ML
UFH PPP CHRO-ACNC: <0.1 IU/ML
WBC # BLD AUTO: 8.2 10E3/UL (ref 4–11)

## 2025-04-06 PROCEDURE — 99233 SBSQ HOSP IP/OBS HIGH 50: CPT | Mod: GC | Performed by: STUDENT IN AN ORGANIZED HEALTH CARE EDUCATION/TRAINING PROGRAM

## 2025-04-06 PROCEDURE — 85014 HEMATOCRIT: CPT | Performed by: STUDENT IN AN ORGANIZED HEALTH CARE EDUCATION/TRAINING PROGRAM

## 2025-04-06 PROCEDURE — 250N000011 HC RX IP 250 OP 636: Performed by: INTERNAL MEDICINE

## 2025-04-06 PROCEDURE — 82435 ASSAY OF BLOOD CHLORIDE: CPT | Performed by: STUDENT IN AN ORGANIZED HEALTH CARE EDUCATION/TRAINING PROGRAM

## 2025-04-06 PROCEDURE — 36415 COLL VENOUS BLD VENIPUNCTURE: CPT | Performed by: INTERNAL MEDICINE

## 2025-04-06 PROCEDURE — 250N000011 HC RX IP 250 OP 636: Mod: JZ

## 2025-04-06 PROCEDURE — 85730 THROMBOPLASTIN TIME PARTIAL: CPT | Performed by: INTERNAL MEDICINE

## 2025-04-06 PROCEDURE — 85520 HEPARIN ASSAY: CPT | Performed by: INTERNAL MEDICINE

## 2025-04-06 PROCEDURE — 250N000013 HC RX MED GY IP 250 OP 250 PS 637: Performed by: STUDENT IN AN ORGANIZED HEALTH CARE EDUCATION/TRAINING PROGRAM

## 2025-04-06 PROCEDURE — 120N000005 HC R&B MS OVERFLOW UMMC

## 2025-04-06 PROCEDURE — 93926 LOWER EXTREMITY STUDY: CPT | Mod: LT

## 2025-04-06 PROCEDURE — 36415 COLL VENOUS BLD VENIPUNCTURE: CPT | Performed by: STUDENT IN AN ORGANIZED HEALTH CARE EDUCATION/TRAINING PROGRAM

## 2025-04-06 PROCEDURE — 80048 BASIC METABOLIC PNL TOTAL CA: CPT | Performed by: STUDENT IN AN ORGANIZED HEALTH CARE EDUCATION/TRAINING PROGRAM

## 2025-04-06 PROCEDURE — 258N000003 HC RX IP 258 OP 636: Performed by: STUDENT IN AN ORGANIZED HEALTH CARE EDUCATION/TRAINING PROGRAM

## 2025-04-06 PROCEDURE — 93926 LOWER EXTREMITY STUDY: CPT | Mod: 26 | Performed by: STUDENT IN AN ORGANIZED HEALTH CARE EDUCATION/TRAINING PROGRAM

## 2025-04-06 PROCEDURE — 250N000011 HC RX IP 250 OP 636: Mod: JZ | Performed by: STUDENT IN AN ORGANIZED HEALTH CARE EDUCATION/TRAINING PROGRAM

## 2025-04-06 PROCEDURE — 250N000013 HC RX MED GY IP 250 OP 250 PS 637

## 2025-04-06 RX ORDER — FAMOTIDINE 20 MG/1
20 TABLET, FILM COATED ORAL
Status: DISCONTINUED | OUTPATIENT
Start: 2025-04-08 | End: 2025-04-11 | Stop reason: HOSPADM

## 2025-04-06 RX ORDER — HYDROMORPHONE HYDROCHLORIDE 1 MG/ML
0.5 INJECTION, SOLUTION INTRAMUSCULAR; INTRAVENOUS; SUBCUTANEOUS
Status: COMPLETED | OUTPATIENT
Start: 2025-04-06 | End: 2025-04-06

## 2025-04-06 RX ORDER — HEPARIN SODIUM 10000 [USP'U]/100ML
0-5000 INJECTION, SOLUTION INTRAVENOUS CONTINUOUS
Status: DISCONTINUED | OUTPATIENT
Start: 2025-04-06 | End: 2025-04-07

## 2025-04-06 RX ADMIN — HEPARIN SODIUM 800 UNITS/HR: 10000 INJECTION, SOLUTION INTRAVENOUS at 18:15

## 2025-04-06 RX ADMIN — OXYCODONE HYDROCHLORIDE 5 MG: 5 TABLET ORAL at 18:15

## 2025-04-06 RX ADMIN — TRIAMCINOLONE ACETONIDE: 1 CREAM TOPICAL at 09:48

## 2025-04-06 RX ADMIN — OXYCODONE HYDROCHLORIDE 5 MG: 5 TABLET ORAL at 22:27

## 2025-04-06 RX ADMIN — ACETAMINOPHEN 975 MG: 325 TABLET, FILM COATED ORAL at 15:19

## 2025-04-06 RX ADMIN — TRIAMCINOLONE ACETONIDE: 1 CREAM TOPICAL at 20:54

## 2025-04-06 RX ADMIN — ASPIRIN 81 MG CHEWABLE TABLET 81 MG: 81 TABLET CHEWABLE at 08:50

## 2025-04-06 RX ADMIN — ACETAMINOPHEN 975 MG: 325 TABLET, FILM COATED ORAL at 03:07

## 2025-04-06 RX ADMIN — Medication 250 MG: at 08:50

## 2025-04-06 RX ADMIN — HYDROMORPHONE HYDROCHLORIDE 0.5 MG: 1 INJECTION, SOLUTION INTRAMUSCULAR; INTRAVENOUS; SUBCUTANEOUS at 11:20

## 2025-04-06 RX ADMIN — OXYCODONE HYDROCHLORIDE 5 MG: 5 TABLET ORAL at 09:46

## 2025-04-06 RX ADMIN — Medication 50 MCG: at 08:49

## 2025-04-06 RX ADMIN — TAMSULOSIN HYDROCHLORIDE 0.8 MG: 0.4 CAPSULE ORAL at 08:50

## 2025-04-06 RX ADMIN — SODIUM CHLORIDE, POTASSIUM CHLORIDE, SODIUM LACTATE AND CALCIUM CHLORIDE 500 ML: 600; 310; 30; 20 INJECTION, SOLUTION INTRAVENOUS at 11:22

## 2025-04-06 RX ADMIN — ATORVASTATIN CALCIUM 20 MG: 20 TABLET, FILM COATED ORAL at 08:50

## 2025-04-06 RX ADMIN — AMLODIPINE BESYLATE 5 MG: 5 TABLET ORAL at 08:49

## 2025-04-06 RX ADMIN — MIRABEGRON 25 MG: 25 TABLET, FILM COATED, EXTENDED RELEASE ORAL at 08:48

## 2025-04-06 RX ADMIN — HYDROMORPHONE HYDROCHLORIDE 0.5 MG: 1 INJECTION, SOLUTION INTRAMUSCULAR; INTRAVENOUS; SUBCUTANEOUS at 21:07

## 2025-04-06 RX ADMIN — HEPARIN SODIUM 800 UNITS/HR: 10000 INJECTION, SOLUTION INTRAVENOUS at 08:45

## 2025-04-06 RX ADMIN — ACETAMINOPHEN 975 MG: 325 TABLET, FILM COATED ORAL at 22:27

## 2025-04-06 RX ADMIN — SODIUM CHLORIDE, POTASSIUM CHLORIDE, SODIUM LACTATE AND CALCIUM CHLORIDE 500 ML: 600; 310; 30; 20 INJECTION, SOLUTION INTRAVENOUS at 06:04

## 2025-04-06 RX ADMIN — OXYCODONE HYDROCHLORIDE 5 MG: 5 TABLET ORAL at 05:13

## 2025-04-06 RX ADMIN — ACETAMINOPHEN 975 MG: 325 TABLET, FILM COATED ORAL at 09:45

## 2025-04-06 RX ADMIN — GABAPENTIN 300 MG: 300 CAPSULE ORAL at 08:50

## 2025-04-06 ASSESSMENT — ACTIVITIES OF DAILY LIVING (ADL)
ADLS_ACUITY_SCORE: 22
ADLS_ACUITY_SCORE: 35
ADLS_ACUITY_SCORE: 35
ADLS_ACUITY_SCORE: 22
ADLS_ACUITY_SCORE: 35
ADLS_ACUITY_SCORE: 22
ADLS_ACUITY_SCORE: 22
ADLS_ACUITY_SCORE: 35
ADLS_ACUITY_SCORE: 22
ADLS_ACUITY_SCORE: 35
ADLS_ACUITY_SCORE: 22
ADLS_ACUITY_SCORE: 35
DEPENDENT_IADLS:: INDEPENDENT
ADLS_ACUITY_SCORE: 22
ADLS_ACUITY_SCORE: 35
ADLS_ACUITY_SCORE: 35

## 2025-04-06 NOTE — PROGRESS NOTES
[facility]    Progress Note - Medicine Service, MAROON TEAM 2       Date of Admission:  4/4/2025    Assessment & Plan   Earle Rene is a 75 year old male with a PMH of Type 2 diabetes, hypertension, peripheral artery disease, CKD3, and diabetic neuropathy who   Is admitted as advised by vascular surgery 2/2 DVT, now s/p LE angiogram.     Today:   - pseudoaneurysm is smaller but still patent. We're going to   - hold heparin ggt until 6pm, then restart with very low dose protocol.  - Continue bedrest until his pseudoaneurysm resolves.   - ultrasound tomorrow morning   - urinary retention today, requiring straight cath   - 1L IVF for CHARLINE       #Peripheral artery disease  Pt with h/o HTN and T2DM with long standing bilateral LE pain, follows with IR outpatient and found to have aramis class 4 chronic limb ischemia. Angiogram on 02/04 showing R multifocal stenosis at various levels with occluded dorsalis pedis, not requiring intervention. Left LE angiogram with multifocal lesions requiring 3 x  drug-coated balloons  (TP trunk, proximal posterior tibial artery and distal superficial femoral artery/popliteal artery) with patent dorsalis pedis. Pt presented with ongoing significant pain, POD#2 of BLE angiogram and was found to have a thrombus in distal popliteal artery and hence advised to come to the ED. Vitally stable. IR performed left lower extremity angiogram with angioplasty and thrombectomy. Procedure was complicated by extravasation of the DAJA, and L groin hematoma. Neurovascularly intact. Dorsalis pedis pulses are dopplerable.  - IR following:   - pseudoaneurysm is smaller but still patent. We're going to   - hold heparin ggt until 6pm, then restart with very low dose protocol.  - Continue bedrest until his pseudoaneurysm resolves.   - ultrasound tomorrow morning   -Holding Low intensity heparin gtt   -ASA 81mg- Holding   -Femostop placed temporarily  -Pain  management:  -Scheduled tylenol 975mg q6h  -Robaxin 1000mg TID PRN  -Oxycodone 5mg Q4H PRN  -Dilaudid 0.5mg Q2H PRN   -Bilateral LE arterial US done   -Per IR recs Full strength Xarelto once hemodynamically stable              -10 mg BID for 21 days             -Followed by 20 mg daily for 6 months  -Continue atorvastatin 20mg     #Type 2 diabetes mellitus  #Mild nonproliferative retinopathy and macular edema   Pt with long standing T2DM. With last HbA1c of 8% (2/11/25).  -Continue 50U degludec  -Hold terzepatide and jiardiance while inpatient  -HDSSI     #Diabetic neuropathy  Pt with bilateral LE pain 2/2 diabetic neuropathy and PAD. Uses diabetic shoes. Has seen podiatry in the past.  -Continue PTA gabapentin 300mg TID     #Thrombocytopenia   Pt with platelet count of 133 in the setting of DVT, now s/p thrombectomy.  -CTM     #Hypertension  BP well controlled during admission.  -Continue PTA losartan 100mg  -Continue PTA amlodipine 5mg     #Hyponatremia  -Continue PTA sodium bicarb 1300mg BID     #BPH  #Low risk prostate cancer  Follows with urology outpatient. Retention on 4/6, requiring straight cath.   -Continue PTA tamsulosin 0.8mg  -Continue PTA finasteride 5mg     #Urinary urgency  Follows with urology outpatient.  -Continue PTA mirabegron 25mg     #GERD  -Continue PTA famotidine     Diet: Regular Diet Adult    DVT Prophylaxis: low intensity heparin after bedrest completed  Chamberlain Catheter: Not present  Fluids: None  Lines: None     Cardiac Monitoring: None  Code Status: Full Code      Clinically Significant Risk Factors        # Hyperkalemia: Highest K = 5.6 mmol/L in last 2 days, will monitor as appropriate            # Thrombocytopenia: Lowest platelets = 103 in last 2 days, will monitor for bleeding  # Acute Kidney Injury, unspecified: based on a >150% or 0.3 mg/dL increase in last creatinine compared to past 90 day average, will monitor renal function            # DMII: A1C = 8.0 % (Ref range: <5.7 %)  "within past 6 months, PRESENT ON ADMISSION  # Overweight: Estimated body mass index is 27.96 kg/m  as calculated from the following:    Height as of this encounter: 1.727 m (5' 8\").    Weight as of this encounter: 83.4 kg (183 lb 13.8 oz)., PRESENT ON ADMISSION     # Financial/Environmental Concerns: none         Social Drivers of Health   Transportation Needs: Low Risk  (4/5/2025)    Transportation Needs     Within the past 12 months, has lack of transportation kept you from medical appointments, getting your medicines, non-medical meetings or appointments, work, or from getting things that you need?: No   Recent Concern: Transportation Needs - Unmet Transportation Needs (1/30/2025)    Received from [facility]    Transportation Needs     Does lack of transportation keep you from medical appointments?: 1     Does lack of transportation keep you from work, meetings or getting things that you need?: 2         Disposition Plan     Medically Ready for Discharge: Anticipated in 2-4 Days         The patient's care was discussed with the Attending Physician, Dr. Garcia .    Gaby Macdonald MD  Medicine Service, [address]  Securely message with [employer] (more info)  Text page via [facility] Paging/Directory   See signed in provider for up to date coverage information  ______________________________________________________________________    Interval History   No acute overnight events.  Patient reports decrease sensation in his left leg today.  Pain continues.     Physical Exam   Vital Signs: Temp: 97.6  F (36.4  C) Temp src: Oral BP: 126/63 Pulse: 75   Resp: 15 SpO2: 100 % O2 Device: None (Room air)    Weight: 183 lbs 13.82 oz    General Appearance:  A&Ox3. Uncomfortable appearing  Respiratory: Kungs clear to auscultation bilaterally  Cardiovascular: Regular rate and rhythm  GI: Soft, nontender, nondistended  Skin: No rashes on " exposed skin  Extremities: No pitting edema bilaterally              Left:: Dopplerable pulses. Sole cooler on left foot. Normal capillary refill. Nontender to palpation; decreased sensation starting at ankle              Right: Dopplerable pulses. Warm, and pale. Normal capillary refill. Nontender to palpation    Medical Decision Making     Please see A&P for additional details of medical decision making.      Data   ------------------------- PAST 24 HR DATA REVIEWED -----------------------------------------------

## 2025-04-06 NOTE — PLAN OF CARE
Goal Outcome Evaluation:      Plan of Care Reviewed With: patient, spouse    Overall Patient Progress: no changeOverall Patient Progress: no change    Outcome Evaluation: L groin site stable. Patients recieved dilaudid x1 and oxycodone x1. Heparin gtt initiated at 2200. running at 1000U through R PIV. Straight cath x1 at 0530a with 300ml out. 500ml LR bolus ordered and initiated.    Neuro: A&Ox4.   Cardiac: *120s. HR 70-80s. Afebrile.   Respiratory: Sating well on RA.  GI/: Patient uses urinal. Patient has urinary hesitancy. Most recent bladder scan 258ml. Straight cathed at 0530 per MD recommendation. 300ml out. Some blood noted surrounding the penis. Last BM prior to admission.   Diet/appetite: Regular diet. Advance as tolerated. Patient only consumed gingerale overnight. Tolerated well.   Activity:  Bedrest. Patient is on bedrest d/t procedure until 1000a.  Pain: Patient is complaining of pain in LLE. PRN dilaudid and oxycodone given. Patient RR was decreased after pain medication administration to 10s.  Skin: No new deficits noted. Patient has groin site with large marked hematoma on L side. L CMS check abnormal. Cool, numb, sensation decreased, mobility decreased, pulse weak on palpation and auscultated on doppler. Patients L hematoma stable. Patient R groin site stable.   LDA's: Patient has RPIV infusing heparin at 1000U/hr. RPIV infusing LR 500ml bolus.      Plan: Continue with POC. Notify primary team with changes. Monitor CMS. Monitor groin sites. Adjust low dose heparin gtt per plan of care.

## 2025-04-06 NOTE — PLAN OF CARE
Neuro: A&Ox 4   Cardiac: SR, HR 70-80's. -130's/50-60's. Afebrile.    Respiratory: Sating > 92% on RA.   GI/: Minimal urine output via urinal, monitored bladder volume. No BM this shift.  Diet/appetite: Tolerating kosher diet. Eating well. No N/V. ACHS.   Activity:  Bedrest, patient not OOB. HOB 30 degrees OK.   Pain: At acceptable level on current regimen. PRN oxy given x1, PRN dilaudid given x1.   Skin: No new deficits noted.   LDA's: 2 R PIV. L & R groin site    Plan: Continue with POC. Notify primary team with changes.     Patient reported no feeling in L foot, dopplered pulses maintained, team aware.     Compressive US performed, no closure noted. Femstop placed for 1hr, removed 20mmHg Q30 until fully deflated.     Heparin gtt held from 1200 - 1800, restarted @ 800units @ 1800. Xa recheck @0000    500 LR bolus given

## 2025-04-06 NOTE — PROGRESS NOTES
"Brief IR Progress Note  S:  NAEON. He has had pain in his left toes. He is not sure when it started. He has neuropathy and cannot feel his feet.     O:  Vital signs:  Temp: 97.6  F (36.4  C) Temp src: Oral BP: 126/63 Pulse: 75   Resp: 15 SpO2: 100 % O2 Device: None (Room air)   Height: 172.7 cm (5' 8\") Weight: 83.4 kg (183 lb 13.8 oz)  Estimated body mass index is 27.96 kg/m  as calculated from the following:    Height as of this encounter: 1.727 m (5' 8\").    Weight as of this encounter: 83.4 kg (183 lb 13.8 oz).      Physical Exam  HENT:      Head: Normocephalic and atraumatic.   Cardiovascular:      Rate and Rhythm: Normal rate.      Comments: Left DAJA and PTA are dopplerable  Pulmonary:      Effort: Pulmonary effort is normal.   Abdominal:      General: Abdomen is flat.   Musculoskeletal:      Comments: Left groin with swelling and ecchymosis.  Left foot cool to the touch, stable compared to post procedure 4/4 and 4/5   Skin:     General: Skin is warm and dry.   Neurological:      General: No focal deficit present.      Mental Status: He is alert.   Psychiatric:         Mood and Affect: Mood normal.       Left lower extremity arterial ultrasound 4/6/2025:  Decreased size of thrombosed pseudoaneurysm. There is patent neck of the pseudoaneurysm arising from the CFA. The left SFA, PFA, popliteal artery, DAJA, and PTA are patent.     A/P:  Earle Rene is a 76 yo M w/ pmh T2DM, HTN, CKD, and PAD c/b popliteal artery thrombus, now POD#1 post left lower extremity angiogram and thrombectomy. Post-operative course was complicated by left CFA pseudoaneurysm formation.   On 4/5, ultrasound guided compression was performed for 20 minutes, then a thrombin injection of the pseudoaneurysm was performed.     Ultrasound guided compression was once again attempted with heparin held for 30 minutes, without resolution of pseudoaneurysm.     -Femstop was applied at 1305.  -Continue bedrest until pseudoaneurysm is resolved.  -Very " low dose heparin ggt to restart at 6 pm. Do not bolus.  -Arterial ultrasound of the left lower extremity tomorrow AM      Magalie Dugan, DO  Interventional Radiology  PGY-6  [phone]

## 2025-04-06 NOTE — PLAN OF CARE
Problem: Adult Inpatient Plan of Care  Goal: Plan of Care Review  Description: The Plan of Care Review/Shift note should be completed every shift.  The Outcome Evaluation is a brief statement about your assessment that the patient is improving, declining, or no change.  This information will be displayed automatically on your shiftnote.  Flowsheets (Taken 4/6/2025 1129)  Outcome Evaluation: CMA completed.  Anticipate discharge to home with posible outpatient PT.  No care management needs identified at this time.  Plan of Care Reviewed With: patient  Goal: Readiness for Transition of Care  Recent Flowsheet Documentation  Taken 4/6/2025 1119 by Nicol Carr RN  Transportation Anticipated: family or friend will provide  Concerns to be Addressed: all concerns addressed in this encounter  Intervention: Mutually Develop Transition Plan  Recent Flowsheet Documentation  Taken 4/6/2025 1119 by Nicol Carr RN  Readmission Within the Last 30 Days: no previous admission in last 30 days  Transportation Anticipated: family or friend will provide  Transportation Concerns: none  Concerns to be Addressed: all concerns addressed in this encounter  Patient/Family Anticipated Services at Transition: outpatient care  Patient/Family Anticipates Transition to: home  Offered/Gave Vendor List: no  Equipment Currently Used at Home: none

## 2025-04-06 NOTE — CONSULTS
Care Management Initial Consult    General Information  Assessment completed with: Patient, Patient  Type of CM/SW Visit: Initial Assessment    Primary Care Provider verified and updated as needed: Yes   Readmission within the last 30 days: no previous admission in last 30 days      Reason for Consult: discharge planning  Advance Care Planning: Advance Care Planning Reviewed: no concerns identified  Declined at this time       Communication Assessment  Patient's communication style: spoken language (English or Bilingual)    Hearing Difficulty or Deaf: yes   Wear Glasses or Blind: no    Cognitive  Cognitive/Neuro/Behavioral: WDL  Level of Consciousness: alert  Arousal Level: opens eyes spontaneously  Orientation: oriented x 4  Mood/Behavior: calm, cooperative  Best Language: 0 - No aphasia  Speech: clear, spontaneous    Living Environment:   People in home: child(jason), adult, parent(s), spouse, grandchild(jason)     Current living Arrangements: house      Able to return to prior arrangements: yes       Family/Social Support:  Care provided by: self  Provides care for: no one  Marital Status:   Support system: Wife, Parent(s), Children  Soheyla       Description of Support System: Supportive         Current Resources:   Patient receiving home care services: No        Community Resources: None  Equipment currently used at home: none  Supplies currently used at home: None    Employment/Financial:  Employment Status: employed full-time     Employment/ Comments: Owns own business  Financial Concerns: none           Does the patient's insurance plan have a 3 day qualifying hospital stay waiver?  No    Lifestyle & Psychosocial Needs:  Social Drivers of Health     Food Insecurity: Low Risk  (4/5/2025)    Food Insecurity     Within the past 12 months, did you worry that your food would run out before you got money to buy more?: No     Within the past 12 months, did the food you bought just not last and you didn t  have money to get more?: No   Depression: Not at risk (2/11/2025)    PHQ-2     PHQ-2 Score: 0   Housing Stability: Low Risk  (4/5/2025)    Housing Stability     Do you have housing? : Yes     Are you worried about losing your housing?: No   Tobacco Use: Low Risk  (4/2/2025)    Patient History     Smoking Tobacco Use: Never     Smokeless Tobacco Use: Never     Passive Exposure: Not on file   Financial Resource Strain: Low Risk  (4/5/2025)    Financial Resource Strain     Within the past 12 months, have you or your family members you live with been unable to get utilities (heat, electricity) when it was really needed?: No   Alcohol Use: Not on file   Transportation Needs: Low Risk  (4/5/2025)    Transportation Needs     Within the past 12 months, has lack of transportation kept you from medical appointments, getting your medicines, non-medical meetings or appointments, work, or from getting things that you need?: No   Recent Concern: Transportation Needs - Unmet Transportation Needs (1/30/2025)    Received from [facility]    Transportation Needs     Does lack of transportation keep you from medical appointments?: 1     Does lack of transportation keep you from work, meetings or getting things that you need?: 2   Physical Activity: Not on file   Interpersonal Safety: Low Risk  (4/5/2025)    Interpersonal Safety     Do you feel physically and emotionally safe where you currently live?: Yes     Within the past 12 months, have you been hit, slapped, kicked or otherwise physically hurt by someone?: No     Within the past 12 months, have you been humiliated or emotionally abused in other ways by your partner or ex-partner?: No   Stress: Not on file   Social Connections: Socially Integrated (1/30/2025)    Received from [facility]    Social Connections     Do you often feel lonely or isolated from those around you?: 0   Health Literacy: Not on file        Functional Status:  Prior to admission patient needed assistance:   Dependent ADLs:: Independent  Dependent IADLs:: Independent       Mental Health Status:  Mental Health Status: No Current Concerns       Chemical Dependency Status:  Chemical Dependency Status: No Current Concerns             Values/Beliefs:  Spiritual, Cultural Beliefs, Amish Practices, Values that affect care:                 Discussed  Partnership in Safe Discharge Planning  document with patient/family: No    Additional Information:  Met with patient at bedside to introduce self and role in discharge planning.  Verified PCP is correct.  Declined to complete a health care directive at this time.    Earle lives in a home with his wife, mother-in-law, daughter and grandchild.  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      Prior to this hospitalization he was independent in ADL/IADL.  He stated that right now he does not have feeling in his one foot and would like to see someone from therapy and is open to outpatient if needed.  Writer sent the provider a page to order a consult.   He does not have any equipment at home and does not utilize any community resources.  He denies and mental health or chemical health concerns.    He is hoping to discharge home soon.  His wife will be able to pick him up and bring him home.      Next Steps: No further care management intervention anticipated at this time.  Please re-consult if further needs arise.  Care management signing off.       Danna Carr,  Nurse Coordinator, BSN  Phone: [phone]  Vocera: 6B C RNCC     Social Work and Care Management Department      SEARCHABLE in [facility] - search CARE COORDINATOR      [place] [facility] [place] ([phone]) Saturday & Sunday; ([phone]) [facility] Recognized Holidays      Units: 5A Onc Vocera & 5C Vocera      Units: 6B Vocera & 6C Vocera       Units: 7A SOT RNCC Vocera, 7B Med Surg Vocera, & 7C Med Surg Vocera       Units: 6A Vocera & 4A CVICU  Vocera, 4C MICU Vocera, and 4E SICU Vocera       Units: 5 Ortho Vocera & 5 Med Surg Vocera       Units: 6 Med Surg Vocera & 8 Med Surg Vocera       Nicol Carr RN

## 2025-04-07 ENCOUNTER — APPOINTMENT (OUTPATIENT)
Dept: ULTRASOUND IMAGING | Facility: CLINIC | Age: 75
DRG: 271 | End: 2025-04-07
Attending: STUDENT IN AN ORGANIZED HEALTH CARE EDUCATION/TRAINING PROGRAM
Payer: COMMERCIAL

## 2025-04-07 DIAGNOSIS — E11.65 TYPE 2 DIABETES MELLITUS WITH HYPERGLYCEMIA, WITH LONG-TERM CURRENT USE OF INSULIN (H): Primary | ICD-10-CM

## 2025-04-07 DIAGNOSIS — Z79.4 TYPE 2 DIABETES MELLITUS WITH HYPERGLYCEMIA, WITH LONG-TERM CURRENT USE OF INSULIN (H): Primary | ICD-10-CM

## 2025-04-07 LAB
ALBUMIN UR-MCNC: 100 MG/DL
ANION GAP SERPL CALCULATED.3IONS-SCNC: 13 MMOL/L (ref 7–15)
APPEARANCE UR: ABNORMAL
BACTERIA #/AREA URNS HPF: ABNORMAL /HPF
BILIRUB UR QL STRIP: NEGATIVE
BUN SERPL-MCNC: 66 MG/DL (ref 8–23)
CALCIUM SERPL-MCNC: 8 MG/DL (ref 8.8–10.4)
CHLORIDE SERPL-SCNC: 108 MMOL/L (ref 98–107)
COLOR UR AUTO: YELLOW
CREAT SERPL-MCNC: 3.51 MG/DL (ref 0.67–1.17)
EGFRCR SERPLBLD CKD-EPI 2021: 17 ML/MIN/1.73M2
ERYTHROCYTE [DISTWIDTH] IN BLOOD BY AUTOMATED COUNT: 12.8 % (ref 10–15)
GLUCOSE BLDC GLUCOMTR-MCNC: 113 MG/DL (ref 70–99)
GLUCOSE BLDC GLUCOMTR-MCNC: 117 MG/DL (ref 70–99)
GLUCOSE BLDC GLUCOMTR-MCNC: 130 MG/DL (ref 70–99)
GLUCOSE BLDC GLUCOMTR-MCNC: 141 MG/DL (ref 70–99)
GLUCOSE BLDC GLUCOMTR-MCNC: 80 MG/DL (ref 70–99)
GLUCOSE BLDC GLUCOMTR-MCNC: 97 MG/DL (ref 70–99)
GLUCOSE SERPL-MCNC: 172 MG/DL (ref 70–99)
GLUCOSE UR STRIP-MCNC: NEGATIVE MG/DL
HCO3 SERPL-SCNC: 16 MMOL/L (ref 22–29)
HCT VFR BLD AUTO: 26.4 % (ref 40–53)
HGB BLD-MCNC: 8.9 G/DL (ref 13.3–17.7)
HGB UR QL STRIP: ABNORMAL
KETONES UR STRIP-MCNC: NEGATIVE MG/DL
LEUKOCYTE ESTERASE UR QL STRIP: ABNORMAL
MCH RBC QN AUTO: 31.9 PG (ref 26.5–33)
MCHC RBC AUTO-ENTMCNC: 33.7 G/DL (ref 31.5–36.5)
MCV RBC AUTO: 95 FL (ref 78–100)
NITRATE UR QL: NEGATIVE
PH UR STRIP: 5.5 [PH] (ref 5–7)
PLATELET # BLD AUTO: 96 10E3/UL (ref 150–450)
POTASSIUM SERPL-SCNC: 4.3 MMOL/L (ref 3.4–5.3)
RBC # BLD AUTO: 2.79 10E6/UL (ref 4.4–5.9)
RBC URINE: 24 /HPF
SODIUM SERPL-SCNC: 137 MMOL/L (ref 135–145)
SP GR UR STRIP: 1.02 (ref 1–1.03)
SQUAMOUS EPITHELIAL: 1 /HPF
TRANSITIONAL EPI: <1 /HPF
UFH PPP CHRO-ACNC: 0.11 IU/ML
UFH PPP CHRO-ACNC: 0.2 IU/ML
UFH PPP CHRO-ACNC: 0.26 IU/ML
UFH PPP CHRO-ACNC: <0.1 IU/ML
UROBILINOGEN UR STRIP-MCNC: NORMAL MG/DL
WBC # BLD AUTO: 5.5 10E3/UL (ref 4–11)
WBC URINE: 66 /HPF

## 2025-04-07 PROCEDURE — 36415 COLL VENOUS BLD VENIPUNCTURE: CPT

## 2025-04-07 PROCEDURE — 85027 COMPLETE CBC AUTOMATED: CPT

## 2025-04-07 PROCEDURE — 99233 SBSQ HOSP IP/OBS HIGH 50: CPT | Mod: GC | Performed by: STUDENT IN AN ORGANIZED HEALTH CARE EDUCATION/TRAINING PROGRAM

## 2025-04-07 PROCEDURE — 93971 EXTREMITY STUDY: CPT

## 2025-04-07 PROCEDURE — 93971 EXTREMITY STUDY: CPT | Mod: 26 | Performed by: STUDENT IN AN ORGANIZED HEALTH CARE EDUCATION/TRAINING PROGRAM

## 2025-04-07 PROCEDURE — 250N000013 HC RX MED GY IP 250 OP 250 PS 637: Performed by: STUDENT IN AN ORGANIZED HEALTH CARE EDUCATION/TRAINING PROGRAM

## 2025-04-07 PROCEDURE — 87186 SC STD MICRODIL/AGAR DIL: CPT

## 2025-04-07 PROCEDURE — 36415 COLL VENOUS BLD VENIPUNCTURE: CPT | Performed by: INTERNAL MEDICINE

## 2025-04-07 PROCEDURE — 93926 LOWER EXTREMITY STUDY: CPT | Mod: 26 | Performed by: STUDENT IN AN ORGANIZED HEALTH CARE EDUCATION/TRAINING PROGRAM

## 2025-04-07 PROCEDURE — 120N000003 HC R&B IMCU UMMC

## 2025-04-07 PROCEDURE — 81001 URINALYSIS AUTO W/SCOPE: CPT

## 2025-04-07 PROCEDURE — 250N000011 HC RX IP 250 OP 636: Performed by: INTERNAL MEDICINE

## 2025-04-07 PROCEDURE — 87088 URINE BACTERIA CULTURE: CPT

## 2025-04-07 PROCEDURE — 93926 LOWER EXTREMITY STUDY: CPT | Mod: 26 | Performed by: RADIOLOGY

## 2025-04-07 PROCEDURE — 85520 HEPARIN ASSAY: CPT | Performed by: INTERNAL MEDICINE

## 2025-04-07 PROCEDURE — 93926 LOWER EXTREMITY STUDY: CPT | Mod: LT

## 2025-04-07 PROCEDURE — 250N000013 HC RX MED GY IP 250 OP 250 PS 637

## 2025-04-07 PROCEDURE — 80048 BASIC METABOLIC PNL TOTAL CA: CPT

## 2025-04-07 PROCEDURE — 85520 HEPARIN ASSAY: CPT

## 2025-04-07 RX ORDER — ACETAMINOPHEN 325 MG/1
650-975 TABLET ORAL EVERY 6 HOURS
Status: DISCONTINUED | OUTPATIENT
Start: 2025-04-07 | End: 2025-04-10

## 2025-04-07 RX ORDER — CLOPIDOGREL BISULFATE 75 MG/1
75 TABLET ORAL DAILY
Status: DISCONTINUED | OUTPATIENT
Start: 2025-04-08 | End: 2025-04-11 | Stop reason: HOSPADM

## 2025-04-07 RX ORDER — POLYETHYLENE GLYCOL 3350 17 G/17G
17 POWDER, FOR SOLUTION ORAL DAILY
Status: DISCONTINUED | OUTPATIENT
Start: 2025-04-07 | End: 2025-04-09

## 2025-04-07 RX ORDER — BISACODYL 10 MG
10 SUPPOSITORY, RECTAL RECTAL DAILY PRN
Status: DISCONTINUED | OUTPATIENT
Start: 2025-04-07 | End: 2025-04-10

## 2025-04-07 RX ORDER — TIRZEPATIDE 5 MG/.5ML
INJECTION, SOLUTION SUBCUTANEOUS
Refills: 5 | OUTPATIENT
Start: 2025-04-07

## 2025-04-07 RX ORDER — CLOPIDOGREL BISULFATE 75 MG/1
300 TABLET ORAL ONCE
Status: COMPLETED | OUTPATIENT
Start: 2025-04-07 | End: 2025-04-07

## 2025-04-07 RX ORDER — TIRZEPATIDE 5 MG/.5ML
5 INJECTION, SOLUTION SUBCUTANEOUS WEEKLY
Qty: 2 ML | Refills: 11 | Status: SHIPPED | OUTPATIENT
Start: 2025-04-07

## 2025-04-07 RX ADMIN — POLYETHYLENE GLYCOL 3350 17 G: 17 POWDER, FOR SOLUTION ORAL at 12:57

## 2025-04-07 RX ADMIN — Medication 50 MCG: at 08:48

## 2025-04-07 RX ADMIN — MIRABEGRON 25 MG: 25 TABLET, FILM COATED, EXTENDED RELEASE ORAL at 08:48

## 2025-04-07 RX ADMIN — ATORVASTATIN CALCIUM 20 MG: 20 TABLET, FILM COATED ORAL at 08:49

## 2025-04-07 RX ADMIN — TRIAMCINOLONE ACETONIDE: 1 CREAM TOPICAL at 19:43

## 2025-04-07 RX ADMIN — CLOPIDOGREL BISULFATE 300 MG: 75 TABLET ORAL at 10:21

## 2025-04-07 RX ADMIN — ACETAMINOPHEN 975 MG: 325 TABLET, FILM COATED ORAL at 22:52

## 2025-04-07 RX ADMIN — AMLODIPINE BESYLATE 5 MG: 5 TABLET ORAL at 08:49

## 2025-04-07 RX ADMIN — HEPARIN SODIUM 950 UNITS/HR: 10000 INJECTION, SOLUTION INTRAVENOUS at 03:05

## 2025-04-07 RX ADMIN — OXYCODONE HYDROCHLORIDE 5 MG: 5 TABLET ORAL at 19:41

## 2025-04-07 RX ADMIN — TRIAMCINOLONE ACETONIDE: 1 CREAM TOPICAL at 08:49

## 2025-04-07 RX ADMIN — TAMSULOSIN HYDROCHLORIDE 0.8 MG: 0.4 CAPSULE ORAL at 08:49

## 2025-04-07 RX ADMIN — Medication 250 MG: at 08:49

## 2025-04-07 RX ADMIN — OXYCODONE HYDROCHLORIDE 5 MG: 5 TABLET ORAL at 03:43

## 2025-04-07 RX ADMIN — ACETAMINOPHEN 975 MG: 325 TABLET, FILM COATED ORAL at 03:43

## 2025-04-07 RX ADMIN — ASPIRIN 81 MG CHEWABLE TABLET 81 MG: 81 TABLET CHEWABLE at 08:48

## 2025-04-07 ASSESSMENT — ACTIVITIES OF DAILY LIVING (ADL)
ADLS_ACUITY_SCORE: 35
ADLS_ACUITY_SCORE: 35
ADLS_ACUITY_SCORE: 43
ADLS_ACUITY_SCORE: 35
ADLS_ACUITY_SCORE: 43
ADLS_ACUITY_SCORE: 35
ADLS_ACUITY_SCORE: 35
ADLS_ACUITY_SCORE: 42
ADLS_ACUITY_SCORE: 39
ADLS_ACUITY_SCORE: 41
ADLS_ACUITY_SCORE: 35
ADLS_ACUITY_SCORE: 42
ADLS_ACUITY_SCORE: 39
ADLS_ACUITY_SCORE: 43
ADLS_ACUITY_SCORE: 41
ADLS_ACUITY_SCORE: 41
ADLS_ACUITY_SCORE: 35
ADLS_ACUITY_SCORE: 39
ADLS_ACUITY_SCORE: 35
ADLS_ACUITY_SCORE: 41
ADLS_ACUITY_SCORE: 42
ADLS_ACUITY_SCORE: 39
ADLS_ACUITY_SCORE: 39

## 2025-04-07 NOTE — PROGRESS NOTES
@1600 Patient had new onset tremors in hands and c/o of coldness. Vitals: Temp 98, , /64,RR 22. Denies pain or discomfort. MD Jose Carlos Demarco notified.

## 2025-04-07 NOTE — PROGRESS NOTES
"CLINICAL NUTRITION SERVICES - BRIEF NOTE     Nutrition Prescription    Recommendations already ordered by Registered Dietitian (RD):  Ensure Enlive/Ensure Plus with each meal tray (vanilla flavor), to optimize Kcal/protein intake    Kosher food preferences, working with nutrition manager to obtain specialty food products for patient.   -- Per patient report, he requires a more strict \"Glatt\" Kosher for any meat containing products (symbol of K with words \"glatt\" underneath on package)  -- Patient reports that the normal/typical Kosher symbol is ok for non-meat items.   -- No chicken products at all, per pt request     Encourage foods from home if desired by patient, but only with good food safety practice (discard any perishable food within 2-hours of room temperature, if not kept in cooler/proper temperature control)    Room service with assistance to be initiated, given pt specialty diet/limited offerings. Please inform RD or RN if concerns arise.    No official RD consult and MST score 0 on admit, and currently LOS day 3    -  RD informed of Kosher diet restrictions; difficulty ordering from menu. Informed by kitchen staff that there is a shortage of Kosher meals currently available; checking with other [place] sites to see if able to obtain meals, but otherwise nutrition manager coordinating to go to the grocery store for patient this evening; asked RD to obtain food preferences from patient.     - Writer introduced self to patient; explained reason for visit. Attempted to obtain food preferences from patient, however he pt seemingly agitated and stated \"I already told someone else who was in here earlier today\", and would not tell writer what foods he likes or dislikes. Writer explained plan to obtain additional food items for patient at the grocery store, and wanting to  be sure we purchase foods that patient will eat. Patient informed writer that he requires a strict \"Glatt Kosher\" symbol for any/all meat " "containing foods, and that otherwise the general Kosher symbol would be acceptable to him for any non-meat containing items. He able to tell writer that he does not like chicken, but states he is otherwise not picky. He reports \"this isn't important, I am going home tomorrow\", however per chart and team report, there is potential that patient may be in the hospital 2-3 more days.     - Writer asked patient if he would like to start receiving Ensure protein shakes to optimize Kcal/protein intake, given current diet limitations. Patient stated that he would like this. Writer will place order. Will place patient on room service with assistance list and will monitor estimated LOS.     Monitoring/Evaluation  Will continue to monitor and evaluate per protocol.    Sotero Jin, MS, RDN, LD, CNSC  Available by [email or web address] or phone   Vocera: M-F (7:00-3:30)  6B Clinical Dietitian  Weekend/Holiday (7:00-3:30) - Weekend Clinical Dietitian   6B RD desk: [phone]       **Clinical Dietitians are no longer available by pager.    "

## 2025-04-07 NOTE — PROGRESS NOTES
[facility]    Progress Note - Medicine Service, MARO[facility] TEAM 2       Date of Admission:  4/4/2025    Assessment & Plan   Earle Rene is a 75 year old male with a PMH of Type 2 diabetes, hypertension, peripheral artery disease, CKD3, and diabetic neuropathy who Is admitted as advised by vascular surgery 2/2 DVT, now s/p LE angiogram. Course complicated by pseudoaneurysm, now closed, and weakness.      Today:   - Following arterial ultrasounds   - Renal US and UA for worsening CHARLINE despite fluids   - Appreciate IR recommendations   - Load plavix today, will start plavix 75mg tomorrow, continue ASA81   - Can be off bedrest, pseudoaneurysm is now closed  - WOC to follow groin site and cares   - Physical therapy ordered       #Peripheral artery disease  Pt with h/o HTN and T2DM with long standing bilateral LE pain, follows with IR outpatient and found to have aramis class 4 chronic limb ischemia. Angiogram on 02/04 showing R multifocal stenosis at various levels with occluded dorsalis pedis, not requiring intervention. Left LE angiogram with multifocal lesions requiring 3 x  drug-coated balloons  (TP trunk, proximal posterior tibial artery and distal superficial femoral artery/popliteal artery) with patent dorsalis pedis. Pt presented with ongoing significant pain, POD#2 of BLE angiogram and was found to have a thrombus in distal popliteal artery and hence advised to come to the ED. Vitally stable. IR performed left lower extremity angiogram with angioplasty and thrombectomy on 4/4/2025. Procedure was complicated by extravasation of the DAJA, and L groin hematoma. Neurovascularly intact. Dorsalis pedis pulses are dopplerable.  Repeat ultrasound on 4/6 was notable for incidentally found right pelvis hematoma measuring 3 x 1.6 x 4.9 cm.  Pseudoaneurysm closed on 4/7.   - IR following:   -Pseudoaneurysm now closed  -Stop heparin, start aspirin and Plavix (loaded on  4/7)  -Can stop bedrest  -WOC consulted to help manage  -ASA 81mg-restart  -Femostop placed temporarily  -Pain management:  -Scheduled tylenol 975mg q6h  -Robaxin 1000mg TID PRN  -Oxycodone 5mg Q4H PRN  -Dilaudid 0.5mg Q2H PRN   -Bilateral LE arterial US  on 4/6 noting   -Per IR recs Full strength Xarelto once hemodynamically stable              -10 mg BID for 21 days             -Followed by 20 mg daily for 6 months  -Continue atorvastatin 20mg  -Physical therapy consulted  -WOC consulted     # Acute on chronic kidney injury  # CKD 3  Patient had a admission serum creatinine of 1.8.  Serum creatinine is continued to increase this admission to 3.51.  Notably patient has had multiple angiograms and procedures done with IR.  Patient has many risk factors for CKD, including T2DM, HTN, and PAD.  Patient is also had dark urine, concerning for ATN in the setting of contrast. No longer seems to be retaining after 4/6 but will also monitor for post-renal causes.   -UA  -Renal ultrasound with Doppler to assess vasculature in the setting of PAD history     # Acute anemia  Possibly blood loss related in the setting of multiple procedures with IR.  Patient was admitted with a hemoglobin of 14.5.  Decreased to a nia of 10.  Further dropped to 8.9 on 4/7.  Imaging with ultrasound instantly finding fluid collection in his right pelvis suspicious for hematoma measuring 3 x 1.6 x 4.9.  Remains vitally stable.  Will continue to trend CBC daily.  If vitally unstable stat check CBC.    #Type 2 diabetes mellitus  #Mild nonproliferative retinopathy and macular edema   Pt with long standing T2DM. With last HbA1c of 8% (2/11/25).  -Continue 50U degludec  -Hold terzepatide and jiardiance while inpatient  -HDSSI     #Diabetic neuropathy  Pt with bilateral LE pain 2/2 diabetic neuropathy and PAD. Uses diabetic shoes. Has seen podiatry in the past.  -Continue PTA gabapentin 300mg TID     #Thrombocytopenia   Pt with platelet count of 133 in  "the setting of DVT, now s/p thrombectomy.  -CTM     #Hypertension  BP well controlled during admission.  -Continue PTA losartan 100mg  -Continue PTA amlodipine 5mg     #Hyponatremia  -Continue PTA sodium bicarb 1300mg BID     #BPH  #Low risk prostate cancer  Follows with urology outpatient. Retention on 4/6, requiring straight cath.   -Continue PTA tamsulosin 0.8mg  -Continue PTA finasteride 5mg     #Urinary urgency  Follows with urology outpatient.  -Continue PTA mirabegron 25mg     #GERD  -Continue PTA famotidine     Diet: Regular Diet Adult    DVT Prophylaxis: low intensity heparin after bedrest completed  Chamberlain Catheter: Not present  Fluids: None  Lines: None     Cardiac Monitoring: None  Code Status: Full Code      Clinically Significant Risk Factors          # Hyperchloremia: Highest Cl = 108 mmol/L in last 2 days, will monitor as appropriate              # Thrombocytopenia: Lowest platelets = 96 in last 2 days, will monitor for bleeding  # Acute Kidney Injury, unspecified: based on a >150% or 0.3 mg/dL increase in last creatinine compared to past 90 day average, will monitor renal function            # DMII: A1C = 8.0 % (Ref range: <5.7 %) within past 6 months, PRESENT ON ADMISSION  # Overweight: Estimated body mass index is 27.82 kg/m  as calculated from the following:    Height as of this encounter: 1.727 m (5' 8\").    Weight as of this encounter: 83 kg (182 lb 15.7 oz)., PRESENT ON ADMISSION     # Financial/Environmental Concerns: none         Social Drivers of Health   Transportation Needs: Low Risk  (4/5/2025)    Transportation Needs     Within the past 12 months, has lack of transportation kept you from medical appointments, getting your medicines, non-medical meetings or appointments, work, or from getting things that you need?: No   Recent Concern: Transportation Needs - Unmet Transportation Needs (1/30/2025)    Received from [facility]    Transportation Needs     " Does lack of transportation keep you from medical appointments?: 1     Does lack of transportation keep you from work, meetings or getting things that you need?: 2         Disposition Plan     Medically Ready for Discharge: Anticipated in 2-4 Days         The patient's care was discussed with the Attending Physician, Dr. Garcia .    ISAURA PERKINS MD  Medicine Service, [facility] TEAM 2  [facility]  Securely message with [employer] (more info)  Text page via [employer] Paging/Directory   See signed in provider for up to date coverage information  ______________________________________________________________________    Interval History   No acute overnight events.  Patient reports ongoing confusion about pain medication and plan.  Attempted to provide summary of patient's hospital stay.  Patient intermittently understanding.  Appears anxious.    Physical Exam   Vital Signs: Temp: 98.1  F (36.7  C) Temp src: Oral BP: (!) 140/61 Pulse: 80   Resp: 21 SpO2: 100 % O2 Device: None (Room air)    Weight: 182 lbs 15.71 oz    General Appearance:  A&Ox3.  Anxious appearing  Respiratory: Kungs clear to auscultation bilaterally  Cardiovascular: Regular rate and rhythm  GI: Soft, nontender, nondistended  Skin: No rashes on exposed skin  Extremities: No pitting edema bilaterally, markedly tender to palpation over RLe              Left:: Dopplerable pulses. Sole cooler on left foot. Normal capillary refill. Nontender to palpation; decreased sensation starting at ankle              Right: Dopplerable pulses. Warm, and pale. Normal capillary refill. Nontender to palpation    Medical Decision Making     Please see A&P for additional details of medical decision making.      Data   ------------------------- PAST 24 HR DATA REVIEWED -----------------------------------------------

## 2025-04-07 NOTE — PLAN OF CARE
Goal Outcome Evaluation:      Plan of Care Reviewed With: patient    Overall Patient Progress: no changeOverall Patient Progress: no change    Outcome Evaluation: hematoma brusing continue to spread slightly, no changes in swelling. Adequate urine output with minimal urine in bladder post void    Neuro: A&Ox4.   Cardiac: SR. VSS.   Respiratory: Sating > 92% on RA.   GI/: Adequate UO, 170 post residual void, refused primofit. No BM  Diet/appetite: Tolerating kosher diet. Eating well   Activity:  Bedrest. HOB 30 degrees okay. ACHS  Pain: At acceptable level on current regimen.   Skin: No new deficits noted.  LDA's: 2 R PIV, L and R groin site.    Plan: Continue with POC. Notify primary team with changes.

## 2025-04-07 NOTE — PLAN OF CARE
"Goal Outcome Evaluation:      Plan of Care Reviewed With: patient, spouse          Outcome Evaluation: Heparin gtt stopped, now on PLAVIX and aspirin. Pseudoaneurysm closed and able to be up w/ assist, as able. WOC to follow on groin site and cares. PT ordered.     Neuro: A&Ox4.   Cardiac: SR. VSS. BP (!) 159/64 (BP Location: Left arm)   Pulse 87   Temp 98  F (36.7  C) (Oral)   Resp 22   Ht 1.727 m (5' 8\")   Wt 83 kg (182 lb 15.7 oz)   SpO2 100%   BMI 27.82 kg/m   Dopplering Q4H. CMS intact Q4H.      Respiratory: Sating >99 on RA.  GI/: Adequate urine output. Voids independently in urinal or commode with 1-2 assist. Last  @1736. UA sent @0900.   Diet/appetite: Tolerating Kosher diet. Pt refused breakfast and dinner this shift.  Activity:  Assist of 1-2, up to chair and in halls.   Pain: At acceptable level on current regimen.    Skin: No new deficits noted. Ultrasound done for LLE, improved doppler flow.   LDA's: 2 Right PIV, saline locked. 2 Groin Interventional Procedure Assess.     Plan: Continue with POC. Notify primary team with changes. Plan for renal ultrasound tomorrow.   "

## 2025-04-07 NOTE — PROGRESS NOTES
"Brief IR Progress Note  S:  JERRICAEON.     O:  Vital signs:  Temp: 97.8  F (36.6  C) Temp src: Oral BP: (!) 140/61 Pulse: 80   Resp: 21 SpO2: 100 % O2 Device: None (Room air)   Height: 172.7 cm (5' 8\") Weight: 83 kg (182 lb 15.7 oz)  Estimated body mass index is 27.82 kg/m  as calculated from the following:    Height as of this encounter: 1.727 m (5' 8\").    Weight as of this encounter: 83 kg (182 lb 15.7 oz).      Physical Exam  HENT:      Head: Normocephalic and atraumatic.   Cardiovascular:      Rate and Rhythm: Normal rate.      Comments: Left DAJA and DPA are dopplerable.  Left PTA at the mid calf and at ankle are dopplerable  Pulmonary:      Effort: Pulmonary effort is normal.   Abdominal:      General: Abdomen is flat.   Musculoskeletal:      Comments: Left groin with swelling and ecchymosis.  Left foot cool to the touch, mildly improved compared to 4/6.   Skin:     General: Skin is warm and dry.   Neurological:      General: No focal deficit present.      Mental Status: He is alert.   Psychiatric:         Mood and Affect: Mood normal.       Left lower extremity arterial ultrasound 4/6/2025:  Decreased size of thrombosed pseudoaneurysm. There is patent neck of the pseudoaneurysm arising from the CFA. The left SFA, PFA, popliteal artery, DAJA, and PTA are patent.     Left lower extremity arterial ultrasound 4/7/2025:  Thrombosed pseudoaneurysm without flow in the pseudoaneurysm or neck. Patent left CFA, SFA, PFA, popliteal artery, DAJA, and PTA.    A/P:  Earle Rene is a 74 yo M w/ pmh T2DM, HTN, CKD, and PAD c/b popliteal artery thrombus, now POD#3 post left lower extremity angiogram and thrombectomy. Post-operative course was complicated by left CFA pseudoaneurysm formation.   On 4/5, ultrasound guided compression was performed for 20 minutes, then a thrombin injection of the pseudoaneurysm was performed. On 4/6 ultrasound guided compression was attempted again for 30 minutes and prolonged Femstop application " after.     Ultrasound today demonstrates thrombosed pseudoaneurysm without flow in the pseudoaneurysm or neck. Improved doppler exam of the DAJA/DPA and PTA.    -discontinue heparin  -Plavix 300 mg loaded today (ordered)  -Plavix 75 mg daily starting 4/8  -ASA 81 mg daily  -No Xarelto or Eliquis. We will do dual-antiplatelet therapy instead.   -Patient may come off bedrest. Up with assist. May consider PT.  -Follow up in IR clinic as scheduled.            Magalie Dugan, DO  Interventional Radiology  PGY-6  [phone]

## 2025-04-08 ENCOUNTER — APPOINTMENT (OUTPATIENT)
Dept: PHYSICAL THERAPY | Facility: CLINIC | Age: 75
DRG: 271 | End: 2025-04-08
Payer: COMMERCIAL

## 2025-04-08 ENCOUNTER — APPOINTMENT (OUTPATIENT)
Dept: ULTRASOUND IMAGING | Facility: CLINIC | Age: 75
DRG: 271 | End: 2025-04-08
Payer: COMMERCIAL

## 2025-04-08 ENCOUNTER — APPOINTMENT (OUTPATIENT)
Dept: CT IMAGING | Facility: CLINIC | Age: 75
DRG: 271 | End: 2025-04-08
Payer: COMMERCIAL

## 2025-04-08 ENCOUNTER — DOCUMENTATION ONLY (OUTPATIENT)
Dept: VASCULAR SURGERY | Facility: CLINIC | Age: 75
End: 2025-04-08

## 2025-04-08 DIAGNOSIS — I82.492 ACUTE EMBOLISM AND THROMBOSIS OF OTHER SPECIFIED DEEP VEIN OF LEFT LOWER EXTREMITY (H): ICD-10-CM

## 2025-04-08 DIAGNOSIS — I82.401 ACUTE DEEP VEIN THROMBOSIS (DVT) OF RIGHT LOWER EXTREMITY, UNSPECIFIED VEIN (H): Primary | ICD-10-CM

## 2025-04-08 DIAGNOSIS — I70.213 ATHEROSCLEROSIS OF NATIVE ARTERY OF BOTH LOWER EXTREMITIES WITH INTERMITTENT CLAUDICATION: ICD-10-CM

## 2025-04-08 PROBLEM — N17.9 ACUTE KIDNEY FAILURE, UNSPECIFIED: Status: ACTIVE | Noted: 2025-04-08

## 2025-04-08 LAB
ALBUMIN SERPL BCG-MCNC: 3.1 G/DL (ref 3.5–5.2)
ALP SERPL-CCNC: 101 U/L (ref 40–150)
ALT SERPL W P-5'-P-CCNC: 27 U/L (ref 0–70)
ANION GAP SERPL CALCULATED.3IONS-SCNC: 13 MMOL/L (ref 7–15)
AST SERPL W P-5'-P-CCNC: 59 U/L (ref 0–45)
BACTERIA UR CULT: ABNORMAL
BILIRUB DIRECT SERPL-MCNC: 0.58 MG/DL (ref 0–0.3)
BILIRUB SERPL-MCNC: 0.9 MG/DL
BUN SERPL-MCNC: 64.2 MG/DL (ref 8–23)
CALCIUM SERPL-MCNC: 8.3 MG/DL (ref 8.8–10.4)
CHLORIDE SERPL-SCNC: 108 MMOL/L (ref 98–107)
CREAT SERPL-MCNC: 3 MG/DL (ref 0.67–1.17)
EGFRCR SERPLBLD CKD-EPI 2021: 21 ML/MIN/1.73M2
ERYTHROCYTE [DISTWIDTH] IN BLOOD BY AUTOMATED COUNT: 12.8 % (ref 10–15)
GLUCOSE BLDC GLUCOMTR-MCNC: 100 MG/DL (ref 70–99)
GLUCOSE BLDC GLUCOMTR-MCNC: 119 MG/DL (ref 70–99)
GLUCOSE BLDC GLUCOMTR-MCNC: 136 MG/DL (ref 70–99)
GLUCOSE BLDC GLUCOMTR-MCNC: 87 MG/DL (ref 70–99)
GLUCOSE BLDC GLUCOMTR-MCNC: 96 MG/DL (ref 70–99)
GLUCOSE BLDC GLUCOMTR-MCNC: 98 MG/DL (ref 70–99)
GLUCOSE SERPL-MCNC: 80 MG/DL (ref 70–99)
HCO3 SERPL-SCNC: 15 MMOL/L (ref 22–29)
HCT VFR BLD AUTO: 26.4 % (ref 40–53)
HGB BLD-MCNC: 8.8 G/DL (ref 13.3–17.7)
LACTATE SERPL-SCNC: 1 MMOL/L (ref 0.7–2)
MCH RBC QN AUTO: 32.1 PG (ref 26.5–33)
MCHC RBC AUTO-ENTMCNC: 33.3 G/DL (ref 31.5–36.5)
MCV RBC AUTO: 96 FL (ref 78–100)
PLATELET # BLD AUTO: 94 10E3/UL (ref 150–450)
POTASSIUM SERPL-SCNC: 4.3 MMOL/L (ref 3.4–5.3)
PROT SERPL-MCNC: 5.4 G/DL (ref 6.4–8.3)
RBC # BLD AUTO: 2.74 10E6/UL (ref 4.4–5.9)
SODIUM SERPL-SCNC: 136 MMOL/L (ref 135–145)
WBC # BLD AUTO: 8 10E3/UL (ref 4–11)

## 2025-04-08 PROCEDURE — 76705 ECHO EXAM OF ABDOMEN: CPT

## 2025-04-08 PROCEDURE — 120N000002 HC R&B MED SURG/OB UMMC

## 2025-04-08 PROCEDURE — 250N000013 HC RX MED GY IP 250 OP 250 PS 637

## 2025-04-08 PROCEDURE — 84460 ALANINE AMINO (ALT) (SGPT): CPT

## 2025-04-08 PROCEDURE — 99232 SBSQ HOSP IP/OBS MODERATE 35: CPT | Mod: GC | Performed by: STUDENT IN AN ORGANIZED HEALTH CARE EDUCATION/TRAINING PROGRAM

## 2025-04-08 PROCEDURE — 74176 CT ABD & PELVIS W/O CONTRAST: CPT | Mod: 26 | Performed by: RADIOLOGY

## 2025-04-08 PROCEDURE — 36415 COLL VENOUS BLD VENIPUNCTURE: CPT

## 2025-04-08 PROCEDURE — 74176 CT ABD & PELVIS W/O CONTRAST: CPT

## 2025-04-08 PROCEDURE — 99207 US RENAL COMPLETE WITH ARTERIAL DUPLEX: CPT | Mod: 26 | Performed by: RADIOLOGY

## 2025-04-08 PROCEDURE — 93976 VASCULAR STUDY: CPT | Mod: 26 | Performed by: STUDENT IN AN ORGANIZED HEALTH CARE EDUCATION/TRAINING PROGRAM

## 2025-04-08 PROCEDURE — 87154 CUL TYP ID BLD PTHGN 6+ TRGT: CPT

## 2025-04-08 PROCEDURE — 87077 CULTURE AEROBIC IDENTIFY: CPT

## 2025-04-08 PROCEDURE — 250N000011 HC RX IP 250 OP 636

## 2025-04-08 PROCEDURE — 97116 GAIT TRAINING THERAPY: CPT | Mod: GP

## 2025-04-08 PROCEDURE — 250N000013 HC RX MED GY IP 250 OP 250 PS 637: Performed by: STUDENT IN AN ORGANIZED HEALTH CARE EDUCATION/TRAINING PROGRAM

## 2025-04-08 PROCEDURE — 97530 THERAPEUTIC ACTIVITIES: CPT | Mod: GP

## 2025-04-08 PROCEDURE — 76705 ECHO EXAM OF ABDOMEN: CPT | Mod: 26 | Performed by: STUDENT IN AN ORGANIZED HEALTH CARE EDUCATION/TRAINING PROGRAM

## 2025-04-08 PROCEDURE — 97161 PT EVAL LOW COMPLEX 20 MIN: CPT | Mod: GP

## 2025-04-08 PROCEDURE — 93975 VASCULAR STUDY: CPT

## 2025-04-08 PROCEDURE — 85041 AUTOMATED RBC COUNT: CPT

## 2025-04-08 PROCEDURE — 250N000013 HC RX MED GY IP 250 OP 250 PS 637: Performed by: INTERNAL MEDICINE

## 2025-04-08 PROCEDURE — 80048 BASIC METABOLIC PNL TOTAL CA: CPT

## 2025-04-08 PROCEDURE — 76770 US EXAM ABDO BACK WALL COMP: CPT

## 2025-04-08 PROCEDURE — G0463 HOSPITAL OUTPT CLINIC VISIT: HCPCS

## 2025-04-08 PROCEDURE — 93976 VASCULAR STUDY: CPT

## 2025-04-08 PROCEDURE — 83605 ASSAY OF LACTIC ACID: CPT

## 2025-04-08 RX ORDER — PIPERACILLIN SODIUM, TAZOBACTAM SODIUM 3; .375 G/15ML; G/15ML
3.38 INJECTION, POWDER, LYOPHILIZED, FOR SOLUTION INTRAVENOUS EVERY 6 HOURS
Status: DISCONTINUED | OUTPATIENT
Start: 2025-04-08 | End: 2025-04-09

## 2025-04-08 RX ORDER — CLOPIDOGREL BISULFATE 75 MG/1
75 TABLET ORAL DAILY
Qty: 30 TABLET | Refills: 0 | Status: SHIPPED | OUTPATIENT
Start: 2025-04-08 | End: 2025-05-08

## 2025-04-08 RX ORDER — CLOPIDOGREL BISULFATE 75 MG/1
75 TABLET ORAL DAILY
Qty: 30 TABLET | Refills: 2 | Status: SHIPPED | OUTPATIENT
Start: 2025-04-08 | End: 2025-04-08

## 2025-04-08 RX ORDER — CEFTRIAXONE 2 G/1
2 INJECTION, POWDER, FOR SOLUTION INTRAMUSCULAR; INTRAVENOUS EVERY 24 HOURS
Status: DISCONTINUED | OUTPATIENT
Start: 2025-04-08 | End: 2025-04-08

## 2025-04-08 RX ORDER — HYDROMORPHONE HYDROCHLORIDE 1 MG/ML
0.5 INJECTION, SOLUTION INTRAMUSCULAR; INTRAVENOUS; SUBCUTANEOUS EVERY 4 HOURS PRN
Status: DISCONTINUED | OUTPATIENT
Start: 2025-04-08 | End: 2025-04-10

## 2025-04-08 RX ADMIN — ACETAMINOPHEN 975 MG: 325 TABLET, FILM COATED ORAL at 04:18

## 2025-04-08 RX ADMIN — Medication 50 MCG: at 09:25

## 2025-04-08 RX ADMIN — SENNOSIDES AND DOCUSATE SODIUM 1 TABLET: 50; 8.6 TABLET ORAL at 09:36

## 2025-04-08 RX ADMIN — CLOPIDOGREL BISULFATE 75 MG: 75 TABLET ORAL at 09:26

## 2025-04-08 RX ADMIN — ASPIRIN 81 MG CHEWABLE TABLET 81 MG: 81 TABLET CHEWABLE at 09:27

## 2025-04-08 RX ADMIN — ACETAMINOPHEN 650 MG: 325 TABLET, FILM COATED ORAL at 15:00

## 2025-04-08 RX ADMIN — MIRABEGRON 25 MG: 25 TABLET, FILM COATED, EXTENDED RELEASE ORAL at 09:36

## 2025-04-08 RX ADMIN — ATORVASTATIN CALCIUM 20 MG: 20 TABLET, FILM COATED ORAL at 09:26

## 2025-04-08 RX ADMIN — ACETAMINOPHEN 650 MG: 325 TABLET, FILM COATED ORAL at 09:26

## 2025-04-08 RX ADMIN — CEFTRIAXONE SODIUM 2 G: 2 INJECTION, POWDER, FOR SOLUTION INTRAMUSCULAR; INTRAVENOUS at 17:39

## 2025-04-08 RX ADMIN — TRIAMCINOLONE ACETONIDE: 1 CREAM TOPICAL at 21:06

## 2025-04-08 RX ADMIN — FAMOTIDINE 20 MG: 20 TABLET, FILM COATED ORAL at 09:26

## 2025-04-08 RX ADMIN — TAMSULOSIN HYDROCHLORIDE 0.8 MG: 0.4 CAPSULE ORAL at 09:26

## 2025-04-08 RX ADMIN — TRIAMCINOLONE ACETONIDE: 1 CREAM TOPICAL at 09:25

## 2025-04-08 RX ADMIN — Medication 250 MG: at 09:26

## 2025-04-08 RX ADMIN — ACETAMINOPHEN 650 MG: 325 TABLET, FILM COATED ORAL at 21:07

## 2025-04-08 RX ADMIN — POLYETHYLENE GLYCOL 3350 17 G: 17 POWDER, FOR SOLUTION ORAL at 09:25

## 2025-04-08 RX ADMIN — PIPERACILLIN AND TAZOBACTAM 3.38 G: 3; .375 INJECTION, POWDER, LYOPHILIZED, FOR SOLUTION INTRAVENOUS at 19:04

## 2025-04-08 RX ADMIN — AMLODIPINE BESYLATE 5 MG: 5 TABLET ORAL at 09:26

## 2025-04-08 ASSESSMENT — ACTIVITIES OF DAILY LIVING (ADL)
ADLS_ACUITY_SCORE: 43
ADLS_ACUITY_SCORE: 42
ADLS_ACUITY_SCORE: 43
ADLS_ACUITY_SCORE: 42
ADLS_ACUITY_SCORE: 43
ADLS_ACUITY_SCORE: 42
ADLS_ACUITY_SCORE: 42
ADLS_ACUITY_SCORE: 43
ADLS_ACUITY_SCORE: 42
ADLS_ACUITY_SCORE: 43
ADLS_ACUITY_SCORE: 42
ADLS_ACUITY_SCORE: 43
ADLS_ACUITY_SCORE: 42
ADLS_ACUITY_SCORE: 43
ADLS_ACUITY_SCORE: 42
ADLS_ACUITY_SCORE: 43
ADLS_ACUITY_SCORE: 42
ADLS_ACUITY_SCORE: 42

## 2025-04-08 ASSESSMENT — ENCOUNTER SYMPTOMS
ACTIVITY IMPAIRMENT: RETURNING TO NORMAL
PAIN SEVERITY NOW: MILD
SUBJECTIVE PAIN PROGRESSION: IMPROVING
SUBJECTIVE PATIENT PAIN CONTROL: WELL CONTROLLED
DIETARY ISSUES: ADEQUATE INTAKE

## 2025-04-08 NOTE — PLAN OF CARE
Shift Hours: 0700 - 1900    Assessment:  Body systems that were not at patient's baseline Cognitive/Behavioral/Neuro, Cardiac, and Genitourinary. Focused body system assessments documented in flowsheets.        Activity     Fall Risk Score: 25   Bed alarm on? Yes     Activity Assistance Provided: assistance, 1 person      Assistive Device Utilized: walker    Pain: Pt reports pain in the RLE as well as the RUQ upon assessment with MD this afternoon. US ordered.     Labs/RN Managed Protocols: No protocols.  Labs pending.     Lines/Drains: PIV x 2    Nutrition: Kosher diet.     Goal Outcome Evaluation  Plan of Care Reviewed With: patient  Overall Patient Progress: declining  Outcome Evaluation: Pt alert and oriented upon arrival to the unit this afternoon, however steadily declined throughout the shift.  T Max of 102.9, rigors and lethargy.  Blood cultures drawn and orders for labs and RUQ US obtained.  Pt placed on telemetry 2/2 tachycardia and cardiac hx; sinus tach.  ABX broadened.    Barriers to Discharge:   New onset of fever this afternoon, T max 102.9 with rigors.  Blood cultures obtained.  Orders for additional labs and imaging placed.

## 2025-04-08 NOTE — PROGRESS NOTES
[facility]    Progress Note - Medicine Service, MAROON TEAM 2       Date of Admission:  4/4/2025    Assessment & Plan   Earle Rene is a 75 year old male with a PMH of Type 2 diabetes, hypertension, peripheral artery disease, CKD3, and diabetic neuropathy who Is admitted as advised by vascular surgery 2/2 DVT, now s/p LE angiogram. Course complicated by pseudoaneurysm, now closed, and weakness.      Today:   - Following arterial ultrasounds   - Renal US showing renal disease but no etiology for CHARLINE  - UA with large blood, Cx with >100k enterococcus faecalis - will plan for penicillin based abx   > BCx x2 pndg given chills, tachycardia, fevering   - Appreciate IR recommendations   - Started plavix 75mg today following load yesterday, continue ASA 81   - Can be off bedrest, pseudoaneurysm is now closed  - WOC to follow groin site and cares   - Physical therapy ordered, recommendations evolving        #Peripheral artery disease  Pt with h/o HTN and T2DM with long standing bilateral LE pain, follows with IR outpatient and found to have aramis class 4 chronic limb ischemia. Angiogram on 02/04 showing R multifocal stenosis at various levels with occluded dorsalis pedis, not requiring intervention. Left LE angiogram with multifocal lesions requiring 3 x  drug-coated balloons  (TP trunk, proximal posterior tibial artery and distal superficial femoral artery/popliteal artery) with patent dorsalis pedis. Pt presented with ongoing significant pain, POD#2 of BLE angiogram and was found to have a thrombus in distal popliteal artery and hence advised to come to the ED. Vitally stable. IR performed left lower extremity angiogram with angioplasty and thrombectomy on 4/4/2025. Procedure was complicated by extravasation of the DAJA, and L groin hematoma. Neurovascularly intact. Dorsalis pedis pulses are dopplerable.  Repeat ultrasound on 4/6 was notable for incidentally found  right pelvis hematoma measuring 3 x 1.6 x 4.9 cm.  Pseudoaneurysm closed on 4/7.   - IR following:   -Pseudoaneurysm now closed  -Stop heparin, start aspirin and Plavix (loaded on 4/7)  -Can stop bedrest  -WOC consulted to help manage  -ASA 81mg-restart  -Femostop placed temporarily  -Pain management:  -Scheduled tylenol 975mg q6h  -Robaxin 1000mg TID PRN  -Oxycodone 5mg Q4H PRN  -Dilaudid 0.5mg Q2H PRN   -Bilateral LE arterial US  on 4/6 noting   -Per IR recs Full strength Xarelto once hemodynamically stable              -10 mg BID for 21 days             -Followed by 20 mg daily for 6 months  -Continue atorvastatin 20mg  -Physical therapy consulted  -WOC consulted     # Urinary trace infection c/b male sex + chills, febrile, with CHARLINE   UA with large blood but o/w fairly unremarkable. However, cx with >100k Enterococcous and with systemic symptoms including chills, repeat fevers, elevated HRs in 90s, and CHARLINE, initiated tx with IV CTX 2g with BCx x2 drawn prior to abx initiation.   -- F/up sensitivites   -- BCx x2 pending   Abx   Plan for penicillin based antibiotic     # Acute on chronic kidney injury  # CKD 3  Patient had a admission serum creatinine of 1.8.  Serum creatinine is continued to increase this admission to 3.51.  Notably patient has had multiple angiograms and procedures done with IR.  Patient has many risk factors for CKD, including T2DM, HTN, and PAD.  Patient is also had dark urine, concerning for ATN in the setting of contrast. No longer seems to be retaining after 4/6 but will also monitor for post-renal causes.   -UA  -Renal ultrasound with Doppler to assess vasculature in the setting of PAD history     # Acute anemia  Possibly blood loss related in the setting of multiple procedures with IR.  Patient was admitted with a hemoglobin of 14.5.  Decreased to a nia of 10.  Further dropped to 8.9 on 4/7.  Imaging with ultrasound instantly finding fluid collection in his right pelvis suspicious for  hematoma measuring 3 x 1.6 x 4.9.  Remains vitally stable.  Will continue to trend CBC daily.  If vitally unstable stat check CBC.     # Type 2 diabetes mellitus  # Mild nonproliferative retinopathy and macular edema   Pt with long standing T2DM. With last HbA1c of 8% (2/11/25).  -Continue 50U degludec  -Hold terzepatide and jiardiance while inpatient  -HDSSI     #Diabetic neuropathy  Pt with bilateral LE pain 2/2 diabetic neuropathy and PAD. Uses diabetic shoes. Has seen podiatry in the past.  -Continue PTA gabapentin 300mg TID     #Thrombocytopenia   Pt with platelet count of 133 in the setting of DVT, now s/p thrombectomy.  -CTM     #Hypertension  BP well controlled during admission.  -Continue PTA losartan 100mg  -Continue PTA amlodipine 5mg     #Hyponatremia  -Continue PTA sodium bicarb 1300mg BID     #BPH  #Low risk prostate cancer  Follows with urology outpatient. Retention on 4/6, requiring straight cath.   -Continue PTA tamsulosin 0.8mg  -Continue PTA finasteride 5mg     #Urinary urgency  Follows with urology outpatient.  -Continue PTA mirabegron 25mg     #GERD  -Continue PTA famotidine     Diet: Regular Diet Adult , Kosher  DVT Prophylaxis: Plavix + ASA  Chamberlain Catheter: Not present  Fluids: None  Lines: None     Cardiac Monitoring: None  Code Status: Full Code          Social Drivers of Health   Transportation Needs: Low Risk  (4/5/2025)    Transportation Needs     Within the past 12 months, has lack of transportation kept you from medical appointments, getting your medicines, non-medical meetings or appointments, work, or from getting things that you need?: No   Recent Concern: Transportation Needs - Unmet Transportation Needs (1/30/2025)    [facility]    Transportation Needs     Does lack of transportation keep you from medical appointments?: 1     Does lack of transportation keep you from work, meetings or getting things that you need?: 2          Disposition Plan     Medically Ready for Discharge: Anticipated in 2-4 Days         The patient's care was discussed with the Attending Physician, Dr. Julian .    Lizzie Wang MD  Medicine Service, [facility] TEAM 2  [facility]  Securely message with [employer] (more info)  Text page via [employer] Paging/Directory   See signed in provider for up to date coverage information  ______________________________________________________________________    Interval History   NAOE. Pt doing ok, no concerns this morning outside of some mild pain but this is improving for him. Looking forward to discharging eventually.     Physical Exam   Vital Signs: Temp: 98.2  F (36.8  C) Temp src: Axillary BP: (!) 145/56 Pulse: 81   Resp: 15 SpO2: 100 % O2 Device: None (Room air)    Weight: 183 lbs 13.82 oz    General Appearance:  A&Ox3.  Anxious appearing  Respiratory: Kungs clear to auscultation bilaterally  Cardiovascular: Regular rate and rhythm  GI: Soft, nontender, nondistended. No suprapubic tenderness.   Skin: No rashes on exposed skin  Extremities: No pitting edema bilaterally, markedly tender to palpation over RLe              Left:: Dopplerable pulses. Sole cooler on left foot. Normal capillary refill. Nontender to palpation; decreased sensation starting at ankle              Right: Dopplerable pulses. Warm, and pale. Normal capillary refill. Nontender to palpation    Data     I have personally reviewed the following data over the past 24 hrs:    N/A  \   N/A   / N/A     136 108 (H) 64.2 (H) /  100 (H)   4.3 15 (L) 3.00 (H) \       Imaging results reviewed over the past 24 hrs:   Recent Results (from the past 24 hours)   US Renal Complete w Arterial Duplex    Narrative    ULTRASOUND RENAL COMPLETE WITH DOPPLER 4/8/2025 8:52 AM    CLINICAL HISTORY: Patient with rapidly reising scr with new hematuria  and PAD hx. assessing to see if there is any stenosis.      COMPARISONS: CTA runoff  3/7/2025.    ORDERING PROVIDER: ISAURA PERKINS    TECHNIQUE: B-mode (grayscale) and duplex Doppler evaluation of the  abdominal aorta and renal arteries performed. Velocity measurements  obtained with angle correction at or less than 60 degrees.     FINDINGS:    AORTA:       Suprarenal: obscured       Infrarenal: obscured    RIGHT KIDNEY: Poorly visualized.       Renal artery: Obscured. Not visualized.         Renal artery - aortic ratio: N/A         Renal vein: not visualized.         Length: not visualized cm         Cortical thickness: not visualized cm         Segmental artery resistive index (superior / mid / inferior): not  visualized / not visualized / 0.85         Parenchyma: not visualized    LEFT KIDNEY: Poorly visualized.       Renal artery:            Origin: obscured             Mid: obscured            Hilum: 79/11 cm/s         Renal artery - aortic ratio: N/A         Renal vein: 24 cm/s         Length: 11.8 cm         Cortical thickness: 1.2 cm         Segmental artery resistive index (superior / mid / inferior):  0.87 / 0.84 / 0.87         Parenchyma: poorly visualized.    Bladder: Distended.      Impression    IMPRESSION:   1. Non diagnostic study. Renal artery stenosis unable to be evaluated.      2. Elevated visualized segmental artery resistive indices suggest  medical renal disease.    3. Non diagnostic visualization of bilateral kidneys.    Guidelines:  Diagnostic criteria suggestive of > 60% diameter stenosis  Renal artery:       Peak systolic velocity > 180 cm/s       RAR > 3.5       Turbulent flow    Arcuate/Segmental artery Resistive Index Normal < 0.7    VIVIAN HAMMONDS MD         SYSTEM ID:  [id number]

## 2025-04-08 NOTE — PROGRESS NOTES
04/08/25 1200   Appointment Info   Signing Clinician's Name / Credentials (PT) DOUGLAS Collins   Student Supervision Direct Patient Contact Provided;Therapy services provided with the co-signing licensed therapist guiding and directing the services, and providing the skilled judgement and assessment throughout the session   Rehab Comments (PT) Avoid repetitive hip flexion exercises, ambulating okay   Living Environment   People in Home spouse   Current Living Arrangements house   Home Accessibility stairs to enter home;stairs within home   Number of Stairs, Main Entrance 3   Number of Stairs, Within Home, Primary eight   Transportation Anticipated car, drives self   Living Environment Comments No use of ADs/equipment in the home, no concerns   Self-Care   Usual Activity Tolerance good   Current Activity Tolerance moderate   Equipment Currently Used at Home none   Fall history within last six months no   Activity/Exercise/Self-Care Comment Pt was IND prior to admission   General Information   Onset of Illness/Injury or Date of Surgery 04/07/25   Referring Physician Jose Carlos Demarco MD   Patient/Family Therapy Goals Statement (PT) none specifically stated   Pertinent History of Current Problem (include personal factors and/or comorbidities that impact the POC) Earle Rene is a 75 year old male with a PMH of Type 2 diabetes, hypertension, peripheral artery disease, CKD3, and diabetic neuropathy who Is admitted as advised by vascular surgery 2/2 DVT, now s/p LE angiogram. Course complicated by pseudoaneurysm, now closed, and weakness.   Existing Precautions/Restrictions no known precautions/restrictions   Heart Disease Risk Factors Diabetes;High blood pressure;Age   Cognition   Affect/Mental Status (Cognition) WFL   Orientation Status (Cognition) oriented x 4   Follows Commands (Cognition) follows one-step commands;over 90% accuracy   Pain Assessment   Patient Currently in Pain Yes, see Vital Sign  flowsheet  (Increased pain with WB on LLE)   Integumentary/Edema   Integumentary/Edema other (describe)   Integumentary/Edema Comments Bruising in B groin and pubic area, proximal LLE   Posture    Posture Protracted shoulders;Forward head position;Kyphosis   Range of Motion (ROM)   Range of Motion ROM is WFL   Strength (Manual Muscle Testing)   Strength Comments Grossly demonstrates >3/5 in BUE and BLE   Bed Mobility   Comment, (Bed Mobility) Supine>sit SBA with HOB elevated and use of railing   Transfers   Comment, (Transfers) Sit>stand CGA with FWW   Gait/Stairs (Locomotion)   Comment, (Gait/Stairs) Amb x 5' with FWW and CGA, forward flexed posture, antalgic steps with LLE   Balance   Balance Comments Good seated and standing balance, some instability with dynamic balance while ambulating/turning   Sensory Examination   Sensory Perception Comments Reported decreased sensation d/t peripheral neuropathy   Clinical Impression   Criteria for Skilled Therapeutic Intervention Yes, treatment indicated   PT Diagnosis (PT) Impaired functional mobility   Influenced by the following impairments strength, pain, activity tolerance, balance, sensation   Functional limitations due to impairments STS, gait, transfers, stairs, functional endurance   Clinical Presentation (PT Evaluation Complexity) stable   Clinical Presentation Rationale per clinical reasoning   Clinical Decision Making (Complexity) low complexity   Planned Therapy Interventions (PT) balance training;bed mobility training;gait training;home exercise program;neuromuscular re-education;motor coordination training;postural re-education;patient/family education;stair training;strengthening;stretching;transfer training;progressive activity/exercise;risk factor education;home program guidelines   Risk & Benefits of therapy have been explained evaluation/treatment results reviewed;care plan/treatment goals reviewed;risks/benefits reviewed;patient   PT Total Evaluation  Time   PT Diogenes, Low Complexity Minutes (74777) 8   Physical Therapy Goals   PT Frequency 5x/week   PT Predicted Duration/Target Date for Goal Attainment 04/29/25   PT Goals Bed Mobility;Transfers;Gait;Stairs   PT: Bed Mobility Independent   PT: Transfers Modified independent;Assistive device;Sit to/from stand  (FWW)   PT: Gait Modified independent;Greater than 200 feet;Rolling walker  (FWW)   PT: Stairs Modified independent;Greater than 10 stairs   PT Discharge Planning   PT Plan progress gait with FWW, stairs, standing LE exercises   PT Discharge Recommendation (DC Rec) home with assist;home with outpatient physical therapy   PT Rationale for DC Rec Pt active at baseline and doing well with all mobility, currently limited by pain, recommend walker for d/c due to noted instability/peripheral neuropathy. Expect pt to progress well with therapy, still needs to trial stairs prior to d/c.   PT Brief overview of current status Ax1 with STS, ambulating, transfers   PT Total Distance Amb During Session (feet) 390   PT Equipment Needed at Discharge walker, rolling  (FWW)   Physical Therapy Time and Intention   Timed Code Treatment Minutes 34   Total Session Time (sum of timed and untimed services) 42

## 2025-04-08 NOTE — PLAN OF CARE
Neuro: A&Ox4. Ute   Cardiac: SR. VSS.   Respiratory: Sating >98% on RA.  GI/: Adequate urine output (uses the urinal). No BM during the shift  Diet/appetite: Tolerating kosher diet. NPO post MN for upcoming procedure  Activity:  Assist of 2, up to commode  Pain: At acceptable level on current regimen.  6-9/10 ;eft foot pain.  Managed by Oxycodone and Tylenol  Skin: No new deficits noted.  LDA's: 2 RPIVs    Plan: Continue with POC. Notify primary team with changes.

## 2025-04-08 NOTE — PROGRESS NOTES
Transfer  Transferred to: 7C  Via: Wheelchair  Reason for transfer: Pt no longer appropriate for 6B- improved patient condition  Family: Aware of transfer- pt. Stated he prefers to notify his wife  Belongings: Packed and sent with pt  Chart: Delivered with pt to next unit  Medications: Meds sent to new unit with pt  Report given to: JERROD RN   Pt status:  Stable, improved

## 2025-04-08 NOTE — PROGRESS NOTES
"Earle Rene is a 75 year old male patient s/p LLE angiogram and thrombectomy for ALI. Pt with known RC4 PAD with rest pain bilaterally. No evidence of wounds. Pt recently had PSA injection of thrombin, which resolved along the CFA. Patent 2 vessel runoff noted on US.     NAEO. Pain improving significantly. Patient able to ambulate.     Anticoagulation and Plavix sent to pharmacy - continue for 3 months.     Vessel 1+ at AT and PT    1. PAD (peripheral artery disease)    2. Popliteal artery occlusion, left    3. Polyneuropathic pain      Past Medical History:   Diagnosis Date    Blepharitis of both eyes     BPH (benign prostatic hyperplasia)     Diabetes (H)     Diabetic neuropathy (H)     Diabetic retinopathy associated with diabetes mellitus due to underlying condition (H)     Dry eye syndrome     GERD (gastroesophageal reflux disease)     Goiter     Granulomatous disease (H)     HLD (hyperlipidemia)     HTN (hypertension)     Nonsenile cataract     Peripheral neuropathy      Current Outpatient Medications   Medication Sig Dispense Refill    tirzepatide (MOUNJARO) 5 MG/0.5ML SOAJ auto-injector pen Inject 0.5 mLs (5 mg) subcutaneously once a week. 2 mL 11     No Known Allergies  Active Problems:    PAD (peripheral artery disease)    Blood pressure 133/60, pulse 84, temperature 98.3  F (36.8  C), temperature source Oral, resp. rate 17, height 5' 8\", weight 183 lb 13.8 oz, SpO2 100%.    Subjective:  Symptoms:  Resolved.    Diet:  Adequate intake.    Activity level: Returning to normal.    Pain:  He complains of pain that is mild.  He reports pain is improving.  Pain is well controlled.      Objective  Psa closed on US in L groin  AT/DP 1+,. pT 1+, 2+ pop and Cfa.     Assessment & Plan    75M with RCL4 (PAD) s/p thrombectomy of the LLE for ALI.   Pt tolerating meds well, significantly decreased pain in L groin.     Pt cleared ort discontinue from IR perspective.       Low dose Anticoagulation and Plavix sent to [facility] " pharmacy - continue for 3 months.   Clinic appt in 2 weeks with LLE US (Ordered)  Clinic appt in 4 weeks with LLE US (Ordered)      Kenrick Ramon MD  4/8/2025

## 2025-04-08 NOTE — PROGRESS NOTES
Outpatient Care coordination:    2 week follow up: Appts with US LLE and Dr. Ramon scheduled for Thurs 4/24. Will appear on pt AVS at the time of discharge  DAPT PLAN:   Plavix 75 mg once daily qty: 30 days sent to [facility]  Eliquis 2.5 mg BID qty: 60 x 2 refills already sent on 4/4/25 to [facility]  Pt to stop ASA 81 mg  4 week follow up: already prescheduled 5/6 US LLE and GARCIA with 5/8 at 10am Dr. Ramon.  *will discuss further DAPT plan at this time    RNCC will fup with pt post discharge.         Mayda JUÁREZ RN, BSN  Interventional Radiology/Vascular  Nurse Coordinator  Phone: [phone], option 2

## 2025-04-08 NOTE — SUMMARY OF CARE
Transferred from: 6B   Report received from:  Tori RN         2 RN skin assessment completed by: Mars SCOTT RN and Aliyah TEJEDA RN      - Findings (add LDA if needed): Much bruising throughout the body with emphasis on the LLE, flank, scrotum and penis.   Care plan (primary problem) and education initiated if not already done: Yes  MDRO education done if applicable: N/A  Pt informed about policy regarding no IV pumps off unit: Yes  Suction set up in room? Yes  Flu shot ordered? (October-April only): N/A  Detailed Belongings: Cell phone, clothing

## 2025-04-08 NOTE — CONSULTS
[facility] Nurse Inpatient Assessment     Consulted for: R access sit at pelvis     [facility] nurse follow-up plan: weekly    Patient History (according to provider note(s):      Earle Rene is a 75 year old male with a PMH of Type 2 diabetes, hypertension, peripheral artery disease, CKD3, and diabetic neuropathy who Is admitted as advised by vascular surgery 2/2 DVT, now s/p LE angiogram. Course complicated by pseudoaneurysm, now closed, and weakness.     Assessment:      Areas visualized during today's visit: Skin folds     Skin Injury Location: bilateral groin   Skin injury due to: Hematoma and surgical access site   Skin history and plan of care:   s/p LE angiogram with pseudoaneurysm formation, now closed  Affected area:      Skin assessment: Ecchymosis, Edema, and Erosion of epidermis within L groin and pannus skin fold      Measurements (length x width x depth, in cm) access site to R groin measures 0.2  x 0.3  x  0.1 cm, scabbed on assessment. L groin and pannus with open areas each measuring about 0.6 x 3 x 0.1cm      Color: dusky, purple, and red     Temperature  normal      Drainage: scant .      Color: serosanguinous      Odor: none  Pain: mild, tender  Pain interventions prior to dressing change: slow and gentle cares   Treatment goal: Heal , Bleeding control, and Decrease moisture  STATUS: initial assessment  Supplies ordered: supplies stored on unit       Treatment Plan:     Bilateral groin wound(s): Daily cleanse all skin folds with microKlenz and dry thoroughly, apply Cavilon barrier film to open areas and within skin folds, ensure to allow to dry before releasing the fold, then place Interdry AG textile, placing flat within the fold, ensuring at least 2 inches remain outside of fold to allow for wicking action of textile. Cover R groin access site with small primapore or tiny mepilex if still draining, otherwise can keep SHERI. Alert primary team for any  bleeding concerns.      Orders: Written    RECOMMEND PRIMARY TEAM ORDER: None, at this time  Education provided: plan of care  Discussed plan of care with: Patient  Notify WOC if wound(s) deteriorate.  Nursing to notify the Provider(s) and re-consult the WO Nurse if new skin concern.    DATA:     Current support surface: Standard  Standard gel mattress (Isoflex)  Containment of urine/stool: Continent of bladder and Continent of bowel  BMI: Body mass index is 27.96 kg/m .   Active diet order: Orders Placed This Encounter      Kosher Diet     Output: I/O last 3 completed shifts:  In: 355.58 [P.O.:300; I.V.:55.58]  Out: 1730 [Urine:1730]     Labs:   Recent Labs   Lab 04/07/25  0904 04/04/25  1900 04/02/25  1038   HGB 8.9*   < > 14.4   INR  --   --  1.02   WBC 5.5   < > 4.1    < > = values in this interval not displayed.     Pressure injury risk assessment:   Sensory Perception: 3-->slightly limited  Moisture: 3-->occasionally moist  Activity: 3-->walks occasionally  Mobility: 3-->slightly limited  Nutrition: 3-->adequate  Friction and Shear: 3-->no apparent problem  Jona Score: 18      Pager no longer is use, please contact through [employer] group: [facility] Nurse [place]   Dept. Office Number: [phone]

## 2025-04-09 ENCOUNTER — APPOINTMENT (OUTPATIENT)
Dept: PHYSICAL THERAPY | Facility: CLINIC | Age: 75
DRG: 271 | End: 2025-04-09
Payer: COMMERCIAL

## 2025-04-09 LAB
ACB COMPLEX DNA BLD POS QL NAA+NON-PROBE: NOT DETECTED
ANION GAP SERPL CALCULATED.3IONS-SCNC: 13 MMOL/L (ref 7–15)
B FRAGILIS DNA BLD POS QL NAA+NON-PROBE: NOT DETECTED
BUN SERPL-MCNC: 65.5 MG/DL (ref 8–23)
C ALBICANS DNA BLD POS QL NAA+NON-PROBE: NOT DETECTED
C AURIS DNA BLD POS QL NAA+NON-PROBE: NOT DETECTED
C GATTII+NEOFOR DNA BLD POS QL NAA+N-PRB: NOT DETECTED
C GLABRATA DNA BLD POS QL NAA+NON-PROBE: NOT DETECTED
C KRUSEI DNA BLD POS QL NAA+NON-PROBE: NOT DETECTED
C PARAP DNA BLD POS QL NAA+NON-PROBE: NOT DETECTED
C TROPICLS DNA BLD POS QL NAA+NON-PROBE: NOT DETECTED
CALCIUM SERPL-MCNC: 8 MG/DL (ref 8.8–10.4)
CHLORIDE SERPL-SCNC: 108 MMOL/L (ref 98–107)
CREAT SERPL-MCNC: 2.8 MG/DL (ref 0.67–1.17)
CREAT UR-MCNC: 73.9 MG/DL
E CLOAC COMP DNA BLD POS NAA+NON-PROBE: NOT DETECTED
E COLI DNA BLD POS QL NAA+NON-PROBE: NOT DETECTED
E FAECALIS DNA BLD POS QL NAA+NON-PROBE: DETECTED
E FAECIUM DNA BLD POS QL NAA+NON-PROBE: NOT DETECTED
EGFRCR SERPLBLD CKD-EPI 2021: 23 ML/MIN/1.73M2
ENTEROBACTERALES DNA BLD POS NAA+N-PRB: NOT DETECTED
ERYTHROCYTE [DISTWIDTH] IN BLOOD BY AUTOMATED COUNT: 13 % (ref 10–15)
FRACT EXCRET NA UR+SERPL-RTO: 1.1 %
GLUCOSE BLDC GLUCOMTR-MCNC: 126 MG/DL (ref 70–99)
GLUCOSE BLDC GLUCOMTR-MCNC: 155 MG/DL (ref 70–99)
GLUCOSE BLDC GLUCOMTR-MCNC: 156 MG/DL (ref 70–99)
GLUCOSE BLDC GLUCOMTR-MCNC: 203 MG/DL (ref 70–99)
GLUCOSE BLDC GLUCOMTR-MCNC: 72 MG/DL (ref 70–99)
GLUCOSE SERPL-MCNC: 106 MG/DL (ref 70–99)
GP B STREP DNA BLD POS QL NAA+NON-PROBE: NOT DETECTED
HAEM INFLU DNA BLD POS QL NAA+NON-PROBE: NOT DETECTED
HCO3 SERPL-SCNC: 15 MMOL/L (ref 22–29)
HCT VFR BLD AUTO: 26.2 % (ref 40–53)
HGB BLD-MCNC: 8.8 G/DL (ref 13.3–17.7)
K OXYTOCA DNA BLD POS QL NAA+NON-PROBE: NOT DETECTED
KLEBSIELLA SP DNA BLD POS QL NAA+NON-PRB: NOT DETECTED
KLEBSIELLA SP DNA BLD POS QL NAA+NON-PRB: NOT DETECTED
L MONOCYTOG DNA BLD POS QL NAA+NON-PROBE: NOT DETECTED
MCH RBC QN AUTO: 32 PG (ref 26.5–33)
MCHC RBC AUTO-ENTMCNC: 33.6 G/DL (ref 31.5–36.5)
MCV RBC AUTO: 95 FL (ref 78–100)
N MEN DNA BLD POS QL NAA+NON-PROBE: NOT DETECTED
P AERUGINOSA DNA BLD POS NAA+NON-PROBE: NOT DETECTED
PLATELET # BLD AUTO: 117 10E3/UL (ref 150–450)
POTASSIUM SERPL-SCNC: 4.5 MMOL/L (ref 3.4–5.3)
PROTEUS SP DNA BLD POS QL NAA+NON-PROBE: NOT DETECTED
RBC # BLD AUTO: 2.75 10E6/UL (ref 4.4–5.9)
S AUREUS DNA BLD POS QL NAA+NON-PROBE: NOT DETECTED
S AUREUS+CONS DNA BLD POS NAA+NON-PROBE: NOT DETECTED
S EPIDERMIDIS DNA BLD POS QL NAA+NON-PRB: NOT DETECTED
S LUGDUNENSIS DNA BLD POS QL NAA+NON-PRB: NOT DETECTED
S MALTOPHILIA DNA BLD POS QL NAA+NON-PRB: NOT DETECTED
S MARCESCENS DNA BLD POS NAA+NON-PROBE: NOT DETECTED
S PNEUM DNA BLD POS QL NAA+NON-PROBE: NOT DETECTED
S PYO DNA BLD POS QL NAA+NON-PROBE: NOT DETECTED
SALMONELLA DNA BLD POS QL NAA+NON-PROBE: NOT DETECTED
SODIUM SERPL-SCNC: 136 MMOL/L (ref 135–145)
SODIUM UR-SCNC: 41 MMOL/L
STREPTOCOCCUS DNA BLD POS NAA+NON-PROBE: NOT DETECTED
VANA+VANB ISLT/SPM QL: NOT DETECTED
WBC # BLD AUTO: 9.1 10E3/UL (ref 4–11)

## 2025-04-09 PROCEDURE — 36415 COLL VENOUS BLD VENIPUNCTURE: CPT

## 2025-04-09 PROCEDURE — 250N000011 HC RX IP 250 OP 636

## 2025-04-09 PROCEDURE — 99233 SBSQ HOSP IP/OBS HIGH 50: CPT | Mod: GC | Performed by: STUDENT IN AN ORGANIZED HEALTH CARE EDUCATION/TRAINING PROGRAM

## 2025-04-09 PROCEDURE — 80048 BASIC METABOLIC PNL TOTAL CA: CPT

## 2025-04-09 PROCEDURE — 250N000013 HC RX MED GY IP 250 OP 250 PS 637: Performed by: STUDENT IN AN ORGANIZED HEALTH CARE EDUCATION/TRAINING PROGRAM

## 2025-04-09 PROCEDURE — 87040 BLOOD CULTURE FOR BACTERIA: CPT

## 2025-04-09 PROCEDURE — 85048 AUTOMATED LEUKOCYTE COUNT: CPT | Performed by: STUDENT IN AN ORGANIZED HEALTH CARE EDUCATION/TRAINING PROGRAM

## 2025-04-09 PROCEDURE — 82570 ASSAY OF URINE CREATININE: CPT

## 2025-04-09 PROCEDURE — 84300 ASSAY OF URINE SODIUM: CPT

## 2025-04-09 PROCEDURE — 97116 GAIT TRAINING THERAPY: CPT | Mod: GP

## 2025-04-09 PROCEDURE — 250N000011 HC RX IP 250 OP 636: Performed by: STUDENT IN AN ORGANIZED HEALTH CARE EDUCATION/TRAINING PROGRAM

## 2025-04-09 PROCEDURE — 250N000013 HC RX MED GY IP 250 OP 250 PS 637

## 2025-04-09 PROCEDURE — 120N000002 HC R&B MED SURG/OB UMMC

## 2025-04-09 PROCEDURE — 97530 THERAPEUTIC ACTIVITIES: CPT | Mod: GP

## 2025-04-09 RX ORDER — AMPICILLIN 2 G/1
2 INJECTION, POWDER, FOR SOLUTION INTRAVENOUS EVERY 8 HOURS
Status: DISCONTINUED | OUTPATIENT
Start: 2025-04-09 | End: 2025-04-11

## 2025-04-09 RX ORDER — AMOXICILLIN 250 MG
2 CAPSULE ORAL 2 TIMES DAILY
Status: DISCONTINUED | OUTPATIENT
Start: 2025-04-09 | End: 2025-04-10

## 2025-04-09 RX ORDER — AMOXICILLIN 250 MG
1 CAPSULE ORAL 2 TIMES DAILY
Status: DISCONTINUED | OUTPATIENT
Start: 2025-04-09 | End: 2025-04-10

## 2025-04-09 RX ORDER — POLYETHYLENE GLYCOL 3350 17 G/17G
17 POWDER, FOR SOLUTION ORAL 2 TIMES DAILY
Status: DISCONTINUED | OUTPATIENT
Start: 2025-04-09 | End: 2025-04-10

## 2025-04-09 RX ADMIN — SENNOSIDES AND DOCUSATE SODIUM 2 TABLET: 50; 8.6 TABLET ORAL at 11:04

## 2025-04-09 RX ADMIN — TAMSULOSIN HYDROCHLORIDE 0.8 MG: 0.4 CAPSULE ORAL at 07:56

## 2025-04-09 RX ADMIN — ASPIRIN 81 MG CHEWABLE TABLET 81 MG: 81 TABLET CHEWABLE at 07:56

## 2025-04-09 RX ADMIN — Medication 1500 MG: at 08:41

## 2025-04-09 RX ADMIN — TRIAMCINOLONE ACETONIDE: 1 CREAM TOPICAL at 07:57

## 2025-04-09 RX ADMIN — ACETAMINOPHEN 975 MG: 325 TABLET, FILM COATED ORAL at 16:05

## 2025-04-09 RX ADMIN — ACETAMINOPHEN 975 MG: 325 TABLET, FILM COATED ORAL at 11:04

## 2025-04-09 RX ADMIN — ATORVASTATIN CALCIUM 20 MG: 20 TABLET, FILM COATED ORAL at 07:56

## 2025-04-09 RX ADMIN — SENNOSIDES AND DOCUSATE SODIUM 2 TABLET: 50; 8.6 TABLET ORAL at 21:17

## 2025-04-09 RX ADMIN — AMPICILLIN SODIUM 2 G: 2 INJECTION, POWDER, FOR SOLUTION INTRAMUSCULAR; INTRAVENOUS at 21:16

## 2025-04-09 RX ADMIN — AMPICILLIN SODIUM 2 G: 2 INJECTION, POWDER, FOR SOLUTION INTRAMUSCULAR; INTRAVENOUS at 12:46

## 2025-04-09 RX ADMIN — Medication 50 MCG: at 07:56

## 2025-04-09 RX ADMIN — ACETAMINOPHEN 975 MG: 325 TABLET, FILM COATED ORAL at 21:16

## 2025-04-09 RX ADMIN — MIRABEGRON 25 MG: 25 TABLET, FILM COATED, EXTENDED RELEASE ORAL at 07:57

## 2025-04-09 RX ADMIN — POLYETHYLENE GLYCOL 3350 17 G: 17 POWDER, FOR SOLUTION ORAL at 07:56

## 2025-04-09 RX ADMIN — AMLODIPINE BESYLATE 5 MG: 5 TABLET ORAL at 07:56

## 2025-04-09 RX ADMIN — TRIAMCINOLONE ACETONIDE: 1 CREAM TOPICAL at 21:19

## 2025-04-09 RX ADMIN — APIXABAN 2.5 MG: 2.5 TABLET, FILM COATED ORAL at 21:16

## 2025-04-09 RX ADMIN — Medication 250 MG: at 07:56

## 2025-04-09 RX ADMIN — PIPERACILLIN AND TAZOBACTAM 3.38 G: 3; .375 INJECTION, POWDER, LYOPHILIZED, FOR SOLUTION INTRAVENOUS at 01:29

## 2025-04-09 RX ADMIN — PIPERACILLIN AND TAZOBACTAM 3.38 G: 3; .375 INJECTION, POWDER, LYOPHILIZED, FOR SOLUTION INTRAVENOUS at 06:03

## 2025-04-09 RX ADMIN — CLOPIDOGREL BISULFATE 75 MG: 75 TABLET ORAL at 07:55

## 2025-04-09 ASSESSMENT — ACTIVITIES OF DAILY LIVING (ADL)
ADLS_ACUITY_SCORE: 46
ADLS_ACUITY_SCORE: 41
ADLS_ACUITY_SCORE: 41
ADLS_ACUITY_SCORE: 46
ADLS_ACUITY_SCORE: 46
ADLS_ACUITY_SCORE: 41
ADLS_ACUITY_SCORE: 46
ADLS_ACUITY_SCORE: 46
ADLS_ACUITY_SCORE: 37
ADLS_ACUITY_SCORE: 41
ADLS_ACUITY_SCORE: 46
ADLS_ACUITY_SCORE: 37
ADLS_ACUITY_SCORE: 41
ADLS_ACUITY_SCORE: 46
ADLS_ACUITY_SCORE: 41
ADLS_ACUITY_SCORE: 41
ADLS_ACUITY_SCORE: 46
ADLS_ACUITY_SCORE: 46
ADLS_ACUITY_SCORE: 41
ADLS_ACUITY_SCORE: 46
ADLS_ACUITY_SCORE: 41
ADLS_ACUITY_SCORE: 41
ADLS_ACUITY_SCORE: 46

## 2025-04-09 NOTE — PROGRESS NOTES
1820 MD to bedside to assess pt as RN concerned for persistent fever not responsive to tylenol and ceftriaxone. Next Tylenol not due. Blood cultures already collected earlier today and he was started on ceftriaxone for E feacalis UTI.    Exam:  Tachy to 110's, still febrile > 102F  GEN: M awake but stuporous, partially under blankets  CV: Tachy, regular rhythm, no murmur.    RESP: CTAB.   Abdomen: tense but non-rigid in RUQ - ttp, no guarding.  Neuro: Cooperative, pt less talkative, one-word answers, face symmetric moves all extremities, PERRLA,   MSK/Skin: Hematoma stable. No new rashes.       A&P:  C/f urosepsis vs hepatic or biliary process, hematoma stable, BP wnl, not concerned for internal hemorrhage at this time.   - telemetry  - ice packs for cooling  - lactate, LFTs, RUQ US w/ doppler  - broaden abx from ctx to zosyn      Alejandro Giffodr MD  Internal Medicine & Dermatology, PGY1

## 2025-04-09 NOTE — PLAN OF CARE
Goal Outcome Evaluation:      Plan of Care Reviewed With: patient    Overall Patient Progress: improving    Outcome Evaluation: [id number] A&Ox4.  Can be forgetful of situation.  VSS on RA, tmax 99.7 this shift.  Tele NSR.   & 126, no appetite.  C/o abd pain & LLE pain, tolerable with scheduled Tylenol & hot packs.  Wife came to visit dt concern for fever earlier, requested for pt not to get any opiates. Pt reported abd pain was resolved this AM, passed alot of flatus, LBM 4/4.  Abd US & Abd CT unremarkable.  LLE weak & numbness persists, pulse audible with doppler.  R PIV x2, lower PIV TKO between IV abx.

## 2025-04-09 NOTE — PROGRESS NOTES
Antimicrobial Stewardship Team Note    Antimicrobial Stewardship Program - A joint venture between [facility] and  Physicians to optimize antibiotic management.  NOT a formal consult - Restricted Antimicrobial Review     Patient: Earle Rene  MRN: [id number]  Allergies: Patient has no known allergies.    Brief Summary: Earle Rene is a 75 year-old male with a PMH of DM2, HTN, PAD, CKD3, and diabetic neuropathy who presented to the ED as advised by vascular surgery for left leg DVT s/p BLE angiogram.    History of Present Illness: On 4/2, patient underwent BLE angiogram with intervention which was tolerated well without any immediate complications. Two days later the patient presented to the ED due to worsening post-op pain. He underwent a left leg angiogram with plasty and thrombectomy and was admitted for observation. Since admission, the patient developed an CHARLINE and urinary retention requiring straight catheterization.   On 4/7, UA was positive for leukocyte esterases, and urine cultures grew >100k CFU/mL Enterococcus faecalis that is largely susceptible to antimicrobials. A renal ultrasound was completed on 4/8 which was largely inconclusive with non-diagnostic visualization of bilateral kidneys.  For initial UTI treatment, the patient was started on ceftriaxone on 4/8; however, he developed tachycardia (HR 90s) as well as a fever (39.2), and antibiotic therapy was broadened to Zosyn later that day. Blood cultures were collected which resulted preliminarily positive with Gram positive cocci in pairs/chains present. PCR testing identified presence of Enterococcus faecalis. Blood cultures have been collected again today with results pending. Vancomycin added this AM. Currently, patient is afebrile (36.7) with HR 80s, RR 24, and WBC 9.1.         Active Anti-infective Medications   (From admission, onward)                 Start     Stop    04/10/25 0800  vancomycin (VANCOCIN) injection  750 mg,    Intravenous,   EVERY 24 HOURS        Bacteremia       --    04/09/25 0830  vancomycin  1,500 mg,   Intravenous,   ONCE        Bacteremia       --    04/08/25 1900  piperacillin-tazobactam  3.375 g,   Intravenous,   EVERY 6 HOURS        Sepsis       --                  Assessment: Bacteremia 2/2 UTI  Enterococcus faecalis initially identified in the urine culture was largely pan-sensitive. Enterococcus faecalis was also identified in the blood. Therefore, the source of bacteremia is likely from UTI. As the patient does not have a history of Pseudomonal or MRSA colonization nor apparent risk factors, antimicrobial therapy can be deescalated. Upon review of urinary culture susceptibility testing, ampicillin would be an appropriate alternative. Additionally, while blood cultures have not yet been tested for resistance, facility's antibiogram shows Enterococcus faecalis to have a 100% susceptibility reinforcing utility of ampicillin. Moreover, piperacillin may enhance the nephrotoxic effect of vancomycin which is of concern given the patient's unresolved CHARLINE.    Recommendations:  Deescalate Zosyn + vancomycin to ampicillin 2g IV Q8H  Repeat blood cultures daily and treat 10-14 days following negative cultures    Pharmacy took the following actions: Called/paged provider, Electronic note created..    Discussed with ID Staff DAVID Odell and Rakesh Barcenas [facility]  Gardenia De Leon, P4 pharmacy student    Vital Signs/Clinical Features:  Vitals         04/07 0700  04/08 0659 04/08 0700 04/09 0659 04/09 0700 04/09 1139   Most Recent      Temp ( F) 97.8 -  100.6    98 -  102.9       98 (36.7) 04/09 0609    Pulse 76 -  104    81 -  111    79 -  83     79 04/09 1105    Resp 14 -  26    15 -  24       24 04/09 0609    /48 -  170/58    118/52 -  150/61    117/46 -  117/56     117/46 04/09 1105    SpO2 (%) 95 -  100    96 -  100       100 04/09 0609            Labs  Estimated Creatinine Clearance: 23.6 mL/min  (A) (based on SCr of 2.8 mg/dL (H)).  Recent Labs   Lab Test 04/02/25  1038 04/05/25  1034 04/06/25  0427 04/07/25  0436 04/08/25  0507 04/09/25  0543   CR 1.89* 2.26* 3.29* 3.51* 3.00* 2.80*       Recent Labs   Lab Test 06/24/17  2317 11/01/17  1044 03/06/18  1216 05/01/18  1233 02/06/19  1322 03/13/19  1516 06/18/19  1054 01/29/20  1315 03/12/21  1324 06/09/21  1524 04/04/25  1900 04/05/25  1034 04/05/25  1850 04/06/25  0427 04/07/25  0904 04/08/25  2108 04/09/25  0543   WBC 8.3 4.9 4.8   < > 6.1   < > 4.6  --  5.1   < > 4.9 9.3  --  8.2 5.5 8.0 9.1   ANEU 4.3 1.1* 1.6  --  2.2  --  1.3*  --  2.4  --   --   --   --   --   --   --   --    ALYM 3.0 3.0 2.4  --  2.9  --  2.6  --  2.0  --   --   --   --   --   --   --   --    AMISH 0.7 0.4 0.6  --  0.5  --  0.4  --  0.4  --   --   --   --   --   --   --   --    AEOS 0.3 0.3 0.2  --  0.4  --  0.3  --  0.3  --   --   --   --   --   --   --   --    HGB 14.8 15.1 15.5   < > 13.8   < > 13.5   < > 14.1   < > 14.5 11.8* 10.4* 10.0* 8.9* 8.8* 8.8*   HCT 41.7 43.6 44.6   < > 41.4   < > 38.7*  --  41.7   < > 40.5 35.0*  --  30.0* 26.4* 26.4* 26.2*   MCV 92 92 94   < > 94   < > 93  --  97   < > 93 94  --  95 95 96 95    170 148*   < > 225   < > 130*  --  156   < > 133* 137*  --  103* 96* 94* 117*    < > = values in this interval not displayed.       Recent Labs   Lab Test 06/06/17  1116 11/01/17  1044 03/06/18  1216 05/01/18  1233 06/18/19  1054 01/29/20  1315 03/12/21  1324 06/09/21  1524 06/09/22  1333 09/13/23  1622 01/03/24  0929 03/27/24  0914 05/29/24  1017 11/08/24  0823 04/08/25  2108   BILITOTAL 0.7  --  0.8  --  0.6  --  0.4  --   --   --   --   --   --   --  0.9   ALKPHOS 70  --  77  --  63  --  108  --   --   --   --   --   --   --  101   ALBUMIN 3.5  --  3.8   < > 3.8   < > 3.7   < > 3.9 4.3  --  4.1 3.9 3.8 3.1*   AST 20 23 25  --  24  --  24  --   --   --  23  --   --   --  59*   ALT 49 44 35  --  38  --  36  --   --   --   --   --   --   --  27    < > =  values in this interval not displayed.       Recent Labs   Lab Test 04/08/25  2108   LACT 1.0             Culture Results:  7-Day Micro Results       Procedure Component Value Units Date/Time    Blood Culture Hand, Right [[id number]] Collected: 04/09/25 0818    Order Status: Resulted Lab Status: In process Updated: 04/09/25 0823    Specimen: Blood from Hand, Right     Blood Culture Hand, Left [[id number]] Collected: 04/09/25 0811    Order Status: Resulted Lab Status: In process Updated: 04/09/25 0823    Specimen: Blood from Hand, Left     Blood Culture Hand, Left [[id number]]  (Abnormal) Collected: 04/08/25 1626    Order Status: Completed Lab Status: Preliminary result Updated: 04/09/25 0557    Specimen: Blood from Hand, Left      Culture Positive on the 1st day of incubation      Gram positive cocci in pairs and chains     Comment: 2 of 2 bottles       Blood Culture ID Panel, PCR [[id number]]  (Abnormal) Collected: 04/08/25 1626    Order Status: Completed Lab Status: Final result Updated: 04/09/25 0635    Specimen: Blood from Hand, Left      Enterococcus faecalis Detected     Comment: Enterococcus faecalis detected by [employer] BCID2 assay. Final identification and antimicrobial susceptibility testing will be verified by standard methods.        Enterococcus faecium Not Detected     van A/B Not Detected     Listeria monocytogenes Not Detected     Staphylococcus species Not Detected     Staphylococcus aureus Not Detected     Staphylococcus epidermidis Not Detected     Staphylococcus lugdunensis Not Detected     Streptococcus species Not Detected     Streptococcus agalactiae Not Detected     Streptococcus pneumoniae Not Detected     Streptococcus pyogenes Not Detected     A. baumannii complex Not Detected     Bacteroides fragilis Not Detected     Enterobacterales Not Detected     Enterobacter cloacae complex Not Detected     Escherichia coli Not Detected     Klebsiella aerogenes Not Detected     Klebsiella  oxytoca Not Detected     Klebsiella pneumoniae group Not Detected     Proteus species Not Detected     Salmonella species Not Detected     Serratia marcescens Not Detected     Haemophilus influenzae Not Detected     Neisseria meningitidis Not Detected     Pseudomonas aeruginosa Not Detected     Stenotrophomonas maltophilia Not Detected     Candida albicans Not Detected     Candida auris Not Detected     Candida glabrata Not Detected     Candida krusei Not Detected     Candida parapsilosis Not Detected     Candida tropicalis Not Detected     Cryptococcus neoformans/gattii Not Detected    Narrative:      Assay performed using the FDA-cleared A4 Data Blood Culture Identification 2 (BCID2) Panel, a multiplexed nucleic acid test for the detection and identification of multiple bacterial and yeast nucleic acids and select genetic determinants associated with antimicrobial resistance.    A negative BCID2 result does not exclude the possibility of bloodstream infection. Positive results do not rule out co-infection with organisms not included in the [employer] BCID2 Panel. Results are intended to aid in the diagnosis of illness and are meant to be used in conjunction with other clinical findings.  This test has been verified and performed by the [facility] at [facility]. This laboratory is certified under the Clinical Laboratory Improvement Amendments of 1988 (CLIA-88) as qualified to perform high complexity clinical laboratory testing.      Blood Culture Hand, Right [[id number]]  (Normal) Collected: 04/08/25 1623    Order Status: Completed Lab Status: Preliminary result Updated: 04/09/25 0632    Specimen: Blood from Hand, Right      Culture No growth after 12 hours    Urine Culture [[id number]]  (Abnormal)  (Susceptibility) Collected: 04/07/25 1042    Order Status: Completed Lab Status: Final result Updated: 04/08/25 2238    Specimen: Urine, Clean Catch      Culture >100,000 CFU/mL  Enterococcus faecalis    Susceptibility       Enterococcus faecalis (1)       Antibiotic Interpretation Sensitivity   Method Status    Ampicillin Susceptible <=2 ug/mL MIRANDA Final    Gentamicin Synergy  [*]  Susceptible Susceptible ug/mL MIRANDA Final     No high level gentamicin resistance found - therefore combination therapy with an aminoglycoside may be indicated for serious enterococcal infections such as bacteremia and endocarditis.       Streptomycin Synergy  [*]  Susceptible Susceptible ug/mL MIRANDA Final    Ciprofloxacin  [*]  Susceptible 1 ug/mL MIRANDA Final    Levofloxacin  [*]  Susceptible 2 ug/mL MIRANDA Final    Linezolid  [*]  Susceptible 2 ug/mL MIRANDA Final    Vancomycin Susceptible <=0.5 ug/mL MIRANDA Final    Daptomycin  [*]  Susceptible 0.5 ug/mL MIRANDA Final    Doxycycline  [*]  Resistant >=16 ug/mL MIRANDA Final    Tigecycline  [*]  Susceptible <=0.12 ug/mL MIRANDA Final    Nitrofurantoin Susceptible <=16 ug/mL MIRANDA Final               [*]  Suppressed Antibiotic                           Recent Labs   Lab Test 08/10/23  1650 09/13/23  1811 03/27/24  0908 05/29/24  1028 04/07/25  1042   URINEPH 5.5 5.5 6.0 6.0 5.5   NITRITE Negative Negative Negative Negative Negative   LEUKEST Negative Negative Negative Negative Small*   WBCU <1 0 <1 <1 66*                         Imaging: CT Abdomen Pelvis w/o Contrast    Result Date: 4/9/2025  EXAMINATION: CT ABDOMEN/PELVIS  W/O CONTRAST, 4/8/2025 10:39 PM TECHNIQUE:  Helical CT images from the lung bases through the pubic symphysis were obtained  without IV contrast. COMPARISON: Same date ultrasound, CT 6/6/2017 HISTORY:    new RUQ tenderness AMS, US inconclusive, c/f possible hematoma FINDINGS: Abdomen and pelvis: No acute abnormality of the liver, gallbladder, spleen, adrenal glands, pancreas, bowel, urinary bladder, or reproductive organs. Prostatomegaly. Persistent contrast nephrograms, otherwise unremarkable kidneys. No free air or fluid in the abdomen. Scattered atherosclerotic  calcifications of the abdominal arteries and branches. Lung bases:  Coronary calcifications, otherwise unremarkable Bones and soft tissues: No acute or suspicious osseous abnormalities. Chronic deformity of the L4 vertebral body. There is a subcutaneous soft tissue thickening of the lower abdomen, indeterminate and possibly secondary to chronic subcutaneous injections. Fat stranding along the groins from prior vascular access.     IMPRESSION: 1. No intraperitoneal hematoma. No findings to suggest etiology for patient's clinical presentation. 2. Fat stranding along the groins from prior vessel access, no distinct hematoma. 3. Persistent contrast nephrogram suggestive of poor renal function. 4. Additional incidental findings as in the body of the report.    US Abdomen Limited w Abdomen Doppler Limited    Result Date: 4/8/2025  EXAMINATION: US ABDOMEN LIMITED W ABDOMEN DOPPLER LIMITED 4/8/2025 8:44 PM COMPARISON: 6/6/2017 HISTORY: new fever and tachycardia, RUQ tenderness, ams c/f sepsis TECHNIQUE: The abdomen was scanned in standard fashion with specialized ultrasound transducer(s) using both gray-scale, color Doppler, and spectral flow techniques. Findings: Liver: The liver demonstrates normal homogeneous echotexture. No evidence of a focal hepatic mass. Homogenous echogenicity anterior to the right lobe of liver, (image 17-20) Extrahepatic portal vein flow is antegrade at 62 cm/s. Right portal vein flow is antegrade, measuring 20 cm/s. Left portal vein flow is antegrade, measuring 19 cm/s. Flow in the hepatic artery is towards the liver and: 110 cm/s peak systolic 0.70 resistive index. The splenic vein is patent and flow is towards the liver.  The left, middle, and right hepatic veins are patent with flow towards the IVC. The IVC is patent with flow towards the heart.   The visualized aorta is not dilated. Gallbladder: There is no wall thickening, pericholecystic fluid, positive sonographic Soto's sign or evidence  for cholelithiasis Bile Ducts: The common bile duct is not seen. Pancreas: Not visualized. Right kidney: Not seen Fluid: No evidence of ascites or pleural effusions.     Impression: Technically limited study with nonvisualization of the pancreas, right kidney common bile duct 1. Patent hepatic vasculature with Doppler evaluation. 2. Increased homogenous echogenicity along the anterior right lobe of liver, uncertain if it represents intraperitoneal fat versus less likely acute hematoma, recommend noncontrast CT abdomen for further evaluation. Recommendation for noncontrast CT discussed with ordering provider by hermelinda at 10:00 PM 4/8/2025 10:02 PM I have personally reviewed the examination and initial interpretation and I agree with the findings. CLAIRE SABA MD   SYSTEM ID:  [id number]    US Renal Complete w Arterial Duplex    Result Date: 4/8/2025  ULTRASOUND RENAL COMPLETE WITH DOPPLER 4/8/2025 8:52 AM CLINICAL HISTORY: Patient with rapidly reising scr with new hematuria and PAD hx. assessing to see if there is any stenosis.  COMPARISONS: CTA runoff 3/7/2025. ORDERING PROVIDER: ISAURA PERKINS TECHNIQUE: B-mode (grayscale) and duplex Doppler evaluation of the abdominal aorta and renal arteries performed. Velocity measurements obtained with angle correction at or less than 60 degrees.  FINDINGS: AORTA:      Suprarenal: obscured      Infrarenal: obscured RIGHT KIDNEY: Poorly visualized.      Renal artery: Obscured. Not visualized.      Renal artery - aortic ratio: N/A      Renal vein: not visualized.      Length: not visualized cm      Cortical thickness: not visualized cm      Segmental artery resistive index (superior / mid / inferior): not visualized / not visualized / 0.85      Parenchyma: not visualized LEFT KIDNEY: Poorly visualized.      Renal artery:           Origin: obscured           Mid: obscured           Hilum: 79/11 cm/s      Renal artery - aortic ratio: N/A      Renal vein: 24 cm/s      Length: 11.8 cm       Cortical thickness: 1.2 cm      Segmental artery resistive index (superior / mid / inferior): 0.87 / 0.84 / 0.87      Parenchyma: poorly visualized. Bladder: Distended.     IMPRESSION: 1. Non diagnostic study. Renal artery stenosis unable to be evaluated. 2. Elevated visualized segmental artery resistive indices suggest medical renal disease. 3. Non diagnostic visualization of bilateral kidneys. Guidelines: Diagnostic criteria suggestive of > 60% diameter stenosis Renal artery:      Peak systolic velocity > 180 cm/s      RAR > 3.5      Turbulent flow Arcuate/Segmental artery Resistive Index Normal < 0.7 VIVIAN HAMMONDS MD   SYSTEM ID:  [id number]    US Lower Extremity Arterial Duplex Left    Result Date: 4/7/2025  ULTRASOUND LOWER EXTREMITY ARTERIAL DUPLEX LEFT 4/7/2025 11:33 AM CLINICAL HISTORY: Left Groin PSA, s/p ALI, assess Lower Ext vessels. Angiography and embolectomy 4/4/2025. Left common femoral pseudoaneurysm thrombin injection 4/5/2025. COMPARISONS: Angiography and intervention 4/4/2025. Ultrasound post vascular access lower extremity duplex 4/7/2025. REFERRING PROVIDER: BEVERLY LOPEZ TECHNIQUE: Left leg arteries evaluated with color Doppler and spectral pulsed wave Doppler ultrasound. FINDINGS: LEFT: Common femoral artery: 111/0 cm/s, multiphasic Profundus femoral artery: 74/0 cm/s, multiphasic Superficial femoral artery, origin: 82/0 cm/s, multiphasic Superficial femoral artery, mid: 92/0 cm/s, multiphasic Superficial femoral artery, distal: 98/0 cm/s, multiphasic Popliteal artery: 60/0 cm/s, multiphasic Posterior tibial artery, ankle: 27/10 cm/s, tardus parvus (previously 8/4 cm/s) Anterior tibial artery, ankle: 51/10 cm/s, monophasic (previously 17/8 cm/s)     IMPRESSION: 1. Left posterior tibial artery disease. 2. Improved Dopplerable flow in the left posterior and anterior tibial arteries at the ankle. VIVIAN HAMMONDS MD   SYSTEM ID:  [id number]    IR Pseudoaneurysm Injection    Result Date:  4/7/2025  PROCEDURE: Left groin pseudoaneurysm thrombin injection Procedural Personnel Attending physician(s): Kenrick Ramon Fellow physician(s): Magalie Dugan Resident physician(s): None Advanced practice provider(s): None Procedure Date (mm/dd/yyyy): 4/7/2025 Pre-procedure diagnosis: Pseudoaneurysm Post-procedure diagnosis: Same Indication: Additional clinical history: None Complications: No immediate complications.     IMPRESSION: 1. Ultrasound-guided compression was performed prior to thrombin injection for 30 minutes. Pseudoaneurysm did not close after compression 2. Pre-procedural ultrasound demonstrates patent pseudoaneurysm with narrow neck appropriate for thrombin injection. 3. 600U thrombin injected into the pseudoaneurysm 4. Post-thrombin injection ultrasound demonstrates no flow within the pseudoaneurysm with little flow in the neck. Plan: -Bedrest with left leg straight (END 1000AM 4/6) -Very low intensity heparin ggt start at 2200 4/5 -ASA 81 mg daily start tomorrow AM -Arterial ultrasound of the left lower extremity tomorrow AM _______________________________________________________________ PROCEDURE SUMMARY: 1.Ultrasound guided thrombin injection PROCEDURE DETAILS: Pre-procedure Consent: Informed consent for the procedure including risks, benefits and alternatives was obtained and time-out was performed prior to the procedure. Preparation: The site was prepared and draped using maximal sterile barrier technique including cutaneous antisepsis. Anesthesia/sedation Level of anesthesia/sedation: No sedation Anesthesia/sedation administered by: Not applicable Total intra-service sedation time (minutes): 0 Procedure The pseudoaneurysm and neck were visualized with ultrasound. Representative images were saved. 1% lidocaine was administered for local anesthesia. A 22-g needle was advanced into the pseudoaneurysm adjacent to the neck. A total of 600U thrombin was injected around into the pseudoaneurysm.  Post-injection, there was patent neck without flow within the pseudoaneurysm. Needle was removed, and sterile dressing placed. No immediate complications. Additional Details Additional description of procedure: None Acute procedural success: Yes Registry event: V/3/g Device used: None Equipment details: None Unique Device Identifiers: Not available Specimens removed: None Estimated blood loss (mL): Less than 10 Attestation Signer name: BEVERLY LOPEZ MD I, DR BEVERLY LOPEZ, attest that I was present in the room for the entire procedure. I reviewed the stored images and agree with the report as written. I have personally reviewed the examination and initial interpretation and I agree with the findings. BEVERLY LOPEZ MD   SYSTEM ID:  [id number]    Us Post Vascular Access Low Ext Duplex    Result Date: 4/7/2025  Exam: US POST VASCULAR ACCESS LOW EXT DUPLEX, 4/7/2025 11:43 AM Indication: follow up PSA Comparison: 4/5/2025 Technique: Grayscale, color Doppler, and spectral images of the left common femoral artery and vein at the site of pseudoaneurysm. Findings: 3.6 x 2.4 x 1.6 cm pseudoaneurysm without flow on color or power Doppler. Normal triphasic waveform in the left common femoral artery above the pseudoaneurysm. Normal multiphasic wave form in the left superficial femoral artery below the pseudoaneurysm. Patent superficial femoral and common femoral veins above and below the pseudoaneurysm with normal waveforms.     Impression: Thrombosed left common femoral pseudoaneurysm. Patent common femoral and superficial femoral artery and vein in the left lower extremity. I have personally reviewed the examination and initial interpretation and I agree with the findings. BEVERLY LOPEZ MD   SYSTEM ID:  [id number]    IR Lower Extremity Angiogram Left    Result Date: 4/7/2025  PROCEDURE: Lower extremity angiography and interventions Procedural Personnel Attending physician(s): BEVERLY LOPEZ MD Fellow physician(s):  Ye Wright MD Resident physician(s): Jo Ann MD Advanced practice provider(s): None Procedure Date (mm/dd/yyyy): 4/2/2025 Pre-procedure diagnosis: Atherosclerosis of native artery of both lower extremities with intermittent claudication Post-procedure diagnosis: Same Procedure urgency: Elective Indication: Rest pain Additional clinical history: Resting pain in left lower extremity. Complications: Radial artery vessel spasm.     IMPRESSION: Right: Mild multifocal stenoses of the superficial femoral artery and popliteal artery. The anterior tibial artery is occluded proximally. Multifocal moderate to severe stenoses of the TP trunk and both peroneal and posterior tibial arteries. Dorsalis pedis artery is occluded. Left: Multifocal moderate stenosis of the distal superficial femoral artery/popliteal artery, which was treated with 5 x 150 mm drug-coated balloon. Multifocal moderate to severe stenoses of the TP trunk and proximal posterior tibial artery was treated with 2mm and 3mm balloon sequentially. Proximal anterior tibial artery is occluded with reconstituted flow at the level of ankle. Dorsalis pedis artery is patent. Multifocal moderate-to-severe stenoses of the proximal peroneal artery. Plan: 1. Bed rest for 1 hour(s). _______________________________________________________________ PROCEDURE SUMMARY: - Arterial access with ultrasound guidance - Bilateral lower extremity angiography as described below - Arterial interventions as described below - Additional procedure(s): None PROCEDURE DETAILS: Pre-procedure Consent: Informed consent for the procedure including risks, benefits and alternatives was obtained and time-out was performed prior to the procedure. Preparation: The site was prepared and draped using maximal sterile barrier technique including cutaneous antisepsis. Anesthesia/sedation Level of anesthesia/sedation: Moderate sedation (conscious sedation) Anesthesia/sedation administered by:  Independent trained observer under attending supervision with continuous monitoring of the patient?s level of consciousness and physiologic status Total intra-service sedation time (minutes): 210 Access Local anesthesia was administered. Vascular access was obtained and a 6F sheath was placed. Access guidance: The vessel was sonographically evaluated and judged to be patent. Real time ultrasound was used to visualize needle entry into  the vessel and a permanent image was stored Largest sheath size (Tajik):  6 Laterality: Left Access direction: Retrograde Vessel accessed: Radial artery Access technique: Micropuncture set with 21 gauge needle Aortography Indication for aortography: NA Vessel catheterized: NA Findings: NA Pelvic angiogram was performed with 5Fr MG1 catheter demonstrated no significant stenosis of bilateral iliac arteries. Left lower extremity angiography and interventions The left lower extremity arterial system was catheterized using a 200cm angled R2P NaviCross Support catheter and 0.035 GlideAdvantage wire. Indication for angiography: Diagnostic angiography - There was no prior catheter-based angiographic study available and a full diagnostic study was performed. The decision to intervene was based on the diagnostic study. Vessel catheterized: Left common femoral artery Findings: Multifocal moderate stenoses of the distal superficial femoral/popliteal artery. Multifocal moderate to severe stenosis of the proximal posterior tibial artery. Multifocal severe stenoses of the proximal peroneal artery. Complete occlusion of the proximal anterior tibial artery. Reconstituted flow in the distal anterior tibial artery. Dorsalis pedis artery is patent. ~Lesion ~Lesion number: 1 ~Artery segment of target lesion: Posterior tibial artery ~Lesion length (cm): Multifocal ~Percent stenosis (%): 90 ~Technical details: Stenosis was successfully crossed with a 0.035 glide advantage wire with NaviCross catheter.  ~Treatment aborted or incomplete: No ~~Angioplasty ~~Angioplasty balloon type: Angioplasty balloon ~~Angioplasty balloon: [place] ~~Balloon length (mm): 150 ~~Balloon diameter (mm): 2 ~Lesion ~Lesion number: 2 ~Artery segment of target lesion: Tibioperoneal trunk ~Lesion length (cm): Multifocal ~Percent stenosis (%): 90 ~Technical details: Stenosis was successfully crossed with a 0.035 glide advantage wire with NaviCross catheter. ~Treatment aborted or incomplete: No ~~Angioplasty ~~Angioplasty balloon type: Angioplasty balloon ~~Angioplasty balloon: Crosstella ~~Balloon length (mm): 150 ~~Balloon diameter (mm): 2 ~~Angioplasty balloon type: Angioplasty balloon ~~Angioplasty balloon: Crosstella ~~Balloon length (mm): 150 ~~Balloon diameter (mm): 3 ~Lesion ~Lesion number: 3 ~Artery segment of target lesion: Superficial femoral artery ~Lesion length (cm): Multifocal ~Percent stenosis (%): 70 ~Technical details: Stenosis was successfully crossed with a 0.035 glide advantage wire with NaviCross catheter. ~Treatment aborted or incomplete: No ~~Angioplasty ~~Angioplasty balloon type: Drug-coated balloon ~~Angioplasty balloon: INPACT ~~Balloon length (mm): 150 ~~Balloon diameter (mm): 5 Short non-flow-limiting dissection in the distal superficial femoral artery after angioplasty. ~Angiography ~Post-intervention angiography: Satisfactory luminal gain in the TP trunk and proximal posterior tibial artery. Significantly improved flow in the posterior tibial artery. Right lower extremity angiography and interventions The right lower extremity arterial system was catheterized using a 200cm angled R2P NaviCross Support catheter and 0.035 GlideAdvantage wire. Indication for angiography: Diagnostic angiography - There was no prior catheter-based angiographic study available and a full diagnostic study was performed. The decision to intervene was based on the diagnostic study. Vessel catheterized: Right common femoral artery  Findings: Mild multifocal stenoses of the superficial femoral artery and popliteal artery. The anterior tibial artery is occluded proximally. Multifocal moderate to severe stenoses of the TP trunk and both peroneal and posterior tibial arteries. Dorsalis pedis artery is occluded. No intervention performed. Intravascular ultrasound Vessel imaged: None Indication for IVUS: Not applicable Findings: Not applicable Intraprocedural Anticoagulation Anticoagulation agent: Heparin Dose: 9500 Dosing method: Fixed Anticoagulation monitoring: ACT Closure Procedure was complicated by radial artery vasospasm while removing the 6 [place] sheath. We were unable to remove the sheath after multiple intra-arterial nitroglycerin (400mcg) and verapamil (2.5mg) treatment. Eventually, ultrasound was performed and identified a long segmental spasm. Topical nitroglycerin paste was applied over the spasmed radial artery and then covered with occlusive dressing for 25 minutes. Additional 200 mcg nitroglycerin was given through the sheath. A blood pressure cuff was inflated to 40mmHg above the systolic pressure for 5 minutes and was then deflated. The 6 Czech sheath was then removed successfully with gentle traction. Access site angiography performed: No Findings: NA Arterial closure technique: Radial compression device Hemostasis achieved from closure technique: Yes Duration of manual compression (minutes): 1 Contrast Contrast agent: Visipaque 320 Contrast volume (mL): 65 Radiation Dose Fluoroscopy time (minutes): 27.9 Reference air kerma (mGy): 133 Kerma area product (uGy-m2): 3030.0 Additional Details Additional description of procedure: None Acute procedural success: Yes Registry event: V/3/f Device used: None Equipment details: None Unique Device Identifiers: Not available Specimens removed: None Estimated blood loss (mL): 11-50 Standardized report: [email or web address]ventions_v3.1 Attestation Signer name: BEVERLY LOPEZ MD I,  DR BEVERLY LOPEZ, attest that I was present in the room for the entire procedure. I have personally reviewed the examination and initial interpretation and I agree with the findings. BEVERLY LOPEZ MD   SYSTEM ID:  [id number]    IR Lower Extremity Stent/Atherectomy/PTA    Result Date: 4/7/2025  PROCEDURE: Lower extremity angiography and interventions Procedural Personnel Attending physician(s): BEVERLY LOPEZ MD Fellow physician(s): Ye Wright MD Resident physician(s): None Advanced practice provider(s): None Procedure Date (mm/dd/yyyy): 4/5/2025 Pre-procedure diagnosis:  Acute occlusion of left popliteal artery Post-procedure diagnosis: Same Procedure urgency: Urgent Indication: Thrombosis Additional clinical history: POD#2 of BLE angiogram and LLE angioplasty. Was found to have acute occlusion of left popliteal artery. Complications: Left groin hematoma.     IMPRESSION: Angiography of the left lower extremity demonstrated filling defects in the distal popliteal artery, most likely acute thrombus. Aspiration thrombectomy was performed. Improved flow in the popliteal artery and TP trunk. Plan: 1. Bed rest for 6 hour(s). 2. Admit patient for observation. _______________________________________________________________ PROCEDURE SUMMARY: - Arterial access with ultrasound guidance - Unilateral lower extremity angiography as described below - Arterial interventions as described below - Additional procedure(s): None PROCEDURE DETAILS: Pre-procedure Consent: Informed consent for the procedure including risks, benefits and alternatives was obtained and time-out was performed prior to the procedure. Preparation: The site was prepared and draped using maximal sterile barrier technique including cutaneous antisepsis. Anesthesia/sedation Level of anesthesia/sedation: No sedation Anesthesia/sedation administered by: Independent trained observer under attending supervision with continuous monitoring of the patient?s level  of consciousness and physiologic status Total intra-service sedation time (minutes): 0 Access #1 Local anesthesia was administered. Vascular access was obtained and a 3 Gabonese sheath was placed. Access guidance: The vessel was sonographically evaluated and judged to be patent. Real time ultrasound was used to visualize needle entry into  the vessel and a permanent image was stored Largest sheath size (Gabonese):  3 Laterality: Right Access direction: Retrograde Vessel accessed: Femoral artery Access technique: Micropuncture set with 21 gauge needle This access was abandoned and due to excessive overlying soft tissue. Pressure was held for 15 minutes after removal of the 3 Gabonese sheath. Hemostasis was achieved. Sterile dressing applied. Access #2 Local anesthesia was administered. Vascular access was obtained and a 7 Gabonese sheath was placed. Access guidance: The vessel was sonographically evaluated and judged to be patent. Real time ultrasound was used to visualize needle entry into  the vessel and a permanent image was stored Largest sheath size (Gabonese):  7 Laterality: Left Access direction: Antegrade Vessel accessed: Superficial femoral artery Access technique: Micropuncture set with 21 gauge needle Left lower extremity angiography and interventions The left lower extremity arterial system was catheterized using a combination of MICHELLE 1 and glide advantage wire. Vessel catheterized: Left superficial femoral artery Findings: Similar appearance of flow flow-limiting dissection of the distal SFA/proximal popliteal artery. Complete occlusion distal popliteal artery. ~Lesion ~Lesion number: 1 ~Artery segment of target lesion: Popliteal artery below the knee (center of the knee joint to origin of the anterior tibial) ~Lesion length (cm): NA ~Percent stenosis (%): 100 ~Technical details: Filling defects in the popliteal artery, likely acute thrombus ~Treatment aborted or incomplete: No ~~Angioplasty ~~Angioplasty balloon  type: Angioplasty balloon ~~Angioplasty balloon: [place] ~~Balloon length (mm): 100 ~~Balloon diameter (mm): 3 ~~Thrombectomy ~~Thrombotic intervention: Aspiration thrombectomy ~~Thrombotic intervention device: PenumbraJames 6 ~~Distal protection device: No ~~Intraprocedural Thrombolytic agent: tPA ~~Intraprocedural Thrombolytic dose: 2 mg ~~Fibrinogen monitoring: No Attempt to recanalize TP trunk, posterior tibial artery and anterior tibial artery using a 0.018 NaviCross catheter and [place] advantage wire was not successful and complicated by dissections. ~Angiography ~Post-intervention angiography: Improved flow in the popliteal artery. Persistent occlusion of the TP trunk. The foot was supplied by posterior tibial artery and reconstituted anterior tibial artery at the end of the procedure. Intravascular ultrasound Vessel imaged: None Indication for IVUS: Not applicable Findings: Not applicable Intraprocedural Anticoagulation Anticoagulation agent: Heparin Dose: 41054 Dosing method: Fixed Anticoagulation monitoring: ACT Closure Access site angiography performed: No Findings: NA Arterial closure technique: Manual compression Hemostasis achieved from closure technique: Yes Duration of manual compression (minutes): 35 Contrast Contrast agent: Visipaque 320 Contrast volume (mL): 100 Radiation Dose Fluoroscopy time (minutes): 39.2 Reference air kerma (mGy): 566 Kerma area product (uGy-m2): 39352 Additional Details Additional description of procedure: None Acute procedural success: Yes Registry event: A/0/e Device used: None Equipment details: None Unique Device Identifiers: Not available Specimens removed: None Estimated blood loss (mL): 101-200 Standardized report: [email or web address]ventions_v3.1 Attestation Signer name: BEVERLY LOPEZ MD I, DR BEVERLY LOPEZ, attest that I was present in the room for the entire procedure. I reviewed the stored images and agree with the report as written. I have  personally reviewed the examination and initial interpretation and I agree with the findings. BEVERLY LOPEZ MD   SYSTEM ID:  [id number]    US Lower Extremity Arterial Duplex Left    Result Date: 4/6/2025  Exam: US LOWER EXTREMITY ARTERIAL DUPLEX LEFT, 4/6/2025 11:22 AM Indication: post LLE [place] 4/4, L CFA PSA s/p thrombin 4/5. Please obtain PSA and also through PTA/DAJA/DPA Comparison: Ultrasound 4/4/2025. CT 3/7/2025 Findings: Grayscale, color Doppler and spectral Doppler assessment of the left lower extremity arteries. Redemonstration of pseudoaneurysm arising from the left common femoral artery measuring 1.4 x 0.9 x 1.2 cm, previously 1.5 x 1.0 x 1.5 cm. No associated flow is demonstrated internally. The neck of the pseudoaneurysm contains flow and measures up to 0.9 cm in length. The left lower extremity arteries are patent throughout with multiphasic waveforms to the level of the popliteal artery. Redemonstration of monophasic waveforms in the posterior tibial, anterior tibial and dorsalis pedis arteries with blunted systolic upstroke. Incidentally noted is a 3.0 x 1.6 x 4.9 cm hypoechoic and avascular collection in the left pelvic region.     Impression: 1. Slightly decreased size of the thrombosed pseudoaneurysm arising from the left common femoral artery measuring up to 1.4 cm, previously 1.5 cm. 2. Incidentally noted is a 3.0 x 1.6 x 4.9 cm hypoechoic and avascular collection in the superficial soft tissues of the left inguinal region. Findings could represent a hematoma/seroma. This was not definitively identified on 4/5/2025 ultrasound. I have personally reviewed the examination and initial interpretation and I agree with the findings. PIERCE RUIZ MD   SYSTEM ID:  [id number]    US Lower Extremity Arterial Duplex Bilateral    Result Date: 4/5/2025  Exam: Duplex ultrasound of bilateral lower extremity arteries dated 4/5/2025 9:00 AM Clinical information: post LLE angiogram on 4/4. Please obtain  through both feet. Also c/f L groin hematoma, had R groin access Comparison: 4/4/2025 Technique: Grayscale (B-mode), color Doppler, and duplex spectral Doppler ultrasound of the lower extremity arteries. Velocity measurements obtained with angle correction of 60 degrees or less. Ordering provider: Dr. Crouch Findings: Right lower extremity: Common femoral artery: Velocity: 94 cm/sec. Waveforms: Triphasic Profunda femoral artery: Velocity: 87 cm/sec. Waveforms: Biphasic Proximal SFA: Velocity: 96 cm/sec. Waveforms: Biphasic Mid SFA:Velocity:  72 cm/sec. Waveforms: Triphasic Distal SFA: Velocity: 82 cm/sec. Waveforms: Triphasic Popliteal artery: Velocity: 61 cm/sec. Waveforms: Biphasic PTA ankle: Velocity: 37 cm/sec. Waveforms: Triphasic DAJA ankle: Velocity: 30 cm/sec. Waveforms: Triphasic DPA: 8 cm/s. Waveforms: Monophasic. Left lower extremity: Left groin bruise: 1.5 x 1.0 x 1.5 cm with to and fro flow originating from the left CFA. Neck measures 9 mm. Common femoral artery: Velocity: 84 cm/sec. Waveforms: Triphasic Deep femoral artery: Velocity: 42 cm/sec. Waveforms: Biphasic Proximal SFA: Velocity: 143 cm/sec. Waveforms: Triphasic Mid SFA: Velocity: 89 cm/sec. Waveforms: Triphasic Distal SFA: Velocity: 58 cm/sec. Waveforms: Triphasic Popliteal artery proximal: Velocity: 78 cm/sec. Waveforms: Triphasic Popliteal artery distal: 83 cm/s. Biphasic waveforms. PTA ankle: Velocity: 8 cm/sec. Waveforms: Monophasic DAJA ankle: Velocity: 17 cm/sec. Waveforms: Monophasic     Impression: 1. Right leg: Patent arterial vasculature of the right leg without hemodynamically significant stenosis. 2. Left leg: Patent arterial vasculature of the left leg without hemodynamically significant stenosis. 3. 1.5 cm pseudoaneurysm arising from the left CFA. Guidelines: [facility] duplex criteria for lower limb arterial occlusive disease Percent stenosis: Normal (1-19%): Peak systolic velocity (cm/s): <150, End-diastolic  velocity (cm/s): <40, Velocity ratio (Vr): <1.5, Distal arterial waveform: Triphasic 20-49%: Peak systolic velocity (cm/s): 150-200, End-diastolic velocity (cm/s): <40, Velocity ratio (Vr): 1.5-2.0, Distal arterial waveform: Triphasic 50-75%: Peak systolic velocity (cm/s): 200-300, End-diastolic velocity (cm/s): <90, Velocity ratio (Vr): 2.0-3.9, Distal arterial waveform: Poststenotic turbulence distal to stenosis, monophasic distal waveform >75%: Peak systolic velocity (cm/s): >300, End-diastolic velocity (cm/s): <90, Velocity ratio (Vr): >4.0, Distal arterial waveform: Dampened distal waveform and low PSV/EDV* in the stenosis Occlusion: Absent flow by color Doppler/pulsed Doppler spectral analysis; length of occlusion estimated from distance between exit and reentry collateral arteries *PSV = peak systolic velocity, EDV = end-diastolic velocity http://link.hammer.com/chapter/10.1007/[email or web address]_23/fulltext html I have personally reviewed the examination and initial interpretation and I agree with the findings. PIERCE RUIZ MD   SYSTEM ID:  [id number]    US Lower Extremity Arterial Duplex Left    Result Date: 4/4/2025  Examinations 4/4/2025 4:22 PM: 1. GARCIA, TBI, PVR, and 1st digit PPG 2. Ultrasound lower extremity arterial duplex left CLINICAL HISTORY: post left lower extremity angiogram;ongoing pain; please assess; Atherosclerosis of native artery of both lower extremities with intermittent claudication; DVT (deep venous thrombosis) (H). COMPARISONS: Angiography and intervention 4/2/2025. CTA 3/7/2025 REFERRING PROVIDER: BEVERLY LOPEZ TECHNIQUE: Bilateral GARCIA, TBI, PVR, and 1st digit PPG obtained. Left leg arteries evaluated with grayscale, color Doppler, and spectral pulsed wave Doppler ultrasound. FINDINGS: RIGHT:      Brachial: 146 mmHg      Ankle (PT): >240 mmHg - non compressible      Ankle (DP): >240 mmHg - non compressible      1st Digit: 88 mmHg      GARCIA: non compressible      TBI: 0.60       PVR:           High thigh: Normal           Low thigh: Normal           Calf: Normal           Ankle: Normal      1st Digit PPG: Normal LEFT:      Brachial: 144 mmHg      Ankle (PT): >240 mmHg - non compressible      Ankle (DP): >240 mmHg - non compressible      1st Digit: 57 mmHg      GARCIA: non compressible      TBI: 0.39      PVR:           High thigh: Normal           Low thigh: Normal           Calf: Normal           Ankle: Diminished      1st Digit PPG: Diminished External iliac artery: 78/0 cm/s, multiphasic Common femoral artery: 71/0 cm/s, multiphasic Profundus femoral artery: 78/0 cm/s, multiphasic  Superficial femoral artery, proximal: 83/0 cm/s, multiphasic Superficial femoral artery, mid: 53/0 cm/s, multiphasic Superficial femoral artery, distal: 70/0 cm/s, multiphasic Popliteal artery, proximal: 52/0 cm/s, multiphasic Branch from proximal popliteal artery: 77/0 cm/s, multiphasic Popliteal artery, mid: nearly occlusive filling defect. 29/0 cm/s, multiphasic Popliteal artery, distal: occlusive filling defect. 0 cm/s Posterior tibial artery, ankle: 16/6 cm/s, monophasic Anterior tibial artery, ankle, proximal to collateral: 11/0 cm/s, multiphasic, RETROGRADE Dorsalis pedis artery: 24/7 cm/s, monophasic, antegrade     IMPRESSION: 1. RIGHT:      A. Resting GARCIA is non compressible.      B. Resting TBI is ABNORMAL, 0.60. 2. LEFT:      A. Resting GARCIA is non compressible.      B. Resting TBI is ABNORMAL, 0.39      C. Distal popliteal artery occlusive filling defect, likely thrombus.      D. Nearly occlusive filling defect in the mid popliteal artery, likely thrombus.      E. Dopplerable flow in anterior and posterior tibial arteries at the ankle.      F. Retrograde flow in the anterior tibial artery. Known proximal occlusion. ------------- GARCIA Interpretation:    Normal: 1.00 - 1.40    Borderline: 0.91 - 0.99    Abnormal: ? 0.90    Noncompressible: > 1.40 TBI Interpretation:    Normal: > 0.70    Abnormal: ? 0.70  Kayleigh HL, Jeramy HD, Viktoria PP, Mc S, et al.; Peer Review Committee Members. 2024 ACC/AHA/AACVPR/APMA/ABC/SCAI/SVM/SVN/SVS/SIR/VESS Guideline for the Management of Lower Extremity Peripheral Artery Disease: A Report of the American College of Cardiology/American Heart Association Joint Committee on Clinical Practice Guidelines. Circulation. 2024 Jun 11;149(24):r0155-v3888. doi: 10.1161/CIR.4321733757924563. Epub 2024 May 14. PMID: [id number]. VIVIAN HAMMONDS MD   SYSTEM ID:  [id number]

## 2025-04-09 NOTE — PHARMACY-VANCOMYCIN DOSING SERVICE
"Pharmacy Vancomycin Initial Note  Date of Service 2025  Patient's  1950  75 year old, male    Indication: Bacteremia    Current estimated CrCl = Estimated Creatinine Clearance: 23.6 mL/min (A) (based on SCr of 2.8 mg/dL (H)).    Creatinine for last 3 days  2025:  4:36 AM Creatinine 3.51 mg/dL  2025:  5:07 AM Creatinine 3.00 mg/dL  2025:  5:43 AM Creatinine 2.80 mg/dL    Recent Vancomycin Level(s) for last 3 days  No results found for requested labs within last 3 days.      Vancomycin IV Administrations (past 72 hours)        No vancomycin orders with administrations in past 72 hours.                    Nephrotoxins and other renal medications (From now, onward)      Start     Dose/Rate Route Frequency Ordered Stop    04/10/25 0800  vancomycin (VANCOCIN) 750 mg in sodium chloride 0.9 % 282.5 mL intermittent infusion         750 mg  over 90 Minutes Intravenous EVERY 24 HOURS 25 0814      25 0830  vancomycin (VANCOCIN) 1,500 mg in 0.9% NaCl 250 mL intermittent infusion         1,500 mg  over 90 Minutes Intravenous ONCE 25 0813      25 1900  piperacillin-tazobactam (ZOSYN) 3.375 g vial to attach to  mL bag        Note to Pharmacy: For [facility], [facility] and [facility]: For Zosyn-naive patients, use the \"Zosyn initial dose + extended infusion\" order panel.    3.375 g  over 30 Minutes Intravenous EVERY 6 HOURS 25 1835              Contrast Orders - past 72 hours (72h ago, onward)      None            [employer] Prediction of Planned Initial Vancomycin Regimen    Loading dose: 1500 mg at 09:00 2025.  Regimen: 750 mg IV every 24 hours.  Start time: 08:00 on 04/10/2025  Exposure target: AUC24 (range)400-600 mg/L.hr   AUC24,ss: 539 mg/L.hr  Probability of AUC24 > 400: 81 %  Ctrough,ss: 18.9 mg/L  Probability of Ctrough,ss > 20: 45 %  Probability of nephrotoxicity (Lodise CRISTHIAN ): 15 %          Plan:  Start vancomycin  750 mg IV q24h tomorrow morning AFTER load of 1500mg " IV vanco now   Vancomycin monitoring method: AUC  Vancomycin therapeutic monitoring goal: 400-600 mg*h/L  Pharmacy will check vancomycin levels as appropriate in 1-3 Days.    Serum creatinine levels will be ordered daily for the first week of therapy and at least twice weekly for subsequent weeks.      Carter Craig PharmD, [facility]    [phone]  [place]

## 2025-04-09 NOTE — PLAN OF CARE
Shift Hours: 0700 - 1900    Assessment:  Body systems assessments were at patient's baseline.        Activity     Fall Risk Score: 20   Bed alarm on? No     Activity Assistance Provided: assistance, 2 people      Assistive Device Utilized: walker    Pain: Pt reports mild pain in the LLE treated with tylenol and heat.     Labs/RN Managed Protocols: No protocols.     Lines/Drains: PIV x 2    Nutrition: Kosher diet well tolerated.     Goal Outcome Evaluation  Plan of Care Reviewed With: patient  Overall Patient Progress: no change  Outcome Evaluation: VS stable throughout the shift.  Pt continues on IV abx, switched to Unasyn this shift.  PIVs WDL, saline locked and TKO.  Ambulating with SBA.  Kosher diet well tolerated.  Continue to monitor labs for derangement    Barriers to Discharge:   Blood cultures pending.  Treatment of Bacteremia.

## 2025-04-09 NOTE — PROGRESS NOTES
[facility]    Progress Note - Medicine Service, MAROON TEAM 2       Date of Admission:  4/4/2025    Assessment & Plan   Earle Rene is a 75 year old male with a PMH of Type 2 diabetes, hypertension, peripheral artery disease, CKD3, and diabetic neuropathy who Is admitted as advised by vascular surgery 2/2 DVT, now s/p LE angiogram. Course complicated by pseudoaneurysm, now closed, and weakness.      Today:   - Following arterial ultrasounds   - No e/o worsening hematoma on abdominal imaging overnight, no e/o pyelonephritis or other intraabdominal etiology  - Treating for E. Faecalis UTI, first set of BCx positive x2, second set pending    > Ampicillin 2q q8h per antimicrobial stewardship, narrowed from Zosyn (4/8-9)  - UA with large blood, Cx with >100k enterococcus faecalis - will plan for penicillin based abx   > BCx x2 pndg given chills, tachycardia, fevering   - Appreciate IR recommendations   > Plavix 75mg + Apixaban 2.5mg BID starting today  - Increased bowel reg  - Can be off bedrest, pseudoaneurysm is now closed  - WOC to follow groin site and cares   - Physical therapy ordered, recommendations evolving        #Peripheral artery disease  Pt with h/o HTN and T2DM with long standing bilateral LE pain, follows with IR outpatient and found to have aramis class 4 chronic limb ischemia. Angiogram on 02/04 showing R multifocal stenosis at various levels with occluded dorsalis pedis, not requiring intervention. Left LE angiogram with multifocal lesions requiring 3 x  drug-coated balloons  (TP trunk, proximal posterior tibial artery and distal superficial femoral artery/popliteal artery) with patent dorsalis pedis. Pt presented with ongoing significant pain, POD#2 of BLE angiogram and was found to have a thrombus in distal popliteal artery and hence advised to come to the ED. Vitally stable. IR performed left lower extremity angiogram with angioplasty and  thrombectomy on 4/4/2025. Procedure was complicated by extravasation of the DAJA, and L groin hematoma. Neurovascularly intact. Dorsalis pedis pulses are dopplerable.  Repeat ultrasound on 4/6 was notable for incidentally found right pelvis hematoma measuring 3 x 1.6 x 4.9 cm.  Pseudoaneurysm closed on 4/7.   - IR following:   -Pseudoaneurysm now closed  -Stop heparin, start aspirin and Plavix (loaded on 4/7)  -Can stop bedrest  -WOC consulted to help manage  -ASA 81mg-restart  -Femostop placed temporarily  -Pain management:  -Scheduled tylenol 975mg q6h  -Robaxin 1000mg TID PRN  -Oxycodone 5mg Q4H PRN  -Dilaudid 0.5mg Q2H PRN   -Bilateral LE arterial US  on 4/6 noting   -Per IR recs Full strength Xarelto once hemodynamically stable              -10 mg BID for 21 days             -Followed by 20 mg daily for 6 months  -Continue atorvastatin 20mg  -Physical therapy consulted  -WOC consulted     # Urinary trace infection c/b male sex + chills, febrile, with CHARLINE   # Gram positive bacteremia  UA with large blood but o/w fairly unremarkable. However, cx with >100k Enterococcous and with systemic symptoms including chills, repeat fevers, elevated HRs in 90s, and CHARLINE, initiated tx with IV CTX 2g with BCx x2 drawn prior to abx initiation. Lactate normal. BCx x2 positive for GPCs x2, repeats pending   Antimicrobial stewardship recommended narrowing to Ampicillin 2g q8h, done today. Pt symptomatically improved, CTM.  -- BCx x2 +GPCs; repeat BCx pending   Abx   Zosyn  4/8 - 4/9   Ampicillin 4/9 - **      # Acute on chronic kidney injury  # CKD 3  Patient had a admission serum creatinine of 1.8.  Serum creatinine is continued to increase this admission to 3.51.  Notably patient has had multiple angiograms and procedures done with IR.  Patient has many risk factors for CKD, including T2DM, HTN, and PAD.  Patient is also had dark urine, concerning for ATN in the setting of contrast.   Ultimately, work up positive for UTI as likely  etiology +/- contrast. Improving iso abx for UTI as above.      # Acute anemia  Possibly blood loss related in the setting of multiple procedures with IR.  Patient was admitted with a hemoglobin of 14.5.  Decreased to a nia of 10.  Further dropped to 8.9 on 4/7.  Imaging with ultrasound instantly finding fluid collection in his right pelvis suspicious for hematoma measuring 3 x 1.6 x 4.9.  Remains vitally stable.  Will continue to trend CBC daily.  If vitally unstable stat check CBC.     # Type 2 diabetes mellitus  # Mild nonproliferative retinopathy and macular edema   Pt with long standing T2DM. With last HbA1c of 8% (2/11/25).  -Continue 50U degludec  -Hold terzepatide and jiardiance while inpatient  -HDSSI     #Diabetic neuropathy  Pt with bilateral LE pain 2/2 diabetic neuropathy and PAD. Uses diabetic shoes. Has seen podiatry in the past.  -Continue PTA gabapentin 300mg TID     #Thrombocytopenia   Pt with platelet count of 133 in the setting of DVT, now s/p thrombectomy.  -CTM     #Hypertension  BP well controlled during admission.  -Continue PTA losartan 100mg  -Continue PTA amlodipine 5mg     #Hyponatremia  -Continue PTA sodium bicarb 1300mg BID     #BPH  #Low risk prostate cancer  Follows with urology outpatient. Retention on 4/6, requiring straight cath.   -Continue PTA tamsulosin 0.8mg  -Continue PTA finasteride 5mg     #Urinary urgency  Follows with urology outpatient.  -Continue PTA mirabegron 25mg     #GERD  -Continue PTA famotidine     Diet: Snacks/Supplements Adult: Ensure Enlive; With Meals  Room Service  Kosher Diet    DVT Prophylaxis: Plavix + ASA  Chamberlain Catheter: Not present  Fluids: None  Lines: None     Cardiac Monitoring: None  Code Status: Full Code             Social Drivers of Health   Transportation Needs: Low Risk  (4/5/2025)    Transportation Needs     Within the past 12 months, has lack of transportation kept you from medical appointments, getting your medicines, non-medical meetings  or appointments, work, or from getting things that you need?: No   Recent Concern: Transportation Needs - Unmet Transportation Needs (1/30/2025)    [facility]    Transportation Needs     Does lack of transportation keep you from medical appointments?: 1     Does lack of transportation keep you from work, meetings or getting things that you need?: 2         Disposition Plan   Medically Ready for Discharge: Anticipated in 2-4 Days         The patient's care was discussed with the Attending Physician, Dr. Julian, Chief Resident/Fellow, and Patient.    Lizzie Wang MD  Medicine Service, [address]  Securely message with [facility] (more info)  Text page via [facility] Paging/Directory   See signed in provider for up to date coverage information  ______________________________________________________________________    Interval History   Chills and fevering overnight, diagnosed with UTI. Feeling better this morning after starting abx. Would like to make sure that he gets home by Pass[place], ideally goal would be to discharge in next 1-2d. He is feeling impatient.     States that his leg pain is improved.     Denies chills, abdominal pain, back pain, nausea.     Physical Exam   Vital Signs: Temp: 99  F (37.2  C) Temp src: Oral BP: 122/42 Pulse: 79   Resp: 22 SpO2: 99 % O2 Device: None (Room air)    Weight: 183 lbs 13.82 oz    Physical Exam  Vitals and nursing note reviewed.   Constitutional:       Appearance: Normal appearance.   HENT:      Head: Normocephalic.      Mouth/Throat:      Mouth: Mucous membranes are moist.   Eyes:      Extraocular Movements: Extraocular movements intact.   Cardiovascular:      Rate and Rhythm: Normal rate and regular rhythm.      Pulses: Normal pulses.      Heart sounds: Normal heart sounds.   Pulmonary:      Effort: Pulmonary effort is normal.      Breath sounds: Normal breath sounds. No  wheezing or rales.   Abdominal:      General: Abdomen is flat. Bowel sounds are normal. There is no distension.      Palpations: Abdomen is soft.      Tenderness: There is no abdominal tenderness. There is no right CVA tenderness, left CVA tenderness, guarding or rebound.   Musculoskeletal:         General: Tenderness (TTP over LLE, anterior over surgical site, improved) present. Normal range of motion.      Cervical back: Normal range of motion.      Right lower leg: No edema.      Left lower leg: No edema.   Skin:     General: Skin is warm and dry.      Findings: No erythema or lesion.      Comments: Chronic venous stasis change   Neurological:      General: No focal deficit present.      Mental Status: He is alert and oriented to person, place, and time.   Psychiatric:         Mood and Affect: Mood normal.         Behavior: Behavior normal.           Medical Decision Making       Please see A&P for additional details of medical decision making.      Data     I have personally reviewed the following data over the past 24 hrs:    9.1  \   8.8 (L)   / 117 (L)     136 108 (H) 65.5 (H) /  72   4.5 15 (L) 2.80 (H) \     ALT: 27 AST: 59 (H) AP: 101 TBILI: 0.9   ALB: 3.1 (L) TOT PROTEIN: 5.4 (L) LIPASE: N/A     Procal: N/A CRP: N/A Lactic Acid: 1.0         Imaging results reviewed over the past 24 hrs:   Recent Results (from the past 24 hours)   US Abdomen Limited w Abdomen Doppler Limited    Narrative    EXAMINATION: US ABDOMEN LIMITED W ABDOMEN DOPPLER LIMITED 4/8/2025  8:44 PM     COMPARISON: 6/6/2017    HISTORY: new fever and tachycardia, RUQ tenderness, ams c/f sepsis    TECHNIQUE: The abdomen was scanned in standard fashion with  specialized ultrasound transducer(s) using both gray-scale, color  Doppler, and spectral flow techniques.    Findings:  Liver: The liver demonstrates normal homogeneous echotexture. No  evidence of a focal hepatic mass. Homogenous echogenicity anterior to  the right lobe of liver, (image  [id number])    Extrahepatic portal vein flow is antegrade at 62 cm/s.  Right portal vein flow is antegrade, measuring 20 cm/s.  Left portal vein flow is antegrade, measuring 19 cm/s.    Flow in the hepatic artery is towards the liver and:  110 cm/s peak systolic  0.70 resistive index.     The splenic vein is patent and flow is towards the liver.  The left,  middle, and right hepatic veins are patent with flow towards the IVC.  The IVC is patent with flow towards the heart.   The visualized aorta  is not dilated.    Gallbladder: There is no wall thickening, pericholecystic fluid,  positive sonographic Soto's sign or evidence for cholelithiasis    Bile Ducts: The common bile duct is not seen.    Pancreas: Not visualized.    Right kidney: Not seen    Fluid: No evidence of ascites or pleural effusions.      Impression    Impression:     Technically limited study with nonvisualization of the pancreas, right  kidney common bile duct    1. Patent hepatic vasculature with Doppler evaluation.  2. Increased homogenous echogenicity along the anterior right lobe of  liver, uncertain if it represents intraperitoneal fat versus less  likely acute hematoma, recommend noncontrast CT abdomen for further  evaluation.    Recommendation for noncontrast CT discussed with ordering provider by  hermelinda at 10:00 PM 4/8/2025 10:02 PM    I have personally reviewed the examination and initial interpretation  and I agree with the findings.    CLAIRE SABA MD         SYSTEM ID:  [id number]   CT Abdomen Pelvis w/o Contrast    Narrative    EXAMINATION: CT ABDOMEN/PELVIS  W/O CONTRAST, 4/8/2025 10:39 PM    TECHNIQUE:  Helical CT images from the lung bases through the pubic  symphysis were obtained  without IV contrast.     COMPARISON: Same date ultrasound, CT 6/6/2017    HISTORY: new RUQ tenderness AMS, US inconclusive, c/f possible  hematoma    FINDINGS:    Abdomen and pelvis: Unremarkable non-contrast appearance of the liver,  gallbladder, adrenal  glands, and urinary bladder. Moderate fatty  infiltration in the pancreas. Splenomegaly at 13 cm craniocaudal  dimension. Prostatomegaly. Persistent contrast nephrograms with  non-specific perinephric stranding. No hydronephrosis. Contrast within  the large bowel. Mild diffuse distention of small bowel loops likely  represent ileus. No free air or fluid in the abdomen. Scattered  atherosclerotic calcifications of the abdominal arteries and branches.    Lung bases:  Coronary calcifications. Patchy bibasilar atelectasis. No  focal consolidations. Scattered sub-4 mm pulmonary nodules in the  visualized lung bases.    Bones and soft tissues: No acute or suspicious osseous abnormalities.  Chronic deformity of the L4 vertebral body. There is a subcutaneous  soft tissue thickening of the lower abdomen, indeterminate and  possibly secondary to chronic subcutaneous injections. Fat stranding  along the groins from prior vascular access, without discrete  collections.      Impression    IMPRESSION:   1. No intraperitoneal hematoma.   2. Subcutaneous fat stranding and small amount of ill-defined  hemorrhagic products along the groins (left>right) from prior vascular  access, no distinct hematoma identified within the field of view.  Further dedicated CT imaging of the left upper thigh can be considered  if clinically warranted.  3. Persistent contrast nephrogram is suggestive of renal dysfunction.   4.  Prostatomegaly.  5. Scattered sub-4 mm pulmonary nodules in the visualized lung bases.  Recommend attention on follow-up.    I have personally reviewed the examination and initial interpretation  and I agree with the findings.    CRYSTAL RAMACHANDRAN MD         SYSTEM ID:  [id number]

## 2025-04-10 ENCOUNTER — APPOINTMENT (OUTPATIENT)
Dept: GENERAL RADIOLOGY | Facility: CLINIC | Age: 75
DRG: 271 | End: 2025-04-10
Payer: COMMERCIAL

## 2025-04-10 VITALS
TEMPERATURE: 98.9 F | SYSTOLIC BLOOD PRESSURE: 145 MMHG | OXYGEN SATURATION: 100 % | BODY MASS INDEX: 28.1 KG/M2 | HEART RATE: 81 BPM | WEIGHT: 185.41 LBS | DIASTOLIC BLOOD PRESSURE: 59 MMHG | RESPIRATION RATE: 18 BRPM | HEIGHT: 68 IN

## 2025-04-10 LAB
ALBUMIN SERPL BCG-MCNC: 3.4 G/DL (ref 3.5–5.2)
ALP SERPL-CCNC: 109 U/L (ref 40–150)
ALT SERPL W P-5'-P-CCNC: 30 U/L (ref 0–70)
ANION GAP SERPL CALCULATED.3IONS-SCNC: 14 MMOL/L (ref 7–15)
AST SERPL W P-5'-P-CCNC: 49 U/L (ref 0–45)
BACTERIA BLD CULT: ABNORMAL
BACTERIA BLD CULT: NORMAL
BILIRUB DIRECT SERPL-MCNC: 0.39 MG/DL (ref 0–0.3)
BILIRUB SERPL-MCNC: 0.7 MG/DL
BUN SERPL-MCNC: 55.6 MG/DL (ref 8–23)
CALCIUM SERPL-MCNC: 8.7 MG/DL (ref 8.8–10.4)
CHLORIDE SERPL-SCNC: 108 MMOL/L (ref 98–107)
CREAT SERPL-MCNC: 2.38 MG/DL (ref 0.67–1.17)
EGFRCR SERPLBLD CKD-EPI 2021: 28 ML/MIN/1.73M2
ERYTHROCYTE [DISTWIDTH] IN BLOOD BY AUTOMATED COUNT: 12.9 % (ref 10–15)
GLUCOSE BLDC GLUCOMTR-MCNC: 137 MG/DL (ref 70–99)
GLUCOSE BLDC GLUCOMTR-MCNC: 176 MG/DL (ref 70–99)
GLUCOSE BLDC GLUCOMTR-MCNC: 205 MG/DL (ref 70–99)
GLUCOSE BLDC GLUCOMTR-MCNC: 231 MG/DL (ref 70–99)
GLUCOSE BLDC GLUCOMTR-MCNC: 238 MG/DL (ref 70–99)
GLUCOSE BLDC GLUCOMTR-MCNC: 311 MG/DL (ref 70–99)
GLUCOSE SERPL-MCNC: 191 MG/DL (ref 70–99)
HCO3 SERPL-SCNC: 15 MMOL/L (ref 22–29)
HCT VFR BLD AUTO: 28.7 % (ref 40–53)
HGB BLD-MCNC: 9.6 G/DL (ref 13.3–17.7)
LIPASE SERPL-CCNC: 37 U/L (ref 13–60)
MCH RBC QN AUTO: 31.6 PG (ref 26.5–33)
MCHC RBC AUTO-ENTMCNC: 33.4 G/DL (ref 31.5–36.5)
MCV RBC AUTO: 94 FL (ref 78–100)
PLATELET # BLD AUTO: 139 10E3/UL (ref 150–450)
POTASSIUM SERPL-SCNC: 4.8 MMOL/L (ref 3.4–5.3)
PROT SERPL-MCNC: 6 G/DL (ref 6.4–8.3)
RBC # BLD AUTO: 3.04 10E6/UL (ref 4.4–5.9)
SODIUM SERPL-SCNC: 137 MMOL/L (ref 135–145)
WBC # BLD AUTO: 5.6 10E3/UL (ref 4–11)

## 2025-04-10 PROCEDURE — 85027 COMPLETE CBC AUTOMATED: CPT | Performed by: STUDENT IN AN ORGANIZED HEALTH CARE EDUCATION/TRAINING PROGRAM

## 2025-04-10 PROCEDURE — 74018 RADEX ABDOMEN 1 VIEW: CPT | Mod: 26 | Performed by: RADIOLOGY

## 2025-04-10 PROCEDURE — 74018 RADEX ABDOMEN 1 VIEW: CPT

## 2025-04-10 PROCEDURE — 87040 BLOOD CULTURE FOR BACTERIA: CPT

## 2025-04-10 PROCEDURE — 120N000002 HC R&B MED SURG/OB UMMC

## 2025-04-10 PROCEDURE — 250N000013 HC RX MED GY IP 250 OP 250 PS 637

## 2025-04-10 PROCEDURE — 250N000011 HC RX IP 250 OP 636

## 2025-04-10 PROCEDURE — 83690 ASSAY OF LIPASE: CPT

## 2025-04-10 PROCEDURE — 82374 ASSAY BLOOD CARBON DIOXIDE: CPT

## 2025-04-10 PROCEDURE — 36415 COLL VENOUS BLD VENIPUNCTURE: CPT

## 2025-04-10 PROCEDURE — 250N000013 HC RX MED GY IP 250 OP 250 PS 637: Performed by: INTERNAL MEDICINE

## 2025-04-10 PROCEDURE — 250N000013 HC RX MED GY IP 250 OP 250 PS 637: Performed by: STUDENT IN AN ORGANIZED HEALTH CARE EDUCATION/TRAINING PROGRAM

## 2025-04-10 PROCEDURE — 82248 BILIRUBIN DIRECT: CPT

## 2025-04-10 PROCEDURE — 999N000111 HC STATISTIC OT IP EVAL DEFER

## 2025-04-10 PROCEDURE — 82435 ASSAY OF BLOOD CHLORIDE: CPT

## 2025-04-10 PROCEDURE — 80048 BASIC METABOLIC PNL TOTAL CA: CPT

## 2025-04-10 PROCEDURE — 99233 SBSQ HOSP IP/OBS HIGH 50: CPT | Mod: GC | Performed by: STUDENT IN AN ORGANIZED HEALTH CARE EDUCATION/TRAINING PROGRAM

## 2025-04-10 RX ORDER — SIMETHICONE 80 MG
80 TABLET,CHEWABLE ORAL EVERY 6 HOURS PRN
Status: DISCONTINUED | OUTPATIENT
Start: 2025-04-10 | End: 2025-04-10

## 2025-04-10 RX ORDER — MAGNESIUM HYDROXIDE/ALUMINUM HYDROXICE/SIMETHICONE 120; 1200; 1200 MG/30ML; MG/30ML; MG/30ML
30 SUSPENSION ORAL EVERY 4 HOURS PRN
Status: DISCONTINUED | OUTPATIENT
Start: 2025-04-10 | End: 2025-04-11 | Stop reason: HOSPADM

## 2025-04-10 RX ORDER — LACTULOSE 10 G/15ML
20 SOLUTION ORAL
Status: DISCONTINUED | OUTPATIENT
Start: 2025-04-10 | End: 2025-04-10

## 2025-04-10 RX ORDER — BISACODYL 10 MG
10 SUPPOSITORY, RECTAL RECTAL ONCE
Status: COMPLETED | OUTPATIENT
Start: 2025-04-10 | End: 2025-04-10

## 2025-04-10 RX ORDER — POLYETHYLENE GLYCOL 3350 17 G/17G
17 POWDER, FOR SOLUTION ORAL
Status: COMPLETED | OUTPATIENT
Start: 2025-04-10 | End: 2025-04-10

## 2025-04-10 RX ORDER — MAGNESIUM HYDROXIDE/ALUMINUM HYDROXICE/SIMETHICONE 120; 1200; 1200 MG/30ML; MG/30ML; MG/30ML
30 SUSPENSION ORAL ONCE
Status: COMPLETED | OUTPATIENT
Start: 2025-04-10 | End: 2025-04-10

## 2025-04-10 RX ORDER — POLYETHYLENE GLYCOL 3350 17 G/17G
17 POWDER, FOR SOLUTION ORAL 2 TIMES DAILY PRN
Status: DISCONTINUED | OUTPATIENT
Start: 2025-04-10 | End: 2025-04-11 | Stop reason: HOSPADM

## 2025-04-10 RX ORDER — PROCHLORPERAZINE MALEATE 5 MG/1
5 TABLET ORAL EVERY 6 HOURS PRN
Status: DISCONTINUED | OUTPATIENT
Start: 2025-04-10 | End: 2025-04-11 | Stop reason: HOSPADM

## 2025-04-10 RX ORDER — PROCHLORPERAZINE 25 MG
12.5 SUPPOSITORY, RECTAL RECTAL EVERY 12 HOURS PRN
Status: DISCONTINUED | OUTPATIENT
Start: 2025-04-10 | End: 2025-04-11 | Stop reason: HOSPADM

## 2025-04-10 RX ORDER — LACTULOSE 10 G/15ML
20 SOLUTION ORAL ONCE
Status: DISCONTINUED | OUTPATIENT
Start: 2025-04-10 | End: 2025-04-10

## 2025-04-10 RX ORDER — ACETAMINOPHEN 325 MG/1
650 TABLET ORAL EVERY 6 HOURS
Status: DISCONTINUED | OUTPATIENT
Start: 2025-04-10 | End: 2025-04-11 | Stop reason: HOSPADM

## 2025-04-10 RX ADMIN — FAMOTIDINE 20 MG: 20 TABLET, FILM COATED ORAL at 09:24

## 2025-04-10 RX ADMIN — GABAPENTIN 300 MG: 300 CAPSULE ORAL at 09:23

## 2025-04-10 RX ADMIN — AMPICILLIN SODIUM 2 G: 2 INJECTION, POWDER, FOR SOLUTION INTRAMUSCULAR; INTRAVENOUS at 20:11

## 2025-04-10 RX ADMIN — AMPICILLIN SODIUM 2 G: 2 INJECTION, POWDER, FOR SOLUTION INTRAMUSCULAR; INTRAVENOUS at 12:20

## 2025-04-10 RX ADMIN — Medication 250 MG: at 09:22

## 2025-04-10 RX ADMIN — MIRABEGRON 25 MG: 25 TABLET, FILM COATED, EXTENDED RELEASE ORAL at 09:30

## 2025-04-10 RX ADMIN — Medication 50 MCG: at 09:24

## 2025-04-10 RX ADMIN — TRIAMCINOLONE ACETONIDE: 1 CREAM TOPICAL at 09:29

## 2025-04-10 RX ADMIN — AMLODIPINE BESYLATE 5 MG: 5 TABLET ORAL at 09:24

## 2025-04-10 RX ADMIN — ACETAMINOPHEN 650 MG: 325 TABLET, FILM COATED ORAL at 12:20

## 2025-04-10 RX ADMIN — APIXABAN 2.5 MG: 2.5 TABLET, FILM COATED ORAL at 09:24

## 2025-04-10 RX ADMIN — ACETAMINOPHEN 650 MG: 325 TABLET, FILM COATED ORAL at 05:26

## 2025-04-10 RX ADMIN — APIXABAN 2.5 MG: 2.5 TABLET, FILM COATED ORAL at 20:07

## 2025-04-10 RX ADMIN — ONDANSETRON 4 MG: 2 INJECTION, SOLUTION INTRAMUSCULAR; INTRAVENOUS at 08:00

## 2025-04-10 RX ADMIN — CLOPIDOGREL BISULFATE 75 MG: 75 TABLET ORAL at 09:24

## 2025-04-10 RX ADMIN — ALUMINUM HYDROXIDE, MAGNESIUM HYDROXIDE, AND DIMETHICONE 30 ML: 200; 20; 200 SUSPENSION ORAL at 12:20

## 2025-04-10 RX ADMIN — POLYETHYLENE GLYCOL 3350 17 G: 17 POWDER, FOR SOLUTION ORAL at 01:27

## 2025-04-10 RX ADMIN — ATORVASTATIN CALCIUM 20 MG: 20 TABLET, FILM COATED ORAL at 09:23

## 2025-04-10 RX ADMIN — BISACODYL 10 MG: 10 SUPPOSITORY RECTAL at 21:46

## 2025-04-10 RX ADMIN — AMPICILLIN SODIUM 2 G: 2 INJECTION, POWDER, FOR SOLUTION INTRAMUSCULAR; INTRAVENOUS at 05:26

## 2025-04-10 RX ADMIN — GABAPENTIN 300 MG: 300 CAPSULE ORAL at 20:07

## 2025-04-10 RX ADMIN — METHOCARBAMOL 1000 MG: 500 TABLET, FILM COATED ORAL at 01:09

## 2025-04-10 RX ADMIN — TRIAMCINOLONE ACETONIDE: 1 CREAM TOPICAL at 20:10

## 2025-04-10 RX ADMIN — TAMSULOSIN HYDROCHLORIDE 0.8 MG: 0.4 CAPSULE ORAL at 12:24

## 2025-04-10 RX ADMIN — ACETAMINOPHEN 650 MG: 325 TABLET, FILM COATED ORAL at 17:15

## 2025-04-10 RX ADMIN — METHOCARBAMOL 1000 MG: 500 TABLET, FILM COATED ORAL at 20:28

## 2025-04-10 ASSESSMENT — ACTIVITIES OF DAILY LIVING (ADL)
ADLS_ACUITY_SCORE: 37
ADLS_ACUITY_SCORE: 36
ADLS_ACUITY_SCORE: 36
ADLS_ACUITY_SCORE: 37
ADLS_ACUITY_SCORE: 36
ADLS_ACUITY_SCORE: 37
ADLS_ACUITY_SCORE: 36
ADLS_ACUITY_SCORE: 36
ADLS_ACUITY_SCORE: 37
ADLS_ACUITY_SCORE: 36
ADLS_ACUITY_SCORE: 36
ADLS_ACUITY_SCORE: 37
ADLS_ACUITY_SCORE: 37
ADLS_ACUITY_SCORE: 36
ADLS_ACUITY_SCORE: 37
ADLS_ACUITY_SCORE: 36

## 2025-04-10 NOTE — PROGRESS NOTES
Suppository ordered for BM intervention but pt refused. Provider is aware. Pt claim that he has had couple of small loose BM during the shift. However, writer was not able to verify pt claim. He also said that he was passing gas.   Blood sugar 238 mg/dl. Will treat according sliding scale order.

## 2025-04-10 NOTE — PLAN OF CARE
Shift Hours: 0700 - 1500    Assessment:  Body systems assessments were at patient's baseline.        Activity     Fall Risk Score: 45   Bed alarm on? Yes     Activity Assistance Provided: assistance, stand-by      Assistive Device Utilized: walker    Pain: abdominal discomfort improved after milk o mag    Labs/RN Managed Protocols: n/a    Lines/Drains: PIV x2    Nutrition: tolerated kosher diet for lunch     Goal Outcome Evaluation     Overall Patient Progress: no change  Outcome Evaluation: Emesis this morning. During care reported three loose/soft bowel movements. After lunch with no nausea or increased pain. Abdominal XRAY results back later this afternoon; changed to liquid diet and order placed for suppository . A/O x4. Ambulating well with walker and supervision. Abdominal discomfort improved after milk o mag. Denied acute pain elsewhere. Distal pulses intact with doppler; extremities warm to touch. Call light within reach. Attempted multiple times to complete wound care; pt delayed multiple times.     Barriers to Discharge:   Infection, possible ileus

## 2025-04-10 NOTE — PROGRESS NOTES
[facility]    Progress Note - Medicine Service, MARO[facility] TEAM 2       Date of Admission:  4/4/2025    Assessment & Plan   Earle Rene is a 75 year old male with a PMH of Type 2 diabetes, hypertension, peripheral artery disease, CKD3, and diabetic neuropathy who Is admitted as advised by vascular surgery 2/2 DVT, now s/p LE angiogram. Course complicated by pseudoaneurysm, now closed, and weakness.      Today:   - No e/o worsening hematoma on abdominal imaging overnight, no e/o pyelonephritis or other intraabdominal etiology  - Treating for E. Faecalis UTI, first set of BCx positive x2, second set NGTD so far, if negative tomorrow with transition to PO abx and likely discharge to home for Passover   > Ampicillin 2q q8h per antimicrobial stewardship, narrowed from Zosyn (4/8-9)  - Appreciate IR recommendations   > Plavix 75mg + Apixaban 2.5mg BID - continue for 3 months post-operatively  - Decreased bowel reg  - Abdominal XR c/w ileus, supportive mgmt with full liquid diet since no emesis or nausea and suppository x1.    > If having emesis, make NPO and continuous LR @ 75cc/hr  - GI cocktail given for abdominal pain and sxs similar to GERD on hx   - Can be off bedrest, pseudoaneurysm is now closed  - WOC to follow groin site and cares   - Physical therapy ordered, recommendations evolving        #Peripheral artery disease  Pt with h/o HTN and T2DM with long standing bilateral LE pain, follows with IR outpatient and found to have aramis class 4 chronic limb ischemia. Angiogram on 02/04 showing R multifocal stenosis at various levels with occluded dorsalis pedis, not requiring intervention. Left LE angiogram with multifocal lesions requiring 3 x  drug-coated balloons  (TP trunk, proximal posterior tibial artery and distal superficial femoral artery/popliteal artery) with patent dorsalis pedis. Pt presented with ongoing significant pain, POD#2 of BLE angiogram and was  found to have a thrombus in distal popliteal artery and hence advised to come to the ED. Vitally stable. IR performed left lower extremity angiogram with angioplasty and thrombectomy on 4/4/2025. Procedure was complicated by extravasation of the DAJA, and L groin hematoma. Neurovascularly intact. Dorsalis pedis pulses are dopplerable.  Repeat ultrasound on 4/6 was notable for incidentally found right pelvis hematoma measuring 3 x 1.6 x 4.9 cm.  Pseudoaneurysm closed on 4/7.   - IR following:   -Pseudoaneurysm now closed  -Stop heparin, start aspirin and Plavix (loaded on 4/7)  -Can stop bedrest  -WOC consulted to help manage  -Pain management:  -Scheduled tylenol 975mg q6h  -Robaxin 1000mg TID PRN  -Oxycodone 5mg Q4H PRN  -Dilaudid 0.5mg Q2H PRN   -Continue atorvastatin 20mg  -Per IR, Apixaban 2.5mg BID + Plavix 75mg qd, on both as of 4/9, continue for 3 months postoperatively   -Physical therapy consulted  -WOC consulted     # Urinary trace infection c/b male sex + chills, febrile, with CHARLINE   # Gram positive bacteremia  UA with large blood but o/w fairly unremarkable. However, cx with >100k Enterococcous and with systemic symptoms including chills, repeat fevers, elevated HRs in 90s, and CHARLINE, initiated tx with IV CTX 2g with BCx x2 drawn prior to abx initiation. Lactate normal. BCx x2 positive for GPCs x2, repeats pending   Antimicrobial stewardship recommended narrowing to Ampicillin 2g q8h, done today. Pt symptomatically improved, CTM.  -- BCx x2 +GPCs; repeat BCx pending   Abx   Zosyn  4/8 - 4/9   Ampicillin 4/9 - **      # Acute on chronic kidney injury  # CKD 3  Patient had a admission serum creatinine of 1.8.  Serum creatinine is continued to increase this admission to 3.51.  Notably patient has had multiple angiograms and procedures done with IR.  Patient has many risk factors for CKD, including T2DM, HTN, and PAD.  Patient is also had dark urine, concerning for ATN in the setting of contrast.   Ultimately,  work up positive for UTI as likely etiology +/- contrast. Improving iso abx for UTI as above.      # Epigastric abdominal pain  # Gas and burping   # Nausea   Pt describing epigastric abdominal pain that waxes and wanes throughout the day aggravated by eating. CT abdomen 2 days ago negative for acute etiology and prior hematoma not worsened.   Epigastric abdominal pain is associated with abdominal bloating, burping, and nausea. He has mild epigastric TTP on exam, otherwise no peritoneal signs and no other TTP. Abdomen is very soft with hyperactive BS. Highest concern is for GERD vs PUD vs ileus, will trial GI cocktail this AM with antiemetics, and revisit this PM. May benefit from long term PPI ultimately. Doubt more sinister etiology such as SBO, intraabdominal infection given exam and hx.  -- GI cocktail x1  -- IV antiemetics prn   -- Decreased bowel reg   -- Simethicone prn  -- Abdominal XR pending     # Acute anemia  Possibly blood loss related in the setting of multiple procedures with IR.  Patient was admitted with a hemoglobin of 14.5.  Decreased to a nia of 10.  Further dropped to 8.9 on 4/7.  Imaging with ultrasound instantly finding fluid collection in his right pelvis suspicious for hematoma measuring 3 x 1.6 x 4.9.  Remains vitally stable.  Will continue to trend CBC daily.  If vitally unstable stat check CBC.     # Type 2 diabetes mellitus  # Mild nonproliferative retinopathy and macular edema   Pt with long standing T2DM. With last HbA1c of 8% (2/11/25).  -Continue 50U degludec  -Hold terzepatide and jiardiance while inpatient  -HDSSI     #Diabetic neuropathy  Pt with bilateral LE pain 2/2 diabetic neuropathy and PAD. Uses diabetic shoes. Has seen podiatry in the past.  -Continue PTA gabapentin 300mg TID     #Thrombocytopenia   Pt with platelet count of 133 in the setting of DVT, now s/p thrombectomy.  -CTM     #Hypertension  BP well controlled during admission.  -Continue PTA losartan  100mg  -Continue PTA amlodipine 5mg     #Hyponatremia  -Continue PTA sodium bicarb 1300mg BID     #BPH  #Low risk prostate cancer  Follows with urology outpatient. Retention on 4/6, requiring straight cath.   -Continue PTA tamsulosin 0.8mg  -Continue PTA finasteride 5mg     #Urinary urgency  Follows with urology outpatient.  -Continue PTA mirabegron 25mg     #GERD  -Continue PTA famotidine     Diet: Snacks/Supplements Adult: Ensure Enlive; With Meals  Room Service  Kosher Diet    DVT Prophylaxis: Plavix + ASA  Chamberlain Catheter: Not present  Fluids: None  Lines: None     Cardiac Monitoring: None  Code Status: Full Code         Social Drivers of Health   Transportation Needs: Low Risk  (4/5/2025)    Transportation Needs     Within the past 12 months, has lack of transportation kept you from medical appointments, getting your medicines, non-medical meetings or appointments, work, or from getting things that you need?: No   Recent Concern: Transportation Needs - Unmet Transportation Needs (1/30/2025)    Received from [facility]    Transportation Needs     Does lack of transportation keep you from medical appointments?: 1     Does lack of transportation keep you from work, meetings or getting things that you need?: 2         Disposition Plan   Medically Ready for Discharge: Anticipated Tomorrow pending repeat blood cultures.         The patient's care was discussed with the Attending Physician, Dr. Julian, Chief Resident/Fellow, Bedside Nurse, and Patient.    Lizzie Wang MD  Medicine Service, [facility] TEAM 2  [facility]  Securely message with [employer] (more info)  Text page via [facility] Paging/Directory   See signed in provider for up to date coverage information  ______________________________________________________________________    Interval History   Pt with nausea and increased gas this AM. Small amnt of diarrhea also this AM. Feels very  bloated. Having epigastric abdominal pain and burping often. Denies chills, emesis, dysuria or pain with urination.     Is very anxious to go home by Saturday for Passover and is wanting to expedite his discharge as much as he is able. Hopeful that his blood cultures will be negative today.    Physical Exam   Vital Signs: Temp: 97.8  F (36.6  C) Temp src: Oral BP: 136/62 (used smaller cuff) Pulse: 74   Resp: 22 SpO2: 100 % O2 Device: None (Room air)    Weight: 185 lbs 6.51 oz    Physical Exam  Constitutional:       Appearance: Normal appearance. He is normal weight.   HENT:      Mouth/Throat:      Mouth: Mucous membranes are moist.   Eyes:      General: No scleral icterus.     Extraocular Movements: Extraocular movements intact.   Cardiovascular:      Rate and Rhythm: Normal rate and regular rhythm.      Pulses: Normal pulses.      Heart sounds: Normal heart sounds.   Pulmonary:      Effort: Pulmonary effort is normal.      Breath sounds: Normal breath sounds.   Abdominal:      General: Abdomen is flat. There is no distension.      Palpations: Abdomen is soft.      Tenderness: There is abdominal tenderness (epigastric). There is no guarding or rebound.   Musculoskeletal:         General: Tenderness (overlying surgical site) present. Normal range of motion.      Cervical back: Normal range of motion.      Right lower leg: No edema.      Left lower leg: No edema.   Skin:     General: Skin is warm and dry.      Findings: No bruising.   Neurological:      General: No focal deficit present.      Mental Status: He is alert.   Psychiatric:         Mood and Affect: Mood normal.         Behavior: Behavior normal.         Medical Decision Making     Please see A&P for additional details of medical decision making.   Data     I have personally reviewed the following data over the past 24 hrs:    5.6  \   9.6 (L)   / 139 (L)     137 108 (H) 55.6 (H) /  137 (H)   4.8 15 (L) 2.38 (H) \     ALT: 30 AST: 49 (H) AP: 109 TBILI: 0.7    ALB: 3.4 (L) TOT PROTEIN: 6.0 (L) LIPASE: 37       Imaging results reviewed over the past 24 hrs:   Recent Results (from the past 24 hours)   XR Abdomen Port 1 View    Narrative    EXAMINATION:  XR ABDOMEN PORT 1 VIEW 4/10/2025 11:42 AM     COMPARISON: CT abdomen and pelvis 4/8/2025.    HISTORY: Worsening abdominal pain with new nausea and vomiting.    FINDINGS: Portable AP supine view. Many air-filled loops of small  bowel at the upper limits of normal. Air noted with nondilated colon.  No pneumatosis or portal venous gas. Degenerative changes of the hips  and vascular calcifications.      Impression    IMPRESSION: Many air-filled loops of small bowel that are mildly  dilated/at the upper limits of normal, suggestive of ileus.    I have personally reviewed the examination and initial interpretation  and I agree with the findings.    IVORY MILLER MD         SYSTEM ID:  [id number]

## 2025-04-10 NOTE — CARE PLAN
Pt is not appropriate for skilled OT services at this time due to at/near baseline with functional tasks & mobility. Will defer to PT for higher level mobility and transfers. Plan was discussed with PT. Will D/C current OT orders. Thank you.    4/10/2025 by Darshana Wood, OT, OTR/L

## 2025-04-10 NOTE — PLAN OF CARE
Shift Hours: 1900 - 0700    Assessment:  Body systems that were not at patient's baseline Respiratory, Cardiac, Peripheral Neurovascular, Gastrointestinal, Skin, and Musculoskeletal. Focused body system assessments documented in flowsheets.        Activity     Fall Risk Score: 20   Bed alarm on? No     Activity Assistance Provided: independent      Assistive Device Utilized: walker    Pain: abd discomfort, L ankle pain    Labs/RN Managed Protocols: BG ACHS. 10PM: 203, 2AM: 205    Lines/Drains: 2x R PIV SL    Nutrition: kosher    Goal Outcome Evaluation  Plan of Care Reviewed With: patient  Overall Patient Progress: no change       No active tele order. VSS x intermittently tachycardic and tachypnea on RA. IV abx q8h. Pt does not remember last BM but per I&Os flowsheet, last BM on 4/9. Miralax PRN given because pt reported abd discomfort, now relieved with passing flatus. Pending 4/10 blood cultures.     Barriers to Discharge:   Blood cultures from 4/8 came back as positive for gram cocci in pairs. Pt requesting to leave for Passover.

## 2025-04-11 VITALS
TEMPERATURE: 97.2 F | HEART RATE: 81 BPM | BODY MASS INDEX: 28.1 KG/M2 | WEIGHT: 185.41 LBS | HEIGHT: 68 IN | RESPIRATION RATE: 24 BRPM | SYSTOLIC BLOOD PRESSURE: 139 MMHG | OXYGEN SATURATION: 100 % | DIASTOLIC BLOOD PRESSURE: 70 MMHG

## 2025-04-11 LAB
ANION GAP SERPL CALCULATED.3IONS-SCNC: 12 MMOL/L (ref 7–15)
BACTERIA BLD CULT: ABNORMAL
BACTERIA BLD CULT: ABNORMAL
BUN SERPL-MCNC: 48.8 MG/DL (ref 8–23)
CALCIUM SERPL-MCNC: 8.5 MG/DL (ref 8.8–10.4)
CHLORIDE SERPL-SCNC: 108 MMOL/L (ref 98–107)
CREAT SERPL-MCNC: 2.03 MG/DL (ref 0.67–1.17)
EGFRCR SERPLBLD CKD-EPI 2021: 33 ML/MIN/1.73M2
ERYTHROCYTE [DISTWIDTH] IN BLOOD BY AUTOMATED COUNT: 12.9 % (ref 10–15)
GLUCOSE BLDC GLUCOMTR-MCNC: 144 MG/DL (ref 70–99)
GLUCOSE BLDC GLUCOMTR-MCNC: 220 MG/DL (ref 70–99)
GLUCOSE SERPL-MCNC: 182 MG/DL (ref 70–99)
HCO3 SERPL-SCNC: 17 MMOL/L (ref 22–29)
HCT VFR BLD AUTO: 24.5 % (ref 40–53)
HGB BLD-MCNC: 8.3 G/DL (ref 13.3–17.7)
MCH RBC QN AUTO: 31.2 PG (ref 26.5–33)
MCHC RBC AUTO-ENTMCNC: 33.9 G/DL (ref 31.5–36.5)
MCV RBC AUTO: 92 FL (ref 78–100)
PLATELET # BLD AUTO: 151 10E3/UL (ref 150–450)
POTASSIUM SERPL-SCNC: 4.7 MMOL/L (ref 3.4–5.3)
RBC # BLD AUTO: 2.66 10E6/UL (ref 4.4–5.9)
SODIUM SERPL-SCNC: 137 MMOL/L (ref 135–145)
WBC # BLD AUTO: 5.6 10E3/UL (ref 4–11)

## 2025-04-11 PROCEDURE — 36415 COLL VENOUS BLD VENIPUNCTURE: CPT

## 2025-04-11 PROCEDURE — 250N000013 HC RX MED GY IP 250 OP 250 PS 637

## 2025-04-11 PROCEDURE — 80048 BASIC METABOLIC PNL TOTAL CA: CPT

## 2025-04-11 PROCEDURE — 250N000013 HC RX MED GY IP 250 OP 250 PS 637: Performed by: STUDENT IN AN ORGANIZED HEALTH CARE EDUCATION/TRAINING PROGRAM

## 2025-04-11 PROCEDURE — 250N000011 HC RX IP 250 OP 636

## 2025-04-11 PROCEDURE — 85014 HEMATOCRIT: CPT | Performed by: STUDENT IN AN ORGANIZED HEALTH CARE EDUCATION/TRAINING PROGRAM

## 2025-04-11 PROCEDURE — 99238 HOSP IP/OBS DSCHRG MGMT 30/<: CPT | Mod: GC | Performed by: STUDENT IN AN ORGANIZED HEALTH CARE EDUCATION/TRAINING PROGRAM

## 2025-04-11 RX ORDER — MAGNESIUM HYDROXIDE/ALUMINUM HYDROXICE/SIMETHICONE 120; 1200; 1200 MG/30ML; MG/30ML; MG/30ML
30 SUSPENSION ORAL EVERY 4 HOURS PRN
Qty: 355 ML | Refills: 0 | Status: SHIPPED | OUTPATIENT
Start: 2025-04-11 | End: 2025-05-11

## 2025-04-11 RX ORDER — METHOCARBAMOL 1000 MG/1
1000 TABLET, FILM COATED ORAL 3 TIMES DAILY PRN
Qty: 45 TABLET | Refills: 0 | Status: SHIPPED | OUTPATIENT
Start: 2025-04-11 | End: 2025-04-26

## 2025-04-11 RX ORDER — AMOXICILLIN 500 MG/1
500 CAPSULE ORAL EVERY 8 HOURS
Qty: 15 CAPSULE | Refills: 0 | Status: SHIPPED | OUTPATIENT
Start: 2025-04-11 | End: 2025-04-11

## 2025-04-11 RX ORDER — AMOXICILLIN 500 MG/1
500 CAPSULE ORAL EVERY 8 HOURS SCHEDULED
Status: DISCONTINUED | OUTPATIENT
Start: 2025-04-11 | End: 2025-04-11 | Stop reason: HOSPADM

## 2025-04-11 RX ORDER — POLYETHYLENE GLYCOL 3350 17 G/17G
17 POWDER, FOR SOLUTION ORAL DAILY PRN
Qty: 510 G | Refills: 0 | Status: SHIPPED | OUTPATIENT
Start: 2025-04-11

## 2025-04-11 RX ORDER — ONDANSETRON 4 MG/1
4 TABLET, ORALLY DISINTEGRATING ORAL EVERY 6 HOURS PRN
Qty: 30 TABLET | Refills: 0 | Status: SHIPPED | OUTPATIENT
Start: 2025-04-11

## 2025-04-11 RX ORDER — AMOXICILLIN 500 MG/1
500 CAPSULE ORAL EVERY 8 HOURS
Qty: 33 CAPSULE | Refills: 0 | Status: SHIPPED | OUTPATIENT
Start: 2025-04-11 | End: 2025-04-22

## 2025-04-11 RX ORDER — ACETAMINOPHEN 325 MG/1
650 TABLET ORAL EVERY 6 HOURS PRN
Qty: 240 TABLET | Refills: 0 | Status: SHIPPED | OUTPATIENT
Start: 2025-04-11 | End: 2025-05-11

## 2025-04-11 RX ORDER — MAGNESIUM HYDROXIDE/ALUMINUM HYDROXICE/SIMETHICONE 120; 1200; 1200 MG/30ML; MG/30ML; MG/30ML
30 SUSPENSION ORAL ONCE
Status: COMPLETED | OUTPATIENT
Start: 2025-04-11 | End: 2025-04-11

## 2025-04-11 RX ADMIN — Medication 5 MG: at 00:36

## 2025-04-11 RX ADMIN — ACETAMINOPHEN 650 MG: 325 TABLET, FILM COATED ORAL at 05:24

## 2025-04-11 RX ADMIN — APIXABAN 2.5 MG: 2.5 TABLET, FILM COATED ORAL at 09:32

## 2025-04-11 RX ADMIN — Medication 250 MG: at 09:32

## 2025-04-11 RX ADMIN — Medication 50 MCG: at 09:32

## 2025-04-11 RX ADMIN — GABAPENTIN 300 MG: 300 CAPSULE ORAL at 09:31

## 2025-04-11 RX ADMIN — ATORVASTATIN CALCIUM 20 MG: 20 TABLET, FILM COATED ORAL at 09:31

## 2025-04-11 RX ADMIN — AMPICILLIN SODIUM 2 G: 2 INJECTION, POWDER, FOR SOLUTION INTRAMUSCULAR; INTRAVENOUS at 05:16

## 2025-04-11 RX ADMIN — MIRABEGRON 25 MG: 25 TABLET, FILM COATED, EXTENDED RELEASE ORAL at 09:32

## 2025-04-11 RX ADMIN — ALUMINUM HYDROXIDE, MAGNESIUM HYDROXIDE, AND DIMETHICONE 30 ML: 200; 20; 200 SUSPENSION ORAL at 00:11

## 2025-04-11 RX ADMIN — CLOPIDOGREL BISULFATE 75 MG: 75 TABLET ORAL at 09:31

## 2025-04-11 RX ADMIN — ALUMINUM HYDROXIDE, MAGNESIUM HYDROXIDE, AND DIMETHICONE 30 ML: 200; 20; 200 SUSPENSION ORAL at 12:51

## 2025-04-11 RX ADMIN — ACETAMINOPHEN 650 MG: 325 TABLET, FILM COATED ORAL at 00:19

## 2025-04-11 RX ADMIN — AMOXICILLIN 500 MG: 500 CAPSULE ORAL at 12:51

## 2025-04-11 RX ADMIN — AMLODIPINE BESYLATE 5 MG: 5 TABLET ORAL at 09:31

## 2025-04-11 RX ADMIN — ACETAMINOPHEN 650 MG: 325 TABLET, FILM COATED ORAL at 12:50

## 2025-04-11 RX ADMIN — TAMSULOSIN HYDROCHLORIDE 0.8 MG: 0.4 CAPSULE ORAL at 09:32

## 2025-04-11 ASSESSMENT — ACTIVITIES OF DAILY LIVING (ADL)
ADLS_ACUITY_SCORE: 36
ADLS_ACUITY_SCORE: 37
ADLS_ACUITY_SCORE: 38
ADLS_ACUITY_SCORE: 36
ADLS_ACUITY_SCORE: 37
ADLS_ACUITY_SCORE: 36
ADLS_ACUITY_SCORE: 37
ADLS_ACUITY_SCORE: 36

## 2025-04-11 NOTE — DISCHARGE SUMMARY
"[facility]  Discharge Summary - Medicine & Pediatrics       Date of Admission:  4/4/2025  Date of Discharge:  4/11/2025  Discharging Provider: Dr. Lizzie Wang  Discharge Service: Medicine Service, JUAN TEAM 2    Discharge Diagnoses   Peripheral artery disease s/p LE angiogram with popliteal DVT s/p angioplasty and thrombectomy c/b pseudoaneurysm formation  R pelvic hematoma while on heparin gtt   Acute blood loss anemia, resolved   UTI d/t E. Faecalis with +BCx, resolved s/p 1 day IV Zosyn   CHARLINE on CKD, resolved   Postoperative ileus, partial  GERD  T2DM c/b diabetic neuropathy   HTN     Clinically Significant Risk Factors     # DMII: A1C = 8.0 % (Ref range: <5.7 %) within past 6 months  # Overweight: Estimated body mass index is 28.19 kg/m  as calculated from the following:    Height as of this encounter: 1.727 m (5' 8\").    Weight as of this encounter: 84.1 kg (185 lb 6.5 oz).       Follow-ups Needed After Discharge   Follow-up Appointments       Hospital Follow-up with Existing Primary Care Provider (PCP)          Schedule Primary Care visit within: 14 Days   Recommended labs and Imaging (to be ordered by Primary Care Provider): CBC, BMP, Hgb A1c           Please recheck CBC, BMP, Hgb A1c. Please ensure continuing Plavix and Apixaban.     Unresulted Labs Ordered in the Past 30 Days of this Admission       Date and Time Order Name Status Description    4/10/2025  1:00 AM Blood Culture Arm, Left Preliminary     4/10/2025  1:00 AM Blood Culture Arm, Right Preliminary     4/9/2025  7:27 AM Blood Culture Hand, Right Preliminary     4/9/2025  7:27 AM Blood Culture Hand, Left Preliminary     4/8/2025  4:06 PM Blood Culture Hand, Right Preliminary         These results will be followed up by primary care.     Discharge Disposition   Discharged to home  Condition at discharge: Stable    Hospital Course   Earle Rene was admitted on 4/4/2025 for popliteal DVT as advised " by vascular surgery now s/p LE angiogram with thrombectomy. Course complicated by pseudoaneurysm, now closed, and spontaneous R groin hematoma.  The following problems were addressed during his hospitalization:    Peripheral artery disease s/p LE angiogram with popliteal DVT s/p angioplasty and thrombectomy c/b pseudoaneurysm formation  Pt with h/o HTN and T2DM with long standing bilateral LE pain, follows with IR outpatient and found to have aramis class 4 chronic limb ischemia.   Angiogram on 02/04 showing R multifocal stenosis at various levels with occluded dorsalis pedis, not requiring intervention. Left LE angiogram with multifocal lesions requiring 3 x  drug-coated balloons  (TP trunk, proximal posterior tibial artery and distal superficial femoral artery/popliteal artery) with patent dorsalis pedis. Pt presented with ongoing significant pain, POD#2 of BLE angiogram and was found to have a thrombus in distal popliteal artery and he was subsequently admitted to the hospital.   IR performed left lower extremity angiogram with angioplasty and thrombectomy on 4/4/2025. Procedure was complicated by extravasation of the DAJA, and L groin hematoma. Neurovascularly intact. Dorsalis pedis pulses are dopplerable.  Repeat ultrasound on 4/6 was notable for incidentally found right pelvis hematoma measuring 3 x 1.6 x 4.9 cm.  Pseudoaneurysm closed on 4/7.   Pt started on Plavix 75mg and Apixaban 2.5mg BID once stabilized from a bleeding and hemodynamics perspective. Will have close follow up with IR outpatient for continued cares.     R pelvic hematoma while on heparin gtt   Acute blood loss anemia, resolved   Patient was admitted with a hemoglobin of 14.5. Decreased to a nia of 10, found to have Rt pelvic hematoma on US spontaneously following proceduralizing and heparin gtt. Further dropped to 8.9 on 4/7. Remained vitally stable and did not require blood products throughout remainder of stay, able to safely begin  oral DAPT.     UTI d/t E. Faecalis with +BCx, resolved s/p 1 day IV Zosyn   CHARLINE on CKD, resolved   UA with large blood but o/w fairly unremarkable. However, cx with >100k Enterococcous and with systemic symptoms including chills, repeat fevers, elevated HRs in 90s, and CHARLINE. BCx x2 drawn and IV Zosyn ordered. Lactate normal. BCx x2 positive for GPCs x2 on day 1 of diagnosis, returned as. E. Faecalis on UCx and BCx.   Antimicrobial stewardship recommended narrowing to Ampicillin 2g q8h. Following repeat negative blood cultures over two days, pt was transitioned to oral Amoxicillin 500mg TID to continue for full 14d course. Discharged to home in improved condition.     Postoperative ileus, partial  Abdominal pain and bloating postoperatively with decreased stool output prompted abdominal CT and KUB showing question of ileus. Given that pt was not vomiting and passing small amounts of stool still, transitioned to liquid diet and ordered Dulcolax suppository x1. Pt with improvement in symptoms following these interventions and able to tolerate soft foods without worsening abdominal pain, distension, or emesis prior to discharging to home. Will move to more solid diet with daughter assisting/keeping track of symptoms.     GERD  Some waxing and waning abdominal pain ultimately did also resolved with GI cocktail in addition to the above treatment. With ongoing burping and LUQ discomfort in addition to lower abdominal pain, treated with GI cocktail with resolution of GERD like symptoms. Discharged to home with prescription for same.     T2DM c/b diabetic neuropathy   Pt with long standing T2DM, last HbA1c of 8% (2/11/25). Stable while inpatient on insulin. Restarted on home medications at discharge.     HTN   BP well controlled with PTA Losartan and Amlodipine during admission.     Consultations This Hospital Stay   PHARMACY IP CONSULT  INTERVENTIONAL RADIOLOGY ADULT/PEDS IP CONSULT  PHARMACY IP CONSULT  PHARMACY IP  CONSULT  NURSING TO CONSULT FOR VASCULAR ACCESS CARE IP CONSULT  CARE MANAGEMENT / SOCIAL WORK IP CONSULT  PHYSICAL THERAPY ADULT IP CONSULT  WOUND OSTOMY CONTINENCE NURSE  IP CONSULT  OCCUPATIONAL THERAPY ADULT IP CONSULT  PHARMACY TO DOSE VANCO    Code Status   Full Code       The patient was discussed with MD [facility]  [facility] MED SURG  [address]  [place] [address]  Phone: [phone]  ______________________________________________________________________    Physical Exam   Vital Signs: Temp: 97.2  F (36.2  C) Temp src: Axillary BP: 139/70 Pulse: 81   Resp: 24 SpO2: 100 % O2 Device: None (Room air)    Weight: 185 lbs 6.51 oz  Physical Exam  Vitals and nursing note reviewed.   Constitutional:       Appearance: Normal appearance. He is not ill-appearing.   HENT:      Head: Normocephalic.      Mouth/Throat:      Mouth: Mucous membranes are moist.   Cardiovascular:      Rate and Rhythm: Normal rate and regular rhythm.      Pulses: Normal pulses.      Heart sounds: Normal heart sounds.   Pulmonary:      Effort: Pulmonary effort is normal.      Breath sounds: Normal breath sounds.   Abdominal:      General: Abdomen is flat. Bowel sounds are normal. There is no distension.      Palpations: Abdomen is soft.      Tenderness: There is no abdominal tenderness. There is no guarding or rebound.   Musculoskeletal:         General: Normal range of motion.      Cervical back: Normal range of motion.      Right lower leg: No edema.      Left lower leg: No edema.   Skin:     General: Skin is warm and dry.      Comments: Chronic venous stasis changes to BLE   Neurological:      General: No focal deficit present.      Mental Status: He is alert and oriented to person, place, and time.   Psychiatric:         Mood and Affect: Mood normal.         Behavior: Behavior normal.             Primary Care Physician   Lizzie Wang    Discharge Orders      Primary Care - Care  Coordination Referral      Physical Therapy  Referral      Reason for your hospital stay    You were hospitalized for a left lower extremity clot that was found on angiogram 4/2/2025 by interventional radiology. You underwent repeat angiogram with thrombectomy (removal of the clot) inpatient but procedure was complicated by bruising along the left groin with resulting hematoma requiring prolonged hospital stay. Additionally, a pseudoaneurysm (outpouching of the vessel) was found on follow up ultrasound which interventional radiology injected medication into to clot off and treat. This was successful.   While here, you receive opioids for pain control and were followed closely by interventional radiology with repeat ultrasounds to evaluate your legs following your procedure. These demonstrated resolution of the pseudoaneurysm addressed following initial procedure. Your left groin hematoma resolved with time and rest.   Unfortunately, you were diagnosed with a urinary tract infection following fevering with chills after intermittent catheter use following your procedure. Your initial blood cultures did grow bacteria, showing that there was a blood stream infection alongside the urinary tract infection. For this, you were treated with IV antibiotics. Repeat blood cultures showed full resolution of the blood stream infection following IV antibiotic initiation. Because of this, you were able to safely move to oral antibiotics and discharge to home with close follow up with primary care, interventional radiology, and physical therapy.   In addition to the above, you experienced difficulties with moving your bowels during your hospitalization due to prolonged rest and opioid medications for pain control. You had nausea that we treated with medication, and had you take in a liquid diet to rest your bowels due to this difficulty with bowel movements. With these supportive measures, your abdominal discomfort  improved. You are being discharged to home with instructions to follow a soft diet (no meats, raw vegetables, hard foods, large volume of solid food) for the first day and transition to a more solid diet as tolerated. If you experience nausea, constipation, worsened abdominal pain please move back down to a soft diet and contact your clinic or come to the emergency department.   For your bowel movements and abdominal pain, you are being prescribed Maalox (GI cocktail) for indigestion and abdominal pain in addition to Zofran to take as needed for nausea. Please continue to move around and exercise as you are able to and hydrate well.   For your urinary tract infection, you will need to continue oral Amoxicillin 500mg three times a day (5AM, 1PM, and 9PM) until 5AM on 4/16 for your full course of treatment.   We have sent a referral to physical therapy to help you get stronger after your hospitalization.     Activity    Your activity upon discharge: activity as tolerated and activity as tolerated and no driving for today     Tubes and Drains    Current Tubes and Drains:     Other  Duration           Right Groin Interventional Procedure Access 7 days        Line  Duration           Left Groin Interventional Procedure Access 7 days              When to contact your care team    Call your primary doctor if you have any of the following: temperature greater than 100.4 or less than 98,  increased shortness of breath, increased swelling, or increased pain.     Discharge Instructions    Please continue medications as written at discharge.   Please exercise as you are able to tolerate. If you choose to go to the gym, please go with family or friends the first time to ensure safety.   On day one after hospitalization, it is important that you follow a soft diet. You can expand your diet to harder or more solid foods if after 24 hours you are tolerating a soft diet without increased abdominal pain, nausea, or constipation.      Walker Order for DME - ONLY FOR DME     Diet    Follow this diet upon discharge: Current Diet:Orders Placed This Encounter      Snacks/Supplements Adult: Ensure Enlive; With Meals      Room Service      Full Liquid Diet Thin Liquids (level 0)  Please transition to soft diet at home and escalate to more solid foods as able. Back off from solid foods with increased nausea, constipation.     Hospital Follow-up with Existing Primary Care Provider (PCP)            Significant Results and Procedures   Results for orders placed or performed during the hospital encounter of 04/04/25   IR Lower Extremity Stent/Atherectomy/PTA    Narrative    PROCEDURE: Lower extremity angiography and interventions    Procedural Personnel  Attending physician(s): BEVERLY LOPEZ MD  Fellow physician(s): Ye Wright MD  Resident physician(s): None  Advanced practice provider(s): None    Procedure Date (mm/dd/yyyy): 4/5/2025    Pre-procedure diagnosis:  Acute occlusion of left popliteal artery  Post-procedure diagnosis: Same  Procedure urgency: Urgent  Indication: Thrombosis  Additional clinical history: POD#2 of BLE angiogram and LLE  angioplasty. Was found to have acute occlusion of left popliteal  artery.    Complications: Left groin hematoma.      Impression    IMPRESSION:    Angiography of the left lower extremity demonstrated filling defects  in the distal popliteal artery, most likely acute thrombus. Aspiration  thrombectomy was performed. Improved flow in the popliteal artery and  TP trunk.     Plan:     1. Bed rest for 6 hour(s).    2. Admit patient for observation.  _______________________________________________________________    PROCEDURE SUMMARY:  - Arterial access with ultrasound guidance  - Unilateral lower extremity angiography as described below  - Arterial interventions as described below  - Additional procedure(s): None    PROCEDURE DETAILS:    Pre-procedure  Consent: Informed consent for the procedure including  risks, benefits  and alternatives was obtained and time-out was performed prior to the  procedure.  Preparation: The site was prepared and draped using maximal sterile  barrier technique including cutaneous antisepsis.    Anesthesia/sedation  Level of anesthesia/sedation: No sedation  Anesthesia/sedation administered by: Independent trained observer  under attending supervision with continuous monitoring of the  patient?s level of consciousness and physiologic status  Total intra-service sedation time (minutes): 0    Access #1  Local anesthesia was administered. Vascular access was obtained and a  3 [place] sheath was placed.   Access guidance: The vessel was sonographically evaluated and judged  to be patent. Real time ultrasound was used to visualize needle entry  into  the vessel and a permanent image was stored  Largest sheath size ([place]):  3   Laterality: Right  Access direction: Retrograde  Vessel accessed: Femoral artery  Access technique: Micropuncture set with 21 gauge needle    This access was abandoned and due to excessive overlying soft tissue.  Pressure was held for 15 minutes after removal of the 3 [place] sheath.  Hemostasis was achieved. Sterile dressing applied.    Access #2  Local anesthesia was administered. Vascular access was obtained and a  7 [place] sheath was placed.   Access guidance: The vessel was sonographically evaluated and judged  to be patent. Real time ultrasound was used to visualize needle entry  into  the vessel and a permanent image was stored  Largest sheath size ([place]):  7   Laterality: Left  Access direction: Antegrade  Vessel accessed: Superficial femoral artery  Access technique: Micropuncture set with 21 gauge needle    Left lower extremity angiography and interventions  The left lower extremity arterial system was catheterized using a  combination of MICHELLE 1 and glide advantage wire.     Vessel catheterized: Left superficial femoral artery  Findings: Similar appearance of  flow flow-limiting dissection of the  distal SFA/proximal popliteal artery. Complete occlusion distal  popliteal artery.     ~Lesion  ~Lesion number: 1  ~Artery segment of target lesion: Popliteal artery below the knee  (center of the knee joint to origin of the anterior tibial)  ~Lesion length (cm): NA  ~Percent stenosis (%): 100  ~Technical details: Filling defects in the popliteal artery, likely  acute thrombus  ~Treatment aborted or incomplete: No    ~~Angioplasty  ~~Angioplasty balloon type: Angioplasty balloon  ~~Angioplasty balloon: [place]  ~~Balloon length (mm): 100  ~~Balloon diameter (mm): 3    ~~Thrombectomy  ~~Thrombotic intervention: Aspiration thrombectomy  ~~Thrombotic intervention device: Penumbra, [place] 6  ~~Distal protection device: No  ~~Intraprocedural Thrombolytic agent: tPA  ~~Intraprocedural Thrombolytic dose: 2 mg  ~~Fibrinogen monitoring: No    Attempt to recanalize TP trunk, posterior tibial artery and anterior  tibial artery using a 0.018 NaviCross catheter and [place] advantage  wire was not successful and complicated by dissections.    ~Angiography  ~Post-intervention angiography: Improved flow in the popliteal  artery. Persistent occlusion of the TP trunk. The foot was supplied by  posterior tibial artery and reconstituted anterior tibial artery at  the end of the procedure.    Intravascular ultrasound  Vessel imaged: None  Indication for IVUS: Not applicable  Findings: Not applicable    Intraprocedural Anticoagulation  Anticoagulation agent: Heparin  Dose: 80852  Dosing method: Fixed  Anticoagulation monitoring: ACT    Closure  Access site angiography performed: No  Findings: NA  Arterial closure technique: Manual compression  Hemostasis achieved from closure technique: Yes  Duration of manual compression (minutes): 35    Contrast  Contrast agent: Visipaque 320  Contrast volume (mL): 100    Radiation Dose  Fluoroscopy time (minutes): 39.2  Reference air kerma (mGy): 566  Kerma area  product (uGy-m2): 29080    Additional Details  Additional description of procedure: None  Acute procedural success: Yes  Registry event: A/0/e  Device used: None  Equipment details: None  Unique Device Identifiers: Not available  Specimens removed: None  Estimated blood loss (mL): 101-200  Standardized report: [email or web address]_v3.1    Attestation  Signer name: BEVERLY LOPEZ MD  I, DR BEVERLY LOPEZ, attest that I was present in the room for the  entire procedure. I reviewed the stored images and agree with the  report as written.    I have personally reviewed the examination and initial interpretation  and I agree with the findings.    BEVERLY LOPEZ MD         SYSTEM ID:  [id number]   US Lower Extremity Arterial Duplex Bilateral    Narrative    Exam: Duplex ultrasound of bilateral lower extremity arteries dated  4/5/2025 9:00 AM     Clinical information: post LLE angiogram on 4/4. Please obtain through  both feet. Also c/f L groin hematoma, had R groin access     Comparison: 4/4/2025    Technique: Grayscale (B-mode), color Doppler, and duplex spectral  Doppler ultrasound of the lower extremity arteries. Velocity  measurements obtained with angle correction of 60 degrees or less.    Ordering provider: Dr. Crouch    Findings:    Right lower extremity:     Common femoral artery: Velocity: 94 cm/sec. Waveforms: Triphasic  Profunda femoral artery: Velocity: 87 cm/sec. Waveforms: Biphasic  Proximal SFA: Velocity: 96 cm/sec. Waveforms: Biphasic  Mid SFA:Velocity:  72 cm/sec. Waveforms: Triphasic  Distal SFA: Velocity: 82 cm/sec. Waveforms: Triphasic    Popliteal artery: Velocity: 61 cm/sec. Waveforms: Biphasic    PTA ankle: Velocity: 37 cm/sec. Waveforms: Triphasic  DAJA ankle: Velocity: 30 cm/sec. Waveforms: Triphasic  DPA: 8 cm/s. Waveforms: Monophasic.    Left lower extremity:    Left groin bruise: 1.5 x 1.0 x 1.5 cm with to and fro flow originating  from the left CFA. Neck measures 9  mm.    Common femoral artery: Velocity: 84 cm/sec. Waveforms: Triphasic  Deep femoral artery: Velocity: 42 cm/sec. Waveforms: Biphasic  Proximal SFA: Velocity: 143 cm/sec. Waveforms: Triphasic  Mid SFA: Velocity: 89 cm/sec. Waveforms: Triphasic  Distal SFA: Velocity: 58 cm/sec. Waveforms: Triphasic    Popliteal artery proximal: Velocity: 78 cm/sec. Waveforms: Triphasic  Popliteal artery distal: 83 cm/s. Biphasic waveforms.    PTA ankle: Velocity: 8 cm/sec. Waveforms: Monophasic  DAJA ankle: Velocity: 17 cm/sec. Waveforms: Monophasic      Impression    Impression:     1. Right leg: Patent arterial vasculature of the right leg without  hemodynamically significant stenosis.    2. Left leg: Patent arterial vasculature of the left leg without  hemodynamically significant stenosis.    3. 1.5 cm pseudoaneurysm arising from the left CFA.    Guidelines:    [facility] duplex criteria for lower limb arterial  occlusive disease    Percent stenosis:     Normal (1-19%): Peak systolic velocity (cm/s): <150, End-diastolic  velocity (cm/s): <40, Velocity ratio (Vr): <1.5, Distal arterial  waveform: Triphasic    20-49%: Peak systolic velocity (cm/s): 150-200, End-diastolic velocity  (cm/s): <40, Velocity ratio (Vr): 1.5-2.0, Distal arterial waveform:  Triphasic    50-75%: Peak systolic velocity (cm/s): 200-300, End-diastolic velocity  (cm/s): <90, Velocity ratio (Vr): 2.0-3.9, Distal arterial waveform:  Poststenotic turbulence distal to stenosis, monophasic distal waveform    >75%: Peak systolic velocity (cm/s): >300, End-diastolic velocity  (cm/s): <90, Velocity ratio (Vr): >4.0, Distal arterial waveform:  Dampened distal waveform and low PSV/EDV* in the stenosis    Occlusion: Absent flow by color Doppler/pulsed Doppler spectral  analysis; length of occlusion estimated from distance between exit and  reentry collateral arteries    *PSV = peak systolic velocity, EDV = end-diastolic  velocity  http://link.hammer.com/chapter/10.1007/[email or web address]_23/fulltext  html    I have personally reviewed the examination and initial interpretation  and I agree with the findings.    PIERCE RUIZ MD         SYSTEM ID:  [id number]   IR Pseudoaneurysm Injection    Narrative    PROCEDURE: Left groin pseudoaneurysm thrombin injection    Procedural Personnel  Attending physician(s): Kenrick Ramon   Fellow physician(s): Magalie Dugan  Resident physician(s): None  Advanced practice provider(s): None    Procedure Date (mm/dd/yyyy): 4/7/2025    Pre-procedure diagnosis: Pseudoaneurysm  Post-procedure diagnosis: Same  Indication:   Additional clinical history: None    Complications: No immediate complications.      Impression    IMPRESSION:  1. Ultrasound-guided compression was performed prior to thrombin  injection for 30 minutes. Pseudoaneurysm did not close after  compression  2. Pre-procedural ultrasound demonstrates patent pseudoaneurysm with  narrow neck appropriate for thrombin injection.  3. 600U thrombin injected into the pseudoaneurysm  4. Post-thrombin injection ultrasound demonstrates no flow within the  pseudoaneurysm with little flow in the neck.    Plan:   -Bedrest with left leg straight (END 1000AM 4/6)  -Very low intensity heparin ggt start at 2200 4/5  -ASA 81 mg daily start tomorrow AM  -Arterial ultrasound of the left lower extremity tomorrow AM    _______________________________________________________________    PROCEDURE SUMMARY:  1.Ultrasound guided thrombin injection    PROCEDURE DETAILS:    Pre-procedure  Consent: Informed consent for the procedure including risks, benefits  and alternatives was obtained and time-out was performed prior to the  procedure.  Preparation: The site was prepared and draped using maximal sterile  barrier technique including cutaneous antisepsis.    Anesthesia/sedation  Level of anesthesia/sedation: No sedation  Anesthesia/sedation administered by: Not  applicable  Total intra-service sedation time (minutes): 0    Procedure  The pseudoaneurysm and neck were visualized with ultrasound.  Representative images were saved. 1% lidocaine was administered for  local anesthesia. A 22-g needle was advanced into the pseudoaneurysm  adjacent to the neck. A total of 600U thrombin was injected around  into the pseudoaneurysm. Post-injection, there was patent neck without  flow within the pseudoaneurysm. Needle was removed, and sterile  dressing placed. No immediate complications.       Additional Details  Additional description of procedure: None  Acute procedural success: Yes  Registry event: V/3/g  Device used: None  Equipment details: None  Unique Device Identifiers: Not available  Specimens removed: None  Estimated blood loss (mL): Less than 10      Attestation  Signer name: BEVERLY LOPEZ MD  I, DR BEVERLY LOPEZ, attest that I was present in the room for the  entire procedure. I reviewed the stored images and agree with the  report as written.      I have personally reviewed the examination and initial interpretation  and I agree with the findings.    BEVERLY LOPEZ MD         SYSTEM ID:  [id number]   US Lower Extremity Arterial Duplex Left    Narrative    Exam: US LOWER EXTREMITY ARTERIAL DUPLEX LEFT, 4/6/2025 11:22 AM    Indication: post LLE clinton 4/4, L CFA PSA s/p thrombin 4/5. Please  obtain PSA and also through PTA/DAJA/DPA    Comparison: Ultrasound 4/4/2025. CT 3/7/2025    Findings:   Grayscale, color Doppler and spectral Doppler assessment of the left  lower extremity arteries. Redemonstration of pseudoaneurysm arising  from the left common femoral artery measuring 1.4 x 0.9 x 1.2 cm,  previously 1.5 x 1.0 x 1.5 cm. No associated flow is demonstrated  internally. The neck of the pseudoaneurysm contains flow and measures  up to 0.9 cm in length. The left lower extremity arteries are patent  throughout with multiphasic waveforms to the level of the  popliteal  artery. Redemonstration of monophasic waveforms in the posterior  tibial, anterior tibial and dorsalis pedis arteries with blunted  systolic upstroke.    Incidentally noted is a 3.0 x 1.6 x 4.9 cm hypoechoic and avascular  collection in the left pelvic region.      Impression    Impression:     1. Slightly decreased size of the thrombosed pseudoaneurysm arising  from the left common femoral artery measuring up to 1.4 cm, previously  1.5 cm.    2. Incidentally noted is a 3.0 x 1.6 x 4.9 cm hypoechoic and avascular  collection in the superficial soft tissues of the left inguinal  region. Findings could represent a hematoma/seroma. This was not  definitively identified on 4/5/2025 ultrasound.    I have personally reviewed the examination and initial interpretation  and I agree with the findings.    PIERCE RUIZ MD         SYSTEM ID:  [id number]   US Lower Extremity Arterial Duplex Left    Narrative    ULTRASOUND LOWER EXTREMITY ARTERIAL DUPLEX LEFT 4/7/2025 11:33 AM    CLINICAL HISTORY: Left Groin PSA, s/p ALI, assess Lower Ext vessels.  Angiography and embolectomy 4/4/2025. Left common femoral  pseudoaneurysm thrombin injection 4/5/2025.    COMPARISONS: Angiography and intervention 4/4/2025. Ultrasound post  vascular access lower extremity duplex 4/7/2025.    REFERRING PROVIDER: BEVERLY LOPEZ    TECHNIQUE: Left leg arteries evaluated with color Doppler and spectral  pulsed wave Doppler ultrasound.    FINDINGS:   LEFT:  Common femoral artery: 111/0 cm/s, multiphasic  Profundus femoral artery: 74/0 cm/s, multiphasic    Superficial femoral artery, origin: 82/0 cm/s, multiphasic  Superficial femoral artery, mid: 92/0 cm/s, multiphasic  Superficial femoral artery, distal: 98/0 cm/s, multiphasic    Popliteal artery: 60/0 cm/s, multiphasic    Posterior tibial artery, ankle: 27/10 cm/s, tardus parvus (previously  8/4 cm/s)  Anterior tibial artery, ankle: 51/10 cm/s, monophasic (previously 17/8  cm/s)       Impression    IMPRESSION:   1. Left posterior tibial artery disease.    2. Improved Dopplerable flow in the left posterior and anterior tibial  arteries at the ankle.    VIVIAN HAMMONDS MD         SYSTEM ID:  [id number]   Us Post Vascular Access Low Ext Duplex    Narrative    Exam: US POST VASCULAR ACCESS LOW EXT DUPLEX, 4/7/2025 11:43 AM    Indication: follow up PSA    Comparison: 4/5/2025    Technique: Grayscale, color Doppler, and spectral images of the left  common femoral artery and vein at the site of pseudoaneurysm.    Findings:   3.6 x 2.4 x 1.6 cm pseudoaneurysm without flow on color or power  Doppler. Normal triphasic waveform in the left common femoral artery  above the pseudoaneurysm. Normal multiphasic wave form in the left  superficial femoral artery below the pseudoaneurysm. Patent  superficial femoral and common femoral veins above and below the  pseudoaneurysm with normal waveforms.      Impression    Impression:   Thrombosed left common femoral pseudoaneurysm. Patent common femoral  and superficial femoral artery and vein in the left lower extremity.    I have personally reviewed the examination and initial interpretation  and I agree with the findings.    BEVERLY LOPEZ MD         SYSTEM ID:  [id number]   US Renal Complete w Arterial Duplex    Narrative    ULTRASOUND RENAL COMPLETE WITH DOPPLER 4/8/2025 8:52 AM    CLINICAL HISTORY: Patient with rapidly reising scr with new hematuria  and PAD hx. assessing to see if there is any stenosis.      COMPARISONS: CTA runoff 3/7/2025.    ORDERING PROVIDER: ISAURA PERKINS    TECHNIQUE: B-mode (grayscale) and duplex Doppler evaluation of the  abdominal aorta and renal arteries performed. Velocity measurements  obtained with angle correction at or less than 60 degrees.     FINDINGS:    AORTA:       Suprarenal: obscured       Infrarenal: obscured    RIGHT KIDNEY: Poorly visualized.       Renal artery: Obscured. Not visualized.         Renal artery - aortic ratio:  N/A         Renal vein: not visualized.         Length: not visualized cm         Cortical thickness: not visualized cm         Segmental artery resistive index (superior / mid / inferior): not  visualized / not visualized / 0.85         Parenchyma: not visualized    LEFT KIDNEY: Poorly visualized.       Renal artery:            Origin: obscured             Mid: obscured            Hilum: 79/11 cm/s         Renal artery - aortic ratio: N/A         Renal vein: 24 cm/s         Length: 11.8 cm         Cortical thickness: 1.2 cm         Segmental artery resistive index (superior / mid / inferior):  0.87 / 0.84 / 0.87         Parenchyma: poorly visualized.    Bladder: Distended.      Impression    IMPRESSION:   1. Non diagnostic study. Renal artery stenosis unable to be evaluated.      2. Elevated visualized segmental artery resistive indices suggest  medical renal disease.    3. Non diagnostic visualization of bilateral kidneys.    Guidelines:  Diagnostic criteria suggestive of > 60% diameter stenosis  Renal artery:       Peak systolic velocity > 180 cm/s       RAR > 3.5       Turbulent flow    Arcuate/Segmental artery Resistive Index Normal < 0.7    VIVIAN HAMMONDS MD         SYSTEM ID:  [id number]   US Abdomen Limited w Abdomen Doppler Limited    Narrative    EXAMINATION: US ABDOMEN LIMITED W ABDOMEN DOPPLER LIMITED 4/8/2025  8:44 PM     COMPARISON: 6/6/2017    HISTORY: new fever and tachycardia, RUQ tenderness, ams c/f sepsis    TECHNIQUE: The abdomen was scanned in standard fashion with  specialized ultrasound transducer(s) using both gray-scale, color  Doppler, and spectral flow techniques.    Findings:  Liver: The liver demonstrates normal homogeneous echotexture. No  evidence of a focal hepatic mass. Homogenous echogenicity anterior to  the right lobe of liver, (image [id number])    Extrahepatic portal vein flow is antegrade at 62 cm/s.  Right portal vein flow is antegrade, measuring 20 cm/s.  Left portal vein flow is  antegrade, measuring 19 cm/s.    Flow in the hepatic artery is towards the liver and:  110 cm/s peak systolic  0.70 resistive index.     The splenic vein is patent and flow is towards the liver.  The left,  middle, and right hepatic veins are patent with flow towards the IVC.  The IVC is patent with flow towards the heart.   The visualized aorta  is not dilated.    Gallbladder: There is no wall thickening, pericholecystic fluid,  positive sonographic Soto's sign or evidence for cholelithiasis    Bile Ducts: The common bile duct is not seen.    Pancreas: Not visualized.    Right kidney: Not seen    Fluid: No evidence of ascites or pleural effusions.      Impression    Impression:     Technically limited study with nonvisualization of the pancreas, right  kidney common bile duct    1. Patent hepatic vasculature with Doppler evaluation.  2. Increased homogenous echogenicity along the anterior right lobe of  liver, uncertain if it represents intraperitoneal fat versus less  likely acute hematoma, recommend noncontrast CT abdomen for further  evaluation.    Recommendation for noncontrast CT discussed with ordering provider by  hermelinda at 10:00 PM 4/8/2025 10:02 PM    I have personally reviewed the examination and initial interpretation  and I agree with the findings.    CLAIRE SABA MD         SYSTEM ID:  [id number]   CT Abdomen Pelvis w/o Contrast    Narrative    EXAMINATION: CT ABDOMEN/PELVIS  W/O CONTRAST, 4/8/2025 10:39 PM    TECHNIQUE:  Helical CT images from the lung bases through the pubic  symphysis were obtained  without IV contrast.     COMPARISON: Same date ultrasound, CT 6/6/2017    HISTORY: new RUQ tenderness AMS, US inconclusive, c/f possible  hematoma    FINDINGS:    Abdomen and pelvis: Unremarkable non-contrast appearance of the liver,  gallbladder, adrenal glands, and urinary bladder. Moderate fatty  infiltration in the pancreas. Splenomegaly at 13 cm craniocaudal  dimension. Prostatomegaly. Persistent  contrast nephrograms with  non-specific perinephric stranding. No hydronephrosis. Contrast within  the large bowel. Mild diffuse distention of small bowel loops likely  represent ileus. No free air or fluid in the abdomen. Scattered  atherosclerotic calcifications of the abdominal arteries and branches.    Lung bases:  Coronary calcifications. Patchy bibasilar atelectasis. No  focal consolidations. Scattered sub-4 mm pulmonary nodules in the  visualized lung bases.    Bones and soft tissues: No acute or suspicious osseous abnormalities.  Chronic deformity of the L4 vertebral body. There is a subcutaneous  soft tissue thickening of the lower abdomen, indeterminate and  possibly secondary to chronic subcutaneous injections. Fat stranding  along the groins from prior vascular access, without discrete  collections.      Impression    IMPRESSION:   1. No intraperitoneal hematoma.   2. Subcutaneous fat stranding and small amount of ill-defined  hemorrhagic products along the groins (left>right) from prior vascular  access, no distinct hematoma identified within the field of view.  Further dedicated CT imaging of the left upper thigh can be considered  if clinically warranted.  3. Persistent contrast nephrogram is suggestive of renal dysfunction.   4.  Prostatomegaly.  5. Scattered sub-4 mm pulmonary nodules in the visualized lung bases.  Recommend attention on follow-up.    I have personally reviewed the examination and initial interpretation  and I agree with the findings.    CRYSTAL RAMACHANDRAN MD         SYSTEM ID:  [id number]   XR Abdomen Port 1 View    Narrative    EXAMINATION:  XR ABDOMEN PORT 1 VIEW 4/10/2025 11:42 AM     COMPARISON: CT abdomen and pelvis 4/8/2025.    HISTORY: Worsening abdominal pain with new nausea and vomiting.    FINDINGS: Portable AP supine view. Many air-filled loops of small  bowel at the upper limits of normal. Air noted with nondilated colon.  No pneumatosis or portal venous gas. Degenerative  changes of the hips  and vascular calcifications.      Impression    IMPRESSION: Many air-filled loops of small bowel that are mildly  dilated/at the upper limits of normal, suggestive of ileus.    I have personally reviewed the examination and initial interpretation  and I agree with the findings.    IVORY MILLER MD         SYSTEM ID:  [id number]     *Note: Due to a large number of results and/or encounters for the requested time period, some results have not been displayed. A complete set of results can be found in Results Review.       Discharge Medications   Discharge Medication List as of 4/11/2025  1:37 PM        START taking these medications    Details   acetaminophen (TYLENOL) 325 MG tablet Take 2 tablets (650 mg) by mouth every 6 hours as needed for mild pain., Disp-240 tablet, R-0, E-Prescribe      alum & mag hydroxide-simethicone (MAALOX) 200-200-20 MG/5ML SUSP suspension Take 30 mLs by mouth every 4 hours as needed for indigestion., Disp-355 mL, R-0, E-Prescribe      methocarbamol 1000 MG TABS Take 1,000 mg by mouth 3 times daily as needed for muscle spasms., Disp-45 tablet, R-0, E-Prescribe      ondansetron (ZOFRAN ODT) 4 MG ODT tab Take 1 tablet (4 mg) by mouth every 6 hours as needed for nausea., Disp-30 tablet, R-0, E-Prescribe      polyethylene glycol (MIRALAX) 17 GM/Dose powder Take 17 g by mouth daily as needed for constipation., Disp-510 g, R-0, E-Prescribe           CONTINUE these medications which have CHANGED    Details   amoxicillin (AMOXIL) 500 MG capsule Take 1 capsule (500 mg) by mouth every 8 hours for 11 days., Disp-33 capsule, R-0, E-Prescribe           CONTINUE these medications which have NOT CHANGED    Details   alpha-lipoic acid 600 MG capsule Take 1 capsule (600 mg) by mouth daily, Disp-90 capsule,R-3, E-Prescribe      amLODIPine (NORVASC) 5 MG tablet Take 1 tablet by mouth daily., Historical      apixaban ANTICOAGULANT (ELIQUIS ANTICOAGULANT) 2.5 MG tablet Take 1 tablet (2.5 mg)  by mouth 2 times daily., Disp-60 tablet, R-2, E-Prescribe      ARTIFICIAL TEAR OP Apply to eye as needed., Historical      atorvastatin (LIPITOR) 20 MG tablet TAKE 1 TABLET BY MOUTH EVERY DAY, Disp-90 tablet, R-1, E-PrescribeDX Code Needed  REFILLS FOR REQUESTED PER PATIENT.      blood glucose (NO BRAND SPECIFIED) lancets standard Lancets that go with device, Test 3 times dailyDisp-300 each, [id number]      clopidogrel (PLAVIX) 75 MG tablet Take 1 tablet (75 mg) by mouth daily., Disp-30 tablet, R-0, E-Prescribe      Continuous Glucose Sensor (FREESTYLE PETER 2 SENSOR) MISC 1 each every 14 days. 1 each every 14 days. Change every 14 days., Disp-2 each, R-5, E-Prescribe      econazole nitrate 1 % external cream Apply topically daily. To feet and toenails.Disp-85 g, [id number]      empagliflozin (JARDIANCE) 10 MG TABS tablet TAKE 1 TABLET (10 MG) BY MOUTH DAILY., Disp-90 tablet, R-2, E-Prescribe      famotidine (PEPCID) 20 MG tablet TAKE 1 TABLET BY MOUTH EVERY DAY, Disp-90 tablet, R-1, E-Prescribe      gabapentin (NEURONTIN) 300 MG capsule Take 1 capsule (300 mg) by mouth 2 times daily., Disp-60 capsule, R-2, E-Prescribe      insulin degludec (TRESIBA FLEXTOUCH) 100 UNIT/ML pen INJECT 50 UNITS SUBCUTANEOUSLY AT BEDTIME., Disp-45 mL, R-3, E-Prescribe      insulin pen needle (B-D U/F) 31G X 5 MM miscellaneous Use 4 time(s) per day.  Please dispense as BD Pen Needle Mini U/F 31G x 5 MMDisp-360 each, W-0Y-Brleiowll      loratadine (CLARITIN) 10 MG tablet Take 1 tablet (10 mg) by mouth daily, Disp-90 tablet, R-0, E-Prescribe      losartan (COZAAR) 100 MG tablet Take 1 tablet (100 mg) by mouth at bedtime, Disp-90 tablet, R-3, E-Prescribe      mirabegron (MYRBETRIQ) 25 MG 24 hr tablet TAKE 1 TABLET BY MOUTH EVERY DAY, Disp-90 tablet, R-1, E-PrescribeHave placed 90 with 1 refill Please remind patient to call and make an appointment within 6 months with urology in order for us to continue to prescribe this medication  in the future.      sodium bicarbonate 650 MG tablet Take 2 tablets (1,300 mg) by mouth 2 times daily, Disp-270 tablet, R-3, E-Prescribe      tamsulosin (FLOMAX) 0.4 MG capsule Take 2 capsules (0.8 mg) by mouth daily. For more refills,schedule an appointment at [phone], Disp-180 capsule, R-0, E-Prescribe      tirzepatide (MOUNJARO) 5 MG/0.5ML SOAJ auto-injector pen Inject 0.5 mLs (5 mg) subcutaneously once a week., Disp-2 mL, R-11, E-Prescribe      triamcinolone (KENALOG) 0.1 % external cream Apply topically 2 times dailyDisp-30 g, [id number]      vitamin C 250 MG tablet Take 250 mg by mouth daily., Historical      VITAMIN D3 25 MCG (1000 UT) tablet TAKE 2 TABLETS (50 MCG) BY MOUTH DAILY, Disp-180 tablet, R-3, E-Prescribe           Allergies   No Known Allergies

## 2025-04-11 NOTE — PLAN OF CARE
Goal Outcome Evaluation:      Plan of Care Reviewed With: patient    Overall Patient Progress: no changeOverall Patient Progress: no change     Pt and son at bedside stated that pt will leave AMA possibly today with wife due to Pass[place]. Son expressed confusion between POC from team and requested to speak with night coverage. Writer discussed plan with pt and son instead, reiterating that lab results are pending and if pt is to discharge soon, it will be with oral abx. Son understands plan and will reiterate plan with pt's wife to prevent AMA from occurring. Educated pt on infection and how a UTI can progress to septic shock.     Pt agreed to do suppository 1x, PRN placed again. Still no BM and c/o of abd pain. Updated night coverage and asked if interventions are needed at this time due to concerns for ileus on abd xray.

## 2025-04-11 NOTE — PLAN OF CARE
"Goal Outcome Evaluation: [id number]       Plan of Care Reviewed With: patient, child    Overall Patient Progress: improvingOverall Patient Progress: improving     Discharge to: Home   Transportation: Family   Time:1pm  Prescriptions: [facility] [place]   Belongings: personal clothing items   -if applicable return home meds from inpatient pharmacy: n/a  Access: piv removed  Care plan and education discontinued: yes  Paperwork:  AVS printed and reviewed.     A&OX4. VSS on RA. Pt continues report abd pain, MD notified added Maalox. Switched to PO abx. Groin hematoma, [place] CDI. Liquid diet, tolerated late breakfast. Up ind to bathroom.  Discharged home with daughter and wife.       /70   Pulse 81   Temp 97.2  F (36.2  C) (Axillary)   Resp 24   Ht 1.727 m (5' 8\")   Wt 84.1 kg (185 lb 6.5 oz)   SpO2 100%   BMI 28.19 kg/m     "

## 2025-04-12 ENCOUNTER — TELEPHONE (OUTPATIENT)
Dept: FAMILY MEDICINE | Facility: CLINIC | Age: 75
End: 2025-04-12
Payer: COMMERCIAL

## 2025-04-12 LAB
BACTERIA BLD CULT: ABNORMAL
BACTERIA BLD CULT: ABNORMAL

## 2025-04-13 ENCOUNTER — TELEPHONE (OUTPATIENT)
Dept: FAMILY MEDICINE | Facility: CLINIC | Age: 75
End: 2025-04-13
Payer: COMMERCIAL

## 2025-04-14 ENCOUNTER — TELEPHONE (OUTPATIENT)
Dept: VASCULAR SURGERY | Facility: CLINIC | Age: 75
End: 2025-04-14
Payer: COMMERCIAL

## 2025-04-14 ENCOUNTER — DOCUMENTATION ONLY (OUTPATIENT)
Dept: INTERNAL MEDICINE | Facility: CLINIC | Age: 75
End: 2025-04-14
Payer: COMMERCIAL

## 2025-04-14 ENCOUNTER — MEDICAL CORRESPONDENCE (OUTPATIENT)
Dept: HEALTH INFORMATION MANAGEMENT | Facility: CLINIC | Age: 75
End: 2025-04-14
Payer: COMMERCIAL

## 2025-04-14 LAB
BACTERIA BLD CULT: NO GROWTH
BACTERIA BLD CULT: NO GROWTH

## 2025-04-14 NOTE — TELEPHONE ENCOUNTER
Called pt to fup on him s/p hospital discharge.     Inquired on how he is doing post hospital discharge and that Dr. Ramon did keep me updated on his hospital stay.    Informed him that I do have some follow up appts that I made for him and that I also wanted to relay to him.     Rn line provided for him to return my call.         Mayda JUÁREZ RN, BSN  Interventional Radiology/Vascular  Nurse Coordinator  Phone: [phone], option 2

## 2025-04-14 NOTE — PROGRESS NOTES
Type of Form Received: [facility]: Statement of Certifying Physician [id number]     Form Received (Date) 4/14/25   Form Filled out Yes, faxed 4/14/25   Placed in provider folder Yes

## 2025-04-15 ENCOUNTER — THERAPY VISIT (OUTPATIENT)
Dept: PHYSICAL THERAPY | Facility: CLINIC | Age: 75
End: 2025-04-15
Payer: COMMERCIAL

## 2025-04-15 ENCOUNTER — PATIENT OUTREACH (OUTPATIENT)
Dept: CARE COORDINATION | Facility: CLINIC | Age: 75
End: 2025-04-15
Payer: COMMERCIAL

## 2025-04-15 DIAGNOSIS — I70.202 POPLITEAL ARTERY OCCLUSION, LEFT: ICD-10-CM

## 2025-04-15 DIAGNOSIS — M79.2 POLYNEUROPATHIC PAIN: ICD-10-CM

## 2025-04-15 DIAGNOSIS — G89.29 CHRONIC BILATERAL LOW BACK PAIN WITH BILATERAL SCIATICA: ICD-10-CM

## 2025-04-15 DIAGNOSIS — M54.42 CHRONIC BILATERAL LOW BACK PAIN WITH BILATERAL SCIATICA: ICD-10-CM

## 2025-04-15 DIAGNOSIS — I73.9 PAD (PERIPHERAL ARTERY DISEASE): ICD-10-CM

## 2025-04-15 DIAGNOSIS — E11.42 DIABETIC POLYNEUROPATHY ASSOCIATED WITH TYPE 2 DIABETES MELLITUS (H): ICD-10-CM

## 2025-04-15 DIAGNOSIS — M54.41 CHRONIC BILATERAL LOW BACK PAIN WITH BILATERAL SCIATICA: ICD-10-CM

## 2025-04-15 LAB
BACTERIA BLD CULT: NO GROWTH
BACTERIA BLD CULT: NO GROWTH

## 2025-04-15 PROCEDURE — 97116 GAIT TRAINING THERAPY: CPT | Mod: GP | Performed by: PHYSICAL THERAPIST

## 2025-04-15 PROCEDURE — 97110 THERAPEUTIC EXERCISES: CPT | Mod: GP | Performed by: PHYSICAL THERAPIST

## 2025-04-15 PROCEDURE — 97161 PT EVAL LOW COMPLEX 20 MIN: CPT | Mod: GP | Performed by: PHYSICAL THERAPIST

## 2025-04-15 NOTE — PROGRESS NOTES
Clinic Care Coordination Contact  [facility]/Voicemail    Clinical Data: Care Coordinator Outreach    Outreach Documentation Number of Outreach Attempt   4/15/2025   9:34 AM 1       Left message on patient's voicemail with call back information and requested return call.      Plan: Care Coordinator will try to reach patient again in 1-2 business days.    EDWAYNE NICOLAS RN on 4/15/2025 at 9:34 AM    Addendum:  Pt had hospital follow up call with IR and pt also had PT appointment yesterday which was the goal for inpatient care management at discharge. Pt has appropriate hospital follow ups scheduled. RN will not complete further hospital follow up call due to these things at this time. Pt does have clinic number from  yesterday if patient has any further needs.     DEWAYNE NICOLAS RN on 4/16/2025 at 9:55 AM

## 2025-04-15 NOTE — PROGRESS NOTES
PHYSICAL THERAPY EVALUATION  Type of Visit: Evaluation       Fall Risk Screen:  Have you fallen and had an injury in the past year?: No      Subjective         Presenting condition or subjective complaint: block  Date of onset: 04/04/25    Relevant medical history: Diabetes on insulin  Dates & types of surgery: angiplasty on my both legs 10 and 13 days ago    Prior diagnostic imaging/testing results: MRI; CT scan; EMG     Prior therapy history for the same diagnosis, illness or injury: No      Prior Level of Function  Transfers: Independent  Ambulation: Independent  ADL: Independent  IADL: Driving, Work    Living Environment  Social support: With a significant other or spouse   Type of home: House; 2-story   Stairs to enter the home: Yes 2     Ramp: No   Stairs inside the home: Yes 7 Is there a railing: Yes     Help at home: Home management tasks (cooking, cleaning); Medication and/or finances; Assist for driving and community activities  Equipment owned: Four-point cane; Walker     Employment: Not Applicable   per patient return to rug store.    Hobbies/Interests: reading work    Patient goals for therapy: walk sleep move better/ return to work/ stand more.      Pain assessment:  4 to 5 L LEG.      Objective      Cognitive Status Examination  Orientation: Oriented to person, place and time   Level of Consciousness: Alert  Follows Commands and Answers Questions: 75% of the time  Personal Safety and Judgement: Intact  Memory:  unknown; dtr assists    OBSERVATION: favors left leg w/ gait  INTEGUMENTARY: Impaired  POSTURE:  looking down, forw head  PALPATION: na  RANGE OF MOTION:  L ankle to neutral only DF  STRENGTH:  moves limbs ag gravity but pain L LEG    BED MOBILITY:  NT    TRANSFERS: SBA    WHEELCHAIR MOBILITY: na    GAIT:   Level of [place]: SBA  Assistive Device(s): Cane (single end), Walking poles  Gait Deviations: Antalgic  Gait Distance: 180ft in 1.5 min        BALANCE:  cannot SLS    See daily note  for obj measures    SENSATION:  absent bottom L foot  COORDINATION: poor on L ankle  MUSCLE TONE: WFL        Assessment & Plan   CLINICAL IMPRESSIONS  Medical Diagnosis: PAD, recent hospital stay    Treatment Diagnosis: decreased force production   Impression/Assessment: Patient is a 75 year old male with pain, rom/ strength and gait difficulty; cannot work complaints.  The following significant findings have been identified: Pain, Decreased ROM/flexibility, Decreased strength, Edema, Impaired gait, Impaired muscle performance, and Decreased activity tolerance. These impairments interfere with their ability to perform self care tasks, work tasks, recreational activities, household chores, driving , household mobility, and community mobility as compared to previous level of function.     Clinical Decision Making (Complexity):  Clinical Presentation: Stable/Uncomplicated  Clinical Presentation Rationale: based on medical and personal factors listed in PT evaluation  Clinical Decision Making (Complexity): Low complexity    PLAN OF CARE  Treatment Interventions:  Interventions: Gait Training, Neuromuscular Re-education, Therapeutic Activity, Therapeutic Exercise, Self-Care/Home Management    Long Term Goals     PT Goal 1  Goal Identifier: leg strength  Goal Description: 5x STS to improve to 16 sec or less to show better leg strength  Target Date: 07/13/25  PT Goal 2  Goal Identifier: gait  Goal Description: pt to walk 10 M walk in 1.0 m/s w/ or w/out device for improved gait quality  Target Date: 07/13/25  PT Goal 3  Goal Identifier: HEP  Goal Description: pt to be indep w/ a HEP to decrease pain in L leg by 50% and improve gait as above  Goal Progress: t+88      Frequency of Treatment: up to 8x in  Duration of Treatment: 90 days    Recommended Referrals to Other Professionals: na but doctor f/up  Education Assessment:   Learner/Method: Patient;Family;Significant Other;Listening;Demonstration;Pictures/Video;No Barriers  to Learning  Education Comments: see below    Risks and benefits of evaluation/treatment have been explained.   Patient/Family/caregiver agrees with Plan of Care.     Evaluation Time:     PT Eval, Low Complexity Minutes (57331): 16       Signing Clinician: Sola Gutiérrez PT        [facility]                                                                                   OUTPATIENT PHYSICAL THERAPY      PLAN OF TREATMENT FOR OUTPATIENT REHABILITATION   Patient's Last Name, First Name, Earle Hernandez    YOB: 1950   Provider's Name   [facility]   Medical Record No.  [id number]     Onset Date: 04/04/25  Start of Care Date: 04/15/25     Medical Diagnosis:  PAD, recent hospital stay      PT Treatment Diagnosis:  decreased force production Plan of Treatment  Frequency/Duration: up to 8x in/ 90 days    Certification date from 04/15/25 to 07/13/25         See note for plan of treatment details and functional goals     Sola Gutiérrez PT                         I CERTIFY THE NEED FOR THESE SERVICES FURNISHED UNDER        THIS PLAN OF TREATMENT AND WHILE UNDER MY CARE     (Physician attestation of this document indicates review and certification of the therapy plan).              Referring Provider:  Lizzie Wang    Initial Assessment  See Epic Evaluation- Start of Care Date: 04/15/25

## 2025-04-16 ENCOUNTER — TELEPHONE (OUTPATIENT)
Dept: VASCULAR SURGERY | Facility: CLINIC | Age: 75
End: 2025-04-16
Payer: COMMERCIAL

## 2025-04-16 NOTE — TELEPHONE ENCOUNTER
Called pt again to fup on him post discharge from the hospital.     He states that he's been in pain.   He states that it starts knee down to foot; then from bottom of foot-has a lot of pain on the bottom of his foot.    Very sharp pain    He is able to walk but not very far at all.   He did go to physical therapy yesterday and now walks with a cane.    He has been taking medications that has been prescribed.     Plavix  Eliquis-he is taking these medications daily      Swelling in the left ankle and toes  Coloring of toes are normal-    Inquired if he can add warm moist heat and advised on this method.     Informed him that I'll send a msg to  to see what other thoughts he may have.     *will fup with pt.     *called pt back -inquired if he can get a sooner ultrasound prior to his appt with Dr. Ramon next week Thurs 4/24.     Informed him that Dr. Ramon would like for an updated US to further assess.     We did update his US to Fri 4/18. Will fup on results and get back to him. Informed him that I will also follow-up with Dr. Ramon.           Mayda JUÁREZ RN, BSN  Interventional Radiology/Vascular  Nurse Coordinator  Phone: [phone], option 2

## 2025-04-18 ENCOUNTER — ANCILLARY PROCEDURE (OUTPATIENT)
Dept: ULTRASOUND IMAGING | Facility: CLINIC | Age: 75
End: 2025-04-18
Attending: STUDENT IN AN ORGANIZED HEALTH CARE EDUCATION/TRAINING PROGRAM
Payer: COMMERCIAL

## 2025-04-18 DIAGNOSIS — I82.492 ACUTE EMBOLISM AND THROMBOSIS OF OTHER SPECIFIED DEEP VEIN OF LEFT LOWER EXTREMITY (H): ICD-10-CM

## 2025-04-18 DIAGNOSIS — I70.213 ATHEROSCLEROSIS OF NATIVE ARTERY OF BOTH LOWER EXTREMITIES WITH INTERMITTENT CLAUDICATION: ICD-10-CM

## 2025-04-18 PROCEDURE — 93926 LOWER EXTREMITY STUDY: CPT | Mod: LT | Performed by: STUDENT IN AN ORGANIZED HEALTH CARE EDUCATION/TRAINING PROGRAM

## 2025-04-21 ENCOUNTER — LAB (OUTPATIENT)
Dept: LAB | Facility: CLINIC | Age: 75
End: 2025-04-21
Payer: COMMERCIAL

## 2025-04-21 DIAGNOSIS — Z79.4 TYPE 2 DIABETES MELLITUS WITH BOTH EYES AFFECTED BY MILD NONPROLIFERATIVE RETINOPATHY AND MACULAR EDEMA, WITH LONG-TERM CURRENT USE OF INSULIN (H): ICD-10-CM

## 2025-04-21 DIAGNOSIS — N18.30 STAGE 3 CHRONIC KIDNEY DISEASE, UNSPECIFIED WHETHER STAGE 3A OR 3B CKD (H): ICD-10-CM

## 2025-04-21 DIAGNOSIS — E11.3213 TYPE 2 DIABETES MELLITUS WITH BOTH EYES AFFECTED BY MILD NONPROLIFERATIVE RETINOPATHY AND MACULAR EDEMA, WITH LONG-TERM CURRENT USE OF INSULIN (H): ICD-10-CM

## 2025-04-21 LAB
BASOPHILS # BLD AUTO: 0.1 10E3/UL (ref 0–0.2)
BASOPHILS NFR BLD AUTO: 1 %
EOSINOPHIL # BLD AUTO: 0.3 10E3/UL (ref 0–0.7)
EOSINOPHIL NFR BLD AUTO: 4 %
ERYTHROCYTE [DISTWIDTH] IN BLOOD BY AUTOMATED COUNT: 14.5 % (ref 10–15)
EST. AVERAGE GLUCOSE BLD GHB EST-MCNC: 148 MG/DL
HBA1C MFR BLD: 6.8 % (ref 0–5.6)
HCT VFR BLD AUTO: 34.3 % (ref 40–53)
HGB BLD-MCNC: 11.2 G/DL (ref 13.3–17.7)
IMM GRANULOCYTES # BLD: 0 10E3/UL
IMM GRANULOCYTES NFR BLD: 1 %
LYMPHOCYTES # BLD AUTO: 2.9 10E3/UL (ref 0.8–5.3)
LYMPHOCYTES NFR BLD AUTO: 47 %
MCH RBC QN AUTO: 31.4 PG (ref 26.5–33)
MCHC RBC AUTO-ENTMCNC: 32.7 G/DL (ref 31.5–36.5)
MCV RBC AUTO: 96 FL (ref 78–100)
MONOCYTES # BLD AUTO: 0.6 10E3/UL (ref 0–1.3)
MONOCYTES NFR BLD AUTO: 9 %
NEUTROPHILS # BLD AUTO: 2.3 10E3/UL (ref 1.6–8.3)
NEUTROPHILS NFR BLD AUTO: 38 %
PLATELET # BLD AUTO: 386 10E3/UL (ref 150–450)
RBC # BLD AUTO: 3.57 10E6/UL (ref 4.4–5.9)
WBC # BLD AUTO: 6.2 10E3/UL (ref 4–11)

## 2025-04-21 PROCEDURE — 85025 COMPLETE CBC W/AUTO DIFF WBC: CPT

## 2025-04-21 PROCEDURE — 36415 COLL VENOUS BLD VENIPUNCTURE: CPT

## 2025-04-21 PROCEDURE — 83036 HEMOGLOBIN GLYCOSYLATED A1C: CPT

## 2025-04-21 PROCEDURE — 80053 COMPREHEN METABOLIC PANEL: CPT

## 2025-04-22 LAB
ALBUMIN SERPL BCG-MCNC: 4.2 G/DL (ref 3.5–5.2)
ALP SERPL-CCNC: 117 U/L (ref 40–150)
ALT SERPL W P-5'-P-CCNC: 30 U/L (ref 0–70)
ANION GAP SERPL CALCULATED.3IONS-SCNC: 14 MMOL/L (ref 7–15)
AST SERPL W P-5'-P-CCNC: 30 U/L (ref 0–45)
BILIRUB SERPL-MCNC: 0.4 MG/DL
BUN SERPL-MCNC: 44.2 MG/DL (ref 8–23)
CALCIUM SERPL-MCNC: 9.7 MG/DL (ref 8.8–10.4)
CHLORIDE SERPL-SCNC: 107 MMOL/L (ref 98–107)
CREAT SERPL-MCNC: 2.1 MG/DL (ref 0.67–1.17)
EGFRCR SERPLBLD CKD-EPI 2021: 32 ML/MIN/1.73M2
GLUCOSE SERPL-MCNC: 77 MG/DL (ref 70–99)
HCO3 SERPL-SCNC: 18 MMOL/L (ref 22–29)
POTASSIUM SERPL-SCNC: 4.9 MMOL/L (ref 3.4–5.3)
PROT SERPL-MCNC: 7.4 G/DL (ref 6.4–8.3)
SODIUM SERPL-SCNC: 139 MMOL/L (ref 135–145)

## 2025-05-05 DIAGNOSIS — N18.32 CHRONIC KIDNEY DISEASE, STAGE 3B (H): Primary | ICD-10-CM

## 2025-05-06 ENCOUNTER — TELEPHONE (OUTPATIENT)
Dept: NEPHROLOGY | Facility: CLINIC | Age: 75
End: 2025-05-06

## 2025-05-06 ENCOUNTER — ANCILLARY PROCEDURE (OUTPATIENT)
Dept: ULTRASOUND IMAGING | Facility: CLINIC | Age: 75
End: 2025-05-06
Attending: STUDENT IN AN ORGANIZED HEALTH CARE EDUCATION/TRAINING PROGRAM
Payer: COMMERCIAL

## 2025-05-06 ENCOUNTER — OFFICE VISIT (OUTPATIENT)
Dept: INTERNAL MEDICINE | Facility: CLINIC | Age: 75
End: 2025-05-06
Payer: COMMERCIAL

## 2025-05-06 VITALS
RESPIRATION RATE: 18 BRPM | OXYGEN SATURATION: 100 % | DIASTOLIC BLOOD PRESSURE: 71 MMHG | HEART RATE: 79 BPM | SYSTOLIC BLOOD PRESSURE: 106 MMHG | WEIGHT: 172.2 LBS | BODY MASS INDEX: 26.1 KG/M2 | HEIGHT: 68 IN | TEMPERATURE: 97.4 F

## 2025-05-06 DIAGNOSIS — I73.9 PAD (PERIPHERAL ARTERY DISEASE): ICD-10-CM

## 2025-05-06 DIAGNOSIS — E11.42 DIABETIC POLYNEUROPATHY ASSOCIATED WITH TYPE 2 DIABETES MELLITUS (H): Primary | ICD-10-CM

## 2025-05-06 DIAGNOSIS — I82.492 ACUTE EMBOLISM AND THROMBOSIS OF OTHER SPECIFIED DEEP VEIN OF LEFT LOWER EXTREMITY (H): ICD-10-CM

## 2025-05-06 DIAGNOSIS — I70.213 ATHEROSCLEROSIS OF NATIVE ARTERY OF BOTH LOWER EXTREMITIES WITH INTERMITTENT CLAUDICATION: ICD-10-CM

## 2025-05-06 DIAGNOSIS — Z79.4 TYPE 2 DIABETES MELLITUS WITH DIABETIC NEPHROPATHY, WITH LONG-TERM CURRENT USE OF INSULIN (H): ICD-10-CM

## 2025-05-06 DIAGNOSIS — E11.21 TYPE 2 DIABETES MELLITUS WITH DIABETIC NEPHROPATHY, WITH LONG-TERM CURRENT USE OF INSULIN (H): ICD-10-CM

## 2025-05-06 DIAGNOSIS — M79.2 POLYNEUROPATHIC PAIN: ICD-10-CM

## 2025-05-06 PROCEDURE — 93926 LOWER EXTREMITY STUDY: CPT | Mod: LT | Performed by: RADIOLOGY

## 2025-05-06 PROCEDURE — 93922 UPR/L XTREMITY ART 2 LEVELS: CPT | Mod: GC | Performed by: RADIOLOGY

## 2025-05-06 RX ORDER — CLOPIDOGREL BISULFATE 75 MG/1
75 TABLET ORAL DAILY
Qty: 30 TABLET | Refills: 0 | Status: SHIPPED | OUTPATIENT
Start: 2025-05-06

## 2025-05-06 RX ORDER — LOSARTAN POTASSIUM 100 MG/1
100 TABLET ORAL AT BEDTIME
Qty: 90 TABLET | Refills: 3 | Status: SHIPPED | OUTPATIENT
Start: 2025-05-06

## 2025-05-06 RX ORDER — GABAPENTIN 300 MG/1
600 CAPSULE ORAL 2 TIMES DAILY
Qty: 60 CAPSULE | Refills: 2 | Status: SHIPPED | OUTPATIENT
Start: 2025-05-06

## 2025-05-06 NOTE — PROGRESS NOTES
"  Assessment & Plan   Earle Rene is a 75 year old male with a PMH of Type 2 diabetes, hypertension, peripheral artery disease, CKD3, and diabetic neuropathy coming in for follow up on medications and LLE pain today. I fortunately was able to take care Mr. Rene while he was inpatient recently; he is much improved from his hospitalization but continues to have what on exam and hx sounds like chronic polyneuropathic pain c/b his recent LE angiogram and DVT. He is working with PT currently and still following with IR, will be seeing Dr. Ramon tomorrow for follow up. US performed prior to clinic today for IR.     (E11.42) Diabetic polyneuropathy associated with type 2 diabetes mellitus (H)  (primary encounter diagnosis)  Comment: Chronic in nature, appears to have worsened since last OV; was present during hospitalization. Currently managed on Gabapentin 300mg BID with prn APAP 500mg as needed throughout the day, 1-2x per day per pt. Sensation mildly decreased on exam, pt jumping intermittently with \"shocks\" of pain while in room. Pulses intact bilaterally as below, foot and leg exam improved from prior while inpatient in terms of pain, skin changes.   Pending US per IR, will f/up with Dr. Ramon tomorrow.   Increase Gabapentin to 600mg BID; pt declined TID dosing. Recommended scheduled APAP 650mg QID (written down for pt) and have written for Diclofenac gel 4g TID prn as well to assist with pain mgmt. Will CTM. He is not a candidate for NSAIDs given renal disease and was hesitant to even increase Gabapentin today. If this does not improve his pain, would consider SNRI for alterative therapy.   Plan: gabapentin (NEURONTIN) 300 MG capsule,         diclofenac (VOLTAREN) 1 % topical gel    (M79.2) Polyneuropathic pain  Comment: See above.  Plan: gabapentin (NEURONTIN) 300 MG capsule    (I73.9) PAD (peripheral artery disease)  Comment: See above, continue Apixaban and Cloidogrel. Refilled today for Earle. Following " "up with IR tomorrow.   Plan: diclofenac (VOLTAREN) 1 % topical gel    (E11.21,  Z79.4) Type 2 diabetes mellitus with diabetic nephropathy, with long-term current use of insulin (H)  Comment: Recheck in three months.   Plan: losartan (COZAAR) 100 MG tablet    (I70.213) Atherosclerosis of native artery of both lower extremities with intermittent claudication  Plan: apixaban ANTICOAGULANT (ELIQUIS ANTICOAGULANT)         2.5 MG tablet, clopidogrel (PLAVIX) 75 MG         tablet    (I82.492) Acute embolism and thrombosis of other specified deep vein of left lower extremity (H)  Plan: clopidogrel (PLAVIX) 75 MG tablet       MED REC REQUIRED  Post Medication Reconciliation Status: discharge medications reconciled and changed, per note/orders  BMI  Estimated body mass index is 26.4 kg/m  as calculated from the following:    Height as of this encounter: 1.72 m (5' 7.72\").    Weight as of this encounter: 78.1 kg (172 lb 3.2 oz).         Follow-up  Return in about 2 months (around 7/6/2025) for Follow up, with me on medications, pain, blood pressure.    Subjective   Earle is a 75 year old, presenting for the following health issues:  Follow Up (Left foot still hurts, takes tylenol to help ) and Recheck Medication      5/6/2025     8:34 AM   Additional Questions   Roomed by      PATT  Due to the patient's arrival time, assigned questionnaires were unable to be completed.     Leg pain - Peripheral artery disease s/p LE angiogram with popliteal DVT s/p angioplasty and thrombectomy c/b pseudoaneurysm formation; h/o polyneuropathy; PAD  --> +Plavix, Eliquis per IR, taking   Reviewed recent hospitalization together today. Earle still dealing with LE pain described as shooting \"electrical\" pain which he had a few episodes of while we were in the room together. This existed prior to his procedure but has persisted since that time and he thought that it was supposed to go away entirely, so is confused as to why it exists still. Has " "been taking Tylenol intermittently, 500mg at a time, in addition to his scheduled 300mg gabapentin BID which mildly relieves his pain and does allow him to sleep. Is taking Plavix and Eliquis without new bruising or bleeding, has not missed any doses.     Continues to work with PT outpatient, has two more visits left. Was able to show me the exercises that he is doing at home per their instruction.     Ultrasounds completed today at order of IR, will see Dr. Ramon in clinic tomorrow for f/up.     Medication questions   Earle wondering about his medications, wanted to make sure he is taking everything that he is supposed to and taking them correctly. He mostly knows his medications by color rather than name which makes it slightly difficult to review together, but on printing him a list and going through their uses one by one, we were able to review. He confirmed needing refills for his Losartan, Apixaban, Eliquis. Earle sets out his own medications currently, denies confusion or trouble with doing so.         Objective    /71 (BP Location: Left arm, Patient Position: Sitting, Cuff Size: Adult Regular)   Pulse 79   Temp 97.4  F (36.3  C) (Oral)   Resp 18   Ht 1.72 m (5' 7.72\")   Wt 78.1 kg (172 lb 3.2 oz)   SpO2 100%   BMI 26.40 kg/m    Body mass index is 26.4 kg/m .    GENERAL: alert and no distress  RESP: lungs clear to auscultation - no rales, rhonchi or wheezes  CV: RRR, nl S1/S2.   ABDOMEN: Soft, NTTP, ND, BS+  MS: LE warm and well perfused with DP.PT pulses present on palpation this morning. He has no open wounds on my exam but still with some bruising over site of prior pseudoaneurysm, improved from while inpatient. Continues to have pain along medial LLE down through the dorsum of his left foot. Sensation decreased to light touch.    NEURO: No focal deficits. Strength bilaterally slightly decreased in LE.     Office Visit on 04/25/2025   Component Date Value Ref Range Status    Color Urine " 04/25/2025 Light Yellow  Colorless, Straw, Light Yellow, Yellow Final    Appearance Urine 04/25/2025 Clear  Clear Final    Glucose Urine 04/25/2025 500 (A)  Negative mg/dL Final    Bilirubin Urine 04/25/2025 Negative  Negative Final    Ketones Urine 04/25/2025 Negative  Negative mg/dL Final    Specific Gravity Urine 04/25/2025 1.022  1.003 - 1.035 Final    Blood Urine 04/25/2025 Negative  Negative Final    pH Urine 04/25/2025 5.5  5.0 - 7.0 Final    Protein Albumin Urine 04/25/2025 100 (A)  Negative mg/dL Final    Urobilinogen Urine 04/25/2025 Normal  Normal mg/dL Final    Nitrite Urine 04/25/2025 Negative  Negative Final    Leukocyte Esterase Urine 04/25/2025 Negative  Negative Final    Mucus Urine 04/25/2025 Present (A)  None Seen /LPF Final    RBC Urine 04/25/2025 <1  <=2 /HPF Final    WBC Urine 04/25/2025 1  <=5 /HPF Final    Squamous Epithelials Urine 04/25/2025 <1  <=1 /HPF Final     US pending at time of note      Lizzie Wang MD  Internal Medicine Resident, PGY1  [facility]  While the patient was in clinic, I reviewed the pertinent medical history and results.  I discussed the current findings on physical examination, as well as the patient s diagnosis and treatment plan with the resident and agree with the information as documented with the following exceptions: none.  Yoshi Warren MD

## 2025-05-06 NOTE — PATIENT INSTRUCTIONS
It was alex to see you today, Earle.     Please continue to take your blood pressure medications as prescribed. I refilled your Losartan for you today, which you take at night.     I have refilled your Plavix and Eliquis that you need to continue to take every day.     We agreed to increase the dose of your Gabapentin from 300mg twice daily to 600mg twice daily, continue with tylenol at home 650mg every 6 hours as needed, and added Diclofenac gel to use up to four times daily as needed over your legs.     It is very important that you continue with physical therapy and going to the gym as you are able three times a week or four times a week.     I will see you again in 2-3 months for a check in. Enjoy the warming weather, and I look forward to seeing you again soon.    Dr. Lizzie Wang (Primary Care)

## 2025-05-07 ENCOUNTER — THERAPY VISIT (OUTPATIENT)
Dept: PHYSICAL THERAPY | Facility: CLINIC | Age: 75
End: 2025-05-07
Payer: COMMERCIAL

## 2025-05-07 DIAGNOSIS — I73.9 PAD (PERIPHERAL ARTERY DISEASE): ICD-10-CM

## 2025-05-07 DIAGNOSIS — I70.202 POPLITEAL ARTERY OCCLUSION, LEFT: Primary | ICD-10-CM

## 2025-05-07 PROCEDURE — 97110 THERAPEUTIC EXERCISES: CPT | Mod: GP | Performed by: PHYSICAL THERAPIST

## 2025-05-08 ENCOUNTER — OFFICE VISIT (OUTPATIENT)
Dept: VASCULAR SURGERY | Facility: CLINIC | Age: 75
End: 2025-05-08
Payer: COMMERCIAL

## 2025-05-08 VITALS
BODY MASS INDEX: 26.37 KG/M2 | DIASTOLIC BLOOD PRESSURE: 60 MMHG | OXYGEN SATURATION: 100 % | HEART RATE: 69 BPM | WEIGHT: 172 LBS | SYSTOLIC BLOOD PRESSURE: 128 MMHG

## 2025-05-08 DIAGNOSIS — I70.213 ATHEROSCLEROSIS OF NATIVE ARTERY OF BOTH LOWER EXTREMITIES WITH INTERMITTENT CLAUDICATION: ICD-10-CM

## 2025-05-08 DIAGNOSIS — I82.519 CHRONIC DEEP VEIN THROMBOSIS (DVT) OF FEMORAL VEIN, UNSPECIFIED LATERALITY (H): ICD-10-CM

## 2025-05-08 DIAGNOSIS — I82.4Z2 ACUTE VENOUS EMBOLISM AND THROMBOSIS OF DEEP VESSELS OF DISTAL END OF LEFT LOWER EXTREMITY (H): ICD-10-CM

## 2025-05-08 DIAGNOSIS — I73.9 PAD (PERIPHERAL ARTERY DISEASE): Primary | ICD-10-CM

## 2025-05-08 DIAGNOSIS — I82.492 ACUTE EMBOLISM AND THROMBOSIS OF OTHER SPECIFIED DEEP VEIN OF LEFT LOWER EXTREMITY (H): ICD-10-CM

## 2025-05-08 ASSESSMENT — PAIN SCALES - GENERAL: PAINLEVEL_OUTOF10: MILD PAIN (3)

## 2025-05-08 NOTE — NURSING NOTE
Chief Complaint   Patient presents with    Follow Up     5/8/2025 visit with Kenrick Ramon MD for RETURN VASCULAR PATIENT - 1 mos fup appt only-post angiogram Ok per Mayda Y       Vitals were taken and medications reconciled.    Lester Liu, Visit Facilitator  10:10 AM

## 2025-05-08 NOTE — PROGRESS NOTES
INTERVENTIONAL RADIOLOGY CONSULTATION    Name: Earle Rene  Age: 75 year old   Referring Physician: Dr. Ramon   REASON FOR REFERRAL: follow up s/p RLE angio     HPI:   Earle Rene is a 75 year old male with a past medical history significant for PAD, DVT, T2DM s/p LLE angiogram c/b acute limb ischemia likely 2/2 not taking Plavix and apixaban and left medial lower thigh pseudoaneurysm requiring further intervention. He presents today for follow up of this angiogram from 4/4/25. He does describe significant improvement in his pain since the procedure, but he still has some claudication which he rates at a 2-3/10. He also reports some difficulty in moving the toes on his left foot with some minimal movement and subsequent difficulty with ambulation. He has no appreciable sensation to light touch or deep pressure to the right toes. The patient reports that he has been consistent with taking his medications, including his blood thinner. He has also been taking his gabapentin, which has decreased his pain. The patient has been going to the gym more and using the strider. Prior angiogram showed opening of TP, PTA, and DAJA, on the left leg, but more recent doppler from 5/6/25 indicated that the tibioperoneal trunk occlusion persists.   PT & AT pulses are monophasic with doppler.    PAST MEDICAL HISTORY:   Past Medical History:   Diagnosis Date    Blepharitis of both eyes     BPH (benign prostatic hyperplasia)     Diabetes (H)     Diabetic neuropathy (H)     Diabetic retinopathy associated with diabetes mellitus due to underlying condition (H)     Dry eye syndrome     GERD (gastroesophageal reflux disease)     Goiter     Granulomatous disease (H)     HLD (hyperlipidemia)     HTN (hypertension)     Nonsenile cataract     Peripheral neuropathy        PAST SURGICAL HISTORY:   Past Surgical History:   Procedure Laterality Date    ------------OTHER-------------      back of neck abscess drainage in OR    AS RAD RESEC  TONSIL/PILLARS Bilateral 1961    CATARACT IOL, RT/LT Left     COLONOSCOPY  7/29/2013    Procedure: COLONOSCOPY;;  Surgeon: Montana Pascal MD;  Location: [facility]    EXCISE MASS UPPER EXTREMITY Right 11/11/2019    Procedure: EXCISION, MASS, UPPER EXTREMITY, RIGHT SHOULDER;  Surgeon: Johana Choudhury MD;  [facility]    INJECT EPIDURAL LUMBAR Left 4/20/2023    Procedure: INJECTION, SPINE, LUMBAR, EPIDURAL (L4-L5);  Surgeon: Mahamed Vázquez MD;  Location: [facility]    INJECT SACROILIAC JOINT Bilateral 9/13/2022    Procedure: Bilateral sacroiliac joint injection;  Surgeon: Collin Mata MD;  Location: [facility]    INTRAVITREAL INJECTION CHEMOTHERAPY Right 12/30/2019    Procedure: INTRAVITREAL Bevacizumab injection;  Surgeon: Milton Maki MD;  [facility]    IR LOWER EXTREMITY ANGIOGRAM LEFT  4/2/2025    IR LOWER EXTREMITY STENT/ATHERECTOMY/PTA  4/4/2025    IR PSEUDOANEURYSM INJECTION  4/5/2025    PHACOEMULSIFICATION CLEAR CORNEA WITH STANDARD INTRAOCULAR LENS IMPLANT Right 12/30/2019    Procedure: PHACOEMULSIFICATION, CATARACT, WITH INTRAOCULAR LENS IMPLANT;  Surgeon: Milton Maki MD;  [facility]    PHACOEMULSIFICATION WITH STANDARD INTRAOCULAR LENS IMPLANT Left 6/21/2019    Procedure: Left Eye Cataract Removal with Intraocular Lens Implant with Intraoperative Avastin Injection;  Surgeon: Lacey Eugene MD;  [facility]    siladenatis  11/2017       FAMILY HISTORY:   Family History   Problem Relation Age of Onset    Diabetes Father     Myocardial Infarction Father     Diabetes Brother     Leukemia Brother 44    Glaucoma No family hx of     Macular Degeneration No family hx of     Kidney Disease No family hx of        SOCIAL HISTORY:   Social History     Tobacco Use    Smoking status: Never    Smokeless tobacco: Never   Substance Use Topics    Alcohol use: Yes     Comment: rare       PROBLEM LIST:   Patient Active Problem List    Diagnosis Date Noted    Acute kidney failure,  unspecified (H) 04/08/2025     Priority: Medium    PAD (peripheral artery disease) 04/04/2025     Priority: Medium    Mastoiditis of left side 02/11/2025     Priority: Medium    Polyneuropathic pain 02/11/2025     Priority: Medium    Lumbar radiculopathy 03/28/2023     Priority: Medium     Added automatically from request for surgery [id number]      Pain of both sacroiliac joints 09/06/2022     Priority: Medium     Added automatically from request for surgery [id number]      Chronic bilateral low back pain with bilateral sciatica 03/18/2022     Priority: Medium    Peripheral vascular disease, unspecified 04/11/2021     Priority: Medium    Malignant neoplasm of prostate (H) 04/11/2021     Priority: Medium    Chronic kidney disease, stage 3 (H) 10/28/2020     Priority: Medium    Combined form of nonsenile cataract of right eye 12/06/2019     Priority: Medium     Added automatically from request for surgery [id number]      Subcutaneous mass 10/03/2019     Priority: Medium     Added automatically from request for surgery [id number]      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) 05/11/2018     Priority: Medium    Hyponatremia 06/27/2017     Priority: Medium    Sialoadenitis 06/27/2017     Priority: Medium     Overview:   Added automatically from request for surgery [id number]      Pain of right thumb 01/07/2016     Priority: Medium    Pain of finger of right hand 01/07/2016     Priority: Medium    Presbyopia 05/11/2015     Priority: Medium    Myopia 05/11/2015     Priority: Medium    Cataracts, bilateral 05/11/2015     Priority: Medium    CTS (carpal tunnel syndrome) 10/24/2014     Priority: Medium    Diabetic polyneuropathy (H) 10/24/2014     Priority: Medium    Hyperlipidemia with target LDL less than 100 01/30/2014     Priority: Medium     Diagnosis updated by automated process. Provider to review and confirm.      Erectile dysfunction 01/18/2012     Priority: Medium     Psychological factors associated with another disorder 11/16/2011     Priority: Medium     Problem list name updated by automated process. Provider to review      Mood disorder in conditions classified elsewhere 11/16/2011     Priority: Medium    Occupational problem 11/16/2011     Priority: Medium    Type 2 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, with long-term current use of insulin (H) 11/16/2011     Priority: Medium    Adhesive capsulitis of shoulder 12/01/2008     Priority: Medium    Colonic polyp 08/23/2007     Priority: Medium     Overview:   Colonoscopy 3/13/07 showed a tublar adenoma in distal sigmoid colon. Needs repeat in 2012.      Elevated PSA 08/23/2007     Priority: Medium     Overview:   Reviewed notes from [facility] from March 2007. PSA 1.32 in August 2006 and 2.2 (27% free) in March 2007.  Recommended f/u in 6 months.      Heel fracture 08/23/2007     Priority: Medium     Overview:   Right heel      Nephrolithiasis 08/23/2007     Priority: Medium     Overview:   1984, 1989      Sarcoidosis of lung 08/23/2007     Priority: Medium     Overview:   Diagnosed by bx in late 1990's.         MEDICATIONS:   Prescription Medications as of 5/8/2025         Rx Number Disp Refills Start End Last Dispensed Date Next Fill Date Owning Pharmacy    acetaminophen (TYLENOL) 325 MG tablet  240 tablet 0 4/11/2025 5/11/2025   [id number] [place], [place] - [address]    Sig: Take 2 tablets (650 mg) by mouth every 6 hours as needed for mild pain.    Class: E-Prescribe    Route: Oral    Cosign for Ordering: Accepted by Peña Julian MD on 4/11/2025 12:51 PM    alpha-lipoic acid 600 MG capsule  90 capsule 3 5/15/2020 --   [id number] [place], [place] - [address]    Sig: Take 1 capsule (600 mg) by mouth daily    Class: E-Prescribe    Route: Oral    alum & mag hydroxide-simethicone (MAALOX) 200-200-20 MG/5ML SUSP suspension  355 mL 0  2025   [facility] - [place], [place] - [address]    Sig: Take 30 mLs by mouth every 4 hours as needed for indigestion.    Class: E-Prescribe    Route: Oral    Cosign for Ordering: Accepted by Peña Julian MD on 2025 12:51 PM    amLODIPine (NORVASC) 5 MG tablet  -- -- 12/10/2024 --       Sig: Take 1 tablet by mouth daily.    Class: Historical    Route: Oral    apixaban ANTICOAGULANT (ELIQUIS ANTICOAGULANT) 2.5 MG tablet  60 tablet 2 2025 --   [facility] [place], [place] - [address]    Sig: Take 1 tablet (2.5 mg) by mouth 2 times daily.    Class: E-Prescribe    Route: Oral    ARTIFICIAL TEAR OP  -- --  --       Sig: Apply to eye as needed.    Class: Historical    Route: Ophthalmic    atorvastatin (LIPITOR) 20 MG tablet  90 tablet 1 2024 --   [id number] - [place], [place] - [address]    Sig: TAKE 1 TABLET BY MOUTH EVERY DAY    Class: E-Prescribe    Notes to Pharmacy: DX Code Needed  REFILLS FOR REQUESTED PER PATIENT.    Route: Oral    blood glucose (NO BRAND SPECIFIED) lancets standard  300 each 3 2018 --   [id number] - [place], [place] - [address] AT [facility] [address]    Sig: Lancets that go with device, Test 3 times daily    Class: E-Prescribe    clopidogrel (PLAVIX) 75 MG tablet  30 tablet 0 2025 --   [facility] - [place], [place] - [address]    Sig: Take 1 tablet (75 mg) by mouth daily.    Class: E-Prescribe    Route: Oral    Continuous Glucose Sensor (FREESTYLE PETER 2 SENSOR[place] [facility] [place]  2 each 5 10/7/2024 --   [facility] [place] - [address]    Si each every 14 days. 1 each every 14 days. Change every 14 days.    Class: E-Prescribe    Route: Does not apply    diclofenac (VOLTAREN) 1 % topical gel  150 g 0 2025 --   [facility] - [place], [place] -  [address]    Sig: Apply 4 g topically 4 times daily as needed for moderate pain.    Class: E-Prescribe     Notes to Pharmacy: Apply to bilateral lower extremities for pain four times daily as needed. You may also apply a small amount to other joints that are causing you pain.    Route: Topical    No prior authorization was found for this prescription.    Found prior authorization for another prescription for the same medication: Closed    econazole nitrate 1 % external cream  85 g 5 2/24/2025 --   [facility] - [place], [place] - [address]    Sig: Apply topically daily. To feet and toenails.    Class: E-Prescribe    Route: Topical    empagliflozin (JARDIANCE) 10 MG TABS tablet  90 tablet 2 9/14/2024 --   [facility] - [place], [place] - [address]    Sig: TAKE 1 TABLET (10 MG) BY MOUTH DAILY.    Class: E-Prescribe    Route: Oral    famotidine (PEPCID) 20 MG tablet  90 tablet 1 11/16/2024 --   [facility] - [place], [place] - [address]    Sig: TAKE 1 TABLET BY MOUTH EVERY DAY    Class: E-Prescribe    Route: Oral    gabapentin (NEURONTIN) 300 MG capsule  60 capsule 2 5/6/2025 --   [facility] - [place], [place] - [address]    Sig: Take 2 capsules (600 mg) by mouth 2 times daily.    Class: E-Prescribe    Route: Oral    insulin degludec (TRESIBA FLEXTOUCH) 100 UNIT/ML pen  45 mL 3 10/7/2024 --   [facility] - [place], [place] - [address]    Sig: INJECT 50 UNITS SUBCUTANEOUSLY AT BEDTIME.    Class: E-Prescribe    insulin pen needle (B-D U/F) 31G X 5 MM miscellaneous  360 each 3 10/7/2024 --   [facility] - [place], [place] - [address]    Sig: Use 4 time(s) per day.  Please dispense as BD Pen Needle Mini U/F 31G x 5 MM    Class: E-Prescribe    loratadine (CLARITIN) 10 MG tablet  90 tablet 0 7/9/2024 --   [facility] - [place], [place] - [address]    Sig: Take 1 tablet (10 mg) by mouth daily    Class: E-Prescribe    Route: Oral    losartan (COZAAR) 100 MG tablet  90 tablet 3 5/6/2025 --   [facility] - [place] - [address]    Sig:  Take 1 tablet (100 mg) by mouth at bedtime.    Class: E-Prescribe    Route: Oral    mirabegron (MYRBETRIQ) 25 MG 24 hr tablet  90 tablet 1 6/19/2024 --   [facility] - [place] - [address]    Sig: TAKE 1 TABLET BY MOUTH EVERY DAY    Class: E-Prescribe    Notes to Pharmacy: Have placed 90 with 1 refill Please remind patient to call and make an appointment within 6 months with urology in order for us to continue to prescribe this medication in the future.    Route: Oral    ondansetron (ZOFRAN ODT) 4 MG ODT tab  30 tablet 0 4/11/2025 --   [facility] - [place], [place] - [address]    Sig: Take 1 tablet (4 mg) by mouth every 6 hours as needed for nausea.    Class: E-Prescribe    Route: Oral    Cosign for Ordering: Accepted by Peña Julian MD on 4/11/2025 12:51 PM    polyethylene glycol (MIRALAX) 17 GM/Dose powder  510 g 0 4/11/2025 --   [facility] - [place] - [address]    Sig: Take 17 g by mouth daily as needed for constipation.    Class: E-Prescribe    Route: Oral    Cosign for Ordering: Accepted by Peña Julian MD on 4/11/2025 12:51 PM    sodium bicarbonate 650 MG tablet  270 tablet 3 10/1/2023 --   [facility] - [place] - [address]    Sig: Take 2 tablets (1,300 mg) by mouth 2 times daily    Class: E-Prescribe    Route: Oral    tamsulosin (FLOMAX) 0.4 MG capsule  180 capsule 0 8/21/2024 --   [facility] - [place] - [address]    Sig: Take 2 capsules (0.8 mg) by mouth daily. For more refills,schedule an appointment at [phone]    Class: E-Prescribe    Route: Oral    tirzepatide (MOUNJARO) 5 MG/0.5ML SOAJ auto-injector pen  2 mL 11 4/7/2025 --   [facility] - [place] - [address]    Sig: Inject 0.5 mLs (5 mg) subcutaneously once a week.    Class: E-Prescribe    Route: Subcutaneous    triamcinolone (KENALOG) 0.1 % external cream  30 g 0 1/3/2024 --   [facility] - [place], [place] - [address]    Sig: Apply topically 2 times daily    Class: E-Prescribe    Route: Topical    vitamin C 250 MG tablet  -- --  --       Sig: Take 250 mg by mouth daily.    Class: Historical    Route: Oral    VITAMIN D3 25 MCG (1000 UT) tablet  180 tablet 3 11/1/2024 --   [facility] - [place], [place] - [address]    Sig: TAKE 2 TABLETS (50 MCG) BY MOUTH DAILY    Class: E-Prescribe    Renewals       Renewal provider: Kiah Ramsey MD                    ALLERGIES:   Patient has no known allergies.    ROS:  An 11 point ROS was performed and is otherwise negative except as mentioned above in the HPI.       Physical Examination:   VITALS:   /60 (BP Location: Left arm, Patient Position: Sitting, Cuff Size: Adult Regular)   Pulse 69   Wt 172 lb   SpO2 100%   BMI 26.37 kg/m    Constitutional: healthy, alert, and no distress  Head: Normocephalic, atraumatic  Respiratory: breathing non-labored on RA, no accessory muscle use.  Extremities: no wounds to the BLE.   LLE: Monophasic waveform to the PT and AT.  LUE: bi- and triphasic waveform of the L radial artery. Small healing wound from procedure.  Neurologic: A&Ox4. Answers questions appropriately.     Labs:    BMP RESULTS:  Lab Results   Component Value Date     04/21/2025     06/09/2021    POTASSIUM 4.9 04/21/2025    POTASSIUM 4.3 06/09/2022    POTASSIUM 4.2 06/09/2021    CHLORIDE 107 04/21/2025    CHLORIDE 107 06/09/2022    CHLORIDE 111 (H) 06/09/2021    CO2 18 (L) 04/21/2025    CO2 24 06/09/2022    CO2 22 06/09/2021    ANIONGAP 14 04/21/2025    ANIONGAP 8 06/09/2022    ANIONGAP 8 06/09/2021    GLC 77 04/21/2025     (H) 04/11/2025     (H) 06/09/2022     (H) 06/09/2021    BUN 44.2 (H) 04/21/2025    BUN 33 (H) 06/09/2022    BUN 20 06/09/2021    CR 2.10 (H) 04/21/2025    CR 1.45 (H) 06/09/2021    GFRESTIMATED 32 (L) 04/21/2025    GFRESTIMATED 36 (L) 03/07/2025    GFRESTIMATED 48 (L) 06/09/2021    GFRESTBLACK 55 (L)  06/09/2021    INDERJIT 9.7 04/21/2025    INDERJIT 8.8 06/09/2021        CBC RESULTS:  Lab Results   Component Value Date    WBC 6.2 04/21/2025    WBC 5.1 03/12/2021    RBC 3.57 (L) 04/21/2025    RBC 4.31 (L) 03/12/2021    HGB 11.2 (L) 04/21/2025    HGB 13.6 06/09/2021    HCT 34.3 (L) 04/21/2025    HCT 41.7 03/12/2021    MCV 96 04/21/2025    MCV 97 03/12/2021    MCH 31.4 04/21/2025    MCH 32.7 03/12/2021    MCHC 32.7 04/21/2025    MCHC 33.8 03/12/2021    RDW 14.5 04/21/2025    RDW 12.3 03/12/2021     04/21/2025     03/12/2021       INR/PTT:  Lab Results   Component Value Date    INR 1.02 04/02/2025    INR 0.93 06/12/2013    PTT 92 (H) 04/06/2025    PTT 29 06/12/2013       Diagnostic studies:   LLE DUPLEX ARTERIAL ULTRASOUND 5/6/25  1. LEFT: Overall unchanged exam compared to ultrasound from 4/18/2025,  including:  -Tibioperoneal trunk occlusion persists  -PTA and DAJA are patent at the ankle - post obstructive.  - Mild-to-moderate narrowing suspected at the distal popliteal artery.  2. No definitive visualization of the previously described left groin  hematoma/pseudoaneurysm.    Assessment   Earle Rene is a 75 year old male with a past medical history significant for PAD, DVT, T2DM s/p LLE angiogram c/b acute limb ischemia likely 2/2 not taking prescribed Plavix and apixaban and left medial lower thigh pseudoaneurysm requiring further intervention.  Prior angiogram showed opening of TP, PTA, and DAJA, on the left leg, but more recent doppler from 5/6/25 indicated that the tibioperoneal trunk occlusion persists. The patient does still have some claudication, but currently he does have some blood flow to his foot. We discussed that he has several options moving forward. With the persisting tibioperoneal trunk occlusion, I recommend a procedure with possible stenting/scaffolding to the vessel to help open it up and increase perfusion to the patient's foot. This is a newer intervention that had not been available  during the patient's prior procedure. However, the patient would like to defer this procedure and instead watch and wait to see if his symptoms improve on their own with medical therapy.    Plan   PAD  - Recommend stent/scaffolding of tibioperoneal occlusion. Patient deferred and would like to try medical management.   - Recommend increasing exercise as tolerated with biking, strider, etc  - Three month follow-up with BLE arterial US  - Continue with anti-platelet monotherapy with Plavix and apixaban  - Encourage increasing exercise as tolerated  - Gave return precautions including increased pain to the LLE, new non-healing wounds, overt decrease in temperature in the L>R    [facility]  Medical Student  [employer]    I, Dr Kenrick Ramon, attending physician, was present with the resident/ fellow during the history and exam. I discussed the case with the fellow and agree with the findings as documented in the assessment and plan.      CC  Patient Care Team:  Lizzie Wang MD as PCP - General (Internal Medicine)  Marcio Hare MD as Referring Physician (Internal Medicine)  Rafael Hand MD as MD (Cardiology)  Pamela Laura PA-C as Physician Assistant (Physician Assistant)  Marcio Hare MD as Referring Physician (Internal Medicine)  Emmanuel Cantu MD as MD (Urology)  Jackie Rodriguez MD as MD (Ophthalmology)  Juan Rehman MD as MD (Internal Medicine)  Lala Villanueva MD as MD (INTERNAL MEDICINE - ENDOCRINOLOGY, DIABETES & METABOLISM)  Silas Bland MD as MD (Urology)  Pamela Laura PA-C as Assigned Endocrinology Provider  Amy Rogel PA-C as Physician Assistant (Endocrinology, Diabetes, and Metabolism)  Marcio Hare MD as Referring Physician (Internal Medicine)  Krysta Perdue MD as MD (Dermatology)  Mariann Rand APRN CNP as Nurse Practitioner  Marcio Hare MD as Referring  Physician (Internal Medicine)  Hemanth Nuñez DPM as Assigned Musculoskeletal Provider  Narendra Garza MD as Physician (Endocrinology, Diabetes, and Metabolism)  Tracy Chandler NP as Assigned Nephrology Provider  Theo Gong RPH as Pharmacist (Pharmacist)  Theo Gong RPH as Assigned MTM Pharmacist  Lizzie Wang MD as Resident (Internal Medicine)  Jaden Collins MD as MD (Ophthalmology)  Lizzie Wang MD as Assigned PCP  Jaden Collins MD as Assigned Surgical Provider  Yanni Sage APRN CNP as Nurse Practitioner (Internal Medicine)  BEVERLY LOPEZ

## 2025-05-08 NOTE — LETTER
5/8/2025       RE: Earle Rene  [address]  [place] [address]     Dear Colleague,    Thank you for referring your patient, Earle Rene, to the [facility] [place] [facility]. Please see a copy of my visit note below.        INTERVENTIONAL RADIOLOGY CONSULTATION    Name: Earle Rene  Age: 75 year old   Referring Physician: Dr. Ramon   REASON FOR REFERRAL: follow up s/p RLE angio     HPI:   Earle Rene is a 75 year old male with a past medical history significant for PAD, DVT, T2DM s/p LLE angiogram c/b acute limb ischemia likely 2/2 not taking Plavix and apixaban and left medial lower thigh pseudoaneurysm requiring further intervention. He presents today for follow up of this angiogram from 4/4/25. He does describe significant improvement in his pain since the procedure, but he still has some claudication which he rates at a 2-3/10. He also reports some difficulty in moving the toes on his left foot with some minimal movement and subsequent difficulty with ambulation. He has no appreciable sensation to light touch or deep pressure to the right toes. The patient reports that he has been consistent with taking his medications, including his blood thinner. He has also been taking his gabapentin, which has decreased his pain. The patient has been going to the gym more and using the strider. Prior angiogram showed opening of TP, PTA, and DAJA, on the left leg, but more recent doppler from 5/6/25 indicated that the tibioperoneal trunk occlusion persists.   PT & AT pulses are monophasic with doppler.    PAST MEDICAL HISTORY:   Past Medical History:   Diagnosis Date     Blepharitis of both eyes      BPH (benign prostatic hyperplasia)      Diabetes (H)      Diabetic neuropathy (H)      Diabetic retinopathy associated with diabetes mellitus due to underlying condition (H)      Dry eye syndrome      GERD (gastroesophageal reflux  disease)      Goiter      Granulomatous disease (H)      HLD (hyperlipidemia)      HTN (hypertension)      Nonsenile cataract      Peripheral neuropathy        PAST SURGICAL HISTORY:   Past Surgical History:   Procedure Laterality Date     ------------OTHER-------------      back of neck abscess drainage in OR     AS RAD RESEC TONSIL/PILLARS Bilateral 1961     CATARACT IOL, RT/LT Left      COLONOSCOPY  7/29/2013    Procedure: COLONOSCOPY;;  Surgeon: Montana Pascal MD;  Location: [facility]     EXCISE MASS UPPER EXTREMITY Right 11/11/2019    Procedure: EXCISION, MASS, UPPER EXTREMITY, RIGHT SHOULDER;  Surgeon: Johana Choudhury MD;  [facility]     INJECT EPIDURAL LUMBAR Left 4/20/2023    Procedure: INJECTION, SPINE, LUMBAR, EPIDURAL (L4-L5);  Surgeon: Mahamed Vázquez MD;  Location: [facility]     INJECT SACROILIAC JOINT Bilateral 9/13/2022    Procedure: Bilateral sacroiliac joint injection;  Surgeon: Collin Mata MD;  Location: [facility]     INTRAVITREAL INJECTION CHEMOTHERAPY Right 12/30/2019    Procedure: INTRAVITREAL Bevacizumab injection;  Surgeon: Milton Maki MD;  [facility]     IR LOWER EXTREMITY ANGIOGRAM LEFT  4/2/2025     IR LOWER EXTREMITY STENT/ATHERECTOMY/PTA  4/4/2025     IR PSEUDOANEURYSM INJECTION  4/5/2025     PHACOEMULSIFICATION CLEAR CORNEA WITH STANDARD INTRAOCULAR LENS IMPLANT Right 12/30/2019    Procedure: PHACOEMULSIFICATION, CATARACT, WITH INTRAOCULAR LENS IMPLANT;  Surgeon: Milton Maki MD;  [facility]     PHACOEMULSIFICATION WITH STANDARD INTRAOCULAR LENS IMPLANT Left 6/21/2019    Procedure: Left Eye Cataract Removal with Intraocular Lens Implant with Intraoperative Avastin Injection;  Surgeon: Lacey Eugene MD;  [facility]     siladenatis  11/2017       FAMILY HISTORY:   Family History   Problem Relation Age of Onset     Diabetes Father      Myocardial Infarction Father      Diabetes Brother      Leukemia Brother 44     Glaucoma No family hx of       Macular Degeneration No family hx of      Kidney Disease No family hx of        SOCIAL HISTORY:   Social History     Tobacco Use     Smoking status: Never     Smokeless tobacco: Never   Substance Use Topics     Alcohol use: Yes     Comment: rare       PROBLEM LIST:   Patient Active Problem List    Diagnosis Date Noted     Acute kidney failure, unspecified (H) 04/08/2025     Priority: Medium     PAD (peripheral artery disease) 04/04/2025     Priority: Medium     Mastoiditis of left side 02/11/2025     Priority: Medium     Polyneuropathic pain 02/11/2025     Priority: Medium     Lumbar radiculopathy 03/28/2023     Priority: Medium     Added automatically from request for surgery [id number]       Pain of both sacroiliac joints 09/06/2022     Priority: Medium     Added automatically from request for surgery [id number]       Chronic bilateral low back pain with bilateral sciatica 03/18/2022     Priority: Medium     Peripheral vascular disease, unspecified 04/11/2021     Priority: Medium     Malignant neoplasm of prostate (H) 04/11/2021     Priority: Medium     Chronic kidney disease, stage 3 (H) 10/28/2020     Priority: Medium     Combined form of nonsenile cataract of right eye 12/06/2019     Priority: Medium     Added automatically from request for surgery [id number]       Subcutaneous mass 10/03/2019     Priority: Medium     Added automatically from request for surgery [id number]       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) 05/11/2018     Priority: Medium     Hyponatremia 06/27/2017     Priority: Medium     Sialoadenitis 06/27/2017     Priority: Medium     Overview:   Added automatically from request for surgery [id number]       Pain of right thumb 01/07/2016     Priority: Medium     Pain of finger of right hand 01/07/2016     Priority: Medium     Presbyopia 05/11/2015     Priority: Medium     Myopia 05/11/2015     Priority: Medium     Cataracts, bilateral 05/11/2015      Priority: Medium     CTS (carpal tunnel syndrome) 10/24/2014     Priority: Medium     Diabetic polyneuropathy (H) 10/24/2014     Priority: Medium     Hyperlipidemia with target LDL less than 100 01/30/2014     Priority: Medium     Diagnosis updated by automated process. Provider to review and confirm.       Erectile dysfunction 01/18/2012     Priority: Medium     Psychological factors associated with another disorder 11/16/2011     Priority: Medium     Problem list name updated by automated process. Provider to review       Mood disorder in conditions classified elsewhere 11/16/2011     Priority: Medium     Occupational problem 11/16/2011     Priority: Medium     Type 2 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, with long-term current use of insulin (H) 11/16/2011     Priority: Medium     Adhesive capsulitis of shoulder 12/01/2008     Priority: Medium     Colonic polyp 08/23/2007     Priority: Medium     Overview:   Colonoscopy 3/13/07 showed a tublar adenoma in distal sigmoid colon. Needs repeat in 2012.       Elevated PSA 08/23/2007     Priority: Medium     Overview:   Reviewed notes from [facility] from March 2007. PSA 1.32 in August 2006 and 2.2 (27% free) in March 2007.  Recommended f/u in 6 months.       Heel fracture 08/23/2007     Priority: Medium     Overview:   Right heel       Nephrolithiasis 08/23/2007     Priority: Medium     Overview:   1984, 1989       Sarcoidosis of lung 08/23/2007     Priority: Medium     Overview:   Diagnosed by bx in late 1990's.         MEDICATIONS:   Prescription Medications as of 5/8/2025         Rx Number Disp Refills Start End Last Dispensed Date Next Fill Date Owning Pharmacy    acetaminophen (TYLENOL) 325 MG tablet  240 tablet 0 4/11/2025 5/11/2025   [facility] [id number] [place] [facility] - [place], [place] - [address]    Sig: Take 2 tablets (650 mg) by mouth every 6 hours as needed for mild pain.    Class: E-Prescribe    Route: Oral     Cosign for Ordering: Accepted by Peña Julian MD on 4/11/2025 12:51 PM    alpha-lipoic acid 600 MG capsule  90 capsule 3 5/15/2020 --   [id number] [place], [place] - [address]    Sig: Take 1 capsule (600 mg) by mouth daily    Class: E-Prescribe    Route: Oral    alum & mag hydroxide-simethicone (MAALOX) 200-200-20 MG/5ML SUSP suspension  355 mL 0 4/11/2025 5/11/2025   [id number] [place], [place] - [address]    Sig: Take 30 mLs by mouth every 4 hours as needed for indigestion.    Class: E-Prescribe    Route: Oral    Cosign for Ordering: Accepted by Peña Julian MD on 4/11/2025 12:51 PM    amLODIPine (NORVASC) 5 MG tablet  -- -- 12/10/2024 --       Sig: Take 1 tablet by mouth daily.    Class: Historical    Route: Oral    apixaban ANTICOAGULANT (ELIQUIS ANTICOAGULANT) 2.5 MG tablet  60 tablet 2 5/6/2025 --   [id number] [place], [place] - [address]    Sig: Take 1 tablet (2.5 mg) by mouth 2 times daily.    Class: E-Prescribe    Route: Oral    ARTIFICIAL TEAR OP  -- --  --       Sig: Apply to eye as needed.    Class: Historical    Route: Ophthalmic    atorvastatin (LIPITOR) 20 MG tablet  90 tablet 1 11/19/2024 --   [id number] [place], [place] - [address]    Sig: TAKE 1 TABLET BY MOUTH EVERY DAY    Class: E-Prescribe    Notes to Pharmacy: DX Code Needed  REFILLS FOR REQUESTED PER PATIENT.    Route: Oral    blood glucose (NO BRAND SPECIFIED) lancets standard  300 each 3 2/5/2018 --   [id number] - [place], [place] - [address] AT [facility] [address]    Sig: Lancets that go with device, Test 3 times daily    Class: E-Prescribe    clopidogrel (PLAVIX) 75 MG tablet  30 tablet 0 5/6/2025 --   [id number] [place], [place] - [address]    Sig: Take 1 tablet (75 mg) by mouth daily.    Class: E-Prescribe    Route: Oral    Continuous Glucose Sensor (FREESTYLE PETER 2 SENSOR) [facility]  2 each 5 10/7/2024 --   [facility] [id number] [place]  [facility] - [place], [place] - [address]    Si each every 14 days. 1 each every 14 days. Change every 14 days.    Class: E-Prescribe    Route: Does not apply    diclofenac (VOLTAREN) 1 % topical gel  150 g 0 2025 --   [facility] [place] [place] -  [address]    Sig: Apply 4 g topically 4 times daily as needed for moderate pain.    Class: E-Prescribe    Notes to Pharmacy: Apply to bilateral lower extremities for pain four times daily as needed. You may also apply a small amount to other joints that are causing you pain.    Route: Topical    No prior authorization was found for this prescription.    Found prior authorization for another prescription for the same medication: Closed    econazole nitrate 1 % external cream  85 g 5 2025 --   [facility] [place] [place] - [address]    Sig: Apply topically daily. To feet and toenails.    Class: E-Prescribe    Route: Topical    empagliflozin (JARDIANCE) 10 MG TABS tablet  90 tablet 2 2024 --   [facility] [place] [place] - [address]    Sig: TAKE 1 TABLET (10 MG) BY MOUTH DAILY.    Class: E-Prescribe    Route: Oral    famotidine (PEPCID) 20 MG tablet  90 tablet 1 2024 --   [facility] [place] [place] - [address]    Sig: TAKE 1 TABLET BY MOUTH EVERY DAY    Class: E-Prescribe    Route: Oral    gabapentin (NEURONTIN) 300 MG capsule  60 capsule 2 2025 --   [facility] [place] [place] -  [address]    Sig: Take 2 capsules (600 mg) by mouth 2 times daily.    Class: E-Prescribe    Route: Oral    insulin degludec (TRESIBA FLEXTOUCH) 100 UNIT/ML pen  45 mL 3 10/7/2024 --   [facility] [place] [place] - [address]    Sig: INJECT 50 UNITS SUBCUTANEOUSLY AT BEDTIME.    Class: E-Prescribe    insulin pen needle (B-D U/F) 31G X 5 MM miscellaneous  360 each 3 10/7/2024 --   [facility] [place] [place] - [address]    Sig: Use 4 time(s) per day.  Please dispense as BD Pen  Needle Mini U/F 31G x 5 MM    Class: E-Prescribe    loratadine (CLARITIN) 10 MG tablet  90 tablet 0 7/9/2024 --   [id number] [place] - [address]    Sig: Take 1 tablet (10 mg) by mouth daily    Class: E-Prescribe    Route: Oral    losartan (COZAAR) 100 MG tablet  90 tablet 3 5/6/2025 --   [facility] [place] - [address]    Sig: Take 1 tablet (100 mg) by mouth at bedtime.    Class: E-Prescribe    Route: Oral    mirabegron (MYRBETRIQ) 25 MG 24 hr tablet  90 tablet 1 6/19/2024 --   [facility] [place] [place] - [address]    Sig: TAKE 1 TABLET BY MOUTH EVERY DAY    Class: E-Prescribe    Notes to Pharmacy: Have placed 90 with 1 refill Please remind patient to call and make an appointment within 6 months with urology in order for us to continue to prescribe this medication in the future.    Route: Oral    ondansetron (ZOFRAN ODT) 4 MG ODT tab  30 tablet 0 4/11/2025 --   [facility] [place] - [address]    Sig: Take 1 tablet (4 mg) by mouth every 6 hours as needed for nausea.    Class: E-Prescribe    Route: Oral    Cosign for Ordering: Accepted by Peña Julian MD on 4/11/2025 12:51 PM    polyethylene glycol (MIRALAX) 17 GM/Dose powder  510 g 0 4/11/2025 --   [facility] [place] - [address]    Sig: Take 17 g by mouth daily as needed for constipation.    Class: E-Prescribe    Route: Oral    Cosign for Ordering: Accepted by Peña Julian MD on 4/11/2025 12:51 PM    sodium bicarbonate 650 MG tablet  270 tablet 3 10/1/2023 --   [facility] [place] - [address]    Sig: Take 2 tablets (1,300 mg) by mouth 2 times daily    Class: E-Prescribe    Route: Oral    tamsulosin (FLOMAX) 0.4 MG capsule  180 capsule 0 8/21/2024 --   [facility] [place] [facility] - [place], [place] - [address]    Sig: Take 2 capsules (0.8 mg) by mouth daily. For more refills,schedule an appointment at [phone]    Class:  E-Prescribe    Route: Oral    tirzepatide (MOUNJARO) 5 MG/0.5ML SOAJ auto-injector pen  2 mL 11 4/7/2025 --   [facility] [address] [facility] - [place] - [address]    Sig: Inject 0.5 mLs (5 mg) subcutaneously once a week.    Class: E-Prescribe    Route: Subcutaneous    triamcinolone (KENALOG) 0.1 % external cream  30 g 0 1/3/2024 --   [facility] [address] [facility] - [place] - [address]    Sig: Apply topically 2 times daily    Class: E-Prescribe    Route: Topical    vitamin C 250 MG tablet  -- --  --       Sig: Take 250 mg by mouth daily.    Class: Historical    Route: Oral    VITAMIN D3 25 MCG (1000 UT) tablet  180 tablet 3 11/1/2024 --   [facility] [address] [facility] - [place], [place] - [address]    Sig: TAKE 2 TABLETS (50 MCG) BY MOUTH DAILY    Class: E-Prescribe    Renewals       Renewal provider: Kiah Ramsey MD                    ALLERGIES:   Patient has no known allergies.    ROS:  An 11 point ROS was performed and is otherwise negative except as mentioned above in the HPI.       Physical Examination:   VITALS:   /60 (BP Location: Left arm, Patient Position: Sitting, Cuff Size: Adult Regular)   Pulse 69   Wt 172 lb   SpO2 100%   BMI 26.37 kg/m    Constitutional: healthy, alert, and no distress  Head: Normocephalic, atraumatic  Respiratory: breathing non-labored on RA, no accessory muscle use.  Extremities: no wounds to the BLE.   LLE: Monophasic waveform to the PT and AT.  LUE: bi- and triphasic waveform of the L radial artery. Small healing wound from procedure.  Neurologic: A&Ox4. Answers questions appropriately.     Labs:    BMP RESULTS:  Lab Results   Component Value Date     04/21/2025     06/09/2021    POTASSIUM 4.9 04/21/2025    POTASSIUM 4.3 06/09/2022    POTASSIUM 4.2 06/09/2021    CHLORIDE 107 04/21/2025    CHLORIDE 107 06/09/2022    CHLORIDE 111 (H) 06/09/2021    CO2 18 (L) 04/21/2025    CO2 24 06/09/2022    CO2 22 06/09/2021    ANIONGAP 14 04/21/2025    ANIONGAP 8  06/09/2022    ANIONGAP 8 06/09/2021    GLC 77 04/21/2025     (H) 04/11/2025     (H) 06/09/2022     (H) 06/09/2021    BUN 44.2 (H) 04/21/2025    BUN 33 (H) 06/09/2022    BUN 20 06/09/2021    CR 2.10 (H) 04/21/2025    CR 1.45 (H) 06/09/2021    GFRESTIMATED 32 (L) 04/21/2025    GFRESTIMATED 36 (L) 03/07/2025    GFRESTIMATED 48 (L) 06/09/2021    GFRESTBLACK 55 (L) 06/09/2021    INDERJIT 9.7 04/21/2025    INDERJIT 8.8 06/09/2021        CBC RESULTS:  Lab Results   Component Value Date    WBC 6.2 04/21/2025    WBC 5.1 03/12/2021    RBC 3.57 (L) 04/21/2025    RBC 4.31 (L) 03/12/2021    HGB 11.2 (L) 04/21/2025    HGB 13.6 06/09/2021    HCT 34.3 (L) 04/21/2025    HCT 41.7 03/12/2021    MCV 96 04/21/2025    MCV 97 03/12/2021    MCH 31.4 04/21/2025    MCH 32.7 03/12/2021    MCHC 32.7 04/21/2025    MCHC 33.8 03/12/2021    RDW 14.5 04/21/2025    RDW 12.3 03/12/2021     04/21/2025     03/12/2021       INR/PTT:  Lab Results   Component Value Date    INR 1.02 04/02/2025    INR 0.93 06/12/2013    PTT 92 (H) 04/06/2025    PTT 29 06/12/2013       Diagnostic studies:   LLE DUPLEX ARTERIAL ULTRASOUND 5/6/25  1. LEFT: Overall unchanged exam compared to ultrasound from 4/18/2025,  including:  -Tibioperoneal trunk occlusion persists  -PTA and DAJA are patent at the ankle - post obstructive.  - Mild-to-moderate narrowing suspected at the distal popliteal artery.  2. No definitive visualization of the previously described left groin  hematoma/pseudoaneurysm.    Assessment   Earle Rene is a 75 year old male with a past medical history significant for PAD, DVT, T2DM s/p LLE angiogram c/b acute limb ischemia likely 2/2 not taking prescribed Plavix and apixaban and left medial lower thigh pseudoaneurysm requiring further intervention.  Prior angiogram showed opening of TP, PTA, and DAJA, on the left leg, but more recent doppler from 5/6/25 indicated that the tibioperoneal trunk occlusion persists. The patient does still  have some claudication, but currently he does have some blood flow to his foot. We discussed that he has several options moving forward. With the persisting tibioperoneal trunk occlusion, I recommend a procedure with possible stenting/scaffolding to the vessel to help open it up and increase perfusion to the patient's foot. This is a newer intervention that had not been available during the patient's prior procedure. However, the patient would like to defer this procedure and instead watch and wait to see if his symptoms improve on their own with medical therapy.    Plan   PAD  - Recommend stent/scaffolding of tibioperoneal occlusion. Patient deferred and would like to try medical management.   - Recommend increasing exercise as tolerated with biking, strider, etc  - Three month follow-up with BLE arterial US  - Continue with anti-platelet monotherapy with Plavix and apixaban  - Encourage increasing exercise as tolerated  - Gave return precautions including increased pain to the LLE, new non-healing wounds, overt decrease in temperature in the L>R    Lorena Liy  Medical Student  [employer]    I, Dr Kenrick Ramon, attending physician, was present with the resident/ fellow during the history and exam. I discussed the case with the fellow and agree with the findings as documented in the assessment and plan.      CC  Patient Care Team:  Lizzie Wang MD as PCP - General (Internal Medicine)  Marcio Hare MD as Referring Physician (Internal Medicine)  Rafael Hand MD as MD (Cardiology)  Pamela Laura PA-C as Physician Assistant (Physician Assistant)  Marcio Hare MD as Referring Physician (Internal Medicine)  Emmanuel Cantu MD as MD (Urology)  Jackie Rodriguez MD as MD (Ophthalmology)  Juan Rehman MD as MD (Internal Medicine)  Lala Villanueva MD as MD (INTERNAL MEDICINE - ENDOCRINOLOGY, DIABETES & METABOLISM)  Silas Bland  MD Joshua as MD (Urology)  Pamela Laura PA-C as Assigned Endocrinology Provider  Amy Rogel PA-C as Physician Assistant (Endocrinology, Diabetes, and Metabolism)  Marcio Hare MD as Referring Physician (Internal Medicine)  Krysta Perdue MD as MD (Dermatology)  Mariann Rand APRN CNP as Nurse Practitioner  Marcio Hare MD as Referring Physician (Internal Medicine)  Hemanth Nuñez DPM as Assigned Musculoskeletal Provider  Narendra Garza MD as Physician (Endocrinology, Diabetes, and Metabolism)  Tracy Chandler NP as Assigned Nephrology Provider  Theo Gong RPH as Pharmacist (Pharmacist)  Theo Gong RPH as Assigned MTM Pharmacist  Lizzie Wang MD as Resident (Internal Medicine)  Jaden Collins MD as MD (Ophthalmology)  Lizzie Wang MD as Assigned PCP  Jaden Collins MD as Assigned Surgical Provider  Yanni Sage APRN CNP as Nurse Practitioner (Internal Medicine)  KENRICK RAMON        Again, thank you for allowing me to participate in the care of your patient.      Sincerely,    Kenrick Ramon MD

## 2025-05-09 ENCOUNTER — OFFICE VISIT (OUTPATIENT)
Dept: NEPHROLOGY | Facility: CLINIC | Age: 75
End: 2025-05-09
Payer: COMMERCIAL

## 2025-05-09 VITALS
HEART RATE: 75 BPM | HEIGHT: 68 IN | WEIGHT: 181 LBS | DIASTOLIC BLOOD PRESSURE: 75 MMHG | OXYGEN SATURATION: 100 % | BODY MASS INDEX: 27.43 KG/M2 | SYSTOLIC BLOOD PRESSURE: 131 MMHG

## 2025-05-09 DIAGNOSIS — N17.9 ACUTE KIDNEY INJURY: ICD-10-CM

## 2025-05-09 DIAGNOSIS — N18.32 CHRONIC KIDNEY DISEASE, STAGE 3B (H): Primary | ICD-10-CM

## 2025-05-09 DIAGNOSIS — R39.15 URINARY URGENCY: ICD-10-CM

## 2025-05-09 DIAGNOSIS — N40.1 BENIGN NON-NODULAR PROSTATIC HYPERPLASIA WITH LOWER URINARY TRACT SYMPTOMS: ICD-10-CM

## 2025-05-09 DIAGNOSIS — I10 BENIGN ESSENTIAL HYPERTENSION: ICD-10-CM

## 2025-05-09 DIAGNOSIS — D62 ACUTE BLOOD LOSS ANEMIA: ICD-10-CM

## 2025-05-09 PROCEDURE — 3074F SYST BP LT 130 MM HG: CPT

## 2025-05-09 PROCEDURE — G0463 HOSPITAL OUTPT CLINIC VISIT: HCPCS

## 2025-05-09 PROCEDURE — 1125F AMNT PAIN NOTED PAIN PRSNT: CPT

## 2025-05-09 PROCEDURE — 3078F DIAST BP <80 MM HG: CPT

## 2025-05-09 PROCEDURE — 99214 OFFICE O/P EST MOD 30 MIN: CPT

## 2025-05-09 PROCEDURE — 1111F DSCHRG MED/CURRENT MED MERGE: CPT

## 2025-05-09 RX ORDER — TAMSULOSIN HYDROCHLORIDE 0.4 MG/1
0.8 CAPSULE ORAL DAILY
Qty: 180 CAPSULE | Refills: 3 | Status: SHIPPED | OUTPATIENT
Start: 2025-05-09

## 2025-05-09 RX ORDER — AMLODIPINE BESYLATE 5 MG/1
5 TABLET ORAL DAILY
Qty: 90 TABLET | Refills: 3 | Status: SHIPPED | OUTPATIENT
Start: 2025-05-09

## 2025-05-09 RX ORDER — SODIUM BICARBONATE 650 MG/1
650 TABLET ORAL 2 TIMES DAILY
Qty: 180 TABLET | Refills: 3 | Status: SHIPPED | OUTPATIENT
Start: 2025-05-09

## 2025-05-09 RX ORDER — MIRABEGRON 25 MG/1
25 TABLET, FILM COATED, EXTENDED RELEASE ORAL DAILY
Qty: 90 TABLET | Refills: 3 | Status: SHIPPED | OUTPATIENT
Start: 2025-05-09

## 2025-05-09 ASSESSMENT — PAIN SCALES - GENERAL: PAINLEVEL_OUTOF10: MODERATE PAIN (6)

## 2025-05-09 NOTE — NURSING NOTE
"Chief Complaint   Patient presents with    RECHECK     Follow up. P reports issues with his left foot. Pain level is 6. PT wants to discuss losartan.      Vitals:    05/09/25 0928 05/09/25 0931 05/09/25 0932   BP: 130/81 127/76 131/75   BP Location: Right arm Right arm Left arm   Patient Position: Sitting Sitting Sitting   Cuff Size: Adult Regular Adult Regular Adult Regular   Pulse: 75     SpO2: 100%     Weight: 82.1 kg (181 lb)     Height: 1.727 m (5' 8\")         BP Readings from Last 3 Encounters:   05/09/25 131/75   05/08/25 128/60   05/06/25 106/71       /75 (BP Location: Left arm, Patient Position: Sitting, Cuff Size: Adult Regular)   Pulse 75   Ht 1.727 m (5' 8\")   Wt 82.1 kg (181 lb)   SpO2 100%   BMI 27.52 kg/m       Macrina King    "

## 2025-05-09 NOTE — LETTER
5/9/2025       RE: Earle Rene  [address]  [place] [address]     Dear Colleague,    Thank you for referring your patient, Earle Rene, to the [facility] [place] [facility]. Please see a copy of my visit note below.    Nephrology Clinic Visit 5/9/25    Assessment and Plan:    Most recent labs 4/21/25    CKD3b w/proteinuria - Creat 2.1 a bit higher than baseline in the setting of recent Angiogram 4/11/25 and CHARLINE with peak creat of 3.5 on 4/7/25. eGFR 32 ml/mn, UPCR 2.4  mg/mgCr ( 11/24)    - Baseline creat mid to upper 1's    - Patient refused to have labs today but agreeable to having them on 5/20/25     - Has chronic urinary retention as evidenced by our clinic PVR and Renal US PVR of 198 ml     - Etiology for his CKD is felt to be DM given retinopathy    - Baseline creat mid to upper 1's since 12/19    - On ARB, SGLT2, GLP1/GLP    - B/ps well controlled     - Diabetes well controlled with A1c 6.8 % ( 4/25)    - On statin for CV risk reduction    2. Volume status - Trace edema w/o dyspnea. B/ps controlled. On Jardiance and has intermit diarrhea 2/2 Tirzepatide. Weight 181 # from 183 #, 193#, 195.7 #. Albumin 4.2    3. HTN - Well controlled w/trace edema. Clinic b/ps 127-131/75 - 80. HR 75.   Current regimen:     Losartan 100 mg every day    Amlodipine 5 mg qd     - Continue current regimen    4. DM2 - Well controlled with current A1c of 6.8 % ( 4/25) on Jardiance, insulin and Tirzepatide   - Follows with RIVERA Laura in Endo    5. Electrolytes - No acute concerns. K 4.9 Na 139    6. Acid base - Mild metabolic acidosis in setting of CHARLINE and intermit diarrhea. Bicarb 18   - Continue bicarb 1300 mg bid    7. BMD - Ca 9.7 Phos 3.6 Albumin 4.2   -  Vit D 22, PTH 74 ( 3/24). Recheck with next lab draw   - Continue Vit D3 - 50 mcg every day    8. Heme - Hgb 11.2 following his thrombectomy from baseline of 14.1    9. BPH -  "Uncontrolled on Flomax and Mirabegron   - Prostate MRI 9/13/23 showed low likelihood for clinically significant cancer   - Per Urology 8/23: \" Urinary symptoms are pretty much the same as they have always been.  Has dysuria.  Quite a bit of straining, slow stream, intermittency, sensation of incomplete emptying.   PVR per US was 124 ml.\" Discussion at that visit regarding his incomplete emptying was for an outlet procedure. He is over due for his follow up.    - Renal US 4/23/24 neg for hydro. Did have PVR of 198 ml   - Recommended return to Urology but he is resistant    10. Disposition - RTC 9/10/25 for follow up with labs prior    Assessment and plan was discussed with patient and he voiced his understanding and agreement.    Reason for Visit:  CKD3b follow up    HPI:  Mr Rene is a 76 yo male with CKD3b, DM2, HTN, Diabetic retinopathy, BPH, Nephrolothiasis, GERD, Sarcoid of the lung, COPD, present today for routine CKD follow up.   Last seen in clinic by me 11/8/24  Baseline creat mid to upper 1's since 2019    ROS:   Patient has had 2 hospital admissions for treatment of his refractory mastoiditis. Ultimately requiring 6 wks of Ceftaz completed 3/7/25  Underwent Left Angioplasty and thrombectomy for Popliteal DVT c/b pseudoaneurysm 4/25 with ongoing left leg pain  Patient notes urinary frequency ( chronic) and chronic dysuria. No change  Exercise limited due to neuropathy/PAD and recent thrombectomy  Follows a sodium restricted diet  No home b/ps  Had decreased appetite but weight stable  BS  and weight improved with Tirzepatide  Generally feeling unwell do to the leg pain with general malaise and fatigue    Chronic Health Problems:    CKD3b  DM2  HTN  Diabetic retinopathy  BPH  Nephrolithiasis  GERD  Sarcoid of the lung  COPD  HLD  Diabetic polyneuropathy    Family Hx:   Family History   Problem Relation Age of Onset     Diabetes Father      Myocardial Infarction Father      Diabetes Brother      Leukemia " Brother 44     Glaucoma No family hx of      Macular Degeneration No family hx of      Kidney Disease No family hx of      Personal Hx:   , 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 NS, ETOH rare    Allergies:  No Known Allergies    Medications:  Current Outpatient Medications   Medication Sig Dispense Refill     acetaminophen (TYLENOL) 325 MG tablet Take 2 tablets (650 mg) by mouth every 6 hours as needed for mild pain. 240 tablet 0     alpha-lipoic acid 600 MG capsule Take 1 capsule (600 mg) by mouth daily 90 capsule 3     amLODIPine (NORVASC) 5 MG tablet Take 1 tablet (5 mg) by mouth daily. 90 tablet 3     ARTIFICIAL TEAR OP Apply to eye as needed.       atorvastatin (LIPITOR) 20 MG tablet TAKE 1 TABLET BY MOUTH EVERY DAY 90 tablet 1     blood glucose (NO BRAND SPECIFIED) lancets standard Lancets that go with device, Test 3 times daily 300 each 3     Continuous Glucose Sensor (FREESTYLE PETER 2 SENSOR) MISC 1 each every 14 days. 1 each every 14 days. Change every 14 days. 2 each 5     econazole nitrate 1 % external cream Apply topically daily. To feet and toenails. 85 g 5     empagliflozin (JARDIANCE) 10 MG TABS tablet TAKE 1 TABLET (10 MG) BY MOUTH DAILY. 90 tablet 2     famotidine (PEPCID) 20 MG tablet TAKE 1 TABLET BY MOUTH EVERY DAY 90 tablet 1     gabapentin (NEURONTIN) 300 MG capsule Take 2 capsules (600 mg) by mouth 2 times daily. 60 capsule 2     insulin degludec (TRESIBA FLEXTOUCH) 100 UNIT/ML pen INJECT 50 UNITS SUBCUTANEOUSLY AT BEDTIME. (Patient taking differently: Inject 40 mLs subcutaneously at bedtime. INJECT 40 UNITS SUBCUTANEOUSLY AT BEDTIME.) 45 mL 3     insulin pen needle (B-D U/F) 31G X 5 MM miscellaneous Use 4 time(s) per day.  Please dispense as BD Pen Needle Mini U/F 31G x 5  each 3     losartan (COZAAR) 100 MG tablet Take 1 tablet (100 mg) by mouth at bedtime. 90 tablet 3     mirabegron (MYRBETRIQ) 25 MG 24  "hr tablet Take 1 tablet (25 mg) by mouth daily. 90 tablet 3     sodium bicarbonate 650 MG tablet Take 1 tablet (650 mg) by mouth 2 times daily. 180 tablet 3     tamsulosin (FLOMAX) 0.4 MG capsule Take 2 capsules (0.8 mg) by mouth daily. For more refills,schedule an appointment at [phone] 180 capsule 3     tirzepatide (MOUNJARO) 5 MG/0.5ML SOAJ auto-injector pen Inject 0.5 mLs (5 mg) subcutaneously once a week. 2 mL 11     triamcinolone (KENALOG) 0.1 % external cream Apply topically 2 times daily 30 g 0     alum & mag hydroxide-simethicone (MAALOX) 200-200-20 MG/5ML SUSP suspension Take 30 mLs by mouth every 4 hours as needed for indigestion. 355 mL 0     apixaban ANTICOAGULANT (ELIQUIS ANTICOAGULANT) 2.5 MG tablet Take 1 tablet (2.5 mg) by mouth 2 times daily. 60 tablet 2     clopidogrel (PLAVIX) 75 MG tablet Take 1 tablet (75 mg) by mouth daily. 30 tablet 0     diclofenac (VOLTAREN) 1 % topical gel Apply 4 g topically 4 times daily as needed for moderate pain. (Patient not taking: Reported on 5/9/2025) 150 g 0     loratadine (CLARITIN) 10 MG tablet Take 1 tablet (10 mg) by mouth daily (Patient not taking: Reported on 5/9/2025) 90 tablet 0     ondansetron (ZOFRAN ODT) 4 MG ODT tab Take 1 tablet (4 mg) by mouth every 6 hours as needed for nausea. 30 tablet 0     polyethylene glycol (MIRALAX) 17 GM/Dose powder Take 17 g by mouth daily as needed for constipation. 510 g 0     vitamin C 250 MG tablet Take 250 mg by mouth daily.       VITAMIN D3 25 MCG (1000 UT) tablet TAKE 2 TABLETS (50 MCG) BY MOUTH DAILY (Patient taking differently: Take 1 tablet by mouth 2 times daily.) 180 tablet 3     No current facility-administered medications for this visit.      Vitals:  /75 (BP Location: Left arm, Patient Position: Sitting, Cuff Size: Adult Regular)   Pulse 75   Ht 1.727 m (5' 8\")   Wt 82.1 kg (181 lb)   SpO2 100%   BMI 27.52 kg/m      Exam:  GENERAL APPEARANCE: Pleasant male in NAD. He is English speaking and " does not require interpretor  RESP: lungs clear to auscultation   CV: regular rhythm, normal rate  EDEMA: Left leg trace edema  ABDOMEN: soft, nondistended, nontender  MS: extremities normal - no gross deformities noted  NEURO: mentation intact and speech normal  PSYCH: affect normal/bright    LABS:   CMP  Recent Labs   Lab Test 04/21/25  1634 04/11/25  0919 04/11/25  0534 04/11/25  0202 04/10/25  0810 04/10/25  0551 04/09/25  1201 04/09/25  0543 07/30/21  1005 06/09/21  1524 03/12/21  1324 12/14/20  0831 06/01/20  1109     --  137  --   --  137  --  136   < > 142 137 138 142   POTASSIUM 4.9  --  4.7  --   --  4.8  --  4.5   < > 4.2 5.2 4.2 4.1   CHLORIDE 107  --  108*  --   --  108*  --  108*   < > 111* 107 110* 110*   CO2 18*  --  17*  --   --  15*  --  15*   < > 22 23 20 22   ANIONGAP 14  --  12  --   --  14  --  13   < > 8 7 8 10   GLC 77 144* 182* 220*   < > 191*   < > 106*   < > 211* 347* 212* 218*   BUN 44.2*  --  48.8*  --   --  55.6*  --  65.5*   < > 20 33* 30 25   CR 2.10*  --  2.03*  --   --  2.38*  --  2.80*   < > 1.45* 1.68* 1.43* 1.39*   GFRESTIMATED 32*  --  33*  --   --  28*  --  23*   < > 48* 40* 49* 51*   GFRESTBLACK  --   --   --   --   --   --   --   --   --  55* 46* 56* 59*   INDERJIT 9.7  --  8.5*  --   --  8.7*  --  8.0*   < > 8.8 9.0 9.4 9.8    < > = values in this interval not displayed.     Recent Labs   Lab Test 04/21/25  1634 04/10/25  0551 04/08/25  2108 01/03/24  0929 03/12/21  1324   BILITOTAL 0.4 0.7 0.9  --  0.4   ALKPHOS 117 109 101  --  108   ALT 30 30 27  --  36   AST 30 49* 59* 23 24     CBC  Recent Labs   Lab Test 04/21/25  1634 04/11/25  0534 04/10/25  0551 04/09/25  0543   HGB 11.2* 8.3* 9.6* 8.8*   WBC 6.2 5.6 5.6 9.1   RBC 3.57* 2.66* 3.04* 2.75*   HCT 34.3* 24.5* 28.7* 26.2*   MCV 96 92 94 95   MCH 31.4 31.2 31.6 32.0   MCHC 32.7 33.9 33.4 33.6   RDW 14.5 12.9 12.9 13.0    151 139* 117*     URINE STUDIES  Recent Labs   Lab Test 04/25/25  1145 04/07/25  1042  05/29/24  1028 03/27/24  0908   COLOR Light Yellow Yellow Light Yellow Straw   APPEARANCE Clear Slightly Cloudy* Clear Clear   URINEGLC 500* Negative >=1000* >=1000*   URINEBILI Negative Negative Negative Negative   URINEKETONE Negative Negative Negative Negative   SG 1.022 1.019 1.013 1.021   UBLD Negative Large* Negative Negative   URINEPH 5.5 5.5 6.0 6.0   PROTEIN 100* 100* 100* 100*   NITRITE Negative Negative Negative Negative   LEUKEST Negative Small* Negative Negative   RBCU <1 24* <1 <1   WBCU 1 66* <1 <1     Recent Labs   Lab Test 12/08/21  1438 06/09/21  1530 12/14/20  0848 06/01/20  1110   UTPG 1.89* 2.37* 1.51* 1.39*     PTH  Recent Labs   Lab Test 03/27/24  0914 12/08/21  1428 06/01/20  1109   PTHI 74* 27 22     IRON STUDIES  Recent Labs   Lab Test 07/02/19  0947 05/01/18  1233   IRON  --  83   FEB  --  344   IRONSAT  --  24   DEANA 178 160         Tracy Chandler NP        Again, thank you for allowing me to participate in the care of your patient.      Sincerely,    Tracy Chandler NP

## 2025-05-10 DIAGNOSIS — E78.5 HYPERLIPIDEMIA LDL GOAL <100: ICD-10-CM

## 2025-05-11 NOTE — PROGRESS NOTES
"Nephrology Clinic Visit 5/9/25    Assessment and Plan:    Most recent labs 4/21/25    CKD3b w/proteinuria - Creat 2.1 a bit higher than baseline in the setting of recent Angiogram 4/11/25 and CHARLINE with peak creat of 3.5 on 4/7/25. eGFR 32 ml/mn, UPCR 2.4  mg/mgCr ( 11/24)    - Baseline creat mid to upper 1's    - Patient refused to have labs today but agreeable to having them on 5/20/25     - Has chronic urinary retention as evidenced by our clinic PVR and Renal US PVR of 198 ml     - Etiology for his CKD is felt to be DM given retinopathy    - Baseline creat mid to upper 1's since 12/19    - On ARB, SGLT2, GLP1/GLP    - B/ps well controlled     - Diabetes well controlled with A1c 6.8 % ( 4/25)    - On statin for CV risk reduction    2. Volume status - Trace edema w/o dyspnea. B/ps controlled. On Jardiance and has intermit diarrhea 2/2 Tirzepatide. Weight 181 # from 183 #, 193#, 195.7 #. Albumin 4.2    3. HTN - Well controlled w/trace edema. Clinic b/ps 127-131/75 - 80. HR 75.   Current regimen:     Losartan 100 mg every day    Amlodipine 5 mg qd     - Continue current regimen    4. DM2 - Well controlled with current A1c of 6.8 % ( 4/25) on Jardiance, insulin and Tirzepatide   - Follows with RIVERA Laura in Endo    5. Electrolytes - No acute concerns. K 4.9 Na 139    6. Acid base - Mild metabolic acidosis in setting of CHARLINE and intermit diarrhea. Bicarb 18   - Continue bicarb 1300 mg bid    7. BMD - Ca 9.7 Phos 3.6 Albumin 4.2   -  Vit D 22, PTH 74 ( 3/24). Recheck with next lab draw   - Continue Vit D3 - 50 mcg every day    8. Heme - Hgb 11.2 following his thrombectomy from baseline of 14.1    9. BPH - Uncontrolled on Flomax and Mirabegron   - Prostate MRI 9/13/23 showed low likelihood for clinically significant cancer   - Per Urology 8/23: \" Urinary symptoms are pretty much the same as they have always been.  Has dysuria.  Quite a bit of straining, slow stream, intermittency, sensation of incomplete emptying.   PVR per " "US was 124 ml.\" Discussion at that visit regarding his incomplete emptying was for an outlet procedure. He is over due for his follow up.    - Renal US 4/23/24 neg for hydro. Did have PVR of 198 ml   - Recommended return to Urology but he is resistant    10. Disposition - RTC 9/10/25 for follow up with labs prior    Assessment and plan was discussed with patient and he voiced his understanding and agreement.    Reason for Visit:  CKD3b follow up    HPI:  Mr Rene is a 74 yo male with CKD3b, DM2, HTN, Diabetic retinopathy, BPH, Nephrolothiasis, GERD, Sarcoid of the lung, COPD, present today for routine CKD follow up.   Last seen in clinic by me 11/8/24  Baseline creat mid to upper 1's since 2019    ROS:   Patient has had 2 hospital admissions for treatment of his refractory mastoiditis. Ultimately requiring 6 wks of Ceftaz completed 3/7/25  Underwent Left Angioplasty and thrombectomy for Popliteal DVT c/b pseudoaneurysm 4/25 with ongoing left leg pain  Patient notes urinary frequency ( chronic) and chronic dysuria. No change  Exercise limited due to neuropathy/PAD and recent thrombectomy  Follows a sodium restricted diet  No home b/ps  Had decreased appetite but weight stable  BS  and weight improved with Tirzepatide  Generally feeling unwell do to the leg pain with general malaise and fatigue    Chronic Health Problems:    CKD3b  DM2  HTN  Diabetic retinopathy  BPH  Nephrolithiasis  GERD  Sarcoid of the lung  COPD  HLD  Diabetic polyneuropathy    Family Hx:   Family History   Problem Relation Age of Onset    Diabetes Father     Myocardial Infarction Father     Diabetes Brother     Leukemia Brother 44    Glaucoma No family hx of     Macular Degeneration No family hx of     Kidney Disease No family hx of      Personal Hx:   , 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 NS, ETOH rare    Allergies:  No Known Allergies    Medications:  Current " Outpatient Medications   Medication Sig Dispense Refill    acetaminophen (TYLENOL) 325 MG tablet Take 2 tablets (650 mg) by mouth every 6 hours as needed for mild pain. 240 tablet 0    alpha-lipoic acid 600 MG capsule Take 1 capsule (600 mg) by mouth daily 90 capsule 3    amLODIPine (NORVASC) 5 MG tablet Take 1 tablet (5 mg) by mouth daily. 90 tablet 3    ARTIFICIAL TEAR OP Apply to eye as needed.      atorvastatin (LIPITOR) 20 MG tablet TAKE 1 TABLET BY MOUTH EVERY DAY 90 tablet 1    blood glucose (NO BRAND SPECIFIED) lancets standard Lancets that go with device, Test 3 times daily 300 each 3    Continuous Glucose Sensor (FREESTYLE PETER 2 SENSOR) MISC 1 each every 14 days. 1 each every 14 days. Change every 14 days. 2 each 5    econazole nitrate 1 % external cream Apply topically daily. To feet and toenails. 85 g 5    empagliflozin (JARDIANCE) 10 MG TABS tablet TAKE 1 TABLET (10 MG) BY MOUTH DAILY. 90 tablet 2    famotidine (PEPCID) 20 MG tablet TAKE 1 TABLET BY MOUTH EVERY DAY 90 tablet 1    gabapentin (NEURONTIN) 300 MG capsule Take 2 capsules (600 mg) by mouth 2 times daily. 60 capsule 2    insulin degludec (TRESIBA FLEXTOUCH) 100 UNIT/ML pen INJECT 50 UNITS SUBCUTANEOUSLY AT BEDTIME. (Patient taking differently: Inject 40 mLs subcutaneously at bedtime. INJECT 40 UNITS SUBCUTANEOUSLY AT BEDTIME.) 45 mL 3    insulin pen needle (B-D U/F) 31G X 5 MM miscellaneous Use 4 time(s) per day.  Please dispense as BD Pen Needle Mini U/F 31G x 5  each 3    losartan (COZAAR) 100 MG tablet Take 1 tablet (100 mg) by mouth at bedtime. 90 tablet 3    mirabegron (MYRBETRIQ) 25 MG 24 hr tablet Take 1 tablet (25 mg) by mouth daily. 90 tablet 3    sodium bicarbonate 650 MG tablet Take 1 tablet (650 mg) by mouth 2 times daily. 180 tablet 3    tamsulosin (FLOMAX) 0.4 MG capsule Take 2 capsules (0.8 mg) by mouth daily. For more refills,schedule an appointment at [phone] 180 capsule 3    tirzepatide (MOUNJARO) 5 MG/0.5ML SOAJ  "auto-injector pen Inject 0.5 mLs (5 mg) subcutaneously once a week. 2 mL 11    triamcinolone (KENALOG) 0.1 % external cream Apply topically 2 times daily 30 g 0    alum & mag hydroxide-simethicone (MAALOX) 200-200-20 MG/5ML SUSP suspension Take 30 mLs by mouth every 4 hours as needed for indigestion. 355 mL 0    apixaban ANTICOAGULANT (ELIQUIS ANTICOAGULANT) 2.5 MG tablet Take 1 tablet (2.5 mg) by mouth 2 times daily. 60 tablet 2    clopidogrel (PLAVIX) 75 MG tablet Take 1 tablet (75 mg) by mouth daily. 30 tablet 0    diclofenac (VOLTAREN) 1 % topical gel Apply 4 g topically 4 times daily as needed for moderate pain. (Patient not taking: Reported on 5/9/2025) 150 g 0    loratadine (CLARITIN) 10 MG tablet Take 1 tablet (10 mg) by mouth daily (Patient not taking: Reported on 5/9/2025) 90 tablet 0    ondansetron (ZOFRAN ODT) 4 MG ODT tab Take 1 tablet (4 mg) by mouth every 6 hours as needed for nausea. 30 tablet 0    polyethylene glycol (MIRALAX) 17 GM/Dose powder Take 17 g by mouth daily as needed for constipation. 510 g 0    vitamin C 250 MG tablet Take 250 mg by mouth daily.      VITAMIN D3 25 MCG (1000 UT) tablet TAKE 2 TABLETS (50 MCG) BY MOUTH DAILY (Patient taking differently: Take 1 tablet by mouth 2 times daily.) 180 tablet 3     No current facility-administered medications for this visit.      Vitals:  /75 (BP Location: Left arm, Patient Position: Sitting, Cuff Size: Adult Regular)   Pulse 75   Ht 1.727 m (5' 8\")   Wt 82.1 kg (181 lb)   SpO2 100%   BMI 27.52 kg/m      Exam:  GENERAL APPEARANCE: Pleasant male in NAD. He is English speaking and does not require interpretor  RESP: lungs clear to auscultation   CV: regular rhythm, normal rate  EDEMA: Left leg trace edema  ABDOMEN: soft, nondistended, nontender  MS: extremities normal - no gross deformities noted  NEURO: mentation intact and speech normal  PSYCH: affect normal/bright    LABS:   CMP  Recent Labs   Lab Test 04/21/25  1634 04/11/25  0919 " 04/11/25  0534 04/11/25  0202 04/10/25  0810 04/10/25  0551 04/09/25  1201 04/09/25  0543 07/30/21  1005 06/09/21  1524 03/12/21  1324 12/14/20  0831 06/01/20  1109     --  137  --   --  137  --  136   < > 142 137 138 142   POTASSIUM 4.9  --  4.7  --   --  4.8  --  4.5   < > 4.2 5.2 4.2 4.1   CHLORIDE 107  --  108*  --   --  108*  --  108*   < > 111* 107 110* 110*   CO2 18*  --  17*  --   --  15*  --  15*   < > 22 23 20 22   ANIONGAP 14  --  12  --   --  14  --  13   < > 8 7 8 10   GLC 77 144* 182* 220*   < > 191*   < > 106*   < > 211* 347* 212* 218*   BUN 44.2*  --  48.8*  --   --  55.6*  --  65.5*   < > 20 33* 30 25   CR 2.10*  --  2.03*  --   --  2.38*  --  2.80*   < > 1.45* 1.68* 1.43* 1.39*   GFRESTIMATED 32*  --  33*  --   --  28*  --  23*   < > 48* 40* 49* 51*   GFRESTBLACK  --   --   --   --   --   --   --   --   --  55* 46* 56* 59*   INDERJIT 9.7  --  8.5*  --   --  8.7*  --  8.0*   < > 8.8 9.0 9.4 9.8    < > = values in this interval not displayed.     Recent Labs   Lab Test 04/21/25  1634 04/10/25  0551 04/08/25  2108 01/03/24  0929 03/12/21  1324   BILITOTAL 0.4 0.7 0.9  --  0.4   ALKPHOS 117 109 101  --  108   ALT 30 30 27  --  36   AST 30 49* 59* 23 24     CBC  Recent Labs   Lab Test 04/21/25  1634 04/11/25  0534 04/10/25  0551 04/09/25  0543   HGB 11.2* 8.3* 9.6* 8.8*   WBC 6.2 5.6 5.6 9.1   RBC 3.57* 2.66* 3.04* 2.75*   HCT 34.3* 24.5* 28.7* 26.2*   MCV 96 92 94 95   MCH 31.4 31.2 31.6 32.0   MCHC 32.7 33.9 33.4 33.6   RDW 14.5 12.9 12.9 13.0    151 139* 117*     URINE STUDIES  Recent Labs   Lab Test 04/25/25  1145 04/07/25  1042 05/29/24  1028 03/27/24  0908   COLOR Light Yellow Yellow Light Yellow Straw   APPEARANCE Clear Slightly Cloudy* Clear Clear   URINEGLC 500* Negative >=1000* >=1000*   URINEBILI Negative Negative Negative Negative   URINEKETONE Negative Negative Negative Negative   SG 1.022 1.019 1.013 1.021   UBLD Negative Large* Negative Negative   URINEPH 5.5 5.5 6.0 6.0   PROTEIN  100* 100* 100* 100*   NITRITE Negative Negative Negative Negative   LEUKEST Negative Small* Negative Negative   RBCU <1 24* <1 <1   WBCU 1 66* <1 <1     Recent Labs   Lab Test 12/08/21  1438 06/09/21  1530 12/14/20  0848 06/01/20  1110   UTPG 1.89* 2.37* 1.51* 1.39*     PTH  Recent Labs   Lab Test 03/27/24  0914 12/08/21  1428 06/01/20  1109   PTHI 74* 27 22     IRON STUDIES  Recent Labs   Lab Test 07/02/19  0947 05/01/18  1233   IRON  --  83   FEB  --  344   IRONSAT  --  24   DEANA 178 160         Tracy Chandler, NP

## 2025-05-12 RX ORDER — ATORVASTATIN CALCIUM 20 MG/1
20 TABLET, FILM COATED ORAL DAILY
Qty: 90 TABLET | Refills: 2 | Status: SHIPPED | OUTPATIENT
Start: 2025-05-12

## 2025-05-12 NOTE — TELEPHONE ENCOUNTER
Last Written Prescription:  atorvastatin (LIPITOR) 20 MG tablet 90 tablet 1 11/19/2024 -- No   Sig - Route: TAKE 1 TABLET BY MOUTH EVERY DAY - Oral   Sent to pharmacy as: Atorvastatin Calcium 20 MG Oral Tablet (LIPITOR)   Class: E-Prescribe   Notes to Pharmacy: DX Code Needed  REFILLS FOR REQUESTED PER PATIENT.   Order: [id number]     ----------------------  Last Visit Date: 5/6/25  Future Visit Date: 0  ----------------------      Refill decision: Medication refilled per  Medication Refill in Ambulatory Care  policy.  LDL Cholesterol Calculated   Date Value Ref Range Status   02/11/2025 65 <100 mg/dL Final   06/18/2019 49 <100 mg/dL Final     Comment:     Desirable:       <100 mg/dl           Request from pharmacy:  Requested Prescriptions   Pending Prescriptions Disp Refills    atorvastatin (LIPITOR) 20 MG tablet [Pharmacy Med Name: ATORVASTATIN 20 MG TABLET] 90 tablet 1     Sig: TAKE 1 TABLET BY MOUTH EVERY DAY       Antihyperlipidemic agents Passed - 5/12/2025  3:43 PM        Passed - LDL on file in the past 12 months        Passed - Medication is active on med list and the sig matches. RN to manually verify dose and sig if red X/fail.     If the protocol passes (green check), you do not need to verify med dose and sig.    A prescription matches if they are the same clinical intention.    For Example: once daily and every morning are the same.    The protocol can not identify upper and lower case letters as matching and will fail.     For Example: Take 1 tablet (50 mg) by mouth daily     TAKE 1 TABLET (50 MG) BY MOUTH DAILY    For all fails (red x), verify dose and sig.    If the refill does match what is on file, the RN can still proceed to approve the refill request.       If they do not match, route to the appropriate provider.             Passed - Recent (12 mo) or future (90 days) visit within the authorizing provider's specialty     The patient must have completed an in-person or virtual visit within the  past 12 months or has a future visit scheduled within the next 90 days with the authorizing provider s specialty.  Urgent care and e-visits do not qualify as an office visit for this protocol.          Passed - Patient is age 18 years or older

## 2025-05-15 ENCOUNTER — TELEPHONE (OUTPATIENT)
Dept: GASTROENTEROLOGY | Facility: CLINIC | Age: 75
End: 2025-05-15
Payer: COMMERCIAL

## 2025-05-15 NOTE — TELEPHONE ENCOUNTER
"Endoscopy Scheduling Screen    Caller: patient    Have you had any respiratory illness or flu-like symptoms in the last 10 days?  No    What is your communication preference for Instructions and/or Bowel Prep?   Mail/USPS    What insurance is in the chart?  Other:  MEDICARE/[employer]    Ordering/Referring Provider: SHANTELL GRIMM    (If ordering provider performs procedure, schedule with ordering provider unless otherwise instructed. )    BMI: Estimated body mass index is 27.52 kg/m  as calculated from the following:    Height as of 5/9/25: 1.727 m (5' 8\").    Weight as of 5/9/25: 82.1 kg (181 lb).     Sedation Ordered  moderate sedation.   If patient BMI > 50 do not schedule in [facility].    If patient BMI > 45 do not schedule at [facility].    Are you taking methadone or Suboxone?  NO, No RN review required.    Have you been diagnosed and are being treated for severe PTSD or severe anxiety?  NO, No RN review required.    Are you taking any prescription medications for pain 3 or more times per week?   NO, No RN review required.    Do you have a history of malignant hyperthermia?  No    (Females) Are you currently pregnant?   No     Have you been diagnosed or told you have pulmonary hypertension?   No    Do you have an LVAD?  No    Have you been told you have moderate to severe sleep apnea?  No.    Have you been told you have COPD, asthma, or any other lung disease?  No    Has your doctor ordered any cardiac tests like echo, angiogram, stress test, ablation, or EKG, that you have not completed yet?  No    Do you  have a history of any heart conditions?  No     Have you ever had or are you waiting for an organ transplant?  No. Continue scheduling, no site restrictions.    Have you had a stroke or transient ischemic attack (TIA aka \"mini stroke\") in the last 2 years?   No.    Have you been diagnosed with or been told you have cirrhosis of the liver?   No.    Are you currently on dialysis?   No    Do you need assistance " "transferring?   No    BMI: Estimated body mass index is 27.52 kg/m  as calculated from the following:    Height as of 5/9/25: 1.727 m (5' 8\").    Weight as of 5/9/25: 82.1 kg (181 lb).     Is patients BMI > 40 and scheduling location [facility]?  No    Do you take an injectable or oral medication for weight loss or diabetes (excluding insulin)?  Yes, hold time can be up to 7 days. Please consult with you prescribing provider to discuss endoscopy recommendations. (Please schedule at least 7 days out.)    Do you take the medication Naltrexone?  No    Do you take blood thinners?  Yes, you must contact your prescribing provider for directions on holding or bridging with a different medication.       Prep   Are you currently on dialysis or do you have chronic kidney disease?  No    Do you have a diagnosis of diabetes?  Yes (Golytely Prep)    Do you have a diagnosis of cystic fibrosis (CF)?  No    On a regular basis do you go 3 -5 days between bowel movements?  Yes (Extended Prep)    BMI > 40?  No    Preferred Pharmacy:    [id number] [facility] - [place], [place] - [address]  [address] [place] [address]  Phone: [phone] Fax: [phone]        Final Scheduling Details     Procedure scheduled  Colonoscopy    Surgeon:       Date of procedure:  7/17/25     Pre-OP / PAC:   No - Not required for this site.    Location  [facility] - Patient preference.    Sedation   Moderate Sedation - Per order.      Patient Reminders:   You will receive a call from a Nurse to review instructions and health history.  This assessment must be completed prior to your procedure.  Failure to complete the Nurse assessment may result in the procedure being cancelled.      On the day of your procedure, please designate an adult(s) who can drive you home stay with you for the next 24 hours. The medicines used in the exam will make you sleepy. You will not be able to drive.      You cannot take public transportation, ride share services, or " non-medical taxi service without a responsible caregiver.  Medical transport services are allowed with the requirement that a responsible caregiver will receive you at your destination.  We require that drivers and caregivers are confirmed prior to your procedure.

## 2025-05-20 ENCOUNTER — VIRTUAL VISIT (OUTPATIENT)
Dept: PHARMACY | Facility: CLINIC | Age: 75
End: 2025-05-20
Attending: PHYSICIAN ASSISTANT
Payer: COMMERCIAL

## 2025-05-20 DIAGNOSIS — E11.3213 TYPE 2 DIABETES MELLITUS WITH BOTH EYES AFFECTED BY MILD NONPROLIFERATIVE RETINOPATHY AND MACULAR EDEMA, WITH LONG-TERM CURRENT USE OF INSULIN (H): ICD-10-CM

## 2025-05-20 DIAGNOSIS — Z79.4 TYPE 2 DIABETES MELLITUS WITH BOTH EYES AFFECTED BY MILD NONPROLIFERATIVE RETINOPATHY AND MACULAR EDEMA, WITH LONG-TERM CURRENT USE OF INSULIN (H): ICD-10-CM

## 2025-05-20 DIAGNOSIS — Z79.4 TYPE 2 DIABETES MELLITUS WITH HYPERGLYCEMIA, WITH LONG-TERM CURRENT USE OF INSULIN (H): Primary | ICD-10-CM

## 2025-05-20 DIAGNOSIS — E11.65 TYPE 2 DIABETES MELLITUS WITH HYPERGLYCEMIA, WITH LONG-TERM CURRENT USE OF INSULIN (H): Primary | ICD-10-CM

## 2025-05-20 NOTE — PROGRESS NOTES
Medication Therapy Management (MTM) Encounter    ASSESSMENT:                            Medication Adherence/Access: See below for considerations    Type 2 Diabetes: A1C above goal of < 7%.  Marked improvement in FPG and RPG since initiation of Mounjaro. Achieving FBG goal of less than 130 most days. Patient still experiencing side effects on Mounjaro 5 mg with little improvement. However, he would prefer to continue his current dose as opposed to trialing 2.5 mg again as he feels the glycemic benefit outweighs the side effects he is experiencing.    PLAN:                            CONTINUE Mounjaro to 5 mg weekly    CONTINUE Tresiba and decrease by 2 units every 3 days if fasting sugars are < 100.     Let me know if you have any lows < 70    Endocrine Team & Next Follow-Up:  As needed with Theo  1/14/2025 with Karla Laura PA-C    SUBJECTIVE/OBJECTIVE:                          Earle Rene is a 74 year old male called for a follow-up visit. He was referred to me from Karla Laura PA-C.      Reason for visit: Medication Therapy Management (MTM).    Allergies/ADRs: Reviewed in chart  Past Medical History: Reviewed in chart  Social History     Tobacco Use    Smoking status: Never    Smokeless tobacco: Never   Vaping Use    Vaping status: Never Used   Substance Use Topics    Alcohol use: Yes     Comment: rare    Drug use: No        Medication Adherence/Access: Adherence is reflected well in the dispense report.     Type 2 Diabetes:   Humalog Kwikpen 100 unit/ml soln - inject 15 units twice daily - no longer needing to take  Insulin degludec (Tresiba flextouch) 100 unit/ml pen - injects 40-45 units at bedtime  Jardiance 10 mg take once daily   Tirzepatide (Mounjaro) 5mg/0.5ml pen - inject 5mg weekly on Fridays  Free Style Lora 2 Sensor change every 14 days  Alpha-lipoic acid 600 mg once daily  Gabapentin 300 mg twice daily for neuropathic pain    Blood sugar monitoring: FPG average is 100-110. No lows <70 reported.  "    Patient has been on Mounjaro 5 mg dose for about 4 months and continues to have some diarrhea, constipation, and constant gas. He tried Gas-X as needed to relieve symptoms but only had slight improvement. He finds the diarrhea to be bothersome and occurs 2 days per week. Despite the side effects, he is willing to continue using Mounjaro as he sees the benefits with it controlling his blood sugar levels and also reports having lost significant weight (16-20 lbs).     Feels like lost muscle    Urine Albumin:   Lab Results   Component Value Date    UMALCR 1,839.43 (H) 11/08/2024    UMALCR 1,495.15 (H) 09/13/2023    UMALCR 1,264.01 (H) 01/09/2020      Lab Results   Component Value Date    A1C 6.8 (H) 04/21/2025    A1C 8.0 (H) 02/11/2025    A1C 8.4 (H) 11/08/2024       A1C 7.7% on 7/9/2024  A1C 8.8 % on 4/16/2024   A1C 10.3 % on 1/2/2024.     Lab Results   Component Value Date    GFRESTIMATED 32 (L) 04/21/2025    GFRESTIMATED 33 (L) 04/11/2025    GFRESTIMATED 28 (L) 04/10/2025     BP Readings from Last 1 Encounters:   05/09/25 131/75     Pulse Readings from Last 1 Encounters:   05/09/25 75     Wt Readings from Last 1 Encounters:   05/09/25 181 lb (82.1 kg)     Ht Readings from Last 1 Encounters:   05/09/25 5' 8\" (1.727 m)     Estimated body mass index is 27.52 kg/m  as calculated from the following:    Height as of 5/9/25: 5' 8\" (1.727 m).    Weight as of 5/9/25: 181 lb (82.1 kg).    Wt Readings from Last 12 Encounters:   05/09/25 181 lb (82.1 kg)   05/08/25 172 lb (78 kg)   05/06/25 172 lb 3.2 oz (78.1 kg)   04/25/25 178 lb 9.6 oz (81 kg)   04/24/25 180 lb 14.4 oz (82.1 kg)   04/09/25 185 lb 6.5 oz (84.1 kg)   04/02/25 184 lb 4.8 oz (83.6 kg)   03/20/25 180 lb 1.6 oz (81.7 kg)   02/11/25 178 lb 4.8 oz (80.9 kg)   01/14/25 183 lb (83 kg)   11/08/24 183 lb 3.2 oz (83.1 kg)   10/13/24 185 lb (83.9 kg)         Temp Readings from Last 1 Encounters:   05/06/25 97.4  F (36.3  C) (Oral)         ----------------      I spent " 20 minutes with this patient today. Karla Laura PA-C was provided the recommendations above via routed note and is the authorizing prescriber for this visit through the pharmacist collaborative practice agreement. A copy of the visit note was provided to the patient's provider(s).    The patient was given to the patient a summary of these recommendations.     Theo Gong, PharmD, [facility]  [place]ocrine & [facility] [place]  [address] [place] [id number]    Telemedicine Visit Details  Type of service:  Telephone visit  Start Time: 11:00 AM  End Time: 11:20 AM  Originating Location (pt. Location): Home  Provider has received verbal consent for a visit from the patient? Yes     Medication Therapy Recommendations  No medication therapy recommendations to display

## 2025-06-01 DIAGNOSIS — I82.492 ACUTE EMBOLISM AND THROMBOSIS OF OTHER SPECIFIED DEEP VEIN OF LEFT LOWER EXTREMITY (H): ICD-10-CM

## 2025-06-01 DIAGNOSIS — I70.213 ATHEROSCLEROSIS OF NATIVE ARTERY OF BOTH LOWER EXTREMITIES WITH INTERMITTENT CLAUDICATION: ICD-10-CM

## 2025-06-03 ENCOUNTER — TELEPHONE (OUTPATIENT)
Dept: INTERNAL MEDICINE | Facility: CLINIC | Age: 75
End: 2025-06-03
Payer: COMMERCIAL

## 2025-06-03 NOTE — TELEPHONE ENCOUNTER
Plavix Failed  Rerun Protocol (6/3/2025 4:34 PM)    Normal HGB on file in past 12 months        Recent Labs   Lab Test 04/21/25  1634   HGB 11.2*

## 2025-06-03 NOTE — TELEPHONE ENCOUNTER
Left Voicemail (1st Attempt) for the patient to call back and schedule the following:    Appointment type: Return   Provider: PCP  Return date: August   Specialty phone number: [phone]  Additonal Notes: per check out - Patient wants a call for a 3 month follow up w Dr. Wang

## 2025-06-03 NOTE — PATIENT INSTRUCTIONS
Continue current therapy.     Continue to decrease Tresiba by 2 units every 3 days if fasting sugars are consistently < 100.     Let me know if you have any lows < 70    Endocrine Team & Next Follow-Up:  7/2/25 with Theo  8/5/2025 with Karla Laura PA-C

## 2025-06-04 ENCOUNTER — THERAPY VISIT (OUTPATIENT)
Dept: PHYSICAL THERAPY | Facility: CLINIC | Age: 75
End: 2025-06-04
Payer: COMMERCIAL

## 2025-06-04 DIAGNOSIS — I70.202 POPLITEAL ARTERY OCCLUSION, LEFT: Primary | ICD-10-CM

## 2025-06-04 DIAGNOSIS — I73.9 PAD (PERIPHERAL ARTERY DISEASE): ICD-10-CM

## 2025-06-04 DIAGNOSIS — M79.2 POLYNEUROPATHIC PAIN: ICD-10-CM

## 2025-06-04 PROCEDURE — 97110 THERAPEUTIC EXERCISES: CPT | Mod: GP | Performed by: PHYSICAL THERAPIST

## 2025-06-04 RX ORDER — CLOPIDOGREL BISULFATE 75 MG/1
75 TABLET ORAL DAILY
Qty: 30 TABLET | Refills: 3 | Status: SHIPPED | OUTPATIENT
Start: 2025-06-04

## 2025-06-04 NOTE — PROGRESS NOTES
DISCHARGE  Reason for Discharge: Patient has met all goals except gait speed nearly met.    Equipment Issued: theraband    Discharge Plan: Patient to continue home program.  Pt to contact his doctors.     Referring Provider:  Lizzie Wang

## 2025-06-05 ENCOUNTER — TELEPHONE (OUTPATIENT)
Dept: INTERNAL MEDICINE | Facility: CLINIC | Age: 75
End: 2025-06-05
Payer: COMMERCIAL

## 2025-06-05 NOTE — TELEPHONE ENCOUNTER
Left Voicemail (2nd Attempt) for the patient to call back and schedule the following:    Appointment type: Return   Provider: PCP  Return date: August   Specialty phone number: [phone]  Additonal Notes: per check out - Patient wants a call for a 3 month follow up w Dr. Wang

## 2025-06-05 NOTE — TELEPHONE ENCOUNTER
Patient confirmed scheduled appointment:  Date: 6/6  Time: 8am  Visit type: Return   Provider: Dr. Hare

## 2025-06-05 NOTE — TELEPHONE ENCOUNTER
[facility] Call Center    Phone Message    May a detailed message be left on voicemail: yes     Reason for Call: Other: Pt requesting call back. Pt called and stated he has been a pt of Dr. Hare for over 30 years. Pt stated he has been dealing with blockage issues in his foot for awhile now and it is not getting better. Pt stated he has had surgeries and the issue has still not been resolved. Pt would like to get a recommendation from Dr. Hare on who else pt can see for this issue

## 2025-06-20 DIAGNOSIS — R12 HEARTBURN: ICD-10-CM

## 2025-06-23 RX ORDER — FAMOTIDINE 20 MG/1
20 TABLET, FILM COATED ORAL DAILY
Qty: 90 TABLET | Refills: 3 | Status: SHIPPED | OUTPATIENT
Start: 2025-06-23

## 2025-06-23 NOTE — TELEPHONE ENCOUNTER
Last Written Prescription:   Disp Refills Start End YOCASTA   famotidine (PEPCID) 20 MG tablet 90 tablet 1 11/16/2024 -- No   Sig - Route: TAKE 1 TABLET BY MOUTH EVERY DAY - Oral     ----------------------  Last Visit Date:   8/22/2024  [facility]    Future Visit Date: 0  ----------------------        Refill decision: Medication unable to be refilled by RN due to: Verification - order discrepancy, clarification needed, Sig modification needed : clarification if this should be filled by PCP or cardiology         Request from pharmacy:  Requested Prescriptions   Pending Prescriptions Disp Refills    famotidine (PEPCID) 20 MG tablet [Pharmacy Med Name: FAMOTIDINE 20 MG TABLET] 90 tablet 1     Sig: TAKE 1 TABLET BY MOUTH EVERY DAY       H2 Blockers Protocol Passed - 6/23/2025  9:12 AM        Passed - Patient is age 12 or older        Passed - Medication is active on med list and the sig matches. RN to manually verify dose and sig if red X/fail.     If the protocol passes (green check), you do not need to verify med dose and sig.    A prescription matches if they are the same clinical intention.    For Example: once daily and every morning are the same.    The protocol can not identify upper and lower case letters as matching and will fail.     For Example: Take 1 tablet (50 mg) by mouth daily     TAKE 1 TABLET (50 MG) BY MOUTH DAILY    For all fails (red x), verify dose and sig.    If the refill does match what is on file, the RN can still proceed to approve the refill request.       If they do not match, route to the appropriate provider.             Passed - Medication indicated for associated diagnosis     Medication is associated with one or more of the following diagnoses:  Erosive esophagitis - Gastric hypersecretion   Gastric ulcer   Gastroesophageal reflux disease   Indigestion   Ulcer of duodenum   Esophagitis   Gastritis   Gastrointestinal hemorrhage   Stress ulcer; Prophylaxis    Helicobacter pylori gastrointestinal tract infection - Ulcer of duodenum  Zollinger-Colby syndrome  Cystic Fibrosis  Bronchiectasis            Passed - Recent (12 month) or future (90 days) visit with authorizing provider's specialty (provided they have been seen in the past 15 months)     The patient must have completed an in-person or virtual visit within the past 12 months or has a future visit scheduled within the next 90 days with the authorizing provider s specialty.  Urgent care and e-visits do not qualify as an office visit for this protocol.

## 2025-06-27 ENCOUNTER — TELEPHONE (OUTPATIENT)
Dept: GASTROENTEROLOGY | Facility: CLINIC | Age: 75
End: 2025-06-27
Payer: COMMERCIAL

## 2025-06-27 DIAGNOSIS — Z86.0100 HISTORY OF COLONIC POLYPS: Primary | ICD-10-CM

## 2025-06-27 NOTE — LETTER
2025      Earle Rene  [address]  [place] [address]              Dear Ulises Og Extended Bowel Prep   Prep instructions for your colonoscopy     [facility]; [address], 5th Floor, [place] [address] - Check in location: 5th Floor.  For prep questions, please call: Amb[facility] - [phone] option 3  Website (printable PDF version): [email or web address]    Your colonoscopy prep prescription has been sent to [facility] [id number] [facility] - [place], [place] -  [address]      Please read these instructions carefully at least 7 days prior to your colonoscopy procedure. Be sure to follow all directions completely. The inside of your colon must be clean to allow for a complete examination for the presence of any growths, polyps, and/or abnormalities, as well as their biopsy or removal. A number of tips are included in order to make this part of the procedure as comfortable as possible.                        Getting ready      prescription at the pharmacy. Endoscopy team will order this about 2 weeks before to your scheduled procedure. Included in the kit will be the followin  Dulcolax (Bisacodyl) 5mg tablets  Two containers of Polyethylene glycol (Golytely, Colyte, Nulytely, Gavilyte-G)    A nurse will call you to go over your appointment details and prep instructions.     You must arrange for an adult to drive you home after your exam. Your colonoscopy cannot be done unless you have a ride. If you need to use public transportation, someone must ride with you and stay with you for up to 24 hours.       7 days before procedure     Medications that may need to be held before procedure:     GLP-1 agonist medication for diabetes or weight loss: such as Mounjaro (Tirzepatide).  Ozempic (Semaglutide). Rybelsus (Semaglutide), Tirzepatide-Weight Management (Zepbound), Wegovy (Semaglutide) or  others, holding times may vary based on how you take this medication. This may be up to a 7 day hold. Our pre assessment nurses will call and discuss holding recommendations 1-2 weeks before scheduled procedure.     Blood thinning and/or anti platelet medications: such as Coumadin, Plavix, Xarelto, Eliquis, Lovenox or others, ask your your prescribing provider about holding recommendations.     If you take insulin for diabetes, ask your prescribing provider for instructions on how to manage this medication while preparing for a colonoscopy.     Stop taking iron (ferrous sulfate), multivitamins that contain iron, and/or fiber supplements (Metamucil, Benefiber, Psyllium husk powder, Fibercon, Bran, etc.) 7 days before procedure.     Stop eating whole kernel corn, popcorn, nuts, and foods that contain seeds. These can stay in the colon for many days and they can clog up the colonoscope.     Begin a low-fiber diet. (See examples below). No Olestra (a fat substitute).   Consume no more than 10-15 grams of fiber each day.                         LOW FIBER DIET   You may have: Do not have:    Starches: White bread, rolls, biscuits, croissants, Margarita toast, white flour tortillas, waffles, pancakes, Divehi toast; white rice, noodles, pasta, macaroni; cooked and peeled potatoes; plain crackers, saltines; cooked farina or cream of rice; puffed rice, corn flakes, Rice Krispies, Special K      Vegetables: tender cooked and canned, vegetable broths     Fruits and fruit juices: Strained fruit juice, canned fruit without seeds or skin (not pineapple), applesauce, pear sauce, ripe bananas, melons (not watermelon)     Milk products: Milk (plain or flavored), cheese, cottage cheese, yogurt (no berries), custard, ice cream       Proteins: Tender, well-cooked ground beef, lamb, veal, ham, pork, chicken, turkey, fish or organ meat, Tofu, eggs, creamy peanut butter      Fats and condiments: Margarine, butter, oils, mayonnaise, sour cream,  salad dressing, plain gravy; spices, cooked herbs; sugar, clear jelly, honey, syrup      Snacks, sweets and drinks: Pretzels, hard candy; plain cakes and cookies (no nuts or seeds); gelatin, plain pudding, sherbet, Popsicles; coffee, tea, carbonated ( fizzy ) drinks  Starches: Breads or rolls that contain nuts, seeds or fruit; whole wheat or whole grain breads that contain more than 2 grams of fiber per serving; cornbread; corn or whole wheat tortillas; potatoes with skin; brown rice, wild rice, quinoa, kasha (buckwheat), and oatmeal      Vegetables: Any raw or steamed vegetables; vegetables with seeds; corn in any form      Fruits and fruit juices: Prunes, prune juice, raisins and other dried fruits, berries and other fruits with seeds, canned pineapple juices with pulp such as orange, grapefruit, pineapple or tomato juice     Milk products: Any yogurt with nuts, seeds or berries      Proteins: Tough, fibrous meats with gristle; cooked dried beans, peas or lentils; crunchy peanut butter     Fats and condiments: Pickles, olives, relish, horseradish; jam, marmalade, preserves      Snacks, sweets and drinks: Popcorn, nuts, seeds, granola, coconut, candies made with nuts or seeds; all desserts that contain nuts, seeds, raisins and other dried fruits, coconut, whole grains or bran.       2 days before procedure       You may have a light, low fiber breakfast and lunch. Begin a clear liquid diet AFTER 1 PM. Do not eat solid foods. (See examples below).    Drink at least eight to ten 8-ounce glasses of water throughout the day  ? ? ? ? ? ? ? ?    Fill one container of Golytely with a full gallon of warm water. Cover and shake until well mixed. Chill in refrigerator until it is time to drink solution.             CLEAR LIQUID DIET:  You can have: Do not have:    Water, tea, coffee (no milk or cream)   Soda pop, Gatorade (not red or purple)   Coconut water   Jell-O, Popsicles (no milk or fruit pieces - not red or purple)    Fat-free soup broth or bouillon   Plain hard candy, such as clear life savers (not red or purple)   Clear juices and fruit-flavored drinks, such as apple juice, white grape juice, Hi-C, and Glen-Aid (not red or purple)    Milk or milk products such as ice cream, malts or shakes, or coffee creamer   Red or purple drinks of any kind such as cranberry juice, grape juice, or Glen-Aid. Avoid red or purple Jell-O, Popsicles, sorbet, sherbet and candy.   Juices with pulp such as orange, grapefruit, pineapple, or tomato juice   Cream soups of any kind   Alcohol and beer   Protein drinks or protein powder     Step 1     At 4 PM, take 2 Dulcolax (Bisacodyl) tablets.  At 5 PM, start drinking one 8-ounce glass of Golytely mixture every 15 minutes until the container is HALF empty. Drink each glass quickly. Store the rest in the refrigerator.   Continue to drink clear liquids.      Reminders While Drinking Laxatives:     After you start drinking the solution, stay near a toilet. You may have watery stools (diarrhea), mild cramping, bloating, and nausea. You may want to use Vaseline on the skin around your anus after each bowel movement or use wet wipes to prevent irritation. Bowel movements will be liquid and dark in color at first and then should turn clear yellow in color. Drinking the prepared solution may make you cold, wearing warm clothing can be helpful.    Some find it helpful to:  For added flavor, include a crystal light lemonade packet in each glass of Golytely, rather than mixing it into the entire prepared mixture.  In between each glass, try sucking on a lemon or a piece of hard candy to help diminish the flavor of the preparation.  Drinking from a straw can help minimize the taste of the prepared mixture.    If you have nausea or vomiting during drinking the solution, rinse your mouth with water and take a 15-30 minute break and then continue drinking solution.                         1 day before procedure        Continue on a clear liquid diet. Do not eat solid foods.    Drink at least eight to ten 8-ounce glasses of water throughout the day  ? ? ? ? ? ? ? ?    Step 2     At 4 PM, take 2 Dulcolax (Bisacodyl) tablets.  At 5 PM, start drinking the 2nd half of Golytely mixture. Drink one 8-ounce glass of Golytely mixture every 15 minutes until the container is empty.  Drink each glass quickly.   Continue to drink clear liquids.    Before you go to bed mix the second container of Golytely and place in the refrigerator for the morning.     Day of procedure     Step 3     6 hours before your arrival time, start drinking one 8-ounce glass of Golytely mixture every 15 minutes until the container is HALF empty. You will not drink the entire container. The remaining solution can be discarded.     2 hours before your arrival time stop drinking all liquids, including water.   Do not smoke or swallow anything, including water or gum for at least 2 hours before your arrival time. This is a safety issue. Your procedure could be cancelled if you do not follow directions.  No chewing tobacco 6 hours prior to procedure arrival time.     You may take your necessary morning medications with sips of water (4 ounces).   Do not take diabetes medicine by mouth until after your exam.  If you have asthma, bring your inhalers.  Please perform your nebulizer treatments and airway clearance therapy in the morning prior to the procedure (if applicable).    Arrive with a responsible adult who can drive you home and stay with you for a minimum of 24 hours. The medications used during the procedure will make you sleepy, so you won't be able to drive yourself home.   You cannot use public transportation, ride-share services, or non-medical taxi services without a responsible caregiver. Medical transport services are okay, but a caregiver must be there to receive you at your destination.  Please check in with your  when you arrive. Drivers should  stay on campus.    Expect to be at the procedure center for about 1.5-2.5 hours.    Do not wear jewelry (i.e. earrings, rings, necklaces, watches, etc.). Leave your purse, billfold, credit cards, and other valuables at home.      Bring insurance card and ID.       Answers to Commonly Asked Questions     How soon can I eat after the procedure?  You may resume your normal diet when you feel ready, unless advised otherwise by the doctor performing your procedure. We recommend starting with a light meal.   Do not drink alcohol for 24 hours after your procedure.  You may resume normal activities (work, exercise, etc.) after 24 hours.    How will I feel after the procedure?  It is normal to feel bloated and gassy after your procedure. Walking will help move the air through your colon. You can take non-aspirin pain relievers that contain acetaminophen (Tylenol).  If you are having sedation, we require a responsible adult to take you home for your safety. The sedation medicines used to relax you during the procedure can impair your judgement and reaction time and make you forgetful and possibly a little unsteady.  Do not drive, make any important decisions, or sign any legal documents for 24 hours after your procedure.    When will I get my test results?  You should have your procedure results and any lab results (if applicable) by letter, MyChart message, or phone call within 2 weeks. If you have any questions, please call the doctor that referred you for the procedure.    How do I know if my colon is cleaned out?   After completing the bowel prep, your bowel movements should be all liquid and yellow. Your bowel movements will look similar to urine in the toilet. If there are pieces of stool (poop) in the toilet, or if you can't see to the bottom of the toilet, please call our office for advice. Call [phone] and ask to speak with a nurse.    Why is the Golytely bowel prep taken in several steps?   The stool is flushed  out by a large wave of fluid going through the colon. Just sipping a large volume of the solution will not achieve the desired result. Studies have shown that two smaller waves (or more in some cases) are better than one large one.      What if I need to cancel or reschedule my procedure?  Contact our endoscopy scheduling team at [phone], option 1. Monday through Friday, 7:00am-5:00pm.

## 2025-06-27 NOTE — TELEPHONE ENCOUNTER
Bowel Prep Review:  Disclaimer: No call was made to the patient.     Extended Golytely bowel prep. Bowel prep sent to    [facility] [id number] [facility] - [place], [place] - [address]  Recommended due to CKD noted, diabetes, and GLP-1 agonist medication noted in chart.   Instructions were sent via letter      Jacey Desir LPN  Endoscopy Procedure Pre Assessment

## 2025-06-30 DIAGNOSIS — E11.3399 TYPE 2 DIABETES MELLITUS WITH MODERATE NONPROLIFERATIVE RETINOPATHY WITHOUT MACULAR EDEMA, WITH LONG-TERM CURRENT USE OF INSULIN, UNSPECIFIED LATERALITY (H): ICD-10-CM

## 2025-06-30 DIAGNOSIS — Z79.4 TYPE 2 DIABETES MELLITUS WITH MODERATE NONPROLIFERATIVE RETINOPATHY WITHOUT MACULAR EDEMA, WITH LONG-TERM CURRENT USE OF INSULIN, UNSPECIFIED LATERALITY (H): ICD-10-CM

## 2025-06-30 RX ORDER — BISACODYL 5 MG/1
TABLET, DELAYED RELEASE ORAL
Qty: 4 TABLET | Refills: 0 | Status: SHIPPED | OUTPATIENT
Start: 2025-06-30

## 2025-06-30 NOTE — TELEPHONE ENCOUNTER
Pre visit planning completed.      Procedure details:    Patient scheduled for Colonoscopy on 7/17/25.     Arrival time: 1215. Procedure time 1315    Facility location: [facility]; [address] [place] [address], 5th Floor, [place] [address]. Check in location: 5th Floor.    Sedation type: Conscious sedation     Pre op exam needed? No.    Indication for procedure: history of polyps      Chart review:     Electronic implanted devices? No    Recent diagnosis of diverticulitis within the last 6 weeks? No      Medication review:    Diabetic? Yes. Oral diabetic medications: Jardiance (empagliflozin): HOLD  3 days before procedure.  Diabetic injectables: Mounjaro (Tirzepatide).  Weekly dosing of medication.  HOLD 7 days before procedure.  Follow up with managing provider.   Insulin: Consult with managing provider.     Anticoagulants? Yes Apixaban (Eliquis): Recommended HOLD 2 days before procedure.  Consult with your managing provider.  Clopidogrel (Plavix): Recommended HOLD 5 days before procedure.  Consult with your managing provider.    Weight loss medication/injectable? No. Patient is on GLP-1 medication but for DM (see above).    Other medication HOLDING recommendations:  N/A      Prep for procedure:     Bowel prep recommendation: Extended Golytely. Bowel prep sent to [phone] - [place], [place] - [address]  Due to: constipation noted or reported    Procedure information and instructions sent via letter         Tara Portillo RN  Endoscopy Procedure Pre Assessment   [phone] option 3

## 2025-06-30 NOTE — TELEPHONE ENCOUNTER
Attempted to contact patient in order to complete pre assessment questions.     Called the patient to do his pre assessment but it was not a good time for him. Left a message to return call to [phone] option 3. The patient does not have an active [facility] account.          Jacey Desir LPN  Endoscopy Procedure Pre Assessment

## 2025-07-02 NOTE — TELEPHONE ENCOUNTER
Second call attempt to complete pre assessment.     No answer. Left message to return call to [phone] #3 by next business day prior to 4PM.    Callback communication sent via voicemail due to [employer] inactive.      Jacey Desir LPN  Endoscopy Procedure Pre Assessment

## 2025-07-07 NOTE — TELEPHONE ENCOUNTER
Pre assessment completed for upcoming procedure.   (Please see previous telephone encounter notes for complete details)    Patient returned call.       Procedure details:    Procedure date 7/17/25, arrival time 1215 and facility location reviewed.    Pre op exam needed? No.    Designated  policy reviewed. Instructed to have someone stay 6  hours post procedure.       Medication review:    Medications reviewed. Please see supporting documentation below. Holding recommendations discussed (if applicable).       Prep for procedure:     Procedure prep instructions NOT reviewed.  Patient does not know if they have received their letter. Patient wanted to ask wife or daughter if they received their prep instructions in the mail and review them and would call if they have any questions.      Any additional information needed:  N/A      Patient verbalized understanding and had no questions or concerns at this time.      Bruna Smith RN  Endoscopy Procedure Pre Assessment   [phone] option 3

## 2025-07-17 ENCOUNTER — ANESTHESIA (OUTPATIENT)
Dept: SURGERY | Facility: AMBULATORY SURGERY CENTER | Age: 75
End: 2025-07-17
Payer: COMMERCIAL

## 2025-07-17 ENCOUNTER — HOSPITAL ENCOUNTER (OUTPATIENT)
Facility: AMBULATORY SURGERY CENTER | Age: 75
End: 2025-07-17
Attending: INTERNAL MEDICINE
Payer: COMMERCIAL

## 2025-07-17 ENCOUNTER — ANESTHESIA EVENT (OUTPATIENT)
Dept: SURGERY | Facility: AMBULATORY SURGERY CENTER | Age: 75
End: 2025-07-17
Payer: COMMERCIAL

## 2025-07-17 VITALS — HEART RATE: 78 BPM

## 2025-07-17 VITALS
BODY MASS INDEX: 26.52 KG/M2 | HEIGHT: 68 IN | RESPIRATION RATE: 18 BRPM | DIASTOLIC BLOOD PRESSURE: 78 MMHG | WEIGHT: 175 LBS | OXYGEN SATURATION: 99 % | TEMPERATURE: 97.1 F | SYSTOLIC BLOOD PRESSURE: 137 MMHG | HEART RATE: 77 BPM

## 2025-07-17 LAB
COLONOSCOPY: NORMAL
GLUCOSE BLDC GLUCOMTR-MCNC: 86 MG/DL (ref 70–99)

## 2025-07-17 PROCEDURE — 88305 TISSUE EXAM BY PATHOLOGIST: CPT | Mod: 26 | Performed by: PATHOLOGY

## 2025-07-17 PROCEDURE — 88305 TISSUE EXAM BY PATHOLOGIST: CPT | Mod: TC | Performed by: INTERNAL MEDICINE

## 2025-07-17 RX ORDER — ONDANSETRON 2 MG/ML
4 INJECTION INTRAMUSCULAR; INTRAVENOUS
Status: DISCONTINUED | OUTPATIENT
Start: 2025-07-17 | End: 2025-07-17 | Stop reason: HOSPADM

## 2025-07-17 RX ORDER — NALOXONE HYDROCHLORIDE 0.4 MG/ML
0.4 INJECTION, SOLUTION INTRAMUSCULAR; INTRAVENOUS; SUBCUTANEOUS
Status: ACTIVE | OUTPATIENT
Start: 2025-07-17

## 2025-07-17 RX ORDER — ONDANSETRON 2 MG/ML
4 INJECTION INTRAMUSCULAR; INTRAVENOUS EVERY 6 HOURS PRN
Status: ACTIVE | OUTPATIENT
Start: 2025-07-17

## 2025-07-17 RX ORDER — LIDOCAINE HYDROCHLORIDE 20 MG/ML
INJECTION, SOLUTION INFILTRATION; PERINEURAL PRN
Status: DISCONTINUED | OUTPATIENT
Start: 2025-07-17 | End: 2025-07-17

## 2025-07-17 RX ORDER — PROPOFOL 10 MG/ML
INJECTION, EMULSION INTRAVENOUS PRN
Status: DISCONTINUED | OUTPATIENT
Start: 2025-07-17 | End: 2025-07-17

## 2025-07-17 RX ORDER — PROCHLORPERAZINE MALEATE 5 MG/1
5 TABLET ORAL EVERY 6 HOURS PRN
Status: DISPENSED | OUTPATIENT
Start: 2025-07-17

## 2025-07-17 RX ORDER — SODIUM CHLORIDE, SODIUM LACTATE, POTASSIUM CHLORIDE, CALCIUM CHLORIDE 600; 310; 30; 20 MG/100ML; MG/100ML; MG/100ML; MG/100ML
INJECTION, SOLUTION INTRAVENOUS CONTINUOUS PRN
Status: DISCONTINUED | OUTPATIENT
Start: 2025-07-17 | End: 2025-07-17

## 2025-07-17 RX ORDER — LIDOCAINE 40 MG/G
CREAM TOPICAL
Status: DISCONTINUED | OUTPATIENT
Start: 2025-07-17 | End: 2025-07-17 | Stop reason: HOSPADM

## 2025-07-17 RX ORDER — FLUMAZENIL 0.1 MG/ML
0.2 INJECTION, SOLUTION INTRAVENOUS
Status: ACTIVE | OUTPATIENT
Start: 2025-07-17 | End: 2025-07-18

## 2025-07-17 RX ORDER — NALOXONE HYDROCHLORIDE 0.4 MG/ML
0.2 INJECTION, SOLUTION INTRAMUSCULAR; INTRAVENOUS; SUBCUTANEOUS
Status: ACTIVE | OUTPATIENT
Start: 2025-07-17

## 2025-07-17 RX ORDER — ONDANSETRON 4 MG/1
4 TABLET, ORALLY DISINTEGRATING ORAL EVERY 6 HOURS PRN
Status: ACTIVE | OUTPATIENT
Start: 2025-07-17

## 2025-07-17 RX ORDER — PROPOFOL 10 MG/ML
INJECTION, EMULSION INTRAVENOUS CONTINUOUS PRN
Status: DISCONTINUED | OUTPATIENT
Start: 2025-07-17 | End: 2025-07-17

## 2025-07-17 RX ADMIN — PROPOFOL 200 MCG/KG/MIN: 10 INJECTION, EMULSION INTRAVENOUS at 13:16

## 2025-07-17 RX ADMIN — LIDOCAINE HYDROCHLORIDE 80 MG: 20 INJECTION, SOLUTION INFILTRATION; PERINEURAL at 13:16

## 2025-07-17 RX ADMIN — PROPOFOL 40 MG: 10 INJECTION, EMULSION INTRAVENOUS at 13:19

## 2025-07-17 RX ADMIN — SODIUM CHLORIDE, SODIUM LACTATE, POTASSIUM CHLORIDE, CALCIUM CHLORIDE: 600; 310; 30; 20 INJECTION, SOLUTION INTRAVENOUS at 13:13

## 2025-07-17 RX ADMIN — PROPOFOL 30 MG: 10 INJECTION, EMULSION INTRAVENOUS at 13:30

## 2025-07-17 NOTE — ANESTHESIA PREPROCEDURE EVALUATION
Anesthesia Pre-Procedure Evaluation    Patient: Earle Rene   MRN: [id number] : 1950          Procedure : Procedure(s):  Colonoscopy, Screening, High Risk         Past Medical History:   Diagnosis Date    Blepharitis of both eyes     BPH (benign prostatic hyperplasia)     Diabetes (H)     Diabetic neuropathy (H)     Diabetic retinopathy associated with diabetes mellitus due to underlying condition (H)     Dry eye syndrome     GERD (gastroesophageal reflux disease)     Goiter     Granulomatous disease (H)     HLD (hyperlipidemia)     HTN (hypertension)     Nonsenile cataract     Peripheral neuropathy       Past Surgical History:   Procedure Laterality Date    ------------OTHER-------------      back of neck abscess drainage in OR    AS RAD RESEC TONSIL/PILLARS Bilateral     CATARACT IOL, RT/LT Left     COLONOSCOPY  2013    Procedure: COLONOSCOPY;;  Surgeon: Montana Pascal MD;  Location: [facility]    EXCISE MASS UPPER EXTREMITY Right 2019    Procedure: EXCISION, MASS, UPPER EXTREMITY, RIGHT SHOULDER;  Surgeon: Johana Choudhury MD;  Location: [facility]    INJECT EPIDURAL LUMBAR Left 2023    Procedure: INJECTION, SPINE, LUMBAR, EPIDURAL (L4-L5);  Surgeon: Mahmaed Vázquez MD;  Location: [facility] OR    INJECT SACROILIAC JOINT Bilateral 2022    Procedure: Bilateral sacroiliac joint injection;  Surgeon: Collin Mata MD;  Location: [facility] OR    INTRAVITREAL INJECTION CHEMOTHERAPY Right 2019    Procedure: INTRAVITREAL Bevacizumab injection;  Surgeon: Milton Maki MD;  Location: [facility] OR    IR LOWER EXTREMITY ANGIOGRAM LEFT  2025    IR LOWER EXTREMITY STENT/ATHERECTOMY/PTA  2025    IR PSEUDOANEURYSM INJECTION  2025    PHACOEMULSIFICATION CLEAR CORNEA WITH STANDARD INTRAOCULAR LENS IMPLANT Right 2019    Procedure: PHACOEMULSIFICATION, CATARACT, WITH INTRAOCULAR LENS IMPLANT;  Surgeon: Milton Maki MD;  Location: [facility]    PHACOEMULSIFICATION WITH STANDARD  INTRAOCULAR LENS IMPLANT Left 6/21/2019    Procedure: Left Eye Cataract Removal with Intraocular Lens Implant with Intraoperative Avastin Injection;  Surgeon: Lacey Eugene MD;  [facility]  11/2017      No Known Allergies   Social History     Tobacco Use    Smoking status: Never    Smokeless tobacco: Never   Substance Use Topics    Alcohol use: Yes     Comment: rare      Wt Readings from Last 1 Encounters:   05/09/25 82.1 kg (181 lb)        Anesthesia Evaluation            ROS/MED HX  ENT/Pulmonary: Comment: Sarcoidosis      Neurologic:       Cardiovascular:     (+) Dyslipidemia hypertension- Peripheral Vascular Disease-   -  - -                                      METS/Exercise Tolerance:     Hematologic:       Musculoskeletal:       GI/Hepatic:       Renal/Genitourinary:     (+) renal disease, type: CRI,            Endo:     (+)  type II DM,   Using insulin,                 Psychiatric/Substance Use:       Infectious Disease:       Malignancy:       Other:              Physical Exam  Airway  Mallampati: II  TM distance: >3 FB  Neck ROM: full  Mouth opening: >= 4 cm    Cardiovascular   Rhythm: regular  Rate: normal rate     Dental   (+) Minor Abnormalities - some fillings, tiny chips      Pulmonary - normal examBreath sounds clear to auscultation        Neurological   Other Findings       OUTSIDE LABS:  CBC:   Lab Results   Component Value Date    WBC 6.2 04/21/2025    WBC 5.6 04/11/2025    HGB 11.2 (L) 04/21/2025    HGB 8.3 (L) 04/11/2025    HCT 34.3 (L) 04/21/2025    HCT 24.5 (L) 04/11/2025     04/21/2025     04/11/2025     BMP:   Lab Results   Component Value Date     04/21/2025     04/11/2025    POTASSIUM 4.9 04/21/2025    POTASSIUM 4.7 04/11/2025    CHLORIDE 107 04/21/2025    CHLORIDE 108 (H) 04/11/2025    CO2 18 (L) 04/21/2025    CO2 17 (L) 04/11/2025    BUN 44.2 (H) 04/21/2025    BUN 48.8 (H) 04/11/2025    CR 2.10 (H) 04/21/2025    CR 2.03 (H) 04/11/2025     GLC 77 04/21/2025     (H) 04/11/2025     COAGS:   Lab Results   Component Value Date    PTT 92 (H) 04/06/2025    INR 1.02 04/02/2025     POC:   Lab Results   Component Value Date     (H) 12/30/2019     HEPATIC:   Lab Results   Component Value Date    ALBUMIN 4.2 04/21/2025    PROTTOTAL 7.4 04/21/2025    ALT 30 04/21/2025    AST 30 04/21/2025    ALKPHOS 117 04/21/2025    BILITOTAL 0.4 04/21/2025     OTHER:   Lab Results   Component Value Date    LACT 1.0 04/08/2025    A1C 6.8 (H) 04/21/2025    INDERJIT 9.7 04/21/2025    PHOS 3.6 11/08/2024    MAG 1.9 07/02/2019    LIPASE 37 04/10/2025    AMYLASE 85 05/22/2012    TSH 1.76 01/03/2024    T4 1.00 10/21/2014    CRP <5.0 11/15/2010    SED 11 11/15/2010       Anesthesia Plan    ASA Status:  3      NPO Status: NPO Appropriate   Anesthesia Type: MAC.  Induction: intravenous.  Maintenance: TIVA.   Techniques and Equipment:       - Monitoring Plan: standard ASA monitoring     Consents    Anesthesia Plan(s) and associated risks, benefits, and realistic alternatives discussed. Questions answered and patient/representative(s) expressed understanding.     - Discussed: anesthesiologist     - Discussed with:  Patient               Postoperative Care         Comments:                   Rodney Ash MD    I have reviewed the pertinent notes and labs in the chart from the past 30 days and (re)examined the patient.  Any updates or changes from those notes are reflected in this note.    Clinically Significant Risk Factors Present on Admission                            # DMII: A1C = 6.8 % (Ref range: 0.0 - 5.6 %) within past 6 months        # Financial/Environmental Concerns:

## 2025-07-17 NOTE — ANESTHESIA POSTPROCEDURE EVALUATION
Patient: Earle Rene    Procedure: Procedure(s):  Colonoscopy, Screening, High Risk with polypectomy       Anesthesia Type:  MAC    Note:  Disposition: Outpatient   Postop Pain Control: Uneventful            Sign Out: Well controlled pain   PONV: No   Neuro/Psych: Uneventful            Sign Out: Acceptable/Baseline neuro status   Airway/Respiratory: Uneventful            Sign Out: Acceptable/Baseline resp. status   CV/Hemodynamics: Uneventful            Sign Out: Acceptable CV status; No obvious hypovolemia; No obvious fluid overload   Other NRE: NONE   DID A NON-ROUTINE EVENT OCCUR? No           Last vitals:  Vitals Value Taken Time   /78 07/17/25 14:15   Temp 36.2  C (97.1  F) 07/17/25 14:15   Pulse 77 07/17/25 14:15   Resp 18 07/17/25 14:15   SpO2 99 % 07/17/25 14:15       Electronically Signed By: Narinder Coronado MD  July 17, 2025  2:42 PM

## 2025-07-17 NOTE — H&P
Earle Rene  [id number]  male  75 year old      Reason for procedure/surgery: normal colonoscopy 2013, one TA in 2007    Patient Active Problem List   Diagnosis    Psychological factors associated with another disorder    Mood disorder in conditions classified elsewhere    Occupational problem    Type 2 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, with long-term current use of insulin (H)    Erectile dysfunction    Hyperlipidemia with target LDL less than 100    CTS (carpal tunnel syndrome)    Diabetic polyneuropathy (H)    Presbyopia    Myopia    Cataracts, bilateral    Pain of right thumb    Subcutaneous mass    Combined form of nonsenile cataract of right eye    Colonic polyp    Elevated PSA    Heel fracture    Hyponatremia    Nephrolithiasis    Pain of finger of right hand    Sarcoidosis of lung    Sialoadenitis    Adhesive capsulitis of shoulder    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)    Chronic kidney disease, stage 3 (H)    Peripheral vascular disease, unspecified    Malignant neoplasm of prostate (H)    Chronic bilateral low back pain with bilateral sciatica    Pain of both sacroiliac joints    Lumbar radiculopathy    Mastoiditis of left side    Polyneuropathic pain    PAD (peripheral artery disease)    Acute kidney failure, unspecified (H)       Past Surgical History:    Past Surgical History:   Procedure Laterality Date    ------------OTHER-------------      back of neck abscess drainage in OR    AS RAD RESEC TONSIL/PILLARS Bilateral 1961    CATARACT IOL, RT/LT Left     COLONOSCOPY  7/29/2013    Procedure: COLONOSCOPY;;  Surgeon: Montana Pascal MD;  Location: [facility]    EXCISE MASS UPPER EXTREMITY Right 11/11/2019    Procedure: EXCISION, MASS, UPPER EXTREMITY, RIGHT SHOULDER;  Surgeon: Johana Choudhury MD;  Location: [facility]    INJECT EPIDURAL LUMBAR Left 4/20/2023    Procedure:  INJECTION, SPINE, LUMBAR, EPIDURAL (L4-L5);  Surgeon: Mahamed Vázquez MD;  Location: [facility]    INJECT SACROILIAC JOINT Bilateral 9/13/2022    Procedure: Bilateral sacroiliac joint injection;  Surgeon: Collin Mata MD;  Location: [facility]    INTRAVITREAL INJECTION CHEMOTHERAPY Right 12/30/2019    Procedure: INTRAVITREAL Bevacizumab injection;  Surgeon: Milton Maki MD;  Location: [facility]    IR LOWER EXTREMITY ANGIOGRAM LEFT  4/2/2025    IR LOWER EXTREMITY STENT/ATHERECTOMY/PTA  4/4/2025    IR PSEUDOANEURYSM INJECTION  4/5/2025    PHACOEMULSIFICATION CLEAR CORNEA WITH STANDARD INTRAOCULAR LENS IMPLANT Right 12/30/2019    Procedure: PHACOEMULSIFICATION, CATARACT, WITH INTRAOCULAR LENS IMPLANT;  Surgeon: Milton Maki MD;  Location: [facility]    PHACOEMULSIFICATION WITH STANDARD INTRAOCULAR LENS IMPLANT Left 6/21/2019    Procedure: Left Eye Cataract Removal with Intraocular Lens Implant with Intraoperative Avastin Injection;  Surgeon: Lacey Eugene MD;  Location: [facility]    siladenatis  11/2017       Past Medical History:   Past Medical History:   Diagnosis Date    Blepharitis of both eyes     BPH (benign prostatic hyperplasia)     Diabetes (H)     Diabetic neuropathy (H)     Diabetic retinopathy associated with diabetes mellitus due to underlying condition (H)     Dry eye syndrome     GERD (gastroesophageal reflux disease)     Goiter     Granulomatous disease (H)     HLD (hyperlipidemia)     HTN (hypertension)     Nonsenile cataract     Peripheral neuropathy        Social History:   Social History     Tobacco Use    Smoking status: Never    Smokeless tobacco: Never   Substance Use Topics    Alcohol use: Yes     Comment: rare       Family History:   Family History   Problem Relation Age of Onset    Diabetes Father     Myocardial Infarction Father     Diabetes Brother     Leukemia Brother 44    Glaucoma No family hx of     Macular Degeneration No family hx of     Kidney Disease No family hx of         Allergies: No Known Allergies    Active Medications:   Current Outpatient Medications   Medication Sig Dispense Refill    alpha-lipoic acid 600 MG capsule Take 1 capsule (600 mg) by mouth daily 90 capsule 3    amLODIPine (NORVASC) 5 MG tablet Take 1 tablet (5 mg) by mouth daily. 90 tablet 3    apixaban ANTICOAGULANT (ELIQUIS ANTICOAGULANT) 2.5 MG tablet Take 1 tablet (2.5 mg) by mouth 2 times daily. 60 tablet 2    ARTIFICIAL TEAR OP Apply to eye as needed.      atorvastatin (LIPITOR) 20 MG tablet Take 1 tablet (20 mg) by mouth daily. 90 tablet 2    bisacodyl (DULCOLAX) 5 MG EC tablet Two days prior to exam take two (2) tablets at 4pm. One day prior to exam take two (2) tablets at 4pm 4 tablet 0    blood glucose (NO BRAND SPECIFIED) lancets standard Lancets that go with device, Test 3 times daily 300 each 3    clopidogrel (PLAVIX) 75 MG tablet TAKE 1 TABLET BY MOUTH EVERY DAY 30 tablet 3    Continuous Glucose Sensor (FREESTYLE PETER 2 SENSOR) MISC 1 EACH EVERY 14 DAYS. 1 EACH EVERY 14 DAYS. CHANGE EVERY 14 DAYS. 2 each 5    diclofenac (VOLTAREN) 1 % topical gel Apply 4 g topically 4 times daily as needed for moderate pain. (Patient not taking: Reported on 5/9/2025) 150 g 0    econazole nitrate 1 % external cream Apply topically daily. To feet and toenails. 85 g 5    empagliflozin (JARDIANCE) 10 MG TABS tablet Take 1 tablet (10 mg) by mouth daily. 90 tablet 2    famotidine (PEPCID) 20 MG tablet TAKE 1 TABLET BY MOUTH EVERY DAY 90 tablet 3    gabapentin (NEURONTIN) 300 MG capsule Take 2 capsules (600 mg) by mouth 2 times daily. 60 capsule 2    insulin degludec (TRESIBA FLEXTOUCH) 100 UNIT/ML pen INJECT 50 UNITS SUBCUTANEOUSLY AT BEDTIME. (Patient taking differently: Inject 40 mLs subcutaneously at bedtime. INJECT 40 UNITS SUBCUTANEOUSLY AT BEDTIME.) 45 mL 3    insulin pen needle (B-D U/F) 31G X 5 MM miscellaneous Use 4 time(s) per day.  Please dispense as BD Pen Needle Mini U/F 31G x 5  each 3    loratadine  (CLARITIN) 10 MG tablet Take 1 tablet (10 mg) by mouth daily (Patient not taking: Reported on 5/9/2025) 90 tablet 0    losartan (COZAAR) 100 MG tablet Take 1 tablet (100 mg) by mouth at bedtime. 90 tablet 3    mirabegron (MYRBETRIQ) 25 MG 24 hr tablet Take 1 tablet (25 mg) by mouth daily. 90 tablet 3    ondansetron (ZOFRAN ODT) 4 MG ODT tab Take 1 tablet (4 mg) by mouth every 6 hours as needed for nausea. 30 tablet 0    polyethylene glycol (GOLYTELY) 236 g suspension Two days before procedure at 5PM fill first container with water. Mix and drink an 8 oz glass every 15 minutes until HALF of the container is gone. Place the remainder in the refrigerator. One day before procedure at 5PM drink second half of bowel prep. Drink an 8 oz glass every 15 minutes until it is gone. Day of procedure 6 hours before arrival time fill the 2nd container with water. Mix and drink an 8 oz glass every 15 minutes until HALF of the container is gone. Discard the remaining solution. 8000 mL 0    polyethylene glycol (MIRALAX) 17 GM/Dose powder Take 17 g by mouth daily as needed for constipation. 510 g 0    sodium bicarbonate 650 MG tablet Take 1 tablet (650 mg) by mouth 2 times daily. 180 tablet 3    tamsulosin (FLOMAX) 0.4 MG capsule Take 2 capsules (0.8 mg) by mouth daily. For more refills,schedule an appointment at [phone] 180 capsule 3    tirzepatide (MOUNJARO) 5 MG/0.5ML SOAJ auto-injector pen Inject 0.5 mLs (5 mg) subcutaneously once a week. 2 mL 11    triamcinolone (KENALOG) 0.1 % external cream Apply topically 2 times daily 30 g 0    vitamin C 250 MG tablet Take 250 mg by mouth daily.      VITAMIN D3 25 MCG (1000 UT) tablet TAKE 2 TABLETS (50 MCG) BY MOUTH DAILY (Patient taking differently: Take 1 tablet by mouth 2 times daily.) 180 tablet 3       Systemic Review:   CONSTITUTIONAL: NEGATIVE for fever, chills, change in weight  ENT/MOUTH: NEGATIVE for ear, mouth and throat problems  RESP: NEGATIVE for significant cough or  "SOB  CV: NEGATIVE for chest pain, palpitations or peripheral edema    Physical Examination:   Vital Signs: /82 (BP Location: Right arm)   Pulse 80   Temp 97.9  F (36.6  C) (Temporal)   Resp 16   Ht 1.727 m (5' 8\")   Wt 79.4 kg (175 lb)   SpO2 100%   BMI 26.61 kg/m    GENERAL: healthy, alert and no distress  NECK: no adenopathy, no asymmetry, masses, or scars  RESP: lungs clear to auscultation - no rales, rhonchi or wheezes  CV: regular rate and rhythm, normal S1 S2, no S3 or S4, no murmur, click or rub, no peripheral edema and peripheral pulses strong  ABDOMEN: soft, nontender, no hepatosplenomegaly, no masses and bowel sounds normal  MS: no gross musculoskeletal defects noted, no edema    Plan: Appropriate to proceed as scheduled.      Elisa Cordova MD  7/17/2025    PCP:  Lizzie Wang  "

## 2025-07-17 NOTE — ANESTHESIA CARE TRANSFER NOTE
Patient: Earle Rene    Procedure: Procedure(s):  Colonoscopy, Screening, High Risk with polypectomy       Diagnosis: History of colonic polyps [Z86.0100]  Diagnosis Additional Information: No value filed.    Anesthesia Type:   MAC     Note:    Oropharynx: oropharynx clear of all foreign objects and spontaneously breathing  Level of Consciousness: awake  Oxygen Supplementation: room air    Independent Airway: airway patency satisfactory and stable  Dentition: dentition unchanged  Vital Signs Stable: post-procedure vital signs reviewed and stable  Report to RN Given: handoff report given  Patient transferred to: Phase II    Handoff Report: Identifed the Patient, Identified the Reponsible Provider, Reviewed the pertinent medical history, Discussed the surgical course, Reviewed Intra-OP anesthesia mangement and issues during anesthesia, Set expectations for post-procedure period and Allowed opportunity for questions and acknowledgement of understanding      Vitals:  Vitals Value Taken Time   /82 07/17/25 13:54   Temp 97.0    Pulse 84 07/17/25 13:54   Resp 16    SpO2 99 % 07/17/25 13:56   Vitals shown include unfiled device data.    Electronically Signed By: RAJNI Finley CRNA  July 17, 2025  1:57 PM

## 2025-07-22 LAB
PATH REPORT.COMMENTS IMP SPEC: NORMAL
PATH REPORT.COMMENTS IMP SPEC: NORMAL
PATH REPORT.FINAL DX SPEC: NORMAL
PATH REPORT.GROSS SPEC: NORMAL
PATH REPORT.MICROSCOPIC SPEC OTHER STN: NORMAL
PATH REPORT.RELEVANT HX SPEC: NORMAL
PHOTO IMAGE: NORMAL

## 2025-08-05 ENCOUNTER — OFFICE VISIT (OUTPATIENT)
Dept: ENDOCRINOLOGY | Facility: CLINIC | Age: 75
End: 2025-08-05
Payer: COMMERCIAL

## 2025-08-05 VITALS
BODY MASS INDEX: 27.06 KG/M2 | HEART RATE: 84 BPM | SYSTOLIC BLOOD PRESSURE: 112 MMHG | WEIGHT: 178 LBS | OXYGEN SATURATION: 99 % | DIASTOLIC BLOOD PRESSURE: 73 MMHG

## 2025-08-05 DIAGNOSIS — E11.3213 TYPE 2 DIABETES MELLITUS WITH BOTH EYES AFFECTED BY MILD NONPROLIFERATIVE RETINOPATHY AND MACULAR EDEMA, WITH LONG-TERM CURRENT USE OF INSULIN (H): Primary | ICD-10-CM

## 2025-08-05 DIAGNOSIS — Z79.4 TYPE 2 DIABETES MELLITUS WITH BOTH EYES AFFECTED BY MILD NONPROLIFERATIVE RETINOPATHY AND MACULAR EDEMA, WITH LONG-TERM CURRENT USE OF INSULIN (H): Primary | ICD-10-CM

## 2025-08-05 LAB
EST. AVERAGE GLUCOSE BLD GHB EST-MCNC: 177 MG/DL
HBA1C MFR BLD: 7.8 %

## 2025-08-05 PROCEDURE — 83036 HEMOGLOBIN GLYCOSYLATED A1C: CPT | Performed by: PATHOLOGY

## 2025-08-05 ASSESSMENT — PAIN SCALES - GENERAL: PAINLEVEL_OUTOF10: MILD PAIN (2)

## 2025-08-06 ENCOUNTER — PATIENT OUTREACH (OUTPATIENT)
Dept: CARE COORDINATION | Facility: CLINIC | Age: 75
End: 2025-08-06
Payer: COMMERCIAL

## 2025-08-12 DIAGNOSIS — Z79.4 TYPE 2 DIABETES MELLITUS WITH BOTH EYES AFFECTED BY MILD NONPROLIFERATIVE RETINOPATHY AND MACULAR EDEMA, WITH LONG-TERM CURRENT USE OF INSULIN (H): Primary | ICD-10-CM

## 2025-08-12 DIAGNOSIS — E11.3213 TYPE 2 DIABETES MELLITUS WITH BOTH EYES AFFECTED BY MILD NONPROLIFERATIVE RETINOPATHY AND MACULAR EDEMA, WITH LONG-TERM CURRENT USE OF INSULIN (H): Primary | ICD-10-CM

## 2025-08-16 DIAGNOSIS — M79.2 POLYNEUROPATHIC PAIN: ICD-10-CM

## 2025-08-16 DIAGNOSIS — E11.42 DIABETIC POLYNEUROPATHY ASSOCIATED WITH TYPE 2 DIABETES MELLITUS (H): ICD-10-CM

## 2025-08-18 RX ORDER — GABAPENTIN 300 MG/1
600 CAPSULE ORAL 2 TIMES DAILY
Qty: 360 CAPSULE | Refills: 1 | Status: SHIPPED | OUTPATIENT
Start: 2025-08-18

## 2025-08-19 ENCOUNTER — OFFICE VISIT (OUTPATIENT)
Dept: OPHTHALMOLOGY | Facility: CLINIC | Age: 75
End: 2025-08-19
Attending: OPHTHALMOLOGY
Payer: COMMERCIAL

## 2025-08-19 DIAGNOSIS — E11.3213 TYPE 2 DIABETES MELLITUS WITH BOTH EYES AFFECTED BY MILD NONPROLIFERATIVE RETINOPATHY AND MACULAR EDEMA, WITH LONG-TERM CURRENT USE OF INSULIN (H): Primary | ICD-10-CM

## 2025-08-19 DIAGNOSIS — Z96.1 PSEUDOPHAKIA OF BOTH EYES: ICD-10-CM

## 2025-08-19 DIAGNOSIS — Z79.4 TYPE 2 DIABETES MELLITUS WITH BOTH EYES AFFECTED BY MILD NONPROLIFERATIVE RETINOPATHY AND MACULAR EDEMA, WITH LONG-TERM CURRENT USE OF INSULIN (H): Primary | ICD-10-CM

## 2025-08-19 DIAGNOSIS — H04.123 DRY EYE SYNDROME OF BOTH EYES: ICD-10-CM

## 2025-08-19 PROCEDURE — 92134 CPTRZ OPH DX IMG PST SGM RTA: CPT | Mod: 26 | Performed by: OPHTHALMOLOGY

## 2025-08-19 PROCEDURE — 92014 COMPRE OPH EXAM EST PT 1/>: CPT | Performed by: OPHTHALMOLOGY

## 2025-08-19 PROCEDURE — 92134 CPTRZ OPH DX IMG PST SGM RTA: CPT | Performed by: OPHTHALMOLOGY

## 2025-08-19 PROCEDURE — 2022F DILAT RTA XM EVC RTNOPTHY: CPT | Performed by: OPHTHALMOLOGY

## 2025-08-19 PROCEDURE — G0463 HOSPITAL OUTPT CLINIC VISIT: HCPCS | Performed by: OPHTHALMOLOGY

## 2025-08-19 ASSESSMENT — VISUAL ACUITY
OS_SC: 20/100
METHOD: SNELLEN - LINEAR
OS_PH_SC+: -1
OD_SC+: -2
OS_PH_SC: 20/60
OD_SC: 20/25

## 2025-08-19 ASSESSMENT — CONF VISUAL FIELD
OD_SUPERIOR_TEMPORAL_RESTRICTION: 3
OS_SUPERIOR_NASAL_RESTRICTION: 3
METHOD: COUNTING FINGERS
OS_INFERIOR_NASAL_RESTRICTION: 3
OD_SUPERIOR_NASAL_RESTRICTION: 3

## 2025-08-19 ASSESSMENT — TONOMETRY
OD_IOP_MMHG: 16
IOP_METHOD: TONOPEN
OS_IOP_MMHG: 12

## 2025-08-19 ASSESSMENT — CUP TO DISC RATIO
OS_RATIO: 0.3
OD_RATIO: 0.3

## 2025-08-19 ASSESSMENT — SLIT LAMP EXAM - LIDS
COMMENTS: BLEPHARITIS, SCURF, DERMATOCHALASIS
COMMENTS: BLEPHARITIS, SCURF, DERMATOCHALASIS

## 2025-08-19 ASSESSMENT — EXTERNAL EXAM - RIGHT EYE: OD_EXAM: NORMAL

## 2025-08-19 ASSESSMENT — EXTERNAL EXAM - LEFT EYE: OS_EXAM: NORMAL

## 2025-09-02 ENCOUNTER — VIRTUAL VISIT (OUTPATIENT)
Dept: PHARMACY | Facility: CLINIC | Age: 75
End: 2025-09-02
Attending: PHYSICIAN ASSISTANT
Payer: COMMERCIAL

## 2025-09-02 DIAGNOSIS — Z79.4 TYPE 2 DIABETES MELLITUS WITH HYPERGLYCEMIA, WITH LONG-TERM CURRENT USE OF INSULIN (H): Primary | ICD-10-CM

## 2025-09-02 DIAGNOSIS — E11.65 TYPE 2 DIABETES MELLITUS WITH HYPERGLYCEMIA, WITH LONG-TERM CURRENT USE OF INSULIN (H): Primary | ICD-10-CM

## 2025-09-02 RX ORDER — BLOOD-GLUCOSE SENSOR
EACH MISCELLANEOUS
Qty: 2 EACH | Refills: 11 | Status: SHIPPED | OUTPATIENT
Start: 2025-09-02

## 2025-09-03 RX ORDER — TIRZEPATIDE 5 MG/.5ML
5 INJECTION, SOLUTION SUBCUTANEOUS WEEKLY
Qty: 2 ML | Refills: 11 | Status: SHIPPED | OUTPATIENT
Start: 2025-09-03

## (undated) DEVICE — ADH SKIN CLOSURE PREMIERPRO EXOFIN 1.0ML 3470

## (undated) DEVICE — EYE CANN IRR 25GA CYSTOTOME 581610

## (undated) DEVICE — TRAY PAIN INJECTION 97A 640

## (undated) DEVICE — PREP CHLORAPREP 26ML TINTED ORANGE  260815

## (undated) DEVICE — EYE KNIFE STILETTO VISITEC 1.1MM ANG 45DEG SIDEPORT 376620

## (undated) DEVICE — SU MONOCRYL 4-0 PS-2 18" UND Y496G

## (undated) DEVICE — EYE TIP IRRIGATION & ASPIRATION POLYMER CVD 0.3MM 8065751512

## (undated) DEVICE — LINEN TOWEL PACK X5 5464

## (undated) DEVICE — GLOVE PROTEXIS POWDER FREE SMT 7.0  2D72PT70X

## (undated) DEVICE — PREP CHLORAPREP W/ORANGE TINT 10.5ML 260715

## (undated) DEVICE — EYE CANN IRR 27GA ANTERIOR CHAMBER 581280

## (undated) DEVICE — NDL SPINAL 22GA 3.5" QUINCKE 405181

## (undated) DEVICE — EYE SHIELD PLASTIC

## (undated) DEVICE — PREP BALL KERLIX ROUND LATEX

## (undated) DEVICE — GLOVE PROTEXIS BLUE W/NEU-THERA 7.0  2D73EB70

## (undated) DEVICE — EYE PACK CUSTOM ANTERIOR 30DEG TIP CENTURION PPK6682-04

## (undated) DEVICE — DRSG GAUZE 4X4" TRAY 6939

## (undated) DEVICE — PACK HAND CUSTOM ASC

## (undated) DEVICE — GLOVE PROTEXIS MICRO 7.5  2D73PM75

## (undated) DEVICE — SPONGE KITTNER 30-101

## (undated) DEVICE — PACK CATARACT CUSTOM ASC SEY15CPUMC

## (undated) DEVICE — SU VICRYL 3-0 SH 27" J316H

## (undated) DEVICE — APPLICATOR COTTON TIP 3" PKG OF 10 34831010

## (undated) DEVICE — GLOVE PROTEXIS POWDER FREE SMT 8.0  2D72PT80X

## (undated) DEVICE — EYE KNIFE SLIT XSTAR VISITEC 2.6MM 45DEG 373726

## (undated) DEVICE — SYR 10ML LL W/O NDL

## (undated) DEVICE — SYR 01ML 27GA 0.5" ECLIPSE 305789

## (undated) DEVICE — LINEN TOWEL PACK X6 WHITE 5487

## (undated) DEVICE — DRSG PRIMAPORE 03 1/8X6" 66000318

## (undated) DEVICE — GLOVE PROTEXIS POWDER FREE SMT 6.5  2D72PT65X

## (undated) DEVICE — EYE PREP BETADINE 5% SOLUTION 30ML 0065-0411-30

## (undated) DEVICE — GLOVE PROTEXIS MICRO 6.5  2D73PM65

## (undated) DEVICE — SOL NACL 0.9% IRRIG 1000ML BOTTLE 2F7124

## (undated) RX ORDER — GABAPENTIN 300 MG/1
CAPSULE ORAL
Status: DISPENSED
Start: 2019-06-20

## (undated) RX ORDER — MOXIFLOXACIN IN NACL,ISO-OS/PF 0.3MG/0.3
SYRINGE (ML) INTRAOCULAR
Status: DISPENSED
Start: 2019-06-21

## (undated) RX ORDER — FENTANYL CITRATE 50 UG/ML
INJECTION, SOLUTION INTRAMUSCULAR; INTRAVENOUS
Status: DISPENSED
Start: 2025-04-04

## (undated) RX ORDER — LIDOCAINE HYDROCHLORIDE AND EPINEPHRINE 10; 10 MG/ML; UG/ML
INJECTION, SOLUTION INFILTRATION; PERINEURAL
Status: DISPENSED
Start: 2019-11-11

## (undated) RX ORDER — LIDOCAINE HYDROCHLORIDE 10 MG/ML
INJECTION, SOLUTION INFILTRATION; PERINEURAL
Status: DISPENSED
Start: 2017-07-27

## (undated) RX ORDER — BEVACIZUMAB 2.5 MG/0.1
SYRINGE (ML) INTRAOCULAR
Status: DISPENSED
Start: 2019-06-21

## (undated) RX ORDER — DIPHENHYDRAMINE HYDROCHLORIDE 50 MG/ML
INJECTION, SOLUTION INTRAMUSCULAR; INTRAVENOUS
Status: DISPENSED
Start: 2025-04-04

## (undated) RX ORDER — DIPHENHYDRAMINE HYDROCHLORIDE 50 MG/ML
INJECTION, SOLUTION INTRAMUSCULAR; INTRAVENOUS
Status: DISPENSED
Start: 2025-04-02

## (undated) RX ORDER — HEPARIN SODIUM 200 [USP'U]/100ML
INJECTION, SOLUTION INTRAVENOUS
Status: DISPENSED
Start: 2025-04-04

## (undated) RX ORDER — LIDOCAINE HYDROCHLORIDE 10 MG/ML
INJECTION, SOLUTION EPIDURAL; INFILTRATION; INTRACAUDAL; PERINEURAL
Status: DISPENSED
Start: 2019-11-11

## (undated) RX ORDER — HEPARIN SODIUM 1000 [USP'U]/ML
INJECTION, SOLUTION INTRAVENOUS; SUBCUTANEOUS
Status: DISPENSED
Start: 2025-04-04

## (undated) RX ORDER — BUPIVACAINE HYDROCHLORIDE 2.5 MG/ML
INJECTION, SOLUTION EPIDURAL; INFILTRATION; INTRACAUDAL
Status: DISPENSED
Start: 2019-11-11

## (undated) RX ORDER — ACETAMINOPHEN 325 MG/1
TABLET ORAL
Status: DISPENSED
Start: 2019-06-18

## (undated) RX ORDER — HYDRALAZINE HYDROCHLORIDE 20 MG/ML
INJECTION INTRAMUSCULAR; INTRAVENOUS
Status: DISPENSED
Start: 2025-04-04

## (undated) RX ORDER — LIDOCAINE HYDROCHLORIDE 10 MG/ML
INJECTION, SOLUTION EPIDURAL; INFILTRATION; INTRACAUDAL; PERINEURAL
Status: DISPENSED
Start: 2025-04-04

## (undated) RX ORDER — FENTANYL CITRATE 50 UG/ML
INJECTION, SOLUTION INTRAMUSCULAR; INTRAVENOUS
Status: DISPENSED
Start: 2025-04-02

## (undated) RX ORDER — SIMETHICONE 40MG/0.6ML
SUSPENSION, DROPS(FINAL DOSAGE FORM)(ML) ORAL
Status: DISPENSED
Start: 2019-12-30

## (undated) RX ORDER — BETAMETHASONE SODIUM PHOSPHATE AND BETAMETHASONE ACETATE 3; 3 MG/ML; MG/ML
INJECTION, SUSPENSION INTRA-ARTICULAR; INTRALESIONAL; INTRAMUSCULAR; SOFT TISSUE
Status: DISPENSED
Start: 2019-12-30

## (undated) RX ORDER — NITROGLYCERIN 5 MG/ML
VIAL (ML) INTRAVENOUS
Status: DISPENSED
Start: 2025-04-02

## (undated) RX ORDER — ACETAMINOPHEN 325 MG/1
TABLET ORAL
Status: DISPENSED
Start: 2019-06-20

## (undated) RX ORDER — HEPARIN SODIUM 1000 [USP'U]/ML
INJECTION, SOLUTION INTRAVENOUS; SUBCUTANEOUS
Status: DISPENSED
Start: 2025-04-02

## (undated) RX ORDER — VERAPAMIL HYDROCHLORIDE 2.5 MG/ML
INJECTION, SOLUTION INTRAVENOUS
Status: DISPENSED
Start: 2025-04-02

## (undated) RX ORDER — HEPARIN SODIUM 10000 [USP'U]/100ML
INJECTION, SOLUTION INTRAVENOUS
Status: DISPENSED
Start: 2025-04-04

## (undated) RX ORDER — LIDOCAINE HYDROCHLORIDE 10 MG/ML
INJECTION, SOLUTION EPIDURAL; INFILTRATION; INTRACAUDAL; PERINEURAL
Status: DISPENSED
Start: 2025-04-05

## (undated) RX ORDER — ACETAMINOPHEN 325 MG/1
TABLET ORAL
Status: DISPENSED
Start: 2019-12-30

## (undated) RX ORDER — SIMETHICONE 40MG/0.6ML
SUSPENSION, DROPS(FINAL DOSAGE FORM)(ML) ORAL
Status: DISPENSED
Start: 2019-06-21

## (undated) RX ORDER — CEFAZOLIN SODIUM 2 G/50ML
SOLUTION INTRAVENOUS
Status: DISPENSED
Start: 2019-06-20

## (undated) RX ORDER — HEPARIN SODIUM 200 [USP'U]/100ML
INJECTION, SOLUTION INTRAVENOUS
Status: DISPENSED
Start: 2025-04-02

## (undated) RX ORDER — LIDOCAINE HYDROCHLORIDE 10 MG/ML
INJECTION, SOLUTION EPIDURAL; INFILTRATION; INTRACAUDAL; PERINEURAL
Status: DISPENSED
Start: 2025-04-02

## (undated) RX ORDER — GENTAMICIN 40 MG/ML
INJECTION, SOLUTION INTRAMUSCULAR; INTRAVENOUS
Status: DISPENSED
Start: 2017-07-27

## (undated) RX ORDER — SODIUM CHLORIDE 9 MG/ML
INJECTION, SOLUTION INTRAVENOUS
Status: DISPENSED
Start: 2025-04-02

## (undated) RX ORDER — ACETAMINOPHEN 325 MG/1
TABLET ORAL
Status: DISPENSED
Start: 2019-06-21

## (undated) RX ORDER — BEVACIZUMAB 2.5 MG/0.1
SYRINGE (ML) INTRAOCULAR
Status: DISPENSED
Start: 2019-12-30